# Patient Record
Sex: FEMALE | Race: WHITE | Employment: OTHER | URBAN - METROPOLITAN AREA
[De-identification: names, ages, dates, MRNs, and addresses within clinical notes are randomized per-mention and may not be internally consistent; named-entity substitution may affect disease eponyms.]

---

## 2018-04-13 PROBLEM — R29.810 FACIAL DROOP: Status: ACTIVE | Noted: 2018-04-13

## 2018-05-08 PROBLEM — Z95.0 PACEMAKER: Chronic | Status: ACTIVE | Noted: 2018-05-08

## 2018-05-08 PROBLEM — I50.42 CHRONIC COMBINED SYSTOLIC AND DIASTOLIC CHF (CONGESTIVE HEART FAILURE) (HCC): Chronic | Status: ACTIVE | Noted: 2018-05-08

## 2018-05-08 PROBLEM — R07.9 CHEST PAIN: Status: ACTIVE | Noted: 2018-05-08

## 2018-05-08 PROBLEM — D33.2 BRAIN TUMOR (BENIGN) (HCC): Status: ACTIVE | Noted: 2018-05-08

## 2018-05-08 PROBLEM — E66.9 OBESITY (BMI 30-39.9): Chronic | Status: ACTIVE | Noted: 2018-05-08

## 2018-05-08 PROBLEM — R29.810 FACIAL DROOP: Status: RESOLVED | Noted: 2018-04-13 | Resolved: 2018-05-08

## 2018-05-08 PROBLEM — D33.2 BRAIN TUMOR (BENIGN) (HCC): Chronic | Status: ACTIVE | Noted: 2018-05-08

## 2018-06-13 PROBLEM — I50.9 CHF (CONGESTIVE HEART FAILURE) (HCC): Status: ACTIVE | Noted: 2018-06-13

## 2018-06-14 PROBLEM — R07.89 ATYPICAL CHEST PAIN: Status: ACTIVE | Noted: 2018-05-08

## 2018-07-14 ENCOUNTER — APPOINTMENT (OUTPATIENT)
Dept: GENERAL RADIOLOGY | Age: 58
End: 2018-07-14
Payer: MEDICARE

## 2018-07-14 ENCOUNTER — HOSPITAL ENCOUNTER (EMERGENCY)
Age: 58
Discharge: HOME | End: 2018-07-15
Attending: EMERGENCY MEDICINE
Payer: MEDICARE

## 2018-07-14 DIAGNOSIS — R55 NEAR SYNCOPE: Primary | ICD-10-CM

## 2018-07-14 DIAGNOSIS — E86.0 DEHYDRATION: ICD-10-CM

## 2018-07-14 DIAGNOSIS — R73.9 HYPERGLYCEMIA: ICD-10-CM

## 2018-07-14 LAB
A/G RATIO: 1.4 (ref 1.1–2.2)
ALBUMIN SERPL-MCNC: 3.7 G/DL (ref 3.4–5)
ALP BLD-CCNC: 115 U/L (ref 40–129)
ALT SERPL-CCNC: 9 U/L (ref 10–40)
ANION GAP SERPL CALCULATED.3IONS-SCNC: 15 MMOL/L (ref 3–16)
AST SERPL-CCNC: 11 U/L (ref 15–37)
BASOPHILS ABSOLUTE: 0.1 K/UL (ref 0–0.2)
BASOPHILS RELATIVE PERCENT: 1.1 %
BILIRUB SERPL-MCNC: <0.2 MG/DL (ref 0–1)
BUN BLDV-MCNC: 11 MG/DL (ref 7–20)
CALCIUM SERPL-MCNC: 8.5 MG/DL (ref 8.3–10.6)
CHLORIDE BLD-SCNC: 94 MMOL/L (ref 99–110)
CO2: 19 MMOL/L (ref 21–32)
CREAT SERPL-MCNC: 0.7 MG/DL (ref 0.6–1.1)
EOSINOPHILS ABSOLUTE: 0.4 K/UL (ref 0–0.6)
EOSINOPHILS RELATIVE PERCENT: 5.6 %
GFR AFRICAN AMERICAN: >60
GFR NON-AFRICAN AMERICAN: >60
GLOBULIN: 2.7 G/DL
GLUCOSE BLD-MCNC: 598 MG/DL (ref 70–99)
HCT VFR BLD CALC: 38.8 % (ref 36–48)
HEMOGLOBIN: 13.2 G/DL (ref 12–16)
LYMPHOCYTES ABSOLUTE: 2.3 K/UL (ref 1–5.1)
LYMPHOCYTES RELATIVE PERCENT: 29 %
MCH RBC QN AUTO: 31.6 PG (ref 26–34)
MCHC RBC AUTO-ENTMCNC: 34.1 G/DL (ref 31–36)
MCV RBC AUTO: 92.6 FL (ref 80–100)
MONOCYTES ABSOLUTE: 0.6 K/UL (ref 0–1.3)
MONOCYTES RELATIVE PERCENT: 7.6 %
NEUTROPHILS ABSOLUTE: 4.4 K/UL (ref 1.7–7.7)
NEUTROPHILS RELATIVE PERCENT: 56.7 %
PDW BLD-RTO: 12.7 % (ref 12.4–15.4)
PLATELET # BLD: 243 K/UL (ref 135–450)
PMV BLD AUTO: 8.5 FL (ref 5–10.5)
POTASSIUM SERPL-SCNC: 4.2 MMOL/L (ref 3.5–5.1)
RBC # BLD: 4.19 M/UL (ref 4–5.2)
SODIUM BLD-SCNC: 128 MMOL/L (ref 136–145)
TOTAL PROTEIN: 6.4 G/DL (ref 6.4–8.2)
TROPONIN: <0.01 NG/ML
WBC # BLD: 7.8 K/UL (ref 4–11)

## 2018-07-14 PROCEDURE — 99284 EMERGENCY DEPT VISIT MOD MDM: CPT

## 2018-07-14 PROCEDURE — 85025 COMPLETE CBC W/AUTO DIFF WBC: CPT

## 2018-07-14 PROCEDURE — 93005 ELECTROCARDIOGRAM TRACING: CPT | Performed by: EMERGENCY MEDICINE

## 2018-07-14 PROCEDURE — 80053 COMPREHEN METABOLIC PANEL: CPT

## 2018-07-14 PROCEDURE — 71045 X-RAY EXAM CHEST 1 VIEW: CPT

## 2018-07-14 PROCEDURE — 84484 ASSAY OF TROPONIN QUANT: CPT

## 2018-07-14 ASSESSMENT — PAIN SCALES - GENERAL: PAINLEVEL_OUTOF10: 10

## 2018-07-14 ASSESSMENT — PAIN DESCRIPTION - LOCATION: LOCATION: CHEST

## 2018-07-15 VITALS
HEART RATE: 75 BPM | TEMPERATURE: 98.3 F | WEIGHT: 189 LBS | HEIGHT: 63 IN | RESPIRATION RATE: 18 BRPM | SYSTOLIC BLOOD PRESSURE: 122 MMHG | DIASTOLIC BLOOD PRESSURE: 87 MMHG | OXYGEN SATURATION: 95 % | BODY MASS INDEX: 33.49 KG/M2

## 2018-07-15 LAB
EKG ATRIAL RATE: 91 BPM
EKG DIAGNOSIS: NORMAL
EKG P AXIS: 23 DEGREES
EKG P-R INTERVAL: 166 MS
EKG Q-T INTERVAL: 392 MS
EKG QRS DURATION: 92 MS
EKG QTC CALCULATION (BAZETT): 482 MS
EKG R AXIS: -31 DEGREES
EKG T AXIS: 80 DEGREES
EKG VENTRICULAR RATE: 91 BPM

## 2018-07-15 PROCEDURE — 2580000003 HC RX 258: Performed by: EMERGENCY MEDICINE

## 2018-07-15 PROCEDURE — 96360 HYDRATION IV INFUSION INIT: CPT

## 2018-07-15 PROCEDURE — 93010 ELECTROCARDIOGRAM REPORT: CPT | Performed by: INTERNAL MEDICINE

## 2018-07-15 RX ORDER — 0.9 % SODIUM CHLORIDE 0.9 %
1000 INTRAVENOUS SOLUTION INTRAVENOUS ONCE
Status: COMPLETED | OUTPATIENT
Start: 2018-07-15 | End: 2018-07-15

## 2018-07-15 RX ADMIN — SODIUM CHLORIDE 1000 ML: 9 INJECTION, SOLUTION INTRAVENOUS at 01:30

## 2018-07-15 NOTE — ED NOTES
Bed: 17  Expected date:   Expected time:   Means of arrival:   Comments:     Monica Lozano RN  07/14/18 4720

## 2018-07-15 NOTE — ED PROVIDER NOTES
Emergency Department Encounter  [facility] ED    Patient: Gabriella Coats  MRN: [id number]  : 1960  Date of Evaluation: 2018  ED Provider: Da Simmons DO    Chief Complaint       Chief Complaint   Patient presents with    Loss of Consciousness     lightheadedness all day, chest pain \"for a long time\". h/o pacemaker/defibrillator. syncopal episode unknown if defibrillator fired. Johnson Elizabeth is a 65 y.o. female who presents to the emergency department For chief complaint of lightheadedness. Patient states that she's had syncopal episodes in the past multiple times, but has not had any for several months. She has a defibrillator in place because of her syncopal episodes but has been told that he knows why she is actually having syncopal episodes. She did not actually lose consciousness today but had several episodes where she felt lightheaded and denies any other significant symptoms associated with it such as fever, headache, cough, shortness of breath, nausea, vomiting, abdominal pain, changes in bowel movement or urinary symptoms. Patient has chest pain but states that she has had chest pain for years and is no different than she has had. She states she is not here for chest pain. Patient also notes that she is chronically \"high blood sugar\". ROS:     Review of Systems   All other systems reviewed and are negative.       Past History     Past Medical History:   Diagnosis Date    CAD (coronary artery disease)     Cerebral artery occlusion with cerebral infarction ([address]     Diabetes mellitus [facility]     Hyperlipidemia     Hypertension     MI (myocardial infarction)      Past Surgical History:   Procedure Laterality Date    BACK SURGERY      PACEMAKER INSERTION      TUMOR REMOVAL       Social History     Social History    Marital status:      Spouse name: N/A    Number of children: N/A    Years of education: N/A     Social History Main Topics    Smoking oriented to person, place, and time. She appears well-developed and well-nourished. No distress. Patient sit up in bed, talking normally appropriate. She doesn't appear to be in acute discomfort or distress. HENT:   Head: Normocephalic and atraumatic. Mouth/Throat: Oropharynx is clear and moist.   Eyes: EOM are normal. Pupils are equal, round, and reactive to light. Right eye exhibits no discharge. Left eye exhibits no discharge. Neck: Normal range of motion. Neck supple. No tracheal deviation present. Cardiovascular: Normal rate, regular rhythm, normal heart sounds and intact distal pulses. Exam reveals no gallop and no friction rub. No murmur heard. Pulmonary/Chest: Effort normal and breath sounds normal. No stridor. No respiratory distress. She has no wheezes. She has no rales. She exhibits no tenderness. Abdominal: Soft. She exhibits no distension. There is no tenderness. There is no rebound and no guarding. Musculoskeletal: Normal range of motion. She exhibits no edema, tenderness or deformity. Neurological: She is alert and oriented to person, place, and time. No cranial nerve deficit. Coordination normal.   Skin: Skin is warm and dry. No rash noted. She is not diaphoretic. No erythema. No pallor. Psychiatric: She has a normal mood and affect. Nursing note and vitals reviewed.       Diagnostics   Labs:  Results for orders placed or performed during the hospital encounter of 07/14/18   CBC Auto Differential   Result Value Ref Range    WBC 7.8 4.0 - 11.0 K/uL    RBC 4.19 4.00 - 5.20 M/uL    Hemoglobin 13.2 12.0 - 16.0 g/dL    Hematocrit 38.8 36.0 - 48.0 %    MCV 92.6 80.0 - 100.0 fL    MCH 31.6 26.0 - 34.0 pg    MCHC 34.1 31.0 - 36.0 g/dL    RDW 12.7 12.4 - 15.4 %    Platelets 782 861 - 893 K/uL    MPV 8.5 5.0 - 10.5 fL    Neutrophils % 56.7 %    Lymphocytes % 29.0 %    Monocytes % 7.6 %    Eosinophils % 5.6 %    Basophils % 1.1 %    Neutrophils # 4.4 1.7 - 7.7 K/uL    Lymphocytes # 2.3

## 2018-08-22 ENCOUNTER — APPOINTMENT (OUTPATIENT)
Dept: GENERAL RADIOLOGY | Age: 58
End: 2018-08-22
Payer: MEDICARE

## 2018-08-22 ENCOUNTER — HOSPITAL ENCOUNTER (EMERGENCY)
Age: 58
Discharge: PSYCHIATRIC OR SUBSTANCE USE FACILITY | End: 2018-08-23
Attending: EMERGENCY MEDICINE
Payer: MEDICARE

## 2018-08-22 VITALS
HEIGHT: 63 IN | WEIGHT: 181 LBS | SYSTOLIC BLOOD PRESSURE: 133 MMHG | BODY MASS INDEX: 32.07 KG/M2 | OXYGEN SATURATION: 96 % | RESPIRATION RATE: 21 BRPM | TEMPERATURE: 98.3 F | HEART RATE: 110 BPM | DIASTOLIC BLOOD PRESSURE: 69 MMHG

## 2018-08-22 DIAGNOSIS — Z79.4 DIABETES MELLITUS, TYPE II, INSULIN DEPENDENT (HCC): ICD-10-CM

## 2018-08-22 DIAGNOSIS — E11.65 POORLY CONTROLLED DIABETES MELLITUS (HCC): ICD-10-CM

## 2018-08-22 DIAGNOSIS — E11.9 DIABETES MELLITUS, TYPE II, INSULIN DEPENDENT (HCC): ICD-10-CM

## 2018-08-22 DIAGNOSIS — R45.851 DEPRESSION WITH SUICIDAL IDEATION: Primary | ICD-10-CM

## 2018-08-22 DIAGNOSIS — F32.A DEPRESSION WITH SUICIDAL IDEATION: Primary | ICD-10-CM

## 2018-08-22 LAB
A/G RATIO: 1.6 (ref 1.1–2.2)
ACETAMINOPHEN LEVEL: <5 UG/ML (ref 10–30)
ALBUMIN SERPL-MCNC: 4.6 G/DL (ref 3.4–5)
ALP BLD-CCNC: 111 U/L (ref 40–129)
ALT SERPL-CCNC: 8 U/L (ref 10–40)
AMPHETAMINE SCREEN, URINE: NORMAL
ANION GAP SERPL CALCULATED.3IONS-SCNC: 16 MMOL/L (ref 3–16)
AST SERPL-CCNC: 9 U/L (ref 15–37)
BARBITURATE SCREEN URINE: NORMAL
BASOPHILS ABSOLUTE: 0.2 K/UL (ref 0–0.2)
BASOPHILS RELATIVE PERCENT: 2.2 %
BENZODIAZEPINE SCREEN, URINE: NORMAL
BILIRUB SERPL-MCNC: 0.4 MG/DL (ref 0–1)
BUN BLDV-MCNC: 14 MG/DL (ref 7–20)
CALCIUM SERPL-MCNC: 9.4 MG/DL (ref 8.3–10.6)
CANNABINOID SCREEN URINE: NORMAL
CHLORIDE BLD-SCNC: 92 MMOL/L (ref 99–110)
CO2: 23 MMOL/L (ref 21–32)
COCAINE METABOLITE SCREEN URINE: NORMAL
CREAT SERPL-MCNC: <0.5 MG/DL (ref 0.6–1.1)
EOSINOPHILS ABSOLUTE: 0.1 K/UL (ref 0–0.6)
EOSINOPHILS RELATIVE PERCENT: 1.3 %
ETHANOL: NORMAL MG/DL (ref 0–0.08)
GFR AFRICAN AMERICAN: >60
GFR NON-AFRICAN AMERICAN: >60
GLOBULIN: 2.9 G/DL
GLUCOSE BLD-MCNC: 251 MG/DL (ref 70–99)
GLUCOSE BLD-MCNC: 303 MG/DL (ref 70–99)
HCT VFR BLD CALC: 46.3 % (ref 36–48)
HEMOGLOBIN: 15.7 G/DL (ref 12–16)
LYMPHOCYTES ABSOLUTE: 2.9 K/UL (ref 1–5.1)
LYMPHOCYTES RELATIVE PERCENT: 26.2 %
Lab: NORMAL
MCH RBC QN AUTO: 31 PG (ref 26–34)
MCHC RBC AUTO-ENTMCNC: 33.9 G/DL (ref 31–36)
MCV RBC AUTO: 91.4 FL (ref 80–100)
METHADONE SCREEN, URINE: NORMAL
MONOCYTES ABSOLUTE: 0.6 K/UL (ref 0–1.3)
MONOCYTES RELATIVE PERCENT: 5.1 %
NEUTROPHILS ABSOLUTE: 7.3 K/UL (ref 1.7–7.7)
NEUTROPHILS RELATIVE PERCENT: 65.2 %
OPIATE SCREEN URINE: NORMAL
OXYCODONE URINE: NORMAL
PDW BLD-RTO: 13 % (ref 12.4–15.4)
PERFORMED ON: ABNORMAL
PH UA: 5
PHENCYCLIDINE SCREEN URINE: NORMAL
PLATELET # BLD: 405 K/UL (ref 135–450)
PMV BLD AUTO: 8.7 FL (ref 5–10.5)
POTASSIUM SERPL-SCNC: 4.2 MMOL/L (ref 3.5–5.1)
PROPOXYPHENE SCREEN: NORMAL
RBC # BLD: 5.06 M/UL (ref 4–5.2)
SALICYLATE, SERUM: 10.6 MG/DL (ref 15–30)
SODIUM BLD-SCNC: 131 MMOL/L (ref 136–145)
TOTAL PROTEIN: 7.5 G/DL (ref 6.4–8.2)
WBC # BLD: 11.1 K/UL (ref 4–11)

## 2018-08-22 PROCEDURE — 2580000003 HC RX 258: Performed by: PHYSICIAN ASSISTANT

## 2018-08-22 PROCEDURE — 96360 HYDRATION IV INFUSION INIT: CPT

## 2018-08-22 PROCEDURE — G0480 DRUG TEST DEF 1-7 CLASSES: HCPCS

## 2018-08-22 PROCEDURE — 80053 COMPREHEN METABOLIC PANEL: CPT

## 2018-08-22 PROCEDURE — 96372 THER/PROPH/DIAG INJ SC/IM: CPT

## 2018-08-22 PROCEDURE — 80307 DRUG TEST PRSMV CHEM ANLYZR: CPT

## 2018-08-22 PROCEDURE — 93005 ELECTROCARDIOGRAM TRACING: CPT | Performed by: PHYSICIAN ASSISTANT

## 2018-08-22 PROCEDURE — 99285 EMERGENCY DEPT VISIT HI MDM: CPT

## 2018-08-22 PROCEDURE — 71046 X-RAY EXAM CHEST 2 VIEWS: CPT

## 2018-08-22 PROCEDURE — 85025 COMPLETE CBC W/AUTO DIFF WBC: CPT

## 2018-08-22 PROCEDURE — 6370000000 HC RX 637 (ALT 250 FOR IP): Performed by: EMERGENCY MEDICINE

## 2018-08-22 RX ORDER — 0.9 % SODIUM CHLORIDE 0.9 %
1000 INTRAVENOUS SOLUTION INTRAVENOUS ONCE
Status: COMPLETED | OUTPATIENT
Start: 2018-08-22 | End: 2018-08-22

## 2018-08-22 RX ADMIN — SODIUM CHLORIDE 1000 ML: 9 INJECTION, SOLUTION INTRAVENOUS at 15:35

## 2018-08-22 RX ADMIN — INSULIN LISPRO 16 UNITS: 100 INJECTION, SOLUTION INTRAVENOUS; SUBCUTANEOUS at 16:27

## 2018-08-22 NOTE — ED PROVIDER NOTES
name: N/A    Number of children: N/A    Years of education: N/A     Occupational History    Not on file. Social History Main Topics    Smoking status: Never Smoker    Smokeless tobacco: Never Used    Alcohol use No    Drug use: No    Sexual activity: Not on file     Other Topics Concern    Not on file     Social History Narrative    No narrative on file     No current facility-administered medications for this encounter.       Current Outpatient Prescriptions   Medication Sig Dispense Refill    ondansetron (ZOFRAN ODT) 4 MG disintegrating tablet Take 1 tablet by mouth every 8 hours as needed for Nausea 20 tablet 0    famotidine (PEPCID) 20 MG tablet Take 1 tablet by mouth 2 times daily 60 tablet 0    omeprazole (PRILOSEC) 20 MG delayed release capsule Take 1 capsule by mouth daily 30 capsule 0    topiramate (TOPAMAX) 200 MG tablet Take 1 tablet by mouth daily 60 tablet 0    DULoxetine (CYMBALTA) 20 MG extended release capsule Take 1 capsule by mouth 2 times daily 30 capsule 0    FLUoxetine (PROZAC) 10 MG capsule Take 1 capsule by mouth daily 30 capsule 3    metFORMIN (GLUCOPHAGE) 1000 MG tablet Take 1 tablet by mouth 2 times daily (with meals) 60 tablet 3    atorvastatin (LIPITOR) 40 MG tablet Take 1 tablet by mouth nightly 30 tablet 0    fenofibrate micronized (LOFIBRA) 200 MG capsule Take 1 capsule by mouth nightly 30 capsule 3    lisinopril (PRINIVIL;ZESTRIL) 2.5 MG tablet Take 1 tablet by mouth daily 30 tablet 3    carvedilol (COREG) 6.25 MG tablet Take 1 tablet by mouth 2 times daily (with meals) 60 tablet 0    clopidogrel (PLAVIX) 75 MG tablet Take 1 tablet by mouth daily 30 tablet 0    insulin glargine (LANTUS SOLOSTAR) 100 UNIT/ML injection pen Inject 35 Units into the skin 2 times daily 5 pen 3    insulin aspart (NOVOLOG FLEXPEN) 100 UNIT/ML injection pen Inject 20 Units into the skin 3 times daily (before meals) 5 pen 3    aspirin 81 MG EC tablet Take 1 tablet by mouth daily 30 tablet 3    Lancets MISC 1 month supply for TID fingersticks 100 each 3    Glucose Blood (BLOOD GLUCOSE TEST STRIPS) STRP 1 month supply for TID glucose checks 100 strip 3     No Known Allergies    REVIEW OF SYSTEMS  10 systems reviewed, pertinent positives per HPI otherwise noted to be negative. PHYSICAL EXAM  /69   Pulse 110   Temp 98.3 °F (36.8 °C)   Resp 21   Ht 5' 3\" (1.6 m)   Wt 181 lb (82.1 kg)   SpO2 96%   BMI 32.06 kg/m²  on room air  GENERAL APPEARANCE: Awake and alert. Cooperative. No acute distress. Obese  HEAD: Normocephalic. Atraumatic. EYES: PERRL. EOM's grossly intact. No scleral injection or icterus. ENT: Mucous membranes are moist.   NECK: Supple. No tracheal deviation. HEART: Tachycardic, pacemaker in place  LUNGS: Respirations unlabored. CTAB. Good air exchange. Speaking comfortably in full sentences. ABDOMEN: Soft. Non-distended. Non-tender. No guarding or rebound. Normal bowel sounds. EXTREMITIES: No peripheral edema. Moves all extremities equally. All extremities neurovascularly intact. SKIN: Warm and dry. No acute rashes. NEUROLOGICAL: Alert and oriented. +baseline facial droop. Strength 5/5, sensation intact. Normal coordination. Gait is steady. PSYCHIATRIC: Tearful on exam, appears well kept, makes appropriate eye contact. Indicates grief difficulties around loss of mother, father and sibling. Has a sister, lives with her nephew. Making plans for her future. Financial problems. Difficulty with compliance with diet for DM.+SI+SP to take pills. +remorseful +depression, denies any hallucinations. No change in appetitie or sleep patterns. LABS  I have reviewed all labs for this visit.    Results for orders placed or performed during the hospital encounter of 08/22/18   CBC auto differential   Result Value Ref Range    WBC 11.1 (H) 4.0 - 11.0 K/uL    RBC 5.06 4.00 - 5.20 M/uL    Hemoglobin 15.7 12.0 - 16.0 g/dL    Hematocrit 46.3 36.0 - 48.0 %    MCV 91.4 80.0 - Glucose   Result Value Ref Range    POC Glucose 303 (H) 70 - 99 mg/dl    Performed on ACCU-CHEK    EKG 12 Lead   Result Value Ref Range    Ventricular Rate 91 BPM    Atrial Rate 91 BPM    P-R Interval 150 ms    QRS Duration 88 ms    Q-T Interval 378 ms    QTc Calculation (Bazett) 464 ms    P Axis 47 degrees    R Axis 14 degrees    T Axis 77 degrees    Diagnosis       Normal sinus rhythmLow voltage QRSCannot rule out Anterior infarct (cited on or before 13-APR-2018)Nonspecific ST abnormalityAbnormal ECGWhen compared with ECG of 14-JUL-2018 22:48,Criteria for Inferior infarct are no longer PresentConfirmed by ALEX GASCA MD ([id number]) on 8/23/2018 8:05:01 AM     CONSULTATIONS: [facility]    ED COURSE/MDM  I have performed a medical clearance examination on this patient. It is my opinion that no medical conditions were discovered that would preclude admission to a behavioral health unit or discharge home. I feel that the patient is medically stable for disposition by the behavioral health team at this time. Afebrile, stable, no changes on EKG, Patient seen and evaluated. This patient was seen with Dr. Benita Bird. Old records reviewed. Labs and imaging reviewed and results discussed. Given IV fluids to address tachycardia, of 110 on arrival, which improved her symptoms on re-evaluation. Signed out In stable condition to attending ED provider, at [phone]. Patient was given the following medications in the ED:  Medications   0.9 % sodium chloride bolus (0 mLs Intravenous Stopped 8/22/18 1646)   insulin lispro (HUMALOG) injection vial 16 Units (16 Units Subcutaneous Given 8/22/18 1627)   insulin glargine (LANTUS) injection vial 35 Units (35 Units Subcutaneous Given 8/23/18 0242)     At this time, patient is ready for transfer in stable condition. CLINICAL IMPRESSION  1. Depression with suicidal ideation    2. Poorly controlled diabetes mellitus ([address]    3.  Diabetes mellitus, type II, insulin dependent ([address]        Blood

## 2018-08-23 LAB
EKG ATRIAL RATE: 91 BPM
EKG DIAGNOSIS: NORMAL
EKG P AXIS: 47 DEGREES
EKG P-R INTERVAL: 150 MS
EKG Q-T INTERVAL: 378 MS
EKG QRS DURATION: 88 MS
EKG QTC CALCULATION (BAZETT): 464 MS
EKG R AXIS: 14 DEGREES
EKG T AXIS: 77 DEGREES
EKG VENTRICULAR RATE: 91 BPM

## 2018-08-23 PROCEDURE — 93010 ELECTROCARDIOGRAM REPORT: CPT | Performed by: INTERNAL MEDICINE

## 2018-08-23 PROCEDURE — 96372 THER/PROPH/DIAG INJ SC/IM: CPT

## 2018-08-23 PROCEDURE — 6370000000 HC RX 637 (ALT 250 FOR IP): Performed by: EMERGENCY MEDICINE

## 2018-08-23 RX ORDER — INSULIN GLARGINE 100 [IU]/ML
35 INJECTION, SOLUTION SUBCUTANEOUS ONCE
Status: COMPLETED | OUTPATIENT
Start: 2018-08-23 | End: 2018-08-23

## 2018-08-23 RX ADMIN — INSULIN GLARGINE 35 UNITS: 100 INJECTION, SOLUTION SUBCUTANEOUS at 02:42

## 2018-08-23 NOTE — ED NOTES
Chief Complaint   Patient presents with    Suicidal     pt. states she is suicidal and \"wants to end it all\" states her plan is to take pills. pt. states she took aspirin yesterday in effort to kill herself but states \"it just made me throw up\". pt. admits to taking 13 324mg aspirin yesterday evening approx 1900. Pt. Is alert and oriented, belongings collected and pt. Placed in gown. Pt. Placed on hr/rr monitoring w/bp cycling q15 mins. Pt. Provided w/and oriented to the call light and is aware of plan of care at this time.      Rajiv Grant RN  08/22/18 8893
Dr. Coty Saucedo notified of need for note in chart prior to transfer.      [facility]  08/22/18 1415
[facility]. Verbal report provided to ambulance staff. Paperwork provided to transportation crew. Patient belongings provided to ambulance crew. Patient ambulated to [facility] and was secured by transportation crew. patient left [facility] ED with [facility] to [address] [place] [address]  08/23/18 7131
Patient is resting quietly in room.       Mp Michael RN  08/22/18 5664
Pt. Is alert and oriented, calm and cooperative w/sitter at bedside.  No needs assessed at this time      Dasia Suarez RN  08/22/18 3515
Pt. Is calm and cooperative at this time. Charge rn aware pt.  Requires 1:1.     Erickson Chahal RN  08/22/18 3887
Received call from [facility]. Patient has been accepted for inpatient admission to [facility]. She will be attended to by Dr. Traci Aburto and she will be admitted to the [facility] unit. The number [phone] was given to call report.       Landon Tian RN  08/23/18 8979
Report given to dandy Monae.       Robert Ablecody  08/22/18 0938
impairment    [x]  No outpatient services in place   []  Medication noncompliance   [x]  No collateral information to support safety      PROTECTIVE FACTORS:  [] Age >25 and <55  [x] Female gender   [] Denies depression  [] Denies suicidal ideation  [] Does not have lethal plan   [x] Does not have access to guns or weapons  [] Patient is verbally vicki for safety  [] No prior suicide attempts  [x] No active substance abuse  [x] Patient has social or family support  [x] No active psychosis or cognitive dysfunction  [] Physically healthy  [] Has outpatient services in place  [] Compliant with recommended medications  [] Collateral information from. .. supports patient safety   [] Patient is future oriented with plans to. .. Clinical Summary:    Patient presents to [facility] voluntarily after she was brought in by squad. Patient was clinically sober at the time of the evaluation. Patient was evaluated and offered supportive counseling. Pt states she became upset last evening and took 13 aspirin as a suicide attempt. Pt states, \"I just wanted to end it. \"  Pt states she struggles with the anniversary of her mother's death which will be 4 years next month. Pt also reports loss of her father and brother in the past few years. Pt states she moved back from [place] 6 months ago and is currently staying with her sister's son. Pt states she feels she is a burden to others and feels she is, \"In everyone's way. \" Pt denies current outpt tx and all hx of psychiatric admissions, Pt states she feels she has never grieved the losses from the past. Pt states she continues to feel hopeless and helpless. She denies all AVH, SA, and HI.              97 [facility]  08/22/18 1934

## 2018-09-17 ENCOUNTER — APPOINTMENT (OUTPATIENT)
Dept: CT IMAGING | Age: 58
End: 2018-09-17
Payer: MEDICARE

## 2018-09-17 ENCOUNTER — HOSPITAL ENCOUNTER (OUTPATIENT)
Age: 58
Setting detail: OBSERVATION
Discharge: HOME | End: 2018-09-18
Attending: EMERGENCY MEDICINE | Admitting: INTERNAL MEDICINE
Payer: MEDICARE

## 2018-09-17 DIAGNOSIS — R20.0 LEFT SIDED NUMBNESS: Primary | ICD-10-CM

## 2018-09-17 DIAGNOSIS — R29.810 FACIAL DROOP: ICD-10-CM

## 2018-09-17 PROBLEM — G45.9 TIA (TRANSIENT ISCHEMIC ATTACK): Status: ACTIVE | Noted: 2018-09-17

## 2018-09-17 LAB
ANION GAP SERPL CALCULATED.3IONS-SCNC: 15 MMOL/L (ref 3–16)
APTT: 26.9 SEC (ref 26–36)
BASOPHILS ABSOLUTE: 0.1 K/UL (ref 0–0.2)
BASOPHILS RELATIVE PERCENT: 1.3 %
BUN BLDV-MCNC: 15 MG/DL (ref 7–20)
CALCIUM SERPL-MCNC: 8.9 MG/DL (ref 8.3–10.6)
CHLORIDE BLD-SCNC: 101 MMOL/L (ref 99–110)
CO2: 16 MMOL/L (ref 21–32)
CREAT SERPL-MCNC: <0.5 MG/DL (ref 0.6–1.1)
EOSINOPHILS ABSOLUTE: 0.3 K/UL (ref 0–0.6)
EOSINOPHILS RELATIVE PERCENT: 3.7 %
GFR AFRICAN AMERICAN: >60
GFR NON-AFRICAN AMERICAN: >60
GLUCOSE BLD-MCNC: 189 MG/DL (ref 70–99)
GLUCOSE BLD-MCNC: 321 MG/DL (ref 70–99)
HCT VFR BLD CALC: 38.2 % (ref 36–48)
HEMOGLOBIN: 13.1 G/DL (ref 12–16)
INR BLD: 0.9 (ref 0.86–1.14)
LYMPHOCYTES ABSOLUTE: 1.9 K/UL (ref 1–5.1)
LYMPHOCYTES RELATIVE PERCENT: 26.5 %
MCH RBC QN AUTO: 30.8 PG (ref 26–34)
MCHC RBC AUTO-ENTMCNC: 34.2 G/DL (ref 31–36)
MCV RBC AUTO: 90.1 FL (ref 80–100)
MONOCYTES ABSOLUTE: 0.5 K/UL (ref 0–1.3)
MONOCYTES RELATIVE PERCENT: 6.3 %
NEUTROPHILS ABSOLUTE: 4.5 K/UL (ref 1.7–7.7)
NEUTROPHILS RELATIVE PERCENT: 62.2 %
PDW BLD-RTO: 13.2 % (ref 12.4–15.4)
PERFORMED ON: ABNORMAL
PLATELET # BLD: 265 K/UL (ref 135–450)
PMV BLD AUTO: 8 FL (ref 5–10.5)
POTASSIUM REFLEX MAGNESIUM: 4.5 MMOL/L (ref 3.5–5.1)
PROTHROMBIN TIME: 10.3 SEC (ref 9.8–13)
RBC # BLD: 4.24 M/UL (ref 4–5.2)
SODIUM BLD-SCNC: 132 MMOL/L (ref 136–145)
SPECIMEN STATUS: NORMAL
TROPONIN: <0.01 NG/ML
WBC # BLD: 7.2 K/UL (ref 4–11)

## 2018-09-17 PROCEDURE — 84484 ASSAY OF TROPONIN QUANT: CPT

## 2018-09-17 PROCEDURE — 96361 HYDRATE IV INFUSION ADD-ON: CPT

## 2018-09-17 PROCEDURE — 70450 CT HEAD/BRAIN W/O DYE: CPT

## 2018-09-17 PROCEDURE — G0378 HOSPITAL OBSERVATION PER HR: HCPCS

## 2018-09-17 PROCEDURE — 99285 EMERGENCY DEPT VISIT HI MDM: CPT

## 2018-09-17 PROCEDURE — 85025 COMPLETE CBC W/AUTO DIFF WBC: CPT

## 2018-09-17 PROCEDURE — 36415 COLL VENOUS BLD VENIPUNCTURE: CPT

## 2018-09-17 PROCEDURE — 85610 PROTHROMBIN TIME: CPT

## 2018-09-17 PROCEDURE — 85730 THROMBOPLASTIN TIME PARTIAL: CPT

## 2018-09-17 PROCEDURE — 93005 ELECTROCARDIOGRAM TRACING: CPT | Performed by: EMERGENCY MEDICINE

## 2018-09-17 PROCEDURE — 6370000000 HC RX 637 (ALT 250 FOR IP): Performed by: EMERGENCY MEDICINE

## 2018-09-17 PROCEDURE — 70496 CT ANGIOGRAPHY HEAD: CPT

## 2018-09-17 PROCEDURE — 80048 BASIC METABOLIC PNL TOTAL CA: CPT

## 2018-09-17 PROCEDURE — 6360000004 HC RX CONTRAST MEDICATION: Performed by: EMERGENCY MEDICINE

## 2018-09-17 PROCEDURE — 2580000003 HC RX 258: Performed by: EMERGENCY MEDICINE

## 2018-09-17 PROCEDURE — 6370000000 HC RX 637 (ALT 250 FOR IP): Performed by: NURSE PRACTITIONER

## 2018-09-17 PROCEDURE — 70498 CT ANGIOGRAPHY NECK: CPT

## 2018-09-17 PROCEDURE — 96374 THER/PROPH/DIAG INJ IV PUSH: CPT

## 2018-09-17 RX ORDER — FENOFIBRATE 160 MG/1
160 TABLET ORAL DAILY
Status: DISCONTINUED | OUTPATIENT
Start: 2018-09-18 | End: 2018-09-18 | Stop reason: HOSPADM

## 2018-09-17 RX ORDER — ASPIRIN 81 MG/1
324 TABLET, CHEWABLE ORAL ONCE
Status: DISCONTINUED | OUTPATIENT
Start: 2018-09-17 | End: 2018-09-17

## 2018-09-17 RX ORDER — DEXTROSE MONOHYDRATE 25 G/50ML
12.5 INJECTION, SOLUTION INTRAVENOUS PRN
Status: DISCONTINUED | OUTPATIENT
Start: 2018-09-17 | End: 2018-09-18 | Stop reason: HOSPADM

## 2018-09-17 RX ORDER — ONDANSETRON 2 MG/ML
4 INJECTION INTRAMUSCULAR; INTRAVENOUS EVERY 6 HOURS PRN
Status: DISCONTINUED | OUTPATIENT
Start: 2018-09-17 | End: 2018-09-18 | Stop reason: HOSPADM

## 2018-09-17 RX ORDER — DEXTROSE MONOHYDRATE 50 MG/ML
100 INJECTION, SOLUTION INTRAVENOUS PRN
Status: DISCONTINUED | OUTPATIENT
Start: 2018-09-17 | End: 2018-09-18 | Stop reason: HOSPADM

## 2018-09-17 RX ORDER — TOPIRAMATE 100 MG/1
200 TABLET, FILM COATED ORAL DAILY
Status: DISCONTINUED | OUTPATIENT
Start: 2018-09-18 | End: 2018-09-18 | Stop reason: HOSPADM

## 2018-09-17 RX ORDER — PANTOPRAZOLE SODIUM 40 MG/1
40 TABLET, DELAYED RELEASE ORAL
Status: DISCONTINUED | OUTPATIENT
Start: 2018-09-18 | End: 2018-09-18 | Stop reason: HOSPADM

## 2018-09-17 RX ORDER — SODIUM CHLORIDE 0.9 % (FLUSH) 0.9 %
10 SYRINGE (ML) INJECTION PRN
Status: DISCONTINUED | OUTPATIENT
Start: 2018-09-17 | End: 2018-09-18 | Stop reason: HOSPADM

## 2018-09-17 RX ORDER — ATORVASTATIN CALCIUM 40 MG/1
40 TABLET, FILM COATED ORAL NIGHTLY
Status: DISCONTINUED | OUTPATIENT
Start: 2018-09-17 | End: 2018-09-18 | Stop reason: HOSPADM

## 2018-09-17 RX ORDER — CARVEDILOL 6.25 MG/1
6.25 TABLET ORAL 2 TIMES DAILY WITH MEALS
Status: DISCONTINUED | OUTPATIENT
Start: 2018-09-18 | End: 2018-09-18 | Stop reason: HOSPADM

## 2018-09-17 RX ORDER — LABETALOL HYDROCHLORIDE 5 MG/ML
10 INJECTION, SOLUTION INTRAVENOUS EVERY 4 HOURS PRN
Status: DISCONTINUED | OUTPATIENT
Start: 2018-09-17 | End: 2018-09-18 | Stop reason: HOSPADM

## 2018-09-17 RX ORDER — FLUOXETINE 10 MG/1
10 CAPSULE ORAL DAILY
Status: DISCONTINUED | OUTPATIENT
Start: 2018-09-18 | End: 2018-09-18 | Stop reason: HOSPADM

## 2018-09-17 RX ORDER — INSULIN GLARGINE 100 [IU]/ML
35 INJECTION, SOLUTION SUBCUTANEOUS 2 TIMES DAILY
Status: DISCONTINUED | OUTPATIENT
Start: 2018-09-17 | End: 2018-09-18 | Stop reason: HOSPADM

## 2018-09-17 RX ORDER — CLOPIDOGREL BISULFATE 75 MG/1
300 TABLET ORAL ONCE
Status: COMPLETED | OUTPATIENT
Start: 2018-09-17 | End: 2018-09-17

## 2018-09-17 RX ORDER — NICOTINE POLACRILEX 4 MG
15 LOZENGE BUCCAL PRN
Status: DISCONTINUED | OUTPATIENT
Start: 2018-09-17 | End: 2018-09-18 | Stop reason: HOSPADM

## 2018-09-17 RX ORDER — ASPIRIN 81 MG/1
81 TABLET ORAL DAILY
Status: DISCONTINUED | OUTPATIENT
Start: 2018-09-18 | End: 2018-09-18 | Stop reason: HOSPADM

## 2018-09-17 RX ORDER — FAMOTIDINE 20 MG/1
20 TABLET, FILM COATED ORAL 2 TIMES DAILY
Status: DISCONTINUED | OUTPATIENT
Start: 2018-09-17 | End: 2018-09-18 | Stop reason: HOSPADM

## 2018-09-17 RX ORDER — DULOXETIN HYDROCHLORIDE 20 MG/1
20 CAPSULE, DELAYED RELEASE ORAL 2 TIMES DAILY
Status: DISCONTINUED | OUTPATIENT
Start: 2018-09-17 | End: 2018-09-18 | Stop reason: HOSPADM

## 2018-09-17 RX ORDER — CLOPIDOGREL BISULFATE 75 MG/1
75 TABLET ORAL DAILY
Status: DISCONTINUED | OUTPATIENT
Start: 2018-09-18 | End: 2018-09-18 | Stop reason: HOSPADM

## 2018-09-17 RX ORDER — SODIUM CHLORIDE 0.9 % (FLUSH) 0.9 %
10 SYRINGE (ML) INJECTION EVERY 12 HOURS SCHEDULED
Status: DISCONTINUED | OUTPATIENT
Start: 2018-09-17 | End: 2018-09-18 | Stop reason: HOSPADM

## 2018-09-17 RX ORDER — 0.9 % SODIUM CHLORIDE 0.9 %
1000 INTRAVENOUS SOLUTION INTRAVENOUS ONCE
Status: COMPLETED | OUTPATIENT
Start: 2018-09-17 | End: 2018-09-17

## 2018-09-17 RX ADMIN — DULOXETINE HYDROCHLORIDE 20 MG: 20 CAPSULE, DELAYED RELEASE ORAL at 23:59

## 2018-09-17 RX ADMIN — CLOPIDOGREL BISULFATE 300 MG: 75 TABLET ORAL at 19:44

## 2018-09-17 RX ADMIN — INSULIN HUMAN 10 UNITS: 100 INJECTION, SOLUTION PARENTERAL at 18:53

## 2018-09-17 RX ADMIN — SODIUM CHLORIDE 1000 ML: 9 INJECTION, SOLUTION INTRAVENOUS at 18:53

## 2018-09-17 RX ADMIN — ATORVASTATIN CALCIUM 40 MG: 40 TABLET, FILM COATED ORAL at 23:59

## 2018-09-17 RX ADMIN — INSULIN GLARGINE 35 UNITS: 100 INJECTION, SOLUTION SUBCUTANEOUS at 23:59

## 2018-09-17 RX ADMIN — FAMOTIDINE 20 MG: 20 TABLET, FILM COATED ORAL at 23:59

## 2018-09-17 RX ADMIN — IOPAMIDOL 75 ML: 755 INJECTION, SOLUTION INTRAVENOUS at 18:29

## 2018-09-17 NOTE — H&P
insight  Capillary Refill: Brisk,< 3 seconds   Peripheral Pulses: +2 palpable, equal bilaterally     Labs:     Recent Labs      09/17/18   1844   WBC  7.2   HGB  13.1   HCT  38.2   PLT  265     Recent Labs      09/17/18   1827   NA  132*   K  4.5   CL  101   CO2  16*   BUN  15   CREATININE  <0.5*   CALCIUM  8.9     Recent Labs      09/17/18   1827   INR  0.90     Recent Labs      09/17/18   1827   TROPONINI  <0.01     Radiology:     CXR: I have reviewed the CXR with the following interpretation: n/a  EKG:  I have reviewed the EKG with the following interpretation: Sinus rhythm with occasional Premature ventricular complexes Possible Inferior infarct , age undetermined Possible Anterior infarct (cited on or before 13-APR-2018) Abnormal ECG When compared with ECG of 22-AUG-2018 15:08,Premature ventricular complexes are now Present Nonspecific T wave abnormality, worse in Lateral leads    CTA NECK W CONTRAST   Preliminary Result   Patent head and neck arteries. Heterogeneous and enlarged thyroid      Recommend thyroid US. Reference: J Am Brando Radiol. 2015 Feb;12(2): 143-50         CTA HEAD W CONTRAST   Preliminary Result   Patent head and neck arteries. Heterogeneous and enlarged thyroid      Recommend thyroid US. Reference: J Am Brando Radiol. 2015 Feb;12(2): 143-50         CT Head WO Contrast   Final Result   No acute intracranial abnormality. Findings were discussed with Jcarlos MEYER at 6:25 pm on 9/17/2018. 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 9) [E66.9] 05/08/2018    CAD in native artery [I25.10]     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) [I63.50]     DM (diabetes mellitus) ([facility] [E11.9]     HTN

## 2018-09-17 NOTE — ED PROVIDER NOTES
3.2 oz (86.7 kg)   SpO2 96%   BMI 33.87 kg/m²   GENERAL APPEARANCE: Awake and alert. Cooperative. No acute distress. HEAD: Normocephalic. Atraumatic. EYES: PERRL. EOM's grossly intact. ENT: Mucous membranes are moist.   NECK: Supple. HEART: RRR. No murmurs. LUNGS: Respirations unlabored. CTAB. Good air exchange. Speaking comfortably in full sentences. ABDOMEN: Soft. Non-distended. Non-tender. No masses. No organomegaly. No guarding or rebound. EXTREMITIES: No peripheral edema. Moves all extremities equally  SKIN: Warm and dry. No acute rashes. NEUROLOGICAL: Alert and oriented. Please see NIH Stroke Scale below. PSYCHIATRIC: Normal mood and affect. NIH Stroke Scale     Interval: Baseline  Time: 1805  Person Administering Scale: Eva MEYER MD   Administer stroke scale items in the order listed. Record performance in each category after each subscale exam. Do not go back and change scores. Follow directions provided for each exam technique. Scores should reflect what the patient does, not what the clinician thinks the patient can do. The clinician should record answers while administering the exam and work quickly. Except where indicated, the patient should not be coached (i.e., repeated requests to patient to make a special effort). 1a  Level of consciousness: 0=alert; keenly responsive   1b. LOC questions:  0=Performs both tasks correctly   1c. LOC commands: 0=Performs both tasks correctly   2. Best Gaze: 0=normal   3. Visual: 0=No visual loss   4. Facial Palsy: 1=Minor paralysis (flattened nasolabial fold, asymmetric on smiling)   5a. Motor left arm: 0=No drift, limb holds 90 (or 45) degrees for full 10 seconds   5b. Motor right arm: 0=No drift, limb holds 90 (or 45) degrees for full 10 seconds   6a. motor left le=No drift, limb holds 90 (or 45) degrees for full 10 seconds   6b  Motor right le=No drift, limb holds 90 (or 45) degrees for full 10 seconds   7.  Limb Ataxia: 0=Absent   8. Sensory: 1=Mild to moderate sensory loss; patient feels pinprick is less sharp or is dull on the affected side; there is a loss of superficial pain with pinprick but patient is aware She is being touched   9. Best Language:  0=No aphasia, normal   10. Dysarthria: 0=Normal   11. Extinction and Inattention: 0=No abnormality   12. Distal motor function: 0=Normal    Total:  2     Modified Alexander Score - Assessing Disability From Stroke    Score: 1 - No significant disability despite symptoms; able to carry out all usual duties and activities    LABS  I have reviewed all labs for this visit.    Results for orders placed or performed during the hospital encounter of 15/90/50   Basic Metabolic Panel w/ Reflex to MG   Result Value Ref Range    Sodium 132 (L) 136 - 145 mmol/L    Potassium reflex Magnesium 4.5 3.5 - 5.1 mmol/L    Chloride 101 99 - 110 mmol/L    CO2 16 (L) 21 - 32 mmol/L    Anion Gap 15 3 - 16    Glucose 321 (H) 70 - 99 mg/dL    BUN 15 7 - 20 mg/dL    CREATININE <0.5 (L) 0.6 - 1.1 mg/dL    GFR Non-African American >60 >60    GFR African American >60 >60    Calcium 8.9 8.3 - 10.6 mg/dL   APTT   Result Value Ref Range    aPTT 26.9 26.0 - 36.0 sec   Troponin   Result Value Ref Range    Troponin <0.01 <0.01 ng/mL   Protime-INR   Result Value Ref Range    Protime 10.3 9.8 - 13.0 sec    INR 0.90 0.86 - 1.14   CBC Auto Differential   Result Value Ref Range    WBC 7.2 4.0 - 11.0 K/uL    RBC 4.24 4.00 - 5.20 M/uL    Hemoglobin 13.1 12.0 - 16.0 g/dL    Hematocrit 38.2 36.0 - 48.0 %    MCV 90.1 80.0 - 100.0 fL    MCH 30.8 26.0 - 34.0 pg    MCHC 34.2 31.0 - 36.0 g/dL    RDW 13.2 12.4 - 15.4 %    Platelets 861 129 - 226 K/uL    MPV 8.0 5.0 - 10.5 fL    Neutrophils % 62.2 %    Lymphocytes % 26.5 %    Monocytes % 6.3 %    Eosinophils % 3.7 %    Basophils % 1.3 %    Neutrophils # 4.5 1.7 - 7.7 K/uL    Lymphocytes # 1.9 1.0 - 5.1 K/uL    Monocytes # 0.5 0.0 - 1.3 K/uL    Eosinophils # 0.3 0.0 - 0.6 K/uL Basophils # 0.1 0.0 - 0.2 K/uL   Sample possible blood bank testing   Result Value Ref Range    Specimen Status DEANNE    EKG 12 lead   Result Value Ref Range    Ventricular Rate 87 BPM    Atrial Rate 87 BPM    P-R Interval 162 ms    QRS Duration 98 ms    Q-T Interval 406 ms    QTc Calculation (Bazett) 488 ms    P Axis 35 degrees    R Axis -12 degrees    T Axis 67 degrees    Diagnosis       Sinus rhythm with occasional Premature ventricular complexesPossible Inferior infarct , age undeterminedPossible Anterior infarct (cited on or before 13-APR-2018)Abnormal ECGWhen compared with ECG of 22-AUG-2018 15:08,Premature ventricular complexes are now PresentNonspecific T wave abnormality, worse in Lateral leads       EKG Interpretation    Interpreted by emergency department physician    My interpretation: Normal sinus rhythm rate 87, normal axis, normal intervals, no ST elevations or depressions      RADIOLOGY    CTA NECK W CONTRAST   Preliminary Result   Patent head and neck arteries. Heterogeneous and enlarged thyroid      Recommend thyroid US. Reference: J Am Deanne Radiol. 2015 Feb;12(2): 143-50         CTA HEAD W CONTRAST   Preliminary Result   Patent head and neck arteries. Heterogeneous and enlarged thyroid      Recommend thyroid US. Reference: J Am Deanne Radiol. 2015 Feb;12(2): 143-50         CT Head WO Contrast   Final Result   No acute intracranial abnormality. Findings were discussed with David MEYER at 6:25 pm on 9/17/2018. I discussed the CT imaging results with the radiologist, Dr. Garrett Santiago at 3070. TIMES:  Last Known Well: 1600   Arrival to ED: 4949  CT First Slice: 6481  CT Results: 1828  tPA Administration Time: NA  Door to Needle: NA  Time to ICU: NA  Stroke Team: Case discussed with  Stroke Team, Dr. Asif Franco at 0407. ED COURSE/MDM  Patient seen and evaluated. Old records reviewed. Labs and imaging reviewed and results discussed.    She presented to the emergency Department with complaints of left-sided weakness and numbness as well as facial droop. Patient was found to have numbness and facial droop on examination. Patient is not a candidate for TPA given the minor stroke symptoms that will not cause disability in the long-term. Patient had taken a 325 aspirin today. Patient will be given printed milligrams of Plavix. Dual antiplatelet therapy has been shown to be more effective than single antiplatelet therapy and a recent trial called the point trial.  Patient will need admission for continued workup including MRI/MRA. Hospitalist service was contacted and accepted the patient for admission. We thoroughly discussed the history, physical exam, laboratory and imaging studies, as well as, emergency department course. Based upon that discussion, we've decided to admit Paola Brand for further observation and evaluation of Stephanie Eisenberg's CVA-like symptoms. As I have deemed necessary from their history, physical and studies, I have considered and evaluated Paola Brand for the following diagnoses:  DIABETES, INTRACRANIAL HEMORRHAGE, MENINGITIS, SUBARACHNOID HEMORRHAGE, SUBDURAL HEMATOMA, & STROKE. t-PA NOT given due to the following EXCLUSION CRITERIA (only those checked):  [] Pregnancy  [] Symptoms > 3 hours of onset  [x] Minor or isolated neurological signs  [] Rapid improvement of stroke symptoms  [] Seizure at the same time of stroke symptoms  [] Active bleeding or acute trauma (fracture)  [] Presentation consistent with acute MI or post-MI pericarditis  [] Known intracranial neoplasm, AV malformation or aneurysm  [] CT evidence of intracranial hemorrhage  [] Any prior history of intracranial hemorrhage  [] Symptoms suggestive of subarachnoid hemorrhage (even if head CT normal)  [] Persistent hypertension (SBP>185 or DBP>110)  [] Glucose < 50 or > 400.   [] Bleeding diathesis, including but not limited to:   -Platelets < 254,572   -Heparin within 48 hours with PTT > normal range   -Current or recent use of anticoagulants (dabagtran, rivaroxaban, or warfarin with     INR > 1.7)  [] Lumbar puncture in past 7 days  [] Arterial puncture at a noncompressible site in past 7 days  [] Major surgery in past 14 days  [] Gastrointestinal or urinary tract hemorrhage in past 21 days  [] Myocardial infarction in past 3 months  [] Stroke, intracranial surgery or serious head trauma in past 3 months    t-PA given with the following INCLUSION CRITERIA verified (only those checked):  [] Age 25 years or older  [] Clinical diagnosis of ischemic stroke causing measurable neurological deficit  [] Administration of t-PA can be initiated within 3 hours of onset of symptoms  [] A patient or family members who understand the potential risks and benefits:   Of every 100 patients treated with tPA:   72 will have the same outcome   32 will have a better outcome   3 will have a worse outcome (with 1 being severely disabled or fatal) due to t-PA        CLINICAL IMPRESSION  1. Left sided numbness    2. Facial droop        Blood pressure 123/73, pulse 89, temperature 100.2 °F (37.9 °C), temperature source Oral, resp. rate 12, weight 191 lb 3.2 oz (86.7 kg), SpO2 96 %. Kaci Garrison was admitted in stable condition. Davina Oliver MD    Comment: Please note this report has been produced using speech recognition software and may contain errors related to that system including errors in grammar, punctuation, and spelling, as well as words and phrases that may be inappropriate. If there are any questions or concerns please feel free to contact the dictating provider for clarification.         Angi Richardson MD  09/17/18 1938       Angi Richardson MD  09/17/18 1971

## 2018-09-17 NOTE — ED NOTES
Bed: 02  Expected date:   Expected time:   Means of arrival:   Comments:     Dorene Fernandes RN  09/17/18 7015

## 2018-09-18 VITALS
RESPIRATION RATE: 16 BRPM | SYSTOLIC BLOOD PRESSURE: 86 MMHG | DIASTOLIC BLOOD PRESSURE: 45 MMHG | WEIGHT: 191.7 LBS | HEART RATE: 76 BPM | HEIGHT: 63 IN | TEMPERATURE: 98.4 F | BODY MASS INDEX: 33.96 KG/M2 | OXYGEN SATURATION: 97 %

## 2018-09-18 LAB
ANION GAP SERPL CALCULATED.3IONS-SCNC: 10 MMOL/L (ref 3–16)
BASOPHILS ABSOLUTE: 0.1 K/UL (ref 0–0.2)
BASOPHILS RELATIVE PERCENT: 1.2 %
BUN BLDV-MCNC: 13 MG/DL (ref 7–20)
CALCIUM SERPL-MCNC: 8.9 MG/DL (ref 8.3–10.6)
CHLORIDE BLD-SCNC: 104 MMOL/L (ref 99–110)
CHOLESTEROL, TOTAL: 199 MG/DL (ref 0–199)
CO2: 24 MMOL/L (ref 21–32)
CREAT SERPL-MCNC: <0.5 MG/DL (ref 0.6–1.1)
EOSINOPHILS ABSOLUTE: 0.2 K/UL (ref 0–0.6)
EOSINOPHILS RELATIVE PERCENT: 3.7 %
ESTIMATED AVERAGE GLUCOSE: 280.5 MG/DL
GFR AFRICAN AMERICAN: >60
GFR NON-AFRICAN AMERICAN: >60
GLUCOSE BLD-MCNC: 273 MG/DL (ref 70–99)
GLUCOSE BLD-MCNC: 296 MG/DL (ref 70–99)
HBA1C MFR BLD: 11.4 %
HCT VFR BLD CALC: 38.3 % (ref 36–48)
HDLC SERPL-MCNC: 38 MG/DL (ref 40–60)
HEMOGLOBIN: 13.2 G/DL (ref 12–16)
LDL CHOLESTEROL CALCULATED: 132 MG/DL
LYMPHOCYTES ABSOLUTE: 1.7 K/UL (ref 1–5.1)
LYMPHOCYTES RELATIVE PERCENT: 28.4 %
MCH RBC QN AUTO: 31.2 PG (ref 26–34)
MCHC RBC AUTO-ENTMCNC: 34.4 G/DL (ref 31–36)
MCV RBC AUTO: 90.8 FL (ref 80–100)
MONOCYTES ABSOLUTE: 0.4 K/UL (ref 0–1.3)
MONOCYTES RELATIVE PERCENT: 6.9 %
NEUTROPHILS ABSOLUTE: 3.6 K/UL (ref 1.7–7.7)
NEUTROPHILS RELATIVE PERCENT: 59.8 %
PDW BLD-RTO: 13.2 % (ref 12.4–15.4)
PERFORMED ON: ABNORMAL
PLATELET # BLD: 251 K/UL (ref 135–450)
PMV BLD AUTO: 8.6 FL (ref 5–10.5)
POTASSIUM REFLEX MAGNESIUM: 4.3 MMOL/L (ref 3.5–5.1)
RBC # BLD: 4.21 M/UL (ref 4–5.2)
SODIUM BLD-SCNC: 138 MMOL/L (ref 136–145)
TRIGL SERPL-MCNC: 143 MG/DL (ref 0–150)
VLDLC SERPL CALC-MCNC: 29 MG/DL
WBC # BLD: 6 K/UL (ref 4–11)

## 2018-09-18 PROCEDURE — G8987 SELF CARE CURRENT STATUS: HCPCS

## 2018-09-18 PROCEDURE — G8989 SELF CARE D/C STATUS: HCPCS

## 2018-09-18 PROCEDURE — 97161 PT EVAL LOW COMPLEX 20 MIN: CPT

## 2018-09-18 PROCEDURE — 2580000003 HC RX 258: Performed by: NURSE PRACTITIONER

## 2018-09-18 PROCEDURE — G8978 MOBILITY CURRENT STATUS: HCPCS

## 2018-09-18 PROCEDURE — 99219 PR INITIAL OBSERVATION CARE/DAY 50 MINUTES: CPT | Performed by: PSYCHIATRY & NEUROLOGY

## 2018-09-18 PROCEDURE — G0378 HOSPITAL OBSERVATION PER HR: HCPCS

## 2018-09-18 PROCEDURE — G8988 SELF CARE GOAL STATUS: HCPCS

## 2018-09-18 PROCEDURE — 93010 ELECTROCARDIOGRAM REPORT: CPT | Performed by: INTERNAL MEDICINE

## 2018-09-18 PROCEDURE — 83036 HEMOGLOBIN GLYCOSYLATED A1C: CPT

## 2018-09-18 PROCEDURE — 6370000000 HC RX 637 (ALT 250 FOR IP): Performed by: NURSE PRACTITIONER

## 2018-09-18 PROCEDURE — 85025 COMPLETE CBC W/AUTO DIFF WBC: CPT

## 2018-09-18 PROCEDURE — G8979 MOBILITY GOAL STATUS: HCPCS

## 2018-09-18 PROCEDURE — 97116 GAIT TRAINING THERAPY: CPT

## 2018-09-18 PROCEDURE — 97165 OT EVAL LOW COMPLEX 30 MIN: CPT

## 2018-09-18 PROCEDURE — 80048 BASIC METABOLIC PNL TOTAL CA: CPT

## 2018-09-18 PROCEDURE — G8980 MOBILITY D/C STATUS: HCPCS

## 2018-09-18 PROCEDURE — 97530 THERAPEUTIC ACTIVITIES: CPT

## 2018-09-18 PROCEDURE — 36415 COLL VENOUS BLD VENIPUNCTURE: CPT

## 2018-09-18 PROCEDURE — 80061 LIPID PANEL: CPT

## 2018-09-18 RX ADMIN — DULOXETINE HYDROCHLORIDE 20 MG: 20 CAPSULE, DELAYED RELEASE ORAL at 09:28

## 2018-09-18 RX ADMIN — CLOPIDOGREL BISULFATE 75 MG: 75 TABLET ORAL at 09:28

## 2018-09-18 RX ADMIN — ASPIRIN 81 MG: 81 TABLET, COATED ORAL at 09:28

## 2018-09-18 RX ADMIN — INSULIN LISPRO 2 UNITS: 100 INJECTION, SOLUTION INTRAVENOUS; SUBCUTANEOUS at 00:00

## 2018-09-18 RX ADMIN — FENOFIBRATE 160 MG: 160 TABLET ORAL at 09:54

## 2018-09-18 RX ADMIN — Medication 10 ML: at 09:39

## 2018-09-18 RX ADMIN — FAMOTIDINE 20 MG: 20 TABLET, FILM COATED ORAL at 09:28

## 2018-09-18 RX ADMIN — FLUOXETINE 10 MG: 10 CAPSULE ORAL at 09:28

## 2018-09-18 RX ADMIN — PANTOPRAZOLE SODIUM 40 MG: 40 TABLET, DELAYED RELEASE ORAL at 09:28

## 2018-09-18 RX ADMIN — TOPIRAMATE 200 MG: 100 TABLET, FILM COATED ORAL at 09:54

## 2018-09-18 RX ADMIN — Medication 10 ML: at 00:05

## 2018-09-18 RX ADMIN — INSULIN GLARGINE 35 UNITS: 100 INJECTION, SOLUTION SUBCUTANEOUS at 09:33

## 2018-09-18 RX ADMIN — INSULIN LISPRO 9 UNITS: 100 INJECTION, SOLUTION INTRAVENOUS; SUBCUTANEOUS at 09:34

## 2018-09-18 RX ADMIN — CARVEDILOL 6.25 MG: 6.25 TABLET, FILM COATED ORAL at 09:28

## 2018-09-18 NOTE — CARE COORDINATION
Patient does not have PCP. Provided a PCP list and also made referral to [facility] for post hospital follow up. Spoke with Sherron Gottron at [phone] and gave patient information of [facility].

## 2018-09-18 NOTE — DISCHARGE SUMMARY
enlarged heterogeneous thyroid on CTA neck  - will need outpatient thyroid US via PCP      Physical Exam Performed:     /76   Pulse 71   Temp 98.2 °F (36.8 °C) (Oral)   Resp 18   Ht 5' 3\" (1.6 m)   Wt 191 lb 11.2 oz (87 kg)   SpO2 98%   BMI 33.96 kg/m²       General appearance:  No apparent distress, appears stated age and cooperative. HEENT:  Normal cephalic, atraumatic without obvious deformity. Pupils equal, round, and reactive to light. Extra ocular muscles intact. Conjunctivae/corneas clear. Neck: Supple, with full range of motion. No jugular venous distention. Trachea midline. Respiratory:  Normal respiratory effort. Clear to auscultation, bilaterally without Rales/Wheezes/Rhonchi. Cardiovascular:  Regular rate and rhythm with normal S1/S2 without murmurs, rubs or gallops. Abdomen: Soft, non-tender, non-distended with normal bowel sounds. Musculoskeletal:  No clubbing, cyanosis or edema bilaterally. Full range of motion without deformity. Skin: Skin color, texture, turgor normal.  No rashes or lesions. Neurologic:  Neurovascularly intact without any focal sensory/motor deficits. Cranial nerves: II-XII intact, grossly non-focal.  Exam is inconsistent. With extra encouragement her LUE weakness resolves immediately. Psychiatric:  Alert and oriented, thought content appropriate, normal insight  Capillary Refill: Brisk,< 3 seconds   Peripheral Pulses: +2 palpable, equal bilaterally       Labs:  For convenience and continuity at follow-up the following most recent labs are provided:      CBC:    Lab Results   Component Value Date    WBC 6.0 09/18/2018    HGB 13.2 09/18/2018    HCT 38.3 09/18/2018     09/18/2018       Renal:    Lab Results   Component Value Date     09/18/2018    K 4.3 09/18/2018     09/18/2018    CO2 24 09/18/2018    BUN 13 09/18/2018    CREATININE <0.5 09/18/2018    CALCIUM 8.9 09/18/2018    PHOS 4.9 05/09/2018         Significant Diagnostic Studies    Radiology:   CTA NECK W CONTRAST   Preliminary Result   Patent head and neck arteries. Heterogeneous and enlarged thyroid      Recommend thyroid US. Reference: J Am Brando Radiol. 2015 Feb;12(2): 143-50         CTA HEAD W CONTRAST   Preliminary Result   Patent head and neck arteries. Heterogeneous and enlarged thyroid      Recommend thyroid US. Reference: J Am Brando Radiol. 2015 Feb;12(2): 143-50         CT Head WO Contrast   Final Result   No acute intracranial abnormality. Findings were discussed with Jcarlos MEYER at 6:25 pm on 9/17/2018. Consults:     IP CONSULT TO STROKE TEAM  IP CONSULT TO HOSPITALIST  IP CONSULT TO NEUROLOGY    Disposition:  home     Condition at Discharge: Stable    Discharge Instructions/Follow-up:  Follow up with PCP within 1-2 weeks.        Code Status:  Full Code     Activity: activity as tolerated    Diet: diabetic diet      Discharge Medications:     Current Discharge Medication List           Details   ondansetron (ZOFRAN ODT) 4 MG disintegrating tablet Take 1 tablet by mouth every 8 hours as needed for Nausea  Qty: 20 tablet, Refills: 0      famotidine (PEPCID) 20 MG tablet Take 1 tablet by mouth 2 times daily  Qty: 60 tablet, Refills: 0      omeprazole (PRILOSEC) 20 MG delayed release capsule Take 1 capsule by mouth daily  Qty: 30 capsule, Refills: 0      aspirin 81 MG EC tablet Take 1 tablet by mouth daily  Qty: 30 tablet, Refills: 3      topiramate (TOPAMAX) 200 MG tablet Take 1 tablet by mouth daily  Qty: 60 tablet, Refills: 0      DULoxetine (CYMBALTA) 20 MG extended release capsule Take 1 capsule by mouth 2 times daily  Qty: 30 capsule, Refills: 0      FLUoxetine (PROZAC) 10 MG capsule Take 1 capsule by mouth daily  Qty: 30 capsule, Refills: 3      metFORMIN (GLUCOPHAGE) 1000 MG tablet Take 1 tablet by mouth 2 times daily (with meals)  Qty: 60 tablet, Refills: 3      atorvastatin (LIPITOR) 40 MG tablet Take 1 tablet by

## 2018-09-18 NOTE — ED NOTES
Report given to C2 nurse, Martha Ivey CNP in room with patient.  Full NIHSS scale done on transfer with RN     Martín Partida RN  09/17/18 7280

## 2018-09-18 NOTE — PROGRESS NOTES
Consult called to Neurology at 21 [phone] on 9/17/18. RN aware.  -ES
requesting cane for home. Pt educated that cane can be bought at any local drug store and pt agreeable to have her sister stop on her way home from the hospital     Balance  Sitting - Static: Good  Sitting - Dynamic: Good  Standing - Static: Good  Standing - Dynamic: Fair;+        Assessment   Assessment: Pt is 62 yo female who presents with diagnosis of TIA. Pt indep-supervision for mobility and appears to be at baseline function. Evaluation demonstrating very inconsistent findings. Unclear if pt has any real deficits and appears to be at baseline function. Will d/c from PT due to no deficits identified. Prognosis: Good  Decision Making: Low Complexity  Patient Education: Role of PT, safety with mobility, POC, d/c recs, use of SPC; pt verbalized understanding  Barriers to Learning: none  No Skilled PT: At baseline function  REQUIRES PT FOLLOW UP: No  Activity Tolerance  Activity Tolerance: Patient Tolerated treatment well         Plan   Plan  Times per week: d/c acute PT  Safety Devices  Type of devices: All fall risk precautions in place, Bed alarm in place, Call light within reach, Gait belt, Patient at risk for falls, Nurse notified, Left in bed    G-Code  PT G-Codes  Functional Assessment Tool Used: [facility]  Score: 19  Functional Limitation: Mobility: Walking and moving around  Mobility: Walking and Moving Around Current Status (): At least 1 percent but less than 20 percent impaired, limited or restricted  Mobility: Walking and Moving Around Goal Status (): At least 1 percent but less than 20 percent impaired, limited or restricted  Mobility: Walking and Moving Around Discharge Status ():  At least 1 percent but less than 20 percent impaired, limited or restricted                     AM-PAC Score     AM-PAC Inpatient Mobility without Stair Climbing Raw Score : 19  AM-PAC Inpatient without Stair Climbing T-Scale Score : 56.04  Mobility Inpatient CMS 0-100% Score: 12.25  Mobility Inpatient without
Inpatient CMS G-Code Modifier : CI    Goals  Short term goals  Time Frame for Short term goals: 1 time visit   Short term goal 1: Pt will complete functional transfers with Mod I or better - GOAL MET   Short term goal 2: Pt will demonstrate ability to reach to face and feet with independence in order to complete ADL independently - GOAL MET  Patient Goals   Patient goals : \"to go home\"        Therapy Time   Individual Concurrent Group Co-treatment   Time In 0805 (10 minutes for eval)         Time Out 0825         Minutes 20         Timed Code Treatment Minutes: 10 Minutes       STACIA Renae/L

## 2018-09-18 NOTE — CONSULTS
FRITZ - CNP        dextrose 50 % solution 12.5 g  12.5 g Intravenous PRN FRITZ Skinner CNP        glucagon (rDNA) injection 1 mg  1 mg Intramuscular PRN FRITZ Skinner CNP        dextrose 5 % solution  100 mL/hr Intravenous PRN FRITZ Skinner CNP        labetalol (NORMODYNE;TRANDATE) injection 10 mg  10 mg Intravenous Q4H PRN FRITZ Skinner CNP           ROS : A 10-12 system review of constitutional, cardiovascular, respiratory, musculoskeletal, endocrine, skin, hematological, SHEENT, genitourinary, psychiatric and neurologic systems was obtained and updated today and is unremarkable except as mentioned in my HPI      Exam:   Constitutional:   Vitals:    09/17/18 2302 09/18/18 0338 09/18/18 0629 09/18/18 0644   BP: 106/65 105/66  118/76   Pulse: 74 75  71   Resp: 16 14  18   Temp: 98.3 °F (36.8 °C) 97.9 °F (36.6 °C)  98.2 °F (36.8 °C)   TempSrc: Oral Oral  Oral   SpO2: 97% 96%  98%   Weight:   191 lb 11.2 oz (87 kg)    Height:           General appearance: NAD  Eye: No icterus. No blurring of optic disc. Neck: supple  Cardiovascular: No carotid bruit. No lower leg edema with good pulsation. Mental Status: Oriented to person, place, problem, and time. Fluent speech. Poor fund of knowledge. Normal attention span and concentration. Cranial Nerves:   II: Visual fields: Full to confrontation  III: Pupils: equal, round, reactive to light  III,IV,VI: Extra Ocular Movements are intact. No nystagmus  V: Facial sensation is intact to pin prick and light touch  VII: Facial strength and movements: intact and symmetric  VIII: Hearing: Intact to finger rub bilaterally  IX: Palate elevation is symmetric  XI: Shoulder shrug is intact  XII: Tongue movements are normal  Musculoskeletal: 5/5 in all 4 extremities. Normal tone. Reflexes: Bilateral biceps 2/4, triceps 2/4, brachial radialis 2/4, knee 2/4 and ankle 2/4. Planters: flexor bilaterally.   Coordination: no pronator drift, no dysmetria with FNF testing. Sensation: normal to all modalities. Gait/Posture: steady    Data:  LABS:   Lab Results   Component Value Date     09/18/2018    K 4.3 09/18/2018     09/18/2018    CO2 24 09/18/2018    BUN 13 09/18/2018    CREATININE <0.5 09/18/2018    GFRAA >60 09/18/2018    LABGLOM >60 09/18/2018    GLUCOSE 296 09/18/2018    PHOS 4.9 05/09/2018    MG 1.90 05/09/2018    CALCIUM 8.9 09/18/2018     Lab Results   Component Value Date    WBC 6.0 09/18/2018    RBC 4.21 09/18/2018    HGB 13.2 09/18/2018    HCT 38.3 09/18/2018    MCV 90.8 09/18/2018    RDW 13.2 09/18/2018     09/18/2018     Lab Results   Component Value Date    INR 0.90 09/17/2018    PROTIME 10.3 09/17/2018       Neuroimaging and/or  labs reviewed by me and discussed results with the patient. Impression:  Acute left facial droop and numbness seems to be improving. Possible TIA  Diabetes, poorly controlled. A1c 11.4  Hypertension  Hyperlipidemia  CAD       Recommendation:  Can not have MRI brain  DAPT  ISS  BS monitor  PT and OT  Monitor BP and continue with the same BP medications. Hold BP medications if BP below 120/80  Statin  Stroke prevention was discussed in details with the patient  Follow troponin  Last echo was in April showed EF of 45%. Consider event monitor if symptoms recur. DC planning when medically stable           Thank you for referring such patient. If you have any questions regarding my consult note, please don't hesitate to call me. Oswald Hair MD  [phone]    This dictation was generated by voice recognition computer software.  Although all attempts are made to edit the dictation for accuracy, there may be errors in the  transcription that are not intended

## 2018-09-19 LAB
EKG ATRIAL RATE: 87 BPM
EKG DIAGNOSIS: NORMAL
EKG P AXIS: 35 DEGREES
EKG P-R INTERVAL: 162 MS
EKG Q-T INTERVAL: 406 MS
EKG QRS DURATION: 98 MS
EKG QTC CALCULATION (BAZETT): 488 MS
EKG R AXIS: -12 DEGREES
EKG T AXIS: 67 DEGREES
EKG VENTRICULAR RATE: 87 BPM

## 2018-12-09 ENCOUNTER — APPOINTMENT (OUTPATIENT)
Dept: GENERAL RADIOLOGY | Age: 58
End: 2018-12-09
Payer: MEDICARE

## 2018-12-09 ENCOUNTER — HOSPITAL ENCOUNTER (OUTPATIENT)
Age: 58
Setting detail: OBSERVATION
Discharge: HOME | End: 2018-12-12
Attending: EMERGENCY MEDICINE | Admitting: INTERNAL MEDICINE
Payer: MEDICARE

## 2018-12-09 DIAGNOSIS — R07.9 CHEST PAIN, UNSPECIFIED TYPE: Primary | ICD-10-CM

## 2018-12-09 DIAGNOSIS — R73.9 HYPERGLYCEMIA: ICD-10-CM

## 2018-12-09 LAB
A/G RATIO: 1.6 (ref 1.1–2.2)
ALBUMIN SERPL-MCNC: 4.1 G/DL (ref 3.4–5)
ALP BLD-CCNC: 83 U/L (ref 40–129)
ALT SERPL-CCNC: 10 U/L (ref 10–40)
ANION GAP SERPL CALCULATED.3IONS-SCNC: 17 MMOL/L (ref 3–16)
AST SERPL-CCNC: 7 U/L (ref 15–37)
BASOPHILS ABSOLUTE: 0.1 K/UL (ref 0–0.2)
BASOPHILS RELATIVE PERCENT: 1.1 %
BILIRUB SERPL-MCNC: <0.2 MG/DL (ref 0–1)
BUN BLDV-MCNC: 14 MG/DL (ref 7–20)
CALCIUM SERPL-MCNC: 9.1 MG/DL (ref 8.3–10.6)
CHLORIDE BLD-SCNC: 96 MMOL/L (ref 99–110)
CO2: 21 MMOL/L (ref 21–32)
CREAT SERPL-MCNC: <0.5 MG/DL (ref 0.6–1.1)
D DIMER: <200 NG/ML DDU (ref 0–229)
EOSINOPHILS ABSOLUTE: 0.1 K/UL (ref 0–0.6)
EOSINOPHILS RELATIVE PERCENT: 1.7 %
GFR AFRICAN AMERICAN: >60
GFR NON-AFRICAN AMERICAN: >60
GLOBULIN: 2.6 G/DL
GLUCOSE BLD-MCNC: 252 MG/DL (ref 70–99)
GLUCOSE BLD-MCNC: 381 MG/DL (ref 70–99)
HCT VFR BLD CALC: 41.6 % (ref 36–48)
HEMOGLOBIN: 14.3 G/DL (ref 12–16)
LYMPHOCYTES ABSOLUTE: 2.3 K/UL (ref 1–5.1)
LYMPHOCYTES RELATIVE PERCENT: 28.7 %
MCH RBC QN AUTO: 32.2 PG (ref 26–34)
MCHC RBC AUTO-ENTMCNC: 34.4 G/DL (ref 31–36)
MCV RBC AUTO: 93.6 FL (ref 80–100)
MONOCYTES ABSOLUTE: 0.6 K/UL (ref 0–1.3)
MONOCYTES RELATIVE PERCENT: 7.5 %
NEUTROPHILS ABSOLUTE: 4.8 K/UL (ref 1.7–7.7)
NEUTROPHILS RELATIVE PERCENT: 61 %
PDW BLD-RTO: 13.4 % (ref 12.4–15.4)
PERFORMED ON: ABNORMAL
PLATELET # BLD: 322 K/UL (ref 135–450)
PMV BLD AUTO: 8.5 FL (ref 5–10.5)
POTASSIUM SERPL-SCNC: 4.2 MMOL/L (ref 3.5–5.1)
RBC # BLD: 4.45 M/UL (ref 4–5.2)
SODIUM BLD-SCNC: 134 MMOL/L (ref 136–145)
TOTAL PROTEIN: 6.7 G/DL (ref 6.4–8.2)
TROPONIN: <0.01 NG/ML
WBC # BLD: 7.9 K/UL (ref 4–11)

## 2018-12-09 PROCEDURE — 6370000000 HC RX 637 (ALT 250 FOR IP): Performed by: NURSE PRACTITIONER

## 2018-12-09 PROCEDURE — 96374 THER/PROPH/DIAG INJ IV PUSH: CPT

## 2018-12-09 PROCEDURE — G0378 HOSPITAL OBSERVATION PER HR: HCPCS

## 2018-12-09 PROCEDURE — 85025 COMPLETE CBC W/AUTO DIFF WBC: CPT

## 2018-12-09 PROCEDURE — 36415 COLL VENOUS BLD VENIPUNCTURE: CPT

## 2018-12-09 PROCEDURE — S0028 INJECTION, FAMOTIDINE, 20 MG: HCPCS | Performed by: EMERGENCY MEDICINE

## 2018-12-09 PROCEDURE — 6360000002 HC RX W HCPCS: Performed by: NURSE PRACTITIONER

## 2018-12-09 PROCEDURE — 96376 TX/PRO/DX INJ SAME DRUG ADON: CPT

## 2018-12-09 PROCEDURE — 93005 ELECTROCARDIOGRAM TRACING: CPT | Performed by: NURSE PRACTITIONER

## 2018-12-09 PROCEDURE — 2580000003 HC RX 258: Performed by: NURSE PRACTITIONER

## 2018-12-09 PROCEDURE — 96375 TX/PRO/DX INJ NEW DRUG ADDON: CPT

## 2018-12-09 PROCEDURE — 99285 EMERGENCY DEPT VISIT HI MDM: CPT

## 2018-12-09 PROCEDURE — 96361 HYDRATE IV INFUSION ADD-ON: CPT

## 2018-12-09 PROCEDURE — 2500000003 HC RX 250 WO HCPCS: Performed by: EMERGENCY MEDICINE

## 2018-12-09 PROCEDURE — 80053 COMPREHEN METABOLIC PANEL: CPT

## 2018-12-09 PROCEDURE — 2580000003 HC RX 258: Performed by: EMERGENCY MEDICINE

## 2018-12-09 PROCEDURE — 85379 FIBRIN DEGRADATION QUANT: CPT

## 2018-12-09 PROCEDURE — 84484 ASSAY OF TROPONIN QUANT: CPT

## 2018-12-09 PROCEDURE — 6360000002 HC RX W HCPCS: Performed by: EMERGENCY MEDICINE

## 2018-12-09 PROCEDURE — 71046 X-RAY EXAM CHEST 2 VIEWS: CPT

## 2018-12-09 RX ORDER — DULOXETIN HYDROCHLORIDE 20 MG/1
20 CAPSULE, DELAYED RELEASE ORAL 2 TIMES DAILY
Status: DISCONTINUED | OUTPATIENT
Start: 2018-12-09 | End: 2018-12-12 | Stop reason: HOSPADM

## 2018-12-09 RX ORDER — DEXTROSE MONOHYDRATE 50 MG/ML
100 INJECTION, SOLUTION INTRAVENOUS PRN
Status: DISCONTINUED | OUTPATIENT
Start: 2018-12-09 | End: 2018-12-12 | Stop reason: HOSPADM

## 2018-12-09 RX ORDER — KETOROLAC TROMETHAMINE 30 MG/ML
30 INJECTION, SOLUTION INTRAMUSCULAR; INTRAVENOUS ONCE
Status: COMPLETED | OUTPATIENT
Start: 2018-12-09 | End: 2018-12-09

## 2018-12-09 RX ORDER — ONDANSETRON 2 MG/ML
4 INJECTION INTRAMUSCULAR; INTRAVENOUS ONCE
Status: COMPLETED | OUTPATIENT
Start: 2018-12-09 | End: 2018-12-09

## 2018-12-09 RX ORDER — MORPHINE SULFATE 2 MG/ML
2 INJECTION, SOLUTION INTRAMUSCULAR; INTRAVENOUS EVERY 4 HOURS PRN
Status: DISCONTINUED | OUTPATIENT
Start: 2018-12-09 | End: 2018-12-11

## 2018-12-09 RX ORDER — PANTOPRAZOLE SODIUM 40 MG/1
40 TABLET, DELAYED RELEASE ORAL
Status: DISCONTINUED | OUTPATIENT
Start: 2018-12-10 | End: 2018-12-12 | Stop reason: HOSPADM

## 2018-12-09 RX ORDER — FLUOXETINE 10 MG/1
10 CAPSULE ORAL DAILY
Status: DISCONTINUED | OUTPATIENT
Start: 2018-12-09 | End: 2018-12-12 | Stop reason: HOSPADM

## 2018-12-09 RX ORDER — 0.9 % SODIUM CHLORIDE 0.9 %
1000 INTRAVENOUS SOLUTION INTRAVENOUS ONCE
Status: COMPLETED | OUTPATIENT
Start: 2018-12-09 | End: 2018-12-09

## 2018-12-09 RX ORDER — ONDANSETRON 2 MG/ML
4 INJECTION INTRAMUSCULAR; INTRAVENOUS EVERY 6 HOURS PRN
Status: DISCONTINUED | OUTPATIENT
Start: 2018-12-09 | End: 2018-12-12 | Stop reason: HOSPADM

## 2018-12-09 RX ORDER — CARVEDILOL 6.25 MG/1
6.25 TABLET ORAL 2 TIMES DAILY WITH MEALS
Status: DISCONTINUED | OUTPATIENT
Start: 2018-12-09 | End: 2018-12-12 | Stop reason: HOSPADM

## 2018-12-09 RX ORDER — ATORVASTATIN CALCIUM 40 MG/1
40 TABLET, FILM COATED ORAL NIGHTLY
Status: DISCONTINUED | OUTPATIENT
Start: 2018-12-09 | End: 2018-12-12 | Stop reason: HOSPADM

## 2018-12-09 RX ORDER — LISINOPRIL 5 MG/1
2.5 TABLET ORAL DAILY
Status: DISCONTINUED | OUTPATIENT
Start: 2018-12-09 | End: 2018-12-12 | Stop reason: HOSPADM

## 2018-12-09 RX ORDER — MORPHINE SULFATE 2 MG/ML
4 INJECTION, SOLUTION INTRAMUSCULAR; INTRAVENOUS
Status: DISCONTINUED | OUTPATIENT
Start: 2018-12-09 | End: 2018-12-10

## 2018-12-09 RX ORDER — SODIUM CHLORIDE 0.9 % (FLUSH) 0.9 %
10 SYRINGE (ML) INJECTION PRN
Status: DISCONTINUED | OUTPATIENT
Start: 2018-12-09 | End: 2018-12-12 | Stop reason: HOSPADM

## 2018-12-09 RX ORDER — ASPIRIN 81 MG/1
81 TABLET ORAL DAILY
Status: DISCONTINUED | OUTPATIENT
Start: 2018-12-09 | End: 2018-12-12 | Stop reason: HOSPADM

## 2018-12-09 RX ORDER — DEXTROSE MONOHYDRATE 25 G/50ML
12.5 INJECTION, SOLUTION INTRAVENOUS PRN
Status: DISCONTINUED | OUTPATIENT
Start: 2018-12-09 | End: 2018-12-12 | Stop reason: HOSPADM

## 2018-12-09 RX ORDER — TOPIRAMATE 100 MG/1
200 TABLET, FILM COATED ORAL DAILY
Status: DISCONTINUED | OUTPATIENT
Start: 2018-12-09 | End: 2018-12-12 | Stop reason: HOSPADM

## 2018-12-09 RX ORDER — SODIUM CHLORIDE 0.9 % (FLUSH) 0.9 %
10 SYRINGE (ML) INJECTION EVERY 12 HOURS SCHEDULED
Status: DISCONTINUED | OUTPATIENT
Start: 2018-12-09 | End: 2018-12-12 | Stop reason: HOSPADM

## 2018-12-09 RX ORDER — FAMOTIDINE 20 MG/1
20 TABLET, FILM COATED ORAL 2 TIMES DAILY
Status: DISCONTINUED | OUTPATIENT
Start: 2018-12-09 | End: 2018-12-12 | Stop reason: HOSPADM

## 2018-12-09 RX ORDER — CLOPIDOGREL BISULFATE 75 MG/1
75 TABLET ORAL DAILY
Status: DISCONTINUED | OUTPATIENT
Start: 2018-12-09 | End: 2018-12-12

## 2018-12-09 RX ORDER — NITROGLYCERIN 0.4 MG/1
0.4 TABLET SUBLINGUAL EVERY 5 MIN PRN
Status: COMPLETED | OUTPATIENT
Start: 2018-12-09 | End: 2018-12-09

## 2018-12-09 RX ORDER — INSULIN GLARGINE 100 [IU]/ML
35 INJECTION, SOLUTION SUBCUTANEOUS 2 TIMES DAILY
Status: DISCONTINUED | OUTPATIENT
Start: 2018-12-09 | End: 2018-12-12 | Stop reason: HOSPADM

## 2018-12-09 RX ORDER — NICOTINE POLACRILEX 4 MG
15 LOZENGE BUCCAL PRN
Status: DISCONTINUED | OUTPATIENT
Start: 2018-12-09 | End: 2018-12-12 | Stop reason: HOSPADM

## 2018-12-09 RX ORDER — FENOFIBRATE 160 MG/1
160 TABLET ORAL DAILY
Status: DISCONTINUED | OUTPATIENT
Start: 2018-12-09 | End: 2018-12-12 | Stop reason: HOSPADM

## 2018-12-09 RX ADMIN — CLOPIDOGREL BISULFATE 75 MG: 75 TABLET ORAL at 21:14

## 2018-12-09 RX ADMIN — ONDANSETRON 4 MG: 2 INJECTION INTRAMUSCULAR; INTRAVENOUS at 19:01

## 2018-12-09 RX ADMIN — MORPHINE SULFATE 4 MG: 2 INJECTION, SOLUTION INTRAMUSCULAR; INTRAVENOUS at 19:01

## 2018-12-09 RX ADMIN — NITROGLYCERIN 0.4 MG: 0.4 TABLET, ORALLY DISINTEGRATING SUBLINGUAL at 23:08

## 2018-12-09 RX ADMIN — ATORVASTATIN CALCIUM 40 MG: 40 TABLET, FILM COATED ORAL at 21:01

## 2018-12-09 RX ADMIN — FAMOTIDINE 20 MG: 20 TABLET ORAL at 21:01

## 2018-12-09 RX ADMIN — MORPHINE SULFATE 2 MG: 2 INJECTION, SOLUTION INTRAMUSCULAR; INTRAVENOUS at 21:16

## 2018-12-09 RX ADMIN — CARVEDILOL 6.25 MG: 6.25 TABLET, FILM COATED ORAL at 21:14

## 2018-12-09 RX ADMIN — NITROGLYCERIN 0.4 MG: 0.4 TABLET, ORALLY DISINTEGRATING SUBLINGUAL at 23:15

## 2018-12-09 RX ADMIN — INSULIN LISPRO 5 UNITS: 100 INJECTION, SOLUTION INTRAVENOUS; SUBCUTANEOUS at 21:26

## 2018-12-09 RX ADMIN — INSULIN GLARGINE 35 UNITS: 100 INJECTION, SOLUTION SUBCUTANEOUS at 21:25

## 2018-12-09 RX ADMIN — LISINOPRIL 2.5 MG: 5 TABLET ORAL at 21:14

## 2018-12-09 RX ADMIN — FLUOXETINE 10 MG: 10 CAPSULE ORAL at 21:14

## 2018-12-09 RX ADMIN — SODIUM CHLORIDE, PRESERVATIVE FREE 10 ML: 5 INJECTION INTRAVENOUS at 21:15

## 2018-12-09 RX ADMIN — FENOFIBRATE 160 MG: 160 TABLET ORAL at 21:14

## 2018-12-09 RX ADMIN — TOPIRAMATE 200 MG: 100 TABLET, FILM COATED ORAL at 21:31

## 2018-12-09 RX ADMIN — KETOROLAC TROMETHAMINE 30 MG: 30 INJECTION, SOLUTION INTRAMUSCULAR at 19:22

## 2018-12-09 RX ADMIN — FAMOTIDINE 20 MG: 10 INJECTION, SOLUTION INTRAVENOUS at 19:22

## 2018-12-09 RX ADMIN — DULOXETINE HYDROCHLORIDE 20 MG: 20 CAPSULE, DELAYED RELEASE ORAL at 21:01

## 2018-12-09 RX ADMIN — SODIUM CHLORIDE 1000 ML: 9 INJECTION, SOLUTION INTRAVENOUS at 19:21

## 2018-12-09 RX ADMIN — NITROGLYCERIN 0.4 MG: 0.4 TABLET, ORALLY DISINTEGRATING SUBLINGUAL at 23:03

## 2018-12-09 RX ADMIN — ASPIRIN 81 MG: 81 TABLET, COATED ORAL at 21:14

## 2018-12-09 ASSESSMENT — PAIN SCALES - GENERAL
PAINLEVEL_OUTOF10: 10

## 2018-12-09 ASSESSMENT — PAIN DESCRIPTION - LOCATION
LOCATION: CHEST
LOCATION: CHEST

## 2018-12-09 ASSESSMENT — PAIN DESCRIPTION - ORIENTATION: ORIENTATION: RIGHT

## 2018-12-09 ASSESSMENT — PAIN DESCRIPTION - PAIN TYPE
TYPE: ACUTE PAIN
TYPE: ACUTE PAIN

## 2018-12-09 ASSESSMENT — HEART SCORE: ECG: 1

## 2018-12-09 NOTE — ED NOTES
Bed: 14  Expected date:   Expected time:   Means of arrival:   Comments:  m2     Elpidio Petty RN  12/09/18 1375

## 2018-12-10 ENCOUNTER — APPOINTMENT (OUTPATIENT)
Dept: CT IMAGING | Age: 58
End: 2018-12-10
Payer: MEDICARE

## 2018-12-10 LAB
ANION GAP SERPL CALCULATED.3IONS-SCNC: 10 MMOL/L (ref 3–16)
BUN BLDV-MCNC: 13 MG/DL (ref 7–20)
CALCIUM SERPL-MCNC: 8.4 MG/DL (ref 8.3–10.6)
CHLORIDE BLD-SCNC: 102 MMOL/L (ref 99–110)
CHOLESTEROL, TOTAL: 219 MG/DL (ref 0–199)
CO2: 25 MMOL/L (ref 21–32)
CREAT SERPL-MCNC: <0.5 MG/DL (ref 0.6–1.1)
EKG ATRIAL RATE: 109 BPM
EKG ATRIAL RATE: 70 BPM
EKG DIAGNOSIS: NORMAL
EKG DIAGNOSIS: NORMAL
EKG P AXIS: 49 DEGREES
EKG P AXIS: 8 DEGREES
EKG P-R INTERVAL: 150 MS
EKG P-R INTERVAL: 154 MS
EKG Q-T INTERVAL: 348 MS
EKG Q-T INTERVAL: 546 MS
EKG QRS DURATION: 84 MS
EKG QRS DURATION: 88 MS
EKG QTC CALCULATION (BAZETT): 468 MS
EKG QTC CALCULATION (BAZETT): 589 MS
EKG R AXIS: -18 DEGREES
EKG R AXIS: -5 DEGREES
EKG T AXIS: 59 DEGREES
EKG T AXIS: 89 DEGREES
EKG VENTRICULAR RATE: 109 BPM
EKG VENTRICULAR RATE: 70 BPM
ESTIMATED AVERAGE GLUCOSE: 303.4 MG/DL
GFR AFRICAN AMERICAN: >60
GFR NON-AFRICAN AMERICAN: >60
GLUCOSE BLD-MCNC: 159 MG/DL (ref 70–99)
GLUCOSE BLD-MCNC: 193 MG/DL (ref 70–99)
GLUCOSE BLD-MCNC: 212 MG/DL (ref 70–99)
GLUCOSE BLD-MCNC: 219 MG/DL (ref 70–99)
GLUCOSE BLD-MCNC: 294 MG/DL (ref 70–99)
HBA1C MFR BLD: 12.2 %
HCT VFR BLD CALC: 37.1 % (ref 36–48)
HDLC SERPL-MCNC: 35 MG/DL (ref 40–60)
HEMOGLOBIN: 12.7 G/DL (ref 12–16)
LDL CHOLESTEROL CALCULATED: 144 MG/DL
MCH RBC QN AUTO: 32 PG (ref 26–34)
MCHC RBC AUTO-ENTMCNC: 34.1 G/DL (ref 31–36)
MCV RBC AUTO: 93.8 FL (ref 80–100)
PDW BLD-RTO: 13.5 % (ref 12.4–15.4)
PERFORMED ON: ABNORMAL
PLATELET # BLD: 227 K/UL (ref 135–450)
PMV BLD AUTO: 8 FL (ref 5–10.5)
POTASSIUM REFLEX MAGNESIUM: 3.7 MMOL/L (ref 3.5–5.1)
RBC # BLD: 3.96 M/UL (ref 4–5.2)
SODIUM BLD-SCNC: 137 MMOL/L (ref 136–145)
TRIGL SERPL-MCNC: 201 MG/DL (ref 0–150)
VLDLC SERPL CALC-MCNC: 40 MG/DL
WBC # BLD: 4.9 K/UL (ref 4–11)

## 2018-12-10 PROCEDURE — 6360000002 HC RX W HCPCS: Performed by: INTERNAL MEDICINE

## 2018-12-10 PROCEDURE — 93010 ELECTROCARDIOGRAM REPORT: CPT | Performed by: INTERNAL MEDICINE

## 2018-12-10 PROCEDURE — 83036 HEMOGLOBIN GLYCOSYLATED A1C: CPT

## 2018-12-10 PROCEDURE — 6360000002 HC RX W HCPCS: Performed by: NURSE PRACTITIONER

## 2018-12-10 PROCEDURE — 2580000003 HC RX 258: Performed by: NURSE PRACTITIONER

## 2018-12-10 PROCEDURE — 99214 OFFICE O/P EST MOD 30 MIN: CPT | Performed by: INTERNAL MEDICINE

## 2018-12-10 PROCEDURE — 36415 COLL VENOUS BLD VENIPUNCTURE: CPT

## 2018-12-10 PROCEDURE — G0008 ADMIN INFLUENZA VIRUS VAC: HCPCS | Performed by: INTERNAL MEDICINE

## 2018-12-10 PROCEDURE — 96376 TX/PRO/DX INJ SAME DRUG ADON: CPT

## 2018-12-10 PROCEDURE — 80048 BASIC METABOLIC PNL TOTAL CA: CPT

## 2018-12-10 PROCEDURE — 85027 COMPLETE CBC AUTOMATED: CPT

## 2018-12-10 PROCEDURE — G0378 HOSPITAL OBSERVATION PER HR: HCPCS

## 2018-12-10 PROCEDURE — 80061 LIPID PANEL: CPT

## 2018-12-10 PROCEDURE — 6360000004 HC RX CONTRAST MEDICATION: Performed by: INTERNAL MEDICINE

## 2018-12-10 PROCEDURE — 93005 ELECTROCARDIOGRAM TRACING: CPT | Performed by: NURSE PRACTITIONER

## 2018-12-10 PROCEDURE — 6370000000 HC RX 637 (ALT 250 FOR IP): Performed by: INTERNAL MEDICINE

## 2018-12-10 PROCEDURE — 90686 IIV4 VACC NO PRSV 0.5 ML IM: CPT | Performed by: INTERNAL MEDICINE

## 2018-12-10 PROCEDURE — 75574 CT ANGIO HRT W/3D IMAGE: CPT

## 2018-12-10 PROCEDURE — 6370000000 HC RX 637 (ALT 250 FOR IP): Performed by: NURSE PRACTITIONER

## 2018-12-10 RX ORDER — METOPROLOL TARTRATE 50 MG/1
50 TABLET, FILM COATED ORAL
Status: COMPLETED | OUTPATIENT
Start: 2018-12-10 | End: 2018-12-10

## 2018-12-10 RX ORDER — CYCLOBENZAPRINE HCL 10 MG
10 TABLET ORAL 3 TIMES DAILY PRN
Status: DISCONTINUED | OUTPATIENT
Start: 2018-12-10 | End: 2018-12-12 | Stop reason: HOSPADM

## 2018-12-10 RX ADMIN — FAMOTIDINE 20 MG: 20 TABLET ORAL at 20:15

## 2018-12-10 RX ADMIN — FENOFIBRATE 160 MG: 160 TABLET ORAL at 09:26

## 2018-12-10 RX ADMIN — METOPROLOL TARTRATE 50 MG: 50 TABLET ORAL at 12:46

## 2018-12-10 RX ADMIN — FAMOTIDINE 20 MG: 20 TABLET ORAL at 09:26

## 2018-12-10 RX ADMIN — MORPHINE SULFATE 2 MG: 2 INJECTION, SOLUTION INTRAMUSCULAR; INTRAVENOUS at 06:55

## 2018-12-10 RX ADMIN — FLUOXETINE 10 MG: 10 CAPSULE ORAL at 09:26

## 2018-12-10 RX ADMIN — IOPAMIDOL 100 ML: 755 INJECTION, SOLUTION INTRAVENOUS at 14:28

## 2018-12-10 RX ADMIN — INSULIN GLARGINE 35 UNITS: 100 INJECTION, SOLUTION SUBCUTANEOUS at 21:56

## 2018-12-10 RX ADMIN — SODIUM CHLORIDE, PRESERVATIVE FREE 10 ML: 5 INJECTION INTRAVENOUS at 10:31

## 2018-12-10 RX ADMIN — CARVEDILOL 6.25 MG: 6.25 TABLET, FILM COATED ORAL at 18:37

## 2018-12-10 RX ADMIN — CARVEDILOL 6.25 MG: 6.25 TABLET, FILM COATED ORAL at 09:27

## 2018-12-10 RX ADMIN — ASPIRIN 81 MG: 81 TABLET, COATED ORAL at 09:26

## 2018-12-10 RX ADMIN — INSULIN LISPRO 5 UNITS: 100 INJECTION, SOLUTION INTRAVENOUS; SUBCUTANEOUS at 21:56

## 2018-12-10 RX ADMIN — ATORVASTATIN CALCIUM 40 MG: 40 TABLET, FILM COATED ORAL at 20:15

## 2018-12-10 RX ADMIN — SODIUM CHLORIDE, PRESERVATIVE FREE 10 ML: 5 INJECTION INTRAVENOUS at 20:15

## 2018-12-10 RX ADMIN — DULOXETINE HYDROCHLORIDE 20 MG: 20 CAPSULE, DELAYED RELEASE ORAL at 09:27

## 2018-12-10 RX ADMIN — SODIUM CHLORIDE, PRESERVATIVE FREE 10 ML: 5 INJECTION INTRAVENOUS at 12:13

## 2018-12-10 RX ADMIN — MORPHINE SULFATE 2 MG: 2 INJECTION, SOLUTION INTRAMUSCULAR; INTRAVENOUS at 12:12

## 2018-12-10 RX ADMIN — LISINOPRIL 2.5 MG: 5 TABLET ORAL at 09:27

## 2018-12-10 RX ADMIN — PANTOPRAZOLE SODIUM 40 MG: 40 TABLET, DELAYED RELEASE ORAL at 06:55

## 2018-12-10 RX ADMIN — CLOPIDOGREL BISULFATE 75 MG: 75 TABLET ORAL at 09:26

## 2018-12-10 RX ADMIN — INFLUENZA A VIRUS A/MICHIGAN/45/2015 X-275 (H1N1) ANTIGEN (FORMALDEHYDE INACTIVATED), INFLUENZA A VIRUS A/SINGAPORE/INFIMH-16-0019/2016 IVR-186 (H3N2) ANTIGEN (FORMALDEHYDE INACTIVATED), INFLUENZA B VIRUS B/PHUKET/3073/2013 ANTIGEN (FORMALDEHYDE INACTIVATED), AND INFLUENZA B VIRUS B/MARYLAND/15/2016 BX-69A ANTIGEN (FORMALDEHYDE INACTIVATED) 0.5 ML: 15; 15; 15; 15 INJECTION, SUSPENSION INTRAMUSCULAR at 09:28

## 2018-12-10 RX ADMIN — DULOXETINE HYDROCHLORIDE 20 MG: 20 CAPSULE, DELAYED RELEASE ORAL at 20:15

## 2018-12-10 ASSESSMENT — PAIN SCALES - GENERAL
PAINLEVEL_OUTOF10: 10
PAINLEVEL_OUTOF10: 6
PAINLEVEL_OUTOF10: 10

## 2018-12-10 ASSESSMENT — PAIN DESCRIPTION - PAIN TYPE: TYPE: ACUTE PAIN

## 2018-12-10 ASSESSMENT — PAIN DESCRIPTION - LOCATION: LOCATION: CHEST

## 2018-12-10 NOTE — PROGRESS NOTES
VSS - afebrile. Pt is alert and oriented x 4 with no history of falls. Assessment completed as charted. Bed is in lowest position with 2/4 bed rails raised. Bed alarm turned on, wheels locked, and call light within reach. Patient wearing non-skid socks and verbalizes understanding to call out for assistance. No further requests at this time. Will continue to monitor patient.     Vitals:    12/10/18 0308   BP: 113/67   Pulse: 69   Resp: 18   Temp: 97.9 °F (36.6 °C)   SpO2: 96%     Electronically signed by Geri Clark RN on 12/10/2018 at 5:04 AM

## 2018-12-10 NOTE — CONSULTS
[address], [address] [address]                                  CONSULTATION    PATIENT NAME: Eitan Magdaleno                    :        1960  MED REC NO:   [id number]                          ROOM:       2999  ACCOUNT NO:   [de-identified]                           ADMIT DATE: 2018  PROVIDER:     Carina Crawley MD    CONSULT DATE:  12/10/2018    REASON FOR CONSULTATION:  Chest pain. HISTORY OF PRESENT ILLNESS:  This 71-year-old female presented to the  hospital with complaints of chest pain. Pain came on at rest.  It feels  sharp. It is constant and going on for 24 hours. It is at times  pleuritic, at times positional, does not appear to be exertional,  radiates into her neck and right shoulder area, and waxes and wanes in  severity. PAST MEDICAL HISTORY:  1. Nonischemic cardiomyopathy. This was diagnosed in  in [place]  when an echo showed ejection fraction to be 15% to 20%. She underwent  testing, which included a cardiac catheterization, showing normal  coronary arteries; echocardiogram and stress test both of which showed  her ejection fraction to be more in the range of 30% to 35%. Most  recent cardiac evaluation was in 2018, at which time she had an  echocardiogram that showed an ejection fraction of 45% to 50% and a  nuclear stress test, which showed no ischemia and an ejection fraction  of 41%. 2.  History of nonsustained ventricular tachycardia. 3.  Status post ICD, [facility], placed in [facility]. 4.  Hypertension. 5.  Hyperlipidemia. 6.  Diabetes. 7.  History of CVA. No known residual effect. 8.  Reported history of brain tumor that was removed and believed to be  benign. 9.  Obesity. 10.  Depression. SOCIAL HISTORY:  Does not smoke. FAMILY HISTORY:  Positive for heart disease. REVIEW OF SYSTEMS:  She denies fevers, chills, cough. No focal  neurologic symptoms.   No

## 2018-12-10 NOTE — ED NOTES
Report given to Lynn Hollis. All questions answered. No concerns at this time. The patient was transported via wheelchair to room. All belongings sent with patient. Remote tele placed on patient before transfer. Patient stable during transfer.       Dayton Borrego RN  12/09/18 2007

## 2018-12-10 NOTE — H&P
21   BUN  14   CREATININE  <0.5*   CALCIUM  9.1     Recent Labs      12/09/18   1821   AST  7*   ALT  10   BILITOT  <0.2   ALKPHOS  83     Recent Labs      12/09/18   1821   TROPONINI  <0.01     Urinalysis:      Lab Results   Component Value Date    NITRU Negative 04/13/2018    WBCUA 20-50 04/13/2018    BACTERIA 2+ 04/13/2018    RBCUA 0-2 04/13/2018    BLOODU TRACE-INTACT 04/13/2018    SPECGRAV 1.010 04/13/2018    GLUCOSEU >=1000 04/13/2018     Radiology:  CXR: I have reviewed the CXR with the following interpretation: No acute cardiopulmonary findings  EKG:  I have reviewed the EKG with the following interpretation: Sinus tachycardia, rate 109. Possible inferior infarct, age undetermined. Cannot rule out anterior infarct, age undetermined. Abnormal EKG    XR CHEST STANDARD (2 VW)   Final Result   No evidence of acute process. ASSESSMENT:    Active Hospital Problems    Diagnosis Date Noted    Chest pain [R07.9] 12/09/2018    Essential hypertension [I10]     Atypical chest pain [R07.89] 05/08/2018    Chronic combined systolic (EF 16-18%) & diastolic (grade 2 LVDD) CHF [I50.42] 05/08/2018    Obesity (BMI 30-39. 9) [E66.9] 05/08/2018    CAD (coronary artery disease) [I25.10]     DM (diabetes mellitus) ([facility] [E11.9]     Dyslipidemia [E78.5]      PLAN:    Chest pain in setting of known CAD  -atypical  -serial troponin - initial negative  -EKG w/o ischemic changes  -FLP in am  -NPO after MN  -stress in am  -ECHO 4/2018 EF 45-50%  -cardiology consulted - appreciate recommendations in advance  -iv pain medication  -prn nitro  -daily asa and plavix  -tele monitoring  -heart score 4    Essential HTN,   -continue coreg and lisinopril    HLD, stable  -continue atorvastatin and fenofibrate    DM2, uncontrolled  -  -hemglobin a1c 11.4 on 9/18/2018, repeat in am  -hold home metformin  -continue lantus  -hdssi  -poct ac/hs  -hypoglycemia protocol  -carb control diet    Obesity  With Body mass index is 31.8

## 2018-12-11 LAB
GLUCOSE BLD-MCNC: 207 MG/DL (ref 70–99)
GLUCOSE BLD-MCNC: 221 MG/DL (ref 70–99)
GLUCOSE BLD-MCNC: 221 MG/DL (ref 70–99)
PERFORMED ON: ABNORMAL

## 2018-12-11 PROCEDURE — 2580000003 HC RX 258: Performed by: INTERNAL MEDICINE

## 2018-12-11 PROCEDURE — 2580000003 HC RX 258: Performed by: NURSE PRACTITIONER

## 2018-12-11 PROCEDURE — 6370000000 HC RX 637 (ALT 250 FOR IP): Performed by: NURSE PRACTITIONER

## 2018-12-11 PROCEDURE — 96372 THER/PROPH/DIAG INJ SC/IM: CPT

## 2018-12-11 PROCEDURE — 6370000000 HC RX 637 (ALT 250 FOR IP): Performed by: INTERNAL MEDICINE

## 2018-12-11 PROCEDURE — G0378 HOSPITAL OBSERVATION PER HR: HCPCS

## 2018-12-11 PROCEDURE — 6360000002 HC RX W HCPCS: Performed by: NURSE PRACTITIONER

## 2018-12-11 PROCEDURE — 99226 PR SBSQ OBSERVATION CARE/DAY 35 MINUTES: CPT | Performed by: INTERNAL MEDICINE

## 2018-12-11 RX ORDER — SODIUM CHLORIDE 9 MG/ML
INJECTION, SOLUTION INTRAVENOUS CONTINUOUS
Status: DISCONTINUED | OUTPATIENT
Start: 2018-12-11 | End: 2018-12-12 | Stop reason: HOSPADM

## 2018-12-11 RX ADMIN — INSULIN LISPRO 6 UNITS: 100 INJECTION, SOLUTION INTRAVENOUS; SUBCUTANEOUS at 11:11

## 2018-12-11 RX ADMIN — FAMOTIDINE 20 MG: 20 TABLET ORAL at 11:10

## 2018-12-11 RX ADMIN — INSULIN GLARGINE 35 UNITS: 100 INJECTION, SOLUTION SUBCUTANEOUS at 20:28

## 2018-12-11 RX ADMIN — LISINOPRIL 2.5 MG: 5 TABLET ORAL at 11:10

## 2018-12-11 RX ADMIN — SODIUM CHLORIDE, PRESERVATIVE FREE 10 ML: 5 INJECTION INTRAVENOUS at 20:27

## 2018-12-11 RX ADMIN — FENOFIBRATE 160 MG: 160 TABLET ORAL at 11:10

## 2018-12-11 RX ADMIN — CARVEDILOL 6.25 MG: 6.25 TABLET, FILM COATED ORAL at 11:09

## 2018-12-11 RX ADMIN — DULOXETINE HYDROCHLORIDE 20 MG: 20 CAPSULE, DELAYED RELEASE ORAL at 20:27

## 2018-12-11 RX ADMIN — CLOPIDOGREL BISULFATE 75 MG: 75 TABLET ORAL at 11:09

## 2018-12-11 RX ADMIN — FLUOXETINE 10 MG: 10 CAPSULE ORAL at 11:10

## 2018-12-11 RX ADMIN — INSULIN LISPRO 6 UNITS: 100 INJECTION, SOLUTION INTRAVENOUS; SUBCUTANEOUS at 16:58

## 2018-12-11 RX ADMIN — INSULIN LISPRO 3 UNITS: 100 INJECTION, SOLUTION INTRAVENOUS; SUBCUTANEOUS at 20:28

## 2018-12-11 RX ADMIN — CYCLOBENZAPRINE HYDROCHLORIDE 10 MG: 10 TABLET, FILM COATED ORAL at 11:35

## 2018-12-11 RX ADMIN — CARVEDILOL 6.25 MG: 6.25 TABLET, FILM COATED ORAL at 16:58

## 2018-12-11 RX ADMIN — ASPIRIN 81 MG: 81 TABLET, COATED ORAL at 11:09

## 2018-12-11 RX ADMIN — INSULIN GLARGINE 35 UNITS: 100 INJECTION, SOLUTION SUBCUTANEOUS at 11:15

## 2018-12-11 RX ADMIN — PANTOPRAZOLE SODIUM 40 MG: 40 TABLET, DELAYED RELEASE ORAL at 06:05

## 2018-12-11 RX ADMIN — SODIUM CHLORIDE: 9 INJECTION, SOLUTION INTRAVENOUS at 20:27

## 2018-12-11 RX ADMIN — FAMOTIDINE 20 MG: 20 TABLET ORAL at 20:27

## 2018-12-11 RX ADMIN — ATORVASTATIN CALCIUM 40 MG: 40 TABLET, FILM COATED ORAL at 20:27

## 2018-12-11 RX ADMIN — DULOXETINE HYDROCHLORIDE 20 MG: 20 CAPSULE, DELAYED RELEASE ORAL at 11:09

## 2018-12-11 RX ADMIN — TOPIRAMATE 200 MG: 100 TABLET, FILM COATED ORAL at 11:10

## 2018-12-11 RX ADMIN — SODIUM CHLORIDE, PRESERVATIVE FREE 10 ML: 5 INJECTION INTRAVENOUS at 11:10

## 2018-12-11 RX ADMIN — ENOXAPARIN SODIUM 40 MG: 40 INJECTION SUBCUTANEOUS at 11:10

## 2018-12-11 ASSESSMENT — PAIN SCALES - GENERAL: PAINLEVEL_OUTOF10: 0

## 2018-12-11 NOTE — PROGRESS NOTES
Hospitalist Progress Note      PCP: No primary care provider on file. Date of Admission: 12/9/2018    Chief Complaint:  Chest pain      Subjective:   Continues to have persistent substernal chest pain. Denies dyspnea, n/v, abd pain. Does have headache. Medications:  Reviewed    Infusion Medications    dextrose       Scheduled Medications    aspirin  81 mg Oral Daily    atorvastatin  40 mg Oral Nightly    carvedilol  6.25 mg Oral BID WC    clopidogrel  75 mg Oral Daily    DULoxetine  20 mg Oral BID    famotidine  20 mg Oral BID    fenofibrate  160 mg Oral Daily    FLUoxetine  10 mg Oral Daily    insulin glargine  35 Units Subcutaneous BID    lisinopril  2.5 mg Oral Daily    pantoprazole  40 mg Oral QAM AC    topiramate  200 mg Oral Daily    sodium chloride flush  10 mL Intravenous 2 times per day    enoxaparin  40 mg Subcutaneous Daily    insulin lispro  0-18 Units Subcutaneous TID WC    insulin lispro  0-9 Units Subcutaneous Nightly     PRN Meds: cyclobenzaprine, sodium chloride flush, magnesium hydroxide, ondansetron, morphine, glucose, dextrose, glucagon (rDNA), dextrose      Intake/Output Summary (Last 24 hours) at 12/10/18 2020  Last data filed at 12/10/18 1836   Gross per 24 hour   Intake              470 ml   Output             1900 ml   Net            -1430 ml       Exam:    BP 96/60   Pulse 65   Temp 97.9 °F (36.6 °C) (Oral)   Resp 18   Ht 5' 3\" (1.6 m)   Wt 181 lb 6.4 oz (82.3 kg)   SpO2 96%   BMI 32.13 kg/m²     Gen/overall appearance: Not in acute distress. Alert. Head: Normocephalic, atraumatic  Eyes: EOMI, no scleral icterus  CVS: regular rate and rhythm, Normal S1S2  Pulm: Clear to auscultation bilaterally. No crackles/wheezes  Gastrointestinal: Soft, nontender, obese, no guarding or rebound  Extremities: No edema.  No erythema or warmth  Neuro: No gross focal deficits noted  Skin: Warm, dry    Labs:   Recent Labs      12/09/18   1821  12/10/18   0612   WBC  7.9 4. 9   HGB  14.3  12.7   HCT  41.6  37.1   PLT  322  227     Recent Labs      12/09/18   1821  12/10/18   0612   NA  134*  137   K  4.2  3.7   CL  96*  102   CO2  21  25   BUN  14  13   CREATININE  <0.5*  <0.5*   CALCIUM  9.1  8.4     Recent Labs      12/09/18   1821   AST  7*   ALT  10   BILITOT  <0.2   ALKPHOS  83     No results for input(s): INR in the last 72 hours. Recent Labs      12/09/18   1821 12/09/18 2055 12/09/18   2320   TROPONINI  <0.01  <0.01  <0.01       Assessment/Plan:    Active Hospital Problems    Diagnosis Date Noted    Chest pain [R07.9] 12/09/2018    Essential hypertension [I10]     Atypical chest pain [R07.89] 05/08/2018    Chronic combined systolic (EF 61-35%) & diastolic (grade 2 LVDD) CHF [I50.42] 05/08/2018    Obesity (BMI 30-39. 9) [E66.9] 05/08/2018    CAD (coronary artery disease) [I25.10]     DM (diabetes mellitus) ([facility] [address] [E11.9]     Dyslipidemia [E78.5]      Atypical chest pain in setting of known CAD. -serial troponins neg  -EKG w/o ischemic changes  -CT-A per cardio  -ECHO 4/2018 EF 45-50%  -cardiology on board  -prn nitro  -daily asa and plavix  -tele monitoring     Essential HTN,   -continue coreg and lisinopril     HLD, stable  -continue atorvastatin and fenofibrate     DM2, uncontrolled  -  -hemglobin a1c 11.4 on 9/18/2018  -hold home metformin  -continue lantus  -ISS     Obesity  With Body mass index is 29.1 kg/m². Complicating assessment and treatment. Placing patient at risk for multiple co-morbidities as well as early death and contributing to the patient's presentation.  Counseled on weight loss     Depression anxiety  -continue cymbalta and prozac    DVT Prophylaxis: lovenox  Diet: DIET CARB CONTROL; No Caffeine  Code Status: Full Code    Dispo - 1 day    Dyan Cornelius MD

## 2018-12-12 ENCOUNTER — APPOINTMENT (OUTPATIENT)
Dept: CARDIAC CATH/INVASIVE PROCEDURES | Age: 58
End: 2018-12-12
Payer: MEDICARE

## 2018-12-12 VITALS
WEIGHT: 180.4 LBS | TEMPERATURE: 98.2 F | HEIGHT: 63 IN | BODY MASS INDEX: 31.96 KG/M2 | HEART RATE: 84 BPM | OXYGEN SATURATION: 95 % | DIASTOLIC BLOOD PRESSURE: 69 MMHG | RESPIRATION RATE: 16 BRPM | SYSTOLIC BLOOD PRESSURE: 105 MMHG

## 2018-12-12 LAB
EKG ATRIAL RATE: 73 BPM
EKG DIAGNOSIS: NORMAL
EKG P AXIS: 19 DEGREES
EKG P-R INTERVAL: 170 MS
EKG Q-T INTERVAL: 402 MS
EKG QRS DURATION: 90 MS
EKG QTC CALCULATION (BAZETT): 442 MS
EKG R AXIS: -14 DEGREES
EKG T AXIS: 52 DEGREES
EKG VENTRICULAR RATE: 73 BPM
GLUCOSE BLD-MCNC: 207 MG/DL (ref 70–99)
PERFORMED ON: ABNORMAL
POC ACT LR: 235 SEC

## 2018-12-12 PROCEDURE — 2580000003 HC RX 258

## 2018-12-12 PROCEDURE — 99152 MOD SED SAME PHYS/QHP 5/>YRS: CPT | Performed by: INTERNAL MEDICINE

## 2018-12-12 PROCEDURE — 93005 ELECTROCARDIOGRAM TRACING: CPT | Performed by: INTERNAL MEDICINE

## 2018-12-12 PROCEDURE — 2580000003 HC RX 258: Performed by: INTERNAL MEDICINE

## 2018-12-12 PROCEDURE — 2500000003 HC RX 250 WO HCPCS

## 2018-12-12 PROCEDURE — C1769 GUIDE WIRE: HCPCS

## 2018-12-12 PROCEDURE — C1887 CATHETER, GUIDING: HCPCS

## 2018-12-12 PROCEDURE — 93458 L HRT ARTERY/VENTRICLE ANGIO: CPT | Performed by: INTERNAL MEDICINE

## 2018-12-12 PROCEDURE — 6360000002 HC RX W HCPCS

## 2018-12-12 PROCEDURE — 99153 MOD SED SAME PHYS/QHP EA: CPT | Performed by: INTERNAL MEDICINE

## 2018-12-12 PROCEDURE — 6360000004 HC RX CONTRAST MEDICATION: Performed by: INTERNAL MEDICINE

## 2018-12-12 PROCEDURE — 6370000000 HC RX 637 (ALT 250 FOR IP): Performed by: NURSE PRACTITIONER

## 2018-12-12 PROCEDURE — 2580000003 HC RX 258: Performed by: NURSE PRACTITIONER

## 2018-12-12 PROCEDURE — 2709999900 HC NON-CHARGEABLE SUPPLY

## 2018-12-12 PROCEDURE — G0378 HOSPITAL OBSERVATION PER HR: HCPCS

## 2018-12-12 PROCEDURE — 93571 IV DOP VEL&/PRESS C FLO 1ST: CPT | Performed by: INTERNAL MEDICINE

## 2018-12-12 PROCEDURE — C1894 INTRO/SHEATH, NON-LASER: HCPCS

## 2018-12-12 PROCEDURE — 85347 COAGULATION TIME ACTIVATED: CPT

## 2018-12-12 RX ORDER — SODIUM CHLORIDE 0.9 % (FLUSH) 0.9 %
10 SYRINGE (ML) INJECTION EVERY 12 HOURS SCHEDULED
Status: CANCELLED | OUTPATIENT
Start: 2018-12-12

## 2018-12-12 RX ORDER — SODIUM CHLORIDE 0.9 % (FLUSH) 0.9 %
10 SYRINGE (ML) INJECTION PRN
Status: CANCELLED | OUTPATIENT
Start: 2018-12-12

## 2018-12-12 RX ORDER — CYCLOBENZAPRINE HCL 5 MG
5 TABLET ORAL 2 TIMES DAILY PRN
Qty: 10 TABLET | Refills: 0 | Status: SHIPPED | OUTPATIENT
Start: 2018-12-12 | End: 2018-12-17

## 2018-12-12 RX ORDER — ACETAMINOPHEN 325 MG/1
650 TABLET ORAL EVERY 4 HOURS PRN
Status: CANCELLED | OUTPATIENT
Start: 2018-12-12

## 2018-12-12 RX ORDER — SODIUM CHLORIDE 9 MG/ML
INJECTION, SOLUTION INTRAVENOUS CONTINUOUS
Status: CANCELLED | OUTPATIENT
Start: 2018-12-12 | End: 2018-12-12

## 2018-12-12 RX ORDER — NAPROXEN 500 MG/1
500 TABLET ORAL 2 TIMES DAILY PRN
Qty: 10 TABLET | Refills: 0 | Status: ON HOLD | OUTPATIENT
Start: 2018-12-12 | End: 2018-12-30

## 2018-12-12 RX ADMIN — FENOFIBRATE 160 MG: 160 TABLET ORAL at 11:01

## 2018-12-12 RX ADMIN — SODIUM CHLORIDE: 9 INJECTION, SOLUTION INTRAVENOUS at 12:25

## 2018-12-12 RX ADMIN — ASPIRIN 81 MG: 81 TABLET, COATED ORAL at 07:04

## 2018-12-12 RX ADMIN — FLUOXETINE 10 MG: 10 CAPSULE ORAL at 11:02

## 2018-12-12 RX ADMIN — IOVERSOL 110 ML: 741 INJECTION INTRA-ARTERIAL; INTRAVENOUS at 10:14

## 2018-12-12 RX ADMIN — SODIUM CHLORIDE, PRESERVATIVE FREE 10 ML: 5 INJECTION INTRAVENOUS at 11:02

## 2018-12-12 RX ADMIN — DULOXETINE HYDROCHLORIDE 20 MG: 20 CAPSULE, DELAYED RELEASE ORAL at 11:01

## 2018-12-12 RX ADMIN — TOPIRAMATE 200 MG: 100 TABLET, FILM COATED ORAL at 11:01

## 2018-12-12 RX ADMIN — CLOPIDOGREL BISULFATE 75 MG: 75 TABLET ORAL at 07:04

## 2018-12-12 RX ADMIN — FAMOTIDINE 20 MG: 20 TABLET ORAL at 11:01

## 2018-12-12 ASSESSMENT — PAIN SCALES - GENERAL
PAINLEVEL_OUTOF10: 0

## 2018-12-16 ENCOUNTER — HOSPITAL ENCOUNTER (EMERGENCY)
Age: 58
Discharge: HOME | End: 2018-12-16
Attending: EMERGENCY MEDICINE
Payer: MEDICARE

## 2018-12-16 ENCOUNTER — APPOINTMENT (OUTPATIENT)
Dept: GENERAL RADIOLOGY | Age: 58
End: 2018-12-16
Payer: MEDICARE

## 2018-12-16 VITALS
WEIGHT: 177.4 LBS | HEIGHT: 63 IN | TEMPERATURE: 98.8 F | SYSTOLIC BLOOD PRESSURE: 114 MMHG | HEART RATE: 93 BPM | DIASTOLIC BLOOD PRESSURE: 70 MMHG | RESPIRATION RATE: 18 BRPM | BODY MASS INDEX: 31.43 KG/M2 | OXYGEN SATURATION: 98 %

## 2018-12-16 DIAGNOSIS — E87.1 HYPONATREMIA: ICD-10-CM

## 2018-12-16 DIAGNOSIS — R73.9 HYPERGLYCEMIA: ICD-10-CM

## 2018-12-16 DIAGNOSIS — F41.1 ANXIETY STATE: ICD-10-CM

## 2018-12-16 DIAGNOSIS — R07.89 CHEST WALL PAIN: Primary | ICD-10-CM

## 2018-12-16 LAB
A/G RATIO: 1.6 (ref 1.1–2.2)
ALBUMIN SERPL-MCNC: 4.1 G/DL (ref 3.4–5)
ALP BLD-CCNC: 98 U/L (ref 40–129)
ALT SERPL-CCNC: 11 U/L (ref 10–40)
ANION GAP SERPL CALCULATED.3IONS-SCNC: 12 MMOL/L (ref 3–16)
AST SERPL-CCNC: 10 U/L (ref 15–37)
BASOPHILS ABSOLUTE: 0.1 K/UL (ref 0–0.2)
BASOPHILS RELATIVE PERCENT: 1 %
BILIRUB SERPL-MCNC: 0.3 MG/DL (ref 0–1)
BUN BLDV-MCNC: 16 MG/DL (ref 7–20)
CALCIUM SERPL-MCNC: 9.3 MG/DL (ref 8.3–10.6)
CHLORIDE BLD-SCNC: 94 MMOL/L (ref 99–110)
CHP ED QC CHECK: YES
CO2: 24 MMOL/L (ref 21–32)
CREAT SERPL-MCNC: <0.5 MG/DL (ref 0.6–1.1)
EOSINOPHILS ABSOLUTE: 0.2 K/UL (ref 0–0.6)
EOSINOPHILS RELATIVE PERCENT: 2.7 %
GFR AFRICAN AMERICAN: >60
GFR NON-AFRICAN AMERICAN: >60
GLOBULIN: 2.6 G/DL
GLUCOSE BLD-MCNC: 344 MG/DL
GLUCOSE BLD-MCNC: 347 MG/DL (ref 70–99)
GLUCOSE BLD-MCNC: 368 MG/DL (ref 70–99)
GLUCOSE BLD-MCNC: 434 MG/DL (ref 70–99)
HCT VFR BLD CALC: 42.1 % (ref 36–48)
HEMOGLOBIN: 14.3 G/DL (ref 12–16)
INR BLD: 0.99 (ref 0.86–1.14)
LYMPHOCYTES ABSOLUTE: 2 K/UL (ref 1–5.1)
LYMPHOCYTES RELATIVE PERCENT: 31 %
MCH RBC QN AUTO: 32 PG (ref 26–34)
MCHC RBC AUTO-ENTMCNC: 33.9 G/DL (ref 31–36)
MCV RBC AUTO: 94.5 FL (ref 80–100)
MONOCYTES ABSOLUTE: 0.4 K/UL (ref 0–1.3)
MONOCYTES RELATIVE PERCENT: 6.7 %
NEUTROPHILS ABSOLUTE: 3.8 K/UL (ref 1.7–7.7)
NEUTROPHILS RELATIVE PERCENT: 58.6 %
PDW BLD-RTO: 13.4 % (ref 12.4–15.4)
PERFORMED ON: ABNORMAL
PERFORMED ON: ABNORMAL
PLATELET # BLD: 269 K/UL (ref 135–450)
PMV BLD AUTO: 8.7 FL (ref 5–10.5)
POTASSIUM SERPL-SCNC: 4.4 MMOL/L (ref 3.5–5.1)
PROTHROMBIN TIME: 11.3 SEC (ref 9.8–13)
RBC # BLD: 4.45 M/UL (ref 4–5.2)
SODIUM BLD-SCNC: 130 MMOL/L (ref 136–145)
TOTAL PROTEIN: 6.7 G/DL (ref 6.4–8.2)
TROPONIN: <0.01 NG/ML
WBC # BLD: 6.6 K/UL (ref 4–11)

## 2018-12-16 PROCEDURE — 93005 ELECTROCARDIOGRAM TRACING: CPT | Performed by: EMERGENCY MEDICINE

## 2018-12-16 PROCEDURE — 85025 COMPLETE CBC W/AUTO DIFF WBC: CPT

## 2018-12-16 PROCEDURE — 96361 HYDRATE IV INFUSION ADD-ON: CPT

## 2018-12-16 PROCEDURE — 84484 ASSAY OF TROPONIN QUANT: CPT

## 2018-12-16 PROCEDURE — 96374 THER/PROPH/DIAG INJ IV PUSH: CPT

## 2018-12-16 PROCEDURE — 71046 X-RAY EXAM CHEST 2 VIEWS: CPT

## 2018-12-16 PROCEDURE — 96375 TX/PRO/DX INJ NEW DRUG ADDON: CPT

## 2018-12-16 PROCEDURE — 2580000003 HC RX 258: Performed by: EMERGENCY MEDICINE

## 2018-12-16 PROCEDURE — 99285 EMERGENCY DEPT VISIT HI MDM: CPT

## 2018-12-16 PROCEDURE — 85610 PROTHROMBIN TIME: CPT

## 2018-12-16 PROCEDURE — 80053 COMPREHEN METABOLIC PANEL: CPT

## 2018-12-16 PROCEDURE — 6370000000 HC RX 637 (ALT 250 FOR IP): Performed by: EMERGENCY MEDICINE

## 2018-12-16 PROCEDURE — 6360000002 HC RX W HCPCS: Performed by: EMERGENCY MEDICINE

## 2018-12-16 RX ORDER — TRAMADOL HYDROCHLORIDE 50 MG/1
50 TABLET ORAL EVERY 6 HOURS PRN
Qty: 12 TABLET | Refills: 0 | Status: SHIPPED | OUTPATIENT
Start: 2018-12-16 | End: 2018-12-19

## 2018-12-16 RX ORDER — LORAZEPAM 2 MG/ML
0.5 INJECTION INTRAMUSCULAR ONCE
Status: COMPLETED | OUTPATIENT
Start: 2018-12-16 | End: 2018-12-16

## 2018-12-16 RX ORDER — KETOROLAC TROMETHAMINE 30 MG/ML
30 INJECTION, SOLUTION INTRAMUSCULAR; INTRAVENOUS ONCE
Status: COMPLETED | OUTPATIENT
Start: 2018-12-16 | End: 2018-12-16

## 2018-12-16 RX ORDER — TRAMADOL HYDROCHLORIDE 50 MG/1
50 TABLET ORAL ONCE
Status: COMPLETED | OUTPATIENT
Start: 2018-12-16 | End: 2018-12-16

## 2018-12-16 RX ORDER — 0.9 % SODIUM CHLORIDE 0.9 %
1000 INTRAVENOUS SOLUTION INTRAVENOUS ONCE
Status: COMPLETED | OUTPATIENT
Start: 2018-12-16 | End: 2018-12-16

## 2018-12-16 RX ADMIN — KETOROLAC TROMETHAMINE 30 MG: 30 INJECTION, SOLUTION INTRAMUSCULAR at 20:12

## 2018-12-16 RX ADMIN — TRAMADOL HYDROCHLORIDE 50 MG: 50 TABLET, FILM COATED ORAL at 22:11

## 2018-12-16 RX ADMIN — LORAZEPAM 0.5 MG: 2 INJECTION, SOLUTION INTRAMUSCULAR; INTRAVENOUS at 20:14

## 2018-12-16 RX ADMIN — INSULIN HUMAN 10 UNITS: 100 INJECTION, SOLUTION PARENTERAL at 20:55

## 2018-12-16 RX ADMIN — SODIUM CHLORIDE 1000 ML: 9 INJECTION, SOLUTION INTRAVENOUS at 20:57

## 2018-12-16 ASSESSMENT — ENCOUNTER SYMPTOMS
CHEST TIGHTNESS: 0
EYE DISCHARGE: 0
EYE PAIN: 0
BLOOD IN STOOL: 0
BACK PAIN: 0
SHORTNESS OF BREATH: 0
RECTAL PAIN: 0
COLOR CHANGE: 0
CONSTIPATION: 0
PHOTOPHOBIA: 0
WHEEZING: 0
ABDOMINAL PAIN: 0
SINUS PRESSURE: 0
RHINORRHEA: 0
SORE THROAT: 0
APNEA: 0
DIARRHEA: 0
STRIDOR: 0
ABDOMINAL DISTENTION: 0
EYE REDNESS: 0
TROUBLE SWALLOWING: 0
NAUSEA: 0
VOICE CHANGE: 0
VOMITING: 0
COUGH: 0

## 2018-12-16 ASSESSMENT — PAIN SCALES - GENERAL
PAINLEVEL_OUTOF10: 10

## 2018-12-16 ASSESSMENT — PAIN DESCRIPTION - LOCATION: LOCATION: CHEST

## 2018-12-16 ASSESSMENT — PAIN DESCRIPTION - PAIN TYPE: TYPE: ACUTE PAIN

## 2018-12-16 ASSESSMENT — PAIN DESCRIPTION - ORIENTATION: ORIENTATION: RIGHT

## 2018-12-17 LAB
EKG ATRIAL RATE: 91 BPM
EKG DIAGNOSIS: NORMAL
EKG P AXIS: 19 DEGREES
EKG P-R INTERVAL: 146 MS
EKG Q-T INTERVAL: 446 MS
EKG QRS DURATION: 88 MS
EKG QTC CALCULATION (BAZETT): 548 MS
EKG R AXIS: 2 DEGREES
EKG T AXIS: 80 DEGREES
EKG VENTRICULAR RATE: 91 BPM

## 2018-12-17 PROCEDURE — 93010 ELECTROCARDIOGRAM REPORT: CPT | Performed by: INTERNAL MEDICINE

## 2018-12-17 NOTE — ED PROVIDER NOTES
Jasen Faustin is a 63year old female who has a history of CAD and MI in 1. She was recently hospitalized for chest pain, and had an angiogram which the patient reports \"showed it wasn't in my heart\". She has been in \"constant\" pain since she was released from hospital on Wednesday. The medications she was given \"isn't helping\". The pain is sharp, stabbing right sided and worse with movement and palpation, and she complains of \"tingling\" in all four of her extremities. /61   Pulse 91   Temp 98.8 °F (37.1 °C) (Oral)   Resp 16   Ht 5' 3\" (1.6 m)   Wt 177 lb 6.4 oz (80.5 kg)   SpO2 98%   BMI 31.42 kg/m²     I have reviewed the following from the nursing documentation:      Prior to Admission medications    Medication Sig Start Date End Date Taking?  Authorizing Provider   naproxen (NAPROSYN) 500 MG tablet Take 1 tablet by mouth 2 times daily as needed for Pain 12/12/18 12/17/18 Yes Apolinar Bland MD   cyclobenzaprine (FLEXERIL) 5 MG tablet Take 1 tablet by mouth 2 times daily as needed for Muscle spasms 12/12/18 12/17/18 Yes Apolinar Bland MD   ondansetron (ZOFRAN ODT) 4 MG disintegrating tablet Take 1 tablet by mouth every 8 hours as needed for Nausea 6/26/18  Yes FRITZ Stevenson CNP   famotidine (PEPCID) 20 MG tablet Take 1 tablet by mouth 2 times daily 6/26/18  Yes FRITZ Stevenson CNP   omeprazole (PRILOSEC) 20 MG delayed release capsule Take 1 capsule by mouth daily 6/26/18  Yes FRITZ Stevenson CNP   aspirin 81 MG EC tablet Take 1 tablet by mouth daily 5/10/18  Yes Sil Vu MD   topiramate (TOPAMAX) 200 MG tablet Take 1 tablet by mouth daily 4/14/18  Yes Piyush Oliveira MD   DULoxetine (CYMBALTA) 20 MG extended release capsule Take 1 capsule by mouth 2 times daily 4/14/18  Yes Piyush Oliveira MD   FLUoxetine (PROZAC) 10 MG capsule Take 1 capsule by mouth daily 4/14/18  Yes Piyush Oliveira MD   metFORMIN (GLUCOPHAGE) 1000 MG tablet Take

## 2018-12-17 NOTE — ED NOTES
Bed: 14  Expected date:   Expected time:   Means of arrival:   Comments:  [facility]  12/16/18 0160

## 2018-12-21 ENCOUNTER — APPOINTMENT (OUTPATIENT)
Dept: GENERAL RADIOLOGY | Age: 58
End: 2018-12-21
Payer: MEDICARE

## 2018-12-21 ENCOUNTER — HOSPITAL ENCOUNTER (EMERGENCY)
Age: 58
Discharge: HOME | End: 2018-12-21
Attending: EMERGENCY MEDICINE
Payer: MEDICARE

## 2018-12-21 ENCOUNTER — APPOINTMENT (OUTPATIENT)
Dept: ULTRASOUND IMAGING | Age: 58
End: 2018-12-21
Payer: MEDICARE

## 2018-12-21 VITALS
BODY MASS INDEX: 31.36 KG/M2 | OXYGEN SATURATION: 95 % | DIASTOLIC BLOOD PRESSURE: 70 MMHG | WEIGHT: 177 LBS | RESPIRATION RATE: 16 BRPM | TEMPERATURE: 97.6 F | SYSTOLIC BLOOD PRESSURE: 105 MMHG | HEIGHT: 63 IN | HEART RATE: 72 BPM

## 2018-12-21 DIAGNOSIS — R11.2 NAUSEA VOMITING AND DIARRHEA: ICD-10-CM

## 2018-12-21 DIAGNOSIS — K80.20 CALCULUS OF GALLBLADDER WITHOUT CHOLECYSTITIS WITHOUT OBSTRUCTION: Primary | ICD-10-CM

## 2018-12-21 DIAGNOSIS — R07.9 CHEST PAIN, UNSPECIFIED TYPE: ICD-10-CM

## 2018-12-21 DIAGNOSIS — R19.7 NAUSEA VOMITING AND DIARRHEA: ICD-10-CM

## 2018-12-21 LAB
A/G RATIO: 1.6 (ref 1.1–2.2)
ALBUMIN SERPL-MCNC: 4.2 G/DL (ref 3.4–5)
ALP BLD-CCNC: 71 U/L (ref 40–129)
ALT SERPL-CCNC: 9 U/L (ref 10–40)
ANION GAP SERPL CALCULATED.3IONS-SCNC: 13 MMOL/L (ref 3–16)
AST SERPL-CCNC: 8 U/L (ref 15–37)
BACTERIA: ABNORMAL /HPF
BASOPHILS ABSOLUTE: 0.1 K/UL (ref 0–0.2)
BASOPHILS RELATIVE PERCENT: 1.2 %
BILIRUB SERPL-MCNC: 0.4 MG/DL (ref 0–1)
BILIRUBIN URINE: NEGATIVE
BLOOD, URINE: NEGATIVE
BUN BLDV-MCNC: 13 MG/DL (ref 7–20)
CALCIUM SERPL-MCNC: 9.3 MG/DL (ref 8.3–10.6)
CHLORIDE BLD-SCNC: 102 MMOL/L (ref 99–110)
CLARITY: CLEAR
CO2: 21 MMOL/L (ref 21–32)
COLOR: YELLOW
CREAT SERPL-MCNC: <0.5 MG/DL (ref 0.6–1.1)
EKG ATRIAL RATE: 90 BPM
EKG DIAGNOSIS: NORMAL
EKG P AXIS: 49 DEGREES
EKG P-R INTERVAL: 162 MS
EKG Q-T INTERVAL: 408 MS
EKG QRS DURATION: 96 MS
EKG QTC CALCULATION (BAZETT): 499 MS
EKG R AXIS: -8 DEGREES
EKG T AXIS: 85 DEGREES
EKG VENTRICULAR RATE: 90 BPM
EOSINOPHILS ABSOLUTE: 0.2 K/UL (ref 0–0.6)
EOSINOPHILS RELATIVE PERCENT: 2.2 %
EPITHELIAL CELLS, UA: ABNORMAL /HPF
GFR AFRICAN AMERICAN: >60
GFR NON-AFRICAN AMERICAN: >60
GLOBULIN: 2.6 G/DL
GLUCOSE BLD-MCNC: 254 MG/DL (ref 70–99)
GLUCOSE URINE: >=1000 MG/DL
HCT VFR BLD CALC: 40.9 % (ref 36–48)
HEMOGLOBIN: 14.3 G/DL (ref 12–16)
KETONES, URINE: NEGATIVE MG/DL
LEUKOCYTE ESTERASE, URINE: ABNORMAL
LYMPHOCYTES ABSOLUTE: 2.5 K/UL (ref 1–5.1)
LYMPHOCYTES RELATIVE PERCENT: 33.2 %
MCH RBC QN AUTO: 32 PG (ref 26–34)
MCHC RBC AUTO-ENTMCNC: 34.8 G/DL (ref 31–36)
MCV RBC AUTO: 91.8 FL (ref 80–100)
MICROSCOPIC EXAMINATION: YES
MONOCYTES ABSOLUTE: 0.5 K/UL (ref 0–1.3)
MONOCYTES RELATIVE PERCENT: 6.7 %
NEUTROPHILS ABSOLUTE: 4.3 K/UL (ref 1.7–7.7)
NEUTROPHILS RELATIVE PERCENT: 56.7 %
NITRITE, URINE: NEGATIVE
PDW BLD-RTO: 13.3 % (ref 12.4–15.4)
PH UA: 6.5
PLATELET # BLD: 295 K/UL (ref 135–450)
PMV BLD AUTO: 8.7 FL (ref 5–10.5)
POTASSIUM SERPL-SCNC: 3.9 MMOL/L (ref 3.5–5.1)
PROTEIN UA: NEGATIVE MG/DL
RAPID INFLUENZA  B AGN: NEGATIVE
RAPID INFLUENZA A AGN: NEGATIVE
RBC # BLD: 4.46 M/UL (ref 4–5.2)
RBC UA: ABNORMAL /HPF (ref 0–2)
REASON FOR REJECTION: NORMAL
REJECTED TEST: NORMAL
SODIUM BLD-SCNC: 136 MMOL/L (ref 136–145)
SPECIFIC GRAVITY UA: 1.01
TOTAL PROTEIN: 6.8 G/DL (ref 6.4–8.2)
TROPONIN: <0.01 NG/ML
URINE TYPE: ABNORMAL
UROBILINOGEN, URINE: 0.2 E.U./DL
WBC # BLD: 7.5 K/UL (ref 4–11)
WBC UA: ABNORMAL /HPF (ref 0–5)
YEAST: PRESENT /HPF

## 2018-12-21 PROCEDURE — 76705 ECHO EXAM OF ABDOMEN: CPT

## 2018-12-21 PROCEDURE — 84484 ASSAY OF TROPONIN QUANT: CPT

## 2018-12-21 PROCEDURE — 85025 COMPLETE CBC W/AUTO DIFF WBC: CPT

## 2018-12-21 PROCEDURE — 87804 INFLUENZA ASSAY W/OPTIC: CPT

## 2018-12-21 PROCEDURE — 81001 URINALYSIS AUTO W/SCOPE: CPT

## 2018-12-21 PROCEDURE — 99285 EMERGENCY DEPT VISIT HI MDM: CPT

## 2018-12-21 PROCEDURE — 6360000002 HC RX W HCPCS: Performed by: EMERGENCY MEDICINE

## 2018-12-21 PROCEDURE — 96374 THER/PROPH/DIAG INJ IV PUSH: CPT

## 2018-12-21 PROCEDURE — 96372 THER/PROPH/DIAG INJ SC/IM: CPT

## 2018-12-21 PROCEDURE — 71046 X-RAY EXAM CHEST 2 VIEWS: CPT

## 2018-12-21 PROCEDURE — 80053 COMPREHEN METABOLIC PANEL: CPT

## 2018-12-21 PROCEDURE — 93005 ELECTROCARDIOGRAM TRACING: CPT | Performed by: EMERGENCY MEDICINE

## 2018-12-21 PROCEDURE — 93010 ELECTROCARDIOGRAM REPORT: CPT | Performed by: INTERNAL MEDICINE

## 2018-12-21 RX ORDER — TRAMADOL HYDROCHLORIDE 50 MG/1
50 TABLET ORAL EVERY 6 HOURS PRN
Qty: 12 TABLET | Refills: 0 | Status: SHIPPED | OUTPATIENT
Start: 2018-12-21 | End: 2018-12-24

## 2018-12-21 RX ORDER — DICYCLOMINE HYDROCHLORIDE 10 MG/ML
20 INJECTION INTRAMUSCULAR ONCE
Status: COMPLETED | OUTPATIENT
Start: 2018-12-21 | End: 2018-12-21

## 2018-12-21 RX ORDER — FENTANYL CITRATE 50 UG/ML
50 INJECTION, SOLUTION INTRAMUSCULAR; INTRAVENOUS ONCE
Status: DISCONTINUED | OUTPATIENT
Start: 2018-12-21 | End: 2018-12-21

## 2018-12-21 RX ORDER — 0.9 % SODIUM CHLORIDE 0.9 %
1000 INTRAVENOUS SOLUTION INTRAVENOUS ONCE
Status: DISCONTINUED | OUTPATIENT
Start: 2018-12-21 | End: 2018-12-21

## 2018-12-21 RX ORDER — ONDANSETRON 4 MG/1
4 TABLET, ORALLY DISINTEGRATING ORAL EVERY 8 HOURS PRN
Qty: 12 TABLET | Refills: 0 | Status: SHIPPED | OUTPATIENT
Start: 2018-12-21 | End: 2018-12-25

## 2018-12-21 RX ORDER — ONDANSETRON 2 MG/ML
4 INJECTION INTRAMUSCULAR; INTRAVENOUS ONCE
Status: COMPLETED | OUTPATIENT
Start: 2018-12-21 | End: 2018-12-21

## 2018-12-21 RX ADMIN — ONDANSETRON 4 MG: 2 INJECTION INTRAMUSCULAR; INTRAVENOUS at 16:45

## 2018-12-21 RX ADMIN — DICYCLOMINE HYDROCHLORIDE 20 MG: 20 INJECTION, SOLUTION INTRAMUSCULAR at 16:46

## 2018-12-21 ASSESSMENT — PAIN DESCRIPTION - LOCATION: LOCATION: CHEST

## 2018-12-21 ASSESSMENT — PAIN SCALES - GENERAL: PAINLEVEL_OUTOF10: 10

## 2018-12-21 ASSESSMENT — PAIN DESCRIPTION - PAIN TYPE: TYPE: ACUTE PAIN

## 2018-12-21 NOTE — ED PROVIDER NOTES
aspirin 81 MG EC tablet Take 1 tablet by mouth daily 5/10/18  Yes Rosemary Morillo MD   topiramate (TOPAMAX) 200 MG tablet Take 1 tablet by mouth daily 4/14/18  Yes Malcolm Le MD   DULoxetine (CYMBALTA) 20 MG extended release capsule Take 1 capsule by mouth 2 times daily 4/14/18  Yes Malcolm Le MD   FLUoxetine (PROZAC) 10 MG capsule Take 1 capsule by mouth daily 4/14/18  Yes Malcolm Le MD   metFORMIN (GLUCOPHAGE) 1000 MG tablet Take 1 tablet by mouth 2 times daily (with meals) 4/14/18  Yes Malcolm Le MD   atorvastatin (LIPITOR) 40 MG tablet Take 1 tablet by mouth nightly 4/14/18  Yes Malcolm Le MD   fenofibrate micronized (LOFIBRA) 200 MG capsule Take 1 capsule by mouth nightly 4/14/18  Yes Malcolm Le MD   lisinopril (PRINIVIL;ZESTRIL) 2.5 MG tablet Take 1 tablet by mouth daily 4/14/18  Yes Malcolm Le MD   carvedilol (COREG) 6.25 MG tablet Take 1 tablet by mouth 2 times daily (with meals) 4/14/18  Yes Malcolm Le MD   insulin glargine (LANTUS SOLOSTAR) 100 UNIT/ML injection pen Inject 35 Units into the skin 2 times daily 4/14/18  Yes Malcolm Le MD   insulin aspart (NOVOLOG FLEXPEN) 100 UNIT/ML injection pen Inject 20 Units into the skin 3 times daily (before meals) 4/14/18  Yes Malcolm Le MD   Lancets MISC 1 month supply for TID fingersticks 4/14/18  Yes Malcolm Le MD   Glucose Blood (BLOOD GLUCOSE TEST STRIPS) STRP 1 month supply for TID glucose checks 4/14/18  Yes Malcolm Le MD   naproxen (NAPROSYN) 500 MG tablet Take 1 tablet by mouth 2 times daily as needed for Pain 12/12/18 12/17/18  Sonny Lopez MD       Social history:  reports that she has never smoked. She has never used smokeless tobacco. She reports that she does not drink alcohol or use drugs. Family history:  History reviewed. No pertinent family history.     Exam  ED Triage Vitals [12/21/18 1453]   BP Temp Temp Source Pulse Resp SpO2 Height Weight 110/67 97.6 °F (36.4 °C) Oral 91 16 100 % 5' 3\" (1.6 m) 177 lb (80.3 kg)   Physical Exam   Constitutional: She is oriented to person, place, and time. She appears well-developed and well-nourished. No distress. HENT:   Head: Normocephalic and atraumatic. Eyes: Pupils are equal, round, and reactive to light. Conjunctivae and lids are normal. Right eye exhibits no discharge. Left eye exhibits no discharge. No scleral icterus. Neck: Neck supple. No tracheal deviation present. Cardiovascular: Normal rate, regular rhythm, normal heart sounds and intact distal pulses. No murmur heard. Pulmonary/Chest: Effort normal and breath sounds normal. No stridor. No respiratory distress. She has no wheezes. She exhibits no tenderness and no crepitus. Abdominal: Soft. Bowel sounds are normal. She exhibits no distension. There is tenderness in the right upper quadrant and epigastric area. There is no rigidity, no rebound, no guarding, no tenderness at McBurney's point and negative Lozano's sign. Musculoskeletal: She exhibits no edema or deformity. Neurological: She is alert and oriented to person, place, and time. She has normal strength. She displays no tremor. She exhibits normal muscle tone. No facial droop, no slurred speech   Skin: Skin is warm and dry. No rash noted. She is not diaphoretic. No cyanosis. No pallor. Psychiatric: She has a normal mood and affect. Her speech is normal.   Nursing note and vitals reviewed.         MDM/ED Course    ED Medication Orders     Start Ordered     Status Ordering Provider    12/21/18 1600 12/21/18 1553  dicyclomine (BENTYL) injection 20 mg  ONCE      Last MAR action:  Given - by Luis Armando KITCHEN on 12[facility]21/18 at [facility] [address], [facility]    12/21/18 1530 12/21/18 1523  ondansetron (ZOFRAN) injection 4 mg  ONCE      Last MAR action:  Given - by Luis Armando KITCHEN on 12/21/18 at [address] [facility]          EKG  The Ekg interpreted by me in the absence of a cardiologist

## 2018-12-29 ENCOUNTER — HOSPITAL ENCOUNTER (EMERGENCY)
Age: 58
Discharge: ANOTHER HOSPITAL | End: 2018-12-30
Attending: EMERGENCY MEDICINE
Payer: MEDICARE

## 2018-12-29 ENCOUNTER — APPOINTMENT (OUTPATIENT)
Dept: CT IMAGING | Age: 58
End: 2018-12-29
Payer: MEDICARE

## 2018-12-29 DIAGNOSIS — R53.1 GENERALIZED WEAKNESS: Primary | ICD-10-CM

## 2018-12-29 DIAGNOSIS — E11.65 POORLY CONTROLLED DIABETES MELLITUS (HCC): ICD-10-CM

## 2018-12-29 LAB
A/G RATIO: 1.5 (ref 1.1–2.2)
ALBUMIN SERPL-MCNC: 4.1 G/DL (ref 3.4–5)
ALP BLD-CCNC: 86 U/L (ref 40–129)
ALT SERPL-CCNC: 10 U/L (ref 10–40)
ANION GAP SERPL CALCULATED.3IONS-SCNC: 13 MMOL/L (ref 3–16)
AST SERPL-CCNC: 10 U/L (ref 15–37)
BASOPHILS ABSOLUTE: 0.1 K/UL (ref 0–0.2)
BASOPHILS RELATIVE PERCENT: 0.8 %
BILIRUB SERPL-MCNC: 0.3 MG/DL (ref 0–1)
BUN BLDV-MCNC: 9 MG/DL (ref 7–20)
CALCIUM SERPL-MCNC: 9.9 MG/DL (ref 8.3–10.6)
CHLORIDE BLD-SCNC: 93 MMOL/L (ref 99–110)
CO2: 25 MMOL/L (ref 21–32)
CREAT SERPL-MCNC: <0.5 MG/DL (ref 0.6–1.1)
EOSINOPHILS ABSOLUTE: 0.3 K/UL (ref 0–0.6)
EOSINOPHILS RELATIVE PERCENT: 4.1 %
GFR AFRICAN AMERICAN: >60
GFR NON-AFRICAN AMERICAN: >60
GLOBULIN: 2.7 G/DL
GLUCOSE BLD-MCNC: 434 MG/DL (ref 70–99)
HCT VFR BLD CALC: 41.6 % (ref 36–48)
HEMOGLOBIN: 13.9 G/DL (ref 12–16)
LYMPHOCYTES ABSOLUTE: 2.6 K/UL (ref 1–5.1)
LYMPHOCYTES RELATIVE PERCENT: 31.6 %
MCH RBC QN AUTO: 31.5 PG (ref 26–34)
MCHC RBC AUTO-ENTMCNC: 33.5 G/DL (ref 31–36)
MCV RBC AUTO: 94.1 FL (ref 80–100)
MONOCYTES ABSOLUTE: 0.6 K/UL (ref 0–1.3)
MONOCYTES RELATIVE PERCENT: 7 %
NEUTROPHILS ABSOLUTE: 4.6 K/UL (ref 1.7–7.7)
NEUTROPHILS RELATIVE PERCENT: 56.5 %
PDW BLD-RTO: 13.6 % (ref 12.4–15.4)
PLATELET # BLD: 278 K/UL (ref 135–450)
PMV BLD AUTO: 8.8 FL (ref 5–10.5)
POTASSIUM REFLEX MAGNESIUM: 4.3 MMOL/L (ref 3.5–5.1)
RBC # BLD: 4.42 M/UL (ref 4–5.2)
SODIUM BLD-SCNC: 131 MMOL/L (ref 136–145)
TOTAL CK: 27 U/L (ref 26–192)
TOTAL PROTEIN: 6.8 G/DL (ref 6.4–8.2)
TROPONIN: <0.01 NG/ML
WBC # BLD: 8.1 K/UL (ref 4–11)

## 2018-12-29 PROCEDURE — 70450 CT HEAD/BRAIN W/O DYE: CPT

## 2018-12-29 PROCEDURE — 93005 ELECTROCARDIOGRAM TRACING: CPT | Performed by: EMERGENCY MEDICINE

## 2018-12-29 PROCEDURE — 86140 C-REACTIVE PROTEIN: CPT

## 2018-12-29 PROCEDURE — 96372 THER/PROPH/DIAG INJ SC/IM: CPT

## 2018-12-29 PROCEDURE — 96375 TX/PRO/DX INJ NEW DRUG ADDON: CPT

## 2018-12-29 PROCEDURE — 96361 HYDRATE IV INFUSION ADD-ON: CPT

## 2018-12-29 PROCEDURE — 86038 ANTINUCLEAR ANTIBODIES: CPT

## 2018-12-29 PROCEDURE — 6370000000 HC RX 637 (ALT 250 FOR IP): Performed by: EMERGENCY MEDICINE

## 2018-12-29 PROCEDURE — 82550 ASSAY OF CK (CPK): CPT

## 2018-12-29 PROCEDURE — 85652 RBC SED RATE AUTOMATED: CPT

## 2018-12-29 PROCEDURE — 80053 COMPREHEN METABOLIC PANEL: CPT

## 2018-12-29 PROCEDURE — 6360000002 HC RX W HCPCS: Performed by: EMERGENCY MEDICINE

## 2018-12-29 PROCEDURE — 2580000003 HC RX 258: Performed by: EMERGENCY MEDICINE

## 2018-12-29 PROCEDURE — 85025 COMPLETE CBC W/AUTO DIFF WBC: CPT

## 2018-12-29 PROCEDURE — 84484 ASSAY OF TROPONIN QUANT: CPT

## 2018-12-29 PROCEDURE — 96374 THER/PROPH/DIAG INJ IV PUSH: CPT

## 2018-12-29 PROCEDURE — 99285 EMERGENCY DEPT VISIT HI MDM: CPT

## 2018-12-29 RX ORDER — ONDANSETRON 2 MG/ML
4 INJECTION INTRAMUSCULAR; INTRAVENOUS EVERY 30 MIN PRN
Status: DISCONTINUED | OUTPATIENT
Start: 2018-12-29 | End: 2018-12-30 | Stop reason: HOSPADM

## 2018-12-29 RX ORDER — MORPHINE SULFATE 2 MG/ML
4 INJECTION, SOLUTION INTRAMUSCULAR; INTRAVENOUS ONCE
Status: COMPLETED | OUTPATIENT
Start: 2018-12-29 | End: 2018-12-29

## 2018-12-29 RX ORDER — 0.9 % SODIUM CHLORIDE 0.9 %
1000 INTRAVENOUS SOLUTION INTRAVENOUS ONCE
Status: COMPLETED | OUTPATIENT
Start: 2018-12-29 | End: 2018-12-30

## 2018-12-29 RX ADMIN — SODIUM CHLORIDE 1000 ML: 9 INJECTION, SOLUTION INTRAVENOUS at 23:53

## 2018-12-29 RX ADMIN — MORPHINE SULFATE 4 MG: 2 INJECTION, SOLUTION INTRAMUSCULAR; INTRAVENOUS at 23:53

## 2018-12-29 RX ADMIN — ONDANSETRON 4 MG: 2 INJECTION INTRAMUSCULAR; INTRAVENOUS at 23:53

## 2018-12-29 RX ADMIN — INSULIN HUMAN 10 UNITS: 100 INJECTION, SOLUTION PARENTERAL at 23:53

## 2018-12-29 ASSESSMENT — PAIN SCALES - GENERAL
PAINLEVEL_OUTOF10: 10
PAINLEVEL_OUTOF10: 10

## 2018-12-29 ASSESSMENT — PAIN DESCRIPTION - LOCATION: LOCATION: GENERALIZED

## 2018-12-30 ENCOUNTER — HOSPITAL ENCOUNTER (INPATIENT)
Age: 58
LOS: 1 days | Discharge: HOME | DRG: 948 | End: 2018-12-31
Attending: HOSPITALIST | Admitting: HOSPITALIST
Payer: MEDICARE

## 2018-12-30 VITALS
SYSTOLIC BLOOD PRESSURE: 129 MMHG | RESPIRATION RATE: 14 BRPM | DIASTOLIC BLOOD PRESSURE: 74 MMHG | HEART RATE: 84 BPM | OXYGEN SATURATION: 99 % | WEIGHT: 169 LBS | TEMPERATURE: 98 F | BODY MASS INDEX: 29.95 KG/M2 | HEIGHT: 63 IN

## 2018-12-30 PROBLEM — R53.1 GENERALIZED WEAKNESS: Status: ACTIVE | Noted: 2018-12-30

## 2018-12-30 LAB
BILIRUBIN URINE: NEGATIVE
BLOOD, URINE: NEGATIVE
C-REACTIVE PROTEIN: 0.5 MG/L (ref 0–5.1)
CLARITY: CLEAR
COLOR: YELLOW
EKG ATRIAL RATE: 105 BPM
EKG DIAGNOSIS: NORMAL
EKG P AXIS: 44 DEGREES
EKG P-R INTERVAL: 156 MS
EKG Q-T INTERVAL: 510 MS
EKG QRS DURATION: 86 MS
EKG QTC CALCULATION (BAZETT): 674 MS
EKG R AXIS: 15 DEGREES
EKG T AXIS: 75 DEGREES
EKG VENTRICULAR RATE: 105 BPM
GLUCOSE BLD-MCNC: 225 MG/DL (ref 70–99)
GLUCOSE BLD-MCNC: 236 MG/DL (ref 70–99)
GLUCOSE BLD-MCNC: 261 MG/DL (ref 70–99)
GLUCOSE BLD-MCNC: 287 MG/DL (ref 70–99)
GLUCOSE BLD-MCNC: 318 MG/DL (ref 70–99)
GLUCOSE URINE: >=1000 MG/DL
KETONES, URINE: NEGATIVE MG/DL
LEUKOCYTE ESTERASE, URINE: NEGATIVE
MICROSCOPIC EXAMINATION: ABNORMAL
NITRITE, URINE: NEGATIVE
PERFORMED ON: ABNORMAL
PH UA: 6.5
PROTEIN UA: NEGATIVE MG/DL
SEDIMENTATION RATE, ERYTHROCYTE: 11 MM/HR (ref 0–30)
SPECIFIC GRAVITY UA: <=1.005
URINE TYPE: ABNORMAL
UROBILINOGEN, URINE: 0.2 E.U./DL

## 2018-12-30 PROCEDURE — 81003 URINALYSIS AUTO W/O SCOPE: CPT

## 2018-12-30 PROCEDURE — 2580000003 HC RX 258: Performed by: INTERNAL MEDICINE

## 2018-12-30 PROCEDURE — 1200000000 HC SEMI PRIVATE

## 2018-12-30 PROCEDURE — 6360000002 HC RX W HCPCS: Performed by: INTERNAL MEDICINE

## 2018-12-30 PROCEDURE — 93010 ELECTROCARDIOGRAM REPORT: CPT | Performed by: INTERNAL MEDICINE

## 2018-12-30 PROCEDURE — 6370000000 HC RX 637 (ALT 250 FOR IP): Performed by: INTERNAL MEDICINE

## 2018-12-30 RX ORDER — GABAPENTIN 300 MG/1
600 CAPSULE ORAL NIGHTLY
Status: DISCONTINUED | OUTPATIENT
Start: 2018-12-30 | End: 2018-12-31 | Stop reason: HOSPADM

## 2018-12-30 RX ORDER — INSULIN GLARGINE 100 [IU]/ML
35 INJECTION, SOLUTION SUBCUTANEOUS 2 TIMES DAILY
Status: DISCONTINUED | OUTPATIENT
Start: 2018-12-30 | End: 2018-12-31 | Stop reason: HOSPADM

## 2018-12-30 RX ORDER — GABAPENTIN 600 MG/1
600 TABLET ORAL NIGHTLY
Status: ON HOLD | COMMUNITY
End: 2020-07-18 | Stop reason: SDUPTHER

## 2018-12-30 RX ORDER — SODIUM CHLORIDE 0.9 % (FLUSH) 0.9 %
10 SYRINGE (ML) INJECTION PRN
Status: DISCONTINUED | OUTPATIENT
Start: 2018-12-30 | End: 2018-12-31 | Stop reason: HOSPADM

## 2018-12-30 RX ORDER — PANTOPRAZOLE SODIUM 40 MG/1
40 TABLET, DELAYED RELEASE ORAL
Status: DISCONTINUED | OUTPATIENT
Start: 2018-12-31 | End: 2018-12-31 | Stop reason: HOSPADM

## 2018-12-30 RX ORDER — ASPIRIN 81 MG/1
81 TABLET ORAL DAILY
Status: DISCONTINUED | OUTPATIENT
Start: 2018-12-30 | End: 2018-12-31 | Stop reason: HOSPADM

## 2018-12-30 RX ORDER — CLOPIDOGREL BISULFATE 75 MG/1
75 TABLET ORAL DAILY
COMMUNITY
End: 2020-01-15

## 2018-12-30 RX ORDER — CLOPIDOGREL BISULFATE 75 MG/1
75 TABLET ORAL DAILY
Status: DISCONTINUED | OUTPATIENT
Start: 2018-12-30 | End: 2018-12-31 | Stop reason: HOSPADM

## 2018-12-30 RX ORDER — SODIUM CHLORIDE 0.9 % (FLUSH) 0.9 %
10 SYRINGE (ML) INJECTION EVERY 12 HOURS SCHEDULED
Status: DISCONTINUED | OUTPATIENT
Start: 2018-12-30 | End: 2018-12-31 | Stop reason: HOSPADM

## 2018-12-30 RX ORDER — CARVEDILOL 6.25 MG/1
6.25 TABLET ORAL 2 TIMES DAILY WITH MEALS
Status: DISCONTINUED | OUTPATIENT
Start: 2018-12-30 | End: 2018-12-31 | Stop reason: HOSPADM

## 2018-12-30 RX ORDER — DEXTROSE MONOHYDRATE 50 MG/ML
100 INJECTION, SOLUTION INTRAVENOUS PRN
Status: DISCONTINUED | OUTPATIENT
Start: 2018-12-30 | End: 2018-12-31 | Stop reason: HOSPADM

## 2018-12-30 RX ORDER — DULOXETIN HYDROCHLORIDE 20 MG/1
20 CAPSULE, DELAYED RELEASE ORAL 2 TIMES DAILY
Status: DISCONTINUED | OUTPATIENT
Start: 2018-12-30 | End: 2018-12-31 | Stop reason: HOSPADM

## 2018-12-30 RX ORDER — NICOTINE POLACRILEX 4 MG
15 LOZENGE BUCCAL PRN
Status: DISCONTINUED | OUTPATIENT
Start: 2018-12-30 | End: 2018-12-31 | Stop reason: HOSPADM

## 2018-12-30 RX ORDER — DEXTROSE MONOHYDRATE 25 G/50ML
12.5 INJECTION, SOLUTION INTRAVENOUS PRN
Status: DISCONTINUED | OUTPATIENT
Start: 2018-12-30 | End: 2018-12-31 | Stop reason: HOSPADM

## 2018-12-30 RX ORDER — FLUOXETINE 10 MG/1
10 CAPSULE ORAL DAILY
Status: DISCONTINUED | OUTPATIENT
Start: 2018-12-30 | End: 2018-12-31

## 2018-12-30 RX ORDER — LISINOPRIL 2.5 MG/1
2.5 TABLET ORAL DAILY
Status: DISCONTINUED | OUTPATIENT
Start: 2018-12-30 | End: 2018-12-31 | Stop reason: HOSPADM

## 2018-12-30 RX ORDER — ONDANSETRON 2 MG/ML
4 INJECTION INTRAMUSCULAR; INTRAVENOUS EVERY 6 HOURS PRN
Status: DISCONTINUED | OUTPATIENT
Start: 2018-12-30 | End: 2018-12-31 | Stop reason: HOSPADM

## 2018-12-30 RX ORDER — ATORVASTATIN CALCIUM 40 MG/1
40 TABLET, FILM COATED ORAL NIGHTLY
Status: DISCONTINUED | OUTPATIENT
Start: 2018-12-30 | End: 2018-12-31 | Stop reason: HOSPADM

## 2018-12-30 RX ADMIN — ENOXAPARIN SODIUM 40 MG: 40 INJECTION SUBCUTANEOUS at 16:54

## 2018-12-30 RX ADMIN — CLOPIDOGREL BISULFATE 75 MG: 75 TABLET ORAL at 16:54

## 2018-12-30 RX ADMIN — LISINOPRIL 2.5 MG: 2.5 TABLET ORAL at 16:54

## 2018-12-30 RX ADMIN — Medication 10 ML: at 22:08

## 2018-12-30 RX ADMIN — CARVEDILOL 6.25 MG: 6.25 TABLET, FILM COATED ORAL at 16:54

## 2018-12-30 RX ADMIN — INSULIN LISPRO 2 UNITS: 100 INJECTION, SOLUTION INTRAVENOUS; SUBCUTANEOUS at 21:05

## 2018-12-30 RX ADMIN — GABAPENTIN 600 MG: 300 CAPSULE ORAL at 21:04

## 2018-12-30 RX ADMIN — ATORVASTATIN CALCIUM 40 MG: 40 TABLET, FILM COATED ORAL at 21:04

## 2018-12-30 RX ADMIN — FLUOXETINE 10 MG: 10 CAPSULE ORAL at 16:55

## 2018-12-30 RX ADMIN — INSULIN GLARGINE 35 UNITS: 100 INJECTION, SOLUTION SUBCUTANEOUS at 21:04

## 2018-12-30 RX ADMIN — DULOXETINE HYDROCHLORIDE 20 MG: 20 CAPSULE, DELAYED RELEASE ORAL at 21:04

## 2018-12-30 RX ADMIN — INSULIN LISPRO 2 UNITS: 100 INJECTION, SOLUTION INTRAVENOUS; SUBCUTANEOUS at 17:43

## 2018-12-30 RX ADMIN — ASPIRIN 81 MG: 81 TABLET, COATED ORAL at 16:54

## 2018-12-30 ASSESSMENT — PAIN DESCRIPTION - LOCATION: LOCATION: GENERALIZED

## 2018-12-30 ASSESSMENT — PAIN SCALES - GENERAL
PAINLEVEL_OUTOF10: 10
PAINLEVEL_OUTOF10: 10

## 2018-12-30 ASSESSMENT — PAIN DESCRIPTION - PAIN TYPE: TYPE: CHRONIC PAIN

## 2018-12-31 VITALS
SYSTOLIC BLOOD PRESSURE: 97 MMHG | TEMPERATURE: 97.9 F | RESPIRATION RATE: 16 BRPM | DIASTOLIC BLOOD PRESSURE: 64 MMHG | OXYGEN SATURATION: 97 % | HEART RATE: 92 BPM

## 2018-12-31 LAB
BASOPHILS ABSOLUTE: 0.1 K/UL (ref 0–0.2)
BASOPHILS RELATIVE PERCENT: 0.9 %
EOSINOPHILS ABSOLUTE: 0.3 K/UL (ref 0–0.6)
EOSINOPHILS RELATIVE PERCENT: 4.6 %
ESTIMATED AVERAGE GLUCOSE: 292 MG/DL
GLUCOSE BLD-MCNC: 227 MG/DL (ref 70–99)
HBA1C MFR BLD: 11.8 %
HCT VFR BLD CALC: 40.1 % (ref 36–48)
HEMOGLOBIN: 13.6 G/DL (ref 12–16)
LYMPHOCYTES ABSOLUTE: 2.5 K/UL (ref 1–5.1)
LYMPHOCYTES RELATIVE PERCENT: 40.6 %
MCH RBC QN AUTO: 32.1 PG (ref 26–34)
MCHC RBC AUTO-ENTMCNC: 34 G/DL (ref 31–36)
MCV RBC AUTO: 94.4 FL (ref 80–100)
MONOCYTES ABSOLUTE: 0.5 K/UL (ref 0–1.3)
MONOCYTES RELATIVE PERCENT: 7.6 %
NEUTROPHILS ABSOLUTE: 2.9 K/UL (ref 1.7–7.7)
NEUTROPHILS RELATIVE PERCENT: 46.3 %
PDW BLD-RTO: 13.6 % (ref 12.4–15.4)
PERFORMED ON: ABNORMAL
PLATELET # BLD: 262 K/UL (ref 135–450)
PMV BLD AUTO: 8.5 FL (ref 5–10.5)
RBC # BLD: 4.25 M/UL (ref 4–5.2)
WBC # BLD: 6.2 K/UL (ref 4–11)

## 2018-12-31 PROCEDURE — 2580000003 HC RX 258: Performed by: INTERNAL MEDICINE

## 2018-12-31 PROCEDURE — 83036 HEMOGLOBIN GLYCOSYLATED A1C: CPT

## 2018-12-31 PROCEDURE — 85025 COMPLETE CBC W/AUTO DIFF WBC: CPT

## 2018-12-31 PROCEDURE — 36415 COLL VENOUS BLD VENIPUNCTURE: CPT

## 2018-12-31 PROCEDURE — 6370000000 HC RX 637 (ALT 250 FOR IP): Performed by: INTERNAL MEDICINE

## 2018-12-31 RX ADMIN — INSULIN GLARGINE 35 UNITS: 100 INJECTION, SOLUTION SUBCUTANEOUS at 09:34

## 2018-12-31 RX ADMIN — DULOXETINE HYDROCHLORIDE 20 MG: 20 CAPSULE, DELAYED RELEASE ORAL at 09:45

## 2018-12-31 RX ADMIN — INSULIN LISPRO 2 UNITS: 100 INJECTION, SOLUTION INTRAVENOUS; SUBCUTANEOUS at 09:34

## 2018-12-31 RX ADMIN — Medication 10 ML: at 09:39

## 2018-12-31 RX ADMIN — ASPIRIN 81 MG: 81 TABLET, COATED ORAL at 09:39

## 2018-12-31 RX ADMIN — CLOPIDOGREL BISULFATE 75 MG: 75 TABLET ORAL at 09:39

## 2018-12-31 RX ADMIN — PANTOPRAZOLE SODIUM 40 MG: 40 TABLET, DELAYED RELEASE ORAL at 05:41

## 2018-12-31 ASSESSMENT — PAIN SCALES - GENERAL: PAINLEVEL_OUTOF10: 0

## 2019-01-02 LAB
ANA INTERPRETATION: NORMAL
ANTI-NUCLEAR ANTIBODY (ANA): NEGATIVE

## 2019-01-05 ENCOUNTER — HOSPITAL ENCOUNTER (EMERGENCY)
Age: 59
Discharge: HOME | End: 2019-01-06
Attending: EMERGENCY MEDICINE
Payer: MEDICARE

## 2019-01-05 ENCOUNTER — APPOINTMENT (OUTPATIENT)
Dept: GENERAL RADIOLOGY | Age: 59
End: 2019-01-05
Payer: MEDICARE

## 2019-01-05 VITALS
SYSTOLIC BLOOD PRESSURE: 126 MMHG | TEMPERATURE: 98.3 F | HEIGHT: 63 IN | WEIGHT: 168 LBS | BODY MASS INDEX: 29.77 KG/M2 | HEART RATE: 89 BPM | RESPIRATION RATE: 14 BRPM | OXYGEN SATURATION: 97 % | DIASTOLIC BLOOD PRESSURE: 61 MMHG

## 2019-01-05 DIAGNOSIS — S92.352A CLOSED FRACTURE OF FIFTH METATARSAL BONE OF LEFT FOOT, PHYSEAL INVOLVEMENT UNSPECIFIED, INITIAL ENCOUNTER: Primary | ICD-10-CM

## 2019-01-05 PROCEDURE — 4500000024 HC ED LEVEL 4 PROCEDURE

## 2019-01-05 PROCEDURE — 73630 X-RAY EXAM OF FOOT: CPT

## 2019-01-05 PROCEDURE — 99284 EMERGENCY DEPT VISIT MOD MDM: CPT

## 2019-01-05 ASSESSMENT — PAIN DESCRIPTION - ORIENTATION: ORIENTATION: LEFT

## 2019-01-05 ASSESSMENT — PAIN DESCRIPTION - LOCATION: LOCATION: FOOT

## 2019-01-05 ASSESSMENT — PAIN DESCRIPTION - PAIN TYPE: TYPE: ACUTE PAIN

## 2019-01-05 ASSESSMENT — PAIN SCALES - GENERAL: PAINLEVEL_OUTOF10: 9

## 2019-01-06 PROCEDURE — 6370000000 HC RX 637 (ALT 250 FOR IP): Performed by: EMERGENCY MEDICINE

## 2019-01-06 RX ORDER — HYDROCODONE BITARTRATE AND ACETAMINOPHEN 5; 325 MG/1; MG/1
1 TABLET ORAL EVERY 6 HOURS PRN
Qty: 8 TABLET | Refills: 0 | Status: SHIPPED | OUTPATIENT
Start: 2019-01-06 | End: 2019-01-08

## 2019-01-06 RX ORDER — IBUPROFEN 600 MG/1
600 TABLET ORAL ONCE
Status: COMPLETED | OUTPATIENT
Start: 2019-01-06 | End: 2019-01-06

## 2019-01-06 RX ADMIN — IBUPROFEN 600 MG: 600 TABLET ORAL at 00:57

## 2019-01-06 ASSESSMENT — PAIN SCALES - GENERAL: PAINLEVEL_OUTOF10: 10

## 2019-01-25 ENCOUNTER — APPOINTMENT (OUTPATIENT)
Dept: GENERAL RADIOLOGY | Age: 59
End: 2019-01-25
Payer: MEDICARE

## 2019-01-25 ENCOUNTER — HOSPITAL ENCOUNTER (OUTPATIENT)
Age: 59
Setting detail: OBSERVATION
Discharge: HOME | End: 2019-01-26
Attending: EMERGENCY MEDICINE | Admitting: INTERNAL MEDICINE
Payer: MEDICARE

## 2019-01-25 DIAGNOSIS — R07.9 CHEST PAIN, UNSPECIFIED TYPE: Primary | ICD-10-CM

## 2019-01-25 DIAGNOSIS — R11.2 NAUSEA VOMITING AND DIARRHEA: ICD-10-CM

## 2019-01-25 DIAGNOSIS — F79 MENTAL RETARDATION: ICD-10-CM

## 2019-01-25 DIAGNOSIS — R19.7 NAUSEA VOMITING AND DIARRHEA: ICD-10-CM

## 2019-01-25 LAB
A/G RATIO: 1.5 (ref 1.1–2.2)
ALBUMIN SERPL-MCNC: 3.7 G/DL (ref 3.4–5)
ALP BLD-CCNC: 72 U/L (ref 40–129)
ALT SERPL-CCNC: 8 U/L (ref 10–40)
ANION GAP SERPL CALCULATED.3IONS-SCNC: 13 MMOL/L (ref 3–16)
AST SERPL-CCNC: 7 U/L (ref 15–37)
BACTERIA: ABNORMAL /HPF
BILIRUB SERPL-MCNC: <0.2 MG/DL (ref 0–1)
BILIRUBIN URINE: NEGATIVE
BLOOD, URINE: NEGATIVE
BUN BLDV-MCNC: 15 MG/DL (ref 7–20)
CALCIUM SERPL-MCNC: 8.6 MG/DL (ref 8.3–10.6)
CHLORIDE BLD-SCNC: 100 MMOL/L (ref 99–110)
CLARITY: ABNORMAL
CO2: 21 MMOL/L (ref 21–32)
COLOR: YELLOW
CREAT SERPL-MCNC: <0.5 MG/DL (ref 0.6–1.1)
EPITHELIAL CELLS, UA: ABNORMAL /HPF
GFR AFRICAN AMERICAN: >60
GFR NON-AFRICAN AMERICAN: >60
GLOBULIN: 2.5 G/DL
GLUCOSE BLD-MCNC: 294 MG/DL (ref 70–99)
GLUCOSE URINE: >=1000 MG/DL
HCT VFR BLD CALC: 38.9 % (ref 36–48)
HEMOGLOBIN: 13.1 G/DL (ref 12–16)
INR BLD: 1.05 (ref 0.86–1.14)
KETONES, URINE: ABNORMAL MG/DL
LEUKOCYTE ESTERASE, URINE: NEGATIVE
LIPASE: 34 U/L (ref 13–60)
MCH RBC QN AUTO: 31.9 PG (ref 26–34)
MCHC RBC AUTO-ENTMCNC: 33.8 G/DL (ref 31–36)
MCV RBC AUTO: 94.4 FL (ref 80–100)
MICROSCOPIC EXAMINATION: YES
NITRITE, URINE: NEGATIVE
PDW BLD-RTO: 13.4 % (ref 12.4–15.4)
PH UA: 5.5
PLATELET # BLD: 232 K/UL (ref 135–450)
PMV BLD AUTO: 8.5 FL (ref 5–10.5)
POTASSIUM SERPL-SCNC: 4 MMOL/L (ref 3.5–5.1)
PROTEIN UA: NEGATIVE MG/DL
PROTHROMBIN TIME: 12 SEC (ref 9.8–13)
RBC # BLD: 4.12 M/UL (ref 4–5.2)
RBC UA: ABNORMAL /HPF (ref 0–2)
SODIUM BLD-SCNC: 134 MMOL/L (ref 136–145)
SPECIFIC GRAVITY UA: >=1.03
TOTAL PROTEIN: 6.2 G/DL (ref 6.4–8.2)
TROPONIN: <0.01 NG/ML
TROPONIN: <0.01 NG/ML
URINE REFLEX TO CULTURE: YES
URINE TYPE: ABNORMAL
UROBILINOGEN, URINE: 0.2 E.U./DL
WBC # BLD: 6.5 K/UL (ref 4–11)
WBC UA: ABNORMAL /HPF (ref 0–5)
YEAST: PRESENT /HPF

## 2019-01-25 PROCEDURE — 84484 ASSAY OF TROPONIN QUANT: CPT

## 2019-01-25 PROCEDURE — 83690 ASSAY OF LIPASE: CPT

## 2019-01-25 PROCEDURE — 80053 COMPREHEN METABOLIC PANEL: CPT

## 2019-01-25 PROCEDURE — 85610 PROTHROMBIN TIME: CPT

## 2019-01-25 PROCEDURE — 6370000000 HC RX 637 (ALT 250 FOR IP): Performed by: NURSE PRACTITIONER

## 2019-01-25 PROCEDURE — 6360000002 HC RX W HCPCS: Performed by: NURSE PRACTITIONER

## 2019-01-25 PROCEDURE — 93005 ELECTROCARDIOGRAM TRACING: CPT | Performed by: NURSE PRACTITIONER

## 2019-01-25 PROCEDURE — 96374 THER/PROPH/DIAG INJ IV PUSH: CPT

## 2019-01-25 PROCEDURE — 81001 URINALYSIS AUTO W/SCOPE: CPT

## 2019-01-25 PROCEDURE — 71046 X-RAY EXAM CHEST 2 VIEWS: CPT

## 2019-01-25 PROCEDURE — 99285 EMERGENCY DEPT VISIT HI MDM: CPT

## 2019-01-25 PROCEDURE — 2580000003 HC RX 258: Performed by: NURSE PRACTITIONER

## 2019-01-25 PROCEDURE — 96361 HYDRATE IV INFUSION ADD-ON: CPT

## 2019-01-25 PROCEDURE — 87086 URINE CULTURE/COLONY COUNT: CPT

## 2019-01-25 PROCEDURE — 85027 COMPLETE CBC AUTOMATED: CPT

## 2019-01-25 RX ORDER — ASPIRIN 325 MG
325 TABLET ORAL ONCE
Status: COMPLETED | OUTPATIENT
Start: 2019-01-25 | End: 2019-01-25

## 2019-01-25 RX ORDER — 0.9 % SODIUM CHLORIDE 0.9 %
500 INTRAVENOUS SOLUTION INTRAVENOUS ONCE
Status: COMPLETED | OUTPATIENT
Start: 2019-01-25 | End: 2019-01-25

## 2019-01-25 RX ORDER — ONDANSETRON 2 MG/ML
4 INJECTION INTRAMUSCULAR; INTRAVENOUS ONCE
Status: COMPLETED | OUTPATIENT
Start: 2019-01-25 | End: 2019-01-25

## 2019-01-25 RX ADMIN — ASPIRIN 325 MG: 325 TABLET, COATED ORAL at 23:35

## 2019-01-25 RX ADMIN — SODIUM CHLORIDE 500 ML: 9 INJECTION, SOLUTION INTRAVENOUS at 20:19

## 2019-01-25 RX ADMIN — ONDANSETRON 4 MG: 2 INJECTION INTRAMUSCULAR; INTRAVENOUS at 20:19

## 2019-01-25 ASSESSMENT — PAIN SCALES - GENERAL: PAINLEVEL_OUTOF10: 10

## 2019-01-25 ASSESSMENT — PAIN DESCRIPTION - PAIN TYPE: TYPE: ACUTE PAIN

## 2019-01-25 ASSESSMENT — PAIN DESCRIPTION - LOCATION: LOCATION: CHEST

## 2019-01-25 ASSESSMENT — PAIN DESCRIPTION - DESCRIPTORS: DESCRIPTORS: STABBING;PRESSURE

## 2019-01-26 VITALS
WEIGHT: 178.13 LBS | HEART RATE: 71 BPM | RESPIRATION RATE: 16 BRPM | BODY MASS INDEX: 31.56 KG/M2 | HEIGHT: 63 IN | TEMPERATURE: 98.2 F | SYSTOLIC BLOOD PRESSURE: 104 MMHG | OXYGEN SATURATION: 94 % | DIASTOLIC BLOOD PRESSURE: 66 MMHG

## 2019-01-26 LAB
ANION GAP SERPL CALCULATED.3IONS-SCNC: 10 MMOL/L (ref 3–16)
BUN BLDV-MCNC: 14 MG/DL (ref 7–20)
CALCIUM SERPL-MCNC: 8.9 MG/DL (ref 8.3–10.6)
CHLORIDE BLD-SCNC: 106 MMOL/L (ref 99–110)
CHOLESTEROL, TOTAL: 105 MG/DL (ref 0–199)
CO2: 24 MMOL/L (ref 21–32)
CREAT SERPL-MCNC: <0.5 MG/DL (ref 0.6–1.1)
EKG ATRIAL RATE: 74 BPM
EKG ATRIAL RATE: 98 BPM
EKG DIAGNOSIS: NORMAL
EKG DIAGNOSIS: NORMAL
EKG P AXIS: 32 DEGREES
EKG P AXIS: 41 DEGREES
EKG P-R INTERVAL: 152 MS
EKG P-R INTERVAL: 162 MS
EKG Q-T INTERVAL: 428 MS
EKG Q-T INTERVAL: 462 MS
EKG QRS DURATION: 100 MS
EKG QRS DURATION: 92 MS
EKG QTC CALCULATION (BAZETT): 512 MS
EKG QTC CALCULATION (BAZETT): 546 MS
EKG R AXIS: -15 DEGREES
EKG R AXIS: 0 DEGREES
EKG T AXIS: 43 DEGREES
EKG T AXIS: 71 DEGREES
EKG VENTRICULAR RATE: 74 BPM
EKG VENTRICULAR RATE: 98 BPM
GFR AFRICAN AMERICAN: >60
GFR NON-AFRICAN AMERICAN: >60
GLUCOSE BLD-MCNC: 161 MG/DL (ref 70–99)
GLUCOSE BLD-MCNC: 212 MG/DL (ref 70–99)
GLUCOSE BLD-MCNC: 219 MG/DL (ref 70–99)
HCT VFR BLD CALC: 38.7 % (ref 36–48)
HDLC SERPL-MCNC: 27 MG/DL (ref 40–60)
HEMOGLOBIN: 13 G/DL (ref 12–16)
LDL CHOLESTEROL CALCULATED: 52 MG/DL
MAGNESIUM: 1.6 MG/DL (ref 1.8–2.4)
MCH RBC QN AUTO: 31.6 PG (ref 26–34)
MCHC RBC AUTO-ENTMCNC: 33.5 G/DL (ref 31–36)
MCV RBC AUTO: 94.4 FL (ref 80–100)
PDW BLD-RTO: 13.3 % (ref 12.4–15.4)
PERFORMED ON: ABNORMAL
PERFORMED ON: ABNORMAL
PLATELET # BLD: 215 K/UL (ref 135–450)
PMV BLD AUTO: 8.5 FL (ref 5–10.5)
POTASSIUM REFLEX MAGNESIUM: 3.3 MMOL/L (ref 3.5–5.1)
RBC # BLD: 4.1 M/UL (ref 4–5.2)
SODIUM BLD-SCNC: 140 MMOL/L (ref 136–145)
TRIGL SERPL-MCNC: 129 MG/DL (ref 0–150)
TROPONIN: <0.01 NG/ML
TROPONIN: <0.01 NG/ML
VLDLC SERPL CALC-MCNC: 26 MG/DL
WBC # BLD: 4.7 K/UL (ref 4–11)

## 2019-01-26 PROCEDURE — G0378 HOSPITAL OBSERVATION PER HR: HCPCS

## 2019-01-26 PROCEDURE — 93010 ELECTROCARDIOGRAM REPORT: CPT | Performed by: INTERNAL MEDICINE

## 2019-01-26 PROCEDURE — 6370000000 HC RX 637 (ALT 250 FOR IP): Performed by: NURSE PRACTITIONER

## 2019-01-26 PROCEDURE — 83735 ASSAY OF MAGNESIUM: CPT

## 2019-01-26 PROCEDURE — 96372 THER/PROPH/DIAG INJ SC/IM: CPT

## 2019-01-26 PROCEDURE — 83036 HEMOGLOBIN GLYCOSYLATED A1C: CPT

## 2019-01-26 PROCEDURE — 6360000002 HC RX W HCPCS: Performed by: NURSE PRACTITIONER

## 2019-01-26 PROCEDURE — 36415 COLL VENOUS BLD VENIPUNCTURE: CPT

## 2019-01-26 PROCEDURE — 84484 ASSAY OF TROPONIN QUANT: CPT

## 2019-01-26 PROCEDURE — 80061 LIPID PANEL: CPT

## 2019-01-26 PROCEDURE — 85027 COMPLETE CBC AUTOMATED: CPT

## 2019-01-26 PROCEDURE — 93005 ELECTROCARDIOGRAM TRACING: CPT | Performed by: NURSE PRACTITIONER

## 2019-01-26 PROCEDURE — 80048 BASIC METABOLIC PNL TOTAL CA: CPT

## 2019-01-26 RX ORDER — ATORVASTATIN CALCIUM 40 MG/1
40 TABLET, FILM COATED ORAL NIGHTLY
Status: DISCONTINUED | OUTPATIENT
Start: 2019-01-26 | End: 2019-01-26

## 2019-01-26 RX ORDER — ASPIRIN 81 MG/1
81 TABLET, CHEWABLE ORAL DAILY
Status: DISCONTINUED | OUTPATIENT
Start: 2019-01-27 | End: 2019-01-26

## 2019-01-26 RX ORDER — FAMOTIDINE 20 MG/1
20 TABLET, FILM COATED ORAL 2 TIMES DAILY
Status: DISCONTINUED | OUTPATIENT
Start: 2019-01-26 | End: 2019-01-26 | Stop reason: HOSPADM

## 2019-01-26 RX ORDER — NITROGLYCERIN 0.4 MG/1
0.4 TABLET SUBLINGUAL EVERY 5 MIN PRN
Status: DISCONTINUED | OUTPATIENT
Start: 2019-01-26 | End: 2019-01-26 | Stop reason: HOSPADM

## 2019-01-26 RX ORDER — MAGNESIUM SULFATE 1 G/100ML
1 INJECTION INTRAVENOUS ONCE
Status: DISCONTINUED | OUTPATIENT
Start: 2019-01-26 | End: 2019-01-26 | Stop reason: HOSPADM

## 2019-01-26 RX ORDER — FLUOXETINE 10 MG/1
10 CAPSULE ORAL DAILY
Status: DISCONTINUED | OUTPATIENT
Start: 2019-01-26 | End: 2019-01-26 | Stop reason: HOSPADM

## 2019-01-26 RX ORDER — FENOFIBRATE 160 MG/1
160 TABLET ORAL DAILY
Status: DISCONTINUED | OUTPATIENT
Start: 2019-01-26 | End: 2019-01-26 | Stop reason: HOSPADM

## 2019-01-26 RX ORDER — ATORVASTATIN CALCIUM 40 MG/1
40 TABLET, FILM COATED ORAL NIGHTLY
Status: DISCONTINUED | OUTPATIENT
Start: 2019-01-26 | End: 2019-01-26 | Stop reason: HOSPADM

## 2019-01-26 RX ORDER — GABAPENTIN 300 MG/1
600 CAPSULE ORAL NIGHTLY
Status: DISCONTINUED | OUTPATIENT
Start: 2019-01-26 | End: 2019-01-26 | Stop reason: HOSPADM

## 2019-01-26 RX ORDER — NICOTINE POLACRILEX 4 MG
15 LOZENGE BUCCAL PRN
Status: DISCONTINUED | OUTPATIENT
Start: 2019-01-26 | End: 2019-01-26 | Stop reason: HOSPADM

## 2019-01-26 RX ORDER — POTASSIUM CHLORIDE 20 MEQ/1
40 TABLET, EXTENDED RELEASE ORAL ONCE
Status: DISCONTINUED | OUTPATIENT
Start: 2019-01-26 | End: 2019-01-26 | Stop reason: HOSPADM

## 2019-01-26 RX ORDER — ONDANSETRON 2 MG/ML
4 INJECTION INTRAMUSCULAR; INTRAVENOUS EVERY 6 HOURS PRN
Status: DISCONTINUED | OUTPATIENT
Start: 2019-01-26 | End: 2019-01-26

## 2019-01-26 RX ORDER — INSULIN GLARGINE 100 [IU]/ML
35 INJECTION, SOLUTION SUBCUTANEOUS NIGHTLY
Status: DISCONTINUED | OUTPATIENT
Start: 2019-01-26 | End: 2019-01-26 | Stop reason: HOSPADM

## 2019-01-26 RX ORDER — SODIUM CHLORIDE 0.9 % (FLUSH) 0.9 %
10 SYRINGE (ML) INJECTION PRN
Status: DISCONTINUED | OUTPATIENT
Start: 2019-01-26 | End: 2019-01-26 | Stop reason: HOSPADM

## 2019-01-26 RX ORDER — DULOXETIN HYDROCHLORIDE 20 MG/1
20 CAPSULE, DELAYED RELEASE ORAL 2 TIMES DAILY
Status: DISCONTINUED | OUTPATIENT
Start: 2019-01-26 | End: 2019-01-26 | Stop reason: HOSPADM

## 2019-01-26 RX ORDER — ASPIRIN 81 MG/1
81 TABLET ORAL DAILY
Status: DISCONTINUED | OUTPATIENT
Start: 2019-01-26 | End: 2019-01-26 | Stop reason: HOSPADM

## 2019-01-26 RX ORDER — CARVEDILOL 6.25 MG/1
6.25 TABLET ORAL 2 TIMES DAILY WITH MEALS
Status: DISCONTINUED | OUTPATIENT
Start: 2019-01-26 | End: 2019-01-26 | Stop reason: HOSPADM

## 2019-01-26 RX ORDER — LISINOPRIL 2.5 MG/1
2.5 TABLET ORAL DAILY
Status: DISCONTINUED | OUTPATIENT
Start: 2019-01-26 | End: 2019-01-26 | Stop reason: HOSPADM

## 2019-01-26 RX ORDER — SODIUM CHLORIDE 0.9 % (FLUSH) 0.9 %
10 SYRINGE (ML) INJECTION EVERY 12 HOURS SCHEDULED
Status: DISCONTINUED | OUTPATIENT
Start: 2019-01-26 | End: 2019-01-26 | Stop reason: HOSPADM

## 2019-01-26 RX ORDER — DEXTROSE MONOHYDRATE 25 G/50ML
12.5 INJECTION, SOLUTION INTRAVENOUS PRN
Status: DISCONTINUED | OUTPATIENT
Start: 2019-01-26 | End: 2019-01-26 | Stop reason: HOSPADM

## 2019-01-26 RX ORDER — TOPIRAMATE 25 MG/1
200 TABLET ORAL DAILY
Status: DISCONTINUED | OUTPATIENT
Start: 2019-01-26 | End: 2019-01-26 | Stop reason: HOSPADM

## 2019-01-26 RX ORDER — DEXTROSE MONOHYDRATE 50 MG/ML
100 INJECTION, SOLUTION INTRAVENOUS PRN
Status: DISCONTINUED | OUTPATIENT
Start: 2019-01-26 | End: 2019-01-26 | Stop reason: HOSPADM

## 2019-01-26 RX ADMIN — INSULIN LISPRO 10 UNITS: 100 INJECTION, SOLUTION INTRAVENOUS; SUBCUTANEOUS at 04:45

## 2019-01-26 RX ADMIN — ASPIRIN 81 MG: 81 TABLET, COATED ORAL at 08:35

## 2019-01-26 RX ADMIN — LISINOPRIL 2.5 MG: 2.5 TABLET ORAL at 08:35

## 2019-01-26 RX ADMIN — ENOXAPARIN SODIUM 40 MG: 40 INJECTION SUBCUTANEOUS at 08:34

## 2019-01-26 RX ADMIN — FAMOTIDINE 20 MG: 20 TABLET ORAL at 08:35

## 2019-01-26 RX ADMIN — TOPIRAMATE 200 MG: 25 TABLET, FILM COATED ORAL at 08:35

## 2019-01-26 RX ADMIN — DULOXETINE HYDROCHLORIDE 20 MG: 20 CAPSULE, DELAYED RELEASE ORAL at 08:35

## 2019-01-26 RX ADMIN — FENOFIBRATE 160 MG: 160 TABLET, FILM COATED ORAL at 08:35

## 2019-01-26 RX ADMIN — INSULIN LISPRO 2 UNITS: 100 INJECTION, SOLUTION INTRAVENOUS; SUBCUTANEOUS at 08:38

## 2019-01-26 RX ADMIN — FLUOXETINE 10 MG: 10 CAPSULE ORAL at 08:35

## 2019-01-26 ASSESSMENT — PAIN DESCRIPTION - ORIENTATION
ORIENTATION: MID
ORIENTATION: MID

## 2019-01-26 ASSESSMENT — PAIN SCALES - GENERAL
PAINLEVEL_OUTOF10: 10
PAINLEVEL_OUTOF10: 10

## 2019-01-26 ASSESSMENT — PAIN DESCRIPTION - LOCATION
LOCATION: CHEST
LOCATION: CHEST

## 2019-01-26 ASSESSMENT — PAIN DESCRIPTION - DESCRIPTORS: DESCRIPTORS: CONSTANT

## 2019-01-26 ASSESSMENT — PAIN DESCRIPTION - PAIN TYPE
TYPE: ACUTE PAIN
TYPE: ACUTE PAIN

## 2019-01-27 LAB
ESTIMATED AVERAGE GLUCOSE: 289.1 MG/DL
HBA1C MFR BLD: 11.7 %
URINE CULTURE, ROUTINE: NORMAL

## 2019-03-18 ENCOUNTER — HOSPITAL ENCOUNTER (EMERGENCY)
Age: 59
Discharge: HOME | End: 2019-03-18
Attending: EMERGENCY MEDICINE
Payer: MEDICARE

## 2019-03-18 ENCOUNTER — APPOINTMENT (OUTPATIENT)
Dept: GENERAL RADIOLOGY | Age: 59
End: 2019-03-18
Payer: MEDICARE

## 2019-03-18 VITALS
TEMPERATURE: 98.6 F | OXYGEN SATURATION: 98 % | DIASTOLIC BLOOD PRESSURE: 71 MMHG | WEIGHT: 161 LBS | HEIGHT: 63 IN | BODY MASS INDEX: 28.53 KG/M2 | SYSTOLIC BLOOD PRESSURE: 135 MMHG | RESPIRATION RATE: 18 BRPM | HEART RATE: 86 BPM

## 2019-03-18 DIAGNOSIS — R07.9 CHEST PAIN, UNSPECIFIED TYPE: Primary | ICD-10-CM

## 2019-03-18 LAB
A/G RATIO: 1.4 (ref 1.1–2.2)
ALBUMIN SERPL-MCNC: 4.1 G/DL (ref 3.4–5)
ALP BLD-CCNC: 119 U/L (ref 40–129)
ALT SERPL-CCNC: 18 U/L (ref 10–40)
ANION GAP SERPL CALCULATED.3IONS-SCNC: 14 MMOL/L (ref 3–16)
AST SERPL-CCNC: 10 U/L (ref 15–37)
BASOPHILS ABSOLUTE: 0.1 K/UL (ref 0–0.2)
BASOPHILS RELATIVE PERCENT: 1 %
BILIRUB SERPL-MCNC: <0.2 MG/DL (ref 0–1)
BUN BLDV-MCNC: 10 MG/DL (ref 7–20)
CALCIUM SERPL-MCNC: 9.2 MG/DL (ref 8.3–10.6)
CHLORIDE BLD-SCNC: 90 MMOL/L (ref 99–110)
CO2: 27 MMOL/L (ref 21–32)
CREAT SERPL-MCNC: <0.5 MG/DL (ref 0.6–1.1)
EKG ATRIAL RATE: 95 BPM
EKG DIAGNOSIS: NORMAL
EKG P AXIS: -9 DEGREES
EKG P-R INTERVAL: 140 MS
EKG Q-T INTERVAL: 350 MS
EKG QRS DURATION: 100 MS
EKG QTC CALCULATION (BAZETT): 439 MS
EKG R AXIS: -10 DEGREES
EKG T AXIS: 72 DEGREES
EKG VENTRICULAR RATE: 95 BPM
EOSINOPHILS ABSOLUTE: 0.2 K/UL (ref 0–0.6)
EOSINOPHILS RELATIVE PERCENT: 1.9 %
GFR AFRICAN AMERICAN: >60
GFR NON-AFRICAN AMERICAN: >60
GLOBULIN: 2.9 G/DL
GLUCOSE BLD-MCNC: 299 MG/DL (ref 70–99)
HCT VFR BLD CALC: 40.9 % (ref 36–48)
HEMOGLOBIN: 14.3 G/DL (ref 12–16)
LYMPHOCYTES ABSOLUTE: 2.9 K/UL (ref 1–5.1)
LYMPHOCYTES RELATIVE PERCENT: 30.5 %
MAGNESIUM: 1.6 MG/DL (ref 1.8–2.4)
MCH RBC QN AUTO: 31.8 PG (ref 26–34)
MCHC RBC AUTO-ENTMCNC: 35 G/DL (ref 31–36)
MCV RBC AUTO: 90.9 FL (ref 80–100)
MONOCYTES ABSOLUTE: 0.6 K/UL (ref 0–1.3)
MONOCYTES RELATIVE PERCENT: 6.1 %
NEUTROPHILS ABSOLUTE: 5.8 K/UL (ref 1.7–7.7)
NEUTROPHILS RELATIVE PERCENT: 60.5 %
PDW BLD-RTO: 13 % (ref 12.4–15.4)
PLATELET # BLD: 268 K/UL (ref 135–450)
PMV BLD AUTO: 8.3 FL (ref 5–10.5)
POTASSIUM SERPL-SCNC: 3 MMOL/L (ref 3.5–5.1)
PRO-BNP: 76 PG/ML (ref 0–124)
RBC # BLD: 4.5 M/UL (ref 4–5.2)
SODIUM BLD-SCNC: 131 MMOL/L (ref 136–145)
TOTAL PROTEIN: 7 G/DL (ref 6.4–8.2)
TROPONIN: <0.01 NG/ML
TROPONIN: <0.01 NG/ML
WBC # BLD: 9.5 K/UL (ref 4–11)

## 2019-03-18 PROCEDURE — 6360000002 HC RX W HCPCS: Performed by: NURSE PRACTITIONER

## 2019-03-18 PROCEDURE — 83735 ASSAY OF MAGNESIUM: CPT

## 2019-03-18 PROCEDURE — 85025 COMPLETE CBC W/AUTO DIFF WBC: CPT

## 2019-03-18 PROCEDURE — 80053 COMPREHEN METABOLIC PANEL: CPT

## 2019-03-18 PROCEDURE — 71046 X-RAY EXAM CHEST 2 VIEWS: CPT

## 2019-03-18 PROCEDURE — 2580000003 HC RX 258: Performed by: NURSE PRACTITIONER

## 2019-03-18 PROCEDURE — 83880 ASSAY OF NATRIURETIC PEPTIDE: CPT

## 2019-03-18 PROCEDURE — 96361 HYDRATE IV INFUSION ADD-ON: CPT

## 2019-03-18 PROCEDURE — 93005 ELECTROCARDIOGRAM TRACING: CPT | Performed by: EMERGENCY MEDICINE

## 2019-03-18 PROCEDURE — 6370000000 HC RX 637 (ALT 250 FOR IP): Performed by: NURSE PRACTITIONER

## 2019-03-18 PROCEDURE — 93010 ELECTROCARDIOGRAM REPORT: CPT | Performed by: INTERNAL MEDICINE

## 2019-03-18 PROCEDURE — 99285 EMERGENCY DEPT VISIT HI MDM: CPT

## 2019-03-18 PROCEDURE — 36415 COLL VENOUS BLD VENIPUNCTURE: CPT

## 2019-03-18 PROCEDURE — 84484 ASSAY OF TROPONIN QUANT: CPT

## 2019-03-18 PROCEDURE — 96374 THER/PROPH/DIAG INJ IV PUSH: CPT

## 2019-03-18 RX ORDER — 0.9 % SODIUM CHLORIDE 0.9 %
1000 INTRAVENOUS SOLUTION INTRAVENOUS ONCE
Status: COMPLETED | OUTPATIENT
Start: 2019-03-18 | End: 2019-03-18

## 2019-03-18 RX ORDER — POTASSIUM CHLORIDE 20 MEQ/1
40 TABLET, EXTENDED RELEASE ORAL ONCE
Status: COMPLETED | OUTPATIENT
Start: 2019-03-18 | End: 2019-03-18

## 2019-03-18 RX ORDER — KETOROLAC TROMETHAMINE 30 MG/ML
15 INJECTION, SOLUTION INTRAMUSCULAR; INTRAVENOUS ONCE
Status: COMPLETED | OUTPATIENT
Start: 2019-03-18 | End: 2019-03-18

## 2019-03-18 RX ADMIN — POTASSIUM CHLORIDE 40 MEQ: 20 TABLET, EXTENDED RELEASE ORAL at 02:39

## 2019-03-18 RX ADMIN — SODIUM CHLORIDE 1000 ML: 9 INJECTION, SOLUTION INTRAVENOUS at 02:40

## 2019-03-18 RX ADMIN — KETOROLAC TROMETHAMINE 15 MG: 30 INJECTION, SOLUTION INTRAMUSCULAR; INTRAVENOUS at 02:40

## 2019-03-18 ASSESSMENT — PAIN SCALES - GENERAL
PAINLEVEL_OUTOF10: 10

## 2019-03-18 ASSESSMENT — PAIN DESCRIPTION - ORIENTATION: ORIENTATION: RIGHT;LEFT

## 2019-03-18 ASSESSMENT — HEART SCORE: ECG: 1

## 2019-03-18 ASSESSMENT — PAIN DESCRIPTION - LOCATION: LOCATION: ARM;CHEST

## 2019-04-08 ENCOUNTER — HOSPITAL ENCOUNTER (INPATIENT)
Age: 59
LOS: 1 days | Discharge: HOME | DRG: 638 | End: 2019-04-09
Attending: EMERGENCY MEDICINE | Admitting: INTERNAL MEDICINE
Payer: MEDICARE

## 2019-04-08 ENCOUNTER — APPOINTMENT (OUTPATIENT)
Dept: GENERAL RADIOLOGY | Age: 59
DRG: 638 | End: 2019-04-08
Payer: MEDICARE

## 2019-04-08 DIAGNOSIS — R07.9 CHEST PAIN, UNSPECIFIED TYPE: ICD-10-CM

## 2019-04-08 DIAGNOSIS — R55 SYNCOPE AND COLLAPSE: Primary | ICD-10-CM

## 2019-04-08 DIAGNOSIS — R73.9 HYPERGLYCEMIA: ICD-10-CM

## 2019-04-08 PROBLEM — E11.00 TYPE 2 DIABETES MELLITUS WITH HYPEROSMOLAR NONKETOTIC HYPERGLYCEMIA (HCC): Status: ACTIVE | Noted: 2019-04-08

## 2019-04-08 PROBLEM — E11.10 DKA, TYPE 2, NOT AT GOAL (HCC): Status: ACTIVE | Noted: 2019-04-08

## 2019-04-08 LAB
A/G RATIO: 1.3 (ref 1.1–2.2)
ALBUMIN SERPL-MCNC: 4.3 G/DL (ref 3.4–5)
ALP BLD-CCNC: 173 U/L (ref 40–129)
ALT SERPL-CCNC: 17 U/L (ref 10–40)
ANION GAP SERPL CALCULATED.3IONS-SCNC: 12 MMOL/L (ref 3–16)
ANION GAP SERPL CALCULATED.3IONS-SCNC: 23 MMOL/L (ref 3–16)
APTT: 29.7 SEC (ref 26–36)
AST SERPL-CCNC: 10 U/L (ref 15–37)
BASOPHILS ABSOLUTE: 0.1 K/UL (ref 0–0.2)
BASOPHILS RELATIVE PERCENT: 1 %
BETA-HYDROXYBUTYRATE: 0.6 MMOL/L (ref 0–0.27)
BILIRUB SERPL-MCNC: <0.2 MG/DL (ref 0–1)
BUN BLDV-MCNC: 12 MG/DL (ref 7–20)
BUN BLDV-MCNC: 16 MG/DL (ref 7–20)
CALCIUM SERPL-MCNC: 8.7 MG/DL (ref 8.3–10.6)
CALCIUM SERPL-MCNC: 9.5 MG/DL (ref 8.3–10.6)
CHLORIDE BLD-SCNC: 84 MMOL/L (ref 99–110)
CHLORIDE BLD-SCNC: 97 MMOL/L (ref 99–110)
CHP ED QC CHECK: YES
CHP ED QC CHECK: YES
CO2: 17 MMOL/L (ref 21–32)
CO2: 23 MMOL/L (ref 21–32)
CREAT SERPL-MCNC: 0.8 MG/DL (ref 0.6–1.1)
CREAT SERPL-MCNC: <0.5 MG/DL (ref 0.6–1.1)
D DIMER: <200 NG/ML DDU (ref 0–229)
EKG ATRIAL RATE: 118 BPM
EKG ATRIAL RATE: 125 BPM
EKG DIAGNOSIS: NORMAL
EKG DIAGNOSIS: NORMAL
EKG P AXIS: 37 DEGREES
EKG P AXIS: 45 DEGREES
EKG P-R INTERVAL: 152 MS
EKG P-R INTERVAL: 160 MS
EKG Q-T INTERVAL: 318 MS
EKG Q-T INTERVAL: 328 MS
EKG QRS DURATION: 84 MS
EKG QRS DURATION: 88 MS
EKG QTC CALCULATION (BAZETT): 445 MS
EKG QTC CALCULATION (BAZETT): 473 MS
EKG R AXIS: -23 DEGREES
EKG R AXIS: -7 DEGREES
EKG T AXIS: 69 DEGREES
EKG T AXIS: 78 DEGREES
EKG VENTRICULAR RATE: 118 BPM
EKG VENTRICULAR RATE: 125 BPM
EOSINOPHILS ABSOLUTE: 0.1 K/UL (ref 0–0.6)
EOSINOPHILS RELATIVE PERCENT: 1 %
GFR AFRICAN AMERICAN: >60
GFR AFRICAN AMERICAN: >60
GFR NON-AFRICAN AMERICAN: >60
GFR NON-AFRICAN AMERICAN: >60
GLOBULIN: 3.2 G/DL
GLUCOSE BLD-MCNC: 1027 MG/DL (ref 70–99)
GLUCOSE BLD-MCNC: 306 MG/DL
GLUCOSE BLD-MCNC: 306 MG/DL (ref 70–99)
GLUCOSE BLD-MCNC: 307 MG/DL (ref 70–99)
GLUCOSE BLD-MCNC: 319 MG/DL (ref 70–99)
GLUCOSE BLD-MCNC: 329 MG/DL (ref 70–99)
GLUCOSE BLD-MCNC: 333 MG/DL (ref 70–99)
GLUCOSE BLD-MCNC: 365 MG/DL
GLUCOSE BLD-MCNC: 365 MG/DL (ref 70–99)
GLUCOSE BLD-MCNC: 395 MG/DL (ref 70–99)
GLUCOSE BLD-MCNC: 428 MG/DL (ref 70–99)
GLUCOSE BLD-MCNC: 540 MG/DL (ref 70–99)
GLUCOSE BLD-MCNC: >600 MG/DL (ref 70–99)
HCT VFR BLD CALC: 45.9 % (ref 36–48)
HEMOGLOBIN: 14.9 G/DL (ref 12–16)
INR BLD: 0.85 (ref 0.86–1.14)
LYMPHOCYTES ABSOLUTE: 1.7 K/UL (ref 1–5.1)
LYMPHOCYTES RELATIVE PERCENT: 21.1 %
MCH RBC QN AUTO: 31.3 PG (ref 26–34)
MCHC RBC AUTO-ENTMCNC: 32.4 G/DL (ref 31–36)
MCV RBC AUTO: 96.6 FL (ref 80–100)
MONOCYTES ABSOLUTE: 0.4 K/UL (ref 0–1.3)
MONOCYTES RELATIVE PERCENT: 5.5 %
NEUTROPHILS ABSOLUTE: 5.8 K/UL (ref 1.7–7.7)
NEUTROPHILS RELATIVE PERCENT: 71.4 %
PDW BLD-RTO: 13.6 % (ref 12.4–15.4)
PERFORMED ON: ABNORMAL
PLATELET # BLD: 288 K/UL (ref 135–450)
PMV BLD AUTO: 9.5 FL (ref 5–10.5)
POTASSIUM SERPL-SCNC: 4.2 MMOL/L (ref 3.5–5.1)
POTASSIUM SERPL-SCNC: 5 MMOL/L (ref 3.5–5.1)
PRO-BNP: 38 PG/ML (ref 0–124)
PRO-BNP: 52 PG/ML (ref 0–124)
PROTHROMBIN TIME: 9.7 SEC (ref 9.8–13)
RBC # BLD: 4.75 M/UL (ref 4–5.2)
SODIUM BLD-SCNC: 124 MMOL/L (ref 136–145)
SODIUM BLD-SCNC: 132 MMOL/L (ref 136–145)
TOTAL PROTEIN: 7.5 G/DL (ref 6.4–8.2)
TROPONIN: <0.01 NG/ML
TROPONIN: <0.01 NG/ML
WBC # BLD: 8.1 K/UL (ref 4–11)

## 2019-04-08 PROCEDURE — 6370000000 HC RX 637 (ALT 250 FOR IP)

## 2019-04-08 PROCEDURE — 2500000003 HC RX 250 WO HCPCS: Performed by: EMERGENCY MEDICINE

## 2019-04-08 PROCEDURE — 93005 ELECTROCARDIOGRAM TRACING: CPT | Performed by: EMERGENCY MEDICINE

## 2019-04-08 PROCEDURE — 93010 ELECTROCARDIOGRAM REPORT: CPT | Performed by: INTERNAL MEDICINE

## 2019-04-08 PROCEDURE — 2060000000 HC ICU INTERMEDIATE R&B

## 2019-04-08 PROCEDURE — 85379 FIBRIN DEGRADATION QUANT: CPT

## 2019-04-08 PROCEDURE — 96361 HYDRATE IV INFUSION ADD-ON: CPT

## 2019-04-08 PROCEDURE — 84484 ASSAY OF TROPONIN QUANT: CPT

## 2019-04-08 PROCEDURE — 99291 CRITICAL CARE FIRST HOUR: CPT | Performed by: INTERNAL MEDICINE

## 2019-04-08 PROCEDURE — 96365 THER/PROPH/DIAG IV INF INIT: CPT

## 2019-04-08 PROCEDURE — 80053 COMPREHEN METABOLIC PANEL: CPT

## 2019-04-08 PROCEDURE — 82010 KETONE BODYS QUAN: CPT

## 2019-04-08 PROCEDURE — 85610 PROTHROMBIN TIME: CPT

## 2019-04-08 PROCEDURE — 6370000000 HC RX 637 (ALT 250 FOR IP): Performed by: EMERGENCY MEDICINE

## 2019-04-08 PROCEDURE — 73562 X-RAY EXAM OF KNEE 3: CPT

## 2019-04-08 PROCEDURE — 2580000003 HC RX 258: Performed by: EMERGENCY MEDICINE

## 2019-04-08 PROCEDURE — 85025 COMPLETE CBC W/AUTO DIFF WBC: CPT

## 2019-04-08 PROCEDURE — 96375 TX/PRO/DX INJ NEW DRUG ADDON: CPT

## 2019-04-08 PROCEDURE — 85730 THROMBOPLASTIN TIME PARTIAL: CPT

## 2019-04-08 PROCEDURE — 83880 ASSAY OF NATRIURETIC PEPTIDE: CPT

## 2019-04-08 PROCEDURE — 6360000002 HC RX W HCPCS: Performed by: EMERGENCY MEDICINE

## 2019-04-08 PROCEDURE — 99285 EMERGENCY DEPT VISIT HI MDM: CPT

## 2019-04-08 PROCEDURE — 71045 X-RAY EXAM CHEST 1 VIEW: CPT

## 2019-04-08 RX ORDER — MAGNESIUM SULFATE 1 G/100ML
1 INJECTION INTRAVENOUS PRN
Status: DISCONTINUED | OUTPATIENT
Start: 2019-04-08 | End: 2019-04-09

## 2019-04-08 RX ORDER — ONDANSETRON 2 MG/ML
4 INJECTION INTRAMUSCULAR; INTRAVENOUS EVERY 30 MIN PRN
Status: DISCONTINUED | OUTPATIENT
Start: 2019-04-08 | End: 2019-04-09 | Stop reason: HOSPADM

## 2019-04-08 RX ORDER — POTASSIUM CHLORIDE 7.45 MG/ML
10 INJECTION INTRAVENOUS PRN
Status: DISCONTINUED | OUTPATIENT
Start: 2019-04-08 | End: 2019-04-09

## 2019-04-08 RX ORDER — ASPIRIN 81 MG/1
324 TABLET, CHEWABLE ORAL ONCE
Status: COMPLETED | OUTPATIENT
Start: 2019-04-08 | End: 2019-04-08

## 2019-04-08 RX ORDER — SODIUM CHLORIDE 9 MG/ML
INJECTION, SOLUTION INTRAVENOUS CONTINUOUS
Status: DISCONTINUED | OUTPATIENT
Start: 2019-04-08 | End: 2019-04-09

## 2019-04-08 RX ORDER — DEXTROSE MONOHYDRATE 25 G/50ML
12.5 INJECTION, SOLUTION INTRAVENOUS PRN
Status: DISCONTINUED | OUTPATIENT
Start: 2019-04-08 | End: 2019-04-09

## 2019-04-08 RX ORDER — 0.9 % SODIUM CHLORIDE 0.9 %
1000 INTRAVENOUS SOLUTION INTRAVENOUS ONCE
Status: COMPLETED | OUTPATIENT
Start: 2019-04-08 | End: 2019-04-08

## 2019-04-08 RX ORDER — DEXTROSE MONOHYDRATE 50 MG/ML
100 INJECTION, SOLUTION INTRAVENOUS PRN
Status: DISCONTINUED | OUTPATIENT
Start: 2019-04-08 | End: 2019-04-09

## 2019-04-08 RX ORDER — NITROGLYCERIN 0.4 MG/1
0.4 TABLET SUBLINGUAL EVERY 5 MIN PRN
Status: DISCONTINUED | OUTPATIENT
Start: 2019-04-08 | End: 2019-04-09

## 2019-04-08 RX ORDER — NICOTINE POLACRILEX 4 MG
15 LOZENGE BUCCAL PRN
Status: DISCONTINUED | OUTPATIENT
Start: 2019-04-08 | End: 2019-04-09

## 2019-04-08 RX ORDER — DILTIAZEM HYDROCHLORIDE 5 MG/ML
10 INJECTION INTRAVENOUS ONCE
Status: COMPLETED | OUTPATIENT
Start: 2019-04-08 | End: 2019-04-08

## 2019-04-08 RX ORDER — DEXTROSE AND SODIUM CHLORIDE 5; .45 G/100ML; G/100ML
INJECTION, SOLUTION INTRAVENOUS CONTINUOUS PRN
Status: DISCONTINUED | OUTPATIENT
Start: 2019-04-08 | End: 2019-04-09

## 2019-04-08 RX ADMIN — NITROGLYCERIN 0.4 MG: 0.4 TABLET SUBLINGUAL at 11:55

## 2019-04-08 RX ADMIN — SODIUM CHLORIDE 0.1 UNITS/KG/HR: 9 INJECTION, SOLUTION INTRAVENOUS at 13:42

## 2019-04-08 RX ADMIN — ASPIRIN 81 MG 324 MG: 81 TABLET ORAL at 11:49

## 2019-04-08 RX ADMIN — SODIUM CHLORIDE: 9 INJECTION, SOLUTION INTRAVENOUS at 13:48

## 2019-04-08 RX ADMIN — NITROGLYCERIN 0.4 MG: 0.4 TABLET SUBLINGUAL at 12:00

## 2019-04-08 RX ADMIN — DILTIAZEM HYDROCHLORIDE 10 MG: 5 INJECTION INTRAVENOUS at 11:51

## 2019-04-08 RX ADMIN — SODIUM CHLORIDE 1000 ML: 9 INJECTION, SOLUTION INTRAVENOUS at 11:49

## 2019-04-08 RX ADMIN — ONDANSETRON 4 MG: 2 INJECTION INTRAMUSCULAR; INTRAVENOUS at 11:49

## 2019-04-08 RX ADMIN — INSULIN HUMAN 10 UNITS: 100 INJECTION, SOLUTION PARENTERAL at 21:43

## 2019-04-08 ASSESSMENT — PAIN DESCRIPTION - DESCRIPTORS: DESCRIPTORS: CONSTANT;TIGHTNESS

## 2019-04-08 ASSESSMENT — PAIN DESCRIPTION - PAIN TYPE: TYPE: ACUTE PAIN

## 2019-04-08 ASSESSMENT — PAIN SCALES - GENERAL: PAINLEVEL_OUTOF10: 10

## 2019-04-08 ASSESSMENT — PAIN DESCRIPTION - LOCATION: LOCATION: CHEST

## 2019-04-08 ASSESSMENT — HEART SCORE: ECG: 1

## 2019-04-08 NOTE — ED PROVIDER NOTES
of motion   Neurological:  Alert and orientedto person and place but not time. No focal neuro deficits.              Psychiatric:  Appropriate    I have reviewed and interpreted all of the currently available lab results from this visit (if applicable):  Results for orders placed or performed during the hospital encounter of 04/08/19   D-Dimer, Quantitative   Result Value Ref Range    D-Dimer, Quant <200 0 - 229 ng/mL DDU   Comprehensive Metabolic Panel   Result Value Ref Range    Sodium 124 (L) 136 - 145 mmol/L    Potassium 4.2 3.5 - 5.1 mmol/L    Chloride 84 (L) 99 - 110 mmol/L    CO2 17 (L) 21 - 32 mmol/L    Anion Gap 23 (H) 3 - 16    Glucose 1,027 (HH) 70 - 99 mg/dL    BUN 16 7 - 20 mg/dL    CREATININE 0.8 0.6 - 1.1 mg/dL    GFR Non-African American >60 >60    GFR African American >60 >60    Calcium 9.5 8.3 - 10.6 mg/dL    Total Protein 7.5 6.4 - 8.2 g/dL    Alb 4.3 3.4 - 5.0 g/dL    Albumin/Globulin Ratio 1.3 1.1 - 2.2    Total Bilirubin <0.2 0.0 - 1.0 mg/dL    Alkaline Phosphatase 173 (H) 40 - 129 U/L    ALT 17 10 - 40 U/L    AST 10 (L) 15 - 37 U/L    Globulin 3.2 g/dL   CBC Auto Differential   Result Value Ref Range    WBC 8.1 4.0 - 11.0 K/uL    RBC 4.75 4.00 - 5.20 M/uL    Hemoglobin 14.9 12.0 - 16.0 g/dL    Hematocrit 45.9 36.0 - 48.0 %    MCV 96.6 80.0 - 100.0 fL    MCH 31.3 26.0 - 34.0 pg    MCHC 32.4 31.0 - 36.0 g/dL    RDW 13.6 12.4 - 15.4 %    Platelets 460 221 - 568 K/uL    MPV 9.5 5.0 - 10.5 fL    Neutrophils % 71.4 %    Lymphocytes % 21.1 %    Monocytes % 5.5 %    Eosinophils % 1.0 %    Basophils % 1.0 %    Neutrophils # 5.8 1.7 - 7.7 K/uL    Lymphocytes # 1.7 1.0 - 5.1 K/uL    Monocytes # 0.4 0.0 - 1.3 K/uL    Eosinophils # 0.1 0.0 - 0.6 K/uL    Basophils # 0.1 0.0 - 0.2 K/uL   APTT   Result Value Ref Range    aPTT 29.7 26.0 - 36.0 sec   Protime-INR   Result Value Ref Range    Protime 9.7 (L) 9.8 - 13.0 sec    INR 0.85 (L) 0.86 - 1.14   Troponin   Result Value Ref Range    Troponin <0.01 <0.01

## 2019-04-08 NOTE — ED NOTES
Call placed to Critical Care @ [phone]  Dr Sandeep Merida returned the call @ [address] [facility]  04/08/19 6136

## 2019-04-08 NOTE — ED NOTES
4065- Call placed to cards for inpatient consult for Dr. Rivera Castro. Spoke to [employer].       Yari Ford  04/08/19 6666

## 2019-04-09 VITALS
WEIGHT: 185.1 LBS | RESPIRATION RATE: 16 BRPM | BODY MASS INDEX: 32.8 KG/M2 | HEIGHT: 63 IN | OXYGEN SATURATION: 95 % | SYSTOLIC BLOOD PRESSURE: 125 MMHG | TEMPERATURE: 98.2 F | DIASTOLIC BLOOD PRESSURE: 72 MMHG | HEART RATE: 94 BPM

## 2019-04-09 PROBLEM — R73.9 HYPERGLYCEMIA: Status: ACTIVE | Noted: 2019-04-09

## 2019-04-09 LAB
ALBUMIN SERPL-MCNC: 3.7 G/DL (ref 3.4–5)
ANION GAP SERPL CALCULATED.3IONS-SCNC: 12 MMOL/L (ref 3–16)
ANION GAP SERPL CALCULATED.3IONS-SCNC: 12 MMOL/L (ref 3–16)
BASOPHILS ABSOLUTE: 0.2 K/UL (ref 0–0.2)
BASOPHILS RELATIVE PERCENT: 2.6 %
BETA-HYDROXYBUTYRATE: 0.1 MMOL/L (ref 0–0.27)
BUN BLDV-MCNC: 11 MG/DL (ref 7–20)
BUN BLDV-MCNC: 12 MG/DL (ref 7–20)
CALCIUM SERPL-MCNC: 8.8 MG/DL (ref 8.3–10.6)
CALCIUM SERPL-MCNC: 9 MG/DL (ref 8.3–10.6)
CHLORIDE BLD-SCNC: 100 MMOL/L (ref 99–110)
CHLORIDE BLD-SCNC: 98 MMOL/L (ref 99–110)
CO2: 25 MMOL/L (ref 21–32)
CO2: 25 MMOL/L (ref 21–32)
CREAT SERPL-MCNC: <0.5 MG/DL (ref 0.6–1.1)
CREAT SERPL-MCNC: <0.5 MG/DL (ref 0.6–1.1)
EKG ATRIAL RATE: 82 BPM
EKG DIAGNOSIS: NORMAL
EKG P AXIS: 27 DEGREES
EKG P-R INTERVAL: 162 MS
EKG Q-T INTERVAL: 410 MS
EKG QRS DURATION: 98 MS
EKG QTC CALCULATION (BAZETT): 479 MS
EKG R AXIS: -10 DEGREES
EKG T AXIS: 38 DEGREES
EKG VENTRICULAR RATE: 82 BPM
EOSINOPHILS ABSOLUTE: 0.1 K/UL (ref 0–0.6)
EOSINOPHILS RELATIVE PERCENT: 1.9 %
GFR AFRICAN AMERICAN: >60
GFR AFRICAN AMERICAN: >60
GFR NON-AFRICAN AMERICAN: >60
GFR NON-AFRICAN AMERICAN: >60
GLUCOSE BLD-MCNC: 203 MG/DL (ref 70–99)
GLUCOSE BLD-MCNC: 207 MG/DL (ref 70–99)
GLUCOSE BLD-MCNC: 207 MG/DL (ref 70–99)
GLUCOSE BLD-MCNC: 234 MG/DL (ref 70–99)
GLUCOSE BLD-MCNC: 326 MG/DL (ref 70–99)
HCT VFR BLD CALC: 38.2 % (ref 36–48)
HEMOGLOBIN: 13.2 G/DL (ref 12–16)
LYMPHOCYTES ABSOLUTE: 1.2 K/UL (ref 1–5.1)
LYMPHOCYTES RELATIVE PERCENT: 14.7 %
MCH RBC QN AUTO: 31.6 PG (ref 26–34)
MCHC RBC AUTO-ENTMCNC: 34.5 G/DL (ref 31–36)
MCV RBC AUTO: 91.4 FL (ref 80–100)
MONOCYTES ABSOLUTE: 0.4 K/UL (ref 0–1.3)
MONOCYTES RELATIVE PERCENT: 5.5 %
NEUTROPHILS ABSOLUTE: 5.9 K/UL (ref 1.7–7.7)
NEUTROPHILS RELATIVE PERCENT: 75.3 %
OSMOLALITY: 293 MOSM/KG (ref 278–305)
PDW BLD-RTO: 13.1 % (ref 12.4–15.4)
PERFORMED ON: ABNORMAL
PHOSPHORUS: 3.4 MG/DL (ref 2.5–4.9)
PLATELET # BLD: 218 K/UL (ref 135–450)
PMV BLD AUTO: 8.7 FL (ref 5–10.5)
POTASSIUM SERPL-SCNC: 3.5 MMOL/L (ref 3.5–5.1)
POTASSIUM SERPL-SCNC: 3.5 MMOL/L (ref 3.5–5.1)
RBC # BLD: 4.18 M/UL (ref 4–5.2)
SODIUM BLD-SCNC: 135 MMOL/L (ref 136–145)
SODIUM BLD-SCNC: 137 MMOL/L (ref 136–145)
TROPONIN: <0.01 NG/ML
WBC # BLD: 7.8 K/UL (ref 4–11)

## 2019-04-09 PROCEDURE — 83930 ASSAY OF BLOOD OSMOLALITY: CPT

## 2019-04-09 PROCEDURE — 99238 HOSP IP/OBS DSCHRG MGMT 30/<: CPT | Performed by: INTERNAL MEDICINE

## 2019-04-09 PROCEDURE — 80069 RENAL FUNCTION PANEL: CPT

## 2019-04-09 PROCEDURE — 36415 COLL VENOUS BLD VENIPUNCTURE: CPT

## 2019-04-09 PROCEDURE — 94761 N-INVAS EAR/PLS OXIMETRY MLT: CPT

## 2019-04-09 PROCEDURE — 99222 1ST HOSP IP/OBS MODERATE 55: CPT | Performed by: INTERNAL MEDICINE

## 2019-04-09 PROCEDURE — 84484 ASSAY OF TROPONIN QUANT: CPT

## 2019-04-09 PROCEDURE — 6370000000 HC RX 637 (ALT 250 FOR IP): Performed by: INTERNAL MEDICINE

## 2019-04-09 PROCEDURE — 99232 SBSQ HOSP IP/OBS MODERATE 35: CPT | Performed by: INTERNAL MEDICINE

## 2019-04-09 PROCEDURE — 93005 ELECTROCARDIOGRAM TRACING: CPT | Performed by: PHYSICIAN ASSISTANT

## 2019-04-09 PROCEDURE — 6370000000 HC RX 637 (ALT 250 FOR IP): Performed by: HOSPITALIST

## 2019-04-09 PROCEDURE — 82010 KETONE BODYS QUAN: CPT

## 2019-04-09 PROCEDURE — 85025 COMPLETE CBC W/AUTO DIFF WBC: CPT

## 2019-04-09 PROCEDURE — 80048 BASIC METABOLIC PNL TOTAL CA: CPT

## 2019-04-09 PROCEDURE — 93010 ELECTROCARDIOGRAM REPORT: CPT | Performed by: INTERNAL MEDICINE

## 2019-04-09 RX ORDER — CARVEDILOL 3.12 MG/1
3.12 TABLET ORAL 2 TIMES DAILY WITH MEALS
Status: DISCONTINUED | OUTPATIENT
Start: 2019-04-09 | End: 2019-04-09 | Stop reason: HOSPADM

## 2019-04-09 RX ORDER — INSULIN GLARGINE 100 [IU]/ML
35 INJECTION, SOLUTION SUBCUTANEOUS 2 TIMES DAILY
Status: DISCONTINUED | OUTPATIENT
Start: 2019-04-09 | End: 2019-04-09 | Stop reason: HOSPADM

## 2019-04-09 RX ORDER — DEXTROSE MONOHYDRATE 50 MG/ML
100 INJECTION, SOLUTION INTRAVENOUS PRN
Status: DISCONTINUED | OUTPATIENT
Start: 2019-04-09 | End: 2019-04-09 | Stop reason: SDUPTHER

## 2019-04-09 RX ORDER — NICOTINE POLACRILEX 4 MG
15 LOZENGE BUCCAL PRN
Status: DISCONTINUED | OUTPATIENT
Start: 2019-04-09 | End: 2019-04-09 | Stop reason: SDUPTHER

## 2019-04-09 RX ORDER — ASPIRIN 81 MG/1
81 TABLET ORAL DAILY
Status: DISCONTINUED | OUTPATIENT
Start: 2019-04-09 | End: 2019-04-09 | Stop reason: HOSPADM

## 2019-04-09 RX ORDER — DEXTROSE MONOHYDRATE 25 G/50ML
12.5 INJECTION, SOLUTION INTRAVENOUS PRN
Status: DISCONTINUED | OUTPATIENT
Start: 2019-04-09 | End: 2019-04-09 | Stop reason: SDUPTHER

## 2019-04-09 RX ORDER — SODIUM CHLORIDE 450 MG/100ML
INJECTION, SOLUTION INTRAVENOUS CONTINUOUS
Status: DISCONTINUED | OUTPATIENT
Start: 2019-04-09 | End: 2019-04-09

## 2019-04-09 RX ORDER — MAGNESIUM SULFATE 1 G/100ML
1 INJECTION INTRAVENOUS PRN
Status: DISCONTINUED | OUTPATIENT
Start: 2019-04-09 | End: 2019-04-09 | Stop reason: HOSPADM

## 2019-04-09 RX ORDER — ATORVASTATIN CALCIUM 40 MG/1
40 TABLET, FILM COATED ORAL NIGHTLY
Status: DISCONTINUED | OUTPATIENT
Start: 2019-04-09 | End: 2019-04-09 | Stop reason: HOSPADM

## 2019-04-09 RX ORDER — RANOLAZINE 500 MG/1
500 TABLET, EXTENDED RELEASE ORAL 2 TIMES DAILY
Qty: 60 TABLET | Refills: 3 | Status: ON HOLD | OUTPATIENT
Start: 2019-04-09 | End: 2020-07-18 | Stop reason: SDUPTHER

## 2019-04-09 RX ORDER — RANOLAZINE 500 MG/1
500 TABLET, EXTENDED RELEASE ORAL 2 TIMES DAILY
Status: DISCONTINUED | OUTPATIENT
Start: 2019-04-09 | End: 2019-04-09 | Stop reason: HOSPADM

## 2019-04-09 RX ORDER — DEXTROSE MONOHYDRATE 25 G/50ML
12.5 INJECTION, SOLUTION INTRAVENOUS PRN
Status: DISCONTINUED | OUTPATIENT
Start: 2019-04-09 | End: 2019-04-09 | Stop reason: HOSPADM

## 2019-04-09 RX ORDER — CLOPIDOGREL BISULFATE 75 MG/1
75 TABLET ORAL DAILY
Status: DISCONTINUED | OUTPATIENT
Start: 2019-04-09 | End: 2019-04-09 | Stop reason: HOSPADM

## 2019-04-09 RX ORDER — DEXTROSE AND SODIUM CHLORIDE 5; .45 G/100ML; G/100ML
INJECTION, SOLUTION INTRAVENOUS CONTINUOUS PRN
Status: DISCONTINUED | OUTPATIENT
Start: 2019-04-09 | End: 2019-04-09 | Stop reason: HOSPADM

## 2019-04-09 RX ORDER — POTASSIUM CHLORIDE 7.45 MG/ML
10 INJECTION INTRAVENOUS PRN
Status: DISCONTINUED | OUTPATIENT
Start: 2019-04-09 | End: 2019-04-09 | Stop reason: HOSPADM

## 2019-04-09 RX ADMIN — CARVEDILOL 3.12 MG: 3.12 TABLET, FILM COATED ORAL at 15:59

## 2019-04-09 RX ADMIN — INSULIN GLARGINE 35 UNITS: 100 INJECTION, SOLUTION SUBCUTANEOUS at 03:12

## 2019-04-09 RX ADMIN — INSULIN LISPRO 3 UNITS: 100 INJECTION, SOLUTION INTRAVENOUS; SUBCUTANEOUS at 03:11

## 2019-04-09 RX ADMIN — RANOLAZINE 500 MG: 500 TABLET, FILM COATED, EXTENDED RELEASE ORAL at 15:59

## 2019-04-09 RX ADMIN — CLOPIDOGREL BISULFATE 75 MG: 75 TABLET ORAL at 15:59

## 2019-04-09 RX ADMIN — ASPIRIN 81 MG: 81 TABLET, COATED ORAL at 15:59

## 2019-04-09 ASSESSMENT — PAIN SCALES - GENERAL: PAINLEVEL_OUTOF10: 0

## 2019-04-09 NOTE — ED NOTES
Call  Received from clinical requesting repeat labs per Hospitalist request. Labs drawn and sent to lab.      Alfredo Browne RN  04/08/19 7426

## 2019-04-09 NOTE — CARE COORDINATION
[facility]  Spoke with patient regarding homecare services. Demographics verified, and patient is agreeable to home care services. Will continue to follow patient for needs. DC order noted, all docs needed have been faxed to [facility] for home care services.         Vignesh Mejia  Work mobile: [phone]  [facility] office: [phone]
CM explained. States lives home alone in [address] apt. States sister provides transportation or she walks. Denies services currently. Discussed HHC and pt is agreeable and chooses [facility]. Not pt is to dc home today. Referral called to [facility] who will arrange for [place] [id number] needs. Chart reviewed and no other dc needs identified. Explained Case Management role/services.

## 2019-04-09 NOTE — PROGRESS NOTES
[facility] has been called to set up a follow up appointment for patient. See DC instructions.
100   CO2 23 25 25   PHOS  --   --  3.4   BUN 12 12 11   CREATININE <0.5* <0.5* <0.5*     LIVER PROFILE:   Recent Labs     04/08/19  1140   AST 10*   ALT 17   BILITOT <0.2   ALKPHOS 173*     PT/INR:   Recent Labs     04/08/19  1140   PROTIME 9.7*   INR 0.85*     APTT:   Recent Labs     04/08/19  1140   APTT 29.7     UA:No results for input(s): NITRITE, COLORU, PHUR, LABCAST, WBCUA, RBCUA, MUCUS, TRICHOMONAS, YEAST, BACTERIA, CLARITYU, SPECGRAV, LEUKOCYTESUR, UROBILINOGEN, BILIRUBINUR, BLOODU, GLUCOSEU, AMORPHOUS in the last 72 hours. Invalid input(s): KETONESU  No results for input(s): PHART, MHC0RWG, PO2ART in the last 72 hours. Films:  CXR: The lungs are clear.  The cardiac silhouette is stable status post dual lead  ICD.  There is no pneumothorax or pleural effusion           ASSESSMENT:  ·  Hyperosmolar hyperglycemic non-ketotic syndrome  · Pseudohyponatremia  · Hypovolemia  · Syncope- etiology is unclear  · Hx of PPM-placed in [place], no local cardiologist  · Chest discomfort- somewhat atypical by history, troponin <0.01        PLAN:  · Insulin per IM  · Cardiology consulted for syncope and pacer evaluation- appreciated  · Telemetry monitoring  · Serial cardiac markers are ok  · Please call with questions.

## 2019-04-09 NOTE — CONSULTS
[facility]  [phone]        Reason for Consultation/Chief Complaint: \"I have been having chest pain . \"    History of Present Illness:  Karen Kaye is a 63 y.o. patient who has not have a regular  cardiology follow up , but did see Dr Elie Reno in our group in  12/2018 after admission to hospital for chest pain. She states she had not had her ICD checked in long time but [employer] rep came to ED yesterday and told her device was still active. She  presented to the hospital with complaints of on going chest pain for past 3 months. She describes the pain as right upper chest tightness with pain and tightness radiating under her right arm and back. She reports her arms are numb and tingling. She reports associated SOB, nausea and diaphoresis. She feels awful. Pain is worse on touching chest or activity. Pain has been fairly constant for 3 months. Has seen PCP 3 times with no relief. She has taken SL nitro which does not help. She finally decided to come to ED for further evaluation. In the ED her EKG revealed  Sinus tachycardia. Cannot rule out Anterior infarct , Inferior myocardial infarction old, troponins neg x 3, glucose 1027  CXR nothing acute   I have been asked to provide consultation regarding further management and testing. Past Medical History: Nonischemic cardiomyopathy. This was diagnosed in 2013 in [place]  when an echo showed ejection fraction to be 15% to 20%   has a past medical history of CAD (coronary artery disease), Cerebral artery occlusion with cerebral infarction ([address] Diabetes mellitus ([address] Hyperlipidemia, Hypertension, Mental retardation, and MI (myocardial infarction) ([facility] [address] Surgical History:   has a past surgical history that includes back surgery; Pacemaker insertion; and tumor removal.     Social History:  She is disabled due to \" brain tumors\"  No transportation and that makes it difficult for her to follow up with providers. L-sided facial droop (April 2018)    Pacemaker    Atypical chest pain    Brain tumor (benign) (HCC)    Chronic combined systolic (EF 51-80%) & diastolic (grade 2 LVDD) CHF    Obesity (BMI 30-39. 9)    TIA involving right internal carotid artery    CAD in native artery    DM (diabetes mellitus), secondary, uncontrolled, w/neurologic complic ([address]    CHF (congestive heart failure) ([address]    Cardiomyopathy ([address]    Essential hypertension    TIA (transient ischemic attack)    Left sided numbness    Chest pain    Angina, class IV (HCC)    Generalized weakness    Mental retardation    DKA, type 2, not at goal [facility])    Type 2 diabetes mellitus with hyperosmolar nonketotic hyperglycemia ([address]    Syncope and collapse    Hyperglycemia         Plan:  Recommend medical therapy only, add Ranexa for coronary artery disease  Antiplatelets  Beta blockers  Statins   Consider surgical consult    Thank you for allowing to us to participate in the [facility] or [facility]. Further evaluation will be based upon the patient's clinical course and testing results.

## 2019-04-09 NOTE — ED NOTES
Per clinical hospitalist requesting repeat BMP to compare to determine if patient requires ICU or PCU.      Edvin Stallworth RN  04/08/19 5031

## 2019-04-09 NOTE — DISCHARGE SUMMARY
Name:  Otis Lee  Room:  /4503-68  MRN:    [id number]    Discharge Summary      This discharge summary is in conjunction with a complete physical exam done on the day of discharge. Discharging Physician: Dr. Patricia Islas: 4/8/2019  Discharge:   4/9/2019    HPI taken from admission H&P:    63year old white female who had DM/CAD/old CVA, pacemaker who came to the ER with syncope. She went to the pharmacy to get a script filled. She passed out. She had some chest discomfort. In the Er she was found to have DKA. She has constant chest pain. Non smoker.     She feels some better since the treatment in the Er. Diagnoses this Admission and [place] to ICU.   - DKA protocol.   - IV insulin. - Npo  - DKA resolved, transfer to PCU     DM type 2   - uncontrolled on insulin    Diffuse Chest pain with neg troponin and neg ECG  - Recent cath showed non obstructive CAD  - Cardiology consult--start Ranexa   - Prior RUQ US with cholelithiasis--will follow up OP with general surgery     Chronic combined systolic and diastolic CHF  - monitor     Syncope likely due to dehydration  - Consult cardiology. Have pacemaker check as OP. Procedures (Please Review Full Report for Details)  None    Consults    Pulmonology   Cardiology       Physical Exam at Discharge:    /63   Pulse 83   Temp 98.2 °F (36.8 °C) (Oral)   Resp 16   Ht 5' 3\" (1.6 m)   Wt 185 lb 1.6 oz (84 kg)   SpO2 93%   BMI 32.79 kg/m²     Gen: appears improved. .   Eyes: PERRL. No sclera icterus. No conjunctival injection. ENT: No discharge. Pharynx clear. Neck: Trachea midline. Normal thyroid. Resp: No accessory muscle use. No crackles. No wheezes. No rhonchi. No dullness on percussion. CV: Regular rate. Regular rhythm. No murmur or rub. No edema. GI: Non-tender. Non-distended. No masses. No organomegaly. Normal bowel sounds. No hernia. Skin: Warm and dry.  No nodule on exposed

## 2019-04-14 ENCOUNTER — HOSPITAL ENCOUNTER (OUTPATIENT)
Age: 59
Setting detail: OBSERVATION
Discharge: HOME | End: 2019-04-15
Attending: EMERGENCY MEDICINE | Admitting: INTERNAL MEDICINE
Payer: MEDICARE

## 2019-04-14 ENCOUNTER — APPOINTMENT (OUTPATIENT)
Dept: GENERAL RADIOLOGY | Age: 59
End: 2019-04-14
Payer: MEDICARE

## 2019-04-14 DIAGNOSIS — R07.9 CHEST PAIN, UNSPECIFIED TYPE: Primary | ICD-10-CM

## 2019-04-14 DIAGNOSIS — R73.9 HYPERGLYCEMIA: ICD-10-CM

## 2019-04-14 LAB
A/G RATIO: 1.3 (ref 1.1–2.2)
ALBUMIN SERPL-MCNC: 3.7 G/DL (ref 3.4–5)
ALP BLD-CCNC: 148 U/L (ref 40–129)
ALT SERPL-CCNC: 35 U/L (ref 10–40)
ANION GAP SERPL CALCULATED.3IONS-SCNC: 11 MMOL/L (ref 3–16)
ANION GAP SERPL CALCULATED.3IONS-SCNC: 19 MMOL/L (ref 3–16)
APTT: 30.1 SEC (ref 26–36)
AST SERPL-CCNC: 13 U/L (ref 15–37)
BASE EXCESS VENOUS: -4 MMOL/L (ref -3–3)
BASOPHILS ABSOLUTE: 0.1 K/UL (ref 0–0.2)
BASOPHILS RELATIVE PERCENT: 0.9 %
BETA-HYDROXYBUTYRATE: 0.2 MMOL/L (ref 0–0.27)
BILIRUB SERPL-MCNC: <0.2 MG/DL (ref 0–1)
BILIRUBIN URINE: NEGATIVE
BLOOD, URINE: NEGATIVE
BUN BLDV-MCNC: 10 MG/DL (ref 7–20)
BUN BLDV-MCNC: 11 MG/DL (ref 7–20)
CALCIUM SERPL-MCNC: 8.5 MG/DL (ref 8.3–10.6)
CALCIUM SERPL-MCNC: 9.1 MG/DL (ref 8.3–10.6)
CARBOXYHEMOGLOBIN: 1.8 % (ref 0–1.5)
CHLORIDE BLD-SCNC: 101 MMOL/L (ref 99–110)
CHLORIDE BLD-SCNC: 94 MMOL/L (ref 99–110)
CHP ED QC CHECK: YES
CLARITY: CLEAR
CO2: 19 MMOL/L (ref 21–32)
CO2: 24 MMOL/L (ref 21–32)
COLOR: ABNORMAL
CREAT SERPL-MCNC: <0.5 MG/DL (ref 0.6–1.1)
CREAT SERPL-MCNC: <0.5 MG/DL (ref 0.6–1.1)
EKG ATRIAL RATE: 102 BPM
EKG DIAGNOSIS: NORMAL
EKG P AXIS: 53 DEGREES
EKG P-R INTERVAL: 158 MS
EKG Q-T INTERVAL: 396 MS
EKG QRS DURATION: 88 MS
EKG QTC CALCULATION (BAZETT): 516 MS
EKG R AXIS: 16 DEGREES
EKG T AXIS: 63 DEGREES
EKG VENTRICULAR RATE: 102 BPM
EOSINOPHILS ABSOLUTE: 0.1 K/UL (ref 0–0.6)
EOSINOPHILS RELATIVE PERCENT: 2.4 %
GFR AFRICAN AMERICAN: >60
GFR AFRICAN AMERICAN: >60
GFR NON-AFRICAN AMERICAN: >60
GFR NON-AFRICAN AMERICAN: >60
GLOBULIN: 2.8 G/DL
GLUCOSE BLD-MCNC: 224 MG/DL (ref 70–99)
GLUCOSE BLD-MCNC: 272 MG/DL
GLUCOSE BLD-MCNC: 272 MG/DL (ref 70–99)
GLUCOSE BLD-MCNC: 337 MG/DL (ref 70–99)
GLUCOSE BLD-MCNC: 421 MG/DL
GLUCOSE BLD-MCNC: 421 MG/DL (ref 70–99)
GLUCOSE BLD-MCNC: 436 MG/DL (ref 70–99)
GLUCOSE BLD-MCNC: 536 MG/DL (ref 70–99)
GLUCOSE BLD-MCNC: 584 MG/DL
GLUCOSE BLD-MCNC: 584 MG/DL (ref 70–99)
GLUCOSE BLD-MCNC: 660 MG/DL (ref 70–99)
GLUCOSE URINE: >=1000 MG/DL
HCO3 VENOUS: 21.7 MMOL/L (ref 23–29)
HCT VFR BLD CALC: 40.5 % (ref 36–48)
HEMOGLOBIN: 13.5 G/DL (ref 12–16)
INR BLD: 0.96 (ref 0.86–1.14)
KETONES, URINE: NEGATIVE MG/DL
LEUKOCYTE ESTERASE, URINE: NEGATIVE
LYMPHOCYTES ABSOLUTE: 1.6 K/UL (ref 1–5.1)
LYMPHOCYTES RELATIVE PERCENT: 28.7 %
MAGNESIUM: 1.6 MG/DL (ref 1.8–2.4)
MCH RBC QN AUTO: 31.9 PG (ref 26–34)
MCHC RBC AUTO-ENTMCNC: 33.3 G/DL (ref 31–36)
MCV RBC AUTO: 95.7 FL (ref 80–100)
METHEMOGLOBIN VENOUS: 0.4 %
MICROSCOPIC EXAMINATION: ABNORMAL
MONOCYTES ABSOLUTE: 0.4 K/UL (ref 0–1.3)
MONOCYTES RELATIVE PERCENT: 7.7 %
NEUTROPHILS ABSOLUTE: 3.4 K/UL (ref 1.7–7.7)
NEUTROPHILS RELATIVE PERCENT: 60.3 %
NITRITE, URINE: NEGATIVE
O2 CONTENT, VEN: 18 VOL %
O2 SAT, VEN: 91 %
O2 THERAPY: ABNORMAL
PCO2, VEN: 42.1 MMHG (ref 40–50)
PDW BLD-RTO: 13.5 % (ref 12.4–15.4)
PERFORMED ON: ABNORMAL
PH UA: 5.5 (ref 5–8)
PH VENOUS: 7.33 (ref 7.35–7.45)
PHOSPHORUS: 3.4 MG/DL (ref 2.5–4.9)
PLATELET # BLD: 222 K/UL (ref 135–450)
PMV BLD AUTO: 8.9 FL (ref 5–10.5)
PO2, VEN: 64.8 MMHG (ref 25–40)
POTASSIUM REFLEX MAGNESIUM: 4.1 MMOL/L (ref 3.5–5.1)
POTASSIUM SERPL-SCNC: 4 MMOL/L (ref 3.5–5.1)
PRO-BNP: 53 PG/ML (ref 0–124)
PROTEIN UA: NEGATIVE MG/DL
PROTHROMBIN TIME: 10.9 SEC (ref 9.8–13)
RBC # BLD: 4.23 M/UL (ref 4–5.2)
SODIUM BLD-SCNC: 132 MMOL/L (ref 136–145)
SODIUM BLD-SCNC: 136 MMOL/L (ref 136–145)
SPECIFIC GRAVITY UA: <=1.005 (ref 1–1.03)
TCO2 CALC VENOUS: 23 MMOL/L
TOTAL PROTEIN: 6.5 G/DL (ref 6.4–8.2)
TROPONIN: <0.01 NG/ML
URINE REFLEX TO CULTURE: ABNORMAL
URINE TYPE: ABNORMAL
UROBILINOGEN, URINE: 0.2 E.U./DL
WBC # BLD: 5.6 K/UL (ref 4–11)

## 2019-04-14 PROCEDURE — 82803 BLOOD GASES ANY COMBINATION: CPT

## 2019-04-14 PROCEDURE — 36415 COLL VENOUS BLD VENIPUNCTURE: CPT

## 2019-04-14 PROCEDURE — 81003 URINALYSIS AUTO W/O SCOPE: CPT

## 2019-04-14 PROCEDURE — 2580000003 HC RX 258: Performed by: INTERNAL MEDICINE

## 2019-04-14 PROCEDURE — 85730 THROMBOPLASTIN TIME PARTIAL: CPT

## 2019-04-14 PROCEDURE — 71046 X-RAY EXAM CHEST 2 VIEWS: CPT

## 2019-04-14 PROCEDURE — 96365 THER/PROPH/DIAG IV INF INIT: CPT

## 2019-04-14 PROCEDURE — 2580000003 HC RX 258: Performed by: PHYSICIAN ASSISTANT

## 2019-04-14 PROCEDURE — 6360000002 HC RX W HCPCS: Performed by: HOSPITALIST

## 2019-04-14 PROCEDURE — 1200000000 HC SEMI PRIVATE

## 2019-04-14 PROCEDURE — 93005 ELECTROCARDIOGRAM TRACING: CPT | Performed by: PHYSICIAN ASSISTANT

## 2019-04-14 PROCEDURE — 96372 THER/PROPH/DIAG INJ SC/IM: CPT

## 2019-04-14 PROCEDURE — 96375 TX/PRO/DX INJ NEW DRUG ADDON: CPT

## 2019-04-14 PROCEDURE — 6370000000 HC RX 637 (ALT 250 FOR IP): Performed by: INTERNAL MEDICINE

## 2019-04-14 PROCEDURE — 83880 ASSAY OF NATRIURETIC PEPTIDE: CPT

## 2019-04-14 PROCEDURE — 83735 ASSAY OF MAGNESIUM: CPT

## 2019-04-14 PROCEDURE — 6360000002 HC RX W HCPCS: Performed by: PHYSICIAN ASSISTANT

## 2019-04-14 PROCEDURE — 84100 ASSAY OF PHOSPHORUS: CPT

## 2019-04-14 PROCEDURE — 6360000002 HC RX W HCPCS: Performed by: INTERNAL MEDICINE

## 2019-04-14 PROCEDURE — 85610 PROTHROMBIN TIME: CPT

## 2019-04-14 PROCEDURE — 83036 HEMOGLOBIN GLYCOSYLATED A1C: CPT

## 2019-04-14 PROCEDURE — 80053 COMPREHEN METABOLIC PANEL: CPT

## 2019-04-14 PROCEDURE — 82010 KETONE BODYS QUAN: CPT

## 2019-04-14 PROCEDURE — 85025 COMPLETE CBC W/AUTO DIFF WBC: CPT

## 2019-04-14 PROCEDURE — 96361 HYDRATE IV INFUSION ADD-ON: CPT

## 2019-04-14 PROCEDURE — 84484 ASSAY OF TROPONIN QUANT: CPT

## 2019-04-14 PROCEDURE — 99285 EMERGENCY DEPT VISIT HI MDM: CPT

## 2019-04-14 PROCEDURE — 96366 THER/PROPH/DIAG IV INF ADDON: CPT

## 2019-04-14 PROCEDURE — 96374 THER/PROPH/DIAG INJ IV PUSH: CPT

## 2019-04-14 PROCEDURE — 93010 ELECTROCARDIOGRAM REPORT: CPT | Performed by: INTERNAL MEDICINE

## 2019-04-14 PROCEDURE — 6370000000 HC RX 637 (ALT 250 FOR IP): Performed by: PHYSICIAN ASSISTANT

## 2019-04-14 RX ORDER — ASPIRIN 325 MG
325 TABLET ORAL ONCE
Status: COMPLETED | OUTPATIENT
Start: 2019-04-14 | End: 2019-04-14

## 2019-04-14 RX ORDER — POTASSIUM CHLORIDE 7.45 MG/ML
10 INJECTION INTRAVENOUS PRN
Status: DISCONTINUED | OUTPATIENT
Start: 2019-04-14 | End: 2019-04-15 | Stop reason: HOSPADM

## 2019-04-14 RX ORDER — POTASSIUM CHLORIDE 7.45 MG/ML
10 INJECTION INTRAVENOUS PRN
Status: DISCONTINUED | OUTPATIENT
Start: 2019-04-14 | End: 2019-04-14

## 2019-04-14 RX ORDER — FENOFIBRATE 54 MG/1
54 TABLET ORAL DAILY
Status: DISCONTINUED | OUTPATIENT
Start: 2019-04-14 | End: 2019-04-15 | Stop reason: HOSPADM

## 2019-04-14 RX ORDER — DULOXETIN HYDROCHLORIDE 20 MG/1
20 CAPSULE, DELAYED RELEASE ORAL 2 TIMES DAILY
Status: DISCONTINUED | OUTPATIENT
Start: 2019-04-14 | End: 2019-04-15 | Stop reason: HOSPADM

## 2019-04-14 RX ORDER — SODIUM CHLORIDE 0.9 % (FLUSH) 0.9 %
10 SYRINGE (ML) INJECTION PRN
Status: DISCONTINUED | OUTPATIENT
Start: 2019-04-14 | End: 2019-04-15 | Stop reason: HOSPADM

## 2019-04-14 RX ORDER — FLUOXETINE 10 MG/1
10 CAPSULE ORAL DAILY
Status: CANCELLED | OUTPATIENT
Start: 2019-04-14

## 2019-04-14 RX ORDER — DEXTROSE MONOHYDRATE 25 G/50ML
12.5 INJECTION, SOLUTION INTRAVENOUS PRN
Status: DISCONTINUED | OUTPATIENT
Start: 2019-04-14 | End: 2019-04-15 | Stop reason: HOSPADM

## 2019-04-14 RX ORDER — 0.9 % SODIUM CHLORIDE 0.9 %
2000 INTRAVENOUS SOLUTION INTRAVENOUS ONCE
Status: COMPLETED | OUTPATIENT
Start: 2019-04-14 | End: 2019-04-14

## 2019-04-14 RX ORDER — MAGNESIUM SULFATE IN WATER 40 MG/ML
4 INJECTION, SOLUTION INTRAVENOUS ONCE
Status: COMPLETED | OUTPATIENT
Start: 2019-04-14 | End: 2019-04-15

## 2019-04-14 RX ORDER — ONDANSETRON 2 MG/ML
4 INJECTION INTRAMUSCULAR; INTRAVENOUS EVERY 6 HOURS PRN
Status: DISCONTINUED | OUTPATIENT
Start: 2019-04-14 | End: 2019-04-15 | Stop reason: HOSPADM

## 2019-04-14 RX ORDER — SODIUM CHLORIDE 0.9 % (FLUSH) 0.9 %
10 SYRINGE (ML) INJECTION EVERY 12 HOURS SCHEDULED
Status: DISCONTINUED | OUTPATIENT
Start: 2019-04-14 | End: 2019-04-15 | Stop reason: HOSPADM

## 2019-04-14 RX ORDER — ASPIRIN 81 MG/1
81 TABLET ORAL DAILY
Status: DISCONTINUED | OUTPATIENT
Start: 2019-04-14 | End: 2019-04-15 | Stop reason: HOSPADM

## 2019-04-14 RX ORDER — ONDANSETRON 2 MG/ML
4 INJECTION INTRAMUSCULAR; INTRAVENOUS ONCE
Status: COMPLETED | OUTPATIENT
Start: 2019-04-14 | End: 2019-04-14

## 2019-04-14 RX ORDER — SODIUM CHLORIDE 9 MG/ML
INJECTION, SOLUTION INTRAVENOUS CONTINUOUS
Status: DISCONTINUED | OUTPATIENT
Start: 2019-04-14 | End: 2019-04-14

## 2019-04-14 RX ORDER — MAGNESIUM SULFATE 1 G/100ML
1 INJECTION INTRAVENOUS PRN
Status: DISCONTINUED | OUTPATIENT
Start: 2019-04-14 | End: 2019-04-15 | Stop reason: HOSPADM

## 2019-04-14 RX ORDER — DEXTROSE AND SODIUM CHLORIDE 5; .45 G/100ML; G/100ML
INJECTION, SOLUTION INTRAVENOUS CONTINUOUS PRN
Status: DISCONTINUED | OUTPATIENT
Start: 2019-04-14 | End: 2019-04-14

## 2019-04-14 RX ORDER — CLOPIDOGREL BISULFATE 75 MG/1
75 TABLET ORAL DAILY
Status: DISCONTINUED | OUTPATIENT
Start: 2019-04-14 | End: 2019-04-15 | Stop reason: HOSPADM

## 2019-04-14 RX ORDER — MAGNESIUM SULFATE 1 G/100ML
1 INJECTION INTRAVENOUS PRN
Status: DISCONTINUED | OUTPATIENT
Start: 2019-04-14 | End: 2019-04-14

## 2019-04-14 RX ORDER — RANOLAZINE 500 MG/1
500 TABLET, EXTENDED RELEASE ORAL 2 TIMES DAILY
Status: DISCONTINUED | OUTPATIENT
Start: 2019-04-14 | End: 2019-04-15 | Stop reason: HOSPADM

## 2019-04-14 RX ORDER — INSULIN GLARGINE 100 [IU]/ML
35 INJECTION, SOLUTION SUBCUTANEOUS 2 TIMES DAILY
Status: DISCONTINUED | OUTPATIENT
Start: 2019-04-14 | End: 2019-04-15 | Stop reason: HOSPADM

## 2019-04-14 RX ORDER — TOPIRAMATE 100 MG/1
200 TABLET, FILM COATED ORAL DAILY
Status: DISCONTINUED | OUTPATIENT
Start: 2019-04-14 | End: 2019-04-15 | Stop reason: HOSPADM

## 2019-04-14 RX ORDER — NICOTINE POLACRILEX 4 MG
15 LOZENGE BUCCAL PRN
Status: DISCONTINUED | OUTPATIENT
Start: 2019-04-14 | End: 2019-04-15 | Stop reason: HOSPADM

## 2019-04-14 RX ORDER — ACETAMINOPHEN 325 MG/1
650 TABLET ORAL EVERY 4 HOURS PRN
Status: DISCONTINUED | OUTPATIENT
Start: 2019-04-14 | End: 2019-04-15 | Stop reason: HOSPADM

## 2019-04-14 RX ORDER — FAMOTIDINE 20 MG/1
20 TABLET, FILM COATED ORAL 2 TIMES DAILY
Status: DISCONTINUED | OUTPATIENT
Start: 2019-04-14 | End: 2019-04-15 | Stop reason: HOSPADM

## 2019-04-14 RX ORDER — DEXTROSE MONOHYDRATE 50 MG/ML
100 INJECTION, SOLUTION INTRAVENOUS PRN
Status: DISCONTINUED | OUTPATIENT
Start: 2019-04-14 | End: 2019-04-15 | Stop reason: HOSPADM

## 2019-04-14 RX ORDER — CARVEDILOL 6.25 MG/1
6.25 TABLET ORAL 2 TIMES DAILY WITH MEALS
Status: DISCONTINUED | OUTPATIENT
Start: 2019-04-14 | End: 2019-04-15 | Stop reason: HOSPADM

## 2019-04-14 RX ORDER — ATORVASTATIN CALCIUM 40 MG/1
40 TABLET, FILM COATED ORAL NIGHTLY
Status: DISCONTINUED | OUTPATIENT
Start: 2019-04-14 | End: 2019-04-15 | Stop reason: HOSPADM

## 2019-04-14 RX ORDER — LISINOPRIL 5 MG/1
2.5 TABLET ORAL DAILY
Status: DISCONTINUED | OUTPATIENT
Start: 2019-04-15 | End: 2019-04-15 | Stop reason: HOSPADM

## 2019-04-14 RX ORDER — GABAPENTIN 300 MG/1
600 CAPSULE ORAL NIGHTLY
Status: DISCONTINUED | OUTPATIENT
Start: 2019-04-14 | End: 2019-04-15 | Stop reason: HOSPADM

## 2019-04-14 RX ORDER — POTASSIUM CHLORIDE 20 MEQ/1
40 TABLET, EXTENDED RELEASE ORAL PRN
Status: DISCONTINUED | OUTPATIENT
Start: 2019-04-14 | End: 2019-04-15 | Stop reason: HOSPADM

## 2019-04-14 RX ORDER — SODIUM CHLORIDE 9 MG/ML
INJECTION, SOLUTION INTRAVENOUS CONTINUOUS
Status: DISCONTINUED | OUTPATIENT
Start: 2019-04-14 | End: 2019-04-15 | Stop reason: HOSPADM

## 2019-04-14 RX ORDER — DEXTROSE MONOHYDRATE 25 G/50ML
12.5 INJECTION, SOLUTION INTRAVENOUS PRN
Status: DISCONTINUED | OUTPATIENT
Start: 2019-04-14 | End: 2019-04-14

## 2019-04-14 RX ADMIN — DULOXETINE 20 MG: 20 CAPSULE, DELAYED RELEASE ORAL at 20:37

## 2019-04-14 RX ADMIN — SODIUM CHLORIDE: 9 INJECTION, SOLUTION INTRAVENOUS at 20:36

## 2019-04-14 RX ADMIN — FAMOTIDINE 20 MG: 20 TABLET, FILM COATED ORAL at 20:37

## 2019-04-14 RX ADMIN — ENOXAPARIN SODIUM 40 MG: 40 INJECTION SUBCUTANEOUS at 20:37

## 2019-04-14 RX ADMIN — INSULIN LISPRO 1 UNITS: 100 INJECTION, SOLUTION INTRAVENOUS; SUBCUTANEOUS at 20:45

## 2019-04-14 RX ADMIN — FENOFIBRATE 54 MG: 54 TABLET ORAL at 20:37

## 2019-04-14 RX ADMIN — INSULIN HUMAN 5 UNITS: 100 INJECTION, SOLUTION PARENTERAL at 16:06

## 2019-04-14 RX ADMIN — ATORVASTATIN CALCIUM 40 MG: 40 TABLET, FILM COATED ORAL at 20:37

## 2019-04-14 RX ADMIN — INSULIN GLARGINE 35 UNITS: 100 INJECTION, SOLUTION SUBCUTANEOUS at 20:45

## 2019-04-14 RX ADMIN — ONDANSETRON 4 MG: 2 INJECTION INTRAMUSCULAR; INTRAVENOUS at 16:09

## 2019-04-14 RX ADMIN — Medication 10 ML: at 20:37

## 2019-04-14 RX ADMIN — SODIUM CHLORIDE 2000 ML: 9 INJECTION, SOLUTION INTRAVENOUS at 13:43

## 2019-04-14 RX ADMIN — ASPIRIN 81 MG: 81 TABLET, COATED ORAL at 20:38

## 2019-04-14 RX ADMIN — MAGNESIUM SULFATE HEPTAHYDRATE 4 G: 40 INJECTION, SOLUTION INTRAVENOUS at 20:45

## 2019-04-14 RX ADMIN — ASPIRIN 325 MG: 325 TABLET, COATED ORAL at 16:08

## 2019-04-14 RX ADMIN — RANOLAZINE 500 MG: 500 TABLET, FILM COATED, EXTENDED RELEASE ORAL at 20:37

## 2019-04-14 RX ADMIN — CLOPIDOGREL BISULFATE 75 MG: 75 TABLET ORAL at 20:37

## 2019-04-14 RX ADMIN — TOPIRAMATE 200 MG: 100 TABLET, FILM COATED ORAL at 20:36

## 2019-04-14 RX ADMIN — GABAPENTIN 600 MG: 300 CAPSULE ORAL at 20:37

## 2019-04-14 RX ADMIN — SODIUM CHLORIDE: 9 INJECTION, SOLUTION INTRAVENOUS at 16:11

## 2019-04-14 RX ADMIN — CARVEDILOL 6.25 MG: 6.25 TABLET, FILM COATED ORAL at 20:37

## 2019-04-14 ASSESSMENT — PAIN DESCRIPTION - ONSET: ONSET: ON-GOING

## 2019-04-14 ASSESSMENT — PAIN DESCRIPTION - DESCRIPTORS
DESCRIPTORS: BURNING;STABBING;CONSTANT
DESCRIPTORS: CONSTANT;SHARP

## 2019-04-14 ASSESSMENT — PAIN SCALES - GENERAL
PAINLEVEL_OUTOF10: 10
PAINLEVEL_OUTOF10: 10

## 2019-04-14 ASSESSMENT — PAIN DESCRIPTION - ORIENTATION: ORIENTATION: RIGHT

## 2019-04-14 ASSESSMENT — PAIN DESCRIPTION - LOCATION
LOCATION: CHEST
LOCATION: CHEST

## 2019-04-14 ASSESSMENT — PAIN - FUNCTIONAL ASSESSMENT: PAIN_FUNCTIONAL_ASSESSMENT: PREVENTS OR INTERFERES SOME ACTIVE ACTIVITIES AND ADLS

## 2019-04-14 ASSESSMENT — PAIN DESCRIPTION - PAIN TYPE
TYPE: ACUTE PAIN
TYPE: ACUTE PAIN

## 2019-04-14 ASSESSMENT — HEART SCORE: ECG: 1

## 2019-04-14 ASSESSMENT — PAIN DESCRIPTION - FREQUENCY
FREQUENCY: CONTINUOUS
FREQUENCY: CONTINUOUS

## 2019-04-14 ASSESSMENT — PAIN DESCRIPTION - PROGRESSION: CLINICAL_PROGRESSION: NOT CHANGED

## 2019-04-14 NOTE — ED PROVIDER NOTES
 Drug use: No    Sexual activity: Not on file   Lifestyle    Physical activity:     Days per week: Not on file     Minutes per session: Not on file    Stress: Not on file   Relationships    Social connections:     Talks on phone: Not on file     Gets together: Not on file     Attends Denominational service: Not on file     Active member of club or organization: Not on file     Attends meetings of clubs or organizations: Not on file     Relationship status: Not on file    Intimate partner violence:     Fear of current or ex partner: Not on file     Emotionally abused: Not on file     Physically abused: Not on file     Forced sexual activity: Not on file   Other Topics Concern    Not on file   Social History Narrative    Not on file     Current Facility-Administered Medications   Medication Dose Route Frequency Provider Last Rate Last Dose    0.9 % sodium chloride infusion   Intravenous Continuous Kaitlyn Huffman PA-C   Stopped at 04/14/19 3658     Current Outpatient Medications   Medication Sig Dispense Refill    ranolazine (RANEXA) 500 MG extended release tablet Take 1 tablet by mouth 2 times daily 60 tablet 3    gabapentin (NEURONTIN) 600 MG tablet Take 600 mg by mouth nightly. Eliza Voss clopidogrel (PLAVIX) 75 MG tablet Take 75 mg by mouth daily      famotidine (PEPCID) 20 MG tablet Take 1 tablet by mouth 2 times daily 60 tablet 0    aspirin 81 MG EC tablet Take 1 tablet by mouth daily 30 tablet 3    topiramate (TOPAMAX) 200 MG tablet Take 1 tablet by mouth daily 60 tablet 0    DULoxetine (CYMBALTA) 20 MG extended release capsule Take 1 capsule by mouth 2 times daily 30 capsule 0    FLUoxetine (PROZAC) 10 MG capsule Take 1 capsule by mouth daily 30 capsule 3    metFORMIN (GLUCOPHAGE) 1000 MG tablet Take 1 tablet by mouth 2 times daily (with meals) 60 tablet 3    atorvastatin (LIPITOR) 40 MG tablet Take 1 tablet by mouth nightly 30 tablet 0    fenofibrate micronized (LOFIBRA) 200 MG capsule Take 1 capsule by mouth nightly 30 capsule 3    lisinopril (PRINIVIL;ZESTRIL) 2.5 MG tablet Take 1 tablet by mouth daily 30 tablet 3    carvedilol (COREG) 6.25 MG tablet Take 1 tablet by mouth 2 times daily (with meals) 60 tablet 0    insulin glargine (LANTUS SOLOSTAR) 100 UNIT/ML injection pen Inject 35 Units into the skin 2 times daily (Patient taking differently: Inject 35 Units into the skin 2 times daily Has been non-compliant since July 2018) 5 pen 3    insulin aspart (NOVOLOG FLEXPEN) 100 UNIT/ML injection pen Inject 20 Units into the skin 3 times daily (before meals) 5 pen 3    Lancets MISC 1 month supply for TID fingersticks 100 each 3    Glucose Blood (BLOOD GLUCOSE TEST STRIPS) STRP 1 month supply for TID glucose checks 100 strip 3      No Known Allergies    REVIEW OF SYSTEMS  Review of Systems  10 systems reviewed, pertinent positives per HPI otherwise noted to be negative. PHYSICAL EXAM  BP (!) 108/54   Pulse 85   Temp 98.8 °F (37.1 °C) (Oral)   Resp 22   Ht 5' 3\" (1.6 m)   Wt 185 lb (83.9 kg)   SpO2 95%   BMI 32.77 kg/m²  on room air  GENERALAPPEARANCE: Awake and alert. Cooperative. No acute distress. HEAD: Normocephalic. Atraumatic. EYES: PERRL. EOM's grossly intact. No scleral injection or icterus. ENT: Mucous membranes are moist.   NECK: Supple. No tracheal deviation. HEART: Tachycardic, AICD in place to left chest wall  LUNGS: Respirations unlabored. CTAB. Good air exchange. Speaking comfortably in full sentences. ABDOMEN: Soft. Non-distended. Non-tender. No guarding or rebound. Normal bowel sounds. EXTREMITIES: No peripheral edema. Moves all extremities equally. All extremities neurovascularly intact. SKIN: Warm and dry. No acute rashes. NEUROLOGICAL: Alert and oriented. No gross facial drooping. Strength 5/5, sensation intact. Normal coordination. Gait is steady. PSYCHIATRIC: Normal mood and affect. LABS  I have reviewed all labs for this visit.    Results for orders placed or performed during the hospital encounter of 04/14/19   CBC Auto Differential   Result Value Ref Range    WBC 5.6 4.0 - 11.0 K/uL    RBC 4.23 4.00 - 5.20 M/uL    Hemoglobin 13.5 12.0 - 16.0 g/dL    Hematocrit 40.5 36.0 - 48.0 %    MCV 95.7 80.0 - 100.0 fL    MCH 31.9 26.0 - 34.0 pg    MCHC 33.3 31.0 - 36.0 g/dL    RDW 13.5 12.4 - 15.4 %    Platelets 298 799 - 911 K/uL    MPV 8.9 5.0 - 10.5 fL    Neutrophils % 60.3 %    Lymphocytes % 28.7 %    Monocytes % 7.7 %    Eosinophils % 2.4 %    Basophils % 0.9 %    Neutrophils # 3.4 1.7 - 7.7 K/uL    Lymphocytes # 1.6 1.0 - 5.1 K/uL    Monocytes # 0.4 0.0 - 1.3 K/uL    Eosinophils # 0.1 0.0 - 0.6 K/uL    Basophils # 0.1 0.0 - 0.2 K/uL   Comprehensive Metabolic Panel w/ Reflex to MG   Result Value Ref Range    Sodium 132 (L) 136 - 145 mmol/L    Potassium reflex Magnesium 4.1 3.5 - 5.1 mmol/L    Chloride 94 (L) 99 - 110 mmol/L    CO2 19 (L) 21 - 32 mmol/L    Anion Gap 19 (H) 3 - 16    Glucose 660 (HH) 70 - 99 mg/dL    BUN 11 7 - 20 mg/dL    CREATININE <0.5 (L) 0.6 - 1.1 mg/dL    GFR Non-African American >60 >60    GFR African American >60 >60    Calcium 9.1 8.3 - 10.6 mg/dL    Total Protein 6.5 6.4 - 8.2 g/dL    Alb 3.7 3.4 - 5.0 g/dL    Albumin/Globulin Ratio 1.3 1.1 - 2.2    Total Bilirubin <0.2 0.0 - 1.0 mg/dL    Alkaline Phosphatase 148 (H) 40 - 129 U/L    ALT 35 10 - 40 U/L    AST 13 (L) 15 - 37 U/L    Globulin 2.8 g/dL   Troponin   Result Value Ref Range    Troponin <0.01 <0.01 ng/mL   Brain Natriuretic Peptide   Result Value Ref Range    Pro-BNP 53 0 - 124 pg/mL   Protime-INR   Result Value Ref Range    Protime 10.9 9.8 - 13.0 sec    INR 0.96 0.86 - 1.14   APTT   Result Value Ref Range    aPTT 30.1 26.0 - 36.0 sec   Urinalysis Reflex to Culture   Result Value Ref Range    Color, UA Straw Straw/Yellow    Clarity, UA Clear Clear    Glucose, Ur >=1000 (A) Negative mg/dL    Bilirubin Urine Negative Negative    Ketones, Urine Negative Negative mg/dL    Specific Regent, UA <=1.005 1.005 - 1.030    Blood, Urine Negative Negative    pH, UA 5.5 5.0 - 8.0    Protein, UA Negative Negative mg/dL    Urobilinogen, Urine 0.2 <2.0 E.U./dL    Nitrite, Urine Negative Negative    Leukocyte Esterase, Urine Negative Negative    Microscopic Examination Not Indicated     Urine Reflex to Culture Not Indicated     Urine Type Not Specified    Blood gas, venous   Result Value Ref Range    pH, Alberto 7.331 (L) 7.350 - 7.450    pCO2, Alberto 42.1 40.0 - 50.0 mmHg    pO2, Alberto 64.8 (H) 25.0 - 40.0 mmHg    HCO3, Venous 21.7 (L) 23.0 - 29.0 mmol/L    Base Excess, Alberto -4.0 (L) -3.0 - 3.0 mmol/L    O2 Sat, Alberto 91 Not Established %    Carboxyhemoglobin 1.8 (H) 0.0 - 1.5 %    MetHgb, Alberto 0.4 <1.5 %    TC02 (Calc), Alberto 23 Not Established mmol/L    O2 Content, Alberto 18 Not Established VOL %    O2 Therapy Unknown    Beta-Hydroxybutyrate   Result Value Ref Range    Beta-Hydroxybutyrate 0.20 0.00 - 0.27 mmol/L   Basic Metabolic Panel   Result Value Ref Range    Sodium 136 136 - 145 mmol/L    Potassium 4.0 3.5 - 5.1 mmol/L    Chloride 101 99 - 110 mmol/L    CO2 24 21 - 32 mmol/L    Anion Gap 11 3 - 16    Glucose 436 (H) 70 - 99 mg/dL    BUN 10 7 - 20 mg/dL    CREATININE <0.5 (L) 0.6 - 1.1 mg/dL    GFR Non-African American >60 >60    GFR African American >60 >60    Calcium 8.5 8.3 - 10.6 mg/dL   Magnesium   Result Value Ref Range    Magnesium 1.60 (L) 1.80 - 2.40 mg/dL   Phosphorus   Result Value Ref Range    Phosphorus 3.4 2.5 - 4.9 mg/dL   POCT Glucose   Result Value Ref Range    Glucose 584 mg/dL   POCT Glucose   Result Value Ref Range    POC Glucose 584 (H) 70 - 99 mg/dl    Performed on ACCU-CHEK    POCT glucose - every hour   Result Value Ref Range    Glucose 421 mg/dL    QC OK?  Yes    POCT Glucose   Result Value Ref Range    POC Glucose 536 (H) 70 - 99 mg/dl    Performed on ACCU-CHEK    POCT Glucose   Result Value Ref Range    POC Glucose 421 (H) 70 - 99 mg/dl    Performed on ACCU-CHEK    POCT glucose - every hour   Result Value Ref Range    Glucose 272 mg/dL   POCT Glucose   Result Value Ref Range    POC Glucose 337 (H) 70 - 99 mg/dl    Performed on ACCU-CHEK    POCT Glucose   Result Value Ref Range    POC Glucose 272 (H) 70 - 99 mg/dl    Performed on ACCU-CHEK    EKG 12 Lead   Result Value Ref Range    Ventricular Rate 102 BPM    Atrial Rate 102 BPM    P-R Interval 158 ms    QRS Duration 88 ms    Q-T Interval 396 ms    QTc Calculation (Bazett) 516 ms    P Axis 53 degrees    R Axis 16 degrees    T Axis 63 degrees    Diagnosis       Sinus tachycardia with occasional Premature ventricular complexesNonspecific T wave abnormalityAbnormal ECGWhen compared with ECG of 09-APR-2019 03:42,Premature ventricular complexes are now PresentNonspecific T wave abnormality no longer evident in Inferior leadsConfirmed by SEAMUS YOUNG, [address] ([id number]) on 4/14/2019 5:39:40 PM           RADIOLOGY    Xr Chest Standard (2 Vw)    Result Date: 4/14/2019  EXAMINATION: TWO VIEWS OF THE CHEST 4/14/2019 1:16 pm COMPARISON: 04/08/2019. HISTORY: ORDERING SYSTEM PROVIDED HISTORY: other TECHNOLOGIST PROVIDED HISTORY: Reason for exam:->other Ordering Physician Provided Reason for Exam: chest pain Acuity: Unknown Type of Exam: Unknown FINDINGS: The cardiomediastinal silhouette is unremarkable. The lungs are clear. No infiltrate, pleural fluid or evidence of overt failure. Left sided AICD device is in place. No acute osseous findings. No acute cardiopulmonary disease. CONSULTATIONS  Hospitalist, dr Maximiliano Avalos, agrees to admit. ED COURSE/MDM  Afebrile stable patient presents to the ED for evaluation SPO2 on room air 95% the patient is not hypoxic. Concern for possible DKA basic labs cardiac workup is ordered in addition to beta hydroxybutyrate and venous blood gas. Patient is ordered to liters of IV fluids in addition to Aspirin. Patient seen and evaluated. Discussed H&P with supervising physician, aware of results and agrees w plan/disposition.    I have seen and evaluated this patient with supervising physician. Old records reviewed. Labs and imaging reviewed kaiden discussed. Kenisha order for a insulin-dependent however nurse indicated that she has had this patient in the past and that her glucose tends to drop very quickly. She was to hold off starting the insulin drip until this is reasonable. Advised a point-of-care glucose after patient has received her 2 L of fluids and we will order additional insulin at this time. Point of care glucose patient has received her IV fluids her glucose level has dropped into 421. I placed order for  5 units of SQ insulin. Patient was given the following medications in the ED:  At this time, patient is ready for admission . CLINICAL IMPRESSION  1. Chest pain, unspecified type    2. Hyperglycemia        Blood pressure (!) 108/54, pulse 85, temperature 98.8 °F (37.1 °C), temperature source Oral, resp. rate 22, height 5' 3\" (1.6 m), weight 185 lb (83.9 kg), SpO2 95 %. Cadence Valdes is in fair condition upon Admit to med/surg floor.           Noris Berger PA-C  04/14/19 8209

## 2019-04-14 NOTE — PROGRESS NOTES
4 Eyes Skin Assessment     The patient is being assess for   Admission    I agree that 2 RN's have performed a thorough Head to Toe Skin Assessment on the patient. ALL assessment sites listed below have been assessed. Areas assessed by both nurses:   [x]   Head, Face, and Ears   [x]   Shoulders, Back, and Chest, Abdomen  [x]   Arms, Elbows, and Hands   [x]   Coccyx, Sacrum, and Ischium  [x]   Legs, Feet, and Heels        Patient skin natural warm and dry. scaterred scabs noted to BLE, scattered bruising to BUE. Surgical scar noted to lower back, patient reports hx 3 back surgeries. Groin and lower abdominal folds excoriated, pt reports treating this at home. **SHARE this note so that the co-signing nurse is able to place an eSignature**    Co-signer eSignature: Electronically signed by Veronica Dixon RN on 4/14/19 at 6:46 PM    Does the Patient have Skin Breakdown?   No          Ignacio Prevention initiated:  No   Wound Care Orders initiated:  No      WOC nurse consulted for Pressure Injury (Stage 3,4, Unstageable, DTI, NWPT, Complex wounds)and New or Established Ostomies:  No      Primary Nurse eSignature: Electronically signed by Martine Ribeiro RN on 4/14/19 at 6:43 PM

## 2019-04-14 NOTE — ED NOTES
Call received from lab with panic glucose of 660. KAYLEN Sahni informed.      David Hawkins RN  04/14/19 0319

## 2019-04-14 NOTE — ED NOTES
Bed: 05  Expected date:   Expected time:   Means of arrival:   Comments:  khai Alvarez  04/14/19 1448

## 2019-04-14 NOTE — ED PROVIDER NOTES
I independently evaluated and obtained a history and physical on Estela Del Rio. All diagnostic, treatment, and disposition assistants were made to myself in conjunction the advanced practice provider. For further details of this patient's emergency department encounter, please see the advanced practice provider's documentation. History: Pt with chest pain for several months, difficulty controlling her blood sugars. Has had recent cardiac workups. Physician Exam: Pt no acute distress, sitting comfortably on room air    MDM: Discussed medical decision making with KAREN and agree with plan. Please see their note for further detail.          Colin Foley MD  04/15/19 7609

## 2019-04-14 NOTE — ED NOTES
Report given to Nadeen Cespedes RN  Pt went via wheelchair  A&O vitals stable      Marley Arrington RN  04/14/19 0872

## 2019-04-14 NOTE — PLAN OF CARE
Feeling ill, achy, CP, increased with arm movements, nausea/ vomiting     BG high    mild DKA     resolved with IV hydration in ED   rpt BMP , normal AG     admit to med surg  , aggressive IVF, lantus, SSI

## 2019-04-14 NOTE — ED NOTES
Perfect serve message to Dr. Betty Steward for [facility] @ [address]  04/14/19 1521    Dr Betty Steward returned the call to [facility] @ [address]  04/14/19 1536

## 2019-04-15 VITALS
BODY MASS INDEX: 37.02 KG/M2 | OXYGEN SATURATION: 96 % | HEIGHT: 63 IN | DIASTOLIC BLOOD PRESSURE: 60 MMHG | TEMPERATURE: 97.7 F | WEIGHT: 208.9 LBS | HEART RATE: 71 BPM | RESPIRATION RATE: 16 BRPM | SYSTOLIC BLOOD PRESSURE: 92 MMHG

## 2019-04-15 LAB
ANION GAP SERPL CALCULATED.3IONS-SCNC: 10 MMOL/L (ref 3–16)
BASOPHILS ABSOLUTE: 0 K/UL (ref 0–0.2)
BASOPHILS RELATIVE PERCENT: 0.7 %
BUN BLDV-MCNC: 7 MG/DL (ref 7–20)
CALCIUM SERPL-MCNC: 8.2 MG/DL (ref 8.3–10.6)
CHLORIDE BLD-SCNC: 99 MMOL/L (ref 99–110)
CO2: 25 MMOL/L (ref 21–32)
CREAT SERPL-MCNC: <0.5 MG/DL (ref 0.6–1.1)
EOSINOPHILS ABSOLUTE: 0.2 K/UL (ref 0–0.6)
EOSINOPHILS RELATIVE PERCENT: 3 %
ESTIMATED AVERAGE GLUCOSE: 317.8 MG/DL
GFR AFRICAN AMERICAN: >60
GFR NON-AFRICAN AMERICAN: >60
GLUCOSE BLD-MCNC: 113 MG/DL (ref 70–99)
GLUCOSE BLD-MCNC: 196 MG/DL (ref 70–99)
GLUCOSE BLD-MCNC: 214 MG/DL (ref 70–99)
GLUCOSE BLD-MCNC: 237 MG/DL (ref 70–99)
GLUCOSE BLD-MCNC: 255 MG/DL (ref 70–99)
GLUCOSE BLD-MCNC: 268 MG/DL (ref 70–99)
HBA1C MFR BLD: 12.7 %
HCT VFR BLD CALC: 38.2 % (ref 36–48)
HEMOGLOBIN: 13.1 G/DL (ref 12–16)
LYMPHOCYTES ABSOLUTE: 1.9 K/UL (ref 1–5.1)
LYMPHOCYTES RELATIVE PERCENT: 34.4 %
MCH RBC QN AUTO: 32.1 PG (ref 26–34)
MCHC RBC AUTO-ENTMCNC: 34.2 G/DL (ref 31–36)
MCV RBC AUTO: 93.9 FL (ref 80–100)
MONOCYTES ABSOLUTE: 0.5 K/UL (ref 0–1.3)
MONOCYTES RELATIVE PERCENT: 8.5 %
NEUTROPHILS ABSOLUTE: 2.9 K/UL (ref 1.7–7.7)
NEUTROPHILS RELATIVE PERCENT: 53.4 %
PDW BLD-RTO: 13.4 % (ref 12.4–15.4)
PERFORMED ON: ABNORMAL
PLATELET # BLD: 221 K/UL (ref 135–450)
PMV BLD AUTO: 8.5 FL (ref 5–10.5)
POTASSIUM REFLEX MAGNESIUM: 4 MMOL/L (ref 3.5–5.1)
RBC # BLD: 4.07 M/UL (ref 4–5.2)
SODIUM BLD-SCNC: 134 MMOL/L (ref 136–145)
TROPONIN: <0.01 NG/ML
WBC # BLD: 5.4 K/UL (ref 4–11)

## 2019-04-15 PROCEDURE — 6360000002 HC RX W HCPCS: Performed by: INTERNAL MEDICINE

## 2019-04-15 PROCEDURE — 80048 BASIC METABOLIC PNL TOTAL CA: CPT

## 2019-04-15 PROCEDURE — 97166 OT EVAL MOD COMPLEX 45 MIN: CPT

## 2019-04-15 PROCEDURE — 96372 THER/PROPH/DIAG INJ SC/IM: CPT

## 2019-04-15 PROCEDURE — 36415 COLL VENOUS BLD VENIPUNCTURE: CPT

## 2019-04-15 PROCEDURE — 85025 COMPLETE CBC W/AUTO DIFF WBC: CPT

## 2019-04-15 PROCEDURE — 97530 THERAPEUTIC ACTIVITIES: CPT

## 2019-04-15 PROCEDURE — 97116 GAIT TRAINING THERAPY: CPT

## 2019-04-15 PROCEDURE — 97161 PT EVAL LOW COMPLEX 20 MIN: CPT

## 2019-04-15 PROCEDURE — 6370000000 HC RX 637 (ALT 250 FOR IP): Performed by: INTERNAL MEDICINE

## 2019-04-15 PROCEDURE — G0378 HOSPITAL OBSERVATION PER HR: HCPCS

## 2019-04-15 PROCEDURE — 84484 ASSAY OF TROPONIN QUANT: CPT

## 2019-04-15 PROCEDURE — 2580000003 HC RX 258: Performed by: INTERNAL MEDICINE

## 2019-04-15 PROCEDURE — 96366 THER/PROPH/DIAG IV INF ADDON: CPT

## 2019-04-15 PROCEDURE — 99217 PR OBSERVATION CARE DISCHARGE MANAGEMENT: CPT | Performed by: INTERNAL MEDICINE

## 2019-04-15 RX ADMIN — INSULIN LISPRO 2 UNITS: 100 INJECTION, SOLUTION INTRAVENOUS; SUBCUTANEOUS at 08:42

## 2019-04-15 RX ADMIN — INSULIN LISPRO 10 UNITS: 100 INJECTION, SOLUTION INTRAVENOUS; SUBCUTANEOUS at 08:43

## 2019-04-15 RX ADMIN — DULOXETINE 20 MG: 20 CAPSULE, DELAYED RELEASE ORAL at 08:34

## 2019-04-15 RX ADMIN — FENOFIBRATE 54 MG: 54 TABLET ORAL at 08:34

## 2019-04-15 RX ADMIN — FAMOTIDINE 20 MG: 20 TABLET, FILM COATED ORAL at 08:34

## 2019-04-15 RX ADMIN — RANOLAZINE 500 MG: 500 TABLET, FILM COATED, EXTENDED RELEASE ORAL at 08:34

## 2019-04-15 RX ADMIN — INSULIN LISPRO 2 UNITS: 100 INJECTION, SOLUTION INTRAVENOUS; SUBCUTANEOUS at 12:34

## 2019-04-15 RX ADMIN — INSULIN GLARGINE 35 UNITS: 100 INJECTION, SOLUTION SUBCUTANEOUS at 08:42

## 2019-04-15 RX ADMIN — ASPIRIN 81 MG: 81 TABLET, COATED ORAL at 08:34

## 2019-04-15 RX ADMIN — SODIUM CHLORIDE: 9 INJECTION, SOLUTION INTRAVENOUS at 05:28

## 2019-04-15 RX ADMIN — ENOXAPARIN SODIUM 40 MG: 40 INJECTION SUBCUTANEOUS at 08:34

## 2019-04-15 RX ADMIN — INSULIN LISPRO 10 UNITS: 100 INJECTION, SOLUTION INTRAVENOUS; SUBCUTANEOUS at 12:34

## 2019-04-15 RX ADMIN — CLOPIDOGREL BISULFATE 75 MG: 75 TABLET ORAL at 08:30

## 2019-04-15 RX ADMIN — CARVEDILOL 6.25 MG: 6.25 TABLET, FILM COATED ORAL at 08:34

## 2019-04-15 RX ADMIN — LISINOPRIL 2.5 MG: 5 TABLET ORAL at 08:32

## 2019-04-15 RX ADMIN — TOPIRAMATE 200 MG: 100 TABLET, FILM COATED ORAL at 08:34

## 2019-04-15 NOTE — H&P
Hospital Medicine History & Physical      PCP: Crystal Mayes    Date of Admission: 4/14/2019    Date of Service: Pt seen/examined on 4/14/2019 and Admitted to Inpatient with expected LOS greater than two midnights due to medical therapy. Chief Complaint:  Chest pain      History Of Present Illness:       63 y.o. female presented to the ED with chest pain, arm pain. Work up revealed patient to be in mild DKA, readily reversed with hydration. Patient does not complain of arm or chest pain during my interview, but states she has not been able to take medication due to lack of assistance from sister. This is the patient's second similar presentation in a week. No respiratory distress on room air, no complaints of pain, alert, coherently responsive, clutching her juan pablo bear. Past Medical History:          Diagnosis Date    CAD (coronary artery disease)     [facility] [address] [facility]     Diabetes mellitus ([facility] [address] [facility]     Hyperlipidemia     Hypertension     Mental retardation     MI (myocardial infarction) ([facility] [address]        Past Surgical History:          Procedure Laterality Date    BACK SURGERY      PACEMAKER INSERTION      TUMOR REMOVAL         Medications Prior to Admission:      Prior to Admission medications    Medication Sig Start Date End Date Taking? Authorizing Provider   ranolazine (RANEXA) 500 MG extended release tablet Take 1 tablet by mouth 2 times daily 4/9/19  Yes Jasen Young MD   gabapentin (NEURONTIN) 600 MG tablet Take 600 mg by mouth nightly. .   Yes Historical Provider, MD   clopidogrel (PLAVIX) 75 MG tablet Take 75 mg by mouth daily   Yes Historical Provider, MD   famotidine (PEPCID) 20 MG tablet Take 1 tablet by mouth 2 times daily 6/26/18  Yes FRITZ Stevenson - CNP   aspirin 81 MG EC tablet Take 1 tablet by mouth daily 5/10/18  Yes Shira Stephen MD   topiramate (TOPAMAX) 200 MG tablet Take 1 tablet by mouth daily 4/14/18  Yes Mindy Crocker Bang Joseph MD   DULoxetine (CYMBALTA) 20 MG extended release capsule Take 1 capsule by mouth 2 times daily 4/14/18  Yes Clemencia Boyer MD   FLUoxetine [facility]) 10 MG capsule Take 1 capsule by mouth daily 4/14/18  Yes Clemencia Boyer MD   metFORMIN (GLUCOPHAGE) 1000 MG tablet Take 1 tablet by mouth 2 times daily (with meals) 4/14/18  Yes Clemencia Boyer MD   atorvastatin (LIPITOR) 40 MG tablet Take 1 tablet by mouth nightly 4/14/18  Yes Clemencia Boyer MD   fenofibrate micronized (LOFIBRA) 200 MG capsule Take 1 capsule by mouth nightly 4/14/18  Yes Clemencia Boyer MD   lisinopril (PRINIVIL;ZESTRIL) 2.5 MG tablet Take 1 tablet by mouth daily 4/14/18  Yes Clemencia Boyer MD   carvedilol (COREG) 6.25 MG tablet Take 1 tablet by mouth 2 times daily (with meals) 4/14/18  Yes Clemencia Boyer MD   insulin glargine (LANTUS SOLOSTAR) 100 UNIT/ML injection pen Inject 35 Units into the skin 2 times daily  Patient taking differently: Inject 35 Units into the skin 2 times daily Has been non-compliant since July 2018 4/14/18  Yes Clemencia Boyer MD   insulin aspart (NOVOLOG FLEXPEN) 100 UNIT/ML injection pen Inject 20 Units into the skin 3 times daily (before meals) 4/14/18  Yes Clemencia Boyer MD   Lancets MISC 1 month supply for TID fingersticks 4/14/18  Yes Clemencia Boyer MD   Glucose Blood (BLOOD GLUCOSE TEST STRIPS) STRP 1 month supply for TID glucose checks 4/14/18  Yes Clemencia Boyer MD       Allergies:  Patient has no known allergies. Social History:      The patient currently lives in her own apartment, alone    TOBACCO:   reports that she has never smoked. She has never used smokeless tobacco.  ETOH:   reports that she does not drink alcohol. Family History:       (+) AMI - father at age 79, brother 39        REVIEW OF SYSTEMS:   Pertinent positives as noted in the HPI. All other systems reviewed and negative.     PHYSICAL EXAM PERFORMED:    /84   Pulse 88   Temp 97.4 °F acute changes    XR CHEST STANDARD (2 VW)   Final Result   No acute cardiopulmonary disease. ASSESSMENT:    Active Hospital Problems    Diagnosis Date Noted    Hyperglycemia [R73.9] 04/09/2019         PLAN:    1) Chest pain  - h/o CAD, clinically stable, follow troponin trend    2) DKA  - resolved, follow up renal panel, continue insulin sliding scale     3) Hyperlipidemia  - normal transaminases, continue statin therapy    4) HTN   - continue home medications    5) Hypomagnesemia  - replace IV 4 g    DVT Prophylaxis: lovenox  Diet: DIET FULL LIQUID; Carb Control: 4 carb choices (60 gms)/meal  Code Status: Full Code    PT/OT Eval Status: ordered    Dispo - inpt, will likely need placement due to non compliance / unable to take medication independently       Courtney Braden MD    Thank you Porsche Bustamante for the opportunity to be involved in this patient's care. If you have any questions or concerns please feel free to contact me at [phone].

## 2019-04-15 NOTE — PROGRESS NOTES
Supine to sit:   IND  Sit to supine:   Not Tested  Scooting to head of bed:   Not Tested  Scooting in sitting:  IND  Rolling:   Not Tested  Bridging:   Not Tested    Transfers:    Sit to stand:  Modified Independent  Stand to sit:  Modified Independent  Bed to Chair:  Modified Independent  Bed to [facility]:  Not Tested      Dressing:      UE:  Not Tested  LE:   Independent- donning socks   Bathing:    UE: Not Tested  LE: Not Tested  Eating:   Independent    Positioning Needs: Remained up in chair, call light and needs in reach. Exercise / Activities Initiated:   N/A    Patient/Family Education: Role of OT ; Recommendations for DC     Assessment of Deficits: Pt seen for Occupational therapy evaluation in acute care setting. Pt demonstrated decreased Activity Tolerance, ADL's, Balance, Bathing, Dressing and Transfers    Goal(s) : To be met in 3 Visits:  1). Indep with UE ex x 10 reps    To be met in 5 Visits:  1). Supine to Sit: Independent  2). Bed to Chair/BSC: Independent  3). Upper Body Bathing:  Independent  4). Lower Body Bathing:  Supervision  5). Upper Body Dressing: Independent  6). Lower Body Dressing: Independent  7). Pt to donato UE exs [id number]    Rehabilitation Potential:  Good for goals listed above. Strengths for achieving goals include: Pt cooperative  Barriers to achieving goals include:  Complexity of condition     Plan: To be seen 3-5 x / week  while in acute care setting for therapeutic exercises, bed mobility, transfers, dressing, bathing,family/patient education with adaptive equipment, breathing technique instruction.      Juan A Rojas OTR/L [id number]          If patient discharges from this facility prior to next visit, this note will serve as the Discharge Summary

## 2019-04-15 NOTE — PROGRESS NOTES
The Utilization Review Committee members, including its physician members, have reviewed this case and agree that this patient does not meet evidenced based criteria for inpatient services, requiring a change in patient status from [address] as inpatient to place in observation, in accordance with condition code 44. Medical care will continue to be provided by Maricruz Trevino, attending provider, who concurs with this decision and has documented his/her concurrence in the patients medical record.     ALEX MONTELONGO  Utilization review committee

## 2019-04-15 NOTE — CARE COORDINATION
General acute hospital    Referral received from CM to follow for home care services. I will follow for needs, and speak with patient to verify demos.         Vignesh Mejia  Work mobile: [phone]  General acute hospital office: [phone]

## 2019-04-15 NOTE — PROGRESS NOTES
New admission with c/o weakness and tingling. FSBS 224 (glucose 660 at 1330). Pt states BS are always high at home, >500. Mag replacement ordered and to be hung by primary RN. POC reviewed at bedside with pt and primary RN to reassess after electrolyte replaced and MIVF started. Eloy Carrera Clinical       Results for Laurence Cox (MRN [id number]) as of 4/14/2019 20:17   Ref.  Range 4/14/2019 16:10   Magnesium Latest Ref Range: 1.80 - 2.40 mg/dL 1.60 (L)

## 2019-04-15 NOTE — PROGRESS NOTES
Bedside report given to Boaz Gracia RN  pt in stable condition no needs at this time.  Call light within reach

## 2019-04-15 NOTE — CARE COORDINATION
Case Management Assessment  Initial Evaluation    Date/Time of Evaluation: 4/15/2019 11:41 AM  Assessment Completed by: Tere Willsi    Patient Name: Sylvia Perry  YOB: 1960  Diagnosis: Hyperglycemia [R73.9]  Hyperglycemia [R73.9]  Date / Time: 4/14/2019 12:58 PM  Admission status/Date: Inpatient 4/14/2019  Chart Reviewed: Yes      Patient Interviewed: Yes   Family Interviewed:  No      Hospitalization in the last 30 days:  Yes    Contacts  :   Christa Campos  Relationship to Patient:  sister  Phone Number:    [phone]  Alternate Contact:     Relationship to Patient:     Phone Number:       Met with: patient    Current PCP; [facility] [address] required for SNF :  N        3 night stay required - [employer]  Support Systems:    Transportation: walks    Meal Preparation: self    Housing  Home Environment: lives alone in 1st floor apartment  Steps: NA  Plans to Return to Present Housing: Yes  Other Identified Issues: pt has little social support, estranged from sister. Home Care Information  Currently active with [address] : Yes [facility] (SN/PT/OT/MSW)     Passport/Waiver : No  :                      Phone Number:    Passport/Waiver Services: NA          Durable Medical Equipment   DME Provider: NA  Equipment: Walker_X_Cane_X_RTS__ BSC__Shower Chair__  02__ HHN__ CPAP__  BiPap__  Hospital Bed__ W/C___ Other_X Safe Link phone_________      Has Home O2 in place on admit:  No  Informed of need to bring portable home O2 tank on day of discharge for nursing to connect prior to leaving:   No  Verbalized agreement/Understanding:   No    Community Service Affiliation  Dialysis:  No    · Name:  · Location  · Dialysis Schedule:  · Phone:   · Fax:     Outpatient PT/OT: No    Cancer Center: No     CHF Clinic: No     Pulmonary Rehab: No  Pain Clinic: No  Community Mental Health: No    Wound Clinic: No     Other: NA  DISCHARGE ORDER  Date/Time

## 2019-04-15 NOTE — DISCHARGE SUMMARY
Name:  Akash Cee  Room:  0217/0217-02  MRN:    [id number]    Discharge Summary      This discharge summary is in conjunction with a complete physical exam done on the day of discharge. Discharging Physician: Dr. Marietta Abrams: 4/14/2019  Discharge:  4/15/2019    HPI taken from admission H&P:      63 y.o. female presented to the ED with chest pain, arm pain. Work up revealed patient to be in mild DKA, readily reversed with hydration. Patient does not complain of arm or chest pain during my interview, but states she has not been able to take medication due to lack of assistance from sister. This is the patient's second similar presentation in a week. No respiratory distress on room air, no complaints of pain, alert, coherently responsive, clutching her juan pablo bear. Diagnoses this Admission and Hospital Course     Chest pain  - h/o CAD  - clinically stable, followed troponin trend: <0.01 x 2  - resolved     DKA  - DKA protocol,  IVF, monitor BMP and lytes  - resolved  - follow up renal panel, continued insulin sliding scale   - resumed home regimen at d/c     Hyperlipidemia  - normal transaminases  - continued statin therapy    HTN   - continued home medications     Hypomagnesemia  - replaced IV 4 g    Procedures (Please Review Full Report for Details)  N/A    Consults    N/A    Physical Exam at Discharge:    /68   Pulse 82   Temp 97.1 °F (36.2 °C) (Oral)   Resp 16   Ht 5' 3\" (1.6 m)   Wt 208 lb 14.4 oz (94.8 kg)   SpO2 95%   BMI 37.00 kg/m²     General appearance:  No apparent distress, appears stated age and cooperative. HEENT:  Normal cephalic, atraumatic without obvious deformity. Pupils equal, round, and reactive to light. Extra ocular muscles intact. Conjunctivae/corneas clear. Neck: Supple, with full range of motion. No jugular venous distention. Trachea midline. Respiratory:  Normal respiratory effort.  Clear to auscultation, bilaterally without Rales/Wheezes/Rhonchi. Cardiovascular:  Regular rate and rhythm with normal S1/S2 without murmurs, rubs or gallops. Abdomen: Soft, non-tender, non-distended with normal bowel sounds. Musculoskeletal:  No clubbing, cyanosis or edema bilaterally. Full range of motion without deformity. Skin: Skin color, texture, turgor normal.  No rashes or lesions. Neurologic:  Neurovascularly intact without any focal sensory/motor deficits. Cranial nerves: II-XII intact, grossly non-focal.  Psychiatric:  Alert and oriented    CBC:   Recent Labs     04/14/19  1330 04/15/19  0518   WBC 5.6 5.4   HGB 13.5 13.1   HCT 40.5 38.2   MCV 95.7 93.9    221     BMP:   Recent Labs     04/14/19  1330 04/14/19  1610 04/15/19  0518   * 136 134*   K 4.1 4.0 4.0   CL 94* 101 99   CO2 19* 24 25   PHOS  --  3.4  --    BUN 11 10 7   CREATININE <0.5* <0.5* <0.5*     LIVER PROFILE:   Recent Labs     04/14/19  1330   AST 13*   ALT 35   BILITOT <0.2   ALKPHOS 148*     PT/INR:   Recent Labs     04/14/19  1330   PROTIME 10.9   INR 0.96     APTT:   Recent Labs     04/14/19  1330   APTT 30.1     UA:  Recent Labs     04/14/19  1330   COLORU Straw   PHUR 5.5   CLARITYU Clear   SPECGRAV <=1.005   LEUKOCYTESUR Negative   UROBILINOGEN 0.2   BILIRUBINUR Negative   BLOODU Negative   GLUCOSEU >=1000*     CULTURES  N/A    RADIOLOGY    XR CHEST STANDARD (2 VW) 4/14/2019   Final Result   No acute cardiopulmonary disease.            Discharge Medications     Medication List      CHANGE how you take these medications    insulin glargine 100 UNIT/ML injection pen  Commonly known as:  LANTUS SOLOSTAR  Inject 35 Units into the skin 2 times daily  What changed:  additional instructions        CONTINUE taking these medications    aspirin 81 MG EC tablet  Take 1 tablet by mouth daily     atorvastatin 40 MG tablet  Commonly known as:  LIPITOR  Take 1 tablet by mouth nightly     blood glucose test strips  1 month supply for TID glucose checks     carvedilol 6.25 MG tablet  Commonly known as:  COREG  Take 1 tablet by mouth 2 times daily (with meals)     clopidogrel 75 MG tablet  Commonly known as:  PLAVIX     DULoxetine 20 MG extended release capsule  Commonly known as:  CYMBALTA  Take 1 capsule by mouth 2 times daily     famotidine 20 MG tablet  Commonly known as:  PEPCID  Take 1 tablet by mouth 2 times daily     fenofibrate micronized 200 MG capsule  Commonly known as:  LOFIBRA  Take 1 capsule by mouth nightly     FLUoxetine 10 MG capsule  Commonly known as:  PROZAC  Take 1 capsule by mouth daily     gabapentin 600 MG tablet  Commonly known as:  NEURONTIN     insulin aspart 100 UNIT/ML injection pen  Commonly known as:  NOVOLOG FLEXPEN  Inject 20 Units into the skin 3 times daily (before meals)     Lancets Mis  1 month supply for TID fingersticks     lisinopril 2.5 MG tablet  Commonly known as:  PRINIVIL;ZESTRIL  Take 1 tablet by mouth daily     metFORMIN 1000 MG tablet  Commonly known as:  GLUCOPHAGE  Take 1 tablet by mouth 2 times daily (with meals)     ranolazine 500 MG extended release tablet  Commonly known as:  RANEXA  Take 1 tablet by mouth 2 times daily     topiramate 200 MG tablet  Commonly known as:  TOPAMAX  Take 1 tablet by mouth daily              Discharged in stable condition to home. Follow Up: Follow up with PCP in 1 week. ANNAMARIE Ritchie.

## 2019-04-15 NOTE — DISCHARGE INSTR - COC
Continuity of Care Form    Patient Name: Nya James   :  1960  MRN:  [id number]    Admit date:  2019  Discharge date:  4/15/2019    Code Status Order: Full Code   Advance Directives:   [facility] Documentation     Date/Time Healthcare Directive Type of Healthcare Directive Copy in [facility] [address] Agent's Name Healthcare Agent's Phone Number    [phone]  No, patient does not have an advance directive for healthcare treatment -- -- -- -- --          Admitting Physician:  Josh Davis MD  PCP: Porsche Bustamante    Discharging Nurse: Kamille Carrera Real Unit/Room#: 0217/0217-02  Discharging Unit Phone Number: [phone]    Emergency Contact:   Extended Emergency Contact Information  Primary Emergency Contact: Christa Campos  Address: [address] [place] [address] [address]           [address] [place] [address],[address] Phone: [phone]  Relation: Brother/Sister    Past Surgical History:  Past Surgical History:   Procedure Laterality Date    BACK SURGERY      PACEMAKER INSERTION      TUMOR REMOVAL         Immunization History:   Immunization History   Administered Date(s) Administered    Influenza A (H1N1) Vaccine IM 2009    Influenza Virus Vaccine 2012, 10/17/2014, 10/27/2017    Influenza, Quadv, 6 mo and older, IM, PF (Flulaval, Fluarix) 12/10/2018    Pneumococcal Polysaccharide (Biwmczcll23) 10/17/2014, 2017       Active Problems:  Patient Active Problem List   Diagnosis Code    DM (diabetes mellitus) ([facility] E11.9    HTN (hypertension), benign I10    Dyslipidemia E78.5    CAD (coronary artery disease) I25.10    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 9) E66.9    TIA involving right internal carotid artery G45.1    CAD in native artery I25.10    DM (diabetes mellitus), secondary, uncontrolled, w/neurologic complic ([facility]) E93.03, J14.92    CHF (congestive heart failure) ([facility]) I50.9    Cardiomyopathy ([facility] [address] [facility] I42.9    Essential hypertension I10    TIA (transient ischemic attack) G45.9    Left sided numbness R20.0    Chest pain R07.9    Angina, class IV ([facility]) I20.9    Generalized weakness R53.1    Mental retardation F79    DKA, type 2, not at goal [facility]) E11.10    Type 2 diabetes mellitus with hyperosmolar nonketotic hyperglycemia ([facility]) E11.01    Syncope and collapse R55    Hyperglycemia R73.9    Diabetic ketoacidosis without coma associated with type 2 diabetes mellitus ([facility]) E11.10       Isolation/Infection:   Isolation          No Isolation            Nurse Assessment:  Last Vital Signs: /65   Pulse 70   Temp 97.1 °F (36.2 °C) (Oral)   Resp 16   Ht 5' 3\" (1.6 m)   Wt 208 lb 14.4 oz (94.8 kg)   SpO2 100%   BMI 37.00 kg/m²     Last documented pain score (0-10 scale): Pain Level: 10  Last Weight:   Wt Readings from Last 1 Encounters:   04/15/19 208 lb 14.4 oz (94.8 kg)     Mental Status:  oriented and alert    IV Access:  - None    Nursing Mobility/ADLs:  Walking   Independent  Transfer  Independent  Bathing  Assisted  Dressing  Assisted  Toileting  Independent  Feeding  Independent  Med Admin  Assisted  Med Delivery   whole    Wound Care Documentation and Therapy:        Elimination:  Continence:   · Bowel: Yes  · Bladder: Yes   Urinary Catheter: None   Colostomy/Ileostomy/Ileal Conduit: No       Date of Last BM:     Intake/Output Summary (Last 24 hours) at 4/15/2019 1213  Last data filed at 4/15/2019 0528  Gross per 24 hour   Intake 2772.06 ml   Output --   Net 2772.06 ml     I/O last 3 completed shifts: In: 2772.1 [I.V.:772.1; IV Piggyback:2000]  Out: -     Safety Concerns:      At Risk for Falls    Impairments/Disabilities:      None    Nutrition Therapy:  Current Nutrition Therapy:   - Oral Diet:  Carb Control 4 carbs/meal (1800kcals/day)    Routes of Feeding: Oral  Liquids: Thin Liquids  Daily Fluid Restriction: no  Last Modified Barium Swallow with Video (Video Swallowing Test): not done    Treatments at the Time of Hospital Discharge:   Respiratory Treatments:   Oxygen Therapy:  is not on home oxygen therapy. Ventilator:    - No ventilator support    Rehab Therapies: Physical Therapy, Occupational Therapy and Skilled Nurse, MSW  Weight Bearing Status/Restrictions: No weight bearing restirctions  Other Medical Equipment (for information only, NOT a DME order):  walker  Other Treatments:     Patient's personal belongings (please select all that are sent with patient):  None    RN SIGNATURE:  Electronically signed by Jose A Maldonado RN on 4/15/19 at 3:17 PM    CASE MANAGEMENT/SOCIAL WORK SECTION    Inpatient Status Date: Observation    Readmission Risk Assessment Score:  Readmission Risk              Risk of Unplanned Readmission:        35           Discharging to Facility/ Agency   Name:  [facility] care    Address: [address] [place] [address],[address] [place] [address]  Phone: [phone]  Fax: [phone]      / signature: Electronically signed by Ortiz Rocha RN on 4/15/19 at 12:14 PM    PHYSICIAN SECTION    Prognosis: Good    Condition at Discharge: Stable    Rehab Potential (if transferring to Rehab):     Recommended Labs or Other Treatments After Discharge:   Physician Certification: I certify the above information and transfer of Ritika Wilkins  is necessary for the continuing treatment of the diagnosis listed and that she requires Home Care for less 30 days.      Update Admission H&P: No change in H&P    PHYSICIAN SIGNATURE:  REGINO Parker MD/ Electronically signed by Ortiz Rocha RN on 4/15/19 at 12:15 PM

## 2019-04-15 NOTE — PROGRESS NOTES
Inpatient Physical Therapy Evaluation and Treatment    Unit: 2w  Date:  4/15/2019  Patient Name:    Humble Lorenzo  Admitting diagnosis:  Hyperglycemia [R73.9]  Admit Date:  4/14/2019  Precautions/Restrictions/WB Status/ Lines/ Wounds/ Oxygen: Fall Risk, IV    Treatment Time:  0012-0048  Treatment Number:  1   Timed Code Treatment Minutes: 14 minutes  Total Treatment Minutes:  24  minutes    Patient Goals for Therapy: Not stated        Discharge Recommendations: Home with PRN assist and Home Therapy   DME needs for discharge: defer to facility    Home Health S4 Level Recommendation:  Level 1 Standard  AM-PAC Mobility Score    AM-PAC Inpatient Mobility Raw Score : 19       Preadmission Environment    Pt. Lives Alone  Home environment:  apartment   Steps to enter first floor: No steps  Bathroom: Bath Tub Shower, Grab bars and Standard height toilet  Equipment owned: [facility] and Rolling Walker    Preadmission Status:  Pt. Able to drive: No  Pt Fully independent with ADLs: Yes  Pt. Required assistance from family for: Independent PTA  Pt. Fully independent for transfers and gait and walked with [employer]  History of falls Yes - 1 fall in past month, reports lost balance and required assist to get up, stated she went to the hospital and her sugar was elevated    Pain   Yes  Rating: 10 /10  Location: Chest pain  Pain Medicine Status: RN notified and aware, not new c/o or onset, vitals stable throughout treatment    Cognition    A&O x4   Able to follow 2 step commands    Subjective  Patient lying supine in bed with no family present  Pt agreeable to this PT eval & tx. Upper Extremity ROM/Strength  Please see OT evaluation.       Lower Extremity ROM / Strength    AROM WFL: Yes    Strength Assessment:  R LE   Quad   5/5   Ant Tib  5/5   Hamstring 5/5   Iliopsoas 4  L LE  Quad   5/5   Ant Tib  3-* resistance no applied 2/2 to pain   Hamstring 5/5   Iliopsoas 4    Lower Extremity Sensation    WNL    Lower Extremity Proprioception: WNL    Coordination and Tone  WNL    Balance  Static Sitting:  Good    Tolerance: ~ 5 min  Dynamic Sitting:  Good -   Static Standing: Good -    Tolerance: ~ 2-3 min with transfer/ambulation  Dynamic Standing: Fair     Bed Mobility   Supine to Sit:   Independent, HOB minimally elevated  Sit to Supine:  Not Tested  Rolling:   Not Tested  Scooting at EOB: Independent  Scooting to [facility]:  Not Tested    Transfer Training     Sit to stand:   Supervision  Stand to sit:   Supervision  Bed to Chair:  Not Tested with use of N/A    Gait gait completed as indicated below  Distance:  40 ft  Deviations (firm surface/linoleum): decreased bonnie, Decreased step length and Decreased step height   Assistive Device Used:  No AD  Level of Assist: SBA  Comment:     Stair Training deferred, pt does not have stairs in home environment    Activity Tolerance   Pt completed therapy session with Dizziness with sitting EOB, pt reports no worsening or improvement throughout evaluation  BP: 111/68; HR: 68 bpm, SpO2: 98% on RA supine at rest  BP: 113/73; HR: 73 bpm sitting at EOB    Positioning Needs   Pt reclined in chair, call light and needs in reach and alarm set    Exercises Initiated    N/A    Other  None. Patient/Family Education   Pt educated on role of inpatient PT, POC, importance of continued activity    Assessment  Pt seen for Physical Therapy evaluation in acute care setting. Pt demonstrated decreased Balance, Safety and Strength and decreased independence with Ambulation and Transfers    Goals : To be met in 3 visits:  1). Independent with LE Ex x 10 reps    To be met in 6 visits:  1). Supine to/from sit: Independent with HOB flat  2). Sit to/from stand: Independent  3). Bed to chair: Independent  4). Gait: Ambulate 150 ft   with  Supervision  and use of LRAD  5).   Tolerate B LE exercises 3 sets of 10-15 reps    Rehabilitation Potential    Fair  Strengths for achieving goals include:   PLOF and Pt cooperative  Barriers to achieving goals include:    Pain  and Weakness    Plan    To be seen 3-5 x / week  while in acute care setting for therapeutic exercises, bed mobility, transfers, progressive gait training, balance training, and family/patient education. Lakeshia Knight, PT, DPT #[id number]    If patient discharges from this facility prior to next visit, this note will serve as the Discharge Summary.

## 2019-04-15 NOTE — PROGRESS NOTES
[employer] here to take patient home. Patient transported to cab via wheelchair in stable condition with all personal belongings.

## 2019-04-15 NOTE — CARE COORDINATION
[facility]  Spoke with patient regarding homecare services. Demographics verified, and patient is agreeable to home care services. DC order noted, all docs needed have been faxed to [facility] for home care services.         [place]  Work mobile: [phone]  [facility] office: [phone]

## 2019-04-15 NOTE — FLOWSHEET NOTE
04/15/19 0515   Vital Signs   Temp 96.9 °F (36.1 °C)   Temp Source Oral   Pulse 69   Heart Rate Source Monitor   Resp 16   /71   BP Location Right upper arm   BP Upper/Lower Upper   Patient Position Supine   Level of Consciousness 0   MEWS Score 1   Oxygen Therapy   SpO2 98 %   O2 Device None (Room air)   Pt complaining of tightness in chest and numbness and tingling everywhere. Blood sugar 255. Lab in at this time for AM labs. Clinical aware will continue to monitor.

## 2019-04-15 NOTE — PROGRESS NOTES
Shift assessment completed, see flowsheets. AM meds given per orders. Patient denies pain or further needs at this time. Patient currently awake in bed, call light and personal belongings within reach.

## 2019-04-15 NOTE — PROGRESS NOTES
Type and Reason for Visit: Consult for \"[employer] Nutrition\" Program     This RD was contacted by Constantino Reynolds (case management) this am regarding the \"[employer] Nutrition\" Program for this patient. Patient is financially insecure + she does not leave home to go to the grocery store. This RD provided a \"[employer] Nutrition\" box to this patient prior to her d/c today. Box contents:   - peanut butter  - whole wheat pasta and rotini vegetable pasta + pasta sauce  - tuna fish  - canned fruit  - canned vegetables  - canned beans  - bottles of water  - ziploc bag filled with: water flavoring packets, mayonnaise, and Mrs. Dash packets    Also, included in the box, was diabetic nutrition education for patient to take home.      Thank you,   Margo Parish RD, LD

## 2019-05-11 ENCOUNTER — HOSPITAL ENCOUNTER (INPATIENT)
Age: 59
LOS: 1 days | Discharge: HOME | DRG: 639 | End: 2019-05-12
Attending: EMERGENCY MEDICINE | Admitting: HOSPITALIST
Payer: MEDICARE

## 2019-05-11 ENCOUNTER — APPOINTMENT (OUTPATIENT)
Dept: GENERAL RADIOLOGY | Age: 59
DRG: 639 | End: 2019-05-11
Payer: MEDICARE

## 2019-05-11 ENCOUNTER — APPOINTMENT (OUTPATIENT)
Dept: CT IMAGING | Age: 59
DRG: 639 | End: 2019-05-11
Payer: MEDICARE

## 2019-05-11 DIAGNOSIS — R73.9 HYPERGLYCEMIA: Primary | ICD-10-CM

## 2019-05-11 DIAGNOSIS — R11.2 NON-INTRACTABLE VOMITING WITH NAUSEA, UNSPECIFIED VOMITING TYPE: ICD-10-CM

## 2019-05-11 DIAGNOSIS — Z78.9 SELF-CARE DEFICIT IN PATIENT LIVING ALONE: ICD-10-CM

## 2019-05-11 DIAGNOSIS — E88.81 INSULIN RESISTANCE: ICD-10-CM

## 2019-05-11 DIAGNOSIS — E86.0 DEHYDRATION: ICD-10-CM

## 2019-05-11 PROBLEM — R55 SYNCOPE AND COLLAPSE: Status: RESOLVED | Noted: 2019-04-08 | Resolved: 2019-05-11

## 2019-05-11 PROBLEM — E11.10 DKA, TYPE 2, NOT AT GOAL (HCC): Status: RESOLVED | Noted: 2019-04-08 | Resolved: 2019-05-11

## 2019-05-11 PROBLEM — R53.1 GENERALIZED WEAKNESS: Status: RESOLVED | Noted: 2018-12-30 | Resolved: 2019-05-11

## 2019-05-11 PROBLEM — R07.9 CHEST PAIN: Status: RESOLVED | Noted: 2018-12-09 | Resolved: 2019-05-11

## 2019-05-11 LAB
A/G RATIO: 1.4 (ref 1.1–2.2)
ALBUMIN SERPL-MCNC: 4.1 G/DL (ref 3.4–5)
ALP BLD-CCNC: 147 U/L (ref 40–129)
ALT SERPL-CCNC: 9 U/L (ref 10–40)
ANION GAP SERPL CALCULATED.3IONS-SCNC: 12 MMOL/L (ref 3–16)
AST SERPL-CCNC: 9 U/L (ref 15–37)
BASE EXCESS VENOUS: -1.4 MMOL/L (ref -3–3)
BASOPHILS ABSOLUTE: 0.1 K/UL (ref 0–0.2)
BASOPHILS RELATIVE PERCENT: 0.9 %
BETA-HYDROXYBUTYRATE: 0.3 MMOL/L (ref 0–0.27)
BILIRUB SERPL-MCNC: 0.3 MG/DL (ref 0–1)
BILIRUBIN URINE: NEGATIVE
BLOOD, URINE: NEGATIVE
BUN BLDV-MCNC: 18 MG/DL (ref 7–20)
CALCIUM SERPL-MCNC: 9.4 MG/DL (ref 8.3–10.6)
CARBOXYHEMOGLOBIN: 2.3 % (ref 0–1.5)
CHLORIDE BLD-SCNC: 92 MMOL/L (ref 99–110)
CHP ED QC CHECK: NORMAL
CLARITY: CLEAR
CO2: 22 MMOL/L (ref 21–32)
COLOR: YELLOW
CREAT SERPL-MCNC: <0.5 MG/DL (ref 0.6–1.1)
EKG ATRIAL RATE: 89 BPM
EKG DIAGNOSIS: NORMAL
EKG P AXIS: 30 DEGREES
EKG P-R INTERVAL: 144 MS
EKG Q-T INTERVAL: 386 MS
EKG QRS DURATION: 96 MS
EKG QTC CALCULATION (BAZETT): 469 MS
EKG R AXIS: -4 DEGREES
EKG T AXIS: 79 DEGREES
EKG VENTRICULAR RATE: 89 BPM
EOSINOPHILS ABSOLUTE: 0.2 K/UL (ref 0–0.6)
EOSINOPHILS RELATIVE PERCENT: 3.3 %
GFR AFRICAN AMERICAN: >60
GFR NON-AFRICAN AMERICAN: >60
GLOBULIN: 3 G/DL
GLUCOSE BLD-MCNC: 228 MG/DL (ref 70–99)
GLUCOSE BLD-MCNC: 243 MG/DL (ref 70–99)
GLUCOSE BLD-MCNC: 319 MG/DL (ref 70–99)
GLUCOSE BLD-MCNC: 342 MG/DL (ref 70–99)
GLUCOSE BLD-MCNC: 522 MG/DL
GLUCOSE BLD-MCNC: 522 MG/DL (ref 70–99)
GLUCOSE BLD-MCNC: 564 MG/DL (ref 70–99)
GLUCOSE BLD-MCNC: 649 MG/DL (ref 70–99)
GLUCOSE URINE: >=1000 MG/DL
HCO3 VENOUS: 24.1 MMOL/L (ref 23–29)
HCT VFR BLD CALC: 41.1 % (ref 36–48)
HEMOGLOBIN: 14.2 G/DL (ref 12–16)
KETONES, URINE: NEGATIVE MG/DL
LEUKOCYTE ESTERASE, URINE: NEGATIVE
LIPASE: 68 U/L (ref 13–60)
LYMPHOCYTES ABSOLUTE: 2.5 K/UL (ref 1–5.1)
LYMPHOCYTES RELATIVE PERCENT: 40.1 %
MCH RBC QN AUTO: 32.3 PG (ref 26–34)
MCHC RBC AUTO-ENTMCNC: 34.5 G/DL (ref 31–36)
MCV RBC AUTO: 93.7 FL (ref 80–100)
METHEMOGLOBIN VENOUS: 0.2 %
MICROSCOPIC EXAMINATION: ABNORMAL
MONOCYTES ABSOLUTE: 0.4 K/UL (ref 0–1.3)
MONOCYTES RELATIVE PERCENT: 7 %
NEUTROPHILS ABSOLUTE: 3 K/UL (ref 1.7–7.7)
NEUTROPHILS RELATIVE PERCENT: 48.7 %
NITRITE, URINE: NEGATIVE
O2 CONTENT, VEN: 19 VOL %
O2 SAT, VEN: 90 %
O2 THERAPY: ABNORMAL
PCO2, VEN: 43.4 MMHG (ref 40–50)
PDW BLD-RTO: 13.3 % (ref 12.4–15.4)
PERFORMED ON: ABNORMAL
PH UA: 6.5 (ref 5–8)
PH VENOUS: 7.36 (ref 7.35–7.45)
PLATELET # BLD: 235 K/UL (ref 135–450)
PMV BLD AUTO: 8.9 FL (ref 5–10.5)
PO2, VEN: 59.8 MMHG (ref 25–40)
POTASSIUM REFLEX MAGNESIUM: 3.7 MMOL/L (ref 3.5–5.1)
PROTEIN UA: NEGATIVE MG/DL
RBC # BLD: 4.39 M/UL (ref 4–5.2)
SODIUM BLD-SCNC: 126 MMOL/L (ref 136–145)
SPECIFIC GRAVITY UA: <=1.005 (ref 1–1.03)
TCO2 CALC VENOUS: 26 MMOL/L
TOTAL PROTEIN: 7.1 G/DL (ref 6.4–8.2)
TROPONIN: <0.01 NG/ML
URINE REFLEX TO CULTURE: ABNORMAL
URINE TYPE: ABNORMAL
UROBILINOGEN, URINE: 0.2 E.U./DL
WBC # BLD: 6.2 K/UL (ref 4–11)

## 2019-05-11 PROCEDURE — 1200000000 HC SEMI PRIVATE

## 2019-05-11 PROCEDURE — 82803 BLOOD GASES ANY COMBINATION: CPT

## 2019-05-11 PROCEDURE — 83930 ASSAY OF BLOOD OSMOLALITY: CPT

## 2019-05-11 PROCEDURE — 6360000004 HC RX CONTRAST MEDICATION: Performed by: EMERGENCY MEDICINE

## 2019-05-11 PROCEDURE — 96372 THER/PROPH/DIAG INJ SC/IM: CPT

## 2019-05-11 PROCEDURE — 84484 ASSAY OF TROPONIN QUANT: CPT

## 2019-05-11 PROCEDURE — 83690 ASSAY OF LIPASE: CPT

## 2019-05-11 PROCEDURE — 6370000000 HC RX 637 (ALT 250 FOR IP): Performed by: EMERGENCY MEDICINE

## 2019-05-11 PROCEDURE — 6360000002 HC RX W HCPCS: Performed by: HOSPITALIST

## 2019-05-11 PROCEDURE — 99223 1ST HOSP IP/OBS HIGH 75: CPT | Performed by: INTERNAL MEDICINE

## 2019-05-11 PROCEDURE — 96361 HYDRATE IV INFUSION ADD-ON: CPT

## 2019-05-11 PROCEDURE — 96360 HYDRATION IV INFUSION INIT: CPT

## 2019-05-11 PROCEDURE — 2580000003 HC RX 258: Performed by: EMERGENCY MEDICINE

## 2019-05-11 PROCEDURE — 85025 COMPLETE CBC W/AUTO DIFF WBC: CPT

## 2019-05-11 PROCEDURE — 6370000000 HC RX 637 (ALT 250 FOR IP): Performed by: HOSPITALIST

## 2019-05-11 PROCEDURE — 83036 HEMOGLOBIN GLYCOSYLATED A1C: CPT

## 2019-05-11 PROCEDURE — 81003 URINALYSIS AUTO W/O SCOPE: CPT

## 2019-05-11 PROCEDURE — 80053 COMPREHEN METABOLIC PANEL: CPT

## 2019-05-11 PROCEDURE — 99285 EMERGENCY DEPT VISIT HI MDM: CPT

## 2019-05-11 PROCEDURE — 71046 X-RAY EXAM CHEST 2 VIEWS: CPT

## 2019-05-11 PROCEDURE — 93005 ELECTROCARDIOGRAM TRACING: CPT | Performed by: EMERGENCY MEDICINE

## 2019-05-11 PROCEDURE — 93010 ELECTROCARDIOGRAM REPORT: CPT | Performed by: INTERNAL MEDICINE

## 2019-05-11 PROCEDURE — 2580000003 HC RX 258: Performed by: HOSPITALIST

## 2019-05-11 PROCEDURE — 74177 CT ABD & PELVIS W/CONTRAST: CPT

## 2019-05-11 PROCEDURE — 82010 KETONE BODYS QUAN: CPT

## 2019-05-11 PROCEDURE — 36415 COLL VENOUS BLD VENIPUNCTURE: CPT

## 2019-05-11 RX ORDER — SODIUM CHLORIDE 9 MG/ML
INJECTION, SOLUTION INTRAVENOUS CONTINUOUS
Status: DISCONTINUED | OUTPATIENT
Start: 2019-05-11 | End: 2019-05-12

## 2019-05-11 RX ORDER — DEXTROSE MONOHYDRATE 25 G/50ML
12.5 INJECTION, SOLUTION INTRAVENOUS PRN
Status: DISCONTINUED | OUTPATIENT
Start: 2019-05-11 | End: 2019-05-12 | Stop reason: HOSPADM

## 2019-05-11 RX ORDER — CLOPIDOGREL BISULFATE 75 MG/1
75 TABLET ORAL DAILY
Status: DISCONTINUED | OUTPATIENT
Start: 2019-05-11 | End: 2019-05-12 | Stop reason: HOSPADM

## 2019-05-11 RX ORDER — RANOLAZINE 500 MG/1
500 TABLET, EXTENDED RELEASE ORAL 2 TIMES DAILY
Status: DISCONTINUED | OUTPATIENT
Start: 2019-05-11 | End: 2019-05-12 | Stop reason: HOSPADM

## 2019-05-11 RX ORDER — DULOXETIN HYDROCHLORIDE 20 MG/1
20 CAPSULE, DELAYED RELEASE ORAL 2 TIMES DAILY
Status: DISCONTINUED | OUTPATIENT
Start: 2019-05-11 | End: 2019-05-12 | Stop reason: HOSPADM

## 2019-05-11 RX ORDER — 0.9 % SODIUM CHLORIDE 0.9 %
1000 INTRAVENOUS SOLUTION INTRAVENOUS ONCE
Status: COMPLETED | OUTPATIENT
Start: 2019-05-11 | End: 2019-05-11

## 2019-05-11 RX ORDER — ONDANSETRON 2 MG/ML
4 INJECTION INTRAMUSCULAR; INTRAVENOUS EVERY 6 HOURS PRN
Status: DISCONTINUED | OUTPATIENT
Start: 2019-05-11 | End: 2019-05-12 | Stop reason: HOSPADM

## 2019-05-11 RX ORDER — NICOTINE POLACRILEX 4 MG
15 LOZENGE BUCCAL PRN
Status: DISCONTINUED | OUTPATIENT
Start: 2019-05-11 | End: 2019-05-12 | Stop reason: HOSPADM

## 2019-05-11 RX ORDER — SODIUM CHLORIDE 0.9 % (FLUSH) 0.9 %
10 SYRINGE (ML) INJECTION PRN
Status: DISCONTINUED | OUTPATIENT
Start: 2019-05-11 | End: 2019-05-12 | Stop reason: HOSPADM

## 2019-05-11 RX ORDER — INSULIN GLARGINE 100 [IU]/ML
35 INJECTION, SOLUTION SUBCUTANEOUS 2 TIMES DAILY
Status: DISCONTINUED | OUTPATIENT
Start: 2019-05-11 | End: 2019-05-12 | Stop reason: HOSPADM

## 2019-05-11 RX ORDER — ASPIRIN 81 MG/1
81 TABLET ORAL DAILY
Status: DISCONTINUED | OUTPATIENT
Start: 2019-05-11 | End: 2019-05-12 | Stop reason: HOSPADM

## 2019-05-11 RX ORDER — SODIUM CHLORIDE 0.9 % (FLUSH) 0.9 %
10 SYRINGE (ML) INJECTION EVERY 12 HOURS SCHEDULED
Status: DISCONTINUED | OUTPATIENT
Start: 2019-05-11 | End: 2019-05-12 | Stop reason: HOSPADM

## 2019-05-11 RX ORDER — ONDANSETRON 2 MG/ML
4 INJECTION INTRAMUSCULAR; INTRAVENOUS EVERY 6 HOURS PRN
Status: DISCONTINUED | OUTPATIENT
Start: 2019-05-11 | End: 2019-05-11 | Stop reason: SDUPTHER

## 2019-05-11 RX ORDER — TOPIRAMATE 100 MG/1
200 TABLET, FILM COATED ORAL DAILY
Status: DISCONTINUED | OUTPATIENT
Start: 2019-05-11 | End: 2019-05-12 | Stop reason: HOSPADM

## 2019-05-11 RX ORDER — LISINOPRIL 5 MG/1
2.5 TABLET ORAL DAILY
Status: DISCONTINUED | OUTPATIENT
Start: 2019-05-11 | End: 2019-05-12 | Stop reason: HOSPADM

## 2019-05-11 RX ORDER — ACETAMINOPHEN 325 MG/1
650 TABLET ORAL EVERY 4 HOURS PRN
Status: DISCONTINUED | OUTPATIENT
Start: 2019-05-11 | End: 2019-05-12 | Stop reason: HOSPADM

## 2019-05-11 RX ORDER — DEXTROSE MONOHYDRATE 50 MG/ML
100 INJECTION, SOLUTION INTRAVENOUS PRN
Status: DISCONTINUED | OUTPATIENT
Start: 2019-05-11 | End: 2019-05-12 | Stop reason: HOSPADM

## 2019-05-11 RX ORDER — FAMOTIDINE 20 MG/1
20 TABLET, FILM COATED ORAL 2 TIMES DAILY
Status: DISCONTINUED | OUTPATIENT
Start: 2019-05-11 | End: 2019-05-12 | Stop reason: HOSPADM

## 2019-05-11 RX ORDER — FLUOXETINE 10 MG/1
10 CAPSULE ORAL DAILY
Status: DISCONTINUED | OUTPATIENT
Start: 2019-05-11 | End: 2019-05-12 | Stop reason: HOSPADM

## 2019-05-11 RX ADMIN — Medication 10 ML: at 20:53

## 2019-05-11 RX ADMIN — INSULIN LISPRO 2 UNITS: 100 INJECTION, SOLUTION INTRAVENOUS; SUBCUTANEOUS at 20:54

## 2019-05-11 RX ADMIN — IOPAMIDOL 75 ML: 755 INJECTION, SOLUTION INTRAVENOUS at 04:07

## 2019-05-11 RX ADMIN — INSULIN GLARGINE 35 UNITS: 100 INJECTION, SOLUTION SUBCUTANEOUS at 11:03

## 2019-05-11 RX ADMIN — FAMOTIDINE 20 MG: 20 TABLET, FILM COATED ORAL at 20:53

## 2019-05-11 RX ADMIN — SODIUM CHLORIDE 1000 ML: 9 INJECTION, SOLUTION INTRAVENOUS at 04:23

## 2019-05-11 RX ADMIN — INSULIN HUMAN 10 UNITS: 100 INJECTION, SOLUTION PARENTERAL at 04:19

## 2019-05-11 RX ADMIN — SODIUM CHLORIDE: 9 INJECTION, SOLUTION INTRAVENOUS at 17:08

## 2019-05-11 RX ADMIN — FAMOTIDINE 20 MG: 20 TABLET, FILM COATED ORAL at 10:37

## 2019-05-11 RX ADMIN — SODIUM CHLORIDE: 9 INJECTION, SOLUTION INTRAVENOUS at 10:37

## 2019-05-11 RX ADMIN — CLOPIDOGREL BISULFATE 75 MG: 75 TABLET ORAL at 10:37

## 2019-05-11 RX ADMIN — INSULIN LISPRO 8 UNITS: 100 INJECTION, SOLUTION INTRAVENOUS; SUBCUTANEOUS at 12:05

## 2019-05-11 RX ADMIN — ASPIRIN 81 MG: 81 TABLET, COATED ORAL at 10:34

## 2019-05-11 RX ADMIN — INSULIN GLARGINE 35 UNITS: 100 INJECTION, SOLUTION SUBCUTANEOUS at 20:53

## 2019-05-11 RX ADMIN — LISINOPRIL 2.5 MG: 5 TABLET ORAL at 10:37

## 2019-05-11 RX ADMIN — TOPIRAMATE 200 MG: 100 TABLET, FILM COATED ORAL at 10:34

## 2019-05-11 RX ADMIN — ENOXAPARIN SODIUM 40 MG: 40 INJECTION SUBCUTANEOUS at 10:34

## 2019-05-11 RX ADMIN — Medication 10 ML: at 10:34

## 2019-05-11 RX ADMIN — INSULIN LISPRO 4 UNITS: 100 INJECTION, SOLUTION INTRAVENOUS; SUBCUTANEOUS at 17:02

## 2019-05-11 RX ADMIN — DULOXETINE 20 MG: 20 CAPSULE, DELAYED RELEASE ORAL at 20:53

## 2019-05-11 RX ADMIN — RANOLAZINE 500 MG: 500 TABLET, FILM COATED, EXTENDED RELEASE ORAL at 20:52

## 2019-05-11 RX ADMIN — DULOXETINE 20 MG: 20 CAPSULE, DELAYED RELEASE ORAL at 10:37

## 2019-05-11 RX ADMIN — SODIUM CHLORIDE 1000 ML: 9 INJECTION, SOLUTION INTRAVENOUS at 03:24

## 2019-05-11 RX ADMIN — RANOLAZINE 500 MG: 500 TABLET, FILM COATED, EXTENDED RELEASE ORAL at 10:37

## 2019-05-11 ASSESSMENT — PAIN DESCRIPTION - LOCATION: LOCATION: HEAD

## 2019-05-11 ASSESSMENT — PAIN SCALES - GENERAL
PAINLEVEL_OUTOF10: 0
PAINLEVEL_OUTOF10: 10
PAINLEVEL_OUTOF10: 0
PAINLEVEL_OUTOF10: 0

## 2019-05-11 ASSESSMENT — PAIN DESCRIPTION - PAIN TYPE: TYPE: CHRONIC PAIN

## 2019-05-11 NOTE — PROGRESS NOTES
Admitted to [place] [address] at this time. Report received from [facility]. Patient is alert and denies pain. Requesting food and drink and wanting to watch TV. Smiling and pleasant. 4 Eyes Skin Assessment     The patient is being assess for   Admission    I agree that 2 RN's have performed a thorough Head to Toe Skin Assessment on the patient. ALL assessment sites listed below have been assessed. Areas assessed by both nurses:   [x]   Head, Face, and Ears   [x]   Shoulders, Back, and Chest, Abdomen  [x]   Arms, Elbows, and Hands:  Round, red bump left axilla   [x]   Coccyx, Sacrum, and Ischium  [x]   Legs, Feet, and Heels:  Small open area bottom right foot (s/p splinter removal)            **4 eyes completed by Brandyn Hernandez RN and JP Gustafson RN    Co-signer eSignature: {Esignature:[id number]}    Does the Patient have Skin Breakdown?   No          Ignacio Prevention initiated:  No   Wound Care Orders initiated:  No      [facility] nurse consulted for Pressure Injury (Stage 3,4, Unstageable, DTI, NWPT, Complex wounds)and New or Established Ostomies:  No      Primary Nurse eSignature: Electronically signed by Jorge Ji RN on 5/11/19 at 7:56 AM

## 2019-05-11 NOTE — H&P
Hospital Medicine History & Physical      PCP: Rey Castellanos    Date of Admission: 5/11/2019    Date of Service: Pt seen/examined on 5/11/2019    Chief Complaint:    Chief Complaint   Patient presents with    Nausea     patient called EMS because she had been nauseated since 10 pm, had vomitted 4 or 5 times, patient has been unable to test her glucose at home due to no testing strips but still taking her insulin and pills, report per patient. EMS stated her glucose in the squad read \" high\"         History Of Present Illness: The patient is a 63 y.o. female with CAD, prior stroke, DM2, HLD, HTN, MR who presented to [facility] ED with complaint of N/V. Patient states that over the past few days she started feeling nauseous and developed a headache. She states that she started vomiting and so she came to the ER. She reports taking her medications at home as prescribed and was able to tell me the regimen that she is on, but has not been checking her blood sugar at home. She reports being independent in taking this medication, stating she does not need her sisters help. She states that she has been more active and has been watching what she eats and states she does not understand how her blood sugars continue to run so high. She denies any active abdominal pain, nausea, CP, SOB or headache. Past Medical History:        Diagnosis Date    CAD (coronary artery disease)     [facility]     Diabetes mellitus [facility]     Hyperlipidemia     Hypertension     Mental retardation     MI (myocardial infarction) ([facility]        Past Surgical History:        Procedure Laterality Date    BACK SURGERY      PACEMAKER INSERTION      TUMOR REMOVAL         Medications Prior to Admission:    Prior to Admission medications    Medication Sig Start Date End Date Taking?  Authorizing Provider   ranolazine (RANEXA) 500 MG extended release tablet Take 1 tablet by mouth 2 times daily 4/9/19 Yes Cheryn Severance, MD   gabapentin (NEURONTIN) 600 MG tablet Take 600 mg by mouth nightly. .   Yes Historical Provider, MD   clopidogrel (PLAVIX) 75 MG tablet Take 75 mg by mouth daily   Yes Historical Provider, MD   famotidine (PEPCID) 20 MG tablet Take 1 tablet by mouth 2 times daily 6/26/18  Yes Almita Esqueda, APRN - CNP   aspirin 81 MG EC tablet Take 1 tablet by mouth daily 5/10/18  Yes Estela Lesches, MD   topiramate (TOPAMAX) 200 MG tablet Take 1 tablet by mouth daily 4/14/18  Yes Flo Ahumada, MD   DULoxetine (CYMBALTA) 20 MG extended release capsule Take 1 capsule by mouth 2 times daily 4/14/18  Yes Flo Ahumada, MD   FLUoxetine (PROZAC) 10 MG capsule Take 1 capsule by mouth daily 4/14/18  Yes Flo Ahumada, MD   metFORMIN (GLUCOPHAGE) 1000 MG tablet Take 1 tablet by mouth 2 times daily (with meals) 4/14/18  Yes Flo Ahumada, MD   atorvastatin (LIPITOR) 40 MG tablet Take 1 tablet by mouth nightly 4/14/18  Yes Flo Ahumada, MD   fenofibrate micronized (LOFIBRA) 200 MG capsule Take 1 capsule by mouth nightly 4/14/18  Yes Flo Ahumada, MD   lisinopril (PRINIVIL;ZESTRIL) 2.5 MG tablet Take 1 tablet by mouth daily 4/14/18  Yes Flo Ahumada, MD   carvedilol (COREG) 6.25 MG tablet Take 1 tablet by mouth 2 times daily (with meals) 4/14/18  Yes Flo Ahumada, MD   insulin glargine (LANTUS SOLOSTAR) 100 UNIT/ML injection pen Inject 35 Units into the skin 2 times daily  Patient taking differently: Inject 35 Units into the skin 2 times daily Has been non-compliant since July 2018 4/14/18  Yes Flo Ahumada, MD   insulin aspart (NOVOLOG FLEXPEN) 100 UNIT/ML injection pen Inject 20 Units into the skin 3 times daily (before meals) 4/14/18  Yes Flo Ahumada, MD   Lancets MISC 1 month supply for TID fingersticks 4/14/18   Flo Ahumada, MD   Glucose Blood (BLOOD GLUCOSE TEST STRIPS) STRP 1 month supply for TID glucose checks 4/14/18   Flo Ahumada, MD with the following edits. HPI:     I reviewed the patient's Past Medical History, Past Surgical History, Medications, and Allergies. 63 y.o. female with CAD, prior stroke, DM2, HLD, HTN, MR who presented to [facility] ED with complaint of N/V. Patient states that over the past few days she started feeling nauseous and developed a headache. She states that she started vomiting and so she came to the ER. Workup showed severe hyperglycemia and no DKA. admitted for treatment. Pt reports compliance to meds          General:  Middle aged female Awake, alert and oriented. Appears to be not in any distress  Mucous Membranes:  Pink , anicteric  Neck: No JVD, no carotid bruit, no thyromegaly  Chest:  Clear to auscultation bilaterally, no added sounds  Cardiovascular:  RRR S1S2 heard, no murmurs or gallops  Abdomen:  Soft, undistended, non tender, no organomegaly, BS present  Extremities: No edema or cyanosis. Distal pulses well felt  Neurological : grossly normal              CBC:   Recent Labs     05/11/19  0253   WBC 6.2   HGB 14.2   HCT 41.1   MCV 93.7        BMP:   Recent Labs     05/11/19 0253   *   K 3.7   CL 92*   CO2 22   BUN 18   CREATININE <0.5*     LIVER PROFILE:   Recent Labs     05/11/19 0253   AST 9*   ALT 9*   LIPASE 68.0*   BILITOT 0.3   ALKPHOS 147*     UA:  Recent Labs     05/11/19  0519   COLORU Yellow   PHUR 6.5   CLARITYU Clear   SPECGRAV <=1.005   LEUKOCYTESUR Negative   UROBILINOGEN 0.2   BILIRUBINUR Negative   BLOODU Negative   GLUCOSEU >=1000*          CARDIAC ENZYMES  Recent Labs     05/11/19  0253   TROPONINI <0.01     Beta-Hydroxybutyrate 0. 30High        CULTURES  None    EKG:  I have reviewed the EKG with the following interpretation:   NSR, normal axis, normal interval, Q waves in lead III, aVF, V1-V3, no other acute ST segment changes concerning for acute ischemia     EKG is unchanged from prior on 4/14/19    RADIOLOGY  CT ABDOMEN PELVIS W IV CONTRAST Additional Contrast? None Final Result   No acute abdominopelvic findings. Cholelithiasis without evidence of cholecystitis. No bile duct dilatation. Small hiatal hernia. XR CHEST STANDARD (2 VW)   Final Result   No acute findings. Pertinent previous results reviewed   None    ASSESSMENT/PLAN:  Hyperglycemia without DKA  DM2   -  on arrival to the ED   - No AG, betahydroxybutyrate mildly elevated, pH wnl   - Trending down 564-->522-->319   - Continue Lantus BID, SSI, IVF   - Hbg A1c pending   - No source of infection   - Patient has recently been admitted for mild DKA (twice in April) and reports taking her medications as prescribed. She was able to tell me her regimen and explained correctly how to take her insulin. She has been eating healthier and more active as well.     Pseudohyponatremia   - in the setting hyperglycemia   - Correct Na level 135   - Continue IVF for hypernatremia     CAD  - Continue ASA,HTN Control, Ranexa   - Follows with cardiology   - LHC in Dec 2018 showed mild diffuse CAD with no intervention needed    HTN  - Continue home medications   - BP well controlled     HLD  - Continue statin     DVT Prophylaxis: Lovenox   Diet: DIET CARB CONTROL;   Code Status: Prior      Elis Acevedo PA-C  5/11/2019 8:08 AM     Agree with above  Changes made to note    Deepa Zarate MD 5/11/2019 9:38 AM

## 2019-05-11 NOTE — ED PROVIDER NOTES
patient living alone    4. Pseudohyponatremia      This chart was generated in part by using Dragon Dictation system and may contain errors related to that system including errors in grammar, punctuation, and spelling, as well as words and phrases that may be inappropriate. If there are any questions or concerns please feel free to contact the dictating provider for clarification.            Juaquin Zavala,   05/11/19 9845

## 2019-05-11 NOTE — CARE COORDINATION
Case Management Assessment  Initial Evaluation    Date/Time of Evaluation: 5/11/2019 4:09 PM  Assessment Completed by: Sidney Hensley    Patient Name: Margoth Rojas  YOB: 1960  Diagnosis: Hyperglycemia [R73.9]  Date / Time: 5/11/2019  2:43 AM  Admission status/Date:INPT 5/11/19  Chart Reviewed: Yes      Patient Interviewed: Yes   Family Interviewed:  No      Hospitalization in the last 30 days:  No    Contacts  :     Relationship to Patient:   Phone Number:    Alternate Contact:     Relationship to Patient:     Phone Number:    Met with:    Current PCP Luis Fernando Brooks [id number]  Medicare  Precert required for SNF : Raynold Romberg        3 night stay required - Y, N    ADLS  Support Systems: Family Members  Transportation: family    Meal Preparation: self    Housing  Home Environment: home alone  Steps: n/a  Plans to Return to Present Housing: Yes  Other Identified Issues: -    [address]  Currently active with [address] : No  Type of Home Care Services: None  Passport/Waiver : No  :                      Phone Number:    Passport/Waiver Services: -          [address] [place] [address] Provider: n/a  Equipment: Walker_X_Cane_X_RTS__ BSC__Shower Chair__  02__ HHN__ CPAP__  BiPap__  Hospital Bed__ W/C___ Other__________      Has Home O2 in place on admit:  No  Informed of need to bring portable home O2 tank on day of discharge for nursing to connect prior to leaving:   Not Indicated  Verbalized agreement/Understanding:   Not Indicated    Community Service Affiliation  Dialysis:  No    · Name:  · Location  · Dialysis Schedule:  · Phone:   · Fax: Outpatient PT/OT: No    Cancer Center: No     CHF Clinic: No     Pulmonary Rehab: No  Pain Clinic: No  Community Mental Health: No    Wound Clinic: No     Other: -    DISCHARGE PLAN: reviewed chart and met with pt. Pt reports to live at home alone and is IPTA. Plans to return home at discharge. Denies needs. Will follow. Explained Case Management role/services.

## 2019-05-11 NOTE — PROGRESS NOTES
Patient has orders for both Prozac and Cymbalta. Cymbalta most recently filled and dispensed in March at [facility] in [facility], but Prozac also filled ad picked up in February (90 day supply) at [facility].  Cymbalta prescribed by nurse practitioner at [facility]. Prozac prescribed by nurse practitioner with [facility]. Clarification should be done if patient is still here during the week when offices are open.   DERIK RuffPh.5/11/201910:58 AM

## 2019-05-11 NOTE — PROGRESS NOTES
Pt sitting up in bed, denies needs. Admission assessment complete. Alert and oriented. Skin -4 eyes completed with Merlyn , noted scab to rt foot-had splinter removed recently. Pt states she was was walking [place] and fell outside of the bank, called ambulance and ED noted FSBS to be in 500s, pt states she walks , follows a low carb/sugar diet and walks frequently [place], she states she has not missed any meds recently. Oriented to room and unit. Reviewed plan of care. Call light in reach.

## 2019-05-11 NOTE — ED NOTES
Lab called with panic glucose of 649. Darrell Taveras made aware.       Bhargavi Damon RN  05/11/19 7329

## 2019-05-11 NOTE — PROGRESS NOTES
Admitted to [place] [address] at this time. Report received from Xenia Pizano, [place]. Patient is alert and denies pain. Requesting food and drink and wanting to watch TV. Smiling and pleasant.

## 2019-05-11 NOTE — ED PROVIDER NOTES
[address] ED  EMERGENCY DEPARTMENT ENCOUNTER        Pt Name: Tristan Tucker  MRN: [id number]  [place] 1960  Date of evaluation: 5/11/2019  Provider: FRITZ Glass CNP  PCP: Bernardino Wayne    This patient was seen and evaluated by the attending physician Giuliano Reyes, [address]       Chief Complaint   Patient presents with    Nausea     patient called EMS because she had been nauseated since 10 pm, had vomitted 4 or 5 times, patient has been unable to test her glucose at home due to no testing strips but still taking her insulin and pills, report per patient. EMS stated her glucose in the squad read \" high\"       HISTORY OF PRESENT ILLNESS   (Location/Symptom, Timing/Onset, Context/Setting, Quality, Duration, Modifying Factors, Severity)  Note limiting factors. Tristan Tucker is a 63 y.o. female who presents for evaluation of vomiting and increased blood glucose. Patient was brought in by EMS and EMS states the patient's blood sugar was too high for their glucometer to read. Patient states that around 2200 this evening she became nauseated and vomited approximately 4-5 times. She states that she has been taking her insulin and her pills for diabetes as prescribed but she has not been able to test her blood sugar because she has no test strips. Patient is complaining of right-sided chest pain without shortness of breath. She saw her primary medical doctor approximately 2 days ago and had a splinter removed from the bottom of her left foot. She denies redness and pain to her foot at this time patient is complaining of diffuse abdominal pain and states that she needs to have her gallbladder removed. Patient is denying urinary symptoms, fever, and chills. She states that her blood sugar is normally very well controlled. Nursing Notes were all reviewed and agreed with or any disagreements were addressed  in the HPI.     REVIEW OF SYSTEMS RANOLAZINE (RANEXA) 500 MG EXTENDED RELEASE TABLET    Take 1 tablet by mouth 2 times daily    TOPIRAMATE (TOPAMAX) 200 MG TABLET    Take 1 tablet by mouth daily         ALLERGIES     Patient has no known allergies. FAMILYHISTORY     No family history on file.        SOCIAL HISTORY       Social History     Socioeconomic History    Marital status:      Spouse name: Not on file    Number of children: Not on file    Years of education: Not on file    Highest education level: Not on file   Occupational History    Not on file   Social Needs    Financial resource strain: Not on file    Food insecurity:     Worry: Not on file     Inability: Not on file    Transportation needs:     Medical: Not on file     Non-medical: Not on file   Tobacco Use    Smoking status: Never Smoker    Smokeless tobacco: Never Used   Substance and Sexual Activity    Alcohol use: No    Drug use: No    Sexual activity: Not on file   Lifestyle    Physical activity:     Days per week: Not on file     Minutes per session: Not on file    Stress: Not on file   Relationships    Social connections:     Talks on phone: Not on file     Gets together: Not on file     Attends Protestant service: Not on file     Active member of club or organization: Not on file     Attends meetings of clubs or organizations: Not on file     Relationship status: Not on file    Intimate partner violence:     Fear of current or ex partner: Not on file     Emotionally abused: Not on file     Physically abused: Not on file     Forced sexual activity: Not on file   Other Topics Concern    Not on file   Social History Narrative    Not on file       SCREENINGS             PHYSICAL EXAM    (up to 7 for level 4, 8 or more for level 5)     ED Triage Vitals [05/11/19 0249]   BP Temp Temp Source Pulse Resp SpO2 Height Weight   131/83 98.6 °F (37 °C) Oral 96 18 95 % 5' 3\" (1.6 m) 159 lb (72.1 kg)       Physical Exam   Constitutional: She is oriented to person, place, and time. She appears well-developed and well-nourished. HENT:   Head: Normocephalic and atraumatic. Nose: Nose normal.   Eyes: Right eye exhibits no discharge. Left eye exhibits no discharge. Neck: Normal range of motion. Neck supple. Cardiovascular: Normal rate, regular rhythm and normal heart sounds. Pulmonary/Chest: Effort normal and breath sounds normal. No respiratory distress. Abdominal: Soft. Bowel sounds are normal. There is generalized tenderness and tenderness in the right upper quadrant. Complaining of generalized abdominal tenderness. States right upper quadrant \"extremely painful \" with palpation   Musculoskeletal: Normal range of motion. Neurological: She is alert and oriented to person, place, and time. Skin: Skin is warm and dry. She is not diaphoretic. Psychiatric: She has a normal mood and affect. Her behavior is normal.   Nursing note and vitals reviewed.       DIAGNOSTIC RESULTS   LABS:    Labs Reviewed   COMPREHENSIVE METABOLIC PANEL W/ REFLEX TO MG FOR LOW K - Abnormal; Notable for the following components:       Result Value    Sodium 126 (*)     Chloride 92 (*)     Glucose 649 (*)     CREATININE <0.5 (*)     Alkaline Phosphatase 147 (*)     ALT 9 (*)     AST 9 (*)     All other components within normal limits    Narrative:     Anders BARRERA tel. [phone],  Chemistry results called to and read back by Mattie Smiley, 05/11/2019  03:46, by Iban Luna  Performed at:  [facility],  [place], [facility]   Phone [phone]   LIPASE - Abnormal; Notable for the following components:    Lipase 68.0 (*)     All other components within normal limits    Narrative:     Rafael Craven tel. [phone],  Chemistry results called to and read back by Mattie Smiley, 05/11/2019  03:46, by Iban Luna  Performed at:  [facility],  [place], [facility]   Phone [phone] BLOOD GAS, VENOUS - Abnormal; Notable for the following components:    pO2, Alberto 59.8 (*)     Carboxyhemoglobin 2.3 (*)     All other components within normal limits    Narrative:     Performed at:  [facility],  [place], [facility]   Phone [phone]   BETA-HYDROXYBUTYRATE - Abnormal; Notable for the following components:    Beta-Hydroxybutyrate 0.30 (*)     All other components within normal limits    Narrative:     Performed at:  [facility],  [place], [facility]   Phone [phone]   POCT GLUCOSE - Abnormal; Notable for the following components:    POC Glucose 564 (*)     All other components within normal limits    Narrative:     Performed at:  [facility],  [place], [facility]   Phone [phone]   CBC WITH AUTO DIFFERENTIAL    Narrative:     Performed at:  [place] [address],  [place], [facility]   Phone [phone]   TROPONIN    Narrative:     Performed at:  [facility]) [facility],  [place], [facility]   Phone [phone]   URINE RT REFLEX TO CULTURE   OSMOLALITY       All other labs were within normal range or not returned as of this dictation. EKG: All EKG's are interpreted by the Emergency Department Physician who either signs orCo-signs this chart in the absence of a cardiologist.  Please see their note for interpretation of EKG. RADIOLOGY:   Non-plain film images such as CT, Ultrasound and MRI are read by the radiologist. Plain radiographic images are visualized andpreliminarily interpreted by the  ED Provider with the below findings:        Interpretation perthe Radiologist below, if available at the time of this note:    CT ABDOMEN PELVIS W IV CONTRAST Additional Contrast? None   Final Result   No acute abdominopelvic findings. Cholelithiasis without evidence of cholecystitis. No bile duct dilatation. Small hiatal hernia. XR CHEST STANDARD (2 VW)   Final Result   No acute findings. No results found. PROCEDURES   Unless otherwise noted below, none     Procedures    CRITICAL CARE TIME   N/A    CONSULTS:  IP CONSULT TO HOSPITALIST      EMERGENCY DEPARTMENT COURSE and DIFFERENTIALDIAGNOSIS/MDM:   Vitals:    Vitals:    05/11/19 0249 05/11/19 0333   BP: 131/83 116/87   Pulse: 96 83   Resp: 18 15   Temp: 98.6 °F (37 °C)    TempSrc: Oral    SpO2: 95% 96%   Weight: 159 lb (72.1 kg)    Height: 5' 3\" (1.6 m)        Patient was given thefollowing medications:  Medications   0.9 % sodium chloride bolus (1,000 mLs Intravenous New Bag 5/11/19 0324)   0.9 % sodium chloride bolus (has no administration in time range)   insulin regular (HUMULIN R;NOVOLIN R) injection 10 Units (has no administration in time range)   iopamidol (ISOVUE-370) 76 % injection 75 mL (75 mLs Intravenous Given 5/11/19 0403)     Patient is nontoxic and in no apparent distress. Lab work, EKG, and CT of abdomen and pelvis ordered. 1 L of normal saline ordered. Patient denies nausea at this time. Blood glucose is elevated at 649, lipase elevated, and troponin negative. Second liter of normal saline ordered and 10 units of regular insulin ordered for increased glucose. Per lab results patient is not in DKA. Chest x-ray unremarkable. CT of abdomen and pelvis revealed cholelithiasis. Blood glucose rechecked and glucose 522.     0515 - patient updated about lab results and possible admission. Patient told Dr. Lashawn Nolasco that she thinks she should stay. Patient ambulatory to the bathroom without difficulty. Patient admitted twice in the last month for hyperglycemia. Patient states that her sister gets her meds together for her. Patient believes that her blood sugar is well controlled.     36 - Dr. Korin Flor hospitalist informed of patient and agreed to

## 2019-05-12 VITALS
HEART RATE: 80 BPM | RESPIRATION RATE: 16 BRPM | BODY MASS INDEX: 32.99 KG/M2 | DIASTOLIC BLOOD PRESSURE: 65 MMHG | OXYGEN SATURATION: 96 % | TEMPERATURE: 96.8 F | WEIGHT: 186.2 LBS | SYSTOLIC BLOOD PRESSURE: 114 MMHG | HEIGHT: 63 IN

## 2019-05-12 PROBLEM — R07.89 ATYPICAL CHEST PAIN: Status: RESOLVED | Noted: 2018-05-08 | Resolved: 2019-05-12

## 2019-05-12 PROBLEM — E66.9 OBESITY (BMI 30-39.9): Chronic | Status: RESOLVED | Noted: 2018-05-08 | Resolved: 2019-05-12

## 2019-05-12 LAB
ALBUMIN SERPL-MCNC: 3.2 G/DL (ref 3.4–5)
ALP BLD-CCNC: 70 U/L (ref 40–129)
ALT SERPL-CCNC: 7 U/L (ref 10–40)
ANION GAP SERPL CALCULATED.3IONS-SCNC: 6 MMOL/L (ref 3–16)
AST SERPL-CCNC: 8 U/L (ref 15–37)
BASOPHILS ABSOLUTE: 0 K/UL (ref 0–0.2)
BASOPHILS RELATIVE PERCENT: 0.8 %
BILIRUB SERPL-MCNC: <0.2 MG/DL (ref 0–1)
BILIRUBIN DIRECT: <0.2 MG/DL (ref 0–0.3)
BILIRUBIN, INDIRECT: ABNORMAL MG/DL (ref 0–1)
BUN BLDV-MCNC: 11 MG/DL (ref 7–20)
CALCIUM SERPL-MCNC: 8.2 MG/DL (ref 8.3–10.6)
CHLORIDE BLD-SCNC: 111 MMOL/L (ref 99–110)
CO2: 19 MMOL/L (ref 21–32)
CREAT SERPL-MCNC: <0.5 MG/DL (ref 0.6–1.1)
EOSINOPHILS ABSOLUTE: 0.2 K/UL (ref 0–0.6)
EOSINOPHILS RELATIVE PERCENT: 3.8 %
ESTIMATED AVERAGE GLUCOSE: 303.4 MG/DL
GFR AFRICAN AMERICAN: >60
GFR NON-AFRICAN AMERICAN: >60
GLUCOSE BLD-MCNC: 157 MG/DL (ref 70–99)
GLUCOSE BLD-MCNC: 160 MG/DL (ref 70–99)
GLUCOSE BLD-MCNC: 164 MG/DL (ref 70–99)
HBA1C MFR BLD: 12.2 %
HCT VFR BLD CALC: 36.5 % (ref 36–48)
HEMOGLOBIN: 12.8 G/DL (ref 12–16)
LYMPHOCYTES ABSOLUTE: 2.2 K/UL (ref 1–5.1)
LYMPHOCYTES RELATIVE PERCENT: 41.1 %
MAGNESIUM: 1.9 MG/DL (ref 1.8–2.4)
MCH RBC QN AUTO: 32.6 PG (ref 26–34)
MCHC RBC AUTO-ENTMCNC: 35.1 G/DL (ref 31–36)
MCV RBC AUTO: 92.9 FL (ref 80–100)
MONOCYTES ABSOLUTE: 0.4 K/UL (ref 0–1.3)
MONOCYTES RELATIVE PERCENT: 7.7 %
NEUTROPHILS ABSOLUTE: 2.5 K/UL (ref 1.7–7.7)
NEUTROPHILS RELATIVE PERCENT: 46.6 %
OSMOLALITY: 311 MOSM/KG (ref 278–305)
PDW BLD-RTO: 13.4 % (ref 12.4–15.4)
PERFORMED ON: ABNORMAL
PERFORMED ON: ABNORMAL
PLATELET # BLD: 199 K/UL (ref 135–450)
PMV BLD AUTO: 8.3 FL (ref 5–10.5)
POTASSIUM REFLEX MAGNESIUM: 3.4 MMOL/L (ref 3.5–5.1)
RBC # BLD: 3.93 M/UL (ref 4–5.2)
SODIUM BLD-SCNC: 136 MMOL/L (ref 136–145)
TOTAL PROTEIN: 5.6 G/DL (ref 6.4–8.2)
WBC # BLD: 5.3 K/UL (ref 4–11)

## 2019-05-12 PROCEDURE — 6360000002 HC RX W HCPCS: Performed by: HOSPITALIST

## 2019-05-12 PROCEDURE — 80048 BASIC METABOLIC PNL TOTAL CA: CPT

## 2019-05-12 PROCEDURE — 36415 COLL VENOUS BLD VENIPUNCTURE: CPT

## 2019-05-12 PROCEDURE — 85025 COMPLETE CBC W/AUTO DIFF WBC: CPT

## 2019-05-12 PROCEDURE — 83735 ASSAY OF MAGNESIUM: CPT

## 2019-05-12 PROCEDURE — 6370000000 HC RX 637 (ALT 250 FOR IP): Performed by: HOSPITALIST

## 2019-05-12 PROCEDURE — 2580000003 HC RX 258: Performed by: HOSPITALIST

## 2019-05-12 PROCEDURE — 80076 HEPATIC FUNCTION PANEL: CPT

## 2019-05-12 PROCEDURE — 99238 HOSP IP/OBS DSCHRG MGMT 30/<: CPT | Performed by: INTERNAL MEDICINE

## 2019-05-12 RX ORDER — LANCETS
1 EACH MISCELLANEOUS DAILY
Qty: 100 EACH | Refills: 0 | Status: ON HOLD | OUTPATIENT
Start: 2019-05-12 | End: 2020-07-18 | Stop reason: SDUPTHER

## 2019-05-12 RX ORDER — GLUCOSAMINE HCL/CHONDROITIN SU 500-400 MG
CAPSULE ORAL
Qty: 100 STRIP | Refills: 2 | Status: SHIPPED | OUTPATIENT
Start: 2019-05-12

## 2019-05-12 RX ADMIN — LISINOPRIL 2.5 MG: 5 TABLET ORAL at 09:03

## 2019-05-12 RX ADMIN — TOPIRAMATE 200 MG: 100 TABLET, FILM COATED ORAL at 09:03

## 2019-05-12 RX ADMIN — ASPIRIN 81 MG: 81 TABLET, COATED ORAL at 09:03

## 2019-05-12 RX ADMIN — FLUOXETINE 10 MG: 10 CAPSULE ORAL at 09:03

## 2019-05-12 RX ADMIN — ACETAMINOPHEN 650 MG: 325 TABLET ORAL at 03:39

## 2019-05-12 RX ADMIN — FAMOTIDINE 20 MG: 20 TABLET, FILM COATED ORAL at 09:03

## 2019-05-12 RX ADMIN — RANOLAZINE 500 MG: 500 TABLET, FILM COATED, EXTENDED RELEASE ORAL at 09:03

## 2019-05-12 RX ADMIN — ENOXAPARIN SODIUM 40 MG: 40 INJECTION SUBCUTANEOUS at 09:04

## 2019-05-12 RX ADMIN — CLOPIDOGREL BISULFATE 75 MG: 75 TABLET ORAL at 09:03

## 2019-05-12 RX ADMIN — INSULIN GLARGINE 35 UNITS: 100 INJECTION, SOLUTION SUBCUTANEOUS at 09:05

## 2019-05-12 RX ADMIN — DULOXETINE 20 MG: 20 CAPSULE, DELAYED RELEASE ORAL at 09:03

## 2019-05-12 RX ADMIN — INSULIN LISPRO 2 UNITS: 100 INJECTION, SOLUTION INTRAVENOUS; SUBCUTANEOUS at 09:05

## 2019-05-12 RX ADMIN — Medication 10 ML: at 09:04

## 2019-05-12 ASSESSMENT — PAIN DESCRIPTION - PAIN TYPE
TYPE: ACUTE PAIN
TYPE: ACUTE PAIN

## 2019-05-12 ASSESSMENT — PAIN DESCRIPTION - PROGRESSION
CLINICAL_PROGRESSION: NOT CHANGED

## 2019-05-12 ASSESSMENT — PAIN DESCRIPTION - FREQUENCY
FREQUENCY: CONTINUOUS
FREQUENCY: CONTINUOUS

## 2019-05-12 ASSESSMENT — PAIN SCALES - GENERAL
PAINLEVEL_OUTOF10: 8

## 2019-05-12 ASSESSMENT — PAIN DESCRIPTION - ONSET
ONSET: ON-GOING
ONSET: ON-GOING

## 2019-05-12 ASSESSMENT — PAIN DESCRIPTION - DESCRIPTORS
DESCRIPTORS: HEADACHE
DESCRIPTORS: HEADACHE

## 2019-05-12 ASSESSMENT — PAIN DESCRIPTION - LOCATION
LOCATION: HEAD
LOCATION: HEAD

## 2019-05-12 ASSESSMENT — PAIN - FUNCTIONAL ASSESSMENT
PAIN_FUNCTIONAL_ASSESSMENT: PREVENTS OR INTERFERES WITH ALL ACTIVE AND SOME PASSIVE ACTIVITIES
PAIN_FUNCTIONAL_ASSESSMENT: PREVENTS OR INTERFERES WITH ALL ACTIVE AND SOME PASSIVE ACTIVITIES

## 2019-05-12 NOTE — CARE COORDINATION
DISCHARGE ORDER  Date/Time 2019 10:10 AM  Completed by: Catalnia Reyez, Case Management    Patient Name: Tristan Tucker    : 1960  Admitting Diagnosis: Hyperglycemia [R73.9]  Admit Date/Time: 2019  2:43 AM    Noted discharge order. Confirmed discharge plan with patient: Yes   Discharge Plan:  Received discharge order. Spoke with patient and she is agreeable to going home today.   She denies any home health needs

## 2019-05-12 NOTE — PROGRESS NOTES
Reviewed d/c instructions with pt , verbalized understanding and questions answered. Removed IV tolerated well. Reiterated need to check sugar as ordered, follow prescribed diet, and give ordered insulin. Pt verbalized that she will follow orders. Pt has no family chloe to pick her up to d/c, cab voucher given. ETA 24 37 11. Pt aware.

## 2019-05-12 NOTE — PROGRESS NOTES
Assessment complete. Doretha is alert and oriented. Vital signs are per flowsheet. Respirations are easy and even at rest.  Doretha denies pain or discomfort at this time. Doretha is lying in bed with eyes closed and lights off, 2/4 side rails up, bed in lowest position, call light in easy reach, bed alarm on, and she denies any further needs. No other complications are noted, will continue to monitor throughout shift.

## 2019-05-12 NOTE — DISCHARGE SUMMARY
Name:  Rahel Hurt  Room:  0217/0217-02  MRN:    [id number]    IM Discharge Summary    Discharging Physician:  Dalila Borden MD    Admit: 5/11/2019    Discharge:      PCP      Juliet Sears    Diagnoses and hospital course  this Admission      ASSESSMENT/PLAN:  Hyperglycemia without DKA  DM2   -  on arrival to the ED   - No AG, betahydroxybutyrate mildly elevated, pH wnl   - Trending down 564-->522-->319 >160  - Continue Lantus BID, SSI, IVF   - Hbg A1c at 12  - No source of infection - now tolerating diet, no further nausea  - Patient has recently been admitted for mild DKA (twice in April) and reports taking her medications as prescribed. She was able to tell me her regimen and explained correctly how to take her insulin. She has been eating healthier and more active as well. Recommend compliance with diet and monitoring - testing strips refilled     Pseudohyponatremia   - in the setting hyperglycemia   - Correct Na level 135   -      CAD  - Continue ASA,HTN Control, Ranexa   - Follows with cardiology   - [facility] in Dec 2018 showed mild diffuse CAD with no intervention needed     HTN  - Continue home medications   - BP well controlled      HLD  - Continue statin            HPI   63 y.o. female with CAD, prior stroke, DM2, HLD, HTN, MR who presented to [facility] ED with complaint of N/V. Patient states that over the past few days she started feeling nauseous and developed a headache. She states that she started vomiting and so she came to the ER. She reports taking her medications at home as prescribed and was able to tell me the regimen that she is on, but has not been checking her blood sugar at home. She reports being independent in taking this medication, stating she does not need her sisters help. She states that she has been more active and has been watching what she eats and states she does not understand how her blood sugars continue to run so high.  She denies any active abdominal pain, nausea, CP, SOB or headache.          Physical Exam at Discharge:      General:  Awake, alert and oriented. Appears to be not in any distress  Mucous Membranes:  Pink , anicteric  Neck: No JVD, no carotid bruit, no thyromegaly  Chest:  Clear to auscultation bilaterally, no added sounds  Cardiovascular:  RRR S1S2 heard, no murmurs or gallops  Abdomen:  Soft, undistended, non tender, no organomegaly, BS present  Extremities: No edema or cyanosis. Distal pulses well felt  Neurological : grossly normal            Radiology (Please Review Full Report for Details)       UA:      Recent Labs     05/11/19 0519   COLORU Yellow   PHUR 6.5   CLARITYU Clear   SPECGRAV <=1.005   LEUKOCYTESUR Negative   UROBILINOGEN 0.2   BILIRUBINUR Negative   BLOODU Negative   GLUCOSEU >=1000*            CARDIAC ENZYMES      Recent Labs     05/11/19 0253   TROPONINI <0.01      Beta-Hydroxybutyrate 0. 30High          CULTURES  None     EKG:  I have reviewed the EKG with the following interpretation:   NSR, normal axis, normal interval, Q waves in lead III, aVF, V1-V3, no other acute ST segment changes concerning for acute ischemia      EKG is unchanged from prior on 4/14/19     RADIOLOGY  CT ABDOMEN PELVIS W IV CONTRAST Additional Contrast? None   Final Result   No acute abdominopelvic findings.       Cholelithiasis without evidence of cholecystitis.   No bile duct dilatation.       Small hiatal hernia.           XR CHEST STANDARD (2 VW)   Final Result   No acute findings.                      Recent Labs     05/11/19 0253 05/12/19  0545   WBC 6.2 5.3   HGB 14.2 12.8   HCT 41.1 36.5   MCV 93.7 92.9    199       Recent Labs     05/11/19 0253 05/12/19  0545   * 136   K 3.7 3.4*   CL 92* 111*   CO2 22 19*   BUN 18 11   CREATININE <0.5* <0.5*       CBC:  Lab Results   Component Value Date    WBC 5.3 05/12/2019    HGB 12.8 05/12/2019    HCT 36.5 05/12/2019    MCV 92.9 05/12/2019     05/12/2019    NEUTOPHILPCT 46.6 05/12/2019    LYMPHOPCT 41.1 fenofibrate micronized 200 MG capsule  Commonly known as:  LOFIBRA  Take 1 capsule by mouth nightly     FLUoxetine 10 MG capsule  Commonly known as:  PROZAC  Take 1 capsule by mouth daily     gabapentin 600 MG tablet  Commonly known as:  NEURONTIN     insulin aspart 100 UNIT/ML injection pen  Commonly known as:  NOVOLOG FLEXPEN  Inject 20 Units into the skin 3 times daily (before meals)     lisinopril 2.5 MG tablet  Commonly known as:  PRINIVIL;ZESTRIL  Take 1 tablet by mouth daily     metFORMIN 1000 MG tablet  Commonly known as:  GLUCOPHAGE  Take 1 tablet by mouth 2 times daily (with meals)     ranolazine 500 MG extended release tablet  Commonly known as:  RANEXA  Take 1 tablet by mouth 2 times daily     topiramate 200 MG tablet  Commonly known as:  TOPAMAX  Take 1 tablet by mouth daily           Where to Get Your Medications      You can get these medications from any pharmacy    Bring a paper prescription for each of these medications  · blood glucose test strips  · [facility] Lancets Ultra Thin Misc           Discharge Condition/Location: stable/home     Follow Up:    PCP in 1 weeks      Time Spent on discharge is more than 28 minutes in the examination, evaluation, counseling and review of medications and discharge plan.       Ashley Marley MD 5/12/2019 9:55 AM

## 2019-05-20 ENCOUNTER — HOSPITAL ENCOUNTER (EMERGENCY)
Age: 59
Discharge: HOME | End: 2019-05-20
Attending: EMERGENCY MEDICINE
Payer: MEDICARE

## 2019-05-20 VITALS
DIASTOLIC BLOOD PRESSURE: 75 MMHG | BODY MASS INDEX: 33.49 KG/M2 | RESPIRATION RATE: 18 BRPM | HEART RATE: 85 BPM | WEIGHT: 189 LBS | HEIGHT: 63 IN | TEMPERATURE: 97.4 F | SYSTOLIC BLOOD PRESSURE: 159 MMHG | OXYGEN SATURATION: 98 %

## 2019-05-20 DIAGNOSIS — R11.2 NON-INTRACTABLE VOMITING WITH NAUSEA, UNSPECIFIED VOMITING TYPE: Primary | ICD-10-CM

## 2019-05-20 LAB
A/G RATIO: 1.5 (ref 1.1–2.2)
ALBUMIN SERPL-MCNC: 4.3 G/DL (ref 3.4–5)
ALP BLD-CCNC: 99 U/L (ref 40–129)
ALT SERPL-CCNC: 17 U/L (ref 10–40)
ANION GAP SERPL CALCULATED.3IONS-SCNC: 14 MMOL/L (ref 3–16)
AST SERPL-CCNC: 13 U/L (ref 15–37)
BASOPHILS ABSOLUTE: 0 K/UL (ref 0–0.2)
BASOPHILS RELATIVE PERCENT: 0.7 %
BILIRUB SERPL-MCNC: <0.2 MG/DL (ref 0–1)
BILIRUBIN URINE: NEGATIVE
BLOOD, URINE: NEGATIVE
BUN BLDV-MCNC: 24 MG/DL (ref 7–20)
CALCIUM SERPL-MCNC: 9.7 MG/DL (ref 8.3–10.6)
CHLORIDE BLD-SCNC: 104 MMOL/L (ref 99–110)
CHP ED QC CHECK: YES
CLARITY: CLEAR
CO2: 23 MMOL/L (ref 21–32)
COLOR: YELLOW
CREAT SERPL-MCNC: <0.5 MG/DL (ref 0.6–1.1)
EOSINOPHILS ABSOLUTE: 0.2 K/UL (ref 0–0.6)
EOSINOPHILS RELATIVE PERCENT: 2.3 %
EPITHELIAL CELLS, UA: ABNORMAL /HPF
GFR AFRICAN AMERICAN: >60
GFR NON-AFRICAN AMERICAN: >60
GLOBULIN: 2.8 G/DL
GLUCOSE BLD-MCNC: 215 MG/DL
GLUCOSE BLD-MCNC: 215 MG/DL (ref 70–99)
GLUCOSE BLD-MCNC: 36 MG/DL (ref 70–99)
GLUCOSE BLD-MCNC: 71 MG/DL (ref 70–99)
GLUCOSE BLD-MCNC: 76 MG/DL (ref 70–99)
GLUCOSE URINE: >=1000 MG/DL
HCT VFR BLD CALC: 40.1 % (ref 36–48)
HEMOGLOBIN: 13.9 G/DL (ref 12–16)
KETONES, URINE: NEGATIVE MG/DL
LEUKOCYTE ESTERASE, URINE: ABNORMAL
LYMPHOCYTES ABSOLUTE: 2.5 K/UL (ref 1–5.1)
LYMPHOCYTES RELATIVE PERCENT: 34.4 %
MCH RBC QN AUTO: 32.1 PG (ref 26–34)
MCHC RBC AUTO-ENTMCNC: 34.7 G/DL (ref 31–36)
MCV RBC AUTO: 92.7 FL (ref 80–100)
MICROSCOPIC EXAMINATION: YES
MONOCYTES ABSOLUTE: 0.4 K/UL (ref 0–1.3)
MONOCYTES RELATIVE PERCENT: 6.1 %
NEUTROPHILS ABSOLUTE: 4 K/UL (ref 1.7–7.7)
NEUTROPHILS RELATIVE PERCENT: 56.5 %
NITRITE, URINE: NEGATIVE
PDW BLD-RTO: 13.1 % (ref 12.4–15.4)
PERFORMED ON: ABNORMAL
PERFORMED ON: ABNORMAL
PERFORMED ON: NORMAL
PH UA: 6.5 (ref 5–8)
PLATELET # BLD: 269 K/UL (ref 135–450)
PMV BLD AUTO: 7.8 FL (ref 5–10.5)
POTASSIUM SERPL-SCNC: 3.1 MMOL/L (ref 3.5–5.1)
PROTEIN UA: NEGATIVE MG/DL
RBC # BLD: 4.33 M/UL (ref 4–5.2)
RBC UA: ABNORMAL /HPF (ref 0–2)
SODIUM BLD-SCNC: 141 MMOL/L (ref 136–145)
SPECIFIC GRAVITY UA: <=1.005 (ref 1–1.03)
TOTAL PROTEIN: 7.1 G/DL (ref 6.4–8.2)
URINE REFLEX TO CULTURE: YES
URINE TYPE: ABNORMAL
UROBILINOGEN, URINE: 0.2 E.U./DL
WBC # BLD: 7.2 K/UL (ref 4–11)
WBC UA: ABNORMAL /HPF (ref 0–5)

## 2019-05-20 PROCEDURE — 85025 COMPLETE CBC W/AUTO DIFF WBC: CPT

## 2019-05-20 PROCEDURE — 80053 COMPREHEN METABOLIC PANEL: CPT

## 2019-05-20 PROCEDURE — 87086 URINE CULTURE/COLONY COUNT: CPT

## 2019-05-20 PROCEDURE — 81001 URINALYSIS AUTO W/SCOPE: CPT

## 2019-05-20 PROCEDURE — 99284 EMERGENCY DEPT VISIT MOD MDM: CPT

## 2019-05-20 NOTE — ED PROVIDER NOTES
Triage Chief Complaint:   Emesis (pt brought to ED via EMS from home. Per EMS they were called out for hyperglycemia. Per EMS pt. glucose 316. patient states that she has high blood sugar and has been throwing up that started \"a couple hours ago\". )      [place]:  Dia Pino is a 63 y.o. female that presents with hyperglycemia. Blood sugar on arrival was in the 200 range and she was not symptomatic. Patient states that she didn't feel well and had some nausea and vomiting. The fever did not have abdominal pain has not had diarrhea and has not been on antibiotics recently. She denies urinary symptoms. Patient has some issues with MR was able to provide a history. ROS:  Review of systems was reviewed for 10 systems and is otherwise negative except as in the [address]    Past Medical History:   Diagnosis Date    CAD (coronary artery disease)     Cerebral artery occlusion with cerebral infarction ([address]     Diabetes mellitus ([address]     Hyperlipidemia     Hypertension     Mental retardation     [facility] [facility]      Past Surgical History:   Procedure Laterality Date    BACK SURGERY      PACEMAKER INSERTION      TUMOR REMOVAL       History reviewed. No pertinent family history.   Social History     Socioeconomic History    Marital status:      Spouse name: Not on file    Number of children: Not on file    Years of education: Not on file    Highest education level: Not on file   Occupational History    Not on file   Social Needs    Financial resource strain: Not on file    Food insecurity:     Worry: Not on file     Inability: Not on file    Transportation needs:     Medical: Not on file     Non-medical: Not on file   Tobacco Use    Smoking status: Never Smoker    Smokeless tobacco: Never Used   Substance and Sexual Activity    Alcohol use: No    Drug use: No    Sexual activity: Not on file   Lifestyle    Physical activity:     Days per week: Not on file     Minutes per session: Not on file    Stress: Not on file   Relationships    Social connections:     Talks on phone: Not on file     Gets together: Not on file     Attends Rastafarian service: Not on file     Active member of club or organization: Not on file     Attends meetings of clubs or organizations: Not on file     Relationship status: Not on file    Intimate partner violence:     Fear of current or ex partner: Not on file     Emotionally abused: Not on file     Physically abused: Not on file     Forced sexual activity: Not on file   Other Topics Concern    Not on file   Social History Narrative    Not on file     No current facility-administered medications for this encounter. Current Outpatient Medications   Medication Sig Dispense Refill    ranolazine (RANEXA) 500 MG extended release tablet Take 1 tablet by mouth 2 times daily 60 tablet 3    gabapentin (NEURONTIN) 600 MG tablet Take 600 mg by mouth nightly. [facility] clopidogrel (PLAVIX) 75 MG tablet Take 75 mg by mouth daily      famotidine (PEPCID) 20 MG tablet Take 1 tablet by mouth 2 times daily 60 tablet 0    aspirin 81 MG EC tablet Take 1 tablet by mouth daily 30 tablet 3    topiramate (TOPAMAX) 200 MG tablet Take 1 tablet by mouth daily 60 tablet 0    DULoxetine (CYMBALTA) 20 MG extended release capsule Take 1 capsule by mouth 2 times daily 30 capsule 0    FLUoxetine (PROZAC) 10 MG capsule Take 1 capsule by mouth daily 30 capsule 3    metFORMIN (GLUCOPHAGE) 1000 MG tablet Take 1 tablet by mouth 2 times daily (with meals) 60 tablet 3    atorvastatin (LIPITOR) 40 MG tablet Take 1 tablet by mouth nightly 30 tablet 0    fenofibrate micronized (LOFIBRA) 200 MG capsule Take 1 capsule by mouth nightly 30 capsule 3    lisinopril (PRINIVIL;ZESTRIL) 2.5 MG tablet Take 1 tablet by mouth daily 30 tablet 3    carvedilol (COREG) 6.25 MG tablet Take 1 tablet by mouth 2 times daily (with meals) 60 tablet 0    insulin glargine (LANTUS SOLOSTAR) 100 UNIT/ML 4. 33 4.00 - 5.20 M/uL    Hemoglobin 13.9 12.0 - 16.0 g/dL    Hematocrit 40.1 36.0 - 48.0 %    MCV 92.7 80.0 - 100.0 fL    MCH 32.1 26.0 - 34.0 pg    MCHC 34.7 31.0 - 36.0 g/dL    RDW 13.1 12.4 - 15.4 %    Platelets 372 550 - 635 K/uL    MPV 7.8 5.0 - 10.5 fL    Neutrophils % 56.5 %    Lymphocytes % 34.4 %    Monocytes % 6.1 %    Eosinophils % 2.3 %    Basophils % 0.7 %    Neutrophils # 4.0 1.7 - 7.7 K/uL    Lymphocytes # 2.5 1.0 - 5.1 K/uL    Monocytes # 0.4 0.0 - 1.3 K/uL    Eosinophils # 0.2 0.0 - 0.6 K/uL    Basophils # 0.0 0.0 - 0.2 K/uL   Comprehensive metabolic panel   Result Value Ref Range    Sodium 141 136 - 145 mmol/L    Potassium 3.1 (L) 3.5 - 5.1 mmol/L    Chloride 104 99 - 110 mmol/L    CO2 23 21 - 32 mmol/L    Anion Gap 14 3 - 16    Glucose 76 70 - 99 mg/dL    BUN 24 (H) 7 - 20 mg/dL    CREATININE <0.5 (L) 0.6 - 1.1 mg/dL    GFR Non-African American >60 >60    GFR African American >60 >60    Calcium 9.7 8.3 - 10.6 mg/dL    Total Protein 7.1 6.4 - 8.2 g/dL    Alb 4.3 3.4 - 5.0 g/dL    Albumin/Globulin Ratio 1.5 1.1 - 2.2    Total Bilirubin <0.2 0.0 - 1.0 mg/dL    Alkaline Phosphatase 99 40 - 129 U/L    ALT 17 10 - 40 U/L    AST 13 (L) 15 - 37 U/L    Globulin 2.8 g/dL   Urine, reflex to culture   Result Value Ref Range    Color, UA Yellow Straw/Yellow    Clarity, UA Clear Clear    Glucose, Ur >=1000 (A) Negative mg/dL    Bilirubin Urine Negative Negative    Ketones, Urine Negative Negative mg/dL    Specific Gravity, UA <=1.005 1.005 - 1.030    Blood, Urine Negative Negative    pH, UA 6.5 5.0 - 8.0    Protein, UA Negative Negative mg/dL    Urobilinogen, Urine 0.2 <2.0 E.U./dL    Nitrite, Urine Negative Negative    Leukocyte Esterase, Urine SMALL (A) Negative    Microscopic Examination YES     Urine Reflex to Culture Yes     Urine Type Not Specified    Microscopic Urinalysis   Result Value Ref Range    WBC, UA 6-10 (A) 0 - 5 /HPF    RBC, UA None seen 0 - 2 /HPF    Epi Cells 3-5 /HPF   POCT glucose   Result Value Ref Range    Glucose 215 mg/dL    QC OK? yes    POCT Glucose   Result Value Ref Range    POC Glucose 215 (H) 70 - 99 mg/dl    Performed on ACCU-CHEK         Radiographs (if obtained):  ? Radiologist's Report Reviewed:  No orders to display       ? Discussed with Radiologist:     ? The following radiograph was interpreted by myself in the absence of a radiologist:     EKG (if obtained): (All EKG's are interpreted by myself in the absence of a cardiologist)      MDM:   Patient complained of nausea and vomiting and hyperglycemia. Her labs weren't diagnostically abnormal her blood sugar is now normal and she'll be treated as an outpatient followed up with her family physician but she was encouraged to return to the emergency department if problems develop. Final Impression:  1. Non-intractable vomiting with nausea, unspecified vomiting type        Critical Care:       Disposition referral (if applicable):  Jessica Hilliard  [address] [facility] [address]    Call in 1 day        Disposition medications (if applicable):  New Prescriptions    No medications on file       Comment: Please note this report has been produced using speech recognition software and may contain errors related to that system including errors in grammar, punctuation, and spelling, as well as words and phrases that may be inappropriate. If there are any questions or concerns please feel free to contact the dictating provider for clarification.       (Please note that portions of this note may have been completed with a voice recognition program. Efforts were made to edit the dictations but occasionally words are mis-transcribed.)    MD Chinmay Aleman., MD  05/20/19 1467

## 2019-05-20 NOTE — ED NOTES
Upon entering patient room to discharge patient, patient appears very diaphoretic and pale. Patient states \"I just feel crummy\". POCT 36 at this time. MD Janak Nuno notified. Pt. Drinking orange juice and eating peanut butter crackers. Pt. States that she took the same amount of insulin that she does every morning. Pt. States that she ate pancakes after taking her insulin. Writer asked patient if she had thrown up after eating and patient verbalized that she had thrown up all of her breakfast. Pt. Remains alert and oriented at this time. Will continue to monitor.       Argenis Ashton RN  05/20/19 1518

## 2019-05-20 NOTE — ED NOTES
After eating, pt blood glucose up to 71. Per Dr Nan John OK to discharge patient if eating food. Pt given orange juice and box lunch to take home with her and instructed to check her blood glucose again upon returning home and keep checking it frequently for today. Pt voices understanding and denies any questions. Pt ambulated to cab without assistance. Is being taken home by cab.      Chance Man RN  05/20/19 3499

## 2019-05-21 LAB — URINE CULTURE, ROUTINE: NORMAL

## 2019-05-22 ENCOUNTER — HOSPITAL ENCOUNTER (EMERGENCY)
Age: 59
Discharge: HOME | End: 2019-05-22
Attending: EMERGENCY MEDICINE
Payer: MEDICARE

## 2019-05-22 VITALS
DIASTOLIC BLOOD PRESSURE: 66 MMHG | OXYGEN SATURATION: 97 % | SYSTOLIC BLOOD PRESSURE: 134 MMHG | RESPIRATION RATE: 16 BRPM | WEIGHT: 189 LBS | TEMPERATURE: 99.1 F | HEIGHT: 63 IN | BODY MASS INDEX: 33.49 KG/M2 | HEART RATE: 85 BPM

## 2019-05-22 DIAGNOSIS — R73.9 HYPERGLYCEMIA WITHOUT KETOSIS: ICD-10-CM

## 2019-05-22 DIAGNOSIS — R11.2 NAUSEA AND VOMITING, INTRACTABILITY OF VOMITING NOT SPECIFIED, UNSPECIFIED VOMITING TYPE: Primary | ICD-10-CM

## 2019-05-22 DIAGNOSIS — S90.852S: ICD-10-CM

## 2019-05-22 LAB
A/G RATIO: 1.4 (ref 1.1–2.2)
ALBUMIN SERPL-MCNC: 4.2 G/DL (ref 3.4–5)
ALP BLD-CCNC: 128 U/L (ref 40–129)
ALT SERPL-CCNC: 16 U/L (ref 10–40)
ANION GAP SERPL CALCULATED.3IONS-SCNC: 10 MMOL/L (ref 3–16)
AST SERPL-CCNC: 13 U/L (ref 15–37)
BASE EXCESS VENOUS: -1.4 MMOL/L (ref -3–3)
BASOPHILS ABSOLUTE: 0.1 K/UL (ref 0–0.2)
BASOPHILS RELATIVE PERCENT: 1 %
BILIRUB SERPL-MCNC: <0.2 MG/DL (ref 0–1)
BUN BLDV-MCNC: 18 MG/DL (ref 7–20)
CALCIUM SERPL-MCNC: 9.8 MG/DL (ref 8.3–10.6)
CARBOXYHEMOGLOBIN: 2 % (ref 0–1.5)
CHLORIDE BLD-SCNC: 96 MMOL/L (ref 99–110)
CO2: 23 MMOL/L (ref 21–32)
CREAT SERPL-MCNC: <0.5 MG/DL (ref 0.6–1.1)
EOSINOPHILS ABSOLUTE: 0.2 K/UL (ref 0–0.6)
EOSINOPHILS RELATIVE PERCENT: 3 %
GFR AFRICAN AMERICAN: >60
GFR NON-AFRICAN AMERICAN: >60
GLOBULIN: 2.9 G/DL
GLUCOSE BLD-MCNC: 235 MG/DL
GLUCOSE BLD-MCNC: 235 MG/DL (ref 70–99)
GLUCOSE BLD-MCNC: 282 MG/DL
GLUCOSE BLD-MCNC: 282 MG/DL (ref 70–99)
GLUCOSE BLD-MCNC: 328 MG/DL
GLUCOSE BLD-MCNC: 328 MG/DL (ref 70–99)
GLUCOSE BLD-MCNC: 383 MG/DL (ref 70–99)
HCO3 VENOUS: 23.4 MMOL/L (ref 23–29)
HCT VFR BLD CALC: 39.8 % (ref 36–48)
HEMOGLOBIN: 13.7 G/DL (ref 12–16)
LYMPHOCYTES ABSOLUTE: 3 K/UL (ref 1–5.1)
LYMPHOCYTES RELATIVE PERCENT: 41.8 %
MCH RBC QN AUTO: 32.1 PG (ref 26–34)
MCHC RBC AUTO-ENTMCNC: 34.5 G/DL (ref 31–36)
MCV RBC AUTO: 93 FL (ref 80–100)
METHEMOGLOBIN VENOUS: 0.2 %
MONOCYTES ABSOLUTE: 0.5 K/UL (ref 0–1.3)
MONOCYTES RELATIVE PERCENT: 7.1 %
NEUTROPHILS ABSOLUTE: 3.4 K/UL (ref 1.7–7.7)
NEUTROPHILS RELATIVE PERCENT: 47.1 %
O2 CONTENT, VEN: 18 VOL %
O2 SAT, VEN: 90 %
O2 THERAPY: ABNORMAL
PCO2, VEN: 39.9 MMHG (ref 40–50)
PDW BLD-RTO: 13.1 % (ref 12.4–15.4)
PERFORMED ON: ABNORMAL
PH VENOUS: 7.39 (ref 7.35–7.45)
PLATELET # BLD: 263 K/UL (ref 135–450)
PMV BLD AUTO: 8.1 FL (ref 5–10.5)
PO2, VEN: 58 MMHG (ref 25–40)
POTASSIUM REFLEX MAGNESIUM: 4.2 MMOL/L (ref 3.5–5.1)
RBC # BLD: 4.28 M/UL (ref 4–5.2)
SODIUM BLD-SCNC: 129 MMOL/L (ref 136–145)
TCO2 CALC VENOUS: 25 MMOL/L
TOTAL PROTEIN: 7.1 G/DL (ref 6.4–8.2)
WBC # BLD: 7.2 K/UL (ref 4–11)

## 2019-05-22 PROCEDURE — 2580000003 HC RX 258: Performed by: EMERGENCY MEDICINE

## 2019-05-22 PROCEDURE — 96374 THER/PROPH/DIAG INJ IV PUSH: CPT

## 2019-05-22 PROCEDURE — 99285 EMERGENCY DEPT VISIT HI MDM: CPT

## 2019-05-22 PROCEDURE — 96361 HYDRATE IV INFUSION ADD-ON: CPT

## 2019-05-22 PROCEDURE — 85025 COMPLETE CBC W/AUTO DIFF WBC: CPT

## 2019-05-22 PROCEDURE — 80053 COMPREHEN METABOLIC PANEL: CPT

## 2019-05-22 PROCEDURE — 82803 BLOOD GASES ANY COMBINATION: CPT

## 2019-05-22 PROCEDURE — 6360000002 HC RX W HCPCS: Performed by: EMERGENCY MEDICINE

## 2019-05-22 RX ORDER — ONDANSETRON 4 MG/1
4 TABLET, ORALLY DISINTEGRATING ORAL EVERY 8 HOURS PRN
Qty: 10 TABLET | Refills: 0 | Status: ON HOLD | OUTPATIENT
Start: 2019-05-22 | End: 2020-04-20 | Stop reason: HOSPADM

## 2019-05-22 RX ORDER — ONDANSETRON 2 MG/ML
4 INJECTION INTRAMUSCULAR; INTRAVENOUS ONCE
Status: COMPLETED | OUTPATIENT
Start: 2019-05-22 | End: 2019-05-22

## 2019-05-22 RX ORDER — 0.9 % SODIUM CHLORIDE 0.9 %
1000 INTRAVENOUS SOLUTION INTRAVENOUS ONCE
Status: COMPLETED | OUTPATIENT
Start: 2019-05-22 | End: 2019-05-22

## 2019-05-22 RX ADMIN — ONDANSETRON 4 MG: 2 INJECTION INTRAMUSCULAR; INTRAVENOUS at 03:45

## 2019-05-22 RX ADMIN — SODIUM CHLORIDE 1000 ML: 9 INJECTION, SOLUTION INTRAVENOUS at 03:45

## 2019-05-22 ASSESSMENT — PAIN DESCRIPTION - PAIN TYPE: TYPE: ACUTE PAIN

## 2019-05-22 ASSESSMENT — PAIN DESCRIPTION - FREQUENCY: FREQUENCY: CONTINUOUS

## 2019-05-22 ASSESSMENT — PAIN DESCRIPTION - LOCATION: LOCATION: FOOT

## 2019-05-22 ASSESSMENT — PAIN DESCRIPTION - DESCRIPTORS: DESCRIPTORS: THROBBING

## 2019-05-22 ASSESSMENT — PAIN SCALES - GENERAL: PAINLEVEL_OUTOF10: 9

## 2019-05-22 ASSESSMENT — PAIN DESCRIPTION - ORIENTATION: ORIENTATION: RIGHT

## 2019-05-22 NOTE — ED NOTES
Patient to be discharged home, insisting that she does not have a ride and wants to walk home. This RN placed a call to her emergency contact, Eunice Rivera stated she had no transportation and could not  the patient, she did not know anybody that could  the patient and then she hung up on this RN.        Victoriano Carr RN  05/22/19 9694

## 2019-05-22 NOTE — ED PROVIDER NOTES
Emergency Physician Note    Chief Complaint  Hyperglycemia (patient reports high glucose x 1.5 weeks. N/V. patient reports home glucose 400+. )       History of Present Illness  Aleks العراقي is a 63 y.o. female who presents to the ED for elevated blood glucose and nausea and vomiting. Patient states her blood glucose has been increasing over the past 10 days. Been as high as 500. She also reports the past 3 days she's had multiple episodes of nonbloody vomiting associated with nausea. Is not having any shortness of breath or abdominal pain associated with these symptoms. A week and a half ago she had a splinter taken out of her foot by her primary care physician, she states that the wound continues to be sore especially when she is walking on it. Denies any hematuria, dysuria, cough, headache, fever. Denies fever, chills, malaise, chest pain, shortness of breath, cough, abdominal pain, nausea, vomiting, diarrhea, headache, sore throat, dysuria, back pain, rash. No palliative/provocative factors. Positives and pertinent negatives as per HPI. All other of the 10 systems were reviewed and are negative. I have reviewed the following from the nursing documentation:      Prior to Admission medications    Medication Sig Start Date End Date Taking? Authorizing Provider   ondansetron (ZOFRAN ODT) 4 MG disintegrating tablet Take 1 tablet by mouth every 8 hours as needed for Nausea or Vomiting 5/22/19  Yes Kristin Amador MD   [employer] Lancets Ultra Thin MISC 1 each by Does not apply route daily 5/12/19  Yes Jevon Timmons MD   blood glucose monitor strips Test three times a day & as needed for symptoms of irregular blood glucose. 5/12/19  Yes Jevon Timmons MD   ranolazine (RANEXA) 500 MG extended release tablet Take 1 tablet by mouth 2 times daily 4/9/19  Yes Brigette Petty MD   gabapentin (NEURONTIN) 600 MG tablet Take 600 mg by mouth nightly. Rupal Dhillon Historical Provider, MD clopidogrel (PLAVIX) 75 MG tablet Take 75 mg by mouth daily   Yes Alexandria Munoz MD   famotidine (PEPCID) 20 MG tablet Take 1 tablet by mouth 2 times daily 6/26/18  Yes FRITZ Stevenson CNP   aspirin 81 MG EC tablet Take 1 tablet by mouth daily 5/10/18  Yes Juan Nuno MD   topiramate (TOPAMAX) 200 MG tablet Take 1 tablet by mouth daily 4/14/18  Yes Guerda Pollard MD   DULoxetine (CYMBALTA) 20 MG extended release capsule Take 1 capsule by mouth 2 times daily 4/14/18  Yes Guerda Pollard MD   FLUoxetine (PROZAC) 10 MG capsule Take 1 capsule by mouth daily 4/14/18  Yes Guerda Pollard MD   metFORMIN (GLUCOPHAGE) 1000 MG tablet Take 1 tablet by mouth 2 times daily (with meals) 4/14/18  Yes Guerda Pollard MD   atorvastatin (LIPITOR) 40 MG tablet Take 1 tablet by mouth nightly 4/14/18  Yes Guerda Pollard MD   fenofibrate micronized (LOFIBRA) 200 MG capsule Take 1 capsule by mouth nightly 4/14/18  Yes Guerda Pollard MD   lisinopril (PRINIVIL;ZESTRIL) 2.5 MG tablet Take 1 tablet by mouth daily 4/14/18  Yes Guerda Pollard MD   carvedilol (COREG) 6.25 MG tablet Take 1 tablet by mouth 2 times daily (with meals) 4/14/18  Yes Guerda Pollard MD   insulin glargine (LANTUS SOLOSTAR) 100 UNIT/ML injection pen Inject 35 Units into the skin 2 times daily 4/14/18  Yes Guerda Pollard MD   insulin aspart (NOVOLOG FLEXPEN) 100 UNIT/ML injection pen Inject 20 Units into the skin 3 times daily (before meals) 4/14/18  Yes Guerda Pollard MD   Lancets MISC 1 month supply for TID fingersticks 4/14/18  Yes Guerda Pollard MD       Allergies as of 05/22/2019    (No Known Allergies)       Past Medical History:   Diagnosis Date    CAD (coronary artery disease)     [facility]     Diabetes mellitus [facility]     Hyperlipidemia     Hypertension     Mental retardation     [facility]my[facility] infarction) ([facility]         Surgical History:   Past Surgical History:   Procedure Laterality Date    BACK SURGERY      PACEMAKER INSERTION      TUMOR REMOVAL          Family History:  History reviewed. No pertinent family history. Social History     Socioeconomic History    Marital status:      Spouse name: Not on file    Number of children: Not on file    Years of education: Not on file    Highest education level: Not on file   Occupational History    Not on file   Social Needs    Financial resource strain: Not on file    Food insecurity:     Worry: Not on file     Inability: Not on file    Transportation needs:     Medical: Not on file     Non-medical: Not on file   Tobacco Use    Smoking status: Never Smoker    Smokeless tobacco: Never Used   Substance and Sexual Activity    Alcohol use: No    Drug use: No    Sexual activity: Not Currently   Lifestyle    Physical activity:     Days per week: Not on file     Minutes per session: Not on file    Stress: Not on file   Relationships    Social connections:     Talks on phone: Not on file     Gets together: Not on file     Attends Confucianist service: Not on file     Active member of club or organization: Not on file     Attends meetings of clubs or organizations: Not on file     Relationship status: Not on file    Intimate partner violence:     Fear of current or ex partner: Not on file     Emotionally abused: Not on file     Physically abused: Not on file     Forced sexual activity: Not on file   Other Topics Concern    Not on file   Social History Narrative    Not on file       Nursing notes reviewed. ED Triage Vitals [05/22/19 0325]   Enc Vitals Group      /66      Pulse 85      Resp 16      Temp 99.1 °F (37.3 °C)      Temp Source Oral      SpO2 97 %      Weight 189 lb (85.7 kg)      Height 5' 3\" (1.6 m)      Head Circumference       Peak Flow       Pain Score       Pain Loc       Pain Edu? Excl. in [address]? GENERAL:  Awake, alert.  Well developed, well nourished with no 47.1 %    Lymphocytes % 41.8 %    Monocytes % 7.1 %    Eosinophils % 3.0 %    Basophils % 1.0 %    Neutrophils # 3.4 1.7 - 7.7 K/uL    Lymphocytes # 3.0 1.0 - 5.1 K/uL    Monocytes # 0.5 0.0 - 1.3 K/uL    Eosinophils # 0.2 0.0 - 0.6 K/uL    Basophils # 0.1 0.0 - 0.2 K/uL   Comprehensive Metabolic Panel w/ Reflex to MG   Result Value Ref Range    Sodium 129 (L) 136 - 145 mmol/L    Potassium reflex Magnesium 4.2 3.5 - 5.1 mmol/L    Chloride 96 (L) 99 - 110 mmol/L    CO2 23 21 - 32 mmol/L    Anion Gap 10 3 - 16    Glucose 383 (H) 70 - 99 mg/dL    BUN 18 7 - 20 mg/dL    CREATININE <0.5 (L) 0.6 - 1.1 mg/dL    GFR Non-African American >60 >60    GFR African American >60 >60    Calcium 9.8 8.3 - 10.6 mg/dL    Total Protein 7.1 6.4 - 8.2 g/dL    Alb 4.2 3.4 - 5.0 g/dL    Albumin/Globulin Ratio 1.4 1.1 - 2.2    Total Bilirubin <0.2 0.0 - 1.0 mg/dL    Alkaline Phosphatase 128 40 - 129 U/L    ALT 16 10 - 40 U/L    AST 13 (L) 15 - 37 U/L    Globulin 2.9 g/dL   Blood gas, venous   Result Value Ref Range    pH, Alberto 7.386 7.350 - 7.450    pCO2, Alberto 39.9 (L) 40.0 - 50.0 mmHg    pO2, Alberto 58.0 (H) 25.0 - 40.0 mmHg    HCO3, Venous 23.4 23.0 - 29.0 mmol/L    Base Excess, Alberto -1.4 -3.0 - 3.0 mmol/L    O2 Sat, Alberto 90 Not Established %    Carboxyhemoglobin 2.0 (H) 0.0 - 1.5 %    MetHgb, Alberto 0.2 <1.5 %    TC02 (Calc), Alberto 25 Not Established mmol/L    O2 Content, Alberto 18 Not Established VOL %    O2 Therapy Unknown    POCT glucose   Result Value Ref Range    Glucose 328 mg/dL   POCT Glucose   Result Value Ref Range    POC Glucose 328 (H) 70 - 99 mg/dl    Performed on ACCU-CHEK        PROCEDURES      MEDICAL DECISION MAKING    Procedures/interventions/images ordered for this visit  Orders Placed This Encounter   Procedures    CBC Auto Differential    Comprehensive Metabolic Panel w/ Reflex to MG    Blood gas, venous    POCT glucose    POCT Glucose    POCT glucose       Medications ordered for this visit  Orders Placed This Encounter Medications    0.9 % sodium chloride bolus    ondansetron (ZOFRAN) injection 4 mg       Pt presents with vomiting and elevated blood glucose. Abdominal exam is benign. After anti-emetic, patient was reassessed and has been tolerating PO challenge without difficulty. Patient has a benign abdominal exam. There is no significant evidence of severe dehydration, surgical abdominal process, severe electrolyte abnormality, toxicity, shock, sepsis, hemodynamic or cardiopulmonary instability, increased intracranial pressure, or any disease process requiring other immediate surgical or further ER medical intervention at this time. Pt presents to the emergency room for a wound check. Vital signs reviewed and are stable. Patient is afebrile, nontoxic appearing and in no acute distress. Neurovascular status is intact. They have no major deficits on exam and the wound appears to be healing well and as expected. At this time, there is no sign of worsening of wound infection, cellulitis, or dehiscence. They were counseled on continued wound care. Final Impression    1. Nausea and vomiting, intractability of vomiting not specified, unspecified vomiting type    2. Hyperglycemia without ketosis    3. Splinter of foot without major open wound or infection, left, sequela        Blood pressure 134/66, pulse 85, temperature 99.1 °F (37.3 °C), temperature source Oral, resp. rate 16, height 5' 3\" (1.6 m), weight 189 lb (85.7 kg), SpO2 97 %. Patient and/or companions verbalized understanding of the ED workup, any relevant findings as well as any incidental findings, and the disposition and plan. Questions sought and answered with the patient and/or family members. They voice understanding and agree with plan. If the patient was discharged from the ED, they were instructed to return for any worsening or worrisome concerns. Patient was given scripts for the following medications.  I counseled patient how to take these medications. New Prescriptions    ONDANSETRON (ZOFRAN ODT) 4 MG DISINTEGRATING TABLET    Take 1 tablet by mouth every 8 hours as needed for Nausea or Vomiting       Disposition  Pt is in stable condition upon Discharge to home. The note was completed using Dragon voice recognition transcription. Every effort was made to ensure accuracy; however, inadvertent transcription errors may be present despite my best efforts to edit errors.     Puja Abbott MD  [address] [facility], [place]  05/22/19 4839

## 2019-06-03 ENCOUNTER — APPOINTMENT (OUTPATIENT)
Dept: GENERAL RADIOLOGY | Age: 59
End: 2019-06-03
Payer: MEDICARE

## 2019-06-03 ENCOUNTER — HOSPITAL ENCOUNTER (EMERGENCY)
Age: 59
Discharge: HOME | End: 2019-06-03
Attending: EMERGENCY MEDICINE
Payer: MEDICARE

## 2019-06-03 VITALS
TEMPERATURE: 98.8 F | WEIGHT: 180 LBS | BODY MASS INDEX: 31.89 KG/M2 | SYSTOLIC BLOOD PRESSURE: 150 MMHG | HEIGHT: 63 IN | OXYGEN SATURATION: 100 % | RESPIRATION RATE: 16 BRPM | DIASTOLIC BLOOD PRESSURE: 87 MMHG | HEART RATE: 92 BPM

## 2019-06-03 DIAGNOSIS — S39.012A STRAIN OF LUMBAR REGION, INITIAL ENCOUNTER: Primary | ICD-10-CM

## 2019-06-03 DIAGNOSIS — R73.9 HYPERGLYCEMIA: ICD-10-CM

## 2019-06-03 LAB
A/G RATIO: 1.4 (ref 1.1–2.2)
ALBUMIN SERPL-MCNC: 4.2 G/DL (ref 3.4–5)
ALP BLD-CCNC: 128 U/L (ref 40–129)
ALT SERPL-CCNC: 26 U/L (ref 10–40)
ANION GAP SERPL CALCULATED.3IONS-SCNC: 13 MMOL/L (ref 3–16)
AST SERPL-CCNC: 12 U/L (ref 15–37)
BASOPHILS ABSOLUTE: 0.1 K/UL (ref 0–0.2)
BASOPHILS RELATIVE PERCENT: 1.1 %
BILIRUB SERPL-MCNC: 0.4 MG/DL (ref 0–1)
BUN BLDV-MCNC: 17 MG/DL (ref 7–20)
CALCIUM SERPL-MCNC: 9.9 MG/DL (ref 8.3–10.6)
CHLORIDE BLD-SCNC: 94 MMOL/L (ref 99–110)
CHP ED QC CHECK: YES
CO2: 23 MMOL/L (ref 21–32)
CREAT SERPL-MCNC: <0.5 MG/DL (ref 0.6–1.1)
EOSINOPHILS ABSOLUTE: 0.1 K/UL (ref 0–0.6)
EOSINOPHILS RELATIVE PERCENT: 1.4 %
GFR AFRICAN AMERICAN: >60
GFR NON-AFRICAN AMERICAN: >60
GLOBULIN: 3 G/DL
GLUCOSE BLD-MCNC: 302 MG/DL
GLUCOSE BLD-MCNC: 302 MG/DL (ref 70–99)
GLUCOSE BLD-MCNC: 416 MG/DL (ref 70–99)
GLUCOSE BLD-MCNC: 445 MG/DL (ref 70–99)
HCT VFR BLD CALC: 38.3 % (ref 36–48)
HEMOGLOBIN: 13.6 G/DL (ref 12–16)
LYMPHOCYTES ABSOLUTE: 2.1 K/UL (ref 1–5.1)
LYMPHOCYTES RELATIVE PERCENT: 25.5 %
MCH RBC QN AUTO: 32.4 PG (ref 26–34)
MCHC RBC AUTO-ENTMCNC: 35.5 G/DL (ref 31–36)
MCV RBC AUTO: 91.4 FL (ref 80–100)
MONOCYTES ABSOLUTE: 0.6 K/UL (ref 0–1.3)
MONOCYTES RELATIVE PERCENT: 7.5 %
NEUTROPHILS ABSOLUTE: 5.3 K/UL (ref 1.7–7.7)
NEUTROPHILS RELATIVE PERCENT: 64.5 %
PDW BLD-RTO: 13.3 % (ref 12.4–15.4)
PERFORMED ON: ABNORMAL
PERFORMED ON: ABNORMAL
PLATELET # BLD: 220 K/UL (ref 135–450)
PMV BLD AUTO: 8.5 FL (ref 5–10.5)
POTASSIUM SERPL-SCNC: 3.7 MMOL/L (ref 3.5–5.1)
RBC # BLD: 4.19 M/UL (ref 4–5.2)
SODIUM BLD-SCNC: 130 MMOL/L (ref 136–145)
TOTAL PROTEIN: 7.2 G/DL (ref 6.4–8.2)
WBC # BLD: 8.2 K/UL (ref 4–11)

## 2019-06-03 PROCEDURE — 72100 X-RAY EXAM L-S SPINE 2/3 VWS: CPT

## 2019-06-03 PROCEDURE — 99283 EMERGENCY DEPT VISIT LOW MDM: CPT

## 2019-06-03 PROCEDURE — 6370000000 HC RX 637 (ALT 250 FOR IP): Performed by: EMERGENCY MEDICINE

## 2019-06-03 PROCEDURE — 96374 THER/PROPH/DIAG INJ IV PUSH: CPT

## 2019-06-03 PROCEDURE — 85025 COMPLETE CBC W/AUTO DIFF WBC: CPT

## 2019-06-03 PROCEDURE — 6370000000 HC RX 637 (ALT 250 FOR IP)

## 2019-06-03 PROCEDURE — 80053 COMPREHEN METABOLIC PANEL: CPT

## 2019-06-03 RX ORDER — OXYCODONE HYDROCHLORIDE AND ACETAMINOPHEN 5; 325 MG/1; MG/1
1 TABLET ORAL ONCE
Status: COMPLETED | OUTPATIENT
Start: 2019-06-03 | End: 2019-06-03

## 2019-06-03 RX ORDER — OXYCODONE HYDROCHLORIDE AND ACETAMINOPHEN 5; 325 MG/1; MG/1
1-2 TABLET ORAL EVERY 6 HOURS PRN
Qty: 7 TABLET | Refills: 0 | Status: SHIPPED | OUTPATIENT
Start: 2019-06-03 | End: 2019-06-10

## 2019-06-03 RX ORDER — LIDOCAINE 50 MG/G
1 PATCH TOPICAL DAILY
Qty: 10 PATCH | Refills: 0 | Status: SHIPPED | OUTPATIENT
Start: 2019-06-03 | End: 2020-01-15

## 2019-06-03 RX ADMIN — OXYCODONE HYDROCHLORIDE AND ACETAMINOPHEN 1 TABLET: 5; 325 TABLET ORAL at 02:14

## 2019-06-03 RX ADMIN — INSULIN HUMAN 4 UNITS: 100 INJECTION, SOLUTION PARENTERAL at 02:12

## 2019-06-03 ASSESSMENT — PAIN DESCRIPTION - FREQUENCY: FREQUENCY: CONTINUOUS

## 2019-06-03 ASSESSMENT — PAIN DESCRIPTION - ONSET: ONSET: GRADUAL

## 2019-06-03 ASSESSMENT — PAIN DESCRIPTION - PAIN TYPE: TYPE: ACUTE PAIN

## 2019-06-03 ASSESSMENT — PAIN DESCRIPTION - PROGRESSION: CLINICAL_PROGRESSION: GRADUALLY WORSENING

## 2019-06-03 ASSESSMENT — PAIN DESCRIPTION - LOCATION: LOCATION: BACK

## 2019-06-03 ASSESSMENT — PAIN SCALES - GENERAL
PAINLEVEL_OUTOF10: 10
PAINLEVEL_OUTOF10: 10

## 2019-06-03 NOTE — ED PROVIDER NOTES
insecurity:     Worry: None     Inability: None    Transportation needs:     Medical: None     Non-medical: None   Tobacco Use    Smoking status: Never Smoker    Smokeless tobacco: Never Used   Substance and Sexual Activity    Alcohol use: No    Drug use: No    Sexual activity: Not Currently   Lifestyle    Physical activity:     Days per week: None     Minutes per session: None    Stress: None   Relationships    Social connections:     Talks on phone: None     Gets together: None     Attends Mandaeism service: None     Active member of club or organization: None     Attends meetings of clubs or organizations: None     Relationship status: None    Intimate partner violence:     Fear of current or ex partner: None     Emotionally abused: None     Physically abused: None     Forced sexual activity: None   Other Topics Concern    None   Social History Narrative    None       SCREENINGS      @FLOW([id number])@      PHYSICAL EXAM    (up to 7 for level 4, 8 or more for level 5)     ED Triage Vitals [06/03/19 0134]   BP Temp Temp Source Pulse Resp SpO2 Height Weight   (!) 150/87 98.8 °F (37.1 °C) Oral 92 16 96 % 5' 3\" (1.6 m) 180 lb (81.6 kg)       Physical Exam      General Appearance:  Alert, cooperative, no distress, appears stated age. Head:  Normocephalic, without obviousabnormality, atraumatic. Eyes:  conjunctiva/corneas clear, EOM's intact. Sclera anicteric. ENT: Mucous membranes moist.   Neck: Supple, symmetrical, trachea midline, no adenopathy. No jugular venous distention. Lungs:   Clear to auscultation bilaterally, respirationsunlabored. No rales, rhonchi or wheezes. Chest Wall:  No tenderness. Heart:  Regular rate and rhythm, S1 and S2 normal, no murmur, rub or gallop. Abdomen:   Soft, non-tender, bowel sounds active,   no masses, no organomegaly. Extremities: No edema, cords or calf tenderness. Full range of motion.    Pulses: 2+ and symmetric   Skin: Turgor is normal, no rashes or lesions. Neurologic: Alert and oriented X 3. No focal findings. Motor grossly normal.  Speech clear, no drift, CN III-XII grossly intact,        DIAGNOSTIC RESULTS   LABS:    Labs Reviewed   POCT GLUCOSE - Abnormal; Notable for the following components:       Result Value    POC Glucose 416 (*)     All other components within normal limits    Narrative:     Performed at:  [facility],  [place], [facility]   Phone [phone]   CBC WITH AUTO DIFFERENTIAL    Narrative:     Performed at:  [facility],  [place], [facility]   Phone [phone]   COMPREHENSIVE METABOLIC PANEL   URINE RT REFLEX TO CULTURE       All other labs were within normal range or not returned as of this dictation. EKG: All EKG's are interpreted by the Emergency Department Physician who eithersigns or Co-signs this chart in the absence of a cardiologist.        RADIOLOGY:   Non-plain film images such as CT, Ultrasound and MRI are read by the radiologist. Plain radiographic images are visualized by myself. *    Interpretation per the Radiologist below, if available at the time of this note:    XR LUMBAR SPINE (2-3 VIEWS)    (Results Pending)         PROCEDURES   Unless otherwise noted below, none     Procedures    *    CRITICAL CARE TIME   N/A      EMERGENCY DEPARTMENT COURSE and DIFFERENTIALDIAGNOSIS/MDM:   Vitals:    Vitals:    06/03/19 0134   BP: (!) 150/87   Pulse: 92   Resp: 16   Temp: 98.8 °F (37.1 °C)   TempSrc: Oral   SpO2: 96%   Weight: 180 lb (81.6 kg)   Height: 5' 3\" (1.6 m)       Patient was given thefollowing medications:  Medications   insulin regular (HUMULIN R;NOVOLIN R) injection 10 Units (has no administration in time range)   oxyCODONE-acetaminophen (PERCOCET) 5-325 MG per tablet 1 tablet (has no administration in time range)           The patient tolerated their visit well.    The patient and / or the familywere informed of the results of any tests, a time was given to answer questions. FINAL IMPRESSION      1. Strain of lumbar region, initial encounter    2. Hyperglycemia          DISPOSITION/PLAN   DISPOSITION Decision To Discharge 06/03/2019 02:01:48 AM      PATIENT REFERRED TO:  Rey Castellanos  [address]  [phone]    In 2 days        DISCHARGE MEDICATIONS:  New Prescriptions    LIDOCAINE (LIDODERM) 5 %    Place 1 patch onto the skin daily 12 hours on, 12 hours off. OXYCODONE-ACETAMINOPHEN (PERCOCET) 5-325 MG PER TABLET    Take 1-2 tablets by mouth every 6 hours as needed for Pain for up to 7 days.        DISCONTINUED MEDICATIONS:  Discontinued Medications    No medications on file              (Please note that portions of this note were completed with a voice recognition program.  Efforts were made to edit the dictations but occasionally words are mis-transcribed.)    Kaitlyn Mann MD (electronically signed)      Kaitlyn Mann MD  06/03/19 7899

## 2019-06-03 NOTE — ED NOTES
Attempted to contact pt sister Williams Palomo at this time for ride home, no answer, unable to leave message due to mailbox being full.      Francie Mike RN  06/03/19 0539

## 2019-08-06 ENCOUNTER — HOSPITAL ENCOUNTER (EMERGENCY)
Age: 59
Discharge: HOME | End: 2019-08-06
Attending: EMERGENCY MEDICINE
Payer: MEDICARE

## 2019-08-06 VITALS
TEMPERATURE: 98.3 F | HEIGHT: 63 IN | BODY MASS INDEX: 32.78 KG/M2 | HEART RATE: 87 BPM | WEIGHT: 185 LBS | RESPIRATION RATE: 14 BRPM | DIASTOLIC BLOOD PRESSURE: 66 MMHG | SYSTOLIC BLOOD PRESSURE: 126 MMHG | OXYGEN SATURATION: 95 %

## 2019-08-06 DIAGNOSIS — R73.9 HYPERGLYCEMIA: Primary | ICD-10-CM

## 2019-08-06 DIAGNOSIS — E86.0 DEHYDRATION: ICD-10-CM

## 2019-08-06 LAB
A/G RATIO: 1.2 (ref 1.1–2.2)
ALBUMIN SERPL-MCNC: 4.2 G/DL (ref 3.4–5)
ALP BLD-CCNC: 138 U/L (ref 40–129)
ALT SERPL-CCNC: 10 U/L (ref 10–40)
ANION GAP SERPL CALCULATED.3IONS-SCNC: 11 MMOL/L (ref 3–16)
ANION GAP SERPL CALCULATED.3IONS-SCNC: 18 MMOL/L (ref 3–16)
AST SERPL-CCNC: 10 U/L (ref 15–37)
BASE EXCESS VENOUS: -0.5 MMOL/L (ref -3–3)
BASOPHILS ABSOLUTE: 0.1 K/UL (ref 0–0.2)
BASOPHILS RELATIVE PERCENT: 1 %
BETA-HYDROXYBUTYRATE: 1.7 MMOL/L (ref 0–0.27)
BILIRUB SERPL-MCNC: <0.2 MG/DL (ref 0–1)
BILIRUBIN URINE: NEGATIVE
BLOOD, URINE: NEGATIVE
BUN BLDV-MCNC: 21 MG/DL (ref 7–20)
BUN BLDV-MCNC: 29 MG/DL (ref 7–20)
CALCIUM SERPL-MCNC: 8.6 MG/DL (ref 8.3–10.6)
CALCIUM SERPL-MCNC: 9.9 MG/DL (ref 8.3–10.6)
CARBOXYHEMOGLOBIN: 1.9 % (ref 0–1.5)
CHLORIDE BLD-SCNC: 90 MMOL/L (ref 99–110)
CHLORIDE BLD-SCNC: 96 MMOL/L (ref 99–110)
CHP ED QC CHECK: YES
CLARITY: CLEAR
CO2: 22 MMOL/L (ref 21–32)
CO2: 26 MMOL/L (ref 21–32)
COLOR: ABNORMAL
CREAT SERPL-MCNC: 0.7 MG/DL (ref 0.6–1.1)
CREAT SERPL-MCNC: <0.5 MG/DL (ref 0.6–1.1)
EOSINOPHILS ABSOLUTE: 0.1 K/UL (ref 0–0.6)
EOSINOPHILS RELATIVE PERCENT: 1.8 %
GFR AFRICAN AMERICAN: >60
GFR AFRICAN AMERICAN: >60
GFR NON-AFRICAN AMERICAN: >60
GFR NON-AFRICAN AMERICAN: >60
GLOBULIN: 3.5 G/DL
GLUCOSE BLD-MCNC: 250 MG/DL (ref 70–99)
GLUCOSE BLD-MCNC: 259 MG/DL (ref 70–99)
GLUCOSE BLD-MCNC: 268 MG/DL
GLUCOSE BLD-MCNC: 268 MG/DL (ref 70–99)
GLUCOSE BLD-MCNC: 558 MG/DL (ref 70–99)
GLUCOSE BLD-MCNC: 609 MG/DL (ref 70–99)
GLUCOSE URINE: >=1000 MG/DL
HCO3 VENOUS: 24.3 MMOL/L (ref 23–29)
HCT VFR BLD CALC: 39.7 % (ref 36–48)
HEMOGLOBIN: 13.7 G/DL (ref 12–16)
KETONES, URINE: 15 MG/DL
LACTIC ACID, SEPSIS: 0.9 MMOL/L (ref 0.4–1.9)
LEUKOCYTE ESTERASE, URINE: NEGATIVE
LYMPHOCYTES ABSOLUTE: 1.5 K/UL (ref 1–5.1)
LYMPHOCYTES RELATIVE PERCENT: 22.2 %
MCH RBC QN AUTO: 31.7 PG (ref 26–34)
MCHC RBC AUTO-ENTMCNC: 34.5 G/DL (ref 31–36)
MCV RBC AUTO: 91.7 FL (ref 80–100)
METHEMOGLOBIN VENOUS: 0.3 %
MICROSCOPIC EXAMINATION: ABNORMAL
MONOCYTES ABSOLUTE: 0.5 K/UL (ref 0–1.3)
MONOCYTES RELATIVE PERCENT: 8.2 %
NEUTROPHILS ABSOLUTE: 4.4 K/UL (ref 1.7–7.7)
NEUTROPHILS RELATIVE PERCENT: 66.8 %
NITRITE, URINE: NEGATIVE
O2 CONTENT, VEN: 19 VOL %
O2 SAT, VEN: 97 %
O2 THERAPY: ABNORMAL
PCO2, VEN: 40.5 MMHG (ref 40–50)
PDW BLD-RTO: 12.8 % (ref 12.4–15.4)
PERFORMED ON: ABNORMAL
PH UA: 5 (ref 5–8)
PH VENOUS: 7.4 (ref 7.35–7.45)
PLATELET # BLD: 227 K/UL (ref 135–450)
PMV BLD AUTO: 8.4 FL (ref 5–10.5)
PO2, VEN: 85.9 MMHG (ref 25–40)
POTASSIUM REFLEX MAGNESIUM: 4.5 MMOL/L (ref 3.5–5.1)
POTASSIUM SERPL-SCNC: 4 MMOL/L (ref 3.5–5.1)
PROTEIN UA: NEGATIVE MG/DL
RBC # BLD: 4.33 M/UL (ref 4–5.2)
SODIUM BLD-SCNC: 130 MMOL/L (ref 136–145)
SODIUM BLD-SCNC: 133 MMOL/L (ref 136–145)
SPECIFIC GRAVITY UA: 1.01 (ref 1–1.03)
TCO2 CALC VENOUS: 26 MMOL/L
TOTAL PROTEIN: 7.7 G/DL (ref 6.4–8.2)
TROPONIN: <0.01 NG/ML
URINE REFLEX TO CULTURE: ABNORMAL
URINE TYPE: ABNORMAL
UROBILINOGEN, URINE: 0.2 E.U./DL
WBC # BLD: 6.6 K/UL (ref 4–11)

## 2019-08-06 PROCEDURE — 96374 THER/PROPH/DIAG INJ IV PUSH: CPT

## 2019-08-06 PROCEDURE — 81003 URINALYSIS AUTO W/O SCOPE: CPT

## 2019-08-06 PROCEDURE — 2580000003 HC RX 258: Performed by: PHYSICIAN ASSISTANT

## 2019-08-06 PROCEDURE — 6370000000 HC RX 637 (ALT 250 FOR IP): Performed by: PHYSICIAN ASSISTANT

## 2019-08-06 PROCEDURE — 85025 COMPLETE CBC W/AUTO DIFF WBC: CPT

## 2019-08-06 PROCEDURE — 84484 ASSAY OF TROPONIN QUANT: CPT

## 2019-08-06 PROCEDURE — 80053 COMPREHEN METABOLIC PANEL: CPT

## 2019-08-06 PROCEDURE — 96361 HYDRATE IV INFUSION ADD-ON: CPT

## 2019-08-06 PROCEDURE — 99284 EMERGENCY DEPT VISIT MOD MDM: CPT

## 2019-08-06 PROCEDURE — 87801 DETECT AGNT MULT DNA AMPLI: CPT

## 2019-08-06 PROCEDURE — 82010 KETONE BODYS QUAN: CPT

## 2019-08-06 PROCEDURE — 83605 ASSAY OF LACTIC ACID: CPT

## 2019-08-06 PROCEDURE — 2580000003 HC RX 258: Performed by: EMERGENCY MEDICINE

## 2019-08-06 PROCEDURE — 87040 BLOOD CULTURE FOR BACTERIA: CPT

## 2019-08-06 PROCEDURE — 82803 BLOOD GASES ANY COMBINATION: CPT

## 2019-08-06 RX ORDER — 0.9 % SODIUM CHLORIDE 0.9 %
1000 INTRAVENOUS SOLUTION INTRAVENOUS ONCE
Status: COMPLETED | OUTPATIENT
Start: 2019-08-06 | End: 2019-08-06

## 2019-08-06 RX ORDER — SODIUM CHLORIDE 0.9 % (FLUSH) 0.9 %
10 SYRINGE (ML) INJECTION EVERY 12 HOURS SCHEDULED
Status: DISCONTINUED | OUTPATIENT
Start: 2019-08-06 | End: 2019-08-06 | Stop reason: HOSPADM

## 2019-08-06 RX ORDER — FLUCONAZOLE 150 MG/1
150 TABLET ORAL WEEKLY
Qty: 2 TABLET | Refills: 0 | Status: SHIPPED | OUTPATIENT
Start: 2019-08-06 | End: 2019-08-14

## 2019-08-06 RX ORDER — SODIUM CHLORIDE 0.9 % (FLUSH) 0.9 %
10 SYRINGE (ML) INJECTION PRN
Status: DISCONTINUED | OUTPATIENT
Start: 2019-08-06 | End: 2019-08-06 | Stop reason: HOSPADM

## 2019-08-06 RX ADMIN — SODIUM CHLORIDE 1000 ML: 9 INJECTION, SOLUTION INTRAVENOUS at 15:34

## 2019-08-06 RX ADMIN — SODIUM CHLORIDE 1000 ML: 9 INJECTION, SOLUTION INTRAVENOUS at 16:58

## 2019-08-06 RX ADMIN — INSULIN HUMAN 10 UNITS: 100 INJECTION, SOLUTION PARENTERAL at 15:32

## 2019-08-06 ASSESSMENT — PAIN DESCRIPTION - LOCATION: LOCATION: VAGINA

## 2019-08-06 NOTE — ED NOTES
Bed: 11  Expected date: 8/6/19  Expected time: 1:45 PM  Means of arrival:   Comments:  Medic 2    Hyperglycemia     Maggy Parson  08/06/19 [id number]

## 2019-08-10 LAB — REPORT: NORMAL

## 2019-08-11 LAB — CULTURE, BLOOD 2: NORMAL

## 2019-08-12 LAB
BLOOD CULTURE, ROUTINE: ABNORMAL
BLOOD CULTURE, ROUTINE: ABNORMAL
ORGANISM: ABNORMAL

## 2019-10-07 ENCOUNTER — APPOINTMENT (OUTPATIENT)
Dept: GENERAL RADIOLOGY | Age: 59
End: 2019-10-07
Payer: MEDICARE

## 2019-10-07 ENCOUNTER — HOSPITAL ENCOUNTER (OUTPATIENT)
Age: 59
Setting detail: OBSERVATION
Discharge: HOME | End: 2019-10-09
Attending: EMERGENCY MEDICINE | Admitting: INTERNAL MEDICINE
Payer: MEDICARE

## 2019-10-07 DIAGNOSIS — R73.9 HYPERGLYCEMIA: ICD-10-CM

## 2019-10-07 DIAGNOSIS — R07.9 CHEST PAIN, UNSPECIFIED TYPE: Primary | ICD-10-CM

## 2019-10-07 DIAGNOSIS — N76.0 ACUTE VAGINITIS: ICD-10-CM

## 2019-10-07 LAB
A/G RATIO: 1.3 (ref 1.1–2.2)
ALBUMIN SERPL-MCNC: 3.9 G/DL (ref 3.4–5)
ALP BLD-CCNC: 229 U/L (ref 40–129)
ALT SERPL-CCNC: 12 U/L (ref 10–40)
ANION GAP SERPL CALCULATED.3IONS-SCNC: 15 MMOL/L (ref 3–16)
AST SERPL-CCNC: 13 U/L (ref 15–37)
BASE EXCESS VENOUS: -2.4 MMOL/L (ref -3–3)
BASOPHILS ABSOLUTE: 0.1 K/UL (ref 0–0.2)
BASOPHILS RELATIVE PERCENT: 1.2 %
BILIRUB SERPL-MCNC: <0.2 MG/DL (ref 0–1)
BILIRUBIN URINE: NEGATIVE
BLOOD, URINE: NEGATIVE
BUN BLDV-MCNC: 24 MG/DL (ref 7–20)
CALCIUM SERPL-MCNC: 9.6 MG/DL (ref 8.3–10.6)
CARBOXYHEMOGLOBIN: 1.5 % (ref 0–1.5)
CHLORIDE BLD-SCNC: 91 MMOL/L (ref 99–110)
CLARITY: CLEAR
CO2: 21 MMOL/L (ref 21–32)
COLOR: YELLOW
CREAT SERPL-MCNC: <0.5 MG/DL (ref 0.6–1.1)
D DIMER: <200 NG/ML DDU (ref 0–229)
EOSINOPHILS ABSOLUTE: 0.1 K/UL (ref 0–0.6)
EOSINOPHILS RELATIVE PERCENT: 1.8 %
GFR AFRICAN AMERICAN: >60
GFR NON-AFRICAN AMERICAN: >60
GLOBULIN: 2.9 G/DL
GLUCOSE BLD-MCNC: 265 MG/DL (ref 70–99)
GLUCOSE BLD-MCNC: 315 MG/DL (ref 70–99)
GLUCOSE BLD-MCNC: 362 MG/DL (ref 70–99)
GLUCOSE BLD-MCNC: 420 MG/DL (ref 70–99)
GLUCOSE BLD-MCNC: 545 MG/DL (ref 70–99)
GLUCOSE BLD-MCNC: 825 MG/DL (ref 70–99)
GLUCOSE URINE: >=1000 MG/DL
HCO3 VENOUS: 22.8 MMOL/L (ref 23–29)
HCT VFR BLD CALC: 42.2 % (ref 36–48)
HEMOGLOBIN: 14.7 G/DL (ref 12–16)
INR BLD: 0.84 (ref 0.86–1.14)
KETONES, URINE: ABNORMAL MG/DL
LEUKOCYTE ESTERASE, URINE: NEGATIVE
LYMPHOCYTES ABSOLUTE: 1.3 K/UL (ref 1–5.1)
LYMPHOCYTES RELATIVE PERCENT: 22.1 %
MCH RBC QN AUTO: 33 PG (ref 26–34)
MCHC RBC AUTO-ENTMCNC: 34.9 G/DL (ref 31–36)
MCV RBC AUTO: 94.7 FL (ref 80–100)
METHEMOGLOBIN VENOUS: 0.6 %
MICROSCOPIC EXAMINATION: ABNORMAL
MONOCYTES ABSOLUTE: 0.5 K/UL (ref 0–1.3)
MONOCYTES RELATIVE PERCENT: 8.6 %
NEUTROPHILS ABSOLUTE: 3.8 K/UL (ref 1.7–7.7)
NEUTROPHILS RELATIVE PERCENT: 66.3 %
NITRITE, URINE: NEGATIVE
O2 CONTENT, VEN: 19 VOL %
O2 SAT, VEN: 94 %
O2 THERAPY: ABNORMAL
OSMOLALITY: 323 MOSM/KG (ref 275–295)
PCO2, VEN: 41 MMHG (ref 40–50)
PDW BLD-RTO: 13.4 % (ref 12.4–15.4)
PERFORMED ON: ABNORMAL
PH UA: 5.5 (ref 5–8)
PH VENOUS: 7.36 (ref 7.35–7.45)
PLATELET # BLD: 227 K/UL (ref 135–450)
PMV BLD AUTO: 8.5 FL (ref 5–10.5)
PO2, VEN: 75.6 MMHG (ref 25–40)
POTASSIUM SERPL-SCNC: 4.7 MMOL/L (ref 3.5–5.1)
PRO-BNP: 132 PG/ML (ref 0–124)
PROTEIN UA: NEGATIVE MG/DL
PROTHROMBIN TIME: 9.6 SEC (ref 9.8–13)
RBC # BLD: 4.46 M/UL (ref 4–5.2)
REASON FOR REJECTION: NORMAL
REJECTED TEST: NORMAL
SODIUM BLD-SCNC: 127 MMOL/L (ref 136–145)
SPECIFIC GRAVITY UA: <=1.005 (ref 1–1.03)
TCO2 CALC VENOUS: 24 MMOL/L
TOTAL PROTEIN: 6.8 G/DL (ref 6.4–8.2)
TROPONIN: <0.01 NG/ML
URINE TYPE: ABNORMAL
UROBILINOGEN, URINE: 0.2 E.U./DL
WBC # BLD: 5.7 K/UL (ref 4–11)

## 2019-10-07 PROCEDURE — 6370000000 HC RX 637 (ALT 250 FOR IP): Performed by: NURSE PRACTITIONER

## 2019-10-07 PROCEDURE — 80053 COMPREHEN METABOLIC PANEL: CPT

## 2019-10-07 PROCEDURE — 2580000003 HC RX 258: Performed by: NURSE PRACTITIONER

## 2019-10-07 PROCEDURE — 99285 EMERGENCY DEPT VISIT HI MDM: CPT

## 2019-10-07 PROCEDURE — 96376 TX/PRO/DX INJ SAME DRUG ADON: CPT

## 2019-10-07 PROCEDURE — 96374 THER/PROPH/DIAG INJ IV PUSH: CPT

## 2019-10-07 PROCEDURE — 85379 FIBRIN DEGRADATION QUANT: CPT

## 2019-10-07 PROCEDURE — 84484 ASSAY OF TROPONIN QUANT: CPT

## 2019-10-07 PROCEDURE — 85025 COMPLETE CBC W/AUTO DIFF WBC: CPT

## 2019-10-07 PROCEDURE — 93005 ELECTROCARDIOGRAM TRACING: CPT | Performed by: EMERGENCY MEDICINE

## 2019-10-07 PROCEDURE — 81003 URINALYSIS AUTO W/O SCOPE: CPT

## 2019-10-07 PROCEDURE — 82803 BLOOD GASES ANY COMBINATION: CPT

## 2019-10-07 PROCEDURE — 71046 X-RAY EXAM CHEST 2 VIEWS: CPT

## 2019-10-07 PROCEDURE — G0378 HOSPITAL OBSERVATION PER HR: HCPCS

## 2019-10-07 PROCEDURE — 85610 PROTHROMBIN TIME: CPT

## 2019-10-07 PROCEDURE — 6360000002 HC RX W HCPCS: Performed by: NURSE PRACTITIONER

## 2019-10-07 PROCEDURE — 36415 COLL VENOUS BLD VENIPUNCTURE: CPT

## 2019-10-07 PROCEDURE — 83880 ASSAY OF NATRIURETIC PEPTIDE: CPT

## 2019-10-07 PROCEDURE — 83930 ASSAY OF BLOOD OSMOLALITY: CPT

## 2019-10-07 PROCEDURE — 96375 TX/PRO/DX INJ NEW DRUG ADDON: CPT

## 2019-10-07 RX ORDER — FENOFIBRATE 160 MG/1
160 TABLET ORAL DAILY
Status: DISCONTINUED | OUTPATIENT
Start: 2019-10-07 | End: 2019-10-09 | Stop reason: HOSPADM

## 2019-10-07 RX ORDER — ASPIRIN 81 MG/1
324 TABLET, CHEWABLE ORAL ONCE
Status: COMPLETED | OUTPATIENT
Start: 2019-10-07 | End: 2019-10-07

## 2019-10-07 RX ORDER — FLUCONAZOLE 100 MG/1
150 TABLET ORAL
Status: DISCONTINUED | OUTPATIENT
Start: 2019-10-10 | End: 2019-10-09 | Stop reason: HOSPADM

## 2019-10-07 RX ORDER — FAMOTIDINE 20 MG/1
20 TABLET, FILM COATED ORAL 2 TIMES DAILY
Status: DISCONTINUED | OUTPATIENT
Start: 2019-10-07 | End: 2019-10-09 | Stop reason: HOSPADM

## 2019-10-07 RX ORDER — ASPIRIN 81 MG/1
81 TABLET ORAL DAILY
Status: DISCONTINUED | OUTPATIENT
Start: 2019-10-07 | End: 2019-10-07

## 2019-10-07 RX ORDER — CLOPIDOGREL BISULFATE 75 MG/1
75 TABLET ORAL DAILY
Status: DISCONTINUED | OUTPATIENT
Start: 2019-10-07 | End: 2019-10-09 | Stop reason: HOSPADM

## 2019-10-07 RX ORDER — ASPIRIN 81 MG/1
81 TABLET ORAL DAILY
Status: DISCONTINUED | OUTPATIENT
Start: 2019-10-08 | End: 2019-10-09 | Stop reason: HOSPADM

## 2019-10-07 RX ORDER — SODIUM CHLORIDE 0.9 % (FLUSH) 0.9 %
10 SYRINGE (ML) INJECTION PRN
Status: DISCONTINUED | OUTPATIENT
Start: 2019-10-07 | End: 2019-10-09 | Stop reason: HOSPADM

## 2019-10-07 RX ORDER — FLUOXETINE 10 MG/1
10 CAPSULE ORAL DAILY
Status: DISCONTINUED | OUTPATIENT
Start: 2019-10-07 | End: 2019-10-09 | Stop reason: HOSPADM

## 2019-10-07 RX ORDER — ONDANSETRON 2 MG/ML
4 INJECTION INTRAMUSCULAR; INTRAVENOUS EVERY 6 HOURS PRN
Status: DISCONTINUED | OUTPATIENT
Start: 2019-10-07 | End: 2019-10-09 | Stop reason: HOSPADM

## 2019-10-07 RX ORDER — DEXTROSE MONOHYDRATE 50 MG/ML
100 INJECTION, SOLUTION INTRAVENOUS PRN
Status: DISCONTINUED | OUTPATIENT
Start: 2019-10-07 | End: 2019-10-09 | Stop reason: HOSPADM

## 2019-10-07 RX ORDER — CARVEDILOL 6.25 MG/1
6.25 TABLET ORAL 2 TIMES DAILY WITH MEALS
Status: DISCONTINUED | OUTPATIENT
Start: 2019-10-07 | End: 2019-10-09 | Stop reason: HOSPADM

## 2019-10-07 RX ORDER — ACETAMINOPHEN 325 MG/1
650 TABLET ORAL EVERY 4 HOURS PRN
Status: DISCONTINUED | OUTPATIENT
Start: 2019-10-07 | End: 2019-10-09 | Stop reason: HOSPADM

## 2019-10-07 RX ORDER — DEXTROSE MONOHYDRATE 25 G/50ML
12.5 INJECTION, SOLUTION INTRAVENOUS PRN
Status: DISCONTINUED | OUTPATIENT
Start: 2019-10-07 | End: 2019-10-09 | Stop reason: HOSPADM

## 2019-10-07 RX ORDER — GABAPENTIN 300 MG/1
600 CAPSULE ORAL NIGHTLY
Status: DISCONTINUED | OUTPATIENT
Start: 2019-10-07 | End: 2019-10-09 | Stop reason: HOSPADM

## 2019-10-07 RX ORDER — LISINOPRIL 5 MG/1
2.5 TABLET ORAL DAILY
Status: DISCONTINUED | OUTPATIENT
Start: 2019-10-07 | End: 2019-10-09 | Stop reason: HOSPADM

## 2019-10-07 RX ORDER — FLUCONAZOLE 100 MG/1
200 TABLET ORAL ONCE
Status: COMPLETED | OUTPATIENT
Start: 2019-10-07 | End: 2019-10-07

## 2019-10-07 RX ORDER — INSULIN GLARGINE 100 [IU]/ML
35 INJECTION, SOLUTION SUBCUTANEOUS 2 TIMES DAILY
Status: DISCONTINUED | OUTPATIENT
Start: 2019-10-07 | End: 2019-10-09 | Stop reason: HOSPADM

## 2019-10-07 RX ORDER — DULOXETIN HYDROCHLORIDE 20 MG/1
20 CAPSULE, DELAYED RELEASE ORAL 2 TIMES DAILY
Status: DISCONTINUED | OUTPATIENT
Start: 2019-10-07 | End: 2019-10-09 | Stop reason: HOSPADM

## 2019-10-07 RX ORDER — 0.9 % SODIUM CHLORIDE 0.9 %
1000 INTRAVENOUS SOLUTION INTRAVENOUS ONCE
Status: COMPLETED | OUTPATIENT
Start: 2019-10-07 | End: 2019-10-07

## 2019-10-07 RX ORDER — TOPIRAMATE 100 MG/1
200 TABLET, FILM COATED ORAL DAILY
Status: DISCONTINUED | OUTPATIENT
Start: 2019-10-07 | End: 2019-10-09 | Stop reason: HOSPADM

## 2019-10-07 RX ORDER — SODIUM CHLORIDE 0.9 % (FLUSH) 0.9 %
10 SYRINGE (ML) INJECTION EVERY 12 HOURS SCHEDULED
Status: DISCONTINUED | OUTPATIENT
Start: 2019-10-07 | End: 2019-10-09 | Stop reason: HOSPADM

## 2019-10-07 RX ORDER — MORPHINE SULFATE 2 MG/ML
2 INJECTION, SOLUTION INTRAMUSCULAR; INTRAVENOUS EVERY 4 HOURS PRN
Status: DISCONTINUED | OUTPATIENT
Start: 2019-10-07 | End: 2019-10-09 | Stop reason: HOSPADM

## 2019-10-07 RX ORDER — RANOLAZINE 500 MG/1
500 TABLET, EXTENDED RELEASE ORAL 2 TIMES DAILY
Status: DISCONTINUED | OUTPATIENT
Start: 2019-10-07 | End: 2019-10-09 | Stop reason: HOSPADM

## 2019-10-07 RX ORDER — NICOTINE POLACRILEX 4 MG
15 LOZENGE BUCCAL PRN
Status: DISCONTINUED | OUTPATIENT
Start: 2019-10-07 | End: 2019-10-09 | Stop reason: HOSPADM

## 2019-10-07 RX ORDER — ATORVASTATIN CALCIUM 40 MG/1
40 TABLET, FILM COATED ORAL NIGHTLY
Status: DISCONTINUED | OUTPATIENT
Start: 2019-10-07 | End: 2019-10-09 | Stop reason: HOSPADM

## 2019-10-07 RX ADMIN — INSULIN LISPRO 20 UNITS: 100 INJECTION, SOLUTION INTRAVENOUS; SUBCUTANEOUS at 19:12

## 2019-10-07 RX ADMIN — MORPHINE SULFATE 2 MG: 2 INJECTION, SOLUTION INTRAMUSCULAR; INTRAVENOUS at 21:37

## 2019-10-07 RX ADMIN — INSULIN LISPRO 15 UNITS: 100 INJECTION, SOLUTION INTRAVENOUS; SUBCUTANEOUS at 18:52

## 2019-10-07 RX ADMIN — FLUCONAZOLE 200 MG: 100 TABLET ORAL at 15:04

## 2019-10-07 RX ADMIN — INSULIN LISPRO 6 UNITS: 100 INJECTION, SOLUTION INTRAVENOUS; SUBCUTANEOUS at 15:05

## 2019-10-07 RX ADMIN — INSULIN GLARGINE 35 UNITS: 100 INJECTION, SOLUTION SUBCUTANEOUS at 21:36

## 2019-10-07 RX ADMIN — Medication 10 ML: at 15:10

## 2019-10-07 RX ADMIN — CARVEDILOL 6.25 MG: 6.25 TABLET, FILM COATED ORAL at 15:04

## 2019-10-07 RX ADMIN — MORPHINE SULFATE 2 MG: 2 INJECTION, SOLUTION INTRAMUSCULAR; INTRAVENOUS at 15:10

## 2019-10-07 RX ADMIN — DULOXETINE HYDROCHLORIDE 20 MG: 20 CAPSULE, DELAYED RELEASE ORAL at 21:36

## 2019-10-07 RX ADMIN — Medication 10 ML: at 21:37

## 2019-10-07 RX ADMIN — ASPIRIN 81 MG 324 MG: 81 TABLET ORAL at 11:38

## 2019-10-07 RX ADMIN — SODIUM CHLORIDE 1000 ML: 9 INJECTION, SOLUTION INTRAVENOUS at 11:31

## 2019-10-07 RX ADMIN — INSULIN HUMAN 10 UNITS: 100 INJECTION, SOLUTION PARENTERAL at 11:45

## 2019-10-07 RX ADMIN — FENOFIBRATE 160 MG: 160 TABLET ORAL at 21:36

## 2019-10-07 RX ADMIN — GABAPENTIN 600 MG: 300 CAPSULE ORAL at 21:36

## 2019-10-07 RX ADMIN — NITROGLYCERIN 0.5 INCH: 20 OINTMENT TOPICAL at 11:39

## 2019-10-07 RX ADMIN — INSULIN LISPRO 5 UNITS: 100 INJECTION, SOLUTION INTRAVENOUS; SUBCUTANEOUS at 21:36

## 2019-10-07 RX ADMIN — FAMOTIDINE 20 MG: 20 TABLET ORAL at 21:36

## 2019-10-07 RX ADMIN — ATORVASTATIN CALCIUM 40 MG: 40 TABLET, FILM COATED ORAL at 21:36

## 2019-10-07 ASSESSMENT — PAIN SCALES - GENERAL
PAINLEVEL_OUTOF10: 10

## 2019-10-07 ASSESSMENT — PAIN DESCRIPTION - LOCATION: LOCATION: CHEST

## 2019-10-07 ASSESSMENT — PAIN DESCRIPTION - DESCRIPTORS: DESCRIPTORS: ACHING

## 2019-10-08 LAB
ANION GAP SERPL CALCULATED.3IONS-SCNC: 10 MMOL/L (ref 3–16)
BUN BLDV-MCNC: 15 MG/DL (ref 7–20)
CALCIUM SERPL-MCNC: 9 MG/DL (ref 8.3–10.6)
CHLORIDE BLD-SCNC: 101 MMOL/L (ref 99–110)
CHOLESTEROL, TOTAL: 201 MG/DL (ref 0–199)
CO2: 26 MMOL/L (ref 21–32)
CREAT SERPL-MCNC: <0.5 MG/DL (ref 0.6–1.1)
ESTIMATED AVERAGE GLUCOSE: 349.4 MG/DL
GFR AFRICAN AMERICAN: >60
GFR NON-AFRICAN AMERICAN: >60
GLUCOSE BLD-MCNC: 199 MG/DL (ref 70–99)
GLUCOSE BLD-MCNC: 206 MG/DL (ref 70–99)
GLUCOSE BLD-MCNC: 234 MG/DL (ref 70–99)
GLUCOSE BLD-MCNC: 249 MG/DL (ref 70–99)
GLUCOSE BLD-MCNC: 262 MG/DL (ref 70–99)
HBA1C MFR BLD: 13.8 %
HCT VFR BLD CALC: 40.3 % (ref 36–48)
HDLC SERPL-MCNC: 43 MG/DL (ref 40–60)
HEMOGLOBIN: 14.2 G/DL (ref 12–16)
LDL CHOLESTEROL CALCULATED: 108 MG/DL
LV EF: 38 %
LVEF MODALITY: NORMAL
MCH RBC QN AUTO: 32.1 PG (ref 26–34)
MCHC RBC AUTO-ENTMCNC: 35.1 G/DL (ref 31–36)
MCV RBC AUTO: 91.4 FL (ref 80–100)
PDW BLD-RTO: 13.4 % (ref 12.4–15.4)
PERFORMED ON: ABNORMAL
PLATELET # BLD: 223 K/UL (ref 135–450)
PMV BLD AUTO: 8.2 FL (ref 5–10.5)
POTASSIUM REFLEX MAGNESIUM: 3.8 MMOL/L (ref 3.5–5.1)
RBC # BLD: 4.41 M/UL (ref 4–5.2)
SODIUM BLD-SCNC: 137 MMOL/L (ref 136–145)
TRIGL SERPL-MCNC: 251 MG/DL (ref 0–150)
VLDLC SERPL CALC-MCNC: 50 MG/DL
WBC # BLD: 6 K/UL (ref 4–11)

## 2019-10-08 PROCEDURE — 85027 COMPLETE CBC AUTOMATED: CPT

## 2019-10-08 PROCEDURE — 83036 HEMOGLOBIN GLYCOSYLATED A1C: CPT

## 2019-10-08 PROCEDURE — 80048 BASIC METABOLIC PNL TOTAL CA: CPT

## 2019-10-08 PROCEDURE — 96372 THER/PROPH/DIAG INJ SC/IM: CPT

## 2019-10-08 PROCEDURE — G0378 HOSPITAL OBSERVATION PER HR: HCPCS

## 2019-10-08 PROCEDURE — 80061 LIPID PANEL: CPT

## 2019-10-08 PROCEDURE — 6370000000 HC RX 637 (ALT 250 FOR IP): Performed by: INTERNAL MEDICINE

## 2019-10-08 PROCEDURE — 2580000003 HC RX 258: Performed by: NURSE PRACTITIONER

## 2019-10-08 PROCEDURE — 93306 TTE W/DOPPLER COMPLETE: CPT

## 2019-10-08 PROCEDURE — 6370000000 HC RX 637 (ALT 250 FOR IP): Performed by: NURSE PRACTITIONER

## 2019-10-08 PROCEDURE — 6360000002 HC RX W HCPCS: Performed by: NURSE PRACTITIONER

## 2019-10-08 PROCEDURE — 36415 COLL VENOUS BLD VENIPUNCTURE: CPT

## 2019-10-08 RX ORDER — LANCETS 30 GAUGE
EACH MISCELLANEOUS
Qty: 100 EACH | Refills: 3 | Status: ON HOLD | OUTPATIENT
Start: 2019-10-08 | End: 2020-07-18 | Stop reason: HOSPADM

## 2019-10-08 RX ORDER — FLUCONAZOLE 150 MG/1
150 TABLET ORAL
Qty: 2 TABLET | Refills: 0 | Status: ON HOLD | OUTPATIENT
Start: 2019-10-10 | End: 2019-10-16 | Stop reason: HOSPADM

## 2019-10-08 RX ADMIN — INSULIN GLARGINE 35 UNITS: 100 INJECTION, SOLUTION SUBCUTANEOUS at 09:44

## 2019-10-08 RX ADMIN — Medication 10 ML: at 23:54

## 2019-10-08 RX ADMIN — ATORVASTATIN CALCIUM 40 MG: 40 TABLET, FILM COATED ORAL at 23:51

## 2019-10-08 RX ADMIN — INSULIN LISPRO 2 UNITS: 100 INJECTION, SOLUTION INTRAVENOUS; SUBCUTANEOUS at 23:51

## 2019-10-08 RX ADMIN — FAMOTIDINE 20 MG: 20 TABLET ORAL at 09:44

## 2019-10-08 RX ADMIN — CARVEDILOL 6.25 MG: 6.25 TABLET, FILM COATED ORAL at 18:17

## 2019-10-08 RX ADMIN — FENOFIBRATE 160 MG: 160 TABLET ORAL at 18:17

## 2019-10-08 RX ADMIN — DULOXETINE HYDROCHLORIDE 20 MG: 20 CAPSULE, DELAYED RELEASE ORAL at 23:51

## 2019-10-08 RX ADMIN — DULOXETINE HYDROCHLORIDE 20 MG: 20 CAPSULE, DELAYED RELEASE ORAL at 09:44

## 2019-10-08 RX ADMIN — TOPIRAMATE 200 MG: 100 TABLET, FILM COATED ORAL at 09:44

## 2019-10-08 RX ADMIN — LISINOPRIL 2.5 MG: 5 TABLET ORAL at 09:44

## 2019-10-08 RX ADMIN — INSULIN GLARGINE 35 UNITS: 100 INJECTION, SOLUTION SUBCUTANEOUS at 23:50

## 2019-10-08 RX ADMIN — FAMOTIDINE 20 MG: 20 TABLET ORAL at 23:51

## 2019-10-08 RX ADMIN — ASPIRIN 81 MG: 81 TABLET ORAL at 09:44

## 2019-10-08 RX ADMIN — CARVEDILOL 6.25 MG: 6.25 TABLET, FILM COATED ORAL at 09:44

## 2019-10-08 RX ADMIN — Medication 10 ML: at 09:45

## 2019-10-08 RX ADMIN — ENOXAPARIN SODIUM 40 MG: 40 INJECTION SUBCUTANEOUS at 18:19

## 2019-10-08 RX ADMIN — FLUOXETINE 10 MG: 10 CAPSULE ORAL at 09:44

## 2019-10-08 RX ADMIN — INSULIN LISPRO 9 UNITS: 100 INJECTION, SOLUTION INTRAVENOUS; SUBCUTANEOUS at 09:46

## 2019-10-08 RX ADMIN — CLOPIDOGREL BISULFATE 75 MG: 75 TABLET ORAL at 09:44

## 2019-10-08 RX ADMIN — GABAPENTIN 600 MG: 300 CAPSULE ORAL at 23:51

## 2019-10-08 ASSESSMENT — PAIN DESCRIPTION - PROGRESSION
CLINICAL_PROGRESSION: NOT CHANGED

## 2019-10-08 ASSESSMENT — PAIN DESCRIPTION - LOCATION
LOCATION: CHEST
LOCATION: CHEST

## 2019-10-08 ASSESSMENT — PAIN DESCRIPTION - PAIN TYPE
TYPE: ACUTE PAIN
TYPE: ACUTE PAIN

## 2019-10-08 ASSESSMENT — PAIN SCALES - GENERAL
PAINLEVEL_OUTOF10: 10
PAINLEVEL_OUTOF10: 0
PAINLEVEL_OUTOF10: 10

## 2019-10-09 VITALS
WEIGHT: 193.78 LBS | HEIGHT: 63 IN | RESPIRATION RATE: 18 BRPM | SYSTOLIC BLOOD PRESSURE: 96 MMHG | TEMPERATURE: 98.3 F | OXYGEN SATURATION: 96 % | HEART RATE: 68 BPM | DIASTOLIC BLOOD PRESSURE: 60 MMHG | BODY MASS INDEX: 34.34 KG/M2

## 2019-10-09 LAB
EKG ATRIAL RATE: 105 BPM
EKG DIAGNOSIS: NORMAL
EKG P AXIS: 56 DEGREES
EKG P-R INTERVAL: 162 MS
EKG Q-T INTERVAL: 362 MS
EKG QRS DURATION: 100 MS
EKG QTC CALCULATION (BAZETT): 478 MS
EKG R AXIS: 4 DEGREES
EKG T AXIS: 60 DEGREES
EKG VENTRICULAR RATE: 105 BPM
GLUCOSE BLD-MCNC: 221 MG/DL (ref 70–99)
PERFORMED ON: ABNORMAL

## 2019-10-09 PROCEDURE — 99214 OFFICE O/P EST MOD 30 MIN: CPT | Performed by: INTERNAL MEDICINE

## 2019-10-09 PROCEDURE — G0378 HOSPITAL OBSERVATION PER HR: HCPCS

## 2019-10-09 PROCEDURE — 6370000000 HC RX 637 (ALT 250 FOR IP): Performed by: NURSE PRACTITIONER

## 2019-10-09 PROCEDURE — 93010 ELECTROCARDIOGRAM REPORT: CPT | Performed by: INTERNAL MEDICINE

## 2019-10-09 PROCEDURE — 6370000000 HC RX 637 (ALT 250 FOR IP): Performed by: INTERNAL MEDICINE

## 2019-10-09 RX ADMIN — INSULIN GLARGINE 35 UNITS: 100 INJECTION, SOLUTION SUBCUTANEOUS at 08:38

## 2019-10-09 RX ADMIN — FAMOTIDINE 20 MG: 20 TABLET ORAL at 08:38

## 2019-10-09 RX ADMIN — CLOPIDOGREL BISULFATE 75 MG: 75 TABLET ORAL at 08:38

## 2019-10-09 RX ADMIN — ASPIRIN 81 MG: 81 TABLET ORAL at 08:38

## 2019-10-09 ASSESSMENT — PAIN DESCRIPTION - PROGRESSION
CLINICAL_PROGRESSION: NOT CHANGED

## 2019-10-09 ASSESSMENT — PAIN SCALES - GENERAL
PAINLEVEL_OUTOF10: 0
PAINLEVEL_OUTOF10: 0
PAINLEVEL_OUTOF10: 10

## 2019-10-09 ASSESSMENT — PAIN DESCRIPTION - LOCATION: LOCATION: CHEST

## 2019-10-09 ASSESSMENT — PAIN DESCRIPTION - PAIN TYPE: TYPE: ACUTE PAIN

## 2019-10-11 ENCOUNTER — HOSPITAL ENCOUNTER (INPATIENT)
Age: 59
LOS: 3 days | Discharge: HOME | DRG: 065 | End: 2019-10-16
Attending: EMERGENCY MEDICINE | Admitting: INTERNAL MEDICINE
Payer: MEDICARE

## 2019-10-11 ENCOUNTER — APPOINTMENT (OUTPATIENT)
Dept: GENERAL RADIOLOGY | Age: 59
DRG: 065 | End: 2019-10-11
Payer: MEDICARE

## 2019-10-11 ENCOUNTER — APPOINTMENT (OUTPATIENT)
Dept: CT IMAGING | Age: 59
DRG: 065 | End: 2019-10-11
Payer: MEDICARE

## 2019-10-11 DIAGNOSIS — E04.2 MULTINODULAR GOITER: ICD-10-CM

## 2019-10-11 DIAGNOSIS — R73.9 HYPERGLYCEMIA: ICD-10-CM

## 2019-10-11 DIAGNOSIS — R07.9 CHEST PAIN, UNSPECIFIED TYPE: Primary | ICD-10-CM

## 2019-10-11 LAB
A/G RATIO: 1.5 (ref 1.1–2.2)
ALBUMIN SERPL-MCNC: 4 G/DL (ref 3.4–5)
ALP BLD-CCNC: 123 U/L (ref 40–129)
ALT SERPL-CCNC: 16 U/L (ref 10–40)
ANION GAP SERPL CALCULATED.3IONS-SCNC: 14 MMOL/L (ref 3–16)
AST SERPL-CCNC: 12 U/L (ref 15–37)
BASE EXCESS VENOUS: -0.9 MMOL/L (ref -3–3)
BASOPHILS ABSOLUTE: 0.1 K/UL (ref 0–0.2)
BASOPHILS RELATIVE PERCENT: 0.8 %
BILIRUB SERPL-MCNC: 0.3 MG/DL (ref 0–1)
BUN BLDV-MCNC: 22 MG/DL (ref 7–20)
CALCIUM SERPL-MCNC: 9.1 MG/DL (ref 8.3–10.6)
CARBOXYHEMOGLOBIN: 1.3 % (ref 0–1.5)
CHLORIDE BLD-SCNC: 93 MMOL/L (ref 99–110)
CO2: 24 MMOL/L (ref 21–32)
CREAT SERPL-MCNC: <0.5 MG/DL (ref 0.6–1.1)
EKG ATRIAL RATE: 94 BPM
EKG DIAGNOSIS: NORMAL
EKG P AXIS: 55 DEGREES
EKG P-R INTERVAL: 152 MS
EKG Q-T INTERVAL: 412 MS
EKG QRS DURATION: 100 MS
EKG QTC CALCULATION (BAZETT): 515 MS
EKG R AXIS: 12 DEGREES
EKG T AXIS: 66 DEGREES
EKG VENTRICULAR RATE: 94 BPM
EOSINOPHILS ABSOLUTE: 0.1 K/UL (ref 0–0.6)
EOSINOPHILS RELATIVE PERCENT: 1.4 %
GFR AFRICAN AMERICAN: >60
GFR NON-AFRICAN AMERICAN: >60
GLOBULIN: 2.7 G/DL
GLUCOSE BLD-MCNC: 362 MG/DL (ref 70–99)
GLUCOSE BLD-MCNC: 540 MG/DL (ref 70–99)
HCO3 VENOUS: 24.5 MMOL/L (ref 23–29)
HCT VFR BLD CALC: 40.8 % (ref 36–48)
HEMOGLOBIN: 14.1 G/DL (ref 12–16)
INR BLD: 0.99 (ref 0.86–1.14)
LYMPHOCYTES ABSOLUTE: 2.1 K/UL (ref 1–5.1)
LYMPHOCYTES RELATIVE PERCENT: 23.6 %
MCH RBC QN AUTO: 32.5 PG (ref 26–34)
MCHC RBC AUTO-ENTMCNC: 34.7 G/DL (ref 31–36)
MCV RBC AUTO: 93.8 FL (ref 80–100)
METHEMOGLOBIN VENOUS: 0.7 %
MONOCYTES ABSOLUTE: 0.5 K/UL (ref 0–1.3)
MONOCYTES RELATIVE PERCENT: 5.2 %
NEUTROPHILS ABSOLUTE: 6.1 K/UL (ref 1.7–7.7)
NEUTROPHILS RELATIVE PERCENT: 69 %
O2 CONTENT, VEN: 14 VOL %
O2 SAT, VEN: 72 %
O2 THERAPY: NORMAL
PCO2, VEN: 43 MMHG (ref 40–50)
PDW BLD-RTO: 13.3 % (ref 12.4–15.4)
PERFORMED ON: ABNORMAL
PH VENOUS: 7.37 (ref 7.35–7.45)
PLATELET # BLD: 222 K/UL (ref 135–450)
PMV BLD AUTO: 8.8 FL (ref 5–10.5)
PO2, VEN: 38.3 MMHG (ref 25–40)
POTASSIUM SERPL-SCNC: 3.6 MMOL/L (ref 3.5–5.1)
PRO-BNP: 101 PG/ML (ref 0–124)
PROTHROMBIN TIME: 11.3 SEC (ref 9.8–13)
RBC # BLD: 4.35 M/UL (ref 4–5.2)
SODIUM BLD-SCNC: 131 MMOL/L (ref 136–145)
TCO2 CALC VENOUS: 26 MMOL/L
TOTAL PROTEIN: 6.7 G/DL (ref 6.4–8.2)
TROPONIN: <0.01 NG/ML
TROPONIN: <0.01 NG/ML
WBC # BLD: 8.9 K/UL (ref 4–11)

## 2019-10-11 PROCEDURE — 83036 HEMOGLOBIN GLYCOSYLATED A1C: CPT

## 2019-10-11 PROCEDURE — 71046 X-RAY EXAM CHEST 2 VIEWS: CPT

## 2019-10-11 PROCEDURE — 84484 ASSAY OF TROPONIN QUANT: CPT

## 2019-10-11 PROCEDURE — G0378 HOSPITAL OBSERVATION PER HR: HCPCS

## 2019-10-11 PROCEDURE — 6360000004 HC RX CONTRAST MEDICATION: Performed by: EMERGENCY MEDICINE

## 2019-10-11 PROCEDURE — 2580000003 HC RX 258: Performed by: EMERGENCY MEDICINE

## 2019-10-11 PROCEDURE — 93005 ELECTROCARDIOGRAM TRACING: CPT | Performed by: EMERGENCY MEDICINE

## 2019-10-11 PROCEDURE — 71260 CT THORAX DX C+: CPT

## 2019-10-11 PROCEDURE — 36415 COLL VENOUS BLD VENIPUNCTURE: CPT

## 2019-10-11 PROCEDURE — 99285 EMERGENCY DEPT VISIT HI MDM: CPT

## 2019-10-11 PROCEDURE — 80053 COMPREHEN METABOLIC PANEL: CPT

## 2019-10-11 PROCEDURE — 85610 PROTHROMBIN TIME: CPT

## 2019-10-11 PROCEDURE — 6370000000 HC RX 637 (ALT 250 FOR IP): Performed by: INTERNAL MEDICINE

## 2019-10-11 PROCEDURE — 93010 ELECTROCARDIOGRAM REPORT: CPT | Performed by: INTERNAL MEDICINE

## 2019-10-11 PROCEDURE — 96360 HYDRATION IV INFUSION INIT: CPT

## 2019-10-11 PROCEDURE — 6370000000 HC RX 637 (ALT 250 FOR IP): Performed by: EMERGENCY MEDICINE

## 2019-10-11 PROCEDURE — 83880 ASSAY OF NATRIURETIC PEPTIDE: CPT

## 2019-10-11 PROCEDURE — 85025 COMPLETE CBC W/AUTO DIFF WBC: CPT

## 2019-10-11 PROCEDURE — 2580000003 HC RX 258: Performed by: INTERNAL MEDICINE

## 2019-10-11 PROCEDURE — 82803 BLOOD GASES ANY COMBINATION: CPT

## 2019-10-11 RX ORDER — ASPIRIN 81 MG/1
81 TABLET, CHEWABLE ORAL DAILY
Status: DISCONTINUED | OUTPATIENT
Start: 2019-10-12 | End: 2019-10-16 | Stop reason: HOSPADM

## 2019-10-11 RX ORDER — FENOFIBRATE 160 MG/1
160 TABLET ORAL DAILY
Status: DISCONTINUED | OUTPATIENT
Start: 2019-10-12 | End: 2019-10-16 | Stop reason: HOSPADM

## 2019-10-11 RX ORDER — LISINOPRIL 2.5 MG/1
2.5 TABLET ORAL DAILY
Status: DISCONTINUED | OUTPATIENT
Start: 2019-10-12 | End: 2019-10-16 | Stop reason: HOSPADM

## 2019-10-11 RX ORDER — INSULIN GLARGINE 100 [IU]/ML
35 INJECTION, SOLUTION SUBCUTANEOUS 2 TIMES DAILY
Status: DISCONTINUED | OUTPATIENT
Start: 2019-10-11 | End: 2019-10-16 | Stop reason: HOSPADM

## 2019-10-11 RX ORDER — FAMOTIDINE 20 MG/1
20 TABLET, FILM COATED ORAL 2 TIMES DAILY
Status: DISCONTINUED | OUTPATIENT
Start: 2019-10-11 | End: 2019-10-11 | Stop reason: SDUPTHER

## 2019-10-11 RX ORDER — RANOLAZINE 500 MG/1
500 TABLET, EXTENDED RELEASE ORAL 2 TIMES DAILY
Status: DISCONTINUED | OUTPATIENT
Start: 2019-10-11 | End: 2019-10-16 | Stop reason: HOSPADM

## 2019-10-11 RX ORDER — SODIUM CHLORIDE 0.9 % (FLUSH) 0.9 %
10 SYRINGE (ML) INJECTION PRN
Status: DISCONTINUED | OUTPATIENT
Start: 2019-10-11 | End: 2019-10-16 | Stop reason: HOSPADM

## 2019-10-11 RX ORDER — 0.9 % SODIUM CHLORIDE 0.9 %
500 INTRAVENOUS SOLUTION INTRAVENOUS ONCE
Status: COMPLETED | OUTPATIENT
Start: 2019-10-11 | End: 2019-10-11

## 2019-10-11 RX ORDER — FAMOTIDINE 20 MG/1
20 TABLET, FILM COATED ORAL 2 TIMES DAILY
Status: DISCONTINUED | OUTPATIENT
Start: 2019-10-11 | End: 2019-10-16 | Stop reason: HOSPADM

## 2019-10-11 RX ORDER — TOPIRAMATE 100 MG/1
200 TABLET, FILM COATED ORAL DAILY
Status: DISCONTINUED | OUTPATIENT
Start: 2019-10-12 | End: 2019-10-16 | Stop reason: HOSPADM

## 2019-10-11 RX ORDER — CARVEDILOL 6.25 MG/1
6.25 TABLET ORAL 2 TIMES DAILY WITH MEALS
Status: DISCONTINUED | OUTPATIENT
Start: 2019-10-12 | End: 2019-10-16 | Stop reason: HOSPADM

## 2019-10-11 RX ORDER — SODIUM CHLORIDE 0.9 % (FLUSH) 0.9 %
10 SYRINGE (ML) INJECTION EVERY 12 HOURS SCHEDULED
Status: DISCONTINUED | OUTPATIENT
Start: 2019-10-11 | End: 2019-10-16 | Stop reason: HOSPADM

## 2019-10-11 RX ORDER — DEXTROSE MONOHYDRATE 25 G/50ML
12.5 INJECTION, SOLUTION INTRAVENOUS PRN
Status: DISCONTINUED | OUTPATIENT
Start: 2019-10-11 | End: 2019-10-16 | Stop reason: HOSPADM

## 2019-10-11 RX ORDER — GABAPENTIN 300 MG/1
600 CAPSULE ORAL NIGHTLY
Status: DISCONTINUED | OUTPATIENT
Start: 2019-10-11 | End: 2019-10-16 | Stop reason: HOSPADM

## 2019-10-11 RX ORDER — CLOPIDOGREL BISULFATE 75 MG/1
75 TABLET ORAL DAILY
Status: DISCONTINUED | OUTPATIENT
Start: 2019-10-12 | End: 2019-10-16 | Stop reason: HOSPADM

## 2019-10-11 RX ORDER — DULOXETIN HYDROCHLORIDE 20 MG/1
20 CAPSULE, DELAYED RELEASE ORAL 2 TIMES DAILY
Status: DISCONTINUED | OUTPATIENT
Start: 2019-10-11 | End: 2019-10-16

## 2019-10-11 RX ORDER — ASPIRIN 81 MG/1
81 TABLET ORAL DAILY
Status: DISCONTINUED | OUTPATIENT
Start: 2019-10-12 | End: 2019-10-11 | Stop reason: SDUPTHER

## 2019-10-11 RX ORDER — ATORVASTATIN CALCIUM 40 MG/1
40 TABLET, FILM COATED ORAL NIGHTLY
Status: DISCONTINUED | OUTPATIENT
Start: 2019-10-11 | End: 2019-10-16 | Stop reason: HOSPADM

## 2019-10-11 RX ORDER — DEXTROSE MONOHYDRATE 50 MG/ML
100 INJECTION, SOLUTION INTRAVENOUS PRN
Status: DISCONTINUED | OUTPATIENT
Start: 2019-10-11 | End: 2019-10-16 | Stop reason: HOSPADM

## 2019-10-11 RX ORDER — NICOTINE POLACRILEX 4 MG
15 LOZENGE BUCCAL PRN
Status: DISCONTINUED | OUTPATIENT
Start: 2019-10-11 | End: 2019-10-16 | Stop reason: HOSPADM

## 2019-10-11 RX ORDER — ATORVASTATIN CALCIUM 40 MG/1
40 TABLET, FILM COATED ORAL NIGHTLY
Status: DISCONTINUED | OUTPATIENT
Start: 2019-10-11 | End: 2019-10-11 | Stop reason: SDUPTHER

## 2019-10-11 RX ORDER — ONDANSETRON 2 MG/ML
4 INJECTION INTRAMUSCULAR; INTRAVENOUS EVERY 6 HOURS PRN
Status: DISCONTINUED | OUTPATIENT
Start: 2019-10-11 | End: 2019-10-16 | Stop reason: HOSPADM

## 2019-10-11 RX ADMIN — INSULIN HUMAN 6 UNITS: 100 INJECTION, SOLUTION PARENTERAL at 18:49

## 2019-10-11 RX ADMIN — INSULIN LISPRO 3 UNITS: 100 INJECTION, SOLUTION INTRAVENOUS; SUBCUTANEOUS at 21:35

## 2019-10-11 RX ADMIN — ATORVASTATIN CALCIUM 40 MG: 40 TABLET, FILM COATED ORAL at 21:36

## 2019-10-11 RX ADMIN — RANOLAZINE 500 MG: 500 TABLET, FILM COATED, EXTENDED RELEASE ORAL at 21:36

## 2019-10-11 RX ADMIN — FAMOTIDINE 20 MG: 20 TABLET ORAL at 21:36

## 2019-10-11 RX ADMIN — SODIUM CHLORIDE 500 ML: 9 INJECTION, SOLUTION INTRAVENOUS at 18:55

## 2019-10-11 RX ADMIN — IOPAMIDOL 75 ML: 755 INJECTION, SOLUTION INTRAVENOUS at 17:10

## 2019-10-11 RX ADMIN — DULOXETINE HYDROCHLORIDE 20 MG: 20 CAPSULE, DELAYED RELEASE ORAL at 21:36

## 2019-10-11 RX ADMIN — Medication 10 ML: at 21:36

## 2019-10-11 RX ADMIN — GABAPENTIN 600 MG: 300 CAPSULE ORAL at 21:36

## 2019-10-11 RX ADMIN — INSULIN GLARGINE 35 UNITS: 100 INJECTION, SOLUTION SUBCUTANEOUS at 21:33

## 2019-10-11 ASSESSMENT — PAIN SCALES - GENERAL
PAINLEVEL_OUTOF10: 10
PAINLEVEL_OUTOF10: 0
PAINLEVEL_OUTOF10: 10
PAINLEVEL_OUTOF10: 10

## 2019-10-11 ASSESSMENT — PAIN DESCRIPTION - LOCATION
LOCATION: CHEST

## 2019-10-11 ASSESSMENT — PAIN DESCRIPTION - ORIENTATION: ORIENTATION: MID

## 2019-10-11 ASSESSMENT — HEART SCORE: ECG: 1

## 2019-10-11 ASSESSMENT — PAIN DESCRIPTION - PAIN TYPE: TYPE: ACUTE PAIN

## 2019-10-12 LAB
ANION GAP SERPL CALCULATED.3IONS-SCNC: 10 MMOL/L (ref 3–16)
BUN BLDV-MCNC: 15 MG/DL (ref 7–20)
CALCIUM SERPL-MCNC: 8.9 MG/DL (ref 8.3–10.6)
CHLORIDE BLD-SCNC: 103 MMOL/L (ref 99–110)
CHOLESTEROL, TOTAL: 166 MG/DL (ref 0–199)
CO2: 26 MMOL/L (ref 21–32)
CREAT SERPL-MCNC: <0.5 MG/DL (ref 0.6–1.1)
ESTIMATED AVERAGE GLUCOSE: 363.7 MG/DL
GFR AFRICAN AMERICAN: >60
GFR NON-AFRICAN AMERICAN: >60
GLUCOSE BLD-MCNC: 167 MG/DL (ref 70–99)
GLUCOSE BLD-MCNC: 217 MG/DL (ref 70–99)
GLUCOSE BLD-MCNC: 222 MG/DL (ref 70–99)
GLUCOSE BLD-MCNC: 243 MG/DL (ref 70–99)
GLUCOSE BLD-MCNC: 99 MG/DL (ref 70–99)
HBA1C MFR BLD: 14.3 %
HCT VFR BLD CALC: 39.6 % (ref 36–48)
HDLC SERPL-MCNC: 51 MG/DL (ref 40–60)
HEMOGLOBIN: 13.9 G/DL (ref 12–16)
LDL CHOLESTEROL CALCULATED: 87 MG/DL
MCH RBC QN AUTO: 32 PG (ref 26–34)
MCHC RBC AUTO-ENTMCNC: 35.1 G/DL (ref 31–36)
MCV RBC AUTO: 91.2 FL (ref 80–100)
PDW BLD-RTO: 13.3 % (ref 12.4–15.4)
PERFORMED ON: ABNORMAL
PERFORMED ON: NORMAL
PLATELET # BLD: 209 K/UL (ref 135–450)
PMV BLD AUTO: 8.3 FL (ref 5–10.5)
POTASSIUM REFLEX MAGNESIUM: 3.8 MMOL/L (ref 3.5–5.1)
RBC # BLD: 4.34 M/UL (ref 4–5.2)
SODIUM BLD-SCNC: 139 MMOL/L (ref 136–145)
TRIGL SERPL-MCNC: 142 MG/DL (ref 0–150)
TROPONIN: <0.01 NG/ML
VLDLC SERPL CALC-MCNC: 28 MG/DL
WBC # BLD: 5.3 K/UL (ref 4–11)

## 2019-10-12 PROCEDURE — 6360000002 HC RX W HCPCS

## 2019-10-12 PROCEDURE — 96372 THER/PROPH/DIAG INJ SC/IM: CPT

## 2019-10-12 PROCEDURE — G0378 HOSPITAL OBSERVATION PER HR: HCPCS

## 2019-10-12 PROCEDURE — 2580000003 HC RX 258: Performed by: INTERNAL MEDICINE

## 2019-10-12 PROCEDURE — 6360000002 HC RX W HCPCS: Performed by: INTERNAL MEDICINE

## 2019-10-12 PROCEDURE — 85027 COMPLETE CBC AUTOMATED: CPT

## 2019-10-12 PROCEDURE — 6370000000 HC RX 637 (ALT 250 FOR IP): Performed by: INTERNAL MEDICINE

## 2019-10-12 PROCEDURE — G0008 ADMIN INFLUENZA VIRUS VAC: HCPCS

## 2019-10-12 PROCEDURE — 80048 BASIC METABOLIC PNL TOTAL CA: CPT

## 2019-10-12 PROCEDURE — 90686 IIV4 VACC NO PRSV 0.5 ML IM: CPT

## 2019-10-12 PROCEDURE — 80061 LIPID PANEL: CPT

## 2019-10-12 PROCEDURE — 99222 1ST HOSP IP/OBS MODERATE 55: CPT | Performed by: INTERNAL MEDICINE

## 2019-10-12 PROCEDURE — 84484 ASSAY OF TROPONIN QUANT: CPT

## 2019-10-12 RX ADMIN — TOPIRAMATE 200 MG: 100 TABLET, FILM COATED ORAL at 13:14

## 2019-10-12 RX ADMIN — ENOXAPARIN SODIUM 40 MG: 40 INJECTION SUBCUTANEOUS at 13:15

## 2019-10-12 RX ADMIN — RANOLAZINE 500 MG: 500 TABLET, FILM COATED, EXTENDED RELEASE ORAL at 13:15

## 2019-10-12 RX ADMIN — FENOFIBRATE 160 MG: 160 TABLET ORAL at 13:15

## 2019-10-12 RX ADMIN — CLOPIDOGREL BISULFATE 75 MG: 75 TABLET ORAL at 13:17

## 2019-10-12 RX ADMIN — ASPIRIN 81 MG 81 MG: 81 TABLET ORAL at 13:17

## 2019-10-12 RX ADMIN — METFORMIN HYDROCHLORIDE 1000 MG: 500 TABLET ORAL at 13:14

## 2019-10-12 RX ADMIN — LISINOPRIL 2.5 MG: 2.5 TABLET ORAL at 13:14

## 2019-10-12 RX ADMIN — INSULIN LISPRO 1 UNITS: 100 INJECTION, SOLUTION INTRAVENOUS; SUBCUTANEOUS at 17:07

## 2019-10-12 RX ADMIN — INSULIN GLARGINE 35 UNITS: 100 INJECTION, SOLUTION SUBCUTANEOUS at 09:27

## 2019-10-12 RX ADMIN — RANOLAZINE 500 MG: 500 TABLET, FILM COATED, EXTENDED RELEASE ORAL at 21:19

## 2019-10-12 RX ADMIN — Medication 10 ML: at 21:20

## 2019-10-12 RX ADMIN — INSULIN LISPRO 20 UNITS: 100 INJECTION, SOLUTION INTRAVENOUS; SUBCUTANEOUS at 17:07

## 2019-10-12 RX ADMIN — DULOXETINE HYDROCHLORIDE 20 MG: 20 CAPSULE, DELAYED RELEASE ORAL at 13:15

## 2019-10-12 RX ADMIN — CARVEDILOL 6.25 MG: 6.25 TABLET, FILM COATED ORAL at 13:15

## 2019-10-12 RX ADMIN — FAMOTIDINE 20 MG: 20 TABLET ORAL at 21:18

## 2019-10-12 RX ADMIN — METFORMIN HYDROCHLORIDE 1000 MG: 500 TABLET ORAL at 17:07

## 2019-10-12 RX ADMIN — GABAPENTIN 600 MG: 300 CAPSULE ORAL at 21:19

## 2019-10-12 RX ADMIN — INSULIN GLARGINE 35 UNITS: 100 INJECTION, SOLUTION SUBCUTANEOUS at 21:19

## 2019-10-12 RX ADMIN — INFLUENZA A VIRUS A/BRISBANE/02/2018 IVR-190 (H1N1) ANTIGEN (PROPIOLACTONE INACTIVATED), INFLUENZA A VIRUS A/KANSAS/14/2017 X-327 (H3N2) ANTIGEN (PROPIOLACTONE INACTIVATED), INFLUENZA B VIRUS B/MARYLAND/15/2016 ANTIGEN (PROPIOLACTONE INACTIVATED), INFLUENZA B VIRUS B/PHUKET/3073/2013 BVR-1B ANTIGEN (PROPIOLACTONE INACTIVATED) 0.5 ML: 15; 15; 15; 15 INJECTION, SUSPENSION INTRAMUSCULAR at 13:17

## 2019-10-12 RX ADMIN — ATORVASTATIN CALCIUM 40 MG: 40 TABLET, FILM COATED ORAL at 21:19

## 2019-10-12 RX ADMIN — FAMOTIDINE 20 MG: 20 TABLET ORAL at 13:15

## 2019-10-12 RX ADMIN — INSULIN LISPRO 20 UNITS: 100 INJECTION, SOLUTION INTRAVENOUS; SUBCUTANEOUS at 13:52

## 2019-10-12 RX ADMIN — INSULIN LISPRO 2 UNITS: 100 INJECTION, SOLUTION INTRAVENOUS; SUBCUTANEOUS at 13:26

## 2019-10-12 RX ADMIN — DULOXETINE HYDROCHLORIDE 20 MG: 20 CAPSULE, DELAYED RELEASE ORAL at 21:19

## 2019-10-13 ENCOUNTER — APPOINTMENT (OUTPATIENT)
Dept: CT IMAGING | Age: 59
DRG: 065 | End: 2019-10-13
Payer: MEDICARE

## 2019-10-13 LAB
ANION GAP SERPL CALCULATED.3IONS-SCNC: 10 MMOL/L (ref 3–16)
BUN BLDV-MCNC: 13 MG/DL (ref 7–20)
CALCIUM SERPL-MCNC: 9.7 MG/DL (ref 8.3–10.6)
CHLORIDE BLD-SCNC: 102 MMOL/L (ref 99–110)
CO2: 24 MMOL/L (ref 21–32)
CREAT SERPL-MCNC: <0.5 MG/DL (ref 0.6–1.1)
EKG ATRIAL RATE: 74 BPM
EKG DIAGNOSIS: NORMAL
EKG P AXIS: -14 DEGREES
EKG P-R INTERVAL: 162 MS
EKG Q-T INTERVAL: 414 MS
EKG QRS DURATION: 100 MS
EKG QTC CALCULATION (BAZETT): 459 MS
EKG R AXIS: 9 DEGREES
EKG T AXIS: 23 DEGREES
EKG VENTRICULAR RATE: 74 BPM
GFR AFRICAN AMERICAN: >60
GFR NON-AFRICAN AMERICAN: >60
GLUCOSE BLD-MCNC: 145 MG/DL (ref 70–99)
GLUCOSE BLD-MCNC: 146 MG/DL (ref 70–99)
GLUCOSE BLD-MCNC: 167 MG/DL (ref 70–99)
GLUCOSE BLD-MCNC: 243 MG/DL (ref 70–99)
GLUCOSE BLD-MCNC: 395 MG/DL (ref 70–99)
HCT VFR BLD CALC: 43.6 % (ref 36–48)
HEMOGLOBIN: 15.1 G/DL (ref 12–16)
MAGNESIUM: 2.1 MG/DL (ref 1.8–2.4)
MCH RBC QN AUTO: 32.1 PG (ref 26–34)
MCHC RBC AUTO-ENTMCNC: 34.5 G/DL (ref 31–36)
MCV RBC AUTO: 93 FL (ref 80–100)
PDW BLD-RTO: 13.4 % (ref 12.4–15.4)
PERFORMED ON: ABNORMAL
PLATELET # BLD: 229 K/UL (ref 135–450)
PLATELET SLIDE REVIEW: ADEQUATE
PMV BLD AUTO: 8.6 FL (ref 5–10.5)
POTASSIUM SERPL-SCNC: 4.6 MMOL/L (ref 3.5–5.1)
RBC # BLD: 4.69 M/UL (ref 4–5.2)
SLIDE REVIEW: NORMAL
SODIUM BLD-SCNC: 136 MMOL/L (ref 136–145)
WBC # BLD: 8.7 K/UL (ref 4–11)

## 2019-10-13 PROCEDURE — 6360000002 HC RX W HCPCS: Performed by: INTERNAL MEDICINE

## 2019-10-13 PROCEDURE — 97530 THERAPEUTIC ACTIVITIES: CPT

## 2019-10-13 PROCEDURE — 70496 CT ANGIOGRAPHY HEAD: CPT

## 2019-10-13 PROCEDURE — 85027 COMPLETE CBC AUTOMATED: CPT

## 2019-10-13 PROCEDURE — 6370000000 HC RX 637 (ALT 250 FOR IP): Performed by: INTERNAL MEDICINE

## 2019-10-13 PROCEDURE — 83735 ASSAY OF MAGNESIUM: CPT

## 2019-10-13 PROCEDURE — 2580000003 HC RX 258: Performed by: INTERNAL MEDICINE

## 2019-10-13 PROCEDURE — 36415 COLL VENOUS BLD VENIPUNCTURE: CPT

## 2019-10-13 PROCEDURE — 80048 BASIC METABOLIC PNL TOTAL CA: CPT

## 2019-10-13 PROCEDURE — 97162 PT EVAL MOD COMPLEX 30 MIN: CPT

## 2019-10-13 PROCEDURE — 93010 ELECTROCARDIOGRAM REPORT: CPT | Performed by: INTERNAL MEDICINE

## 2019-10-13 PROCEDURE — 97110 THERAPEUTIC EXERCISES: CPT

## 2019-10-13 PROCEDURE — G0378 HOSPITAL OBSERVATION PER HR: HCPCS

## 2019-10-13 PROCEDURE — 93005 ELECTROCARDIOGRAM TRACING: CPT | Performed by: INTERNAL MEDICINE

## 2019-10-13 PROCEDURE — 70450 CT HEAD/BRAIN W/O DYE: CPT

## 2019-10-13 PROCEDURE — 96372 THER/PROPH/DIAG INJ SC/IM: CPT

## 2019-10-13 PROCEDURE — 6360000004 HC RX CONTRAST MEDICATION: Performed by: INTERNAL MEDICINE

## 2019-10-13 PROCEDURE — 1200000000 HC SEMI PRIVATE

## 2019-10-13 RX ADMIN — CARVEDILOL 6.25 MG: 6.25 TABLET, FILM COATED ORAL at 17:12

## 2019-10-13 RX ADMIN — ATORVASTATIN CALCIUM 40 MG: 40 TABLET, FILM COATED ORAL at 20:53

## 2019-10-13 RX ADMIN — Medication 10 ML: at 09:15

## 2019-10-13 RX ADMIN — INSULIN LISPRO 1 UNITS: 100 INJECTION, SOLUTION INTRAVENOUS; SUBCUTANEOUS at 18:38

## 2019-10-13 RX ADMIN — FENOFIBRATE 160 MG: 160 TABLET ORAL at 09:07

## 2019-10-13 RX ADMIN — TOPIRAMATE 200 MG: 100 TABLET, FILM COATED ORAL at 09:07

## 2019-10-13 RX ADMIN — METFORMIN HYDROCHLORIDE 1000 MG: 500 TABLET ORAL at 17:12

## 2019-10-13 RX ADMIN — Medication 10 ML: at 20:59

## 2019-10-13 RX ADMIN — GABAPENTIN 600 MG: 300 CAPSULE ORAL at 20:53

## 2019-10-13 RX ADMIN — CLOPIDOGREL BISULFATE 75 MG: 75 TABLET ORAL at 09:07

## 2019-10-13 RX ADMIN — DULOXETINE HYDROCHLORIDE 20 MG: 20 CAPSULE, DELAYED RELEASE ORAL at 20:53

## 2019-10-13 RX ADMIN — INSULIN LISPRO 1 UNITS: 100 INJECTION, SOLUTION INTRAVENOUS; SUBCUTANEOUS at 20:54

## 2019-10-13 RX ADMIN — INSULIN LISPRO 5 UNITS: 100 INJECTION, SOLUTION INTRAVENOUS; SUBCUTANEOUS at 12:06

## 2019-10-13 RX ADMIN — ASPIRIN 81 MG 81 MG: 81 TABLET ORAL at 09:07

## 2019-10-13 RX ADMIN — RANOLAZINE 500 MG: 500 TABLET, FILM COATED, EXTENDED RELEASE ORAL at 09:06

## 2019-10-13 RX ADMIN — INSULIN GLARGINE 35 UNITS: 100 INJECTION, SOLUTION SUBCUTANEOUS at 20:54

## 2019-10-13 RX ADMIN — RANOLAZINE 500 MG: 500 TABLET, FILM COATED, EXTENDED RELEASE ORAL at 20:53

## 2019-10-13 RX ADMIN — INSULIN LISPRO 20 UNITS: 100 INJECTION, SOLUTION INTRAVENOUS; SUBCUTANEOUS at 18:38

## 2019-10-13 RX ADMIN — IOPAMIDOL 75 ML: 755 INJECTION, SOLUTION INTRAVENOUS at 08:11

## 2019-10-13 RX ADMIN — FAMOTIDINE 20 MG: 20 TABLET ORAL at 20:53

## 2019-10-13 RX ADMIN — INSULIN LISPRO 20 UNITS: 100 INJECTION, SOLUTION INTRAVENOUS; SUBCUTANEOUS at 12:06

## 2019-10-13 RX ADMIN — ENOXAPARIN SODIUM 40 MG: 40 INJECTION SUBCUTANEOUS at 09:07

## 2019-10-13 RX ADMIN — DULOXETINE HYDROCHLORIDE 20 MG: 20 CAPSULE, DELAYED RELEASE ORAL at 09:07

## 2019-10-13 RX ADMIN — FAMOTIDINE 20 MG: 20 TABLET ORAL at 09:07

## 2019-10-13 RX ADMIN — INSULIN GLARGINE 35 UNITS: 100 INJECTION, SOLUTION SUBCUTANEOUS at 09:18

## 2019-10-13 ASSESSMENT — PAIN DESCRIPTION - LOCATION: LOCATION: GENERALIZED

## 2019-10-13 ASSESSMENT — PAIN DESCRIPTION - PAIN TYPE: TYPE: CHRONIC PAIN

## 2019-10-13 ASSESSMENT — PAIN SCALES - GENERAL
PAINLEVEL_OUTOF10: 0
PAINLEVEL_OUTOF10: 10

## 2019-10-14 LAB
GLUCOSE BLD-MCNC: 106 MG/DL (ref 70–99)
GLUCOSE BLD-MCNC: 129 MG/DL (ref 70–99)
GLUCOSE BLD-MCNC: 148 MG/DL (ref 70–99)
GLUCOSE BLD-MCNC: 268 MG/DL (ref 70–99)
PERFORMED ON: ABNORMAL

## 2019-10-14 PROCEDURE — 97535 SELF CARE MNGMENT TRAINING: CPT

## 2019-10-14 PROCEDURE — 2580000003 HC RX 258: Performed by: INTERNAL MEDICINE

## 2019-10-14 PROCEDURE — 99223 1ST HOSP IP/OBS HIGH 75: CPT | Performed by: PSYCHIATRY & NEUROLOGY

## 2019-10-14 PROCEDURE — 97165 OT EVAL LOW COMPLEX 30 MIN: CPT

## 2019-10-14 PROCEDURE — 92610 EVALUATE SWALLOWING FUNCTION: CPT

## 2019-10-14 PROCEDURE — 1200000000 HC SEMI PRIVATE

## 2019-10-14 PROCEDURE — 6370000000 HC RX 637 (ALT 250 FOR IP): Performed by: INTERNAL MEDICINE

## 2019-10-14 PROCEDURE — 6370000000 HC RX 637 (ALT 250 FOR IP): Performed by: NURSE PRACTITIONER

## 2019-10-14 PROCEDURE — 6360000002 HC RX W HCPCS: Performed by: INTERNAL MEDICINE

## 2019-10-14 PROCEDURE — 2500000003 HC RX 250 WO HCPCS: Performed by: NURSE PRACTITIONER

## 2019-10-14 RX ORDER — FLUCONAZOLE 100 MG/1
200 TABLET ORAL ONCE
Status: COMPLETED | OUTPATIENT
Start: 2019-10-14 | End: 2019-10-14

## 2019-10-14 RX ADMIN — DULOXETINE HYDROCHLORIDE 20 MG: 20 CAPSULE, DELAYED RELEASE ORAL at 20:58

## 2019-10-14 RX ADMIN — INSULIN LISPRO 2 UNITS: 100 INJECTION, SOLUTION INTRAVENOUS; SUBCUTANEOUS at 20:59

## 2019-10-14 RX ADMIN — INSULIN GLARGINE 35 UNITS: 100 INJECTION, SOLUTION SUBCUTANEOUS at 20:58

## 2019-10-14 RX ADMIN — FAMOTIDINE 20 MG: 20 TABLET ORAL at 20:58

## 2019-10-14 RX ADMIN — GABAPENTIN 600 MG: 300 CAPSULE ORAL at 20:58

## 2019-10-14 RX ADMIN — LISINOPRIL 2.5 MG: 2.5 TABLET ORAL at 10:25

## 2019-10-14 RX ADMIN — TOPIRAMATE 200 MG: 100 TABLET, FILM COATED ORAL at 10:25

## 2019-10-14 RX ADMIN — INSULIN GLARGINE 35 UNITS: 100 INJECTION, SOLUTION SUBCUTANEOUS at 10:26

## 2019-10-14 RX ADMIN — DULOXETINE HYDROCHLORIDE 20 MG: 20 CAPSULE, DELAYED RELEASE ORAL at 10:25

## 2019-10-14 RX ADMIN — FAMOTIDINE 20 MG: 20 TABLET ORAL at 10:25

## 2019-10-14 RX ADMIN — FENOFIBRATE 160 MG: 160 TABLET ORAL at 10:25

## 2019-10-14 RX ADMIN — RANOLAZINE 500 MG: 500 TABLET, FILM COATED, EXTENDED RELEASE ORAL at 10:25

## 2019-10-14 RX ADMIN — ATORVASTATIN CALCIUM 40 MG: 40 TABLET, FILM COATED ORAL at 20:58

## 2019-10-14 RX ADMIN — ASPIRIN 81 MG 81 MG: 81 TABLET ORAL at 10:25

## 2019-10-14 RX ADMIN — Medication 10 ML: at 20:58

## 2019-10-14 RX ADMIN — CARVEDILOL 6.25 MG: 6.25 TABLET, FILM COATED ORAL at 10:26

## 2019-10-14 RX ADMIN — FLUCONAZOLE 200 MG: 100 TABLET ORAL at 18:52

## 2019-10-14 RX ADMIN — MICONAZOLE NITRATE: 20 POWDER TOPICAL at 18:52

## 2019-10-14 RX ADMIN — MICONAZOLE NITRATE: 20 POWDER TOPICAL at 20:58

## 2019-10-14 RX ADMIN — CLOPIDOGREL BISULFATE 75 MG: 75 TABLET ORAL at 10:25

## 2019-10-14 RX ADMIN — ENOXAPARIN SODIUM 40 MG: 40 INJECTION SUBCUTANEOUS at 10:25

## 2019-10-14 RX ADMIN — RANOLAZINE 500 MG: 500 TABLET, FILM COATED, EXTENDED RELEASE ORAL at 20:58

## 2019-10-14 RX ADMIN — METFORMIN HYDROCHLORIDE 1000 MG: 500 TABLET ORAL at 10:25

## 2019-10-14 RX ADMIN — CARVEDILOL 6.25 MG: 6.25 TABLET, FILM COATED ORAL at 18:52

## 2019-10-14 ASSESSMENT — PAIN DESCRIPTION - PAIN TYPE: TYPE: CHRONIC PAIN

## 2019-10-14 ASSESSMENT — PAIN SCALES - GENERAL
PAINLEVEL_OUTOF10: 0
PAINLEVEL_OUTOF10: 10

## 2019-10-14 ASSESSMENT — PAIN DESCRIPTION - LOCATION: LOCATION: GENERALIZED

## 2019-10-15 LAB
GLUCOSE BLD-MCNC: 163 MG/DL (ref 70–99)
GLUCOSE BLD-MCNC: 228 MG/DL (ref 70–99)
GLUCOSE BLD-MCNC: 90 MG/DL (ref 70–99)
GLUCOSE BLD-MCNC: 94 MG/DL (ref 70–99)
PERFORMED ON: ABNORMAL
PERFORMED ON: ABNORMAL
PERFORMED ON: NORMAL
PERFORMED ON: NORMAL

## 2019-10-15 PROCEDURE — 6370000000 HC RX 637 (ALT 250 FOR IP): Performed by: NURSE PRACTITIONER

## 2019-10-15 PROCEDURE — 1200000000 HC SEMI PRIVATE

## 2019-10-15 PROCEDURE — 6370000000 HC RX 637 (ALT 250 FOR IP): Performed by: INTERNAL MEDICINE

## 2019-10-15 PROCEDURE — 97110 THERAPEUTIC EXERCISES: CPT

## 2019-10-15 PROCEDURE — 97116 GAIT TRAINING THERAPY: CPT

## 2019-10-15 PROCEDURE — 99233 SBSQ HOSP IP/OBS HIGH 50: CPT | Performed by: PSYCHIATRY & NEUROLOGY

## 2019-10-15 PROCEDURE — 97530 THERAPEUTIC ACTIVITIES: CPT

## 2019-10-15 PROCEDURE — 6360000002 HC RX W HCPCS: Performed by: INTERNAL MEDICINE

## 2019-10-15 PROCEDURE — 2580000003 HC RX 258: Performed by: INTERNAL MEDICINE

## 2019-10-15 RX ADMIN — FENOFIBRATE 160 MG: 160 TABLET ORAL at 10:07

## 2019-10-15 RX ADMIN — MICONAZOLE NITRATE: 20 POWDER TOPICAL at 10:09

## 2019-10-15 RX ADMIN — INSULIN LISPRO 1 UNITS: 100 INJECTION, SOLUTION INTRAVENOUS; SUBCUTANEOUS at 21:35

## 2019-10-15 RX ADMIN — ENOXAPARIN SODIUM 40 MG: 40 INJECTION SUBCUTANEOUS at 10:08

## 2019-10-15 RX ADMIN — TOPIRAMATE 200 MG: 100 TABLET, FILM COATED ORAL at 10:08

## 2019-10-15 RX ADMIN — INSULIN GLARGINE 35 UNITS: 100 INJECTION, SOLUTION SUBCUTANEOUS at 10:08

## 2019-10-15 RX ADMIN — RANOLAZINE 500 MG: 500 TABLET, FILM COATED, EXTENDED RELEASE ORAL at 10:07

## 2019-10-15 RX ADMIN — CARVEDILOL 6.25 MG: 6.25 TABLET, FILM COATED ORAL at 17:59

## 2019-10-15 RX ADMIN — GABAPENTIN 600 MG: 300 CAPSULE ORAL at 21:07

## 2019-10-15 RX ADMIN — Medication 10 ML: at 21:08

## 2019-10-15 RX ADMIN — DULOXETINE HYDROCHLORIDE 20 MG: 20 CAPSULE, DELAYED RELEASE ORAL at 10:07

## 2019-10-15 RX ADMIN — INSULIN LISPRO 2 UNITS: 100 INJECTION, SOLUTION INTRAVENOUS; SUBCUTANEOUS at 12:09

## 2019-10-15 RX ADMIN — CLOPIDOGREL BISULFATE 75 MG: 75 TABLET ORAL at 10:07

## 2019-10-15 RX ADMIN — MICONAZOLE NITRATE: 20 POWDER TOPICAL at 21:08

## 2019-10-15 RX ADMIN — CARVEDILOL 6.25 MG: 6.25 TABLET, FILM COATED ORAL at 10:07

## 2019-10-15 RX ADMIN — INSULIN LISPRO 5 UNITS: 100 INJECTION, SOLUTION INTRAVENOUS; SUBCUTANEOUS at 12:09

## 2019-10-15 RX ADMIN — ATORVASTATIN CALCIUM 40 MG: 40 TABLET, FILM COATED ORAL at 21:07

## 2019-10-15 RX ADMIN — DULOXETINE HYDROCHLORIDE 20 MG: 20 CAPSULE, DELAYED RELEASE ORAL at 21:07

## 2019-10-15 RX ADMIN — ASPIRIN 81 MG 81 MG: 81 TABLET ORAL at 10:08

## 2019-10-15 RX ADMIN — FAMOTIDINE 20 MG: 20 TABLET ORAL at 10:07

## 2019-10-15 RX ADMIN — RANOLAZINE 500 MG: 500 TABLET, FILM COATED, EXTENDED RELEASE ORAL at 21:07

## 2019-10-15 RX ADMIN — FAMOTIDINE 20 MG: 20 TABLET ORAL at 21:07

## 2019-10-15 RX ADMIN — INSULIN GLARGINE 35 UNITS: 100 INJECTION, SOLUTION SUBCUTANEOUS at 21:34

## 2019-10-15 ASSESSMENT — 9 HOLE PEG TEST
TESTTIME_SECONDS: 29.84
TESTTIME_SECONDS: 29.94
TEST_RESULT: IMPAIRED
TEST_RESULT: IMPAIRED

## 2019-10-15 ASSESSMENT — PAIN SCALES - GENERAL: PAINLEVEL_OUTOF10: 0

## 2019-10-16 VITALS
HEART RATE: 84 BPM | SYSTOLIC BLOOD PRESSURE: 119 MMHG | BODY MASS INDEX: 34.2 KG/M2 | WEIGHT: 193 LBS | RESPIRATION RATE: 16 BRPM | HEIGHT: 63 IN | DIASTOLIC BLOOD PRESSURE: 81 MMHG | TEMPERATURE: 98.1 F | OXYGEN SATURATION: 97 %

## 2019-10-16 LAB
GLUCOSE BLD-MCNC: 128 MG/DL (ref 70–99)
GLUCOSE BLD-MCNC: 138 MG/DL (ref 70–99)
GLUCOSE BLD-MCNC: 87 MG/DL (ref 70–99)
PERFORMED ON: ABNORMAL
PERFORMED ON: ABNORMAL
PERFORMED ON: NORMAL

## 2019-10-16 PROCEDURE — 6370000000 HC RX 637 (ALT 250 FOR IP): Performed by: INTERNAL MEDICINE

## 2019-10-16 PROCEDURE — 6360000002 HC RX W HCPCS: Performed by: INTERNAL MEDICINE

## 2019-10-16 PROCEDURE — 2580000003 HC RX 258: Performed by: INTERNAL MEDICINE

## 2019-10-16 PROCEDURE — 99223 1ST HOSP IP/OBS HIGH 75: CPT | Performed by: PSYCHIATRY & NEUROLOGY

## 2019-10-16 RX ORDER — DULOXETIN HYDROCHLORIDE 60 MG/1
60 CAPSULE, DELAYED RELEASE ORAL DAILY
Status: DISCONTINUED | OUTPATIENT
Start: 2019-10-17 | End: 2019-10-16 | Stop reason: HOSPADM

## 2019-10-16 RX ORDER — DULOXETIN HYDROCHLORIDE 60 MG/1
60 CAPSULE, DELAYED RELEASE ORAL DAILY
Qty: 30 CAPSULE | Refills: 3 | Status: ON HOLD | OUTPATIENT
Start: 2019-10-17 | End: 2020-07-18 | Stop reason: SDUPTHER

## 2019-10-16 RX ADMIN — CLOPIDOGREL BISULFATE 75 MG: 75 TABLET ORAL at 09:45

## 2019-10-16 RX ADMIN — RANOLAZINE 500 MG: 500 TABLET, FILM COATED, EXTENDED RELEASE ORAL at 09:45

## 2019-10-16 RX ADMIN — MICONAZOLE NITRATE: 20 POWDER TOPICAL at 09:46

## 2019-10-16 RX ADMIN — ENOXAPARIN SODIUM 40 MG: 40 INJECTION SUBCUTANEOUS at 09:46

## 2019-10-16 RX ADMIN — CARVEDILOL 6.25 MG: 6.25 TABLET, FILM COATED ORAL at 09:46

## 2019-10-16 RX ADMIN — DULOXETINE HYDROCHLORIDE 20 MG: 20 CAPSULE, DELAYED RELEASE ORAL at 09:46

## 2019-10-16 RX ADMIN — FAMOTIDINE 20 MG: 20 TABLET ORAL at 09:45

## 2019-10-16 RX ADMIN — Medication 10 ML: at 09:46

## 2019-10-16 RX ADMIN — ASPIRIN 81 MG 81 MG: 81 TABLET ORAL at 09:45

## 2019-10-16 RX ADMIN — TOPIRAMATE 200 MG: 100 TABLET, FILM COATED ORAL at 09:45

## 2019-10-16 RX ADMIN — LISINOPRIL 2.5 MG: 2.5 TABLET ORAL at 09:45

## 2019-10-16 RX ADMIN — FENOFIBRATE 160 MG: 160 TABLET ORAL at 09:46

## 2019-11-20 ENCOUNTER — HOSPITAL ENCOUNTER (EMERGENCY)
Age: 59
Discharge: HOME | End: 2019-11-20
Attending: EMERGENCY MEDICINE
Payer: MEDICARE

## 2019-11-20 ENCOUNTER — APPOINTMENT (OUTPATIENT)
Dept: CT IMAGING | Age: 59
End: 2019-11-20
Payer: MEDICARE

## 2019-11-20 ENCOUNTER — APPOINTMENT (OUTPATIENT)
Dept: GENERAL RADIOLOGY | Age: 59
End: 2019-11-20
Payer: MEDICARE

## 2019-11-20 VITALS
OXYGEN SATURATION: 98 % | BODY MASS INDEX: 33.66 KG/M2 | TEMPERATURE: 98.3 F | WEIGHT: 190 LBS | HEART RATE: 90 BPM | SYSTOLIC BLOOD PRESSURE: 123 MMHG | RESPIRATION RATE: 15 BRPM | DIASTOLIC BLOOD PRESSURE: 56 MMHG

## 2019-11-20 DIAGNOSIS — R20.0 NUMBNESS: Primary | ICD-10-CM

## 2019-11-20 LAB
A/G RATIO: 1.4 (ref 1.1–2.2)
ALBUMIN SERPL-MCNC: 3.9 G/DL (ref 3.4–5)
ALP BLD-CCNC: 72 U/L (ref 40–129)
ALT SERPL-CCNC: 9 U/L (ref 10–40)
ANION GAP SERPL CALCULATED.3IONS-SCNC: 11 MMOL/L (ref 3–16)
AST SERPL-CCNC: 12 U/L (ref 15–37)
BASOPHILS ABSOLUTE: 0 K/UL (ref 0–0.2)
BASOPHILS RELATIVE PERCENT: 0.7 %
BILIRUB SERPL-MCNC: <0.2 MG/DL (ref 0–1)
BUN BLDV-MCNC: 11 MG/DL (ref 7–20)
CALCIUM SERPL-MCNC: 9.7 MG/DL (ref 8.3–10.6)
CHLORIDE BLD-SCNC: 101 MMOL/L (ref 99–110)
CO2: 23 MMOL/L (ref 21–32)
CREAT SERPL-MCNC: 0.6 MG/DL (ref 0.6–1.1)
EOSINOPHILS ABSOLUTE: 0.2 K/UL (ref 0–0.6)
EOSINOPHILS RELATIVE PERCENT: 2.9 %
GFR AFRICAN AMERICAN: >60
GFR NON-AFRICAN AMERICAN: >60
GLOBULIN: 2.8 G/DL
GLUCOSE BLD-MCNC: 187 MG/DL (ref 70–99)
HCT VFR BLD CALC: 39.6 % (ref 36–48)
HEMOGLOBIN: 13.5 G/DL (ref 12–16)
INR BLD: 0.98 (ref 0.86–1.14)
LYMPHOCYTES ABSOLUTE: 2.1 K/UL (ref 1–5.1)
LYMPHOCYTES RELATIVE PERCENT: 35.1 %
MCH RBC QN AUTO: 31.6 PG (ref 26–34)
MCHC RBC AUTO-ENTMCNC: 34.2 G/DL (ref 31–36)
MCV RBC AUTO: 92.6 FL (ref 80–100)
MONOCYTES ABSOLUTE: 0.4 K/UL (ref 0–1.3)
MONOCYTES RELATIVE PERCENT: 7 %
NEUTROPHILS ABSOLUTE: 3.2 K/UL (ref 1.7–7.7)
NEUTROPHILS RELATIVE PERCENT: 54.3 %
PDW BLD-RTO: 13.3 % (ref 12.4–15.4)
PLATELET # BLD: 231 K/UL (ref 135–450)
PMV BLD AUTO: 8.8 FL (ref 5–10.5)
POTASSIUM REFLEX MAGNESIUM: 3.7 MMOL/L (ref 3.5–5.1)
PROTHROMBIN TIME: 11.4 SEC (ref 10–13.2)
RBC # BLD: 4.27 M/UL (ref 4–5.2)
SODIUM BLD-SCNC: 135 MMOL/L (ref 136–145)
TOTAL PROTEIN: 6.7 G/DL (ref 6.4–8.2)
TROPONIN: <0.01 NG/ML
TSH REFLEX: 0.37 UIU/ML (ref 0.27–4.2)
WBC # BLD: 6 K/UL (ref 4–11)

## 2019-11-20 PROCEDURE — 85025 COMPLETE CBC W/AUTO DIFF WBC: CPT

## 2019-11-20 PROCEDURE — 85610 PROTHROMBIN TIME: CPT

## 2019-11-20 PROCEDURE — 70498 CT ANGIOGRAPHY NECK: CPT

## 2019-11-20 PROCEDURE — 6360000004 HC RX CONTRAST MEDICATION: Performed by: EMERGENCY MEDICINE

## 2019-11-20 PROCEDURE — 70450 CT HEAD/BRAIN W/O DYE: CPT

## 2019-11-20 PROCEDURE — 80053 COMPREHEN METABOLIC PANEL: CPT

## 2019-11-20 PROCEDURE — 99284 EMERGENCY DEPT VISIT MOD MDM: CPT

## 2019-11-20 PROCEDURE — 84484 ASSAY OF TROPONIN QUANT: CPT

## 2019-11-20 PROCEDURE — 84443 ASSAY THYROID STIM HORMONE: CPT

## 2019-11-20 PROCEDURE — 93005 ELECTROCARDIOGRAM TRACING: CPT | Performed by: EMERGENCY MEDICINE

## 2019-11-20 PROCEDURE — 71045 X-RAY EXAM CHEST 1 VIEW: CPT

## 2019-11-20 RX ADMIN — IOPAMIDOL 85 ML: 755 INJECTION, SOLUTION INTRAVENOUS at 19:04

## 2019-11-21 LAB
EKG ATRIAL RATE: 85 BPM
EKG DIAGNOSIS: NORMAL
EKG P AXIS: 24 DEGREES
EKG P-R INTERVAL: 154 MS
EKG Q-T INTERVAL: 412 MS
EKG QRS DURATION: 92 MS
EKG QTC CALCULATION (BAZETT): 490 MS
EKG R AXIS: -14 DEGREES
EKG T AXIS: 59 DEGREES
EKG VENTRICULAR RATE: 85 BPM

## 2019-11-21 PROCEDURE — 93010 ELECTROCARDIOGRAM REPORT: CPT | Performed by: INTERNAL MEDICINE

## 2019-12-20 ENCOUNTER — APPOINTMENT (OUTPATIENT)
Dept: GENERAL RADIOLOGY | Age: 59
DRG: 638 | End: 2019-12-20
Payer: MEDICARE

## 2019-12-20 ENCOUNTER — HOSPITAL ENCOUNTER (INPATIENT)
Age: 59
LOS: 2 days | Discharge: HOME | DRG: 638 | End: 2019-12-22
Attending: EMERGENCY MEDICINE | Admitting: INTERNAL MEDICINE
Payer: MEDICARE

## 2019-12-20 ENCOUNTER — APPOINTMENT (OUTPATIENT)
Dept: CT IMAGING | Age: 59
DRG: 638 | End: 2019-12-20
Payer: MEDICARE

## 2019-12-20 DIAGNOSIS — E11.00 UNCONTROLLED TYPE 2 DIABETES MELLITUS WITH HYPEROSMOLAR NONKETOTIC HYPERGLYCEMIA (HCC): Primary | ICD-10-CM

## 2019-12-20 DIAGNOSIS — N17.9 AKI (ACUTE KIDNEY INJURY) (HCC): ICD-10-CM

## 2019-12-20 LAB
A/G RATIO: 1.2 (ref 1.1–2.2)
ALBUMIN SERPL-MCNC: 5.1 G/DL (ref 3.4–5)
ALP BLD-CCNC: 103 U/L (ref 40–129)
ALT SERPL-CCNC: 23 U/L (ref 10–40)
ANION GAP SERPL CALCULATED.3IONS-SCNC: 17 MMOL/L (ref 3–16)
ANION GAP SERPL CALCULATED.3IONS-SCNC: 25 MMOL/L (ref 3–16)
AST SERPL-CCNC: 14 U/L (ref 15–37)
BASE EXCESS VENOUS: -0.9 MMOL/L (ref -3–3)
BASOPHILS ABSOLUTE: 0 K/UL (ref 0–0.2)
BASOPHILS RELATIVE PERCENT: 0.3 %
BETA-HYDROXYBUTYRATE: 4.2 MMOL/L (ref 0–0.27)
BILIRUB SERPL-MCNC: 0.5 MG/DL (ref 0–1)
BILIRUBIN URINE: NEGATIVE
BLOOD, URINE: NEGATIVE
BUN BLDV-MCNC: 34 MG/DL (ref 7–20)
BUN BLDV-MCNC: 35 MG/DL (ref 7–20)
CALCIUM SERPL-MCNC: 11.7 MG/DL (ref 8.3–10.6)
CALCIUM SERPL-MCNC: 9.8 MG/DL (ref 8.3–10.6)
CARBOXYHEMOGLOBIN: 1.5 % (ref 0–1.5)
CHLORIDE BLD-SCNC: 118 MMOL/L (ref 99–110)
CHLORIDE BLD-SCNC: 99 MMOL/L (ref 99–110)
CLARITY: CLEAR
CO2: 21 MMOL/L (ref 21–32)
CO2: 25 MMOL/L (ref 21–32)
COLOR: ABNORMAL
CREAT SERPL-MCNC: 1.1 MG/DL (ref 0.6–1.1)
CREAT SERPL-MCNC: 1.3 MG/DL (ref 0.6–1.1)
EKG ATRIAL RATE: 119 BPM
EKG DIAGNOSIS: NORMAL
EKG P AXIS: 96 DEGREES
EKG P-R INTERVAL: 80 MS
EKG Q-T INTERVAL: 442 MS
EKG QRS DURATION: 102 MS
EKG QTC CALCULATION (BAZETT): 621 MS
EKG R AXIS: -41 DEGREES
EKG T AXIS: 100 DEGREES
EKG VENTRICULAR RATE: 119 BPM
EOSINOPHILS ABSOLUTE: 0 K/UL (ref 0–0.6)
EOSINOPHILS RELATIVE PERCENT: 0 %
GFR AFRICAN AMERICAN: 51
GFR AFRICAN AMERICAN: >60
GFR NON-AFRICAN AMERICAN: 42
GFR NON-AFRICAN AMERICAN: 51
GLOBULIN: 4.1 G/DL
GLUCOSE BLD-MCNC: 1026 MG/DL (ref 70–99)
GLUCOSE BLD-MCNC: 406 MG/DL (ref 70–99)
GLUCOSE BLD-MCNC: 428 MG/DL (ref 70–99)
GLUCOSE BLD-MCNC: 502 MG/DL (ref 70–99)
GLUCOSE BLD-MCNC: 524 MG/DL (ref 70–99)
GLUCOSE BLD-MCNC: 565 MG/DL (ref 70–99)
GLUCOSE BLD-MCNC: 622 MG/DL (ref 70–99)
GLUCOSE BLD-MCNC: 903 MG/DL (ref 70–99)
GLUCOSE BLD-MCNC: >600 MG/DL (ref 70–99)
GLUCOSE BLD-MCNC: >600 MG/DL (ref 70–99)
GLUCOSE URINE: >=1000 MG/DL
HCO3 VENOUS: 25.3 MMOL/L (ref 23–29)
HCT VFR BLD CALC: 50.7 % (ref 36–48)
HEMOGLOBIN: 16.6 G/DL (ref 12–16)
KETONES, URINE: ABNORMAL MG/DL
LACTIC ACID, SEPSIS: 1.7 MMOL/L (ref 0.4–1.9)
LACTIC ACID, SEPSIS: 3 MMOL/L (ref 0.4–1.9)
LEUKOCYTE ESTERASE, URINE: NEGATIVE
LIPASE: 60 U/L (ref 13–60)
LYMPHOCYTES ABSOLUTE: 1.1 K/UL (ref 1–5.1)
LYMPHOCYTES RELATIVE PERCENT: 6.9 %
MAGNESIUM: 2.2 MG/DL (ref 1.8–2.4)
MCH RBC QN AUTO: 30.7 PG (ref 26–34)
MCHC RBC AUTO-ENTMCNC: 32.7 G/DL (ref 31–36)
MCV RBC AUTO: 93.9 FL (ref 80–100)
METHEMOGLOBIN VENOUS: 0.2 %
MICROSCOPIC EXAMINATION: ABNORMAL
MONOCYTES ABSOLUTE: 1.2 K/UL (ref 0–1.3)
MONOCYTES RELATIVE PERCENT: 7.9 %
NEUTROPHILS ABSOLUTE: 13.2 K/UL (ref 1.7–7.7)
NEUTROPHILS RELATIVE PERCENT: 84.9 %
NITRITE, URINE: NEGATIVE
O2 CONTENT, VEN: 23 VOL %
O2 SAT, VEN: 95 %
O2 THERAPY: ABNORMAL
OSMOLALITY: 391 MOSM/KG (ref 275–295)
PCO2, VEN: 46.9 MMHG (ref 40–50)
PDW BLD-RTO: 13.5 % (ref 12.4–15.4)
PERFORMED ON: ABNORMAL
PH UA: 5.5 (ref 5–8)
PH VENOUS: 7.35 (ref 7.35–7.45)
PHOSPHORUS: 2.3 MG/DL (ref 2.5–4.9)
PLATELET # BLD: 473 K/UL (ref 135–450)
PMV BLD AUTO: 8.8 FL (ref 5–10.5)
PO2, VEN: 79.3 MMHG (ref 25–40)
POTASSIUM REFLEX MAGNESIUM: 3.8 MMOL/L (ref 3.5–5.1)
POTASSIUM SERPL-SCNC: 3 MMOL/L (ref 3.5–5.1)
PRO-BNP: 679 PG/ML (ref 0–124)
PROCALCITONIN: 1.16 NG/ML (ref 0–0.15)
PROTEIN UA: NEGATIVE MG/DL
RAPID INFLUENZA  B AGN: NEGATIVE
RAPID INFLUENZA A AGN: NEGATIVE
RBC # BLD: 5.4 M/UL (ref 4–5.2)
SODIUM BLD-SCNC: 149 MMOL/L (ref 136–145)
SODIUM BLD-SCNC: 156 MMOL/L (ref 136–145)
SPECIFIC GRAVITY UA: <=1.005 (ref 1–1.03)
TCO2 CALC VENOUS: 27 MMOL/L
TOTAL CK: 16 U/L (ref 26–192)
TOTAL PROTEIN: 9.2 G/DL (ref 6.4–8.2)
TROPONIN: <0.01 NG/ML
TROPONIN: <0.01 NG/ML
URINE REFLEX TO CULTURE: ABNORMAL
URINE TYPE: ABNORMAL
UROBILINOGEN, URINE: 0.2 E.U./DL
WBC # BLD: 15.5 K/UL (ref 4–11)

## 2019-12-20 PROCEDURE — 83880 ASSAY OF NATRIURETIC PEPTIDE: CPT

## 2019-12-20 PROCEDURE — 2580000003 HC RX 258: Performed by: INTERNAL MEDICINE

## 2019-12-20 PROCEDURE — 87804 INFLUENZA ASSAY W/OPTIC: CPT

## 2019-12-20 PROCEDURE — 96368 THER/DIAG CONCURRENT INF: CPT

## 2019-12-20 PROCEDURE — 83605 ASSAY OF LACTIC ACID: CPT

## 2019-12-20 PROCEDURE — 93010 ELECTROCARDIOGRAM REPORT: CPT | Performed by: INTERNAL MEDICINE

## 2019-12-20 PROCEDURE — 82947 ASSAY GLUCOSE BLOOD QUANT: CPT

## 2019-12-20 PROCEDURE — 6370000000 HC RX 637 (ALT 250 FOR IP): Performed by: INTERNAL MEDICINE

## 2019-12-20 PROCEDURE — 83735 ASSAY OF MAGNESIUM: CPT

## 2019-12-20 PROCEDURE — 82550 ASSAY OF CK (CPK): CPT

## 2019-12-20 PROCEDURE — 2700000000 HC OXYGEN THERAPY PER DAY

## 2019-12-20 PROCEDURE — 2000000000 HC ICU R&B

## 2019-12-20 PROCEDURE — 82803 BLOOD GASES ANY COMBINATION: CPT

## 2019-12-20 PROCEDURE — 70450 CT HEAD/BRAIN W/O DYE: CPT

## 2019-12-20 PROCEDURE — 84484 ASSAY OF TROPONIN QUANT: CPT

## 2019-12-20 PROCEDURE — 84145 PROCALCITONIN (PCT): CPT

## 2019-12-20 PROCEDURE — 96367 TX/PROPH/DG ADDL SEQ IV INF: CPT

## 2019-12-20 PROCEDURE — 82010 KETONE BODYS QUAN: CPT

## 2019-12-20 PROCEDURE — 71045 X-RAY EXAM CHEST 1 VIEW: CPT

## 2019-12-20 PROCEDURE — 87040 BLOOD CULTURE FOR BACTERIA: CPT

## 2019-12-20 PROCEDURE — 36415 COLL VENOUS BLD VENIPUNCTURE: CPT

## 2019-12-20 PROCEDURE — 93005 ELECTROCARDIOGRAM TRACING: CPT | Performed by: EMERGENCY MEDICINE

## 2019-12-20 PROCEDURE — 84100 ASSAY OF PHOSPHORUS: CPT

## 2019-12-20 PROCEDURE — 2500000003 HC RX 250 WO HCPCS: Performed by: INTERNAL MEDICINE

## 2019-12-20 PROCEDURE — 99291 CRITICAL CARE FIRST HOUR: CPT

## 2019-12-20 PROCEDURE — 6360000002 HC RX W HCPCS: Performed by: INTERNAL MEDICINE

## 2019-12-20 PROCEDURE — 96365 THER/PROPH/DIAG IV INF INIT: CPT

## 2019-12-20 PROCEDURE — 2580000003 HC RX 258: Performed by: EMERGENCY MEDICINE

## 2019-12-20 PROCEDURE — 6370000000 HC RX 637 (ALT 250 FOR IP): Performed by: EMERGENCY MEDICINE

## 2019-12-20 PROCEDURE — 80053 COMPREHEN METABOLIC PANEL: CPT

## 2019-12-20 PROCEDURE — 94761 N-INVAS EAR/PLS OXIMETRY MLT: CPT

## 2019-12-20 PROCEDURE — 81003 URINALYSIS AUTO W/O SCOPE: CPT

## 2019-12-20 PROCEDURE — 85025 COMPLETE CBC W/AUTO DIFF WBC: CPT

## 2019-12-20 PROCEDURE — 83930 ASSAY OF BLOOD OSMOLALITY: CPT

## 2019-12-20 PROCEDURE — 6360000002 HC RX W HCPCS: Performed by: EMERGENCY MEDICINE

## 2019-12-20 PROCEDURE — 83690 ASSAY OF LIPASE: CPT

## 2019-12-20 RX ORDER — POTASSIUM CHLORIDE 7.45 MG/ML
10 INJECTION INTRAVENOUS PRN
Status: DISCONTINUED | OUTPATIENT
Start: 2019-12-20 | End: 2019-12-22 | Stop reason: HOSPADM

## 2019-12-20 RX ORDER — MAGNESIUM SULFATE 1 G/100ML
1 INJECTION INTRAVENOUS PRN
Status: DISCONTINUED | OUTPATIENT
Start: 2019-12-20 | End: 2019-12-22 | Stop reason: HOSPADM

## 2019-12-20 RX ORDER — NICOTINE POLACRILEX 4 MG
15 LOZENGE BUCCAL PRN
Status: DISCONTINUED | OUTPATIENT
Start: 2019-12-20 | End: 2019-12-22 | Stop reason: HOSPADM

## 2019-12-20 RX ORDER — LISINOPRIL 5 MG/1
2.5 TABLET ORAL DAILY
Status: DISCONTINUED | OUTPATIENT
Start: 2019-12-20 | End: 2019-12-22 | Stop reason: HOSPADM

## 2019-12-20 RX ORDER — LIDOCAINE 4 G/G
1 PATCH TOPICAL DAILY
Status: DISCONTINUED | OUTPATIENT
Start: 2019-12-21 | End: 2019-12-22 | Stop reason: HOSPADM

## 2019-12-20 RX ORDER — CARVEDILOL 6.25 MG/1
6.25 TABLET ORAL 2 TIMES DAILY WITH MEALS
Status: DISCONTINUED | OUTPATIENT
Start: 2019-12-20 | End: 2019-12-22 | Stop reason: HOSPADM

## 2019-12-20 RX ORDER — TOPIRAMATE 100 MG/1
200 TABLET, FILM COATED ORAL DAILY
Status: DISCONTINUED | OUTPATIENT
Start: 2019-12-21 | End: 2019-12-22 | Stop reason: HOSPADM

## 2019-12-20 RX ORDER — FENOFIBRATE 160 MG/1
160 TABLET ORAL DAILY
Status: DISCONTINUED | OUTPATIENT
Start: 2019-12-21 | End: 2019-12-22 | Stop reason: HOSPADM

## 2019-12-20 RX ORDER — DEXTROSE MONOHYDRATE 25 G/50ML
12.5 INJECTION, SOLUTION INTRAVENOUS PRN
Status: DISCONTINUED | OUTPATIENT
Start: 2019-12-20 | End: 2019-12-21

## 2019-12-20 RX ORDER — SODIUM CHLORIDE 9 MG/ML
INJECTION, SOLUTION INTRAVENOUS CONTINUOUS
Status: DISCONTINUED | OUTPATIENT
Start: 2019-12-20 | End: 2019-12-21

## 2019-12-20 RX ORDER — FAMOTIDINE 20 MG/1
20 TABLET, FILM COATED ORAL 2 TIMES DAILY
Status: DISCONTINUED | OUTPATIENT
Start: 2019-12-20 | End: 2019-12-22 | Stop reason: HOSPADM

## 2019-12-20 RX ORDER — ACETAMINOPHEN 325 MG/1
650 TABLET ORAL ONCE
Status: COMPLETED | OUTPATIENT
Start: 2019-12-20 | End: 2019-12-20

## 2019-12-20 RX ORDER — CLOPIDOGREL BISULFATE 75 MG/1
75 TABLET ORAL DAILY
Status: DISCONTINUED | OUTPATIENT
Start: 2019-12-20 | End: 2019-12-22 | Stop reason: HOSPADM

## 2019-12-20 RX ORDER — 0.9 % SODIUM CHLORIDE 0.9 %
1586 INTRAVENOUS SOLUTION INTRAVENOUS ONCE
Status: DISCONTINUED | OUTPATIENT
Start: 2019-12-20 | End: 2019-12-20 | Stop reason: DRUGHIGH

## 2019-12-20 RX ORDER — DEXTROSE MONOHYDRATE 50 MG/ML
100 INJECTION, SOLUTION INTRAVENOUS PRN
Status: DISCONTINUED | OUTPATIENT
Start: 2019-12-20 | End: 2019-12-21

## 2019-12-20 RX ORDER — RANOLAZINE 500 MG/1
500 TABLET, EXTENDED RELEASE ORAL 2 TIMES DAILY
Status: DISCONTINUED | OUTPATIENT
Start: 2019-12-20 | End: 2019-12-22 | Stop reason: HOSPADM

## 2019-12-20 RX ORDER — ATORVASTATIN CALCIUM 40 MG/1
40 TABLET, FILM COATED ORAL NIGHTLY
Status: DISCONTINUED | OUTPATIENT
Start: 2019-12-20 | End: 2019-12-22 | Stop reason: HOSPADM

## 2019-12-20 RX ORDER — DULOXETIN HYDROCHLORIDE 60 MG/1
60 CAPSULE, DELAYED RELEASE ORAL DAILY
Status: DISCONTINUED | OUTPATIENT
Start: 2019-12-20 | End: 2019-12-22 | Stop reason: HOSPADM

## 2019-12-20 RX ORDER — ONDANSETRON 4 MG/1
4 TABLET, ORALLY DISINTEGRATING ORAL EVERY 8 HOURS PRN
Status: DISCONTINUED | OUTPATIENT
Start: 2019-12-20 | End: 2019-12-22 | Stop reason: HOSPADM

## 2019-12-20 RX ORDER — FLUOXETINE 10 MG/1
10 CAPSULE ORAL DAILY
Status: DISCONTINUED | OUTPATIENT
Start: 2019-12-21 | End: 2019-12-22 | Stop reason: HOSPADM

## 2019-12-20 RX ORDER — ASPIRIN 81 MG/1
81 TABLET ORAL DAILY
Status: DISCONTINUED | OUTPATIENT
Start: 2019-12-20 | End: 2019-12-22 | Stop reason: HOSPADM

## 2019-12-20 RX ORDER — GABAPENTIN 300 MG/1
600 CAPSULE ORAL NIGHTLY
Status: DISCONTINUED | OUTPATIENT
Start: 2019-12-20 | End: 2019-12-22 | Stop reason: HOSPADM

## 2019-12-20 RX ORDER — 0.9 % SODIUM CHLORIDE 0.9 %
1000 INTRAVENOUS SOLUTION INTRAVENOUS ONCE
Status: COMPLETED | OUTPATIENT
Start: 2019-12-20 | End: 2019-12-20

## 2019-12-20 RX ORDER — DEXTROSE AND SODIUM CHLORIDE 5; .45 G/100ML; G/100ML
INJECTION, SOLUTION INTRAVENOUS CONTINUOUS PRN
Status: DISCONTINUED | OUTPATIENT
Start: 2019-12-20 | End: 2019-12-21 | Stop reason: ALTCHOICE

## 2019-12-20 RX ADMIN — POTASSIUM CHLORIDE 10 MEQ: 7.46 INJECTION, SOLUTION INTRAVENOUS at 23:07

## 2019-12-20 RX ADMIN — ACETAMINOPHEN 650 MG: 325 TABLET ORAL at 16:40

## 2019-12-20 RX ADMIN — POTASSIUM CHLORIDE 10 MEQ: 7.46 INJECTION, SOLUTION INTRAVENOUS at 22:00

## 2019-12-20 RX ADMIN — SODIUM CHLORIDE 1000 ML: 9 INJECTION, SOLUTION INTRAVENOUS at 15:16

## 2019-12-20 RX ADMIN — CEFEPIME 2 G: 2 INJECTION, POWDER, FOR SOLUTION INTRAVENOUS at 15:46

## 2019-12-20 RX ADMIN — ONDANSETRON 4 MG: 4 TABLET, ORALLY DISINTEGRATING ORAL at 18:12

## 2019-12-20 RX ADMIN — POTASSIUM CHLORIDE: 2 INJECTION, SOLUTION, CONCENTRATE INTRAVENOUS at 17:04

## 2019-12-20 RX ADMIN — SODIUM CHLORIDE: 9 INJECTION, SOLUTION INTRAVENOUS at 19:02

## 2019-12-20 RX ADMIN — SODIUM CHLORIDE 0.1 UNITS/KG/HR: 9 INJECTION, SOLUTION INTRAVENOUS at 17:17

## 2019-12-20 RX ADMIN — SODIUM PHOSPHATE, MONOBASIC, MONOHYDRATE 10 MMOL: 276; 142 INJECTION, SOLUTION INTRAVENOUS at 21:28

## 2019-12-20 RX ADMIN — VANCOMYCIN HYDROCHLORIDE 1250 MG: 1 INJECTION, POWDER, LYOPHILIZED, FOR SOLUTION INTRAVENOUS at 16:21

## 2019-12-20 RX ADMIN — POTASSIUM CHLORIDE 10 MEQ: 7.46 INJECTION, SOLUTION INTRAVENOUS at 21:00

## 2019-12-20 RX ADMIN — SODIUM CHLORIDE 4.3 UNITS/HR: 9 INJECTION, SOLUTION INTRAVENOUS at 18:38

## 2019-12-20 RX ADMIN — SODIUM CHLORIDE 1586 ML: 9 INJECTION, SOLUTION INTRAVENOUS at 15:47

## 2019-12-20 RX ADMIN — MICONAZOLE NITRATE: 20 POWDER TOPICAL at 20:47

## 2019-12-20 RX ADMIN — POTASSIUM CHLORIDE 10 MEQ: 7.46 INJECTION, SOLUTION INTRAVENOUS at 20:39

## 2019-12-21 ENCOUNTER — APPOINTMENT (OUTPATIENT)
Dept: GENERAL RADIOLOGY | Age: 59
DRG: 638 | End: 2019-12-21
Payer: MEDICARE

## 2019-12-21 PROBLEM — E11.11 DIABETIC KETOACIDOSIS WITH COMA ASSOCIATED WITH TYPE 2 DIABETES MELLITUS (HCC): Status: ACTIVE | Noted: 2019-12-21

## 2019-12-21 LAB
ANION GAP SERPL CALCULATED.3IONS-SCNC: 10 MMOL/L (ref 3–16)
ANION GAP SERPL CALCULATED.3IONS-SCNC: 10 MMOL/L (ref 3–16)
ANION GAP SERPL CALCULATED.3IONS-SCNC: 11 MMOL/L (ref 3–16)
ANION GAP SERPL CALCULATED.3IONS-SCNC: 16 MMOL/L (ref 3–16)
ANION GAP SERPL CALCULATED.3IONS-SCNC: 7 MMOL/L (ref 3–16)
ANION GAP SERPL CALCULATED.3IONS-SCNC: 9 MMOL/L (ref 3–16)
BASOPHILS ABSOLUTE: 0 K/UL (ref 0–0.2)
BASOPHILS RELATIVE PERCENT: 0.1 %
BUN BLDV-MCNC: 34 MG/DL (ref 7–20)
BUN BLDV-MCNC: 35 MG/DL (ref 7–20)
BUN BLDV-MCNC: 36 MG/DL (ref 7–20)
BUN BLDV-MCNC: 36 MG/DL (ref 7–20)
CALCIUM SERPL-MCNC: 8 MG/DL (ref 8.3–10.6)
CALCIUM SERPL-MCNC: 8.4 MG/DL (ref 8.3–10.6)
CALCIUM SERPL-MCNC: 8.5 MG/DL (ref 8.3–10.6)
CALCIUM SERPL-MCNC: 9.1 MG/DL (ref 8.3–10.6)
CALCIUM SERPL-MCNC: 9.5 MG/DL (ref 8.3–10.6)
CALCIUM SERPL-MCNC: 9.7 MG/DL (ref 8.3–10.6)
CHLORIDE BLD-SCNC: 113 MMOL/L (ref 99–110)
CHLORIDE BLD-SCNC: 119 MMOL/L (ref 99–110)
CHLORIDE BLD-SCNC: 124 MMOL/L (ref 99–110)
CHLORIDE BLD-SCNC: 124 MMOL/L (ref 99–110)
CHLORIDE BLD-SCNC: 125 MMOL/L (ref 99–110)
CHLORIDE BLD-SCNC: 127 MMOL/L (ref 99–110)
CO2: 23 MMOL/L (ref 21–32)
CO2: 24 MMOL/L (ref 21–32)
CO2: 25 MMOL/L (ref 21–32)
CO2: 25 MMOL/L (ref 21–32)
CO2: 26 MMOL/L (ref 21–32)
CO2: 27 MMOL/L (ref 21–32)
CREAT SERPL-MCNC: 0.8 MG/DL (ref 0.6–1.1)
CREAT SERPL-MCNC: 0.9 MG/DL (ref 0.6–1.1)
CREAT SERPL-MCNC: 0.9 MG/DL (ref 0.6–1.1)
CREAT SERPL-MCNC: 1 MG/DL (ref 0.6–1.1)
CREAT SERPL-MCNC: 1.1 MG/DL (ref 0.6–1.1)
CREAT SERPL-MCNC: 1.1 MG/DL (ref 0.6–1.1)
EOSINOPHILS ABSOLUTE: 0.1 K/UL (ref 0–0.6)
EOSINOPHILS RELATIVE PERCENT: 1.1 %
GFR AFRICAN AMERICAN: >60
GFR NON-AFRICAN AMERICAN: 51
GFR NON-AFRICAN AMERICAN: 51
GFR NON-AFRICAN AMERICAN: 57
GFR NON-AFRICAN AMERICAN: >60
GLUCOSE BLD-MCNC: 104 MG/DL (ref 70–99)
GLUCOSE BLD-MCNC: 112 MG/DL (ref 70–99)
GLUCOSE BLD-MCNC: 128 MG/DL (ref 70–99)
GLUCOSE BLD-MCNC: 175 MG/DL (ref 70–99)
GLUCOSE BLD-MCNC: 181 MG/DL (ref 70–99)
GLUCOSE BLD-MCNC: 187 MG/DL (ref 70–99)
GLUCOSE BLD-MCNC: 188 MG/DL (ref 70–99)
GLUCOSE BLD-MCNC: 194 MG/DL (ref 70–99)
GLUCOSE BLD-MCNC: 202 MG/DL (ref 70–99)
GLUCOSE BLD-MCNC: 206 MG/DL (ref 70–99)
GLUCOSE BLD-MCNC: 207 MG/DL (ref 70–99)
GLUCOSE BLD-MCNC: 223 MG/DL (ref 70–99)
GLUCOSE BLD-MCNC: 224 MG/DL (ref 70–99)
GLUCOSE BLD-MCNC: 241 MG/DL (ref 70–99)
GLUCOSE BLD-MCNC: 272 MG/DL (ref 70–99)
GLUCOSE BLD-MCNC: 281 MG/DL (ref 70–99)
GLUCOSE BLD-MCNC: 283 MG/DL (ref 70–99)
GLUCOSE BLD-MCNC: 285 MG/DL (ref 70–99)
GLUCOSE BLD-MCNC: 302 MG/DL (ref 70–99)
GLUCOSE BLD-MCNC: 383 MG/DL (ref 70–99)
GLUCOSE BLD-MCNC: 443 MG/DL (ref 70–99)
HCT VFR BLD CALC: 43.2 % (ref 36–48)
HEMOGLOBIN: 14.5 G/DL (ref 12–16)
LYMPHOCYTES ABSOLUTE: 0.8 K/UL (ref 1–5.1)
LYMPHOCYTES RELATIVE PERCENT: 8.6 %
MAGNESIUM: 1.7 MG/DL (ref 1.8–2.4)
MAGNESIUM: 2 MG/DL (ref 1.8–2.4)
MAGNESIUM: 2.1 MG/DL (ref 1.8–2.4)
MAGNESIUM: 2.2 MG/DL (ref 1.8–2.4)
MAGNESIUM: 2.3 MG/DL (ref 1.8–2.4)
MAGNESIUM: 2.3 MG/DL (ref 1.8–2.4)
MCH RBC QN AUTO: 31.2 PG (ref 26–34)
MCHC RBC AUTO-ENTMCNC: 33.7 G/DL (ref 31–36)
MCV RBC AUTO: 92.7 FL (ref 80–100)
MONOCYTES ABSOLUTE: 0.9 K/UL (ref 0–1.3)
MONOCYTES RELATIVE PERCENT: 9.6 %
NEUTROPHILS ABSOLUTE: 7.6 K/UL (ref 1.7–7.7)
NEUTROPHILS RELATIVE PERCENT: 80.6 %
PDW BLD-RTO: 13.3 % (ref 12.4–15.4)
PERFORMED ON: ABNORMAL
PHOSPHORUS: 2 MG/DL (ref 2.5–4.9)
PHOSPHORUS: 2 MG/DL (ref 2.5–4.9)
PHOSPHORUS: 2.2 MG/DL (ref 2.5–4.9)
PHOSPHORUS: 2.4 MG/DL (ref 2.5–4.9)
PHOSPHORUS: 2.4 MG/DL (ref 2.5–4.9)
PHOSPHORUS: 2.5 MG/DL (ref 2.5–4.9)
PLATELET # BLD: 354 K/UL (ref 135–450)
PMV BLD AUTO: 8.2 FL (ref 5–10.5)
POTASSIUM SERPL-SCNC: 3.5 MMOL/L (ref 3.5–5.1)
POTASSIUM SERPL-SCNC: 3.7 MMOL/L (ref 3.5–5.1)
POTASSIUM SERPL-SCNC: 3.7 MMOL/L (ref 3.5–5.1)
POTASSIUM SERPL-SCNC: 4.1 MMOL/L (ref 3.5–5.1)
POTASSIUM SERPL-SCNC: 4.2 MMOL/L (ref 3.5–5.1)
POTASSIUM SERPL-SCNC: 4.4 MMOL/L (ref 3.5–5.1)
RBC # BLD: 4.66 M/UL (ref 4–5.2)
SODIUM BLD-SCNC: 145 MMOL/L (ref 136–145)
SODIUM BLD-SCNC: 151 MMOL/L (ref 136–145)
SODIUM BLD-SCNC: 158 MMOL/L (ref 136–145)
SODIUM BLD-SCNC: 163 MMOL/L (ref 136–145)
SODIUM BLD-SCNC: 163 MMOL/L (ref 136–145)
SODIUM BLD-SCNC: 165 MMOL/L (ref 136–145)
WBC # BLD: 9.4 K/UL (ref 4–11)

## 2019-12-21 PROCEDURE — 80048 BASIC METABOLIC PNL TOTAL CA: CPT

## 2019-12-21 PROCEDURE — 71045 X-RAY EXAM CHEST 1 VIEW: CPT

## 2019-12-21 PROCEDURE — 2580000003 HC RX 258: Performed by: INTERNAL MEDICINE

## 2019-12-21 PROCEDURE — 6370000000 HC RX 637 (ALT 250 FOR IP): Performed by: INTERNAL MEDICINE

## 2019-12-21 PROCEDURE — 2000000000 HC ICU R&B

## 2019-12-21 PROCEDURE — 6360000002 HC RX W HCPCS: Performed by: INTERNAL MEDICINE

## 2019-12-21 PROCEDURE — 85025 COMPLETE CBC W/AUTO DIFF WBC: CPT

## 2019-12-21 PROCEDURE — 84100 ASSAY OF PHOSPHORUS: CPT

## 2019-12-21 PROCEDURE — 94761 N-INVAS EAR/PLS OXIMETRY MLT: CPT

## 2019-12-21 PROCEDURE — 2500000003 HC RX 250 WO HCPCS: Performed by: INTERNAL MEDICINE

## 2019-12-21 PROCEDURE — 99223 1ST HOSP IP/OBS HIGH 75: CPT | Performed by: INTERNAL MEDICINE

## 2019-12-21 PROCEDURE — 83735 ASSAY OF MAGNESIUM: CPT

## 2019-12-21 PROCEDURE — 6360000002 HC RX W HCPCS: Performed by: HOSPITALIST

## 2019-12-21 PROCEDURE — 99233 SBSQ HOSP IP/OBS HIGH 50: CPT | Performed by: INTERNAL MEDICINE

## 2019-12-21 PROCEDURE — 36415 COLL VENOUS BLD VENIPUNCTURE: CPT

## 2019-12-21 RX ORDER — LEVOFLOXACIN 5 MG/ML
500 INJECTION, SOLUTION INTRAVENOUS EVERY 24 HOURS
Status: DISCONTINUED | OUTPATIENT
Start: 2019-12-21 | End: 2019-12-22 | Stop reason: HOSPADM

## 2019-12-21 RX ORDER — POTASSIUM CHLORIDE AND SODIUM CHLORIDE 450; 150 MG/100ML; MG/100ML
INJECTION, SOLUTION INTRAVENOUS CONTINUOUS
Status: DISCONTINUED | OUTPATIENT
Start: 2019-12-21 | End: 2019-12-21

## 2019-12-21 RX ORDER — INSULIN GLARGINE 100 [IU]/ML
35 INJECTION, SOLUTION SUBCUTANEOUS NIGHTLY
Status: DISCONTINUED | OUTPATIENT
Start: 2019-12-21 | End: 2019-12-22 | Stop reason: HOSPADM

## 2019-12-21 RX ORDER — INSULIN GLARGINE 100 [IU]/ML
35 INJECTION, SOLUTION SUBCUTANEOUS NIGHTLY
Status: DISCONTINUED | OUTPATIENT
Start: 2019-12-21 | End: 2019-12-21

## 2019-12-21 RX ORDER — DEXTROSE MONOHYDRATE 50 MG/ML
INJECTION, SOLUTION INTRAVENOUS CONTINUOUS
Status: DISCONTINUED | OUTPATIENT
Start: 2019-12-21 | End: 2019-12-22 | Stop reason: HOSPADM

## 2019-12-21 RX ADMIN — POTASSIUM CHLORIDE 10 MEQ: 7.46 INJECTION, SOLUTION INTRAVENOUS at 05:40

## 2019-12-21 RX ADMIN — POTASSIUM CHLORIDE 10 MEQ: 7.46 INJECTION, SOLUTION INTRAVENOUS at 06:01

## 2019-12-21 RX ADMIN — MICONAZOLE NITRATE: 20 POWDER TOPICAL at 20:46

## 2019-12-21 RX ADMIN — ATORVASTATIN CALCIUM 40 MG: 40 TABLET, FILM COATED ORAL at 20:38

## 2019-12-21 RX ADMIN — SODIUM PHOSPHATE, MONOBASIC, MONOHYDRATE 15 MMOL: 276; 142 INJECTION, SOLUTION INTRAVENOUS at 06:55

## 2019-12-21 RX ADMIN — FAMOTIDINE 20 MG: 20 TABLET ORAL at 20:38

## 2019-12-21 RX ADMIN — POTASSIUM CHLORIDE 10 MEQ: 7.46 INJECTION, SOLUTION INTRAVENOUS at 02:49

## 2019-12-21 RX ADMIN — POTASSIUM CHLORIDE 10 MEQ: 7.46 INJECTION, SOLUTION INTRAVENOUS at 01:38

## 2019-12-21 RX ADMIN — FLUOXETINE 10 MG: 10 CAPSULE ORAL at 10:47

## 2019-12-21 RX ADMIN — POTASSIUM CHLORIDE 10 MEQ: 7.46 INJECTION, SOLUTION INTRAVENOUS at 16:42

## 2019-12-21 RX ADMIN — SODIUM CHLORIDE 8.15 UNITS/HR: 9 INJECTION, SOLUTION INTRAVENOUS at 07:01

## 2019-12-21 RX ADMIN — GABAPENTIN 600 MG: 300 CAPSULE ORAL at 20:38

## 2019-12-21 RX ADMIN — POTASSIUM PHOSPHATE, MONOBASIC AND POTASSIUM PHOSPHATE, DIBASIC 10 MMOL: 224; 236 INJECTION, SOLUTION INTRAVENOUS at 15:30

## 2019-12-21 RX ADMIN — ASPIRIN 81 MG: 81 TABLET, COATED ORAL at 10:47

## 2019-12-21 RX ADMIN — FENOFIBRATE 160 MG: 160 TABLET ORAL at 10:48

## 2019-12-21 RX ADMIN — INSULIN LISPRO 20 UNITS: 100 INJECTION, SOLUTION INTRAVENOUS; SUBCUTANEOUS at 17:50

## 2019-12-21 RX ADMIN — DEXTROSE MONOHYDRATE: 50 INJECTION, SOLUTION INTRAVENOUS at 19:47

## 2019-12-21 RX ADMIN — SODIUM PHOSPHATE, MONOBASIC, MONOHYDRATE 15 MMOL: 276; 142 INJECTION, SOLUTION INTRAVENOUS at 01:51

## 2019-12-21 RX ADMIN — ENOXAPARIN SODIUM 40 MG: 40 INJECTION SUBCUTANEOUS at 11:28

## 2019-12-21 RX ADMIN — CARVEDILOL 6.25 MG: 6.25 TABLET, FILM COATED ORAL at 10:48

## 2019-12-21 RX ADMIN — INSULIN GLARGINE 35 UNITS: 100 INJECTION, SOLUTION SUBCUTANEOUS at 17:39

## 2019-12-21 RX ADMIN — POTASSIUM CHLORIDE 10 MEQ: 7.46 INJECTION, SOLUTION INTRAVENOUS at 17:53

## 2019-12-21 RX ADMIN — FAMOTIDINE 20 MG: 20 TABLET ORAL at 10:47

## 2019-12-21 RX ADMIN — POTASSIUM PHOSPHATE, MONOBASIC AND POTASSIUM PHOSPHATE, DIBASIC 15 MMOL: 224; 236 INJECTION, SOLUTION INTRAVENOUS at 10:48

## 2019-12-21 RX ADMIN — POTASSIUM CHLORIDE 10 MEQ: 7.46 INJECTION, SOLUTION INTRAVENOUS at 06:55

## 2019-12-21 RX ADMIN — LEVOFLOXACIN 500 MG: 5 INJECTION, SOLUTION INTRAVENOUS at 11:29

## 2019-12-21 RX ADMIN — POTASSIUM CHLORIDE 10 MEQ: 7.46 INJECTION, SOLUTION INTRAVENOUS at 02:43

## 2019-12-21 RX ADMIN — DEXTROSE MONOHYDRATE: 50 INJECTION, SOLUTION INTRAVENOUS at 10:48

## 2019-12-21 RX ADMIN — INSULIN LISPRO 3 UNITS: 100 INJECTION, SOLUTION INTRAVENOUS; SUBCUTANEOUS at 20:41

## 2019-12-21 RX ADMIN — RANOLAZINE 500 MG: 500 TABLET, FILM COATED, EXTENDED RELEASE ORAL at 10:47

## 2019-12-21 RX ADMIN — CLOPIDOGREL BISULFATE 75 MG: 75 TABLET ORAL at 10:48

## 2019-12-21 RX ADMIN — TOPIRAMATE 200 MG: 100 TABLET, FILM COATED ORAL at 10:47

## 2019-12-21 RX ADMIN — MICONAZOLE NITRATE: 20 POWDER TOPICAL at 08:33

## 2019-12-21 RX ADMIN — RANOLAZINE 500 MG: 500 TABLET, FILM COATED, EXTENDED RELEASE ORAL at 20:38

## 2019-12-21 RX ADMIN — POTASSIUM CHLORIDE 10 MEQ: 7.46 INJECTION, SOLUTION INTRAVENOUS at 16:07

## 2019-12-21 RX ADMIN — POTASSIUM CHLORIDE 10 MEQ: 7.46 INJECTION, SOLUTION INTRAVENOUS at 07:50

## 2019-12-21 RX ADMIN — LISINOPRIL 2.5 MG: 5 TABLET ORAL at 10:47

## 2019-12-21 RX ADMIN — DULOXETINE HYDROCHLORIDE 60 MG: 60 CAPSULE, DELAYED RELEASE ORAL at 10:47

## 2019-12-21 RX ADMIN — POTASSIUM CHLORIDE AND SODIUM CHLORIDE: 450; 150 INJECTION, SOLUTION INTRAVENOUS at 01:54

## 2019-12-22 VITALS
DIASTOLIC BLOOD PRESSURE: 78 MMHG | OXYGEN SATURATION: 99 % | HEIGHT: 63 IN | BODY MASS INDEX: 34.45 KG/M2 | RESPIRATION RATE: 17 BRPM | HEART RATE: 97 BPM | SYSTOLIC BLOOD PRESSURE: 110 MMHG | WEIGHT: 194.45 LBS | TEMPERATURE: 98.8 F

## 2019-12-22 LAB
ANION GAP SERPL CALCULATED.3IONS-SCNC: 9 MMOL/L (ref 3–16)
BUN BLDV-MCNC: 29 MG/DL (ref 7–20)
CALCIUM SERPL-MCNC: 7.7 MG/DL (ref 8.3–10.6)
CHLORIDE BLD-SCNC: 112 MMOL/L (ref 99–110)
CO2: 18 MMOL/L (ref 21–32)
CREAT SERPL-MCNC: 0.8 MG/DL (ref 0.6–1.1)
GFR AFRICAN AMERICAN: >60
GFR NON-AFRICAN AMERICAN: >60
GLUCOSE BLD-MCNC: 134 MG/DL (ref 70–99)
GLUCOSE BLD-MCNC: 210 MG/DL (ref 70–99)
GLUCOSE BLD-MCNC: 226 MG/DL (ref 70–99)
GLUCOSE BLD-MCNC: 231 MG/DL (ref 70–99)
MAGNESIUM: 1.9 MG/DL (ref 1.8–2.4)
PERFORMED ON: ABNORMAL
PHOSPHORUS: 1.8 MG/DL (ref 2.5–4.9)
POTASSIUM SERPL-SCNC: 3.7 MMOL/L (ref 3.5–5.1)
SODIUM BLD-SCNC: 139 MMOL/L (ref 136–145)

## 2019-12-22 PROCEDURE — 6360000002 HC RX W HCPCS: Performed by: INTERNAL MEDICINE

## 2019-12-22 PROCEDURE — 6370000000 HC RX 637 (ALT 250 FOR IP): Performed by: INTERNAL MEDICINE

## 2019-12-22 PROCEDURE — 83735 ASSAY OF MAGNESIUM: CPT

## 2019-12-22 PROCEDURE — 80048 BASIC METABOLIC PNL TOTAL CA: CPT

## 2019-12-22 PROCEDURE — 99239 HOSP IP/OBS DSCHRG MGMT >30: CPT | Performed by: INTERNAL MEDICINE

## 2019-12-22 PROCEDURE — 36415 COLL VENOUS BLD VENIPUNCTURE: CPT

## 2019-12-22 PROCEDURE — 84100 ASSAY OF PHOSPHORUS: CPT

## 2019-12-22 PROCEDURE — 99233 SBSQ HOSP IP/OBS HIGH 50: CPT | Performed by: INTERNAL MEDICINE

## 2019-12-22 RX ADMIN — MICONAZOLE NITRATE: 20 POWDER TOPICAL at 08:31

## 2019-12-22 RX ADMIN — RANOLAZINE 500 MG: 500 TABLET, FILM COATED, EXTENDED RELEASE ORAL at 08:27

## 2019-12-22 RX ADMIN — FLUOXETINE 10 MG: 10 CAPSULE ORAL at 08:27

## 2019-12-22 RX ADMIN — INSULIN LISPRO 4 UNITS: 100 INJECTION, SOLUTION INTRAVENOUS; SUBCUTANEOUS at 07:49

## 2019-12-22 RX ADMIN — TOPIRAMATE 200 MG: 100 TABLET, FILM COATED ORAL at 08:26

## 2019-12-22 RX ADMIN — INSULIN LISPRO 4 UNITS: 100 INJECTION, SOLUTION INTRAVENOUS; SUBCUTANEOUS at 16:44

## 2019-12-22 RX ADMIN — ENOXAPARIN SODIUM 40 MG: 40 INJECTION SUBCUTANEOUS at 08:31

## 2019-12-22 RX ADMIN — CARVEDILOL 6.25 MG: 6.25 TABLET, FILM COATED ORAL at 08:27

## 2019-12-22 RX ADMIN — DULOXETINE HYDROCHLORIDE 60 MG: 60 CAPSULE, DELAYED RELEASE ORAL at 08:27

## 2019-12-22 RX ADMIN — FAMOTIDINE 20 MG: 20 TABLET ORAL at 08:27

## 2019-12-22 RX ADMIN — LISINOPRIL 2.5 MG: 5 TABLET ORAL at 08:27

## 2019-12-22 RX ADMIN — INSULIN LISPRO 20 UNITS: 100 INJECTION, SOLUTION INTRAVENOUS; SUBCUTANEOUS at 07:49

## 2019-12-22 RX ADMIN — CLOPIDOGREL BISULFATE 75 MG: 75 TABLET ORAL at 08:27

## 2019-12-22 RX ADMIN — INSULIN LISPRO 20 UNITS: 100 INJECTION, SOLUTION INTRAVENOUS; SUBCUTANEOUS at 16:43

## 2019-12-22 RX ADMIN — ASPIRIN 81 MG: 81 TABLET, COATED ORAL at 08:26

## 2019-12-22 RX ADMIN — CARVEDILOL 6.25 MG: 6.25 TABLET, FILM COATED ORAL at 16:46

## 2019-12-22 RX ADMIN — FENOFIBRATE 160 MG: 160 TABLET ORAL at 08:27

## 2019-12-22 RX ADMIN — INSULIN LISPRO 20 UNITS: 100 INJECTION, SOLUTION INTRAVENOUS; SUBCUTANEOUS at 11:43

## 2019-12-22 RX ADMIN — LEVOFLOXACIN 500 MG: 5 INJECTION, SOLUTION INTRAVENOUS at 10:48

## 2019-12-24 LAB
BLOOD CULTURE, ROUTINE: NORMAL
CULTURE, BLOOD 2: NORMAL
GLUCOSE BLD-MCNC: >600 MG/DL (ref 70–99)
PERFORMED ON: ABNORMAL

## 2020-01-15 ENCOUNTER — APPOINTMENT (OUTPATIENT)
Dept: GENERAL RADIOLOGY | Age: 60
End: 2020-01-15
Payer: MEDICARE

## 2020-01-15 ENCOUNTER — HOSPITAL ENCOUNTER (EMERGENCY)
Age: 60
Discharge: HOME | End: 2020-01-15
Attending: EMERGENCY MEDICINE
Payer: MEDICARE

## 2020-01-15 ENCOUNTER — TELEPHONE (OUTPATIENT)
Dept: CARDIOLOGY | Age: 60
End: 2020-01-15

## 2020-01-15 VITALS
HEIGHT: 63 IN | HEART RATE: 86 BPM | TEMPERATURE: 98.7 F | RESPIRATION RATE: 16 BRPM | DIASTOLIC BLOOD PRESSURE: 61 MMHG | WEIGHT: 200 LBS | SYSTOLIC BLOOD PRESSURE: 103 MMHG | BODY MASS INDEX: 35.44 KG/M2 | OXYGEN SATURATION: 98 %

## 2020-01-15 LAB
A/G RATIO: 1.5 (ref 1.1–2.2)
ALBUMIN SERPL-MCNC: 4.1 G/DL (ref 3.4–5)
ALP BLD-CCNC: 63 U/L (ref 40–129)
ALT SERPL-CCNC: 17 U/L (ref 10–40)
ANION GAP SERPL CALCULATED.3IONS-SCNC: 11 MMOL/L (ref 3–16)
AST SERPL-CCNC: 20 U/L (ref 15–37)
BASOPHILS ABSOLUTE: 0.1 K/UL (ref 0–0.2)
BASOPHILS RELATIVE PERCENT: 0.9 %
BILIRUB SERPL-MCNC: 0.3 MG/DL (ref 0–1)
BUN BLDV-MCNC: 16 MG/DL (ref 7–20)
CALCIUM SERPL-MCNC: 9.1 MG/DL (ref 8.3–10.6)
CHLORIDE BLD-SCNC: 101 MMOL/L (ref 99–110)
CO2: 22 MMOL/L (ref 21–32)
CREAT SERPL-MCNC: 0.6 MG/DL (ref 0.6–1.1)
EOSINOPHILS ABSOLUTE: 0.2 K/UL (ref 0–0.6)
EOSINOPHILS RELATIVE PERCENT: 3.1 %
GFR AFRICAN AMERICAN: >60
GFR NON-AFRICAN AMERICAN: >60
GLOBULIN: 2.7 G/DL
GLUCOSE BLD-MCNC: 243 MG/DL (ref 70–99)
HCT VFR BLD CALC: 37 % (ref 36–48)
HEMOGLOBIN: 12.7 G/DL (ref 12–16)
LYMPHOCYTES ABSOLUTE: 1.7 K/UL (ref 1–5.1)
LYMPHOCYTES RELATIVE PERCENT: 21.2 %
MCH RBC QN AUTO: 32.2 PG (ref 26–34)
MCHC RBC AUTO-ENTMCNC: 34.4 G/DL (ref 31–36)
MCV RBC AUTO: 93.7 FL (ref 80–100)
MONOCYTES ABSOLUTE: 0.6 K/UL (ref 0–1.3)
MONOCYTES RELATIVE PERCENT: 7.5 %
NEUTROPHILS ABSOLUTE: 5.4 K/UL (ref 1.7–7.7)
NEUTROPHILS RELATIVE PERCENT: 67.3 %
PDW BLD-RTO: 14.9 % (ref 12.4–15.4)
PLATELET # BLD: 260 K/UL (ref 135–450)
PMV BLD AUTO: 8.2 FL (ref 5–10.5)
POTASSIUM SERPL-SCNC: 4.8 MMOL/L (ref 3.5–5.1)
RBC # BLD: 3.95 M/UL (ref 4–5.2)
SODIUM BLD-SCNC: 134 MMOL/L (ref 136–145)
TOTAL PROTEIN: 6.8 G/DL (ref 6.4–8.2)
TROPONIN: <0.01 NG/ML
WBC # BLD: 8 K/UL (ref 4–11)

## 2020-01-15 PROCEDURE — 85025 COMPLETE CBC W/AUTO DIFF WBC: CPT

## 2020-01-15 PROCEDURE — 93005 ELECTROCARDIOGRAM TRACING: CPT | Performed by: EMERGENCY MEDICINE

## 2020-01-15 PROCEDURE — 71046 X-RAY EXAM CHEST 2 VIEWS: CPT

## 2020-01-15 PROCEDURE — 80053 COMPREHEN METABOLIC PANEL: CPT

## 2020-01-15 PROCEDURE — 84484 ASSAY OF TROPONIN QUANT: CPT

## 2020-01-15 PROCEDURE — 99285 EMERGENCY DEPT VISIT HI MDM: CPT

## 2020-01-15 ASSESSMENT — PAIN DESCRIPTION - DESCRIPTORS: DESCRIPTORS: CONSTANT

## 2020-01-15 ASSESSMENT — PAIN SCALES - GENERAL: PAINLEVEL_OUTOF10: 9

## 2020-01-15 ASSESSMENT — PAIN DESCRIPTION - LOCATION: LOCATION: CHEST

## 2020-01-15 ASSESSMENT — PAIN DESCRIPTION - PAIN TYPE: TYPE: ACUTE PAIN

## 2020-01-15 NOTE — ED NOTES
Bed: 12  Expected date: 1/15/20  Expected time: 1:37 PM  Means of arrival: SQUAD  Comments:  64yo female chest pain x 3 days.  Defibrillator going off since Saturday       [facility]  01/15/20 [id number]

## 2020-01-15 NOTE — ED NOTES
[address] called back card     [facility] - [facility]ANI  01/15/20 [id number]

## 2020-01-15 NOTE — TELEPHONE ENCOUNTER
Patient is new to the area and would like to follow-up in our office for history of ICD and was in the emergency room with a defibrillator firing and was discharged thereafter once adjustments to the device were made. Can we get her hooked up into our EP office as a new patient in the near future and also in device clinic. We will need to reach out to her and call her to get more information on the  of the device. Thank you.

## 2020-01-15 NOTE — ED NOTES
Pacemaker tech finished interrogation. Spoke with ALINE Delgadillo regarding results.       Allyson Florian RN  01/15/20 [id number]

## 2020-01-16 ENCOUNTER — APPOINTMENT (OUTPATIENT)
Dept: GENERAL RADIOLOGY | Age: 60
End: 2020-01-16
Payer: MEDICARE

## 2020-01-16 ENCOUNTER — HOSPITAL ENCOUNTER (EMERGENCY)
Age: 60
Discharge: HOME | End: 2020-01-16
Attending: EMERGENCY MEDICINE
Payer: MEDICARE

## 2020-01-16 VITALS
BODY MASS INDEX: 31.89 KG/M2 | TEMPERATURE: 97.6 F | RESPIRATION RATE: 17 BRPM | HEIGHT: 63 IN | OXYGEN SATURATION: 99 % | DIASTOLIC BLOOD PRESSURE: 57 MMHG | HEART RATE: 81 BPM | SYSTOLIC BLOOD PRESSURE: 102 MMHG | WEIGHT: 180 LBS

## 2020-01-16 LAB
A/G RATIO: 1.8 (ref 1.1–2.2)
ALBUMIN SERPL-MCNC: 4.2 G/DL (ref 3.4–5)
ALP BLD-CCNC: 56 U/L (ref 40–129)
ALT SERPL-CCNC: 13 U/L (ref 10–40)
ANION GAP SERPL CALCULATED.3IONS-SCNC: 11 MMOL/L (ref 3–16)
AST SERPL-CCNC: 11 U/L (ref 15–37)
BASOPHILS ABSOLUTE: 0.1 K/UL (ref 0–0.2)
BASOPHILS RELATIVE PERCENT: 0.7 %
BILIRUB SERPL-MCNC: 0.3 MG/DL (ref 0–1)
BUN BLDV-MCNC: 20 MG/DL (ref 7–20)
CALCIUM SERPL-MCNC: 9.9 MG/DL (ref 8.3–10.6)
CHLORIDE BLD-SCNC: 98 MMOL/L (ref 99–110)
CO2: 24 MMOL/L (ref 21–32)
CREAT SERPL-MCNC: 0.7 MG/DL (ref 0.6–1.1)
EKG ATRIAL RATE: 92 BPM
EKG DIAGNOSIS: NORMAL
EKG P AXIS: 4 DEGREES
EKG P-R INTERVAL: 148 MS
EKG Q-T INTERVAL: 396 MS
EKG QRS DURATION: 100 MS
EKG QTC CALCULATION (BAZETT): 489 MS
EKG R AXIS: -11 DEGREES
EKG T AXIS: 77 DEGREES
EKG VENTRICULAR RATE: 92 BPM
EOSINOPHILS ABSOLUTE: 0.2 K/UL (ref 0–0.6)
EOSINOPHILS RELATIVE PERCENT: 2.3 %
GFR AFRICAN AMERICAN: >60
GFR NON-AFRICAN AMERICAN: >60
GLOBULIN: 2.4 G/DL
GLUCOSE BLD-MCNC: 160 MG/DL (ref 70–99)
GLUCOSE BLD-MCNC: 41 MG/DL (ref 70–99)
GLUCOSE BLD-MCNC: 73 MG/DL
GLUCOSE BLD-MCNC: 73 MG/DL (ref 70–99)
GLUCOSE BLD-MCNC: 74 MG/DL (ref 70–99)
HCT VFR BLD CALC: 36.9 % (ref 36–48)
HEMOGLOBIN: 12.4 G/DL (ref 12–16)
LYMPHOCYTES ABSOLUTE: 1.2 K/UL (ref 1–5.1)
LYMPHOCYTES RELATIVE PERCENT: 16.6 %
MCH RBC QN AUTO: 32 PG (ref 26–34)
MCHC RBC AUTO-ENTMCNC: 33.7 G/DL (ref 31–36)
MCV RBC AUTO: 95 FL (ref 80–100)
MONOCYTES ABSOLUTE: 0.5 K/UL (ref 0–1.3)
MONOCYTES RELATIVE PERCENT: 7.2 %
NEUTROPHILS ABSOLUTE: 5.3 K/UL (ref 1.7–7.7)
NEUTROPHILS RELATIVE PERCENT: 73.2 %
PDW BLD-RTO: 14.7 % (ref 12.4–15.4)
PERFORMED ON: ABNORMAL
PERFORMED ON: NORMAL
PERFORMED ON: NORMAL
PLATELET # BLD: 228 K/UL (ref 135–450)
PMV BLD AUTO: 8.3 FL (ref 5–10.5)
POTASSIUM REFLEX MAGNESIUM: 3.9 MMOL/L (ref 3.5–5.1)
RBC # BLD: 3.89 M/UL (ref 4–5.2)
SODIUM BLD-SCNC: 133 MMOL/L (ref 136–145)
TOTAL PROTEIN: 6.6 G/DL (ref 6.4–8.2)
TROPONIN: <0.01 NG/ML
WBC # BLD: 7.2 K/UL (ref 4–11)

## 2020-01-16 PROCEDURE — 80053 COMPREHEN METABOLIC PANEL: CPT

## 2020-01-16 PROCEDURE — 93010 ELECTROCARDIOGRAM REPORT: CPT | Performed by: INTERNAL MEDICINE

## 2020-01-16 PROCEDURE — 2580000003 HC RX 258

## 2020-01-16 PROCEDURE — 93005 ELECTROCARDIOGRAM TRACING: CPT | Performed by: EMERGENCY MEDICINE

## 2020-01-16 PROCEDURE — 84484 ASSAY OF TROPONIN QUANT: CPT

## 2020-01-16 PROCEDURE — 6370000000 HC RX 637 (ALT 250 FOR IP): Performed by: EMERGENCY MEDICINE

## 2020-01-16 PROCEDURE — 99285 EMERGENCY DEPT VISIT HI MDM: CPT

## 2020-01-16 PROCEDURE — 71045 X-RAY EXAM CHEST 1 VIEW: CPT

## 2020-01-16 PROCEDURE — 85025 COMPLETE CBC W/AUTO DIFF WBC: CPT

## 2020-01-16 RX ORDER — DEXTROSE MONOHYDRATE 25 G/50ML
INJECTION, SOLUTION INTRAVENOUS
Status: COMPLETED
Start: 2020-01-16 | End: 2020-01-16

## 2020-01-16 RX ORDER — ASPIRIN 81 MG/1
324 TABLET, CHEWABLE ORAL ONCE
Status: COMPLETED | OUTPATIENT
Start: 2020-01-16 | End: 2020-01-16

## 2020-01-16 RX ADMIN — ASPIRIN 81 MG 324 MG: 81 TABLET ORAL at 18:09

## 2020-01-16 RX ADMIN — DEXTROSE MONOHYDRATE: 25 INJECTION, SOLUTION INTRAVENOUS at 17:47

## 2020-01-16 ASSESSMENT — PAIN DESCRIPTION - LOCATION: LOCATION: CHEST

## 2020-01-16 ASSESSMENT — PAIN SCALES - GENERAL: PAINLEVEL_OUTOF10: 10

## 2020-01-16 ASSESSMENT — PAIN DESCRIPTION - PAIN TYPE: TYPE: ACUTE PAIN

## 2020-01-16 NOTE — ED PROVIDER NOTES
Inability: Not on file    Transportation needs:     Medical: Not on file     Non-medical: Not on file   Tobacco Use    Smoking status: Never Smoker    Smokeless tobacco: Never Used   Substance and Sexual Activity    Alcohol use: No    Drug use: No    Sexual activity: Not Currently   Lifestyle    Physical activity:     Days per week: Not on file     Minutes per session: Not on file    Stress: Not on file   Relationships    Social connections:     Talks on phone: Not on file     Gets together: Not on file     Attends Catholic service: Not on file     Active member of club or organization: Not on file     Attends meetings of clubs or organizations: Not on file     Relationship status: Not on file    Intimate partner violence:     Fear of current or ex partner: Not on file     Emotionally abused: Not on file     Physically abused: Not on file     Forced sexual activity: Not on file   Other Topics Concern    Not on file   Social History Narrative    Not on file     No current facility-administered medications for this encounter. Current Outpatient Medications   Medication Sig Dispense Refill    DULoxetine (CYMBALTA) 60 MG extended release capsule Take 1 capsule by mouth daily 30 capsule 3    miconazole (MICOTIN) 2 % powder Apply topically 2 times daily. 45 g 1    Lancets MISC 1 month supply for TID fingersticks 100 each 3    ondansetron (ZOFRAN ODT) 4 MG disintegrating tablet Take 1 tablet by mouth every 8 hours as needed for Nausea or Vomiting 10 tablet 0    [employer] Lancets Ultra Thin MISC 1 each by Does not apply route daily 100 each 0    blood glucose monitor strips Test three times a day & as needed for symptoms of irregular blood glucose. 100 strip 2    ranolazine (RANEXA) 500 MG extended release tablet Take 1 tablet by mouth 2 times daily 60 tablet 3    gabapentin (NEURONTIN) 600 MG tablet Take 600 mg by mouth nightly. Adelaida Lopez aspirin 81 MG EC tablet Take 1 tablet by mouth daily 30 tablet 3  topiramate (TOPAMAX) 200 MG tablet Take 1 tablet by mouth daily 60 tablet 0    FLUoxetine (PROZAC) 10 MG capsule Take 1 capsule by mouth daily 30 capsule 3    metFORMIN (GLUCOPHAGE) 1000 MG tablet Take 1 tablet by mouth 2 times daily (with meals) 60 tablet 3    atorvastatin (LIPITOR) 40 MG tablet Take 1 tablet by mouth nightly 30 tablet 0    fenofibrate micronized (LOFIBRA) 200 MG capsule Take 1 capsule by mouth nightly 30 capsule 3    lisinopril (PRINIVIL;ZESTRIL) 2.5 MG tablet Take 1 tablet by mouth daily 30 tablet 3    carvedilol (COREG) 6.25 MG tablet Take 1 tablet by mouth 2 times daily (with meals) 60 tablet 0    insulin glargine (LANTUS SOLOSTAR) 100 UNIT/ML injection pen Inject 35 Units into the skin 2 times daily 5 pen 3    insulin aspart (NOVOLOG FLEXPEN) 100 UNIT/ML injection pen Inject 20 Units into the skin 3 times daily (before meals) 5 pen 3     No Known Allergies    REVIEW OF SYSTEMS  10 systems reviewed, pertinent positives and negatives per HPI, otherwise noted to be negative. PHYSICAL EXAM  ED Triage Vitals   Enc Vitals Group      BP 01/16/20 1742 (!) 95/47      Pulse 01/16/20 1740 80      Resp 01/16/20 1740 20      Temp 01/16/20 1740 97.6 °F (36.4 °C)      Temp Source 01/16/20 1740 Oral      SpO2 01/16/20 1740 97 %      Weight 01/16/20 1740 180 lb (81.6 kg)      Height 01/16/20 1740 5' 3\" (1.6 m)      Head Circumference --       Peak Flow --       Pain Score --       Pain Loc --       Pain Edu? --       Excl. in [address]? --        General appearance: Awake and alert. Cooperative. No acute distress. HENT: Normocephalic. Atraumatic. Mucous membranes are moist.  Neck: Supple. Eyes: PERRL. EOMI. Heart/Chest: RRR. No murmurs. Lungs: Respirations unlabored. CTAB. Good air exchange. Speaking comfortably in full sentences. Abdomen: No tenderness. Soft. Non-distended. No masses. No organomegaly. No guarding or rebound. Musculoskeletal: No extremity edema. No deformity.   No tenderness

## 2020-01-16 NOTE — ED PROVIDER NOTES
glucose monitor strips Test three times a day & as needed for symptoms of irregular blood glucose., Disp-100 strip, R-2, Print       !! - Potential duplicate medications found. Please discuss with provider. ALLERGIES:    Patient has no known allergies. FAMILY HISTORY:     History reviewed. No pertinent family history.        SOCIAL HISTORY:       Social History     Socioeconomic History    Marital status:      Spouse name: None    Number of children: None    Years of education: None    Highest education level: None   Occupational History    None   Social Needs    Financial resource strain: None    Food insecurity:     Worry: None     Inability: None    Transportation needs:     Medical: None     Non-medical: None   Tobacco Use    Smoking status: Never Smoker    Smokeless tobacco: Never Used   Substance and Sexual Activity    Alcohol use: No    Drug use: No    Sexual activity: Not Currently   Lifestyle    Physical activity:     Days per week: None     Minutes per session: None    Stress: None   Relationships    Social connections:     Talks on phone: None     Gets together: None     Attends [employer] service: None     Active member of club or organization: None     Attends meetings of clubs or organizations: None     Relationship status: None    Intimate partner violence:     Fear of current or ex partner: None     Emotionally abused: None     Physically abused: None     Forced sexual activity: None   Other Topics Concern    None   Social History Narrative    None       SCREENINGS:   [place] Coma Scale  Eye Opening: Spontaneous  Best Verbal Response: Oriented  Best Motor Response: Obeys commands  [place] Coma Scale Score: 15        PHYSICAL EXAM:       ED Triage Vitals   BP Temp Temp Source Pulse Resp SpO2 Height Weight   01/15/20 1357 01/15/20 1357 01/15/20 1357 01/15/20 1357 01/15/20 1357 01/15/20 1357 01/15/20 1352 01/15/20 1352   (!) 100/46 98.7 °F (37.1 °C) Oral 92 20 99 % MCHC 34.4 31.0 - 36.0 g/dL    RDW 14.9 12.4 - 15.4 %    Platelets 943 432 - 439 K/uL    MPV 8.2 5.0 - 10.5 fL    Neutrophils % 67.3 %    Lymphocytes % 21.2 %    Monocytes % 7.5 %    Eosinophils % 3.1 %    Basophils % 0.9 %    Neutrophils Absolute 5.4 1.7 - 7.7 K/uL    Lymphocytes Absolute 1.7 1.0 - 5.1 K/uL    Monocytes Absolute 0.6 0.0 - 1.3 K/uL    Eosinophils Absolute 0.2 0.0 - 0.6 K/uL    Basophils Absolute 0.1 0.0 - 0.2 K/uL   Comprehensive metabolic panel   Result Value Ref Range    Sodium 134 (L) 136 - 145 mmol/L    Potassium 4.8 3.5 - 5.1 mmol/L    Chloride 101 99 - 110 mmol/L    CO2 22 21 - 32 mmol/L    Anion Gap 11 3 - 16    Glucose 243 (H) 70 - 99 mg/dL    BUN 16 7 - 20 mg/dL    CREATININE 0.6 0.6 - 1.1 mg/dL    GFR Non-African American >60 >60    GFR African American >60 >60    Calcium 9.1 8.3 - 10.6 mg/dL    Total Protein 6.8 6.4 - 8.2 g/dL    Alb 4.1 3.4 - 5.0 g/dL    Albumin/Globulin Ratio 1.5 1.1 - 2.2    Total Bilirubin 0.3 0.0 - 1.0 mg/dL    Alkaline Phosphatase 63 40 - 129 U/L    ALT 17 10 - 40 U/L    AST 20 15 - 37 U/L    Globulin 2.7 g/dL   Troponin   Result Value Ref Range    Troponin <0.01 <0.01 ng/mL   EKG 12 Lead   Result Value Ref Range    Ventricular Rate 92 BPM    Atrial Rate 92 BPM    P-R Interval 148 ms    QRS Duration 100 ms    Q-T Interval 396 ms    QTc Calculation (Bazett) 489 ms    P Axis 4 degrees    R Axis -11 degrees    T Axis 77 degrees    Diagnosis       Normal sinus rhythmPossible Inferior infarct (cited on or before 20-DEC-2019)Anterior infarct (cited on or before 20-NOV-2019)Abnormal ECGWhen compared with ECG of 20-DEC-2019 15:10,Premature ventricular complexes are no longer PresentConfirmed by Toyin Felix MDAamir ([id number]) on 1/16/2020 9:14:29 AM         RADIOLOGY:  All x-ray studies are viewed/reviewedby me. Formal interpretations per the radiologist are as follows:      XR CHEST STANDARD (2 VW)   Final Result   No radiographic evidence of acute pulmonary disease. Cardiomegaly. EKG:  See EKG interpretation by an attending phsyician      PROCEDURES:   N/A    CRITICAL CARE TIME:   N/A    CONSULTS:  IP CONSULT TO CARDIOLOGY      EMERGENCYDEPARTMENT COURSE and DIFFERENTIAL DIAGNOSIS/MDM:   Vitals:    Vitals:    01/15/20 1352 01/15/20 1357 01/15/20 1557 01/15/20 1709   BP:  (!) 100/46 104/60 103/61   Pulse:  92 88 86   Resp:  20 17 16   Temp:  98.7 °F (37.1 °C)     TempSrc:  Oral     SpO2:  99% 97% 98%   Weight: 200 lb (90.7 kg)      Height: 5' 3\" (1.6 m)          Patient was given the following medications:  Medications - No data to display      Patient was evaluated by both myself and Dr. Sanjay Sanchez. Patient presented to the emergency department today with complaints of her pacemaker defibrillator firing. We did have the defibrillator interrogated, I spoke with the rep in person, he states that patient had episodes of SVT and that she was only shocked one time and that was 11 days ago, no shocks initiated over the last 4 days. He states that everything was working and functioning correctly, he changed some of the settings so that this would not happen. I did consult cardiology and spoke with cardiologist who recommended that the patient up her Coreg and they will see her as an outpatient. Urgent cardiology consult was placed in the patient's chart. Patient laboratory studies today showed a negative troponin, otherwise unremarkable labs. She was discharged home with instruction to follow-up with cardiology. Patient verbalized understanding of the plan and agreed with it. Discharged home in good condition. Patient laboratory studies, radiographic imaging, andassessment were all discussed with the patient and/or patient family. There was shared decision-making between myself, the attending physician, as well as the patient and/or their surrogate and we are all in agreement with discharge home.   There was an opportunity for questions and all questions were answered to the best of my ability and to the satisfaction of the patient and/or patient family. I estimate there is LOW risk for PULMONARY EMBOLISM, ACUTE CORONARY SYNDROME, OR THORACIC AORTIC DISSECTION, thus I consider the discharge disposition reasonable. Sidney Johnson and I have discussed the diagnosis and risks, and we agree with discharging home to follow-up with their primary doctor. We also discussed returning to the Emergency Department immediately if new or worsening symptoms occur. We have discussed the symptoms which are most concerning (e.g., bloody sputum, fever, worsening pain or shortness of breath, vomiting) that necessitate immediate return. FINAL IMPRESSION:      1.  Chest pain, unspecified type          DISPOSITION/PLAN:   DISPOSITIONDecision To Discharge      PATIENT REFERRED TO:  [facility]  [address] [facility]  Go to   If symptoms worsen      DISCHARGE MEDICATIONS:  Discharge Medication List as of 1/15/2020  5:01 PM                     (Please note that portions of this note were completed with a voice recognition program.  Efforts were made to edit the dictations, but occasionally words are mis-transcribed.)    Kerin Sacks, APRN - CNP-C (electronically signed)        Kerin Sacks, APRN - CNP  01/16/20 6515

## 2020-01-17 LAB
EKG ATRIAL RATE: 80 BPM
EKG DIAGNOSIS: NORMAL
EKG P AXIS: 6 DEGREES
EKG P-R INTERVAL: 174 MS
EKG Q-T INTERVAL: 416 MS
EKG QRS DURATION: 102 MS
EKG QTC CALCULATION (BAZETT): 479 MS
EKG R AXIS: -8 DEGREES
EKG T AXIS: 72 DEGREES
EKG VENTRICULAR RATE: 80 BPM

## 2020-01-17 PROCEDURE — 93010 ELECTROCARDIOGRAM REPORT: CPT | Performed by: INTERNAL MEDICINE

## 2020-01-17 NOTE — ED NOTES
Saline lock removed intact. IV site looked unremarkable. Pressure dressing applied. Discharge instructions reviewed with Ms. Deborah Oliveira. She verbalized understanding. Copy of discharge instructions and prescriptions given. Ms. Deborah Oliveira was discharged to home in good condition per personal vehicle, friend/family driving. She exited the ED without difficulty.         Urmila Pagan RN  01/16/20 3824

## 2020-01-24 NOTE — PROGRESS NOTES
[address]   Electrophysiology Consult Note              Date:  January 28, 2020  Patient name: Hayden Woodruff  YOB: 1960    Reason for Consult:   New Patient; Chest Pain; Shortness of Breath; Palpitations; Edema; and Fatigue    Requesting Physician:No primary care provider on file. HISTORY OF PRESENT ILLNESS: Hayden Woodruff is a 63 y.o. female who presents for evaluation for s/p defibrillator x5 years. She has a PMH of CAD, cerebral artery occlusion with cerebral infarct, DM, Hyperlipidemia, Hypertension, Mental retardation, NSVT, and MI. She underwent LHC in 12/2018. This showed an EF of 45%. She presented to ED on 1/15/2020 with chest pain that was worse with palpitations from her defibrillator firing. Today she states she is here for her pacemaker firing. She states that she can't feel her legs and arms. She uses a cane to walk with. This has been going on for 3 months. She has had 3 strokes since April. She states that she has not followed up with neurologist.  She states that her sugars have been high. She does not want to see an endocrinologist.  She states that she had 7 heart attacks and she thinks the last one was a year ago. She states that she passes out a lot and the last time was a couple of days ago and she hit her head. Her device check today shows she has had atrial tachycardia. She states that she has chest tightness constantly. She states that it makes her feel like she can't breath. Past Medical History:   has a past medical history of CAD (coronary artery disease), Cerebral artery occlusion with cerebral infarction ([address] Diabetes mellitus ([address] Hyperlipidemia, Hypertension, Mental retardation, and MI (myocardial infarction) ([address] Past Surgical History:   has a past surgical history that includes back surgery; Pacemaker insertion; and tumor removal.     Allergies:  Patient has no known allergies.     Social History:   reports that she need to see if she has a POA prior to ablation. ~on device check today    4. Chest tightness   ~ Plan as per above. 5.  Extremity numbness H/O 3 strokes   ~referral to neuro given      RECOMMENDATIONS:  1. For the atrial tachycardia, we can use medication to control vs ablation vs medications to suppress abnormal rhythm vs removing ICD. 2.  Get stress test for chest tightness [phone]   3. See daniela for extremity numbness and history of strokes  4. Follow up in 1 month    This note was scribed in the presence of Arlet Lopez MD by Ag Malik RN. QUALITY MEASURES  1. Tobacco Cessation Counseling: NA  2. Retake of BP if >140/90:   NA  3. Documentation to PCP/referring for new patient:  Sent to PCP at close of office visit  4. CAD patient on anti-platelet: NA  5. CAD patient on STATIN therapy:  NA  6. Patient with CHF and aFib on anticoagulation:  NA     All questions and concerns were addressed to the patient/family. Alternatives to my treatment were discussed. Dr. Arlet Lopez MD  Electrophysiology  [facility]. [address] [place] [address]. [address]. [place] [address]  Phone: [phone]  Fax: [phone]     NOTE: This report was transcribed using voice recognition software. Every effort was made to ensure accuracy, however, inadvertent computerized transcription errors may be present. The above documentation has been prepared under my direction and personally reviewed by me in its entirety. I confirm the note above accurately reflects the work, physical examination, discussion of treatments and procedures, and medical decision making performed by the nurse and myself.      Electronically signed by Radha Barnhart MD on 1/29/2020 at 8:52 AM

## 2020-01-28 ENCOUNTER — OFFICE VISIT (OUTPATIENT)
Dept: CARDIOLOGY CLINIC | Age: 60
End: 2020-01-28
Payer: MEDICARE

## 2020-01-28 ENCOUNTER — NURSE ONLY (OUTPATIENT)
Dept: CARDIOLOGY CLINIC | Age: 60
End: 2020-01-28
Payer: MEDICARE

## 2020-01-28 VITALS
OXYGEN SATURATION: 95 % | DIASTOLIC BLOOD PRESSURE: 62 MMHG | HEIGHT: 63 IN | HEART RATE: 96 BPM | WEIGHT: 211 LBS | BODY MASS INDEX: 37.39 KG/M2 | SYSTOLIC BLOOD PRESSURE: 118 MMHG

## 2020-01-28 PROBLEM — I47.19 ATRIAL TACHYCARDIA: Status: ACTIVE | Noted: 2020-01-28

## 2020-01-28 PROBLEM — Z95.810 DUAL ICD (IMPLANTABLE CARDIOVERTER-DEFIBRILLATOR) IN PLACE: Status: ACTIVE | Noted: 2018-05-08

## 2020-01-28 PROBLEM — I47.1 ATRIAL TACHYCARDIA (HCC): Status: ACTIVE | Noted: 2020-01-28

## 2020-01-28 PROCEDURE — 93283 PRGRMG EVAL IMPLANTABLE DFB: CPT | Performed by: INTERNAL MEDICINE

## 2020-01-28 PROCEDURE — G8427 DOCREV CUR MEDS BY ELIG CLIN: HCPCS | Performed by: INTERNAL MEDICINE

## 2020-01-28 PROCEDURE — 99215 OFFICE O/P EST HI 40 MIN: CPT | Performed by: INTERNAL MEDICINE

## 2020-01-28 PROCEDURE — 93000 ELECTROCARDIOGRAM COMPLETE: CPT | Performed by: INTERNAL MEDICINE

## 2020-01-28 PROCEDURE — G8417 CALC BMI ABV UP PARAM F/U: HCPCS | Performed by: INTERNAL MEDICINE

## 2020-01-28 PROCEDURE — G8482 FLU IMMUNIZE ORDER/ADMIN: HCPCS | Performed by: INTERNAL MEDICINE

## 2020-01-28 PROCEDURE — 93290 INTERROG DEV EVAL ICPMS IP: CPT | Performed by: INTERNAL MEDICINE

## 2020-01-28 RX ORDER — GLIPIZIDE 5 MG/1
5 TABLET ORAL
Status: ON HOLD | COMMUNITY
End: 2020-04-20 | Stop reason: HOSPADM

## 2020-01-31 ENCOUNTER — HOSPITAL ENCOUNTER (OUTPATIENT)
Age: 60
Setting detail: OBSERVATION
Discharge: HOME | End: 2020-02-01
Attending: EMERGENCY MEDICINE | Admitting: INTERNAL MEDICINE
Payer: MEDICARE

## 2020-01-31 ENCOUNTER — APPOINTMENT (OUTPATIENT)
Dept: NUCLEAR MEDICINE | Age: 60
End: 2020-01-31
Payer: MEDICARE

## 2020-01-31 ENCOUNTER — APPOINTMENT (OUTPATIENT)
Dept: GENERAL RADIOLOGY | Age: 60
End: 2020-01-31
Payer: MEDICARE

## 2020-01-31 LAB
A/G RATIO: 1.4 (ref 1.1–2.2)
ALBUMIN SERPL-MCNC: 4.2 G/DL (ref 3.4–5)
ALP BLD-CCNC: 45 U/L (ref 40–129)
ALT SERPL-CCNC: 9 U/L (ref 10–40)
ANION GAP SERPL CALCULATED.3IONS-SCNC: 14 MMOL/L (ref 3–16)
AST SERPL-CCNC: 8 U/L (ref 15–37)
BASE EXCESS VENOUS: -2.7 MMOL/L (ref -3–3)
BASOPHILS ABSOLUTE: 0.1 K/UL (ref 0–0.2)
BASOPHILS RELATIVE PERCENT: 1.1 %
BILIRUB SERPL-MCNC: 0.4 MG/DL (ref 0–1)
BUN BLDV-MCNC: 20 MG/DL (ref 7–20)
CALCIUM SERPL-MCNC: 9.6 MG/DL (ref 8.3–10.6)
CARBOXYHEMOGLOBIN: 1.5 % (ref 0–1.5)
CHLORIDE BLD-SCNC: 100 MMOL/L (ref 99–110)
CO2: 22 MMOL/L (ref 21–32)
CREAT SERPL-MCNC: 0.8 MG/DL (ref 0.6–1.1)
EOSINOPHILS ABSOLUTE: 0.1 K/UL (ref 0–0.6)
EOSINOPHILS RELATIVE PERCENT: 1.9 %
GFR AFRICAN AMERICAN: >60
GFR NON-AFRICAN AMERICAN: >60
GLOBULIN: 3 G/DL
GLUCOSE BLD-MCNC: 242 MG/DL (ref 70–99)
GLUCOSE BLD-MCNC: 277 MG/DL (ref 70–99)
GLUCOSE BLD-MCNC: 421 MG/DL (ref 70–99)
GLUCOSE BLD-MCNC: 538 MG/DL (ref 70–99)
GLUCOSE BLD-MCNC: 558 MG/DL (ref 70–99)
GLUCOSE BLD-MCNC: 562 MG/DL (ref 70–99)
HCO3 VENOUS: 21.4 MMOL/L (ref 23–29)
HCT VFR BLD CALC: 37.5 % (ref 36–48)
HEMOGLOBIN: 12.7 G/DL (ref 12–16)
LV EF: 45 %
LVEF MODALITY: NORMAL
LYMPHOCYTES ABSOLUTE: 1.8 K/UL (ref 1–5.1)
LYMPHOCYTES RELATIVE PERCENT: 25.3 %
MCH RBC QN AUTO: 32.3 PG (ref 26–34)
MCHC RBC AUTO-ENTMCNC: 33.8 G/DL (ref 31–36)
MCV RBC AUTO: 95.5 FL (ref 80–100)
METHEMOGLOBIN VENOUS: 0.3 %
MONOCYTES ABSOLUTE: 0.6 K/UL (ref 0–1.3)
MONOCYTES RELATIVE PERCENT: 8.4 %
NEUTROPHILS ABSOLUTE: 4.4 K/UL (ref 1.7–7.7)
NEUTROPHILS RELATIVE PERCENT: 63.3 %
O2 CONTENT, VEN: 18 VOL %
O2 SAT, VEN: 96 %
O2 THERAPY: ABNORMAL
PCO2, VEN: 35.1 MMHG (ref 40–50)
PDW BLD-RTO: 14.2 % (ref 12.4–15.4)
PERFORMED ON: ABNORMAL
PH VENOUS: 7.4 (ref 7.35–7.45)
PLATELET # BLD: 351 K/UL (ref 135–450)
PMV BLD AUTO: 7.9 FL (ref 5–10.5)
PO2, VEN: 80.9 MMHG (ref 25–40)
POTASSIUM REFLEX MAGNESIUM: 4.1 MMOL/L (ref 3.5–5.1)
RBC # BLD: 3.92 M/UL (ref 4–5.2)
SODIUM BLD-SCNC: 136 MMOL/L (ref 136–145)
TCO2 CALC VENOUS: 22 MMOL/L
TOTAL PROTEIN: 7.2 G/DL (ref 6.4–8.2)
TROPONIN: <0.01 NG/ML
TROPONIN: <0.01 NG/ML
WBC # BLD: 6.9 K/UL (ref 4–11)

## 2020-01-31 PROCEDURE — G0378 HOSPITAL OBSERVATION PER HR: HCPCS

## 2020-01-31 PROCEDURE — 3430000000 HC RX DIAGNOSTIC RADIOPHARMACEUTICAL: Performed by: INTERNAL MEDICINE

## 2020-01-31 PROCEDURE — 99285 EMERGENCY DEPT VISIT HI MDM: CPT

## 2020-01-31 PROCEDURE — 71046 X-RAY EXAM CHEST 2 VIEWS: CPT

## 2020-01-31 PROCEDURE — 93005 ELECTROCARDIOGRAM TRACING: CPT | Performed by: EMERGENCY MEDICINE

## 2020-01-31 PROCEDURE — 6370000000 HC RX 637 (ALT 250 FOR IP): Performed by: INTERNAL MEDICINE

## 2020-01-31 PROCEDURE — A9502 TC99M TETROFOSMIN: HCPCS | Performed by: INTERNAL MEDICINE

## 2020-01-31 PROCEDURE — 2500000003 HC RX 250 WO HCPCS: Performed by: INTERNAL MEDICINE

## 2020-01-31 PROCEDURE — 82803 BLOOD GASES ANY COMBINATION: CPT

## 2020-01-31 PROCEDURE — 6360000002 HC RX W HCPCS: Performed by: EMERGENCY MEDICINE

## 2020-01-31 PROCEDURE — 96372 THER/PROPH/DIAG INJ SC/IM: CPT

## 2020-01-31 PROCEDURE — 85025 COMPLETE CBC W/AUTO DIFF WBC: CPT

## 2020-01-31 PROCEDURE — 84484 ASSAY OF TROPONIN QUANT: CPT

## 2020-01-31 PROCEDURE — 2580000003 HC RX 258: Performed by: INTERNAL MEDICINE

## 2020-01-31 PROCEDURE — 6370000000 HC RX 637 (ALT 250 FOR IP): Performed by: EMERGENCY MEDICINE

## 2020-01-31 PROCEDURE — 6360000002 HC RX W HCPCS: Performed by: INTERNAL MEDICINE

## 2020-01-31 PROCEDURE — 80053 COMPREHEN METABOLIC PANEL: CPT

## 2020-01-31 PROCEDURE — 78452 HT MUSCLE IMAGE SPECT MULT: CPT

## 2020-01-31 PROCEDURE — 2580000003 HC RX 258: Performed by: EMERGENCY MEDICINE

## 2020-01-31 PROCEDURE — 93017 CV STRESS TEST TRACING ONLY: CPT

## 2020-01-31 PROCEDURE — 99214 OFFICE O/P EST MOD 30 MIN: CPT | Performed by: INTERNAL MEDICINE

## 2020-01-31 PROCEDURE — 96374 THER/PROPH/DIAG INJ IV PUSH: CPT

## 2020-01-31 RX ORDER — TOPIRAMATE 100 MG/1
200 TABLET, FILM COATED ORAL DAILY
Status: DISCONTINUED | OUTPATIENT
Start: 2020-01-31 | End: 2020-02-01 | Stop reason: HOSPADM

## 2020-01-31 RX ORDER — ATORVASTATIN CALCIUM 40 MG/1
40 TABLET, FILM COATED ORAL NIGHTLY
Status: DISCONTINUED | OUTPATIENT
Start: 2020-01-31 | End: 2020-02-01 | Stop reason: HOSPADM

## 2020-01-31 RX ORDER — LISINOPRIL 5 MG/1
2.5 TABLET ORAL DAILY
Status: DISCONTINUED | OUTPATIENT
Start: 2020-01-31 | End: 2020-02-01 | Stop reason: HOSPADM

## 2020-01-31 RX ORDER — GABAPENTIN 300 MG/1
600 CAPSULE ORAL NIGHTLY
Status: DISCONTINUED | OUTPATIENT
Start: 2020-01-31 | End: 2020-02-01 | Stop reason: HOSPADM

## 2020-01-31 RX ORDER — DULOXETIN HYDROCHLORIDE 60 MG/1
60 CAPSULE, DELAYED RELEASE ORAL DAILY
Status: DISCONTINUED | OUTPATIENT
Start: 2020-01-31 | End: 2020-02-01 | Stop reason: HOSPADM

## 2020-01-31 RX ORDER — GLIPIZIDE 5 MG/1
5 TABLET ORAL
Status: DISCONTINUED | OUTPATIENT
Start: 2020-01-31 | End: 2020-02-01 | Stop reason: HOSPADM

## 2020-01-31 RX ORDER — ASPIRIN 81 MG/1
81 TABLET ORAL DAILY
Status: DISCONTINUED | OUTPATIENT
Start: 2020-01-31 | End: 2020-02-01 | Stop reason: HOSPADM

## 2020-01-31 RX ORDER — SODIUM CHLORIDE 0.9 % (FLUSH) 0.9 %
10 SYRINGE (ML) INJECTION PRN
Status: DISCONTINUED | OUTPATIENT
Start: 2020-01-31 | End: 2020-02-01 | Stop reason: HOSPADM

## 2020-01-31 RX ORDER — CARVEDILOL 6.25 MG/1
6.25 TABLET ORAL 2 TIMES DAILY WITH MEALS
Status: DISCONTINUED | OUTPATIENT
Start: 2020-01-31 | End: 2020-02-01 | Stop reason: HOSPADM

## 2020-01-31 RX ORDER — SODIUM CHLORIDE 0.9 % (FLUSH) 0.9 %
10 SYRINGE (ML) INJECTION EVERY 12 HOURS SCHEDULED
Status: DISCONTINUED | OUTPATIENT
Start: 2020-01-31 | End: 2020-02-01 | Stop reason: HOSPADM

## 2020-01-31 RX ORDER — INSULIN GLARGINE 100 [IU]/ML
35 INJECTION, SOLUTION SUBCUTANEOUS 2 TIMES DAILY
Status: DISCONTINUED | OUTPATIENT
Start: 2020-01-31 | End: 2020-02-01 | Stop reason: HOSPADM

## 2020-01-31 RX ORDER — ACETAMINOPHEN 325 MG/1
650 TABLET ORAL EVERY 4 HOURS PRN
Status: DISCONTINUED | OUTPATIENT
Start: 2020-01-31 | End: 2020-02-01 | Stop reason: HOSPADM

## 2020-01-31 RX ORDER — FENOFIBRATE 160 MG/1
160 TABLET ORAL DAILY
Status: DISCONTINUED | OUTPATIENT
Start: 2020-01-31 | End: 2020-02-01 | Stop reason: HOSPADM

## 2020-01-31 RX ORDER — RANOLAZINE 500 MG/1
500 TABLET, EXTENDED RELEASE ORAL 2 TIMES DAILY
Status: DISCONTINUED | OUTPATIENT
Start: 2020-01-31 | End: 2020-02-01 | Stop reason: HOSPADM

## 2020-01-31 RX ORDER — DEXTROSE MONOHYDRATE 25 G/50ML
12.5 INJECTION, SOLUTION INTRAVENOUS PRN
Status: DISCONTINUED | OUTPATIENT
Start: 2020-01-31 | End: 2020-02-01 | Stop reason: HOSPADM

## 2020-01-31 RX ORDER — INSULIN GLARGINE 100 [IU]/ML
15 INJECTION, SOLUTION SUBCUTANEOUS ONCE
Status: COMPLETED | OUTPATIENT
Start: 2020-01-31 | End: 2020-01-31

## 2020-01-31 RX ORDER — CARVEDILOL 6.25 MG/1
6.25 TABLET ORAL 2 TIMES DAILY WITH MEALS
Status: DISCONTINUED | OUTPATIENT
Start: 2020-01-31 | End: 2020-01-31

## 2020-01-31 RX ORDER — FLUOXETINE 10 MG/1
10 CAPSULE ORAL DAILY
Status: DISCONTINUED | OUTPATIENT
Start: 2020-01-31 | End: 2020-02-01 | Stop reason: HOSPADM

## 2020-01-31 RX ORDER — ONDANSETRON 2 MG/ML
4 INJECTION INTRAMUSCULAR; INTRAVENOUS EVERY 6 HOURS PRN
Status: DISCONTINUED | OUTPATIENT
Start: 2020-01-31 | End: 2020-02-01 | Stop reason: HOSPADM

## 2020-01-31 RX ORDER — INSULIN GLARGINE 100 [IU]/ML
20 INJECTION, SOLUTION SUBCUTANEOUS ONCE
Status: COMPLETED | OUTPATIENT
Start: 2020-01-31 | End: 2020-01-31

## 2020-01-31 RX ORDER — 0.9 % SODIUM CHLORIDE 0.9 %
1000 INTRAVENOUS SOLUTION INTRAVENOUS ONCE
Status: COMPLETED | OUTPATIENT
Start: 2020-01-31 | End: 2020-01-31

## 2020-01-31 RX ORDER — DEXTROSE MONOHYDRATE 50 MG/ML
100 INJECTION, SOLUTION INTRAVENOUS PRN
Status: DISCONTINUED | OUTPATIENT
Start: 2020-01-31 | End: 2020-02-01 | Stop reason: HOSPADM

## 2020-01-31 RX ORDER — MORPHINE SULFATE 4 MG/ML
4 INJECTION, SOLUTION INTRAMUSCULAR; INTRAVENOUS EVERY 30 MIN PRN
Status: DISCONTINUED | OUTPATIENT
Start: 2020-01-31 | End: 2020-01-31 | Stop reason: HOSPADM

## 2020-01-31 RX ORDER — NICOTINE POLACRILEX 4 MG
15 LOZENGE BUCCAL PRN
Status: DISCONTINUED | OUTPATIENT
Start: 2020-01-31 | End: 2020-02-01 | Stop reason: HOSPADM

## 2020-01-31 RX ORDER — ASPIRIN 81 MG/1
324 TABLET, CHEWABLE ORAL ONCE
Status: COMPLETED | OUTPATIENT
Start: 2020-01-31 | End: 2020-01-31

## 2020-01-31 RX ADMIN — INSULIN GLARGINE 15 UNITS: 100 INJECTION, SOLUTION SUBCUTANEOUS at 15:24

## 2020-01-31 RX ADMIN — INSULIN LISPRO 2 UNITS: 100 INJECTION, SOLUTION INTRAVENOUS; SUBCUTANEOUS at 21:03

## 2020-01-31 RX ADMIN — GABAPENTIN 600 MG: 300 CAPSULE ORAL at 21:03

## 2020-01-31 RX ADMIN — FENOFIBRATE 160 MG: 160 TABLET ORAL at 10:12

## 2020-01-31 RX ADMIN — INSULIN HUMAN 8 UNITS: 100 INJECTION, SOLUTION PARENTERAL at 04:05

## 2020-01-31 RX ADMIN — ATORVASTATIN CALCIUM 40 MG: 40 TABLET, FILM COATED ORAL at 21:03

## 2020-01-31 RX ADMIN — TETROFOSMIN 11 MILLICURIE: 1.38 INJECTION, POWDER, LYOPHILIZED, FOR SOLUTION INTRAVENOUS at 11:10

## 2020-01-31 RX ADMIN — RANOLAZINE 500 MG: 500 TABLET, FILM COATED, EXTENDED RELEASE ORAL at 10:12

## 2020-01-31 RX ADMIN — DULOXETINE HYDROCHLORIDE 60 MG: 60 CAPSULE, DELAYED RELEASE ORAL at 10:12

## 2020-01-31 RX ADMIN — CARVEDILOL 6.25 MG: 6.25 TABLET, FILM COATED ORAL at 21:03

## 2020-01-31 RX ADMIN — NITROGLYCERIN 0.5 INCH: 20 OINTMENT TOPICAL at 04:14

## 2020-01-31 RX ADMIN — MICONAZOLE NITRATE: 20 POWDER TOPICAL at 21:03

## 2020-01-31 RX ADMIN — ASPIRIN 81 MG 324 MG: 81 TABLET ORAL at 02:47

## 2020-01-31 RX ADMIN — INSULIN LISPRO 6 UNITS: 100 INJECTION, SOLUTION INTRAVENOUS; SUBCUTANEOUS at 14:33

## 2020-01-31 RX ADMIN — FLUOXETINE 10 MG: 10 CAPSULE ORAL at 14:30

## 2020-01-31 RX ADMIN — RANOLAZINE 500 MG: 500 TABLET, FILM COATED, EXTENDED RELEASE ORAL at 21:03

## 2020-01-31 RX ADMIN — Medication 10 ML: at 21:03

## 2020-01-31 RX ADMIN — TETROFOSMIN 30.6 MILLICURIE: 1.38 INJECTION, POWDER, LYOPHILIZED, FOR SOLUTION INTRAVENOUS at 12:29

## 2020-01-31 RX ADMIN — ASPIRIN 81 MG: 81 TABLET, COATED ORAL at 10:12

## 2020-01-31 RX ADMIN — SODIUM CHLORIDE 1000 ML: 9 INJECTION, SOLUTION INTRAVENOUS at 04:06

## 2020-01-31 RX ADMIN — REGADENOSON 0.4 MG: 0.08 INJECTION, SOLUTION INTRAVENOUS at 12:27

## 2020-01-31 RX ADMIN — TOPIRAMATE 200 MG: 100 TABLET, FILM COATED ORAL at 10:12

## 2020-01-31 RX ADMIN — INSULIN LISPRO 6 UNITS: 100 INJECTION, SOLUTION INTRAVENOUS; SUBCUTANEOUS at 17:50

## 2020-01-31 RX ADMIN — MICONAZOLE NITRATE: 20 POWDER TOPICAL at 10:13

## 2020-01-31 RX ADMIN — INSULIN GLARGINE 35 UNITS: 100 INJECTION, SOLUTION SUBCUTANEOUS at 21:04

## 2020-01-31 RX ADMIN — MORPHINE SULFATE 4 MG: 4 INJECTION, SOLUTION INTRAMUSCULAR; INTRAVENOUS at 04:46

## 2020-01-31 RX ADMIN — INSULIN GLARGINE 20 UNITS: 100 INJECTION, SOLUTION SUBCUTANEOUS at 14:30

## 2020-01-31 RX ADMIN — Medication 10 ML: at 10:12

## 2020-01-31 ASSESSMENT — PAIN DESCRIPTION - LOCATION
LOCATION: CHEST

## 2020-01-31 ASSESSMENT — PAIN SCALES - GENERAL
PAINLEVEL_OUTOF10: 10
PAINLEVEL_OUTOF10: 10
PAINLEVEL_OUTOF10: 0
PAINLEVEL_OUTOF10: 10

## 2020-01-31 ASSESSMENT — HEART SCORE: ECG: 0

## 2020-01-31 ASSESSMENT — PAIN DESCRIPTION - PAIN TYPE
TYPE: ACUTE PAIN

## 2020-01-31 NOTE — ED NOTES
Bedside report to DILLAN Woo. Patient transported via wheelchair in stable condition.      Mary Kate Hobbs RN  01/31/20 028

## 2020-01-31 NOTE — ED PROVIDER NOTES
Emergency Physician Note    Chief Complaint  Chest Pain (CP for a couple days, worse tonight and noticed sugar was high tonight ) and Hyperglycemia       History of Present Illness  Anant Rdz is a 63 y.o. female who presents to the ED for pain. Patient states she does have chronic chest pain that is described as pressure-like and in the midsternal region. However tonight she states the pain significantly worsened at 1 AM, does not radiate, and patient is very concerned about this change in her chronic pain. She is also reports that her blood sugars have been high recently and she is not sure why. Denies fever, chills, malaise,   shortness of breath, cough, abdominal pain, nausea, vomiting, diarrhea, headache, sore throat, dysuria, back pain, rash. No palliative/provocative factors. Unless otherwise stated in this report or unable to obtain because of the patient's clinical or mental status as evidenced by the medical record, this patient's positive and negative responses for review of systems, constitutional, psych, eyes, ENT, cardiovascular, respiratory, gastrointestinal, neurological, genitourinary, musculoskeletal, integument systems and systems related to the presenting problem are either stated in the preceding paragraph or were not pertinent or were negative for the symptoms and/or complaints related to the medical problem. I have reviewed the following from the nursing documentation:      Prior to Admission medications    Medication Sig Start Date End Date Taking? Authorizing Provider   glipiZIDE (GLUCOTROL) 5 MG tablet Take 5 mg by mouth 2 times daily (before meals)    Historical Provider, MD   DULoxetine (CYMBALTA) 60 MG extended release capsule Take 1 capsule by mouth daily 10/17/19   Elena Memory, APRN - CNP   miconazole (MICOTIN) 2 % powder Apply topically 2 times daily.  10/16/19   Elena Memory, APRN - CNP   Lancets MISC 1 month supply for TID fingersticks 10/8/19 sinus   Rate: normal  Ectopy: none  ST Segments: normal    RADIOLOGY  X-RAYS:  I have reviewed radiologic plain film image(s). ALL OTHER NON-PLAIN FILM IMAGES SUCH AS CT, ULTRASOUND AND MRI HAVE BEEN READ BY THE RADIOLOGIST.   XR CHEST STANDARD (2 VW)   Preliminary Result   No acute process              Chest X-Ray    Interpreted by: Emergency Department Physician    View: PA/Lateral chest xray    Findings: Normal heart size, Normal lungs, Normal mediastinum, No infiltrates, No hemothorax, No pneumothorax, No congestive heart failure, No effusion    LABS  Results for orders placed or performed during the hospital encounter of 01/31/20   CBC Auto Differential   Result Value Ref Range    WBC 6.9 4.0 - 11.0 K/uL    RBC 3.92 (L) 4.00 - 5.20 M/uL    Hemoglobin 12.7 12.0 - 16.0 g/dL    Hematocrit 37.5 36.0 - 48.0 %    MCV 95.5 80.0 - 100.0 fL    MCH 32.3 26.0 - 34.0 pg    MCHC 33.8 31.0 - 36.0 g/dL    RDW 14.2 12.4 - 15.4 %    Platelets 113 175 - 672 K/uL    MPV 7.9 5.0 - 10.5 fL    Neutrophils % 63.3 %    Lymphocytes % 25.3 %    Monocytes % 8.4 %    Eosinophils % 1.9 %    Basophils % 1.1 %    Neutrophils Absolute 4.4 1.7 - 7.7 K/uL    Lymphocytes Absolute 1.8 1.0 - 5.1 K/uL    Monocytes Absolute 0.6 0.0 - 1.3 K/uL    Eosinophils Absolute 0.1 0.0 - 0.6 K/uL    Basophils Absolute 0.1 0.0 - 0.2 K/uL   Blood gas, venous   Result Value Ref Range    pH, Alberto 7.402 7.350 - 7.450    pCO2, Alberto 35.1 (L) 40.0 - 50.0 mmHg    pO2, Alberto 80.9 (H) 25.0 - 40.0 mmHg    HCO3, Venous 21.4 (L) 23.0 - 29.0 mmol/L    Base Excess, Alberto -2.7 -3.0 - 3.0 mmol/L    O2 Sat, Alberto 96 Not Established %    Carboxyhemoglobin 1.5 0.0 - 1.5 %    MetHgb, Alberto 0.3 <1.5 %    TC02 (Calc), Alberto 22 Not Established mmol/L    O2 Content, Alberto 18 Not Established VOL %    O2 Therapy Unknown    POCT Glucose   Result Value Ref Range    POC Glucose 562 (H) 70 - 99 mg/dl    Performed on ACCU-CHEK        PROCEDURES      MEDICAL DECISION MAKING    Procedures/interventions/images ordered for this visit  Orders Placed This Encounter   Procedures    XR CHEST STANDARD (2 VW)    CBC Auto Differential    Comprehensive Metabolic Panel w/ Reflex to MG    Troponin    Blood gas, venous    Initiate Oxygen Therapy Protocol    POCT Glucose    EKG 12 Lead    Saline lock IV       Medications ordered for this visit  Orders Placed This Encounter   Medications    aspirin chewable tablet 324 mg       ED course notes for this visit       REVIEW OF PREVIOUS RECORDS  I have reviewed the old records of Magali Stone which reveal the following pertinent information:      From office visit on 2020  Robert Adams MD   Physician   Cardiology   Progress Notes   Signed   Encounter Date:  2020          EC  Normal sinus rhythmLow voltage QRSCannot rule out Inferior infarct   EC20  SR 90     ECHO: 10/7/2019  Summary   The left ventricular systolic function is mildly reduced with an ejection   fraction of 35 - 40 %.   There is hypokinesis of the apex, anteroseptum, anterior, apical lateral and   inferior walls.   Left ventricular cavity size is moderately dilated.   Grade I diastolic dysfunction with normal filling pressure.   Changes noted from previous echo on 2018 in left ventricular function.   Mild mitral regurgitation.   Right ventricular systolic function is mildly reduced .   Systolic pulmonic artery pressure (SPAP) is normal estimated at 21 mmHg   (Right atrial pressure of 3 mmHg).    IMPRESSION:    1. Cardiomyopathy  Mrs. Miguelito Hope is a pleasant 63year old female with a medical history significant for mental retardation, ?ischemic cardiomypathy?, cerebral vascular disease, diabetes mellitus, hypertension, hyperlipidemia, NSVT, and SVT who presents from home for evaluation of device discharge (inappropriate). Etiology of cardiomyopathy. She reports having chronic chest pressure.   Nuclear stress test from 2018 showed depressed ejection fraction without signs of ischemic pathology. I will ask that she get a stress test.  If negative will ask that cardiology evaluate her for continued chest pain.    - Nuclear stress test.              ~[facility] [facility] defibrillator placed in [address] [place] [address] St              ~Echo 10/7/2009 EF 35-40%    RECOMMENDATIONS:  1. For the atrial tachycardia, we can use medication to control vs ablation vs medications to suppress abnormal rhythm vs removing ICD. 2.  Get stress test for chest tightness [phone]   3. See daniela for extremity numbness and history of strokes  4.   Follow up in 1 month        - Patient seen and evaluated in room 3    Results for orders placed or performed during the hospital encounter of 01/31/20   CBC Auto Differential   Result Value Ref Range    WBC 6.9 4.0 - 11.0 K/uL    RBC 3.92 (L) 4.00 - 5.20 M/uL    Hemoglobin 12.7 12.0 - 16.0 g/dL    Hematocrit 37.5 36.0 - 48.0 %    MCV 95.5 80.0 - 100.0 fL    MCH 32.3 26.0 - 34.0 pg    MCHC 33.8 31.0 - 36.0 g/dL    RDW 14.2 12.4 - 15.4 %    Platelets 070 376 - 816 K/uL    MPV 7.9 5.0 - 10.5 fL    Neutrophils % 63.3 %    Lymphocytes % 25.3 %    Monocytes % 8.4 %    Eosinophils % 1.9 %    Basophils % 1.1 %    Neutrophils Absolute 4.4 1.7 - 7.7 K/uL    Lymphocytes Absolute 1.8 1.0 - 5.1 K/uL    Monocytes Absolute 0.6 0.0 - 1.3 K/uL    Eosinophils Absolute 0.1 0.0 - 0.6 K/uL    Basophils Absolute 0.1 0.0 - 0.2 K/uL   Comprehensive Metabolic Panel w/ Reflex to MG   Result Value Ref Range    Sodium 136 136 - 145 mmol/L    Potassium reflex Magnesium 4.1 3.5 - 5.1 mmol/L    Chloride 100 99 - 110 mmol/L    CO2 22 21 - 32 mmol/L    Anion Gap 14 3 - 16    Glucose 558 (H) 70 - 99 mg/dL    BUN 20 7 - 20 mg/dL    CREATININE 0.8 0.6 - 1.1 mg/dL    GFR Non-African American >60 >60    GFR African American >60 >60    Calcium 9.6 8.3 - 10.6 mg/dL    Total Protein 7.2 6.4 - 8.2 g/dL    Alb 4.2 3.4 - 5.0 g/dL    Albumin/Globulin Ratio 1.4 1.1 - 2.2    Total Bilirubin 0.4 0.0 - 1.0 mg/dL    Alkaline

## 2020-01-31 NOTE — ED NOTES
[facility]  RE:cHest pain, recent visit to the cardiology recommended stress test, hyperglycemia in the setting of diabetes per Dr. Cathi Bueno spoke with Torrey Luna at [address]  01/31/20 0665

## 2020-01-31 NOTE — PROGRESS NOTES
A lexiscan stress test was completed on this patient as ordered. The patient tolerated the procedure well. Awaiting stress imaging at this time.

## 2020-01-31 NOTE — PROGRESS NOTES
Paged Dr. Jamee Anderson \"blood sugar 538 after stress test. Will give sliding scale and 20 units of lantus now. Do you want anything else? Will look for orders. Thanks! \"

## 2020-01-31 NOTE — PLAN OF CARE
Problem: OXYGENATION/RESPIRATORY FUNCTION  Goal: Patient will maintain patent airway  Outcome: Ongoing  Note:     Patient's EF (Ejection Fraction) is less than 40%    Heart Failure Medications:  Diuretics[de-identified] None  ACE[de-identified] Lisinopril  ARB[de-identified] None  ARNI[de-identified] None  Evidenced Based Beta Blocker[de-identified] Carvedilol- Coreg    Patient's weights and intake/output reviewed: Yes    Patient's Last Weight: 202 lbs obtained by standing scale. Difference of 9 lbs less than last documented weight. Intake/Output Summary (Last 24 hours) at 1/31/2020 1811  Last data filed at 1/31/2020 1633  Gross per 24 hour   Intake 1960 ml   Output 600 ml   Net 1360 ml       Comorbidities Reviewed Yes    Patient has a past medical history of CAD (coronary artery disease), Cerebral artery occlusion with cerebral infarction ([facility] Diabetes mellitus ([facility] Hyperlipidemia, Hypertension, Mental retardation, and MI (myocardial infarction) ([facility] >>For CHF and Comorbidity documentation on Education Time and Topics, please see Education Tab    Patient stated Daily Functional Goal: (Tolerate stress test, walk to bathroom with minimal assistance    Pt resting in bed at this time on room air. Pt denies shortness of breath. Pt without lower extremity edema.      Patient and/or Family's stated Goal of Care this Admission: increase activity tolerance, better understand heart failure and disease management, and be more comfortable prior to discharge

## 2020-01-31 NOTE — H&P
mouth every 8 hours as needed for Nausea or Vomiting 5/22/19   Jason Hyde MD   [facility] Lancets Ultra Thin MISC 1 each by Does not apply route daily 5/12/19   Melba Mensah MD   blood glucose monitor strips Test three times a day & as needed for symptoms of irregular blood glucose. 5/12/19   Melba Mensah MD   ranolazine (RANEXA) 500 MG extended release tablet Take 1 tablet by mouth 2 times daily 4/9/19   Fabio Alfred MD   gabapentin (NEURONTIN) 600 MG tablet Take 600 mg by mouth nightly. Padmini Munoz MD   aspirin 81 MG EC tablet Take 1 tablet by mouth daily 5/10/18   Patrick Newby MD   topiramate (TOPAMAX) 200 MG tablet Take 1 tablet by mouth daily 4/14/18   Fady Layton MD   [facility]) 10 MG capsule Take 1 capsule by mouth daily 4/14/18   Fady Layton MD   metFORMIN (GLUCOPHAGE) 1000 MG tablet Take 1 tablet by mouth 2 times daily (with meals) 4/14/18   Fady Layton MD   atorvastatin (LIPITOR) 40 MG tablet Take 1 tablet by mouth nightly 4/14/18   Fady Layton MD   fenofibrate micronized (LOFIBRA) 200 MG capsule Take 1 capsule by mouth nightly 4/14/18   Fady Layton MD   lisinopril (PRINIVIL;ZESTRIL) 2.5 MG tablet Take 1 tablet by mouth daily 4/14/18   Fady Layton MD   carvedilol (COREG) 6.25 MG tablet Take 1 tablet by mouth 2 times daily (with meals) 4/14/18   Fady Layton MD   insulin glargine (LANTUS SOLOSTAR) 100 UNIT/ML injection pen Inject 35 Units into the skin 2 times daily 4/14/18   Fady Layton MD   insulin aspart (NOVOLOG FLEXPEN) 100 UNIT/ML injection pen Inject 20 Units into the skin 3 times daily (before meals) 4/14/18   Fady Layton MD       Allergies:  Patient has no known allergies. Social History:      The patient currently lives at home by self    TOBACCO:   reports that she has never smoked. She has never used smokeless tobacco.  ETOH:   reports no history of alcohol use.   E-Cigarettes TROPONINI <0.01 <0.01       Urinalysis:      Lab Results   Component Value Date    NITRU Negative 12/20/2019    WBCUA 6-10 05/20/2019    BACTERIA 2+ 01/25/2019    RBCUA None seen 05/20/2019    BLOODU Negative 12/20/2019    SPECGRAV <=1.005 12/20/2019    GLUCOSEU >=1000 12/20/2019       Radiology:     CXR: I have reviewed the CXR with the following interpretation: No acute process  EKG:  I have reviewed the EKG with the following interpretation: NSR, rate 91, no acute changes compared to prior EKG    XR CHEST STANDARD (2 VW)   Preliminary Result   No acute process             ASSESSMENT:PLAN:    Active Hospital Problems    Diagnosis Date Noted    Atrial tachycardia ([facility] [I47.1] 01/28/2020    Chest pain [R07.9] 10/07/2019    Dual ICD (implantable cardioverter-defibrillator) in place [Z95.810] 05/08/2018    CAD in native artery [I25.10]     DM (diabetes mellitus), secondary, uncontrolled, w/neurologic complic ([facility] [O80.81, Y31.98]     HTN (hypertension), benign [I10]     Dyslipidemia [E78.5]      Chest pain-atypical, likely musculoskeletal pain  Initial EKG and troponin negative  Follow serial troponins  IV morphine and NTG patch applied in ED without relief of pain, suggesting likely noncardiac pain. May benefit from NSAID  We will schedule stress test as determined necessary by Karlos Cardenas MD    Cardiomyopathy ? Ischemic  Chronic systolic heart failure EF 35 to 40% on echo 10/2019  -Appears compensated  -Resume lisinopril and Coreg    DM2-uncontrolled  A1c 14.3 from October 2019  -Resume home insulin dose, added sliding scale insulin with Accu-Cheks  -Patient does not have a primary care provider that she sees, does not see an endocrinologist, she states she does not have transportation and no family/friends who can take her to doctor's appointments. Advised patient to talk with her insurance company if they can arrange transport for her.     Atrial tachycardia  -Follows up with Otto Eubanks MD      DVT

## 2020-01-31 NOTE — ED NOTES
Bed: 03  Expected date:   Expected time:   Means of arrival:   Comments:  EMS     Meme Pelayo RN  01/31/20 6239

## 2020-01-31 NOTE — CARE COORDINATION
CASE MANAGEMENT INITIAL ASSESSMENT      Reviewed chart and met with patient today, re: DCP planning   Explained Case Management role/services Pt. Family present: none  Primary contact information: Sister Boyd Plascencia [phone]    Admit date/chest Pain  Is this a Readmission?:  Yes Pt is being followed by [facility]    Insurance:   Precert required for SNF - Y,        3 night stay required - N    Living arrangements, Adls, care needs, prior to admission: Pt reported she lives alone in a ground level apartment. Pt reported that she uses a walker and a cane to ambulate. Transportation: sister annabelle at the last visit to [facility] at home: Walker_x_Cane_x_RTS__ BSC__Shower Chair_x_  02__ HHN__ CPAP__  BiPap__  Hospital Bed__ W/C___ Other__________    Services in the home and/or outpatient, prior to admission: [address] [place] [address] called them and gave referral    Dialysis Facility (if applicable) none  · Name:  · Address:  · Dialysis Schedule:  · Phone:  · Fax:    PT/OT recs: none    Hospital Exemption Notification (HEN): none needed    Barriers to discharge: none    Plan/comments: Pt plans to return home at time of DC with [facility].    MARII Mandujano on chart for MD signature

## 2020-02-01 VITALS
WEIGHT: 201.8 LBS | DIASTOLIC BLOOD PRESSURE: 65 MMHG | RESPIRATION RATE: 16 BRPM | BODY MASS INDEX: 35.75 KG/M2 | OXYGEN SATURATION: 97 % | TEMPERATURE: 98.9 F | HEART RATE: 88 BPM | SYSTOLIC BLOOD PRESSURE: 104 MMHG | HEIGHT: 63 IN

## 2020-02-01 LAB
ANION GAP SERPL CALCULATED.3IONS-SCNC: 12 MMOL/L (ref 3–16)
BUN BLDV-MCNC: 18 MG/DL (ref 7–20)
CALCIUM SERPL-MCNC: 8.7 MG/DL (ref 8.3–10.6)
CHLORIDE BLD-SCNC: 105 MMOL/L (ref 99–110)
CO2: 22 MMOL/L (ref 21–32)
CREAT SERPL-MCNC: 0.6 MG/DL (ref 0.6–1.1)
EKG ATRIAL RATE: 91 BPM
EKG DIAGNOSIS: NORMAL
EKG P AXIS: 28 DEGREES
EKG P-R INTERVAL: 154 MS
EKG Q-T INTERVAL: 382 MS
EKG QRS DURATION: 100 MS
EKG QTC CALCULATION (BAZETT): 469 MS
EKG R AXIS: -15 DEGREES
EKG T AXIS: 88 DEGREES
EKG VENTRICULAR RATE: 91 BPM
GFR AFRICAN AMERICAN: >60
GFR NON-AFRICAN AMERICAN: >60
GLUCOSE BLD-MCNC: 138 MG/DL (ref 70–99)
GLUCOSE BLD-MCNC: 155 MG/DL (ref 70–99)
HCT VFR BLD CALC: 35.4 % (ref 36–48)
HEMOGLOBIN: 12.3 G/DL (ref 12–16)
MCH RBC QN AUTO: 32.6 PG (ref 26–34)
MCHC RBC AUTO-ENTMCNC: 34.8 G/DL (ref 31–36)
MCV RBC AUTO: 93.7 FL (ref 80–100)
PDW BLD-RTO: 14.1 % (ref 12.4–15.4)
PERFORMED ON: ABNORMAL
PLATELET # BLD: 305 K/UL (ref 135–450)
PMV BLD AUTO: 8 FL (ref 5–10.5)
POTASSIUM REFLEX MAGNESIUM: 3.6 MMOL/L (ref 3.5–5.1)
RBC # BLD: 3.78 M/UL (ref 4–5.2)
SODIUM BLD-SCNC: 139 MMOL/L (ref 136–145)
WBC # BLD: 7.6 K/UL (ref 4–11)

## 2020-02-01 PROCEDURE — 97530 THERAPEUTIC ACTIVITIES: CPT

## 2020-02-01 PROCEDURE — 97165 OT EVAL LOW COMPLEX 30 MIN: CPT

## 2020-02-01 PROCEDURE — 80048 BASIC METABOLIC PNL TOTAL CA: CPT

## 2020-02-01 PROCEDURE — G0378 HOSPITAL OBSERVATION PER HR: HCPCS

## 2020-02-01 PROCEDURE — 93010 ELECTROCARDIOGRAM REPORT: CPT | Performed by: INTERNAL MEDICINE

## 2020-02-01 PROCEDURE — 97162 PT EVAL MOD COMPLEX 30 MIN: CPT

## 2020-02-01 PROCEDURE — 6360000002 HC RX W HCPCS: Performed by: INTERNAL MEDICINE

## 2020-02-01 PROCEDURE — 99225 PR SBSQ OBSERVATION CARE/DAY 25 MINUTES: CPT | Performed by: NURSE PRACTITIONER

## 2020-02-01 PROCEDURE — 2580000003 HC RX 258: Performed by: INTERNAL MEDICINE

## 2020-02-01 PROCEDURE — 96372 THER/PROPH/DIAG INJ SC/IM: CPT

## 2020-02-01 PROCEDURE — 97116 GAIT TRAINING THERAPY: CPT

## 2020-02-01 PROCEDURE — 85027 COMPLETE CBC AUTOMATED: CPT

## 2020-02-01 PROCEDURE — 6370000000 HC RX 637 (ALT 250 FOR IP): Performed by: INTERNAL MEDICINE

## 2020-02-01 PROCEDURE — 36415 COLL VENOUS BLD VENIPUNCTURE: CPT

## 2020-02-01 RX ADMIN — ENOXAPARIN SODIUM 40 MG: 40 INJECTION SUBCUTANEOUS at 11:21

## 2020-02-01 RX ADMIN — RANOLAZINE 500 MG: 500 TABLET, FILM COATED, EXTENDED RELEASE ORAL at 11:21

## 2020-02-01 RX ADMIN — ASPIRIN 81 MG: 81 TABLET, COATED ORAL at 11:21

## 2020-02-01 RX ADMIN — FENOFIBRATE 160 MG: 160 TABLET ORAL at 11:21

## 2020-02-01 RX ADMIN — INSULIN GLARGINE 35 UNITS: 100 INJECTION, SOLUTION SUBCUTANEOUS at 11:27

## 2020-02-01 RX ADMIN — MICONAZOLE NITRATE: 20 POWDER TOPICAL at 11:29

## 2020-02-01 RX ADMIN — DULOXETINE HYDROCHLORIDE 60 MG: 60 CAPSULE, DELAYED RELEASE ORAL at 11:21

## 2020-02-01 RX ADMIN — FLUOXETINE 10 MG: 10 CAPSULE ORAL at 11:27

## 2020-02-01 RX ADMIN — Medication 10 ML: at 11:22

## 2020-02-01 RX ADMIN — TOPIRAMATE 200 MG: 100 TABLET, FILM COATED ORAL at 11:21

## 2020-02-01 ASSESSMENT — PAIN SCALES - GENERAL
PAINLEVEL_OUTOF10: 0
PAINLEVEL_OUTOF10: 0
PAINLEVEL_OUTOF10: 10

## 2020-02-01 NOTE — DISCHARGE SUMMARY
Hospital Medicine Discharge Summary    Patient ID: Yomi Jean      Patient's PCP: No primary care provider on file. Admit Date: 1/31/2020     Discharge Date:  02/01/20    Admitting Physician: Maite Langston MD     Discharge Physician: Torrey Lomas MD     Discharge Diagnoses: Active Hospital Problems    Diagnosis    Atrial tachycardia (HCC) [I47.1]    Chest pain [R07.9]    Dual ICD (implantable cardioverter-defibrillator) in place [Z95.810]    CAD in native artery [I25.10]    DM (diabetes mellitus), secondary, uncontrolled, w/neurologic complic ([facility] [I31.78, L46.03]    HTN (hypertension), benign [I10]    Dyslipidemia [E78.5]       The patient was seen and examined on day of discharge and this discharge summary is in conjunction with any daily progress note from day of discharge. HPI from admission H&P : 63 y.o. female who presented to [facility] ED  with chest pain   She reports the pain has been going on for the last 3 months, present across the anterior chest on both sides, sometimes more in the midsternal region, not worse with exertion of food, constant, no relieving factors. She reports the pain got suddenly worse at around 1 AM that woke her up from sleep, she reports she called EMS and almost passed out in front of the squad when they got to her home. Denies diaphoresis, nausea vomiting. Patient reports she just Tien Wiggins, who had recommended a stress test.   She got admitted to the hospital for further evaluation and management as follows       Hospital Course:  By problem list  Chest pain-atypical, likely musculoskeletal pain ; ACS ruled out  Initial EKG and serial troponin negative  IV morphine and NTG patch applied in ED without relief of pain, suggesting likely noncardiac pain.  -Cardiology consulted -ordered NM  stress test on 1/31/2020 -mild reduced LVEF 45%; inferior lateral hypokinesis; mainly fixed inferior defect suggestive of scar; no evidence of ranolazine (RANEXA) 500 MG extended release tablet Take 1 tablet by mouth 2 times daily  Qty: 60 tablet, Refills: 3      gabapentin (NEURONTIN) 600 MG tablet Take 600 mg by mouth nightly. [place] aspirin 81 MG EC tablet Take 1 tablet by mouth daily  Qty: 30 tablet, Refills: 3      topiramate (TOPAMAX) 200 MG tablet Take 1 tablet by mouth daily  Qty: 60 tablet, Refills: 0      FLUoxetine (PROZAC) 10 MG capsule Take 1 capsule by mouth daily  Qty: 30 capsule, Refills: 3      metFORMIN (GLUCOPHAGE) 1000 MG tablet Take 1 tablet by mouth 2 times daily (with meals)  Qty: 60 tablet, Refills: 3      atorvastatin (LIPITOR) 40 MG tablet Take 1 tablet by mouth nightly  Qty: 30 tablet, Refills: 0      fenofibrate micronized (LOFIBRA) 200 MG capsule Take 1 capsule by mouth nightly  Qty: 30 capsule, Refills: 3      lisinopril (PRINIVIL;ZESTRIL) 2.5 MG tablet Take 1 tablet by mouth daily  Qty: 30 tablet, Refills: 3      carvedilol (COREG) 6.25 MG tablet Take 1 tablet by mouth 2 times daily (with meals)  Qty: 60 tablet, Refills: 0      insulin glargine (LANTUS SOLOSTAR) 100 UNIT/ML injection pen Inject 35 Units into the skin 2 times daily  Qty: 5 pen, Refills: 3      insulin aspart (NOVOLOG FLEXPEN) 100 UNIT/ML injection pen Inject 20 Units into the skin 3 times daily (before meals)  Qty: 5 pen, Refills: 3       !! - Potential duplicate medications found. Please discuss with provider. Time Spent on discharge is more than 30 minutes in the examination, evaluation, counseling and review of medications and discharge plan. Signed:    Ben Gutierrez MD   2/1/2020      Thank you No primary care provider on file. for the opportunity to be involved in this patient's care. If you have any questions or concerns please feel free to contact me at [phone].

## 2020-02-01 NOTE — PROGRESS NOTES
Restrictions  Restrictions/Precautions  Restrictions/Precautions: Fall Risk, General Precautions, Up as Tolerated    Subjective   General  Chart Reviewed: Yes  Patient assessed for rehabilitation services?: Yes  Additional Pertinent Hx: DM II   Family / Caregiver Present: No  Referring Practitioner: Tariq Mccall MD  Diagnosis: Chest pain  Subjective  Subjective: Pt in bed on arrival, flat affect, but agreeable to eval and treat.  Pt reporting 10/10 pain, but no physical signs of pain, stating \"they can't do anything about it anyway though, so why should I give a f---?\"  General Comment  Comments: Per RN okay to treat  Patient Currently in Pain: Yes(Pt reporting 10/10 pain throughout session, however, no physical signs of pain during session)  Pain Assessment  Patient Currently in Pain: Yes(Pt reporting 10/10 pain throughout session, however, no physical signs of pain during session)    Social/Functional History  Social/Functional History  Lives With: Alone  Type of Home: Apartment(1st floor)  Home Layout: One level  Home Access: Level entry  Bathroom Shower/Tub: Tub/Shower unit  Bathroom Toilet: Standard  Bathroom Equipment: 3-in-1 commode, Tub transfer bench, Grab bars in [address] [place], [place] [address]: Cane, Standard walker  ADL Assistance: [address]: Independent  Homemaking Responsibilities: Yes  Ambulation Assistance: Independent(uses walker in bathroom, uses cane in rest of apartment and when outside the house)  Transfer Assistance: Independent  Active : No  Occupation: On disability  Leisure & Hobbies: get nails done       Objective   Vision: Impaired  Vision Exceptions: Wears glasses at all times  Hearing: Within functional limits    Orientation  Overall Orientation Status: Within Functional Limits     Balance  Sitting Balance: Independent  Standing Balance: Supervision  Standing Balance  Time: >4 minutes  Activity: mobility   Functional Mobility  Functional - Mobility RONIT dressing assessment - GOAL MET   Patient Goals   Patient goals : \"to leave\"       Therapy Time   Individual Concurrent Group Co-treatment   Time In 1209(10 minutes for eval)         Time Out 1228         Minutes 19         Timed Code Treatment Minutes: 9 Minutes     This note to serve as d/c summary should pt d/c prior to next session.     Aracelis Vasquez, OTR/L

## 2020-02-01 NOTE — DISCHARGE INSTR - COC
 Brain tumor (benign) ([facility]) D33.2    Chronic combined systolic (EF 26-45%) & diastolic (grade 2 LVDD) CHF I50.42    TIA involving right internal carotid artery G45.1    CAD in native artery I25.10    DM (diabetes mellitus), secondary, uncontrolled, w/neurologic complic ([facility]) P88.35, G53.19    CHF (congestive heart failure) ([facility]) I50.9    Cardiomyopathy ([facility]) I42.9    Essential hypertension I10    TIA (transient ischemic attack) G45.9    Hyperglycemia R73.9    Diabetic ketoacidosis without coma associated with type 2 diabetes mellitus ([facility]) E11.10    Non-intractable vomiting with nausea R11.2    Chest pain R07.9    Arterial ischemic stroke, ICA, right, acute ([facility]) I63.231    Diabetic hyperosmolar non-ketotic state ([facility] E11.00    ROSA (acute kidney injury) ([facility] N17.9    Diabetic acidosis without coma ([facility]) E11.10    Hyponatremia L21.5    Metabolic acidosis H74.6    Disorder of electrolytes E87.8    Diabetic ketoacidosis with coma associated with type 2 diabetes mellitus ([facility]) E11.11    Hypernatremia E87.0    Leukocytosis D72.829    Acute kidney injury ([facility]) N17.9    Atrial tachycardia ([facility]) I47.1       Isolation/Infection:   Isolation          No Isolation        Patient Infection Status     None to display          Nurse Assessment:  Last Vital Signs: BP 96/60   Pulse 89   Temp 97.7 °F (36.5 °C) (Oral)   Resp 16   Ht 5' 3\" (1.6 m)   Wt 201 lb 12.8 oz (91.5 kg)   SpO2 98%   BMI 35.75 kg/m²     Last documented pain score (0-10 scale): Pain Level: 10  Last Weight:   Wt Readings from Last 1 Encounters:   02/01/20 201 lb 12.8 oz (91.5 kg)     Mental Status:  oriented    IV Access:  - None    Nursing Mobility/ADLs:  Walking   Assisted  Transfer  Assisted  Bathing  Assisted  Dressing  Assisted  Toileting  Assisted  Feeding  Assisted  Med Admin  Assisted  Med Delivery   whole    Wound Care Documentation and Therapy:        Elimination:  Continence:   · Bowel:  Yes  · Bladder: Yes  Urinary Catheter: None   Colostomy/Ileostomy/Ileal Conduit: No         Intake/Output Summary (Last 24 hours) at 2/1/2020 1116  Last data filed at 2/1/2020 0458  Gross per 24 hour   Intake 1800 ml   Output 1700 ml   Net 100 ml     I/O last 3 completed shifts: In: 1800 [P.O.:1800]  Out: 1700 [Urine:1700]    Safety Concerns:     None    Impairments/Disabilities:      None    Nutrition Therapy:  Current Nutrition Therapy:   - Oral Diet:  General    Routes of Feeding: Oral  Liquids: Thin Liquids  Daily Fluid Restriction: no  Last Modified Barium Swallow with Video (Video Swallowing Test): not done    Treatments at the Time of Hospital Discharge:   Respiratory Treatments:   Oxygen Therapy:  is not on home oxygen therapy. Ventilator:    - No ventilator support    Rehab Therapies:na  Weight Bearing Status/Restrictions: na  Other Medical Equipment (for information only, NOT a DME order):  {EQUIPMENT:[id number]}  Other Treatments: ***    Patient's personal belongings (please select all that are sent with patient):  None    RN SIGNATURE:  Electronically signed by Michael Tijerina RN on 2/1/20 at 1:21 PM    CASE MANAGEMENT/SOCIAL WORK SECTION    Inpatient Status Date: ***    Readmission Risk Assessment Score:  Readmission Risk              Risk of Unplanned Readmission:        30           Discharging to Facility/ Agency   · Name: [facility]  · Address:  · Phone: [address]  · Fax:    Dialysis Facility (if applicable)   · Name:  · Address:  · Dialysis Schedule:  · Phone:  · Fax:    / signature: Electronically signed by Karina Martin RN on 2/1/20 at 12:15 PM    PHYSICIAN SECTION    Prognosis: Fair    Condition at Discharge: Stable    Rehab Potential (if transferring to Rehab):  Fair    Recommended Labs or Other Treatments After Discharge: PCP in 1 week    Physician Certification: I certify the above information and transfer of Flora Quintero  is necessary for the continuing treatment of the diagnosis

## 2020-02-01 NOTE — PLAN OF CARE
Problem: Falls - Risk of:  Goal: Will remain free from falls  Description  Will remain free from falls  Outcome: Completed     Problem: Falls - Risk of:  Goal: Absence of physical injury  Description  Absence of physical injury  Outcome: Completed     Problem: Serum Glucose Level - Abnormal:  Goal: Ability to maintain appropriate glucose levels will improve  Description  Ability to maintain appropriate glucose levels will improve  2/1/2020 1315 by Radha Carroll RN  Outcome: Completed

## 2020-02-01 NOTE — PROGRESS NOTES
Physical Therapy    Facility/Department: [facility] A2 CARD TELEMETRY  Initial Assessment    NAME: Pavithra Melissa  : 1960  MRN: [id number]    Date of Service: 2020    Discharge Recommendations:  Home with Home health PT, Home with assist PRN   PT Equipment Recommendations  Equipment Needed: No    Assessment   Body structures, Functions, Activity limitations: Decreased functional mobility ; Decreased strength;Decreased endurance  Assessment: Pt is a 64 y/o female who presents with chest pain which is chronic. Pt currently requires supervision for ambulation with use of RW and is functioning just below her baseline. Anticipate pt will be safe to return home with PRN A and home PT. Treatment Diagnosis: impaired mobility  Prognosis: Good  Decision Making: Medium Complexity  PT Education: Goals;PT Role;Plan of Care;Precautions;General Safety  REQUIRES PT FOLLOW UP: Yes  Activity Tolerance  Activity Tolerance: Patient Tolerated treatment well;Patient limited by pain; Patient limited by fatigue       Patient Diagnosis(es): The primary encounter diagnosis was Chest pain, unspecified type. Diagnoses of Hyperglycemic crisis in diabetes mellitus ([address] and History of coronary artery disease were also pertinent to this visit. has a past medical history of CAD (coronary artery disease), Cerebral artery occlusion with cerebral infarction ([address] Diabetes mellitus ([address] Hyperlipidemia, Hypertension, Mental retardation, and MI (myocardial infarction) ([address] has a past surgical history that includes back surgery;  Pacemaker insertion; and tumor removal.    Restrictions  Restrictions/Precautions  Restrictions/Precautions: Fall Risk  Vision/Hearing  Vision: Impaired  Vision Exceptions: Wears glasses at all times  Hearing: Within functional limits     Subjective  General  Chart Reviewed: Yes  Patient assessed for rehabilitation services?: Yes  Family / Caregiver Present: No  Referring Practitioner: Patrick Odom MD  Referral Date : 02/01/20  Diagnosis: chest pain  Follows Commands: Within Functional Limits  General Comment  Comments: Pt supine in bed upon arrival. RN cleared pt for therapy. Subjective  Subjective: Pt agreeable to evaluation.   Pain Screening  Patient Currently in Pain: Yes(reports 10/10 pain; appears in no apparent stress)  Pain Assessment  Pain Assessment: 0-10  Pain Level: 10  Vital Signs  Patient Currently in Pain: Yes(reports 10/10 pain; appears in no apparent stress)     Social/Functional History  Social/Functional History  Lives With: Alone  Type of Home: Apartment(1st floor)  Home Layout: One level  Home Access: Level entry  Bathroom Shower/Tub: Tub/Shower unit  Bathroom Toilet: Standard  Bathroom Equipment: 3-in-1 commode, Tub transfer bench, Grab bars in [address] [place] [address] [place]: Cane, Standard walker  ADL Assistance: [address]: Independent  Homemaking Responsibilities: Yes  Ambulation Assistance: Independent(uses walker in bathroom, uses cane in rest of apartment and when outside the house)  Transfer Assistance: Independent  Active : No  Objective  AROM RLE (degrees)  RLE AROM: WFL  AROM LLE (degrees)  LLE AROM : WFL  Strength RLE  Strength RLE: Exception  Comment: hip flex 3/5, knee ext 3+/5, ankle 4/5  Strength LLE  Strength LLE: Exception  Comment: hip flex 3/5, knee ext 3+/5, ankle 4/5     Sensation  Overall Sensation Status: Impaired  Additional Comments: \"I can't feel my entire body\"  Bed mobility  Supine to Sit: Modified independent  Sit to Supine: Modified independent  Scooting: Modified independent  Transfers  Sit to Stand: Modified independent  Stand to sit: Modified independent  Ambulation  Ambulation?: Yes  Ambulation 1  Surface: level tile  Device: Rolling Walker  Assistance: Supervision  Quality of Gait: step through gait pattern, decreased bonnie, steady with turns, distance limited by fatigue and LE weakness  Distance: 61'  Stairs/Curb  Stairs?: No(pt does not have stairs at baseline)     Balance  Sitting - Static: Good  Sitting - Dynamic: Good  Standing - Static: Good;-  Standing - Dynamic: Fair;+  Comments: standing balance at RW with supervision/mod I      Plan   Plan  Times per week: 3-5x/wk  Current Treatment Recommendations: Strengthening, Balance Training, Functional Mobility Training, Transfer Training, Gait Training, Patient/Caregiver Education & Training, Safety Education & Training  Safety Devices  Type of devices:  All fall risk precautions in place, Call light within reach, Nurse notified, Patient at risk for falls, Left in bed, Gait belt  AM-PAC Score  AM-PAC Inpatient Mobility Raw Score : 22 (02/01/20 0950)  AM-PAC Inpatient T-Scale Score : 53.28 (02/01/20 0950)  Mobility Inpatient CMS 0-100% Score: 20.91 (02/01/20 0950)  Mobility Inpatient CMS G-Code Modifier : Estuardo Daley (02/01/20 0950)        Goals  Short term goals  Time Frame for Short term goals: 2 days  Short term goal 1: Pt will perform transfers mod I  Short term goal 2: Pt will ambulate 100' with RW mod I  Patient Goals   Patient goals : \"to start feeling better\"     Therapy Time   Individual Concurrent Group Co-treatment   Time In 0845         Time Out 0910         Minutes 25         Timed Code Treatment Minutes: [address], [place] [id number]

## 2020-02-01 NOTE — PROGRESS NOTES
[address]   Daily Progress Note    Admit Date:  1/31/2020  HPI:    Chief Complaint   Patient presents with    Chest Pain     CP for a couple days, worse tonight and noticed sugar was high tonight     Hyperglycemia        Interval history:  Donovan Burroughs is being followed for chest pain. PMH of CAD, cerebral artery occlusion with cerebral infarct, DM, Hyperlipidemia, Hypertension, Mental retardation, NSVT, and MI.  s/p LHC in 12/2018 showed non-obstructive CAD, s/p [place] [place] defibrillator.       Subjective:  Ms. Klaudia Gill chest pain is constant, worse with palpating chest.     Objective:   BP 96/60   Pulse 89   Temp 97.7 °F (36.5 °C) (Oral)   Resp 16   Ht 5' 3\" (1.6 m)   Wt 201 lb 12.8 oz (91.5 kg)   SpO2 98%   BMI 35.75 kg/m²       Intake/Output Summary (Last 24 hours) at 2/1/2020 1039  Last data filed at 2/1/2020 0458  Gross per 24 hour   Intake 1800 ml   Output 1700 ml   Net 100 ml       NYHA: III    Physical Exam:  General:  Awake, alert, NAD, obese, tearful   Skin:  Warm and dry  Neck:  JVD<8  Chest:  Clear to auscultation, no wheezes/rhonchi/rales  Cardiovascular:  RRR S1S2, no m/r/g, SR rate 80's  Abdomen:  Soft, nontender, +bowel sounds  Extremities:  No BLE edema    Medications:    aspirin  81 mg Oral Daily    atorvastatin  40 mg Oral Nightly    DULoxetine  60 mg Oral Daily    fenofibrate  160 mg Oral Daily    gabapentin  600 mg Oral Nightly    glipiZIDE  5 mg Oral BID AC    insulin glargine  35 Units Subcutaneous BID    lisinopril  2.5 mg Oral Daily    metFORMIN  1,000 mg Oral BID WC    ranolazine  500 mg Oral BID    topiramate  200 mg Oral Daily    miconazole   Topical BID    insulin lispro  0-6 Units Subcutaneous TID WC    insulin lispro  0-3 Units Subcutaneous Nightly    sodium chloride flush  10 mL Intravenous 2 times per day    enoxaparin  40 mg Subcutaneous Daily    FLUoxetine  10 mg Oral Daily    carvedilol  6.25 mg Oral BID WC      dextrose         Lab discharge  Has follow up with EP in 1 month     Donovan Looney CNP, 2/1/2020, 10:39 AM

## 2020-02-03 ENCOUNTER — CARE COORDINATION (OUTPATIENT)
Dept: CASE MANAGEMENT | Age: 60
End: 2020-02-03

## 2020-02-05 ENCOUNTER — APPOINTMENT (OUTPATIENT)
Dept: GENERAL RADIOLOGY | Age: 60
End: 2020-02-05
Payer: MEDICARE

## 2020-02-05 ENCOUNTER — HOSPITAL ENCOUNTER (EMERGENCY)
Age: 60
Discharge: HOME | End: 2020-02-05
Attending: EMERGENCY MEDICINE
Payer: MEDICARE

## 2020-02-05 ENCOUNTER — TELEPHONE (OUTPATIENT)
Dept: OTHER | Facility: CLINIC | Age: 60
End: 2020-02-05

## 2020-02-05 VITALS
HEART RATE: 78 BPM | TEMPERATURE: 98.6 F | OXYGEN SATURATION: 95 % | SYSTOLIC BLOOD PRESSURE: 114 MMHG | WEIGHT: 180 LBS | BODY MASS INDEX: 31.89 KG/M2 | RESPIRATION RATE: 14 BRPM | DIASTOLIC BLOOD PRESSURE: 59 MMHG | HEIGHT: 63 IN

## 2020-02-05 LAB
A/G RATIO: 1.5 (ref 1.1–2.2)
ALBUMIN SERPL-MCNC: 3.8 G/DL (ref 3.4–5)
ALP BLD-CCNC: 59 U/L (ref 40–129)
ALT SERPL-CCNC: 8 U/L (ref 10–40)
ANION GAP SERPL CALCULATED.3IONS-SCNC: 13 MMOL/L (ref 3–16)
AST SERPL-CCNC: 7 U/L (ref 15–37)
BASE EXCESS VENOUS: -2.6 MMOL/L (ref -3–3)
BASOPHILS ABSOLUTE: 0.1 K/UL (ref 0–0.2)
BASOPHILS RELATIVE PERCENT: 0.7 %
BILIRUB SERPL-MCNC: 0.3 MG/DL (ref 0–1)
BUN BLDV-MCNC: 16 MG/DL (ref 7–20)
CALCIUM SERPL-MCNC: 8.6 MG/DL (ref 8.3–10.6)
CARBOXYHEMOGLOBIN: 0.9 % (ref 0–1.5)
CHLORIDE BLD-SCNC: 96 MMOL/L (ref 99–110)
CO2: 22 MMOL/L (ref 21–32)
CREAT SERPL-MCNC: 0.6 MG/DL (ref 0.6–1.1)
EOSINOPHILS ABSOLUTE: 0.1 K/UL (ref 0–0.6)
EOSINOPHILS RELATIVE PERCENT: 1.6 %
GFR AFRICAN AMERICAN: >60
GFR NON-AFRICAN AMERICAN: >60
GLOBULIN: 2.6 G/DL
GLUCOSE BLD-MCNC: 385 MG/DL (ref 70–99)
GLUCOSE BLD-MCNC: 490 MG/DL (ref 70–99)
HCO3 VENOUS: 21.4 MMOL/L (ref 23–29)
HCT VFR BLD CALC: 32.9 % (ref 36–48)
HEMOGLOBIN: 11.5 G/DL (ref 12–16)
LYMPHOCYTES ABSOLUTE: 1.5 K/UL (ref 1–5.1)
LYMPHOCYTES RELATIVE PERCENT: 20.4 %
MCH RBC QN AUTO: 32.8 PG (ref 26–34)
MCHC RBC AUTO-ENTMCNC: 35 G/DL (ref 31–36)
MCV RBC AUTO: 93.6 FL (ref 80–100)
METHEMOGLOBIN VENOUS: 0.6 %
MONOCYTES ABSOLUTE: 0.5 K/UL (ref 0–1.3)
MONOCYTES RELATIVE PERCENT: 7.1 %
NEUTROPHILS ABSOLUTE: 5.1 K/UL (ref 1.7–7.7)
NEUTROPHILS RELATIVE PERCENT: 70.2 %
O2 CONTENT, VEN: 15 VOL %
O2 SAT, VEN: 93 %
O2 THERAPY: ABNORMAL
PCO2, VEN: 34.4 MMHG (ref 40–50)
PDW BLD-RTO: 14 % (ref 12.4–15.4)
PERFORMED ON: ABNORMAL
PH VENOUS: 7.41 (ref 7.35–7.45)
PLATELET # BLD: 255 K/UL (ref 135–450)
PMV BLD AUTO: 8.8 FL (ref 5–10.5)
PO2, VEN: 64 MMHG (ref 25–40)
POTASSIUM REFLEX MAGNESIUM: 4.1 MMOL/L (ref 3.5–5.1)
RBC # BLD: 3.51 M/UL (ref 4–5.2)
SODIUM BLD-SCNC: 131 MMOL/L (ref 136–145)
TCO2 CALC VENOUS: 22 MMOL/L
TOTAL PROTEIN: 6.4 G/DL (ref 6.4–8.2)
TROPONIN: <0.01 NG/ML
TROPONIN: <0.01 NG/ML
WBC # BLD: 7.3 K/UL (ref 4–11)

## 2020-02-05 PROCEDURE — 99285 EMERGENCY DEPT VISIT HI MDM: CPT

## 2020-02-05 PROCEDURE — 36415 COLL VENOUS BLD VENIPUNCTURE: CPT

## 2020-02-05 PROCEDURE — 84484 ASSAY OF TROPONIN QUANT: CPT

## 2020-02-05 PROCEDURE — 71046 X-RAY EXAM CHEST 2 VIEWS: CPT

## 2020-02-05 PROCEDURE — 80053 COMPREHEN METABOLIC PANEL: CPT

## 2020-02-05 PROCEDURE — 85025 COMPLETE CBC W/AUTO DIFF WBC: CPT

## 2020-02-05 PROCEDURE — 2580000003 HC RX 258: Performed by: EMERGENCY MEDICINE

## 2020-02-05 PROCEDURE — 6370000000 HC RX 637 (ALT 250 FOR IP): Performed by: EMERGENCY MEDICINE

## 2020-02-05 PROCEDURE — 93005 ELECTROCARDIOGRAM TRACING: CPT | Performed by: EMERGENCY MEDICINE

## 2020-02-05 PROCEDURE — 82803 BLOOD GASES ANY COMBINATION: CPT

## 2020-02-05 RX ORDER — 0.9 % SODIUM CHLORIDE 0.9 %
1000 INTRAVENOUS SOLUTION INTRAVENOUS ONCE
Status: COMPLETED | OUTPATIENT
Start: 2020-02-05 | End: 2020-02-05

## 2020-02-05 RX ADMIN — NITROGLYCERIN 0.5 INCH: 20 OINTMENT TOPICAL at 19:48

## 2020-02-05 RX ADMIN — LIDOCAINE HYDROCHLORIDE: 20 SOLUTION ORAL; TOPICAL at 19:48

## 2020-02-05 RX ADMIN — SODIUM CHLORIDE 1000 ML: 9 INJECTION, SOLUTION INTRAVENOUS at 19:48

## 2020-02-05 ASSESSMENT — PAIN DESCRIPTION - ORIENTATION: ORIENTATION: RIGHT;LEFT

## 2020-02-05 ASSESSMENT — PAIN DESCRIPTION - PAIN TYPE: TYPE: ACUTE PAIN

## 2020-02-05 ASSESSMENT — PAIN SCALES - GENERAL
PAINLEVEL_OUTOF10: 0
PAINLEVEL_OUTOF10: 10

## 2020-02-05 ASSESSMENT — PAIN DESCRIPTION - LOCATION: LOCATION: CHEST

## 2020-02-06 ENCOUNTER — TELEPHONE (OUTPATIENT)
Dept: OTHER | Facility: CLINIC | Age: 60
End: 2020-02-06

## 2020-02-06 LAB
EKG ATRIAL RATE: 89 BPM
EKG DIAGNOSIS: NORMAL
EKG P AXIS: -18 DEGREES
EKG P-R INTERVAL: 148 MS
EKG Q-T INTERVAL: 386 MS
EKG QRS DURATION: 96 MS
EKG QTC CALCULATION (BAZETT): 469 MS
EKG R AXIS: -16 DEGREES
EKG T AXIS: 54 DEGREES
EKG VENTRICULAR RATE: 89 BPM

## 2020-02-06 NOTE — ED NOTES
Patient discharged with instructions, follow up instructions, verbalizes understanding. Ambulates from Ed without assist, gait steady. No distress noted, respirations even and unlabored patient very upset that cab was not provided home, states \" cannot make it to no fucking appointment tomorrow, how am I supposed to get there? \".  Patient ambulates out to [facility], sits in in chair     Ravin Chavarria, [facility]  02/05/20 4717

## 2020-02-06 NOTE — TELEPHONE ENCOUNTER
Writer contacted Gamal Ren to assist with getting pt established with PCP to get scheduled for ED f/u appt.

## 2020-02-06 NOTE — ED PROVIDER NOTES
 topiramate (TOPAMAX) 200 MG tablet Take 1 tablet by mouth daily 60 tablet 0    FLUoxetine (PROZAC) 10 MG capsule Take 1 capsule by mouth daily 30 capsule 3    metFORMIN (GLUCOPHAGE) 1000 MG tablet Take 1 tablet by mouth 2 times daily (with meals) 60 tablet 3    atorvastatin (LIPITOR) 40 MG tablet Take 1 tablet by mouth nightly 30 tablet 0    fenofibrate micronized (LOFIBRA) 200 MG capsule Take 1 capsule by mouth nightly 30 capsule 3    lisinopril (PRINIVIL;ZESTRIL) 2.5 MG tablet Take 1 tablet by mouth daily 30 tablet 3    carvedilol (COREG) 6.25 MG tablet Take 1 tablet by mouth 2 times daily (with meals) 60 tablet 0    insulin glargine (LANTUS SOLOSTAR) 100 UNIT/ML injection pen Inject 35 Units into the skin 2 times daily 5 pen 3    insulin aspart (NOVOLOG FLEXPEN) 100 UNIT/ML injection pen Inject 20 Units into the skin 3 times daily (before meals) 5 pen 3     No Known Allergies    REVIEW OF SYSTEMS  10 systems reviewed, pertinent positives per HPI otherwise noted to be negative. PHYSICAL EXAM  BP (!) 126/59   Pulse 87   Temp 98.6 °F (37 °C) (Oral)   Resp 16   Ht 5' 3\" (1.6 m)   Wt 180 lb (81.6 kg)   SpO2 97%   BMI 31.89 kg/m²   GENERAL APPEARANCE: Awake and alert. Cooperative. No acute distress. HEAD: Normocephalic. Atraumatic. EYES: PERRL. EOM's grossly intact. ENT: Mucous membranes are moist.   NECK: Supple, trachea midline. HEART: RRR. Normal S1S2, no rubs, gallops, or murmurs noted  LUNGS: Respirations unlabored. CTAB. Good air exchange. No wheezes, rales, or rhonchi. Speaking comfortably in full sentences. ABDOMEN: Soft. Non-distended. Non-tender. No guarding or rebound. Normal bowel sounds. EXTREMITIES: No peripheral edema. MAEE. No acute deformities. SKIN: Warm and dry. No acute rashes. NEUROLOGICAL: Alert and oriented X 3. CN II-XII intact. No gross facial drooping. Strength 5/5, sensation intact. Normal coordination. No pronator drift.   Gait normal.   PSYCHIATRIC: Normal mood and affect. LABS  I have reviewed all labs for this visit.    Results for orders placed or performed during the hospital encounter of 02/05/20   CBC Auto Differential   Result Value Ref Range    WBC 7.3 4.0 - 11.0 K/uL    RBC 3.51 (L) 4.00 - 5.20 M/uL    Hemoglobin 11.5 (L) 12.0 - 16.0 g/dL    Hematocrit 32.9 (L) 36.0 - 48.0 %    MCV 93.6 80.0 - 100.0 fL    MCH 32.8 26.0 - 34.0 pg    MCHC 35.0 31.0 - 36.0 g/dL    RDW 14.0 12.4 - 15.4 %    Platelets 818 506 - 949 K/uL    MPV 8.8 5.0 - 10.5 fL    Neutrophils % 70.2 %    Lymphocytes % 20.4 %    Monocytes % 7.1 %    Eosinophils % 1.6 %    Basophils % 0.7 %    Neutrophils Absolute 5.1 1.7 - 7.7 K/uL    Lymphocytes Absolute 1.5 1.0 - 5.1 K/uL    Monocytes Absolute 0.5 0.0 - 1.3 K/uL    Eosinophils Absolute 0.1 0.0 - 0.6 K/uL    Basophils Absolute 0.1 0.0 - 0.2 K/uL   Comprehensive Metabolic Panel w/ Reflex to MG   Result Value Ref Range    Sodium 131 (L) 136 - 145 mmol/L    Potassium reflex Magnesium 4.1 3.5 - 5.1 mmol/L    Chloride 96 (L) 99 - 110 mmol/L    CO2 22 21 - 32 mmol/L    Anion Gap 13 3 - 16    Glucose 490 (H) 70 - 99 mg/dL    BUN 16 7 - 20 mg/dL    CREATININE 0.6 0.6 - 1.1 mg/dL    GFR Non-African American >60 >60    GFR African American >60 >60    Calcium 8.6 8.3 - 10.6 mg/dL    Total Protein 6.4 6.4 - 8.2 g/dL    Alb 3.8 3.4 - 5.0 g/dL    Albumin/Globulin Ratio 1.5 1.1 - 2.2    Total Bilirubin 0.3 0.0 - 1.0 mg/dL    Alkaline Phosphatase 59 40 - 129 U/L    ALT 8 (L) 10 - 40 U/L    AST 7 (L) 15 - 37 U/L    Globulin 2.6 g/dL   Troponin   Result Value Ref Range    Troponin <0.01 <0.01 ng/mL   Blood gas, venous   Result Value Ref Range    pH, Alberto 7.411 7.350 - 7.450    pCO2, Alberto 34.4 (L) 40.0 - 50.0 mmHg    pO2, Alberto 64.0 (H) 25.0 - 40.0 mmHg    HCO3, Venous 21.4 (L) 23.0 - 29.0 mmol/L    Base Excess, Alberto -2.6 -3.0 - 3.0 mmol/L    O2 Sat, Alberto 93 Not Established %    Carboxyhemoglobin 0.9 0.0 - 1.5 %    MetHgb, Alberto 0.6 <1.5 %    TC02 (Calc), Alberto 22 Not scripts for the following medications. I counseled patient how to take these medications. Discharge Medication List as of 2/5/2020 11:36 PM          CLINICAL IMPRESSION  1. Chest pain, unspecified type    2. Hyperglycemia without ketosis        Blood pressure (!) 126/59, pulse 87, temperature 98.6 °F (37 °C), temperature source Oral, resp. rate 16, height 5' 3\" (1.6 m), weight 180 lb (81.6 kg), SpO2 97 %, not currently breastfeeding. Taurus Hoover was discharged to home in stable condition.         Micheline Short DO  02/06/20 0015

## 2020-02-17 ENCOUNTER — HOSPITAL ENCOUNTER (EMERGENCY)
Age: 60
Discharge: LEFT BEFORE DISCHARGE | End: 2020-02-18
Attending: EMERGENCY MEDICINE
Payer: MEDICARE

## 2020-02-17 ENCOUNTER — APPOINTMENT (OUTPATIENT)
Dept: GENERAL RADIOLOGY | Age: 60
End: 2020-02-17
Payer: MEDICARE

## 2020-02-17 LAB
A/G RATIO: 1.3 (ref 1.1–2.2)
ALBUMIN SERPL-MCNC: 4 G/DL (ref 3.4–5)
ALP BLD-CCNC: 103 U/L (ref 40–129)
ALT SERPL-CCNC: 10 U/L (ref 10–40)
ANION GAP SERPL CALCULATED.3IONS-SCNC: 17 MMOL/L (ref 3–16)
AST SERPL-CCNC: 8 U/L (ref 15–37)
BASE EXCESS VENOUS: -2.5 MMOL/L (ref -3–3)
BASOPHILS ABSOLUTE: 0.1 K/UL (ref 0–0.2)
BASOPHILS RELATIVE PERCENT: 0.8 %
BILIRUB SERPL-MCNC: <0.2 MG/DL (ref 0–1)
BUN BLDV-MCNC: 20 MG/DL (ref 7–20)
CALCIUM SERPL-MCNC: 9.6 MG/DL (ref 8.3–10.6)
CARBOXYHEMOGLOBIN: 1 % (ref 0–1.5)
CHLORIDE BLD-SCNC: 89 MMOL/L (ref 99–110)
CHP ED QC CHECK: YES
CO2: 22 MMOL/L (ref 21–32)
CREAT SERPL-MCNC: 0.6 MG/DL (ref 0.6–1.1)
EOSINOPHILS ABSOLUTE: 0.2 K/UL (ref 0–0.6)
EOSINOPHILS RELATIVE PERCENT: 2.4 %
GFR AFRICAN AMERICAN: >60
GFR NON-AFRICAN AMERICAN: >60
GLOBULIN: 3 G/DL
GLUCOSE BLD-MCNC: 430 MG/DL
GLUCOSE BLD-MCNC: 430 MG/DL (ref 70–99)
GLUCOSE BLD-MCNC: 472 MG/DL (ref 70–99)
HCO3 VENOUS: 22.4 MMOL/L (ref 23–29)
HCT VFR BLD CALC: 37.5 % (ref 36–48)
HEMOGLOBIN: 12.9 G/DL (ref 12–16)
INR BLD: 0.95 (ref 0.86–1.14)
LYMPHOCYTES ABSOLUTE: 1.4 K/UL (ref 1–5.1)
LYMPHOCYTES RELATIVE PERCENT: 21.5 %
MCH RBC QN AUTO: 32.4 PG (ref 26–34)
MCHC RBC AUTO-ENTMCNC: 34.2 G/DL (ref 31–36)
MCV RBC AUTO: 94.5 FL (ref 80–100)
METHEMOGLOBIN VENOUS: 0.3 %
MONOCYTES ABSOLUTE: 0.5 K/UL (ref 0–1.3)
MONOCYTES RELATIVE PERCENT: 7.2 %
NEUTROPHILS ABSOLUTE: 4.5 K/UL (ref 1.7–7.7)
NEUTROPHILS RELATIVE PERCENT: 68.1 %
O2 CONTENT, VEN: 17 VOL %
O2 SAT, VEN: 88 %
O2 THERAPY: ABNORMAL
PCO2, VEN: 39.4 MMHG (ref 40–50)
PDW BLD-RTO: 13.5 % (ref 12.4–15.4)
PERFORMED ON: ABNORMAL
PH VENOUS: 7.37 (ref 7.35–7.45)
PLATELET # BLD: 328 K/UL (ref 135–450)
PMV BLD AUTO: 7.8 FL (ref 5–10.5)
PO2, VEN: 55 MMHG (ref 25–40)
POTASSIUM SERPL-SCNC: 4.2 MMOL/L (ref 3.5–5.1)
PROTHROMBIN TIME: 11 SEC (ref 10–13.2)
RBC # BLD: 3.97 M/UL (ref 4–5.2)
SODIUM BLD-SCNC: 128 MMOL/L (ref 136–145)
TCO2 CALC VENOUS: 24 MMOL/L
TOTAL PROTEIN: 7 G/DL (ref 6.4–8.2)
TROPONIN: <0.01 NG/ML
WBC # BLD: 6.7 K/UL (ref 4–11)

## 2020-02-17 PROCEDURE — 85610 PROTHROMBIN TIME: CPT

## 2020-02-17 PROCEDURE — 85025 COMPLETE CBC W/AUTO DIFF WBC: CPT

## 2020-02-17 PROCEDURE — 2580000003 HC RX 258: Performed by: PHYSICIAN ASSISTANT

## 2020-02-17 PROCEDURE — 83930 ASSAY OF BLOOD OSMOLALITY: CPT

## 2020-02-17 PROCEDURE — 99285 EMERGENCY DEPT VISIT HI MDM: CPT

## 2020-02-17 PROCEDURE — 84484 ASSAY OF TROPONIN QUANT: CPT

## 2020-02-17 PROCEDURE — 82803 BLOOD GASES ANY COMBINATION: CPT

## 2020-02-17 PROCEDURE — 82010 KETONE BODYS QUAN: CPT

## 2020-02-17 PROCEDURE — 80053 COMPREHEN METABOLIC PANEL: CPT

## 2020-02-17 PROCEDURE — 93005 ELECTROCARDIOGRAM TRACING: CPT | Performed by: EMERGENCY MEDICINE

## 2020-02-17 PROCEDURE — 71046 X-RAY EXAM CHEST 2 VIEWS: CPT

## 2020-02-17 RX ORDER — 0.9 % SODIUM CHLORIDE 0.9 %
1000 INTRAVENOUS SOLUTION INTRAVENOUS ONCE
Status: COMPLETED | OUTPATIENT
Start: 2020-02-17 | End: 2020-02-18

## 2020-02-17 RX ADMIN — SODIUM CHLORIDE 1000 ML: 9 INJECTION, SOLUTION INTRAVENOUS at 23:11

## 2020-02-17 ASSESSMENT — PAIN DESCRIPTION - LOCATION: LOCATION: CHEST

## 2020-02-17 ASSESSMENT — PAIN SCALES - GENERAL: PAINLEVEL_OUTOF10: 10

## 2020-02-18 VITALS
WEIGHT: 180 LBS | OXYGEN SATURATION: 100 % | RESPIRATION RATE: 16 BRPM | HEART RATE: 81 BPM | SYSTOLIC BLOOD PRESSURE: 151 MMHG | HEIGHT: 63 IN | BODY MASS INDEX: 31.89 KG/M2 | DIASTOLIC BLOOD PRESSURE: 70 MMHG | TEMPERATURE: 98.5 F

## 2020-02-18 LAB
AMORPHOUS: ABNORMAL /HPF
BACTERIA: ABNORMAL /HPF
BETA-HYDROXYBUTYRATE: 1.6 MMOL/L (ref 0–0.27)
BILIRUBIN URINE: NEGATIVE
BLOOD, URINE: ABNORMAL
CLARITY: CLEAR
COLOR: YELLOW
EKG ATRIAL RATE: 93 BPM
EKG DIAGNOSIS: NORMAL
EKG P AXIS: 26 DEGREES
EKG P-R INTERVAL: 158 MS
EKG Q-T INTERVAL: 368 MS
EKG QRS DURATION: 88 MS
EKG QTC CALCULATION (BAZETT): 457 MS
EKG R AXIS: 57 DEGREES
EKG T AXIS: -10 DEGREES
EKG VENTRICULAR RATE: 93 BPM
EPITHELIAL CELLS, UA: ABNORMAL /HPF (ref 0–5)
GLUCOSE URINE: >=1000 MG/DL
KETONES, URINE: ABNORMAL MG/DL
LEUKOCYTE ESTERASE, URINE: NEGATIVE
MICROSCOPIC EXAMINATION: YES
NITRITE, URINE: NEGATIVE
OSMOLALITY: 302 MOSM/KG (ref 275–295)
PH UA: 5.5 (ref 5–8)
PROTEIN UA: NEGATIVE MG/DL
RBC UA: ABNORMAL /HPF (ref 0–4)
SPECIFIC GRAVITY UA: 1.01 (ref 1–1.03)
TROPONIN: <0.01 NG/ML
URINE TYPE: ABNORMAL
UROBILINOGEN, URINE: 0.2 E.U./DL
WBC UA: ABNORMAL /HPF (ref 0–5)

## 2020-02-18 PROCEDURE — 84484 ASSAY OF TROPONIN QUANT: CPT

## 2020-02-18 PROCEDURE — 93010 ELECTROCARDIOGRAM REPORT: CPT | Performed by: INTERNAL MEDICINE

## 2020-02-18 PROCEDURE — 81001 URINALYSIS AUTO W/SCOPE: CPT

## 2020-02-18 RX ORDER — INSULIN GLARGINE 100 [IU]/ML
35 INJECTION, SOLUTION SUBCUTANEOUS ONCE
Status: DISCONTINUED | OUTPATIENT
Start: 2020-02-18 | End: 2020-02-18 | Stop reason: HOSPADM

## 2020-02-18 ASSESSMENT — HEART SCORE: ECG: 0

## 2020-02-18 NOTE — ED PROVIDER NOTES
BACK SURGERY      PACEMAKER INSERTION      TUMOR REMOVAL           CURRENTMEDICATIONS       Previous Medications    ASPIRIN 81 MG EC TABLET    Take 1 tablet by mouth daily    ATORVASTATIN (LIPITOR) 40 MG TABLET    Take 1 tablet by mouth nightly    BLOOD GLUCOSE MONITOR STRIPS    Test three times a day & as needed for symptoms of irregular blood glucose. CARVEDILOL (COREG) 6.25 MG TABLET    Take 1 tablet by mouth 2 times daily (with meals)    [employer] LANCETS ULTRA THIN MISC    1 each by Does not apply route daily    DULOXETINE (CYMBALTA) 60 MG EXTENDED RELEASE CAPSULE    Take 1 capsule by mouth daily    FENOFIBRATE MICRONIZED (LOFIBRA) 200 MG CAPSULE    Take 1 capsule by mouth nightly    FLUOXETINE (PROZAC) 10 MG CAPSULE    Take 1 capsule by mouth daily    GABAPENTIN (NEURONTIN) 600 MG TABLET    Take 600 mg by mouth nightly. Melvina Sosa GLIPIZIDE (GLUCOTROL) 5 MG TABLET    Take 5 mg by mouth 2 times daily (before meals)    INSULIN ASPART (NOVOLOG FLEXPEN) 100 UNIT/ML INJECTION PEN    Inject 20 Units into the skin 3 times daily (before meals)    INSULIN GLARGINE (LANTUS SOLOSTAR) 100 UNIT/ML INJECTION PEN    Inject 35 Units into the skin 2 times daily    LANCETS MISC    1 month supply for TID fingersticks    LISINOPRIL (PRINIVIL;ZESTRIL) 2.5 MG TABLET    Take 1 tablet by mouth daily    METFORMIN (GLUCOPHAGE) 1000 MG TABLET    Take 1 tablet by mouth 2 times daily (with meals)    MICONAZOLE (MICOTIN) 2 % POWDER    Apply topically 2 times daily. ONDANSETRON (ZOFRAN ODT) 4 MG DISINTEGRATING TABLET    Take 1 tablet by mouth every 8 hours as needed for Nausea or Vomiting    RANOLAZINE (RANEXA) 500 MG EXTENDED RELEASE TABLET    Take 1 tablet by mouth 2 times daily    TOPIRAMATE (TOPAMAX) 200 MG TABLET    Take 1 tablet by mouth daily         ALLERGIES     Patient has no known allergies. FAMILYHISTORY     History reviewed. No pertinent family history.        SOCIAL HISTORY       Social History     Socioeconomic History    Marital status:      Spouse name: None    Number of children: None    Years of education: None    Highest education level: None   Occupational History    None   Social Needs    Financial resource strain: None    Food insecurity:     Worry: None     Inability: None    Transportation needs:     Medical: None     Non-medical: None   Tobacco Use    Smoking status: Never Smoker    Smokeless tobacco: Never Used   Substance and Sexual Activity    Alcohol use: No    Drug use: No    Sexual activity: Not Currently   Lifestyle    Physical activity:     Days per week: None     Minutes per session: None    Stress: None   Relationships    Social connections:     Talks on phone: None     Gets together: None     Attends Hoahaoism service: None     Active member of club or organization: None     Attends meetings of clubs or organizations: None     Relationship status: None    Intimate partner violence:     Fear of current or ex partner: None     Emotionally abused: None     Physically abused: None     Forced sexual activity: None   Other Topics Concern    None   Social History Narrative    None       SCREENINGS      Heart Score for chest pain patients  History: Slightly Suspicious  ECG: Normal  Patient Age: > 39 and < 65 years  *Risk factors for Atherosclerotic disease: Diabetes Mellitus, Hypercholesterolemia, Hypertension, Obesity  Risk Factors: > 3 Risk factors or history of atherosclerotic disease*  Troponin: < 1X normal limit  Heart Score Total: 3      PHYSICAL EXAM    (up to 7 for level 4, 8 or more for level 5)     ED Triage Vitals [02/17/20 2119]   BP Temp Temp Source Pulse Resp SpO2 Height Weight   (!) 131/58 98.5 °F (36.9 °C) Oral 96 16 95 % 5' 3\" (1.6 m) 180 lb (81.6 kg)       Physical Exam  Vitals signs and nursing note reviewed. Constitutional:       General: She is awake. She is not in acute distress. Appearance: She is well-developed. She is obese.  She is not ill-appearing, toxic-appearing or diaphoretic. HENT:      Head: Normocephalic and atraumatic. Right Ear: External ear normal.      Left Ear: External ear normal.      Nose: Nose normal.      Mouth/Throat:      Mouth: Mucous membranes are moist.   Eyes:      General:         Right eye: No discharge. Left eye: No discharge. Extraocular Movements: Extraocular movements intact. Conjunctiva/sclera: Conjunctivae normal.      Pupils: Pupils are equal, round, and reactive to light. Neck:      Musculoskeletal: Normal range of motion and neck supple. Cardiovascular:      Rate and Rhythm: Normal rate and regular rhythm. Pulses: Normal pulses. Heart sounds: Normal heart sounds. No murmur. No friction rub. No gallop. Pulmonary:      Effort: Pulmonary effort is normal. No respiratory distress. Breath sounds: Normal breath sounds. No wheezing or rales. Abdominal:      General: There is no distension. Palpations: Abdomen is soft. Tenderness: There is no abdominal tenderness. Musculoskeletal:      Right lower leg: No edema. Left lower leg: No edema. Comments:  Strength 5/5, sensation intact- Motor strength 5/5 and symmetric to UE Deltoids/trapezius, biceps and wrist extensors. Sensation to light touch intact and symmetric to bilateral UE dermatomes. Strength 5/5 symmetric to LE hip flexors, knees and ankles. Sensation to light touch intact in bilateral LE dermatomes. 2+ DTR to  patella. Gait normal.      Lymphadenopathy:      Cervical: No cervical adenopathy. Skin:     General: Skin is warm and dry. Neurological:      General: No focal deficit present. Mental Status: She is alert, oriented to person, place, and time and easily aroused. GCS: GCS eye subscore is 4. GCS verbal subscore is 5. GCS motor subscore is 6. Cranial Nerves: Facial asymmetry (left sided facial droop, no changes) present. Motor: Motor function is intact.       Coordination: Coordination is intact. Gait: Gait is intact. Deep Tendon Reflexes:      Reflex Scores:       Patellar reflexes are 2+ on the right side and 2+ on the left side. Comments: Notes numbness from below the knee bilaterally but jumps at pinprick to bilateral plantar surfaces. Psychiatric:         Attention and Perception: Attention and perception normal.         Mood and Affect: Mood is depressed. Affect is flat and tearful. Speech: Speech normal.         Behavior: Behavior normal. Behavior is not slowed, aggressive, hyperactive or combative. Behavior is cooperative. Thought Content: Thought content does not include homicidal or suicidal ideation. Thought content does not include homicidal or suicidal plan.          Cognition and Memory: Cognition and memory normal.         Judgment: Judgment normal.         DIAGNOSTIC RESULTS   LABS:    Labs Reviewed   COMPREHENSIVE METABOLIC PANEL - Abnormal; Notable for the following components:       Result Value    Sodium 128 (*)     Chloride 89 (*)     Anion Gap 17 (*)     Glucose 472 (*)     AST 8 (*)     All other components within normal limits    Narrative:     Performed at:  [facility] [address],  [place], [facility]   Phone [phone]   CBC WITH AUTO DIFFERENTIAL - Abnormal; Notable for the following components:    RBC 3.97 (*)     All other components within normal limits    Narrative:     Performed at:  [facility] [address],  [place], [facility]   Phone [phone]   BLOOD GAS, VENOUS - Abnormal; Notable for the following components:    pCO2, Alberto 39.4 (*)     pO2, Alberto 55.0 (*)     HCO3, Venous 22.4 (*)     All other components within normal limits    Narrative:     Performed at:  [facility]) [facility] [address],  [place], [place] [employer]   Phone [phone]   URINALYSIS - Abnormal; Notable for the following components: Glucose, Ur >=1000 (*)     Ketones, Urine TRACE (*)     Blood, Urine TRACE-INTACT (*)     All other components within normal limits    Narrative:     Performed at:  [facility] [address],  [employer]   Phone [phone]   BETA-HYDROXYBUTYRATE - Abnormal; Notable for the following components:    Beta-Hydroxybutyrate 1.60 (*)     All other components within normal limits    Narrative:     Performed at:  [place] [address],  [employer]   Phone [phone]   OSMOLALITY - Abnormal; Notable for the following components:    Osmolality 302 (*)     All other components within normal limits    Narrative:     Performed at:  [place] [address],  [employer]   Phone [phone]   MICROSCOPIC URINALYSIS - Abnormal; Notable for the following components:    WBC, UA 6-10 (*)     Bacteria, UA 2+ (*)     All other components within normal limits    Narrative:     Performed at:  [facility]) - [facility],  [employer]   Phone [phone]   POCT GLUCOSE - Abnormal; Notable for the following components:    POC Glucose 430 (*)     All other components within normal limits    Narrative:     Performed at:  [facility] [address],  [employer]   Phone [phone]   POCT GLUCOSE - Normal   PROTIME-INR    Narrative:     Performed at:  [place] [address],  [employer]   Phone [phone]   TROPONIN    Narrative:     Performed at:  [facility]) [facility],  [facility]   Phone [phone]   TROPONIN       All other labs were within normal range or not returned as of this dictation. EKG:  All EKG's are interpreted by the Emergency Department Physician who either signs orCo-signs this 78-year-old female who presents for evaluation of hyperglycemia. Physical exam remarkable for obese appearing female, she is sitting comfortably, in no acute distress. She has stable appearing left-sided facial deficits secondary to remote CVA. No focal neuro deficits. Her abdomen soft nontender, no peritoneal signs. Labs reviewed, she does have elevated serum glucose and slight anion gap. Bicarb is within normal limits however, and although she does have serum and urine ketones she is not currently HHS or DKA. Additionally, patient complaining of vague generalized chest pain that is been going on for quite some time. Initial troponin is negative, twelve-lead remain remarkable for normal sinus rhythm with occasional PVCs, nonspecific ST changes. Her chest x-ray was negative, no sign of acute cardiopulmonary disease. Patient's heart score is a 3. However patient left AGAINST MEDICAL ADVICE prior to completion of delta troponin repeat twelve-lead in the emergency department. Although I do have low risk that patient is currently suffering from acute coronary syndrome given that this is been chronic for her I did advise patient that given her risk and the fact that she presents again for the chest pain I recommended complete and thorough evaluation while she was in the emergency department. By the time I was able to reevaluate the patient she had left and signed [employer] paperwork. While she was in the emergency department she was given her home dose of Lantus and IV fluids. I did refer patient to PCP, to psychiatry and I also placed an inpatient consult to case management for review in the morning given her transportation issues. FINAL IMPRESSION      1. Hyperglycemia    2. Atypical chest pain    3.  Depression, unspecified depression type          DISPOSITION/PLAN   DISPOSITION Discharge - Pending Orders Complete 02/18/2020 12:45:30 AM      PATIENT REFERREDTO:  Eulalio Cabello

## 2020-03-02 DIAGNOSIS — Z95.810 DUAL ICD (IMPLANTABLE CARDIOVERTER-DEFIBRILLATOR) IN PLACE: Primary | ICD-10-CM

## 2020-03-10 ENCOUNTER — HOSPITAL ENCOUNTER (EMERGENCY)
Age: 60
Discharge: HOME | End: 2020-03-10
Attending: EMERGENCY MEDICINE
Payer: MEDICARE

## 2020-03-10 VITALS
HEART RATE: 89 BPM | BODY MASS INDEX: 31.01 KG/M2 | TEMPERATURE: 98.1 F | WEIGHT: 175 LBS | HEIGHT: 63 IN | RESPIRATION RATE: 18 BRPM | OXYGEN SATURATION: 100 % | SYSTOLIC BLOOD PRESSURE: 132 MMHG | DIASTOLIC BLOOD PRESSURE: 67 MMHG

## 2020-03-10 LAB
A/G RATIO: 1.2 (ref 1.1–2.2)
ALBUMIN SERPL-MCNC: 3.9 G/DL (ref 3.4–5)
ALP BLD-CCNC: 119 U/L (ref 40–129)
ALT SERPL-CCNC: 11 U/L (ref 10–40)
ANION GAP SERPL CALCULATED.3IONS-SCNC: 17 MMOL/L (ref 3–16)
AST SERPL-CCNC: 9 U/L (ref 15–37)
BACTERIA: ABNORMAL /HPF
BASOPHILS ABSOLUTE: 0.1 K/UL (ref 0–0.2)
BASOPHILS RELATIVE PERCENT: 1.3 %
BILIRUB SERPL-MCNC: <0.2 MG/DL (ref 0–1)
BILIRUBIN URINE: NEGATIVE
BLOOD, URINE: ABNORMAL
BUN BLDV-MCNC: 20 MG/DL (ref 7–20)
CALCIUM SERPL-MCNC: 9.2 MG/DL (ref 8.3–10.6)
CHLORIDE BLD-SCNC: 92 MMOL/L (ref 99–110)
CLARITY: CLEAR
CO2: 23 MMOL/L (ref 21–32)
COLOR: ABNORMAL
CREAT SERPL-MCNC: <0.5 MG/DL (ref 0.6–1.1)
EOSINOPHILS ABSOLUTE: 0.1 K/UL (ref 0–0.6)
EOSINOPHILS RELATIVE PERCENT: 1.6 %
GFR AFRICAN AMERICAN: >60
GFR NON-AFRICAN AMERICAN: >60
GLOBULIN: 3.2 G/DL
GLUCOSE BLD-MCNC: 354 MG/DL (ref 70–99)
GLUCOSE BLD-MCNC: 378 MG/DL (ref 70–99)
GLUCOSE URINE: >=1000 MG/DL
HCT VFR BLD CALC: 39.2 % (ref 36–48)
HEMOGLOBIN: 13.6 G/DL (ref 12–16)
KETONES, URINE: 15 MG/DL
LEUKOCYTE ESTERASE, URINE: NEGATIVE
LIPASE: 53 U/L (ref 13–60)
LYMPHOCYTES ABSOLUTE: 1.6 K/UL (ref 1–5.1)
LYMPHOCYTES RELATIVE PERCENT: 26.5 %
MCH RBC QN AUTO: 32.3 PG (ref 26–34)
MCHC RBC AUTO-ENTMCNC: 34.6 G/DL (ref 31–36)
MCV RBC AUTO: 93.2 FL (ref 80–100)
MICROSCOPIC EXAMINATION: YES
MONOCYTES ABSOLUTE: 0.5 K/UL (ref 0–1.3)
MONOCYTES RELATIVE PERCENT: 8 %
NEUTROPHILS ABSOLUTE: 3.8 K/UL (ref 1.7–7.7)
NEUTROPHILS RELATIVE PERCENT: 62.6 %
NITRITE, URINE: NEGATIVE
PDW BLD-RTO: 13.1 % (ref 12.4–15.4)
PERFORMED ON: ABNORMAL
PH UA: 5.5 (ref 5–8)
PLATELET # BLD: 232 K/UL (ref 135–450)
PMV BLD AUTO: 8.5 FL (ref 5–10.5)
POTASSIUM REFLEX MAGNESIUM: 4 MMOL/L (ref 3.5–5.1)
PROTEIN UA: NEGATIVE MG/DL
RAPID INFLUENZA  B AGN: NEGATIVE
RAPID INFLUENZA A AGN: NEGATIVE
RBC # BLD: 4.2 M/UL (ref 4–5.2)
RBC UA: ABNORMAL /HPF (ref 0–4)
SODIUM BLD-SCNC: 132 MMOL/L (ref 136–145)
SPECIFIC GRAVITY UA: 1.01 (ref 1–1.03)
TOTAL PROTEIN: 7.1 G/DL (ref 6.4–8.2)
URINE REFLEX TO CULTURE: YES
URINE TYPE: ABNORMAL
UROBILINOGEN, URINE: 0.2 E.U./DL
WBC # BLD: 6.1 K/UL (ref 4–11)
WBC UA: ABNORMAL /HPF (ref 0–5)
YEAST: PRESENT /HPF

## 2020-03-10 PROCEDURE — 6360000002 HC RX W HCPCS: Performed by: EMERGENCY MEDICINE

## 2020-03-10 PROCEDURE — 2580000003 HC RX 258: Performed by: EMERGENCY MEDICINE

## 2020-03-10 PROCEDURE — 6370000000 HC RX 637 (ALT 250 FOR IP): Performed by: EMERGENCY MEDICINE

## 2020-03-10 PROCEDURE — 96361 HYDRATE IV INFUSION ADD-ON: CPT

## 2020-03-10 PROCEDURE — 80053 COMPREHEN METABOLIC PANEL: CPT

## 2020-03-10 PROCEDURE — 96375 TX/PRO/DX INJ NEW DRUG ADDON: CPT

## 2020-03-10 PROCEDURE — 96372 THER/PROPH/DIAG INJ SC/IM: CPT

## 2020-03-10 PROCEDURE — 87086 URINE CULTURE/COLONY COUNT: CPT

## 2020-03-10 PROCEDURE — 99284 EMERGENCY DEPT VISIT MOD MDM: CPT

## 2020-03-10 PROCEDURE — 83690 ASSAY OF LIPASE: CPT

## 2020-03-10 PROCEDURE — 96374 THER/PROPH/DIAG INJ IV PUSH: CPT

## 2020-03-10 PROCEDURE — 81001 URINALYSIS AUTO W/SCOPE: CPT

## 2020-03-10 PROCEDURE — 87804 INFLUENZA ASSAY W/OPTIC: CPT

## 2020-03-10 PROCEDURE — 85025 COMPLETE CBC W/AUTO DIFF WBC: CPT

## 2020-03-10 RX ORDER — ONDANSETRON 4 MG/1
4 TABLET, FILM COATED ORAL 3 TIMES DAILY PRN
Qty: 15 TABLET | Refills: 0 | Status: ON HOLD | OUTPATIENT
Start: 2020-03-10 | End: 2020-07-18 | Stop reason: HOSPADM

## 2020-03-10 RX ORDER — ONDANSETRON 2 MG/ML
4 INJECTION INTRAMUSCULAR; INTRAVENOUS ONCE
Status: COMPLETED | OUTPATIENT
Start: 2020-03-10 | End: 2020-03-10

## 2020-03-10 RX ORDER — NYSTATIN 100000 [USP'U]/G
POWDER TOPICAL
Qty: 1 BOTTLE | Refills: 0 | Status: ON HOLD | OUTPATIENT
Start: 2020-03-10 | End: 2020-07-18 | Stop reason: HOSPADM

## 2020-03-10 RX ORDER — FLUCONAZOLE 100 MG/1
150 TABLET ORAL ONCE
Status: COMPLETED | OUTPATIENT
Start: 2020-03-10 | End: 2020-03-10

## 2020-03-10 RX ORDER — KETOROLAC TROMETHAMINE 30 MG/ML
15 INJECTION, SOLUTION INTRAMUSCULAR; INTRAVENOUS ONCE
Status: COMPLETED | OUTPATIENT
Start: 2020-03-10 | End: 2020-03-10

## 2020-03-10 RX ORDER — 0.9 % SODIUM CHLORIDE 0.9 %
1000 INTRAVENOUS SOLUTION INTRAVENOUS ONCE
Status: COMPLETED | OUTPATIENT
Start: 2020-03-10 | End: 2020-03-10

## 2020-03-10 RX ADMIN — ONDANSETRON HYDROCHLORIDE 4 MG: 2 INJECTION, SOLUTION INTRAMUSCULAR; INTRAVENOUS at 07:29

## 2020-03-10 RX ADMIN — FLUCONAZOLE 150 MG: 100 TABLET ORAL at 09:06

## 2020-03-10 RX ADMIN — SODIUM CHLORIDE 1000 ML: 9 INJECTION, SOLUTION INTRAVENOUS at 07:28

## 2020-03-10 RX ADMIN — INSULIN LISPRO 8 UNITS: 100 INJECTION, SOLUTION INTRAVENOUS; SUBCUTANEOUS at 07:57

## 2020-03-10 RX ADMIN — KETOROLAC TROMETHAMINE 15 MG: 30 INJECTION, SOLUTION INTRAMUSCULAR at 07:28

## 2020-03-10 ASSESSMENT — PAIN SCALES - GENERAL
PAINLEVEL_OUTOF10: 10
PAINLEVEL_OUTOF10: 3

## 2020-03-10 NOTE — ED TRIAGE NOTES
Pt states started 3 days ago with nausea/vomiting as well as fevers, cold sweats/hot flashes, and cough. States her glucometer broke 2 days ago and was unsure of blood sugar, squad reported at 441, FSBS taken here is 354.

## 2020-03-10 NOTE — ED PROVIDER NOTES
[address] ED  EMERGENCY DEPARTMENT ENCOUNTER      Pt Name: Swathi Coyne  MRN: [id number]  [place] 1960  Date of evaluation: 3/10/2020  Provider: Narda Cisneros MD    CHIEF COMPLAINT       Chief Complaint   Patient presents with    Nausea     Pt states has been having nausea/vomiting along with cold sweats and fever for last 3 days. Glucometer broke 2 days ago and was unable to check sugar, squad reports it as 441    Emesis    Chills         HISTORY OF PRESENT ILLNESS   (Location/Symptom, Timing/Onset, Context/Setting, Quality, Duration, Modifying Factors, Severity)  Note limiting factors. Swathi Coyne is a 63 y.o. female with past medical history of hypertension, hyperlipidemia, diabetes, coronary artery disease, stroke and CHF here today with vomiting and diarrhea. The patient states for last 3 days she has had nausea, vomiting, loose watery nonbloody stools. Notes occasional cramping abdominal pain. Denies any dysuria or hematuria. States she has no chest pain, cough or shortness of breath. Nursing notes state that the patient was having a fever, but to me she denies this. She does note that despite taking her insulin her blood sugar has been running high. Denies polyuria or polydipsia. No obvious alleviating factors. No known sick contacts    HPI    Nursing Notes were reviewed. REVIEW OF SYSTEMS    (2-9 systems for level 4, 10 or more for level 5)     Review of Systems    Please see HPI for pertinent positive and negative review of system findings. A full 10 system ROS was performed and otherwise negative.         PAST MEDICAL HISTORY     Past Medical History:   Diagnosis Date    CAD (coronary artery disease)     [facility]erebral artery occlusion with [facility] infarction ([facility]     Diabetes mellitus ([facility]     Hyperlipidemia     Hypertension     Mental retardation     MI (myocardial infarction) ([facility]          SURGICAL HISTORY       Past Surgical History:   Procedure spontaneously  Musculoskeletal:   Normal ROM, no deformities          Psychiatric:  Normal mood      DIAGNOSTIC RESULTS       Labs Reviewed   COMPREHENSIVE METABOLIC PANEL W/ REFLEX TO MG FOR LOW K - Abnormal; Notable for the following components:       Result Value    Sodium 132 (*)     Chloride 92 (*)     Anion Gap 17 (*)     Glucose 378 (*)     CREATININE <0.5 (*)     AST 9 (*)     All other components within normal limits    Narrative:     Performed at:  [place] [address],  [employer]   Phone [phone]   URINE RT REFLEX TO CULTURE - Abnormal; Notable for the following components:    Glucose, Ur >=1000 (*)     Ketones, Urine 15 (*)     Blood, Urine TRACE-LYSED (*)     All other components within normal limits    Narrative:     Performed at:  [place] [address],  [employer]   Phone [phone]   MICROSCOPIC URINALYSIS - Abnormal; Notable for the following components:    WBC, UA 11-20 (*)     Bacteria, UA 2+ (*)     Yeast, UA Present (*)     All other components within normal limits    Narrative:     Performed at:  [facility] [place] [address],  [employer]   Phone [phone]   POCT GLUCOSE - Abnormal; Notable for the following components:    POC Glucose 354 (*)     All other components within normal limits    Narrative:     Performed at:  [place] [address],  [employer]   Phone [phone]   RAPID INFLUENZA A/B ANTIGENS    Narrative:     Performed at:  [facility]) - [facility],  [employer]   Phone [phone]   CULTURE, URINE   CBC WITH AUTO DIFFERENTIAL    Narrative:     Performed at:  [place] [address],  [employer]   Phone [phone]   LIPASE    Narrative:     Performed at:  [facility]) - [facility]  [address],  [place], [facility]   Phone [phone]       Interpretation per the Radiologist below, if obtained/available at the time of this note:    No orders to display       All other labs/imaging were within normal range or not returned as of this dictation. EMERGENCY DEPARTMENT COURSE and DIFFERENTIAL DIAGNOSIS/MDM:   Vitals:    Vitals:    03/10/20 0625 03/10/20 0633   BP: (!) 153/85 (!) 129/90   Pulse: 95 91   Resp: 18 16   Temp: 98.1 °F (36.7 °C)    SpO2: 95% 96%   Weight: 175 lb (79.4 kg)    Height: 5' 3\" (1.6 m)        Patient presents emergency department today with some cramping abdominal pain, vomiting and diarrhea. Overall abdomen soft. Found to be hyperglycemic here but I am not concerned for DKA. Does have evidence of dehydration was given IV fluids nausea medication has had no further emesis. Symptoms have improved. Urinalysis concerning for yeast infection and on reexamination of the patient she does have intertrigo in the folds of her groin. Will be given nystatin powder and oral Diflucan but otherwise will be discharged home    MDM    CONSULTS     None    Critical Care:   None    REASSESSMENT          PROCEDURE     Unless otherwise noted below, none     Procedures      FINAL IMPRESSION      1. Nausea and vomiting, intractability of vomiting not specified, unspecified vomiting type    2. Dehydration    3. Diarrhea, unspecified type    4. Abdominal pain, unspecified abdominal location    5. Intertrigo    6. Hyperglycemia            DISPOSITION/PLAN   DISPOSITION Decision To Discharge 03/10/2020 08:44:27 AM        PATIENT REFERRED TO:  [facility]  [phone]    Schedule an appointment as soon as possible for a visit         DISCHARGE MEDICATIONS:  New Prescriptions    NYSTATIN (MYCOSTATIN) 876270 UNIT/GM POWDER    Apply topically 4 times daily.     ONDANSETRON (ZOFRAN) 4 MG TABLET    Take 1 tablet by mouth 3 times daily as needed for Nausea

## 2020-03-11 LAB — URINE CULTURE, ROUTINE: NORMAL

## 2020-04-15 ENCOUNTER — APPOINTMENT (OUTPATIENT)
Dept: CT IMAGING | Age: 60
DRG: 871 | End: 2020-04-15
Payer: MEDICARE

## 2020-04-15 ENCOUNTER — HOSPITAL ENCOUNTER (INPATIENT)
Age: 60
LOS: 4 days | Discharge: HOME | DRG: 871 | End: 2020-04-20
Attending: EMERGENCY MEDICINE | Admitting: INTERNAL MEDICINE
Payer: MEDICARE

## 2020-04-15 ENCOUNTER — APPOINTMENT (OUTPATIENT)
Dept: GENERAL RADIOLOGY | Age: 60
DRG: 871 | End: 2020-04-15
Payer: MEDICARE

## 2020-04-15 ENCOUNTER — NURSE ONLY (OUTPATIENT)
Dept: CARDIOLOGY CLINIC | Age: 60
End: 2020-04-15
Payer: MEDICARE

## 2020-04-15 LAB
A/G RATIO: 0.7 (ref 1.1–2.2)
ALBUMIN SERPL-MCNC: 3.1 G/DL (ref 3.4–5)
ALP BLD-CCNC: 104 U/L (ref 40–129)
ALT SERPL-CCNC: 8 U/L (ref 10–40)
ANION GAP SERPL CALCULATED.3IONS-SCNC: 24 MMOL/L (ref 3–16)
AST SERPL-CCNC: 8 U/L (ref 15–37)
BACTERIA: ABNORMAL /HPF
BASOPHILS ABSOLUTE: 0.1 K/UL (ref 0–0.2)
BASOPHILS RELATIVE PERCENT: 0.7 %
BETA-HYDROXYBUTYRATE: 6.9 MMOL/L (ref 0–0.27)
BILIRUB SERPL-MCNC: <0.2 MG/DL (ref 0–1)
BILIRUBIN URINE: ABNORMAL
BLOOD, URINE: ABNORMAL
BUN BLDV-MCNC: 14 MG/DL (ref 7–20)
CALCIUM SERPL-MCNC: 8.7 MG/DL (ref 8.3–10.6)
CHLORIDE BLD-SCNC: 97 MMOL/L (ref 99–110)
CLARITY: CLEAR
CO2: 10 MMOL/L (ref 21–32)
COLOR: YELLOW
CREAT SERPL-MCNC: 0.7 MG/DL (ref 0.6–1.1)
D DIMER: 888 NG/ML DDU (ref 0–229)
EKG ATRIAL RATE: 87 BPM
EKG DIAGNOSIS: NORMAL
EKG P AXIS: 13 DEGREES
EKG P-R INTERVAL: 148 MS
EKG Q-T INTERVAL: 408 MS
EKG QRS DURATION: 102 MS
EKG QTC CALCULATION (BAZETT): 490 MS
EKG R AXIS: 13 DEGREES
EKG T AXIS: 64 DEGREES
EKG VENTRICULAR RATE: 87 BPM
EOSINOPHILS ABSOLUTE: 0 K/UL (ref 0–0.6)
EOSINOPHILS RELATIVE PERCENT: 0.4 %
EPITHELIAL CELLS, UA: ABNORMAL /HPF (ref 0–5)
GFR AFRICAN AMERICAN: >60
GFR NON-AFRICAN AMERICAN: >60
GLOBULIN: 4.6 G/DL
GLUCOSE BLD-MCNC: 217 MG/DL (ref 70–99)
GLUCOSE BLD-MCNC: 242 MG/DL (ref 70–99)
GLUCOSE URINE: 500 MG/DL
HCT VFR BLD CALC: 38.8 % (ref 36–48)
HEMOGLOBIN: 12.9 G/DL (ref 12–16)
KETONES, URINE: >=80 MG/DL
LACTIC ACID: 1.2 MMOL/L (ref 0.4–2)
LEUKOCYTE ESTERASE, URINE: ABNORMAL
LYMPHOCYTES ABSOLUTE: 0.9 K/UL (ref 1–5.1)
LYMPHOCYTES RELATIVE PERCENT: 9.3 %
MAGNESIUM: 2 MG/DL (ref 1.8–2.4)
MCH RBC QN AUTO: 31.2 PG (ref 26–34)
MCHC RBC AUTO-ENTMCNC: 33.3 G/DL (ref 31–36)
MCV RBC AUTO: 93.8 FL (ref 80–100)
MICROSCOPIC EXAMINATION: YES
MONOCYTES ABSOLUTE: 0.9 K/UL (ref 0–1.3)
MONOCYTES RELATIVE PERCENT: 8.9 %
NEUTROPHILS ABSOLUTE: 7.8 K/UL (ref 1.7–7.7)
NEUTROPHILS RELATIVE PERCENT: 80.7 %
NITRITE, URINE: NEGATIVE
PDW BLD-RTO: 13.1 % (ref 12.4–15.4)
PERFORMED ON: ABNORMAL
PH UA: 5.5 (ref 5–8)
PLATELET # BLD: 304 K/UL (ref 135–450)
PMV BLD AUTO: 8 FL (ref 5–10.5)
POTASSIUM REFLEX MAGNESIUM: 3.1 MMOL/L (ref 3.5–5.1)
PRO-BNP: 2128 PG/ML (ref 0–124)
PROTEIN UA: 30 MG/DL
RBC # BLD: 4.13 M/UL (ref 4–5.2)
RBC UA: ABNORMAL /HPF (ref 0–4)
SODIUM BLD-SCNC: 131 MMOL/L (ref 136–145)
SPECIFIC GRAVITY UA: >=1.03 (ref 1–1.03)
TOTAL PROTEIN: 7.7 G/DL (ref 6.4–8.2)
TROPONIN: <0.01 NG/ML
URINE REFLEX TO CULTURE: YES
URINE TYPE: ABNORMAL
UROBILINOGEN, URINE: 0.2 E.U./DL
WBC # BLD: 9.7 K/UL (ref 4–11)
WBC UA: ABNORMAL /HPF (ref 0–5)

## 2020-04-15 PROCEDURE — 6370000000 HC RX 637 (ALT 250 FOR IP): Performed by: NURSE PRACTITIONER

## 2020-04-15 PROCEDURE — 71045 X-RAY EXAM CHEST 1 VIEW: CPT

## 2020-04-15 PROCEDURE — 85025 COMPLETE CBC W/AUTO DIFF WBC: CPT

## 2020-04-15 PROCEDURE — 83605 ASSAY OF LACTIC ACID: CPT

## 2020-04-15 PROCEDURE — 80307 DRUG TEST PRSMV CHEM ANLYZR: CPT

## 2020-04-15 PROCEDURE — 96360 HYDRATION IV INFUSION INIT: CPT

## 2020-04-15 PROCEDURE — 6360000004 HC RX CONTRAST MEDICATION: Performed by: EMERGENCY MEDICINE

## 2020-04-15 PROCEDURE — 83880 ASSAY OF NATRIURETIC PEPTIDE: CPT

## 2020-04-15 PROCEDURE — 84484 ASSAY OF TROPONIN QUANT: CPT

## 2020-04-15 PROCEDURE — 93010 ELECTROCARDIOGRAM REPORT: CPT | Performed by: INTERNAL MEDICINE

## 2020-04-15 PROCEDURE — 83735 ASSAY OF MAGNESIUM: CPT

## 2020-04-15 PROCEDURE — 82010 KETONE BODYS QUAN: CPT

## 2020-04-15 PROCEDURE — 93005 ELECTROCARDIOGRAM TRACING: CPT | Performed by: EMERGENCY MEDICINE

## 2020-04-15 PROCEDURE — 93289 INTERROG DEVICE EVAL HEART: CPT | Performed by: INTERNAL MEDICINE

## 2020-04-15 PROCEDURE — 81001 URINALYSIS AUTO W/SCOPE: CPT

## 2020-04-15 PROCEDURE — 99285 EMERGENCY DEPT VISIT HI MDM: CPT

## 2020-04-15 PROCEDURE — 2580000003 HC RX 258: Performed by: NURSE PRACTITIONER

## 2020-04-15 PROCEDURE — 87086 URINE CULTURE/COLONY COUNT: CPT

## 2020-04-15 PROCEDURE — 36415 COLL VENOUS BLD VENIPUNCTURE: CPT

## 2020-04-15 PROCEDURE — 85379 FIBRIN DEGRADATION QUANT: CPT

## 2020-04-15 PROCEDURE — 80053 COMPREHEN METABOLIC PANEL: CPT

## 2020-04-15 PROCEDURE — 71260 CT THORAX DX C+: CPT

## 2020-04-15 RX ORDER — CEFDINIR 300 MG/1
300 CAPSULE ORAL 2 TIMES DAILY
Qty: 20 CAPSULE | Refills: 0 | Status: SHIPPED | OUTPATIENT
Start: 2020-04-15 | End: 2020-04-20 | Stop reason: HOSPADM

## 2020-04-15 RX ORDER — 0.9 % SODIUM CHLORIDE 0.9 %
1000 INTRAVENOUS SOLUTION INTRAVENOUS ONCE
Status: COMPLETED | OUTPATIENT
Start: 2020-04-15 | End: 2020-04-15

## 2020-04-15 RX ORDER — ONDANSETRON 4 MG/1
4 TABLET, FILM COATED ORAL EVERY 8 HOURS PRN
Qty: 20 TABLET | Refills: 0 | Status: ON HOLD | OUTPATIENT
Start: 2020-04-15 | End: 2020-07-18 | Stop reason: HOSPADM

## 2020-04-15 RX ORDER — POTASSIUM CHLORIDE 20 MEQ/1
40 TABLET, EXTENDED RELEASE ORAL ONCE
Status: COMPLETED | OUTPATIENT
Start: 2020-04-15 | End: 2020-04-15

## 2020-04-15 RX ORDER — CEFDINIR 300 MG/1
300 CAPSULE ORAL ONCE
Status: COMPLETED | OUTPATIENT
Start: 2020-04-15 | End: 2020-04-15

## 2020-04-15 RX ADMIN — SODIUM CHLORIDE 1000 ML: 9 INJECTION, SOLUTION INTRAVENOUS at 22:14

## 2020-04-15 RX ADMIN — POTASSIUM CHLORIDE 40 MEQ: 1500 TABLET, EXTENDED RELEASE ORAL at 19:39

## 2020-04-15 RX ADMIN — CEFDINIR 300 MG: 300 CAPSULE ORAL at 21:44

## 2020-04-15 RX ADMIN — IOPAMIDOL 85 ML: 755 INJECTION, SOLUTION INTRAVENOUS at 20:53

## 2020-04-15 ASSESSMENT — ENCOUNTER SYMPTOMS
ABDOMINAL PAIN: 0
RHINORRHEA: 0
SHORTNESS OF BREATH: 0
VOMITING: 1
SORE THROAT: 0
NAUSEA: 1
COLOR CHANGE: 0

## 2020-04-15 NOTE — ED NOTES
[facility] interpretation line called.  RN to receive call back     Brielle Noonan RN  04/15/20 8742

## 2020-04-15 NOTE — ED PROVIDER NOTES
r          [facility] [address]        Pt Name: Ashlee Anderson  MRN: [id number]  [place] 1960  Dateof evaluation: 4/15/2020  Provider: FRITZ Cabral - CELESTINO  PCP: Angel Lira  ED Attending: No att. providers found    [address] [place] [address]       Chief Complaint   Patient presents with    Fatigue     Patient reports n/v and general weakness x 2 days    Emesis       HISTORY OF PRESENTILLNESS   (Location/Symptom, Timing/Onset, Context/Setting, Quality, Duration, Modifying Factors, Severity)  Note limiting factors. Ashlee Anderson is a 63 y.o. female for nausea and vomiting. Onset was 3 days ago. Duration has been since the onset. Context includes patient reports that she has had nausea and vomiting for the last 3 days. She also reports chest discomfort for 3 days. Patient does have a history of multiple heart attacks. Patient reports that she does have exertional chest pain and chest pain that is worse with a deep breath. Alleviating factors include nothing. Aggravating factors include nothing. Pain is 10/10. Nothing has been used for pain today. Nursing Notes were all reviewed and agreed with or any disagreements were addressed  in the HPI. REVIEW OF SYSTEMS    (2-9 systems for level 4, 10 or more for level 5)     Review of Systems   Constitutional: Positive for fatigue. Negative for fever. HENT: Negative for congestion, rhinorrhea and sore throat. Respiratory: Negative for shortness of breath. Cardiovascular: Positive for chest pain. Gastrointestinal: Positive for nausea and vomiting. Negative for abdominal pain. Genitourinary: Negative for decreased urine volume and difficulty urinating. Musculoskeletal: Negative for arthralgias and myalgias. Skin: Negative for color change and rash. Neurological: Negative for dizziness and light-headedness. Psychiatric/Behavioral: Negative for agitation.    All other systems reviewed and Attends meetings of clubs or organizations: None     Relationship status: None    Intimate partner violence     Fear of current or ex partner: None     Emotionally abused: None     Physically abused: None     Forced sexual activity: None   Other Topics Concern    None   Social History Narrative    None       SCREENINGS    Bc Coma Scale  Eye Opening: Spontaneous  Best Verbal Response: Oriented  Best Motor Response: Obeys commands  [place] Coma Scale Score: 15        PHYSICAL EXAM  (up to 7 for level 4, 8 or more for level 5)     ED Triage Vitals   BP Temp Temp Source Pulse Resp SpO2 Height Weight   04/15/20 1642 04/15/20 1639 04/15/20 1639 04/15/20 1639 04/15/20 1639 04/15/20 1639 04/15/20 1639 04/15/20 1639   (!) 140/59 97.6 °F (36.4 °C) Oral 94 16 98 % 5' 3\" (1.6 m) 180 lb (81.6 kg)       Physical Exam  Constitutional:       Appearance: She is well-developed. HENT:      Head: Normocephalic and atraumatic. Neck:      Musculoskeletal: Normal range of motion. Cardiovascular:      Rate and Rhythm: Normal rate. Pulmonary:      Effort: Pulmonary effort is normal. No respiratory distress. Breath sounds: Examination of the right-lower field reveals decreased breath sounds. Examination of the left-lower field reveals decreased breath sounds. Decreased breath sounds present. Abdominal:      General: There is no distension. Palpations: Abdomen is soft. Tenderness: There is generalized abdominal tenderness. Musculoskeletal: Normal range of motion. Skin:     General: Skin is warm and dry. Neurological:      Mental Status: She is alert and oriented to person, place, and time. Psychiatric:         Mood and Affect: Affect is tearful. Thought Content: Thought content does not include suicidal ideation. Thought content does not include suicidal plan.       Comments: Patient became very tearful on exam talking about the loss of her parents         DIAGNOSTIC RESULTS   LABS:    Labs normal limits    Narrative:     Patricia BARRERA tel. [phone],  Chemistry results called to and read back by Arvind Almendarez, 04/16/2020  01:49, by Stephanie Benavides  Performed at:  [facility] 75,  [employer]   Phone [phone]   BLOOD GAS, VENOUS - Abnormal; Notable for the following components:    pH, Alberto 7.239 (*)     pCO2, Alberto 25.2 (*)     pO2, Alberto 110.9 (*)     HCO3, Venous 10.5 (*)     Base Excess, Alberto -15.1 (*)     All other components within normal limits    Narrative:     Performed at:  [address] [facility] 75,  [employer]   Phone [phone]   ACETAMINOPHEN LEVEL - Abnormal; Notable for the following components:    Acetaminophen Level <5 (*)     All other components within normal limits    Narrative:     Performed at:  [facility] 75,  [employer]   Phone [phone]   SALICYLATE LEVEL - Abnormal; Notable for the following components:    Salicylate, Serum <1.7 (*)     All other components within normal limits    Narrative:     Performed at:  [facility] 75,  [employer]   Phone [phone]   CBC WITH AUTO DIFFERENTIAL - Abnormal; Notable for the following components:    RBC 3.84 (*)     Hemoglobin 11.9 (*)     Hematocrit 35.6 (*)     Lymphocytes Absolute 0.8 (*)     All other components within normal limits    Narrative:     Performed at:  [facility] 75,  [employer]   Phone [phone]   BASIC METABOLIC PANEL - Abnormal; Notable for the following components:    Sodium 134 (*)     Potassium 3.3 (*)     CO2 9 (*)     Anion Gap 23 (*)     Glucose 199 (*)     All other components within normal limits    Narrative:     [address] tel. [phone],  Previous panic on this admission - call not needed per SOP, 04/16/2020 [address]   Phone [phone]   PHOSPHORUS - Abnormal; Notable for the following components:    Phosphorus 0.6 (*)     All other components within normal limits    Narrative:     Bereket Solis tel. [phone],  Chemistry results called to and read back by Wyatt Choi RN, 04/16/2020  09:18, by Neda Wong  Previous panic on this admission - call not needed per SOP, 04/16/2020 08:51,  by ALY  Performed at:  [place] [address],  [place], [employer]   Phone [phone]   PHOSPHORUS - Abnormal; Notable for the following components:    Phosphorus 0.9 (*)     All other components within normal limits    Narrative:     Bereket Solis tel. [phone],  Chemistry results called to and read back by Shayna Block RN, 04/16/2020  12:17, by Neda Wong  Performed at:  [facility] 75,  [place]   Phone [phone]   PHOSPHORUS - Abnormal; Notable for the following components:    Phosphorus 1.3 (*)     All other components within normal limits    Narrative:     Collection has been rescheduled by Femi Mccord at 04/16/2020 15:52 Reason:   Patient has port or line  Performed at:  [facility] 75,  [place], [place]   Phone [phone]   MAGNESIUM - Abnormal; Notable for the following components:    Magnesium 1.60 (*)     All other components within normal limits    Narrative:     Collection has been rescheduled by Femi Mccord at 04/16/2020 15:52 Reason:   Patient has port or line  Performed at:  [facility] 75,  [place], [place]   Phone [phone]   BASIC METABOLIC PANEL - Abnormal; Notable for the following components:    Sodium 134 (*)     Potassium 3.1 (*)     CO2 18 (*)     Glucose 216 (*)     BUN 4 (*)     CREATININE <0.5 (*)     Calcium 7.8 (*)     All other components within normal limits    Narrative:     Performed at:  [facility] 75,  [place], [facility]   Phone [phone]   PHOSPHORUS - Abnormal; Notable for the following components:    Phosphorus 1.5 (*)     All other components within normal limits    Narrative:     Performed at:  [place] [address],  [place], [place] [facility]   Phone [phone]   MAGNESIUM - Abnormal; Notable for the following components:    Magnesium 2.50 (*)     All other components within normal limits    Narrative:     Performed at:  [place] [address],  [place], [facility]   Phone [phone] - Abnormal; Notable for the following components:    Protime 13.5 (*)     INR 1.16 (*)     All other components within normal limits    Narrative:     Performed at:  [place] [address],  [place], [facility]   Phone [phone]   BASIC METABOLIC PANEL - Abnormal; Notable for the following components:    Sodium 131 (*)     Potassium 3.0 (*)     Glucose 175 (*)     BUN 3 (*)     CREATININE <0.5 (*)     Calcium 7.7 (*)     All other components within normal limits    Narrative:     Performed at:  [place] [address],  [place] [employer]   Phone [phone]   PHOSPHORUS - Abnormal; Notable for the following components:    Phosphorus 2.1 (*)     All other components within normal limits    Narrative:     Performed at:  [place] [address],  [place], [place] [employer]   Phone [phone]   BASIC METABOLIC PANEL - Abnormal; Notable for the following components:    Sodium 127 (*)     Potassium 3.4 (*)     CO2 20 (*)     Glucose 174 (*)     BUN <2 (*)     CREATININE <0.5 (*)     Calcium 7.5 (*)     All other components within normal limits    Narrative:     Performed at:  [place] [address],  [employer], [place], [employer]   Phone [phone]   POCT GLUCOSE - Abnormal; Notable for the following components:    POC Glucose 157 (*)     All other components within normal limits    Narrative:     Performed at:  [facility] 75,  [place], [employer]   Phone [phone]   POCT GLUCOSE - Abnormal; Notable for the following components:    POC Glucose 246 (*)     All other components within normal limits    Narrative:     Performed at:  [facility] 75,  [place], [employer]   Phone [phone]   POCT GLUCOSE - Abnormal; Notable for the following components:    POC Glucose 121 (*)     All other components within normal limits    Narrative:     Performed at:  [facility] 75,  [place], [employer]   Phone [phone]   POCT GLUCOSE - Abnormal; Notable for the following components:    POC Glucose 105 (*)     All other components within normal limits    Narrative:     Performed at:  [facility] 75,  [place], [employer]   Phone [phone]   POCT GLUCOSE - Abnormal; Notable for the following components:    POC Glucose 221 (*)     All other components within normal limits    Narrative:     Performed at:  [facility] 75,  [place], [employer]   Phone [phone]   POCT GLUCOSE - Abnormal; Notable for the following components:    POC Glucose 231 (*)     All other components within normal limits    Narrative:     Performed at:  [facility] 75,  [place], [employer]   Phone [phone]   POCT GLUCOSE - Abnormal; Notable for the following components:    POC Glucose 225 (*)     All other components within normal limits    Narrative:     Performed at:  [facility] [employer],  [employer] Ravi Manzano [facility] [address]   Phone [phone]   TROPONIN    Narrative:     Magdi De Dios  ACUITY [facility] tel. [phone],  Previous panic on this admission - call not needed per SOP, 04/16/2020 05:05,  by SONJA  Performed at:  [facility] 75,  [place], [facility]   Phone [phone]   TROPONIN    Narrative:     Performed at:  [place] [address],  [place], [facility]   Phone [phone]   MAGNESIUM    Narrative:     Prince[facility] Monarch tel. [phone],  Previous panic on this admission - call not needed per SOP, 04/16/2020 05:05,  by SONJA  Performed at:  [place] [address],  [place], [facility]   Phone [phone]   MAGNESIUM    Narrative:     [employer] tel. [phone],  Previous panic on this admission - call not needed per SOP, 04/16/2020 08:51,  by ALY  Performed at:  [place] [address],  [place], [facility]   Phone [phone]   MAGNESIUM    Narrative:     [employer] tel. [phone],  Chemistry results called to and read back by Khushboo Romero RN, 04/16/2020  12:17, by Faviola Hinojosa  Performed at:  [place] [address],  [place], [facility]   Phone [phone]   MAGNESIUM    Narrative:     Performed at:  [place] [address],  [place], [facility]   Phone [phone]   MAGNESIUM    Narrative:     Performed at:  [place] [address],  [place], [facility]   Phone [phone]   POTASSIUM    Narrative:     Performed at:  [place] [address],  [place], [facility]   Phone [phone]   MAGNESIUM    Narrative:     Performed at:  [place] [address],  [place], [facility]   Phone [phone]   PHOSPHORUS    Narrative: Performed at:  [facility] 75,  [place], [facility]   Phone [phone]   MAGNESIUM    Narrative:     Performed at:  [facility] 75,  [place], [facility]   Phone [phone]   MAGNESIUM    Narrative:     Performed at:  [facility] 75,  [place], [facility]   Phone [phone]   POCT GLUCOSE    Narrative:     Performed at:  [facility] 75,  [place], [facility]   Phone [phone]   POCT GLUCOSE    Narrative:     Performed at:  [facility] 75,  [place], [facility]   Phone [phone]   POCT GLUCOSE   POCT GLUCOSE   POCT GLUCOSE   POCT GLUCOSE   POCT GLUCOSE   POCT GLUCOSE   POCT GLUCOSE   POCT GLUCOSE   POCT GLUCOSE   POCT GLUCOSE   POCT GLUCOSE   POCT GLUCOSE   POCT GLUCOSE       All other labs werewithin normal range or not returned as of this dictation. EKG: All EKG's are interpreted by the Emergency Department Physician who either signs or Co-signs this chart in the absence of acardiologist.  Please see their note for interpretation of EKG. RADIOLOGY:     Chest x-ray interpreted by radiologist for pacemaker. No acute cardiopulmonary process. CT chest pulmonary with IV contrast interpreted by radiologist for  Impression:    No evidence of pulmonary embolism or acute pulmonary abnormality. Evidence of remote granulomatous disease. Cholelithiasis. Grossly unchanged multinodular goiter. Small hiatal hernia.  Concentric thickening of the wall of the distal  esophagus may be related to reflux disease.  Clinical correlation is  recommended. Interpretation per the Radiologist below, if available at the time of this note:    IR PICC WO SQ PORT/PUMP > 5 YEARS   Final Result   Successful placement of PICC line.          CT CHEST PULMONARY chloride bolus (0 mLs Intravenous Stopped 4/15/20 4884)   sodium chloride 0.9 % infusion (250 mLs  New Bag 4/16/20 0559)   sodium chloride 0.9 % infusion (500 mLs  New Bag 4/16/20 0826)   sodium chloride 0.9 % infusion (250 mLs  New Bag 4/16/20 0825)   sodium phosphate 15 mmol in dextrose 5 % 250 mL IVPB (0 mmol Intravenous Stopped 4/17/20 1310)   sodium chloride 0.9 % infusion (250 mLs  New Bag 4/18/20 6655)       Patient was seen and evaluated by myself and Dr. Li Yu. Patient here for complaints of nausea and vomiting that she is had for the last 3 days. Upon further questioning the patient does report that she has had chest tightness and. Patient reports that she has had multiple MIs in the past.  Patient currently has a pacemaker. During my evaluation the patient was making comments concerning the fact that she wants her pacemaker removed because she does not want it anymore however she was informed that if her pacemaker was removed she may die. Patient does deny any suicidal or homicidal ideations however I suspect that she may be depressed. During our conversation she became very tearful and was talking about her parents who have passed away and appeared very sad. Lab values have all been reviewed and interpreted. Patient was found to be orthostatic. She also had a UTI. She had an anion gap of 24 with an elevated BNP of 2128. Patient was provided with ZIEGLER JOSSY for her UTI. She did have replacement her potassium and she is given IV fluids. Consult was placed to the hospitalist for admission for orthostatics and a UTI. Pacemaker was interrogated in the ED. It did show there were episodes of SVT. Consult was placed to the hospitalist for admission. The patient tolerated their visit well. I have evaluated thispatient. My supervising physician was available for consultation.  The patient and / or the family were informed of the results of any tests, a time was given to answer questions, a plan

## 2020-04-16 ENCOUNTER — APPOINTMENT (OUTPATIENT)
Dept: GENERAL RADIOLOGY | Age: 60
DRG: 871 | End: 2020-04-16
Payer: MEDICARE

## 2020-04-16 ENCOUNTER — APPOINTMENT (OUTPATIENT)
Dept: INTERVENTIONAL RADIOLOGY/VASCULAR | Age: 60
DRG: 871 | End: 2020-04-16
Payer: MEDICARE

## 2020-04-16 PROBLEM — E11.10 DKA, TYPE 2, NOT AT GOAL (HCC): Status: ACTIVE | Noted: 2020-04-16

## 2020-04-16 PROBLEM — F81.9 COGNITIVE DEVELOPMENTAL DELAY: Chronic | Status: ACTIVE | Noted: 2020-04-16

## 2020-04-16 PROBLEM — F39 MOOD DISORDER (HCC): Status: ACTIVE | Noted: 2020-04-16

## 2020-04-16 LAB
A/G RATIO: 0.7 (ref 1.1–2.2)
ACETAMINOPHEN LEVEL: <5 UG/ML (ref 10–30)
ALBUMIN SERPL-MCNC: 2.8 G/DL (ref 3.4–5)
ALP BLD-CCNC: 94 U/L (ref 40–129)
ALT SERPL-CCNC: 7 U/L (ref 10–40)
AMPHETAMINE SCREEN, URINE: NORMAL
ANION GAP SERPL CALCULATED.3IONS-SCNC: 14 MMOL/L (ref 3–16)
ANION GAP SERPL CALCULATED.3IONS-SCNC: 16 MMOL/L (ref 3–16)
ANION GAP SERPL CALCULATED.3IONS-SCNC: 16 MMOL/L (ref 3–16)
ANION GAP SERPL CALCULATED.3IONS-SCNC: 20 MMOL/L (ref 3–16)
ANION GAP SERPL CALCULATED.3IONS-SCNC: 21 MMOL/L (ref 3–16)
ANION GAP SERPL CALCULATED.3IONS-SCNC: 23 MMOL/L (ref 3–16)
AST SERPL-CCNC: 6 U/L (ref 15–37)
BARBITURATE SCREEN URINE: NORMAL
BASE EXCESS VENOUS: -15.1 MMOL/L (ref -3–3)
BASOPHILS ABSOLUTE: 0 K/UL (ref 0–0.2)
BASOPHILS RELATIVE PERCENT: 0.5 %
BENZODIAZEPINE SCREEN, URINE: NORMAL
BILIRUB SERPL-MCNC: <0.2 MG/DL (ref 0–1)
BUN BLDV-MCNC: 10 MG/DL (ref 7–20)
BUN BLDV-MCNC: 11 MG/DL (ref 7–20)
BUN BLDV-MCNC: 4 MG/DL (ref 7–20)
BUN BLDV-MCNC: 5 MG/DL (ref 7–20)
BUN BLDV-MCNC: 7 MG/DL (ref 7–20)
BUN BLDV-MCNC: 9 MG/DL (ref 7–20)
CALCIUM SERPL-MCNC: 7.8 MG/DL (ref 8.3–10.6)
CALCIUM SERPL-MCNC: 8 MG/DL (ref 8.3–10.6)
CALCIUM SERPL-MCNC: 8.1 MG/DL (ref 8.3–10.6)
CALCIUM SERPL-MCNC: 8.3 MG/DL (ref 8.3–10.6)
CALCIUM SERPL-MCNC: 8.3 MG/DL (ref 8.3–10.6)
CALCIUM SERPL-MCNC: 8.5 MG/DL (ref 8.3–10.6)
CANNABINOID SCREEN URINE: NORMAL
CARBOXYHEMOGLOBIN: 1 % (ref 0–1.5)
CHLORIDE BLD-SCNC: 100 MMOL/L (ref 99–110)
CHLORIDE BLD-SCNC: 102 MMOL/L (ref 99–110)
CHLORIDE BLD-SCNC: 106 MMOL/L (ref 99–110)
CO2: 12 MMOL/L (ref 21–32)
CO2: 13 MMOL/L (ref 21–32)
CO2: 14 MMOL/L (ref 21–32)
CO2: 17 MMOL/L (ref 21–32)
CO2: 18 MMOL/L (ref 21–32)
CO2: 9 MMOL/L (ref 21–32)
COCAINE METABOLITE SCREEN URINE: NORMAL
CREAT SERPL-MCNC: 0.6 MG/DL (ref 0.6–1.1)
CREAT SERPL-MCNC: 0.6 MG/DL (ref 0.6–1.1)
CREAT SERPL-MCNC: 0.7 MG/DL (ref 0.6–1.1)
CREAT SERPL-MCNC: <0.5 MG/DL (ref 0.6–1.1)
EOSINOPHILS ABSOLUTE: 0 K/UL (ref 0–0.6)
EOSINOPHILS RELATIVE PERCENT: 0.5 %
GFR AFRICAN AMERICAN: >60
GFR NON-AFRICAN AMERICAN: >60
GLOBULIN: 4.1 G/DL
GLUCOSE BLD-MCNC: 130 MG/DL (ref 70–99)
GLUCOSE BLD-MCNC: 136 MG/DL (ref 70–99)
GLUCOSE BLD-MCNC: 140 MG/DL (ref 70–99)
GLUCOSE BLD-MCNC: 153 MG/DL (ref 70–99)
GLUCOSE BLD-MCNC: 178 MG/DL (ref 70–99)
GLUCOSE BLD-MCNC: 182 MG/DL (ref 70–99)
GLUCOSE BLD-MCNC: 185 MG/DL (ref 70–99)
GLUCOSE BLD-MCNC: 185 MG/DL (ref 70–99)
GLUCOSE BLD-MCNC: 187 MG/DL (ref 70–99)
GLUCOSE BLD-MCNC: 188 MG/DL (ref 70–99)
GLUCOSE BLD-MCNC: 191 MG/DL (ref 70–99)
GLUCOSE BLD-MCNC: 194 MG/DL (ref 70–99)
GLUCOSE BLD-MCNC: 194 MG/DL (ref 70–99)
GLUCOSE BLD-MCNC: 195 MG/DL (ref 70–99)
GLUCOSE BLD-MCNC: 195 MG/DL (ref 70–99)
GLUCOSE BLD-MCNC: 199 MG/DL (ref 70–99)
GLUCOSE BLD-MCNC: 202 MG/DL (ref 70–99)
GLUCOSE BLD-MCNC: 203 MG/DL (ref 70–99)
GLUCOSE BLD-MCNC: 205 MG/DL (ref 70–99)
GLUCOSE BLD-MCNC: 216 MG/DL (ref 70–99)
GLUCOSE BLD-MCNC: 223 MG/DL (ref 70–99)
GLUCOSE BLD-MCNC: 241 MG/DL (ref 70–99)
HCO3 VENOUS: 10.5 MMOL/L (ref 23–29)
HCT VFR BLD CALC: 35.6 % (ref 36–48)
HEMOGLOBIN: 11.9 G/DL (ref 12–16)
INR BLD: 1.16 (ref 0.86–1.14)
LYMPHOCYTES ABSOLUTE: 0.8 K/UL (ref 1–5.1)
LYMPHOCYTES RELATIVE PERCENT: 10.3 %
Lab: NORMAL
MAGNESIUM: 1.6 MG/DL (ref 1.8–2.4)
MAGNESIUM: 1.8 MG/DL (ref 1.8–2.4)
MAGNESIUM: 1.9 MG/DL (ref 1.8–2.4)
MAGNESIUM: 2 MG/DL (ref 1.8–2.4)
MAGNESIUM: 2.5 MG/DL (ref 1.8–2.4)
MCH RBC QN AUTO: 31 PG (ref 26–34)
MCHC RBC AUTO-ENTMCNC: 33.4 G/DL (ref 31–36)
MCV RBC AUTO: 92.8 FL (ref 80–100)
METHADONE SCREEN, URINE: NORMAL
METHEMOGLOBIN VENOUS: 0.3 %
MONOCYTES ABSOLUTE: 0.7 K/UL (ref 0–1.3)
MONOCYTES RELATIVE PERCENT: 9.3 %
NEUTROPHILS ABSOLUTE: 6.2 K/UL (ref 1.7–7.7)
NEUTROPHILS RELATIVE PERCENT: 79.4 %
O2 CONTENT, VEN: 18 VOL %
O2 SAT, VEN: 97 %
O2 THERAPY: ABNORMAL
OPIATE SCREEN URINE: NORMAL
OXYCODONE URINE: NORMAL
PCO2, VEN: 25.2 MMHG (ref 40–50)
PDW BLD-RTO: 13 % (ref 12.4–15.4)
PERFORMED ON: ABNORMAL
PH UA: 5.5
PH VENOUS: 7.24 (ref 7.35–7.45)
PHENCYCLIDINE SCREEN URINE: NORMAL
PHOSPHORUS: 0.6 MG/DL (ref 2.5–4.9)
PHOSPHORUS: 0.9 MG/DL (ref 2.5–4.9)
PHOSPHORUS: 1 MG/DL (ref 2.5–4.9)
PHOSPHORUS: 1.3 MG/DL (ref 2.5–4.9)
PHOSPHORUS: 1.5 MG/DL (ref 2.5–4.9)
PLATELET # BLD: 264 K/UL (ref 135–450)
PMV BLD AUTO: 7.2 FL (ref 5–10.5)
PO2, VEN: 110.9 MMHG (ref 25–40)
POTASSIUM SERPL-SCNC: 3 MMOL/L (ref 3.5–5.1)
POTASSIUM SERPL-SCNC: 3.1 MMOL/L (ref 3.5–5.1)
POTASSIUM SERPL-SCNC: 3.2 MMOL/L (ref 3.5–5.1)
POTASSIUM SERPL-SCNC: 3.3 MMOL/L (ref 3.5–5.1)
PROPOXYPHENE SCREEN: NORMAL
PROTHROMBIN TIME: 13.5 SEC (ref 10–13.2)
RBC # BLD: 3.84 M/UL (ref 4–5.2)
SALICYLATE, SERUM: <0.3 MG/DL (ref 15–30)
SARS-COV-2, PCR: NOT DETECTED
SODIUM BLD-SCNC: 132 MMOL/L (ref 136–145)
SODIUM BLD-SCNC: 134 MMOL/L (ref 136–145)
SODIUM BLD-SCNC: 134 MMOL/L (ref 136–145)
SODIUM BLD-SCNC: 135 MMOL/L (ref 136–145)
SODIUM BLD-SCNC: 136 MMOL/L (ref 136–145)
SODIUM BLD-SCNC: 136 MMOL/L (ref 136–145)
TCO2 CALC VENOUS: 11 MMOL/L
TOTAL PROTEIN: 6.9 G/DL (ref 6.4–8.2)
TROPONIN: <0.01 NG/ML
TROPONIN: <0.01 NG/ML
URINE CULTURE, ROUTINE: NORMAL
WBC # BLD: 7.8 K/UL (ref 4–11)

## 2020-04-16 PROCEDURE — 84484 ASSAY OF TROPONIN QUANT: CPT

## 2020-04-16 PROCEDURE — 6370000000 HC RX 637 (ALT 250 FOR IP): Performed by: INTERNAL MEDICINE

## 2020-04-16 PROCEDURE — G0480 DRUG TEST DEF 1-7 CLASSES: HCPCS

## 2020-04-16 PROCEDURE — 36573 INSJ PICC RS&I 5 YR+: CPT

## 2020-04-16 PROCEDURE — 99223 1ST HOSP IP/OBS HIGH 75: CPT | Performed by: PSYCHIATRY & NEUROLOGY

## 2020-04-16 PROCEDURE — 2580000003 HC RX 258: Performed by: INTERNAL MEDICINE

## 2020-04-16 PROCEDURE — 83735 ASSAY OF MAGNESIUM: CPT

## 2020-04-16 PROCEDURE — 87040 BLOOD CULTURE FOR BACTERIA: CPT

## 2020-04-16 PROCEDURE — 99291 CRITICAL CARE FIRST HOUR: CPT | Performed by: INTERNAL MEDICINE

## 2020-04-16 PROCEDURE — 6360000002 HC RX W HCPCS: Performed by: INTERNAL MEDICINE

## 2020-04-16 PROCEDURE — 87186 SC STD MICRODIL/AGAR DIL: CPT

## 2020-04-16 PROCEDURE — 82803 BLOOD GASES ANY COMBINATION: CPT

## 2020-04-16 PROCEDURE — 2000000000 HC ICU R&B

## 2020-04-16 PROCEDURE — 2500000003 HC RX 250 WO HCPCS: Performed by: INTERNAL MEDICINE

## 2020-04-16 PROCEDURE — C1751 CATH, INF, PER/CENT/MIDLINE: HCPCS

## 2020-04-16 PROCEDURE — 87150 DNA/RNA AMPLIFIED PROBE: CPT

## 2020-04-16 PROCEDURE — 84100 ASSAY OF PHOSPHORUS: CPT

## 2020-04-16 PROCEDURE — 85025 COMPLETE CBC W/AUTO DIFF WBC: CPT

## 2020-04-16 PROCEDURE — 80053 COMPREHEN METABOLIC PANEL: CPT

## 2020-04-16 PROCEDURE — 51798 US URINE CAPACITY MEASURE: CPT

## 2020-04-16 PROCEDURE — 36592 COLLECT BLOOD FROM PICC: CPT

## 2020-04-16 PROCEDURE — 2580000003 HC RX 258

## 2020-04-16 PROCEDURE — U0002 COVID-19 LAB TEST NON-CDC: HCPCS

## 2020-04-16 PROCEDURE — 02HV33Z INSERTION OF INFUSION DEVICE INTO SUPERIOR VENA CAVA, PERCUTANEOUS APPROACH: ICD-10-PCS | Performed by: RADIOLOGY

## 2020-04-16 PROCEDURE — 85610 PROTHROMBIN TIME: CPT

## 2020-04-16 PROCEDURE — 94761 N-INVAS EAR/PLS OXIMETRY MLT: CPT

## 2020-04-16 PROCEDURE — 36415 COLL VENOUS BLD VENIPUNCTURE: CPT

## 2020-04-16 RX ORDER — POTASSIUM CHLORIDE 7.45 MG/ML
INJECTION INTRAVENOUS
Status: DISCONTINUED
Start: 2020-04-16 | End: 2020-04-17

## 2020-04-16 RX ORDER — MAGNESIUM SULFATE 1 G/100ML
1 INJECTION INTRAVENOUS PRN
Status: DISCONTINUED | OUTPATIENT
Start: 2020-04-16 | End: 2020-04-17

## 2020-04-16 RX ORDER — ALBUTEROL SULFATE 90 UG/1
2 AEROSOL, METERED RESPIRATORY (INHALATION) EVERY 6 HOURS PRN
Status: DISCONTINUED | OUTPATIENT
Start: 2020-04-16 | End: 2020-04-20 | Stop reason: HOSPADM

## 2020-04-16 RX ORDER — RANOLAZINE 500 MG/1
500 TABLET, EXTENDED RELEASE ORAL 2 TIMES DAILY
Status: DISCONTINUED | OUTPATIENT
Start: 2020-04-16 | End: 2020-04-20 | Stop reason: HOSPADM

## 2020-04-16 RX ORDER — GABAPENTIN 300 MG/1
600 CAPSULE ORAL NIGHTLY
Status: DISCONTINUED | OUTPATIENT
Start: 2020-04-16 | End: 2020-04-20 | Stop reason: HOSPADM

## 2020-04-16 RX ORDER — SODIUM CHLORIDE 9 MG/ML
INJECTION, SOLUTION INTRAVENOUS
Status: COMPLETED
Start: 2020-04-16 | End: 2020-04-16

## 2020-04-16 RX ORDER — FLUOXETINE 10 MG/1
10 CAPSULE ORAL DAILY
Status: DISCONTINUED | OUTPATIENT
Start: 2020-04-16 | End: 2020-04-20 | Stop reason: HOSPADM

## 2020-04-16 RX ORDER — LISINOPRIL 5 MG/1
2.5 TABLET ORAL DAILY
Status: DISCONTINUED | OUTPATIENT
Start: 2020-04-16 | End: 2020-04-20 | Stop reason: HOSPADM

## 2020-04-16 RX ORDER — CARVEDILOL 6.25 MG/1
6.25 TABLET ORAL 2 TIMES DAILY WITH MEALS
Status: DISCONTINUED | OUTPATIENT
Start: 2020-04-16 | End: 2020-04-20 | Stop reason: HOSPADM

## 2020-04-16 RX ORDER — LIDOCAINE HYDROCHLORIDE 10 MG/ML
5 INJECTION, SOLUTION EPIDURAL; INFILTRATION; INTRACAUDAL; PERINEURAL ONCE
Status: DISCONTINUED | OUTPATIENT
Start: 2020-04-16 | End: 2020-04-17

## 2020-04-16 RX ORDER — ASPIRIN 81 MG/1
81 TABLET, CHEWABLE ORAL DAILY
Status: DISCONTINUED | OUTPATIENT
Start: 2020-04-16 | End: 2020-04-20 | Stop reason: HOSPADM

## 2020-04-16 RX ORDER — DEXTROSE MONOHYDRATE 25 G/50ML
12.5 INJECTION, SOLUTION INTRAVENOUS PRN
Status: DISCONTINUED | OUTPATIENT
Start: 2020-04-16 | End: 2020-04-17 | Stop reason: SDUPTHER

## 2020-04-16 RX ORDER — TOPIRAMATE 100 MG/1
200 TABLET, FILM COATED ORAL DAILY
Status: DISCONTINUED | OUTPATIENT
Start: 2020-04-16 | End: 2020-04-20 | Stop reason: HOSPADM

## 2020-04-16 RX ORDER — SODIUM CHLORIDE 0.9 % (FLUSH) 0.9 %
10 SYRINGE (ML) INJECTION PRN
Status: DISCONTINUED | OUTPATIENT
Start: 2020-04-16 | End: 2020-04-20 | Stop reason: HOSPADM

## 2020-04-16 RX ORDER — SODIUM CHLORIDE 0.9 % (FLUSH) 0.9 %
10 SYRINGE (ML) INJECTION EVERY 12 HOURS SCHEDULED
Status: DISCONTINUED | OUTPATIENT
Start: 2020-04-16 | End: 2020-04-20 | Stop reason: HOSPADM

## 2020-04-16 RX ORDER — DULOXETIN HYDROCHLORIDE 60 MG/1
60 CAPSULE, DELAYED RELEASE ORAL DAILY
Status: DISCONTINUED | OUTPATIENT
Start: 2020-04-16 | End: 2020-04-20 | Stop reason: HOSPADM

## 2020-04-16 RX ORDER — PROCHLORPERAZINE EDISYLATE 5 MG/ML
10 INJECTION INTRAMUSCULAR; INTRAVENOUS EVERY 6 HOURS PRN
Status: DISCONTINUED | OUTPATIENT
Start: 2020-04-16 | End: 2020-04-20 | Stop reason: HOSPADM

## 2020-04-16 RX ORDER — ATORVASTATIN CALCIUM 40 MG/1
40 TABLET, FILM COATED ORAL NIGHTLY
Status: DISCONTINUED | OUTPATIENT
Start: 2020-04-16 | End: 2020-04-20 | Stop reason: HOSPADM

## 2020-04-16 RX ORDER — ACETAMINOPHEN 325 MG/1
650 TABLET ORAL EVERY 4 HOURS PRN
Status: DISCONTINUED | OUTPATIENT
Start: 2020-04-16 | End: 2020-04-20 | Stop reason: HOSPADM

## 2020-04-16 RX ORDER — MAGNESIUM SULFATE 1 G/100ML
INJECTION INTRAVENOUS
Status: DISCONTINUED
Start: 2020-04-16 | End: 2020-04-17

## 2020-04-16 RX ORDER — GUAIFENESIN/DEXTROMETHORPHAN 100-10MG/5
5 SYRUP ORAL EVERY 4 HOURS PRN
Status: DISCONTINUED | OUTPATIENT
Start: 2020-04-16 | End: 2020-04-20 | Stop reason: HOSPADM

## 2020-04-16 RX ORDER — SODIUM CHLORIDE 9 MG/ML
INJECTION, SOLUTION INTRAVENOUS CONTINUOUS
Status: DISCONTINUED | OUTPATIENT
Start: 2020-04-16 | End: 2020-04-17

## 2020-04-16 RX ORDER — LABETALOL HYDROCHLORIDE 5 MG/ML
10 INJECTION, SOLUTION INTRAVENOUS EVERY 6 HOURS PRN
Status: DISCONTINUED | OUTPATIENT
Start: 2020-04-16 | End: 2020-04-20 | Stop reason: HOSPADM

## 2020-04-16 RX ORDER — POTASSIUM CHLORIDE 7.45 MG/ML
10 INJECTION INTRAVENOUS PRN
Status: DISCONTINUED | OUTPATIENT
Start: 2020-04-16 | End: 2020-04-17

## 2020-04-16 RX ORDER — FLUOXETINE HYDROCHLORIDE 20 MG/1
CAPSULE ORAL
Status: DISCONTINUED
Start: 2020-04-16 | End: 2020-04-17

## 2020-04-16 RX ORDER — DEXTROSE, SODIUM CHLORIDE, AND POTASSIUM CHLORIDE 5; .45; .15 G/100ML; G/100ML; G/100ML
INJECTION INTRAVENOUS CONTINUOUS PRN
Status: DISCONTINUED | OUTPATIENT
Start: 2020-04-16 | End: 2020-04-17

## 2020-04-16 RX ADMIN — POTASSIUM CHLORIDE 10 MEQ: 7.46 INJECTION, SOLUTION INTRAVENOUS at 22:26

## 2020-04-16 RX ADMIN — POTASSIUM CHLORIDE 10 MEQ: 7.46 INJECTION, SOLUTION INTRAVENOUS at 06:52

## 2020-04-16 RX ADMIN — POTASSIUM CHLORIDE, DEXTROSE MONOHYDRATE AND SODIUM CHLORIDE: 150; 5; 450 INJECTION, SOLUTION INTRAVENOUS at 06:09

## 2020-04-16 RX ADMIN — SODIUM CHLORIDE 4.29 UNITS/HR: 9 INJECTION, SOLUTION INTRAVENOUS at 06:02

## 2020-04-16 RX ADMIN — POTASSIUM CHLORIDE 10 MEQ: 7.46 INJECTION, SOLUTION INTRAVENOUS at 09:59

## 2020-04-16 RX ADMIN — POTASSIUM CHLORIDE, DEXTROSE MONOHYDRATE AND SODIUM CHLORIDE: 150; 5; 450 INJECTION, SOLUTION INTRAVENOUS at 21:16

## 2020-04-16 RX ADMIN — POTASSIUM CHLORIDE 10 MEQ: 7.46 INJECTION, SOLUTION INTRAVENOUS at 22:29

## 2020-04-16 RX ADMIN — SODIUM PHOSPHATE, MONOBASIC, MONOHYDRATE 20 MMOL: 276; 142 INJECTION, SOLUTION INTRAVENOUS at 11:09

## 2020-04-16 RX ADMIN — MICONAZOLE NITRATE: 20 POWDER TOPICAL at 20:26

## 2020-04-16 RX ADMIN — MAGNESIUM SULFATE 1 G: 1 INJECTION INTRAVENOUS at 18:48

## 2020-04-16 RX ADMIN — FLUOXETINE 10 MG: 10 CAPSULE ORAL at 16:29

## 2020-04-16 RX ADMIN — MAGNESIUM SULFATE 1 G: 1 INJECTION INTRAVENOUS at 23:44

## 2020-04-16 RX ADMIN — POTASSIUM CHLORIDE, DEXTROSE MONOHYDRATE AND SODIUM CHLORIDE: 150; 5; 450 INJECTION, SOLUTION INTRAVENOUS at 12:56

## 2020-04-16 RX ADMIN — TOPIRAMATE 200 MG: 100 TABLET, FILM COATED ORAL at 08:24

## 2020-04-16 RX ADMIN — POTASSIUM CHLORIDE 10 MEQ: 7.46 INJECTION, SOLUTION INTRAVENOUS at 08:20

## 2020-04-16 RX ADMIN — ASPIRIN 81 MG 81 MG: 81 TABLET ORAL at 15:29

## 2020-04-16 RX ADMIN — SODIUM CHLORIDE 250 ML: 9 INJECTION, SOLUTION INTRAVENOUS at 08:25

## 2020-04-16 RX ADMIN — POTASSIUM CHLORIDE 10 MEQ: 7.46 INJECTION, SOLUTION INTRAVENOUS at 23:42

## 2020-04-16 RX ADMIN — CARVEDILOL 6.25 MG: 6.25 TABLET, FILM COATED ORAL at 16:28

## 2020-04-16 RX ADMIN — LISINOPRIL 2.5 MG: 5 TABLET ORAL at 15:29

## 2020-04-16 RX ADMIN — MAGNESIUM SULFATE 1 G: 1 INJECTION INTRAVENOUS at 22:30

## 2020-04-16 RX ADMIN — POTASSIUM CHLORIDE 10 MEQ: 7.46 INJECTION, SOLUTION INTRAVENOUS at 18:51

## 2020-04-16 RX ADMIN — POTASSIUM CHLORIDE 10 MEQ: 7.46 INJECTION, SOLUTION INTRAVENOUS at 16:26

## 2020-04-16 RX ADMIN — DULOXETINE HYDROCHLORIDE 60 MG: 60 CAPSULE, DELAYED RELEASE ORAL at 15:29

## 2020-04-16 RX ADMIN — ENOXAPARIN SODIUM 40 MG: 40 INJECTION SUBCUTANEOUS at 08:25

## 2020-04-16 RX ADMIN — RANOLAZINE 500 MG: 500 TABLET, FILM COATED, EXTENDED RELEASE ORAL at 08:24

## 2020-04-16 RX ADMIN — SODIUM PHOSPHATE, MONOBASIC, MONOHYDRATE 20 MMOL: 276; 142 INJECTION, SOLUTION INTRAVENOUS at 18:51

## 2020-04-16 RX ADMIN — SODIUM CHLORIDE 500 ML: 9 INJECTION, SOLUTION INTRAVENOUS at 08:26

## 2020-04-16 RX ADMIN — ACETAMINOPHEN 650 MG: 325 TABLET ORAL at 17:43

## 2020-04-16 RX ADMIN — POTASSIUM CHLORIDE 10 MEQ: 7.46 INJECTION, SOLUTION INTRAVENOUS at 15:13

## 2020-04-16 RX ADMIN — CEFTRIAXONE SODIUM 1 G: 1 INJECTION, POWDER, FOR SOLUTION INTRAMUSCULAR; INTRAVENOUS at 06:10

## 2020-04-16 RX ADMIN — MAGNESIUM SULFATE 1 G: 1 INJECTION INTRAVENOUS at 17:47

## 2020-04-16 RX ADMIN — Medication 10 ML: at 20:26

## 2020-04-16 RX ADMIN — GUAIFENESIN AND DEXTROMETHORPHAN 5 ML: 100; 10 SYRUP ORAL at 15:29

## 2020-04-16 RX ADMIN — SODIUM CHLORIDE 250 ML: 9 INJECTION, SOLUTION INTRAVENOUS at 05:59

## 2020-04-16 RX ADMIN — MICONAZOLE NITRATE: 20 POWDER TOPICAL at 08:25

## 2020-04-16 RX ADMIN — POTASSIUM CHLORIDE 10 MEQ: 7.46 INJECTION, SOLUTION INTRAVENOUS at 17:47

## 2020-04-16 ASSESSMENT — PAIN SCALES - GENERAL
PAINLEVEL_OUTOF10: 0
PAINLEVEL_OUTOF10: 0

## 2020-04-16 NOTE — PROGRESS NOTES
Brief note . chart reviewed. Plan of care discussed with ICU RN    Patient seen by Dr. Essence Mascorro early this morning. Please see his H&P. Patient on droplet plus precautions. Rule out COVID. Admitted with DKA, sepsis. On insulin drip, getting IV fluids, sats stable. High functioning MRDD, lives at home. Dooley catheter placed for urinary retention.   Hypophosphatemia being repleted

## 2020-04-16 NOTE — PROGRESS NOTES
Vitals:    04/16/20 0800   BP: 126/60   Pulse: 85   Resp: 19   Temp:    SpO2: 99%       Pt in bed. Pt alert and oriented X4. Pt states slight discomfort to abdomen, denies nausea. Per pt states she is hungry. Shift assessment complete. Respirations easy at rest. Pt is tearful states she does not want to be here in the hospital. Lungs diminished bilateral. Present bowel sounds. HR NSR on monitor. No edema noted. IV fluid infusing see MAR. Insulin gtt per order, 1.5cc/hr see insulin dosing. Call light in reach. Will continue to monitor.

## 2020-04-16 NOTE — PROGRESS NOTES
Notified Jessica Huerta by telephone regarding pt assessment dry cough, fine crackles to RLL. O2 sat 99% on RA. Pt has low grade temp 100.3. Per  PRN tylenol 650mg q4hrs for fever and continue to monitor pt. Will monitor.

## 2020-04-16 NOTE — H&P
VILLA Lamas MD   glipiZIDE (GLUCOTROL) 5 MG tablet Take 5 mg by mouth 2 times daily (before meals)    Historical Provider, MD   DULoxetine (CYMBALTA) 60 MG extended release capsule Take 1 capsule by mouth daily 10/17/19   FRITZ Banks CNP   miconazole (MICOTIN) 2 % powder Apply topically 2 times daily. 10/16/19   FRITZ Banks CNP   Lancets MISC 1 month supply for TID fingersticks 10/8/19   FRITZ Banks CNP   ondansetron (ZOFRAN ODT) 4 MG disintegrating tablet Take 1 tablet by mouth every 8 hours as needed for Nausea or Vomiting 5/22/19   Perfecto Burt MD   [employer] Lancets Ultra Thin MISC 1 each by Does not apply route daily 5/12/19   Purnima Pichardo MD   blood glucose monitor strips Test three times a day & as needed for symptoms of irregular blood glucose. 5/12/19   Purnima Pichardo MD   ranolazine (RANEXA) 500 MG extended release tablet Take 1 tablet by mouth 2 times daily 4/9/19   Chanell Bonilla MD   gabapentin (NEURONTIN) 600 MG tablet Take 600 mg by mouth nightly. Natan Samayoa     Historical Provider, MD   aspirin 81 MG EC tablet Take 1 tablet by mouth daily 5/10/18   Tyesha Salguero MD   topiramate (TOPAMAX) 200 MG tablet Take 1 tablet by mouth daily 4/14/18   Champ Dominguez MD   FLUoxetine [facility]) 10 MG capsule Take 1 capsule by mouth daily 4/14/18   Champ Dominguez MD   metFORMIN (GLUCOPHAGE) 1000 MG tablet Take 1 tablet by mouth 2 times daily (with meals) 4/14/18   Champ Dominguez MD   atorvastatin (LIPITOR) 40 MG tablet Take 1 tablet by mouth nightly 4/14/18   Champ Dominguez MD   fenofibrate micronized (LOFIBRA) 200 MG capsule Take 1 capsule by mouth nightly 4/14/18   Champ Dominguez MD   lisinopril (PRINIVIL;ZESTRIL) 2.5 MG tablet Take 1 tablet by mouth daily 4/14/18   Champ Dominguez MD   carvedilol (COREG) 6.25 MG tablet Take 1 tablet by mouth 2 times daily (with meals) 4/14/18   Champ Dominguez MD   insulin glargine (LANTUS SOLOSTAR) 100 SPECT REST EXERCISE OR    RX        Study location: [id number] [place] [address] - Nuclear Medicine        Indications: Coronary Artery Disease (CAD) and Chest pain.                           Hospital Status: Outpatient.       Height: 63 inches Weight: 211 pounds        Risk Factors        The patient risk factors include:obesity, cerebrovascular disease, physical    activity, hypertension, diabetes mellitus and dyslipidemia.        Conclusions        Summary    Mildly reduced LVEF 45%    Inferolateral hypokinesis    Mainly fixed inferior defect suggestive of scar    No evidence of myocardium at risk for significant reversible ischemia. CBC:  Recent Labs     04/15/20  1647   WBC 9.7   HGB 12.9   HCT 38.8         RENAL  Recent Labs     04/15/20  1647   *   K 3.1*   CL 97*   CO2 10*   BUN 14   CREATININE 0.7   GLUCOSE 242*     Hemoglobin a1c:  Lab Results   Component Value Date    LABA1C 14.3 10/11/2019    LABA1C 13.8 10/08/2019    LABA1C 12.2 05/11/2019     LFT'S:  Recent Labs     04/15/20  1647   AST 8*   ALT 8*   BILITOT <0.2   ALKPHOS 104     CARDIAC ENZYMES:   Recent Labs     04/15/20  1647   TROPONINI <0.01     Lab Results   Component Value Date    PROBNP 2,128 (H) 04/15/2020    PROBNP 679 (H) 12/20/2019    PROBNP 101 10/11/2019     LACTIC ACID:  Recent Labs     04/15/20  1647   LACTA 1.2     U/A:  Recent Labs     04/15/20  2020   LEUKOCYTESUR TRACE*   BACTERIA 1+*   WBCUA 21-50*   COLORU Yellow   RBCUA 11-20*   CLARITYU Clear   SPECGRAV >=1.030   BLOODU SMALL*   GLUCOSEU 500*     VBG:  Recent Labs     04/16/20  0035   PHVEN 7.239*   ORW3FRZ 25.2*   QKR9BIH 10.5*   PO2VEN 110.9*   H0LYBNGT 97      PHYSICIAN CERTIFICATION  I certify that Ced Mcfadden is expected to be hospitalized for 2 midnights based on the following assessment and plan:    ASSESSMENT/PLAN:  1. DKA vs other causes of metabolic acidosis. pH of 7.239 on VBG 7.239, AG 24, , ?-hydroxybutyrate .   DKA protocol for insulin gtt, electrolyte replacement, hypoglycemia orders and IVF. q4 BMP, K, Mg and Phos to monitor electrolytes and AG. Chk ASA, acetaminophen levels. PRN IV compazine for N/V. Intensivist c/s. Admit to ICU. Hold PO regimen for now. 2. Possible sepsis, likely related to UTI. IV ceftriaxone. CTA chest did not demonstrate acute process. 3. UTI, ABX as above, f/u cx.    4. Febrile illness w/ frequent coughing fits and N/V. Low grade fever, 99.3. CT chest clear. Possible COVID, f/u labs, precautions. PRN Robitussin, resp cx, IBD. 5. Chest pain, hx CAD, tele and serial trops. Possibly related to GI/esophagus pain related to emesis. 6. Hypokalemia, 3.1, replacement per DKA protocol. 7. Lightheadedness, + orthostatics, possibly related to vol depletion. IVF, fall precautions. Cardiogenic causes also possible consideration. Pt has had several episodes of 1:1 SVT over the last several days per pacer interrogation. Rehydrate and correct electrolytes/acidosis. Defer to DD for cards c/s if needed. 8. CAD, hold ASA pending levels. 9. HLD, hold statin/fibrate for now. 10. PSY, hold home PO regimen for now. 11. HTN, hold PO regimen for now. PRN IV labetalol. 12. Prolonged QTc, 490 ms, avoid QT prolonging agents as able. 13. MRDD, supportive measures. 14. Hx sCHF, monitor for vol OL. DVT Prophylaxis: Lx  Diet: NPO  Code Status: Full Code  PT/OT Eval Status: Will order if needed and as patient condition allows  Dispo - Admit to inpatient ICU    Total critical care time caring for this patient with life threatening, unstable organ failure, including direct patient contact, management of life support systems, review of data including imaging and labs, discussions with other team members and physicians is 35 minutes so far today, excluding procedures. Leopold Russel, MD    Thank you [facility] for the opportunity to be involved in this patient's care.  If you have any questions or

## 2020-04-16 NOTE — PROGRESS NOTES
Verbal consent from patient for PICC line placement per covid precautions unable to bring paperwork in for patient to sign. MEDS on hold until PICC placed. Will monitor.

## 2020-04-16 NOTE — CONSULTS
patient could benefit from an increase in in-home services. If  nursing is coming once a month, it is most likely not an appropriate  amount of time with patient's cognitive delays and needs. Will benefit  from more frequent nursing visits. 6.  Discharge home once medically stabilized. I spent approximately 110 minutes on this evaluation, with more than 50%  of the time discussing the patient's care and treatment options.       Ana Maria Yost MD    D: 04/16/2020 13:16:09       T: 04/16/2020 15:17:10     [email or web address]  Job#: [id number]     Doc#: [id number]    CC:

## 2020-04-16 NOTE — PROGRESS NOTES
4 Eyes Skin Assessment     The patient is being assess for   Admission    I agree that 2 RN's have performed a thorough Head to Toe Skin Assessment on the patient. ALL assessment sites listed below have been assessed. Areas assessed by both nurses:   [x]   Head, Face, and Ears   [x]   Shoulders, Back, and Chest, Abdomen  [x]   Arms, Elbows, and Hands   [x]   Coccyx, Sacrum, and Ischium  [x]   Legs, Feet, and Heels        Scattered bruises and abrasions    **SHARE this note so that the co-signing nurse is able to place an eSignature**    Co-signer eSignature: Electronically signed by Rekha Doyle RN on 4/16/20 at 6:56 AM EDT    Does the Patient have Skin Breakdown?   No          Ignacio Prevention initiated:  Yes   Wound Care Orders initiated:  NA      [facility] nurse consulted for Pressure Injury (Stage 3,4, Unstageable, DTI, NWPT, Complex wounds)and New or Established Ostomies:  NA      Primary Nurse eSignature: Electronically signed by Celeste Ponce RN on 4/16/20 at 4:36 AM EDT

## 2020-04-16 NOTE — CONSULTS
Patient is being seen at the request of Madison Yost MD  for a consultation for c/o N/V fatigue.  marked metabolic acidosis.  Meets DKA criteria but BGT's only in 200's. HISTORY OF PRESENT ILLNESS:   63years old with history of coronary artery disease and diabetes presented with nausea and vomiting over the past several days. Associated with generalized weakness and lightheadedness. Left-sided chest pain worse with taking deep breath and exertion. Pacer inquiry performed in ED showed several episodes of SVTs. Hyperglycemia with metabolic acidosis. On insulin drip 0.7 unit/hr. Poor historian not able to obtain further HPI. PAST MEDICAL HISTORY:  Past Medical History:   Diagnosis Date    CAD (coronary artery disease)     [facility]     Diabetes mellitus [facility] [facility]     Hyperlipidemia     Hypertension     Mental retardation     MI (myocardial infarction) ([facility] [address]      PAST SURGICAL HISTORY:  Past Surgical History:   Procedure Laterality Date    BACK SURGERY      PACEMAKER INSERTION      TUMOR REMOVAL         FAMILY HISTORY:  Poor historian unable to obtain    SOCIAL HISTORY:   reports that she has never smoked.  She has never used smokeless tobacco.    Scheduled Meds:   miconazole   Topical BID    ranolazine  500 mg Oral BID    topiramate  200 mg Oral Daily    cefTRIAXone (ROCEPHIN) IV  1 g Intravenous Q24H    enoxaparin  40 mg Subcutaneous Daily     Continuous Infusions:   sodium chloride Stopped (04/16/20 0610)    dextrose 5% and 0.45% NaCl with KCl 20 mEq 150 mL/hr at 04/16/20 0609    insulin 3.54 Units/hr (04/16/20 0700)     PRN Meds:  guaiFENesin-dextromethorphan, labetalol, prochlorperazine, dextrose, potassium chloride, magnesium sulfate, sodium phosphate IVPB **OR** sodium phosphate IVPB **OR** sodium phosphate IVPB, albuterol sulfate HFA, dextrose 5% and 0.45% NaCl with KCl 20 mEq    ALLERGIES:  Patient has No Known Allergies. REVIEW OF SYSTEMS: Poor historian limited to obtain  Constitutional: Negative for fever  HENT: Negative for sore throat  Eyes: Negative for redness   Respiratory: + cough  Cardiovascular: Negative for chest pain  Gastrointestinal: + vomiting  Genitourinary: Negative for hematuria   Musculoskeletal: + arthralgias   Skin: Negative for rash  Neurological: Negative for syncope  Hematological: Negative for adenopathy  Psychiatric/Behavorial: Negative for anxiety    PHYSICAL EXAM:  Blood pressure 133/67, pulse 82, temperature 98.7 °F (37.1 °C), temperature source Oral, resp. rate 20, height 5' 3\" (1.6 m), weight 187 lb 13.3 oz (85.2 kg), SpO2 100 %, not currently breastfeeding.' on RA  Gen: + distress. Eyes: PERRL. No sclera icterus. No conjunctival injection. ENT: No discharge. Pharynx clear. Neck: Trachea midline. No obvious mass. Resp: + accessory muscle use. No crackles. No wheezes. Few rhonchi. No dullness on percussion. CV: Regular rate. Regular rhythm. No murmur or rub. No edema. Peripheral pulses are 2+. Capillary refill is less than 3 seconds. GI: Non-tender. Non-distended. No hernia. Skin: Warm and dry. No nodule on exposed extremities. Lymph: No cervical LAD. No supraclavicular LAD. M/S: No cyanosis. No joint deformity. No clubbing. Neuro: Awake. Alert. Moves all four extremities. Psych: Oriented x 1. No anxiety. LABS:  CBC:   Recent Labs     04/15/20  1647   WBC 9.7   HGB 12.9   HCT 38.8   MCV 93.8        BMP:   Recent Labs     04/15/20  1647 04/16/20  0035 04/16/20  0418   * 132* 134*   K 3.1* 3.3* 3.3*   CL 97* 100 102   CO2 10* 12* 9*   PHOS  --   --  1.0*   BUN 14 11 10   CREATININE 0.7 0.7 0.6     LIVER PROFILE:   Recent Labs     04/15/20  1647 04/16/20  0035   AST 8* 6*   ALT 8* 7*   BILITOT <0.2 <0.2   ALKPHOS 104 94     PT/INR: No results for input(s): PROTIME, INR in the last 72 hours. APTT: No results for input(s):  APTT in the last 72 hours.  UA:  Recent Labs     04/15/20  2020 04/15/20  2025   COLORU Yellow  --    PHUR 5.5 5.5   WBCUA 21-50*  --    RBCUA 11-20*  --    BACTERIA 1+*  --    CLARITYU Clear  --    SPECGRAV >=1.030  --    LEUKOCYTESUR TRACE*  --    UROBILINOGEN 0.2  --    BILIRUBINUR MODERATE*  --    BLOODU SMALL*  --    GLUCOSEU 500*  --      No results for input(s): PHART, BFQ5WET, PO2ART in the last 72 hours. CT chest 4/15 imaging was reviewed by me and showed   No evidence of pulmonary embolism or acute pulmonary abnormality. Evidence of remote granulomatous disease. Cholelithiasis. Grossly unchanged multinodular goiter. Small hiatal hernia.  Concentric thickening of the wall of the distal  esophagus may be related to reflux disease.  Clinical correlation is  Recommended.       ASSESSMENT:  · Metabolic acidosis-probable DKA  · Possible UTI  · Nausea and vomiting  · Hyperglycemia with possible DKA   · Chest pain-SVT on pacer interrogation  · Electrolytes disorder  · CAD, cardiomyopathy-EF 35 to 40%, NSVT, SVT, atrial tachycardia followed by cardiology  · MRDD        PLAN:  Supplemental oxygen to maintain SaO2 >92%; wean as tolerated  Closely monitory airways, clinical status, cardiac rhythm, vital signs, and urine output   IVF resuscitation, electrolytes repletion, electrolytes and glucose monitoring and insulin drip are per DKA protocol   Cardiac monitor and pulse oximtery  No indication for Bicarbonate  Ceftriaxone  Resume ASA Lipitor Cymbalta Prozac Neurontin Lisinopril   Droplet plus isolation (surgical mask, eye protection, gown, glove)  Respiratory panel, molecular  Emergent Covid 19 testing  Moved to negative pressure room in case needs aerosolized procedure  Avoid NEBs as able-albuterol MDI strongly preferred   Avoid steroids if possible  DVT prophylaxis: Lovenox  MRSA prophylaxis: Bactroban              Total critical care time caring for this patient with life threatening, unstable organ failure, including direct patient contact, management of life support systems, review of data including imaging and labs, discussions with other team members and physicians is 33 minutes, excluding procedures.

## 2020-04-17 LAB
ANION GAP SERPL CALCULATED.3IONS-SCNC: 8 MMOL/L (ref 3–16)
ANION GAP SERPL CALCULATED.3IONS-SCNC: 8 MMOL/L (ref 3–16)
BUN BLDV-MCNC: 3 MG/DL (ref 7–20)
BUN BLDV-MCNC: <2 MG/DL (ref 7–20)
CALCIUM SERPL-MCNC: 7.5 MG/DL (ref 8.3–10.6)
CALCIUM SERPL-MCNC: 7.7 MG/DL (ref 8.3–10.6)
CHLORIDE BLD-SCNC: 102 MMOL/L (ref 99–110)
CHLORIDE BLD-SCNC: 99 MMOL/L (ref 99–110)
CO2: 20 MMOL/L (ref 21–32)
CO2: 21 MMOL/L (ref 21–32)
CREAT SERPL-MCNC: <0.5 MG/DL (ref 0.6–1.1)
CREAT SERPL-MCNC: <0.5 MG/DL (ref 0.6–1.1)
GFR AFRICAN AMERICAN: >60
GFR AFRICAN AMERICAN: >60
GFR NON-AFRICAN AMERICAN: >60
GFR NON-AFRICAN AMERICAN: >60
GLUCOSE BLD-MCNC: 149 MG/DL (ref 70–99)
GLUCOSE BLD-MCNC: 150 MG/DL (ref 70–99)
GLUCOSE BLD-MCNC: 157 MG/DL (ref 70–99)
GLUCOSE BLD-MCNC: 157 MG/DL (ref 70–99)
GLUCOSE BLD-MCNC: 159 MG/DL (ref 70–99)
GLUCOSE BLD-MCNC: 161 MG/DL (ref 70–99)
GLUCOSE BLD-MCNC: 161 MG/DL (ref 70–99)
GLUCOSE BLD-MCNC: 163 MG/DL (ref 70–99)
GLUCOSE BLD-MCNC: 166 MG/DL (ref 70–99)
GLUCOSE BLD-MCNC: 174 MG/DL (ref 70–99)
GLUCOSE BLD-MCNC: 175 MG/DL (ref 70–99)
GLUCOSE BLD-MCNC: 188 MG/DL (ref 70–99)
GLUCOSE BLD-MCNC: 305 MG/DL (ref 70–99)
MAGNESIUM: 2.1 MG/DL (ref 1.8–2.4)
MAGNESIUM: 2.4 MG/DL (ref 1.8–2.4)
PERFORMED ON: ABNORMAL
PHOSPHORUS: 1.5 MG/DL (ref 2.5–4.9)
PHOSPHORUS: 2.1 MG/DL (ref 2.5–4.9)
POTASSIUM SERPL-SCNC: 3 MMOL/L (ref 3.5–5.1)
POTASSIUM SERPL-SCNC: 3.4 MMOL/L (ref 3.5–5.1)
POTASSIUM SERPL-SCNC: 4.1 MMOL/L (ref 3.5–5.1)
REPORT: NORMAL
SODIUM BLD-SCNC: 127 MMOL/L (ref 136–145)
SODIUM BLD-SCNC: 131 MMOL/L (ref 136–145)

## 2020-04-17 PROCEDURE — 2580000003 HC RX 258: Performed by: INTERNAL MEDICINE

## 2020-04-17 PROCEDURE — 6370000000 HC RX 637 (ALT 250 FOR IP): Performed by: INTERNAL MEDICINE

## 2020-04-17 PROCEDURE — 99233 SBSQ HOSP IP/OBS HIGH 50: CPT | Performed by: INTERNAL MEDICINE

## 2020-04-17 PROCEDURE — 83735 ASSAY OF MAGNESIUM: CPT

## 2020-04-17 PROCEDURE — 84100 ASSAY OF PHOSPHORUS: CPT

## 2020-04-17 PROCEDURE — 6360000002 HC RX W HCPCS: Performed by: INTERNAL MEDICINE

## 2020-04-17 PROCEDURE — 84132 ASSAY OF SERUM POTASSIUM: CPT

## 2020-04-17 PROCEDURE — 1200000000 HC SEMI PRIVATE

## 2020-04-17 PROCEDURE — 2500000003 HC RX 250 WO HCPCS: Performed by: INTERNAL MEDICINE

## 2020-04-17 PROCEDURE — 80048 BASIC METABOLIC PNL TOTAL CA: CPT

## 2020-04-17 RX ORDER — DEXTROSE MONOHYDRATE 25 G/50ML
12.5 INJECTION, SOLUTION INTRAVENOUS PRN
Status: DISCONTINUED | OUTPATIENT
Start: 2020-04-17 | End: 2020-04-20 | Stop reason: HOSPADM

## 2020-04-17 RX ORDER — INSULIN GLARGINE 100 [IU]/ML
35 INJECTION, SOLUTION SUBCUTANEOUS 2 TIMES DAILY
Status: DISCONTINUED | OUTPATIENT
Start: 2020-04-17 | End: 2020-04-20 | Stop reason: HOSPADM

## 2020-04-17 RX ORDER — NICOTINE POLACRILEX 4 MG
15 LOZENGE BUCCAL PRN
Status: DISCONTINUED | OUTPATIENT
Start: 2020-04-17 | End: 2020-04-20 | Stop reason: HOSPADM

## 2020-04-17 RX ORDER — DEXTROSE MONOHYDRATE 50 MG/ML
100 INJECTION, SOLUTION INTRAVENOUS PRN
Status: DISCONTINUED | OUTPATIENT
Start: 2020-04-17 | End: 2020-04-20 | Stop reason: HOSPADM

## 2020-04-17 RX ORDER — POTASSIUM CHLORIDE 7.45 MG/ML
INJECTION INTRAVENOUS
Status: DISCONTINUED
Start: 2020-04-17 | End: 2020-04-17

## 2020-04-17 RX ADMIN — GABAPENTIN 600 MG: 300 CAPSULE ORAL at 21:24

## 2020-04-17 RX ADMIN — DULOXETINE HYDROCHLORIDE 60 MG: 60 CAPSULE, DELAYED RELEASE ORAL at 09:21

## 2020-04-17 RX ADMIN — ATORVASTATIN CALCIUM 40 MG: 40 TABLET, FILM COATED ORAL at 21:24

## 2020-04-17 RX ADMIN — POTASSIUM CHLORIDE, DEXTROSE MONOHYDRATE AND SODIUM CHLORIDE: 150; 5; 450 INJECTION, SOLUTION INTRAVENOUS at 04:14

## 2020-04-17 RX ADMIN — TOPIRAMATE 200 MG: 100 TABLET, FILM COATED ORAL at 09:21

## 2020-04-17 RX ADMIN — FLUOXETINE 10 MG: 10 CAPSULE ORAL at 09:40

## 2020-04-17 RX ADMIN — INSULIN LISPRO 1 UNITS: 100 INJECTION, SOLUTION INTRAVENOUS; SUBCUTANEOUS at 21:30

## 2020-04-17 RX ADMIN — INSULIN LISPRO 8 UNITS: 100 INJECTION, SOLUTION INTRAVENOUS; SUBCUTANEOUS at 11:44

## 2020-04-17 RX ADMIN — ASPIRIN 81 MG 81 MG: 81 TABLET ORAL at 09:21

## 2020-04-17 RX ADMIN — INSULIN GLARGINE 35 UNITS: 100 INJECTION, SOLUTION SUBCUTANEOUS at 21:30

## 2020-04-17 RX ADMIN — CARVEDILOL 6.25 MG: 6.25 TABLET, FILM COATED ORAL at 17:37

## 2020-04-17 RX ADMIN — CEFTRIAXONE SODIUM 1 G: 1 INJECTION, POWDER, FOR SOLUTION INTRAMUSCULAR; INTRAVENOUS at 05:23

## 2020-04-17 RX ADMIN — LISINOPRIL 2.5 MG: 5 TABLET ORAL at 09:21

## 2020-04-17 RX ADMIN — Medication 10 ML: at 21:24

## 2020-04-17 RX ADMIN — ACETAMINOPHEN 650 MG: 325 TABLET ORAL at 17:37

## 2020-04-17 RX ADMIN — SODIUM PHOSPHATE, MONOBASIC, MONOHYDRATE 20 MMOL: 276; 142 INJECTION, SOLUTION INTRAVENOUS at 00:56

## 2020-04-17 RX ADMIN — POTASSIUM CHLORIDE, DEXTROSE MONOHYDRATE AND SODIUM CHLORIDE: 150; 5; 450 INJECTION, SOLUTION INTRAVENOUS at 10:43

## 2020-04-17 RX ADMIN — POTASSIUM CHLORIDE 10 MEQ: 7.46 INJECTION, SOLUTION INTRAVENOUS at 09:32

## 2020-04-17 RX ADMIN — RANOLAZINE 500 MG: 500 TABLET, FILM COATED, EXTENDED RELEASE ORAL at 21:24

## 2020-04-17 RX ADMIN — Medication 10 ML: at 08:02

## 2020-04-17 RX ADMIN — MICONAZOLE NITRATE: 20 POWDER TOPICAL at 09:23

## 2020-04-17 RX ADMIN — RANOLAZINE 500 MG: 500 TABLET, FILM COATED, EXTENDED RELEASE ORAL at 09:21

## 2020-04-17 RX ADMIN — POTASSIUM CHLORIDE 10 MEQ: 7.46 INJECTION, SOLUTION INTRAVENOUS at 08:32

## 2020-04-17 RX ADMIN — INSULIN LISPRO 10 UNITS: 100 INJECTION, SOLUTION INTRAVENOUS; SUBCUTANEOUS at 17:19

## 2020-04-17 RX ADMIN — POTASSIUM CHLORIDE 10 MEQ: 7.46 INJECTION, SOLUTION INTRAVENOUS at 06:27

## 2020-04-17 RX ADMIN — INSULIN LISPRO 2 UNITS: 100 INJECTION, SOLUTION INTRAVENOUS; SUBCUTANEOUS at 17:18

## 2020-04-17 RX ADMIN — SODIUM PHOSPHATE, MONOBASIC, MONOHYDRATE 15 MMOL: 276; 142 INJECTION, SOLUTION INTRAVENOUS at 09:10

## 2020-04-17 RX ADMIN — POTASSIUM CHLORIDE 10 MEQ: 7.46 INJECTION, SOLUTION INTRAVENOUS at 06:29

## 2020-04-17 RX ADMIN — POTASSIUM CHLORIDE 10 MEQ: 7.46 INJECTION, SOLUTION INTRAVENOUS at 00:44

## 2020-04-17 RX ADMIN — MUPIROCIN: 20 OINTMENT TOPICAL at 09:22

## 2020-04-17 RX ADMIN — INSULIN GLARGINE 35 UNITS: 100 INJECTION, SOLUTION SUBCUTANEOUS at 09:23

## 2020-04-17 RX ADMIN — CARVEDILOL 6.25 MG: 6.25 TABLET, FILM COATED ORAL at 08:02

## 2020-04-17 RX ADMIN — ENOXAPARIN SODIUM 40 MG: 40 INJECTION SUBCUTANEOUS at 09:21

## 2020-04-17 RX ADMIN — INSULIN LISPRO 10 UNITS: 100 INJECTION, SOLUTION INTRAVENOUS; SUBCUTANEOUS at 11:44

## 2020-04-17 ASSESSMENT — PAIN SCALES - GENERAL: PAINLEVEL_OUTOF10: 0

## 2020-04-17 NOTE — PROGRESS NOTES
12 ft  Deviations (firm surface/linoleum): decreased bonnie, Decreased step length on R, Decreased step length on L, Decreased step height on R, Decreased step height on L, Decreased head and trunk rotation, lack heel strike on L  and lack heel strike on R   Assistive Device Used:  rolling walker (RW) and gait belt  Level of Assist: SBA  Comment: therapist managed lines    Stair Training deferred, pt unsafe/not appropriate to complete stairs at this time    Activity Tolerance   Pt completed therapy session with SOB noted with ambulation  SpO2: >95% on RA with activity  HR: 100 bpm with activity  BP:     Positioning Needs   Pt instructed and educated on use of call light to call for assist if desiring to get up or change position, call light handed to pt and needs within reach, Pt sitting up in chair, alarm set, call light provided and all needs within reach and pillows placed for support/comfort    Exercises Initiated    N/A    Other  SBA for washing up at sink and washing hair with shower cap. Patient/Family Education   Pt educated on role of inpatient PT, POC, importance of continued activity, DC recommendations, safety awareness, transfer techniques, pursed lip breathing, pacing activity and calling for assist with mobility. Assessment  Pt seen for Physical Therapy evaluation in acute care setting. Pt demonstrated decreased Activity tolerance, Balance, ROM, Safety and Strength and decreased independence with Ambulation, Bed Mobility  and Transfers. At this time pt appears to be functioning below baseline and would benefit from additional skilled physical therapy. Pt does not have 24/7 assistance available from family or friends and would benefit from increased HHA services temporarily upon return home. Recommending home with PRN assist and home PT to continue address the above deficits. Pt verbalized understanding of therapist's recommendation that she use her walker at home. Goals :    To be met in 3 visits:  1). Independent with LE Ex x 10 reps    To be met in 6 visits:  1). Supine to/from sit: Independent  2). Sit to/from stand: Modified Independent  3). Bed to chair: Modified Independent and with use of RW  4). Gait: Ambulate 50 ft.  with  Modified Independent  and use of rolling walker (RW)  5). Tolerate B LE exercises 3 sets of 10-15 reps    Rehabilitation Potential: Good  Strengths for achieving goals include:   Pt motivated, PLOF and Pt cooperative  Barriers to achieving goals include:    Weakness    Plan    To be seen 3-5 x / week  while in acute care setting for therapeutic exercises, bed mobility, transfers, progressive gait training, balance training, and family/patient education. Signature: Ricarda Whitfield PT, DPT #[id number]    If patient discharges from this facility prior to next visit, this note will serve as the Discharge Summary.

## 2020-04-17 NOTE — PROGRESS NOTES
Pulmonary & Critical Care Medicine ICU Progress Note    CC: DKA    Events of Last 24 hours: On RA  Off insulin drip this am       Invasive Lines: IV: PICC  /   No results for input(s): PHART, XQY9IMU, PO2ART in the last 72 hours. IV:   sodium chloride Stopped (20 0610)    dextrose 5% and 0.45% NaCl with KCl 20 mEq 150 mL/hr at 20 0414    insulin 3.88 Units/hr (20 0626)       Vitals:  Blood pressure 116/61, pulse 94, temperature 98.6 °F (37 °C), temperature source Axillary, resp. rate 24, height 5' 3\" (1.6 m), weight 187 lb 13.3 oz (85.2 kg), SpO2 99 %, not currently breastfeeding. on RA  Temp  Av.6 °F (37 °C)  Min: 97.8 °F (36.6 °C)  Max: 100.2 °F (37.9 °C)    Intake/Output Summary (Last 24 hours) at 2020 0742  Last data filed at 2020 0205  Gross per 24 hour   Intake 5645 ml   Output 3250 ml   Net 2395 ml     EXAM:  Gen: + distress. Eyes: PERRL. No sclera icterus. No conjunctival injection. ENT: No discharge. Pharynx clear. Neck: Trachea midline. No obvious mass. Resp: No accessory muscle use. No crackles. No wheezes. Few rhonchi. No dullness on percussion. CV: Regular rate. Regular rhythm. No murmur or rub. No edema. GI: Non-tender. Non-distended. No hernia. Skin: Warm and dry. No nodule on exposed extremities. Lymph: No cervical LAD. No supraclavicular LAD. M/S: No cyanosis. No joint deformity. No clubbing. Neuro: Awake. Alert. Moves all four extremities. Psych: Oriented x 3. No anxiety.      Scheduled Meds:   potassium chloride        potassium chloride        magnesium sulfate        miconazole   Topical BID    ranolazine  500 mg Oral BID    topiramate  200 mg Oral Daily    cefTRIAXone (ROCEPHIN) IV  1 g Intravenous Q24H    enoxaparin  40 mg Subcutaneous Daily    lidocaine 1 % injection  5 mL Intradermal Once    sodium chloride flush  10 mL Intravenous 2 times per day    aspirin  81 mg Oral Daily    atorvastatin  40 mg

## 2020-04-17 NOTE — PROGRESS NOTES
Called placed to Dr Sravan Segundo regarding COVID-19 negative results. Okayed to remove from isolation.  Ariana Brine

## 2020-04-17 NOTE — PROGRESS NOTES
4 Eyes Skin Assessment     The patient is being assess for   Transfer to New Unit    I agree that 2 RN's have performed a thorough Head to Toe Skin Assessment on the patient. ALL assessment sites listed below have been assessed. Areas assessed by both nurses:   [x]   Head, Face, and Ears   [x]   Shoulders, Back, and Chest, Abdomen  [x]   Arms, Elbows, and Hands   [x]   Coccyx, Sacrum, and Ischium  [x]   Legs, Feet, and Heels                              Scattered bruises and abrasions    **SHARE this note so that the co-signing nurse is able to place an eSignature**    Co-signer eSignature: {Esignature:[id number]}    Does the Patient have Skin Breakdown?   No          Ignacio Prevention initiated:  Yes   Wound Care Orders initiated:  No      [facility] nurse consulted for Pressure Injury (Stage 3,4, Unstageable, DTI, NWPT, Complex wounds)and New or Established Ostomies:  NA      Primary Nurse eSignature: Electronically signed by Rubio Chaidez RN on 4/17/20 at 4:09 PM EDT

## 2020-04-17 NOTE — PROGRESS NOTES
Assessment complete. Vital signs stable on room air. Call light within reach. ID band and monitor intact. Medications given per MAR. Insulin drip stopped per MD orders. SQ insulin administered. Carb control diet ordered per MD. Patient tolerated. Will continue to monitor.

## 2020-04-17 NOTE — CARE COORDINATION
Care is related to the following treatment goals: return home with current services. DISCHARGE PLAN: reviewed chart. Pt in ICU and unable to reach at this time. ALEXYS spoke with pt sister, Shona Valentin, via telephone ([phone] or [phone]) Shona Valentin states that pt lives at home alone and is IPTA. States that pt is active with [facility] [place] and will return home at discharge with current services. Shona Valentin states that pt has not been compliant with her medications for diabetes and doesn't seem to check her sugars. Alexys spoke with pt nurse for increased bedside teaching re DM management. May need to consider increased hhc services at discharge. Pt sister states that she is looking into getting guardianship and assisted living for pt due to poor self care. Will follow. Explained Case Management role/services.

## 2020-04-17 NOTE — PROGRESS NOTES
Physical Therapy/Occupational Therapy Attempt  PT/OT orders received and chart reviewed. Pt cleared for therapy by RN. Upon therapist arrival to room pt declining therapy. Educated pt on importance of continued activity. Pt continued to decline stating she wanted to rest.   Will follow up as pt status and schedule allow. No charge.    Aracely Nettles, PT, DPT #[id number]  Iván Marx, OTR/L [id number]

## 2020-04-18 LAB
ANION GAP SERPL CALCULATED.3IONS-SCNC: 9 MMOL/L (ref 3–16)
BUN BLDV-MCNC: 8 MG/DL (ref 7–20)
CALCIUM SERPL-MCNC: 8.2 MG/DL (ref 8.3–10.6)
CHLORIDE BLD-SCNC: 107 MMOL/L (ref 99–110)
CO2: 23 MMOL/L (ref 21–32)
CREAT SERPL-MCNC: <0.5 MG/DL (ref 0.6–1.1)
GFR AFRICAN AMERICAN: >60
GFR NON-AFRICAN AMERICAN: >60
GLUCOSE BLD-MCNC: 157 MG/DL (ref 70–99)
GLUCOSE BLD-MCNC: 204 MG/DL (ref 70–99)
GLUCOSE BLD-MCNC: 246 MG/DL (ref 70–99)
GLUCOSE BLD-MCNC: 281 MG/DL (ref 70–99)
GLUCOSE BLD-MCNC: 326 MG/DL (ref 70–99)
GLUCOSE BLD-MCNC: 342 MG/DL (ref 70–99)
MAGNESIUM: 2.2 MG/DL (ref 1.8–2.4)
PERFORMED ON: ABNORMAL
PHOSPHORUS: 3 MG/DL (ref 2.5–4.9)
POTASSIUM SERPL-SCNC: 3.5 MMOL/L (ref 3.5–5.1)
SODIUM BLD-SCNC: 139 MMOL/L (ref 136–145)

## 2020-04-18 PROCEDURE — 99233 SBSQ HOSP IP/OBS HIGH 50: CPT | Performed by: INTERNAL MEDICINE

## 2020-04-18 PROCEDURE — 36415 COLL VENOUS BLD VENIPUNCTURE: CPT

## 2020-04-18 PROCEDURE — 6360000002 HC RX W HCPCS: Performed by: INTERNAL MEDICINE

## 2020-04-18 PROCEDURE — 6370000000 HC RX 637 (ALT 250 FOR IP): Performed by: INTERNAL MEDICINE

## 2020-04-18 PROCEDURE — 83735 ASSAY OF MAGNESIUM: CPT

## 2020-04-18 PROCEDURE — 84100 ASSAY OF PHOSPHORUS: CPT

## 2020-04-18 PROCEDURE — 97162 PT EVAL MOD COMPLEX 30 MIN: CPT

## 2020-04-18 PROCEDURE — 2580000003 HC RX 258: Performed by: INTERNAL MEDICINE

## 2020-04-18 PROCEDURE — 97166 OT EVAL MOD COMPLEX 45 MIN: CPT

## 2020-04-18 PROCEDURE — 97116 GAIT TRAINING THERAPY: CPT

## 2020-04-18 PROCEDURE — 97530 THERAPEUTIC ACTIVITIES: CPT

## 2020-04-18 PROCEDURE — 1200000000 HC SEMI PRIVATE

## 2020-04-18 PROCEDURE — 97535 SELF CARE MNGMENT TRAINING: CPT

## 2020-04-18 PROCEDURE — 99232 SBSQ HOSP IP/OBS MODERATE 35: CPT | Performed by: INTERNAL MEDICINE

## 2020-04-18 PROCEDURE — 2580000003 HC RX 258

## 2020-04-18 PROCEDURE — 87040 BLOOD CULTURE FOR BACTERIA: CPT

## 2020-04-18 PROCEDURE — 80048 BASIC METABOLIC PNL TOTAL CA: CPT

## 2020-04-18 RX ORDER — SODIUM CHLORIDE 9 MG/ML
INJECTION, SOLUTION INTRAVENOUS
Status: COMPLETED
Start: 2020-04-18 | End: 2020-04-18

## 2020-04-18 RX ADMIN — INSULIN LISPRO 10 UNITS: 100 INJECTION, SOLUTION INTRAVENOUS; SUBCUTANEOUS at 09:27

## 2020-04-18 RX ADMIN — ATORVASTATIN CALCIUM 40 MG: 40 TABLET, FILM COATED ORAL at 20:56

## 2020-04-18 RX ADMIN — CEFTRIAXONE SODIUM 1 G: 1 INJECTION, POWDER, FOR SOLUTION INTRAMUSCULAR; INTRAVENOUS at 04:55

## 2020-04-18 RX ADMIN — INSULIN LISPRO 2 UNITS: 100 INJECTION, SOLUTION INTRAVENOUS; SUBCUTANEOUS at 20:59

## 2020-04-18 RX ADMIN — INSULIN LISPRO 10 UNITS: 100 INJECTION, SOLUTION INTRAVENOUS; SUBCUTANEOUS at 17:04

## 2020-04-18 RX ADMIN — SODIUM CHLORIDE 250 ML: 9 INJECTION, SOLUTION INTRAVENOUS at 04:55

## 2020-04-18 RX ADMIN — DULOXETINE HYDROCHLORIDE 60 MG: 60 CAPSULE, DELAYED RELEASE ORAL at 09:18

## 2020-04-18 RX ADMIN — INSULIN GLARGINE 35 UNITS: 100 INJECTION, SOLUTION SUBCUTANEOUS at 20:59

## 2020-04-18 RX ADMIN — MICONAZOLE NITRATE: 20 POWDER TOPICAL at 09:08

## 2020-04-18 RX ADMIN — INSULIN LISPRO 10 UNITS: 100 INJECTION, SOLUTION INTRAVENOUS; SUBCUTANEOUS at 12:19

## 2020-04-18 RX ADMIN — TOPIRAMATE 200 MG: 100 TABLET, FILM COATED ORAL at 09:18

## 2020-04-18 RX ADMIN — INSULIN GLARGINE 35 UNITS: 100 INJECTION, SOLUTION SUBCUTANEOUS at 09:22

## 2020-04-18 RX ADMIN — INSULIN LISPRO 2 UNITS: 100 INJECTION, SOLUTION INTRAVENOUS; SUBCUTANEOUS at 17:04

## 2020-04-18 RX ADMIN — RANOLAZINE 500 MG: 500 TABLET, FILM COATED, EXTENDED RELEASE ORAL at 20:56

## 2020-04-18 RX ADMIN — MEROPENEM 1 G: 1 INJECTION, POWDER, FOR SOLUTION INTRAVENOUS at 23:40

## 2020-04-18 RX ADMIN — FLUOXETINE 10 MG: 10 CAPSULE ORAL at 09:18

## 2020-04-18 RX ADMIN — ASPIRIN 81 MG 81 MG: 81 TABLET ORAL at 09:18

## 2020-04-18 RX ADMIN — MICONAZOLE NITRATE: 20 POWDER TOPICAL at 20:57

## 2020-04-18 RX ADMIN — MEROPENEM 1 G: 1 INJECTION, POWDER, FOR SOLUTION INTRAVENOUS at 17:01

## 2020-04-18 RX ADMIN — INSULIN LISPRO 4 UNITS: 100 INJECTION, SOLUTION INTRAVENOUS; SUBCUTANEOUS at 09:21

## 2020-04-18 RX ADMIN — Medication 10 ML: at 20:56

## 2020-04-18 RX ADMIN — RANOLAZINE 500 MG: 500 TABLET, FILM COATED, EXTENDED RELEASE ORAL at 09:18

## 2020-04-18 RX ADMIN — INSULIN LISPRO 8 UNITS: 100 INJECTION, SOLUTION INTRAVENOUS; SUBCUTANEOUS at 12:18

## 2020-04-18 RX ADMIN — CARVEDILOL 6.25 MG: 6.25 TABLET, FILM COATED ORAL at 09:18

## 2020-04-18 RX ADMIN — ENOXAPARIN SODIUM 40 MG: 40 INJECTION SUBCUTANEOUS at 09:16

## 2020-04-18 RX ADMIN — MEROPENEM 1 G: 1 INJECTION, POWDER, FOR SOLUTION INTRAVENOUS at 09:11

## 2020-04-18 RX ADMIN — CARVEDILOL 6.25 MG: 6.25 TABLET, FILM COATED ORAL at 17:03

## 2020-04-18 RX ADMIN — GABAPENTIN 600 MG: 300 CAPSULE ORAL at 20:56

## 2020-04-18 RX ADMIN — Medication 10 ML: at 09:35

## 2020-04-18 NOTE — PLAN OF CARE
Problem: Falls - Risk of:  Goal: Will remain free from falls  Description: Will remain free from falls  Outcome: Ongoing     Problem: Safety:  Goal: Free from accidental physical injury  Description: Free from accidental physical injury  Outcome: Ongoing     Problem: Daily Care:  Goal: Daily care needs are met  Description: Daily care needs are met  Outcome: Ongoing     Problem: Pain:  Goal: Patient's pain/discomfort is manageable  Description: Patient's pain/discomfort is manageable  Outcome: Ongoing     Problem: Skin Integrity:  Goal: Skin integrity will stabilize  Description: Skin integrity will stabilize  Outcome: Ongoing     Problem: Discharge Planning:  Goal: Patients continuum of care needs are met  Description: Patients continuum of care needs are met  Outcome: Ongoing

## 2020-04-18 NOTE — PROGRESS NOTES
Final Result   No evidence of pulmonary embolism or acute pulmonary abnormality. Evidence of remote granulomatous disease. Cholelithiasis. Grossly unchanged multinodular goiter. Small hiatal hernia. Concentric thickening of the wall of the distal   esophagus may be related to reflux disease. Clinical correlation is   recommended. XR CHEST PORTABLE   Final Result   Pacemaker. No acute cardiopulmonary process. Echo 10/8/2019   Summary   The left ventricular systolic function is mildly reduced with an ejection   fraction of 35 - 40 %. There is hypokinesis of the apex, anteroseptum, anterior, apical lateral and   inferior walls. Left ventricular cavity size is moderately dilated. Grade I diastolic dysfunction with normal filling pressure. Changes noted from previous echo on 4- in left ventricular function. Mild mitral regurgitation. Right ventricular systolic function is mildly reduced . Systolic pulmonic artery pressure (SPAP) is normal estimated at 21 mmHg   (Right atrial pressure of 3 mmHg). Assessment/Plan:  # DKA   - DKA protocol, insulin drip, electrolyte replacement  - NPO, IVFs  - intensivist consult  - DKA resolved. Insulin drip discontinued. # DM , type 2, insulin requiring  - resumed lantus, novolog , SSI     # Sepsis, related to UTI  - IVFs, antibiotics as below    # UTI  # E. Coli bacteremia  - urine cx mixed skin/urogenital hipolito  - Blood cx with E. Coli. -  on Rocephin D#3. Patient with persistent fevers, antibiotics changed to [address] [place] [address] today    # Febrile illness  - COVID negative  -Treat UTI    # Pseudohyponatremia  - due to hyperglycemia     # Chest pain  # CAD  - continue asa, statin, coreg.   - monitor on tele  - serial troponins  - GI related due to emesis? # Hypokalemia, hypophosphatemia  - replacement protocol.      # Light-headedness  - + orthostatic hypotension  - volume depletion  - IVFs, fall precautions  - several episodes of SVT over last several days per pacer interrogation  - rehydrate and correct electrolytes/acidosis    # Chronic systolic CHF  - monitor closely with administration of IVFs. - EF 35-40%  - held Lisinopril. Resumed now. IV fluids have been discontinued    # Hyperlipidemia  - on Atorvastatin. # Hypertension  - BP stable. - held  Lisinopril, Coreg. Resumed now    # Depression, mood disorder   - resumed Cymbalta. - seen by  psychiatrist    # Prolonged QTc  - avoid QT prolonging agents.      # MRDD  - supportive measures    #General weakness  - PT, OT ordered      DVT Prophylaxis: Lovenox  Diet: DIET CARB CONTROL;  Code Status: Full Code      Zulema Man MD  4/18/2020

## 2020-04-18 NOTE — PROGRESS NOTES
Up in chair, call light and needs in reach. Exercise / Activities Initiated:   N/A    Patient/Family Education:   Role of OT  Recommendations for DC    Assessment of Deficits: Pt seen for Occupational therapy evaluation in acute care setting. Pt demonstrated decreased Activity Tolerance, ADL's, Bed Mobility and Transfers. Pt functioning below baseline and will likely benefit from skilled occupational therapy services to maximize safety and independence. Goal(s) : To be met in 3 Visits:  1). Bed to toilet: Independent and with use of RW    To be met in 5 Visits:  1). Supine to Sit: Independent, HOB flat, to hand rails   2). Upper Body Bathing:  Supervision  3). Lower Body Bathing:  Supervision  4). Upper Body Dressing: Supervision  5). Lower Body Dressing: Supervision  6). Pt to donato UE exs x 15 reps    Rehabilitation Potential:  Good for goals listed above. Strengths for achieving goals include: Pt motivated, PLOF and Pt cooperative  Barriers to achieving goals include:  None      Plan: To be seen 3-5 x/wk while in acute care setting for therapeutic exercises, bed mobility, transfers, dressing, bathing, family/patient education with adaptive equipment, breathing technique instruction.      Ayde Devlin MOT, OTR/L   [id number]           If patient discharges from this facility prior to next visit, this note will serve as the Discharge Summary

## 2020-04-18 NOTE — PROGRESS NOTES
Pt confused on time believing it is noon and she missed breakfast and lunch. Reoriented pt at this time. Also provided with a box lunch.

## 2020-04-18 NOTE — PROGRESS NOTES
Pulmonary & Critical Care Medicine ICU Progress Note    CC: Sepsis    Events of Last 24 hours:   Appears comfortable  Room air  Fever overnight      Invasive Lines: IV: PICC  /   No results for input(s): PHART, PBM6APW, PO2ART in the last 72 hours. IV:   dextrose         Vitals:  Blood pressure 103/66, pulse 84, temperature 97 °F (36.1 °C), temperature source Oral, resp. rate 20, height 5' 3\" (1.6 m), weight 196 lb 3.4 oz (89 kg), SpO2 96 %, not currently breastfeeding. on RA  Temp  Av.1 °F (37.3 °C)  Min: 97 °F (36.1 °C)  Max: 101.3 °F (38.5 °C)    Intake/Output Summary (Last 24 hours) at 2020 0708  Last data filed at 2020 0458  Gross per 24 hour   Intake 1595 ml   Output 4000 ml   Net -2405 ml     EXAM:  Gen:  No distress. Eyes: PERRL. No sclera icterus. No conjunctival injection. ENT: No discharge. Pharynx clear. Neck: Trachea midline. No obvious mass. Resp: No accessory muscle use. No crackles. No wheezes. Few rhonchi. No dullness on percussion. CV: Regular rate. Regular rhythm. No murmur or rub. No edema. GI: Non-tender. Non-distended. No hernia. Skin: Warm and dry. No nodule on exposed extremities. Lymph: No cervical LAD. No supraclavicular LAD. M/S: No cyanosis. No joint deformity. No clubbing. Neuro: More alert and awake today. Followed commands. Persistent fever moves all four extremities. Psych: Oriented x 3. No anxiety.      Scheduled Meds:   mupirocin   Nasal BID    insulin glargine  35 Units Subcutaneous BID    insulin lispro  0-12 Units Subcutaneous TID WC    insulin lispro  0-6 Units Subcutaneous Nightly    insulin lispro  10 Units Subcutaneous TID WC    miconazole   Topical BID    ranolazine  500 mg Oral BID    topiramate  200 mg Oral Daily    cefTRIAXone (ROCEPHIN) IV  1 g Intravenous Q24H    enoxaparin  40 mg Subcutaneous Daily    sodium chloride flush  10 mL Intravenous 2 times per day    aspirin  81 mg Oral Daily    atorvastatin  40 mg Oral Nightly    carvedilol  6.25 mg Oral BID WC    DULoxetine  60 mg Oral Daily    FLUoxetine  10 mg Oral Daily    gabapentin  600 mg Oral Nightly    lisinopril  2.5 mg Oral Daily     PRN Meds:  glucose, dextrose, glucagon (rDNA), dextrose, guaiFENesin-dextromethorphan, labetalol, prochlorperazine, albuterol sulfate HFA, sodium chloride flush, acetaminophen    Results:  CBC:   Recent Labs     04/15/20  1647 04/16/20  0815   WBC 9.7 7.8   HGB 12.9 11.9*   HCT 38.8 35.6*   MCV 93.8 92.8    264     BMP:   Recent Labs     04/17/20  0411 04/17/20  0805 04/17/20  1210 04/18/20  0418   * 127*  --  139   K 3.0* 3.4* 4.1 3.5    99  --  107   CO2 21 20*  --  23   PHOS 2.1* 1.5*  --  3.0   BUN 3* <2*  --  8   CREATININE <0.5* <0.5*  --  <0.5*     LIVER PROFILE:   Recent Labs     04/15/20  1647 04/16/20  0035   AST 8* 6*   ALT 8* 7*   BILITOT <0.2 <0.2   ALKPHOS 104 94       Cultures:  4/16 BC Escherichia coli DNA DetectedAbnormal     Films:  CT chest 4/15 imaging was reviewed by me and showed   No evidence of pulmonary embolism or acute pulmonary abnormality. Evidence of remote granulomatous disease. Cholelithiasis. Grossly unchanged multinodular goiter.   Small hiatal hernia.  Concentric thickening of the wall of the distal  esophagus may be related to reflux disease.  Clinical correlation is  Recommended.        ASSESSMENT:  · Persistent fever   · E. coli bacteremia  · Possible UTI  · Hyperglycemia with possible DKA   · Chest pain-SVT on pacer interrogation  · CAD, cardiomyopathy-EF 35 to 40%, NSVT, SVT, atrial tachycardia followed by cardiology  · MRDD           PLAN:  · Supplemental oxygen to maintain SaO2 >92%; wean as tolerated  · Ceftriaxone D#2-change to meropenem given persistent fever  · Repeat blood culture  · Narrow down antibiotics based on final sensitivity  · Covid negative and off isolation   · Evaluated by psych   · DVT prophylaxis: Lovenox  · MRSA prophylaxis:

## 2020-04-19 LAB
ANION GAP SERPL CALCULATED.3IONS-SCNC: 13 MMOL/L (ref 3–16)
BLOOD CULTURE, ROUTINE: ABNORMAL
BLOOD CULTURE, ROUTINE: ABNORMAL
BUN BLDV-MCNC: 9 MG/DL (ref 7–20)
CALCIUM SERPL-MCNC: 8.5 MG/DL (ref 8.3–10.6)
CHLORIDE BLD-SCNC: 106 MMOL/L (ref 99–110)
CO2: 22 MMOL/L (ref 21–32)
CREAT SERPL-MCNC: <0.5 MG/DL (ref 0.6–1.1)
GFR AFRICAN AMERICAN: >60
GFR NON-AFRICAN AMERICAN: >60
GLUCOSE BLD-MCNC: 105 MG/DL (ref 70–99)
GLUCOSE BLD-MCNC: 120 MG/DL (ref 70–99)
GLUCOSE BLD-MCNC: 121 MG/DL (ref 70–99)
GLUCOSE BLD-MCNC: 221 MG/DL (ref 70–99)
GLUCOSE BLD-MCNC: 225 MG/DL (ref 70–99)
GLUCOSE BLD-MCNC: 231 MG/DL (ref 70–99)
MAGNESIUM: 2 MG/DL (ref 1.8–2.4)
ORGANISM: ABNORMAL
ORGANISM: ABNORMAL
PERFORMED ON: ABNORMAL
POTASSIUM SERPL-SCNC: 3.6 MMOL/L (ref 3.5–5.1)
SODIUM BLD-SCNC: 141 MMOL/L (ref 136–145)

## 2020-04-19 PROCEDURE — 99232 SBSQ HOSP IP/OBS MODERATE 35: CPT | Performed by: INTERNAL MEDICINE

## 2020-04-19 PROCEDURE — 6370000000 HC RX 637 (ALT 250 FOR IP): Performed by: INTERNAL MEDICINE

## 2020-04-19 PROCEDURE — 1200000000 HC SEMI PRIVATE

## 2020-04-19 PROCEDURE — 83735 ASSAY OF MAGNESIUM: CPT

## 2020-04-19 PROCEDURE — 97110 THERAPEUTIC EXERCISES: CPT

## 2020-04-19 PROCEDURE — 99233 SBSQ HOSP IP/OBS HIGH 50: CPT | Performed by: INTERNAL MEDICINE

## 2020-04-19 PROCEDURE — 97116 GAIT TRAINING THERAPY: CPT

## 2020-04-19 PROCEDURE — 6360000002 HC RX W HCPCS: Performed by: INTERNAL MEDICINE

## 2020-04-19 PROCEDURE — 2580000003 HC RX 258: Performed by: INTERNAL MEDICINE

## 2020-04-19 PROCEDURE — 80048 BASIC METABOLIC PNL TOTAL CA: CPT

## 2020-04-19 RX ADMIN — INSULIN LISPRO 4 UNITS: 100 INJECTION, SOLUTION INTRAVENOUS; SUBCUTANEOUS at 16:53

## 2020-04-19 RX ADMIN — MICONAZOLE NITRATE: 20 POWDER TOPICAL at 08:46

## 2020-04-19 RX ADMIN — MUPIROCIN: 20 OINTMENT TOPICAL at 20:45

## 2020-04-19 RX ADMIN — ATORVASTATIN CALCIUM 40 MG: 40 TABLET, FILM COATED ORAL at 20:45

## 2020-04-19 RX ADMIN — INSULIN LISPRO 10 UNITS: 100 INJECTION, SOLUTION INTRAVENOUS; SUBCUTANEOUS at 08:59

## 2020-04-19 RX ADMIN — INSULIN GLARGINE 35 UNITS: 100 INJECTION, SOLUTION SUBCUTANEOUS at 21:11

## 2020-04-19 RX ADMIN — CARVEDILOL 6.25 MG: 6.25 TABLET, FILM COATED ORAL at 16:56

## 2020-04-19 RX ADMIN — INSULIN LISPRO 2 UNITS: 100 INJECTION, SOLUTION INTRAVENOUS; SUBCUTANEOUS at 21:11

## 2020-04-19 RX ADMIN — MICONAZOLE NITRATE: 20 POWDER TOPICAL at 20:45

## 2020-04-19 RX ADMIN — FLUOXETINE 10 MG: 10 CAPSULE ORAL at 08:45

## 2020-04-19 RX ADMIN — INSULIN LISPRO 4 UNITS: 100 INJECTION, SOLUTION INTRAVENOUS; SUBCUTANEOUS at 12:10

## 2020-04-19 RX ADMIN — CARVEDILOL 6.25 MG: 6.25 TABLET, FILM COATED ORAL at 08:46

## 2020-04-19 RX ADMIN — RANOLAZINE 500 MG: 500 TABLET, FILM COATED, EXTENDED RELEASE ORAL at 08:45

## 2020-04-19 RX ADMIN — ASPIRIN 81 MG 81 MG: 81 TABLET ORAL at 08:45

## 2020-04-19 RX ADMIN — INSULIN LISPRO 10 UNITS: 100 INJECTION, SOLUTION INTRAVENOUS; SUBCUTANEOUS at 12:10

## 2020-04-19 RX ADMIN — TOPIRAMATE 200 MG: 100 TABLET, FILM COATED ORAL at 08:46

## 2020-04-19 RX ADMIN — INSULIN LISPRO 10 UNITS: 100 INJECTION, SOLUTION INTRAVENOUS; SUBCUTANEOUS at 16:53

## 2020-04-19 RX ADMIN — Medication 10 ML: at 08:50

## 2020-04-19 RX ADMIN — ENOXAPARIN SODIUM 40 MG: 40 INJECTION SUBCUTANEOUS at 08:46

## 2020-04-19 RX ADMIN — CEFTRIAXONE SODIUM 1 G: 1 INJECTION, POWDER, FOR SOLUTION INTRAMUSCULAR; INTRAVENOUS at 08:47

## 2020-04-19 RX ADMIN — Medication 10 ML: at 20:45

## 2020-04-19 RX ADMIN — INSULIN GLARGINE 35 UNITS: 100 INJECTION, SOLUTION SUBCUTANEOUS at 08:59

## 2020-04-19 RX ADMIN — RANOLAZINE 500 MG: 500 TABLET, FILM COATED, EXTENDED RELEASE ORAL at 20:45

## 2020-04-19 RX ADMIN — GABAPENTIN 600 MG: 300 CAPSULE ORAL at 20:45

## 2020-04-19 RX ADMIN — DULOXETINE HYDROCHLORIDE 60 MG: 60 CAPSULE, DELAYED RELEASE ORAL at 08:45

## 2020-04-19 NOTE — PLAN OF CARE
Problem: Falls - Risk of:  Goal: Will remain free from falls  Description: Will remain free from falls  Outcome: Ongoing  Goal: Absence of physical injury  Description: Absence of physical injury  Outcome: Ongoing     Problem: Infection - Central Venous Catheter-Associated Bloodstream Infection:  Goal: Will show no infection signs and symptoms  Description: Will show no infection signs and symptoms  Outcome: Ongoing     Problem: Infection:  Goal: Will remain free from infection  Description: Will remain free from infection  Outcome: Ongoing     Problem: Safety:  Goal: Free from accidental physical injury  Description: Free from accidental physical injury  Outcome: Ongoing  Goal: Free from intentional harm  Description: Free from intentional harm  Outcome: Ongoing     Problem: Daily Care:  Goal: Daily care needs are met  Description: Daily care needs are met  Outcome: Ongoing     Problem: Pain:  Goal: Patient's pain/discomfort is manageable  Description: Patient's pain/discomfort is manageable  Outcome: Ongoing     Problem: Skin Integrity:  Goal: Skin integrity will stabilize  Description: Skin integrity will stabilize  Outcome: Ongoing     Problem: Discharge Planning:  Goal: Patients continuum of care needs are met  Description: Patients continuum of care needs are met  Outcome: Ongoing

## 2020-04-19 NOTE — PROGRESS NOTES
Pt awake at this time. Attempted to draw AM labs from PICC line with no success. PICC is hard to flush with little blood return will call lab for AM labs.

## 2020-04-19 NOTE — PROGRESS NOTES
Pulmonary & Critical Care Medicine ICU Progress Note    CC: Sepsis    Events of Last 24 hours:   No fever  Room air  No sputum production      Invasive Lines: IV: PICC  /   No results for input(s): PHART, OQY7NPN, PO2ART in the last 72 hours. IV:   dextrose         Vitals:  Blood pressure 113/66, pulse 88, temperature 97.7 °F (36.5 °C), temperature source Axillary, resp. rate 18, height 5' 3\" (1.6 m), weight 196 lb 3.4 oz (89 kg), SpO2 98 %, not currently breastfeeding. on RA  Temp  Av.8 °F (36.6 °C)  Min: 96.9 °F (36.1 °C)  Max: 98.4 °F (36.9 °C)    Intake/Output Summary (Last 24 hours) at 2020 0716  Last data filed at 2020 1735  Gross per 24 hour   Intake 760 ml   Output 1150 ml   Net -390 ml     EXAM:  Gen:  No distress. Ill-appearing  Eyes: PERRL. No sclera icterus. No conjunctival injection. ENT: No discharge. Pharynx clear. Neck: Trachea midline. No obvious mass. Resp: No accessory muscle use. No crackles. No wheezes. Few rhonchi. No dullness on percussion. CV: Regular rate. Regular rhythm. No murmur or rub. No edema. GI: Non-tender. Non-distended. No hernia. Skin: Warm and dry. No nodule on exposed extremities. Lymph: No cervical LAD. No supraclavicular LAD. M/S: No cyanosis. No joint deformity. No clubbing. Neuro:  Alert and awake. Followed commands. Follows commands  Psych: Oriented x 3. No anxiety.      Scheduled Meds:   meropenem  1 g Intravenous Q8H    mupirocin   Nasal BID    insulin glargine  35 Units Subcutaneous BID    insulin lispro  0-12 Units Subcutaneous TID WC    insulin lispro  0-6 Units Subcutaneous Nightly    insulin lispro  10 Units Subcutaneous TID WC    miconazole   Topical BID    ranolazine  500 mg Oral BID    topiramate  200 mg Oral Daily    enoxaparin  40 mg Subcutaneous Daily    sodium chloride flush  10 mL Intravenous 2 times per day    aspirin  81 mg Oral Daily    atorvastatin  40 mg Oral Nightly    carvedilol 6.25 mg Oral BID WC    DULoxetine  60 mg Oral Daily    FLUoxetine  10 mg Oral Daily    gabapentin  600 mg Oral Nightly    lisinopril  2.5 mg Oral Daily     PRN Meds:  glucose, dextrose, glucagon (rDNA), dextrose, guaiFENesin-dextromethorphan, labetalol, prochlorperazine, albuterol sulfate HFA, sodium chloride flush, acetaminophen    Results:  CBC:   Recent Labs     04/16/20  0815   WBC 7.8   HGB 11.9*   HCT 35.6*   MCV 92.8        BMP:   Recent Labs     04/17/20  0411 04/17/20  0805 04/17/20  1210 04/18/20  0418 04/19/20  0546   * 127*  --  139 141   K 3.0* 3.4* 4.1 3.5 3.6    99  --  107 106   CO2 21 20*  --  23 22   PHOS 2.1* 1.5*  --  3.0  --    BUN 3* <2*  --  8 9   CREATININE <0.5* <0.5*  --  <0.5* <0.5*     LIVER PROFILE:   No results for input(s): AST, ALT, LIPASE, BILIDIR, BILITOT, ALKPHOS in the last 72 hours. Invalid input(s): AMYLASE,  ALB    Cultures:  4/16 BC Escherichia coli DNA     Films:  CT chest 4/15 imaging was reviewed by me and showed   No evidence of pulmonary embolism or acute pulmonary abnormality. Evidence of remote granulomatous disease. Cholelithiasis. Grossly unchanged multinodular goiter.   Small hiatal hernia.          ASSESSMENT:  · Persistent fever   · E. coli bacteremia  · Possible UTI  · Hyperglycemia with possible DKA   · Chest pain-SVT on pacer interrogation  · CAD, cardiomyopathy-EF 35 to 40%, NSVT, SVT, atrial tachycardia followed by cardiology  · MRDD           PLAN:  · Supplemental oxygen to maintain SaO2 >92%; wean as tolerated  · Ceftriaxone D#4  · Follow-up repeat cultures  · Covid negative and off isolation   · Evaluated by psych   · Discussed with internal medicine  · We will follow on the periphery and call with questions

## 2020-04-19 NOTE — PROGRESS NOTES
Final Result   No evidence of pulmonary embolism or acute pulmonary abnormality. Evidence of remote granulomatous disease. Cholelithiasis. Grossly unchanged multinodular goiter. Small hiatal hernia. Concentric thickening of the wall of the distal   esophagus may be related to reflux disease. Clinical correlation is   recommended. XR CHEST PORTABLE   Final Result   Pacemaker. No acute cardiopulmonary process. Echo 10/8/2019   Summary   The left ventricular systolic function is mildly reduced with an ejection   fraction of 35 - 40 %. There is hypokinesis of the apex, anteroseptum, anterior, apical lateral and   inferior walls. Left ventricular cavity size is moderately dilated. Grade I diastolic dysfunction with normal filling pressure. Changes noted from previous echo on 4- in left ventricular function. Mild mitral regurgitation. Right ventricular systolic function is mildly reduced . Systolic pulmonic artery pressure (SPAP) is normal estimated at 21 mmHg   (Right atrial pressure of 3 mmHg). Assessment/Plan:  # DKA   - DKA protocol, insulin drip, electrolyte replacement, NPO, IVFs  - intensivist consult  - DKA resolved. Insulin drip discontinued. # DM , type 2, insulin requiring  - resumed lantus, novolog , SSI     # Sepsis, related to UTI  - IVFs, antibiotics as below  - sepsis resolved     # UTI  # E. Coli bacteremia  - urine cx mixed skin/urogenital hipolito  - Blood cx with E. Coli. - on Rocephin D#4    # Febrile illness  - COVID negative  -Treat UTI    # Pseudohyponatremia  - due to hyperglycemia   - na l;evels stable now     # Chest pain  # CAD  - continue asa, statin, coreg.   - monitor on tele  - serial troponins  - GI related due to emesis? # Hypokalemia, hypophosphatemia  - replacement protocol.      # Light-headedness  - + orthostatic hypotension  - volume depletion  - IVFs, fall precautions  - several episodes of SVT over last

## 2020-04-19 NOTE — PLAN OF CARE
Problem: Falls - Risk of:  Goal: Will remain free from falls  Description: Will remain free from falls  Outcome: Ongoing     Problem: Infection - Central Venous Catheter-Associated Bloodstream Infection:  Goal: Will show no infection signs and symptoms  Description: Will show no infection signs and symptoms  Outcome: Ongoing     Problem: Daily Care:  Goal: Daily care needs are met  Description: Daily care needs are met  Outcome: Ongoing     Problem: Skin Integrity:  Goal: Skin integrity will stabilize  Description: Skin integrity will stabilize  Outcome: Ongoing     Problem: Discharge Planning:  Goal: Patients continuum of care needs are met  Description: Patients continuum of care needs are met  Outcome: Ongoing

## 2020-04-19 NOTE — PROGRESS NOTES
completed as indicated below  Distance:  40 ft x 2 bouts  Deviations (firm surface/linoleum): decreased bonnie, Decreased step length on R and Decreased step length on L   Assistive Device Used:  rolling walker (RW)  Level of Assist: SBA  Comment:     Stair Training deferred, pt does not have stairs in the home environment    Therapeutic Exercise all completed bilaterally unless indicated  Ankle pumps: x 10  LAQ: x 10    Max cues for proper execution and full ROM     Balance  Static Sitting:  Normal   Tolerance:   Dynamic Sitting:  Good   Static Standing: Good -    Tolerance:   Dynamic Standing: Good -     Patient Education      Role of PT, POC, Discharge recommendations, DC recommendations, safety awareness, transfer techniques, HEP and calling for assist with mobility. Positioning Needs       Pt instructed and educated on use of call light to call for assist if desiring to get up or change position, call light handed to pt and needs within reach, Pt sitting up in chair and alarm set. Activity Tolerance   Pt completed therapy session with Dizziness noted with initial sitting up, improved with sitting < 30 sec, Vitals stable    Other  None. Assessment :  Patient demonstrates improved functional activity tolerance with increased distance of ambulation. Min VC for safe sequencing with transfers with hand placement. Recommend home PRN assist and home PT at discharge. Discussed with pt use of RW over cane at this time. Goals (all goals ongoing unless otherwise indicated)  To be met in 3 visits:  1). Independent with LE Ex x 10 reps     To be met in 6 visits:  1). Supine to/from sit: Independent  2). Sit to/from stand: Modified Independent  3). Bed to chair: Modified Independent and with use of RW  4). Gait: Ambulate 50 ft.  with  Modified Independent  and use of rolling walker (RW)  5). Tolerate B LE exercises 3 sets of 10-15 reps    Plan   Continue with plan of care.     Babatunde Parsons, PT, DPT #[id number]    If patient discharges from this facility prior to next visit, this note will serve as the Discharge Summary.

## 2020-04-20 VITALS
TEMPERATURE: 97.8 F | OXYGEN SATURATION: 97 % | DIASTOLIC BLOOD PRESSURE: 63 MMHG | HEIGHT: 63 IN | WEIGHT: 196.21 LBS | BODY MASS INDEX: 34.77 KG/M2 | SYSTOLIC BLOOD PRESSURE: 100 MMHG | HEART RATE: 85 BPM | RESPIRATION RATE: 15 BRPM

## 2020-04-20 LAB
ANION GAP SERPL CALCULATED.3IONS-SCNC: 11 MMOL/L (ref 3–16)
BUN BLDV-MCNC: 9 MG/DL (ref 7–20)
CALCIUM SERPL-MCNC: 8.6 MG/DL (ref 8.3–10.6)
CHLORIDE BLD-SCNC: 102 MMOL/L (ref 99–110)
CO2: 24 MMOL/L (ref 21–32)
CREAT SERPL-MCNC: <0.5 MG/DL (ref 0.6–1.1)
CULTURE, BLOOD 2: NORMAL
GFR AFRICAN AMERICAN: >60
GFR NON-AFRICAN AMERICAN: >60
GLUCOSE BLD-MCNC: 103 MG/DL (ref 70–99)
GLUCOSE BLD-MCNC: 116 MG/DL (ref 70–99)
GLUCOSE BLD-MCNC: 147 MG/DL (ref 70–99)
GLUCOSE BLD-MCNC: 64 MG/DL (ref 70–99)
GLUCOSE BLD-MCNC: 73 MG/DL (ref 70–99)
GLUCOSE BLD-MCNC: 77 MG/DL (ref 70–99)
MAGNESIUM: 1.9 MG/DL (ref 1.8–2.4)
PERFORMED ON: ABNORMAL
PERFORMED ON: NORMAL
PERFORMED ON: NORMAL
POTASSIUM SERPL-SCNC: 3.5 MMOL/L (ref 3.5–5.1)
SODIUM BLD-SCNC: 137 MMOL/L (ref 136–145)

## 2020-04-20 PROCEDURE — 80048 BASIC METABOLIC PNL TOTAL CA: CPT

## 2020-04-20 PROCEDURE — 2580000003 HC RX 258: Performed by: INTERNAL MEDICINE

## 2020-04-20 PROCEDURE — 6370000000 HC RX 637 (ALT 250 FOR IP): Performed by: INTERNAL MEDICINE

## 2020-04-20 PROCEDURE — 6360000002 HC RX W HCPCS: Performed by: INTERNAL MEDICINE

## 2020-04-20 PROCEDURE — 99238 HOSP IP/OBS DSCHRG MGMT 30/<: CPT | Performed by: INTERNAL MEDICINE

## 2020-04-20 PROCEDURE — 83735 ASSAY OF MAGNESIUM: CPT

## 2020-04-20 RX ORDER — CIPROFLOXACIN 500 MG/1
500 TABLET, FILM COATED ORAL 2 TIMES DAILY
Qty: 20 TABLET | Refills: 0 | Status: SHIPPED | OUTPATIENT
Start: 2020-04-20 | End: 2020-04-30

## 2020-04-20 RX ADMIN — INSULIN LISPRO 10 UNITS: 100 INJECTION, SOLUTION INTRAVENOUS; SUBCUTANEOUS at 11:41

## 2020-04-20 RX ADMIN — MICONAZOLE NITRATE: 20 POWDER TOPICAL at 08:33

## 2020-04-20 RX ADMIN — ENOXAPARIN SODIUM 40 MG: 40 INJECTION SUBCUTANEOUS at 08:32

## 2020-04-20 RX ADMIN — INSULIN GLARGINE 35 UNITS: 100 INJECTION, SOLUTION SUBCUTANEOUS at 08:38

## 2020-04-20 RX ADMIN — INSULIN LISPRO 2 UNITS: 100 INJECTION, SOLUTION INTRAVENOUS; SUBCUTANEOUS at 11:41

## 2020-04-20 RX ADMIN — RANOLAZINE 500 MG: 500 TABLET, FILM COATED, EXTENDED RELEASE ORAL at 08:32

## 2020-04-20 RX ADMIN — DULOXETINE HYDROCHLORIDE 60 MG: 60 CAPSULE, DELAYED RELEASE ORAL at 08:32

## 2020-04-20 RX ADMIN — FLUOXETINE 10 MG: 10 CAPSULE ORAL at 08:32

## 2020-04-20 RX ADMIN — Medication 10 ML: at 08:32

## 2020-04-20 RX ADMIN — CEFTRIAXONE SODIUM 1 G: 1 INJECTION, POWDER, FOR SOLUTION INTRAMUSCULAR; INTRAVENOUS at 08:32

## 2020-04-20 RX ADMIN — CARVEDILOL 6.25 MG: 6.25 TABLET, FILM COATED ORAL at 08:32

## 2020-04-20 RX ADMIN — TOPIRAMATE 200 MG: 100 TABLET, FILM COATED ORAL at 08:32

## 2020-04-20 RX ADMIN — LISINOPRIL 2.5 MG: 5 TABLET ORAL at 08:32

## 2020-04-20 RX ADMIN — MUPIROCIN: 20 OINTMENT TOPICAL at 08:38

## 2020-04-20 RX ADMIN — ASPIRIN 81 MG 81 MG: 81 TABLET ORAL at 08:32

## 2020-04-20 ASSESSMENT — PAIN SCALES - GENERAL: PAINLEVEL_OUTOF10: 0

## 2020-04-20 NOTE — DISCHARGE SUMMARY
Result   Successful placement of PICC line. CT CHEST PULMONARY EMBOLISM W CONTRAST   Final Result   No evidence of pulmonary embolism or acute pulmonary abnormality. Evidence of remote granulomatous disease. Cholelithiasis. Grossly unchanged multinodular goiter. Small hiatal hernia. Concentric thickening of the wall of the distal   esophagus may be related to reflux disease. Clinical correlation is   recommended. XR CHEST PORTABLE   Final Result   Pacemaker. No acute cardiopulmonary process. Discharge Medications     Medication List      START taking these medications    cefdinir 300 MG capsule  Commonly known as:  OMNICEF  Take 1 capsule by mouth 2 times daily for 10 days        CHANGE how you take these medications    * ondansetron 4 MG tablet  Commonly known as:  ZOFRAN  Take 1 tablet by mouth 3 times daily as needed for Nausea or Vomiting  What changed:  Another medication with the same name was added. Make sure you understand how and when to take each. * ondansetron 4 MG tablet  Commonly known as:  Zofran  Take 1 tablet by mouth every 8 hours as needed for Nausea  What changed: You were already taking a medication with the same name, and this prescription was added. Make sure you understand how and when to take each. * This list has 2 medication(s) that are the same as other medications prescribed for you. Read the directions carefully, and ask your doctor or other care provider to review them with you. ASK your doctor about these medications    aspirin 81 MG EC tablet  Take 1 tablet by mouth daily     atorvastatin 40 MG tablet  Commonly known as:  LIPITOR  Take 1 tablet by mouth nightly     blood glucose test strips  Test three times a day & as needed for symptoms of irregular blood glucose.      carvedilol 6.25 MG tablet  Commonly known as:  COREG  Take 1 tablet by mouth 2 times daily (with meals)     * [employer] Lancets Ultra Thin Misc  1 each by Does not apply route daily     * Lancets [facility]  1 month supply for TID fingersticks     DULoxetine 60 MG extended release capsule  Commonly known as:  CYMBALTA  Take 1 capsule by mouth daily     fenofibrate micronized 200 MG capsule  Commonly known as:  LOFIBRA  Take 1 capsule by mouth nightly     FLUoxetine 10 MG capsule  Commonly known as:  PROZAC  Take 1 capsule by mouth daily     gabapentin 600 MG tablet  Commonly known as:  NEURONTIN     glipiZIDE 5 MG tablet  Commonly known as:  GLUCOTROL     insulin aspart 100 UNIT/ML injection pen  Commonly known as:  NovoLOG FlexPen  Inject 20 Units into the skin 3 times daily (before meals)     insulin glargine 100 UNIT/ML injection pen  Commonly known as:  Lantus SoloStar  Inject 35 Units into the skin 2 times daily     lisinopril 2.5 MG tablet  Commonly known as:  PRINIVIL;ZESTRIL  Take 1 tablet by mouth daily     metFORMIN 1000 MG tablet  Commonly known as:  GLUCOPHAGE  Take 1 tablet by mouth 2 times daily (with meals)     miconazole 2 % powder  Commonly known as:  MICOTIN  Apply topically 2 times daily. nystatin 313814 UNIT/GM powder  Commonly known as:  MYCOSTATIN  Apply topically 4 times daily. ondansetron 4 MG disintegrating tablet  Commonly known as:  Zofran ODT  Take 1 tablet by mouth every 8 hours as needed for Nausea or Vomiting     ranolazine 500 MG extended release tablet  Commonly known as:  RANEXA  Take 1 tablet by mouth 2 times daily     topiramate 200 MG tablet  Commonly known as:  TOPAMAX  Take 1 tablet by mouth daily         * This list has 2 medication(s) that are the same as other medications prescribed for you. Read the directions carefully, and ask your doctor or other care provider to review them with you.                Where to Get Your Medications      You can get these medications from any pharmacy    Bring a paper prescription for each of these medications  · cefdinir 300 MG capsule  · ondansetron 4 MG tablet           Discharged

## 2020-04-20 NOTE — DISCHARGE INSTR - COC
XGKR:[id number]}  Toileting  {P DME EDEB:207899500}  Feeding  {[facility] DME HEMW:298384460}  Med Admin  {[facility] DME BRQ}  Med Delivery   { [facility] MED Delivery:047686521}    Wound Care Documentation and Therapy:        Elimination:  Continence:   · Bowel: {YES / XK:74790}  · Bladder: {YES / DO:40488}  Urinary Catheter: {Urinary Catheter:988480260}   Colostomy/Ileostomy/Ileal Conduit: {YES / }       Date of Last BM: ***    Intake/Output Summary (Last 24 hours) at 2020 1127  Last data filed at 2020 0902  Gross per 24 hour   Intake 545 ml   Output --   Net 545 ml     I/O last 3 completed shifts:   In: 5 [P.O.:720]  Out: -     Safety Concerns:     [address] Concerns:329692655}    Impairments/Disabilities:      [address] Impairments/Disabilities:691516113}    Nutrition Therapy:  Current Nutrition Therapy:   [address] [facility] Diet List:765189946}    Routes of Feeding: {[facility] DME Other Feedings:019421382}  Liquids: {Slp liquid thickness:16226}  Daily Fluid Restriction: {[facility] DME Yes amt example:518736254}  Last Modified Barium Swallow with Video (Video Swallowing Test): {Done Not Done YBGR:410687419}    Treatments at the Time of Hospital Discharge:   Respiratory Treatments: ***  Oxygen Therapy:  {Therapy; copd oxygen:77294}  Ventilator:    { CC Vent UVOR:633895821}    Rehab Therapies: {THERAPEUTIC INTERVENTION:4666396760}  Weight Bearing Status/Restrictions: [address]  Weight Bearin}  Other Medical Equipment (for information only, NOT a DME order):  {EQUIPMENT:601257805}  Other Treatments: ***    Patient's personal belongings (please select all that are sent with patient):  {[facility] DME Belongings:698196906}    RN SIGNATURE:  {Esignature:353286443}    CASE MANAGEMENT/SOCIAL WORK SECTION    Inpatient Status Date: 20    Readmission Risk Assessment Score:  Readmission Risk              Risk of Unplanned Readmission:        41           Discharging to Facility/ Agency   · Name: [facility]  · Phone: [phone]        / signature: Electronically signed by Manas Motta RN on 4/20/20 at 1:48 PM EDT    PHYSICIAN SECTION    Prognosis: Good    Condition at Discharge: Stable    Rehab Potential (if transferring to Rehab): {Prognosis:6312190440}    Recommended Labs or Other Treatments After Discharge: ***    Physician Certification: I certify the above information and transfer of Sameer Dougherty  is necessary for the continuing treatment of the diagnosis listed and that she requires Home Care for less 30 days.      Update Admission H&P: No change in H&P    PHYSICIAN SIGNATURE:  Electronically signed by REGINO Comer, RN on 4/20/20 at 1:49 PM EDT

## 2020-04-21 ENCOUNTER — CARE COORDINATION (OUTPATIENT)
Dept: CASE MANAGEMENT | Age: 60
End: 2020-04-21

## 2020-04-22 LAB
BLOOD CULTURE, ROUTINE: NORMAL
CULTURE, BLOOD 2: NORMAL

## 2020-04-23 ENCOUNTER — TELEPHONE (OUTPATIENT)
Dept: PULMONOLOGY | Age: 60
End: 2020-04-23

## 2020-04-23 NOTE — TELEPHONE ENCOUNTER
The radiologist report suggests the findings in lungs are benign granulomas. We can schedule her for f/u when office opens. Please tell patient.

## 2020-04-23 NOTE — TELEPHONE ENCOUNTER
Received a referral from Morenita Chavez CNP for Granulomatous disease. Patient saw [facility] in 2019 and was recently IP at [facility] and was consulted by . Please advise when to Schedule          Narrative   EXAMINATION:   CTA OF THE CHEST 4/15/2020 8:53 pm       TECHNIQUE:   CTA of the chest was performed after the administration of intravenous   contrast.  Multiplanar reformatted images are provided for review.  MIP   images are provided for review. Dose modulation, iterative reconstruction,   and/or weight based adjustment of the mA/kV was utilized to reduce the   radiation dose to as low as reasonably achievable.       COMPARISON:   Chest x-ray 04/15/2020 and CTA chest 10/11/2019       HISTORY:   ORDERING SYSTEM PROVIDED HISTORY: fatigue and elevated dimer   TECHNOLOGIST PROVIDED HISTORY:   Reason for exam:->fatigue and elevated dimer       FINDINGS:   Pulmonary Arteries: Pulmonary arteries are adequately opacified for   evaluation.  No evidence of intraluminal filling defect to suggest pulmonary   embolism.  Main pulmonary artery is normal in caliber.       Mediastinum: Multinodular goiter as noted on the prior study.  There is an   unchanged left subclavian AICD.  No evidence of mediastinal lymphadenopathy. The heart is borderline enlarged.  The heart and pericardium demonstrate no   acute abnormality.  There is no acute abnormality of the thoracic aorta.       Lungs/pleura: The lungs are without acute process.  No focal consolidation or   pulmonary edema.  No evidence of pleural effusion or pneumothorax.  There are   right lung and right hilar calcifications from remote granulomatous disease.       Upper Abdomen: There is a small hiatal hernia. [place] An is concentric   thickening of the wall of the distal esophagus.  This may be related to   reflux disease and clinical correlation is recommended.  There are spleen and   to lesser extent liver calcifications from remote granulomatous disease.

## 2020-04-27 ENCOUNTER — CARE COORDINATION (OUTPATIENT)
Dept: CASE MANAGEMENT | Age: 60
End: 2020-04-27

## 2020-04-27 NOTE — TELEPHONE ENCOUNTER
Pt was informed with verbal understanding. Will call pt when able, to schedule IN OFFICE appt.       Pt placed on wait list.

## 2020-04-27 NOTE — CARE COORDINATION
Patient contacted regarding COVID-19 risk and screening. Care Transition Nurse/ Ambulatory Care Manager contacted the patient by telephone to perform follow-up assessment. Verified name and  with patient as identifiers. Patient has following risk factors of: heart failure, sepsis and DM. Symptoms reviewed with patient who verbalized the following symptoms: nausea, vomiting and elevated BS. Due to Patient reports already contacting her PCP and is awaiting a return call. encounter was not routed to provider for escalation. Education provided regarding infection prevention, and signs and symptoms of COVID-19 and when to seek medical attention with patient who verbalized understanding. Discussed exposure protocols and quarantine from [address] you at higher risk for severe illness  and given an opportunity for questions and concerns. The patient agrees to contact the PCP office for questions related to their healthcare. CTN/ACM provided contact information for future reference. From CDC: Are you at higher risk for severe illness?  Wash your hands often.  Avoid close contact (6 feet, which is about two arm lengths) with people who are sick.  Put distance between yourself and other people if COVID-19 is spreading in your community.  Clean and disinfect frequently touched surfaces.  Avoid all cruise travel and non-essential air travel.  Call your healthcare professional if you have concerns about COVID-19 and your underlying condition or if you are sick. For more information on steps you can take to protect yourself, see CDC's How to [address] [place] [address] for follow-up call in 1-2 days based on severity of symptoms and risk factors. Sindhu Nguyen reports she has a glucometer and has been checking her BS. She c/o n/v despite ondansetron. Her BS are elevated to >500 today.  States she already called her PCP - [address] - and is awaiting a return call from them for instruction. Denies needs from writer at this time.      Keisha Sandy, RN  Care Transition Nurse  [phone] mobile    Future Appointments   Date Time Provider Jeremy Lomas   7/15/2020  7:50 AM SCHEDULE, Neda Thomas REMOTE TRANSMISSION [facility], [place], [facility]

## 2020-04-28 ENCOUNTER — CARE COORDINATION (OUTPATIENT)
Dept: CASE MANAGEMENT | Age: 60
End: 2020-04-28

## 2020-05-01 ENCOUNTER — CARE COORDINATION (OUTPATIENT)
Dept: CASE MANAGEMENT | Age: 60
End: 2020-05-01

## 2020-05-28 ENCOUNTER — APPOINTMENT (OUTPATIENT)
Dept: GENERAL RADIOLOGY | Age: 60
End: 2020-05-28
Payer: MEDICARE

## 2020-05-28 ENCOUNTER — HOSPITAL ENCOUNTER (EMERGENCY)
Age: 60
Discharge: HOME | End: 2020-05-28
Payer: MEDICARE

## 2020-05-28 VITALS
BODY MASS INDEX: 31.89 KG/M2 | HEART RATE: 97 BPM | HEIGHT: 63 IN | WEIGHT: 180 LBS | SYSTOLIC BLOOD PRESSURE: 135 MMHG | OXYGEN SATURATION: 93 % | TEMPERATURE: 97.5 F | DIASTOLIC BLOOD PRESSURE: 76 MMHG | RESPIRATION RATE: 20 BRPM

## 2020-05-28 LAB
A/G RATIO: 1.4 (ref 1.1–2.2)
A/G RATIO: 1.4 (ref 1.1–2.2)
ALBUMIN SERPL-MCNC: 3.7 G/DL (ref 3.4–5)
ALBUMIN SERPL-MCNC: 4.1 G/DL (ref 3.4–5)
ALP BLD-CCNC: 121 U/L (ref 40–129)
ALP BLD-CCNC: 96 U/L (ref 40–129)
ALT SERPL-CCNC: 11 U/L (ref 10–40)
ALT SERPL-CCNC: 18 U/L (ref 10–40)
ANION GAP SERPL CALCULATED.3IONS-SCNC: 12 MMOL/L (ref 3–16)
ANION GAP SERPL CALCULATED.3IONS-SCNC: 17 MMOL/L (ref 3–16)
AST SERPL-CCNC: 14 U/L (ref 15–37)
AST SERPL-CCNC: 14 U/L (ref 15–37)
BACTERIA: ABNORMAL /HPF
BASE EXCESS VENOUS: -3 MMOL/L (ref -3–3)
BASOPHILS ABSOLUTE: 0.1 K/UL (ref 0–0.2)
BASOPHILS RELATIVE PERCENT: 1.3 %
BETA-HYDROXYBUTYRATE: 1.5 MMOL/L (ref 0–0.27)
BILIRUB SERPL-MCNC: <0.2 MG/DL (ref 0–1)
BILIRUB SERPL-MCNC: <0.2 MG/DL (ref 0–1)
BILIRUBIN URINE: NEGATIVE
BLOOD, URINE: ABNORMAL
BUN BLDV-MCNC: 18 MG/DL (ref 7–20)
BUN BLDV-MCNC: 23 MG/DL (ref 7–20)
CALCIUM SERPL-MCNC: 8.1 MG/DL (ref 8.3–10.6)
CALCIUM SERPL-MCNC: 8.8 MG/DL (ref 8.3–10.6)
CARBOXYHEMOGLOBIN: 0.8 % (ref 0–1.5)
CHLORIDE BLD-SCNC: 100 MMOL/L (ref 99–110)
CHLORIDE BLD-SCNC: 96 MMOL/L (ref 99–110)
CLARITY: ABNORMAL
CO2: 20 MMOL/L (ref 21–32)
CO2: 20 MMOL/L (ref 21–32)
COLOR: YELLOW
CREAT SERPL-MCNC: <0.5 MG/DL (ref 0.6–1.1)
CREAT SERPL-MCNC: <0.5 MG/DL (ref 0.6–1.1)
D DIMER: <200 NG/ML DDU (ref 0–229)
EOSINOPHILS ABSOLUTE: 0.1 K/UL (ref 0–0.6)
EOSINOPHILS RELATIVE PERCENT: 1 %
GFR AFRICAN AMERICAN: >60
GFR AFRICAN AMERICAN: >60
GFR NON-AFRICAN AMERICAN: >60
GFR NON-AFRICAN AMERICAN: >60
GLOBULIN: 2.7 G/DL
GLOBULIN: 3 G/DL
GLUCOSE BLD-MCNC: 271 MG/DL (ref 70–99)
GLUCOSE BLD-MCNC: 572 MG/DL (ref 70–99)
GLUCOSE BLD-MCNC: >600 MG/DL (ref 70–99)
GLUCOSE URINE: >=1000 MG/DL
HCO3 VENOUS: 21.1 MMOL/L (ref 23–29)
HCT VFR BLD CALC: 40.1 % (ref 36–48)
HEMOGLOBIN: 13.2 G/DL (ref 12–16)
KETONES, URINE: 15 MG/DL
LEUKOCYTE ESTERASE, URINE: ABNORMAL
LIPASE: 42 U/L (ref 13–60)
LYMPHOCYTES ABSOLUTE: 1.7 K/UL (ref 1–5.1)
LYMPHOCYTES RELATIVE PERCENT: 21.9 %
MCH RBC QN AUTO: 30.1 PG (ref 26–34)
MCHC RBC AUTO-ENTMCNC: 32.8 G/DL (ref 31–36)
MCV RBC AUTO: 91.7 FL (ref 80–100)
METHEMOGLOBIN VENOUS: 0.3 %
MICROSCOPIC EXAMINATION: YES
MONOCYTES ABSOLUTE: 0.5 K/UL (ref 0–1.3)
MONOCYTES RELATIVE PERCENT: 6.5 %
NEUTROPHILS ABSOLUTE: 5.4 K/UL (ref 1.7–7.7)
NEUTROPHILS RELATIVE PERCENT: 69.3 %
NITRITE, URINE: NEGATIVE
O2 CONTENT, VEN: 19 VOL %
O2 SAT, VEN: 98 %
O2 THERAPY: ABNORMAL
PCO2, VEN: 35.2 MMHG (ref 40–50)
PDW BLD-RTO: 14.5 % (ref 12.4–15.4)
PERFORMED ON: ABNORMAL
PH UA: 5.5 (ref 5–8)
PH VENOUS: 7.4 (ref 7.35–7.45)
PLATELET # BLD: 275 K/UL (ref 135–450)
PMV BLD AUTO: 8.4 FL (ref 5–10.5)
PO2, VEN: 102.6 MMHG (ref 25–40)
POTASSIUM REFLEX MAGNESIUM: 4.3 MMOL/L (ref 3.5–5.1)
POTASSIUM REFLEX MAGNESIUM: 4.5 MMOL/L (ref 3.5–5.1)
PROTEIN UA: NEGATIVE MG/DL
RBC # BLD: 4.38 M/UL (ref 4–5.2)
RBC UA: ABNORMAL /HPF (ref 0–4)
SODIUM BLD-SCNC: 132 MMOL/L (ref 136–145)
SODIUM BLD-SCNC: 133 MMOL/L (ref 136–145)
SPECIFIC GRAVITY UA: 1.01 (ref 1–1.03)
TCO2 CALC VENOUS: 22 MMOL/L
TOTAL PROTEIN: 6.4 G/DL (ref 6.4–8.2)
TOTAL PROTEIN: 7.1 G/DL (ref 6.4–8.2)
TROPONIN: <0.01 NG/ML
URINE REFLEX TO CULTURE: YES
URINE TYPE: ABNORMAL
UROBILINOGEN, URINE: 0.2 E.U./DL
WBC # BLD: 7.8 K/UL (ref 4–11)
WBC UA: >100 /HPF (ref 0–5)

## 2020-05-28 PROCEDURE — 84484 ASSAY OF TROPONIN QUANT: CPT

## 2020-05-28 PROCEDURE — 87086 URINE CULTURE/COLONY COUNT: CPT

## 2020-05-28 PROCEDURE — 82803 BLOOD GASES ANY COMBINATION: CPT

## 2020-05-28 PROCEDURE — 96365 THER/PROPH/DIAG IV INF INIT: CPT

## 2020-05-28 PROCEDURE — 6360000002 HC RX W HCPCS: Performed by: PHYSICIAN ASSISTANT

## 2020-05-28 PROCEDURE — 82010 KETONE BODYS QUAN: CPT

## 2020-05-28 PROCEDURE — 96375 TX/PRO/DX INJ NEW DRUG ADDON: CPT

## 2020-05-28 PROCEDURE — 99285 EMERGENCY DEPT VISIT HI MDM: CPT

## 2020-05-28 PROCEDURE — 93005 ELECTROCARDIOGRAM TRACING: CPT | Performed by: EMERGENCY MEDICINE

## 2020-05-28 PROCEDURE — 83690 ASSAY OF LIPASE: CPT

## 2020-05-28 PROCEDURE — 85379 FIBRIN DEGRADATION QUANT: CPT

## 2020-05-28 PROCEDURE — 81001 URINALYSIS AUTO W/SCOPE: CPT

## 2020-05-28 PROCEDURE — 71046 X-RAY EXAM CHEST 2 VIEWS: CPT

## 2020-05-28 PROCEDURE — 80053 COMPREHEN METABOLIC PANEL: CPT

## 2020-05-28 PROCEDURE — 85025 COMPLETE CBC W/AUTO DIFF WBC: CPT

## 2020-05-28 PROCEDURE — 6370000000 HC RX 637 (ALT 250 FOR IP): Performed by: PHYSICIAN ASSISTANT

## 2020-05-28 PROCEDURE — 87077 CULTURE AEROBIC IDENTIFY: CPT

## 2020-05-28 PROCEDURE — 2580000003 HC RX 258: Performed by: PHYSICIAN ASSISTANT

## 2020-05-28 RX ORDER — 0.9 % SODIUM CHLORIDE 0.9 %
1000 INTRAVENOUS SOLUTION INTRAVENOUS ONCE
Status: COMPLETED | OUTPATIENT
Start: 2020-05-28 | End: 2020-05-28

## 2020-05-28 RX ORDER — CEPHALEXIN 500 MG/1
500 CAPSULE ORAL 2 TIMES DAILY
Qty: 28 CAPSULE | Refills: 0 | Status: SHIPPED | OUTPATIENT
Start: 2020-05-28 | End: 2020-06-04

## 2020-05-28 RX ORDER — ONDANSETRON 2 MG/ML
4 INJECTION INTRAMUSCULAR; INTRAVENOUS EVERY 6 HOURS PRN
Status: DISCONTINUED | OUTPATIENT
Start: 2020-05-28 | End: 2020-05-29 | Stop reason: HOSPADM

## 2020-05-28 RX ADMIN — INSULIN HUMAN 10 UNITS: 100 INJECTION, SOLUTION PARENTERAL at 20:41

## 2020-05-28 RX ADMIN — SODIUM CHLORIDE 1000 ML: 9 INJECTION, SOLUTION INTRAVENOUS at 19:56

## 2020-05-28 RX ADMIN — ONDANSETRON HYDROCHLORIDE 4 MG: 2 INJECTION, SOLUTION INTRAMUSCULAR; INTRAVENOUS at 19:54

## 2020-05-28 RX ADMIN — CEFTRIAXONE SODIUM 1 G: 1 INJECTION, POWDER, FOR SOLUTION INTRAMUSCULAR; INTRAVENOUS at 20:41

## 2020-05-28 RX ADMIN — SODIUM CHLORIDE 1000 ML: 9 INJECTION, SOLUTION INTRAVENOUS at 19:53

## 2020-05-28 ASSESSMENT — ENCOUNTER SYMPTOMS
VOMITING: 1
SHORTNESS OF BREATH: 1
NAUSEA: 1

## 2020-05-28 ASSESSMENT — PAIN SCALES - GENERAL: PAINLEVEL_OUTOF10: 10

## 2020-05-28 ASSESSMENT — PAIN DESCRIPTION - LOCATION: LOCATION: CHEST

## 2020-05-28 NOTE — ED PROVIDER NOTES
[address] ED  EMERGENCY DEPARTMENT ENCOUNTER        Pt Name: Sarah Villa  MRN: [id number]  [place] 1960  Date of evaluation: 5/28/2020  Provider: Darrell Jimenez PA-C  PCP: [facility]    Evaluation by KAREN. My supervising physician was available for consultation. CHIEF COMPLAINT       Chief Complaint   Patient presents with    Hyperglycemia     EMS states they checked her FSBS reading 539    Shortness of Breath     EMS states patient c/o not feeling well. She was out for a walk and felt short of breath. HISTORY OF PRESENT ILLNESS   (Location, Timing/Onset, Context/Setting, Quality, Duration, Modifying Factors, Severity, Associated Signs and Symptoms)  Note limiting factors. Sarah Villa is a 63 y.o. female with a past medical history of diabetes, hyperlipidemia, hypertension, CAD, history of previous CVA, CHF, cognitive development delay, TIA, type 2 diabetes on insulin as well as metformin brought in today by private vehicle for complaints of hyperglycemia. Patient states she checked her blood sugar earlier today and noticed that it was running \"high\". Onset occurred earlier today. Duration of symptoms have been persistent since onset. No context. She states she has felt nauseous but denies any vomiting. She also admits to chest pain and shortness of breath but denies any cough or congestion. Denies any numbness or tingling to her lower extremities no aggravating or alleviating complaints. Patient took about 40 units of insulin at about noon today. She has not taken anything else. She otherwise denies any other complaints. Nursing Notes were all reviewed and agreed with or any disagreements were addressed in the HPI. REVIEW OF SYSTEMS    (2-9 systems for level 4, 10 or more for level 5)     Review of Systems   Constitutional: Negative. HENT: Negative. Respiratory: Positive for shortness of breath.     Cardiovascular: Positive for chest Anion Gap 17 (*)     Glucose 572 (*)     BUN 23 (*)     CREATININE <0.5 (*)     AST 14 (*)     All other components within normal limits    Narrative:     Performed at:  [facility] [place],  [employer]   Phone [phone]   BLOOD GAS, VENOUS - Abnormal; Notable for the following components:    pCO2, Alberto 35.2 (*)     pO2, Alberto 102.6 (*)     HCO3, Venous 21.1 (*)     All other components within normal limits    Narrative:     Performed at:  [place] [address],  [employer]   Phone [phone]   URINE RT REFLEX TO CULTURE - Abnormal; Notable for the following components:    Clarity, UA SL CLOUDY (*)     Glucose, Ur >=1000 (*)     Ketones, Urine 15 (*)     Blood, Urine TRACE-INTACT (*)     Leukocyte Esterase, Urine SMALL (*)     All other components within normal limits    Narrative:     Performed at:  [place] [address],  [employer]   Phone [phone]   BETA-HYDROXYBUTYRATE - Abnormal; Notable for the following components:    Beta-Hydroxybutyrate 1.50 (*)     All other components within normal limits    Narrative:     Performed at:  [facility] [place],  [employer] [place] [employer]   Phone [phone]   MICROSCOPIC URINALYSIS - Abnormal; Notable for the following components:    WBC, UA >100 (*)     Bacteria, UA Rare (*)     All other components within normal limits    Narrative:     Performed at:  [facility] [place],  [employer]   Phone [phone]   COMPREHENSIVE METABOLIC PANEL W/ REFLEX TO MG FOR LOW K - Abnormal; Notable for the following components:    Sodium 132 (*)     CO2 20 (*)     Glucose 271 (*)     CREATININE <0.5 (*)     Calcium 8.1 (*)     AST 14 (*)     All other components within normal limits    Narrative:     Performed at:  [facility]) - breath Reason for Exam: Shortness of Breath (EMS states patient c/o not feeling well. She was out for a walk and felt short of breath. ) FINDINGS: Left-sided subclavian ICD again noted. No evidence of pneumonia, edema or other acute pulmonary process. No evidence of acute process of the cardiac or mediastinal structures. No evidence of pneumothorax or pleural effusion. No evidence of acute cardiopulmonary disease. PROCEDURES   Unless otherwise noted below, none     Procedures    CRITICAL CARE TIME   N/A    CONSULTS:  None      EMERGENCY DEPARTMENT COURSE and DIFFERENTIAL DIAGNOSIS/MDM:   Vitals:    Vitals:    05/28/20 2032 05/28/20 2133 05/28/20 2215 05/28/20 2302   BP: (!) 144/89 (!) 140/75 (!) 140/77 135/76   Pulse: 107 110 101 97   Resp: 22 20 17 20   Temp:       TempSrc:       SpO2: (!) 82% 93% 96% 93%   Weight:       Height:           Patient was given the following medications:  Medications   ondansetron (ZOFRAN) injection 4 mg (4 mg Intravenous Given 5/28/20 1954)   0.9 % sodium chloride bolus (0 mLs Intravenous Stopped 5/28/20 2214)   0.9 % sodium chloride bolus (0 mLs Intravenous Stopped 5/28/20 2153)   cefTRIAXone (ROCEPHIN) 1 g IVPB in 50 mL D5W minibag (0 g Intravenous Stopped 5/28/20 2120)   insulin regular (HUMULIN R;NOVOLIN R) injection 10 Units (10 Units Intravenous Given 5/28/20 2041)       Patient brought in today by private vehicle for complaints of hyperglycemia. Patient is a type 2 DM. Patient initially tachycardic here afebrile breathing on room air satting at 96%. Nontoxic no acute respiratory distress. Old labs records reviewed. Patient initially given 2 L of fluids and Zofran. CBC reveals no acute leukocytosis. Hemoglobin of 13.2. Potassium of 4.5. BUN of 23 creatinine of 0.5 blood glucose of 572 anion gap of 17. Hydroxybutyrate elevated at 1.5. Lipase of 42. Troponin is less than 2.01. EKG was reviewed by my attending see note for dictation.     Urine shows

## 2020-05-29 ENCOUNTER — CARE COORDINATION (OUTPATIENT)
Dept: CARE COORDINATION | Age: 60
End: 2020-05-29

## 2020-05-29 LAB
EKG ATRIAL RATE: 121 BPM
EKG DIAGNOSIS: NORMAL
EKG P AXIS: 58 DEGREES
EKG P-R INTERVAL: 142 MS
EKG Q-T INTERVAL: 328 MS
EKG QRS DURATION: 90 MS
EKG QTC CALCULATION (BAZETT): 465 MS
EKG R AXIS: -10 DEGREES
EKG T AXIS: 69 DEGREES
EKG VENTRICULAR RATE: 121 BPM
ORGANISM: ABNORMAL
URINE CULTURE, ROUTINE: ABNORMAL

## 2020-05-29 PROCEDURE — 93010 ELECTROCARDIOGRAM REPORT: CPT | Performed by: INTERNAL MEDICINE

## 2020-05-29 NOTE — ED NOTES
Pt's oxygen level dropped to 82% with a good pleth, pt was on oxygen 2L per NC at the time. Pt was sitting in bed alert and oriented, asked pt to breath deep and cough, I increased oxygen to 4L,. Pt  States that she is not on oxygen at home. On 4L pt's oxygen saturation is between 90 and 92.      Jose Manuel Huff RN  05/28/20 0415

## 2020-06-01 ENCOUNTER — CARE COORDINATION (OUTPATIENT)
Dept: CARE COORDINATION | Age: 60
End: 2020-06-01

## 2020-06-01 NOTE — CARE COORDINATION
First phone attempt to reach patient for 3-5 day ED FU. LM with my contact information    Plan  Make second phone call    Nataly Antunez.  Jean-Pierre Stroud, RN, BSN, [address] [place] [address]  [phone]

## 2020-06-02 ENCOUNTER — CARE COORDINATION (OUTPATIENT)
Dept: CARE COORDINATION | Age: 60
End: 2020-06-02

## 2020-06-05 ENCOUNTER — APPOINTMENT (OUTPATIENT)
Dept: GENERAL RADIOLOGY | Age: 60
End: 2020-06-05
Payer: MEDICARE

## 2020-06-05 ENCOUNTER — HOSPITAL ENCOUNTER (EMERGENCY)
Age: 60
Discharge: HOME | End: 2020-06-06
Attending: EMERGENCY MEDICINE
Payer: MEDICARE

## 2020-06-05 VITALS
DIASTOLIC BLOOD PRESSURE: 71 MMHG | HEIGHT: 63 IN | TEMPERATURE: 97 F | RESPIRATION RATE: 20 BRPM | SYSTOLIC BLOOD PRESSURE: 148 MMHG | HEART RATE: 90 BPM | BODY MASS INDEX: 31.89 KG/M2 | WEIGHT: 180 LBS | OXYGEN SATURATION: 98 %

## 2020-06-05 LAB
A/G RATIO: 1.3 (ref 1.1–2.2)
A/G RATIO: 1.4 (ref 1.1–2.2)
ACETAMINOPHEN LEVEL: <5 UG/ML (ref 10–30)
ALBUMIN SERPL-MCNC: 3.9 G/DL (ref 3.4–5)
ALBUMIN SERPL-MCNC: 4.1 G/DL (ref 3.4–5)
ALP BLD-CCNC: 101 U/L (ref 40–129)
ALP BLD-CCNC: 121 U/L (ref 40–129)
ALT SERPL-CCNC: 10 U/L (ref 10–40)
ALT SERPL-CCNC: 9 U/L (ref 10–40)
ANION GAP SERPL CALCULATED.3IONS-SCNC: 11 MMOL/L (ref 3–16)
ANION GAP SERPL CALCULATED.3IONS-SCNC: 15 MMOL/L (ref 3–16)
AST SERPL-CCNC: 10 U/L (ref 15–37)
AST SERPL-CCNC: 11 U/L (ref 15–37)
BASE EXCESS VENOUS: 0.8 MMOL/L (ref -3–3)
BASOPHILS ABSOLUTE: 0.1 K/UL (ref 0–0.2)
BASOPHILS RELATIVE PERCENT: 1.2 %
BETA-HYDROXYBUTYRATE: 1.4 MMOL/L (ref 0–0.27)
BILIRUB SERPL-MCNC: <0.2 MG/DL (ref 0–1)
BILIRUB SERPL-MCNC: <0.2 MG/DL (ref 0–1)
BILIRUBIN URINE: NEGATIVE
BLOOD, URINE: NEGATIVE
BUN BLDV-MCNC: 15 MG/DL (ref 7–20)
BUN BLDV-MCNC: 20 MG/DL (ref 7–20)
CALCIUM SERPL-MCNC: 8.4 MG/DL (ref 8.3–10.6)
CALCIUM SERPL-MCNC: 9.6 MG/DL (ref 8.3–10.6)
CARBOXYHEMOGLOBIN: 2.3 % (ref 0–1.5)
CHLORIDE BLD-SCNC: 87 MMOL/L (ref 99–110)
CHLORIDE BLD-SCNC: 98 MMOL/L (ref 99–110)
CHP ED QC CHECK: YES
CLARITY: CLEAR
CO2: 24 MMOL/L (ref 21–32)
CO2: 24 MMOL/L (ref 21–32)
COLOR: ABNORMAL
CREAT SERPL-MCNC: <0.5 MG/DL (ref 0.6–1.1)
CREAT SERPL-MCNC: <0.5 MG/DL (ref 0.6–1.1)
EKG ATRIAL RATE: 110 BPM
EKG DIAGNOSIS: NORMAL
EKG P AXIS: 36 DEGREES
EKG P-R INTERVAL: 148 MS
EKG Q-T INTERVAL: 354 MS
EKG QRS DURATION: 94 MS
EKG QTC CALCULATION (BAZETT): 479 MS
EKG R AXIS: -4 DEGREES
EKG T AXIS: 81 DEGREES
EKG VENTRICULAR RATE: 110 BPM
EOSINOPHILS ABSOLUTE: 0.1 K/UL (ref 0–0.6)
EOSINOPHILS RELATIVE PERCENT: 2 %
ETHANOL: NORMAL MG/DL (ref 0–0.08)
GFR AFRICAN AMERICAN: >60
GFR AFRICAN AMERICAN: >60
GFR NON-AFRICAN AMERICAN: >60
GFR NON-AFRICAN AMERICAN: >60
GLOBULIN: 2.9 G/DL
GLOBULIN: 3 G/DL
GLUCOSE BLD-MCNC: 322 MG/DL (ref 70–99)
GLUCOSE BLD-MCNC: 368 MG/DL
GLUCOSE BLD-MCNC: 368 MG/DL (ref 70–99)
GLUCOSE BLD-MCNC: 475 MG/DL (ref 70–99)
GLUCOSE BLD-MCNC: 590 MG/DL
GLUCOSE BLD-MCNC: 590 MG/DL (ref 70–99)
GLUCOSE BLD-MCNC: 710 MG/DL (ref 70–99)
GLUCOSE URINE: >=1000 MG/DL
HCO3 VENOUS: 25.9 MMOL/L (ref 23–29)
HCT VFR BLD CALC: 41.8 % (ref 36–48)
HEMOGLOBIN: 14 G/DL (ref 12–16)
KETONES, URINE: 15 MG/DL
LEUKOCYTE ESTERASE, URINE: NEGATIVE
LYMPHOCYTES ABSOLUTE: 1.1 K/UL (ref 1–5.1)
LYMPHOCYTES RELATIVE PERCENT: 15.8 %
MCH RBC QN AUTO: 31.3 PG (ref 26–34)
MCHC RBC AUTO-ENTMCNC: 33.5 G/DL (ref 31–36)
MCV RBC AUTO: 93.3 FL (ref 80–100)
METHEMOGLOBIN VENOUS: 0.3 %
MICROSCOPIC EXAMINATION: ABNORMAL
MONOCYTES ABSOLUTE: 0.5 K/UL (ref 0–1.3)
MONOCYTES RELATIVE PERCENT: 6.4 %
NEUTROPHILS ABSOLUTE: 5.3 K/UL (ref 1.7–7.7)
NEUTROPHILS RELATIVE PERCENT: 74.6 %
NITRITE, URINE: NEGATIVE
O2 CONTENT, VEN: 18 VOL %
O2 SAT, VEN: 85 %
O2 THERAPY: ABNORMAL
PCO2, VEN: 42.9 MMHG (ref 40–50)
PDW BLD-RTO: 14.5 % (ref 12.4–15.4)
PERFORMED ON: ABNORMAL
PH UA: 6 (ref 5–8)
PH VENOUS: 7.4 (ref 7.35–7.45)
PLATELET # BLD: 280 K/UL (ref 135–450)
PMV BLD AUTO: 8.3 FL (ref 5–10.5)
PO2, VEN: 49.7 MMHG (ref 25–40)
POTASSIUM REFLEX MAGNESIUM: 4.4 MMOL/L (ref 3.5–5.1)
POTASSIUM REFLEX MAGNESIUM: 4.9 MMOL/L (ref 3.5–5.1)
PROTEIN UA: NEGATIVE MG/DL
RBC # BLD: 4.49 M/UL (ref 4–5.2)
SALICYLATE, SERUM: <0.3 MG/DL (ref 15–30)
SODIUM BLD-SCNC: 126 MMOL/L (ref 136–145)
SODIUM BLD-SCNC: 133 MMOL/L (ref 136–145)
SPECIFIC GRAVITY UA: 1.01 (ref 1–1.03)
TCO2 CALC VENOUS: 27 MMOL/L
TOTAL PROTEIN: 6.8 G/DL (ref 6.4–8.2)
TOTAL PROTEIN: 7.1 G/DL (ref 6.4–8.2)
TROPONIN: <0.01 NG/ML
URINE REFLEX TO CULTURE: ABNORMAL
URINE TYPE: ABNORMAL
UROBILINOGEN, URINE: 0.2 E.U./DL
WBC # BLD: 7.1 K/UL (ref 4–11)

## 2020-06-05 PROCEDURE — 84484 ASSAY OF TROPONIN QUANT: CPT

## 2020-06-05 PROCEDURE — G0480 DRUG TEST DEF 1-7 CLASSES: HCPCS

## 2020-06-05 PROCEDURE — 93010 ELECTROCARDIOGRAM REPORT: CPT | Performed by: INTERNAL MEDICINE

## 2020-06-05 PROCEDURE — 99284 EMERGENCY DEPT VISIT MOD MDM: CPT

## 2020-06-05 PROCEDURE — 71045 X-RAY EXAM CHEST 1 VIEW: CPT

## 2020-06-05 PROCEDURE — 81003 URINALYSIS AUTO W/O SCOPE: CPT

## 2020-06-05 PROCEDURE — 2580000003 HC RX 258: Performed by: EMERGENCY MEDICINE

## 2020-06-05 PROCEDURE — 82010 KETONE BODYS QUAN: CPT

## 2020-06-05 PROCEDURE — 2580000003 HC RX 258: Performed by: PHYSICIAN ASSISTANT

## 2020-06-05 PROCEDURE — 85025 COMPLETE CBC W/AUTO DIFF WBC: CPT

## 2020-06-05 PROCEDURE — 93005 ELECTROCARDIOGRAM TRACING: CPT | Performed by: EMERGENCY MEDICINE

## 2020-06-05 PROCEDURE — 6370000000 HC RX 637 (ALT 250 FOR IP): Performed by: PHYSICIAN ASSISTANT

## 2020-06-05 PROCEDURE — 80053 COMPREHEN METABOLIC PANEL: CPT

## 2020-06-05 PROCEDURE — 96374 THER/PROPH/DIAG INJ IV PUSH: CPT

## 2020-06-05 PROCEDURE — 82803 BLOOD GASES ANY COMBINATION: CPT

## 2020-06-05 RX ORDER — 0.9 % SODIUM CHLORIDE 0.9 %
1000 INTRAVENOUS SOLUTION INTRAVENOUS ONCE
Status: COMPLETED | OUTPATIENT
Start: 2020-06-05 | End: 2020-06-05

## 2020-06-05 RX ADMIN — SODIUM CHLORIDE 1000 ML: 9 INJECTION, SOLUTION INTRAVENOUS at 15:27

## 2020-06-05 RX ADMIN — INSULIN HUMAN 10 UNITS: 100 INJECTION, SOLUTION PARENTERAL at 15:24

## 2020-06-05 RX ADMIN — SODIUM CHLORIDE 1000 ML: 9 INJECTION, SOLUTION INTRAVENOUS at 14:37

## 2020-06-05 RX ADMIN — SODIUM CHLORIDE 1000 ML: 9 INJECTION, SOLUTION INTRAVENOUS at 16:22

## 2020-06-05 ASSESSMENT — PAIN DESCRIPTION - PAIN TYPE: TYPE: ACUTE PAIN

## 2020-06-05 ASSESSMENT — PAIN SCALES - GENERAL: PAINLEVEL_OUTOF10: 10

## 2020-06-05 NOTE — ED PROVIDER NOTES
EKG: Sinus tachycardia rate of 110 bpm with inferior and anterior Q waves. No ST elevation.   Compared to prior from 5/28/2020 no change      Tawny Rivera MD  06/05/20 0176

## 2020-06-06 NOTE — ED NOTES
Pt states this is the anniversary of dad death. And mom dead too. Nothing means anything to her anymore. [employer] NP made aware of pt stating she is depressed and nothing is helping. Denies that she is harmful to herself, however states she has a bad life with no meaning.       Jorge Ramirez  06/05/20 9313
impairment    [x]  No outpatient services in place   []  Medication noncompliance   []  No collateral information to support safety      PROTECTIVE FACTORS:  [] Age >25 and <55  [x] Female gender   [] Denies depression  [x] Denies suicidal ideation  [x] Does not have lethal plan   [x] Does not have access to guns or weapons  [x] Patient is verbally vicki for safety  [x] No prior suicide attempts  [x] No active substance abuse  [] Patient has social or family support  [x] No active psychosis or cognitive dysfunction  [] Physically healthy  [] Has outpatient services in place  [x] Compliant with recommended medications  [] Collateral information from  supports patient safety   [] Patient is future oriented with plans to          Clinical Summary:    Patient presents to [facility] voluntarily after stating she felt depressed to MD.  Patient was clinically sober at the time of the evaluation. Patient was evaluated and offered supportive counseling.                  Woodrow Howard RN  06/05/20 6009

## 2020-06-07 ASSESSMENT — ENCOUNTER SYMPTOMS
GASTROINTESTINAL NEGATIVE: 1
RESPIRATORY NEGATIVE: 1

## 2020-06-07 NOTE — ED PROVIDER NOTES
Disp-60 tablet, R-3Print      gabapentin (NEURONTIN) 600 MG tablet Take 600 mg by mouth nightly. [facility] [place] Historical Med      aspirin 81 MG EC tablet Take 1 tablet by mouth daily, Disp-30 tablet, R-3Normal      topiramate (TOPAMAX) 200 MG tablet Take 1 tablet by mouth daily, Disp-60 tablet, R-0Print      FLUoxetine (PROZAC) 10 MG capsule Take 1 capsule by mouth daily, Disp-30 capsule, R-3Print      metFORMIN (GLUCOPHAGE) 1000 MG tablet Take 1 tablet by mouth 2 times daily (with meals), Disp-60 tablet, R-3Print      atorvastatin (LIPITOR) 40 MG tablet Take 1 tablet by mouth nightly, Disp-30 tablet, R-0Print      fenofibrate micronized (LOFIBRA) 200 MG capsule Take 1 capsule by mouth nightly, Disp-30 capsule, R-3Print      lisinopril (PRINIVIL;ZESTRIL) 2.5 MG tablet Take 1 tablet by mouth daily, Disp-30 tablet, R-3Print      carvedilol (COREG) 6.25 MG tablet Take 1 tablet by mouth 2 times daily (with meals), Disp-60 tablet, R-0Print      insulin glargine (LANTUS SOLOSTAR) 100 UNIT/ML injection pen Inject 35 Units into the skin 2 times daily, Disp-5 pen, R-3Print      insulin aspart (NOVOLOG FLEXPEN) 100 UNIT/ML injection pen Inject 20 Units into the skin 3 times daily (before meals), Disp-5 pen, R-3Print       !! - Potential duplicate medications found. Please discuss with provider. ALLERGIES     Patient has no known allergies. FAMILYHISTORY     History reviewed. No pertinent family history.        SOCIAL HISTORY       Social History     Tobacco Use    Smoking status: Never Smoker    Smokeless tobacco: Never Used   Substance Use Topics    Alcohol use: No    Drug use: No       SCREENINGS             PHYSICAL EXAM    (up to 7 for level 4, 8 or more for level 5)     ED Triage Vitals   BP Temp Temp src Pulse Resp SpO2 Height Weight   06/05/20 1415 06/05/20 1417 -- 06/05/20 1415 06/05/20 1415 06/05/20 1415 06/05/20 1415 06/05/20 1415   127/62 98.6 °F (37 °C)  110 19 94 % 5' 3\" (1.6 m) 180 lb (81.6 kg) Physical Exam  Vitals signs and nursing note reviewed. Constitutional:       General: She is awake. She is not in acute distress. Appearance: Normal appearance. She is well-developed. She is obese. She is not ill-appearing, toxic-appearing or diaphoretic. HENT:      Head: Normocephalic and atraumatic. Nose: Nose normal.   Eyes:      General:         Right eye: No discharge. Left eye: No discharge. Neck:      Musculoskeletal: Normal range of motion and neck supple. Cardiovascular:      Rate and Rhythm: Normal rate and regular rhythm. Pulses:           Radial pulses are 2+ on the right side and 2+ on the left side. Heart sounds: Normal heart sounds. No murmur. No gallop. Pulmonary:      Effort: Pulmonary effort is normal. No respiratory distress. Breath sounds: Normal breath sounds. No decreased breath sounds, wheezing, rhonchi or rales. Chest:      Chest wall: No tenderness. Abdominal:      General: Abdomen is flat. Bowel sounds are normal.      Palpations: Abdomen is soft. Tenderness: There is no abdominal tenderness. Musculoskeletal: Normal range of motion. General: No deformity. Right lower leg: No edema. Left lower leg: No edema. Skin:     General: Skin is warm and dry. Neurological:      Mental Status: She is alert and oriented to person, place, and time. GCS: GCS eye subscore is 4. GCS verbal subscore is 5. GCS motor subscore is 6. Psychiatric:         Attention and Perception: Attention normal.         Mood and Affect: Mood is depressed. Affect is flat. Speech: Speech normal.         Behavior: Behavior normal. Behavior is cooperative. Thought Content: Thought content is not paranoid or delusional. Thought content does not include homicidal or suicidal ideation. Thought content does not include homicidal or suicidal plan.          Cognition and Memory: Cognition normal.         Judgment: Judgment normal. Intravenous Stopped 6/5/20 1729)   0.9 % sodium chloride bolus (0 mLs Intravenous Stopped 6/5/20 1729)   0.9 % sodium chloride bolus (0 mLs Intravenous Stopped 6/5/20 1810)   insulin regular (HUMULIN R;NOVOLIN R) injection 10 Units (10 Units Intravenous Given 6/5/20 1524)       Patient presented to the ER by squad from home for evaluation of generalized weakness and fatigue and hyperglycemia. She is a type II diabetic and has had \"problems with her blood sugar\". On exam patient is alert and oriented she is afebrile today breathing on room air satting at 98%. Nontoxic in appearance in no acute respiratory distress. Old labs and records reviewed at this time. Initial blood sugar of 590. Urine is negative for infection. CMP initially shows a blood glucose of 710. No anion gap. Bicarb of 24. No acute electrolyte abnormalities. CBC reveals no acute leukocytosis. Hemoglobin of 14. Troponin is less than 0.01. EKG was reviewed by my attending please see note for dictation. Beta hydroxybutyrate was elevated slightly at 1.4. VBG is unremarkable. pH of 7.3. Ethanol is negative. Salicylate level is negative. Acetaminophen level is negative. While patient was in the ER upon further evaluation and further questioning patient states she is feeling down and depressed. She denied suicidal or homicidal ideation. Denies hallucinations or delusions. At this time I will also have our behavioral health team evaluate the patient once patient is medically cleared. Patient was initially given 2 L of fluids and 10 units of insulin. She then received 1/3 L of fluids. Blood glucose had improved. Patient was turned over to my attending Dr. Jhony Franco at 8304 awaiting on all disposition and also evaluation from the psychiatric team here in the ER. Please review the psychiatric team's note for further evaluation as well as my attendings for final disposition. Patient was stable at this time.       FINAL

## 2020-06-08 ENCOUNTER — CARE COORDINATION (OUTPATIENT)
Dept: CARE COORDINATION | Age: 60
End: 2020-06-08

## 2020-06-12 ENCOUNTER — HOSPITAL ENCOUNTER (OUTPATIENT)
Age: 60
Setting detail: OBSERVATION
Discharge: HOME | End: 2020-06-14
Attending: EMERGENCY MEDICINE | Admitting: INTERNAL MEDICINE
Payer: MEDICARE

## 2020-06-12 LAB
A/G RATIO: 1.4 (ref 1.1–2.2)
ALBUMIN SERPL-MCNC: 4.6 G/DL (ref 3.4–5)
ALP BLD-CCNC: 111 U/L (ref 40–129)
ALT SERPL-CCNC: 11 U/L (ref 10–40)
ANION GAP SERPL CALCULATED.3IONS-SCNC: 18 MMOL/L (ref 3–16)
AST SERPL-CCNC: 12 U/L (ref 15–37)
BASOPHILS ABSOLUTE: 0.1 K/UL (ref 0–0.2)
BASOPHILS RELATIVE PERCENT: 1.2 %
BILIRUB SERPL-MCNC: 0.3 MG/DL (ref 0–1)
BILIRUBIN URINE: NEGATIVE
BLOOD, URINE: ABNORMAL
BUN BLDV-MCNC: 18 MG/DL (ref 7–20)
CALCIUM SERPL-MCNC: 9.6 MG/DL (ref 8.3–10.6)
CHLORIDE BLD-SCNC: 89 MMOL/L (ref 99–110)
CHP ED QC CHECK: YES
CLARITY: CLEAR
CO2: 19 MMOL/L (ref 21–32)
COLOR: YELLOW
CREAT SERPL-MCNC: <0.5 MG/DL (ref 0.6–1.1)
EKG ATRIAL RATE: 101 BPM
EKG DIAGNOSIS: NORMAL
EKG P AXIS: 31 DEGREES
EKG P-R INTERVAL: 154 MS
EKG Q-T INTERVAL: 310 MS
EKG QRS DURATION: 90 MS
EKG QTC CALCULATION (BAZETT): 401 MS
EKG R AXIS: 0 DEGREES
EKG T AXIS: 68 DEGREES
EKG VENTRICULAR RATE: 101 BPM
EOSINOPHILS ABSOLUTE: 0.2 K/UL (ref 0–0.6)
EOSINOPHILS RELATIVE PERCENT: 2.1 %
EPITHELIAL CELLS, UA: ABNORMAL /HPF (ref 0–5)
GFR AFRICAN AMERICAN: >60
GFR NON-AFRICAN AMERICAN: >60
GLOBULIN: 3.4 G/DL
GLUCOSE BLD-MCNC: 287 MG/DL (ref 70–99)
GLUCOSE BLD-MCNC: 352 MG/DL
GLUCOSE BLD-MCNC: 352 MG/DL (ref 70–99)
GLUCOSE URINE: >=1000 MG/DL
HCT VFR BLD CALC: 44.2 % (ref 36–48)
HEMOGLOBIN: 14.8 G/DL (ref 12–16)
KETONES, URINE: 15 MG/DL
LEUKOCYTE ESTERASE, URINE: ABNORMAL
LYMPHOCYTES ABSOLUTE: 2.3 K/UL (ref 1–5.1)
LYMPHOCYTES RELATIVE PERCENT: 28.2 %
MCH RBC QN AUTO: 30.8 PG (ref 26–34)
MCHC RBC AUTO-ENTMCNC: 33.5 G/DL (ref 31–36)
MCV RBC AUTO: 91.8 FL (ref 80–100)
MICROSCOPIC EXAMINATION: YES
MONOCYTES ABSOLUTE: 0.5 K/UL (ref 0–1.3)
MONOCYTES RELATIVE PERCENT: 6.1 %
NEUTROPHILS ABSOLUTE: 5.1 K/UL (ref 1.7–7.7)
NEUTROPHILS RELATIVE PERCENT: 62.4 %
NITRITE, URINE: NEGATIVE
PDW BLD-RTO: 15 % (ref 12.4–15.4)
PERFORMED ON: ABNORMAL
PH UA: 5 (ref 5–8)
PLATELET # BLD: 294 K/UL (ref 135–450)
PMV BLD AUTO: 7.8 FL (ref 5–10.5)
POTASSIUM REFLEX MAGNESIUM: 4.1 MMOL/L (ref 3.5–5.1)
PROTEIN UA: NEGATIVE MG/DL
RBC # BLD: 4.82 M/UL (ref 4–5.2)
RBC UA: ABNORMAL /HPF (ref 0–4)
SODIUM BLD-SCNC: 126 MMOL/L (ref 136–145)
SPECIFIC GRAVITY UA: <=1.005 (ref 1–1.03)
TOTAL CK: 54 U/L (ref 26–192)
TOTAL PROTEIN: 8 G/DL (ref 6.4–8.2)
TROPONIN: <0.01 NG/ML
URINE REFLEX TO CULTURE: YES
URINE TYPE: ABNORMAL
UROBILINOGEN, URINE: 0.2 E.U./DL
WBC # BLD: 8.1 K/UL (ref 4–11)
WBC UA: ABNORMAL /HPF (ref 0–5)

## 2020-06-12 PROCEDURE — 85025 COMPLETE CBC W/AUTO DIFF WBC: CPT

## 2020-06-12 PROCEDURE — 99285 EMERGENCY DEPT VISIT HI MDM: CPT

## 2020-06-12 PROCEDURE — 93005 ELECTROCARDIOGRAM TRACING: CPT | Performed by: PHYSICIAN ASSISTANT

## 2020-06-12 PROCEDURE — 81001 URINALYSIS AUTO W/SCOPE: CPT

## 2020-06-12 PROCEDURE — 6360000002 HC RX W HCPCS: Performed by: EMERGENCY MEDICINE

## 2020-06-12 PROCEDURE — 2580000003 HC RX 258: Performed by: EMERGENCY MEDICINE

## 2020-06-12 PROCEDURE — 36415 COLL VENOUS BLD VENIPUNCTURE: CPT

## 2020-06-12 PROCEDURE — 84484 ASSAY OF TROPONIN QUANT: CPT

## 2020-06-12 PROCEDURE — 82550 ASSAY OF CK (CPK): CPT

## 2020-06-12 PROCEDURE — 80053 COMPREHEN METABOLIC PANEL: CPT

## 2020-06-12 PROCEDURE — 2580000003 HC RX 258: Performed by: PHYSICIAN ASSISTANT

## 2020-06-12 PROCEDURE — 83036 HEMOGLOBIN GLYCOSYLATED A1C: CPT

## 2020-06-12 PROCEDURE — 87086 URINE CULTURE/COLONY COUNT: CPT

## 2020-06-12 PROCEDURE — 93010 ELECTROCARDIOGRAM REPORT: CPT | Performed by: INTERNAL MEDICINE

## 2020-06-12 PROCEDURE — 87077 CULTURE AEROBIC IDENTIFY: CPT

## 2020-06-12 PROCEDURE — 96374 THER/PROPH/DIAG INJ IV PUSH: CPT

## 2020-06-12 PROCEDURE — 96361 HYDRATE IV INFUSION ADD-ON: CPT

## 2020-06-12 RX ORDER — 0.9 % SODIUM CHLORIDE 0.9 %
1000 INTRAVENOUS SOLUTION INTRAVENOUS ONCE
Status: COMPLETED | OUTPATIENT
Start: 2020-06-12 | End: 2020-06-13

## 2020-06-12 RX ORDER — KETOROLAC TROMETHAMINE 30 MG/ML
15 INJECTION, SOLUTION INTRAMUSCULAR; INTRAVENOUS ONCE
Status: COMPLETED | OUTPATIENT
Start: 2020-06-12 | End: 2020-06-12

## 2020-06-12 RX ORDER — 0.9 % SODIUM CHLORIDE 0.9 %
1000 INTRAVENOUS SOLUTION INTRAVENOUS ONCE
Status: COMPLETED | OUTPATIENT
Start: 2020-06-12 | End: 2020-06-12

## 2020-06-12 RX ADMIN — SODIUM CHLORIDE 1000 ML: 9 INJECTION, SOLUTION INTRAVENOUS at 21:44

## 2020-06-12 RX ADMIN — SODIUM CHLORIDE 1000 ML: 9 INJECTION, SOLUTION INTRAVENOUS at 23:11

## 2020-06-12 RX ADMIN — KETOROLAC TROMETHAMINE 15 MG: 30 INJECTION, SOLUTION INTRAMUSCULAR at 23:11

## 2020-06-12 ASSESSMENT — PAIN DESCRIPTION - ORIENTATION: ORIENTATION: RIGHT;LEFT

## 2020-06-12 ASSESSMENT — PAIN SCALES - GENERAL
PAINLEVEL_OUTOF10: 5
PAINLEVEL_OUTOF10: 10
PAINLEVEL_OUTOF10: 10

## 2020-06-12 ASSESSMENT — PAIN DESCRIPTION - PAIN TYPE: TYPE: ACUTE PAIN

## 2020-06-12 ASSESSMENT — PAIN DESCRIPTION - LOCATION: LOCATION: LEG;ARM

## 2020-06-12 ASSESSMENT — ENCOUNTER SYMPTOMS
RESPIRATORY NEGATIVE: 1
GASTROINTESTINAL NEGATIVE: 1

## 2020-06-13 ENCOUNTER — APPOINTMENT (OUTPATIENT)
Dept: CT IMAGING | Age: 60
End: 2020-06-13
Payer: MEDICARE

## 2020-06-13 PROBLEM — R55 SYNCOPE AND COLLAPSE: Status: ACTIVE | Noted: 2020-06-13

## 2020-06-13 LAB
ANION GAP SERPL CALCULATED.3IONS-SCNC: 15 MMOL/L (ref 3–16)
BUN BLDV-MCNC: 16 MG/DL (ref 7–20)
CALCIUM SERPL-MCNC: 8.6 MG/DL (ref 8.3–10.6)
CHLORIDE BLD-SCNC: 98 MMOL/L (ref 99–110)
CO2: 20 MMOL/L (ref 21–32)
CREAT SERPL-MCNC: <0.5 MG/DL (ref 0.6–1.1)
GFR AFRICAN AMERICAN: >60
GFR NON-AFRICAN AMERICAN: >60
GLUCOSE BLD-MCNC: 235 MG/DL (ref 70–99)
GLUCOSE BLD-MCNC: 248 MG/DL (ref 70–99)
GLUCOSE BLD-MCNC: 250 MG/DL (ref 70–99)
GLUCOSE BLD-MCNC: 258 MG/DL (ref 70–99)
GLUCOSE BLD-MCNC: 288 MG/DL (ref 70–99)
LV EF: 35 %
LVEF MODALITY: NORMAL
PERFORMED ON: ABNORMAL
POTASSIUM SERPL-SCNC: 4.1 MMOL/L (ref 3.5–5.1)
SODIUM BLD-SCNC: 133 MMOL/L (ref 136–145)
TROPONIN: <0.01 NG/ML
TROPONIN: <0.01 NG/ML

## 2020-06-13 PROCEDURE — 84484 ASSAY OF TROPONIN QUANT: CPT

## 2020-06-13 PROCEDURE — 96375 TX/PRO/DX INJ NEW DRUG ADDON: CPT

## 2020-06-13 PROCEDURE — G0378 HOSPITAL OBSERVATION PER HR: HCPCS

## 2020-06-13 PROCEDURE — 99214 OFFICE O/P EST MOD 30 MIN: CPT | Performed by: INTERNAL MEDICINE

## 2020-06-13 PROCEDURE — 6360000002 HC RX W HCPCS: Performed by: INTERNAL MEDICINE

## 2020-06-13 PROCEDURE — 80048 BASIC METABOLIC PNL TOTAL CA: CPT

## 2020-06-13 PROCEDURE — 36415 COLL VENOUS BLD VENIPUNCTURE: CPT

## 2020-06-13 PROCEDURE — 93290 INTERROG DEV EVAL ICPMS IP: CPT | Performed by: INTERNAL MEDICINE

## 2020-06-13 PROCEDURE — 70450 CT HEAD/BRAIN W/O DYE: CPT

## 2020-06-13 PROCEDURE — 93306 TTE W/DOPPLER COMPLETE: CPT

## 2020-06-13 PROCEDURE — 6370000000 HC RX 637 (ALT 250 FOR IP): Performed by: HOSPITALIST

## 2020-06-13 PROCEDURE — 6370000000 HC RX 637 (ALT 250 FOR IP): Performed by: INTERNAL MEDICINE

## 2020-06-13 PROCEDURE — 96361 HYDRATE IV INFUSION ADD-ON: CPT

## 2020-06-13 PROCEDURE — 2580000003 HC RX 258: Performed by: INTERNAL MEDICINE

## 2020-06-13 PROCEDURE — 93283 PRGRMG EVAL IMPLANTABLE DFB: CPT | Performed by: INTERNAL MEDICINE

## 2020-06-13 RX ORDER — MAGNESIUM SULFATE 1 G/100ML
1 INJECTION INTRAVENOUS PRN
Status: DISCONTINUED | OUTPATIENT
Start: 2020-06-13 | End: 2020-06-14 | Stop reason: HOSPADM

## 2020-06-13 RX ORDER — PROMETHAZINE HYDROCHLORIDE 25 MG/1
12.5 TABLET ORAL EVERY 6 HOURS PRN
Status: DISCONTINUED | OUTPATIENT
Start: 2020-06-13 | End: 2020-06-14 | Stop reason: HOSPADM

## 2020-06-13 RX ORDER — POTASSIUM CHLORIDE 20 MEQ/1
40 TABLET, EXTENDED RELEASE ORAL PRN
Status: DISCONTINUED | OUTPATIENT
Start: 2020-06-13 | End: 2020-06-14 | Stop reason: HOSPADM

## 2020-06-13 RX ORDER — GABAPENTIN 300 MG/1
600 CAPSULE ORAL NIGHTLY
Status: DISCONTINUED | OUTPATIENT
Start: 2020-06-13 | End: 2020-06-14 | Stop reason: HOSPADM

## 2020-06-13 RX ORDER — RANOLAZINE 500 MG/1
500 TABLET, EXTENDED RELEASE ORAL 2 TIMES DAILY
Status: DISCONTINUED | OUTPATIENT
Start: 2020-06-13 | End: 2020-06-14 | Stop reason: HOSPADM

## 2020-06-13 RX ORDER — LISINOPRIL 5 MG/1
2.5 TABLET ORAL DAILY
Status: DISCONTINUED | OUTPATIENT
Start: 2020-06-13 | End: 2020-06-14 | Stop reason: HOSPADM

## 2020-06-13 RX ORDER — SODIUM CHLORIDE 9 MG/ML
INJECTION, SOLUTION INTRAVENOUS CONTINUOUS
Status: DISCONTINUED | OUTPATIENT
Start: 2020-06-13 | End: 2020-06-14 | Stop reason: HOSPADM

## 2020-06-13 RX ORDER — CARVEDILOL 6.25 MG/1
6.25 TABLET ORAL 2 TIMES DAILY WITH MEALS
Status: DISCONTINUED | OUTPATIENT
Start: 2020-06-13 | End: 2020-06-14 | Stop reason: HOSPADM

## 2020-06-13 RX ORDER — SODIUM CHLORIDE 0.9 % (FLUSH) 0.9 %
10 SYRINGE (ML) INJECTION EVERY 12 HOURS SCHEDULED
Status: DISCONTINUED | OUTPATIENT
Start: 2020-06-13 | End: 2020-06-14 | Stop reason: HOSPADM

## 2020-06-13 RX ORDER — INSULIN GLARGINE 100 [IU]/ML
25 INJECTION, SOLUTION SUBCUTANEOUS 2 TIMES DAILY
Status: DISCONTINUED | OUTPATIENT
Start: 2020-06-13 | End: 2020-06-14 | Stop reason: HOSPADM

## 2020-06-13 RX ORDER — ACETAMINOPHEN 325 MG/1
650 TABLET ORAL EVERY 6 HOURS PRN
Status: DISCONTINUED | OUTPATIENT
Start: 2020-06-13 | End: 2020-06-14 | Stop reason: HOSPADM

## 2020-06-13 RX ORDER — NICOTINE POLACRILEX 4 MG
15 LOZENGE BUCCAL PRN
Status: DISCONTINUED | OUTPATIENT
Start: 2020-06-13 | End: 2020-06-14 | Stop reason: HOSPADM

## 2020-06-13 RX ORDER — POLYETHYLENE GLYCOL 3350 17 G/17G
17 POWDER, FOR SOLUTION ORAL DAILY PRN
Status: DISCONTINUED | OUTPATIENT
Start: 2020-06-13 | End: 2020-06-14 | Stop reason: HOSPADM

## 2020-06-13 RX ORDER — POTASSIUM CHLORIDE 7.45 MG/ML
10 INJECTION INTRAVENOUS PRN
Status: DISCONTINUED | OUTPATIENT
Start: 2020-06-13 | End: 2020-06-14 | Stop reason: HOSPADM

## 2020-06-13 RX ORDER — FLUCONAZOLE 2 MG/ML
200 INJECTION, SOLUTION INTRAVENOUS EVERY 24 HOURS
Status: DISCONTINUED | OUTPATIENT
Start: 2020-06-13 | End: 2020-06-14 | Stop reason: HOSPADM

## 2020-06-13 RX ORDER — DULOXETIN HYDROCHLORIDE 30 MG/1
30 CAPSULE, DELAYED RELEASE ORAL DAILY
Status: DISCONTINUED | OUTPATIENT
Start: 2020-06-13 | End: 2020-06-14 | Stop reason: HOSPADM

## 2020-06-13 RX ORDER — GABAPENTIN 100 MG/1
100 CAPSULE ORAL 3 TIMES DAILY
Status: DISCONTINUED | OUTPATIENT
Start: 2020-06-13 | End: 2020-06-13 | Stop reason: DRUGHIGH

## 2020-06-13 RX ORDER — INSULIN GLARGINE 100 [IU]/ML
15 INJECTION, SOLUTION SUBCUTANEOUS 2 TIMES DAILY
Status: DISCONTINUED | OUTPATIENT
Start: 2020-06-13 | End: 2020-06-13

## 2020-06-13 RX ORDER — ASPIRIN 81 MG/1
81 TABLET ORAL DAILY
Status: DISCONTINUED | OUTPATIENT
Start: 2020-06-13 | End: 2020-06-14 | Stop reason: HOSPADM

## 2020-06-13 RX ORDER — ATORVASTATIN CALCIUM 40 MG/1
40 TABLET, FILM COATED ORAL NIGHTLY
Status: DISCONTINUED | OUTPATIENT
Start: 2020-06-13 | End: 2020-06-14 | Stop reason: HOSPADM

## 2020-06-13 RX ORDER — ONDANSETRON 2 MG/ML
4 INJECTION INTRAMUSCULAR; INTRAVENOUS EVERY 6 HOURS PRN
Status: DISCONTINUED | OUTPATIENT
Start: 2020-06-13 | End: 2020-06-14 | Stop reason: HOSPADM

## 2020-06-13 RX ORDER — SODIUM CHLORIDE 0.9 % (FLUSH) 0.9 %
10 SYRINGE (ML) INJECTION PRN
Status: DISCONTINUED | OUTPATIENT
Start: 2020-06-13 | End: 2020-06-14 | Stop reason: HOSPADM

## 2020-06-13 RX ORDER — ACETAMINOPHEN 650 MG/1
650 SUPPOSITORY RECTAL EVERY 6 HOURS PRN
Status: DISCONTINUED | OUTPATIENT
Start: 2020-06-13 | End: 2020-06-14 | Stop reason: HOSPADM

## 2020-06-13 RX ORDER — FENOFIBRATE 160 MG/1
160 TABLET ORAL DAILY
Status: DISCONTINUED | OUTPATIENT
Start: 2020-06-13 | End: 2020-06-14 | Stop reason: HOSPADM

## 2020-06-13 RX ORDER — GABAPENTIN 100 MG/1
100 CAPSULE ORAL
Status: DISCONTINUED | OUTPATIENT
Start: 2020-06-14 | End: 2020-06-14 | Stop reason: HOSPADM

## 2020-06-13 RX ORDER — DEXTROSE MONOHYDRATE 50 MG/ML
100 INJECTION, SOLUTION INTRAVENOUS PRN
Status: DISCONTINUED | OUTPATIENT
Start: 2020-06-13 | End: 2020-06-14 | Stop reason: HOSPADM

## 2020-06-13 RX ORDER — DEXTROSE MONOHYDRATE 25 G/50ML
12.5 INJECTION, SOLUTION INTRAVENOUS PRN
Status: DISCONTINUED | OUTPATIENT
Start: 2020-06-13 | End: 2020-06-14 | Stop reason: HOSPADM

## 2020-06-13 RX ORDER — FLUOXETINE 10 MG/1
10 CAPSULE ORAL DAILY
Status: DISCONTINUED | OUTPATIENT
Start: 2020-06-13 | End: 2020-06-14 | Stop reason: HOSPADM

## 2020-06-13 RX ADMIN — Medication 10 ML: at 20:28

## 2020-06-13 RX ADMIN — GABAPENTIN 100 MG: 100 CAPSULE ORAL at 12:15

## 2020-06-13 RX ADMIN — CARVEDILOL 6.25 MG: 6.25 TABLET, FILM COATED ORAL at 16:53

## 2020-06-13 RX ADMIN — Medication 10 ML: at 09:09

## 2020-06-13 RX ADMIN — INSULIN LISPRO 6 UNITS: 100 INJECTION, SOLUTION INTRAVENOUS; SUBCUTANEOUS at 16:52

## 2020-06-13 RX ADMIN — SODIUM CHLORIDE: 9 INJECTION, SOLUTION INTRAVENOUS at 22:10

## 2020-06-13 RX ADMIN — FLUCONAZOLE 200 MG: 2 INJECTION, SOLUTION INTRAVENOUS at 09:02

## 2020-06-13 RX ADMIN — GABAPENTIN 600 MG: 300 CAPSULE ORAL at 20:28

## 2020-06-13 RX ADMIN — INSULIN LISPRO 3 UNITS: 100 INJECTION, SOLUTION INTRAVENOUS; SUBCUTANEOUS at 20:39

## 2020-06-13 RX ADMIN — INSULIN GLARGINE 25 UNITS: 100 INJECTION, SOLUTION SUBCUTANEOUS at 20:38

## 2020-06-13 RX ADMIN — ACETAMINOPHEN 650 MG: 325 TABLET ORAL at 16:52

## 2020-06-13 RX ADMIN — RANOLAZINE 500 MG: 500 TABLET, FILM COATED, EXTENDED RELEASE ORAL at 20:28

## 2020-06-13 RX ADMIN — ATORVASTATIN CALCIUM 40 MG: 40 TABLET, FILM COATED ORAL at 20:28

## 2020-06-13 RX ADMIN — METFORMIN HYDROCHLORIDE 1000 MG: 500 TABLET ORAL at 16:53

## 2020-06-13 RX ADMIN — SODIUM CHLORIDE: 9 INJECTION, SOLUTION INTRAVENOUS at 09:02

## 2020-06-13 ASSESSMENT — PAIN SCALES - GENERAL: PAINLEVEL_OUTOF10: 10

## 2020-06-13 NOTE — ED PROVIDER NOTES
related to that system including errors in grammar, punctuation, and spelling, as well as words and phrases that may be inappropriate. If there are any questions or concerns please feel free to contact the dictating provider for clarification.           [place],   06/20/20 6499

## 2020-06-13 NOTE — ED PROVIDER NOTES
[address] ED  EMERGENCY DEPARTMENT ENCOUNTER        Pt Name: Amaryllis Blizzard  MRN: [id number]  [place] 1960  Date of evaluation: 6/12/2020  Provider: Fuad Clemons PA-C  PCP: [facility]     I have seen and evaluated this patient with my supervising physician Fern Hughes, [facility]       Chief Complaint   Patient presents with    Arm Pain     patient states she has shooting pains going up her arms and down her legs that started 4 days ago.  Leg Pain       HISTORY OF PRESENT ILLNESS   (Location, Timing/Onset, Context/Setting, Quality, Duration, Modifying Factors, Severity, Associated Signs and Symptoms)  Note limiting factors. Amaryllis Blizzard is a 63 y.o. female with a past medical history of diabetes, hypertension, hyperlipidemia, CAD, history of CVA, CHF, cognitive developmental delay, ICD in place, history of TIA brought in today by squad from home for evaluation of bilateral arm pain as well as bilateral leg pain. Denies injury or trauma. Denies skin changes or redness. Denies fevers ors chills. Onset occurred over the past several days. Duration of symptoms have been persistent since onset. No context. No aggravating or alleviating complaints. Rates her pain 10 out of 10 with no radiation of pain. She has not tried anything at home for pain relief. Nothing seems to make the pain better or worse. Denies any numbness or tingling. She otherwise denies any other complaints at this time. Nursing Notes were all reviewed and agreed with or any disagreements were addressed in the HPI. REVIEW OF SYSTEMS    (2-9 systems for level 4, 10 or more for level 5)     Review of Systems   Constitutional: Negative. HENT: Negative. Respiratory: Negative. Cardiovascular: Negative. Gastrointestinal: Negative. Genitourinary: Negative. Musculoskeletal: Positive for arthralgias. Skin: Negative. Neurological: Negative.         Positives diaphoretic. HENT:      Head: Normocephalic and atraumatic. Nose: Nose normal.   Eyes:      General:         Right eye: No discharge. Left eye: No discharge. Neck:      Musculoskeletal: Normal range of motion and neck supple. Cardiovascular:      Rate and Rhythm: Normal rate and regular rhythm. Pulses:           Radial pulses are 2+ on the right side and 2+ on the left side. Heart sounds: Normal heart sounds. No murmur. No gallop. Pulmonary:      Effort: Pulmonary effort is normal. No respiratory distress. Breath sounds: Normal breath sounds. No decreased breath sounds, wheezing, rhonchi or rales. Chest:      Chest wall: No tenderness. Musculoskeletal: Normal range of motion. General: No deformity. Skin:     General: Skin is warm and dry. Neurological:      General: No focal deficit present. Mental Status: She is alert and oriented to person, place, and time. GCS: GCS eye subscore is 4. GCS verbal subscore is 5. GCS motor subscore is 6. Cranial Nerves: Cranial nerves are intact. Sensory: Sensation is intact. Motor: Motor function is intact. Coordination: Coordination is intact. Gait: Gait is intact. Psychiatric:         Behavior: Behavior normal. Behavior is cooperative.          DIAGNOSTIC RESULTS   LABS:    Labs Reviewed   COMPREHENSIVE METABOLIC PANEL W/ REFLEX TO MG FOR LOW K - Abnormal; Notable for the following components:       Result Value    Sodium 126 (*)     Chloride 89 (*)     CO2 19 (*)     Anion Gap 18 (*)     Glucose 287 (*)     CREATININE <0.5 (*)     AST 12 (*)     All other components within normal limits    Narrative:     Performed at:  [place] [address],  [place], [facility]   Phone [phone]   URINE RT REFLEX TO CULTURE - Abnormal; Notable for the following components:    Glucose, Ur >=1000 (*)     Ketones, Urine 15 (*)     Blood, Urine TRACE-LYSED (*)

## 2020-06-13 NOTE — ED NOTES
Report given to [facility]. All questions answered at this time.       Bry Liu RN  06/13/20 3918

## 2020-06-13 NOTE — H&P
Questions Responses    Vaping Use Never User    Start Date     Does device contain nicotine? Quit Date     Vaping Type             Family History:       Reviewed in detail and negative for DM, CAD, Cancer, CVA. Positive as follows:        Problem Relation Age of Onset    Heart Disease Father        REVIEW OF SYSTEMS:   Pertinent positives as noted in the HPI. All other systems reviewed and negative. PHYSICAL EXAM PERFORMED:    /80   Pulse 80   Temp 97.3 °F (36.3 °C) (Oral)   Resp 16   Ht 5' 3\" (1.6 m)   Wt 204 lb (92.5 kg)   SpO2 97%   BMI 36.14 kg/m²     General appearance:  No apparent distress, appears stated age and cooperative. HEENT:  Normal cephalic, atraumatic without obvious deformity. Pupils equal, round, and reactive to light. Extra ocular muscles intact. Conjunctivae/corneas clear. Neck: Supple, with full range of motion. No jugular venous distention. Trachea midline. Respiratory:  Normal respiratory effort. Clear to auscultation, bilaterally without Rales/Wheezes/Rhonchi. Cardiovascular:  Regular rate and rhythm with normal S1/S2 without murmurs, rubs or gallops. Abdomen: Soft, non-tender, non-distended with normal bowel sounds. Musculoskeletal:  No clubbing, cyanosis or edema bilaterally. Full range of motion without deformity. trace edema   Bit sensitive to touch   Skin: Skin color, texture, turgor normal.  No rashes or lesions. Neurologic:  Neurovascularly intact without any focal sensory/motor deficits.  Cranial nerves: II-XII intact, grossly non-focal.  Psychiatric:  Alert and oriented, thought content appropriate, normal insight  Capillary Refill: Brisk,< 3 seconds   Peripheral Pulses: +2 palpable, equal bilaterally       Labs:     Recent Labs     06/12/20 2055   WBC 8.1   HGB 14.8   HCT 44.2        Recent Labs     06/12/20 2055 06/13/20  0702   * 133*   K 4.1 4.1   CL 89* 98*   CO2 19* 20*   BUN 18 16   CREATININE <0.5* <0.5*   CALCIUM 9.6 8.6     Recent Labs     06/12/20 2055   AST 12*   ALT 11   BILITOT 0.3   ALKPHOS 111     No results for input(s): INR in the last 72 hours. Recent Labs     06/12/20 2055 06/13/20  0702 06/13/20  1242   CKTOTAL 54  --   --    TROPONINI <0.01 <0.01 <0.01       Urinalysis:      Lab Results   Component Value Date    NITRU Negative 06/12/2020    WBCUA 10-20 06/12/2020    BACTERIA Rare 05/28/2020    RBCUA 3-4 06/12/2020    BLOODU TRACE-LYSED 06/12/2020    SPECGRAV <=1.005 06/12/2020    GLUCOSEU >=1000 06/12/2020       Radiology:   EKG:  I have reviewed the EKG with the following interpretation:NSR  pVC s    CT Head WO Contrast   Preliminary Result   No acute intracranial abnormality. ASSESSMENT:    Active Hospital Problems    Diagnosis Date Noted    Syncope and collapse [R55] 06/13/2020    S/P ICD (internal cardiac defibrillator) procedure [Z95.810]     Nonischemic cardiomyopathy ([address] [I42.8]      Uncontrolled dm  previous a1c 14    hyponatremia due t obs    uti  atb     Pain leg/arm could be neuropathy\    Start neurontoin     Syncope/hx  stJude pacer  Will getinterrogate  Dr. Ino Jimenez is considering atrial tachycardia ablation if necessary. Pt has non  ischemic CMP [facility] 2018     MRDD     PLAN:  Will need supervison /assited living   Social service consult       DVT Prophylaxis: lovonex  Diet: DIET CARB CONTROL; Carb Control: 4 carb choices (60 gms)/meal; Low Sodium (2 GM)  Code Status: Full Code      Dispo - pcu       Xiomara Sheth MD    Thank you [facility] for the opportunity to be involved in this patient's care. If you have any questions or concerns please feel free to contact me at [phone].

## 2020-06-13 NOTE — CARE COORDINATION
Case Management Assessment  Initial Evaluation    Date/Time of Evaluation: 6/13/2020 5:49 PM  Assessment Completed by: Scout Amezquita    Patient Name: Compa Lubin  YOB: 1960  Diagnosis: Syncope and collapse [R55]  Date / Time: 6/12/2020  6:05 PM  Admission status/Date:6/12/2020 1805 OBS  Chart Reviewed: Yes      Patient Interviewed: Yes   Family Interviewed:  No      Hospitalization in the last 30 days:  No    Contacts  :     Relationship to Patient:   Phone Number:    Alternate Contact:     Relationship to Patient:     Phone Number:    Met with:    Current PCP Vahid Ta, [address] [phone])--[id number] MD [address] required for SNF : Y, N        3 night stay required - Y, N    ADLS  Support Systems: None  Transportation: walks    Meal Preparation: self    Housing  Home Environment: 1 st floor apt alone  Steps: 0  Plans to Return to Present Housing: Yes  Other Identified Issues:     Home Care Information  Currently active with [address] : No  Type of Home Care Services: Nursing Services  Passport/Waiver : No  :                      Phone Number:    Passport/Waiver Services:           Durable Medical Equipment   DME Provider:   Equipment: yes Walker__X_Cane_X__RTS___ BSC___Shower Chair__X with grab bars_  02__ at ____Liter(s)---Uses________HHN___ CPAP___ BiPap___ Hospital Bed___W/C____Other________      Has Home O2 in place on admit:  No    If above answer is No ---is pt presently on O2 during hospitalization:  No  if yes CM to follow for potential DC O2 need  Informed of need to bring portable home O2 tank on day of discharge for nursing to connect prior to leaving:   Not Indicated  Verbalized agreement/Understanding:   Not Indicated    Community Service Affiliation  Dialysis:  No    · Name:  · Location  · Dialysis Schedule:  · Phone:   · Fax:     Outpatient PT/OT: No    Cancer Center: No     CHF Clinic: No Pulmonary Rehab: No  Pain Clinic: No  Community Mental Health: No    Wound Clinic: No     COVID SCREENING: No       Other: The Plan for Transition of Care is related to the following treatment goals: home    The Patient and/or patient representative pt was provided with a choice of provider and agrees   with the discharge plan. [x] Yes [] No    Freedom of choice list was provided with basic dialogue that supports the patient's individualized plan of care/goals, treatment preferences and shares the quality data associated with the providers. [x] Yes [] No    DISCHARGE PLAN: Reviewed chart. Role of discharge planner explained and patient verbalized understanding. Pt does have a cognitive developmental delay, but is able to understand and tell CM everything and repeat everything CM is saying. Pt is from a 1st floor apt alone and plans to return. CM received a consult for assisted living options. CM placed these options in pt's d/c instructions. Pt was active with [facility]. Cm called John Lemus with [facility] ([phone]) and she states that pt's [facility] ended on 5/20/2020 d/t insurance only approving 4 visits. John Lemus states that she will accept pt back upon discharge. Per John Lemus, the  with [facility] also was involved and asked pt to put in an application into [facility], however, it was noted that for applicants under age 58, there is a waiting list of 1 1-1/2 yrs. Pt states that she has walked to this location, but everytime she does and she also calls, they are closed, they are not open yet. Pt states that upon d/c, she would like [facility] to resume with SN/SW/PT. CM discussed the Assisted Living information that was placed in patients d/c instructions and pt is aware that she needs to call them to set up an appt to get an Assisted Living facility. Pt agreed and states that she will do this.      Pt did not have an emergency  her list, but Nate Persons [facility]. stated that she had her sister. Christa Sheets name and number. Pt states that she did not want the sister involved. CM did not call her sister. She states that her sister has not talked to her in 3 years. Pt states that she walks everywhere. Pt will probably need a taxi voucher home, as she has no family or friends to help. Pt also asked for CM to ask for transportation services. CM placed CTC info for transportation in d/c instructions,as well. Explained Case Management role/services.

## 2020-06-13 NOTE — PROGRESS NOTES
Cardiology consult has been called to Dr. Raeann Tobin on 6/13/20 through answering service @ 1841.  Joaquim Shoemaker [facility]

## 2020-06-13 NOTE — CONSULTS
Skin color, texture, turgor normal, no rashes or lesions   Pysch: Normal mood and affect   Neurologic: Normal gross motor and sensory exam.         Labs  CBC:   Lab Results   Component Value Date    WBC 8.1 06/12/2020    RBC 4.82 06/12/2020    HGB 14.8 06/12/2020    HCT 44.2 06/12/2020    MCV 91.8 06/12/2020    RDW 15.0 06/12/2020     06/12/2020     CMP:    Lab Results   Component Value Date     06/12/2020    K 4.1 06/12/2020    CL 89 06/12/2020    CO2 19 06/12/2020    BUN 18 06/12/2020    CREATININE <0.5 06/12/2020    GFRAA >60 06/12/2020    AGRATIO 1.4 06/12/2020    LABGLOM >60 06/12/2020    GLUCOSE 287 06/12/2020    PROT 8.0 06/12/2020    CALCIUM 9.6 06/12/2020    BILITOT 0.3 06/12/2020    ALKPHOS 111 06/12/2020    AST 12 06/12/2020    ALT 11 06/12/2020     PT/INR:  No results found for: PTINR  Lab Results   Component Value Date    CKTOTAL 54 06/12/2020    TROPONINI <0.01 06/12/2020       EKG:  I have reviewed EKG with the following interpretation:  Impression:  See HPI    Assessment:  Karissa Coker is a 63 y.o. patient who presented to [facility] 6/12/20 with multiple c/o pain in legs/arms/chest, passing out, and N/V. She has PMH of non-ischemic CM, non-obstructive CAD by cath 12/18, hx NSVT, hx atach and inappropriate ICD shock secondarily, hx CVA, s/p ICD, HTN, DM, and syncope. Most recent [address] 1/31/20 shows EF=45%; inferolateral HK; fixed defect c/w scar inferior wall; no ischemia. Most recent ECHO 10/8/19 showed EF=35-40%; moderately dilated LV; mild MR; RV systolic dysfunction. Now presents with multiple complaints. See above. She c/o diffuse, constant, sharp CP, nothing makes better or worse, and no associated symptoms. Also c/o leg/arm pain along with 2-3 day hx N/V with decreased oral intake. She \"passed out\" 2 days ago but cannot give me history of any symptoms prior to event. She reports awakening on floor. This has happened in past. She has baseline CARRION with exertion.  Admit EKG shows sinus tach 101bpm; low voltage; nonspecific ST change (no change from 6/5//20 and 5//28/20 EKG's). Note 2 negative Britney enzymes; K+ 4.1; Na+=126; BUN/Cr=16/<0..5. Diagnosis of unspecified chest pain and reported syncope in middle-aged female with hx non-ischemic CM, atach, NSVT, NOCAD, and s/p ICD. Recs:  1. Ruled out for MI and EKG unremarkable. .  2. ? True syncope but she has hx CM and NSVT with ICD. She denies recent ICD shock. 3. I recommend device interrogation by [facility]. If no concerning arrhythmia cardiology will sign off and she could be d/c'd later today if OK with IM doc. Will make outpatient OV in office. Note Dr. Nataly Saucedo is considering atrial tachycardia ablation if necessary. 4. Would cancel ECHO study if not already done.        Patient Active Problem List   Diagnosis    DM (diabetes mellitus) ([address]    HTN (hypertension), benign    Dyslipidemia    CAD (coronary artery disease)    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)    Dual ICD (implantable cardioverter-defibrillator) in place    Brain tumor (benign) ([address]    Chronic combined systolic (EF 12-96%) & diastolic (grade 2 LVDD) CHF    TIA involving right internal carotid artery    CAD in native artery    DM (diabetes mellitus), secondary, uncontrolled, w/neurologic complic ([address]    CHF (congestive heart failure) ([address]    Cardiomyopathy ([address]    Essential hypertension    TIA (transient ischemic attack)    Hyperglycemia    Diabetic ketoacidosis without coma associated with type 2 diabetes mellitus (HCC)    Non-intractable vomiting with nausea    Chest pain    Arterial ischemic stroke, ICA, right, acute ([address]    Diabetic hyperosmolar non-ketotic state ([address]    ROSA (acute kidney injury) ([address]    Diabetic acidosis without coma ([address]    Hyponatremia    Metabolic acidosis    Disorder of electrolytes    Diabetic ketoacidosis with coma associated with type 2 diabetes mellitus ([address]    Hypernatremia  Leukocytosis    Acute kidney injury [facility]    Atrial tachycardia ([facility]    DKA, type 2, not at goal [facility])    Cognitive developmental delay    Mood disorder ([facility]    Urinary tract infection with hematuria    Nausea and vomiting    Hypokalemia    Persistent fever    Bacteremia    Syncope and collapse       Thank you for allowing to us to participate in the [facility]. Further evaluation will be based upon the patient's clinical course and testing results.

## 2020-06-14 VITALS
HEIGHT: 63 IN | HEART RATE: 89 BPM | OXYGEN SATURATION: 97 % | DIASTOLIC BLOOD PRESSURE: 82 MMHG | WEIGHT: 200.2 LBS | BODY MASS INDEX: 35.47 KG/M2 | RESPIRATION RATE: 18 BRPM | TEMPERATURE: 97.7 F | SYSTOLIC BLOOD PRESSURE: 131 MMHG

## 2020-06-14 LAB
A/G RATIO: 1.4 (ref 1.1–2.2)
ALBUMIN SERPL-MCNC: 3.6 G/DL (ref 3.4–5)
ALP BLD-CCNC: 68 U/L (ref 40–129)
ALT SERPL-CCNC: 8 U/L (ref 10–40)
ANION GAP SERPL CALCULATED.3IONS-SCNC: 12 MMOL/L (ref 3–16)
AST SERPL-CCNC: 11 U/L (ref 15–37)
BASOPHILS ABSOLUTE: 0.1 K/UL (ref 0–0.2)
BASOPHILS RELATIVE PERCENT: 1 %
BILIRUB SERPL-MCNC: <0.2 MG/DL (ref 0–1)
BUN BLDV-MCNC: 10 MG/DL (ref 7–20)
CALCIUM SERPL-MCNC: 8.8 MG/DL (ref 8.3–10.6)
CHLORIDE BLD-SCNC: 103 MMOL/L (ref 99–110)
CO2: 21 MMOL/L (ref 21–32)
CREAT SERPL-MCNC: <0.5 MG/DL (ref 0.6–1.1)
EOSINOPHILS ABSOLUTE: 0.2 K/UL (ref 0–0.6)
EOSINOPHILS RELATIVE PERCENT: 4.2 %
ESTIMATED AVERAGE GLUCOSE: 317.8 MG/DL
GFR AFRICAN AMERICAN: >60
GFR NON-AFRICAN AMERICAN: >60
GLOBULIN: 2.6 G/DL
GLUCOSE BLD-MCNC: 197 MG/DL (ref 70–99)
GLUCOSE BLD-MCNC: 209 MG/DL (ref 70–99)
GLUCOSE BLD-MCNC: 215 MG/DL (ref 70–99)
GLUCOSE BLD-MCNC: 250 MG/DL (ref 70–99)
HBA1C MFR BLD: 12.7 %
HCT VFR BLD CALC: 41.6 % (ref 36–48)
HEMOGLOBIN: 13.9 G/DL (ref 12–16)
LYMPHOCYTES ABSOLUTE: 2.3 K/UL (ref 1–5.1)
LYMPHOCYTES RELATIVE PERCENT: 39.5 %
MCH RBC QN AUTO: 31.3 PG (ref 26–34)
MCHC RBC AUTO-ENTMCNC: 33.4 G/DL (ref 31–36)
MCV RBC AUTO: 93.8 FL (ref 80–100)
MONOCYTES ABSOLUTE: 0.5 K/UL (ref 0–1.3)
MONOCYTES RELATIVE PERCENT: 8.2 %
NEUTROPHILS ABSOLUTE: 2.8 K/UL (ref 1.7–7.7)
NEUTROPHILS RELATIVE PERCENT: 47.1 %
ORGANISM: ABNORMAL
PDW BLD-RTO: 15.1 % (ref 12.4–15.4)
PERFORMED ON: ABNORMAL
PLATELET # BLD: 261 K/UL (ref 135–450)
PMV BLD AUTO: 7.8 FL (ref 5–10.5)
POTASSIUM REFLEX MAGNESIUM: 4 MMOL/L (ref 3.5–5.1)
RBC # BLD: 4.44 M/UL (ref 4–5.2)
SODIUM BLD-SCNC: 136 MMOL/L (ref 136–145)
TOTAL PROTEIN: 6.2 G/DL (ref 6.4–8.2)
URINE CULTURE, ROUTINE: ABNORMAL
URINE CULTURE, ROUTINE: ABNORMAL
WBC # BLD: 5.8 K/UL (ref 4–11)

## 2020-06-14 PROCEDURE — G0378 HOSPITAL OBSERVATION PER HR: HCPCS

## 2020-06-14 PROCEDURE — 85025 COMPLETE CBC W/AUTO DIFF WBC: CPT

## 2020-06-14 PROCEDURE — 80053 COMPREHEN METABOLIC PANEL: CPT

## 2020-06-14 PROCEDURE — 36415 COLL VENOUS BLD VENIPUNCTURE: CPT

## 2020-06-14 PROCEDURE — 2580000003 HC RX 258: Performed by: INTERNAL MEDICINE

## 2020-06-14 PROCEDURE — 6360000002 HC RX W HCPCS: Performed by: INTERNAL MEDICINE

## 2020-06-14 PROCEDURE — 96376 TX/PRO/DX INJ SAME DRUG ADON: CPT

## 2020-06-14 PROCEDURE — 6370000000 HC RX 637 (ALT 250 FOR IP): Performed by: HOSPITALIST

## 2020-06-14 PROCEDURE — 96372 THER/PROPH/DIAG INJ SC/IM: CPT

## 2020-06-14 PROCEDURE — 6370000000 HC RX 637 (ALT 250 FOR IP): Performed by: INTERNAL MEDICINE

## 2020-06-14 RX ORDER — FLUCONAZOLE 100 MG/1
200 TABLET ORAL DAILY
Qty: 5 TABLET | Refills: 0 | Status: SHIPPED | OUTPATIENT
Start: 2020-06-14 | End: 2020-06-21

## 2020-06-14 RX ADMIN — ASPIRIN 81 MG: 81 TABLET, COATED ORAL at 11:47

## 2020-06-14 RX ADMIN — CARVEDILOL 6.25 MG: 6.25 TABLET, FILM COATED ORAL at 11:47

## 2020-06-14 RX ADMIN — DULOXETINE HYDROCHLORIDE 30 MG: 30 CAPSULE, DELAYED RELEASE ORAL at 11:46

## 2020-06-14 RX ADMIN — FENOFIBRATE 160 MG: 160 TABLET ORAL at 11:45

## 2020-06-14 RX ADMIN — METFORMIN HYDROCHLORIDE 1000 MG: 500 TABLET ORAL at 11:47

## 2020-06-14 RX ADMIN — ACETAMINOPHEN 650 MG: 325 TABLET ORAL at 06:11

## 2020-06-14 RX ADMIN — LISINOPRIL 2.5 MG: 5 TABLET ORAL at 11:46

## 2020-06-14 RX ADMIN — INSULIN GLARGINE 15 UNITS: 100 INJECTION, SOLUTION SUBCUTANEOUS at 08:58

## 2020-06-14 RX ADMIN — FLUCONAZOLE 200 MG: 2 INJECTION, SOLUTION INTRAVENOUS at 11:54

## 2020-06-14 RX ADMIN — RANOLAZINE 500 MG: 500 TABLET, FILM COATED, EXTENDED RELEASE ORAL at 11:46

## 2020-06-14 RX ADMIN — ENOXAPARIN SODIUM 40 MG: 40 INJECTION SUBCUTANEOUS at 11:47

## 2020-06-14 RX ADMIN — GABAPENTIN 100 MG: 100 CAPSULE ORAL at 11:47

## 2020-06-14 RX ADMIN — FLUOXETINE 10 MG: 10 CAPSULE ORAL at 11:46

## 2020-06-14 RX ADMIN — SODIUM CHLORIDE: 9 INJECTION, SOLUTION INTRAVENOUS at 11:47

## 2020-06-14 ASSESSMENT — PAIN DESCRIPTION - PROGRESSION: CLINICAL_PROGRESSION: NOT CHANGED

## 2020-06-14 ASSESSMENT — PAIN - FUNCTIONAL ASSESSMENT: PAIN_FUNCTIONAL_ASSESSMENT: ACTIVITIES ARE NOT PREVENTED

## 2020-06-14 ASSESSMENT — PAIN DESCRIPTION - DESCRIPTORS: DESCRIPTORS: HEADACHE

## 2020-06-14 ASSESSMENT — PAIN SCALES - GENERAL: PAINLEVEL_OUTOF10: 3

## 2020-06-14 ASSESSMENT — PAIN DESCRIPTION - PAIN TYPE: TYPE: ACUTE PAIN

## 2020-06-14 ASSESSMENT — PAIN DESCRIPTION - LOCATION: LOCATION: HEAD

## 2020-06-14 ASSESSMENT — PAIN DESCRIPTION - ONSET: ONSET: ON-GOING

## 2020-06-14 ASSESSMENT — PAIN DESCRIPTION - FREQUENCY: FREQUENCY: CONTINUOUS

## 2020-06-14 NOTE — DISCHARGE SUMMARY
Nausea or Vomiting  Qty: 15 tablet, Refills: 0      nystatin (MYCOSTATIN) 192528 UNIT/GM powder Apply topically 4 times daily. Qty: 1 Bottle, Refills: 0      DULoxetine (CYMBALTA) 60 MG extended release capsule Take 1 capsule by mouth daily  Qty: 30 capsule, Refills: 3      miconazole (MICOTIN) 2 % powder Apply topically 2 times daily. Qty: 45 g, Refills: 1      !! Lancets MISC 1 month supply for TID fingersticks  Qty: 100 each, Refills: 3      !! CVS Lancets Ultra Thin MISC 1 each by Does not apply route daily  Qty: 100 each, Refills: 0      blood glucose monitor strips Test three times a day & as needed for symptoms of irregular blood glucose. Qty: 100 strip, Refills: 2    Comments: Brand per patient preference. May round up to next available package size. ranolazine (RANEXA) 500 MG extended release tablet Take 1 tablet by mouth 2 times daily  Qty: 60 tablet, Refills: 3      gabapentin (NEURONTIN) 600 MG tablet Take 600 mg by mouth nightly. Andres Aguilar       aspirin 81 MG EC tablet Take 1 tablet by mouth daily  Qty: 30 tablet, Refills: 3      topiramate (TOPAMAX) 200 MG tablet Take 1 tablet by mouth daily  Qty: 60 tablet, Refills: 0      FLUoxetine (PROZAC) 10 MG capsule Take 1 capsule by mouth daily  Qty: 30 capsule, Refills: 3      metFORMIN (GLUCOPHAGE) 1000 MG tablet Take 1 tablet by mouth 2 times daily (with meals)  Qty: 60 tablet, Refills: 3      atorvastatin (LIPITOR) 40 MG tablet Take 1 tablet by mouth nightly  Qty: 30 tablet, Refills: 0      fenofibrate micronized (LOFIBRA) 200 MG capsule Take 1 capsule by mouth nightly  Qty: 30 capsule, Refills: 3      lisinopril (PRINIVIL;ZESTRIL) 2.5 MG tablet Take 1 tablet by mouth daily  Qty: 30 tablet, Refills: 3      carvedilol (COREG) 6.25 MG tablet Take 1 tablet by mouth 2 times daily (with meals)  Qty: 60 tablet, Refills: 0      insulin glargine (LANTUS SOLOSTAR) 100 UNIT/ML injection pen Inject 35 Units into the skin 2 times daily  Qty: 5 pen, Refills: 3 insulin aspart (NOVOLOG FLEXPEN) 100 UNIT/ML injection pen Inject 20 Units into the skin 3 times daily (before meals)  Qty: 5 pen, Refills: 3       !! - Potential duplicate medications found. Please discuss with provider. Time Spent on discharge is more than 45 minutes in the examination, evaluation, counseling and review of medications and discharge plan. Signed:    Gilbert Tsai MD   6/14/2020      Thank you [facility] for the opportunity to be involved in this patient's care. If you have any questions or concerns please feel free to contact me at [phone].

## 2020-06-14 NOTE — CARE COORDINATION
DISCHARGE ORDER  Date/Time 2020 12:57 PM  Completed by: Mak Junior, Case Management    Patient Name: Liya Lorenzo    : 1960  Admitting Diagnosis: Syncope and collapse [R55]      Admit order Date and Status:2020 1805 OBS   (verify MD's last order for status of admission)      Noted discharge order. Confirmed discharge plan  (pt): Yes  with whom_______________  If pt confirmed DC plan does family need to be contacted by CM No if yes who______  Discharge Plan: Reviewed chart. Role of discharge planner explained and patient verbalized understanding. Discharge order is noted. Pt is being d/c'd to home today. Pt's O2 sats are 97% on RA. Pt states that she is agreeable to [facility] again for SN/PT/SSW. CM spoke rubén Feliz with [facility] ([phone]) and she is able to accept pt back. CM faxed facesheet/HHC orders/AVS/ADEN to [phone]. Pt is going to call [facility] for looking into Assisted Living, as she knows it is on the d/c instructions, Karlee with [facility] is,as well. CM also informed pt about CTC being on her d/c instructions and to call them regarding transportation. She states that she is familiar with this and will call to set up. CM spoke with Ellen Grande is aware that pt will need taxi transport home, d/t not having any family or friends to transport her home. .  No further discharge needs needed or noted. Reviewed chart. Role of discharge planner explained and patient verbalized understanding. Discharge order is noted. Has Home O2 in place on admit:  No  Informed of need to bring portable home O2 tank on day of discharge for nursing to connect prior to leaving:   No  Verbalized agreement/Understanding:   No    Discharge timeout done with Feng Rankin RN.  All discharge needs and concerns addressed.]

## 2020-06-15 ENCOUNTER — NURSE ONLY (OUTPATIENT)
Dept: CARDIOLOGY CLINIC | Age: 60
End: 2020-06-15
Payer: MEDICARE

## 2020-06-16 ENCOUNTER — CARE COORDINATION (OUTPATIENT)
Dept: CARE COORDINATION | Age: 60
End: 2020-06-16

## 2020-06-22 ENCOUNTER — CARE COORDINATION (OUTPATIENT)
Dept: CARE COORDINATION | Age: 60
End: 2020-06-22

## 2020-06-25 ENCOUNTER — TELEPHONE (OUTPATIENT)
Dept: OTHER | Facility: CLINIC | Age: 60
End: 2020-06-25

## 2020-06-25 ENCOUNTER — APPOINTMENT (OUTPATIENT)
Dept: CT IMAGING | Age: 60
End: 2020-06-25
Payer: MEDICARE

## 2020-06-25 ENCOUNTER — HOSPITAL ENCOUNTER (EMERGENCY)
Age: 60
Discharge: HOME | End: 2020-06-25
Attending: EMERGENCY MEDICINE
Payer: MEDICARE

## 2020-06-25 VITALS
SYSTOLIC BLOOD PRESSURE: 113 MMHG | DIASTOLIC BLOOD PRESSURE: 70 MMHG | HEART RATE: 93 BPM | RESPIRATION RATE: 16 BRPM | OXYGEN SATURATION: 96 % | TEMPERATURE: 98.5 F | BODY MASS INDEX: 31.89 KG/M2 | HEIGHT: 63 IN | WEIGHT: 180 LBS

## 2020-06-25 PROCEDURE — 6370000000 HC RX 637 (ALT 250 FOR IP): Performed by: EMERGENCY MEDICINE

## 2020-06-25 PROCEDURE — 6360000002 HC RX W HCPCS: Performed by: EMERGENCY MEDICINE

## 2020-06-25 PROCEDURE — 96372 THER/PROPH/DIAG INJ SC/IM: CPT

## 2020-06-25 PROCEDURE — 72131 CT LUMBAR SPINE W/O DYE: CPT

## 2020-06-25 PROCEDURE — 72128 CT CHEST SPINE W/O DYE: CPT

## 2020-06-25 PROCEDURE — 99283 EMERGENCY DEPT VISIT LOW MDM: CPT

## 2020-06-25 RX ORDER — ONDANSETRON 4 MG/1
4 TABLET, ORALLY DISINTEGRATING ORAL ONCE
Status: COMPLETED | OUTPATIENT
Start: 2020-06-25 | End: 2020-06-25

## 2020-06-25 RX ORDER — ORPHENADRINE CITRATE 30 MG/ML
60 INJECTION INTRAMUSCULAR; INTRAVENOUS ONCE
Status: COMPLETED | OUTPATIENT
Start: 2020-06-25 | End: 2020-06-25

## 2020-06-25 RX ADMIN — ONDANSETRON 4 MG: 4 TABLET, ORALLY DISINTEGRATING ORAL at 01:27

## 2020-06-25 RX ADMIN — HYDROMORPHONE HYDROCHLORIDE 1 MG: 1 INJECTION, SOLUTION INTRAMUSCULAR; INTRAVENOUS; SUBCUTANEOUS at 01:31

## 2020-06-25 RX ADMIN — ORPHENADRINE CITRATE 60 MG: 30 INJECTION INTRAMUSCULAR; INTRAVENOUS at 01:27

## 2020-06-25 ASSESSMENT — PAIN DESCRIPTION - DESCRIPTORS: DESCRIPTORS: SHARP;SHOOTING

## 2020-06-25 ASSESSMENT — PAIN DESCRIPTION - LOCATION: LOCATION: BACK

## 2020-06-25 ASSESSMENT — PAIN SCALES - GENERAL
PAINLEVEL_OUTOF10: 10
PAINLEVEL_OUTOF10: 10

## 2020-06-25 ASSESSMENT — PAIN DESCRIPTION - PAIN TYPE: TYPE: ACUTE PAIN

## 2020-06-25 ASSESSMENT — PAIN DESCRIPTION - ORIENTATION: ORIENTATION: LEFT;LOWER

## 2020-06-25 NOTE — TELEPHONE ENCOUNTER
RN Access contacted [facility] to schedule ED f/u appt. Spoke with Dash Garduno, she stated it was hospice and they do not see this pt.

## 2020-06-25 NOTE — ED NOTES
Bed: 09  Expected date:   Expected time:   Means of arrival:   Comments:  [facility]  06/25/20 9615

## 2020-06-25 NOTE — ED PROVIDER NOTES
CHIEF COMPLAINT  Back Pain (c/o lower back pain for three weeks. EMS states on arrival patient was lying on the floor on her stomach. patient able to get up and walk. Pt has hx of back surgeries)      HISTORY OF PRESENT ILLNESS  Rico Pinto is a 63 y.o. female presents to the ED with back pain, history of back problems, surgery in the past, had a disc repaired in lumbar spine and flared up pain again tonight, no fall or injury, just bent over and felt like her back went out, EMS reported she had called 911 and was lying on her abdomen when they arrived, they explained they couldn't put her face down on the cot so it took some time to get her here but she was able to stand/walk at the scene, no numbness/weakness, no bowel/bladder accidents,no urinary retention, no saddle anesthesia, had not taken anything PTA, pain does radiate down L leg. No dysuria/hematuria, no bowel changes, no melena/hematochezia, no abd pain/N/V/D, no fevers, no IVDA, No other complaints, modifying factors or associated symptoms. I have reviewed the following from the nursing documentation.     Past Medical History:   Diagnosis Date    CAD (coronary artery disease)     Cerebral artery occlusion with cerebral infarction ([address]     Diabetes mellitus ([address]     Hyperlipidemia     Hypertension     Mental retardation     MI (myocardial infarction) ([address]      Past Surgical History:   Procedure Laterality Date    BACK SURGERY      PACEMAKER INSERTION      TUMOR REMOVAL       Family History   Problem Relation Age of Onset    Heart Disease Father      Social History     Socioeconomic History    Marital status:      Spouse name: Not on file    Number of children: Not on file    Years of education: Not on file    Highest education level: Not on file   Occupational History    Not on file   Social Needs    Financial resource strain: Not on file    Food insecurity     Worry: Not on file     Inability: Not on file   [address] Transportation needs     Medical: Not on file     Non-medical: Not on file   Tobacco Use    Smoking status: Never Smoker    Smokeless tobacco: Never Used   Substance and Sexual Activity    Alcohol use: No    Drug use: No    Sexual activity: Not Currently   Lifestyle    Physical activity     Days per week: Not on file     Minutes per session: Not on file    Stress: Not on file   Relationships    Social connections     Talks on phone: Not on file     Gets together: Not on file     Attends Mosque service: Not on file     Active member of club or organization: Not on file     Attends meetings of clubs or organizations: Not on file     Relationship status: Not on file    Intimate partner violence     Fear of current or ex partner: Not on file     Emotionally abused: Not on file     Physically abused: Not on file     Forced sexual activity: Not on file   Other Topics Concern    Not on file   Social History Narrative    Not on file     No current facility-administered medications for this encounter. Current Outpatient Medications   Medication Sig Dispense Refill    aspirin 81 MG EC tablet Take 1 tablet by mouth daily 30 tablet 2    ranolazine (RANEXA) 500 MG extended release tablet Take 1 tablet by mouth 2 times daily 60 tablet 2    gabapentin (NEURONTIN) 600 MG tablet Take 1 tablet by mouth nightly for 30 days.  30 tablet 0    DULoxetine (CYMBALTA) 60 MG extended release capsule Take 1 capsule by mouth daily 30 capsule 0    FLUoxetine (PROZAC) 10 MG capsule Take 1 capsule by mouth daily 30 capsule 0    insulin aspart (NOVOLOG FLEXPEN) 100 UNIT/ML injection pen Inject 30 Units into the skin 3 times daily (before meals) 5 pen 3    insulin glargine (LANTUS SOLOSTAR) 100 UNIT/ML injection pen Inject 40 Units into the skin 2 times daily 5 pen 3    metFORMIN (GLUCOPHAGE) 500 MG tablet Take 2 tablets by mouth 2 times daily (with meals) 120 tablet 2    atorvastatin (LIPITOR) 40 MG tablet Take 1 tablet by mouth nightly 30 tablet 2    fenofibrate micronized (LOFIBRA) 200 MG capsule Take 1 capsule by mouth nightly 30 capsule 3    lisinopril (PRINIVIL;ZESTRIL) 2.5 MG tablet Take 1 tablet by mouth daily 30 tablet 3    carvedilol (COREG) 6.25 MG tablet Take 1 tablet by mouth 2 times daily (with meals) 60 tablet 2    miconazole (MICOTIN) 2 % powder Apply topically 2 times daily. 45 g 1    CVS Lancets Ultra Thin MISC 1 each by Does not apply route 4 times daily 200 each 0    blood glucose monitor strips Test three times a day & as needed for symptoms of irregular blood glucose. 100 strip 2    topiramate (TOPAMAX) 200 MG tablet Take 1 tablet by mouth daily 60 tablet 0     No Known Allergies    REVIEW OF SYSTEMS  10 systems reviewed, pertinent positives per HPI otherwise noted to be negative. PHYSICAL EXAM  /70   Pulse 93   Temp 98.5 °F (36.9 °C)   Resp 16   Ht 5' 3\" (1.6 m)   Wt 180 lb (81.6 kg)   SpO2 96%   BMI 31.89 kg/m²   GENERAL APPEARANCE: Awake and alert. Cooperative. No acute distress  HEAD: Normocephalic. Atraumatic. EYES: PERRL. EOM's grossly intact. ENT: Mucous membranes are moist.   NECK: Supple. No rigidity, normal range of motion  Back: Midline lower lumbar spine well-healed surgical incision, slight midline tenderness of lower lumbar region and SI joint/lumbar paraspinal musculature region, no step-offs or deformities, lying on her side in room, was brought in on squad laying on her abdomen, slight tenderness to palpation extends all the way to mid/lower thoracic region, not upper thoracic or cervical spine, patient reporting pain with minimal range of motion and refusing to lie down for straight leg raise exam  HEART: RRR. No murmurs  LUNGS: Respirations unlabored. Lungs are clear to auscultation bilaterally. ABDOMEN: Soft. Non-distended. Non-tender. No guarding or rebound. Normal bowel sounds. EXTREMITIES: No peripheral edema. Moves all extremities equally.  All extremities Initial; ORDERING SYSTEM PROVIDED HISTORY: severe back pain,   herniated disc   TECHNOLOGIST PROVIDED HISTORY:   Reason for exam:->severe back pain, herniated disc   Reason for Exam: severe pain herniated disks   Acuity: Acute   Type of Exam: Initial     FINDINGS:   BONES/ALIGNMENT: There is no acute fracture or traumatic malalignment. L4-L5 posterior lumbar interbody fusion is seen with bilateral cannulated   pedicle screws attached to posterior fixation rods without evidence of   hardware malalignment, loosening or other complication identified. DEGENERATIVE CHANGES: No significant degenerative changes of the thoracic or   lumbar spine. SOFT TISSUES: No paraspinal mass is seen.                       CT THORACIC SPINE WO CONTRAST (Final result)   Result time 06/25/20 02:15:42   Final result by Erick Bartholomew MD (06/25/20 02:15:42)                 Impression:     L4-L5 posterior lumbar interbody fusion (PLIF) without evidence of hardware   complication. No significant thoracic or lumbar spine spondylosis. Narrative:     EXAMINATION:   CT OF THE THORACIC SPINE WITHOUT CONTRAST; CT OF THE LUMBAR SPINE WITHOUT   CONTRAST 6/25/2020     TECHNIQUE:   CT of the thoracic spine was performed without the administration of   intravenous contrast. Multiplanar reformatted images are provided for review. Dose modulation, iterative reconstruction, and/or weight based adjustment of   the mA/kV was utilized to reduce the radiation dose to as low as reasonably   achievable.; CT of the lumbar spine was performed without the administration   of intravenous contrast. Multiplanar reformatted images are provided for   review. Dose modulation, iterative reconstruction, and/or weight based   adjustment of the mA/kV was utilized to reduce the radiation dose to as low   as reasonably achievable. COMPARISON:   Lumbar spine 2-3 views Nasreen 3, 2019.      HISTORY:   ORDERING SYSTEM PROVIDED HISTORY: severe back

## 2020-06-26 ENCOUNTER — CARE COORDINATION (OUTPATIENT)
Dept: CARE COORDINATION | Age: 60
End: 2020-06-26

## 2020-07-17 ENCOUNTER — HOSPITAL ENCOUNTER (OUTPATIENT)
Age: 60
Setting detail: OBSERVATION
Discharge: HOME | End: 2020-07-18
Attending: EMERGENCY MEDICINE | Admitting: INTERNAL MEDICINE
Payer: MEDICARE

## 2020-07-17 ENCOUNTER — APPOINTMENT (OUTPATIENT)
Dept: CT IMAGING | Age: 60
End: 2020-07-17
Payer: MEDICARE

## 2020-07-17 LAB
A/G RATIO: 1.3 (ref 1.1–2.2)
ALBUMIN SERPL-MCNC: 3.9 G/DL (ref 3.4–5)
ALP BLD-CCNC: 119 U/L (ref 40–129)
ALT SERPL-CCNC: 9 U/L (ref 10–40)
ANION GAP SERPL CALCULATED.3IONS-SCNC: 14 MMOL/L (ref 3–16)
ANION GAP SERPL CALCULATED.3IONS-SCNC: 17 MMOL/L (ref 3–16)
AST SERPL-CCNC: 9 U/L (ref 15–37)
BASE EXCESS VENOUS: -3.6 MMOL/L (ref -3–3)
BASOPHILS ABSOLUTE: 0 K/UL (ref 0–0.2)
BASOPHILS RELATIVE PERCENT: 1 %
BETA-HYDROXYBUTYRATE: 2.4 MMOL/L (ref 0–0.27)
BILIRUB SERPL-MCNC: <0.2 MG/DL (ref 0–1)
BILIRUBIN URINE: NEGATIVE
BLOOD, URINE: NEGATIVE
BUN BLDV-MCNC: 15 MG/DL (ref 7–20)
BUN BLDV-MCNC: 18 MG/DL (ref 7–20)
CALCIUM SERPL-MCNC: 8.7 MG/DL (ref 8.3–10.6)
CALCIUM SERPL-MCNC: 8.9 MG/DL (ref 8.3–10.6)
CARBOXYHEMOGLOBIN: 1 % (ref 0–1.5)
CHLORIDE BLD-SCNC: 102 MMOL/L (ref 99–110)
CHLORIDE BLD-SCNC: 95 MMOL/L (ref 99–110)
CLARITY: CLEAR
CO2: 21 MMOL/L (ref 21–32)
CO2: 23 MMOL/L (ref 21–32)
COLOR: ABNORMAL
CREAT SERPL-MCNC: 0.6 MG/DL (ref 0.6–1.1)
CREAT SERPL-MCNC: <0.5 MG/DL (ref 0.6–1.1)
EOSINOPHILS ABSOLUTE: 0.1 K/UL (ref 0–0.6)
EOSINOPHILS RELATIVE PERCENT: 1.8 %
GFR AFRICAN AMERICAN: >60
GFR AFRICAN AMERICAN: >60
GFR NON-AFRICAN AMERICAN: >60
GFR NON-AFRICAN AMERICAN: >60
GLOBULIN: 3 G/DL
GLUCOSE BLD-MCNC: 268 MG/DL (ref 70–99)
GLUCOSE BLD-MCNC: 283 MG/DL (ref 70–99)
GLUCOSE BLD-MCNC: 346 MG/DL (ref 70–99)
GLUCOSE BLD-MCNC: 674 MG/DL (ref 70–99)
GLUCOSE URINE: >=1000 MG/DL
HCO3 VENOUS: 20.6 MMOL/L (ref 23–29)
HCT VFR BLD CALC: 42.4 % (ref 36–48)
HEMOGLOBIN: 14.2 G/DL (ref 12–16)
KETONES, URINE: 15 MG/DL
LEUKOCYTE ESTERASE, URINE: NEGATIVE
LYMPHOCYTES ABSOLUTE: 1.2 K/UL (ref 1–5.1)
LYMPHOCYTES RELATIVE PERCENT: 26.6 %
MAGNESIUM: 1.8 MG/DL (ref 1.8–2.4)
MCH RBC QN AUTO: 31.7 PG (ref 26–34)
MCHC RBC AUTO-ENTMCNC: 33.6 G/DL (ref 31–36)
MCV RBC AUTO: 94.5 FL (ref 80–100)
METHEMOGLOBIN VENOUS: 0.1 %
MICROSCOPIC EXAMINATION: ABNORMAL
MONOCYTES ABSOLUTE: 0.3 K/UL (ref 0–1.3)
MONOCYTES RELATIVE PERCENT: 6.1 %
NEUTROPHILS ABSOLUTE: 2.9 K/UL (ref 1.7–7.7)
NEUTROPHILS RELATIVE PERCENT: 64.5 %
NITRITE, URINE: NEGATIVE
O2 CONTENT, VEN: 20 VOL %
O2 SAT, VEN: 98 %
O2 THERAPY: ABNORMAL
PCO2, VEN: 35 MMHG (ref 40–50)
PDW BLD-RTO: 13.9 % (ref 12.4–15.4)
PERFORMED ON: ABNORMAL
PERFORMED ON: ABNORMAL
PH UA: 6 (ref 5–8)
PH VENOUS: 7.39 (ref 7.35–7.45)
PLATELET # BLD: 232 K/UL (ref 135–450)
PMV BLD AUTO: 9.1 FL (ref 5–10.5)
PO2, VEN: 99.6 MMHG (ref 25–40)
POTASSIUM REFLEX MAGNESIUM: 3.3 MMOL/L (ref 3.5–5.1)
POTASSIUM REFLEX MAGNESIUM: 4 MMOL/L (ref 3.5–5.1)
PROTEIN UA: NEGATIVE MG/DL
RBC # BLD: 4.48 M/UL (ref 4–5.2)
SODIUM BLD-SCNC: 135 MMOL/L (ref 136–145)
SODIUM BLD-SCNC: 137 MMOL/L (ref 136–145)
SPECIFIC GRAVITY UA: <=1.005 (ref 1–1.03)
TCO2 CALC VENOUS: 22 MMOL/L
TOTAL PROTEIN: 6.9 G/DL (ref 6.4–8.2)
URINE REFLEX TO CULTURE: ABNORMAL
URINE TYPE: ABNORMAL
UROBILINOGEN, URINE: 0.2 E.U./DL
WBC # BLD: 4.5 K/UL (ref 4–11)

## 2020-07-17 PROCEDURE — 96374 THER/PROPH/DIAG INJ IV PUSH: CPT

## 2020-07-17 PROCEDURE — 6370000000 HC RX 637 (ALT 250 FOR IP): Performed by: EMERGENCY MEDICINE

## 2020-07-17 PROCEDURE — 82962 GLUCOSE BLOOD TEST: CPT

## 2020-07-17 PROCEDURE — 96361 HYDRATE IV INFUSION ADD-ON: CPT

## 2020-07-17 PROCEDURE — 6360000002 HC RX W HCPCS: Performed by: PHYSICIAN ASSISTANT

## 2020-07-17 PROCEDURE — 85025 COMPLETE CBC W/AUTO DIFF WBC: CPT

## 2020-07-17 PROCEDURE — 6370000000 HC RX 637 (ALT 250 FOR IP): Performed by: PHYSICIAN ASSISTANT

## 2020-07-17 PROCEDURE — 82010 KETONE BODYS QUAN: CPT

## 2020-07-17 PROCEDURE — 81003 URINALYSIS AUTO W/O SCOPE: CPT

## 2020-07-17 PROCEDURE — 36415 COLL VENOUS BLD VENIPUNCTURE: CPT

## 2020-07-17 PROCEDURE — 82803 BLOOD GASES ANY COMBINATION: CPT

## 2020-07-17 PROCEDURE — G0378 HOSPITAL OBSERVATION PER HR: HCPCS

## 2020-07-17 PROCEDURE — 96375 TX/PRO/DX INJ NEW DRUG ADDON: CPT

## 2020-07-17 PROCEDURE — 83735 ASSAY OF MAGNESIUM: CPT

## 2020-07-17 PROCEDURE — 2580000003 HC RX 258: Performed by: PHYSICIAN ASSISTANT

## 2020-07-17 PROCEDURE — 2580000003 HC RX 258: Performed by: EMERGENCY MEDICINE

## 2020-07-17 PROCEDURE — 80053 COMPREHEN METABOLIC PANEL: CPT

## 2020-07-17 PROCEDURE — 99285 EMERGENCY DEPT VISIT HI MDM: CPT

## 2020-07-17 PROCEDURE — 70450 CT HEAD/BRAIN W/O DYE: CPT

## 2020-07-17 PROCEDURE — 99219 PR INITIAL OBSERVATION CARE/DAY 50 MINUTES: CPT | Performed by: PHYSICIAN ASSISTANT

## 2020-07-17 PROCEDURE — 83036 HEMOGLOBIN GLYCOSYLATED A1C: CPT

## 2020-07-17 RX ORDER — 0.9 % SODIUM CHLORIDE 0.9 %
1000 INTRAVENOUS SOLUTION INTRAVENOUS ONCE
Status: COMPLETED | OUTPATIENT
Start: 2020-07-17 | End: 2020-07-17

## 2020-07-17 RX ORDER — POLYETHYLENE GLYCOL 3350 17 G/17G
17 POWDER, FOR SOLUTION ORAL DAILY PRN
Status: DISCONTINUED | OUTPATIENT
Start: 2020-07-17 | End: 2020-07-18 | Stop reason: HOSPADM

## 2020-07-17 RX ORDER — INSULIN GLARGINE 100 [IU]/ML
35 INJECTION, SOLUTION SUBCUTANEOUS 2 TIMES DAILY
Status: DISCONTINUED | OUTPATIENT
Start: 2020-07-17 | End: 2020-07-18 | Stop reason: HOSPADM

## 2020-07-17 RX ORDER — SODIUM CHLORIDE 0.9 % (FLUSH) 0.9 %
10 SYRINGE (ML) INJECTION EVERY 12 HOURS SCHEDULED
Status: DISCONTINUED | OUTPATIENT
Start: 2020-07-17 | End: 2020-07-18 | Stop reason: HOSPADM

## 2020-07-17 RX ORDER — SODIUM CHLORIDE 9 MG/ML
INJECTION, SOLUTION INTRAVENOUS CONTINUOUS
Status: DISPENSED | OUTPATIENT
Start: 2020-07-17 | End: 2020-07-18

## 2020-07-17 RX ORDER — FENOFIBRATE 160 MG/1
160 TABLET ORAL DAILY
Status: DISCONTINUED | OUTPATIENT
Start: 2020-07-17 | End: 2020-07-18 | Stop reason: HOSPADM

## 2020-07-17 RX ORDER — TOPIRAMATE 100 MG/1
200 TABLET, FILM COATED ORAL DAILY
Status: DISCONTINUED | OUTPATIENT
Start: 2020-07-17 | End: 2020-07-18 | Stop reason: HOSPADM

## 2020-07-17 RX ORDER — DEXTROSE MONOHYDRATE 25 G/50ML
12.5 INJECTION, SOLUTION INTRAVENOUS PRN
Status: DISCONTINUED | OUTPATIENT
Start: 2020-07-17 | End: 2020-07-18 | Stop reason: HOSPADM

## 2020-07-17 RX ORDER — DULOXETIN HYDROCHLORIDE 60 MG/1
60 CAPSULE, DELAYED RELEASE ORAL DAILY
Status: DISCONTINUED | OUTPATIENT
Start: 2020-07-17 | End: 2020-07-18 | Stop reason: HOSPADM

## 2020-07-17 RX ORDER — LISINOPRIL 5 MG/1
2.5 TABLET ORAL DAILY
Status: DISCONTINUED | OUTPATIENT
Start: 2020-07-17 | End: 2020-07-18 | Stop reason: HOSPADM

## 2020-07-17 RX ORDER — ONDANSETRON 2 MG/ML
4 INJECTION INTRAMUSCULAR; INTRAVENOUS EVERY 6 HOURS PRN
Status: DISCONTINUED | OUTPATIENT
Start: 2020-07-17 | End: 2020-07-18 | Stop reason: HOSPADM

## 2020-07-17 RX ORDER — DEXTROSE MONOHYDRATE 50 MG/ML
100 INJECTION, SOLUTION INTRAVENOUS PRN
Status: DISCONTINUED | OUTPATIENT
Start: 2020-07-17 | End: 2020-07-18 | Stop reason: HOSPADM

## 2020-07-17 RX ORDER — CARVEDILOL 6.25 MG/1
6.25 TABLET ORAL 2 TIMES DAILY WITH MEALS
Status: DISCONTINUED | OUTPATIENT
Start: 2020-07-17 | End: 2020-07-18 | Stop reason: HOSPADM

## 2020-07-17 RX ORDER — RANOLAZINE 500 MG/1
500 TABLET, EXTENDED RELEASE ORAL 2 TIMES DAILY
Status: DISCONTINUED | OUTPATIENT
Start: 2020-07-17 | End: 2020-07-18 | Stop reason: HOSPADM

## 2020-07-17 RX ORDER — ASPIRIN 81 MG/1
81 TABLET ORAL DAILY
Status: DISCONTINUED | OUTPATIENT
Start: 2020-07-17 | End: 2020-07-18 | Stop reason: HOSPADM

## 2020-07-17 RX ORDER — MAGNESIUM SULFATE IN WATER 40 MG/ML
2 INJECTION, SOLUTION INTRAVENOUS PRN
Status: DISCONTINUED | OUTPATIENT
Start: 2020-07-17 | End: 2020-07-18 | Stop reason: HOSPADM

## 2020-07-17 RX ORDER — NICOTINE POLACRILEX 4 MG
15 LOZENGE BUCCAL PRN
Status: DISCONTINUED | OUTPATIENT
Start: 2020-07-17 | End: 2020-07-18 | Stop reason: HOSPADM

## 2020-07-17 RX ORDER — POTASSIUM CHLORIDE 7.45 MG/ML
10 INJECTION INTRAVENOUS PRN
Status: DISCONTINUED | OUTPATIENT
Start: 2020-07-17 | End: 2020-07-18 | Stop reason: HOSPADM

## 2020-07-17 RX ORDER — ATORVASTATIN CALCIUM 40 MG/1
40 TABLET, FILM COATED ORAL NIGHTLY
Status: DISCONTINUED | OUTPATIENT
Start: 2020-07-17 | End: 2020-07-18 | Stop reason: HOSPADM

## 2020-07-17 RX ORDER — PROMETHAZINE HYDROCHLORIDE 25 MG/1
12.5 TABLET ORAL EVERY 6 HOURS PRN
Status: DISCONTINUED | OUTPATIENT
Start: 2020-07-17 | End: 2020-07-18 | Stop reason: HOSPADM

## 2020-07-17 RX ORDER — POTASSIUM CHLORIDE 20 MEQ/1
40 TABLET, EXTENDED RELEASE ORAL PRN
Status: DISCONTINUED | OUTPATIENT
Start: 2020-07-17 | End: 2020-07-18 | Stop reason: HOSPADM

## 2020-07-17 RX ORDER — ACETAMINOPHEN 325 MG/1
650 TABLET ORAL EVERY 6 HOURS PRN
Status: DISCONTINUED | OUTPATIENT
Start: 2020-07-17 | End: 2020-07-18 | Stop reason: HOSPADM

## 2020-07-17 RX ORDER — SODIUM CHLORIDE 0.9 % (FLUSH) 0.9 %
10 SYRINGE (ML) INJECTION PRN
Status: DISCONTINUED | OUTPATIENT
Start: 2020-07-17 | End: 2020-07-18 | Stop reason: HOSPADM

## 2020-07-17 RX ORDER — ACETAMINOPHEN 650 MG/1
650 SUPPOSITORY RECTAL EVERY 6 HOURS PRN
Status: DISCONTINUED | OUTPATIENT
Start: 2020-07-17 | End: 2020-07-18 | Stop reason: HOSPADM

## 2020-07-17 RX ADMIN — LISINOPRIL 2.5 MG: 5 TABLET ORAL at 18:19

## 2020-07-17 RX ADMIN — CARVEDILOL 6.25 MG: 6.25 TABLET, FILM COATED ORAL at 18:19

## 2020-07-17 RX ADMIN — SODIUM CHLORIDE: 9 INJECTION, SOLUTION INTRAVENOUS at 18:18

## 2020-07-17 RX ADMIN — ASPIRIN 81 MG: 81 TABLET, COATED ORAL at 18:18

## 2020-07-17 RX ADMIN — ACETAMINOPHEN 650 MG: 325 TABLET ORAL at 21:08

## 2020-07-17 RX ADMIN — METFORMIN HYDROCHLORIDE 1000 MG: 500 TABLET ORAL at 18:18

## 2020-07-17 RX ADMIN — INSULIN HUMAN 10 UNITS: 100 INJECTION, SOLUTION PARENTERAL at 16:14

## 2020-07-17 RX ADMIN — ATORVASTATIN CALCIUM 40 MG: 40 TABLET, FILM COATED ORAL at 21:06

## 2020-07-17 RX ADMIN — INSULIN GLARGINE 35 UNITS: 100 INJECTION, SOLUTION SUBCUTANEOUS at 21:07

## 2020-07-17 RX ADMIN — RANOLAZINE 500 MG: 500 TABLET, FILM COATED, EXTENDED RELEASE ORAL at 21:06

## 2020-07-17 RX ADMIN — POTASSIUM BICARBONATE 40 MEQ: 782 TABLET, EFFERVESCENT ORAL at 22:33

## 2020-07-17 RX ADMIN — FENOFIBRATE 160 MG: 160 TABLET ORAL at 18:18

## 2020-07-17 RX ADMIN — SODIUM CHLORIDE 1000 ML: 9 INJECTION, SOLUTION INTRAVENOUS at 14:13

## 2020-07-17 RX ADMIN — ONDANSETRON HYDROCHLORIDE 4 MG: 2 INJECTION, SOLUTION INTRAMUSCULAR; INTRAVENOUS at 22:38

## 2020-07-17 RX ADMIN — TOPIRAMATE 200 MG: 100 TABLET, FILM COATED ORAL at 18:18

## 2020-07-17 RX ADMIN — Medication 10 ML: at 21:06

## 2020-07-17 RX ADMIN — DULOXETINE HYDROCHLORIDE 60 MG: 60 CAPSULE, DELAYED RELEASE ORAL at 18:18

## 2020-07-17 RX ADMIN — SODIUM CHLORIDE: 9 INJECTION, SOLUTION INTRAVENOUS at 21:05

## 2020-07-17 RX ADMIN — INSULIN LISPRO 20 UNITS: 100 INJECTION, SOLUTION INTRAVENOUS; SUBCUTANEOUS at 18:24

## 2020-07-17 ASSESSMENT — PAIN DESCRIPTION - PROGRESSION: CLINICAL_PROGRESSION: NOT CHANGED

## 2020-07-17 ASSESSMENT — PAIN DESCRIPTION - ORIENTATION: ORIENTATION: RIGHT;LEFT

## 2020-07-17 ASSESSMENT — PAIN - FUNCTIONAL ASSESSMENT: PAIN_FUNCTIONAL_ASSESSMENT: ACTIVITIES ARE NOT PREVENTED

## 2020-07-17 ASSESSMENT — PAIN DESCRIPTION - PAIN TYPE: TYPE: ACUTE PAIN

## 2020-07-17 ASSESSMENT — PAIN DESCRIPTION - DESCRIPTORS
DESCRIPTORS: SHOOTING
DESCRIPTORS: ACHING;DISCOMFORT

## 2020-07-17 ASSESSMENT — ENCOUNTER SYMPTOMS
NAUSEA: 1
SORE THROAT: 0
COUGH: 0
EYE DISCHARGE: 0
VOMITING: 0
ABDOMINAL PAIN: 0
SHORTNESS OF BREATH: 0
DIARRHEA: 0
BACK PAIN: 0

## 2020-07-17 ASSESSMENT — PAIN SCALES - GENERAL
PAINLEVEL_OUTOF10: 10
PAINLEVEL_OUTOF10: 0
PAINLEVEL_OUTOF10: 3

## 2020-07-17 ASSESSMENT — PAIN DESCRIPTION - LOCATION
LOCATION: ARM;HEAD;LEG
LOCATION: GENERALIZED

## 2020-07-17 ASSESSMENT — PAIN DESCRIPTION - FREQUENCY
FREQUENCY: CONTINUOUS
FREQUENCY: INTERMITTENT

## 2020-07-17 NOTE — H&P
10/16/19   [place] Cons, APRN - CNP   Lancets MISC 1 month supply for TID fingersticks 10/8/19   [place] Cons, APRN - CNP   [facility] Lancets Ultra Thin MISC 1 each by Does not apply route daily 5/12/19   Radha Licea MD   blood glucose monitor strips Test three times a day & as needed for symptoms of irregular blood glucose. 5/12/19   Radha Licea MD   ranolazine (RANEXA) 500 MG extended release tablet Take 1 tablet by mouth 2 times daily 4/9/19   Shelly Zamora MD   gabapentin (NEURONTIN) 600 MG tablet Take 600 mg by mouth nightly. Dianna Munoz MD   aspirin 81 MG EC tablet Take 1 tablet by mouth daily 5/10/18   Butch Rivera MD   topiramate (TOPAMAX) 200 MG tablet Take 1 tablet by mouth daily 4/14/18   Darrian Shaffer MD   [facility]) 10 MG capsule Take 1 capsule by mouth daily 4/14/18   Darrian Shaffer MD   metFORMIN (GLUCOPHAGE) 1000 MG tablet Take 1 tablet by mouth 2 times daily (with meals) 4/14/18   Darrian Shaffer MD   atorvastatin (LIPITOR) 40 MG tablet Take 1 tablet by mouth nightly 4/14/18   Darrian Shaffer MD   fenofibrate micronized (LOFIBRA) 200 MG capsule Take 1 capsule by mouth nightly 4/14/18   Darrian Shaffer MD   lisinopril (PRINIVIL;ZESTRIL) 2.5 MG tablet Take 1 tablet by mouth daily 4/14/18   Darrian Shaffer MD   carvedilol (COREG) 6.25 MG tablet Take 1 tablet by mouth 2 times daily (with meals) 4/14/18   Darrian Shaffer MD   insulin glargine (LANTUS SOLOSTAR) 100 UNIT/ML injection pen Inject 35 Units into the skin 2 times daily 4/14/18   Darrian Shaffer MD   insulin aspart (NOVOLOG FLEXPEN) 100 UNIT/ML injection pen Inject 20 Units into the skin 3 times daily (before meals) 4/14/18   Darrian Shaffer MD       Allergies:  Patient has no known allergies. Social History:  The patient currently lives at home. TOBACCO:   reports that she has never smoked.  She has never used smokeless tobacco.  ETOH:   reports no history of alcohol use. Family History:   Positive as follows:        Problem Relation Age of Onset    Heart Disease Father        REVIEW OF SYSTEMS:     Constitutional: Negative for fever   HENT: Negative for sore throat   Eyes: Negative for redness   Respiratory: Negative  for dyspnea, cough   Cardiovascular: + chronic chest pain   Gastrointestinal: Negative for vomiting, diarrhea   Genitourinary: Negative for hematuria   Musculoskeletal: Negative for arthralgias   Skin: Negative for rash   Neurological: Negative for syncope, + neuropathy   Hematological: Negative for adenopathy   Psychiatric/Behavorial: Negative for anxiety    PHYSICAL EXAM:    BP (!) 148/82   Pulse 96   Temp 98.8 °F (37.1 °C)   Resp 19   SpO2 94%   Gen: No distress. Alert. Middle aged  female, mild MR, obese  Eyes: No sclera icterus. No conjunctival injection. Glasses   Neck:  Trachea midline. Resp: No accessory muscle use. No crackles. No wheezes. No rhonchi. On RA  CV: Regular rate. Regular rhythm. No murmur. No edema. GI: Soft, Non-tender. Non-distended. Skin: Warm and dry. No rash on exposed extremities. Neuro: Awake. Grossly nonfocal    Psych: Oriented x 3. No anxiety or agitation.  No SI.    CBC:   Recent Labs     07/17/20  1201   WBC 4.5   HGB 14.2   HCT 42.4   MCV 94.5        BMP:   Recent Labs     07/17/20  1201   *   K 4.0   CL 95*   CO2 23   BUN 18   CREATININE 0.6     LIVER PROFILE:   Recent Labs     07/17/20  1201   AST 9*   ALT 9*   BILITOT <0.2   ALKPHOS 119     UA:  Recent Labs     07/17/20  1204   COLORU Straw   PHUR 6.0   CLARITYU Clear   SPECGRAV <=1.005   LEUKOCYTESUR Negative   UROBILINOGEN 0.2   BILIRUBINUR Negative   BLOODU Negative   GLUCOSEU >=1000*      U/A:    Lab Results   Component Value Date    COLORU Straw 07/17/2020    WBCUA 10-20 06/12/2020    RBCUA 3-4 06/12/2020    BACTERIA Rare 05/28/2020    CLARITYU Clear 07/17/2020    SPECGRAV <=1.005 07/17/2020    LEUKOCYTESUR Negative 07/17/2020    BLOODU Negative 07/17/2020    GLUCOSEU >=1000 07/17/2020    AMORPHOUS 1+ 02/18/2020     CULTURES  None    RADIOLOGY    CT HEAD WO CONTRAST 7/17/2020   Final Result   No acute intracranial abnormality. Mild cerebral atrophy appropriate for age. Mild chronic ischemic change also   age-appropriate. No significant change from the prior study. Prior high   right parietal craniotomy. Pertinent previous results reviewed     6/13/2020 TTE   Conclusions      Summary   LV systolic function is moderately reduced with an estimated EF of 35%. Moderate global hypokinesis. There is mild concentric left ventricular hypertrophy. Left ventricular cavity size is mildly dilated. Estimated LV diastolic filling pressure is normal.   Mild mitral and tricuspid regurgitation. Systolic pulmonary artery pressure (SPAP) is estimated at 35mmHg (Right   atrial pressure of 8 mmHg). No significant change from exam done 10/18/2019    ASSESSMENT/PLAN:    Hyperglycemia  DM Type II  - 2/2 medication non-compliance, stopped taking her medications; denies any SI;  on admission  - Admit to Med Surg, observation  - resume home meds - Metformin, Lantus 35 units BID, humalog 20 units TID before meals  - IVF, POC Glucose, SSI  - monitor - repeat 283    Medication Non-Compliance  - reported she threw away her medications a couple weeks ago because she felt like they were not working; denied any self harm or SI  - when asked further, pt reported \"I just stopped taking them because they don't help, so what's the point? I just don't care anymore.  I'm not going to hurt myself but I just don't care\"  -may benefit from psych eval    CAD  - chronic, unchanged chest pain  - cont ASA, Lipitor, Coreg, Ranexa    Chronic Combined Systolic and Diastolic CHF  - appears compensated  - last echo 6/13/2020: EF 35%, mild LVH  - low Na diet, I&Os, daily weights  - cont Coreg, Lisinopril    S/p ICD    HTN  - controlled  - cont

## 2020-07-17 NOTE — ED PROVIDER NOTES
[address] ED  EMERGENCY DEPARTMENT ENCOUNTER        Pt Name: Romeo Macias  MRN: [id number]  [place] 1960  Date of evaluation: 7/17/2020  Provider: Zara Henry MD  PCP: FRITZ Casillas - NP  ED Attending: Zara Henry MD    CHIEF COMPLAINT       Chief Complaint   Patient presents with    Hyperglycemia     541 glucose per squad; shooting pain bilateral extremities       HISTORY OF PRESENT ILLNESS   (Location/Symptom, Timing/Onset, Context/Setting, Quality, Duration, Modifying Factors, Severity)  Note limiting factors. Romeo Macias is a 63 y.o. female who presents complaining of whole body shooting pains. It's been present for several days. Patient states movement hurts. Nothing seems to make things better. Patient stopped taking her medications and threw them out about two weeks ago. She denies suicidal thoughts; she just didn't want to keep taking her medications. EMS discovered that the patient's glucose was elevated. Patient has been fatigued, nauseated, polyuric and polydipsic since stopping her meds. She denies fever. History is obtained from the patient. REVIEW OF SYSTEMS    (2-9 systems for level 4, 10 or more for level 5)     Review of Systems   Constitutional: Positive for fatigue. Negative for chills and fever. HENT: Negative for congestion and sore throat. Eyes: Negative for discharge. Respiratory: Negative for cough and shortness of breath. Cardiovascular: Negative for chest pain. Gastrointestinal: Positive for nausea. Negative for abdominal pain, diarrhea and vomiting. Endocrine: Positive for polydipsia and polyuria. Genitourinary: Negative for dysuria and urgency. Musculoskeletal: Positive for arthralgias and myalgias. Negative for back pain. Skin: Negative for rash. Neurological: Negative for weakness and headaches. Hematological: Does not bruise/bleed easily. Psychiatric/Behavioral: Negative for confusion. Positives and Pertinent negatives as per HPI. Except as noted above in the ROS, all other systems were reviewed and negative. PAST MEDICAL HISTORY     Past Medical History:   Diagnosis Date    CAD (coronary artery disease)     [facility] occlusion with [facility]     Diabetes mellitus ([facility]     Hyperlipidemia     Hypertension     Mental retardation     MI (myocardial infarction) ([facility]          SURGICAL HISTORY     Past Surgical History:   Procedure Laterality Date    BACK SURGERY      PACEMAKER INSERTION      TUMOR REMOVAL           CURRENTMEDICATIONS       Previous Medications    ASPIRIN 81 MG EC TABLET    Take 1 tablet by mouth daily    ATORVASTATIN (LIPITOR) 40 MG TABLET    Take 1 tablet by mouth nightly    BLOOD GLUCOSE MONITOR STRIPS    Test three times a day & as needed for symptoms of irregular blood glucose. CARVEDILOL (COREG) 6.25 MG TABLET    Take 1 tablet by mouth 2 times daily (with meals)    [facility] LANCETS ULTRA THIN MISC    1 each by Does not apply route daily    DULOXETINE (CYMBALTA) 60 MG EXTENDED RELEASE CAPSULE    Take 1 capsule by mouth daily    FENOFIBRATE MICRONIZED (LOFIBRA) 200 MG CAPSULE    Take 1 capsule by mouth nightly    FLUOXETINE (PROZAC) 10 MG CAPSULE    Take 1 capsule by mouth daily    GABAPENTIN (NEURONTIN) 600 MG TABLET    Take 600 mg by mouth nightly. .    INSULIN ASPART (NOVOLOG FLEXPEN) 100 UNIT/ML INJECTION PEN    Inject 20 Units into the skin 3 times daily (before meals)    INSULIN GLARGINE (LANTUS SOLOSTAR) 100 UNIT/ML INJECTION PEN    Inject 35 Units into the skin 2 times daily    LANCETS MISC    1 month supply for TID fingersticks    LISINOPRIL (PRINIVIL;ZESTRIL) 2.5 MG TABLET    Take 1 tablet by mouth daily    METFORMIN (GLUCOPHAGE) 1000 MG TABLET    Take 1 tablet by mouth 2 times daily (with meals)    MICONAZOLE (MICOTIN) 2 % POWDER    Apply topically 2 times daily.     NYSTATIN (MYCOSTATIN) 328734 UNIT/GM POWDER    Apply topically (up to 7 for level 4, 8 or more for level 5)     ED Triage Vitals   BP Temp Temp src Pulse Resp SpO2 Height Weight   07/17/20 1159 07/17/20 1159 -- 07/17/20 1159 07/17/20 1203 07/17/20 1159 -- --   129/71 98.8 °F (37.1 °C)  102 16 96 %         Physical Exam  Constitutional:       General: She is not in acute distress. Appearance: She is well-developed. She is obese. HENT:      Head: Normocephalic and atraumatic. Right Ear: External ear normal.      Left Ear: External ear normal.      Nose: Nose normal.      Mouth/Throat:      Pharynx: No oropharyngeal exudate. Eyes:      Conjunctiva/sclera: Conjunctivae normal.      Pupils: Pupils are equal, round, and reactive to light. Neck:      Musculoskeletal: Normal range of motion and neck supple. Cardiovascular:      Rate and Rhythm: Normal rate and regular rhythm. Heart sounds: Normal heart sounds. No murmur. No friction rub. No gallop. Pulmonary:      Effort: Pulmonary effort is normal. No respiratory distress. Breath sounds: Normal breath sounds. No wheezing. Abdominal:      General: Bowel sounds are normal. There is no distension. Palpations: Abdomen is soft. Tenderness: There is no abdominal tenderness. There is no guarding or rebound. Musculoskeletal: Normal range of motion. General: No tenderness. Lymphadenopathy:      Cervical: No cervical adenopathy. Skin:     General: Skin is warm and dry. Capillary Refill: Capillary refill takes less than 2 seconds. Findings: No rash. Neurological:      General: No focal deficit present. Mental Status: She is alert and oriented to person, place, and time. Cranial Nerves: No cranial nerve deficit. Motor: No weakness. Psychiatric:         Attention and Perception: Attention and perception normal.         Mood and Affect: Mood is anxious. Speech: Speech normal.         Behavior: Behavior is cooperative. Thought Content:  Thought content is not paranoid or delusional. Thought content does not include homicidal or suicidal ideation.          DIAGNOSTIC RESULTS   LABS:    Results for orders placed or performed during the hospital encounter of 07/17/20   CBC Auto Differential   Result Value Ref Range    WBC 4.5 4.0 - 11.0 K/uL    RBC 4.48 4.00 - 5.20 M/uL    Hemoglobin 14.2 12.0 - 16.0 g/dL    Hematocrit 42.4 36.0 - 48.0 %    MCV 94.5 80.0 - 100.0 fL    MCH 31.7 26.0 - 34.0 pg    MCHC 33.6 31.0 - 36.0 g/dL    RDW 13.9 12.4 - 15.4 %    Platelets 086 437 - 965 K/uL    MPV 9.1 5.0 - 10.5 fL    Neutrophils % 64.5 %    Lymphocytes % 26.6 %    Monocytes % 6.1 %    Eosinophils % 1.8 %    Basophils % 1.0 %    Neutrophils Absolute 2.9 1.7 - 7.7 K/uL    Lymphocytes Absolute 1.2 1.0 - 5.1 K/uL    Monocytes Absolute 0.3 0.0 - 1.3 K/uL    Eosinophils Absolute 0.1 0.0 - 0.6 K/uL    Basophils Absolute 0.0 0.0 - 0.2 K/uL   Comprehensive Metabolic Panel w/ Reflex to MG   Result Value Ref Range    Sodium 135 (L) 136 - 145 mmol/L    Potassium reflex Magnesium 4.0 3.5 - 5.1 mmol/L    Chloride 95 (L) 99 - 110 mmol/L    CO2 23 21 - 32 mmol/L    Anion Gap 17 (H) 3 - 16    Glucose 674 (HH) 70 - 99 mg/dL    BUN 18 7 - 20 mg/dL    CREATININE 0.6 0.6 - 1.1 mg/dL    GFR Non-African American >60 >60    GFR African American >60 >60    Calcium 8.9 8.3 - 10.6 mg/dL    Total Protein 6.9 6.4 - 8.2 g/dL    Alb 3.9 3.4 - 5.0 g/dL    Albumin/Globulin Ratio 1.3 1.1 - 2.2    Total Bilirubin <0.2 0.0 - 1.0 mg/dL    Alkaline Phosphatase 119 40 - 129 U/L    ALT 9 (L) 10 - 40 U/L    AST 9 (L) 15 - 37 U/L    Globulin 3.0 g/dL   Urinalysis Reflex to Culture    Specimen: Urine, clean catch   Result Value Ref Range    Color, UA Straw Straw/Yellow    Clarity, UA Clear Clear    Glucose, Ur >=1000 (A) Negative mg/dL    Bilirubin Urine Negative Negative    Ketones, Urine 15 (A) Negative mg/dL    Specific Gravity, UA <=1.005 1.005 - 1.030    Blood, Urine Negative Negative    pH, UA 6.0 5.0 - 8.0 1617   BP:  132/73 (!) 151/79 (!) 148/82   Pulse: 107 105 102 96   Resp: 12 21 14 19   Temp:       SpO2:  95% 96% 94%       Patient was given the following medications:  Medications   0.9 % sodium chloride bolus (1,000 mLs Intravenous New Bag 7/17/20 1413)   insulin regular (HUMULIN R;NOVOLIN R) injection 10 Units (10 Units Intravenous Given 7/17/20 1614)     Patient is significantly hyperglycemic. She has ketones with an elevated anion gap. Her venous pH is normal, so this is not DKA. Patient has developmental delay and I am concerned about her throwing her medications away. She is not overtly suicidal, but is tearful intermittently through exam.  I started the patient on IVF and provided her a bolus of insulin IV. I do not believe she needs a drip at this point. I believe she needs admission given her situation and medical findings. Case initially discussed with Hiral Fofana PA-C and Dr. Jimbo Chester. They admitted the patient. Patient agreeable with the plan for admission. The patient understands the importance of follow up and reasons to return. FINAL IMPRESSION      1. Hyperglycemia    2. Ketosis ([address]    3.  Myalgia          DISPOSITION/PLAN   DISPOSITION Admitted 07/17/2020 04:14:16 PM      (Please note that portions of this note were completed with a voice recognition program.  Efforts were made to edit the dictations but occasionally words are mis-transcribed.)    Beverly Ramirez MD(electronically signed)              Beverly Ramirez MD  07/17/20 [id number]

## 2020-07-17 NOTE — ED NOTES
1525- Perfect serve sent to Dr. Choi Stands for admission     [phone]- Call was returned by Radha Smith and spoke to Dr. Sanjay Begum  07/17/20 [address] [place] [address]  07/17/20 [id number]

## 2020-07-17 NOTE — ED NOTES
Dr Brooklyn Mccullough and Argelia Logan RN notified of panic glucose.      Kena Rushing RN  07/17/20 9736

## 2020-07-18 VITALS
HEART RATE: 95 BPM | HEIGHT: 63 IN | RESPIRATION RATE: 22 BRPM | WEIGHT: 204 LBS | BODY MASS INDEX: 36.14 KG/M2 | SYSTOLIC BLOOD PRESSURE: 113 MMHG | OXYGEN SATURATION: 98 % | TEMPERATURE: 97.4 F | DIASTOLIC BLOOD PRESSURE: 75 MMHG

## 2020-07-18 LAB
ANION GAP SERPL CALCULATED.3IONS-SCNC: 12 MMOL/L (ref 3–16)
BASOPHILS ABSOLUTE: 0 K/UL (ref 0–0.2)
BASOPHILS RELATIVE PERCENT: 0.7 %
BUN BLDV-MCNC: 13 MG/DL (ref 7–20)
CALCIUM SERPL-MCNC: 8.6 MG/DL (ref 8.3–10.6)
CHLORIDE BLD-SCNC: 104 MMOL/L (ref 99–110)
CO2: 22 MMOL/L (ref 21–32)
CREAT SERPL-MCNC: <0.5 MG/DL (ref 0.6–1.1)
EOSINOPHILS ABSOLUTE: 0.2 K/UL (ref 0–0.6)
EOSINOPHILS RELATIVE PERCENT: 3.2 %
ESTIMATED AVERAGE GLUCOSE: 340.8 MG/DL
GFR AFRICAN AMERICAN: >60
GFR NON-AFRICAN AMERICAN: >60
GLUCOSE BLD-MCNC: 138 MG/DL (ref 70–99)
GLUCOSE BLD-MCNC: 217 MG/DL (ref 70–99)
GLUCOSE BLD-MCNC: 233 MG/DL (ref 70–99)
GLUCOSE BLD-MCNC: 245 MG/DL (ref 70–99)
GLUCOSE BLD-MCNC: 336 MG/DL (ref 70–99)
HBA1C MFR BLD: 13.5 %
HCT VFR BLD CALC: 41.3 % (ref 36–48)
HEMOGLOBIN: 13.8 G/DL (ref 12–16)
LYMPHOCYTES ABSOLUTE: 2 K/UL (ref 1–5.1)
LYMPHOCYTES RELATIVE PERCENT: 33.9 %
MCH RBC QN AUTO: 31.3 PG (ref 26–34)
MCHC RBC AUTO-ENTMCNC: 33.5 G/DL (ref 31–36)
MCV RBC AUTO: 93.6 FL (ref 80–100)
MONOCYTES ABSOLUTE: 0.5 K/UL (ref 0–1.3)
MONOCYTES RELATIVE PERCENT: 8.6 %
NEUTROPHILS ABSOLUTE: 3.2 K/UL (ref 1.7–7.7)
NEUTROPHILS RELATIVE PERCENT: 53.6 %
PDW BLD-RTO: 13.8 % (ref 12.4–15.4)
PERFORMED ON: ABNORMAL
PLATELET # BLD: 243 K/UL (ref 135–450)
PMV BLD AUTO: 8.3 FL (ref 5–10.5)
POTASSIUM REFLEX MAGNESIUM: 3.8 MMOL/L (ref 3.5–5.1)
RBC # BLD: 4.41 M/UL (ref 4–5.2)
SODIUM BLD-SCNC: 138 MMOL/L (ref 136–145)
WBC # BLD: 5.9 K/UL (ref 4–11)

## 2020-07-18 PROCEDURE — G0378 HOSPITAL OBSERVATION PER HR: HCPCS

## 2020-07-18 PROCEDURE — 6370000000 HC RX 637 (ALT 250 FOR IP): Performed by: PHYSICIAN ASSISTANT

## 2020-07-18 PROCEDURE — 99217 PR OBSERVATION CARE DISCHARGE MANAGEMENT: CPT | Performed by: INTERNAL MEDICINE

## 2020-07-18 PROCEDURE — 2580000003 HC RX 258: Performed by: PHYSICIAN ASSISTANT

## 2020-07-18 PROCEDURE — 80048 BASIC METABOLIC PNL TOTAL CA: CPT

## 2020-07-18 PROCEDURE — 82962 GLUCOSE BLOOD TEST: CPT

## 2020-07-18 PROCEDURE — 96361 HYDRATE IV INFUSION ADD-ON: CPT

## 2020-07-18 PROCEDURE — 36415 COLL VENOUS BLD VENIPUNCTURE: CPT

## 2020-07-18 PROCEDURE — 96372 THER/PROPH/DIAG INJ SC/IM: CPT

## 2020-07-18 PROCEDURE — 85025 COMPLETE CBC W/AUTO DIFF WBC: CPT

## 2020-07-18 RX ORDER — ATORVASTATIN CALCIUM 40 MG/1
40 TABLET, FILM COATED ORAL NIGHTLY
Qty: 30 TABLET | Refills: 2 | Status: SHIPPED | OUTPATIENT
Start: 2020-07-18

## 2020-07-18 RX ORDER — LISINOPRIL 2.5 MG/1
2.5 TABLET ORAL DAILY
Qty: 30 TABLET | Refills: 3 | Status: SHIPPED | OUTPATIENT
Start: 2020-07-18 | End: 2020-09-25

## 2020-07-18 RX ORDER — INSULIN ASPART 100 [IU]/ML
30 INJECTION, SOLUTION INTRAVENOUS; SUBCUTANEOUS
Qty: 5 PEN | Refills: 3 | Status: SHIPPED | OUTPATIENT
Start: 2020-07-18 | End: 2020-08-12 | Stop reason: SDUPTHER

## 2020-07-18 RX ORDER — LANCETS
1 EACH MISCELLANEOUS 4 TIMES DAILY
Qty: 200 EACH | Refills: 0 | Status: SHIPPED | OUTPATIENT
Start: 2020-07-18

## 2020-07-18 RX ORDER — DULOXETIN HYDROCHLORIDE 60 MG/1
60 CAPSULE, DELAYED RELEASE ORAL DAILY
Qty: 30 CAPSULE | Refills: 0 | Status: ON HOLD | OUTPATIENT
Start: 2020-07-18 | End: 2021-02-05 | Stop reason: SDUPTHER

## 2020-07-18 RX ORDER — RANOLAZINE 500 MG/1
500 TABLET, EXTENDED RELEASE ORAL 2 TIMES DAILY
Qty: 60 TABLET | Refills: 2 | Status: SHIPPED | OUTPATIENT
Start: 2020-07-18 | End: 2021-03-05 | Stop reason: SDUPTHER

## 2020-07-18 RX ORDER — INSULIN GLARGINE 100 [IU]/ML
40 INJECTION, SOLUTION SUBCUTANEOUS 2 TIMES DAILY
Qty: 5 PEN | Refills: 3 | Status: ON HOLD | OUTPATIENT
Start: 2020-07-18 | End: 2020-09-22 | Stop reason: SDUPTHER

## 2020-07-18 RX ORDER — ASPIRIN 81 MG/1
81 TABLET ORAL DAILY
Qty: 30 TABLET | Refills: 2 | Status: SHIPPED | OUTPATIENT
Start: 2020-07-18

## 2020-07-18 RX ORDER — CARVEDILOL 6.25 MG/1
6.25 TABLET ORAL 2 TIMES DAILY WITH MEALS
Qty: 60 TABLET | Refills: 2 | Status: ON HOLD
Start: 2020-07-18 | End: 2020-09-22 | Stop reason: HOSPADM

## 2020-07-18 RX ORDER — GABAPENTIN 600 MG/1
600 TABLET ORAL NIGHTLY
Qty: 30 TABLET | Refills: 0 | Status: ON HOLD | OUTPATIENT
Start: 2020-07-18 | End: 2020-08-24 | Stop reason: HOSPADM

## 2020-07-18 RX ORDER — FLUOXETINE 10 MG/1
10 CAPSULE ORAL DAILY
Qty: 30 CAPSULE | Refills: 0 | Status: ON HOLD | OUTPATIENT
Start: 2020-07-18 | End: 2020-10-26

## 2020-07-18 RX ORDER — FENOFIBRATE 200 MG/1
200 CAPSULE ORAL NIGHTLY
Qty: 30 CAPSULE | Refills: 3 | Status: SHIPPED
Start: 2020-07-18 | End: 2021-03-23 | Stop reason: CLARIF

## 2020-07-18 RX ADMIN — FENOFIBRATE 160 MG: 160 TABLET ORAL at 09:55

## 2020-07-18 RX ADMIN — INSULIN GLARGINE 35 UNITS: 100 INJECTION, SOLUTION SUBCUTANEOUS at 09:56

## 2020-07-18 RX ADMIN — TOPIRAMATE 200 MG: 100 TABLET, FILM COATED ORAL at 09:56

## 2020-07-18 RX ADMIN — LISINOPRIL 2.5 MG: 5 TABLET ORAL at 09:55

## 2020-07-18 RX ADMIN — ACETAMINOPHEN 650 MG: 325 TABLET ORAL at 09:54

## 2020-07-18 RX ADMIN — ASPIRIN 81 MG: 81 TABLET, COATED ORAL at 09:55

## 2020-07-18 RX ADMIN — Medication 10 ML: at 10:05

## 2020-07-18 RX ADMIN — INSULIN LISPRO 20 UNITS: 100 INJECTION, SOLUTION INTRAVENOUS; SUBCUTANEOUS at 12:41

## 2020-07-18 RX ADMIN — METFORMIN HYDROCHLORIDE 1000 MG: 500 TABLET ORAL at 09:55

## 2020-07-18 RX ADMIN — INSULIN LISPRO 20 UNITS: 100 INJECTION, SOLUTION INTRAVENOUS; SUBCUTANEOUS at 09:56

## 2020-07-18 RX ADMIN — CARVEDILOL 6.25 MG: 6.25 TABLET, FILM COATED ORAL at 09:55

## 2020-07-18 RX ADMIN — DULOXETINE HYDROCHLORIDE 60 MG: 60 CAPSULE, DELAYED RELEASE ORAL at 09:55

## 2020-07-18 RX ADMIN — RANOLAZINE 500 MG: 500 TABLET, FILM COATED, EXTENDED RELEASE ORAL at 09:56

## 2020-07-18 ASSESSMENT — PAIN SCALES - GENERAL
PAINLEVEL_OUTOF10: 10
PAINLEVEL_OUTOF10: 10

## 2020-07-18 ASSESSMENT — PAIN DESCRIPTION - LOCATION: LOCATION: HEAD

## 2020-07-18 ASSESSMENT — PAIN DESCRIPTION - PAIN TYPE: TYPE: ACUTE PAIN

## 2020-07-18 ASSESSMENT — PAIN - FUNCTIONAL ASSESSMENT: PAIN_FUNCTIONAL_ASSESSMENT: ACTIVITIES ARE NOT PREVENTED

## 2020-07-18 ASSESSMENT — PAIN DESCRIPTION - DESCRIPTORS: DESCRIPTORS: HEADACHE

## 2020-07-18 NOTE — PROGRESS NOTES
Pt has been educated to hospital falls prevention policy. They are aware they will be assessed every shift, and with any condition changes, by the nursing staff on their ability to perform their ADL's without need for assistance. Pt understands that based on the number of their score they are given a base score that will assign a level of low, medium, or high risk for falls. This patient has rated a high which requires a bed / chair alarm for their safety to prevent a fall. The patient is alert and oriented, and acknowledges and understands the need for intervention but refuses the application and use of the bed / chair alarm that is required per policy. Pt agrees to use call light and wait for help to arrive to assist them to get up when they need to. Call light is within reach and all other safety measures in place. Will continue to monitor.   Liya Jackson RN

## 2020-07-18 NOTE — DISCHARGE INSTR - DIET

## 2020-07-18 NOTE — PROGRESS NOTES
Pt d/c'd to home. Removed IV and stopped bleeding. Catheter intact. Pt tolerated well. No redness noted at site. Reviewed d/c instructions, home meds, and  f/u information utilizing teach-back method. Scripts for pt electronically send to [facility]. Eda Holland. Patient verbalized understanding. Per pt, did not have a ride home and no family or friends available. Spoke w/ Clinical Abhishek schroeder RN. OK to leave w/ [employer]. Pt left by [employer].

## 2020-07-18 NOTE — PROGRESS NOTES
40 mEq K replaced at this time, see eMAR; K of 3.3. Pt kidney function WNL so CrCL adequate to follow PRN replacement protocol- see orders. Call light within reach. Bed alarm refused. Will continue to monitor.   Tyler Bravo RN

## 2020-07-18 NOTE — PROGRESS NOTES
Clinical RN notified of labs from ED earlier today; 1x BMP with reflex ordered to reassess anion gap. Will continue to monitor.   Germania Solis RN

## 2020-07-18 NOTE — DISCHARGE SUMMARY
Name:  Taya Roche  Room:  5326/4090-85  MRN:    [id number]    Discharge Summary      This discharge summary is in conjunction with a complete physical exam done on the day of discharge. Discharging Physician: Dr. Christal Linton: 7/17/2020  Discharge: 7/18/2020      HPI taken from admission H&P:    The patient is a 63 y.o. female with a PMH of CAD, Hx of CVA, Chronic combined CHF, HLD, HTN, Type II DM, high functioning MRDD who presented to [facility] ED with complaint of hyperglycemia. Pt reports that she stopped taking her medication about 2 weeks ago because she got tired of taking her medications. She felt like they were not helping her so she stopped. Denies any SI. Has chronic chest pain and SOB. She reports this is unchanged. No N/V/D. BG in ED was  674. Pt admitted to Med Surg, observation. Diagnoses this Admission and Hospital Course   Hyperglycemia  DM Type II  - 2/2 medication non-compliance, stopped taking her medications; denies any SI;  on admission  - Admit to Med Surg, observation  - resume home meds - Metformin, Lantus 35 units BID, humalog 20 units TID before meals  - IVF, POC Glucose, SSI  - monitor - repeat 283     Medication Non-Compliance  - reported she threw away her medications a couple weeks ago because she felt like they were not working; denied any self harm or SI  - when asked further, pt reported \"I just stopped taking them because they don't help, so what's the point? I just don't care anymore. I'm not going to hurt myself but I just don't care\"  -I talked to her about a psychiatric evaluation. She says she does not want to see a psychiatrist.  She will talk to her primary care provider.   She is already on antidepressants and she will resume them.     CAD  - chronic, unchanged chest pain  - cont ASA, Lipitor, Coreg, Ranexa     Chronic Combined Systolic and Diastolic CHF  - appears compensated  - last echo 6/13/2020: EF 35%, mild LVH  - low Na diet, I&Os, daily weights  - cont Coreg, Lisinopril     S/p ICD     HTN  - controlled  - cont Lisinopril, Coreg  - monitor     HLD  - cont Lipitor, Fenofibrate     Hx of CVA  - cont ASA, Lipitor     Cognitive Developmental Delay  - supportive measures     Hx of Brain Tumor  - s/p sx - right parietal craniotomy noted on CT head     Depression  Mood DO  - cont Topamax, Cymbalta    Procedures (Please Review Full Report for Details)  none    Consults    none    Physical Exam at Discharge:    /75   Pulse 95   Temp 97.4 °F (36.3 °C) (Oral)   Resp 22   Ht 5' 3\" (1.6 m)   Wt 204 lb (92.5 kg)   SpO2 98%   BMI 36.14 kg/m²     Gen: No distress. Alert. Middle aged  female, mild MR, obese  Eyes: No sclera icterus. No conjunctival injection. Glasses   Neck:  Trachea midline. Resp: No accessory muscle use. No crackles. No wheezes. No rhonchi. On RA  CV: Regular rate. Regular rhythm. No murmur. No edema. GI: Soft, Non-tender. Non-distended. Skin: Warm and dry. No rash on exposed extremities. Neuro: Awake. Grossly nonfocal    Psych: Oriented x 3. No anxiety or agitation. No SI.    CBC:   Recent Labs     07/17/20  1201 07/18/20  0537   WBC 4.5 5.9   HGB 14.2 13.8   HCT 42.4 41.3   MCV 94.5 93.6    243     BMP:   Recent Labs     07/17/20  1201 07/17/20  2038 07/18/20  0537   * 137 138   K 4.0 3.3* 3.8   CL 95* 102 104   CO2 23 21 22   BUN 18 15 13   CREATININE 0.6 <0.5* <0.5*     LIVER PROFILE:   Recent Labs     07/17/20  1201   AST 9*   ALT 9*   BILITOT <0.2   ALKPHOS 119     UA:  Recent Labs     07/17/20  1204   COLORU Straw   PHUR 6.0   CLARITYU Clear   SPECGRAV <=1.005   LEUKOCYTESUR Negative   UROBILINOGEN 0.2   BILIRUBINUR Negative   BLOODU Negative   GLUCOSEU >=1000*     CULTURES  none    RADIOLOGY  CT HEAD WO CONTRAST   Final Result   No acute intracranial abnormality. Mild cerebral atrophy appropriate for age. Mild chronic ischemic change also   age-appropriate.   No significant change from the prior study. Prior high   right parietal craniotomy. Discharge Medications     Medication List      CHANGE how you take these medications    [employer] Lancets Ultra Thin Misc  1 each by Does not apply route 4 times daily  What changed:    · when to take this  · Another medication with the same name was removed. Continue taking this medication, and follow the directions you see here. Lantus SoloStar 100 UNIT/ML injection pen  Generic drug:  insulin glargine  Inject 40 Units into the skin 2 times daily  What changed:  how much to take     metFORMIN 500 MG tablet  Commonly known as:  GLUCOPHAGE  Take 2 tablets by mouth 2 times daily (with meals)  What changed:  medication strength     NovoLOG FlexPen 100 UNIT/ML injection pen  Generic drug:  insulin aspart  Inject 30 Units into the skin 3 times daily (before meals)  What changed:  how much to take        CONTINUE taking these medications    aspirin 81 MG EC tablet  Take 1 tablet by mouth daily     atorvastatin 40 MG tablet  Commonly known as:  LIPITOR  Take 1 tablet by mouth nightly     blood glucose test strips  Test three times a day & as needed for symptoms of irregular blood glucose. carvedilol 6.25 MG tablet  Commonly known as:  COREG  Take 1 tablet by mouth 2 times daily (with meals)     DULoxetine 60 MG extended release capsule  Commonly known as:  CYMBALTA  Take 1 capsule by mouth daily     fenofibrate micronized 200 MG capsule  Commonly known as:  LOFIBRA  Take 1 capsule by mouth nightly     FLUoxetine 10 MG capsule  Commonly known as:  PROZAC  Take 1 capsule by mouth daily     gabapentin 600 MG tablet  Commonly known as:  NEURONTIN  Take 1 tablet by mouth nightly for 30 days. lisinopril 2.5 MG tablet  Commonly known as:  PRINIVIL;ZESTRIL  Take 1 tablet by mouth daily     miconazole 2 % powder  Commonly known as:  MICOTIN  Apply topically 2 times daily.      ranolazine 500 MG extended release tablet  Commonly known as:  RANEXA  Take 1 tablet by mouth 2 times daily     topiramate 200 MG tablet  Commonly known as:  TOPAMAX  Take 1 tablet by mouth daily        STOP taking these medications    nystatin [id number] UNIT/GM powder  Commonly known as:  MYCOSTATIN     ondansetron 4 MG tablet  Commonly known as:  Zofran           Where to Get Your Medications      These medications were sent to [facility] - [place], [place] - [address] - P [phone] - F [phone]  [address] [facility] [address]    Phone:  [phone]   · aspirin 81 MG EC tablet  · atorvastatin 40 MG tablet  · carvedilol 6.25 MG tablet  · [employer]S Lancets Ultra Thin Misc  · DULoxetine 60 MG extended release capsule  · fenofibrate micronized 200 MG capsule  · FLUoxetine 10 MG capsule  · gabapentin 600 MG tablet  · Lantus SoloStar 100 UNIT/ML injection pen  · lisinopril 2.5 MG tablet  · metFORMIN 500 MG tablet  · miconazole 2 % powder  · NovoLOG FlexPen 100 UNIT/ML injection pen  · ranolazine 500 MG extended release tablet       Discharged in stable condition to home    Follow Up:   Follow up with PCP       Loki Tidwell 7/18/2020 2:03 PM

## 2020-08-12 ENCOUNTER — APPOINTMENT (OUTPATIENT)
Dept: GENERAL RADIOLOGY | Age: 60
End: 2020-08-12
Payer: MEDICARE

## 2020-08-12 ENCOUNTER — HOSPITAL ENCOUNTER (EMERGENCY)
Age: 60
Discharge: HOME | End: 2020-08-12
Attending: EMERGENCY MEDICINE
Payer: MEDICARE

## 2020-08-12 VITALS
WEIGHT: 180 LBS | SYSTOLIC BLOOD PRESSURE: 121 MMHG | OXYGEN SATURATION: 93 % | HEIGHT: 63 IN | TEMPERATURE: 97.5 F | BODY MASS INDEX: 31.89 KG/M2 | DIASTOLIC BLOOD PRESSURE: 76 MMHG | HEART RATE: 99 BPM | RESPIRATION RATE: 15 BRPM

## 2020-08-12 LAB
A/G RATIO: 1.5 (ref 1.1–2.2)
ALBUMIN SERPL-MCNC: 4 G/DL (ref 3.4–5)
ALP BLD-CCNC: 78 U/L (ref 40–129)
ALT SERPL-CCNC: 12 U/L (ref 10–40)
ANION GAP SERPL CALCULATED.3IONS-SCNC: 13 MMOL/L (ref 3–16)
AST SERPL-CCNC: 11 U/L (ref 15–37)
BASOPHILS ABSOLUTE: 0.1 K/UL (ref 0–0.2)
BASOPHILS RELATIVE PERCENT: 0.9 %
BILIRUB SERPL-MCNC: 0.4 MG/DL (ref 0–1)
BUN BLDV-MCNC: 15 MG/DL (ref 7–20)
CALCIUM SERPL-MCNC: 9.7 MG/DL (ref 8.3–10.6)
CHLORIDE BLD-SCNC: 91 MMOL/L (ref 99–110)
CO2: 26 MMOL/L (ref 21–32)
CREAT SERPL-MCNC: <0.5 MG/DL (ref 0.6–1.1)
D DIMER: <200 NG/ML DDU (ref 0–229)
EOSINOPHILS ABSOLUTE: 0.1 K/UL (ref 0–0.6)
EOSINOPHILS RELATIVE PERCENT: 2.2 %
GFR AFRICAN AMERICAN: >60
GFR NON-AFRICAN AMERICAN: >60
GLOBULIN: 2.6 G/DL
GLUCOSE BLD-MCNC: 329 MG/DL (ref 70–99)
GLUCOSE BLD-MCNC: 372 MG/DL (ref 70–99)
GLUCOSE BLD-MCNC: 402 MG/DL (ref 70–99)
HCT VFR BLD CALC: 44.4 % (ref 36–48)
HEMOGLOBIN: 15 G/DL (ref 12–16)
INR BLD: 1.01 (ref 0.86–1.14)
LYMPHOCYTES ABSOLUTE: 1.5 K/UL (ref 1–5.1)
LYMPHOCYTES RELATIVE PERCENT: 22.4 %
MCH RBC QN AUTO: 31.5 PG (ref 26–34)
MCHC RBC AUTO-ENTMCNC: 33.8 G/DL (ref 31–36)
MCV RBC AUTO: 93.2 FL (ref 80–100)
MONOCYTES ABSOLUTE: 0.6 K/UL (ref 0–1.3)
MONOCYTES RELATIVE PERCENT: 9.6 %
NEUTROPHILS ABSOLUTE: 4.2 K/UL (ref 1.7–7.7)
NEUTROPHILS RELATIVE PERCENT: 64.9 %
PDW BLD-RTO: 13.1 % (ref 12.4–15.4)
PERFORMED ON: ABNORMAL
PERFORMED ON: ABNORMAL
PLATELET # BLD: 280 K/UL (ref 135–450)
PMV BLD AUTO: 8.7 FL (ref 5–10.5)
POTASSIUM SERPL-SCNC: 4.6 MMOL/L (ref 3.5–5.1)
PROTHROMBIN TIME: 11.7 SEC (ref 10–13.2)
RBC # BLD: 4.76 M/UL (ref 4–5.2)
SODIUM BLD-SCNC: 130 MMOL/L (ref 136–145)
TOTAL PROTEIN: 6.6 G/DL (ref 6.4–8.2)
TROPONIN: <0.01 NG/ML
WBC # BLD: 6.5 K/UL (ref 4–11)

## 2020-08-12 PROCEDURE — 99285 EMERGENCY DEPT VISIT HI MDM: CPT

## 2020-08-12 PROCEDURE — 80053 COMPREHEN METABOLIC PANEL: CPT

## 2020-08-12 PROCEDURE — 85379 FIBRIN DEGRADATION QUANT: CPT

## 2020-08-12 PROCEDURE — 84484 ASSAY OF TROPONIN QUANT: CPT

## 2020-08-12 PROCEDURE — 85025 COMPLETE CBC W/AUTO DIFF WBC: CPT

## 2020-08-12 PROCEDURE — 96375 TX/PRO/DX INJ NEW DRUG ADDON: CPT

## 2020-08-12 PROCEDURE — 96374 THER/PROPH/DIAG INJ IV PUSH: CPT

## 2020-08-12 PROCEDURE — 6370000000 HC RX 637 (ALT 250 FOR IP): Performed by: EMERGENCY MEDICINE

## 2020-08-12 PROCEDURE — 85610 PROTHROMBIN TIME: CPT

## 2020-08-12 PROCEDURE — 2580000003 HC RX 258: Performed by: EMERGENCY MEDICINE

## 2020-08-12 PROCEDURE — 71045 X-RAY EXAM CHEST 1 VIEW: CPT

## 2020-08-12 PROCEDURE — 6360000002 HC RX W HCPCS: Performed by: EMERGENCY MEDICINE

## 2020-08-12 PROCEDURE — 36415 COLL VENOUS BLD VENIPUNCTURE: CPT

## 2020-08-12 PROCEDURE — 93005 ELECTROCARDIOGRAM TRACING: CPT | Performed by: EMERGENCY MEDICINE

## 2020-08-12 RX ORDER — INSULIN ASPART 100 [IU]/ML
30 INJECTION, SOLUTION INTRAVENOUS; SUBCUTANEOUS
Qty: 5 PEN | Refills: 3 | Status: ON HOLD | OUTPATIENT
Start: 2020-08-12 | End: 2020-09-22 | Stop reason: SDUPTHER

## 2020-08-12 RX ORDER — ONDANSETRON 4 MG/1
4 TABLET, FILM COATED ORAL EVERY 8 HOURS PRN
Qty: 10 TABLET | Refills: 0 | Status: ON HOLD | OUTPATIENT
Start: 2020-08-12 | End: 2020-11-06 | Stop reason: HOSPADM

## 2020-08-12 RX ORDER — ONDANSETRON 2 MG/ML
4 INJECTION INTRAMUSCULAR; INTRAVENOUS ONCE
Status: COMPLETED | OUTPATIENT
Start: 2020-08-12 | End: 2020-08-12

## 2020-08-12 RX ORDER — 0.9 % SODIUM CHLORIDE 0.9 %
1000 INTRAVENOUS SOLUTION INTRAVENOUS ONCE
Status: COMPLETED | OUTPATIENT
Start: 2020-08-12 | End: 2020-08-12

## 2020-08-12 RX ADMIN — ONDANSETRON 4 MG: 2 INJECTION INTRAMUSCULAR; INTRAVENOUS at 18:29

## 2020-08-12 RX ADMIN — INSULIN HUMAN 10 UNITS: 100 INJECTION, SOLUTION PARENTERAL at 18:39

## 2020-08-12 RX ADMIN — SODIUM CHLORIDE 1000 ML: 9 INJECTION, SOLUTION INTRAVENOUS at 18:29

## 2020-08-12 ASSESSMENT — PAIN DESCRIPTION - PAIN TYPE: TYPE: ACUTE PAIN

## 2020-08-12 ASSESSMENT — PAIN DESCRIPTION - ORIENTATION: ORIENTATION: MID

## 2020-08-12 ASSESSMENT — PAIN SCALES - GENERAL: PAINLEVEL_OUTOF10: 10

## 2020-08-12 ASSESSMENT — PAIN DESCRIPTION - LOCATION: LOCATION: CHEST

## 2020-08-12 NOTE — ED NOTES

## 2020-08-12 NOTE — ED PROVIDER NOTES
MG EC TABLET    Take 1 tablet by mouth daily    ATORVASTATIN (LIPITOR) 40 MG TABLET    Take 1 tablet by mouth nightly    BLOOD GLUCOSE MONITOR STRIPS    Test three times a day & as needed for symptoms of irregular blood glucose. CARVEDILOL (COREG) 6.25 MG TABLET    Take 1 tablet by mouth 2 times daily (with meals)    [employer]S LANCETS ULTRA THIN MISC    1 each by Does not apply route 4 times daily    DULOXETINE (CYMBALTA) 60 MG EXTENDED RELEASE CAPSULE    Take 1 capsule by mouth daily    FENOFIBRATE MICRONIZED (LOFIBRA) 200 MG CAPSULE    Take 1 capsule by mouth nightly    FLUOXETINE (PROZAC) 10 MG CAPSULE    Take 1 capsule by mouth daily    GABAPENTIN (NEURONTIN) 600 MG TABLET    Take 1 tablet by mouth nightly for 30 days. INSULIN GLARGINE (LANTUS SOLOSTAR) 100 UNIT/ML INJECTION PEN    Inject 40 Units into the skin 2 times daily    LISINOPRIL (PRINIVIL;ZESTRIL) 2.5 MG TABLET    Take 1 tablet by mouth daily    METFORMIN (GLUCOPHAGE) 500 MG TABLET    Take 2 tablets by mouth 2 times daily (with meals)    MICONAZOLE (MICOTIN) 2 % POWDER    Apply topically 2 times daily. RANOLAZINE (RANEXA) 500 MG EXTENDED RELEASE TABLET    Take 1 tablet by mouth 2 times daily    TOPIRAMATE (TOPAMAX) 200 MG TABLET    Take 1 tablet by mouth daily       ALLERGIES     Patient has no known allergies. FAMILY HISTORY       Family History   Problem Relation Age of Onset    Heart Disease Father           SOCIAL HISTORY       Social History     Socioeconomic History    Marital status:      Spouse name: None    Number of children: None    Years of education: None    Highest education level: None   Occupational History    None   Social Needs    Financial resource strain: None    Food insecurity     Worry: None     Inability: None    Transportation needs     Medical: None     Non-medical: None   Tobacco Use    Smoking status: Never Smoker    Smokeless tobacco: Never Used   Substance and Sexual Activity    Alcohol use:  No  Drug use: No    Sexual activity: Not Currently   Lifestyle    Physical activity     Days per week: None     Minutes per session: None    Stress: None   Relationships    Social connections     Talks on phone: None     Gets together: None     Attends Baptism service: None     Active member of club or organization: None     Attends meetings of clubs or organizations: None     Relationship status: None    Intimate partner violence     Fear of current or ex partner: None     Emotionally abused: None     Physically abused: None     Forced sexual activity: None   Other Topics Concern    None   Social History Narrative    None       SCREENINGS    Bc Coma Scale  Eye Opening: Spontaneous  Best Verbal Response: Oriented  Best Motor Response: Obeys commands  Bc Coma Scale Score: 15      PHYSICAL EXAM    (5+ for level 4, 8+ for level 5)     ED Triage Vitals [08/12/20 1725]   BP Temp Temp Source Pulse Resp SpO2 Height Weight   108/64 97.5 °F (36.4 °C) Oral 99 18 97 % 5' 3\" (1.6 m) 180 lb (81.6 kg)       Physical Exam  Vitals signs and nursing note reviewed. Constitutional:       General: She is not in acute distress. Appearance: Normal appearance. She is not toxic-appearing or diaphoretic. HENT:      Head: Normocephalic and atraumatic. Right Ear: External ear normal.      Left Ear: External ear normal.      Nose: Nose normal.      Mouth/Throat:      Mouth: Mucous membranes are moist.      Pharynx: Oropharynx is clear. Eyes:      General: No scleral icterus. Right eye: No discharge. Left eye: No discharge. Extraocular Movements: Extraocular movements intact. Conjunctiva/sclera: Conjunctivae normal.      Pupils: Pupils are equal, round, and reactive to light. Neck:      Musculoskeletal: Normal range of motion and neck supple. Cardiovascular:      Pulses: Normal pulses. Pulmonary:      Effort: Pulmonary effort is normal. No respiratory distress.       Breath sounds: Normal breath sounds. No stridor. Chest:      Chest wall: Tenderness (Left chest without crepitus) present. Abdominal:      General: Abdomen is flat. There is no distension. Palpations: There is no mass. Tenderness: There is no abdominal tenderness. There is no guarding or rebound. Hernia: No hernia is present. Musculoskeletal: Normal range of motion. General: No swelling, tenderness, deformity or signs of injury. Right lower leg: No edema. Left lower leg: No edema. Skin:     General: Skin is warm and dry. Capillary Refill: Capillary refill takes less than 2 seconds. Coloration: Skin is not jaundiced or pale. Findings: No bruising, erythema, lesion or rash. Neurological:      General: No focal deficit present. Mental Status: She is alert and oriented to person, place, and time. Cranial Nerves: No cranial nerve deficit. Sensory: No sensory deficit. Motor: No weakness. Psychiatric:         Mood and Affect: Mood normal.         Behavior: Behavior normal.         Thought Content: Thought content normal.         Judgment: Judgment normal.         DIAGNOSTIC RESULTS     EKG (Per Emergency Physician):   EKG interpretation by ED physician: Normal axis, sinus rhythm at 98 bpm with T wave flattening laterally. QTc is prolonged at 503 ms. Similar to previous EKG from 6/12/2020. RADIOLOGY (Per Emergency Physician): Interpretation per the Radiologist below, if available at the time of this note:  Xr Chest Portable    Result Date: 8/12/2020  EXAMINATION: ONE XRAY VIEW OF THE CHEST 8/12/2020 5:53 pm COMPARISON: 06/05/2020 HISTORY: ORDERING SYSTEM PROVIDED HISTORY: CP TECHNOLOGIST PROVIDED HISTORY: Reason for exam:->CP Reason for Exam: Chest Pain (constant midsternal chest pain \"going all over chest.\" for the last few days with n/v. last episode of emesis was yesterday) FINDINGS: Left subclavian cardiac device is unchanged.   The heart size is stable. The lungs are clear. No acute bony abnormality. Stable portable study. LABS:  Labs Reviewed   COMPREHENSIVE METABOLIC PANEL - Abnormal; Notable for the following components:       Result Value    Sodium 130 (*)     Chloride 91 (*)     Glucose 402 (*)     CREATININE <0.5 (*)     AST 11 (*)     All other components within normal limits    Narrative:     Performed at:  [address], [facility] [place]   Phone [phone]   POCT GLUCOSE - Abnormal; Notable for the following components:    POC Glucose 372 (*)     All other components within normal limits    Narrative:     Performed at:  [address], [facility] [place]   Phone [phone]   POCT GLUCOSE - Abnormal; Notable for the following components:    POC Glucose 329 (*)     All other components within normal limits    Narrative:     Performed at:  [address], [facility] [place]   Phone [phone]   CBC WITH AUTO DIFFERENTIAL    Narrative:     Performed at:  [address], [address] [place]   Phone [phone]   TROPONIN    Narrative:     Performed at:  [address], [address] [place]   Phone [phone]   PROTIME-INR    Narrative:     Performed at:  [address], [address] [place]   Phone [phone]   D-DIMER, QUANTITATIVE    Narrative:     Performed at:  [address], [address] [place]   Phone [phone]   POCT GLUCOSE   POCT GLUCOSE       All other labs were within normal range or not returned as of this dictation.     EMERGENCY DEPARTMENT COURSE and DIFFERENTIAL DIAGNOSIS/MDM:   Vitals:    Vitals:    08/12/20 1855 08/12/20 1954 08/12/20 2025 08/12/20 2055   BP: (!) 100/54 118/76 117/72 110/70   Pulse: 95 98 98 99   Resp: 20 20 20 18   Temp:       TempSrc:       SpO2: 91% 94% 94% 94%   Weight:       Height:           Medications   ondansetron (ZOFRAN) injection 4 mg (4 mg Intravenous Given 8/12/20 1829)   0.9 % sodium chloride bolus (0 mLs Intravenous Stopped 8/12/20 2019)   insulin regular (HUMULIN R;NOVOLIN R) injection 10 Units (10 Units Intravenous Given 8/12/20 1839)       MDM. Basic lab work including cardiac work-up initiated. Given pleuritic chest pain and age, d-dimer was ordered. IV fluids and insulin ordered given her elevated blood glucose level. No anion gap to suggest DKA. Note that she is been out of her NovoLog recently. She recently had a negative stress test in the last 7 months and chest pain is atypical therefore admission not currently indicated. Dimer neg. Pt improved on re-eval - will refill short acting insulin. I did confirm the dose with her - will rx zofran - given strict return precautions. Patient instructed to follow up with their primary care doctor in one-two days and return to the emergency department if worsening of the condition or any other concerns. Please see discharge instructions for further delineation regarding the specific discharge instructions explained and given to the patient. CONSULTS:  None    PROCEDURES:  Unless otherwise noted below, none     Procedures    FINAL IMPRESSION      1. Non-intractable vomiting with nausea, unspecified vomiting type    2. Uncontrolled type 2 diabetes mellitus with hyperglycemia ([facility] [address]    3. Encounter for medication refill    4.  Chest pain, unspecified type          DISPOSITION/PLAN   DISPOSITION        PATIENT REFERRED TO:  [facility]) Pre-Services  [phone]  Schedule an appointment as soon as possible for a visit       [facility]  ED  [address] [facility] [address]  [phone]    If symptoms worsen      DISCHARGE MEDICATIONS:  New Prescriptions ONDANSETRON (ZOFRAN) 4 MG TABLET    Take 1 tablet by mouth every 8 hours as needed for Nausea          (Please note:  Portions of this note were completed with a voice recognition program. Efforts were made to edit the dictations but occasionally words and phrases are mis-transcribed.)    Form v2016. J.[id number]    Negro Almonte DO (electronically signed)  Emergency Medicine Provider              Chaitanya Wilks DO  08/12/20 [id number]

## 2020-08-12 NOTE — ED NOTES
Fall risk screening completed. Fall risk bracelet applied to patient. Non-skid socks provided and placed on patient. The fall risk is indicated using  dome light . Based on score, a bed alarm was indicated and applied. The call light is within the patient's reach, and instructions for use were provided. The bed is in the lowest position with wheels locked. The patient has been advised to notify staff, using the call light, if there is a need to get up or use restroom. The patient verbalized understanding of safety precautions and how to contact staff for assistance.        Yasemin Wheatley RN  08/12/20 7979

## 2020-08-13 ENCOUNTER — CARE COORDINATION (OUTPATIENT)
Dept: CARE COORDINATION | Age: 60
End: 2020-08-13

## 2020-08-13 LAB
EKG ATRIAL RATE: 98 BPM
EKG DIAGNOSIS: NORMAL
EKG P AXIS: 19 DEGREES
EKG P-R INTERVAL: 154 MS
EKG Q-T INTERVAL: 394 MS
EKG QRS DURATION: 98 MS
EKG QTC CALCULATION (BAZETT): 503 MS
EKG R AXIS: -8 DEGREES
EKG T AXIS: 101 DEGREES
EKG VENTRICULAR RATE: 98 BPM

## 2020-08-13 PROCEDURE — 93010 ELECTROCARDIOGRAM REPORT: CPT | Performed by: INTERNAL MEDICINE

## 2020-08-13 NOTE — CARE COORDINATION
Patient contacted regarding recent discharge and COVID-19 risk. Care Transition Nurse/ Ambulatory Care Manager contacted the patient by telephone to perform post discharge assessment. Verified name and  with patient as identifiers. Patient has following risk factors of: heart failure, diabetes and HTN, HLD, CAD, CVA, brain tumor, cardiac defibrillator, TIA. CTN/ACM reviewed discharge instructions, medical action plan and red flags related to discharge diagnosis. Reviewed and educated them on any new and changed medications related to discharge diagnosis. Advised obtaining a 90-day supply of all daily and as-needed medications. Education provided regarding infection prevention, and signs and symptoms of COVID-19 and when to seek medical attention with patient who verbalized understanding. Discussed exposure protocols and quarantine from [address] you at higher risk for severe illness  and given an opportunity for questions and concerns. The patient agrees to contact the COVID-19 hotline [phone] or PCP office for questions related to their healthcare. CTN/ACM provided contact information for future reference. From CDC: Are you at higher risk for severe illness?  Wash your hands often.  Avoid close contact (6 feet, which is about two arm lengths) with people who are sick.  Put distance between yourself and other people if COVID-19 is spreading in your community.  Clean and disinfect frequently touched surfaces.  Avoid all cruise travel and non-essential air travel.  Call your healthcare professional if you have concerns about COVID-19 and your underlying condition or if you are sick. For more information on steps you can take to protect yourself, see CDC's How to Natalya for follow-up call in 5-7 days     Patient was frustrated over the phone. She said she was tired of being sick. Complaining of nausea and taking zofran with some relief.   Says her legs and arms were tired and achey. Allowed her to vent and offered support. Patient said she was done talking on phone and ended call. Plan  ED FU 5-7 days    Nancy Quiñones.  Milton Ruby RN, BSN, [address] [place] [address] Primary Care  [phone]

## 2020-08-17 ENCOUNTER — CARE COORDINATION (OUTPATIENT)
Dept: CARE COORDINATION | Age: 60
End: 2020-08-17

## 2020-08-17 NOTE — CARE COORDINATION
5-7 day CM/ED FU for patient's depression. She was in much better spirits today. Says that her \"NP that does home visits fired her\". Offered bringing [facility] Physician referral on the line. Brought them on 3 way call and they provided physician referral information to patient. She stated that she would like a PCP that was able to do home visits. MPR line encouraged her to contact her insurance for referral for PCP home visit. She said that she would contact them. Also, I spoke to her about her secondary insurance Medicaid. Encouraged her to contact them for an assigned CM that assists with getting her transportation to medical appointments. Patient was satisfied and said she would end call and make these phone calls. She did not have any questions or concerns. Plan  ED FU in one week    Emerson Almanzar.  Charmayne Helm, RN, BSN, [address] [place] [address] Primary Care  [phone]

## 2020-08-22 ENCOUNTER — APPOINTMENT (OUTPATIENT)
Dept: GENERAL RADIOLOGY | Age: 60
DRG: 293 | End: 2020-08-22
Payer: MEDICARE

## 2020-08-22 ENCOUNTER — HOSPITAL ENCOUNTER (INPATIENT)
Age: 60
LOS: 2 days | Discharge: HOME | DRG: 293 | End: 2020-08-24
Attending: EMERGENCY MEDICINE | Admitting: INTERNAL MEDICINE
Payer: MEDICARE

## 2020-08-22 ENCOUNTER — APPOINTMENT (OUTPATIENT)
Dept: CT IMAGING | Age: 60
DRG: 293 | End: 2020-08-22
Payer: MEDICARE

## 2020-08-22 PROBLEM — I50.23 ACUTE ON CHRONIC SYSTOLIC HEART FAILURE (HCC): Status: ACTIVE | Noted: 2020-08-22

## 2020-08-22 LAB
A/G RATIO: 1.5 (ref 1.1–2.2)
ALBUMIN SERPL-MCNC: 4 G/DL (ref 3.4–5)
ALP BLD-CCNC: 172 U/L (ref 40–129)
ALT SERPL-CCNC: 160 U/L (ref 10–40)
ANION GAP SERPL CALCULATED.3IONS-SCNC: 14 MMOL/L (ref 3–16)
AST SERPL-CCNC: 170 U/L (ref 15–37)
BASOPHILS ABSOLUTE: 0.1 K/UL (ref 0–0.2)
BASOPHILS RELATIVE PERCENT: 1.2 %
BILIRUB SERPL-MCNC: <0.2 MG/DL (ref 0–1)
BUN BLDV-MCNC: 19 MG/DL (ref 7–20)
CALCIUM SERPL-MCNC: 9.5 MG/DL (ref 8.3–10.6)
CHLORIDE BLD-SCNC: 98 MMOL/L (ref 99–110)
CO2: 21 MMOL/L (ref 21–32)
CREAT SERPL-MCNC: 0.7 MG/DL (ref 0.6–1.1)
D DIMER: 259 NG/ML DDU (ref 0–229)
EKG ATRIAL RATE: 110 BPM
EKG DIAGNOSIS: NORMAL
EKG P AXIS: 57 DEGREES
EKG P-R INTERVAL: 144 MS
EKG Q-T INTERVAL: 486 MS
EKG QRS DURATION: 104 MS
EKG QTC CALCULATION (BAZETT): 657 MS
EKG R AXIS: 23 DEGREES
EKG T AXIS: 45 DEGREES
EKG VENTRICULAR RATE: 110 BPM
EOSINOPHILS ABSOLUTE: 0.1 K/UL (ref 0–0.6)
EOSINOPHILS RELATIVE PERCENT: 1.3 %
GFR AFRICAN AMERICAN: >60
GFR NON-AFRICAN AMERICAN: >60
GLOBULIN: 2.6 G/DL
GLUCOSE BLD-MCNC: 226 MG/DL (ref 70–99)
GLUCOSE BLD-MCNC: 266 MG/DL (ref 70–99)
GLUCOSE BLD-MCNC: 391 MG/DL (ref 70–99)
HCT VFR BLD CALC: 43.1 % (ref 36–48)
HEMOGLOBIN: 14.5 G/DL (ref 12–16)
LYMPHOCYTES ABSOLUTE: 1.6 K/UL (ref 1–5.1)
LYMPHOCYTES RELATIVE PERCENT: 21.8 %
MCH RBC QN AUTO: 31.9 PG (ref 26–34)
MCHC RBC AUTO-ENTMCNC: 33.6 G/DL (ref 31–36)
MCV RBC AUTO: 94.8 FL (ref 80–100)
MONOCYTES ABSOLUTE: 0.6 K/UL (ref 0–1.3)
MONOCYTES RELATIVE PERCENT: 7.4 %
NEUTROPHILS ABSOLUTE: 5.2 K/UL (ref 1.7–7.7)
NEUTROPHILS RELATIVE PERCENT: 68.3 %
PDW BLD-RTO: 13.5 % (ref 12.4–15.4)
PERFORMED ON: ABNORMAL
PERFORMED ON: ABNORMAL
PLATELET # BLD: 291 K/UL (ref 135–450)
PMV BLD AUTO: 8.5 FL (ref 5–10.5)
POTASSIUM SERPL-SCNC: 4.3 MMOL/L (ref 3.5–5.1)
PRO-BNP: 3113 PG/ML (ref 0–124)
RBC # BLD: 4.54 M/UL (ref 4–5.2)
SODIUM BLD-SCNC: 133 MMOL/L (ref 136–145)
TOTAL PROTEIN: 6.6 G/DL (ref 6.4–8.2)
TROPONIN: <0.01 NG/ML
WBC # BLD: 7.5 K/UL (ref 4–11)

## 2020-08-22 PROCEDURE — 6360000002 HC RX W HCPCS: Performed by: INTERNAL MEDICINE

## 2020-08-22 PROCEDURE — 99222 1ST HOSP IP/OBS MODERATE 55: CPT | Performed by: INTERNAL MEDICINE

## 2020-08-22 PROCEDURE — 2580000003 HC RX 258: Performed by: INTERNAL MEDICINE

## 2020-08-22 PROCEDURE — 1200000000 HC SEMI PRIVATE

## 2020-08-22 PROCEDURE — 93005 ELECTROCARDIOGRAM TRACING: CPT | Performed by: EMERGENCY MEDICINE

## 2020-08-22 PROCEDURE — 84484 ASSAY OF TROPONIN QUANT: CPT

## 2020-08-22 PROCEDURE — 96374 THER/PROPH/DIAG INJ IV PUSH: CPT

## 2020-08-22 PROCEDURE — 96372 THER/PROPH/DIAG INJ SC/IM: CPT

## 2020-08-22 PROCEDURE — 99285 EMERGENCY DEPT VISIT HI MDM: CPT

## 2020-08-22 PROCEDURE — 93010 ELECTROCARDIOGRAM REPORT: CPT | Performed by: INTERNAL MEDICINE

## 2020-08-22 PROCEDURE — 6360000002 HC RX W HCPCS: Performed by: STUDENT IN AN ORGANIZED HEALTH CARE EDUCATION/TRAINING PROGRAM

## 2020-08-22 PROCEDURE — 71045 X-RAY EXAM CHEST 1 VIEW: CPT

## 2020-08-22 PROCEDURE — 2500000003 HC RX 250 WO HCPCS: Performed by: INTERNAL MEDICINE

## 2020-08-22 PROCEDURE — 96375 TX/PRO/DX INJ NEW DRUG ADDON: CPT

## 2020-08-22 PROCEDURE — 36415 COLL VENOUS BLD VENIPUNCTURE: CPT

## 2020-08-22 PROCEDURE — 6370000000 HC RX 637 (ALT 250 FOR IP): Performed by: INTERNAL MEDICINE

## 2020-08-22 PROCEDURE — 85025 COMPLETE CBC W/AUTO DIFF WBC: CPT

## 2020-08-22 PROCEDURE — 85379 FIBRIN DEGRADATION QUANT: CPT

## 2020-08-22 PROCEDURE — 83880 ASSAY OF NATRIURETIC PEPTIDE: CPT

## 2020-08-22 PROCEDURE — 6360000004 HC RX CONTRAST MEDICATION: Performed by: EMERGENCY MEDICINE

## 2020-08-22 PROCEDURE — 96376 TX/PRO/DX INJ SAME DRUG ADON: CPT

## 2020-08-22 PROCEDURE — 80053 COMPREHEN METABOLIC PANEL: CPT

## 2020-08-22 PROCEDURE — 71260 CT THORAX DX C+: CPT

## 2020-08-22 PROCEDURE — 83036 HEMOGLOBIN GLYCOSYLATED A1C: CPT

## 2020-08-22 RX ORDER — CARVEDILOL 6.25 MG/1
6.25 TABLET ORAL 2 TIMES DAILY WITH MEALS
Status: DISCONTINUED | OUTPATIENT
Start: 2020-08-22 | End: 2020-08-24 | Stop reason: HOSPADM

## 2020-08-22 RX ORDER — RANOLAZINE 500 MG/1
500 TABLET, EXTENDED RELEASE ORAL 2 TIMES DAILY
Status: DISCONTINUED | OUTPATIENT
Start: 2020-08-22 | End: 2020-08-24 | Stop reason: HOSPADM

## 2020-08-22 RX ORDER — FUROSEMIDE 10 MG/ML
40 INJECTION INTRAMUSCULAR; INTRAVENOUS ONCE
Status: COMPLETED | OUTPATIENT
Start: 2020-08-22 | End: 2020-08-22

## 2020-08-22 RX ORDER — TOPIRAMATE 100 MG/1
200 TABLET, FILM COATED ORAL DAILY
Status: DISCONTINUED | OUTPATIENT
Start: 2020-08-23 | End: 2020-08-24 | Stop reason: HOSPADM

## 2020-08-22 RX ORDER — FUROSEMIDE 10 MG/ML
40 INJECTION INTRAMUSCULAR; INTRAVENOUS 2 TIMES DAILY
Status: DISCONTINUED | OUTPATIENT
Start: 2020-08-22 | End: 2020-08-22

## 2020-08-22 RX ORDER — FUROSEMIDE 40 MG/1
40 TABLET ORAL DAILY
Status: DISCONTINUED | OUTPATIENT
Start: 2020-08-24 | End: 2020-08-24 | Stop reason: HOSPADM

## 2020-08-22 RX ORDER — SODIUM CHLORIDE 0.9 % (FLUSH) 0.9 %
10 SYRINGE (ML) INJECTION PRN
Status: DISCONTINUED | OUTPATIENT
Start: 2020-08-22 | End: 2020-08-24 | Stop reason: HOSPADM

## 2020-08-22 RX ORDER — SODIUM CHLORIDE 0.9 % (FLUSH) 0.9 %
10 SYRINGE (ML) INJECTION EVERY 12 HOURS SCHEDULED
Status: DISCONTINUED | OUTPATIENT
Start: 2020-08-22 | End: 2020-08-24 | Stop reason: HOSPADM

## 2020-08-22 RX ORDER — ATORVASTATIN CALCIUM 40 MG/1
40 TABLET, FILM COATED ORAL NIGHTLY
Status: DISCONTINUED | OUTPATIENT
Start: 2020-08-22 | End: 2020-08-24 | Stop reason: HOSPADM

## 2020-08-22 RX ORDER — KETOROLAC TROMETHAMINE 30 MG/ML
15 INJECTION, SOLUTION INTRAMUSCULAR; INTRAVENOUS EVERY 6 HOURS PRN
Status: DISCONTINUED | OUTPATIENT
Start: 2020-08-22 | End: 2020-08-24 | Stop reason: HOSPADM

## 2020-08-22 RX ORDER — DEXTROSE MONOHYDRATE 50 MG/ML
100 INJECTION, SOLUTION INTRAVENOUS PRN
Status: DISCONTINUED | OUTPATIENT
Start: 2020-08-22 | End: 2020-08-24 | Stop reason: HOSPADM

## 2020-08-22 RX ORDER — ONDANSETRON 2 MG/ML
4 INJECTION INTRAMUSCULAR; INTRAVENOUS EVERY 6 HOURS PRN
Status: DISCONTINUED | OUTPATIENT
Start: 2020-08-22 | End: 2020-08-24 | Stop reason: HOSPADM

## 2020-08-22 RX ORDER — DEXTROSE MONOHYDRATE 25 G/50ML
12.5 INJECTION, SOLUTION INTRAVENOUS PRN
Status: DISCONTINUED | OUTPATIENT
Start: 2020-08-22 | End: 2020-08-24 | Stop reason: HOSPADM

## 2020-08-22 RX ORDER — POLYETHYLENE GLYCOL 3350 17 G/17G
17 POWDER, FOR SOLUTION ORAL DAILY PRN
Status: DISCONTINUED | OUTPATIENT
Start: 2020-08-22 | End: 2020-08-24 | Stop reason: HOSPADM

## 2020-08-22 RX ORDER — FUROSEMIDE 10 MG/ML
40 INJECTION INTRAMUSCULAR; INTRAVENOUS 2 TIMES DAILY
Status: COMPLETED | OUTPATIENT
Start: 2020-08-22 | End: 2020-08-23

## 2020-08-22 RX ORDER — DULOXETIN HYDROCHLORIDE 60 MG/1
60 CAPSULE, DELAYED RELEASE ORAL DAILY
Status: DISCONTINUED | OUTPATIENT
Start: 2020-08-22 | End: 2020-08-24 | Stop reason: HOSPADM

## 2020-08-22 RX ORDER — LISINOPRIL 5 MG/1
2.5 TABLET ORAL DAILY
Status: DISCONTINUED | OUTPATIENT
Start: 2020-08-22 | End: 2020-08-24 | Stop reason: HOSPADM

## 2020-08-22 RX ORDER — PROMETHAZINE HYDROCHLORIDE 25 MG/1
12.5 TABLET ORAL EVERY 6 HOURS PRN
Status: DISCONTINUED | OUTPATIENT
Start: 2020-08-22 | End: 2020-08-24 | Stop reason: HOSPADM

## 2020-08-22 RX ORDER — ASPIRIN 81 MG/1
81 TABLET, CHEWABLE ORAL DAILY
Status: DISCONTINUED | OUTPATIENT
Start: 2020-08-23 | End: 2020-08-24 | Stop reason: HOSPADM

## 2020-08-22 RX ORDER — NITROGLYCERIN 0.4 MG/1
0.4 TABLET SUBLINGUAL EVERY 5 MIN PRN
Status: DISCONTINUED | OUTPATIENT
Start: 2020-08-22 | End: 2020-08-24 | Stop reason: HOSPADM

## 2020-08-22 RX ORDER — INSULIN GLARGINE 100 [IU]/ML
40 INJECTION, SOLUTION SUBCUTANEOUS 2 TIMES DAILY
Status: DISCONTINUED | OUTPATIENT
Start: 2020-08-22 | End: 2020-08-24 | Stop reason: HOSPADM

## 2020-08-22 RX ORDER — NICOTINE POLACRILEX 4 MG
15 LOZENGE BUCCAL PRN
Status: DISCONTINUED | OUTPATIENT
Start: 2020-08-22 | End: 2020-08-24 | Stop reason: HOSPADM

## 2020-08-22 RX ORDER — FENOFIBRATE 160 MG/1
160 TABLET ORAL DAILY
Status: DISCONTINUED | OUTPATIENT
Start: 2020-08-22 | End: 2020-08-24 | Stop reason: HOSPADM

## 2020-08-22 RX ORDER — FLUOXETINE 10 MG/1
10 CAPSULE ORAL DAILY
Status: DISCONTINUED | OUTPATIENT
Start: 2020-08-22 | End: 2020-08-24 | Stop reason: HOSPADM

## 2020-08-22 RX ORDER — ACETAMINOPHEN 325 MG/1
650 TABLET ORAL EVERY 6 HOURS PRN
Status: DISCONTINUED | OUTPATIENT
Start: 2020-08-22 | End: 2020-08-24 | Stop reason: HOSPADM

## 2020-08-22 RX ORDER — ACETAMINOPHEN 650 MG/1
650 SUPPOSITORY RECTAL EVERY 6 HOURS PRN
Status: DISCONTINUED | OUTPATIENT
Start: 2020-08-22 | End: 2020-08-24 | Stop reason: HOSPADM

## 2020-08-22 RX ADMIN — FUROSEMIDE 40 MG: 10 INJECTION, SOLUTION INTRAMUSCULAR; INTRAVENOUS at 17:19

## 2020-08-22 RX ADMIN — RANOLAZINE 500 MG: 500 TABLET, FILM COATED, EXTENDED RELEASE ORAL at 21:36

## 2020-08-22 RX ADMIN — INSULIN LISPRO 6 UNITS: 100 INJECTION, SOLUTION INTRAVENOUS; SUBCUTANEOUS at 17:19

## 2020-08-22 RX ADMIN — ENOXAPARIN SODIUM 40 MG: 40 INJECTION SUBCUTANEOUS at 14:27

## 2020-08-22 RX ADMIN — MICONAZOLE NITRATE: 20 POWDER TOPICAL at 16:34

## 2020-08-22 RX ADMIN — INSULIN GLARGINE 40 UNITS: 100 INJECTION, SOLUTION SUBCUTANEOUS at 21:36

## 2020-08-22 RX ADMIN — INSULIN LISPRO 20 UNITS: 100 INJECTION, SOLUTION INTRAVENOUS; SUBCUTANEOUS at 17:19

## 2020-08-22 RX ADMIN — Medication 10 ML: at 21:37

## 2020-08-22 RX ADMIN — IOPAMIDOL 75 ML: 755 INJECTION, SOLUTION INTRAVENOUS at 09:51

## 2020-08-22 RX ADMIN — CARVEDILOL 6.25 MG: 6.25 TABLET, FILM COATED ORAL at 16:41

## 2020-08-22 RX ADMIN — ACETAMINOPHEN 650 MG: 325 TABLET ORAL at 20:00

## 2020-08-22 RX ADMIN — INSULIN LISPRO 5 UNITS: 100 INJECTION, SOLUTION INTRAVENOUS; SUBCUTANEOUS at 21:37

## 2020-08-22 RX ADMIN — ATORVASTATIN CALCIUM 40 MG: 40 TABLET, FILM COATED ORAL at 21:36

## 2020-08-22 RX ADMIN — FUROSEMIDE 40 MG: 10 INJECTION, SOLUTION INTRAMUSCULAR; INTRAVENOUS at 10:03

## 2020-08-22 RX ADMIN — MICONAZOLE NITRATE: 20 POWDER TOPICAL at 21:46

## 2020-08-22 ASSESSMENT — PAIN DESCRIPTION - FREQUENCY
FREQUENCY: CONTINUOUS

## 2020-08-22 ASSESSMENT — PAIN DESCRIPTION - ORIENTATION
ORIENTATION: MID
ORIENTATION: MID

## 2020-08-22 ASSESSMENT — PAIN SCALES - GENERAL
PAINLEVEL_OUTOF10: 10

## 2020-08-22 ASSESSMENT — PAIN DESCRIPTION - PAIN TYPE
TYPE: ACUTE PAIN

## 2020-08-22 ASSESSMENT — ENCOUNTER SYMPTOMS
NAUSEA: 0
CONSTIPATION: 1
SHORTNESS OF BREATH: 1
DIARRHEA: 1
VOMITING: 0

## 2020-08-22 ASSESSMENT — PAIN DESCRIPTION - LOCATION
LOCATION: CHEST

## 2020-08-22 ASSESSMENT — PAIN DESCRIPTION - ONSET: ONSET: ON-GOING

## 2020-08-22 ASSESSMENT — PAIN DESCRIPTION - DESCRIPTORS: DESCRIPTORS: SHARP

## 2020-08-22 NOTE — ED NOTES
Pt assisted to bedside commode. 500mL of yellow urine voided.       Isabelle Workman RN  08/22/20 1016

## 2020-08-22 NOTE — ED TRIAGE NOTES
Chief Complaint   Patient presents with    Chest Pain     Pt brought in by EMS for chest pain. Pt was given asa and nitro via EMS with no change to pain. Pt reports SOB with the chest pain.

## 2020-08-22 NOTE — ED PROVIDER NOTES
EMERGENCY DEPARTMENT ENCOUNTER      Pt Name: Blake Guy  MRN: [id number]  [place] 1960  Date of evaluation: 8/22/2020  Provider:Jeremiah Cohen December, DO  ED Attending: Neville Aguilar MD    CHIEF COMPLAINT       Chief Complaint   Patient presents with    Chest Pain     Pt brought in by EMS for chest pain. Pt was given asa and nitro via EMS with no change to pain. Pt reports SOB with the chest pain. HISTORY OF PRESENT ILLNESS   (Location/Symptom, Timing/Onset,Context/Setting, Quality, Duration, Modifying Factors, Severity)  Note limiting factors. Blake Guy is a 63 y.o. female with a past medical history of nonischemic cardiomyopathy, CAD, hypertension, hyperlipidemia, diabetes, cognitive developmental delay who presents to the emergency department for evaluation of chest pain and shortness of breath. Patient reports that her symptoms began a couple of days ago while standing in the kitchen. Describes her chest pain is a sharp pain localized to the precordial region that is nonradiating. Pain is constant and currently rates her pain at 10/10 on pain scale. Patient received an aspirin and nitro during transport which patient reports no improvement of her pain. Patient does have a history of cardiomyopathy with CAD and cath in 2018. Patient reports that the chest pain is different from previous episodes. Patient also complaining of fevers, chills, abdominal pain, diarrhea, constipation, dizziness, and lightheadedness. She has a history of depression and feels like she has been feeling depressed. Feels like her family does not care about her. Additionally she is upset about the upcoming anniversary of her mother's death which occurred approximately 6 years ago. Denies any recent travel. Denies any sick contacts. Has been having some decreased appetite. Was able to tolerate a hot dog yesterday. She said reports that she has been compliant with all her medication.     Nursing Notes were reviewed. REVIEW OF SYSTEMS    (2-9 systems for level 4, 10 or more for level 5)     Review of Systems   Constitutional: Positive for chills and fever. Respiratory: Positive for shortness of breath. Cardiovascular: Positive for chest pain. Gastrointestinal: Positive for constipation and diarrhea. Negative for nausea and vomiting. Neurological: Positive for dizziness and light-headedness. Psychiatric/Behavioral: Negative for suicidal ideas. Depression       Except as noted above the remainder of the review of systems was reviewed and negative.        PAST MEDICAL HISTORY     Past Medical History:   Diagnosis Date    CAD (coronary artery disease)     Cerebral artery occlusion with [facility] ([facility]     Diabetes mellitus ([facility]     Hyperlipidemia     Hypertension     Mental retardation     [facility]) [facility]          SURGICAL HISTORY       Past Surgical History:   Procedure Laterality Date    BACK SURGERY      PACEMAKER INSERTION      TUBAL LIGATION      TUMOR REMOVAL           CURRENT MEDICATIONS       Current Discharge Medication List      CONTINUE these medications which have NOT CHANGED    Details   aspirin 81 MG EC tablet Take 1 tablet by mouth daily  Qty: 30 tablet, Refills: 2      ranolazine (RANEXA) 500 MG extended release tablet Take 1 tablet by mouth 2 times daily  Qty: 60 tablet, Refills: 2      DULoxetine (CYMBALTA) 60 MG extended release capsule Take 1 capsule by mouth daily  Qty: 30 capsule, Refills: 0      FLUoxetine (PROZAC) 10 MG capsule Take 1 capsule by mouth daily  Qty: 30 capsule, Refills: 0      lisinopril (PRINIVIL;ZESTRIL) 2.5 MG tablet Take 1 tablet by mouth daily  Qty: 30 tablet, Refills: 3      carvedilol (COREG) 6.25 MG tablet Take 1 tablet by mouth 2 times daily (with meals)  Qty: 60 tablet, Refills: 2      topiramate (TOPAMAX) 200 MG tablet Take 1 tablet by mouth daily  Qty: 60 tablet, Refills: 0      ondansetron (ZOFRAN) 4 MG tablet Take 1 tablet by mouth every 8 hours as needed for Nausea  Qty: 10 tablet, Refills: 0      insulin aspart (NOVOLOG FLEXPEN) 100 UNIT/ML injection pen Inject 30 Units into the skin 3 times daily (before meals)  Qty: 5 pen, Refills: 3      gabapentin (NEURONTIN) 600 MG tablet Take 1 tablet by mouth nightly for 30 days. Qty: 30 tablet, Refills: 0      insulin glargine (LANTUS SOLOSTAR) 100 UNIT/ML injection pen Inject 40 Units into the skin 2 times daily  Qty: 5 pen, Refills: 3      metFORMIN (GLUCOPHAGE) 500 MG tablet Take 2 tablets by mouth 2 times daily (with meals)  Qty: 120 tablet, Refills: 2      atorvastatin (LIPITOR) 40 MG tablet Take 1 tablet by mouth nightly  Qty: 30 tablet, Refills: 2      fenofibrate micronized (LOFIBRA) 200 MG capsule Take 1 capsule by mouth nightly  Qty: 30 capsule, Refills: 3      miconazole (MICOTIN) 2 % powder Apply topically 2 times daily. Qty: 45 g, Refills: 1      [employer] Lancets Ultra Thin MISC 1 each by Does not apply route 4 times daily  Qty: 200 each, Refills: 0      blood glucose monitor strips Test three times a day & as needed for symptoms of irregular blood glucose. Qty: 100 strip, Refills: 2    Comments: Brand per patient preference. May round up to next available package size. ALLERGIES     Patient has no known allergies.     FAMILY HISTORY       Family History   Problem Relation Age of Onset    Heart Disease Father           SOCIAL HISTORY       Social History     Socioeconomic History    Marital status:      Spouse name: None    Number of children: None    Years of education: None    Highest education level: None   Occupational History    None   Social Needs    Financial resource strain: None    Food insecurity     Worry: None     Inability: None    Transportation needs     Medical: None     Non-medical: None   Tobacco Use    Smoking status: Never Smoker    Smokeless tobacco: Never Used   Substance and Sexual Activity    Alcohol use: No    Drug use: No    Sexual activity: Not Currently   Lifestyle    Physical activity     Days per week: None     Minutes per session: None    Stress: None   Relationships    Social connections     Talks on phone: None     Gets together: None     Attends Latter day service: None     Active member of club or organization: None     Attends meetings of clubs or organizations: None     Relationship status: None    Intimate partner violence     Fear of current or ex partner: None     Emotionally abused: None     Physically abused: None     Forced sexual activity: None   Other Topics Concern    None   Social History Narrative    None       SCREENINGS    [place] Coma Scale  Eye Opening: Spontaneous  Best Verbal Response: Oriented  Best Motor Response: Obeys commands  Bc Coma Scale Score: 15        PHYSICAL EXAM    (up to 7 for level 4, 8 ormore for level 5)     ED Triage Vitals [08/22/20 0753]   BP Temp Temp Source Pulse Resp SpO2 Height Weight   123/84 99 °F (37.2 °C) Oral 103 18 94 % 5' 3\" (1.6 m) 180 lb (81.6 kg)       General appearance:  Alert and oriented, no apparent distress, cooperative   HEENT:  Normal cephalic, atraumatic without obvious deformity. Neck: Supple, with full range of motion. Respiratory:  Normal respiratory effort. Clear to auscultation, bilaterally without Rales/Wheezes/Rhonchi. Cardiovascular:  Regular rate and rhythm with normal S1/S2 without murmurs, rubs or gallops. Chest wall: Reproducible tenderness to palpation of sternum  Abdomen: Soft, non-tender, non-distended with normal bowel sounds. Musculoskeletal:  No clubbing, cyanosis or edema bilaterally. Full range of motion without deformity. Skin: Skin color, texture, turgor normal.  No rashes or lesions. Neurologic:  Neurovascularly intact without any focal sensory/motor deficits. No focal deficits. Psychiatric:  Alert and oriented, thought content appropriate, normal insight.   Patient tearful when discussing family issues. Extremities: Scant bilateral ankle edema. Physical Exam    DIAGNOSTIC RESULTS     EKG: All EKG's are interpreted by the Emergency Department Physicianwho either signs or Co-signs this chart in the absence of a cardiologist.      RADIOLOGY:   Non-plain film images such as CT, Ultrasound and MRI are read by the radiologist. Plain radiographic images are visualized and preliminarily interpreted by the emergency physician with the below findings:      Interpretation per the Radiologist below, if available at the time of this note:    CT CHEST PULMONARY EMBOLISM W CONTRAST   Final Result   1. No evidence of pulmonary embolism   2. Cardiomegaly with interstitial edema and bilateral pleural effusions.    Infra diaphragmatic reflux of contrast suggests elevated right heart pressure         XR CHEST PORTABLE   Final Result   Cardiomegaly with pulmonary vascular congestion and mild perihilar   interstitial edema             ED BEDSIDE ULTRASOUND:   Performed by ED Physician - none    LABS:  Labs Reviewed   COMPREHENSIVE METABOLIC PANEL - Abnormal; Notable for the following components:       Result Value    Sodium 133 (*)     Chloride 98 (*)     Glucose 391 (*)     Alkaline Phosphatase 172 (*)      (*)      (*)     All other components within normal limits    Narrative:     Performed at:  [address] [facility],  [address] [place] [address], [email or web address]   Phone [phone]   D-DIMER, QUANTITATIVE - Abnormal; Notable for the following components:    D-Dimer, Quant 259 (*)     All other components within normal limits    Narrative:     Performed at:  [address],  [address] [place] [address] Drive, [address] [place]   Phone [phone] - Abnormal; Notable for the following components:    Pro-BNP 3,113 (*)     All other components within normal limits    Narrative:     Performed at:  [address] [place] [address]  with likely CHF exacerbation. Due to no improvement of patient's chest pain or shortness of breath, we discussed admission to the hospital for further work-up with patient who is agreeable. Cardiology was consulted and spoke with Dr. Katherine Davidson who agreed that this is unlikely ACS but likely a CHF exacerbation and agreed with admission for further work-up. The patient was discussed with hospitalist Dr. Edin Marina who accepted patient for admission.   - Plan for admission for further workup and treatment discussed with patient. Patient and family in agreement with plan and have no further questions/concerns      REASSESSMENT     ED Course as of Aug 22 1446   Sat Aug 22, 2020   0902 D-Dimer, Quant(!): 259 [XN]      ED Course User Index  [XN] Hal Tidwell DO         CRITICAL CARE TIME   Total Critical Care time was 0 minutes, excluding separately reportableprocedures. There was a high probability of clinically significant/life threatening deterioration in the patient's condition which required my urgent intervention. CONSULTS:  IP CONSULT TO CARDIOLOGY  IP CONSULT TO HOSPITALIST  IP CONSULT TO HEART FAILURE NURSE/COORDINATOR  IP CONSULT TO DIETITIAN    PROCEDURES:  Unless otherwise noted below, none     Procedures    FINAL IMPRESSION      1. Acute on chronic systolic congestive heart failure ([facility]    2. Dyspnea, unspecified type    3. Chest pain, unspecified type          DISPOSITION/PLAN   DISPOSITION Admitted 08/22/2020 11:37:00 AM      PATIENT REFERREDTO:  No follow-up provider specified. DISCHARGE MEDICATIONS:  Current Discharge Medication List             The patient was discussed and seen with attending provider. Maria Victoria Elias D.O.   [facility] - Family Medicine Resident  PGY-3           Hal Tidwell DO  Resident  08/22/20 4439

## 2020-08-22 NOTE — PROGRESS NOTES
4 Eyes Skin Assessment     The patient is being assess for  Admission    I agree that 2 RN's have performed a thorough Head to Toe Skin Assessment on the patient. ALL assessment sites listed below have been assessed. Areas assessed by both nurses: 2  [x]   Head, Face, and Ears   [x]   Shoulders, Back, and Chest  [x]   Arms, Elbows, and Hands   [x]   Coccyx, Sacrum, and Ischum  [x]   Legs, Feet, and Heels        Does the Patient have Skin Breakdown?   No         Ignacio Prevention initiated:  No   Wound Care Orders initiated:  No      [facility] nurse consulted for Pressure Injury (Stage 3,4, Unstageable, DTI, NWPT, and Complex wounds):  No      Nurse 1 eSignature: Electronically signed by Anatoliy Moreno RN on 8/22/20 at 12:58 PM EDT    **SHARE this note so that the co-signing nurse is able to place an eSignature**    Nurse 2 eSignature: Electronically signed by Israel Miller RN on 8/22/20 at 4:34 PM EDT

## 2020-08-22 NOTE — CONSULTS
[facility]  [phone]      Attending Physician: Harriett Nath MD  Reason for Consultation/Chief Complaint: Chest pain and shortness of breath    Subjective   History of Present Illness:  Shira Lott is a 63 y.o. patient who presented to the hospital with complaints of chest pain and shortness of breath over the last few days. Patient describes his pain as a sharp pain in the middle of her chest and she states that when she presses on her chest that seems to worsen it. She notes coughing as well. She presented emergency room today and work-up raise concern for heart failure, proBNP level was elevated 5046 however troponin was negative. She has been admitted to the hospital and IV diuretics have been started. Patient has a history of a cardiomyopathy, reportedly it is an idiopathic cardiomyopathy, patient had previous care through [facility] and ultimately transitioned to our practice this year and was seen as a new patient in the EP clinic due to her history of cardiomyopathy status post ICD implantation. In our clinic, she establish for ICD checks and it was noted she has a [employer] device. Patient does not recall all of her history, however it appears that she also has had care through [facility] in [facility]. She was last seen there in 2016, it was noted that she chronically does have diabetes hypertension and hypercholesterolemia and has had a nonischemic cardiomyopathy with dual-chamber ICD. Chronically, there does appear to be a history of mental health disorders and possibly mental retardation. Patient states she is compliant with her medications. She has noted that she has had heart attacks and strokes in the past however she does provide a tangential history, does not appear to be a reliable historian.     Patient has had several visits to the emergency room in hospital for chest pain last year and this year she has had several encounters for abnormal glucose, nausea and hyponatremia. Past Medical History:   has a past medical history of CAD (coronary artery disease), Cerebral artery occlusion with cerebral infarction ([facility] Diabetes mellitus ([facility] Hyperlipidemia, Hypertension, Mental retardation, and MI (myocardial infarction) ([facility] Surgical History:   has a past surgical history that includes back surgery; Pacemaker insertion; tumor removal; and Tubal ligation. Social History:   reports that she has never smoked. She has never used smokeless tobacco. She reports that she does not drink alcohol or use drugs. Family History:  family history includes Heart Disease in her father. Home Medications:  Were reviewed and are listed in nursing record and/or below  Prior to Admission medications    Medication Sig Start Date End Date Taking?  Authorizing Provider   aspirin 81 MG EC tablet Take 1 tablet by mouth daily 7/18/20  Yes Mile Bennett MD   ranolazine (RANEXA) 500 MG extended release tablet Take 1 tablet by mouth 2 times daily 7/18/20  Yes Mile Bennett MD   DULoxetine (CYMBALTA) 60 MG extended release capsule Take 1 capsule by mouth daily 7/18/20  Yes Mile Bennett MD   FLUoxetine (PROZAC) 10 MG capsule Take 1 capsule by mouth daily 7/18/20  Yes Mile Bennett MD   lisinopril (PRINIVIL;ZESTRIL) 2.5 MG tablet Take 1 tablet by mouth daily 7/18/20  Yes Mile Bennett MD   carvedilol (COREG) 6.25 MG tablet Take 1 tablet by mouth 2 times daily (with meals) 7/18/20  Yes Mile Bennett MD   topiramate (TOPAMAX) 200 MG tablet Take 1 tablet by mouth daily 4/14/18  Yes Ana M Johnson MD   ondansetron [facility]) 4 MG tablet Take 1 tablet by mouth every 8 hours as needed for Nausea 8/12/20   Tc Murillo, DO   insulin aspart (NOVOLOG FLEXPEN) 100 UNIT/ML injection pen Inject 30 Units into the skin 3 times daily (before meals) 8/12/20   Tc Murillo, DO contrast suggests elevated right heart pressure                 I have reviewed labs and imaging/xray/diagnostic testing in this note. 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)    Dual ICD (implantable cardioverter-defibrillator) in place    Brain tumor (benign) ([address]    Chronic combined systolic (EF 26-56%) & diastolic (grade 2 LVDD) CHF    TIA involving right internal carotid artery    CAD in native artery    DM (diabetes mellitus), secondary, uncontrolled, w/neurologic complic ([address]    CHF (congestive heart failure) ([address]    Nonischemic cardiomyopathy ([address]    Essential hypertension    TIA (transient ischemic attack)    Hyperglycemia    Diabetic ketoacidosis without coma associated with type 2 diabetes mellitus (HCC)    Non-intractable vomiting with nausea    Chest pain    Arterial ischemic stroke, ICA, right, acute ([address]    Diabetic hyperosmolar non-ketotic state ([address]    ROSA (acute kidney injury) ([address]    Diabetic acidosis without coma ([address]    Hyponatremia    Metabolic acidosis    Disorder of electrolytes    Diabetic ketoacidosis with coma associated with type 2 diabetes mellitus (HCC)    Hypernatremia    Leukocytosis    Acute kidney injury ([address]    Atrial tachycardia ([address]    DKA, type 2, not at goal [facility])    Cognitive developmental delay    Mood disorder (HCC)    Urinary tract infection with hematuria    Nausea and vomiting    Hypokalemia    Persistent fever    Bacteremia    Syncope and collapse    S/P ICD (internal cardiac defibrillator) procedure    History of CVA (cerebrovascular accident)    Noncompliance with medications    Acute on chronic systolic heart failure (HCC)       Chest pain, atypical, I do not think ischemic evaluation is needed for this.     Acute on

## 2020-08-22 NOTE — PROGRESS NOTES
Patient admitted to room 209 from ER. Patient oriented to room, call light, bed rails, phone, lights and bathroom. Patient instructed about the schedule of the day including: vital sign frequency, lab draws, possible tests, frequency of MD and staff rounds, including RN/MD rounding together at bedside, daily weights, and I &O's. Patient instructed about prescribed diet, how to use 8MENU, and television. Telemetry box  in place, patient aware of placement and reason. Bed locked, in lowest position, side rails up 2/4, call light within reach. Will continue to monitor.

## 2020-08-22 NOTE — ED NOTES
Bed: 17  Expected date:   Expected time:   Means of arrival:   Comments:  Medic Tomas Jung RN  08/22/20 2565

## 2020-08-22 NOTE — PLAN OF CARE
Problem: OXYGENATION/RESPIRATORY FUNCTION  Goal: Patient will maintain patent airway  Outcome: Ongoing  Goal: Patient will achieve/maintain normal respiratory rate/effort  Description: Respiratory rate and effort will be within normal limits for the patient  Outcome: Ongoing     Problem: HEMODYNAMIC STATUS  Goal: Patient has stable vital signs and fluid balance  Outcome: Ongoing     Problem: FLUID AND ELECTROLYTE IMBALANCE  Goal: Fluid and electrolyte balance are achieved/maintained  Outcome: Ongoing     Problem: ACTIVITY INTOLERANCE/IMPAIRED MOBILITY  Goal: Mobility/activity is maintained at optimum level for patient  Outcome: Ongoing     Patient's EF (Ejection Fraction) is less than 40%    Heart Failure Medications:  Diuretics[de-identified] Furosemide    (One of the following REQUIRED for EF <40%/SYSTOLIC FAILURE but MAY be used in EF% >40%/DIASTOLIC FAILURE)        ACE[de-identified] Lisinopril        ARB[de-identified] None         ARNI[de-identified] None    (Beta Blockers)  NON- Evidenced Based Beta Blocker (for EF% >40%/DIASTOLIC FAILURE): None    Evidenced Based Beta Blocker::(REQUIRED for EF% <40%/SYSTOLIC FAILURE) Carvedilol- Coreg  . .................................................................................................................................................. Patient's weights and intake/output reviewed: Yes    Patient's Last Weight: 210.3 lbs obtained by standing scale. Intake/Output Summary (Last 24 hours) at 8/22/2020 1859  Last data filed at 8/22/2020 1817  Gross per 24 hour   Intake 1419 ml   Output 500 ml   Net 919 ml       Comorbidities Reviewed Yes    Patient has a past medical history of CAD (coronary artery disease), Cerebral artery occlusion with cerebral infarction ([facility] Diabetes mellitus ([facility] Hyperlipidemia, Hypertension, Mental retardation, and MI (myocardial infarction) ([address]      >>For CHF and Comorbidity documentation on Education Time and Topics, please see Education Tab    Progressive Mobility Assessment:

## 2020-08-22 NOTE — H&P
Charito Del Castillo MD   FLUoxetine (PROZAC) 10 MG capsule Take 1 capsule by mouth daily 7/18/20  Yes Joe Sparks MD   lisinopril (PRINIVIL;ZESTRIL) 2.5 MG tablet Take 1 tablet by mouth daily 7/18/20  Yes Joe Sparks MD   carvedilol (COREG) 6.25 MG tablet Take 1 tablet by mouth 2 times daily (with meals) 7/18/20  Yes Joe Sparks MD   topiramate (TOPAMAX) 200 MG tablet Take 1 tablet by mouth daily 4/14/18  Yes Valentina Alejandra MD   ondansetron [facility]) 4 MG tablet Take 1 tablet by mouth every 8 hours as needed for Nausea 8/12/20   Lauren Antis, DO   insulin aspart (NOVOLOG FLEXPEN) 100 UNIT/ML injection pen Inject 30 Units into the skin 3 times daily (before meals) 8/12/20   Lauren Antis, DO   gabapentin (NEURONTIN) 600 MG tablet Take 1 tablet by mouth nightly for 30 days. 7/18/20 8/17/20  Joe Sparks MD   insulin glargine (LANTUS SOLOSTAR) 100 UNIT/ML injection pen Inject 40 Units into the skin 2 times daily 7/18/20   Joe Sparks MD   metFORMIN (GLUCOPHAGE) 500 MG tablet Take 2 tablets by mouth 2 times daily (with meals) 7/18/20 10/16/20  Joe Sparks MD   atorvastatin (LIPITOR) 40 MG tablet Take 1 tablet by mouth nightly 7/18/20   Joe Sparks MD   fenofibrate micronized (LOFIBRA) 200 MG capsule Take 1 capsule by mouth nightly 7/18/20   Joe Sparks MD   miconazole (MICOTIN) 2 % powder Apply topically 2 times daily. 7/18/20   Joe Sparks MD   [employer] Lancets Ultra Thin MISC 1 each by Does not apply route 4 times daily 7/18/20   Joe Sparks MD   blood glucose monitor strips Test three times a day & as needed for symptoms of irregular blood glucose. 5/12/19   Noemí Graham MD       Allergies:  Patient has no known allergies. Social History:      The patient currently lives at home    TOBACCO:   reports that she has never smoked.  She has never used smokeless tobacco.  ETOH:   reports no history *   *   BILITOT <0.2   ALKPHOS 172*     No results for input(s): INR in the last 72 hours. Recent Labs     08/22/20  0809   TROPONINI <0.01       Urinalysis:      Lab Results   Component Value Date    NITRU Negative 07/17/2020    WBCUA 10-20 06/12/2020    BACTERIA Rare 05/28/2020    RBCUA 3-4 06/12/2020    BLOODU Negative 07/17/2020    SPECGRAV <=1.005 07/17/2020    GLUCOSEU >=1000 07/17/2020       Radiology:     CXR: I have reviewed the CXR with the following interpretation: cardiomegaly, vascular congestion  EKG:  I have reviewed the EKG with the following interpretation: sinus tachycardia    CT CHEST PULMONARY EMBOLISM W CONTRAST   Final Result   1. No evidence of pulmonary embolism   2. Cardiomegaly with interstitial edema and bilateral pleural effusions.    Infra diaphragmatic reflux of contrast suggests elevated right heart pressure         XR CHEST PORTABLE   Final Result   Cardiomegaly with pulmonary vascular congestion and mild perihilar   interstitial edema             ASSESSMENT:    Active Hospital Problems    Diagnosis Date Noted    Acute on chronic systolic heart failure (HCC) [I50.23] 08/22/2020         PLAN:  Acute on chronic systolic heart failure  - elevated BNP, LE edema, pulmonary congestion on admission  - last echo 6/20 with EF 35%, global hypokinesis  - started on IV lasix  - continue home coreg, lisinopril  - cardiology consulted  - monitor BMP, daily weights    Chest pain, known CAD  - EKG, trop negative  - cardiology consulted  - continue home ASA, statin, BB, ranexa  - CTPA negative for PE  - defer further testing to cardiology    HTN  - well controlled  - continue home coreg, lisinopril    HLD  - continue home statin     DMII  - poorly controlled  - holding home oral meds  - continue basal/bolus insulin    DVT Prophylaxis: lovenox  Diet: DIET CARDIAC; No Caffeine  Code Status: Full Code    PT/OT Eval Status: not ordered    Dispo - at least two days       Fidelina Resendez MD    Thank you No primary care provider on file. for the opportunity to be involved in this patient's care. If you have any questions or concerns please feel free to contact me at [phone].

## 2020-08-22 NOTE — ED NOTES
Patient states that she is unaware of her home meds and has no one to call for them.       Beverley Stewart RN  08/22/20 3072

## 2020-08-22 NOTE — ED NOTES
Garry [email or web address] Re: admit.  Chest pain, CHF exacerbation per [email or web address] [id number]  08/22/20 1131

## 2020-08-23 LAB
ALBUMIN SERPL-MCNC: 3.7 G/DL (ref 3.4–5)
ALP BLD-CCNC: 129 U/L (ref 40–129)
ALT SERPL-CCNC: 110 U/L (ref 10–40)
ANION GAP SERPL CALCULATED.3IONS-SCNC: 15 MMOL/L (ref 3–16)
AST SERPL-CCNC: 50 U/L (ref 15–37)
BILIRUB SERPL-MCNC: 0.4 MG/DL (ref 0–1)
BILIRUBIN DIRECT: <0.2 MG/DL (ref 0–0.3)
BILIRUBIN, INDIRECT: ABNORMAL MG/DL (ref 0–1)
BUN BLDV-MCNC: 22 MG/DL (ref 7–20)
CALCIUM SERPL-MCNC: 8.8 MG/DL (ref 8.3–10.6)
CHLORIDE BLD-SCNC: 98 MMOL/L (ref 99–110)
CO2: 24 MMOL/L (ref 21–32)
CREAT SERPL-MCNC: 0.6 MG/DL (ref 0.6–1.1)
EKG ATRIAL RATE: 99 BPM
EKG DIAGNOSIS: NORMAL
EKG P AXIS: -8 DEGREES
EKG P-R INTERVAL: 160 MS
EKG Q-T INTERVAL: 462 MS
EKG QRS DURATION: 92 MS
EKG QTC CALCULATION (BAZETT): 592 MS
EKG R AXIS: 47 DEGREES
EKG T AXIS: 53 DEGREES
EKG VENTRICULAR RATE: 99 BPM
ESTIMATED AVERAGE GLUCOSE: 340.8 MG/DL
GFR AFRICAN AMERICAN: >60
GFR NON-AFRICAN AMERICAN: >60
GLUCOSE BLD-MCNC: 101 MG/DL (ref 70–99)
GLUCOSE BLD-MCNC: 146 MG/DL (ref 70–99)
GLUCOSE BLD-MCNC: 147 MG/DL (ref 70–99)
GLUCOSE BLD-MCNC: 204 MG/DL (ref 70–99)
GLUCOSE BLD-MCNC: 249 MG/DL (ref 70–99)
HBA1C MFR BLD: 13.5 %
HCT VFR BLD CALC: 41.7 % (ref 36–48)
HEMOGLOBIN: 14.1 G/DL (ref 12–16)
MAGNESIUM: 1.8 MG/DL (ref 1.8–2.4)
MCH RBC QN AUTO: 31.4 PG (ref 26–34)
MCHC RBC AUTO-ENTMCNC: 33.9 G/DL (ref 31–36)
MCV RBC AUTO: 92.8 FL (ref 80–100)
PDW BLD-RTO: 13.1 % (ref 12.4–15.4)
PERFORMED ON: ABNORMAL
PLATELET # BLD: 277 K/UL (ref 135–450)
PMV BLD AUTO: 8.4 FL (ref 5–10.5)
POTASSIUM SERPL-SCNC: 3.4 MMOL/L (ref 3.5–5.1)
RBC # BLD: 4.49 M/UL (ref 4–5.2)
SODIUM BLD-SCNC: 137 MMOL/L (ref 136–145)
TOTAL PROTEIN: 6.3 G/DL (ref 6.4–8.2)
WBC # BLD: 7.9 K/UL (ref 4–11)

## 2020-08-23 PROCEDURE — 80076 HEPATIC FUNCTION PANEL: CPT

## 2020-08-23 PROCEDURE — 36415 COLL VENOUS BLD VENIPUNCTURE: CPT

## 2020-08-23 PROCEDURE — 6360000002 HC RX W HCPCS: Performed by: INTERNAL MEDICINE

## 2020-08-23 PROCEDURE — 6360000002 HC RX W HCPCS: Performed by: NURSE PRACTITIONER

## 2020-08-23 PROCEDURE — 96375 TX/PRO/DX INJ NEW DRUG ADDON: CPT

## 2020-08-23 PROCEDURE — 2580000003 HC RX 258: Performed by: INTERNAL MEDICINE

## 2020-08-23 PROCEDURE — 83735 ASSAY OF MAGNESIUM: CPT

## 2020-08-23 PROCEDURE — 96376 TX/PRO/DX INJ SAME DRUG ADON: CPT

## 2020-08-23 PROCEDURE — 93005 ELECTROCARDIOGRAM TRACING: CPT | Performed by: INTERNAL MEDICINE

## 2020-08-23 PROCEDURE — 6370000000 HC RX 637 (ALT 250 FOR IP): Performed by: INTERNAL MEDICINE

## 2020-08-23 PROCEDURE — 93010 ELECTROCARDIOGRAM REPORT: CPT | Performed by: INTERNAL MEDICINE

## 2020-08-23 PROCEDURE — 80048 BASIC METABOLIC PNL TOTAL CA: CPT

## 2020-08-23 PROCEDURE — 1200000000 HC SEMI PRIVATE

## 2020-08-23 PROCEDURE — 96372 THER/PROPH/DIAG INJ SC/IM: CPT

## 2020-08-23 PROCEDURE — 85027 COMPLETE CBC AUTOMATED: CPT

## 2020-08-23 RX ORDER — POTASSIUM CHLORIDE 20 MEQ/1
40 TABLET, EXTENDED RELEASE ORAL ONCE
Status: COMPLETED | OUTPATIENT
Start: 2020-08-23 | End: 2020-08-23

## 2020-08-23 RX ADMIN — ENOXAPARIN SODIUM 40 MG: 40 INJECTION SUBCUTANEOUS at 09:17

## 2020-08-23 RX ADMIN — INSULIN LISPRO 3 UNITS: 100 INJECTION, SOLUTION INTRAVENOUS; SUBCUTANEOUS at 21:07

## 2020-08-23 RX ADMIN — ASPIRIN 81 MG: 81 TABLET, CHEWABLE ORAL at 09:16

## 2020-08-23 RX ADMIN — INSULIN LISPRO 6 UNITS: 100 INJECTION, SOLUTION INTRAVENOUS; SUBCUTANEOUS at 12:24

## 2020-08-23 RX ADMIN — ACETAMINOPHEN 650 MG: 325 TABLET ORAL at 09:40

## 2020-08-23 RX ADMIN — MICONAZOLE NITRATE: 20 POWDER TOPICAL at 09:18

## 2020-08-23 RX ADMIN — ATORVASTATIN CALCIUM 40 MG: 40 TABLET, FILM COATED ORAL at 21:07

## 2020-08-23 RX ADMIN — Medication 10 ML: at 09:16

## 2020-08-23 RX ADMIN — KETOROLAC TROMETHAMINE 15 MG: 30 INJECTION, SOLUTION INTRAMUSCULAR at 14:45

## 2020-08-23 RX ADMIN — POTASSIUM CHLORIDE 40 MEQ: 20 TABLET, EXTENDED RELEASE ORAL at 12:23

## 2020-08-23 RX ADMIN — TOPIRAMATE 200 MG: 100 TABLET, FILM COATED ORAL at 09:16

## 2020-08-23 RX ADMIN — KETOROLAC TROMETHAMINE 15 MG: 30 INJECTION, SOLUTION INTRAMUSCULAR at 00:43

## 2020-08-23 RX ADMIN — FUROSEMIDE 40 MG: 10 INJECTION, SOLUTION INTRAMUSCULAR; INTRAVENOUS at 18:20

## 2020-08-23 RX ADMIN — FLUOXETINE 10 MG: 10 CAPSULE ORAL at 09:16

## 2020-08-23 RX ADMIN — FENOFIBRATE 160 MG: 160 TABLET ORAL at 09:16

## 2020-08-23 RX ADMIN — FUROSEMIDE 40 MG: 10 INJECTION, SOLUTION INTRAMUSCULAR; INTRAVENOUS at 09:16

## 2020-08-23 RX ADMIN — INSULIN LISPRO 20 UNITS: 100 INJECTION, SOLUTION INTRAVENOUS; SUBCUTANEOUS at 12:24

## 2020-08-23 RX ADMIN — INSULIN GLARGINE 40 UNITS: 100 INJECTION, SOLUTION SUBCUTANEOUS at 09:14

## 2020-08-23 RX ADMIN — RANOLAZINE 500 MG: 500 TABLET, FILM COATED, EXTENDED RELEASE ORAL at 21:07

## 2020-08-23 RX ADMIN — INSULIN LISPRO 20 UNITS: 100 INJECTION, SOLUTION INTRAVENOUS; SUBCUTANEOUS at 17:07

## 2020-08-23 RX ADMIN — LISINOPRIL 2.5 MG: 5 TABLET ORAL at 09:16

## 2020-08-23 RX ADMIN — CARVEDILOL 6.25 MG: 6.25 TABLET, FILM COATED ORAL at 17:06

## 2020-08-23 RX ADMIN — MICONAZOLE NITRATE: 20 POWDER TOPICAL at 21:07

## 2020-08-23 RX ADMIN — DULOXETINE HYDROCHLORIDE 60 MG: 60 CAPSULE, DELAYED RELEASE ORAL at 09:16

## 2020-08-23 RX ADMIN — INSULIN LISPRO 20 UNITS: 100 INJECTION, SOLUTION INTRAVENOUS; SUBCUTANEOUS at 09:13

## 2020-08-23 RX ADMIN — NITROGLYCERIN 0.4 MG: 0.4 TABLET SUBLINGUAL at 02:06

## 2020-08-23 RX ADMIN — INSULIN GLARGINE 40 UNITS: 100 INJECTION, SOLUTION SUBCUTANEOUS at 21:07

## 2020-08-23 RX ADMIN — RANOLAZINE 500 MG: 500 TABLET, FILM COATED, EXTENDED RELEASE ORAL at 09:16

## 2020-08-23 RX ADMIN — CARVEDILOL 6.25 MG: 6.25 TABLET, FILM COATED ORAL at 09:15

## 2020-08-23 RX ADMIN — Medication 10 ML: at 21:07

## 2020-08-23 RX ADMIN — INSULIN LISPRO 3 UNITS: 100 INJECTION, SOLUTION INTRAVENOUS; SUBCUTANEOUS at 09:12

## 2020-08-23 ASSESSMENT — PAIN SCALES - GENERAL
PAINLEVEL_OUTOF10: 10
PAINLEVEL_OUTOF10: 9
PAINLEVEL_OUTOF10: 10

## 2020-08-23 NOTE — PLAN OF CARE
Problem: Pain:  Goal: Pain level will decrease  Description: Pain level will decrease  Outcome: Ongoing  Pt complaining of CP 10/10. BP soft gave Tylenol no relief. BP improved gave nitro and toradol with no change on re-assessment. Problem: ACTIVITY INTOLERANCE/IMPAIRED MOBILITY  Goal: Mobility/activity is maintained at optimum level for patient  8/23/2020 0038 by Toshia Cervantes RN  Outcome: Ongoing     Problem: FLUID AND ELECTROLYTE IMBALANCE  Goal: Fluid and electrolyte balance are achieved/maintained  8/23/2020 0038 by Toshia Cervantes RN  Outcome: Ongoing     Problem: OXYGENATION/RESPIRATORY FUNCTION  Goal: Patient will achieve/maintain normal respiratory rate/effort  Description: Respiratory rate and effort will be within normal limits for the patient  8/23/2020 0038 by Toshia Cervantes RN  Outcome: Met This Shift  Pt has maintained a normal RR on RA. Problem: OXYGENATION/RESPIRATORY FUNCTION  Goal: Patient will maintain patent airway  8/23/2020 0038 by Toshia Cervantes RN  Outcome: Met This Shift     Problem: Falls - Risk of:  Goal: Will remain free from falls  Description: Will remain free from falls  Outcome: Met This Shift     Patient's EF (Ejection Fraction) is less than 40%    Heart Failure Medications:   Diuretics[de-identified] Torsemide     (One of the following REQUIRED for EF <40%/SYSTOLIC FAILURE but MAY be used in EF% >40%/DIASTOLIC FAILURE)        ACE[de-identified] Lisinopril        ARB[de-identified] None         ARNI[de-identified] None    (Beta Blockers)   NON- Evidenced Based Beta Blocker (for EF% >40%/DIASTOLIC FAILURE): None     Evidenced Based Beta Blocker::(REQUIRED for EF% <40%/SYSTOLIC FAILURE) Carvedilol- Coreg  . .................................................................................................................................................. Patient's weights and intake/output reviewed: Yes    Patient's Last Weight: 210 lbs obtained by standing scale.  Difference of 0 lbs than last

## 2020-08-23 NOTE — PROGRESS NOTES
Adan Goss NP:\"Pt with CP 10/10. BP 92/66 Tylenol given instead of Nitro SL. Re-assessment pain in still 10/10. Is there something else she could have ordered? \"  Awaiting orders. Toradol ordered and given. Pt still complaining CP 10/10 after being given toradol and nitro. Ordered stat EKG. Ready for you to review. Do you want anything extra for pain? Awaiting orders.

## 2020-08-23 NOTE — PLAN OF CARE
Problem: OXYGENATION/RESPIRATORY FUNCTION  Goal: Patient will maintain patent airway  Outcome: Ongoing     Patient's EF (Ejection Fraction) is less than 40%    Heart Failure Medications:  Diuretics[de-identified] Furosemide    (One of the following REQUIRED for EF <40%/SYSTOLIC FAILURE but MAY be used in EF% >40%/DIASTOLIC FAILURE)        ACE[de-identified] Lisinopril        ARB[de-identified] None         ARNI[de-identified] None    (Beta Blockers)  NON- Evidenced Based Beta Blocker (for EF% >40%/DIASTOLIC FAILURE): None    Evidenced Based Beta Blocker::(REQUIRED for EF% <40%/SYSTOLIC FAILURE) Carvedilol- Coreg  . .................................................................................................................................................. Patient's weights and intake/output reviewed: Yes    Patient's Last Weight: 210 lbs obtained by standing scale. Difference of 0 lbs more/less than last documented weight. Intake/Output Summary (Last 24 hours) at 8/23/2020 1456  Last data filed at 8/23/2020 1441  Gross per 24 hour   Intake 2769 ml   Output 3720 ml   Net -951 ml       Comorbidities Reviewed Yes    Patient has a past medical history of CAD (coronary artery disease), Cerebral artery occlusion with cerebral infarction ([address] Diabetes mellitus ([address] Hyperlipidemia, Hypertension, Mental retardation, and MI (myocardial infarction) ([address] >>For CHF and Comorbidity documentation on Education Time and Topics, please see Education Tab    Progressive Mobility Assessment:  What is this patient's Current Level of Mobility?: Ambulatory-Up Ad Lois  How was this patient Mobilized today?: Edge of Bed, Up to Chair,  Up to Toilet/Shower, and Up in Room                 With Whom? Self                 Level of Difficulty/Assistance: Independent     Pt resting in bed at this time on room air. Pt denies shortness of breath. Pt without lower extremity edema.      Patient and/or Family's stated Goal of Care this Admission: increase activity tolerance, better understand heart failure and disease management, and be more comfortable prior to discharge        :

## 2020-08-23 NOTE — PROGRESS NOTES
Hospitalist Progress Note      PCP: No primary care provider on file. Date of Admission: 8/22/2020    Chief Complaint: chest pain, SOB    Hospital Course:   63 y.o. female who presented to [facility] with chest pain and SOB. Symptoms started a couple days ago and have been progressive sternal chest pain. This is associated with worsening exertional SOB and orthopnea. She does not check her weights but she has noticed increased LE swelling. She has a history of non-obstructive CAD but states this chest pain is more severe than prior episodes. She has known heart failure and takes lasix at home. She has also had increasing blood sugars at home and has not had tight control. She states she is between her PCPs and is having trouble filling her insulin scripts though she denies being out currently. She denies fevers, chills, cough, nausea, diarrhea. Subjective: Still having significant chest pain which is unlikely to be cardiac. Continuing to diurese well.        Medications:  Reviewed    Infusion Medications    dextrose       Scheduled Medications    atorvastatin  40 mg Oral Nightly    carvedilol  6.25 mg Oral BID WC    fenofibrate  160 mg Oral Daily    FLUoxetine  10 mg Oral Daily    DULoxetine  60 mg Oral Daily    insulin glargine  40 Units Subcutaneous BID    lisinopril  2.5 mg Oral Daily    ranolazine  500 mg Oral BID    topiramate  200 mg Oral Daily    sodium chloride flush  10 mL Intravenous 2 times per day    aspirin  81 mg Oral Daily    insulin lispro  0-18 Units Subcutaneous TID WC    insulin lispro  0-9 Units Subcutaneous Nightly    insulin lispro  20 Units Subcutaneous TID WC    enoxaparin  40 mg Subcutaneous Daily    miconazole   Topical BID    furosemide  40 mg Intravenous BID    [START ON 8/24/2020] furosemide  40 mg Oral Daily     PRN Meds: sodium chloride flush, acetaminophen **OR** acetaminophen, polyethylene glycol, promethazine **OR** ondansetron, glucose, dextrose, glucagon (rDNA), dextrose, nitroGLYCERIN, ketorolac      Intake/Output Summary (Last 24 hours) at 8/23/2020 0907  Last data filed at 8/23/2020 0746  Gross per 24 hour   Intake 1899 ml   Output 2520 ml   Net -621 ml       Physical Exam Performed:    /77   Pulse 87   Temp 97.7 °F (36.5 °C) (Oral)   Resp 20   Ht 5' 3\" (1.6 m)   Wt 210 lb (95.3 kg)   SpO2 96%   BMI 37.20 kg/m²     General appearance:  No apparent distress, appears stated age and cooperative. HEENT:  Normal cephalic, atraumatic without obvious deformity. Pupils equal, round, and reactive to light. Extra ocular muscles intact. Conjunctivae/corneas clear. Neck: Supple, with full range of motion. No jugular venous distention. Trachea midline. Respiratory:  Normal respiratory effort. Clear to auscultation, bilaterally without Rales/Wheezes/Rhonchi. Cardiovascular:  Regular rate and rhythm with normal S1/S2 without murmurs, rubs or gallops. Abdomen: Soft, non-tender, non-distended with normal bowel sounds. Musculoskeletal:  No clubbing, cyanosis. Mild LE edema bilaterally. Full range of motion without deformity. Skin: Skin color, texture, turgor normal.  No rashes or lesions. Neurologic:  Neurovascularly intact without any focal sensory/motor deficits. Cranial nerves: II-XII intact, grossly non-focal.  Psychiatric:  Alert and oriented, thought content appropriate, normal insight  Capillary Refill: Brisk,< 3 seconds   Peripheral Pulses: +2 palpable, equal bilaterally     Labs:   Recent Labs     08/22/20  0809 08/23/20  0716   WBC 7.5 7.9   HGB 14.5 14.1   HCT 43.1 41.7    277     Recent Labs     08/22/20  0809 08/23/20  0716   * 137   K 4.3 3.4*   CL 98* 98*   CO2 21 24   BUN 19 22*   CREATININE 0.7 0.6   CALCIUM 9.5 8.8     Recent Labs     08/22/20  0809 08/23/20  0716   * 50*   * 110*   BILIDIR  --  <0.2   BILITOT <0.2 0.4   ALKPHOS 172* 129     No results for input(s): INR in the last 72 hours.   Recent Labs 08/22/20  0809   TROPONINI <0.01       Urinalysis:      Lab Results   Component Value Date    NITRU Negative 07/17/2020    WBCUA 10-20 06/12/2020    BACTERIA Rare 05/28/2020    RBCUA 3-4 06/12/2020    BLOODU Negative 07/17/2020    SPECGRAV <=1.005 07/17/2020    GLUCOSEU >=1000 07/17/2020       Radiology:  CT CHEST PULMONARY EMBOLISM W CONTRAST   Final Result   1. No evidence of pulmonary embolism   2. Cardiomegaly with interstitial edema and bilateral pleural effusions. Infra diaphragmatic reflux of contrast suggests elevated right heart pressure         XR CHEST PORTABLE   Final Result   Cardiomegaly with pulmonary vascular congestion and mild perihilar   interstitial edema                 Assessment/Plan:    Active Hospital Problems    Diagnosis    Acute on chronic systolic heart failure (HCC) [I50.23]     Acute on chronic systolic heart failure  - elevated BNP, LE edema, pulmonary congestion on admission  - last echo 6/20 with EF 35%, global hypokinesis  - continue IV lasix.  Plan to resume PO tomorrow  - continue home coreg, lisinopril  - cardiology consulted  - monitor BMP, daily weights     Chest pain, known CAD  - likely chronic angina and mood driven  - EKG, trop negative  - cardiology consulted  - continue home ASA, statin, BB, ranexa  - CTPA negative for PE  - Low likelihood of ACS     HTN  - well controlled  - continue home coreg, lisinopril     HLD  - continue home statin      DMII  - poorly controlled  - holding home oral meds  - continue basal/bolus insulin    Mood disorder  - likely severe depression and anxiety but could have alternative diagnosis  - strongly advised to have have outpatient psych follow up  - continue cymbalta, topamax, prozac    DVT Prophylaxis: lovenox  Diet: DIET CARDIAC; No Caffeine  Code Status: Full Code    PT/OT Eval Status: not ordered    Dispo - likely home tomorrow    Christine Marte MD

## 2020-08-24 VITALS
WEIGHT: 212.4 LBS | TEMPERATURE: 97.5 F | RESPIRATION RATE: 18 BRPM | HEART RATE: 81 BPM | HEIGHT: 63 IN | SYSTOLIC BLOOD PRESSURE: 94 MMHG | OXYGEN SATURATION: 99 % | BODY MASS INDEX: 37.63 KG/M2 | DIASTOLIC BLOOD PRESSURE: 62 MMHG

## 2020-08-24 PROBLEM — E66.9 OBESITY: Status: ACTIVE | Noted: 2020-08-24

## 2020-08-24 LAB
ANION GAP SERPL CALCULATED.3IONS-SCNC: 12 MMOL/L (ref 3–16)
BUN BLDV-MCNC: 33 MG/DL (ref 7–20)
CALCIUM SERPL-MCNC: 9 MG/DL (ref 8.3–10.6)
CHLORIDE BLD-SCNC: 103 MMOL/L (ref 99–110)
CO2: 25 MMOL/L (ref 21–32)
CREAT SERPL-MCNC: 0.7 MG/DL (ref 0.6–1.1)
GFR AFRICAN AMERICAN: >60
GFR NON-AFRICAN AMERICAN: >60
GLUCOSE BLD-MCNC: 100 MG/DL (ref 70–99)
GLUCOSE BLD-MCNC: 104 MG/DL (ref 70–99)
GLUCOSE BLD-MCNC: 109 MG/DL (ref 70–99)
MAGNESIUM: 2.1 MG/DL (ref 1.8–2.4)
PERFORMED ON: ABNORMAL
PERFORMED ON: ABNORMAL
POTASSIUM SERPL-SCNC: 4.3 MMOL/L (ref 3.5–5.1)
SODIUM BLD-SCNC: 140 MMOL/L (ref 136–145)

## 2020-08-24 PROCEDURE — 6360000002 HC RX W HCPCS: Performed by: INTERNAL MEDICINE

## 2020-08-24 PROCEDURE — 96372 THER/PROPH/DIAG INJ SC/IM: CPT

## 2020-08-24 PROCEDURE — 6370000000 HC RX 637 (ALT 250 FOR IP): Performed by: INTERNAL MEDICINE

## 2020-08-24 PROCEDURE — 36415 COLL VENOUS BLD VENIPUNCTURE: CPT

## 2020-08-24 PROCEDURE — 80048 BASIC METABOLIC PNL TOTAL CA: CPT

## 2020-08-24 PROCEDURE — 83735 ASSAY OF MAGNESIUM: CPT

## 2020-08-24 RX ORDER — NITROGLYCERIN 0.4 MG/1
TABLET SUBLINGUAL
Qty: 25 TABLET | Refills: 3 | Status: SHIPPED | OUTPATIENT
Start: 2020-08-24

## 2020-08-24 RX ORDER — FUROSEMIDE 40 MG/1
40 TABLET ORAL DAILY
Qty: 30 TABLET | Refills: 0 | Status: SHIPPED | OUTPATIENT
Start: 2020-08-25 | End: 2020-09-08 | Stop reason: SDUPTHER

## 2020-08-24 RX ADMIN — TOPIRAMATE 200 MG: 100 TABLET, FILM COATED ORAL at 08:26

## 2020-08-24 RX ADMIN — INSULIN LISPRO 20 UNITS: 100 INJECTION, SOLUTION INTRAVENOUS; SUBCUTANEOUS at 08:25

## 2020-08-24 RX ADMIN — RANOLAZINE 500 MG: 500 TABLET, FILM COATED, EXTENDED RELEASE ORAL at 08:26

## 2020-08-24 RX ADMIN — ASPIRIN 81 MG: 81 TABLET, CHEWABLE ORAL at 08:26

## 2020-08-24 RX ADMIN — FUROSEMIDE 40 MG: 40 TABLET ORAL at 08:26

## 2020-08-24 RX ADMIN — LISINOPRIL 2.5 MG: 5 TABLET ORAL at 08:26

## 2020-08-24 RX ADMIN — ENOXAPARIN SODIUM 40 MG: 40 INJECTION SUBCUTANEOUS at 08:26

## 2020-08-24 RX ADMIN — MICONAZOLE NITRATE: 20 POWDER TOPICAL at 08:26

## 2020-08-24 RX ADMIN — INSULIN GLARGINE 40 UNITS: 100 INJECTION, SOLUTION SUBCUTANEOUS at 08:24

## 2020-08-24 RX ADMIN — FLUOXETINE 10 MG: 10 CAPSULE ORAL at 08:26

## 2020-08-24 RX ADMIN — FENOFIBRATE 160 MG: 160 TABLET ORAL at 08:26

## 2020-08-24 RX ADMIN — CARVEDILOL 6.25 MG: 6.25 TABLET, FILM COATED ORAL at 08:26

## 2020-08-24 RX ADMIN — DULOXETINE HYDROCHLORIDE 60 MG: 60 CAPSULE, DELAYED RELEASE ORAL at 08:26

## 2020-08-24 NOTE — PROGRESS NOTES
Pt wheeled via wheelchair to private car with all belongings. Pt discharged home in stable condition.

## 2020-08-24 NOTE — CONSULTS
[address] HE[place] 1960    History:  Past Medical History:   Diagnosis Date    CAD (coronary artery disease)     [facility]     Diabetes mellitus ([facility]     Hyperlipidemia     Hypertension     Mental retardation     MI (myocardial infarction) ([facility]        ECHO: 35% + coreg + liso    Discharge plans: home with self. Requesting to restart services with [facility]    Family Present: none    Advanced Directives: patient has advance directives scanned in the chart    Patient's stated goal of care: re-establish with PCP      Patient's current functional capacity:  Slight limitation of physical activity. Comfortable at rest. Ordinary physical activity results in fatigue, palpitation, dyspnea. Pt resting in bed at this time on room air. Pt with complaints of shortness of breath. Pt without lower extremity edema. Patient's weights and intake/output reviewed:    Patient has poor insight. She goes on tangents about her family relationships and how she is upset with her sister. She does not drive, she does not have a scale. She is concerned on how she is suppose to see her cardiologist. She needs meds to beds. Last three weights hospital weights reviewed:    Patient Vitals for the past 96 hrs (Last 3 readings):   Weight   08/24/20 0445 212 lb 6.4 oz (96.3 kg)   08/23/20 0423 210 lb (95.3 kg)   08/22/20 1240 210 lb 3 oz (95.3 kg)       Patient provided with both written and verbal education on CHF signs/symptoms, causes, discharge medications, daily weights, low sodium diet, activity, and follow-up. HF zone self management written instructions provided/reviewed and advised to call MD when in \"yellow\" zone.      Instructed them to call the doctor post discharge if they experiences increasing worsening shortness of breath, worsening chest pains, increased swelling from their baseline, worsening cough, or weight gain of >2- 3 lbs in a day/ 5 lb gain in a week. Also advised to call the doctor if they feels dizzy, increased fatigue, decreased or difficulty urinating. Instructed on and emphasized importance of scheduled hospital follow-up appointment with Juliana Goyal on 08/28/2020 at 9am.      Estela's education needs reinforcement. No additional questions at this time. Stated she will alert nurse with any questions. PATIENT/CAREGIVER TEACHING:    Level of patient/caregiver understanding able to:   [ X ] Verbalize understanding [ ] Demonstrate understanding [ ] Teach back   [ X ] Needs reinforcement [ ] Other:     Education Time: 20 minutes    Recommendations:   1. Encourage follow-up appointment compliance. Next appointment: 08/28  2. Educate further on fluid restriction of <64oz during inpatient stay so they can understand how to measure intake at home. 3. Review sodium restrictions. Encouraged to not add table salt to their foods and avoid foods that are high in sodium. 4. Continue to educate on S/S. Stress the importance of calling the MD with the earliest signs of an acute exacerbation. 5. Emphasize daily weights - instructed to call the MD if the patient gains 3 lb in a day or 5 lb in a week. 6. Provided patient with CHF Resource Line for questions and concerns.      Leann ALANIS BSN-RN  Heart Failure Navigator  [address] [place] [address]  [phone]

## 2020-08-24 NOTE — CONSULTS
Nutrition Education    Consulted for CHF nutrition education. Discussed low sodium, fluid restriction and weight monitoring. Pt reports no additional salt added to foods at home. Pt reports meal preparation at home, encouraged herbs and spices w/o salt for flavoring. Encouraged fluid restriction. Pt does not have scale at home, reports able to get one and willing to take daily weights. Will continue to monitor. · Verbally reviewed information with Patient  · Educated on CHF  · Written educational materials provided. · Contact name and number provided. · Refer to Patient Education activity for more details.     Electronically signed by Marylin Elliott MS, RD, LD on 8/24/20 at 3:02 PM EDT    Contact: Office: [phone]

## 2020-08-24 NOTE — PLAN OF CARE
Patient's EF (Ejection Fraction) is less than 40%    Heart Failure Medications:   Diuretics[de-identified] Furosemide     (One of the following REQUIRED for EF <40%/SYSTOLIC FAILURE but MAY be used in EF% >40%/DIASTOLIC FAILURE)        ACE[de-identified] Lisinopril        ARB[de-identified] None         ARNI[de-identified] None    (Beta Blockers)   NON- Evidenced Based Beta Blocker (for EF% >40%/DIASTOLIC FAILURE): None     Evidenced Based Beta Blocker::(REQUIRED for EF% <40%/SYSTOLIC FAILURE) Carvedilol- Coreg  . .................................................................................................................................................. Patient's weights and intake/output reviewed: Yes    Patient's Last Weight: 210 lbs obtained by standing scale. Difference of 0 lbs less than last documented weight. Intake/Output Summary (Last 24 hours) at 8/23/2020 2349  Last data filed at 8/23/2020 2113  Gross per 24 hour   Intake 1830 ml   Output 2200 ml   Net -370 ml       Comorbidities Reviewed Yes    Patient has a past medical history of CAD (coronary artery disease), Cerebral artery occlusion with cerebral infarction ([address] Diabetes mellitus ([address] Hyperlipidemia, Hypertension, Mental retardation, and MI (myocardial infarction) ([facility] >>For CHF and Comorbidity documentation on Education Time and Topics, please see Education Tab    Progressive Mobility Assessment:  What is this patient's Current Level of Mobility?: Ambulatory-Up Ad Lois  How was this patient Mobilized today?: Edge of Bed,  Up to Toilet/Shower and Up in Room                 With Whom? Self                 Level of Difficulty/Assistance: Independent     Pt resting in bed at this time on room air. Pt denies shortness of breath. Pt with nonpitting lower extremity edema.      Patient and/or Family's stated Goal of Care this Admission: reduce shortness of breath, increase activity tolerance, better understand heart failure and disease management, be more comfortable and reduce lower extremity edema prior to discharge        :

## 2020-08-24 NOTE — PROGRESS NOTES
PIV removed. Tip intact. Dressing in place. Tele box removed. [facility] notified of pt discharge. Discharge paperwork went over with and given to pt. Verbalizes understanding. Pt lock box emptied. Pt awaiting ride home. Scripts given to pt.

## 2020-08-24 NOTE — PROGRESS NOTES
End of shift report given to [facility]. Call light within reach, bed in lowest position, no needs at this time.

## 2020-08-24 NOTE — PROGRESS NOTES
and mood driven. Initial enzymes/EKG negative. Follow serial enzymes, reviewed and documented as above and monitor on tele, w/out evidence of ischemia/arrythmia. CTPA negative for PE. Cardiology consulted and appreciated. Acute on chronic systolic heart failure  - elevated BNP, LE edema, pulmonary congestion on admission  - last echo 6/20 with EF 35%, global hypokinesis  - continued IV lasix. Plan to resume PO  - continue home coreg, lisinopril  - cardiology consulted      HTN/CAD - w/ known CAD but no evidence of active signs/sxs of ischemia/failure. Currently controlled on home meds w/ vitals reviewed and documented as above. HyperLipidemia - controlled on home Statin. Continue, w/ f/u and med adjustment w/ PCP    DM2 - uncontrolled on home oral antiGlycemics/Insulin - held/continued respectively. Follow FSBS/SSI high regimen. Last HbA1c 13.5% dated this admission. Anticipate resuming/continuing home regimen at discharge.      Mood disorder  - likely severe depression and anxiety but could have alternative diagnosis  - strongly advised to have have outpatient psych follow up  - continue cymbalta, topamax, prozac     Obesity -  With Body mass index is 37.62 kg/m². Complicating assessment and treatment. Placing patient at risk for multiple co-morbidities as well as early death and contributing to the patient's presentation. Counseled on weight loss. DVT Prophylaxis: LMWH  Diet: DIET CARB CONTROL; Low Sodium (2 GM); No Caffeine  Code Status: Full Code      PT/OT Eval Status: not yet ordered. Dispo - likely to home Monday 24 August pending clinical course and Cardiology recs.      Jay Milan MD

## 2020-08-24 NOTE — PLAN OF CARE
activity tolerance, better understand heart failure and disease management, and be more comfortable prior to discharge        :

## 2020-08-25 NOTE — DISCHARGE SUMMARY
Hospital Medicine Discharge Summary    Patient ID: Jacobo Wilkins      Patient's PCP: No primary care provider on file. Admit Date: 8/22/2020     Discharge Date: 8/24/2020      Admitting Physician: Bob Joseph MD     Discharge Physician: Rohit Bailey MD     Discharge Diagnoses: Active Hospital Problems    Diagnosis    Obesity [E66.9]    S/P ICD (internal cardiac defibrillator) procedure [Z95.810]    Chest pain [R07.9]    Essential hypertension [I10]    CHF (congestive heart failure) ([facility]) [I50.9]    CAD (coronary artery disease) [I25.10]    DM (diabetes mellitus) ([facility] [address] [E11.9]    Dyslipidemia [E78.5]       The patient was seen and examined on day of discharge and this discharge summary is in conjunction with any daily progress note from day of discharge. Hospital Course:     Chest pain - Concerning to ED for ACS, etiology clinically unable to determine but likely chronic angina and mood driven. Initial enzymes/EKG negative. Followed serial enzymes and monitorws on tele, w/out evidence of ischemia/arrythmia. CTPA negative for PE. Cardiology consulted and appreciated.     Acute on chronic systolic heart failure  - elevated BNP, LE edema, pulmonary congestion on admission  - last echo 6/20 with EF 35%, global hypokinesis  - continued IV Lasix. Plan to resume PO at discharge  - continue home Coreg, Lisinopril  - cardiology consulted      HTN/CAD - w/ known CAD but no evidence of active signs/sxs of ischemia/failure. Currently controlled on home meds      HyperLipidemia - controlled on home Statin. Continue, w/ f/u and med adjustment w/ PCP     DM2 - uncontrolled on home oral antiGlycemics/Insulin - held/continued respectively. Followed FSBS/SSI high regimen. Last HbA1c 13.5% dated this admission.  Resumed home regimen at discharge.      Mood disorder  - likely severe depression and anxiety but could have alternative diagnosis  - strongly advised to have have outpatient psych follow up  - continue cymbalta, topamax, prozac     Obesity -  With Body mass index is 37.62 kg/m². Complicating assessment and treatment. Placing patient at risk for multiple co-morbidities as well as early death and contributing to the patient's presentation. Counseled on weight loss.         Labs: For convenience and continuity at follow-up the following most recent labs are provided:      CBC:    Lab Results   Component Value Date    WBC 7.9 08/23/2020    HGB 14.1 08/23/2020    HCT 41.7 08/23/2020     08/23/2020       Renal:    Lab Results   Component Value Date     08/24/2020    K 4.3 08/24/2020    K 3.8 07/18/2020     08/24/2020    CO2 25 08/24/2020    BUN 33 08/24/2020    CREATININE 0.7 08/24/2020    CALCIUM 9.0 08/24/2020    PHOS 3.0 04/18/2020         Significant Diagnostic Studies    Radiology:   CT CHEST PULMONARY EMBOLISM W CONTRAST   Final Result   1. No evidence of pulmonary embolism   2. Cardiomegaly with interstitial edema and bilateral pleural effusions. Infra diaphragmatic reflux of contrast suggests elevated right heart pressure         XR CHEST PORTABLE   Final Result   Cardiomegaly with pulmonary vascular congestion and mild perihilar   interstitial edema                Consults:     IP CONSULT TO CARDIOLOGY  IP CONSULT TO HOSPITALIST  IP CONSULT TO HEART FAILURE NURSE/COORDINATOR  IP CONSULT TO DIETITIAN    Disposition: home     Condition at Discharge: Stable    Discharge Instructions/Follow-up:  w/ PCP 1-2 weeks and subspecialists as arranged. Code Status:  Full Code    Activity: activity as tolerated    Diet: regular diet      Discharge Medications:     Discharge Medication List as of 8/24/2020  3:44 PM           Details   nitroGLYCERIN (NITROSTAT) 0.4 MG SL tablet up to max of 3 total doses.  If no relief after 1 dose, call 911., Disp-25 tablet,R-3Print      furosemide (LASIX) 40 MG tablet Take 1 tablet by mouth daily, Disp-30 tablet,R-0Print              Details ondansetron (ZOFRAN) 4 MG tablet Take 1 tablet by mouth every 8 hours as needed for Nausea, Disp-10 tablet,R-0Print      insulin aspart (NOVOLOG FLEXPEN) 100 UNIT/ML injection pen Inject 30 Units into the skin 3 times daily (before meals), Disp-5 pen,R-3Print      aspirin 81 MG EC tablet Take 1 tablet by mouth daily, Disp-30 tablet,R-2Normal      ranolazine (RANEXA) 500 MG extended release tablet Take 1 tablet by mouth 2 times daily, Disp-60 tablet,R-2Normal      DULoxetine (CYMBALTA) 60 MG extended release capsule Take 1 capsule by mouth daily, Disp-30 capsule,R-0Normal      FLUoxetine (PROZAC) 10 MG capsule Take 1 capsule by mouth daily, Disp-30 capsule,R-0Normal      insulin glargine (LANTUS SOLOSTAR) 100 UNIT/ML injection pen Inject 40 Units into the skin 2 times daily, Disp-5 pen,R-3Normal      metFORMIN (GLUCOPHAGE) 500 MG tablet Take 2 tablets by mouth 2 times daily (with meals), Disp-120 tablet,R-2Normal      atorvastatin (LIPITOR) 40 MG tablet Take 1 tablet by mouth nightly, Disp-30 tablet,R-2Normal      fenofibrate micronized (LOFIBRA) 200 MG capsule Take 1 capsule by mouth nightly, Disp-30 capsule,R-3Normal      lisinopril (PRINIVIL;ZESTRIL) 2.5 MG tablet Take 1 tablet by mouth daily, Disp-30 tablet,R-3Normal      carvedilol (COREG) 6.25 MG tablet Take 1 tablet by mouth 2 times daily (with meals), Disp-60 tablet,R-2Normal      miconazole (MICOTIN) 2 % powder Apply topically 2 times daily. , Disp-45 g,R-1, Normal      [employer] Lancets Ultra Thin MISC 4 TIMES DAILY Starting Sat 7/18/2020, Disp-200 each,R-0, Normal      blood glucose monitor strips Test three times a day & as needed for symptoms of irregular blood glucose., Disp-100 strip, R-2, Print      topiramate (TOPAMAX) 200 MG tablet Take 1 tablet by mouth daily, Disp-60 tablet, R-0Print             Time Spent on discharge is more than 30 minutes in the examination, evaluation, counseling and review of medications and discharge plan.       Signed:    Edgar Stern Irish Cesar MD   8/25/2020

## 2020-08-26 ENCOUNTER — FOLLOWUP TELEPHONE ENCOUNTER (OUTPATIENT)
Dept: TELEMETRY | Age: 60
End: 2020-08-26

## 2020-08-26 ENCOUNTER — APPOINTMENT (OUTPATIENT)
Dept: GENERAL RADIOLOGY | Age: 60
End: 2020-08-26
Payer: MEDICARE

## 2020-08-26 LAB
ANION GAP SERPL CALCULATED.3IONS-SCNC: 9 MMOL/L (ref 3–16)
BASOPHILS ABSOLUTE: 0.1 K/UL (ref 0–0.2)
BASOPHILS RELATIVE PERCENT: 1.3 %
BUN BLDV-MCNC: 19 MG/DL (ref 7–20)
CALCIUM SERPL-MCNC: 8.9 MG/DL (ref 8.3–10.6)
CHLORIDE BLD-SCNC: 104 MMOL/L (ref 99–110)
CO2: 20 MMOL/L (ref 21–32)
CREAT SERPL-MCNC: 0.7 MG/DL (ref 0.6–1.1)
EOSINOPHILS ABSOLUTE: 0.1 K/UL (ref 0–0.6)
EOSINOPHILS RELATIVE PERCENT: 2 %
GFR AFRICAN AMERICAN: >60
GFR NON-AFRICAN AMERICAN: >60
GLUCOSE BLD-MCNC: 353 MG/DL (ref 70–99)
HCT VFR BLD CALC: 41.9 % (ref 36–48)
HEMOGLOBIN: 13.8 G/DL (ref 12–16)
LYMPHOCYTES ABSOLUTE: 2.5 K/UL (ref 1–5.1)
LYMPHOCYTES RELATIVE PERCENT: 37.4 %
MCH RBC QN AUTO: 31.2 PG (ref 26–34)
MCHC RBC AUTO-ENTMCNC: 32.9 G/DL (ref 31–36)
MCV RBC AUTO: 94.9 FL (ref 80–100)
MONOCYTES ABSOLUTE: 0.5 K/UL (ref 0–1.3)
MONOCYTES RELATIVE PERCENT: 7.7 %
NEUTROPHILS ABSOLUTE: 3.4 K/UL (ref 1.7–7.7)
NEUTROPHILS RELATIVE PERCENT: 51.6 %
PDW BLD-RTO: 13.6 % (ref 12.4–15.4)
PLATELET # BLD: 252 K/UL (ref 135–450)
PMV BLD AUTO: 8.7 FL (ref 5–10.5)
POTASSIUM REFLEX MAGNESIUM: 4.3 MMOL/L (ref 3.5–5.1)
RBC # BLD: 4.42 M/UL (ref 4–5.2)
SODIUM BLD-SCNC: 133 MMOL/L (ref 136–145)
TROPONIN: <0.01 NG/ML
WBC # BLD: 6.6 K/UL (ref 4–11)

## 2020-08-26 PROCEDURE — 71046 X-RAY EXAM CHEST 2 VIEWS: CPT

## 2020-08-26 PROCEDURE — 83880 ASSAY OF NATRIURETIC PEPTIDE: CPT

## 2020-08-26 PROCEDURE — 80048 BASIC METABOLIC PNL TOTAL CA: CPT

## 2020-08-26 PROCEDURE — 84484 ASSAY OF TROPONIN QUANT: CPT

## 2020-08-26 PROCEDURE — 85025 COMPLETE CBC W/AUTO DIFF WBC: CPT

## 2020-08-26 NOTE — ADT AUTH CERT
continue home coreg, lisinopril    - cardiology consulted    - monitor BMP, daily weights         Chest pain, known CAD    - likely chronic angina and mood driven    - EKG, trop negative    - cardiology consulted    - continue home ASA, statin, BB, ranexa    - CTPA negative for PE    - Low likelihood of ACS

## 2020-08-26 NOTE — TELEPHONE ENCOUNTER
[email or web address] Care transition included reason for hospitalization, procedures performed during hospitalization, services provided during hospitalization, discharge medications, and follow-up treatment and services needed.

## 2020-08-26 NOTE — TELEPHONE ENCOUNTER
1st Attempt; No Answer- Left HIPAA compliant voicemail with Non-Urgent Heart Failure Resource Line number for call back.     Ale Palma HF BSN-RN  Heart Failure Navigator  [address] [facility]  [phone]

## 2020-08-27 ENCOUNTER — HOSPITAL ENCOUNTER (EMERGENCY)
Age: 60
Discharge: HOME | End: 2020-08-27
Attending: EMERGENCY MEDICINE
Payer: MEDICARE

## 2020-08-27 VITALS
HEIGHT: 63 IN | DIASTOLIC BLOOD PRESSURE: 83 MMHG | BODY MASS INDEX: 37.21 KG/M2 | SYSTOLIC BLOOD PRESSURE: 125 MMHG | OXYGEN SATURATION: 97 % | RESPIRATION RATE: 16 BRPM | WEIGHT: 210 LBS | HEART RATE: 91 BPM | TEMPERATURE: 97.3 F

## 2020-08-27 LAB — PRO-BNP: 1358 PG/ML (ref 0–124)

## 2020-08-27 PROCEDURE — 99284 EMERGENCY DEPT VISIT MOD MDM: CPT

## 2020-08-27 PROCEDURE — 96374 THER/PROPH/DIAG INJ IV PUSH: CPT

## 2020-08-27 PROCEDURE — 6360000002 HC RX W HCPCS: Performed by: EMERGENCY MEDICINE

## 2020-08-27 RX ORDER — FUROSEMIDE 10 MG/ML
20 INJECTION INTRAMUSCULAR; INTRAVENOUS ONCE
Status: COMPLETED | OUTPATIENT
Start: 2020-08-27 | End: 2020-08-27

## 2020-08-27 RX ADMIN — FUROSEMIDE 20 MG: 10 INJECTION, SOLUTION INTRAVENOUS at 01:33

## 2020-08-27 NOTE — ED PROVIDER NOTES
Emergency Physician Note  [address] [facility] ED  [address] [facility] [place]. [address]  Dept: [phone]  Loc: [phone]    Chief Complaint  Shortness of Breath       History of Present Illness  Bao Garcia is a 63 y.o. female  has a past medical history of CAD (coronary artery disease), Cerebral artery occlusion with cerebral infarction ([facility] [address] Diabetes mellitus ([facility] [address] Hyperlipidemia, Hypertension, Mental retardation, and MI (myocardial infarction) ([facility] [address] who presents to the ED for shortness of breath. Patient was just discharged from [facility] for shortness of breath. She is found to have congestive heart failure. Patient was started on Lasix and given a prescription for the Lasix however she has not had the medication filled and has not been taking it while at home. She states shortness of breath is still at the same baseline shortness of breath she had when she left [facility]. She came in today because [facility] is just tired of all of the shortness of breath\". Patient minimally ambulatory at home, she states this is baseline for her. She is also had some chest pain, she says she has had the chest pain for over a week. She does not know if she has gained or lost weight because she does not have a scale at home. Denies fever, chills, malaise,  cough, abdominal pain, nausea, vomiting, diarrhea, headache, sore throat, dysuria, back pain, rash. No palliative/provocative factors.        Unless otherwise stated in this report or unable to obtain because of the patient's clinical or mental status as evidenced by the medical record, this patient's positive and negative responses for review of systems, constitutional, psych, eyes, ENT, cardiovascular, respiratory, gastrointestinal, neurological, genitourinary, musculoskeletal, integument systems and systems related to the presenting problem are either stated in the preceding paragraph or were not pertinent or were negative for the symptoms and/or complaints related to the medical problem. I have reviewed the following from the nursing documentation:      Prior to Admission medications    Medication Sig Start Date End Date Taking? Authorizing Provider   nitroGLYCERIN (NITROSTAT) 0.4 MG SL tablet up to max of 3 total doses.  If no relief after 1 dose, call 911. 8/24/20   Queenie Guerrero MD   furosemide (LASIX) 40 MG tablet Take 1 tablet by mouth daily 8/25/20 9/24/20  Queenie Guerrero MD   ondansetron [facility]) 4 MG tablet Take 1 tablet by mouth every 8 hours as needed for Nausea 8/12/20   Corrine Machado DO   insulin aspart (NOVOLOG FLEXPEN) 100 UNIT/ML injection pen Inject 30 Units into the skin 3 times daily (before meals) 8/12/20   Corrine Machado DO   aspirin 81 MG EC tablet Take 1 tablet by mouth daily 7/18/20   Aby Abrams MD   ranolazine (RANEXA) 500 MG extended release tablet Take 1 tablet by mouth 2 times daily 7/18/20   Aby Abrams MD   DULoxetine (CYMBALTA) 60 MG extended release capsule Take 1 capsule by mouth daily 7/18/20   Aby Abrams MD   FLUoxetine (PROZAC) 10 MG capsule Take 1 capsule by mouth daily 7/18/20   Aby Abrams MD   insulin glargine (LANTUS SOLOSTAR) 100 UNIT/ML injection pen Inject 40 Units into the skin 2 times daily 7/18/20   Aby Abrams MD   metFORMIN (GLUCOPHAGE) 500 MG tablet Take 2 tablets by mouth 2 times daily (with meals) 7/18/20 10/16/20  Aby Abrams MD   atorvastatin (LIPITOR) 40 MG tablet Take 1 tablet by mouth nightly 7/18/20   Aby Abrams MD   fenofibrate micronized (LOFIBRA) 200 MG capsule Take 1 capsule by mouth nightly 7/18/20   Aby Abrams MD   lisinopril (PRINIVIL;ZESTRIL) 2.5 MG tablet Take 1 tablet by mouth daily 7/18/20   Ayb Abrams MD   carvedilol (COREG) 6.25 MG tablet Take 1 tablet by mouth 2 times daily (with meals) 7/18/20 Felisa Manrique MD   miconazole (MICOTIN) 2 % powder Apply topically 2 times daily. 7/18/20   Felisa Manrique MD   [employer] Lancets Ultra Thin MISC 1 each by Does not apply route 4 times daily 7/18/20   Felisa Manrique MD   blood glucose monitor strips Test three times a day & as needed for symptoms of irregular blood glucose.  5/12/19   Travis Soliz MD   topiramate (TOPAMAX) 200 MG tablet Take 1 tablet by mouth daily 4/14/18   Violet Mojica MD       Allergies as of 08/26/2020    (No Known Allergies)       Past Medical History:   Diagnosis Date    CAD (coronary artery disease)     [facility] [address]     Diabetes mellitus ([facility] [address]     Hyperlipidemia     Hypertension     Mental retardation     MI (myocardial infarction) ([facility] [address]         Surgical History:   Past Surgical History:   Procedure Laterality Date    BACK SURGERY      PACEMAKER INSERTION      TUBAL LIGATION      TUMOR REMOVAL          Family History:    Family History   Problem Relation Age of Onset    Heart Disease Father        Social History     Socioeconomic History    Marital status:      Spouse name: Not on file    Number of children: Not on file    Years of education: Not on file    Highest education level: Not on file   Occupational History    Not on file   Social Needs    Financial resource strain: Not on file    Food insecurity     Worry: Not on file     Inability: Not on file    Transportation needs     Medical: Not on file     Non-medical: Not on file   Tobacco Use    Smoking status: Never Smoker    Smokeless tobacco: Never Used   Substance and Sexual Activity    Alcohol use: No    Drug use: No    Sexual activity: Not Currently   Lifestyle    Physical activity     Days per week: Not on file     Minutes per session: Not on file    Stress: Not on file   Relationships    Social connections     Talks on phone: Not on file     Gets together: Not on file Attends Spiritism service: Not on file     Active member of club or organization: Not on file     Attends meetings of clubs or organizations: Not on file     Relationship status: Not on file    Intimate partner violence     Fear of current or ex partner: Not on file     Emotionally abused: Not on file     Physically abused: Not on file     Forced sexual activity: Not on file   Other Topics Concern    Not on file   Social History Narrative    Not on file       Nursing notes reviewed. ED Triage Vitals [08/26/20 2142]   Enc Vitals Group      /82      Pulse 88      Resp 16      Temp 97.3 °F (36.3 °C)      Temp Source Oral      SpO2 100 %      Weight 210 lb (95.3 kg)      Height 5' 3\" (1.6 m)      Head Circumference       Peak Flow       Pain Score       Pain Loc       Pain Edu? Excl. in [address]? GENERAL:    Awake, alert. Well developed, well nourished with no apparent distress. Nontoxic-appearing, non-ill-appearing. HENT:    Normocephalic, Atraumatic, no lacerations. No ENT exam due to PPE. EYES:    Conjunctiva normal,   Pupils equal round and reactive to light,   Extraocular movements normal.  NECK:    Trachea is midline. No stridor. CHEST:    Regular rate and regular rhythm, no murmurs/rubs/gallops,   normal S1/S2, chest wall non-tender. LUNGS:    Shallow breathing, no respiratory distress. No abdominal retractions, no sternal retractions. Clear to auscultation bilaterally, no wheezing, no rhochi, no rales  Speaking comfortably in full sentences  ABDOMEN:    Soft, non-tender, non-distended. No guarding. No rebound. EXTREMITIES:   no edema,   no tenderness, no deformity,   distal pulses present and equal bilaterally. SKIN:    Warm, dry and intact. NEUROLOGIC:  Normal mental status. Moving all extremities to command. Alert and oriented x4   without focal motor deficit or gross sensory deficit. Normal speech.     Left-sided facial droop (at baseline for the MAKING    Procedures/interventions/images ordered for this visit  Orders Placed This Encounter   Procedures    XR CHEST (2 VW)    Basic Metabolic Panel w/ Reflex to MG    CBC Auto Differential    Troponin       Medications ordered for this visit  No orders of the defined types were placed in this encounter. ED course notes for this visit     REVIEW OF PREVIOUS RECORDS  I have reviewed the old records of Blake Guy which reveal the following pertinent information:    June 2020:      Summary   LV systolic function is moderately reduced with an estimated EF of 35%.   Moderate global hypokinesis.   There is mild concentric left ventricular hypertrophy.   Left ventricular cavity size is mildly dilated.   Estimated LV diastolic filling pressure is normal.   Mild mitral and tricuspid regurgitation.   Systolic pulmonary artery pressure (SPAP) is estimated at 35mmHg (Right   atrial pressure of 8 mmHg).   No significant change from exam done 10/18/2019.     Stress Test:     January 2020:     Summary     Mildly reduced LVEF 45%     Inferolateral hypokinesis     Mainly fixed inferior defect suggestive of scar     No evidence of myocardium at risk for significant reversible ischemia.             Cath:     Studies:   Chest x-ray:         Impression    Cardiomegaly with pulmonary vascular congestion and mild perihilar    interstitial edema                 I wore goggles, surgical mask, N95 mask and gloves when I evaluated the patient. I evaluated the patient in room 11/11     In regards to the chest pain, patient had chest pain when she was recently admitted over this past weekend. She had a cardiology work-up and they do not feel that it was ischemic related. Patient also had a CTA to rule out PE at that time and is no PE was noted on the CTA.   Given that she is also had a stress test in January 2020 I do not feel the need to do a further work-up in regards to the chest pain, I believe this is of chronic nature for the patient. I gave the patient a dose of Lasix while here, her friend who is with her states that she will do what she can to make sure the patient gets her medications tomorrow and start to Lasix at home. This is a pleasant patient who complains of dyspnea. Based on the history and evaluation at this time, there is no significant evidence of a mechanical airflow obstruction, acute coronary syndrome, pulmonary embolism, pulmonary HTN, CHF, pneumothorax, pneumonia, asthma or emphysema, toxicity or other obvious infectious etiology, sepsis, unstable arrhythmia, anemia, hemoglobin disorder, neuromuscular disorder, or any disease process requiring other immediate medical or surgical intervention at this time. There is no obvious sign of hemodynamic or cardiopulmonary instability. Etiology of their symptom is likely secondary to medication noncompliance and CHF at this time and this was explained to the patient. Based on their well exam here, stable v/s and no sign of significant respiratory distress while observed in the department, it is felt that they are stable for d/c home with close f/u by their physician. It is understood that if the patient is not improving as expected or if other new symptoms or signs of concern develop, other etiologies or diagnoses may need to be considered requiring other tests, treatments, consultations, and/or admission. The diagnosis, plan, expected course, follow-up, and return precautions were discussed and all questions were answered. Final Impression    1. Chronic diastolic congestive heart failure ([address]    2. Shortness of breath    3. Chronic chest pain        Blood pressure 125/83, pulse 91, temperature 97.3 °F (36.3 °C), temperature source Oral, resp. rate 16, height 5' 3\" (1.6 m), weight 210 lb (95.3 kg), SpO2 97 %, not currently breastfeeding.       Disposition  At this point I do not feel the patient requires further work up and it is reasonable to discharge the patient. I had a discussion with the patient and/or their surrogate regarding diagnosis, diagnostic testing results, treatment/ plan of care, and follow up. Patient and/or companions verbalized understanding of the ED workup, any relevant findings as well as any incidental findings, and the disposition and plan. There was shared decision-making between myself as well as the patient and/or their surrogate and we are all in agreement with discharge home. There was an opportunity for questions and all questions were answered to the best of my ability and to the satisfaction of the patient and/or patient family. Patient agreed to follow upas recommend for further evaluation/treatment. The patient was given strict return precautions as we discussed symptoms that would necessitate return to the ED. Patient will return to ED for new/worsening symptoms. The patient verbalized their understanding and agreement with the above plan. Please refer to AVS for further details regarding discharge instructions. Patient was given scripts for the following medications. I counseled patient how to take these medications. New Prescriptions    No medications on file       Pt is in stable condition upon Discharge to home. The note was completed using Dragon voice recognition transcription. Every effort was made to ensure accuracy; however, inadvertent transcription errors may be present despite my best efforts to edit errors.     Luma Dow MD  [address] MD [place]  08/27/20 1094

## 2020-08-27 NOTE — TELEPHONE ENCOUNTER
Patient was evaluated overnight at [facility] ED. This satisfies the 72 hour post hospital discharge evaluation.       Angelina Harris HF BSN-RN  Heart Failure Navigator  [address] [place] [address]  [phone]

## 2020-08-27 NOTE — ED TRIAGE NOTES
Pt states she was just DC from hospital and feels like her heart is beating too fast, pt with normal VS in triage

## 2020-08-28 ENCOUNTER — CARE COORDINATION (OUTPATIENT)
Dept: CARE COORDINATION | Age: 60
End: 2020-08-28

## 2020-08-28 NOTE — CARE COORDINATION
Patient contacted regarding Evangelina Gillespie. Care Transition Nurse/ Ambulatory Care Manager contacted the patient by telephone to perform post discharge assessment. Verified name and  with patient as identifiers. Provided introduction to self, and explanation of the CTN/ACM role, and reason for call due to risk factors for infection and/or exposure to COVID-19. Symptoms reviewed with patient who verbalized the following symptoms: shortness of breath. Due to no new or worsening symptoms encounter was not routed to provider for escalation. Discussed follow-up appointments. If no appointment was previously scheduled, appointment scheduling offered: Yes  [facility] follow up appointment(s):   Future Appointments   Date Time Provider Jeremy Lomas   2020  9:30 AM FRITZ Love - CELESTINO UNGER   10/7/2020  7:35 AM SCHEDULE, Rip Burkett REMOTE TRANSMISSION Johny UNGER     Non-[facility] follow up appointment(s): none     Advance Care Planning:   Does patient have an Advance Directive:  reviewed and current. Patient has following risk factors of: heart failure, CAD, CVA, DM, HLD, HTN, mental retardation, MI.   CTN/ACM reviewed discharge instructions, medical action plan and red flags such as increased shortness of breath, increasing fever and signs of decompensation with patient who verbalized understanding. Discussed exposure protocols and quarantine with CDC Guidelines What to do if you are sick with coronavirus disease .  Patient was given an opportunity for questions and concerns. The patient agrees to contact the [facility] exposure line [phone], local health department [place] [facility]: ([phone]) and PCP office for questions related to their healthcare. CTN/ACM provided contact information for future needs.     Reviewed and educated patient on any new and changed medications related to discharge diagnosis     Patient/family/caregiver given information for [employer] and agrees to enroll no  Patient's preferred e-mail:    Patient's preferred phone number:  Based on Loop alert triggers, patient will be contacted by nurse care manager for worsening symptoms. 2 weeks based on severity of symptoms and risk factors. Patient states she is feeling better. Denies SOB and able to speak on phone in full sentences. Patient was in good spirits. She said that she has [facility] and nurse was coming this afternoon. Then she was pleased that her NP No Keith would be managing her medications again. Patient says she needs her pacemaker removed because \"it is not working\". Patient spoke about her life and loss of family members. Allowed her to vent and offered support. She is dreading  because her mother  on that day 6 years ago. She lost her father 5 years ago and her brother 20 years ago. She spoke about her sister and how estranged they have been but she said her sister's car is working now so she is visiting her more often. Patient spoke about her distrust of men and that she would never [place] another one! \". Offered her R [address] and she said she does not have email. She went into discussion about how much \"she hates computers\". Patient thanked me for calling her. Plan  ED FU in 2 weeks    [place] Lay.  Edda Carmichael RN, BSN, [address] [place] [address] Primary Care  [phone]

## 2020-08-31 ENCOUNTER — HOSPITAL ENCOUNTER (EMERGENCY)
Age: 60
Discharge: HOME | End: 2020-08-31
Attending: EMERGENCY MEDICINE
Payer: MEDICARE

## 2020-08-31 ENCOUNTER — APPOINTMENT (OUTPATIENT)
Dept: GENERAL RADIOLOGY | Age: 60
End: 2020-08-31
Payer: MEDICARE

## 2020-08-31 VITALS
HEART RATE: 92 BPM | RESPIRATION RATE: 17 BRPM | TEMPERATURE: 98 F | BODY MASS INDEX: 37.2 KG/M2 | OXYGEN SATURATION: 96 % | DIASTOLIC BLOOD PRESSURE: 79 MMHG | SYSTOLIC BLOOD PRESSURE: 119 MMHG | WEIGHT: 210 LBS

## 2020-08-31 LAB
A/G RATIO: 1.4 (ref 1.1–2.2)
ALBUMIN SERPL-MCNC: 4 G/DL (ref 3.4–5)
ALP BLD-CCNC: 245 U/L (ref 40–129)
ALT SERPL-CCNC: 78 U/L (ref 10–40)
ANION GAP SERPL CALCULATED.3IONS-SCNC: 16 MMOL/L (ref 3–16)
AST SERPL-CCNC: 56 U/L (ref 15–37)
BASOPHILS ABSOLUTE: 0.1 K/UL (ref 0–0.2)
BASOPHILS RELATIVE PERCENT: 0.7 %
BILIRUB SERPL-MCNC: 0.3 MG/DL (ref 0–1)
BUN BLDV-MCNC: 24 MG/DL (ref 7–20)
CALCIUM SERPL-MCNC: 9.3 MG/DL (ref 8.3–10.6)
CHLORIDE BLD-SCNC: 91 MMOL/L (ref 99–110)
CO2: 19 MMOL/L (ref 21–32)
CREAT SERPL-MCNC: 0.7 MG/DL (ref 0.6–1.1)
EKG ATRIAL RATE: 108 BPM
EKG DIAGNOSIS: NORMAL
EKG P AXIS: 48 DEGREES
EKG P-R INTERVAL: 164 MS
EKG Q-T INTERVAL: 364 MS
EKG QRS DURATION: 100 MS
EKG QTC CALCULATION (BAZETT): 487 MS
EKG R AXIS: -87 DEGREES
EKG T AXIS: 68 DEGREES
EKG VENTRICULAR RATE: 108 BPM
EOSINOPHILS ABSOLUTE: 0.1 K/UL (ref 0–0.6)
EOSINOPHILS RELATIVE PERCENT: 1.2 %
GFR AFRICAN AMERICAN: >60
GFR NON-AFRICAN AMERICAN: >60
GLOBULIN: 2.9 G/DL
GLUCOSE BLD-MCNC: 397 MG/DL (ref 70–99)
GLUCOSE BLD-MCNC: 527 MG/DL
GLUCOSE BLD-MCNC: 527 MG/DL (ref 70–99)
GLUCOSE BLD-MCNC: 565 MG/DL (ref 70–99)
HCT VFR BLD CALC: 43.1 % (ref 36–48)
HEMOGLOBIN: 14.1 G/DL (ref 12–16)
INR BLD: 1.02 (ref 0.86–1.14)
LYMPHOCYTES ABSOLUTE: 2.6 K/UL (ref 1–5.1)
LYMPHOCYTES RELATIVE PERCENT: 30.5 %
MCH RBC QN AUTO: 31 PG (ref 26–34)
MCHC RBC AUTO-ENTMCNC: 32.8 G/DL (ref 31–36)
MCV RBC AUTO: 94.6 FL (ref 80–100)
MONOCYTES ABSOLUTE: 0.6 K/UL (ref 0–1.3)
MONOCYTES RELATIVE PERCENT: 7.4 %
NEUTROPHILS ABSOLUTE: 5.1 K/UL (ref 1.7–7.7)
NEUTROPHILS RELATIVE PERCENT: 60.2 %
PDW BLD-RTO: 13.2 % (ref 12.4–15.4)
PERFORMED ON: ABNORMAL
PERFORMED ON: ABNORMAL
PLATELET # BLD: 228 K/UL (ref 135–450)
PMV BLD AUTO: 9 FL (ref 5–10.5)
POTASSIUM REFLEX MAGNESIUM: 4.4 MMOL/L (ref 3.5–5.1)
PRO-BNP: 2869 PG/ML (ref 0–124)
PROTHROMBIN TIME: 11.8 SEC (ref 10–13.2)
RBC # BLD: 4.56 M/UL (ref 4–5.2)
SODIUM BLD-SCNC: 126 MMOL/L (ref 136–145)
TOTAL PROTEIN: 6.9 G/DL (ref 6.4–8.2)
TROPONIN: <0.01 NG/ML
TROPONIN: <0.01 NG/ML
WBC # BLD: 8.4 K/UL (ref 4–11)

## 2020-08-31 PROCEDURE — 71046 X-RAY EXAM CHEST 2 VIEWS: CPT

## 2020-08-31 PROCEDURE — 6370000000 HC RX 637 (ALT 250 FOR IP): Performed by: NURSE PRACTITIONER

## 2020-08-31 PROCEDURE — 93005 ELECTROCARDIOGRAM TRACING: CPT | Performed by: EMERGENCY MEDICINE

## 2020-08-31 PROCEDURE — 84484 ASSAY OF TROPONIN QUANT: CPT

## 2020-08-31 PROCEDURE — 85610 PROTHROMBIN TIME: CPT

## 2020-08-31 PROCEDURE — 2580000003 HC RX 258: Performed by: NURSE PRACTITIONER

## 2020-08-31 PROCEDURE — 83880 ASSAY OF NATRIURETIC PEPTIDE: CPT

## 2020-08-31 PROCEDURE — 93010 ELECTROCARDIOGRAM REPORT: CPT | Performed by: INTERNAL MEDICINE

## 2020-08-31 PROCEDURE — 85025 COMPLETE CBC W/AUTO DIFF WBC: CPT

## 2020-08-31 PROCEDURE — 99285 EMERGENCY DEPT VISIT HI MDM: CPT

## 2020-08-31 PROCEDURE — 6360000002 HC RX W HCPCS: Performed by: NURSE PRACTITIONER

## 2020-08-31 PROCEDURE — 96374 THER/PROPH/DIAG INJ IV PUSH: CPT

## 2020-08-31 PROCEDURE — 80053 COMPREHEN METABOLIC PANEL: CPT

## 2020-08-31 PROCEDURE — 96375 TX/PRO/DX INJ NEW DRUG ADDON: CPT

## 2020-08-31 RX ORDER — ASPIRIN 81 MG/1
244 TABLET, CHEWABLE ORAL ONCE
Status: COMPLETED | OUTPATIENT
Start: 2020-08-31 | End: 2020-08-31

## 2020-08-31 RX ORDER — MORPHINE SULFATE 4 MG/ML
4 INJECTION, SOLUTION INTRAMUSCULAR; INTRAVENOUS ONCE
Status: COMPLETED | OUTPATIENT
Start: 2020-08-31 | End: 2020-08-31

## 2020-08-31 RX ORDER — 0.9 % SODIUM CHLORIDE 0.9 %
1000 INTRAVENOUS SOLUTION INTRAVENOUS ONCE
Status: COMPLETED | OUTPATIENT
Start: 2020-08-31 | End: 2020-08-31

## 2020-08-31 RX ORDER — ONDANSETRON 2 MG/ML
4 INJECTION INTRAMUSCULAR; INTRAVENOUS ONCE
Status: COMPLETED | OUTPATIENT
Start: 2020-08-31 | End: 2020-08-31

## 2020-08-31 RX ORDER — HYDROCODONE BITARTRATE AND ACETAMINOPHEN 5; 325 MG/1; MG/1
1 TABLET ORAL ONCE
Status: COMPLETED | OUTPATIENT
Start: 2020-08-31 | End: 2020-08-31

## 2020-08-31 RX ORDER — FUROSEMIDE 10 MG/ML
20 INJECTION INTRAMUSCULAR; INTRAVENOUS ONCE
Status: COMPLETED | OUTPATIENT
Start: 2020-08-31 | End: 2020-08-31

## 2020-08-31 RX ADMIN — MORPHINE SULFATE 4 MG: 4 INJECTION INTRAVENOUS at 12:20

## 2020-08-31 RX ADMIN — ONDANSETRON HYDROCHLORIDE 4 MG: 2 INJECTION, SOLUTION INTRAMUSCULAR; INTRAVENOUS at 12:20

## 2020-08-31 RX ADMIN — HYDROCODONE BITARTRATE AND ACETAMINOPHEN 1 TABLET: 5; 325 TABLET ORAL at 15:21

## 2020-08-31 RX ADMIN — INSULIN HUMAN 5 UNITS: 100 INJECTION, SOLUTION PARENTERAL at 17:12

## 2020-08-31 RX ADMIN — FUROSEMIDE 20 MG: 10 INJECTION, SOLUTION INTRAMUSCULAR; INTRAVENOUS at 15:21

## 2020-08-31 RX ADMIN — SODIUM CHLORIDE 1000 ML: 9 INJECTION, SOLUTION INTRAVENOUS at 12:43

## 2020-08-31 RX ADMIN — ASPIRIN 81 MG 243 MG: 81 TABLET ORAL at 12:20

## 2020-08-31 ASSESSMENT — PAIN DESCRIPTION - LOCATION
LOCATION: CHEST

## 2020-08-31 ASSESSMENT — PAIN DESCRIPTION - FREQUENCY: FREQUENCY: CONTINUOUS

## 2020-08-31 ASSESSMENT — PAIN SCALES - GENERAL
PAINLEVEL_OUTOF10: 10

## 2020-08-31 ASSESSMENT — PAIN DESCRIPTION - PAIN TYPE
TYPE: ACUTE PAIN

## 2020-08-31 ASSESSMENT — PAIN DESCRIPTION - DESCRIPTORS: DESCRIPTORS: CONSTANT;SHARP

## 2020-08-31 NOTE — ED PROVIDER NOTES
CHIEF COMPLAINT  Chest Pain (states cp  for a week. hx of afib and pacemeaker.)      HISTORY OF PRESENT ILLNESS  Birdie Menchaca is a 63 y.o. female who presents to the ED complaining of chest pain and shortness of breath. Patient is nontoxic in appearance and in no acute distress. Patient presents to the emergency department via EMS. Patient reports that she has been experiencing chest pain and shortness of breath for the past week. States 10 out of 10 chest discomfort, which she describes as constant and sharp at times. States pain is worsened with any exertion. Denies relieving factors. Denies radiation of pain. She denies headache or visual disturbances. No lightheadedness or dizziness. Denies difficulty breathing. Denies abdominal pain. No nausea, vomiting, diarrhea constipation. Denies any urinary complaints. No reports of cough or congestion. Denies fever or chills. Patient with history of coronary artery disease and CHF and states that she is taking her medications as prescribed. Reports that she does have a upcoming appointment next week with her cardiologist although she cannot recall the name. No other complaints, modifying factors or associated symptoms. Nursing notes reviewed.    Past Medical History:   Diagnosis Date    CAD (coronary artery disease)     Cerebral artery occlusion with cerebral infarction ([address]     Diabetes mellitus ([address]     Hyperlipidemia     Hypertension     Mental retardation     MI (myocardial infarction) ([address]      Past Surgical History:   Procedure Laterality Date    BACK SURGERY      PACEMAKER INSERTION      TUBAL LIGATION      TUMOR REMOVAL       Family History   Problem Relation Age of Onset    Heart Disease Father      Social History     Socioeconomic History    Marital status:      Spouse name: Not on file    Number of children: Not on file    Years of education: Not on file    Highest education level: Not on file Occupational History    Not on file   Social Needs    Financial resource strain: Not on file    Food insecurity     Worry: Not on file     Inability: Not on file    Transportation needs     Medical: Not on file     Non-medical: Not on file   Tobacco Use    Smoking status: Never Smoker    Smokeless tobacco: Never Used   Substance and Sexual Activity    Alcohol use: No    Drug use: No    Sexual activity: Not Currently   Lifestyle    Physical activity     Days per week: Not on file     Minutes per session: Not on file    Stress: Not on file   Relationships    Social connections     Talks on phone: Not on file     Gets together: Not on file     Attends Zoroastrianism service: Not on file     Active member of club or organization: Not on file     Attends meetings of clubs or organizations: Not on file     Relationship status: Not on file    Intimate partner violence     Fear of current or ex partner: Not on file     Emotionally abused: Not on file     Physically abused: Not on file     Forced sexual activity: Not on file   Other Topics Concern    Not on file   Social History Narrative    Not on file     No current facility-administered medications for this encounter. Current Outpatient Medications   Medication Sig Dispense Refill    nitroGLYCERIN (NITROSTAT) 0.4 MG SL tablet up to max of 3 total doses.  If no relief after 1 dose, call 911. 25 tablet 3    furosemide (LASIX) 40 MG tablet Take 1 tablet by mouth daily 30 tablet 0    ondansetron (ZOFRAN) 4 MG tablet Take 1 tablet by mouth every 8 hours as needed for Nausea 10 tablet 0    insulin aspart (NOVOLOG FLEXPEN) 100 UNIT/ML injection pen Inject 30 Units into the skin 3 times daily (before meals) 5 pen 3    aspirin 81 MG EC tablet Take 1 tablet by mouth daily 30 tablet 2    ranolazine (RANEXA) 500 MG extended release tablet Take 1 tablet by mouth 2 times daily 60 tablet 2    DULoxetine (CYMBALTA) 60 MG extended release capsule Take 1 capsule by mouth daily 30 capsule 0    FLUoxetine (PROZAC) 10 MG capsule Take 1 capsule by mouth daily 30 capsule 0    insulin glargine (LANTUS SOLOSTAR) 100 UNIT/ML injection pen Inject 40 Units into the skin 2 times daily 5 pen 3    metFORMIN (GLUCOPHAGE) 500 MG tablet Take 2 tablets by mouth 2 times daily (with meals) 120 tablet 2    atorvastatin (LIPITOR) 40 MG tablet Take 1 tablet by mouth nightly 30 tablet 2    fenofibrate micronized (LOFIBRA) 200 MG capsule Take 1 capsule by mouth nightly 30 capsule 3    lisinopril (PRINIVIL;ZESTRIL) 2.5 MG tablet Take 1 tablet by mouth daily 30 tablet 3    carvedilol (COREG) 6.25 MG tablet Take 1 tablet by mouth 2 times daily (with meals) 60 tablet 2    miconazole (MICOTIN) 2 % powder Apply topically 2 times daily. 45 g 1    [employer] Lancets Ultra Thin MISC 1 each by Does not apply route 4 times daily 200 each 0    blood glucose monitor strips Test three times a day & as needed for symptoms of irregular blood glucose. 100 strip 2    topiramate (TOPAMAX) 200 MG tablet Take 1 tablet by mouth daily 60 tablet 0     No Known Allergies    REVIEW OF SYSTEMS  10 systems reviewed, pertinent positives per HPI otherwise noted to be negative    PHYSICAL EXAM  /79   Pulse 92   Temp 98 °F (36.7 °C) (Oral)   Resp 17   Wt 210 lb (95.3 kg)   SpO2 96%   BMI 37.20 kg/m²   GENERAL APPEARANCE: Awake and alert. Cooperative. No acute distress. HEAD: Normocephalic. Atraumatic. EYES: EOM's grossly intact. Bilateral conjunctiva clear and without exudate. No conjunctival injection bilaterally. No scleral icterus. ENT: Mucous membranes are moist.   NECK: Supple. Normal ROM. Trachea is midline. CHEST: Equal and symmetric chest rise and fall. No reproducible tenderness with palpation to the chest wall. HEART: Regular rate and rhythm without murmurs. LUNGS: Respirations unlabored. Speaking comfortably in full sentences.   Bilateral lung sounds clear to auscultation without wheezing, rhonchi or rales. ABDOMEN: Soft, non-distended and non-tender. No hernias or masses appreciated. No organomegaly. No rebound or guarding. Bowel sounds audible and active in all four quadrants. EXTREMITIES: Moves all extremities equally and neurovascularly intact. No edema. SKIN: Pink ,warm and dry. No acute rashes. NEUROLOGICAL: Alert and oriented x 4. Speech clear. No gross facial drooping. Strength is 5/5 in all extremities and sensation intact. PSYCHIATRIC: Normal mood and affect. RADIOLOGY  Xr Chest (2 Vw)    Result Date: 8/31/2020  EXAMINATION: TWO XRAY VIEWS OF THE CHEST 8/31/2020 11:25 am COMPARISON: 08/26/2020 HISTORY: ORDERING SYSTEM PROVIDED HISTORY: cp TECHNOLOGIST PROVIDED HISTORY: Reason for exam:->cp Reason for Exam: chest pain Acuity: Acute Type of Exam: Initial FINDINGS: Slight worsening mild to moderate pulmonary vascular congestion. No change in small bilateral pleural effusions with atelectasis. Cardiomegaly stable status post 2 lead ICD. There is no pneumothorax. 1. Slight worsening congestive heart failure. ED COURSE  I have evaluated this patient in collaboration with Dr. Jasmin Chung. Patient seen and evaluated. Old records reviewed. Patient presenting for evaluation of chest pain and shortness of breath, ongoing for the past 1 week. Afebrile. Patient noted initially to be tachycardic with a heart rate of 110 bpm upon arrival, tachypneic with a respiratory rate of 22 and hypertensive with a blood pressure of 142/98. Nontoxic and in no acute distress. CBC without leukocytosis or anemia. CMP notable for hyponatremia, sodium 126, elevated BUN of 24, hyperglycemia with a glucose of 565. Additionally noted with elevated liver enzymes, alk phos 245, ALT 78 and AST 56, she does appear to have had this in the past as well. Troponin negative. PT and INR unremarkable. BNP elevated at 2869. Chest x-ray with reports of slight worsening congestive heart failure. EKG interpretation per attending, please see her documentation for additional details. Patient received IV fluid hydration, analgesic and antiemetic as well as Lasix and insulin while in the ED. Repeat blood glucose 397. Repeat troponin negative. Initial plan was for possible admission to the hospital; however, considering recent hospitalization and work-up for chest pain and similar symptoms, plan for diuresis in the emergency department as well as insulin for hyperglycemia pseudohyponatremia related to elevated blood glucose. Following reevaluation, patient with improving blood sugar, no increased work of breathing or need for supplemental oxygen. She does have follow-up appointment already scheduled with her PCP in 2 days. Patient prefers to be discharged home. A discussion was had with the patient regarding lab and imaging results as well as plan of care. Patient instructed to follow up with her primary care physician as scheduled in 2 days for further evaluation/treatment. Patient will be discharged home with a prescription for the following medications below. Patient verbalizes understanding, all questions were answered and she is agreeable with the plan of care. Patient will return to ED for any new/worsening symptoms. Patient was given scripts for the following medications. I counseled patient how to take these medications.    Discharge Medication List as of 8/31/2020  5:58 PM        [facility]  Results for orders placed or performed during the hospital encounter of 08/31/20   CBC auto differential   Result Value Ref Range    WBC 8.4 4.0 - 11.0 K/uL    RBC 4.56 4.00 - 5.20 M/uL    Hemoglobin 14.1 12.0 - 16.0 g/dL    Hematocrit 43.1 36.0 - 48.0 %    MCV 94.6 80.0 - 100.0 fL    MCH 31.0 26.0 - 34.0 pg    MCHC 32.8 31.0 - 36.0 g/dL    RDW 13.2 12.4 - 15.4 %    Platelets 718 891 - 850 K/uL    MPV 9.0 5.0 - 10.5 fL    Neutrophils % 60.2 %    Lymphocytes % 30.5 %    Monocytes % 7.4 %    Eosinophils % 1.2 % Basophils % 0.7 %    Neutrophils Absolute 5.1 1.7 - 7.7 K/uL    Lymphocytes Absolute 2.6 1.0 - 5.1 K/uL    Monocytes Absolute 0.6 0.0 - 1.3 K/uL    Eosinophils Absolute 0.1 0.0 - 0.6 K/uL    Basophils Absolute 0.1 0.0 - 0.2 K/uL   Comprehensive Metabolic Panel w/ Reflex to MG   Result Value Ref Range    Sodium 126 (L) 136 - 145 mmol/L    Potassium reflex Magnesium 4.4 3.5 - 5.1 mmol/L    Chloride 91 (L) 99 - 110 mmol/L    CO2 19 (L) 21 - 32 mmol/L    Anion Gap 16 3 - 16    Glucose 565 (H) 70 - 99 mg/dL    BUN 24 (H) 7 - 20 mg/dL    CREATININE 0.7 0.6 - 1.1 mg/dL    GFR Non-African American >60 >60    GFR African American >60 >60    Calcium 9.3 8.3 - 10.6 mg/dL    Total Protein 6.9 6.4 - 8.2 g/dL    Alb 4.0 3.4 - 5.0 g/dL    Albumin/Globulin Ratio 1.4 1.1 - 2.2    Total Bilirubin 0.3 0.0 - 1.0 mg/dL    Alkaline Phosphatase 245 (H) 40 - 129 U/L    ALT 78 (H) 10 - 40 U/L    AST 56 (H) 15 - 37 U/L    Globulin 2.9 g/dL   Troponin   Result Value Ref Range    Troponin <0.01 <0.01 ng/mL   Protime-INR   Result Value Ref Range    Protime 11.8 10.0 - 13.2 sec    INR 1.02 0.86 - 1.14   Brain Natriuretic Peptide   Result Value Ref Range    Pro-BNP 2,869 (H) 0 - 124 pg/mL   Troponin   Result Value Ref Range    Troponin <0.01 <0.01 ng/mL   POCT glucose   Result Value Ref Range    Glucose 527 mg/dL   POCT Glucose   Result Value Ref Range    POC Glucose 527 (H) 70 - 99 mg/dl    Performed on [facility]    POCT Glucose   Result Value Ref Range    POC Glucose 397 (H) 70 - 99 mg/dl    Performed on [facility]    EKG 12 Lead   Result Value Ref Range    Ventricular Rate 108 BPM    Atrial Rate 108 BPM    P-R Interval 164 ms    QRS Duration 100 ms    Q-T Interval 364 ms    QTc Calculation (Bazett) 487 ms    P Axis 48 degrees    R Axis -87 degrees    T Axis 68 degrees    Diagnosis       Sinus tachycardia with frequent Premature ventricular complexesLeft anterior fascicular blockAnterolateral infarct , age undetermined Low voltage QRSAbnormal ECGsignificant changes since 8.23.20Confirmed by Tami Christian MD, [address] ([id number]) on 8/31/2020 4:33:08 PM       I estimate there is LOW risk for PULMONARY EMBOLISM, ACUTE CORONARY SYNDROME, OR THORACIC AORTIC DISSECTION, thus I consider the discharge disposition reasonable. Landon Wheeler and I have discussed the diagnosis and risks, and we agree with discharging home to follow-up with their primary doctor. We also discussed returning to the Emergency Department immediately if new or worsening symptoms occur. We have discussed the symptoms which are most concerning (e.g., bloody sputum, fever, worsening pain or shortness of breath, vomiting) that necessitate immediate return. FINAL Impression    1. Acute on chronic congestive heart failure, unspecified heart failure type ([address]    2. Hyperglycemia        Blood pressure 119/79, pulse 92, temperature 98 °F (36.7 °C), temperature source Oral, resp. rate 17, weight 210 lb (95.3 kg), SpO2 96 %, not currently breastfeeding. DISPOSITION  Patient was discharged to home in stable condition. DISCLAIMER:  Please note this report has been produced using speech recognition Dragon software and may contain errors related to that system including errors in grammar, punctuation, and spelling, as well as words and phrases that may be inappropriate. If there are any questions or concerns please feel free to contact the dictating provider for clarification.                 FRITZ Mueller - [place]  09/02/20 [id number]

## 2020-08-31 NOTE — ED PROVIDER NOTES
ED Attending Note    Anjel Marshall was initially seen and evaluated by the advanced practice provider. Briefly, this is a 63 y.o. female who presented with shortness of breath and chest tightness. Symptoms x several weeks. Getting worse. Seen in this ED 8/26/2020 with similar complaint, favored secondary to medication noncompliance and CHF. Denies fever. Complains of cough, nonproductive. Emesis x 1 earlier today. June 2020:      Summary   LV systolic function is moderately reduced with an estimated EF of 35%.   Moderate global hypokinesis.   There is mild concentric left ventricular hypertrophy.   Left ventricular cavity size is mildly dilated.   Estimated LV diastolic filling pressure is normal.   Mild mitral and tricuspid regurgitation.   Systolic pulmonary artery pressure (SPAP) is estimated at 35mmHg (Right   atrial pressure of 8 mmHg).   No significant change from exam done 10/18/2019.     Stress Test:     January 2020:     Summary     Mildly reduced LVEF 45%     Inferolateral hypokinesis     Mainly fixed inferior defect suggestive of scar     No evidence of myocardium at risk for significant reversible ischemia.           Focused exam:   General: appears stated age, nontoxic, NAD  HEENT: NCAT. PERRLA. CV: RRR, no M/R/G  Pulm: scant cracklesat bilateral bases. No increased WOB. Abd: Soft, nontender, nondistended. No rebound or guarding. Extremities: 1+ symmetric edema    EKG interpreted by myself. Rate: 108  Rhythm: sinus tachy with PVCs  Axis: -87  Intervals:   QTc 487  ST Segments: no acute abnormality  T waves: no acute abnormality  Comparison: Compared to 8/23/2020, the rate is increased by 9 bpm, QTc has narrowed  Impression: Sinus tachycardia with PVCs, left anterior fascicular block    This is a 56-year-old female presents with chest tightness and shortness of breath favoring COPD exacerbation. Previous presentation several days ago with similar complaint.   Troponin

## 2020-08-31 NOTE — ED NOTES
Pt given a cab valucher to to go home. Dc instructions to pt. Follow up instructions.      Hortencia Holland  08/31/20 4804

## 2020-09-08 ENCOUNTER — NURSE ONLY (OUTPATIENT)
Dept: CARDIOLOGY CLINIC | Age: 60
End: 2020-09-08
Payer: MEDICARE

## 2020-09-08 ENCOUNTER — OFFICE VISIT (OUTPATIENT)
Dept: CARDIOLOGY CLINIC | Age: 60
End: 2020-09-08
Payer: MEDICARE

## 2020-09-08 ENCOUNTER — TELEPHONE (OUTPATIENT)
Dept: CARDIOLOGY CLINIC | Age: 60
End: 2020-09-08

## 2020-09-08 VITALS
HEART RATE: 136 BPM | WEIGHT: 209 LBS | OXYGEN SATURATION: 95 % | SYSTOLIC BLOOD PRESSURE: 120 MMHG | BODY MASS INDEX: 37.03 KG/M2 | DIASTOLIC BLOOD PRESSURE: 80 MMHG | HEIGHT: 63 IN

## 2020-09-08 PROCEDURE — 99214 OFFICE O/P EST MOD 30 MIN: CPT | Performed by: NURSE PRACTITIONER

## 2020-09-08 PROCEDURE — 93290 INTERROG DEV EVAL ICPMS IP: CPT | Performed by: NURSE PRACTITIONER

## 2020-09-08 PROCEDURE — 93283 PRGRMG EVAL IMPLANTABLE DFB: CPT | Performed by: INTERNAL MEDICINE

## 2020-09-08 RX ORDER — ESCITALOPRAM OXALATE 10 MG/1
TABLET ORAL
Status: ON HOLD | COMMUNITY
Start: 2020-06-15 | End: 2020-10-26

## 2020-09-08 RX ORDER — CLOPIDOGREL BISULFATE 75 MG/1
TABLET ORAL
Status: ON HOLD | COMMUNITY
Start: 2020-06-10 | End: 2020-10-13 | Stop reason: HOSPADM

## 2020-09-08 RX ORDER — FUROSEMIDE 40 MG/1
40 TABLET ORAL 2 TIMES DAILY
Qty: 60 TABLET | Refills: 0 | Status: ON HOLD | OUTPATIENT
Start: 2020-09-08 | End: 2020-10-13 | Stop reason: HOSPADM

## 2020-09-08 NOTE — TELEPHONE ENCOUNTER
Called [employer] and ordered the patient a new bedside monitor per patient request. Will arrive at the patient's home within 7-10 business days. Ephraim Hagen.

## 2020-09-08 NOTE — PROGRESS NOTES
[address]   Cardiac Evaluation    Primary Care Doctor:  No primary care provider on file. Chief Complaint   Patient presents with    Follow-Up from Hospital     Increased HR    Shortness of Breath     Pt states all the time    Chest Pain     Mid sternum, does not move across chest area, feels pressure in chest as well        History of Present Illness:   I had the pleasure of seeing Chema Smiley in follow up for recent hospitalization. Chema Smiley presented with chest pain and shortness of breath. The chest pain was sharp, worse with palpation. ECG and troponin's negative for ischemia. She was treated for CHF with IV Lasix with improvement in breathing. She has hx of NICM, sCHF, s/p dual ICD as well as HTN, HLD and uncontrolled DM. She has been seen twice in ED over past 3 weeks with similar symptoms. She was treated with IV Lasix. Noted she did not get prescriptions filled from prior hospitalization. Glucose was > 500 associated with hyponatremia. She still has shortness of breath with any activity and with lying down. She complains of chest pain that hurts all the time. She has been out of Lasix for about 2 weeks. She gets medicines through [employer] with pill packets. However she does not think they included the Lasix with last fill. She is accompanied by sister in office today. She has home care house calls as PCP Say Caraballo CNP). Blood sugars continue to run high (>400) despite insulin. Appetite is fair but has frequent emesis after eating. Unable to weigh herself as does not have a scale at home. Chema Smiley describes symptoms including chest pain, dyspnea, palpitations, orthopnea, fatigue, edema but denies PND, early saiety, syncope.      Past Medical History:   has a past medical history of CAD (coronary artery disease), Cerebral artery occlusion with cerebral infarction ([address] Diabetes mellitus ([address] Hyperlipidemia, Hypertension, Mental retardation, and MI (myocardial infarction) ([facility] [address] Surgical History:   has a past surgical history that includes back surgery; Pacemaker insertion; tumor removal; and Tubal ligation. Social History:   reports that she has never smoked. She has never used smokeless tobacco. She reports that she does not drink alcohol or use drugs. Family History:   Family History   Problem Relation Age of Onset    Heart Disease Father        Home Medications:  Prior to Admission medications    Medication Sig Start Date End Date Taking? Authorizing Provider   nitroGLYCERIN (NITROSTAT) 0.4 MG SL tablet up to max of 3 total doses.  If no relief after 1 dose, call 911. 8/24/20  Yes Edmond Hastings MD   furosemide (LASIX) 40 MG tablet Take 1 tablet by mouth daily 8/25/20 9/24/20 Yes Edmond Hastings MD   ondansetron [facility]) 4 MG tablet Take 1 tablet by mouth every 8 hours as needed for Nausea 8/12/20  Yes Dmitri Sher, DO   insulin aspart (NOVOLOG FLEXPEN) 100 UNIT/ML injection pen Inject 30 Units into the skin 3 times daily (before meals) 8/12/20  Yes Dmitri Sher, DO   aspirin 81 MG EC tablet Take 1 tablet by mouth daily 7/18/20  Yes Delvin Sandifer, MD   ranolazine (RANEXA) 500 MG extended release tablet Take 1 tablet by mouth 2 times daily 7/18/20  Yes Delvin Sandifer, MD   DULoxetine (CYMBALTA) 60 MG extended release capsule Take 1 capsule by mouth daily 7/18/20  Yes Delvin Sandifer, MD   FLUoxetine (PROZAC) 10 MG capsule Take 1 capsule by mouth daily 7/18/20  Yes Delvin Sandifer, MD   insulin glargine (LANTUS SOLOSTAR) 100 UNIT/ML injection pen Inject 40 Units into the skin 2 times daily 7/18/20  Yes Delvin Sandifer, MD   metFORMIN (GLUCOPHAGE) 500 MG tablet Take 2 tablets by mouth 2 times daily (with meals) 7/18/20 10/16/20 Yes Delvin Sandifer, MD   atorvastatin (LIPITOR) 40 MG tablet Take 1 tablet by mouth nightly 7/18/20  Yes Delvin Sandifer, MD   fenofibrate micronized (LOFIBRA) 200 MG capsule Take 1 capsule by mouth nightly 7/18/20  Yes Jaki Larson MD   lisinopril (PRINIVIL;ZESTRIL) 2.5 MG tablet Take 1 tablet by mouth daily 7/18/20  Yes Jaki Larson MD   carvedilol (COREG) 6.25 MG tablet Take 1 tablet by mouth 2 times daily (with meals) 7/18/20  Yes Jaki Larson MD   miconazole (MICOTIN) 2 % powder Apply topically 2 times daily. 7/18/20  Yes Jaki Larson MD   [employer] Lancets Ultra Thin MISC 1 each by Does not apply route 4 times daily 7/18/20  Yes Jaki Larson MD   blood glucose monitor strips Test three times a day & as needed for symptoms of irregular blood glucose. 5/12/19  Yes Jhonny Stafford MD   topiramate (TOPAMAX) 200 MG tablet Take 1 tablet by mouth daily 4/14/18  Yes Michelle Copeland MD        Allergies:  Patient has no known allergies. Review of Systems:   · Constitutional: there has been no unanticipated weight loss. · Eyes: No vision changes, wears corrective lens  · ENT: No Headaches, no nasal congestion. No mouth sores or sore throat. · Cardiovascular: Reviewed in HPI  · Respiratory: No cough or wheezing, no sputum production. + SOB  · Gastrointestinal: No abdominal pain, no constipation or diarrhea, + nausea and vomiting  · Genitourinary: No dysuria, trouble voiding, or hematuria. · Musculoskeletal:  No weakness or joint complaints. · Integumentary: No rash or pruritis. · Neurological: No numbness or tingling. No weakness. No tremor. · Psychiatric: No anxiety, no depression. · Endocrine:  No excessive thirst or urination. · Hematologic/Lymphatic: No abnormal bruising or bleeding, blood clots or swollen lymph nodes.       Physical Examination:    Vitals:    09/08/20 0939   BP: 120/80   Pulse: 136   SpO2: 95%   Weight: 209 lb (94.8 kg)   Height: 5' 3\" (1.6 m)        Constitutional and General Appearance: Warm and dry, no apparent distress, normal coloration  HEENT:  Normocephalic, atraumatic  Respiratory:  · Normal excursion and expansion without use of accessory muscles  · Resp Auscultation: Normal breath sounds without dullness  Cardiovascular:  · The apical impulses not displaced  · Heart tones are crisp and normal  · JVP difficult to assess  · Regular rate and rhythm, tachy, no appreciable m/g/r  · Peripheral pulses are symmetrical and full  · There is no clubbing, cyanosis of the extremities.   · 1+ BLE pretibial edema  · Pedal Pulses: 2+ and equal     Abdomen:  · No masses or tenderness  · Liver/Spleen: No Abnormalities Noted  Neurological/Psychiatric:  · Alert and oriented in all spheres  · Moves all extremities well  · Exhibits normal gait balance and coordination  · No abnormalities of mood, affect, memory, mentation, or behavior are noted    Lab Data:  CBC:   Lab Results   Component Value Date    WBC 8.4 08/31/2020    WBC 6.6 08/26/2020    WBC 7.9 08/23/2020    RBC 4.56 08/31/2020    RBC 4.42 08/26/2020    RBC 4.49 08/23/2020    HGB 14.1 08/31/2020    HGB 13.8 08/26/2020    HGB 14.1 08/23/2020    HCT 43.1 08/31/2020    HCT 41.9 08/26/2020    HCT 41.7 08/23/2020    MCV 94.6 08/31/2020    MCV 94.9 08/26/2020    MCV 92.8 08/23/2020    RDW 13.2 08/31/2020    RDW 13.6 08/26/2020    RDW 13.1 08/23/2020     08/31/2020     08/26/2020     08/23/2020     BMP:  Lab Results   Component Value Date     08/31/2020     08/26/2020     08/24/2020    K 4.4 08/31/2020    K 4.3 08/26/2020    K 4.3 08/24/2020    K 3.4 08/23/2020    K 4.3 08/22/2020    K 3.8 07/18/2020    CL 91 08/31/2020     08/26/2020     08/24/2020    CO2 19 08/31/2020    CO2 20 08/26/2020    CO2 25 08/24/2020    PHOS 3.0 04/18/2020    PHOS 1.5 04/17/2020    PHOS 2.1 04/17/2020    BUN 24 08/31/2020    BUN 19 08/26/2020    BUN 33 08/24/2020    CREATININE 0.7 08/31/2020    CREATININE 0.7 08/26/2020    CREATININE 0.7 08/24/2020     BNP:   Lab Results   Component Value Date    PROBNP 2,869 08/31/2020    PROBNP 1,358 08/26/2020    PROBNP 3,113 08/22/2020     Troponin: No components found for: TROPONIN  Lipids:   Lab Results   Component Value Date    TRIG 142 10/12/2019    TRIG 251 10/08/2019    HDL 51 10/12/2019    HDL 43 10/08/2019    LDLCALC 87 10/12/2019    LDLCALC 108 10/08/2019     Cardiac Imaging:  Echo June 2020:    Summary   LV systolic function is moderately reduced with an estimated EF of 35%. Moderate global hypokinesis. There is mild concentric left ventricular hypertrophy. Left ventricular cavity size is mildly dilated. Estimated LV diastolic filling pressure is normal.   Mild mitral and tricuspid regurgitation. Systolic pulmonary artery pressure (SPAP) is estimated at 35mmHg (Right atrial pressure of 8 mmHg). No significant change from exam done 10/18/2019. Stress Test January 2020:   Summary     Mildly reduced LVEF 45%     Inferolateral hypokinesis     Mainly fixed inferior defect suggestive of scar     No evidence of myocardium at risk for significant reversible ischemia. STRESS MPI 4/16/18   LV function is mild-moderately reduced with global hypokinesis and ejection  fraction of 41 %. There is normal isotope uptake at stress and rest. There is no evidence of  myocardial ischemia or scar. Echo:  4/14/18:  Summary   The left ventricular systolic function is mildly reduced with an ejection fraction of 45-50 %. The left ventricle is mildly dilated. There is hypokinesis of the basal inferoseptal, basal inferior and inferolateral wall. Grade II diastolic dysfunction with elevated filing pressure. Pacer / ICD wire is visualized in the right ventricle. A bubble study was performed and fails to show evidence of shunting. Assessment:    1. Nonischemic cardiomyopathy ([address]    2. Chronic combined systolic and diastolic CHF (congestive heart failure) ([address]    3. Dual ICD (implantable cardioverter-defibrillator) in place    4.  Coronary artery disease involving native coronary artery of native heart without angina pectoris    5. DM (diabetes mellitus), secondary, uncontrolled, w/neurologic complic ([facility] [address]    6. Dyslipidemia    7. Essential hypertension    8. History of CVA (cerebrovascular accident)    9. Hyponatremia    10. Noncompliance with medications      Device check in office today shows sinus tachycardia with rates of 110-130's. There have been episodes of NSVT as well as SVT. Currently tachycardia likely related to decompensated CHF. Will diurese and continue to assess. Plan:   1. Restart the furosemide (Lasix) 40 mg at twice daily   2. Continue same doses of carvedilol (Coreg) 6.25 mg twice daily and lisinopril 2.5 mg once daily  3. Keep your fluid intake no more than 64 oz per day (2 liters or 8-8 oz cups)  4. Follow up with me in 2 weeks  5. Will arrange for follow up with EP in 1-2 months       I appreciate the opportunity of cooperating in the care of this individual.    Ani Lyons CNP, 9/8/2020, 9:49 AM    QUALITY MEASURES  1. Tobacco Cessation Counseling: NA  2. Retake of BP if >140/90:   NA  3. Documentation to PCP/referring for new patient:  Sent to PCP at close of office visit  4. CAD patient on anti-platelet: NA  5. CAD patient on STATIN therapy:  NA  6.  Patient with CHF and aFib on anticoagulation:  NA

## 2020-09-08 NOTE — PROGRESS NOTES
Patient presents to the device clinic today for a programming evaluation for her defibrillator. Patient has a history of NICM, syncope, and AT. Takes Plavix. Last device interrogation was on 6/14. Since then, 5 SVT events recorded appear to be 1:1 conduction with V rates ranging 169-181 bpm. SVT event on 7/10 appears to show dual tachycardia at the beginning of the arrhythmia x 2 beats (SVT & NSVT) with the arrhythmias switching back to SVT. 16 NSVT events recorded with longest event recorded  X 12 seconds on 9/6 - limited EGMs available for review. 475 AMS events recorded with overall burden of 3.1%. V rates range from  bpm. AMS events appear to be 1:1 AT based on available EGMs. CorVue indicates previous fluid accumulation. AP <1%  <1%  All sensing and pacing parameters are within normal range. No changes need to be made at this time. Patient education was provided about device functionality, in home monitoring, and any other patient questions and/or concerns were addressed. Patient voices understanding. Called [employer] and ordered the patient a new bedside monitor, per patient request.    Please see interrogation for more detail. Patient will see ALINE Echols today in office. Patient will follow up in 3 months in office or remotely. See [employer] report under the Cardiology tab.

## 2020-09-10 ENCOUNTER — CARE COORDINATION (OUTPATIENT)
Dept: CARE COORDINATION | Age: 60
End: 2020-09-10

## 2020-09-10 ENCOUNTER — HOSPITAL ENCOUNTER (OUTPATIENT)
Age: 60
Setting detail: OBSERVATION
Discharge: HOME | End: 2020-09-11
Attending: STUDENT IN AN ORGANIZED HEALTH CARE EDUCATION/TRAINING PROGRAM | Admitting: INTERNAL MEDICINE
Payer: MEDICARE

## 2020-09-10 ENCOUNTER — APPOINTMENT (OUTPATIENT)
Dept: GENERAL RADIOLOGY | Age: 60
End: 2020-09-10
Payer: MEDICARE

## 2020-09-10 LAB
A/G RATIO: 1.5 (ref 1.1–2.2)
ALBUMIN SERPL-MCNC: 3.8 G/DL (ref 3.4–5)
ALP BLD-CCNC: 103 U/L (ref 40–129)
ALT SERPL-CCNC: 41 U/L (ref 10–40)
ANION GAP SERPL CALCULATED.3IONS-SCNC: 10 MMOL/L (ref 3–16)
AST SERPL-CCNC: 38 U/L (ref 15–37)
BASOPHILS ABSOLUTE: 0.1 K/UL (ref 0–0.2)
BASOPHILS RELATIVE PERCENT: 1.4 %
BILIRUB SERPL-MCNC: 0.4 MG/DL (ref 0–1)
BUN BLDV-MCNC: 18 MG/DL (ref 7–20)
CALCIUM SERPL-MCNC: 8.9 MG/DL (ref 8.3–10.6)
CHLORIDE BLD-SCNC: 96 MMOL/L (ref 99–110)
CO2: 28 MMOL/L (ref 21–32)
CREAT SERPL-MCNC: 0.7 MG/DL (ref 0.6–1.1)
D DIMER: 215 NG/ML DDU (ref 0–229)
EOSINOPHILS ABSOLUTE: 0.2 K/UL (ref 0–0.6)
EOSINOPHILS RELATIVE PERCENT: 2.6 %
GFR AFRICAN AMERICAN: >60
GFR NON-AFRICAN AMERICAN: >60
GLOBULIN: 2.5 G/DL
GLUCOSE BLD-MCNC: 151 MG/DL (ref 70–99)
GLUCOSE BLD-MCNC: 179 MG/DL (ref 70–99)
HCT VFR BLD CALC: 43.3 % (ref 36–48)
HEMOGLOBIN: 14.8 G/DL (ref 12–16)
LYMPHOCYTES ABSOLUTE: 2 K/UL (ref 1–5.1)
LYMPHOCYTES RELATIVE PERCENT: 28.1 %
MCH RBC QN AUTO: 31.3 PG (ref 26–34)
MCHC RBC AUTO-ENTMCNC: 34.1 G/DL (ref 31–36)
MCV RBC AUTO: 91.8 FL (ref 80–100)
MONOCYTES ABSOLUTE: 0.7 K/UL (ref 0–1.3)
MONOCYTES RELATIVE PERCENT: 9 %
NEUTROPHILS ABSOLUTE: 4.3 K/UL (ref 1.7–7.7)
NEUTROPHILS RELATIVE PERCENT: 58.9 %
PDW BLD-RTO: 13.2 % (ref 12.4–15.4)
PERFORMED ON: ABNORMAL
PLATELET # BLD: 309 K/UL (ref 135–450)
PMV BLD AUTO: 8.2 FL (ref 5–10.5)
POTASSIUM REFLEX MAGNESIUM: 3.7 MMOL/L (ref 3.5–5.1)
PRO-BNP: 1578 PG/ML (ref 0–124)
RBC # BLD: 4.72 M/UL (ref 4–5.2)
SODIUM BLD-SCNC: 134 MMOL/L (ref 136–145)
TOTAL PROTEIN: 6.3 G/DL (ref 6.4–8.2)
TROPONIN: <0.01 NG/ML
WBC # BLD: 7.2 K/UL (ref 4–11)

## 2020-09-10 PROCEDURE — 93005 ELECTROCARDIOGRAM TRACING: CPT | Performed by: PHYSICIAN ASSISTANT

## 2020-09-10 PROCEDURE — 84484 ASSAY OF TROPONIN QUANT: CPT

## 2020-09-10 PROCEDURE — 85025 COMPLETE CBC W/AUTO DIFF WBC: CPT

## 2020-09-10 PROCEDURE — 80053 COMPREHEN METABOLIC PANEL: CPT

## 2020-09-10 PROCEDURE — 85379 FIBRIN DEGRADATION QUANT: CPT

## 2020-09-10 PROCEDURE — G0378 HOSPITAL OBSERVATION PER HR: HCPCS

## 2020-09-10 PROCEDURE — 96360 HYDRATION IV INFUSION INIT: CPT

## 2020-09-10 PROCEDURE — 83880 ASSAY OF NATRIURETIC PEPTIDE: CPT

## 2020-09-10 PROCEDURE — 99285 EMERGENCY DEPT VISIT HI MDM: CPT

## 2020-09-10 PROCEDURE — 71045 X-RAY EXAM CHEST 1 VIEW: CPT

## 2020-09-10 PROCEDURE — 2580000003 HC RX 258: Performed by: PHYSICIAN ASSISTANT

## 2020-09-10 RX ORDER — POTASSIUM CHLORIDE 20 MEQ/1
40 TABLET, EXTENDED RELEASE ORAL PRN
Status: DISCONTINUED | OUTPATIENT
Start: 2020-09-10 | End: 2020-09-11 | Stop reason: HOSPADM

## 2020-09-10 RX ORDER — DEXTROSE MONOHYDRATE 50 MG/ML
100 INJECTION, SOLUTION INTRAVENOUS PRN
Status: DISCONTINUED | OUTPATIENT
Start: 2020-09-10 | End: 2020-09-11 | Stop reason: HOSPADM

## 2020-09-10 RX ORDER — ACETAMINOPHEN 325 MG/1
650 TABLET ORAL EVERY 6 HOURS PRN
Status: DISCONTINUED | OUTPATIENT
Start: 2020-09-10 | End: 2020-09-11 | Stop reason: HOSPADM

## 2020-09-10 RX ORDER — CARVEDILOL 6.25 MG/1
6.25 TABLET ORAL 2 TIMES DAILY WITH MEALS
Status: DISCONTINUED | OUTPATIENT
Start: 2020-09-11 | End: 2020-09-11 | Stop reason: HOSPADM

## 2020-09-10 RX ORDER — ESCITALOPRAM OXALATE 10 MG/1
10 TABLET ORAL DAILY
Status: DISCONTINUED | OUTPATIENT
Start: 2020-09-11 | End: 2020-09-11 | Stop reason: HOSPADM

## 2020-09-10 RX ORDER — ASPIRIN 81 MG/1
81 TABLET ORAL DAILY
Status: DISCONTINUED | OUTPATIENT
Start: 2020-09-11 | End: 2020-09-11 | Stop reason: HOSPADM

## 2020-09-10 RX ORDER — FLUOXETINE 10 MG/1
10 CAPSULE ORAL DAILY
Status: DISCONTINUED | OUTPATIENT
Start: 2020-09-11 | End: 2020-09-11 | Stop reason: HOSPADM

## 2020-09-10 RX ORDER — NITROGLYCERIN 0.4 MG/1
0.4 TABLET SUBLINGUAL EVERY 5 MIN PRN
Status: DISCONTINUED | OUTPATIENT
Start: 2020-09-10 | End: 2020-09-11 | Stop reason: HOSPADM

## 2020-09-10 RX ORDER — SODIUM CHLORIDE 0.9 % (FLUSH) 0.9 %
10 SYRINGE (ML) INJECTION EVERY 12 HOURS SCHEDULED
Status: DISCONTINUED | OUTPATIENT
Start: 2020-09-11 | End: 2020-09-11 | Stop reason: HOSPADM

## 2020-09-10 RX ORDER — ACETAMINOPHEN 650 MG/1
650 SUPPOSITORY RECTAL EVERY 6 HOURS PRN
Status: DISCONTINUED | OUTPATIENT
Start: 2020-09-10 | End: 2020-09-11 | Stop reason: HOSPADM

## 2020-09-10 RX ORDER — POLYETHYLENE GLYCOL 3350 17 G/17G
17 POWDER, FOR SOLUTION ORAL DAILY PRN
Status: DISCONTINUED | OUTPATIENT
Start: 2020-09-10 | End: 2020-09-11 | Stop reason: HOSPADM

## 2020-09-10 RX ORDER — PROMETHAZINE HYDROCHLORIDE 25 MG/1
12.5 TABLET ORAL EVERY 6 HOURS PRN
Status: DISCONTINUED | OUTPATIENT
Start: 2020-09-10 | End: 2020-09-11

## 2020-09-10 RX ORDER — ONDANSETRON 2 MG/ML
4 INJECTION INTRAMUSCULAR; INTRAVENOUS EVERY 6 HOURS PRN
Status: DISCONTINUED | OUTPATIENT
Start: 2020-09-10 | End: 2020-09-11

## 2020-09-10 RX ORDER — ATORVASTATIN CALCIUM 40 MG/1
40 TABLET, FILM COATED ORAL NIGHTLY
Status: DISCONTINUED | OUTPATIENT
Start: 2020-09-11 | End: 2020-09-11 | Stop reason: HOSPADM

## 2020-09-10 RX ORDER — NICOTINE POLACRILEX 4 MG
15 LOZENGE BUCCAL PRN
Status: DISCONTINUED | OUTPATIENT
Start: 2020-09-10 | End: 2020-09-11 | Stop reason: HOSPADM

## 2020-09-10 RX ORDER — CLOPIDOGREL BISULFATE 75 MG/1
75 TABLET ORAL DAILY
Status: DISCONTINUED | OUTPATIENT
Start: 2020-09-11 | End: 2020-09-11 | Stop reason: HOSPADM

## 2020-09-10 RX ORDER — FUROSEMIDE 40 MG/1
40 TABLET ORAL 2 TIMES DAILY
Status: DISCONTINUED | OUTPATIENT
Start: 2020-09-11 | End: 2020-09-11 | Stop reason: HOSPADM

## 2020-09-10 RX ORDER — DULOXETIN HYDROCHLORIDE 60 MG/1
60 CAPSULE, DELAYED RELEASE ORAL DAILY
Status: DISCONTINUED | OUTPATIENT
Start: 2020-09-11 | End: 2020-09-11 | Stop reason: HOSPADM

## 2020-09-10 RX ORDER — TOPIRAMATE 100 MG/1
200 TABLET, FILM COATED ORAL DAILY
Status: DISCONTINUED | OUTPATIENT
Start: 2020-09-11 | End: 2020-09-11 | Stop reason: HOSPADM

## 2020-09-10 RX ORDER — SODIUM CHLORIDE 0.9 % (FLUSH) 0.9 %
10 SYRINGE (ML) INJECTION PRN
Status: DISCONTINUED | OUTPATIENT
Start: 2020-09-10 | End: 2020-09-11 | Stop reason: HOSPADM

## 2020-09-10 RX ORDER — LISINOPRIL 5 MG/1
2.5 TABLET ORAL DAILY
Status: DISCONTINUED | OUTPATIENT
Start: 2020-09-11 | End: 2020-09-11 | Stop reason: HOSPADM

## 2020-09-10 RX ORDER — POTASSIUM CHLORIDE 7.45 MG/ML
10 INJECTION INTRAVENOUS PRN
Status: DISCONTINUED | OUTPATIENT
Start: 2020-09-10 | End: 2020-09-11 | Stop reason: HOSPADM

## 2020-09-10 RX ORDER — DEXTROSE MONOHYDRATE 25 G/50ML
12.5 INJECTION, SOLUTION INTRAVENOUS PRN
Status: DISCONTINUED | OUTPATIENT
Start: 2020-09-10 | End: 2020-09-11 | Stop reason: HOSPADM

## 2020-09-10 RX ORDER — RANOLAZINE 500 MG/1
500 TABLET, EXTENDED RELEASE ORAL 2 TIMES DAILY
Status: DISCONTINUED | OUTPATIENT
Start: 2020-09-11 | End: 2020-09-11 | Stop reason: HOSPADM

## 2020-09-10 RX ORDER — INSULIN GLARGINE 100 [IU]/ML
40 INJECTION, SOLUTION SUBCUTANEOUS 2 TIMES DAILY
Status: DISCONTINUED | OUTPATIENT
Start: 2020-09-11 | End: 2020-09-11 | Stop reason: HOSPADM

## 2020-09-10 RX ORDER — 0.9 % SODIUM CHLORIDE 0.9 %
500 INTRAVENOUS SOLUTION INTRAVENOUS ONCE
Status: COMPLETED | OUTPATIENT
Start: 2020-09-10 | End: 2020-09-10

## 2020-09-10 RX ADMIN — SODIUM CHLORIDE 500 ML: 9 INJECTION, SOLUTION INTRAVENOUS at 19:52

## 2020-09-10 ASSESSMENT — PAIN SCALES - GENERAL: PAINLEVEL_OUTOF10: 10

## 2020-09-10 ASSESSMENT — ENCOUNTER SYMPTOMS
RHINORRHEA: 0
CHEST TIGHTNESS: 0
SORE THROAT: 0
CONSTIPATION: 0
ABDOMINAL PAIN: 0
SINUS PRESSURE: 0
SHORTNESS OF BREATH: 1
COUGH: 1
NAUSEA: 0
EYE REDNESS: 0
SINUS PAIN: 0
EYE DISCHARGE: 0
VOMITING: 0
DIARRHEA: 0

## 2020-09-10 NOTE — CARE COORDINATION
Your Patient resolved from the Care Transitions episode on 8/28/2020  Patient/family has been provided the following resources and education related to COVID-19:                         Signs, symptoms and red flags related to COVID-19            CDC exposure and quarantine guidelines            [employer]it exposure contact - [phone]            Contact for their local Department of Health                 Patient currently reports that the following symptoms have improved:  shortness of breath and no new/worsening symptoms  Patient states that she is just \"tired of having CHF and peeing all the time\". Patient able to speak in sentences over the phone. States she is at her baseline SOB. Patient was happy that her NP Amrit Raya is able to visit her at home. Patient just had her 62 th birthday 2 days ago and she said that she did not feel well on her birthday. Lengthy conversation and allowed patient to vent about her illness-CHF. Offered emotional support. No further outreach scheduled with this CTN/ACM. Episode of Care resolved. Patient has this CTN/ACM contact information if future needs arise. Plan  Resolve 14 day ED FU    Randolph Davila.  Trenton Stroud, RN, BSN, [address] [place] [address] Primary Care  [phone]

## 2020-09-10 NOTE — ED PROVIDER NOTES
[address] [facility]        Pt Name: Anjel Marshall  MRN: [id number]  [place] 1960  Date of evaluation: 9/10/2020  Provider: Benoit Zavala PA-C  PCP: FRITZ Hope NP  ED Attending: No att. providers found      This patient was seen and evaluated by the attending physician No att. providers found. I have not independently evaluated this patient. CHIEF COMPLAINT       Chief Complaint   Patient presents with    Chest Pain     patient c/o chest pain for about a month. Patient recently admited for \"fluid in lungs\" and had a 3 day stay. HISTORY OF PRESENT ILLNESS   (Location/Symptom, Timing/Onset, Context/Setting, Quality, Duration, Modifying Factors, Severity)  Note limiting factors. Anjel Marshall is a 62 y.o. female with a history of CAD, CVA, DM, HLD, HTN, HLD, CHF, MR, ICD placement, cardiomyopathy, MI, for evaluation of substernal chest pain, which has been constant for one month, sharp pain, feels like an elephant sitting on my chest. Tried nitro with no improvement in her symptoms. Saw cardiology on tues who was no help to her pain. Chest pain accompanied by SOB. Associated  With one episode of vomiting. Denies fevers. Takes baby aspirin daily, took this am.     Nursing Notes were all reviewed and agreed with or any disagreements were addressed  in the HPI. REVIEW OF SYSTEMS  (2-9 systems for level 4, 10 or more for level 5)     Review of Systems   Constitutional: Negative for chills and fever. HENT: Negative. Negative for congestion, rhinorrhea, sinus pressure, sinus pain and sore throat. Eyes: Negative for discharge, redness and visual disturbance. Respiratory: Positive for cough and shortness of breath. Negative for chest tightness. Cardiovascular: Positive for chest pain. Negative for palpitations. Gastrointestinal: Negative for abdominal pain, constipation, diarrhea, nausea and vomiting.    Genitourinary: Negative for difficulty urinating, dysuria and frequency. Musculoskeletal: Negative. Skin: Negative. Neurological: Negative. Negative for dizziness, weakness, numbness and headaches. Psychiatric/Behavioral: Negative. All other systems reviewed and are negative. Positivesand Pertinent negatives as per HPI. Except as noted above in the ROS, all other systems were reviewed and negative. PAST MEDICAL HISTORY     Past Medical History:   Diagnosis Date    Arthritis     CAD (coronary artery disease)     [facility]erebral artery occlusion with [facility] infarction ([facility]     [facility]     Diabetes mellitus [facility]     Hyperlipidemia     Hypertension     Mental retardation     MI [facility]myocardial infarction) [facility]          SURGICAL HISTORY       Past Surgical History:   Procedure Laterality Date    BACK SURGERY      PACEMAKER INSERTION      TUBAL LIGATION      TUMOR REMOVAL           CURRENT MEDICATIONS       Current Discharge Medication List      CONTINUE these medications which have NOT CHANGED    Details   clopidogrel (PLAVIX) 75 MG tablet       escitalopram (LEXAPRO) 10 MG tablet       furosemide (LASIX) 40 MG tablet Take 1 tablet by mouth 2 times daily  Qty: 60 tablet, Refills: 0      nitroGLYCERIN (NITROSTAT) 0.4 MG SL tablet up to max of 3 total doses. If no relief after 1 dose, call 911.   Qty: 25 tablet, Refills: 3      ondansetron (ZOFRAN) 4 MG tablet Take 1 tablet by mouth every 8 hours as needed for Nausea  Qty: 10 tablet, Refills: 0      insulin aspart (NOVOLOG FLEXPEN) 100 UNIT/ML injection pen Inject 30 Units into the skin 3 times daily (before meals)  Qty: 5 pen, Refills: 3      aspirin 81 MG EC tablet Take 1 tablet by mouth daily  Qty: 30 tablet, Refills: 2      ranolazine (RANEXA) 500 MG extended release tablet Take 1 tablet by mouth 2 times daily  Qty: 60 tablet, Refills: 2      DULoxetine (CYMBALTA) 60 MG extended release capsule Take 1 capsule by mouth daily  Qty: 30 capsule, Refills: 0      FLUoxetine (PROZAC) 10 MG capsule Take 1 capsule by mouth daily  Qty: 30 capsule, Refills: 0      insulin glargine (LANTUS SOLOSTAR) 100 UNIT/ML injection pen Inject 40 Units into the skin 2 times daily  Qty: 5 pen, Refills: 3      metFORMIN (GLUCOPHAGE) 500 MG tablet Take 2 tablets by mouth 2 times daily (with meals)  Qty: 120 tablet, Refills: 2      atorvastatin (LIPITOR) 40 MG tablet Take 1 tablet by mouth nightly  Qty: 30 tablet, Refills: 2      fenofibrate micronized (LOFIBRA) 200 MG capsule Take 1 capsule by mouth nightly  Qty: 30 capsule, Refills: 3      lisinopril (PRINIVIL;ZESTRIL) 2.5 MG tablet Take 1 tablet by mouth daily  Qty: 30 tablet, Refills: 3      carvedilol (COREG) 6.25 MG tablet Take 1 tablet by mouth 2 times daily (with meals)  Qty: 60 tablet, Refills: 2      miconazole (MICOTIN) 2 % powder Apply topically 2 times daily. Qty: 45 g, Refills: 1      [employer] Lancets Ultra Thin MISC 1 each by Does not apply route 4 times daily  Qty: 200 each, Refills: 0      blood glucose monitor strips Test three times a day & as needed for symptoms of irregular blood glucose. Qty: 100 strip, Refills: 2    Comments: Brand per patient preference. May round up to next available package size. topiramate (TOPAMAX) 200 MG tablet Take 1 tablet by mouth daily  Qty: 60 tablet, Refills: 0               ALLERGIES     Patient has no known allergies.     FAMILY HISTORY       Family History   Problem Relation Age of Onset    Heart Disease Father     Cancer Mother     Other Mother          SOCIAL HISTORY       Social History     Socioeconomic History    Marital status:      Spouse name: None    Number of children: None    Years of education: None    Highest education level: None   Occupational History    None   Social Needs    Financial resource strain: None    Food insecurity     Worry: None     Inability: None    Transportation needs     Medical: None     Non-medical: None Tobacco Use    Smoking status: Never Smoker    Smokeless tobacco: Never Used   Substance and Sexual Activity    Alcohol use: No    Drug use: No    Sexual activity: Not Currently   Lifestyle    Physical activity     Days per week: None     Minutes per session: None    Stress: None   Relationships    Social connections     Talks on phone: None     Gets together: None     Attends Gnosticism service: None     Active member of club or organization: None     Attends meetings of clubs or organizations: None     Relationship status: None    Intimate partner violence     Fear of current or ex partner: None     Emotionally abused: None     Physically abused: None     Forced sexual activity: None   Other Topics Concern    None   Social History Narrative    None       SCREENINGS     NIH Score       [facility]     Heart Score         PHYSICAL EXAM    (up to 7 for level 4, 8 ormore for level 5)     ED Triage Vitals   BP Temp Temp Source Pulse Resp SpO2 Height Weight   09/10/20 1855 09/10/20 1852 09/10/20 1852 09/10/20 1852 09/10/20 1852 09/10/20 1852 09/10/20 1852 09/10/20 1852   93/66 98.9 °F (37.2 °C) Oral 118 16 96 % 5' 3\" (1.6 m) 210 lb (95.3 kg)       Physical Exam  Vitals signs and nursing note reviewed. Constitutional:       Appearance: She is well-developed. She is not diaphoretic. HENT:      Head: Normocephalic. Nose: Nose normal.      Mouth/Throat:      Pharynx: No oropharyngeal exudate. Eyes:      General:         Right eye: No discharge. Left eye: No discharge. Conjunctiva/sclera: Conjunctivae normal.      Pupils: Pupils are equal, round, and reactive to light. Neck:      Musculoskeletal: Normal range of motion. Cardiovascular:      Rate and Rhythm: Normal rate and regular rhythm. Heart sounds: Normal heart sounds. No murmur. No friction rub. No gallop. Pulmonary:      Effort: Pulmonary effort is normal. No respiratory distress.       Breath sounds: Normal breath sounds. No wheezing. Abdominal:      General: Bowel sounds are normal. There is no distension. Palpations: Abdomen is soft. Tenderness: There is no abdominal tenderness. Musculoskeletal: Normal range of motion. Skin:     General: Skin is warm and dry. Neurological:      Mental Status: She is alert.    Psychiatric:         Behavior: Behavior normal.         DIAGNOSTIC RESULTS   LABS:    Labs Reviewed   COMPREHENSIVE METABOLIC PANEL W/ REFLEX TO MG FOR LOW K - Abnormal; Notable for the following components:       Result Value    Sodium 134 (*)     Chloride 96 (*)     Glucose 179 (*)     Total Protein 6.3 (*)     ALT 41 (*)     AST 38 (*)     All other components within normal limits    Narrative:     Performed at:  [place] [address],  [employer]   Phone [phone]   BRAIN NATRIURETIC PEPTIDE - Abnormal; Notable for the following components:    Pro-BNP 1,578 (*)     All other components within normal limits    Narrative:     Performed at:  [place] [address],  [employer]   Phone [phone]   POCT GLUCOSE - Abnormal; Notable for the following components:    POC Glucose 151 (*)     All other components within normal limits    Narrative:     Performed at:  [place] [address],  [employer]   Phone [phone]   C DIFF TOXIN/ANTIGEN   CBC WITH AUTO DIFFERENTIAL    Narrative:     Performed at:  [facility],  [employer]   Phone [phone]   TROPONIN    Narrative:     Performed at:  [place] [address],  [employer]   Phone [phone]   D-DIMER, QUANTITATIVE    Narrative:     Performed at:  [place] [address],  [employer]   Phone [phone]   TROPONIN   TROPONIN CBC   POCT GLUCOSE   POCT GLUCOSE       All other labs were within normal range or notreturned as of this dictation. EKG: All EKG's are interpreted by the Emergency Department Physician who either signs or Co-signs this chart in the absence of a cardiologist.  Please see their note for interpretation of EKG. RADIOLOGY:         Interpretation per the Radiologist below, if available at the time of this note:    XR CHEST PORTABLE   Final Result   Pulmonary vascular congestion. No results found.       PROCEDURES   Unless otherwise noted below, none     Procedures    CRITICAL CARE TIME   N/A    CONSULTS:  IP CONSULT TO HOSPITALIST  IP CONSULT TO CARDIOLOGY      EMERGENCY DEPARTMENT COURSE and DIFFERENTIAL DIAGNOSIS/MDM:   Vitals:    Vitals:    09/10/20 1948 09/10/20 2052 09/10/20 2156 09/10/20 2315   BP: 93/67 95/69 104/72 108/79   Pulse: 97 98 102 98   Resp: 17 11 18 14   Temp:    97.8 °F (36.6 °C)   TempSrc:    Oral   SpO2: 94% 97% 97% 97%   Weight:       Height:           Patient was given the following medications:  Medications   aspirin EC tablet 81 mg (has no administration in time range)   atorvastatin (LIPITOR) tablet 40 mg (has no administration in time range)   carvedilol (COREG) tablet 6.25 mg (has no administration in time range)   clopidogrel (PLAVIX) tablet 75 mg (has no administration in time range)   DULoxetine (CYMBALTA) extended release capsule 60 mg (has no administration in time range)   escitalopram (LEXAPRO) tablet 10 mg (has no administration in time range)   FLUoxetine (PROZAC) capsule 10 mg (has no administration in time range)   furosemide (LASIX) tablet 40 mg (has no administration in time range)   insulin glargine (LANTUS) injection vial 40 Units (has no administration in time range)   lisinopril (PRINIVIL;ZESTRIL) tablet 2.5 mg (has no administration in time range)   nitroGLYCERIN (NITROSTAT) SL tablet 0.4 mg (has no administration in time range)   ranolazine (RANEXA) extended release tablet 500 mg (has no administration in time range)   topiramate (TOPAMAX) tablet 200 mg (has no administration in time range)   sodium chloride flush 0.9 % injection 10 mL (has no administration in time range)   sodium chloride flush 0.9 % injection 10 mL (has no administration in time range)   acetaminophen (TYLENOL) tablet 650 mg (has no administration in time range)     Or   acetaminophen (TYLENOL) suppository 650 mg (has no administration in time range)   polyethylene glycol (GLYCOLAX) packet 17 g (has no administration in time range)   promethazine (PHENERGAN) tablet 12.5 mg (has no administration in time range)     Or   ondansetron (ZOFRAN) injection 4 mg (has no administration in time range)   glucose (GLUTOSE) 40 % oral gel 15 g (has no administration in time range)   dextrose 50 % IV solution (has no administration in time range)   glucagon (rDNA) injection 1 mg (has no administration in time range)   dextrose 5 % solution (has no administration in time range)   insulin lispro (HUMALOG) injection vial 0-18 Units (has no administration in time range)   insulin lispro (HUMALOG) injection vial 0-9 Units (has no administration in time range)   enoxaparin (LOVENOX) injection 40 mg (has no administration in time range)   potassium chloride (KLOR-CON M) extended release tablet 40 mEq (has no administration in time range)     Or   potassium bicarb-citric acid (EFFER-K) effervescent tablet 40 mEq (has no administration in time range)     Or   potassium chloride 10 mEq/100 mL IVPB (Peripheral Line) (has no administration in time range)   0.9 % sodium chloride bolus (0 mLs Intravenous Stopped 9/10/20 2123)         Afebrile, stable, patient presents to the ED for evaluation. Seen in conjunction with attending ED provider who agrees with assessment and plan. Patient's SPO2 on room air is 97% she is not hypoxic she is mildly hypertensive on initial presentation.   We do provide her with 500 cc of fluids however we do not want to overwhelm her with fluids due to a history of CHF. Patient's pressure is too low to provide her with nitro. No elevation in initial troponin. BNP is elevated chest x-ray shows pulmonary vascular congestion. All questions are answered. Patient has had stress testing and echo within the past year. However has had past legitimate coronary artery disease. Do not feel comfortable discharging her home at this time she will be admitted for serial troponins further evaluation and treatment. The patient tolerated their visit well. They were seen and evaluated by the attendingphysician, No att. providers found who agreed with the assessment and plan. The patient and / or the family were informed of the results of any tests, a time was given to answer questions, a plan was proposed and they agreed Carlos Raymundo. FINAL IMPRESSION      1. Chest pain, unspecified type    2. Hypotension, unspecified hypotension type    3.  Unstable angina [facility])          DISPOSITION/PLAN   DISPOSITION Admitted 09/10/2020 10:12:00 PM      PATIENT REFERRED TO:  Hamilton Hammans, APRN - NP  [place] [address] [place] [id number]  [phone]            DISCHARGE MEDICATIONS:  Current Discharge Medication List          DISCONTINUED MEDICATIONS:  Current Discharge Medication List                 (Please note that portions of this note were completed with a voice recognition program.  Efforts were made to edit the dictations but occasionally words are mis-transcribed.)    Jorge Fountain PA-C (electronically signed)      Jorge Fountain PA-C  09/11/20 0015

## 2020-09-11 VITALS
OXYGEN SATURATION: 94 % | SYSTOLIC BLOOD PRESSURE: 103 MMHG | RESPIRATION RATE: 16 BRPM | WEIGHT: 203.1 LBS | TEMPERATURE: 97 F | DIASTOLIC BLOOD PRESSURE: 58 MMHG | HEIGHT: 63 IN | HEART RATE: 89 BPM | BODY MASS INDEX: 35.98 KG/M2

## 2020-09-11 PROBLEM — I50.23 ACUTE ON CHRONIC SYSTOLIC HEART FAILURE (HCC): Status: RESOLVED | Noted: 2020-08-22 | Resolved: 2020-09-11

## 2020-09-11 LAB
EKG ATRIAL RATE: 105 BPM
EKG ATRIAL RATE: 94 BPM
EKG DIAGNOSIS: NORMAL
EKG DIAGNOSIS: NORMAL
EKG P AXIS: 45 DEGREES
EKG P-R INTERVAL: 178 MS
EKG P-R INTERVAL: 184 MS
EKG Q-T INTERVAL: 376 MS
EKG Q-T INTERVAL: 506 MS
EKG QRS DURATION: 102 MS
EKG QRS DURATION: 96 MS
EKG QTC CALCULATION (BAZETT): 470 MS
EKG QTC CALCULATION (BAZETT): 668 MS
EKG R AXIS: -50 DEGREES
EKG R AXIS: 116 DEGREES
EKG T AXIS: 32 DEGREES
EKG T AXIS: 45 DEGREES
EKG VENTRICULAR RATE: 105 BPM
EKG VENTRICULAR RATE: 94 BPM
GLUCOSE BLD-MCNC: 106 MG/DL (ref 70–99)
GLUCOSE BLD-MCNC: 140 MG/DL (ref 70–99)
HCT VFR BLD CALC: 42.5 % (ref 36–48)
HEMOGLOBIN: 14.4 G/DL (ref 12–16)
MCH RBC QN AUTO: 31.4 PG (ref 26–34)
MCHC RBC AUTO-ENTMCNC: 34 G/DL (ref 31–36)
MCV RBC AUTO: 92.3 FL (ref 80–100)
PDW BLD-RTO: 13.4 % (ref 12.4–15.4)
PERFORMED ON: ABNORMAL
PERFORMED ON: ABNORMAL
PLATELET # BLD: 270 K/UL (ref 135–450)
PMV BLD AUTO: 8.2 FL (ref 5–10.5)
RBC # BLD: 4.6 M/UL (ref 4–5.2)
TROPONIN: <0.01 NG/ML
TROPONIN: <0.01 NG/ML
WBC # BLD: 7 K/UL (ref 4–11)

## 2020-09-11 PROCEDURE — 93005 ELECTROCARDIOGRAM TRACING: CPT | Performed by: INTERNAL MEDICINE

## 2020-09-11 PROCEDURE — G0378 HOSPITAL OBSERVATION PER HR: HCPCS

## 2020-09-11 PROCEDURE — 84484 ASSAY OF TROPONIN QUANT: CPT

## 2020-09-11 PROCEDURE — 99214 OFFICE O/P EST MOD 30 MIN: CPT | Performed by: INTERNAL MEDICINE

## 2020-09-11 PROCEDURE — 99234 HOSP IP/OBS SM DT SF/LOW 45: CPT | Performed by: INTERNAL MEDICINE

## 2020-09-11 PROCEDURE — 93010 ELECTROCARDIOGRAM REPORT: CPT | Performed by: INTERNAL MEDICINE

## 2020-09-11 PROCEDURE — 6370000000 HC RX 637 (ALT 250 FOR IP): Performed by: INTERNAL MEDICINE

## 2020-09-11 PROCEDURE — 2500000003 HC RX 250 WO HCPCS: Performed by: INTERNAL MEDICINE

## 2020-09-11 PROCEDURE — 36415 COLL VENOUS BLD VENIPUNCTURE: CPT

## 2020-09-11 PROCEDURE — 85027 COMPLETE CBC AUTOMATED: CPT

## 2020-09-11 PROCEDURE — 2580000003 HC RX 258: Performed by: INTERNAL MEDICINE

## 2020-09-11 RX ORDER — PROCHLORPERAZINE EDISYLATE 5 MG/ML
10 INJECTION INTRAMUSCULAR; INTRAVENOUS EVERY 6 HOURS PRN
Status: DISCONTINUED | OUTPATIENT
Start: 2020-09-11 | End: 2020-09-11 | Stop reason: HOSPADM

## 2020-09-11 RX ADMIN — FUROSEMIDE 40 MG: 40 TABLET ORAL at 01:41

## 2020-09-11 RX ADMIN — RANOLAZINE 500 MG: 500 TABLET, FILM COATED, EXTENDED RELEASE ORAL at 01:42

## 2020-09-11 RX ADMIN — INSULIN LISPRO 2 UNITS: 100 INJECTION, SOLUTION INTRAVENOUS; SUBCUTANEOUS at 01:42

## 2020-09-11 RX ADMIN — MICONAZOLE NITRATE: 20 POWDER TOPICAL at 12:11

## 2020-09-11 RX ADMIN — Medication 10 ML: at 12:13

## 2020-09-11 RX ADMIN — ATORVASTATIN CALCIUM 40 MG: 40 TABLET, FILM COATED ORAL at 01:42

## 2020-09-11 NOTE — PROGRESS NOTES
AM assessment completed, see flowsheet. Patient resting in bed at this time. All needs met. Respirations easy and even at rest. O2 stable on room air. C/o chest pain 10/10, cardio aware. Bed in lowest position and locked, SR up x2. Call light and bedside table within reach.

## 2020-09-11 NOTE — PROGRESS NOTES
Handoff report given to DILLAN Panda.     Electronically signed by Amaury Best RN on 9/11/2020 at 7:52 AM

## 2020-09-11 NOTE — H&P
Jeffrey Wyatt MD   ondansetron [facility]) 4 MG tablet Take 1 tablet by mouth every 8 hours as needed for Nausea 8/12/20   Leonila Molina DO   insulin aspart (NOVOLOG FLEXPEN) 100 UNIT/ML injection pen Inject 30 Units into the skin 3 times daily (before meals) 8/12/20   eLonila Molina DO   aspirin 81 MG EC tablet Take 1 tablet by mouth daily 7/18/20   Dash Leary MD   ranolazine (RANEXA) 500 MG extended release tablet Take 1 tablet by mouth 2 times daily 7/18/20   Dash Leary MD   DULoxetine (CYMBALTA) 60 MG extended release capsule Take 1 capsule by mouth daily 7/18/20   Dash Leary MD   FLUoxetine (PROZAC) 10 MG capsule Take 1 capsule by mouth daily 7/18/20   Dash Leary MD   insulin glargine (LANTUS SOLOSTAR) 100 UNIT/ML injection pen Inject 40 Units into the skin 2 times daily 7/18/20   Dash Leary MD   metFORMIN (GLUCOPHAGE) 500 MG tablet Take 2 tablets by mouth 2 times daily (with meals) 7/18/20 10/16/20  Dash Leary MD   atorvastatin (LIPITOR) 40 MG tablet Take 1 tablet by mouth nightly 7/18/20   Dash Leary MD   fenofibrate micronized (LOFIBRA) 200 MG capsule Take 1 capsule by mouth nightly 7/18/20   Dash Leary MD   lisinopril (PRINIVIL;ZESTRIL) 2.5 MG tablet Take 1 tablet by mouth daily 7/18/20   Dash Leary MD   carvedilol (COREG) 6.25 MG tablet Take 1 tablet by mouth 2 times daily (with meals) 7/18/20   Dash Leary MD   miconazole (MICOTIN) 2 % powder Apply topically 2 times daily. 7/18/20   Dash Leary MD   [employer] Lancets Ultra Thin MISC 1 each by Does not apply route 4 times daily 7/18/20   Dash Leary MD   blood glucose monitor strips Test three times a day & as needed for symptoms of irregular blood glucose.  5/12/19   Carleen Alejandro MD   topiramate (TOPAMAX) 200 MG tablet Take 1 tablet by mouth daily 4/14/18   Raz De La Cruz MD       Allergies: Patient has no known allergies. Social History:    TOBACCO:   reports that she has never smoked. She has never used smokeless tobacco.  ETOH:   reports no history of alcohol use. Family History:        Problem Relation Age of Onset    Heart Disease Father        REVIEW OF SYSTEMS:   Constitutional:  Negative for fever,chills or night sweats  ENT:  Negative for rhinorrhea, epistaxis, hoarseness, sore throat. Respiratory: Negative for SOB or wheezing   Cardiovascular:   Positive for chest pain  Gastrointestinal:  Negative for nausea, vomiting, diarrhea  Genitourinary:  Negative for polyuria, dysuria   Hematologic/Lymphatic:  Negative for  bleeding tendency, easy bruising  Musculoskeletal:  Negative for myalgias and arthralgias  Neurologic:  Negative for  confusion,dysarthria. Skin:  Negative for itching,rash  Psychiatric:  Negative for depression,anxiety, agitation. Endocrine:  Negative for polydipsia,polyuria,heat /cold intolerance. PHYSICAL EXAM:  /72   Pulse 102   Temp 98.9 °F (37.2 °C) (Oral)   Resp 18   Ht 5' 3\" (1.6 m)   Wt 210 lb (95.3 kg)   SpO2 97%   BMI 37.20 kg/m²   General appearance:  No apparent distress, appears stated age and cooperative. HEENT: Positive left-sided facial droop that is chronic  Neck: Supple, with full range of motion. No jugular venous distention. Trachea midline. Respiratory:  Normal respiratory effort. Clear to auscultation, bilaterally without Rales/Wheezes/Rhonchi. Cardiovascular:  Regular rate and rhythm with normal S1/S2 without murmurs, rubs or gallops. Abdomen: Soft, non-tender, non-distended with normal bowel sounds. Musculoskeletal:  No clubbing, cyanosis or edema bilaterally. Full range of motion without deformity. Skin: Skin color, texture, turgor normal.  No rashes or lesions. Neurologic:  Neurovascularly intact without any focal sensory/motor deficits.  Cranial nerves: II-XII intact, grossly non-focal.  Psychiatric:  Alert and oriented, thought content appropriate, normal insight  Capillary Refill: Brisk,< 3 seconds   Peripheral Pulses: +2 palpable, equal bilaterally       Labs:   Recent Labs     09/10/20  1925   WBC 7.2   HGB 14.8   HCT 43.3        Recent Labs     09/10/20  1925   *   K 3.7   CL 96*   CO2 28   BUN 18   CREATININE 0.7   CALCIUM 8.9     Recent Labs     09/10/20  1925   AST 38*   ALT 41*   BILITOT 0.4   ALKPHOS 103     No results for input(s): INR in the last 72 hours. Recent Labs     09/10/20  1925   TROPONINI <0.01       Urinalysis:   Lab Results   Component Value Date    NITRU Negative 07/17/2020    WBCUA 10-20 06/12/2020    BACTERIA Rare 05/28/2020    RBCUA 3-4 06/12/2020    BLOODU Negative 07/17/2020    SPECGRAV <=1.005 07/17/2020    GLUCOSEU >=1000 07/17/2020       Radiology:   EKG, sinus tachycardia with PVCs    XR CHEST PORTABLE   Final Result   Pulmonary vascular congestion. ASSESSMENT:  Chest pain, rule out ACS  Diabetes type 2  Ischemic cardiomyopathy  Chronic systolic heart failure  History of CVA    PLAN:  Serial troponins, consult cardiology. Patient states that his chest pain has lingered for weeks. She has been seen by cardiology at [place] and will follow-up. She may just need a left heart catheterization to rule out any obstruction. We will continue her cardiac medications including p.o. Lasix.  -Admit to telemetry, resume rest of home medications including dual antiplatelet therapy and insulin.  -Reevaluate in a.m. nitroglycerin for pain if needed. DVT Prophylaxis: Lovenox  Diet: No diet orders on file  Code Status: Prior    Dispo -admit to telemetry      Thank you for the opportunity to be involved in this patient's care.       (Please note that portions of this note were completed with a voice recognition program. Efforts were made to edit the dictations but occasionally words are mis-transcribed.)

## 2020-09-11 NOTE — PROGRESS NOTES
Patient complaining of chest pain 10/10, refusing Nitrostat states nothing helps with pain. Updated MD no new order.

## 2020-09-11 NOTE — DISCHARGE SUMMARY
Name:  Jaleesa Vanegas  Room:  /0225-23  MRN:    [id number]    Discharge Summary      This discharge summary is in conjunction with a complete physical exam done on the day of discharge. Attending Physician: Dr. Usman Sosa  Discharging Physician: Dr. Evelyn Wilks: 9/10/2020  Discharge:   9/11/2020    HPI:  62 y.o. female who presented to the emergency department with a chief complaint of chest pain. Patient has a pretty extensive cardiac history including MI and is status post ICD. She was recently admitted to [facility] and was seen by cardiology for chest discomfort. She was ruled out for ACS and was told to follow-up with cardiology. She went for her follow-up appointment as scheduled and her diuresis was increased. She states however that she continues to have chest discomfort and describes it as something sitting on her chest.  She says this is chronic and has been ongoing. She is frustrated that nobody believes that her symptoms are real.  In emergency department today initial work-up was unremarkable. She will be admitted for an ACS rule out. Diagnoses this Admission and Hospital Course     Chest pain, ruled out ACS-    Recurrent admissions for the same noted. Was ruled out ACS multiple times at [facility] and here.  Stress test in 1/20 neg exept for scar  Seen and evaluated by cardiology multiple times  Per cardiology eval today  -she nees EP eval for recurrent SVT noted on AICD eval   Will refer back to EP   Dc home today in stable condition       Diabetes type 2  Ischemic cardiomyopathy  Chronic systolic heart failure  History of CVA     Resumed all home meds  Dc home     Procedures (Please Review Full Report for Details)  N/A    Consults    Cardiology       Physical Exam at Discharge:    BP (!) 103/58   Pulse 89   Temp 97 °F (36.1 °C) (Oral)   Resp 16   Ht 5' 3\" (1.6 m)   Wt 203 lb 1.6 oz (92.1 kg)   SpO2 94%   BMI 35.98 kg/m²     See today's progress note    CBC: UNIT/ML injection pen  Generic drug:  insulin glargine  Inject 40 Units into the skin 2 times daily     lisinopril 2.5 MG tablet  Commonly known as:  PRINIVIL;ZESTRIL  Take 1 tablet by mouth daily     metFORMIN 500 MG tablet  Commonly known as:  GLUCOPHAGE  Take 2 tablets by mouth 2 times daily (with meals)     miconazole 2 % powder  Commonly known as:  MICOTIN  Apply topically 2 times daily. nitroGLYCERIN 0.4 MG SL tablet  Commonly known as:  NITROSTAT  up to max of 3 total doses. If no relief after 1 dose, call 911. NovoLOG FlexPen 100 UNIT/ML injection pen  Generic drug:  insulin aspart  Inject 30 Units into the skin 3 times daily (before meals)     ondansetron 4 MG tablet  Commonly known as:  Zofran  Take 1 tablet by mouth every 8 hours as needed for Nausea     ranolazine 500 MG extended release tablet  Commonly known as:  RANEXA  Take 1 tablet by mouth 2 times daily     topiramate 200 MG tablet  Commonly known as:  TOPAMAX  Take 1 tablet by mouth daily              Discharged in stable condition to home. Follow Up: Follow up with PCP.     Laurence Flores MD 10/4/2020 8:33 PM

## 2020-09-11 NOTE — ED NOTES
2147- page to Dr. Beltran Schmidt consult per [facility]. Mayra Beard  09/10/20 2148      2212- Dr. Beltran Schmidt put in orders on pt, consult completed.      Mayra Beard  09/10/20 2218

## 2020-09-11 NOTE — PROGRESS NOTES
IM Progress Note    Admit Date:  9/10/2020  0    Interval history:  Admitted for chest pain   Recent workup at [facility] neg      Subjective:  Ms. Celeste Jimenez seen up in bed, feels ok today . Discussed with cardiology who recommends EP eval for recurrent SVT     Objective:   /79   Pulse 95   Temp 97.7 °F (36.5 °C) (Oral)   Resp 16   Ht 5' 3\" (1.6 m)   Wt 203 lb 1.6 oz (92.1 kg)   SpO2 95%   BMI 35.98 kg/m²       Intake/Output Summary (Last 24 hours) at 9/11/2020 0730  Last data filed at 9/11/2020 0640  Gross per 24 hour   Intake 0 ml   Output 900 ml   Net -900 ml       Physical Exam:        General:  elderly appearing male, Awake, alert and oriented. Appears to be not in any distress  Mucous Membranes:  Pink , anicteric  Neck: No JVD, no carotid bruit, no thyromegaly  Chest:  Clear to auscultation bilaterally, no added sounds + AICD  Cardiovascular: RRR S1S2 heard, no murmurs or gallops  Abdomen:  Soft, undistended, non tender, no organomegaly, BS present  Extremities: No edema or cyanosis.  Distal pulses well felt  Neurological : grossly normal        Medications:   Scheduled Medications:    miconazole   Topical BID    aspirin  81 mg Oral Daily    atorvastatin  40 mg Oral Nightly    carvedilol  6.25 mg Oral BID WC    clopidogrel  75 mg Oral Daily    DULoxetine  60 mg Oral Daily    escitalopram  10 mg Oral Daily    FLUoxetine  10 mg Oral Daily    furosemide  40 mg Oral BID    insulin glargine  40 Units Subcutaneous BID    lisinopril  2.5 mg Oral Daily    ranolazine  500 mg Oral BID    topiramate  200 mg Oral Daily    sodium chloride flush  10 mL Intravenous 2 times per day    insulin lispro  0-18 Units Subcutaneous TID WC    insulin lispro  0-9 Units Subcutaneous Nightly    enoxaparin  40 mg Subcutaneous Daily     I   dextrose       prochlorperazine, nitroGLYCERIN, sodium chloride flush, acetaminophen **OR** acetaminophen, polyethylene glycol, glucose, dextrose, glucagon (rDNA), dextrose, potassium chloride **OR** potassium alternative oral replacement **OR** potassium chloride    Lab Data:  Recent Labs     09/10/20  1925 09/11/20  0242   WBC 7.2 7.0   HGB 14.8 14.4   HCT 43.3 42.5   MCV 91.8 92.3    270     Recent Labs     09/10/20  1925   *   K 3.7   CL 96*   CO2 28   BUN 18   CREATININE 0.7     Recent Labs     09/11/20  0019 09/11/20  0242   TROPONINI <0.01 <0.01       Coagulation:   Lab Results   Component Value Date    INR 1.02 08/31/2020    APTT 30.1 04/14/2019     Cardiac markers:   Lab Results   Component Value Date    CKTOTAL 54 06/12/2020    TROPONINI <0.01 09/11/2020         Lab Results   Component Value Date    ALT 41 (H) 09/10/2020    AST 38 (H) 09/10/2020    ALKPHOS 103 09/10/2020    BILITOT 0.4 09/10/2020       Lab Results   Component Value Date    INR 1.02 08/31/2020    INR 1.01 08/12/2020    INR 1.16 (H) 04/16/2020    PROTIME 11.8 08/31/2020    PROTIME 11.7 08/12/2020    PROTIME 13.5 (H) 04/16/2020     Radiology:   EKG, sinus tachycardia with PVCs     XR CHEST PORTABLE   Final Result   Pulmonary vascular congestion.                ASSESSMENT and PLAN    Chest pain, ruled out ACS-    Recurrent admissions for the same noted. Was ruled out ACS multiple times at [facility] and here .  Stress test in 1/20 neg exept for scar  Seen and evaluated by cardiology multiple times  Per cardiology eval today  -she nees EP eval for recurrent SVT noted on AICD eval   Will refer back to EP   Dc home today in stable condition      Diabetes type 2  Ischemic cardiomyopathy  Chronic systolic heart failure  History of CVA     Resume all home meds  Dc home       Chaitanya Harmna MD 9/11/2020 7:30 AM

## 2020-09-11 NOTE — DISCHARGE INSTR - COC
Continuity of Care Form    Patient Name: Lashanda Nickerson   :  1960  MRN:  [id number]    Admit date:  9/10/2020  Discharge date:  2020    Code Status Order: Full Code   Advance Directives:   Advance Care Flowsheet Documentation     Date/Time Healthcare Directive Type of Healthcare Directive Copy in [facility] [address] Agent's Name Healthcare Agent's Phone Number    09/10/20 2328  No, patient does not have an advance directive for healthcare treatment -- -- -- -- --          Admitting Physician:  Emiliana York MD  PCP: Evelyn Jay APRN - NP    [facility]  Discharging Unit Phone Number: [phone]    Emergency Contact:   Extended Emergency Contact Information  Primary Emergency Contact: Christa Campos  Address:  [address]           [place], [address] [place] [address],[address] Phone: [phone]  Mobile Phone: [phone]  Relation: Brother/Sister    Past Surgical History:  Past Surgical History:   Procedure Laterality Date    BACK SURGERY      PACEMAKER INSERTION      TUBAL LIGATION      TUMOR REMOVAL         Immunization History:   Immunization History   Administered Date(s) Administered    Influenza 2012    Influenza A (A0V8-21) Vaccine IM 2009    Influenza Virus Vaccine 10/17/2014, 10/27/2017    Influenza, Quadv, 6 mo and older, IM, PF (Flulaval, Fluarix) 12/10/2018    Influenza, Quadv, IM, PF (6 mo and older Fluzone, Flulaval, Fluarix, and 3 yrs and older Afluria) 10/12/2019    Pneumococcal Polysaccharide ([id number]) 10/17/2014, 2017       Active Problems:  Patient Active Problem List   Diagnosis Code    DM (diabetes mellitus) ([facility] [address] E11.9    HTN (hypertension), benign I10    Dyslipidemia E78.5    CAD (coronary artery disease) I25.10    Hx CVA with residual L-sided facial droop (2018) I63.50    Dual ICD (implantable cardioverter-defibrillator) in place Z95.810    Brain tumor (benign) ([facility] [address] D33.2    Chronic combined systolic (EF 67-91%) & diastolic (grade 2 LVDD) CHF I50.42    TIA involving right internal carotid artery G45.1    CAD in native artery I25.10    DM (diabetes mellitus), secondary, uncontrolled, w/neurologic complic ([facility]) G43.14, Q04.83    CHF (congestive heart failure) ([facility]) I50.9    Nonischemic cardiomyopathy ([facility]) I42.8    Essential hypertension I10    TIA (transient ischemic attack) G45.9    Hyperglycemia R73.9    Diabetic ketoacidosis without coma associated with type 2 diabetes mellitus ([facility] [address] [facility] E11.10    Non-intractable vomiting with nausea R11.2    Chest pain R07.9    Arterial ischemic stroke, ICA, right, acute ([facility]) I63.231    Diabetic hyperosmolar non-ketotic state ([facility] [address] E11.00    Diabetic acidosis without coma ([facility] [address] E11.10    Hyponatremia L87.7    Metabolic acidosis V30.5    Disorder of electrolytes E87.8    Diabetic ketoacidosis with coma associated with type 2 diabetes mellitus ([facility]) E11.11    Hypernatremia E87.0    Leukocytosis D72.829    Atrial tachycardia ([facility]) I47.1    DKA, type 2, not at goal [facility]) E11.10    Cognitive developmental delay F81.9    Mood disorder ([facility]) F39    Urinary tract infection with hematuria N39.0, R31.9    Nausea and vomiting R11.2    Hypokalemia E87.6    Persistent fever R50.9    Syncope and collapse R55    S/P ICD (internal cardiac defibrillator) procedure Z95.810    History of CVA (cerebrovascular accident) Z80.78    Noncompliance with medications Z91.14    Obesity E66.9       Isolation/Infection:   Isolation          C Diff Contact        Unreconciled External Infections     Infection Onset Last Indicated Last Received Source    No mapped external infections found    .     Unmapped Infections (1)      MRSA 10/31/14 09/09/16 09/08/20             Patient Infection Status     Infection Onset Added Last Indicated Last Indicated By Review Planned Expiration Resolved Resolved By C-diff Rule Out 09/10/20 09/10/20 09/10/20 Clostridium difficile toxin/antigen (Ordered)        Resolved    COVID-19 Rule Out 04/16/20 04/16/20 04/16/20 COVID-19 (Ordered)   04/16/20 Rule-Out Test Resulted          Nurse Assessment:  Last Vital Signs: BP (!) 103/58   Pulse 89   Temp 97 °F (36.1 °C) (Oral)   Resp 16   Ht 5' 3\" (1.6 m)   Wt 203 lb 1.6 oz (92.1 kg)   SpO2 94%   BMI 35.98 kg/m²     Last documented pain score (0-10 scale): Pain Level: 10  Last Weight:   Wt Readings from Last 1 Encounters:   09/11/20 203 lb 1.6 oz (92.1 kg)     Mental Status:  oriented and alert    IV Access:  - None    Nursing Mobility/ADLs:  Walking   Independent  Transfer  Independent  Bathing  Independent  Dressing  Independent  Toileting  Independent  Feeding  Independent  Med Admin  Assisted  Med Delivery   whole    Wound Care Documentation and Therapy:        Elimination:  Continence:   · Bowel: Yes  · Bladder: Yes  Urinary Catheter: None   Colostomy/Ileostomy/Ileal Conduit: No       Date of Last BM:     Intake/Output Summary (Last 24 hours) at 9/11/2020 1312  Last data filed at 9/11/2020 0640  Gross per 24 hour   Intake 0 ml   Output 900 ml   Net -900 ml     I/O last 3 completed shifts: In: 0   Out: 900 [Urine:900]    Safety Concerns:            Impairments/Disabilities:      None    Nutrition Therapy:  Current Nutrition Therapy:   - Oral Diet:  Carb Control 5 carbs/meal (2000kcals/day)    Routes of Feeding: Oral  Liquids: Thin Liquids  Daily Fluid Restriction: no  Last Modified Barium Swallow with Video (Video Swallowing Test): not done    Treatments at the Time of Hospital Discharge:   Respiratory Treatments:   Oxygen Therapy:  is not on home oxygen therapy.   Ventilator:    - No ventilator support    Rehab Therapies:   Weight Bearing Status/Restrictions: No weight bearing restirctions  Other Medical Equipment (for information only, NOT a DME order):  cane  Other Treatments:     Patient's personal belongings (please select all that are sent with patient):  all personal belongings    RN SIGNATURE:  {Esignature:[id number]}    CASE MANAGEMENT/SOCIAL WORK SECTION    Inpatient Status Date: 9/10/2020    Readmission Risk Assessment Score:  Readmission Risk              Risk of Unplanned Readmission:        0           [facility]   [facility]  · Address:  · Phone:[phone]  · Fax:[phone]    Dialysis Facility (if applicable)   · Name:  · Address:  · Dialysis Schedule:  · Phone:  · Fax:    / signature: Electronically signed by Pieter Pearce RN on 9/11/20 at 1:13 PM EDT    PHYSICIAN SECTION    Prognosis: Good    Condition at Discharge: Stable    Rehab Potential (if transferring to Rehab):     Recommended Labs or Other Treatments After Discharge:     Physician Certification: I certify the above information and transfer of Waldo Ham  is necessary for the continuing treatment of the diagnosis listed and that she requires Home Care for less 30 days.      Update Admission H&P: No change in H&P    PHYSICIAN SIGNATURE:  CAITLIN Davis MD/Electronically signed by Pieter Pearce RN on 9/11/20 at 1:14 PM EDT

## 2020-09-11 NOTE — CONSULTS
[address] [facility]  [phone]        Reason for Consultation/Chief Complaint: \"I have been having chest pain. \"     History of Present Illness:  Charity Abdul is a 62 y.o. patient who presented to [facility] 9/10/20 with complaints of chest pain. Patient seen 9/8/2020 in office by ALINE Anderson for another recent hospitalization (8/22/20)  regarding the same symptoms. She has PMH: NICM, sCHF, s/p dual ICD as well as HTN, HLD and uncontrolled DM. She reports the chest pain is sharp and always there, left sided without radiation, and associated with SOB. She rates the pain a 10 but appears comfortable and states she has not taken any medications offered stating, \"nothing helps\". She also c/o palpitations randomly. Of note: she is in contact precautions due to complaints of diarrhea and C-Diff is being ruled out. Most recent EKG 9/11/20 demonstrates SR, occasional PVC's, lateral infarct, nonspecific ST abnormality. Most recent device check 9/8/20 demonstrated  <1%, AP < 1%, AT/AF burden 3.1%, SVT 5 episodes since 6/13/20, non sustained episodes 16 since 6/13/20. No VT and no VF. Most recent Echo 6/13/20 demonstrated EF 35%, mild MR and TR, no significant change from 10/18/19. Most recent stress test 1/31/2020 demonstrated mildly reduced LVEF 45%  Inferolateral hypokinesis  Mainly fixed inferior defect suggestive of scar  No evidence of myocardium at risk for significant reversible ischemia. Note 3 negative Britney; NBU=4790; K+ 3.7; BUN/Cr=18/0.7. Patient with no complaints of dizziness, edema, or orthopnea/PND. I have been asked to provide consultation regarding further management and testing.       Past Medical History:   has a past medical history of Arthritis, CAD (coronary artery disease), Cerebral artery occlusion with cerebral infarction ([facility] [address] CHF (congestive heart failure) ([facility] [address] Diabetes mellitus ([facility] [address] Hyperlipidemia, Hypertension, Mental retardation, and MI (myocardial infarction) [facility]).    Surgical History:   has a past surgical history that includes back surgery; Pacemaker insertion; tumor removal; and Tubal ligation. Social History:   reports that she has never smoked. She has never used smokeless tobacco. She reports that she does not drink alcohol or use drugs. Family History:  family history includes Cancer in her mother; Heart Disease in her father; Other in her mother. Home Medications:  Were reviewed and are listed in nursing record. and/or listed below  Prior to Admission medications    Medication Sig Start Date End Date Taking? Authorizing Provider   clopidogrel (PLAVIX) 75 MG tablet  6/10/20   Historical Provider, MD   escitalopram (LEXAPRO) 10 MG tablet  6/15/20   Historical Provider, MD   furosemide (LASIX) 40 MG tablet Take 1 tablet by mouth 2 times daily 9/8/20 10/8/20  FRITZ Herrera CNP   nitroGLYCERIN (NITROSTAT) 0.4 MG SL tablet up to max of 3 total doses.  If no relief after 1 dose, call 911. 8/24/20   Jennifer Cote MD   ondansetron [facility]) 4 MG tablet Take 1 tablet by mouth every 8 hours as needed for Nausea 8/12/20   Luis Fine, DO   insulin aspart (NOVOLOG FLEXPEN) 100 UNIT/ML injection pen Inject 30 Units into the skin 3 times daily (before meals) 8/12/20   Luis Fine, DO   aspirin 81 MG EC tablet Take 1 tablet by mouth daily 7/18/20   Bob Milan MD   ranolazine (RANEXA) 500 MG extended release tablet Take 1 tablet by mouth 2 times daily 7/18/20   Bob Milan MD   DULoxetine (CYMBALTA) 60 MG extended release capsule Take 1 capsule by mouth daily 7/18/20   Bob Milan MD   FLUoxetine (PROZAC) 10 MG capsule Take 1 capsule by mouth daily 7/18/20   Bob Milan MD   insulin glargine (LANTUS SOLOSTAR) 100 UNIT/ML injection pen Inject 40 Units into the skin 2 times daily 7/18/20   Bob Milan MD   metFORMIN (GLUCOPHAGE) 500 MG tablet Take 2 tablets by mouth 2 times daily (with deformity   Heart:  Regular rate and rhythm, S1, S2 normal, no murmur, rub or gallop   Abdomen:   Soft, non-tender, bowel sounds active all four quadrants,  no masses, no organomegaly           Extremities: Extremities normal, atraumatic, no cyanosis or edema   Pulses: 2+ and symmetric   Skin: Skin color, texture, turgor normal, no rashes or lesions   Pysch: Normal mood and affect   Neurologic: Normal gross motor and sensory exam.         Labs  CBC:   Lab Results   Component Value Date    WBC 7.0 2020    RBC 4.60 2020    HGB 14.4 2020    HCT 42.5 2020    MCV 92.3 2020    RDW 13.4 2020     2020     CMP:    Lab Results   Component Value Date     09/10/2020    K 3.7 09/10/2020    CL 96 09/10/2020    CO2 28 09/10/2020    BUN 18 09/10/2020    CREATININE 0.7 09/10/2020    GFRAA >60 09/10/2020    AGRATIO 1.5 09/10/2020    LABGLOM >60 09/10/2020    GLUCOSE 179 09/10/2020    PROT 6.3 09/10/2020    CALCIUM 8.9 09/10/2020    BILITOT 0.4 09/10/2020    ALKPHOS 103 09/10/2020    AST 38 09/10/2020    ALT 41 09/10/2020     PT/INR:  No results found for: PTINR  Lab Results   Component Value Date    CKTOTAL 54 2020    TROPONINI <0.01 2020       EK2020  Sinus rhythm with occasional Premature ventricular complexesLateral infarct (cited on or before 2019)Nonspecific ST abnormalityAbnormal ECGWhen compared with ECG of 10-SEP-2020 19:05, (unconfirmed)lateral infarct newConfirmed by ALEX GASCA MD ([id number]) on 2020 8:33:34 AM     ECHO 2020 Summary   LV systolic function is moderately reduced with an estimated EF of 35%. Moderate global hypokinesis. There is mild concentric left ventricular hypertrophy. Left ventricular cavity size is mildly dilated. Estimated LV diastolic filling pressure is normal.   Mild mitral and tricuspid regurgitation.    Systolic pulmonary artery pressure (SPAP) is estimated at 35mmHg (Right   atrial pressure of 8 mmHg). No significant change from exam done 10/18/2019. Stress Test 1/31/2020  Summary  Mildly reduced LVEF 45%  Inferolateral hypokinesis  Mainly fixed inferior defect suggestive of scar  No evidence of myocardium at risk for significant reversible ischemia. I have reviewed EKG with the following interpretation:  Impression:  See HPI    Assessment:  Jacobo Wilkins is a 62 y.o. patient who presented to [facility] 9/10/20 with complaints of chest pain. Patient seen 9/8/2020 in office by NP Vallie Dakin for another recent hospitalization (8/22/20)  regarding the same symptoms. She has PMH: NICM, sCHF, s/p dual ICD as well as HTN, HLD and uncontrolled DM. She reports the chest pain is sharp and always there, left sided without radiation, and associated with SOB. She rates the pain a 10 but appears comfortable and states she has not taken any medications offered stating, \"nothing helps\". She also c/o palpitations randomly. Of note: she is in contact precautions due to complaints of diarrhea and C-Diff is being ruled out. Most recent EKG 9/11/20 demonstrates SR, occasional PVC's, lateral infarct, nonspecific ST abnormality. Most recent device check 9/8/20 demonstrated  <1%, AP < 1%, AT/AF burden 3.1%, SVT 5 episodes since 6/13/20, non sustained episodes 16 since 6/13/20. No VT and no VF. Most recent Echo 6/13/20 demonstrated EF 35%, mild MR and TR, no significant change from 10/18/19. Most recent stress test 1/31/2020 demonstrated mildly reduced LVEF 45%  Inferolateral hypokinesis  Mainly fixed inferior defect suggestive of scar  No evidence of myocardium at risk for significant reversible ischemia. Note 3 negative Britney; LEZ=6235; K+ 3.7; BUN/Cr=18/0.7. Diagnosis of unspecified chest pain in middle-aged female with recurrent chronic CP, NICM, chronic systolic CHF, hx SVT, and s/p dual chamber ICD. Recs:  1. She is ruled out for MI and has no ischemic ST changes on EKG.  Her CP is likely related to cardiomyopathy with depressed EF and also potentially related to tachyarrhythmias. 2. Device check in June showed 30 episodes of high HR likely SVT. Recent check with SVT also. 3. Needs EP evaluation sooner rather than later. Dr. Iram Redding mentioned possible ablation for atrial tachycardia and need to get appt with him soon. 4. OK for d/c home today from cardiac standpoint. I do not believe she needs cardiac w/u given recent testing and no ischemia on labs or EKG. Need to get EP appt asap. Signing off thanks.     Patient Active Problem List   Diagnosis    DM (diabetes mellitus) ([address]    HTN (hypertension), benign    Dyslipidemia    CAD (coronary artery disease)    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)    Dual ICD (implantable cardioverter-defibrillator) in place    Brain tumor (benign) ([address]    Chronic combined systolic (EF 65-32%) & diastolic (grade 2 LVDD) CHF    TIA involving right internal carotid artery    CAD in native artery    DM (diabetes mellitus), secondary, uncontrolled, w/neurologic complic ([address]    CHF (congestive heart failure) ([address]    Nonischemic cardiomyopathy ([address]    Essential hypertension    TIA (transient ischemic attack)    Hyperglycemia    Diabetic ketoacidosis without coma associated with type 2 diabetes mellitus (HCC)    Non-intractable vomiting with nausea    Chest pain    Arterial ischemic stroke, ICA, right, acute ([address]    Diabetic hyperosmolar non-ketotic state ([address]    ROSA (acute kidney injury) ([address]    Diabetic acidosis without coma ([address]    Hyponatremia    Metabolic acidosis    Disorder of electrolytes    Diabetic ketoacidosis with coma associated with type 2 diabetes mellitus (HCC)    Hypernatremia    Leukocytosis    Acute kidney injury ([address]    Atrial tachycardia ([address]    DKA, type 2, not at goal [facility])    Cognitive developmental delay    Mood disorder ([address]    Urinary tract infection with hematuria    Nausea and vomiting   

## 2020-09-11 NOTE — PROGRESS NOTES
Patient admitted to room 305 from ER. Patient oriented to room, call light, bed rails, phone, lights and bathroom. Patient instructed about the schedule of the day including: vital sign frequency, lab draws, possible tests, frequency of MD and staff rounds, daily weights, I &O's and prescribed diet. bed alarm in place, patient aware of placement and reason. Telemetry box in place, patient aware of placement and reason. Bed locked, in lowest position, side rails up 2/4, call light within reach. Recliner Assessment  Patient is able to demonstrated the ability to move from a reclining position to an upright position within the recliner. 4 Eyes Skin Assessment     The patient is being assess for   Admission    I agree that 2 RN's have performed a thorough Head to Toe Skin Assessment on the patient. ALL assessment sites listed below have been assessed. Areas assessed by both nurses:   [x]   Head, Face, and Ears   [x]   Shoulders, Back, and Chest, Abdomen  [x]   Arms, Elbows, and Hands   [x]   Coccyx, Sacrum, and Ischium  [x]   Legs, Feet, and Heels        Red excoriation zachary area     **SHARE this note so that the co-signing nurse is able to place an eSignature**    Co-signer eSignature: Electronically signed by Star Vilchis RN on 9/11/20 at 1:40 AM EDT    Does the Patient have Skin Breakdown?   No          Ignacio Prevention initiated:  No   Wound Care Orders initiated:  No      [facility] nurse consulted for Pressure Injury (Stage 3,4, Unstageable, DTI, NWPT, Complex wounds)and New or Established Ostomies:  No      Primary Nurse eSignature: Electronically signed by Yesi Orr RN on 9/11/20 at 12:24 AM EDT

## 2020-09-11 NOTE — ED PROVIDER NOTES
I independently examined and evaluated [facility]. In brief, 81-year-old female with history of diabetes, hypertension, dyslipidemia, coronary artery disease, CVA, ICD, CHF with ejection fraction 35% who presents for chest pain. 1 month of chest pain, unchanged over past month. Severe pressure in the middle of your chest. Malaise. Difficulty breathing.    + cough, leg swelling    Denies fever    This is the patient's fifth presentation to the emergency department for chest pain in the past month. Focused exam revealed cardiac regular rate and rhythm. Lungs clear to auscultation bilaterally. No chest wall tenderness to palpation. Abdomen soft and nontender. No bilateral lower extremity swelling. Alert and oriented. Strength and sensation intact. ED course:     Differential diagnosis includes but is not limited to: unstable angina, Pneumonia, pneumothorax, pleural effusion, ACS, CHF exacerbation, COPD exacerbation, asthma, pulmonary embolism, arrhythmia, anemia    Patient was hypotensive on arrival, given IV fluids. Will obtain EKG, chest x-ray, and labs. The Ekg interpreted by me shows  sinus tachycardia, hppm=885 with supraventricular complexes and occasional PVCs and fusion complexes  Axis is   Left axis deviation  QTc is  Significantly prolonged at 668  Intervals and Durations are unremarkable. ST Segments: no acute change  No significant change from prior EKG dated 8/31/20    EKG showed no evidence of ischemia, does show sinus tachycardia and prolonged QTC. Troponin was in normal limits. Patient's pain has been present for 1 month, low suspicion for acute MI at this time. Heart score is 4. Given patient's continued pain and frequent return to the emergency department, do feel that she requires admission for further work-up possibly including catheterization procedure. She has had recent stress test and echocardiograms, but has not had recent catheterization.     BNP was mildly elevated, evidence of fluid overload on exam.  Chest x-ray did show evidence of pulmonary congestion. Patient did receive fluids on arrival for mild hypotension. Patient's maps are greater than 70. Will not give further fluid resuscitation, however in the setting of hypotension we will not give Lasix at this time. Overall low suspicion for pulmonary embolism given lack of risk factors. Patient is low risk per Wells score. Patient was tachycardic so d-dimer obtained and was only minimally elevated. Age-adjusted would be within normal limits. Do not feel that patient requires further evaluation for pulmonary embolism at this time. No leukocytosis, anemia, or thrombocytopenia. Mild hyponatremia and hypochloremia. No other significant electrolyte abnormalities. Liver function testing shows improving transaminitis. Chest x-ray showed no evidence of pneumonia, pleural effusion, or pneumothorax. XR CHEST PORTABLE   Final Result   Pulmonary vascular congestion. Low suspicion for aortic pathology. Patient is not hypertensive. Patient has strong pulses in the bilateral radial and femoral arteries. Pain was not maximal at onset. There is no evidence of mediastinal widening on chest x-ray. Patient does not have any neurologic deficits. The evidence indicates that the patient is very low risk for Aortic Dissection and this is consistent with my clinical intuition. The risk of further workup or hospitalization for aortic dissection is likely higher than the risk of the patient having an aortic dissection. Based on results of work-up, I am concerned for cardiac related chest pain. At this time, do feel the patient requires admission for further work-up and management. Patient has had extensive chest pain work-up but I do not see a recent catheterization procedure and patient denies recent catheterization procedure. This may be indicated in the setting of continued chest pain. Discussed the patient with hospital team.  Patient admitted in stable condition. All diagnostic, treatment, and disposition decisions were made by myself in conjunction with the advanced practice provider. For all further details of the patient's emergency department visit, please see the advanced practice provider's documentation. Comment: Please note this report has been produced using speech recognition software and may contain errors related to that system including errors in grammar, punctuation, and spelling, as well as words and phrases that may be inappropriate. If there are any questions or concerns please feel free to contact the dictating provider for clarification.       Emperatriz Borrego MD  09/11/20 5513

## 2020-09-11 NOTE — CARE COORDINATION
Case Management Assessment  Initial Evaluation      Patient Name: Anjel Marshall  YOB: 1960  Diagnosis: Chest pain [R07.9]  Date / Time: 9/10/2020  6:44 PM    Admission status/Date:9/10/2020 obs  Chart Reviewed: Yes      Patient Interviewed: Yes  Family Interviewed:  No      Hospitalization in the last 30 days: Yes    Tonio Denney Maker:Yes    Met with: pt  Interview conducted  (bedside/phone):bedside    Current PCP:   Vladimir Luna -home visits    [facility]. required for SNF : Y          3 night stay required - N    ADLS  Support Systems/Care Needs: None  Transportation: sister    Meal Preparation: self    Housing  Living Arrangements: lives in 1st floor apt alone  Steps: 0  Intent for return to present living arrangements: Yes  Identified Issues:     Home Care Information  Active with Home Health Care : No Agency:(Services)  Type of Home Care Services: None  Passport/Waiver : No  :                      Phone Number:    Passport/Waiver Services:           Durable Medical Equiptment   DME Provider: none  Equipment:   Walker_X__Cane_X__RTS___ BSC___Shower Chair_X__Hospital Bed___W/C____Other________  02 at ____Liter(s)---wears(frequency)_______ HHN ___ CPAP___ BiPap___   N/A____      Home O2 Use :  No    If No for home O2---if presently on O2 during hospitalization:  No      Community Service Affiliation  Dialysis:  No    · Agency:  · Location:  · Dialysis Schedule:  · Phone:   · Fax: Other Community Services:none (ex:PT/OT,Mental Health,Wound Clinic, Cardio/Pul [address])    DISCHARGE PLAN: Explained Case Management role/services. Chart reviewed, met with pt at bedside. Pt is from apt alone and plans to return. Pt states her sister now has a car and is visiting more often and drives her places otherwise pt walks were she needs to go. Pt ambulates with cane.      Pt would like HC and would like to resume care with

## 2020-09-18 ENCOUNTER — TELEPHONE (OUTPATIENT)
Dept: OTHER | Facility: CLINIC | Age: 60
End: 2020-09-18

## 2020-09-18 ENCOUNTER — HOSPITAL ENCOUNTER (EMERGENCY)
Age: 60
Discharge: ANOTHER HOSPITAL | End: 2020-09-19
Attending: STUDENT IN AN ORGANIZED HEALTH CARE EDUCATION/TRAINING PROGRAM
Payer: MEDICARE

## 2020-09-18 ENCOUNTER — APPOINTMENT (OUTPATIENT)
Dept: GENERAL RADIOLOGY | Age: 60
End: 2020-09-18
Payer: MEDICARE

## 2020-09-18 LAB
A/G RATIO: 1.5 (ref 1.1–2.2)
ALBUMIN SERPL-MCNC: 4.3 G/DL (ref 3.4–5)
ALP BLD-CCNC: 149 U/L (ref 40–129)
ALT SERPL-CCNC: 97 U/L (ref 10–40)
ANION GAP SERPL CALCULATED.3IONS-SCNC: 14 MMOL/L (ref 3–16)
AST SERPL-CCNC: 154 U/L (ref 15–37)
BASOPHILS ABSOLUTE: 0.1 K/UL (ref 0–0.2)
BASOPHILS RELATIVE PERCENT: 1.1 %
BILIRUB SERPL-MCNC: 0.8 MG/DL (ref 0–1)
BUN BLDV-MCNC: 24 MG/DL (ref 7–20)
CALCIUM SERPL-MCNC: 9.4 MG/DL (ref 8.3–10.6)
CHLORIDE BLD-SCNC: 95 MMOL/L (ref 99–110)
CO2: 23 MMOL/L (ref 21–32)
CREAT SERPL-MCNC: 0.9 MG/DL (ref 0.6–1.1)
EKG ATRIAL RATE: 105 BPM
EKG DIAGNOSIS: NORMAL
EKG P AXIS: 44 DEGREES
EKG P-R INTERVAL: 160 MS
EKG Q-T INTERVAL: 468 MS
EKG QRS DURATION: 98 MS
EKG QTC CALCULATION (BAZETT): 618 MS
EKG R AXIS: -6 DEGREES
EKG T AXIS: 64 DEGREES
EKG VENTRICULAR RATE: 105 BPM
EOSINOPHILS ABSOLUTE: 0.1 K/UL (ref 0–0.6)
EOSINOPHILS RELATIVE PERCENT: 1.1 %
GFR AFRICAN AMERICAN: >60
GFR NON-AFRICAN AMERICAN: >60
GLOBULIN: 2.9 G/DL
GLUCOSE BLD-MCNC: 427 MG/DL (ref 70–99)
HCT VFR BLD CALC: 44.8 % (ref 36–48)
HEMOGLOBIN: 15.1 G/DL (ref 12–16)
LYMPHOCYTES ABSOLUTE: 1.7 K/UL (ref 1–5.1)
LYMPHOCYTES RELATIVE PERCENT: 21.2 %
MAGNESIUM: 1.7 MG/DL (ref 1.8–2.4)
MCH RBC QN AUTO: 31.2 PG (ref 26–34)
MCHC RBC AUTO-ENTMCNC: 33.8 G/DL (ref 31–36)
MCV RBC AUTO: 92.4 FL (ref 80–100)
MONOCYTES ABSOLUTE: 0.6 K/UL (ref 0–1.3)
MONOCYTES RELATIVE PERCENT: 7.5 %
NEUTROPHILS ABSOLUTE: 5.5 K/UL (ref 1.7–7.7)
NEUTROPHILS RELATIVE PERCENT: 69.1 %
PDW BLD-RTO: 13.3 % (ref 12.4–15.4)
PLATELET # BLD: 292 K/UL (ref 135–450)
PMV BLD AUTO: 9.2 FL (ref 5–10.5)
POTASSIUM REFLEX MAGNESIUM: 5 MMOL/L (ref 3.5–5.1)
PRO-BNP: 2632 PG/ML (ref 0–124)
RBC # BLD: 4.85 M/UL (ref 4–5.2)
SODIUM BLD-SCNC: 132 MMOL/L (ref 136–145)
TOTAL PROTEIN: 7.2 G/DL (ref 6.4–8.2)
TROPONIN: <0.01 NG/ML
TROPONIN: <0.01 NG/ML
WBC # BLD: 8 K/UL (ref 4–11)

## 2020-09-18 PROCEDURE — 6360000002 HC RX W HCPCS: Performed by: PHYSICIAN ASSISTANT

## 2020-09-18 PROCEDURE — 96374 THER/PROPH/DIAG INJ IV PUSH: CPT

## 2020-09-18 PROCEDURE — 84484 ASSAY OF TROPONIN QUANT: CPT

## 2020-09-18 PROCEDURE — 85025 COMPLETE CBC W/AUTO DIFF WBC: CPT

## 2020-09-18 PROCEDURE — 93010 ELECTROCARDIOGRAM REPORT: CPT | Performed by: INTERNAL MEDICINE

## 2020-09-18 PROCEDURE — C9113 INJ PANTOPRAZOLE SODIUM, VIA: HCPCS | Performed by: PHYSICIAN ASSISTANT

## 2020-09-18 PROCEDURE — 80053 COMPREHEN METABOLIC PANEL: CPT

## 2020-09-18 PROCEDURE — 83880 ASSAY OF NATRIURETIC PEPTIDE: CPT

## 2020-09-18 PROCEDURE — 96375 TX/PRO/DX INJ NEW DRUG ADDON: CPT

## 2020-09-18 PROCEDURE — 83735 ASSAY OF MAGNESIUM: CPT

## 2020-09-18 PROCEDURE — 93005 ELECTROCARDIOGRAM TRACING: CPT | Performed by: PHYSICIAN ASSISTANT

## 2020-09-18 PROCEDURE — 71045 X-RAY EXAM CHEST 1 VIEW: CPT

## 2020-09-18 PROCEDURE — 99285 EMERGENCY DEPT VISIT HI MDM: CPT

## 2020-09-18 RX ORDER — ONDANSETRON 2 MG/ML
4 INJECTION INTRAMUSCULAR; INTRAVENOUS ONCE
Status: COMPLETED | OUTPATIENT
Start: 2020-09-18 | End: 2020-09-18

## 2020-09-18 RX ORDER — PANTOPRAZOLE SODIUM 40 MG/10ML
40 INJECTION, POWDER, LYOPHILIZED, FOR SOLUTION INTRAVENOUS ONCE
Status: COMPLETED | OUTPATIENT
Start: 2020-09-18 | End: 2020-09-18

## 2020-09-18 RX ORDER — FUROSEMIDE 10 MG/ML
40 INJECTION INTRAMUSCULAR; INTRAVENOUS ONCE
Status: COMPLETED | OUTPATIENT
Start: 2020-09-18 | End: 2020-09-18

## 2020-09-18 RX ADMIN — PANTOPRAZOLE SODIUM 40 MG: 40 INJECTION, POWDER, FOR SOLUTION INTRAVENOUS at 17:06

## 2020-09-18 RX ADMIN — ONDANSETRON HYDROCHLORIDE 4 MG: 2 INJECTION, SOLUTION INTRAMUSCULAR; INTRAVENOUS at 17:06

## 2020-09-18 RX ADMIN — FUROSEMIDE 40 MG: 10 INJECTION, SOLUTION INTRAMUSCULAR; INTRAVENOUS at 20:19

## 2020-09-18 ASSESSMENT — ENCOUNTER SYMPTOMS
NAUSEA: 1
ABDOMINAL PAIN: 0
VOMITING: 1
SHORTNESS OF BREATH: 1
COUGH: 1

## 2020-09-18 ASSESSMENT — PAIN DESCRIPTION - PAIN TYPE: TYPE: ACUTE PAIN;CHRONIC PAIN

## 2020-09-18 ASSESSMENT — PAIN DESCRIPTION - LOCATION: LOCATION: CHEST

## 2020-09-18 ASSESSMENT — PAIN DESCRIPTION - DIRECTION: RADIATING_TOWARDS: NO

## 2020-09-18 ASSESSMENT — PAIN DESCRIPTION - DESCRIPTORS: DESCRIPTORS: CONSTANT

## 2020-09-18 ASSESSMENT — PAIN SCALES - GENERAL: PAINLEVEL_OUTOF10: 10

## 2020-09-18 NOTE — ED PROVIDER NOTES
I independently examined and evaluated Muna Ramos. In brief, 59-year-old female with a history of diabetes, hypertension, dyslipidemia, coronary artery disease, CVA, brain tumor, and heart failure with ICD in place who presents for continued chest pain. Patient reports continued chest pain and palpitations over the past month. Patient was admitted on 8/10 and troponins were trended at that time and remained within normal limits. Patient reports that her symptoms have continued since discharge, worse with exertion. She reports severe chest pressure and difficulty breathing that has been worsening despite taking Lasix and other prescribed medications. Denies feeling like she has been shocked by her ICD. She also reports new vomiting today, 3 episodes without hematemesis. Has appointment with EP on 9/25. Focused exam revealed anxious patient. Tachycardia, regular rhythm. Lungs clear to auscultation bilaterally. Abdomen soft and nontender. No appreciable bilateral lower extremity edema. Patient is alert and oriented. She is moving all extremities without difficulty, strength and sensation intact. Normal affect. ED course: While patient has had previous evaluations for similar complaints, she reports that symptoms continue to worsen. Furthermore, patient does have significant cardiac disease at baseline. Will repeat labs, chest x-ray, and EKG. The Ekg interpreted by me shows  sinus tachycardia, wwtq=403 with occasional PVCs  Axis is   Normal  QTc is  prolonged  Intervals and Durations are unremarkable. ST Segments: nonspecific changes  No significant change from prior EKG dated 9/10/20    ED Course as of Sep 18 2102   Fri Sep 18, 2020   1753 Troponin within normal limits. [ER]   1754 BNP up trended from 8 days ago but within recent baseline.    [ER]   1754 No leukocytosis, anemia, thrombocytopenia. [ER]   1754 Hyponatremia and hypochloremia.   No other electrolyte There is no evidence of mediastinal widening on chest x-ray. Patient does not have any neurologic deficits. The evidence indicates that the patient is very low risk for Aortic Dissection and this is consistent with my clinical intuition. The risk of further workup or hospitalization for aortic dissection is likely higher than the risk of the patient having an aortic dissection. Low suspicion for esophageal perforation. History not consistent with this etiology. There is no crepitus on exam.  No evidence of pneumomediastinum on chest x-ray. EKG showed no evidence of ischemia. .  Troponin x2 was within normal limits. Patient has known cardiac disease and has been recently evaluated by cardiology. Patient is supposed to be seen by electrophysiology and has an outpatient appointment scheduled for 9/25. Chest x-ray showed no evidence of pneumonia, pleural effusion, or pneumothorax. Patient to be admitted for further evaluation via echocardiogram.  Cardiology also can provide further recommendations. Patient would benefit from electrophysiology consult while inpatient if this was possible. She receiving IV Lasix. Patient admitted in stable condition. Hospitalist felt the patient would be best served by being transferred to [facility] to be seen by electrophysiology. He spoke to a cardiologist who agreed with this plan. Patient to be transferred to [facility] for further cardiac evaluation including echocardiogram and evaluation by electrophysiology. 11:13 PM   I have signed out [facility] Emergency Department care to Dr. Olga Cevallos. We discussed the pertinent history, physical exam, completed/pending test results (if applicable) and current treatment plan. Please refer to his/her chart for the patients remaining Emergency Department course and final disposition.       All diagnostic, treatment, and disposition decisions were made by myself in conjunction with the advanced

## 2020-09-18 NOTE — ED PROVIDER NOTES
[address] ED  EMERGENCY DEPARTMENT ENCOUNTER        Pt Name: Heber Rocha  MRN: [id number]  [place] 1960  Date of evaluation: 9/18/2020  Provider: Kristal Tello PA-C  PCP: FRITZ Nascimento NP    Shared Visit or Autonomous Visit:  I have seen and evaluated this patient with my supervising physician Rosy Harding MD.    [address] [facility]       Chief Complaint   Patient presents with    Palpitations     started 1 month       HISTORY OF PRESENT ILLNESS   (Location/Symptom, Timing/Onset, Context/Setting, Quality, Duration, Modifying Factors, Severity)  Note limiting factors. Heber Rocha is a 62 y.o. female presenting to the emergency department with complaint of rapid heartbeat and chest pain constant for the past 1 month. States she has congestive heart failure. She has a pacemaker she saw her cardiologist 9/8/2020 states they think it may be her pacemaker malfunctioning a new monitor was ordered. She has ischemic cardiomyopathy. History of SVT. Recent admit 9/10 for chest pain. States the chest pain is constant pressure and sharp for the past 1 month it gets worse with anything that she does states if she breathes or moves it makes it worse states she always feels short of breath. Has a little bit of a cough. No fever. No known ill exposures. No COVID-19 exposures. She has had previous heart attack and 3 prior strokes. She has hypertension, hyperlipidemia and she is diabetic. Non-smoker. Nausea and vomiting since yesterday. States she vomited twice today and some blood in it. CTPA negative for PE 8/22/2020. The history is provided by the patient. Palpitations   Palpitations quality:  Fast  Onset quality:  Gradual  Duration: 1 month.   Timing:  Constant  Progression:  Unchanged  Chronicity:  New  Associated symptoms: chest pain, chest pressure, cough, lower extremity edema, nausea, shortness of breath and vomiting    Risk factors: heart disease    Risk factors: no hx of atrial fibrillation          Nursing Notes were reviewed    REVIEW OF SYSTEMS    (2-9 systems for level 4, 10 or more for level 5)     Review of Systems   Constitutional: Negative for fever. Respiratory: Positive for cough and shortness of breath. Cardiovascular: Positive for chest pain and palpitations. Gastrointestinal: Positive for nausea and vomiting. Negative for abdominal pain. All other systems reviewed and are negative. Positives and Pertinent negatives as per HPI. PAST MEDICAL HISTORY     Past Medical History:   Diagnosis Date    Arthritis     CAD (coronary artery disease)     Cerebral artery occlusion with cerebral infarction ([facility]     CHF (congestive heart failure) (HCC)     Diabetes mellitus ([facility]     Hyperlipidemia     Hypertension     Mental retardation     MI (myocardial infarction) ([facility] [address] [facility]          SURGICAL HISTORY     Past Surgical History:   Procedure Laterality Date    BACK SURGERY      PACEMAKER INSERTION      TUBAL LIGATION      TUMOR REMOVAL           CURRENTMEDICATIONS       Previous Medications    ASPIRIN 81 MG EC TABLET    Take 1 tablet by mouth daily    ATORVASTATIN (LIPITOR) 40 MG TABLET    Take 1 tablet by mouth nightly    BLOOD GLUCOSE MONITOR STRIPS    Test three times a day & as needed for symptoms of irregular blood glucose.     CARVEDILOL (COREG) 6.25 MG TABLET    Take 1 tablet by mouth 2 times daily (with meals)    CLOPIDOGREL (PLAVIX) 75 MG TABLET        [employer] LANCETS ULTRA THIN MISC    1 each by Does not apply route 4 times daily    DULOXETINE (CYMBALTA) 60 MG EXTENDED RELEASE CAPSULE    Take 1 capsule by mouth daily    ESCITALOPRAM (LEXAPRO) 10 MG TABLET        FENOFIBRATE MICRONIZED (LOFIBRA) 200 MG CAPSULE    Take 1 capsule by mouth nightly    FLUOXETINE (PROZAC) 10 MG CAPSULE    Take 1 capsule by mouth daily    FUROSEMIDE (LASIX) 40 MG TABLET    Take 1 tablet by mouth 2 times daily    INSULIN ASPART (NOVOLOG FLEXPEN) 100 UNIT/ML None    Intimate partner violence     Fear of current or ex partner: None     Emotionally abused: None     Physically abused: None     Forced sexual activity: None   Other Topics Concern    None   Social History Narrative    None       SCREENINGS    [place] Coma Scale  Eye Opening: Spontaneous  Best Verbal Response: Oriented  Best Motor Response: Obeys commands  [place] Coma Scale Score: 15        PHYSICAL EXAM    (up to 7 for level 4, 8 or more for level 5)     ED Triage Vitals [09/18/20 1528]   BP Temp Temp Source Pulse Resp SpO2 Height Weight   (!) 147/47 98 °F (36.7 °C) Temporal 108 18 99 % 5' 3\" (1.6 m) 199 lb (90.3 kg)       Physical Exam  Vitals signs and nursing note reviewed. Constitutional:       Appearance: She is well-developed. HENT:      Head: Normocephalic and atraumatic. Mouth/Throat:      Mouth: Mucous membranes are moist.      Pharynx: Oropharynx is clear. No pharyngeal swelling, oropharyngeal exudate or posterior oropharyngeal erythema. Eyes:      Extraocular Movements: Extraocular movements intact. Conjunctiva/sclera: Conjunctivae normal.      Pupils: Pupils are equal, round, and reactive to light. Neck:      Musculoskeletal: Normal range of motion and neck supple. Vascular: No JVD. Cardiovascular:      Rate and Rhythm: Regular rhythm. Tachycardia present. Pulses: Normal pulses. Pulmonary:      Effort: Pulmonary effort is normal. No respiratory distress. Breath sounds: Normal breath sounds. No stridor. No wheezing, rhonchi or rales. Abdominal:      General: Bowel sounds are normal. There is no distension. Palpations: Abdomen is soft. Abdomen is not rigid. There is no mass. Tenderness: There is no abdominal tenderness. There is no guarding or rebound. Musculoskeletal: Normal range of motion. Right lower leg: No edema. Left lower leg: No edema. Skin:     General: Skin is warm and dry. Findings: No rash.    Neurological: Mental Status: She is alert and oriented to person, place, and time. GCS: GCS eye subscore is 4. GCS verbal subscore is 5. GCS motor subscore is 6. Sensory: Sensation is intact. No sensory deficit. Motor: Motor function is intact. No abnormal muscle tone.       Coordination: Coordination normal.   Psychiatric:         Behavior: Behavior normal.         DIAGNOSTIC RESULTS   LABS:    Labs Reviewed   COMPREHENSIVE METABOLIC PANEL W/ REFLEX TO MG FOR LOW K - Abnormal; Notable for the following components:       Result Value    Sodium 132 (*)     Chloride 95 (*)     Glucose 427 (*)     BUN 24 (*)     Alkaline Phosphatase 149 (*)     ALT 97 (*)      (*)     All other components within normal limits    Narrative:     Performed at:  [facility],  [employer]   Phone [phone]   BRAIN NATRIURETIC PEPTIDE - Abnormal; Notable for the following components:    Pro-BNP 2,632 (*)     All other components within normal limits    Narrative:     Performed at:  [facility],  [place]   Phone [phone]   MAGNESIUM - Abnormal; Notable for the following components:    Magnesium 1.70 (*)     All other components within normal limits    Narrative:     Performed at:  [facility]) [facility],  [place], [employer]   Phone [phone]   CBC WITH AUTO DIFFERENTIAL    Narrative:     Performed at:  [facility],  [place], [employer]   Phone [phone]   TROPONIN    Narrative:     Performed at:  [facility]) - [facility],  [place], [employer]   Phone [phone]   URINE RT REFLEX TO CULTURE   TROPONIN     Results for orders placed or performed during the hospital encounter of 09/18/20   CBC Auto Differential   Result Value Ref Range    WBC 8.0 4.0 - 11.0 K/uL RBC 4.85 4.00 - 5.20 M/uL    Hemoglobin 15.1 12.0 - 16.0 g/dL    Hematocrit 44.8 36.0 - 48.0 %    MCV 92.4 80.0 - 100.0 fL    MCH 31.2 26.0 - 34.0 pg    MCHC 33.8 31.0 - 36.0 g/dL    RDW 13.3 12.4 - 15.4 %    Platelets 608 114 - 690 K/uL    MPV 9.2 5.0 - 10.5 fL    Neutrophils % 69.1 %    Lymphocytes % 21.2 %    Monocytes % 7.5 %    Eosinophils % 1.1 %    Basophils % 1.1 %    Neutrophils Absolute 5.5 1.7 - 7.7 K/uL    Lymphocytes Absolute 1.7 1.0 - 5.1 K/uL    Monocytes Absolute 0.6 0.0 - 1.3 K/uL    Eosinophils Absolute 0.1 0.0 - 0.6 K/uL    Basophils Absolute 0.1 0.0 - 0.2 K/uL   Comprehensive Metabolic Panel w/ Reflex to MG   Result Value Ref Range    Sodium 132 (L) 136 - 145 mmol/L    Potassium reflex Magnesium 5.0 3.5 - 5.1 mmol/L    Chloride 95 (L) 99 - 110 mmol/L    CO2 23 21 - 32 mmol/L    Anion Gap 14 3 - 16    Glucose 427 (H) 70 - 99 mg/dL    BUN 24 (H) 7 - 20 mg/dL    CREATININE 0.9 0.6 - 1.1 mg/dL    GFR Non-African American >60 >60    GFR African American >60 >60    Calcium 9.4 8.3 - 10.6 mg/dL    Total Protein 7.2 6.4 - 8.2 g/dL    Alb 4.3 3.4 - 5.0 g/dL    Albumin/Globulin Ratio 1.5 1.1 - 2.2    Total Bilirubin 0.8 0.0 - 1.0 mg/dL    Alkaline Phosphatase 149 (H) 40 - 129 U/L    ALT 97 (H) 10 - 40 U/L     (H) 15 - 37 U/L    Globulin 2.9 g/dL   Troponin   Result Value Ref Range    Troponin <0.01 <0.01 ng/mL   Brain Natriuretic Peptide   Result Value Ref Range    Pro-BNP 2,632 (H) 0 - 124 pg/mL   Magnesium   Result Value Ref Range    Magnesium 1.70 (L) 1.80 - 2.40 mg/dL   Troponin   Result Value Ref Range    Troponin <0.01 <0.01 ng/mL   EKG 12 Lead   Result Value Ref Range    Ventricular Rate 105 BPM    Atrial Rate 105 BPM    P-R Interval 160 ms    QRS Duration 98 ms    Q-T Interval 468 ms    QTc Calculation (Bazett) 618 ms    P Axis 44 degrees    R Axis -6 degrees    T Axis 64 degrees    Diagnosis       Sinus tachycardia with occasional Premature ventricular complexesLow voltage QRSAnterolateral infarct prior cannot be ruled outNonspecific ST abnormalityBaseline artifactAbnormal ECGWhen compared with ECG of 11-SEP-2020 06:12,axis shifted leftConfirmed by ALEX Newman MD ([id number]) on 9/18/2020 5:46:17 PM       All other labs were within normal range or not returned as of this dictation. EKG: All EKG's are interpreted by the Emergency Department Physician in the absence of a cardiologist.  Please see their note for interpretation of EKG. RADIOLOGY:   Non-plain film images such as CT, Ultrasound and MRI are read by the radiologist. Plain radiographic images are visualized andpreliminarily interpreted by the  ED Provider with the below findings:        Interpretation perthe Radiologist below, if available at the time of this note:    XR CHEST PORTABLE   Final Result   No acute process. Stable cardiomegaly           Xr Chest Portable    Result Date: 9/18/2020  EXAMINATION: ONE XRAY VIEW OF THE CHEST 9/18/2020 4:33 pm COMPARISON: 09/10/2020 HISTORY: ORDERING SYSTEM PROVIDED HISTORY: chest pain TECHNOLOGIST PROVIDED HISTORY: Reason for exam:->chest pain Reason for Exam: Palpitations (started 1 month) FINDINGS: Transvenous pacer remains in place. The lungs are without acute focal process. There is no effusion or pneumothorax. The cardiomediastinal silhouette is stable. The osseous structures are stable. No acute process.  Stable cardiomegaly           PROCEDURES   Unless otherwise noted below, none     Procedures    CRITICAL CARE TIME   N/A    CONSULTS:  IP CONSULT TO HOSPITALIST      EMERGENCY DEPARTMENT COURSE and DIFFERENTIAL DIAGNOSIS/MDM:   Vitals:    Vitals:    09/18/20 1904 09/18/20 1916 09/18/20 2004 09/18/20 2104   BP: 100/74 100/74 (!) 118/91 107/75   Pulse: 103  106 102   Resp: 19 22 18   Temp:       TempSrc:       SpO2: 97%  96% 98%   Weight:       Height:           Patient was given thefollowing medications:  Medications   ondansetron (ZOFRAN) injection 4 mg (4 mg Intravenous Given 9/18/20 1706)   pantoprazole (PROTONIX) injection 40 mg (40 mg Intravenous Given 9/18/20 1706)   furosemide (LASIX) injection 40 mg (40 mg Intravenous Given 9/18/20 2019)       8:07 PM EDT  [facility] cardiology Dr Arnulfo Voss recommends 40mg IV lasix, admit and obtain echocardiogram and they will see her in hospital tomorrow  9:11 PM EDT  Consulted Dr Jj Brian who then consulted Dr Braxton Walton EP recommends admit to [facility] [place] and to be seen by EP Monday in hospital.  9:50 PM EDT  Dr Kassidy Salinas consulted at [facility], accepts patient for transfer. She is stable. FINAL IMPRESSION      1. Chest pain, unspecified type    2. Palpitations    3. Dyspnea on exertion    4. History of ischemic cardiomyopathy    5. Elevated brain natriuretic peptide (BNP) level    6. Pacemaker    7. Diabetes mellitus without complication ([facility]          DISPOSITION/PLAN   DISPOSITION     PATIENT REFERREDTO:  No follow-up provider specified.     DISCHARGE MEDICATIONS:  New Prescriptions    No medications on file       DISCONTINUED MEDICATIONS:  Discontinued Medications    No medications on file              (Please note that portions ofthis note were completed with a voice recognition program.  Efforts were made to edit the dictations but occasionally words are mis-transcribed.)    Carlos Douglas PA-C (electronically signed)            Tarsha Rivera PA-C  09/18/20 0701

## 2020-09-18 NOTE — TELEPHONE ENCOUNTER
Writer contacted  ED provider to inform of 30 day readmission risk. No Decision on disposition at this time.

## 2020-09-19 ENCOUNTER — HOSPITAL ENCOUNTER (INPATIENT)
Age: 60
LOS: 3 days | Discharge: HOME HEALTH | DRG: 308 | End: 2020-09-22
Attending: INTERNAL MEDICINE | Admitting: INTERNAL MEDICINE
Payer: MEDICARE

## 2020-09-19 VITALS
DIASTOLIC BLOOD PRESSURE: 74 MMHG | SYSTOLIC BLOOD PRESSURE: 104 MMHG | WEIGHT: 199 LBS | BODY MASS INDEX: 35.26 KG/M2 | OXYGEN SATURATION: 98 % | TEMPERATURE: 98 F | RESPIRATION RATE: 20 BRPM | HEIGHT: 63 IN | HEART RATE: 90 BPM

## 2020-09-19 LAB
GLUCOSE BLD-MCNC: 312 MG/DL (ref 70–99)
GLUCOSE BLD-MCNC: 452 MG/DL (ref 70–99)
GLUCOSE BLD-MCNC: >600 MG/DL (ref 70–99)
MAGNESIUM: 1.8 MG/DL (ref 1.8–2.4)
PERFORMED ON: ABNORMAL
TROPONIN: <0.01 NG/ML
TROPONIN: <0.01 NG/ML

## 2020-09-19 PROCEDURE — 84443 ASSAY THYROID STIM HORMONE: CPT

## 2020-09-19 PROCEDURE — 84484 ASSAY OF TROPONIN QUANT: CPT

## 2020-09-19 PROCEDURE — 84439 ASSAY OF FREE THYROXINE: CPT

## 2020-09-19 PROCEDURE — 83735 ASSAY OF MAGNESIUM: CPT

## 2020-09-19 PROCEDURE — 6370000000 HC RX 637 (ALT 250 FOR IP): Performed by: STUDENT IN AN ORGANIZED HEALTH CARE EDUCATION/TRAINING PROGRAM

## 2020-09-19 PROCEDURE — 2580000003 HC RX 258: Performed by: INTERNAL MEDICINE

## 2020-09-19 PROCEDURE — 6370000000 HC RX 637 (ALT 250 FOR IP): Performed by: INTERNAL MEDICINE

## 2020-09-19 PROCEDURE — 93005 ELECTROCARDIOGRAM TRACING: CPT | Performed by: INTERNAL MEDICINE

## 2020-09-19 PROCEDURE — 83036 HEMOGLOBIN GLYCOSYLATED A1C: CPT

## 2020-09-19 PROCEDURE — 1200000000 HC SEMI PRIVATE

## 2020-09-19 PROCEDURE — 36415 COLL VENOUS BLD VENIPUNCTURE: CPT

## 2020-09-19 PROCEDURE — 96372 THER/PROPH/DIAG INJ SC/IM: CPT

## 2020-09-19 RX ORDER — NICOTINE POLACRILEX 4 MG
15 LOZENGE BUCCAL PRN
Status: DISCONTINUED | OUTPATIENT
Start: 2020-09-19 | End: 2020-09-22 | Stop reason: HOSPADM

## 2020-09-19 RX ORDER — INSULIN GLARGINE 100 [IU]/ML
40 INJECTION, SOLUTION SUBCUTANEOUS 2 TIMES DAILY
Status: DISCONTINUED | OUTPATIENT
Start: 2020-09-19 | End: 2020-09-21

## 2020-09-19 RX ORDER — NITROGLYCERIN 0.4 MG/1
0.4 TABLET SUBLINGUAL EVERY 5 MIN PRN
Status: DISCONTINUED | OUTPATIENT
Start: 2020-09-19 | End: 2020-09-22 | Stop reason: HOSPADM

## 2020-09-19 RX ORDER — LISINOPRIL 5 MG/1
2.5 TABLET ORAL DAILY
Status: DISCONTINUED | OUTPATIENT
Start: 2020-09-19 | End: 2020-09-22 | Stop reason: HOSPADM

## 2020-09-19 RX ORDER — ACETAMINOPHEN 325 MG/1
650 TABLET ORAL EVERY 6 HOURS PRN
Status: DISCONTINUED | OUTPATIENT
Start: 2020-09-19 | End: 2020-09-22 | Stop reason: HOSPADM

## 2020-09-19 RX ORDER — SODIUM CHLORIDE 0.9 % (FLUSH) 0.9 %
10 SYRINGE (ML) INJECTION PRN
Status: DISCONTINUED | OUTPATIENT
Start: 2020-09-19 | End: 2020-09-22 | Stop reason: HOSPADM

## 2020-09-19 RX ORDER — FLUOXETINE 10 MG/1
10 CAPSULE ORAL DAILY
Status: DISCONTINUED | OUTPATIENT
Start: 2020-09-19 | End: 2020-09-19

## 2020-09-19 RX ORDER — ACETAMINOPHEN 650 MG/1
650 SUPPOSITORY RECTAL EVERY 6 HOURS PRN
Status: DISCONTINUED | OUTPATIENT
Start: 2020-09-19 | End: 2020-09-22 | Stop reason: HOSPADM

## 2020-09-19 RX ORDER — ONDANSETRON 2 MG/ML
4 INJECTION INTRAMUSCULAR; INTRAVENOUS EVERY 6 HOURS PRN
Status: DISCONTINUED | OUTPATIENT
Start: 2020-09-19 | End: 2020-09-22 | Stop reason: HOSPADM

## 2020-09-19 RX ORDER — MAGNESIUM SULFATE 1 G/100ML
1 INJECTION INTRAVENOUS PRN
Status: DISCONTINUED | OUTPATIENT
Start: 2020-09-19 | End: 2020-09-22 | Stop reason: HOSPADM

## 2020-09-19 RX ORDER — CLOPIDOGREL BISULFATE 75 MG/1
75 TABLET ORAL DAILY
Status: DISCONTINUED | OUTPATIENT
Start: 2020-09-19 | End: 2020-09-22 | Stop reason: HOSPADM

## 2020-09-19 RX ORDER — CARVEDILOL 6.25 MG/1
6.25 TABLET ORAL ONCE
Status: COMPLETED | OUTPATIENT
Start: 2020-09-19 | End: 2020-09-19

## 2020-09-19 RX ORDER — INSULIN GLARGINE 100 [IU]/ML
40 INJECTION, SOLUTION SUBCUTANEOUS ONCE
Status: COMPLETED | OUTPATIENT
Start: 2020-09-19 | End: 2020-09-19

## 2020-09-19 RX ORDER — FUROSEMIDE 10 MG/ML
40 INJECTION INTRAMUSCULAR; INTRAVENOUS 2 TIMES DAILY
Status: DISCONTINUED | OUTPATIENT
Start: 2020-09-19 | End: 2020-09-20

## 2020-09-19 RX ORDER — POLYETHYLENE GLYCOL 3350 17 G/17G
17 POWDER, FOR SOLUTION ORAL DAILY PRN
Status: DISCONTINUED | OUTPATIENT
Start: 2020-09-19 | End: 2020-09-22 | Stop reason: HOSPADM

## 2020-09-19 RX ORDER — CLOPIDOGREL BISULFATE 75 MG/1
75 TABLET ORAL ONCE
Status: COMPLETED | OUTPATIENT
Start: 2020-09-19 | End: 2020-09-19

## 2020-09-19 RX ORDER — DEXTROSE MONOHYDRATE 50 MG/ML
100 INJECTION, SOLUTION INTRAVENOUS PRN
Status: DISCONTINUED | OUTPATIENT
Start: 2020-09-19 | End: 2020-09-22 | Stop reason: HOSPADM

## 2020-09-19 RX ORDER — PROMETHAZINE HYDROCHLORIDE 25 MG/1
12.5 TABLET ORAL EVERY 6 HOURS PRN
Status: DISCONTINUED | OUTPATIENT
Start: 2020-09-19 | End: 2020-09-22 | Stop reason: HOSPADM

## 2020-09-19 RX ORDER — ASPIRIN 81 MG/1
81 TABLET ORAL DAILY
Status: DISCONTINUED | OUTPATIENT
Start: 2020-09-19 | End: 2020-09-22 | Stop reason: HOSPADM

## 2020-09-19 RX ORDER — DEXTROSE MONOHYDRATE 25 G/50ML
12.5 INJECTION, SOLUTION INTRAVENOUS PRN
Status: DISCONTINUED | OUTPATIENT
Start: 2020-09-19 | End: 2020-09-22 | Stop reason: HOSPADM

## 2020-09-19 RX ORDER — DULOXETIN HYDROCHLORIDE 60 MG/1
60 CAPSULE, DELAYED RELEASE ORAL DAILY
Status: DISCONTINUED | OUTPATIENT
Start: 2020-09-19 | End: 2020-09-19

## 2020-09-19 RX ORDER — RANOLAZINE 500 MG/1
500 TABLET, EXTENDED RELEASE ORAL 2 TIMES DAILY
Status: DISCONTINUED | OUTPATIENT
Start: 2020-09-19 | End: 2020-09-22 | Stop reason: HOSPADM

## 2020-09-19 RX ORDER — FUROSEMIDE 40 MG/1
40 TABLET ORAL 2 TIMES DAILY
Status: DISCONTINUED | OUTPATIENT
Start: 2020-09-19 | End: 2020-09-19 | Stop reason: HOSPADM

## 2020-09-19 RX ORDER — SODIUM CHLORIDE 0.9 % (FLUSH) 0.9 %
10 SYRINGE (ML) INJECTION EVERY 12 HOURS SCHEDULED
Status: DISCONTINUED | OUTPATIENT
Start: 2020-09-19 | End: 2020-09-22 | Stop reason: HOSPADM

## 2020-09-19 RX ORDER — CARVEDILOL 6.25 MG/1
6.25 TABLET ORAL 2 TIMES DAILY WITH MEALS
Status: DISCONTINUED | OUTPATIENT
Start: 2020-09-19 | End: 2020-09-21

## 2020-09-19 RX ORDER — TOPIRAMATE 100 MG/1
200 TABLET, FILM COATED ORAL DAILY
Status: DISCONTINUED | OUTPATIENT
Start: 2020-09-19 | End: 2020-09-22 | Stop reason: HOSPADM

## 2020-09-19 RX ORDER — LISINOPRIL 5 MG/1
2.5 TABLET ORAL ONCE
Status: COMPLETED | OUTPATIENT
Start: 2020-09-19 | End: 2020-09-19

## 2020-09-19 RX ADMIN — Medication 10 ML: at 21:48

## 2020-09-19 RX ADMIN — INSULIN GLARGINE 40 UNITS: 100 INJECTION, SOLUTION SUBCUTANEOUS at 21:55

## 2020-09-19 RX ADMIN — LISINOPRIL 2.5 MG: 5 TABLET ORAL at 11:24

## 2020-09-19 RX ADMIN — INSULIN LISPRO 4 UNITS: 100 INJECTION, SOLUTION INTRAVENOUS; SUBCUTANEOUS at 21:54

## 2020-09-19 RX ADMIN — RANOLAZINE 500 MG: 500 TABLET, FILM COATED, EXTENDED RELEASE ORAL at 21:48

## 2020-09-19 RX ADMIN — CARVEDILOL 6.25 MG: 6.25 TABLET, FILM COATED ORAL at 11:24

## 2020-09-19 RX ADMIN — CLOPIDOGREL BISULFATE 75 MG: 75 TABLET ORAL at 11:24

## 2020-09-19 RX ADMIN — METFORMIN HYDROCHLORIDE 500 MG: 500 TABLET ORAL at 11:24

## 2020-09-19 RX ADMIN — FUROSEMIDE 40 MG: 40 TABLET ORAL at 11:23

## 2020-09-19 RX ADMIN — INSULIN GLARGINE 40 UNITS: 100 INJECTION, SOLUTION SUBCUTANEOUS at 11:37

## 2020-09-19 RX ADMIN — INSULIN LISPRO 20 UNITS: 100 INJECTION, SOLUTION INTRAVENOUS; SUBCUTANEOUS at 11:38

## 2020-09-19 ASSESSMENT — PAIN SCALES - GENERAL
PAINLEVEL_OUTOF10: 10
PAINLEVEL_OUTOF10: 10

## 2020-09-19 ASSESSMENT — PAIN DESCRIPTION - PAIN TYPE
TYPE: ACUTE PAIN
TYPE: ACUTE PAIN

## 2020-09-19 ASSESSMENT — PAIN DESCRIPTION - LOCATION
LOCATION: CHEST
LOCATION: CHEST

## 2020-09-19 NOTE — H&P
Hospital Medicine History & Physical      PCP: FRITZ Alcazar - NP    Date of Admission: 9/19/2020    Date of Service: Pt seen/examined on 9/19 and Admitted to Inpatient with expected LOS greater than two midnights due to medical therapy. Chief Complaint:  Palpitations      History Of Present Illness:     62 y.o. female with PMH of systolic CHF, ICM, CAD, pacer, CVA, DM, htn, hld who presents with palpitations and dyspnea. Was transferred from [facility] ED for EP eval.  Has been having intermittent palpitations for the past month. Episodes associated with chest pain and dyspnea albeit does state this is chronic. Also complains of n/v, LE edema and cough at times. Denies fever, chills, calf pain or erythema, COVID contacts. Past Medical History:          Diagnosis Date    Arthritis     CAD (coronary artery disease)     Cerebral artery occlusion with cerebral infarction ([facility] [address]     CHF (congestive heart failure) (HCC)     Diabetes mellitus ([facility] [address] [facility]     Hyperlipidemia     Hypertension     Mental retardation     MI (myocardial infarction) ([facility] [address] [facility]        Past Surgical History:          Procedure Laterality Date    BACK SURGERY      PACEMAKER INSERTION      TUBAL LIGATION      TUMOR REMOVAL         Medications Prior to Admission:      Prior to Admission medications    Medication Sig Start Date End Date Taking? Authorizing Provider   clopidogrel (PLAVIX) 75 MG tablet  6/10/20   Historical Provider, MD   escitalopram (LEXAPRO) 10 MG tablet  6/15/20   Historical Provider, MD   furosemide (LASIX) 40 MG tablet Take 1 tablet by mouth 2 times daily 9/8/20 10/8/20  FRITZ Harp - CNP   nitroGLYCERIN (NITROSTAT) 0.4 MG SL tablet up to max of 3 total doses.  If no relief after 1 dose, call 911. 8/24/20   Rah Quinones MD   ondansetron [facility]) 4 MG tablet Take 1 tablet by mouth every 8 hours as needed for Nausea 8/12/20   Danielle Vivar DO   insulin aspart (Harris Rice) 100 UNIT/ML injection pen Inject 30 Units into the skin 3 times daily (before meals) 8/12/20   Chaitanya Wilks, DO   aspirin 81 MG EC tablet Take 1 tablet by mouth daily 7/18/20   [facility], MD   ranolazine (RANEXA) 500 MG extended release tablet Take 1 tablet by mouth 2 times daily 7/18/20   [facility], MD   DULoxetine (CYMBALTA) 60 MG extended release capsule Take 1 capsule by mouth daily 7/18/20   [facility], MD   FLUoxetine (PROZAC) 10 MG capsule Take 1 capsule by mouth daily 7/18/20   [facility], MD   insulin glargine (LANTUS SOLOSTAR) 100 UNIT/ML injection pen Inject 40 Units into the skin 2 times daily 7/18/20   [facility], MD   metFORMIN (GLUCOPHAGE) 500 MG tablet Take 2 tablets by mouth 2 times daily (with meals) 7/18/20 10/16/20  [facility], MD   atorvastatin (LIPITOR) 40 MG tablet Take 1 tablet by mouth nightly 7/18/20   [facility], MD   fenofibrate micronized (LOFIBRA) 200 MG capsule Take 1 capsule by mouth nightly 7/18/20   [facility], MD   lisinopril (PRINIVIL;ZESTRIL) 2.5 MG tablet Take 1 tablet by mouth daily 7/18/20   [facility], MD   carvedilol (COREG) 6.25 MG tablet Take 1 tablet by mouth 2 times daily (with meals) 7/18/20   [facility], MD   miconazole (MICOTIN) 2 % powder Apply topically 2 times daily. 7/18/20   [facility], MD   CVS Lancets Ultra Thin MISC 1 each by Does not apply route 4 times daily 7/18/20   [facility], MD   blood glucose monitor strips Test three times a day & as needed for symptoms of irregular blood glucose. 5/12/19   Grant Baez MD   topiramate (TOPAMAX) 200 MG tablet Take 1 tablet by mouth daily 4/14/18   Harley Baez MD       Allergies:  Patient has no known allergies. Social History:      TOBACCO:   reports that she has never smoked.  She has never used smokeless tobacco.  ETOH:   reports no history of alcohol use.      Family History:          Problem Relation Age of Onset    Heart Disease Father     Cancer Mother     Other Mother        REVIEW OF SYSTEMS:   Pertinent positives as noted in the HPI. All other systems reviewed and negative. PHYSICAL EXAM:    There were no vitals taken for this visit. Gen/overall appearance: Not in acute distress. Alert. Head: Normocephalic, atraumatic  Eyes: EOMI, no scleral icterus  CVS: Regular rhythm, tachy, Normal S1S2  Pulm: Diminished, Clear to auscultation bilaterally. No crackles/wheezes  Gastrointestinal: Soft, nontender, nondistended, no guarding or rebound  Extremities: No edema. No erythema or warmth  Neuro: No gross focal deficits noted  Skin: Warm, dry    Labs:     Recent Labs     09/18/20  1647   WBC 8.0   HGB 15.1   HCT 44.8        Recent Labs     09/18/20  1647   *   K 5.0   CL 95*   CO2 23   BUN 24*   CREATININE 0.9   CALCIUM 9.4     Recent Labs     09/18/20  1647   *   ALT 97*   BILITOT 0.8   ALKPHOS 149*     No results for input(s): INR in the last 72 hours. Recent Labs     09/18/20  1647 09/18/20  2116   Carloyn Allyson <0.01 <0.01       Urinalysis:      Lab Results   Component Value Date    NITRU Negative 07/17/2020    WBCUA 10-20 06/12/2020    BACTERIA Rare 05/28/2020    RBCUA 3-4 06/12/2020    BLOODU Negative 07/17/2020    SPECGRAV <=1.005 07/17/2020    GLUCOSEU >=1000 07/17/2020         ASSESSMENT:    Active Hospital Problems    Diagnosis Date Noted    SVT (supraventricular tachycardia) (HCC) [I47.1]        Questionable SVT. Review of EKG found remarkable for sinus tachycardia with intermittent PVCs. - repeat EKG  - monitor tele  - pacer interrogation  - monitor and replace lytes PRN  - c/w home BB for now  - EP eval    Chronic systolic CHF exacerbation. Appears compensated at this time. Last ECHO EF 35% with moderate global hypokinesis.   Ischemic cardiomyopathy  CAD  - s/p IV lasix in ED; hold for now given relative hypotension  - strict ins/outs  - monitor lytes and Cr  - c/w ACE, BB, asa, statin  - CHF protocol    Uncontrolled hyperglycemia. Hx of DM  - restart home regimen  - moderate ISS  - Hgb a1c  - accuchecks     PMH of CVA, htn, hld, obesity    Diet: No diet orders on file  Code Status: Prior    Dispo - Court Stuart MD    Thank you FRITZ Aponte - ALINE for the opportunity to be involved in this patient's care.

## 2020-09-19 NOTE — ED NOTES
2290 ~ per RN Andrea Bosworth call placed to [facility] for a call to be placed to [facility] for orders, as we are holding this pt indefinitely. 9008 ~ call returned by [facility]. Dr. Albina Abdullahi, states they will not place orders until pt is there, orders now depending on our ED doc.      Roselyn Noriega  09/19/20 1028

## 2020-09-19 NOTE — ED NOTES
Pt report given to [facility]/pt transported to [facility] via squad on [employer], RN  09/19/20 5904

## 2020-09-19 NOTE — PROGRESS NOTES
Perfect served Trev YOUNG: \"Patient states she does not take duloxetine or fluoxetine, can these be d/c'd from orders? Also, her sugar is now down to 128. She has medium dose sliding scale and then 30 unites of Humalog ordered as well. Do you still want to keep the high doses for normal sugars? Thanks! \"

## 2020-09-19 NOTE — PROGRESS NOTES
Transmission from [facility] capability  The following is a report from a pacemaker/ICD that was interrogated on 09- 05:11 PM  EDT at [facility]. The patient is currently enrolled in [employer]. [employer]. Below is the relevant contact information. Site with [employer] capability: [facility]  [place], [address] [place] [address],Third Floor  FFJRW:[phone]  XSZ:[phone]    AT/AF burden 3.4%. AMS/nsvt recordings show AT/SVT-RVR. Corvue is within normal limits. Pt currently admitted. See PACEART report under Cardiology tab.

## 2020-09-20 ENCOUNTER — APPOINTMENT (OUTPATIENT)
Dept: GENERAL RADIOLOGY | Age: 60
DRG: 308 | End: 2020-09-20
Attending: INTERNAL MEDICINE
Payer: MEDICARE

## 2020-09-20 LAB
ALBUMIN SERPL-MCNC: 3.9 G/DL (ref 3.4–5)
ALP BLD-CCNC: 121 U/L (ref 40–129)
ALT SERPL-CCNC: 81 U/L (ref 10–40)
ANION GAP SERPL CALCULATED.3IONS-SCNC: 12 MMOL/L (ref 3–16)
AST SERPL-CCNC: 71 U/L (ref 15–37)
BILIRUB SERPL-MCNC: 0.5 MG/DL (ref 0–1)
BILIRUBIN DIRECT: <0.2 MG/DL (ref 0–0.3)
BILIRUBIN, INDIRECT: ABNORMAL MG/DL (ref 0–1)
BUN BLDV-MCNC: 28 MG/DL (ref 7–20)
CALCIUM SERPL-MCNC: 8.9 MG/DL (ref 8.3–10.6)
CHLORIDE BLD-SCNC: 97 MMOL/L (ref 99–110)
CO2: 22 MMOL/L (ref 21–32)
CREAT SERPL-MCNC: 0.6 MG/DL (ref 0.6–1.1)
EKG ATRIAL RATE: 92 BPM
EKG DIAGNOSIS: NORMAL
EKG P AXIS: 58 DEGREES
EKG P-R INTERVAL: 168 MS
EKG Q-T INTERVAL: 384 MS
EKG QRS DURATION: 98 MS
EKG QTC CALCULATION (BAZETT): 474 MS
EKG R AXIS: 56 DEGREES
EKG T AXIS: -11 DEGREES
EKG VENTRICULAR RATE: 92 BPM
ESTIMATED AVERAGE GLUCOSE: 335 MG/DL
GFR AFRICAN AMERICAN: >60
GFR NON-AFRICAN AMERICAN: >60
GLUCOSE BLD-MCNC: 128 MG/DL (ref 70–99)
GLUCOSE BLD-MCNC: 143 MG/DL (ref 70–99)
GLUCOSE BLD-MCNC: 153 MG/DL (ref 70–99)
GLUCOSE BLD-MCNC: 154 MG/DL (ref 70–99)
GLUCOSE BLD-MCNC: 193 MG/DL (ref 70–99)
GLUCOSE BLD-MCNC: 228 MG/DL (ref 70–99)
GLUCOSE BLD-MCNC: 242 MG/DL (ref 70–99)
HBA1C MFR BLD: 13.3 %
HCT VFR BLD CALC: 43.3 % (ref 36–48)
HEMOGLOBIN: 14.6 G/DL (ref 12–16)
MAGNESIUM: 2 MG/DL (ref 1.8–2.4)
MCH RBC QN AUTO: 30.7 PG (ref 26–34)
MCHC RBC AUTO-ENTMCNC: 33.8 G/DL (ref 31–36)
MCV RBC AUTO: 90.9 FL (ref 80–100)
PDW BLD-RTO: 13.3 % (ref 12.4–15.4)
PERFORMED ON: ABNORMAL
PLATELET # BLD: 262 K/UL (ref 135–450)
PMV BLD AUTO: 10.2 FL (ref 5–10.5)
POTASSIUM SERPL-SCNC: 4.2 MMOL/L (ref 3.5–5.1)
PRO-BNP: 1494 PG/ML (ref 0–124)
RBC # BLD: 4.77 M/UL (ref 4–5.2)
SODIUM BLD-SCNC: 131 MMOL/L (ref 136–145)
T4 FREE: 1.5 NG/DL (ref 0.9–1.8)
TOTAL PROTEIN: 6.6 G/DL (ref 6.4–8.2)
TSH SERPL DL<=0.05 MIU/L-ACNC: 0.28 UIU/ML (ref 0.27–4.2)
WBC # BLD: 8.7 K/UL (ref 4–11)

## 2020-09-20 PROCEDURE — 83880 ASSAY OF NATRIURETIC PEPTIDE: CPT

## 2020-09-20 PROCEDURE — 99222 1ST HOSP IP/OBS MODERATE 55: CPT | Performed by: INTERNAL MEDICINE

## 2020-09-20 PROCEDURE — 6360000002 HC RX W HCPCS: Performed by: INTERNAL MEDICINE

## 2020-09-20 PROCEDURE — 80048 BASIC METABOLIC PNL TOTAL CA: CPT

## 2020-09-20 PROCEDURE — 6370000000 HC RX 637 (ALT 250 FOR IP): Performed by: INTERNAL MEDICINE

## 2020-09-20 PROCEDURE — 36415 COLL VENOUS BLD VENIPUNCTURE: CPT

## 2020-09-20 PROCEDURE — 71046 X-RAY EXAM CHEST 2 VIEWS: CPT

## 2020-09-20 PROCEDURE — 85027 COMPLETE CBC AUTOMATED: CPT

## 2020-09-20 PROCEDURE — 2580000003 HC RX 258: Performed by: INTERNAL MEDICINE

## 2020-09-20 PROCEDURE — 80076 HEPATIC FUNCTION PANEL: CPT

## 2020-09-20 PROCEDURE — 93010 ELECTROCARDIOGRAM REPORT: CPT | Performed by: INTERNAL MEDICINE

## 2020-09-20 PROCEDURE — 1200000000 HC SEMI PRIVATE

## 2020-09-20 PROCEDURE — 83735 ASSAY OF MAGNESIUM: CPT

## 2020-09-20 RX ORDER — FUROSEMIDE 10 MG/ML
40 INJECTION INTRAMUSCULAR; INTRAVENOUS 2 TIMES DAILY
Status: DISCONTINUED | OUTPATIENT
Start: 2020-09-20 | End: 2020-09-22

## 2020-09-20 RX ADMIN — ASPIRIN 81 MG: 81 TABLET, COATED ORAL at 10:14

## 2020-09-20 RX ADMIN — FUROSEMIDE 40 MG: 10 INJECTION, SOLUTION INTRAMUSCULAR; INTRAVENOUS at 18:16

## 2020-09-20 RX ADMIN — LISINOPRIL 2.5 MG: 5 TABLET ORAL at 10:09

## 2020-09-20 RX ADMIN — INSULIN LISPRO 2 UNITS: 100 INJECTION, SOLUTION INTRAVENOUS; SUBCUTANEOUS at 22:27

## 2020-09-20 RX ADMIN — ENOXAPARIN SODIUM 40 MG: 40 INJECTION SUBCUTANEOUS at 10:09

## 2020-09-20 RX ADMIN — RANOLAZINE 500 MG: 500 TABLET, FILM COATED, EXTENDED RELEASE ORAL at 10:14

## 2020-09-20 RX ADMIN — INSULIN GLARGINE 40 UNITS: 100 INJECTION, SOLUTION SUBCUTANEOUS at 11:56

## 2020-09-20 RX ADMIN — INSULIN LISPRO 4 UNITS: 100 INJECTION, SOLUTION INTRAVENOUS; SUBCUTANEOUS at 18:17

## 2020-09-20 RX ADMIN — Medication 10 ML: at 22:28

## 2020-09-20 RX ADMIN — INSULIN LISPRO 2 UNITS: 100 INJECTION, SOLUTION INTRAVENOUS; SUBCUTANEOUS at 11:57

## 2020-09-20 RX ADMIN — INSULIN LISPRO 30 UNITS: 100 INJECTION, SOLUTION INTRAVENOUS; SUBCUTANEOUS at 18:20

## 2020-09-20 RX ADMIN — INSULIN GLARGINE 40 UNITS: 100 INJECTION, SOLUTION SUBCUTANEOUS at 22:27

## 2020-09-20 RX ADMIN — TOPIRAMATE 200 MG: 100 TABLET, FILM COATED ORAL at 10:13

## 2020-09-20 RX ADMIN — CLOPIDOGREL BISULFATE 75 MG: 75 TABLET ORAL at 10:09

## 2020-09-20 RX ADMIN — CARVEDILOL 6.25 MG: 6.25 TABLET, FILM COATED ORAL at 10:08

## 2020-09-20 RX ADMIN — CARVEDILOL 6.25 MG: 6.25 TABLET, FILM COATED ORAL at 18:16

## 2020-09-20 RX ADMIN — RANOLAZINE 500 MG: 500 TABLET, FILM COATED, EXTENDED RELEASE ORAL at 22:27

## 2020-09-20 RX ADMIN — INSULIN LISPRO 15 UNITS: 100 INJECTION, SOLUTION INTRAVENOUS; SUBCUTANEOUS at 10:14

## 2020-09-20 ASSESSMENT — PAIN SCALES - GENERAL
PAINLEVEL_OUTOF10: 0
PAINLEVEL_OUTOF10: 0

## 2020-09-20 NOTE — DISCHARGE INSTR - COC
Continuity of Care Form    Patient Name: Jacobo Wilkins   :  1960  MRN:  [id number]    Admit date:  2020  Discharge date:  2020    Code Status Order: Full Code   Advance Directives:   Advance Care Flowsheet Documentation       Date/Time Healthcare Directive Type of Healthcare Directive Copy in [address] Agent's Name Healthcare Agent's Phone Number    20 1802  No, patient does not have an advance directive for healthcare treatment -- -- -- -- --            Admitting Physician:  Sha Cantor MD  PCP: FRITZ Manzo NP    Discharging Nurse: [facility] Unit/Room#: 0218/0218-02  Discharging Unit Phone Number: [phone]    Emergency Contact:   Extended Emergency Contact Information  Primary Emergency Contact: Christa Campos  Address: [address] [place] [address] [address]           FANN[place], [address] [place] [address],[address] Phone: [phone]  Mobile Phone: [phone]  Relation: Brother/Sister    Past Surgical History:  Past Surgical History:   Procedure Laterality Date    BACK SURGERY      PACEMAKER INSERTION      TUBAL LIGATION      TUMOR REMOVAL         Immunization History:   Immunization History   Administered Date(s) Administered    Influenza 2012    Influenza A ([id number]) Vaccine IM 2009    Influenza Virus Vaccine 10/17/2014, 10/27/2017    Influenza, Quadv, 6 mo and older, IM, PF (Flulaval, Fluarix) 12/10/2018    Influenza, Quadv, IM, PF (6 mo and older Fluzone, Flulaval, Fluarix, and 3 yrs and older Afluria) 10/12/2019    Pneumococcal Polysaccharide ([id number]) 10/17/2014, 2017       Active Problems:  Patient Active Problem List   Diagnosis Code    DM (diabetes mellitus) ([facility] E11.9    HTN (hypertension), benign I10    Dyslipidemia E78.5    CAD (coronary artery disease) I25.10    Hx CVA with residual L-sided facial droop (2018) I63.50    Dual ICD (implantable cardioverter-defibrillator) in place Z95.810    Brain tumor (benign) ([facility] D33.2    Chronic combined systolic (EF 88-76%) & diastolic (grade 2 LVDD) CHF I50.42    TIA involving right internal carotid artery G45.1    CAD in native artery I25.10    DM (diabetes mellitus), secondary, uncontrolled, w/neurologic complic ([facility]) U32.01, Q97.42    CHF (congestive heart failure) ([facility]) I50.9    Nonischemic cardiomyopathy ([facility]) I42.8    Essential hypertension I10    TIA (transient ischemic attack) G45.9    Hyperglycemia R73.9    Diabetic ketoacidosis without coma associated with type 2 diabetes mellitus ([facility] E11.10    Non-intractable vomiting with nausea R11.2    Chest pain R07.9    Arterial ischemic stroke, ICA, right, acute ([facility]) I63.231    Diabetic hyperosmolar non-ketotic state ([facility] [facility] E11.00    Diabetic acidosis without coma ([facility]) E11.10    Hyponatremia E44.8    Metabolic acidosis N54.5    Disorder of electrolytes E87.8    Diabetic ketoacidosis with coma associated with type 2 diabetes mellitus ([facility]) E11.11    Hypernatremia E87.0    Leukocytosis D72.829    Atrial tachycardia ([facility]) I47.1    DKA, type 2, not at goal ([facility] [facility] E11.10    Cognitive developmental delay F81.9    Mood disorder ([facility]) F39    Urinary tract infection with hematuria N39.0, R31.9    Nausea and vomiting R11.2    Hypokalemia E87.6    Persistent fever R50.9    Syncope and collapse R55    S/P ICD (internal cardiac defibrillator) procedure Z95.810    History of CVA (cerebrovascular accident) Z80.78    Noncompliance with medications Z91.14    Obesity E66.9    SVT (supraventricular tachycardia) ([facility]) I47.1    Cardiomyopathy, ischemic I25.5       Isolation/Infection:   Isolation            No Isolation          Unreconciled External Infections       Infection Onset Last Indicated Last Received Source    No mapped external infections found      .     Unmapped Infections (1)        MRSA 10/31/14 09/09/16 09/08/20                   Patient Infection Status Infection Onset Added Last Indicated Last Indicated By Review Planned Expiration Resolved Resolved By    C-diff Rule Out 09/10/20 09/10/20 09/10/20 Clostridium difficile toxin/antigen (Ordered)        Resolved    COVID-19 Rule Out 04/16/20 04/16/20 04/16/20 COVID-19 (Ordered)   04/16/20 Rule-Out Test Resulted            Nurse Assessment:  Last Vital Signs: /74   Pulse 91   Temp 97.8 °F (36.6 °C) (Oral)   Resp 16   Ht 5' 1\" (1.549 m)   Wt 204 lb 1 oz (92.6 kg)   SpO2 97%   BMI 38.56 kg/m²     Last documented pain score (0-10 scale): Pain Level: 0  Last Weight:   Wt Readings from Last 1 Encounters:   09/20/20 204 lb 1 oz (92.6 kg)     Mental Status:  oriented and alert    IV Access:  - None    Nursing Mobility/ADLs:  Walking   Independent  Transfer  Independent  Bathing  Assisted  Dressing  Assisted  Toileting  Independent  Feeding  [address]  Independent  Med Delivery   whole    Wound Care Documentation and Therapy:        Elimination:  Continence:   · Bowel: Yes  · Bladder: Yes  Urinary Catheter: None   Colostomy/Ileostomy/Ileal Conduit: No       Date of Last BM:     Intake/Output Summary (Last 24 hours) at 9/20/2020 0937  Last data filed at 9/20/2020 0417  Gross per 24 hour   Intake 480 ml   Output 100 ml   Net 380 ml     I/O last 3 completed shifts: In: 480 [P.O.:480]  Out: 100 [Urine:100]    Safety Concerns:     None    Impairments/Disabilities:      None    Nutrition Therapy:  Current Nutrition Therapy:   - Oral Diet:  Cardiac and Low Sodium (2gm)    Routes of Feeding: Oral  Liquids: No Restrictions  Daily Fluid Restriction: yes - amount 2L  Last Modified Barium Swallow with Video (Video Swallowing Test): not done    Treatments at the Time of Hospital Discharge:   Respiratory Treatments:   Oxygen Therapy:  is not on home oxygen therapy.   Ventilator:    - No ventilator support    Rehab Therapies:   Weight Bearing Status/Restrictions: No weight bearing restirctions  Other Medical Equipment (for information only, NOT a DME order): Other Treatments: ***    Patient's personal belongings (please select all that are sent with patient):  Glasses    RN SIGNATURE:  Electronically signed by Kailee Dumont RN on 9/22/20 at 1:29 PM EDT    CASE MANAGEMENT/SOCIAL WORK SECTION    Inpatient Status Date: 9/19/2020    Readmission Risk Assessment Score:  Readmission Risk              Risk of Unplanned Readmission:        44           Discharging to Facility/ Agency   · Name: [facility]  · Address:  · Phone: [phone]    Dialysis Facility (if applicable)   · Name:  · Address:  · Dialysis Schedule:  · Phone:  · Fax:    / signature: Electronically signed by Brianne Wells RN on 9/22/20 at 1:25 PM EDT    PHYSICIAN SECTION    Prognosis: Good    Condition at Discharge: Stable    Rehab Potential (if transferring to Rehab): Good    Recommended Labs or Other Treatments After Discharge: Follow-up with cardiology per discharge instructions    Physician Certification: I certify the above information and transfer of Brandyn Anand  is necessary for the continuing treatment of the diagnosis listed and that she requires Home Care for less 30 days.      Update Admission H&P: No change in H&P    PHYSICIAN SIGNATURE:  Electronically signed by Sean Adkins MD on 9/22/20 at 12:28 PM EDT

## 2020-09-20 NOTE — CARE COORDINATION
CASE MANAGEMENT INITIAL ASSESSMENT      Reviewed chart and completed assessment via telephone with: patient   Explained Case Management role/services. yes    Primary contact information: [address] [place] [address] Street:   Who do you trust or have selected to make healthcare decisions for you? Name:   Leda Fothergill, sister  Phone  Number: [phone]  Can this person be reached and be able to respond quickly, such as within a few minutes or hours? yes  Who would be your back-up decision maker? Name na  Phone Number na    Admit date/status: 9/19/2020  Diagnosis: SVT    Insurance: BCBS medicare/medicaid   Precert required for SNF - Y        3 night stay required -  N    Living arrangements, Adls, care needs, prior to admission: lives alone in an apartment with no steps. Uses cane/walker for ambulation. Has never driven. Walks where she needs to go or has her sister drive her. Transportation: will need cab ride home    [address] [place] at home: Walker_x_Cane_x_RTS__ BSC__Shower Chair__  02__ HHN__ CPAP__  BiPap__  Hospital Bed__ W/C___ Other__________    Services in the home and/or outpatient, prior to admission: active with light for SN only      PT/OT recs: not ordered     Hospital Exemption Notification (HEN): needed for snf    Barriers to discharge: none    Plan/comments: patient plans to return to home and continue with her [facility]. Patient would benefit from a  through [place] to help her with more resources for assistance. CM confirmed with light that patient is active. Will need to send new orders and add SW at discharge.      ECOC on chart for MD signature yes

## 2020-09-20 NOTE — PROGRESS NOTES
Elgin Torre MD give 15 units of humolog instead of 30 units, and see how she responds after morning labs. Elgin YOUNG also hold sliding scale for now.

## 2020-09-20 NOTE — CONSULTS
CARDIOLOGY CONSULTATION        Patient Name: Deisy Tabares  Date of admission: 9/19/2020  3:27 PM  Admission Dx: SVT (supraventricular tachycardia) ([facility] [address] [I47.1]  Requesting Physician: Francie Roger MD  Primary Care physician: FRITZ Watt NP    Reason for Consultation/Chief Complaint: Palpitations, dyspnea    History of Present Illness:     Deisy Tabares is a 62 y.o. patient with a prior history significant for congestive heart failure secondary to non-ischemic cardiomyopathy, moderate LV dysfunction, coronary artery disease, status post dual-chamber ICD, cerebrovascular accident, hypertension, hyperlipidemia and diabetes, presenting with intermittent palpitations for the past 1 month. The patient was recently seen during hospitalization at [facility] by Dr. Quick Earing for chest discomfort and palpitations. At that time she was ruled out for MI. It was thought that her chest discomfort was chronic, potentially related to tachyarrhythmia however. On device interrogation in June it was noted that that she had 30 episodes of high heart rate, suspected to be SVT. EP evaluation was requested on outpatient basis. She was recommended for discharge without further cardiac testing. The patient returned to the ED overnight with the above complaints. Subsequently transferred to this facility for EP evaluation. Cardiac enzymes negative x 2. She reports last night that she returned to the hospital due to inability to breathe in the setting of a rapid heart beat. No irregularity. She feels like this has been ongoing but acutely worsened Friday. Associated symptoms include fatigue, dizziness and near-syncope as well as chest discomfort. The latter has been persistent since her last hospitalization at [place] and appears to be a chronic complaint by OP notes. No syncope. She endorses +PND/orthopnea and worsening LE edema. She reports lasix fails to make her urinate robustly.  She remains on low dose coreg and ranexa as started previously. No ICD fires. Past Medical History:   has a past medical history of Arthritis, CAD (coronary artery disease), Cerebral artery occlusion with cerebral infarction ([facility] [address] CHF (congestive heart failure) ([facility] [address] Diabetes mellitus ([facility] [address] Hyperlipidemia, Hypertension, Mental retardation, and MI (myocardial infarction) ([facility] [address] Surgical History:   has a past surgical history that includes back surgery; Pacemaker insertion; tumor removal; and Tubal ligation. Social History:   reports that she has never smoked. She has never used smokeless tobacco. She reports that she does not drink alcohol or use drugs. Family History:  family history includes Cancer in her mother; Heart Disease in her father; Other in her mother. Home Medications:  Were reviewed and are listed in nursing record and/or below  Prior to Admission medications    Medication Sig Start Date End Date Taking? Authorizing Provider   clopidogrel (PLAVIX) 75 MG tablet  6/10/20   Historical Provider, MD   escitalopram (LEXAPRO) 10 MG tablet  6/15/20   Historical Provider, MD   furosemide (LASIX) 40 MG tablet Take 1 tablet by mouth 2 times daily 9/8/20 10/8/20  FRITZ Chavez - CNP   nitroGLYCERIN (NITROSTAT) 0.4 MG SL tablet up to max of 3 total doses.  If no relief after 1 dose, call 911. 8/24/20   Que Feliz MD   ondansetron [facility]) 4 MG tablet Take 1 tablet by mouth every 8 hours as needed for Nausea 8/12/20   Jami Yoo, DO   insulin aspart (NOVOLOG FLEXPEN) 100 UNIT/ML injection pen Inject 30 Units into the skin 3 times daily (before meals) 8/12/20   Jami Yoo, DO   aspirin 81 MG EC tablet Take 1 tablet by mouth daily 7/18/20   Rip Yates MD   ranolazine (RANEXA) 500 MG extended release tablet Take 1 tablet by mouth 2 times daily 7/18/20   Rip Yates MD   DULoxetine (CYMBALTA) 60 MG extended release capsule Take 1 capsule by mouth daily 7/18/20   Desean Vu MD   FLUoxetine (PROZAC) 10 MG capsule Take 1 capsule by mouth daily 7/18/20   Desean Vu MD   insulin glargine (LANTUS SOLOSTAR) 100 UNIT/ML injection pen Inject 40 Units into the skin 2 times daily 7/18/20   Desean Vu MD   metFORMIN (GLUCOPHAGE) 500 MG tablet Take 2 tablets by mouth 2 times daily (with meals) 7/18/20 10/16/20  Desean Vu MD   atorvastatin (LIPITOR) 40 MG tablet Take 1 tablet by mouth nightly 7/18/20   Desean Vu MD   fenofibrate micronized (LOFIBRA) 200 MG capsule Take 1 capsule by mouth nightly 7/18/20   Desean Vu MD   lisinopril (PRINIVIL;ZESTRIL) 2.5 MG tablet Take 1 tablet by mouth daily 7/18/20   Desean Vu MD   carvedilol (COREG) 6.25 MG tablet Take 1 tablet by mouth 2 times daily (with meals) 7/18/20   Desean Vu MD   miconazole (MICOTIN) 2 % powder Apply topically 2 times daily. 7/18/20   Desean Vu MD   [employer] Lancets Ultra Thin MISC 1 each by Does not apply route 4 times daily 7/18/20   Desean Vu MD   blood glucose monitor strips Test three times a day & as needed for symptoms of irregular blood glucose.  5/12/19   Clare Feldman MD   topiramate (TOPAMAX) 200 MG tablet Take 1 tablet by mouth daily 4/14/18   Anuradha Mcguire MD        CURRENT Medications:  topiramate (TOPAMAX) tablet 200 mg, Daily  aspirin EC tablet 81 mg, Daily  ranolazine (RANEXA) extended release tablet 500 mg, BID  insulin glargine (LANTUS) injection vial 40 Units, BID  lisinopril (PRINIVIL;ZESTRIL) tablet 2.5 mg, Daily  carvedilol (COREG) tablet 6.25 mg, BID WC  nitroGLYCERIN (NITROSTAT) SL tablet 0.4 mg, Q5 Min PRN  clopidogrel (PLAVIX) tablet 75 mg, Daily  insulin lispro (HUMALOG) injection vial 30 Units, TID WC  sodium chloride flush 0.9 % injection 10 mL, 2 times per day  sodium chloride flush 0.9 % injection 10 mL, PRN  acetaminophen (TYLENOL) tablet 650 mg, Q6H PRN    Or  acetaminophen (TYLENOL) suppository 650 mg, Q6H PRN  polyethylene glycol (GLYCOLAX) packet 17 g, Daily PRN  promethazine (PHENERGAN) tablet 12.5 mg, Q6H PRN    Or  ondansetron (ZOFRAN) injection 4 mg, Q6H PRN  enoxaparin (LOVENOX) injection 40 mg, Daily  perflutren lipid microspheres (DEFINITY) injection 1.65 mg, ONCE PRN  [Held by provider] furosemide (LASIX) injection 40 mg, BID  glucose (GLUTOSE) 40 % oral gel 15 g, PRN  dextrose 50 % IV solution, PRN  glucagon (rDNA) injection 1 mg, PRN  dextrose 5 % solution, PRN  insulin lispro (HUMALOG) injection vial 0-12 Units, TID WC  insulin lispro (HUMALOG) injection vial 0-6 Units, Nightly  magnesium replacement protocol, RX Placeholder  magnesium sulfate 1 g in dextrose 5% 100 mL IVPB, PRN        Allergies:  Patient has no known allergies. Review of Systems:   A 14 point review of symptoms completed. Pertinent positives identified in the HPI, all other review of symptoms negative as below. Objective:     Vitals:    09/19/20 2143 09/20/20 0040 09/20/20 0415 09/20/20 0747   BP: (!) 98/49 103/63 95/80 105/74   Pulse:  86 70 91   Resp:  16 16 16   Temp:  98 °F (36.7 °C) 98 °F (36.7 °C) 97.8 °F (36.6 °C)   TempSrc:  Oral Oral Oral   SpO2:  97% 95% 97%   Weight:   204 lb 1 oz (92.6 kg)    Height:          Weight: 204 lb 1 oz (92.6 kg)       PHYSICAL EXAM:    General:  Alert, cooperative, no distress, appears stated age   Head:  Normocephalic, atraumatic   Eyes:  Conjunctiva/corneas clear, anicteric sclerae    Nose: Nares normal, no drainage or sinus tenderness   Throat: No abnormalities of the lips, oral mucosa or tongue. Neck: Trachea midline.  Neck supple with no lymphadenopathy, thyroid not enlarged, symmetric, no tenderness/mass/nodules, elevated JVP to ear lobe at 45 degrees   Lungs:   Diminished breath sounds bases, no wheezes, no rales    Chest Wall:  No deformity or tenderness to palpation   Heart:  Regular rate and rhythm, normal S1, normal S2, no murmur, no rub, no S3/S4, PMI non-palpable   Abdomen:   Soft, non-tender, with normoactive bowel sounds. No masses, no hepatosplenomegaly   Extremities: No cyanosis, clubbing or pitting edema. Vascular: 2+ radial, brachial, femoral, dorsalis pedis and posterior tibial pulses bilaterally. Brisk carotid upstrokes without carotid bruit. Skin: Skin color, texture, turgor are normal with no rashes or ulceration. Pysch: Euthymic mood, appropriate affect   Neurologic: Oriented to person, place and time. No slurred speech or facial asymmetry. No motor or sensory deficits on gross examination. Labs:   CBC:   Lab Results   Component Value Date    WBC 8.0 09/18/2020    RBC 4.85 09/18/2020    HGB 15.1 09/18/2020    HCT 44.8 09/18/2020    MCV 92.4 09/18/2020    RDW 13.3 09/18/2020     09/18/2020     CMP:  Lab Results   Component Value Date     09/18/2020    K 5.0 09/18/2020    CL 95 09/18/2020    CO2 23 09/18/2020    BUN 24 09/18/2020    CREATININE 0.9 09/18/2020    GFRAA >60 09/18/2020    AGRATIO 1.5 09/18/2020    LABGLOM >60 09/18/2020    GLUCOSE 427 09/18/2020    PROT 7.2 09/18/2020    CALCIUM 9.4 09/18/2020    BILITOT 0.8 09/18/2020    ALKPHOS 149 09/18/2020     09/18/2020    ALT 97 09/18/2020     PT/INR:  No results found for: PTINR  HgBA1c:  Lab Results   Component Value Date    LABA1C 13.5 08/22/2020     Lab Results   Component Value Date    CKTOTAL 54 06/12/2020    TROPONINI <0.01 09/19/2020         Cardiac Data:     EKG: Sinus rhythm with PVCs, poor R wave progression cannot rule out prior anterior infarct    Echo: 6/13/20  Summary   LV systolic function is moderately reduced with an estimated EF of 35%. Moderate global hypokinesis. There is mild concentric left ventricular hypertrophy. Left ventricular cavity size is mildly dilated. Estimated LV diastolic filling pressure is normal.   Mild mitral and tricuspid regurgitation.    Systolic pulmonary artery pressure (SPAP) is estimated at 35mmHg (Right   atrial pressure of 8 mmHg). No significant change from exam done 10/18/2019. Stress Test:  1/31/2020 demonstrated mildly reduced LVEF 45%  Inferolateral hypokinesis  Mainly fixed inferior defect suggestive of scar  No evidence of myocardium at risk for significant reversible ischemia    Cardiac catheterization:  12/2018  Non-obstructive CAD with FFR PDA 0.91    Additional studies:     9/18/20 interrogation [place]  AT/AF burden 3.4%. AMS/nsvt recordings show AT/SVT-RVR. Corvue is within normal limits. Pt currently admitted    Most recent device check 9/8/20 demonstrated  <1%, AP < 1%, AT/AF burden 3.1%, SVT 5 episodes since 6/13/20, non sustained episodes 16 since 6/13/20. No VT and no VF. Impression and Plan:   Ms. Luciano Gottron is a pleasant 55-year-old female with the above outlined history presenting as a transfer from [facility] emergency department for evaluation of palpitations. Her interrogation in the emergency department shows AT/SVT with rapid ventricular response. This is a recurrent complaint requiring admission earlier this month in addition to now. We will seek electrophysiology consultation. She does appear volume overloaded by JVP. No CXR/BNP to date. Further plans as below. 1.  Palpitations  2. SVT by device interrogation  3. NSVT, frequent by telemetry  4. Acute on chronic systolic ingestive heart failure due to Nonischemic cardiomyopathy  3.  chest pain, chronic, unchanged. Ruled out for MI.  4.  Nonobstructive coronary artery disease  5. Status post ICD  6. Hypertension  1. Hyperlipidemia    Plan:  1. Give lasix 40mg IV BID today as she appears significantly volume up by exam/JVP assessment  2. I have ordered CXR, proBNP and BMP to complete workup; will review upon return  3. EP consultation for SVT and NSVT management; she continues on Ranexa as well as low-dose beta-blocker  4.  Continue current medications otherwise with consideration to switching carvedilol to metoprolol XL once diuresed due to low-normal BP's. 5. Continue telemetry monitoring  6. Agressively repeat lytes- K>4, Mg>2 goals. Will monitor daily. She will need continued diuresis IV. Further evaluation and plan for arrhythmia management from  tomorrow.         Patient Active Problem List   Diagnosis    DM (diabetes mellitus) ([address]    HTN (hypertension), benign    Dyslipidemia    CAD (coronary artery disease)    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)    Dual ICD (implantable cardioverter-defibrillator) in place    Brain tumor (benign) ([address]    Chronic combined systolic (EF 90-66%) & diastolic (grade 2 LVDD) CHF    TIA involving right internal carotid artery    CAD in native artery    DM (diabetes mellitus), secondary, uncontrolled, w/neurologic complic ([address]    CHF (congestive heart failure) ([address]    Nonischemic cardiomyopathy ([address]    Essential hypertension    TIA (transient ischemic attack)    Hyperglycemia    Diabetic ketoacidosis without coma associated with type 2 diabetes mellitus (HCC)    Non-intractable vomiting with nausea    Chest pain    Arterial ischemic stroke, ICA, right, acute ([address]    Diabetic hyperosmolar non-ketotic state ([address]    Diabetic acidosis without coma ([address]    Hyponatremia    Metabolic acidosis    Disorder of electrolytes    Diabetic ketoacidosis with coma associated with type 2 diabetes mellitus (HCC)    Hypernatremia    Leukocytosis    Atrial tachycardia (HCC)    DKA, type 2, not at goal [facility])    Cognitive developmental delay    Mood disorder (HCC)    Urinary tract infection with hematuria    Nausea and vomiting    Hypokalemia    Persistent fever    Syncope and collapse    S/P ICD (internal cardiac defibrillator) procedure    History of CVA (cerebrovascular accident)    Noncompliance with medications    Obesity    SVT (supraventricular tachycardia) ([address]    Cardiomyopathy, ischemic         I will address the patient's cardiac risk factors and adjusted pharmacologic treatment as needed. In addition, I have reinforced the need for patient directed risk factor modification. All questions and concerns were addressed to the patient/family. Alternatives to my treatment were discussed. Thank you for allowing us to participate in the care of Pato Gates. Please call me with any questions [phone].     Maldonado Weathers MD   Cardiovascular Disease  [facility]  [phone] [facility]  [phone] [address] [place] [address]  9/20/2020 7:58 AM

## 2020-09-20 NOTE — PLAN OF CARE
Problem: OXYGENATION/RESPIRATORY FUNCTION  Goal: Patient will achieve/maintain normal respiratory rate/effort  Description: Respiratory rate and effort will be within normal limits for the patient  Outcome: Ongoing     Problem: Serum Glucose Level - Abnormal:  Goal: Ability to maintain appropriate glucose levels will improve  Description: Ability to maintain appropriate glucose levels will improve  Outcome: Ongoing     Patient's EF (Ejection Fraction) is less than 40%    Heart Failure Medications:  Diuretics[de-identified] None    (One of the following REQUIRED for EF <40%/SYSTOLIC FAILURE but MAY be used in EF% >40%/DIASTOLIC FAILURE)        ACE[de-identified] Lisinopril        ARB[de-identified] None         ARNI[de-identified] None    (Beta Blockers)  NON- Evidenced Based Beta Blocker (for EF% >40%/DIASTOLIC FAILURE): None    Evidenced Based Beta Blocker::(REQUIRED for EF% <40%/SYSTOLIC FAILURE) Carvedilol- Coreg  . .................................................................................................................................................. Patient's weights and intake/output reviewed: Yes    Patient's Last Weight: 204 lbs obtained by standing scale. Difference of 1 lbs more than last documented weight. Intake/Output Summary (Last 24 hours) at 9/20/2020 0690  Last data filed at 9/20/2020 0417  Gross per 24 hour   Intake 480 ml   Output 100 ml   Net 380 ml       Comorbidities Reviewed Yes    Patient has a past medical history of Arthritis, CAD (coronary artery disease), Cerebral artery occlusion with cerebral infarction ([facility] CHF (congestive heart failure) ([facility] [address] Diabetes mellitus ([facility] [address] Hyperlipidemia, Hypertension, Mental retardation, and MI (myocardial infarction) ([facility] [address]      >>For CHF and Comorbidity documentation on Education Time and Topics, please see Education Tab    Progressive Mobility Assessment:  What is this patient's Current Level of Mobility?: Ambulatory-Up Ad Lois  How was this patient Mobilized today?: Edge of Bed, Up to Chair,  Up to Toilet/Shower, and Up in Room                 With Whom? Self                 Level of Difficulty/Assistance: Independent     Pt sitting in bed at this time on room air. Pt denies shortness of breath. Pt without lower extremity edema.      Patient and/or Family's stated Goal of Care this Admission: better understand heart failure and disease management and be more comfortable prior to discharge        :

## 2020-09-20 NOTE — PROGRESS NOTES
murmurs, rubs or gallops. Abdomen: Soft, non-tender, non-distended with normal bowel sounds. Musculoskeletal: No clubbing, cyanosis or edema bilaterally. Skin: warm and dry   Neurologic:  Alert, speech clear with no overt facial droop  Psychiatric: Alert and oriented, thought content appropriate, normal insight        Labs:   Recent Labs     09/18/20  1647   WBC 8.0   HGB 15.1   HCT 44.8        Recent Labs     09/18/20  1647   *   K 5.0   CL 95*   CO2 23   BUN 24*   CREATININE 0.9   CALCIUM 9.4     Recent Labs     09/18/20  1647   *   ALT 97*   BILITOT 0.8   ALKPHOS 149*     No results for input(s): INR in the last 72 hours. Recent Labs     09/18/20  2116 09/19/20  1550 09/19/20 2030   TROPONINI <0.01 <0.01 <0.01       Urinalysis:      Lab Results   Component Value Date    NITRU Negative 07/17/2020    WBCUA 10-20 06/12/2020    BACTERIA Rare 05/28/2020    RBCUA 3-4 06/12/2020    BLOODU Negative 07/17/2020    SPECGRAV <=1.005 07/17/2020    GLUCOSEU >=1000 07/17/2020       Radiology:  XR CHEST (2 VW)    (Results Pending)           Assessment/Plan:    Active Hospital Problems    Diagnosis    SVT (supraventricular tachycardia) (HCC) [I47.1]     SVT:    Has ICD , device interrogation in ER showed AT/SVT. Cardio recs appreciated. EP consultation pending. Continue tele, BB           Acute on chronic CHF/ ischemic cardiomyopathy: Last ECHO EF 35% with moderate global hypokinesis. Ischemic cardiomyopathy. Started on IV lasix, monitor strict I's and O's, daily weights. CAD: Continue ACE, BB, asa, statin       Uncontrolled hyperglycemia. Hx of poorly controlled DM, HBA1c 13.3   - BG borderline 143 this am, Continue lantus at 40units BID, SSI  and decrease am scheduled humalog to 15 units. On  30units humalog with meals at home which have been continued.  Monitor accuchecks and adjust insulin doses as needed.         PMH of CVA, htn, hld, obesity    Transaminitis: noted on admission labs of

## 2020-09-21 ENCOUNTER — NURSE ONLY (OUTPATIENT)
Dept: CARDIOLOGY CLINIC | Age: 60
End: 2020-09-21

## 2020-09-21 LAB
ANION GAP SERPL CALCULATED.3IONS-SCNC: 11 MMOL/L (ref 3–16)
BUN BLDV-MCNC: 25 MG/DL (ref 7–20)
CALCIUM SERPL-MCNC: 9 MG/DL (ref 8.3–10.6)
CHLORIDE BLD-SCNC: 103 MMOL/L (ref 99–110)
CO2: 22 MMOL/L (ref 21–32)
CREAT SERPL-MCNC: 0.6 MG/DL (ref 0.6–1.1)
GFR AFRICAN AMERICAN: >60
GFR NON-AFRICAN AMERICAN: >60
GLUCOSE BLD-MCNC: 232 MG/DL (ref 70–99)
GLUCOSE BLD-MCNC: 251 MG/DL (ref 70–99)
GLUCOSE BLD-MCNC: 75 MG/DL (ref 70–99)
GLUCOSE BLD-MCNC: 83 MG/DL (ref 70–99)
GLUCOSE BLD-MCNC: 90 MG/DL (ref 70–99)
HCT VFR BLD CALC: 44.4 % (ref 36–48)
HEMOGLOBIN: 14.9 G/DL (ref 12–16)
MAGNESIUM: 2.2 MG/DL (ref 1.8–2.4)
MCH RBC QN AUTO: 30.6 PG (ref 26–34)
MCHC RBC AUTO-ENTMCNC: 33.6 G/DL (ref 31–36)
MCV RBC AUTO: 91.1 FL (ref 80–100)
PDW BLD-RTO: 13.2 % (ref 12.4–15.4)
PERFORMED ON: ABNORMAL
PERFORMED ON: ABNORMAL
PERFORMED ON: NORMAL
PERFORMED ON: NORMAL
PLATELET # BLD: 252 K/UL (ref 135–450)
PMV BLD AUTO: 9.3 FL (ref 5–10.5)
POTASSIUM SERPL-SCNC: 3.7 MMOL/L (ref 3.5–5.1)
RBC # BLD: 4.87 M/UL (ref 4–5.2)
SODIUM BLD-SCNC: 136 MMOL/L (ref 136–145)
WBC # BLD: 8.6 K/UL (ref 4–11)

## 2020-09-21 PROCEDURE — 6360000002 HC RX W HCPCS: Performed by: INTERNAL MEDICINE

## 2020-09-21 PROCEDURE — 36415 COLL VENOUS BLD VENIPUNCTURE: CPT

## 2020-09-21 PROCEDURE — 6370000000 HC RX 637 (ALT 250 FOR IP): Performed by: INTERNAL MEDICINE

## 2020-09-21 PROCEDURE — 1200000000 HC SEMI PRIVATE

## 2020-09-21 PROCEDURE — 99223 1ST HOSP IP/OBS HIGH 75: CPT | Performed by: INTERNAL MEDICINE

## 2020-09-21 PROCEDURE — 85027 COMPLETE CBC AUTOMATED: CPT

## 2020-09-21 PROCEDURE — 80048 BASIC METABOLIC PNL TOTAL CA: CPT

## 2020-09-21 PROCEDURE — 2580000003 HC RX 258: Performed by: INTERNAL MEDICINE

## 2020-09-21 PROCEDURE — 83735 ASSAY OF MAGNESIUM: CPT

## 2020-09-21 RX ORDER — METOPROLOL SUCCINATE 25 MG/1
25 TABLET, EXTENDED RELEASE ORAL DAILY
Status: DISCONTINUED | OUTPATIENT
Start: 2020-09-22 | End: 2020-09-22 | Stop reason: HOSPADM

## 2020-09-21 RX ORDER — INSULIN GLARGINE 100 [IU]/ML
30 INJECTION, SOLUTION SUBCUTANEOUS 2 TIMES DAILY
Status: DISCONTINUED | OUTPATIENT
Start: 2020-09-21 | End: 2020-09-22 | Stop reason: HOSPADM

## 2020-09-21 RX ORDER — 0.9 % SODIUM CHLORIDE 0.9 %
500 INTRAVENOUS SOLUTION INTRAVENOUS ONCE
Status: COMPLETED | OUTPATIENT
Start: 2020-09-21 | End: 2020-09-21

## 2020-09-21 RX ADMIN — Medication 10 ML: at 11:56

## 2020-09-21 RX ADMIN — RANOLAZINE 500 MG: 500 TABLET, FILM COATED, EXTENDED RELEASE ORAL at 11:55

## 2020-09-21 RX ADMIN — TOPIRAMATE 200 MG: 100 TABLET, FILM COATED ORAL at 11:55

## 2020-09-21 RX ADMIN — PROMETHAZINE HYDROCHLORIDE 12.5 MG: 25 TABLET ORAL at 02:49

## 2020-09-21 RX ADMIN — FUROSEMIDE 40 MG: 10 INJECTION, SOLUTION INTRAMUSCULAR; INTRAVENOUS at 11:54

## 2020-09-21 RX ADMIN — Medication 10 ML: at 22:33

## 2020-09-21 RX ADMIN — RANOLAZINE 500 MG: 500 TABLET, FILM COATED, EXTENDED RELEASE ORAL at 22:28

## 2020-09-21 RX ADMIN — LISINOPRIL 2.5 MG: 5 TABLET ORAL at 11:55

## 2020-09-21 RX ADMIN — INSULIN GLARGINE 30 UNITS: 100 INJECTION, SOLUTION SUBCUTANEOUS at 22:30

## 2020-09-21 RX ADMIN — CLOPIDOGREL BISULFATE 75 MG: 75 TABLET ORAL at 11:55

## 2020-09-21 RX ADMIN — ASPIRIN 81 MG: 81 TABLET, COATED ORAL at 11:55

## 2020-09-21 RX ADMIN — INSULIN LISPRO 2 UNITS: 100 INJECTION, SOLUTION INTRAVENOUS; SUBCUTANEOUS at 22:29

## 2020-09-21 RX ADMIN — CARVEDILOL 6.25 MG: 6.25 TABLET, FILM COATED ORAL at 11:55

## 2020-09-21 RX ADMIN — SODIUM CHLORIDE 500 ML: 9 INJECTION, SOLUTION INTRAVENOUS at 18:10

## 2020-09-21 ASSESSMENT — PAIN SCALES - GENERAL
PAINLEVEL_OUTOF10: 0

## 2020-09-21 NOTE — PROGRESS NOTES
Hospitalist Progress Note      PCP: FRITZ Fletcher NP    Date of Admission: 9/19/2020    Chief Complaint: palpitations           Subjective:     Pt feels weak with frequent palpitations and chest pain which spontaneously resolve. Medications:  Reviewed    Infusion Medications    dextrose       Scheduled Medications    furosemide  40 mg Intravenous BID    topiramate  200 mg Oral Daily    aspirin  81 mg Oral Daily    ranolazine  500 mg Oral BID    insulin glargine  40 Units Subcutaneous BID    lisinopril  2.5 mg Oral Daily    carvedilol  6.25 mg Oral BID WC    clopidogrel  75 mg Oral Daily    insulin lispro  30 Units Subcutaneous TID     sodium chloride flush  10 mL Intravenous 2 times per day    enoxaparin  40 mg Subcutaneous Daily    insulin lispro  0-12 Units Subcutaneous TID WC    insulin lispro  0-6 Units Subcutaneous Nightly    magnesium replacement protocol   Other RX Placeholder     PRN Meds: nitroGLYCERIN, sodium chloride flush, acetaminophen **OR** acetaminophen, polyethylene glycol, promethazine **OR** ondansetron, perflutren lipid microspheres, glucose, dextrose, glucagon (rDNA), dextrose, magnesium sulfate      Intake/Output Summary (Last 24 hours) at 9/21/2020 1347  Last data filed at 9/21/2020 1200  Gross per 24 hour   Intake 1040 ml   Output 1300 ml   Net -260 ml       Physical Exam Performed:    BP 91/64   Pulse 89   Temp 98 °F (36.7 °C) (Oral)   Resp 18   Ht 5' 1\" (1.549 m)   Wt 204 lb 11.2 oz (92.9 kg)   SpO2 98%   BMI 38.68 kg/m²     General appearance: No apparent distress, appears stated age and cooperative. Obese   HEENT: Pupils equal, round. Conjunctivae/corneas clear. Neck: Supple, with full range of motion. No jugular venous distention. Trachea midline. Respiratory:  Normal respiratory effort. Clear to auscultation, bilaterally without Rales/Wheezes/Rhonchi.   Cardiovascular: irregular  rate and rhythm with normal S1/S2 without murmurs, rubs or scheduled humalog, decrease SSI to low dose and decrease lantus to 30 units BID. Monitor and adjust insulin doses as needed         PMH of CVA, htn, hld, obesity    Transaminitis: noted on admission labs of unclear etiology. Statin held. LFTs improved on repeat lab.        DVT Prophylaxis: lovenox    Diet: DIET CARB CONTROL; Low Sodium (2 GM)  Code Status: Full Code    PT/OT Eval Status: not indicated at this time     Dispo - 1-3 days pending clinical course     Jigar Nathan MD

## 2020-09-21 NOTE — PROGRESS NOTES
Pt refusing her am medications and vitals. Will attempt again at a later time.  Electronically signed by Corinne Pearson RN on 9/21/2020 at 10:14 AM

## 2020-09-21 NOTE — PLAN OF CARE
Patient states she is feeling dizzy and nausea. VSS phenergan given. Will continue to monitor. Problem: OXYGENATION/RESPIRATORY FUNCTION  Goal: Patient will maintain patent airway  Outcome: Met This Shift  Goal: Patient will achieve/maintain normal respiratory rate/effort  Description: Respiratory rate and effort will be within normal limits for the patient  Outcome: Met This Shift     Problem: HEMODYNAMIC STATUS  Goal: Patient has stable vital signs and fluid balance  Outcome: Met This Shift     Problem: ACTIVITY INTOLERANCE/IMPAIRED MOBILITY  Goal: Mobility/activity is maintained at optimum level for patient  Outcome: Ongoing     Problem: Discharge Planning:  Goal: Discharged to appropriate level of care  Description: Discharged to appropriate level of care  Outcome: Ongoing     Problem: Serum Glucose Level - Abnormal:  Goal: Ability to maintain appropriate glucose levels will improve  Description: Ability to maintain appropriate glucose levels will improve  Outcome: Ongoing     Problem: Sensory Perception - Impaired:  Goal: Ability to maintain a stable neurologic state will improve  Description: Ability to maintain a stable neurologic state will improve  Outcome: Ongoing     Patient's EF (Ejection Fraction) is less than 40%    Heart Failure Medications:   Diuretics[de-identified] None     (One of the following REQUIRED for EF <40%/SYSTOLIC FAILURE but MAY be used in EF% >40%/DIASTOLIC FAILURE)        ACE[de-identified] Lisinopril        ARB[de-identified] None         ARNI[de-identified] None    (Beta Blockers)   NON- Evidenced Based Beta Blocker (for EF% >40%/DIASTOLIC FAILURE): None     Evidenced Based Beta Blocker::(REQUIRED for EF% <40%/SYSTOLIC FAILURE) Carvedilol- Coreg  . .................................................................................................................................................. Patient's weights and intake/output reviewed: Yes    Patient's Last Weight: 204 lbs obtained by standing scale.  Difference of 1 lbs more than last documented weight. Intake/Output Summary (Last 24 hours) at 9/21/2020 0219  Last data filed at 9/20/2020 2355  Gross per 24 hour   Intake 720 ml   Output 1100 ml   Net -380 ml       Comorbidities Reviewed Yes    Patient has a past medical history of Arthritis, CAD (coronary artery disease), Cerebral artery occlusion with cerebral infarction ([facility] [address] CHF (congestive heart failure) ([facility] [address] Diabetes mellitus ([facility] [address] Hyperlipidemia, Hypertension, Mental retardation, and MI (myocardial infarction) ([facility] [address] >>For CHF and Comorbidity documentation on Education Time and Topics, please see Education Tab    Progressive Mobility Assessment:  What is this patient's Current Level of Mobility?: Ambulatory-Up Ad Lois  How was this patient Mobilized today?: Edge of Bed, Up to Chair,  Up to Toilet/Shower, Up in Room, and Up in Hallway                 With Whom? Self                 Level of Difficulty/Assistance: Independent     Pt resting in bed at this time on room air. Pt denies shortness of breath. Pt without lower extremity edema.      Patient and/or Family's stated Goal of Care this Admission: increase activity tolerance, better understand heart failure and disease management, and be more comfortable prior to discharge        :

## 2020-09-22 VITALS
BODY MASS INDEX: 38.87 KG/M2 | WEIGHT: 205.9 LBS | OXYGEN SATURATION: 100 % | DIASTOLIC BLOOD PRESSURE: 56 MMHG | HEART RATE: 87 BPM | RESPIRATION RATE: 18 BRPM | HEIGHT: 61 IN | TEMPERATURE: 97.6 F | SYSTOLIC BLOOD PRESSURE: 91 MMHG

## 2020-09-22 PROBLEM — I50.43 ACUTE ON CHRONIC COMBINED SYSTOLIC AND DIASTOLIC CHF (CONGESTIVE HEART FAILURE) (HCC): Status: ACTIVE | Noted: 2018-05-08

## 2020-09-22 LAB
ANION GAP SERPL CALCULATED.3IONS-SCNC: 12 MMOL/L (ref 3–16)
BUN BLDV-MCNC: 28 MG/DL (ref 7–20)
CALCIUM SERPL-MCNC: 8.8 MG/DL (ref 8.3–10.6)
CHLORIDE BLD-SCNC: 101 MMOL/L (ref 99–110)
CO2: 25 MMOL/L (ref 21–32)
CREAT SERPL-MCNC: 0.7 MG/DL (ref 0.6–1.1)
GFR AFRICAN AMERICAN: >60
GFR NON-AFRICAN AMERICAN: >60
GLUCOSE BLD-MCNC: 117 MG/DL (ref 70–99)
GLUCOSE BLD-MCNC: 175 MG/DL (ref 70–99)
GLUCOSE BLD-MCNC: 191 MG/DL (ref 70–99)
PERFORMED ON: ABNORMAL
PERFORMED ON: ABNORMAL
POTASSIUM SERPL-SCNC: 4.8 MMOL/L (ref 3.5–5.1)
SODIUM BLD-SCNC: 138 MMOL/L (ref 136–145)

## 2020-09-22 PROCEDURE — 97530 THERAPEUTIC ACTIVITIES: CPT

## 2020-09-22 PROCEDURE — 2580000003 HC RX 258: Performed by: INTERNAL MEDICINE

## 2020-09-22 PROCEDURE — 97165 OT EVAL LOW COMPLEX 30 MIN: CPT

## 2020-09-22 PROCEDURE — 97116 GAIT TRAINING THERAPY: CPT

## 2020-09-22 PROCEDURE — 80048 BASIC METABOLIC PNL TOTAL CA: CPT

## 2020-09-22 PROCEDURE — 97161 PT EVAL LOW COMPLEX 20 MIN: CPT

## 2020-09-22 PROCEDURE — 6360000002 HC RX W HCPCS: Performed by: INTERNAL MEDICINE

## 2020-09-22 PROCEDURE — 36415 COLL VENOUS BLD VENIPUNCTURE: CPT

## 2020-09-22 PROCEDURE — 6370000000 HC RX 637 (ALT 250 FOR IP): Performed by: INTERNAL MEDICINE

## 2020-09-22 RX ORDER — INSULIN GLARGINE 100 [IU]/ML
30 INJECTION, SOLUTION SUBCUTANEOUS 2 TIMES DAILY
Qty: 5 PEN | Refills: 3 | Status: ON HOLD | OUTPATIENT
Start: 2020-09-22 | End: 2020-10-26

## 2020-09-22 RX ORDER — INSULIN ASPART 100 [IU]/ML
5 INJECTION, SOLUTION INTRAVENOUS; SUBCUTANEOUS
Qty: 5 PEN | Refills: 3 | Status: ON HOLD | OUTPATIENT
Start: 2020-09-22 | End: 2020-10-26

## 2020-09-22 RX ORDER — METOPROLOL SUCCINATE 25 MG/1
25 TABLET, EXTENDED RELEASE ORAL DAILY
Qty: 30 TABLET | Refills: 3 | Status: SHIPPED | OUTPATIENT
Start: 2020-09-23 | End: 2020-09-25

## 2020-09-22 RX ORDER — FUROSEMIDE 40 MG/1
40 TABLET ORAL 2 TIMES DAILY
Status: DISCONTINUED | OUTPATIENT
Start: 2020-09-22 | End: 2020-09-22 | Stop reason: HOSPADM

## 2020-09-22 RX ADMIN — ENOXAPARIN SODIUM 40 MG: 40 INJECTION SUBCUTANEOUS at 09:35

## 2020-09-22 RX ADMIN — CLOPIDOGREL BISULFATE 75 MG: 75 TABLET ORAL at 09:35

## 2020-09-22 RX ADMIN — Medication 10 ML: at 09:37

## 2020-09-22 RX ADMIN — RANOLAZINE 500 MG: 500 TABLET, FILM COATED, EXTENDED RELEASE ORAL at 09:38

## 2020-09-22 RX ADMIN — ASPIRIN 81 MG: 81 TABLET, COATED ORAL at 09:35

## 2020-09-22 RX ADMIN — INSULIN GLARGINE 30 UNITS: 100 INJECTION, SOLUTION SUBCUTANEOUS at 09:36

## 2020-09-22 RX ADMIN — TOPIRAMATE 200 MG: 100 TABLET, FILM COATED ORAL at 09:35

## 2020-09-22 RX ADMIN — INSULIN LISPRO 1 UNITS: 100 INJECTION, SOLUTION INTRAVENOUS; SUBCUTANEOUS at 12:13

## 2020-09-22 ASSESSMENT — PAIN SCALES - GENERAL
PAINLEVEL_OUTOF10: 10
PAINLEVEL_OUTOF10: 10

## 2020-09-22 ASSESSMENT — PAIN DESCRIPTION - LOCATION: LOCATION: CHEST

## 2020-09-22 ASSESSMENT — PAIN DESCRIPTION - PAIN TYPE: TYPE: ACUTE PAIN

## 2020-09-22 NOTE — PLAN OF CARE
Problem: OXYGENATION/RESPIRATORY FUNCTION  Goal: Patient will maintain patent airway  Outcome: Met This Shift  SaO2 has remained in the 90's on room air. Goal: Patient will achieve/maintain normal respiratory rate/effort  Description: Respiratory rate and effort will be within normal limits for the patient  Outcome: Met This Shift     Problem: HEMODYNAMIC STATUS  Goal: Patient has stable vital signs and fluid balance  Outcome: Ongoing     Problem: FLUID AND ELECTROLYTE IMBALANCE  Goal: Fluid and electrolyte balance are achieved/maintained  Outcome: Ongoing     Problem: ACTIVITY INTOLERANCE/IMPAIRED MOBILITY  Goal: Mobility/activity is maintained at optimum level for patient  Outcome: Ongoing     Problem: Discharge Planning:  Goal: Discharged to appropriate level of care  Description: Discharged to appropriate level of care  Outcome: Ongoing     Problem: Serum Glucose Level - Abnormal:  Goal: Ability to maintain appropriate glucose levels will improve  Description: Ability to maintain appropriate glucose levels will improve  Outcome: Ongoing     Patient's EF (Ejection Fraction) is less than 40%    Heart Failure Medications:   Diuretics[de-identified] Furosemide     (One of the following REQUIRED for EF <40%/SYSTOLIC FAILURE but MAY be used in EF% >40%/DIASTOLIC FAILURE)        ACE[de-identified] Lisinopril        ARB[de-identified] None         ARNI[de-identified] None    (Beta Blockers)   NON- Evidenced Based Beta Blocker (for EF% >40%/DIASTOLIC FAILURE): None     Evidenced Based Beta Blocker::(REQUIRED for EF% <40%/SYSTOLIC FAILURE) Carvedilol- Coreg  . .................................................................................................................................................. Patient's weights and intake/output reviewed: Yes    Patient's Last Weight: 204 lbs obtained by standing scale. Difference of 0 lbs less than last documented weight.       Intake/Output Summary (Last 24 hours) at 9/22/2020 0355  Last data filed at 9/21/2020 2239  Gross per 24 hour   Intake 1200 ml   Output 850 ml   Net 350 ml       Comorbidities Reviewed Yes    Patient has a past medical history of Arthritis, CAD (coronary artery disease), Cerebral artery occlusion with cerebral infarction ([address] CHF (congestive heart failure) ([facility] [address] Diabetes mellitus ([facility] [address] Hyperlipidemia, Hypertension, Mental retardation, and MI (myocardial infarction) ([facility] [address] >>For CHF and Comorbidity documentation on Education Time and Topics, please see Education Tab    Progressive Mobility Assessment:  What is this patient's Current Level of Mobility?: Ambulatory-Up Ad Lois  How was this patient Mobilized today?: Edge of Bed, Up to Chair,  Up to Toilet/Shower and Up in Room                 With Whom? Nurse and Self                 Level of Difficulty/Assistance: Independent     Pt resting in bed at this time on room air. Pt denies shortness of breath. Pt with pitting lower extremity edema.      Patient and/or Family's stated Goal of Care this Admission: increase activity tolerance, better understand heart failure and disease management, be more comfortable and reduce lower extremity edema prior to discharge        :

## 2020-09-22 NOTE — CONSULTS
Electrophysiology Consultation   Date: 9/21/2020  Admit Date:  9/19/2020  Reason for Consultation: Palpitations, chest pain, and shortness of breath  Consult Requesting Physician: Douglas Bernal MD     No chief complaint on file. HPI:   Mrs. Fe Smith is a 62year old female with a medical history significant for supraventricular tachycardia, non-sustained ventricular tachycardia, chronic atypical chest pain, diabetes mellitus type II, cerebral vascular disease, cognitive delay, non-ischemic cardiomyopathy status post DC ICD ([employer]), hypertension, and hyperlipidemia who presents from home with chest pain and shortness of breath. According to patient she presented to the ER with chest pain, shortness of breath, and palpitations. According to patient she began to suffer from palpitations. Her history and timing is unclear as initially she said she hasn't had more palpitations however during subsequent portion of interview she states that she is currently having palpitations. She doesn't correlate timing of chest pain with palpitations. She is unable to describe palpitations beyond \"palpitations. \"     Patient denies fevers, orthopnea, PND, lower extremity edema, abdominal swelling, chills, visual changes, headaches, sore throat, cough, abdominal pain, nausea, vomiting, bleeding, bruising, dysuria, muscle/joint pain, confusion, depression, anxiety, skin lesions, etc.    Emergency Room/Hospital Course:  Patient was evaluated in the ER. She was admitted from [facility] for SVT evaluation by electrophysiology. Her CMP was reassuring. Her CBC was reassuring. Her BNP is likely around baseline. Her chest radiograph was chest radiograph was reassuring. Cardiology/Electrophysiology was consulted for supraventricular tachycardia. Of note, patient had SVT on her device. Last episode was day prior to admission. SVT appears to be AT vs AVNRT (terminate with PVC which suggests AVNRT).     Past Medical History:   Diagnosis Date    Arthritis     CAD (coronary artery disease)     Cerebral artery occlusion with cerebral infarction ([facility] [address]     CHF (congestive heart failure) (HCC)     [facility]     Hyperlipidemia     Hypertension     Mental retardation     MI (myocardial infarction) ([facility]         Past Surgical History:   Procedure Laterality Date    BACK SURGERY      PACEMAKER INSERTION      TUBAL LIGATION      TUMOR REMOVAL         No Known Allergies    Social History:  Reviewed. reports that she has never smoked. She has never used smokeless tobacco. She reports that she does not drink alcohol or use drugs. Family History:  Reviewed. family history includes Cancer in her mother; Heart Disease in her father; Other in her mother. No premature CAD. Review of System:  All other systems reviewed except for that noted above. Pertinent negatives and positives are:     · General: negative for fever, chills   · Ophthalmic ROS: negative for - eye pain or loss of vision  · ENT ROS: negative for - headaches, sore throat   · Respiratory: negative for - cough, sputum  · Cardiovascular: Reviewed in HPI  · Gastrointestinal: negative for - abdominal pain, diarrhea, N/V  · Hematology: negative for - bleeding, blood clots, bruising or jaundice  · Genito-Urinary:  negative for - Dysuria or incontinence  · Musculoskeletal: negative for - Joint swelling, muscle pain  · Neurological: negative for - confusion, dizziness, headaches   · Psychiatric: No anxiety, no depression.   · Dermatological: negative for - rash    Physical Examination:  Vitals:    09/21/20 2030   BP: 98/64   Pulse: 89   Resp: 18   Temp: 97.7 °F (36.5 °C)   SpO2: 98%        Intake/Output Summary (Last 24 hours) at 9/21/2020 2052  Last data filed at 9/21/2020 1630  Gross per 24 hour   Intake 1200 ml   Output 1700 ml   Net -500 ml     In: 1200 [P.O.:1200]  Out: 1700    Wt Readings from Last 3 Encounters:   09/21/20 204 lb 11.2 oz (92.9 kg) 20 199 lb (90.3 kg)   20 203 lb 1.6 oz (92.1 kg)     Temp  Av.7 °F (36.5 °C)  Min: 97 °F (36.1 °C)  Max: 98.1 °F (36.7 °C)  Pulse  Av.7  Min: 70  Max: 90  BP  Min: 70/50  Max: 101/68  SpO2  Av.8 %  Min: 97 %  Max: 98 %    · Telemetry: Sinus rhythm   · Constitutional: Alert. Oriented to person, place, and time. No distress. · Neck: Neck supple. No lymphadenopathy. No rigidity. No JVD present. · Cardiovas   · Pulmonary/Chest: Bilateral respiratory sounds present. No respiratory accessory muscle use. · Abdominal: Soft. Normal bowel sounds present. No distension, No tenderness. No splenomegaly. No hernia. · Musculoskeletal: No tenderness. No edema    · Neurological: Alert and oriented. Cranial nerve II-XII grossly. · Skin: Skin is warm and dry. No rash, lesions, ulcerations noted. · Psychiatric: No anxiety nor agitation. Labs:  Reviewed. Recent Labs     20  0940 20  1018   * 136   K 4.2 3.7   CL 97* 103   CO2 22 22   BUN 28* 25*   CREATININE 0.6 0.6     Recent Labs     20  0940 20  1018   WBC 8.7 8.6   HGB 14.6 14.9   HCT 43.3 44.4   MCV 90.9 91.1    252     Lab Results   Component Value Date    CKTOTAL 54 2020    TROPONINI <0.01 2020     No results found for: BNP  Lab Results   Component Value Date    PROTIME 11.8 2020    PROTIME 11.7 2020    PROTIME 13.5 2020    INR 1.02 2020    INR 1.01 2020    INR 1.16 2020     Lab Results   Component Value Date    CHOL 166 10/12/2019    HDL 51 10/12/2019    TRIG 142 10/12/2019       Diagnostic and imaging results reviewed. EC2020 - Normal sinus rhythm with PVCs. Poor R wave progression. Echo: 2020   Summary   LV systolic function is moderately reduced with an estimated EF of 35%. Moderate global hypokinesis. There is mild concentric left ventricular hypertrophy. Left ventricular cavity size is mildly dilated.    Estimated LV infection with hematuria     Nausea and vomiting     Atrial tachycardia ([address] 01/28/2020    Leukocytosis     Diabetic ketoacidosis with coma associated with type 2 diabetes mellitus ([address] 12/21/2019    Diabetic acidosis without coma ([address]     Hyponatremia     Metabolic acidosis     Disorder of electrolytes     Hypernatremia     Diabetic hyperosmolar non-ketotic state ([address] 12/20/2019    Arterial ischemic stroke, ICA, right, acute ([address]     Chest pain 10/07/2019    Non-intractable vomiting with nausea     Diabetic ketoacidosis without coma associated with type 2 diabetes mellitus ([address]     Hyperglycemia 04/09/2019    TIA (transient ischemic attack) 09/17/2018    Nonischemic cardiomyopathy ([address]     Essential hypertension     CHF (congestive heart failure) ([address] 06/13/2018    Dual ICD (implantable cardioverter-defibrillator) in place 05/08/2018    Brain tumor (benign) ([facility] [address] [facility] 05/08/2018    Chronic combined systolic (EF 72-86%) & diastolic (grade 2 LVDD) CHF 05/08/2018    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)     TIA involving right internal carotid artery     CAD in native artery     DM (diabetes mellitus), secondary, uncontrolled, w/neurologic complic ([address]     DM (diabetes mellitus) ([address]     HTN (hypertension), benign     Dyslipidemia     CAD (coronary artery disease)       Active Hospital Problems    Diagnosis Date Noted    SVT (supraventricular tachycardia) ([facility] [address] [I47.1]        Mrs. Alexandro Nolasco is a 62year old female with a medical history significant for supraventricular tachycardia, non-sustained ventricular tachycardia, chronic atypical chest pain, diabetes mellitus type II, cerebral vascular disease, cognitive delay, non-ischemic cardiomyopathy status post DC ICD ([employer]), hypertension, and hyperlipidemia who presents from home with chest pain and shortness of breath. Problem List:  1. Supraventricular tachycardia (new onset).   2. Non-ischemic cardiomyopathy status post DC ICD. 3. Chronic atypical chest pain and shortness of breath. Assessment and Plan:  1. Supraventricular tachycardia (new onset). Patient is a 62year old female with a medical history significant for supraventricular tachycardia, non-ischemic cardiomyopathy, diabetes mellitus type II, cognitive delay, and cerebral vascular disease who presents from home with chest pain and palpitations. Association between symptoms and tachycardia unclear as no SVT before admission (or that would lead to admission for palpitations). SVT appears to be AVNRT > AT. Atrial fibrillation seen on device likely AT based on CL and regularity (burden ~3.3%). High risk for stroke but no clear atrial fibrillation. I would like to find out if symptoms related to SVT. I will arrange for heart monitor and follow up with me in clinic to discuss results. - Agree with Dr. Minesh López, I will switch from coreg to metoprolol given hypotension.  - We will arrange for for 2 week monitor.  - We will arrange for follow up with me in clinic at next available appointment to review results (monitor to link symptoms to SVT episodes). - We will sign off case. 2. Non-ischemic cardiomyopathy status post DC ICD. - Per cardiology. 3. Chronic atypical chest pain and shortness of breath. - Per cardiology. 4. Non-sustained ventricular tachycardia. Stable. Post DC ICD with [employer]. - Continue to monitor clinically. Thank you for allowing me to participate in the care of Chava Linares . If you have any questions/comments, please do not hesitate to contact us.     Rigoberto Jean MD  Cardiac Electrophysiology  [address] [facility]  [phone] [facility]

## 2020-09-22 NOTE — PROGRESS NOTES
Occupational Therapy   Occupational Therapy Initial Assessment and Treatment Note  Date: 2020   Patient Name: Rosa Yañez  MRN: [id number]     : 1960    Date of Service: 2020    Discharge Recommendations:  Home with Home health OT, Home with assist PRN  OT Equipment Recommendations  Equipment Needed: No    Assessment   Performance deficits / Impairments: Decreased functional mobility ; Decreased cognition;Decreased ADL status; Decreased high-level IADLs;Decreased balance  Assessment: Pt reports typically IND with BADL and IADL, functional mobility/transfers, presenting grossly at SBA in these areas without device at time of eval. Anticipate pt will be safe to return home with supervision/assistance as needed in these areas, but could benefit from further therapy and a home safety assessment. Continue OT tx. Prognosis: Good  Decision Making: Low Complexity  OT Education: OT Role;Plan of Care;Orientation;Precautions;Transfer Training;Energy Conservation  Patient Education: importance of mobility  Barriers to Learning: cognition  REQUIRES OT FOLLOW UP: Yes  Activity Tolerance  Activity Tolerance: Patient Tolerated treatment well  Safety Devices  Safety Devices in place: Yes  Type of devices: Gait belt;Call light within reach; Chair alarm in place; Left in chair;Nurse notified           Patient Diagnosis(es): The encounter diagnosis was SVT (supraventricular tachycardia) ([address] has a past medical history of Arthritis, CAD (coronary artery disease), Cerebral artery occlusion with cerebral infarction ([address] CHF (congestive heart failure) ([address] Diabetes mellitus ([address] Hyperlipidemia, Hypertension, Mental retardation, and MI (myocardial infarction) ([address] has a past surgical history that includes back surgery; Pacemaker insertion; tumor removal; and Tubal ligation.            Restrictions  Restrictions/Precautions  Restrictions/Precautions: General Precautions  Position Activity Restriction  Other position/activity restrictions: up with assist; low fall risk per nursing assessment, pacemaker    Subjective   General  Chart Reviewed: Yes  Patient assessed for rehabilitation services?: Yes  Referring Practitioner: Shayla Masters MD  Diagnosis: SVT  Subjective  Subjective: Pt in bed on arrival, agreeable to eval and treat  General Comment  Comments: Per RN okay to treat  Patient Currently in Pain: Yes(Pt reported 10/10 generalized pain, but no signs of distress or pain throughout session)  Pain Assessment  Pain Assessment: 0-10  Pain Level: 10  Pain Type: Acute pain  Pain Location: Chest  Non-Pharmaceutical Pain Intervention(s): Ambulation/Increased Activity;Repositioned  Response to Pain Intervention: Patient Satisfied  Vital Signs  Patient Currently in Pain: Yes(Pt reported 10/10 generalized pain, but no signs of distress or pain throughout session)  Social/Functional History  Social/Functional History  Lives With: Alone  Type of Home: Apartment  Home Layout: One level  Home Access: Level entry  Bathroom Shower/Tub: Walk-in shower  Bathroom Toilet: Standard  Bathroom Equipment: Shower chair  Home Equipment: Cane, Rolling walker  ADL Assistance: Independent  Homemaking Responsibilities: Yes  Ambulation Assistance: Independent(uses cane or RW)  Transfer Assistance: Independent  Active : No  Patient's  Info: walks to grocery  Additional Comments: States her neighbors have been making fun of her for being admitted to hospital frequently and wants to move; Sister has car and sometimes able to help take pt places       Objective        Orientation  Overall Orientation Status: Within Functional Limits     Balance  Sitting Balance: Modified independent   Standing Balance: Stand by assistance  Standing Balance  Time: 1-2 minutes, <1 minute, >2 minutes  Activity: mobility, transfers, standing ADL  Functional Mobility  Functional - Mobility Device: No device  Activity: Other  Assist Level: Stand by assistance  ADL  LE Dressing: Stand by assistance;Setup(to don pants)        Bed mobility  Supine to Sit: Modified independent  Sit to Supine: Unable to assess(pt left up in chair)  Transfers  Sit to stand: Stand by assistance  Stand to sit: Stand by assistance     Cognition  Overall Cognitive Status: Exceptions  Following Commands: Follows one step commands with repetition  Attention Span: Difficulty dividing attention; Attends with cues to redirect  Memory: Decreased short term memory  Problem Solving: Assistance required to generate solutions  Initiation: Requires cues for all  Sequencing: Requires cues for some  Cognition Comment: Pt with some difficulty following conversation, changing topics often     LUE AROM (degrees)  LUE AROM : WFL  Left Hand AROM (degrees)  Left Hand AROM: WFL  RUE AROM (degrees)  RUE AROM : WFL  Right Hand AROM (degrees)  Right Hand AROM: WFL  LUE Strength  Gross LUE Strength: WFL  LUE Strength Comment: Functional via observaton of functional activity, formal MMT varied with grasp and at shoulders/elbows, with pt reporting \"I'm really weak\"  RUE Strength  Gross RUE Strength: WFL  RUE Strength Comment: Functional via observaton of functional activity, formal MMT varied with grasp and at shoulders/elbows, with pt reporting \"I'm really weak\"         Plan   Plan  Times per week: 3-5x/week  Current Treatment Recommendations: Strengthening, Balance Training, Functional Mobility Training, Endurance Training, Patient/Caregiver Education & Training, Equipment Evaluation, Education, & procurement, Safety Education & Training, Self-Care / ADL           AM-PAC Score        AM-[facility] Inpatient Daily Activity Raw Score: 19 (09/22/20 1503)  AM-PAC Inpatient ADL T-Scale Score : 40.22 (09/22/20 1503)  ADL Inpatient CMS 0-100% Score: 42.8 (09/22/20 1503)  ADL Inpatient CMS G-Code Modifier : CK (09/22/20 1503)    Goals  Short term goals  Time Frame for Short term goals: 1 week (by 9/29/20)  Short term goal 1:

## 2020-09-22 NOTE — PLAN OF CARE
Problem: Serum Glucose Level - Abnormal:  Goal: Ability to maintain appropriate glucose levels will improve  Description: Ability to maintain appropriate glucose levels will improve  9/22/2020 1337 by Modesto Lindsay RN  Outcome: Ongoing   Pt educated on the importance of a carb control diet. Monitoring pt's blood glucose AC/HS. Sliding scale insulin given as ordered according to pt's blood glucose. Will continue to monitor.

## 2020-09-22 NOTE — PLAN OF CARE
Problem: OXYGENATION/RESPIRATORY FUNCTION  Goal: Patient will maintain patent airway  9/22/2020 1335 by Bo Sanchez RN  Outcome: Ongoing     Patient's EF (Ejection Fraction) is less than 40%    Heart Failure Medications:  Diuretics[de-identified] Furosemide    (One of the following REQUIRED for EF <40%/SYSTOLIC FAILURE but MAY be used in EF% >40%/DIASTOLIC FAILURE)        ACE[de-identified] Lisinopril        ARB[de-identified] None         ARNI[de-identified] None    (Beta Blockers)  NON- Evidenced Based Beta Blocker (for EF% >40%/DIASTOLIC FAILURE): None    Evidenced Based Beta Blocker::(REQUIRED for EF% <40%/SYSTOLIC FAILURE) Metoprolol SUCCinate- Toprol XL  . .................................................................................................................................................. Patient's weights and intake/output reviewed: Yes    Patient's Last Weight: 205 lbs obtained by standing scale. Difference of 1 lbs more than last documented weight. Intake/Output Summary (Last 24 hours) at 9/22/2020 1336  Last data filed at 9/22/2020 1028  Gross per 24 hour   Intake 880 ml   Output 1850 ml   Net -970 ml       Comorbidities Reviewed Yes    Patient has a past medical history of Arthritis, CAD (coronary artery disease), Cerebral artery occlusion with cerebral infarction ([address] CHF (congestive heart failure) ([address] Diabetes mellitus ([address] Hyperlipidemia, Hypertension, Mental retardation, and MI (myocardial infarction) ([address] >>For CHF and Comorbidity documentation on Education Time and Topics, please see Education Tab    Progressive Mobility Assessment:  What is this patient's Current Level of Mobility?: Ambulatory-Up Ad Lois  How was this patient Mobilized today?: Edge of Bed, Up to Chair,  Up to Toilet/Shower, and Up in Room                 With Whom? Self                 Level of Difficulty/Assistance: Independent     Pt resting in bed at this time on room air. Pt denies shortness of breath. Pt without lower extremity edema.      Patient and/or Family's stated Goal of Care this Admission: increase activity tolerance, better understand heart failure and disease management, and be more comfortable prior to discharge        :

## 2020-09-22 NOTE — PROGRESS NOTES
Pt d/c'd home. Removed peripheral IV and stopped bleeding. Catheter intact. Pt tolerated well. Notified [facility] and removed tele box. Reviewed d/c instructions, home meds, and  f/u information utilizing teach-back method. Patient verbalized understanding. Pt escorted off of unit with PCA and all medications and belongings in hand. Medications picked up from outpatient pharmacy.

## 2020-09-22 NOTE — FLOWSHEET NOTE
09/22/20 1035   Encounter Summary   Services provided to: Patient   Referral/Consult From: [address] [facility] Family members   Place of [employer] non-den   Continue Visiting   (9/22 Tel-pt. feeling low/scared;assured her of prayers)   Complexity of Encounter Moderate   Length of Encounter 15 minutes   Routine   Type Initial   Assessment Anxious   Intervention Explored feelings, thoughts, concerns;Nurtured hope   Outcome Expressed gratitude;Comfort

## 2020-09-22 NOTE — PROGRESS NOTES
Pt given event monitor at this time. Event monitor placed on pt, turned on and activated. Explained and demonstrated to pt how to use event monitor. Pt verbalizes understanding.

## 2020-09-22 NOTE — PROGRESS NOTES
Physical Therapy    Facility/Department: [facility] A2 CARD TELEMETRY  Initial Assessment and Treatment    NAME: Dany Guevara  : 1960  MRN: [id number]    Date of Service: 2020    Discharge Recommendations:  Home with Home health PT, Home with assist PRN   PT Equipment Recommendations  Equipment Needed: No    Assessment   Body structures, Functions, Activity limitations: Decreased functional mobility ; Decreased balance;Decreased strength;Decreased endurance  Assessment: Pt is 63 yo female who presents with diagnosis of SVT. Pt lives alone and mod I with mobility at baseline with RW. Pt demonstrates 3/5 strength with MMT, however SBA for transfers and gait training without device this date. Limited with gait distance and mobility d/t pt stating she is not feeling well. Pt would benefit from continued skilled therapy to address deficits. Recommend home with assist prn and home PT at d/c. Treatment Diagnosis: impaired functional mobility  Specific instructions for Next Treatment: progress mobility as tolerated  Prognosis: Good  Decision Making: Low Complexity  PT Education: Goals; General Safety;Gait Training;PT Role;Disease Specific Education;Plan of Care; Functional Mobility Training;Transfer Training  Patient Education: Pt educated on importance of OOB mobility -- pt  verbalized understanding  Barriers to Learning: none  REQUIRES PT FOLLOW UP: Yes  Activity Tolerance  Activity Tolerance: Patient Tolerated treatment well;Patient limited by fatigue;Patient limited by endurance  Activity Tolerance: Post gait training SpO2 99% on room air, HR 92       Patient Diagnosis(es): The encounter diagnosis was SVT (supraventricular tachycardia) ([facility]      has a past medical history of Arthritis, CAD (coronary artery disease), Cerebral artery occlusion with cerebral infarction ([address] CHF (congestive heart failure) ([address] Diabetes mellitus ([address] Hyperlipidemia, Hypertension, Mental retardation, and MI (myocardial wants to move; Sister has car and sometimes able to help take pt places    Objective     AROM RLE (degrees)  RLE AROM: WFL  AROM LLE (degrees)  LLE AROM : WFL  Strength RLE  Comment: Demonstrates 3/5 strength, ankles, knee and hip flexion with MMT  Strength LLE  Comment: Demonstrates 3/5 strength, ankles, knee and hip flexion with MMT        Bed mobility  Supine to Sit: Modified independent  Sit to Supine: Unable to assess(pt up in chair at end of session)     Transfers  Sit to Stand: Stand by assistance  Stand to sit: Stand by assistance     Ambulation  Ambulation?: Yes  Ambulation 1  Surface: level tile  Device: No Device  Assistance: Stand by assistance  Quality of Gait: Increased lateral sway but no overt LOB. Pt states she normally ambulates with RW at baseline  Gait Deviations: Slow Ana;Decreased step length;Shuffles  Distance: 15 ft  Comments: pt declined further gait training d/t not feeling well     Balance  Sitting - Static: Good  Sitting - Dynamic: Good  Standing - Static: Fair;+  Standing - Dynamic: [facility]  Times per week: 3-5x/wk  Times per day: Daily  Specific instructions for Next Treatment: progress mobility as tolerated  Current Treatment Recommendations: Strengthening, Neuromuscular Re-education, Home Exercise Program, Safety Education & Training, Balance Training, Endurance Training, Patient/Caregiver Education & Training, Functional Mobility Training, Transfer Training, Gait Training, Equipment Evaluation, Education, & procurement  Safety Devices  Type of devices:  All fall risk precautions in place, Call light within reach, Chair alarm in place, Gait belt, Patient at risk for falls, Nurse notified, Left in chair                                   AM-PAC Score     AM-PAC Inpatient Mobility without Stair Climbing Raw Score : 18 (09/22/20 1323)  AM-PAC Inpatient without Stair Climbing T-Scale Score : 51.97 (09/22/20 1323)  Mobility Inpatient CMS 0-100% Score: 23.26 (09/22/20 1323)  Mobility Inpatient without Stair CMS G-Code Modifier : Chioma Noel (09/22/20 1323)       Goals  Short term goals  Time Frame for Short term goals: 1 week (9/29) unless otherwise specified  Short term goal 1: Pt will be mod I with transfers with RW. Short term goal 2: Pt will ambulate 100 ft with RW and mod I. Short term goal 3: 9/25: Pt will participate in 12-15 reps of BLE exercises to promote strength and activity tolerance. Patient Goals   Patient goals : \"to feel better\"       Therapy Time   Individual Concurrent Group Co-treatment   Time In 1105         Time Out 1125         Minutes 20         Timed Code Treatment Minutes: [facility], PT, DPT  If pt is unable to be seen after this session, please let this note serve as discharge summary. Please see case management note for discharge disposition. Thank you.

## 2020-09-22 NOTE — DISCHARGE SUMMARY
Hospital Medicine Discharge Summary    Patient ID: Aretta       Patient's PCP: FRITZ Pimentel NP    Admit Date: 9/19/2020     Discharge Date:   9/22/2020    Admitting Physician: Lieutenant Thaddeus MD     Discharge Physician: Lieutenant Thaddeus MD     Discharge Diagnoses: Active Hospital Problems    Diagnosis    SVT (supraventricular tachycardia) ([facility]) [I47.1]     Priority: High    Acute on chronic combined systolic and diastolic CHF (congestive heart failure) ([facility]) [I50.43]     Priority: Medium    Dual ICD (implantable cardioverter-defibrillator) in place [Z95.810]    DM (diabetes mellitus) ([address] [E11.9]    HTN (hypertension), benign [I10]    CAD (coronary artery disease) [I25.10]       The patient was seen and examined on day of discharge and this discharge summary is in conjunction with any daily progress note from day of discharge. HPI :   62 y.o. female with PMH of systolic CHF, ICM, CAD, pacer, CVA, DM, htn, hld who presents with palpitations and dyspnea. Was transferred from [facility] ED for EP eval.  Has been having intermittent palpitations for the past month. Episodes associated with chest pain and dyspnea albeit does state this is chronic. Also complains of n/v, LE edema and cough at times. Denies fever, chills, calf pain or erythema, COVID contacts.         Hospital Course:         SVT:    Has ICD , device interrogation in ER showed AT/SVT. Cardia assisted. Coreg was switched to metoprolol. Pt d/nikhil with 2 week monitor and is to follow up with cardiology outpatient.             Acute on chronic CHF/ ischemic cardiomyopathy: recent ECHO EF 35% with moderate global hypokinesis in 6/2020. Improved with IV diuresis. Now euvolemic. Transitioned to home dose of PO lasix 40mg BID.  Continue BB, ACEi         CAD: Continue ACE, BB, asa, statin        Uncontrolled hyperglycemia.  Hx of poorly controlled DM, HBA1c 13.3   - her blood glucose measurements were borderline low on her home dose of insulin while inpatient which improved with inslin dose adjustments. Continue lantus at 30 units BID, 5 units scheduled novolog with meals with SSI. Metformin resumed. Monitor BG's at home and f/u with PCP          PMH of CVA, htn, hld, obesity     Transaminitis: noted on admission labs of unclear etiology. Statin held. LFTs improved on repeat lab. Statin resumed at d/c. Physical Exam Performed:     BP (!) 91/56   Pulse 87   Temp 97.6 °F (36.4 °C) (Oral)   Resp 18   Ht 5' 1\" (1.549 m)   Wt 205 lb 14.4 oz (93.4 kg)   SpO2 100%   BMI 38.90 kg/m²     General appearance: No apparent distress, appears stated age and cooperative. Obese   HEENT: Pupils equal, round. Conjunctivae/corneas clear. Neck: Supple, with full range of motion. No jugular venous distention. Trachea midline. Respiratory:  Normal respiratory effort. Clear to auscultation, bilaterally without Rales/Wheezes/Rhonchi. Cardiovascular: regular  rate and rhythm with normal S1/S2 without murmurs, rubs or gallops. Abdomen: Soft, non-tender, non-distended with normal bowel sounds. Musculoskeletal: No clubbing, cyanosis or edema bilaterally. Skin: warm and dry   Neurologic:  Alert, speech clear with no overt facial droop  Psychiatric: Alert and oriented, thought content appropriate, normal insight      Labs:  For convenience and continuity at follow-up the following most recent labs are provided:      CBC:    Lab Results   Component Value Date    WBC 8.6 09/21/2020    HGB 14.9 09/21/2020    HCT 44.4 09/21/2020     09/21/2020       Renal:    Lab Results   Component Value Date     09/22/2020    K 4.8 09/22/2020    K 5.0 09/18/2020     09/22/2020    CO2 25 09/22/2020    BUN 28 09/22/2020    CREATININE 0.7 09/22/2020    CALCIUM 8.8 09/22/2020    PHOS 3.0 04/18/2020         Significant Diagnostic Studies    Radiology:   XR CHEST (2 VW)   Final Result   Stable cardiomegaly with no evidence of acute congestive heart failure or   effusions. Consults:     IP CONSULT TO HEART FAILURE NURSE/COORDINATOR  IP CONSULT TO DIETITIAN  IP CONSULT TO CARDIOLOGY  IP CONSULT TO SOCIAL WORK  IP CONSULT TO CASE MANAGEMENT  IP CONSULT TO HOME CARE NEEDS    Disposition:  Home with Odell Brown     Condition at Discharge: Stable    Discharge Instructions/Follow-up:      - F/u with cardiology   - Follow up with PCP in one week     Code Status:  Full Code   Activity: activity as tolerated    Diet: cardiac diet      Discharge Medications:     Current Discharge Medication List           Details   insulin lispro (HUMALOG) 100 UNIT/ML injection vial Inject 0-6 Units into the skin 3 times daily (with meals) **Corrective Low Dose Algorithm**  Glucose: Dose:               No Insulin  140-199 1 Unit  200-249 2 Units  250-299 3 Units  300-349 4 Units  350-399 5 Units  Over 399 6 Units  Qty: 1 vial, Refills: 3      metoprolol succinate (TOPROL XL) 25 MG extended release tablet Take 1 tablet by mouth daily  Qty: 30 tablet, Refills: 3              Details   insulin aspart (NOVOLOG FLEXPEN) 100 UNIT/ML injection pen Inject 5 Units into the skin 3 times daily (before meals)  Qty: 5 pen, Refills: 3      insulin glargine (LANTUS SOLOSTAR) 100 UNIT/ML injection pen Inject 30 Units into the skin 2 times daily  Qty: 5 pen, Refills: 3              Details   clopidogrel (PLAVIX) 75 MG tablet       escitalopram (LEXAPRO) 10 MG tablet       furosemide (LASIX) 40 MG tablet Take 1 tablet by mouth 2 times daily  Qty: 60 tablet, Refills: 0      nitroGLYCERIN (NITROSTAT) 0.4 MG SL tablet up to max of 3 total doses. If no relief after 1 dose, call 911.   Qty: 25 tablet, Refills: 3      ondansetron (ZOFRAN) 4 MG tablet Take 1 tablet by mouth every 8 hours as needed for Nausea  Qty: 10 tablet, Refills: 0      aspirin 81 MG EC tablet Take 1 tablet by mouth daily  Qty: 30 tablet, Refills: 2      ranolazine (RANEXA) 500 MG extended release tablet Take 1 tablet by mouth 2 times daily  Qty: 60 tablet, Refills: 2      DULoxetine (CYMBALTA) 60 MG extended release capsule Take 1 capsule by mouth daily  Qty: 30 capsule, Refills: 0      FLUoxetine (PROZAC) 10 MG capsule Take 1 capsule by mouth daily  Qty: 30 capsule, Refills: 0      metFORMIN (GLUCOPHAGE) 500 MG tablet Take 2 tablets by mouth 2 times daily (with meals)  Qty: 120 tablet, Refills: 2      atorvastatin (LIPITOR) 40 MG tablet Take 1 tablet by mouth nightly  Qty: 30 tablet, Refills: 2      fenofibrate micronized (LOFIBRA) 200 MG capsule Take 1 capsule by mouth nightly  Qty: 30 capsule, Refills: 3      lisinopril (PRINIVIL;ZESTRIL) 2.5 MG tablet Take 1 tablet by mouth daily  Qty: 30 tablet, Refills: 3      miconazole (MICOTIN) 2 % powder Apply topically 2 times daily. Qty: 45 g, Refills: 1      [employer] Lancets Ultra Thin MISC 1 each by Does not apply route 4 times daily  Qty: 200 each, Refills: 0      blood glucose monitor strips Test three times a day & as needed for symptoms of irregular blood glucose. Qty: 100 strip, Refills: 2    Comments: Brand per patient preference. May round up to next available package size. topiramate (TOPAMAX) 200 MG tablet Take 1 tablet by mouth daily  Qty: 60 tablet, Refills: 0             Time Spent on discharge is more than 30 minutes in the examination, evaluation, counseling and review of medications and discharge plan. Signed:    Mariano Cat MD   9/22/2020      Thank you FRITZ Fletcher NP for the opportunity to be involved in this patient's care. If you have any questions or concerns please feel free to contact me at [phone].

## 2020-09-23 ENCOUNTER — FOLLOWUP TELEPHONE ENCOUNTER (OUTPATIENT)
Dept: TELEMETRY | Age: 60
End: 2020-09-23

## 2020-09-23 NOTE — TELEPHONE ENCOUNTER
1st Attempt; No Answer- Left HIPAA compliant voicemail with Non-Urgent Heart Failure Resource Line number for call back.     Josr Patel HF BSN-RN  Heart Failure Navigator  [address] [place] [address]  [phone]

## 2020-09-23 NOTE — ED NOTES
Normal saline infusion stopped per verbal order from KAYLEN Sahni due to patient POC glucose of 272.       Reginaldo Ren RN  04/14/19 8203 English

## 2020-09-25 ENCOUNTER — TELEPHONE (OUTPATIENT)
Dept: CARDIOLOGY CLINIC | Age: 60
End: 2020-09-25

## 2020-09-25 ENCOUNTER — OFFICE VISIT (OUTPATIENT)
Dept: CARDIOLOGY CLINIC | Age: 60
End: 2020-09-25
Payer: MEDICARE

## 2020-09-25 VITALS
HEART RATE: 88 BPM | WEIGHT: 206.5 LBS | OXYGEN SATURATION: 100 % | DIASTOLIC BLOOD PRESSURE: 68 MMHG | BODY MASS INDEX: 38.99 KG/M2 | SYSTOLIC BLOOD PRESSURE: 110 MMHG | HEIGHT: 61 IN

## 2020-09-25 PROCEDURE — 99214 OFFICE O/P EST MOD 30 MIN: CPT | Performed by: NURSE PRACTITIONER

## 2020-09-25 RX ORDER — METOPROLOL SUCCINATE 50 MG/1
50 TABLET, EXTENDED RELEASE ORAL DAILY
Qty: 30 TABLET | Refills: 3 | Status: ON HOLD | OUTPATIENT
Start: 2020-09-25 | End: 2020-10-13 | Stop reason: HOSPADM

## 2020-09-25 RX ORDER — DIGOXIN 125 MCG
125 TABLET ORAL EVERY OTHER DAY
Qty: 30 TABLET | Refills: 3 | Status: SHIPPED | OUTPATIENT
Start: 2020-09-25

## 2020-09-25 NOTE — PATIENT INSTRUCTIONS
1. Stop the lisinopril  2. Increase the metoprolol (Toprol XL) to 50 mg daily  3. Add digoxin 125 mcg every other day  4. Stay on same dose of furosemide (Lasix) 40 mg twice daily  5.  Follow up with me at [facility] 10/19/20 at 12 noon

## 2020-09-25 NOTE — PROGRESS NOTES
Sneha Sellers   Cardiac Evaluation    Primary Care Doctor:  FRITZ Matthews NP    Chief Complaint   Patient presents with   Radha Johnson     Started wearing monitor 09/18, will wear for 2 weeks     Palpitations        History of Present Illness:   I had the pleasure of seeing Brandyn Anand in follow up for recent hospitalization. Brandyn Anand has been hospitalized twice and seen in ED several times in past 2 weeks. She was referred to EP for tachycardia (SVT, NSVT, AT). The Coreg was changed to Toprol XL. She was also diuresed for CHF. She is followed here for NICM, sCHF, s/p dual ICD as well as HTN, HLD. Other medical conditions include uncontrolled DM, hyponatremia and cognitive delay. She continues to feel poorly. Still having fast heart beats and chest pain. Her breathing is terrible. Her weight is down 3 lbs since last OV. She still complains of uncomfortable feeling, unable to describe further.  + lightheadedness. Brandyn Anand describes symptoms including chest pain, dyspnea, palpitations, fatigue, edema but denies orthopnea, PND, early saiety, syncope. Past Medical History:   has a past medical history of Arthritis, CAD (coronary artery disease), Cerebral artery occlusion with cerebral infarction ([address] CHF (congestive heart failure) ([address] Diabetes mellitus ([address] Hyperlipidemia, Hypertension, Mental retardation, and MI (myocardial infarction) ([address] Surgical History:   has a past surgical history that includes back surgery; Pacemaker insertion; tumor removal; and Tubal ligation. Social History:   reports that she has never smoked. She has never used smokeless tobacco. She reports that she does not drink alcohol or use drugs. Family History:   Family History   Problem Relation Age of Onset    Heart Disease Father     Cancer Mother     Other Mother        Home Medications:  Prior to Admission medications    Medication Sig Start Date End Date Taking? Authorizing Provider   insulin aspart (NOVOLOG FLEXPEN) 100 UNIT/ML injection pen Inject 5 Units into the skin 3 times daily (before meals) 9/22/20  Yes Florin Torre MD   insulin glargine (LANTUS SOLOSTAR) 100 UNIT/ML injection pen Inject 30 Units into the skin 2 times daily 9/22/20  Yes Ede Torre MD   insulin lispro (HUMALOG) 100 UNIT/ML injection vial Inject 0-6 Units into the skin 3 times daily (with meals) **Corrective Low Dose Algorithm**  Glucose: Dose:               No Insulin  140-199 1 Unit  200-249 2 Units  250-299 3 Units  300-349 4 Units  350-399 5 Units  Over 399 6 Units 9/22/20  Yes Ede Torre MD   metoprolol succinate (TOPROL XL) 25 MG extended release tablet Take 1 tablet by mouth daily 9/23/20  Yes Ede Torre MD   clopidogrel (PLAVIX) 75 MG tablet  6/10/20  Yes Historical Provider, MD   escitalopram (LEXAPRO) 10 MG tablet  6/15/20  Yes Historical Provider, MD   furosemide (LASIX) 40 MG tablet Take 1 tablet by mouth 2 times daily 9/8/20 10/8/20 Yes FRITZ Fowler - CNP   nitroGLYCERIN (NITROSTAT) 0.4 MG SL tablet up to max of 3 total doses.  If no relief after 1 dose, call 911. 8/24/20  Yes Carlos Manuel Davis MD   ondansetron [facility]) 4 MG tablet Take 1 tablet by mouth every 8 hours as needed for Nausea 8/12/20  Yes Mattie Vanegas DO   aspirin 81 MG EC tablet Take 1 tablet by mouth daily 7/18/20  Yes Ronny Rosario MD   ranolazine (RANEXA) 500 MG extended release tablet Take 1 tablet by mouth 2 times daily 7/18/20  Yes Ronny Rosario MD   DULoxetine (CYMBALTA) 60 MG extended release capsule Take 1 capsule by mouth daily 7/18/20  Yes Ronny Rosario MD   FLUoxetine (PROZAC) 10 MG capsule Take 1 capsule by mouth daily 7/18/20  Yes Ronny Rosario MD   metFORMIN (GLUCOPHAGE) 500 MG tablet Take 2 tablets by mouth 2 times daily (with meals) 7/18/20 10/16/20 Yes Ronny Rosario MD   atorvastatin (LIPITOR) 40 MG tablet Take 1 tablet by mouth nightly 7/18/20  Yes Bob Milan MD   fenofibrate micronized (LOFIBRA) 200 MG capsule Take 1 capsule by mouth nightly 7/18/20  Yes Bob Milan MD   lisinopril (PRINIVIL;ZESTRIL) 2.5 MG tablet Take 1 tablet by mouth daily 7/18/20  Yes Bob Milan MD   miconazole (MICOTIN) 2 % powder Apply topically 2 times daily. 7/18/20  Yes Bob Milan MD   [employer] Lancets Ultra Thin MISC 1 each by Does not apply route 4 times daily 7/18/20  Yes Bob Milan MD   blood glucose monitor strips Test three times a day & as needed for symptoms of irregular blood glucose. 5/12/19  Yes Chaitanya Harman MD   topiramate (TOPAMAX) 200 MG tablet Take 1 tablet by mouth daily 4/14/18  Yes Lynne Muhammad MD        Allergies:  Patient has no known allergies. Review of Systems:   · Constitutional: there has been no unanticipated weight loss. · Eyes: No vision changes, wears corrective lens  · ENT: No Headaches, no nasal congestion. No mouth sores or sore throat. · Cardiovascular: Reviewed in HPI  · Respiratory: +cough, no wheezing, no sputum production. · Gastrointestinal: No abdominal pain, no constipation or diarrhea  · Genitourinary: No dysuria, trouble voiding, or hematuria. · Musculoskeletal:  No joint complaints. + weakness  · Integumentary: No rash or pruritis. · Neurological: No numbness or tingling. No tremor. + cognitive delay  · Psychiatric: + anxiety, + depression, + mood disorder  · Endocrine:  No excessive thirst or urination. · Hematologic/Lymphatic: No abnormal bruising or bleeding, blood clots or swollen lymph nodes.       Physical Examination:    Vitals:    09/25/20 1346   BP: 110/68   Pulse: 88   SpO2: 100%   Weight: 206 lb 8 oz (93.7 kg)   Height: 5' 1\" (1.549 m)        Constitutional and General Appearance: Warm and dry, no apparent distress, normal coloration  HEENT:  Normocephalic, atraumatic  Respiratory:  · Normal excursion and expansion without use of accessory muscles  · Resp Auscultation: Normal breath sounds without dullness  Cardiovascular:  · The apical impulses not displaced  · Heart tones are crisp and normal  · JVP 10 cm H2O  · Regular rate and rhythm, normal S1S2, no m/g/r  · Peripheral pulses are symmetrical and full  · There is no clubbing, cyanosis of the extremities.   · 1+ BLE edema  · Pedal Pulses: 2+ and equal     Abdomen:  · No masses or tenderness, soft, no HJR  · Liver/Spleen: No Abnormalities Noted  Neurological/Psychiatric:  · Alert and oriented in all spheres  · Moves all extremities well  · Exhibits normal gait balance and coordination  · No abnormalities of mood, affect, memory, mentation, or behavior are noted    Lab Data:  CBC:   Lab Results   Component Value Date    WBC 8.6 09/21/2020    WBC 8.7 09/20/2020    WBC 8.0 09/18/2020    RBC 4.87 09/21/2020    RBC 4.77 09/20/2020    RBC 4.85 09/18/2020    HGB 14.9 09/21/2020    HGB 14.6 09/20/2020    HGB 15.1 09/18/2020    HCT 44.4 09/21/2020    HCT 43.3 09/20/2020    HCT 44.8 09/18/2020    MCV 91.1 09/21/2020    MCV 90.9 09/20/2020    MCV 92.4 09/18/2020    RDW 13.2 09/21/2020    RDW 13.3 09/20/2020    RDW 13.3 09/18/2020     09/21/2020     09/20/2020     09/18/2020     BMP:  Lab Results   Component Value Date     09/22/2020     09/21/2020     09/20/2020    K 4.8 09/22/2020    K 3.7 09/21/2020    K 4.2 09/20/2020    K 5.0 09/18/2020    K 3.7 09/10/2020    K 4.4 08/31/2020     09/22/2020     09/21/2020    CL 97 09/20/2020    CO2 25 09/22/2020    CO2 22 09/21/2020    CO2 22 09/20/2020    PHOS 3.0 04/18/2020    PHOS 1.5 04/17/2020    PHOS 2.1 04/17/2020    BUN 28 09/22/2020    BUN 25 09/21/2020    BUN 28 09/20/2020    CREATININE 0.7 09/22/2020    CREATININE 0.6 09/21/2020    CREATININE 0.6 09/20/2020     BNP:   Lab Results   Component Value Date    PROBNP 1,494 09/20/2020    PROBNP 2,632 09/18/2020    PROBNP 1,578 09/10/2020     Troponin: No components found for: TROPONIN  Lipids:   Lab Results   Component Value Date    TRIG 142 10/12/2019    TRIG 251 10/08/2019    HDL 51 10/12/2019    HDL 43 10/08/2019    LDLCALC 87 10/12/2019    LDLCALC 108 10/08/2019     Cardiac Imaging:  Echo June 2020:    Summary   LV systolic function is moderately reduced with an estimated EF of 35%. Moderate global hypokinesis. There is mild concentric left ventricular hypertrophy. Left ventricular cavity size is mildly dilated. Estimated LV diastolic filling pressure is normal.   Mild mitral and tricuspid regurgitation. Systolic pulmonary artery pressure (SPAP) is estimated at 35mmHg (Right atrial pressure of 8 mmHg). No significant change from exam done 10/18/2019. Stress Test January 2020:   Summary     Mildly reduced LVEF 45%     Inferolateral hypokinesis     Mainly fixed inferior defect suggestive of scar     No evidence of myocardium at risk for significant reversible ischemia. STRESS MPI 4/16/18   LV function is mild-moderately reduced with global hypokinesis and ejection  fraction of 41 %. There is normal isotope uptake at stress and rest. There is no evidence of  myocardial ischemia or scar. Echo:  4/14/18:  Summary   The left ventricular systolic function is mildly reduced with an ejection fraction of 45-50 %. The left ventricle is mildly dilated. There is hypokinesis of the basal inferoseptal, basal inferior and inferolateral wall. Grade II diastolic dysfunction with elevated filing pressure. Pacer / ICD wire is visualized in the right ventricle. A bubble study was performed and fails to show evidence of shunting. Assessment:    1. Nonischemic cardiomyopathy ([address]    2. Acute on chronic systolic heart failure ([address]    3. Dual ICD (implantable cardioverter-defibrillator) in place    4. Coronary artery disease involving native coronary artery of native heart without angina pectoris    5.  DM (diabetes mellitus), secondary, uncontrolled, w/neurologic complic ([address]    6. Dyslipidemia    7. Essential hypertension    8. Hyponatremia    9. Atrial tachycardia (HCC)      HR has improved here today. Weight is down but still with +JVD and edema. Plan:   1. Stop the lisinopril (relative hypotension, allow room for BB and diuretic)  2. Increase the metoprolol (Toprol XL) to 50 mg daily  3. Add digoxin 125 mcg every other day  4. Stay on same dose of furosemide (Lasix) 40 mg twice daily  5. Follow up with me at [facility] 10/19/20 at 12 noon      I appreciate the opportunity of cooperating in the care of this individual.    Maciej Beckham, CNP, 9/25/2020, 2:20 PM    QUALITY MEASURES  1. Tobacco Cessation Counseling: NA  2. Retake of BP if >140/90:   NA  3. Documentation to PCP/referring for new patient:  Sent to PCP at close of office visit  4. CAD patient on anti-platelet: Yes  5. CAD patient on STATIN therapy:  Yes  6.  Patient with CHF and aFib on anticoagulation:  NA

## 2020-09-27 ENCOUNTER — TELEPHONE (OUTPATIENT)
Dept: OTHER | Facility: CLINIC | Age: 60
End: 2020-09-27

## 2020-09-27 ENCOUNTER — APPOINTMENT (OUTPATIENT)
Dept: GENERAL RADIOLOGY | Age: 60
End: 2020-09-27
Payer: MEDICARE

## 2020-09-27 ENCOUNTER — HOSPITAL ENCOUNTER (EMERGENCY)
Age: 60
Discharge: HOME | End: 2020-09-27
Attending: EMERGENCY MEDICINE
Payer: MEDICARE

## 2020-09-27 ENCOUNTER — PROCEDURE VISIT (OUTPATIENT)
Dept: CARDIOLOGY CLINIC | Age: 60
End: 2020-09-27

## 2020-09-27 VITALS
BODY MASS INDEX: 40.62 KG/M2 | WEIGHT: 215 LBS | SYSTOLIC BLOOD PRESSURE: 124 MMHG | HEART RATE: 91 BPM | RESPIRATION RATE: 22 BRPM | OXYGEN SATURATION: 99 % | TEMPERATURE: 99 F | DIASTOLIC BLOOD PRESSURE: 72 MMHG

## 2020-09-27 LAB
A/G RATIO: 1.3 (ref 1.1–2.2)
ALBUMIN SERPL-MCNC: 3.5 G/DL (ref 3.4–5)
ALP BLD-CCNC: 171 U/L (ref 40–129)
ALT SERPL-CCNC: 131 U/L (ref 10–40)
ANION GAP SERPL CALCULATED.3IONS-SCNC: 14 MMOL/L (ref 3–16)
AST SERPL-CCNC: 72 U/L (ref 15–37)
BASE EXCESS VENOUS: -0.5 MMOL/L (ref -3–3)
BASOPHILS ABSOLUTE: 0.1 K/UL (ref 0–0.2)
BASOPHILS RELATIVE PERCENT: 0.7 %
BETA-HYDROXYBUTYRATE: 1.8 MMOL/L (ref 0–0.27)
BILIRUB SERPL-MCNC: 0.5 MG/DL (ref 0–1)
BUN BLDV-MCNC: 14 MG/DL (ref 7–20)
CALCIUM SERPL-MCNC: 8.8 MG/DL (ref 8.3–10.6)
CARBOXYHEMOGLOBIN: 2.3 % (ref 0–1.5)
CHLORIDE BLD-SCNC: 103 MMOL/L (ref 99–110)
CO2: 16 MMOL/L (ref 21–32)
CREAT SERPL-MCNC: 0.7 MG/DL (ref 0.6–1.1)
EOSINOPHILS ABSOLUTE: 0.1 K/UL (ref 0–0.6)
EOSINOPHILS RELATIVE PERCENT: 1.4 %
GFR AFRICAN AMERICAN: >60
GFR NON-AFRICAN AMERICAN: >60
GLOBULIN: 2.6 G/DL
GLUCOSE BLD-MCNC: 311 MG/DL (ref 70–99)
HCO3 VENOUS: 21.3 MMOL/L (ref 23–29)
HCT VFR BLD CALC: 43.6 % (ref 36–48)
HEMOGLOBIN: 14.3 G/DL (ref 12–16)
LYMPHOCYTES ABSOLUTE: 1.5 K/UL (ref 1–5.1)
LYMPHOCYTES RELATIVE PERCENT: 18.7 %
MCH RBC QN AUTO: 30.8 PG (ref 26–34)
MCHC RBC AUTO-ENTMCNC: 32.8 G/DL (ref 31–36)
MCV RBC AUTO: 93.9 FL (ref 80–100)
METHEMOGLOBIN VENOUS: 0.3 %
MONOCYTES ABSOLUTE: 0.6 K/UL (ref 0–1.3)
MONOCYTES RELATIVE PERCENT: 7.6 %
NEUTROPHILS ABSOLUTE: 5.6 K/UL (ref 1.7–7.7)
NEUTROPHILS RELATIVE PERCENT: 71.6 %
O2 CONTENT, VEN: 20 VOL %
O2 SAT, VEN: 100 %
O2 THERAPY: ABNORMAL
PCO2, VEN: 27.7 MMHG (ref 40–50)
PDW BLD-RTO: 13.8 % (ref 12.4–15.4)
PH VENOUS: 7.5 (ref 7.35–7.45)
PLATELET # BLD: 191 K/UL (ref 135–450)
PMV BLD AUTO: 9.8 FL (ref 5–10.5)
PO2, VEN: 208.5 MMHG (ref 25–40)
POTASSIUM REFLEX MAGNESIUM: 4.5 MMOL/L (ref 3.5–5.1)
PRO-BNP: 1614 PG/ML (ref 0–124)
RBC # BLD: 4.64 M/UL (ref 4–5.2)
SODIUM BLD-SCNC: 133 MMOL/L (ref 136–145)
TCO2 CALC VENOUS: 22 MMOL/L
TOTAL PROTEIN: 6.1 G/DL (ref 6.4–8.2)
TROPONIN: <0.01 NG/ML
TROPONIN: <0.01 NG/ML
WBC # BLD: 7.8 K/UL (ref 4–11)

## 2020-09-27 PROCEDURE — 71046 X-RAY EXAM CHEST 2 VIEWS: CPT

## 2020-09-27 PROCEDURE — 6360000002 HC RX W HCPCS: Performed by: EMERGENCY MEDICINE

## 2020-09-27 PROCEDURE — 6370000000 HC RX 637 (ALT 250 FOR IP): Performed by: EMERGENCY MEDICINE

## 2020-09-27 PROCEDURE — 82803 BLOOD GASES ANY COMBINATION: CPT

## 2020-09-27 PROCEDURE — 84484 ASSAY OF TROPONIN QUANT: CPT

## 2020-09-27 PROCEDURE — 96374 THER/PROPH/DIAG INJ IV PUSH: CPT

## 2020-09-27 PROCEDURE — 82010 KETONE BODYS QUAN: CPT

## 2020-09-27 PROCEDURE — 85025 COMPLETE CBC W/AUTO DIFF WBC: CPT

## 2020-09-27 PROCEDURE — 83880 ASSAY OF NATRIURETIC PEPTIDE: CPT

## 2020-09-27 PROCEDURE — 80053 COMPREHEN METABOLIC PANEL: CPT

## 2020-09-27 PROCEDURE — 99285 EMERGENCY DEPT VISIT HI MDM: CPT

## 2020-09-27 RX ORDER — FUROSEMIDE 10 MG/ML
40 INJECTION INTRAMUSCULAR; INTRAVENOUS ONCE
Status: COMPLETED | OUTPATIENT
Start: 2020-09-27 | End: 2020-09-27

## 2020-09-27 RX ADMIN — INSULIN LISPRO 5 UNITS: 100 INJECTION, SOLUTION INTRAVENOUS; SUBCUTANEOUS at 21:50

## 2020-09-27 RX ADMIN — FUROSEMIDE 40 MG: 10 INJECTION, SOLUTION INTRAMUSCULAR; INTRAVENOUS at 18:51

## 2020-09-27 NOTE — ED NOTES
Pacemaker interrogated using the [place]. Awaiting report.      Alexsander Dos Santos RN  09/27/20 0699

## 2020-09-27 NOTE — ED NOTES
Patient ambulated to restroom without difficulty. O2 Sats remained at 98% on room air while ambulating, pulse increased from 90 to 99.       Niki Schultz RN  09/27/20 [id number]

## 2020-09-27 NOTE — TELEPHONE ENCOUNTER
Writer contacted ED provider to inform of 30 day readmission risk. Writer's attempt to contact ED provider was unsuccessful.

## 2020-09-27 NOTE — ED NOTES
This Rn at pt bedside introducing self as primary RN pt denies needs or concerns call button within reach      Brielle Ruby RN  09/27/20 1918

## 2020-09-27 NOTE — PROGRESS NOTES
The following is a report from a pacemaker/ICD that was interrogated on 09- 06:51 PM  EDT at [facility]. The patient is currently enrolled in [employer]. Last interrogation 9/18. Hx AT/AF/SVT. (asa, plavix, toprol xl, digoxin). AT/AF burden 3.1%. AT recorded, all < 10 sec. . no VT/VF recorded. Corvue is within normal limits. See PACEART report under Cardiology tab.

## 2020-09-27 NOTE — ED NOTES
Pt report given to [facility] AND [facility].      Ari May RN  09/27/20 0045

## 2020-09-28 ENCOUNTER — TELEPHONE (OUTPATIENT)
Dept: OTHER | Facility: CLINIC | Age: 60
End: 2020-09-28

## 2020-09-28 NOTE — TELEPHONE ENCOUNTER
RN Access contacted Dr. Becky Farris (Cardiology) to schedule ED f/u appt. Spoke with office, pt has appt scheduled prior to this writer contacting office.

## 2020-09-28 NOTE — ED NOTES
Pt ambulatory to restroom with a steady gate.  Pt labs drawn per orders and labeled at pt bedside      Ed DILLAN Wise  09/27/20 2009

## 2020-09-28 NOTE — ED PROVIDER NOTES
[address] ED      CHIEF COMPLAINT  Shortness of Breath (pt has been sob. has been seen several times for this. pt states nothing has changed. last seen at [facility] [place] a week ago. pt states she is :\"tired of this shit\")       HISTORY OF PRESENT ILLNESS  Waldo Ham is a 62 y.o. female with past medical history of hypertension, hyperlipidemia, diabetes, cardiomyopathy with systolic CHF with EF 48%, coronary artery disease, and dual-chamber AICD who presents to the ED complaining of chest pain and shortness of breath. The patient states that the symptoms have been going on for quite some time, she describes them over the past several months. She states they have not really changed today, she just became frustrated by the fact that she has dyspnea on exertion. She denies recent palpitations. She does have chest pain, describes as substernal, denies radiation to her arms. She states this chest pain has been present for several months as well. She denies cough, fever, chills, or respiratory symptoms. She denies recent worsening swelling. She had a recent hospitalization at [facility] and was seen by electrophysiology, it was suggested that she wear a Holter monitor to link SVT episodes to symptoms of palpitations. Patient states she has been wearing the monitor. No other complaints, modifying factors or associated symptoms. I have reviewed the following from the nursing documentation.     Past Medical History:   Diagnosis Date    Arthritis     CAD (coronary artery disease)     Cerebral artery occlusion with cerebral infarction ([address]     CHF (congestive heart failure) (HCC)     Diabetes mellitus ([address]     Hyperlipidemia     Hypertension     Mental retardation     MI (myocardial infarction) ([address]      Past Surgical History:   Procedure Laterality Date    BACK SURGERY      PACEMAKER INSERTION      TUBAL LIGATION      TUMOR REMOVAL       Family History   Problem Relation Age of Onset    Heart Disease Father     Cancer Mother     Other Mother      Social History     Socioeconomic History    Marital status:      Spouse name: Not on file    Number of children: Not on file    Years of education: Not on file    Highest education level: Not on file   Occupational History    Not on file   Social Needs    Financial resource strain: Not on file    Food insecurity     Worry: Not on file     Inability: Not on file   [employer] needs     Medical: Not on file     Non-medical: Not on file   Tobacco Use    Smoking status: Never Smoker    Smokeless tobacco: Never Used   Substance and Sexual Activity    Alcohol use: No    Drug use: No    Sexual activity: Not Currently   Lifestyle    Physical activity     Days per week: Not on file     Minutes per session: Not on file    Stress: Not on file   Relationships    Social connections     Talks on phone: Not on file     Gets together: Not on file     Attends Zoroastrianism service: Not on file     Active member of club or organization: Not on file     Attends meetings of clubs or organizations: Not on file     Relationship status: Not on file    Intimate partner violence     Fear of current or ex partner: Not on file     Emotionally abused: Not on file     Physically abused: Not on file     Forced sexual activity: Not on file   Other Topics Concern    Not on file   Social History Narrative    Not on file     No current facility-administered medications for this encounter.       Current Outpatient Medications   Medication Sig Dispense Refill    metoprolol succinate (TOPROL XL) 50 MG extended release tablet Take 1 tablet by mouth daily 30 tablet 3    digoxin (LANOXIN) 125 MCG tablet Take 1 tablet by mouth every other day 30 tablet 3    insulin aspart (NOVOLOG FLEXPEN) 100 UNIT/ML injection pen Inject 5 Units into the skin 3 times daily (before meals) 5 pen 3    insulin glargine (LANTUS SOLOSTAR) 100 UNIT/ML injection pen Inject 30 Units into the skin 2 times daily 5 pen 3    insulin lispro (HUMALOG) 100 UNIT/ML injection vial Inject 0-6 Units into the skin 3 times daily (with meals) **Corrective Low Dose Algorithm**  Glucose: Dose:               No Insulin  140-199 1 Unit  200-249 2 Units  250-299 3 Units  300-349 4 Units  350-399 5 Units  Over 399 6 Units 1 vial 3    clopidogrel (PLAVIX) 75 MG tablet       escitalopram (LEXAPRO) 10 MG tablet       furosemide (LASIX) 40 MG tablet Take 1 tablet by mouth 2 times daily 60 tablet 0    nitroGLYCERIN (NITROSTAT) 0.4 MG SL tablet up to max of 3 total doses. If no relief after 1 dose, call 911. 25 tablet 3    ondansetron (ZOFRAN) 4 MG tablet Take 1 tablet by mouth every 8 hours as needed for Nausea 10 tablet 0    aspirin 81 MG EC tablet Take 1 tablet by mouth daily 30 tablet 2    ranolazine (RANEXA) 500 MG extended release tablet Take 1 tablet by mouth 2 times daily 60 tablet 2    DULoxetine (CYMBALTA) 60 MG extended release capsule Take 1 capsule by mouth daily 30 capsule 0    FLUoxetine (PROZAC) 10 MG capsule Take 1 capsule by mouth daily 30 capsule 0    metFORMIN (GLUCOPHAGE) 500 MG tablet Take 2 tablets by mouth 2 times daily (with meals) 120 tablet 2    atorvastatin (LIPITOR) 40 MG tablet Take 1 tablet by mouth nightly 30 tablet 2    fenofibrate micronized (LOFIBRA) 200 MG capsule Take 1 capsule by mouth nightly 30 capsule 3    miconazole (MICOTIN) 2 % powder Apply topically 2 times daily. 45 g 1    [employer] Lancets Ultra Thin MISC 1 each by Does not apply route 4 times daily 200 each 0    blood glucose monitor strips Test three times a day & as needed for symptoms of irregular blood glucose. 100 strip 2    topiramate (TOPAMAX) 200 MG tablet Take 1 tablet by mouth daily 60 tablet 0     No Known Allergies    REVIEW OF SYSTEMS  10 systems reviewed, pertinent positives per HPI otherwise noted to be negative.     PHYSICAL EXAM  /72   Pulse 91   Temp 99 °F (37.2 °C) (Oral) Resp 22   Wt 215 lb (97.5 kg)   SpO2 99%   BMI 40.62 kg/m²    Physical exam:  General appearance: awake and cooperative. No distress. Non toxic appearing. Skin: Warm and dry. No rashes or lesions. HENT: Normocephalic. Atraumatic. Mucus membranes are moist.   Neck: supple  Eyes: LISA. EOM intact. Heart: RRR. No murmurs. Lungs: Respirations unlabored. CTAB. No wheezes, rales, or rhonchi. Good air exchange. No conversational dyspnea or accessory muscle use. Abdomen: No tenderness. Soft. Non distended. No peritoneal signs. Musculoskeletal: No extremity edema. Compartments soft. No deformity. No tenderness in the extremities. All extremities neurovascularly intact. Radial, Dp, and PT pulses +2/4 bilaterally  Neurological: Alert and oriented. No focal deficits. No aphasia or dysarthria. No gait ataxia. Psychiatric: depressed mood and affect. LABS  I have reviewed all labs for this visit.    Results for orders placed or performed during the hospital encounter of 09/27/20   CBC auto differential   Result Value Ref Range    WBC 7.8 4.0 - 11.0 K/uL    RBC 4.64 4.00 - 5.20 M/uL    Hemoglobin 14.3 12.0 - 16.0 g/dL    Hematocrit 43.6 36.0 - 48.0 %    MCV 93.9 80.0 - 100.0 fL    MCH 30.8 26.0 - 34.0 pg    MCHC 32.8 31.0 - 36.0 g/dL    RDW 13.8 12.4 - 15.4 %    Platelets 597 472 - 279 K/uL    MPV 9.8 5.0 - 10.5 fL    Neutrophils % 71.6 %    Lymphocytes % 18.7 %    Monocytes % 7.6 %    Eosinophils % 1.4 %    Basophils % 0.7 %    Neutrophils Absolute 5.6 1.7 - 7.7 K/uL    Lymphocytes Absolute 1.5 1.0 - 5.1 K/uL    Monocytes Absolute 0.6 0.0 - 1.3 K/uL    Eosinophils Absolute 0.1 0.0 - 0.6 K/uL    Basophils Absolute 0.1 0.0 - 0.2 K/uL   Comprehensive Metabolic Panel w/ Reflex to MG   Result Value Ref Range    Sodium 133 (L) 136 - 145 mmol/L    Potassium reflex Magnesium 4.5 3.5 - 5.1 mmol/L    Chloride 103 99 - 110 mmol/L    CO2 16 (L) 21 - 32 mmol/L    Anion Gap 14 3 - 16    Glucose 311 (H) 70 - 99 mg/dL    BUN 14 7 - 20 mg/dL    CREATININE 0.7 0.6 - 1.1 mg/dL    GFR Non-African American >60 >60    GFR African American >60 >60    Calcium 8.8 8.3 - 10.6 mg/dL    Total Protein 6.1 (L) 6.4 - 8.2 g/dL    Alb 3.5 3.4 - 5.0 g/dL    Albumin/Globulin Ratio 1.3 1.1 - 2.2    Total Bilirubin 0.5 0.0 - 1.0 mg/dL    Alkaline Phosphatase 171 (H) 40 - 129 U/L     (H) 10 - 40 U/L    AST 72 (H) 15 - 37 U/L    Globulin 2.6 g/dL   Troponin   Result Value Ref Range    Troponin <0.01 <0.01 ng/mL   Brain Natriuretic Peptide   Result Value Ref Range    Pro-BNP 1,614 (H) 0 - 124 pg/mL   Troponin   Result Value Ref Range    Troponin <0.01 <0.01 ng/mL   Blood gas, venous   Result Value Ref Range    pH, Alberto 7.503 (H) 7.350 - 7.450    pCO2, Alberto 27.7 (L) 40.0 - 50.0 mmHg    pO2, Alberto 208.5 (H) 25.0 - 40.0 mmHg    HCO3, Venous 21.3 (L) 23.0 - 29.0 mmol/L    Base Excess, Alberto -0.5 -3.0 - 3.0 mmol/L    O2 Sat, Alberto 100 Not Established %    Carboxyhemoglobin 2.3 (H) 0.0 - 1.5 %    MetHgb, Alberto 0.3 <1.5 %    TC02 (Calc), Alberto 22 Not Established mmol/L    O2 Content, Alberto 20 Not Established VOL %    O2 Therapy Unknown    Beta-Hydroxybutyrate   Result Value Ref Range    Beta-Hydroxybutyrate 1.80 (H) 0.00 - 0.27 mmol/L     RADIOLOGY  Xr Chest (2 Vw)    Result Date: 9/27/2020  EXAMINATION: TWO XRAY VIEWS OF THE CHEST 9/27/2020 4:43 pm COMPARISON: Chest radiograph performed September 20, 2020. HISTORY: ORDERING SYSTEM PROVIDED HISTORY: shortness of breath TECHNOLOGIST PROVIDED HISTORY: Reason for exam:-> shortness of breath Reason for Exam: trouble breathing Acuity: Acute Type of Exam: Initial FINDINGS: Left chest AICD in stable positioning. Stable enlargement of the cardiac silhouette. Low lung volumes. Mild prominence of the pulmonary vasculature. No focal consolidation, pleural effusion, or pneumothorax. No acute osseous abnormality. Stable enlargement of the cardiac silhouette.   Mild prominence of the pulmonary vasculature which could be related to vascular crowding versus pulmonary vascular congestion. No pleural effusion. Xr Chest (2 Vw)    Result Date: 9/20/2020  EXAMINATION: TWO XRAY VIEWS OF THE CHEST 9/20/2020 9:55 am COMPARISON: Chest radiograph 09/18/2020, 09/10/2020. HISTORY: ORDERING SYSTEM PROVIDED HISTORY: CHF TECHNOLOGIST PROVIDED HISTORY: Reason for exam:->CHF Reason for Exam: CHF Acuity: Acute Type of Exam: Initial FINDINGS: Single view provided. Stable enlarged cardiac silhouette. Stable left-sided AICD. Normal lung volumes with no interstitial edema. No acute consolidation or effusion. No pneumothorax or free subdiaphragmatic air. The bowel gas pattern is normal.     Stable cardiomegaly with no evidence of acute congestive heart failure or effusions. Xr Chest (2 Vw)    Result Date: 8/31/2020  EXAMINATION: TWO XRAY VIEWS OF THE CHEST 8/31/2020 11:25 am COMPARISON: 08/26/2020 HISTORY: ORDERING SYSTEM PROVIDED HISTORY: cp TECHNOLOGIST PROVIDED HISTORY: Reason for exam:->cp Reason for Exam: chest pain Acuity: Acute Type of Exam: Initial FINDINGS: Slight worsening mild to moderate pulmonary vascular congestion. No change in small bilateral pleural effusions with atelectasis. Cardiomegaly stable status post 2 lead ICD. There is no pneumothorax. 1. Slight worsening congestive heart failure. Xr Chest Portable    Result Date: 9/18/2020  EXAMINATION: ONE XRAY VIEW OF THE CHEST 9/18/2020 4:33 pm COMPARISON: 09/10/2020 HISTORY: ORDERING SYSTEM PROVIDED HISTORY: chest pain TECHNOLOGIST PROVIDED HISTORY: Reason for exam:->chest pain Reason for Exam: Palpitations (started 1 month) FINDINGS: Transvenous pacer remains in place. The lungs are without acute focal process. There is no effusion or pneumothorax. The cardiomediastinal silhouette is stable. The osseous structures are stable. No acute process.  Stable cardiomegaly     Xr Chest Portable    Result Date: 9/10/2020  EXAMINATION: ONE XRAY VIEW OF THE CHEST 9/10/2020 4:48 pm 24 hours. The patient ambulates without difficulty, maintaining oxygenation of 99% on room air. She does not appear to be in distress, I did witness her ambulating. I do feel that the symptoms are more chronic in nature, she does not show signs of CHF exacerbation at this time, and I do feel she is appropriate for discharge home outpatient follow-up. I did call cardiology and spoke with Cecelia Caraballo's with patient to have close outpatient follow-up. He states that he was sent a message to the patient's electrophysiologist tomorrow and she is told to follow-up very closely. The patient does have a hyperglycemia of 311, she does have a bicarb of 16 of unclear etiology but I do not suspect DKA. She has a normal anion gap. She is not acidotic on venous pH. Patient is agreeable to follow-up with cardiology and primary care. She is given strict return precautions and voices understanding. During the patient's ED course, the patient was given:  Medications   furosemide (LASIX) injection 40 mg (40 mg Intravenous Given 9/27/20 1851)   insulin lispro (HUMALOG) injection vial 5 Units (5 Units Subcutaneous Given 9/27/20 2150)        CLINICAL IMPRESSION  1. Dyspnea on exertion    2. Chest pain, unspecified type    3. Paroxysmal supraventricular tachycardia (HCC)        Blood pressure 124/72, pulse 91, temperature 99 °F (37.2 °C), temperature source Oral, resp. rate 22, weight 215 lb (97.5 kg), SpO2 99 %, not currently breastfeeding. Patient was given scripts for the following medications. I counseled patient how to take these medications. Discharge Medication List as of 9/27/2020 10:14 PM          Follow-up with:  Armani Starkey MD  [address] [place] [address]  [phone]    Call in 1 day  for follow up appointment      DISCLAIMER: This chart was created using Dragon dictation software.   Efforts were made by me to ensure accuracy, however some errors may be present due to limitations of this technology and occasionally words are not transcribed correctly.        [facility] PHYSICAL [facility], [place]  09/28/20 6414

## 2020-09-28 NOTE — ED NOTES
2038 Call to access center to page cardiologist on call     Dominik Lanier  09/27/20 2038 2055 Osmel Bonilla called back and spoke to Dr Loki Camp  09/27/20 2055

## 2020-09-28 NOTE — ED NOTES
D/c education provided pt verbalized understanding and ambulatory with a steady gate to anitra Ferro RN  09/27/20 4721

## 2020-09-28 NOTE — ED NOTES
2001 Call placed to [address] [place] [address] ([phone]) Spoke to [facility], brayan Bartholomew is representative for area, and he is going to call back    2002 Dr Mccall Began called back and spoke to Dr Dianne Botello  09/27/20 2003

## 2020-10-02 ENCOUNTER — HOSPITAL ENCOUNTER (INPATIENT)
Age: 60
LOS: 8 days | Discharge: HOME HEALTH | DRG: 286 | End: 2020-10-14
Attending: EMERGENCY MEDICINE | Admitting: INTERNAL MEDICINE
Payer: MEDICARE

## 2020-10-02 ENCOUNTER — APPOINTMENT (OUTPATIENT)
Dept: GENERAL RADIOLOGY | Age: 60
DRG: 286 | End: 2020-10-02
Payer: MEDICARE

## 2020-10-02 LAB
A/G RATIO: 1.5 (ref 1.1–2.2)
ALBUMIN SERPL-MCNC: 3.8 G/DL (ref 3.4–5)
ALP BLD-CCNC: 225 U/L (ref 40–129)
ALT SERPL-CCNC: 69 U/L (ref 10–40)
ANION GAP SERPL CALCULATED.3IONS-SCNC: 15 MMOL/L (ref 3–16)
AST SERPL-CCNC: 57 U/L (ref 15–37)
BASE EXCESS VENOUS: -5.3 MMOL/L (ref -3–3)
BASOPHILS ABSOLUTE: 0.1 K/UL (ref 0–0.2)
BASOPHILS RELATIVE PERCENT: 1.1 %
BILIRUB SERPL-MCNC: 0.4 MG/DL (ref 0–1)
BILIRUBIN URINE: NEGATIVE
BLOOD, URINE: NEGATIVE
BUN BLDV-MCNC: 26 MG/DL (ref 7–20)
CALCIUM SERPL-MCNC: 9 MG/DL (ref 8.3–10.6)
CARBOXYHEMOGLOBIN: 1.5 % (ref 0–1.5)
CHLORIDE BLD-SCNC: 97 MMOL/L (ref 99–110)
CLARITY: CLEAR
CO2: 18 MMOL/L (ref 21–32)
COLOR: YELLOW
CREAT SERPL-MCNC: 0.7 MG/DL (ref 0.6–1.1)
EOSINOPHILS ABSOLUTE: 0.1 K/UL (ref 0–0.6)
EOSINOPHILS RELATIVE PERCENT: 0.6 %
GFR AFRICAN AMERICAN: >60
GFR NON-AFRICAN AMERICAN: >60
GLOBULIN: 2.6 G/DL
GLUCOSE BLD-MCNC: 528 MG/DL (ref 70–99)
GLUCOSE BLD-MCNC: 641 MG/DL (ref 70–99)
GLUCOSE BLD-MCNC: >600 MG/DL (ref 70–99)
GLUCOSE URINE: >=1000 MG/DL
HCO3 VENOUS: 18.7 MMOL/L (ref 23–29)
HCT VFR BLD CALC: 42.6 % (ref 36–48)
HEMOGLOBIN: 13.8 G/DL (ref 12–16)
KETONES, URINE: NEGATIVE MG/DL
LACTIC ACID: 1.6 MMOL/L (ref 0.4–2)
LEUKOCYTE ESTERASE, URINE: NEGATIVE
LIPASE: 74 U/L (ref 13–60)
LYMPHOCYTES ABSOLUTE: 2.3 K/UL (ref 1–5.1)
LYMPHOCYTES RELATIVE PERCENT: 25 %
MAGNESIUM: 1.9 MG/DL (ref 1.8–2.4)
MCH RBC QN AUTO: 30 PG (ref 26–34)
MCHC RBC AUTO-ENTMCNC: 32.3 G/DL (ref 31–36)
MCV RBC AUTO: 92.8 FL (ref 80–100)
METHEMOGLOBIN VENOUS: 0.5 %
MICROSCOPIC EXAMINATION: ABNORMAL
MONOCYTES ABSOLUTE: 0.5 K/UL (ref 0–1.3)
MONOCYTES RELATIVE PERCENT: 6 %
NEUTROPHILS ABSOLUTE: 6.2 K/UL (ref 1.7–7.7)
NEUTROPHILS RELATIVE PERCENT: 67.3 %
NITRITE, URINE: NEGATIVE
O2 CONTENT, VEN: 20 VOL %
O2 SAT, VEN: 96 %
O2 THERAPY: ABNORMAL
PCO2, VEN: 32.6 MMHG (ref 40–50)
PDW BLD-RTO: 13.8 % (ref 12.4–15.4)
PERFORMED ON: ABNORMAL
PERFORMED ON: ABNORMAL
PH UA: 5 (ref 5–8)
PH VENOUS: 7.38 (ref 7.35–7.45)
PLATELET # BLD: 234 K/UL (ref 135–450)
PMV BLD AUTO: 9.6 FL (ref 5–10.5)
PO2, VEN: 85.7 MMHG (ref 25–40)
POTASSIUM SERPL-SCNC: 5 MMOL/L (ref 3.5–5.1)
PROTEIN UA: NEGATIVE MG/DL
RBC # BLD: 4.6 M/UL (ref 4–5.2)
SODIUM BLD-SCNC: 130 MMOL/L (ref 136–145)
SPECIFIC GRAVITY UA: 1.01 (ref 1–1.03)
TCO2 CALC VENOUS: 20 MMOL/L
TOTAL PROTEIN: 6.4 G/DL (ref 6.4–8.2)
TROPONIN: 0.02 NG/ML
URINE REFLEX TO CULTURE: ABNORMAL
URINE TYPE: ABNORMAL
UROBILINOGEN, URINE: 0.2 E.U./DL
WBC # BLD: 9.2 K/UL (ref 4–11)

## 2020-10-02 PROCEDURE — 82803 BLOOD GASES ANY COMBINATION: CPT

## 2020-10-02 PROCEDURE — 2580000003 HC RX 258: Performed by: EMERGENCY MEDICINE

## 2020-10-02 PROCEDURE — 81003 URINALYSIS AUTO W/O SCOPE: CPT

## 2020-10-02 PROCEDURE — 6360000002 HC RX W HCPCS: Performed by: EMERGENCY MEDICINE

## 2020-10-02 PROCEDURE — 83735 ASSAY OF MAGNESIUM: CPT

## 2020-10-02 PROCEDURE — 85025 COMPLETE CBC W/AUTO DIFF WBC: CPT

## 2020-10-02 PROCEDURE — 36415 COLL VENOUS BLD VENIPUNCTURE: CPT

## 2020-10-02 PROCEDURE — 80053 COMPREHEN METABOLIC PANEL: CPT

## 2020-10-02 PROCEDURE — 93005 ELECTROCARDIOGRAM TRACING: CPT | Performed by: EMERGENCY MEDICINE

## 2020-10-02 PROCEDURE — 87040 BLOOD CULTURE FOR BACTERIA: CPT

## 2020-10-02 PROCEDURE — 99285 EMERGENCY DEPT VISIT HI MDM: CPT

## 2020-10-02 PROCEDURE — 71045 X-RAY EXAM CHEST 1 VIEW: CPT

## 2020-10-02 PROCEDURE — 84484 ASSAY OF TROPONIN QUANT: CPT

## 2020-10-02 PROCEDURE — 83690 ASSAY OF LIPASE: CPT

## 2020-10-02 PROCEDURE — 83605 ASSAY OF LACTIC ACID: CPT

## 2020-10-02 PROCEDURE — 96372 THER/PROPH/DIAG INJ SC/IM: CPT

## 2020-10-02 PROCEDURE — 96374 THER/PROPH/DIAG INJ IV PUSH: CPT

## 2020-10-02 PROCEDURE — 6370000000 HC RX 637 (ALT 250 FOR IP): Performed by: EMERGENCY MEDICINE

## 2020-10-02 RX ORDER — ONDANSETRON 2 MG/ML
4 INJECTION INTRAMUSCULAR; INTRAVENOUS ONCE
Status: COMPLETED | OUTPATIENT
Start: 2020-10-02 | End: 2020-10-02

## 2020-10-02 RX ORDER — PROMETHAZINE HYDROCHLORIDE 25 MG/ML
12.5 INJECTION, SOLUTION INTRAMUSCULAR; INTRAVENOUS ONCE
Status: DISCONTINUED | OUTPATIENT
Start: 2020-10-02 | End: 2020-10-03

## 2020-10-02 RX ORDER — 0.9 % SODIUM CHLORIDE 0.9 %
30 INTRAVENOUS SOLUTION INTRAVENOUS ONCE
Status: COMPLETED | OUTPATIENT
Start: 2020-10-02 | End: 2020-10-03

## 2020-10-02 RX ORDER — ASPIRIN 81 MG/1
324 TABLET, CHEWABLE ORAL ONCE
Status: COMPLETED | OUTPATIENT
Start: 2020-10-02 | End: 2020-10-02

## 2020-10-02 RX ORDER — 0.9 % SODIUM CHLORIDE 0.9 %
30 INTRAVENOUS SOLUTION INTRAVENOUS ONCE
Status: DISCONTINUED | OUTPATIENT
Start: 2020-10-02 | End: 2020-10-02

## 2020-10-02 RX ADMIN — INSULIN LISPRO 15 UNITS: 100 INJECTION, SOLUTION INTRAVENOUS; SUBCUTANEOUS at 23:24

## 2020-10-02 RX ADMIN — ONDANSETRON 4 MG: 2 INJECTION INTRAMUSCULAR; INTRAVENOUS at 21:59

## 2020-10-02 RX ADMIN — ASPIRIN 324 MG: 81 TABLET, CHEWABLE ORAL at 22:00

## 2020-10-02 RX ADMIN — SODIUM CHLORIDE 1434 ML: 9 INJECTION, SOLUTION INTRAVENOUS at 21:59

## 2020-10-02 ASSESSMENT — PAIN DESCRIPTION - LOCATION
LOCATION: CHEST
LOCATION: CHEST

## 2020-10-02 ASSESSMENT — PAIN SCALES - GENERAL
PAINLEVEL_OUTOF10: 10
PAINLEVEL_OUTOF10: 10

## 2020-10-02 ASSESSMENT — PAIN DESCRIPTION - PROGRESSION: CLINICAL_PROGRESSION: NOT CHANGED

## 2020-10-02 ASSESSMENT — PAIN DESCRIPTION - PAIN TYPE
TYPE: ACUTE PAIN
TYPE: ACUTE PAIN

## 2020-10-03 ENCOUNTER — TELEPHONE (OUTPATIENT)
Dept: OTHER | Facility: CLINIC | Age: 60
End: 2020-10-03

## 2020-10-03 ENCOUNTER — APPOINTMENT (OUTPATIENT)
Dept: CT IMAGING | Age: 60
DRG: 286 | End: 2020-10-03
Payer: MEDICARE

## 2020-10-03 PROBLEM — E11.65 TYPE 2 DIABETES MELLITUS WITH HYPERGLYCEMIA, WITH LONG-TERM CURRENT USE OF INSULIN (HCC): Status: ACTIVE | Noted: 2020-10-03

## 2020-10-03 PROBLEM — Z79.4 TYPE 2 DIABETES MELLITUS WITH HYPERGLYCEMIA, WITH LONG-TERM CURRENT USE OF INSULIN (HCC): Status: ACTIVE | Noted: 2020-10-03

## 2020-10-03 LAB
ANION GAP SERPL CALCULATED.3IONS-SCNC: 16 MMOL/L (ref 3–16)
BUN BLDV-MCNC: 23 MG/DL (ref 7–20)
CALCIUM SERPL-MCNC: 9.2 MG/DL (ref 8.3–10.6)
CHLORIDE BLD-SCNC: 101 MMOL/L (ref 99–110)
CO2: 18 MMOL/L (ref 21–32)
CREAT SERPL-MCNC: 0.6 MG/DL (ref 0.6–1.1)
D DIMER: 319 NG/ML DDU (ref 0–229)
DIGOXIN LEVEL: <0.3 NG/ML (ref 0.8–2)
EKG ATRIAL RATE: 113 BPM
EKG DIAGNOSIS: NORMAL
EKG P AXIS: 30 DEGREES
EKG P-R INTERVAL: 170 MS
EKG Q-T INTERVAL: 342 MS
EKG QRS DURATION: 90 MS
EKG QTC CALCULATION (BAZETT): 469 MS
EKG R AXIS: 62 DEGREES
EKG T AXIS: 52 DEGREES
EKG VENTRICULAR RATE: 113 BPM
GFR AFRICAN AMERICAN: >60
GFR NON-AFRICAN AMERICAN: >60
GLUCOSE BLD-MCNC: 206 MG/DL (ref 70–99)
GLUCOSE BLD-MCNC: 272 MG/DL (ref 70–99)
GLUCOSE BLD-MCNC: 296 MG/DL (ref 70–99)
GLUCOSE BLD-MCNC: 337 MG/DL (ref 70–99)
GLUCOSE BLD-MCNC: 345 MG/DL (ref 70–99)
GLUCOSE BLD-MCNC: 377 MG/DL (ref 70–99)
GLUCOSE BLD-MCNC: 471 MG/DL (ref 70–99)
PERFORMED ON: ABNORMAL
POTASSIUM REFLEX MAGNESIUM: 4.2 MMOL/L (ref 3.5–5.1)
PRO-BNP: 3271 PG/ML (ref 0–124)
SODIUM BLD-SCNC: 135 MMOL/L (ref 136–145)
TROPONIN: 0.02 NG/ML
TROPONIN: 0.03 NG/ML

## 2020-10-03 PROCEDURE — 83880 ASSAY OF NATRIURETIC PEPTIDE: CPT

## 2020-10-03 PROCEDURE — 93010 ELECTROCARDIOGRAM REPORT: CPT | Performed by: INTERNAL MEDICINE

## 2020-10-03 PROCEDURE — 85379 FIBRIN DEGRADATION QUANT: CPT

## 2020-10-03 PROCEDURE — 96375 TX/PRO/DX INJ NEW DRUG ADDON: CPT

## 2020-10-03 PROCEDURE — 2580000003 HC RX 258: Performed by: EMERGENCY MEDICINE

## 2020-10-03 PROCEDURE — 36415 COLL VENOUS BLD VENIPUNCTURE: CPT

## 2020-10-03 PROCEDURE — 80048 BASIC METABOLIC PNL TOTAL CA: CPT

## 2020-10-03 PROCEDURE — 80162 ASSAY OF DIGOXIN TOTAL: CPT

## 2020-10-03 PROCEDURE — 99214 OFFICE O/P EST MOD 30 MIN: CPT | Performed by: INTERNAL MEDICINE

## 2020-10-03 PROCEDURE — G0378 HOSPITAL OBSERVATION PER HR: HCPCS

## 2020-10-03 PROCEDURE — 6370000000 HC RX 637 (ALT 250 FOR IP): Performed by: INTERNAL MEDICINE

## 2020-10-03 PROCEDURE — 6360000002 HC RX W HCPCS: Performed by: EMERGENCY MEDICINE

## 2020-10-03 PROCEDURE — 96372 THER/PROPH/DIAG INJ SC/IM: CPT

## 2020-10-03 PROCEDURE — 84484 ASSAY OF TROPONIN QUANT: CPT

## 2020-10-03 PROCEDURE — 87040 BLOOD CULTURE FOR BACTERIA: CPT

## 2020-10-03 PROCEDURE — 6370000000 HC RX 637 (ALT 250 FOR IP): Performed by: EMERGENCY MEDICINE

## 2020-10-03 RX ORDER — MAGNESIUM SULFATE 1 G/100ML
1 INJECTION INTRAVENOUS PRN
Status: DISCONTINUED | OUTPATIENT
Start: 2020-10-03 | End: 2020-10-14 | Stop reason: HOSPADM

## 2020-10-03 RX ORDER — PROMETHAZINE HYDROCHLORIDE 25 MG/1
12.5 TABLET ORAL EVERY 6 HOURS PRN
Status: DISCONTINUED | OUTPATIENT
Start: 2020-10-03 | End: 2020-10-14 | Stop reason: HOSPADM

## 2020-10-03 RX ORDER — DEXTROSE MONOHYDRATE 50 MG/ML
100 INJECTION, SOLUTION INTRAVENOUS PRN
Status: DISCONTINUED | OUTPATIENT
Start: 2020-10-03 | End: 2020-10-14 | Stop reason: HOSPADM

## 2020-10-03 RX ORDER — INSULIN GLARGINE 100 [IU]/ML
30 INJECTION, SOLUTION SUBCUTANEOUS ONCE
Status: COMPLETED | OUTPATIENT
Start: 2020-10-03 | End: 2020-10-03

## 2020-10-03 RX ORDER — ONDANSETRON 2 MG/ML
4 INJECTION INTRAMUSCULAR; INTRAVENOUS EVERY 6 HOURS PRN
Status: DISCONTINUED | OUTPATIENT
Start: 2020-10-03 | End: 2020-10-14 | Stop reason: HOSPADM

## 2020-10-03 RX ORDER — SODIUM CHLORIDE 0.9 % (FLUSH) 0.9 %
10 SYRINGE (ML) INJECTION PRN
Status: DISCONTINUED | OUTPATIENT
Start: 2020-10-03 | End: 2020-10-14 | Stop reason: HOSPADM

## 2020-10-03 RX ORDER — FENOFIBRATE 160 MG/1
160 TABLET ORAL DAILY
Status: DISCONTINUED | OUTPATIENT
Start: 2020-10-03 | End: 2020-10-14 | Stop reason: HOSPADM

## 2020-10-03 RX ORDER — CLOPIDOGREL BISULFATE 75 MG/1
75 TABLET ORAL DAILY
Status: DISCONTINUED | OUTPATIENT
Start: 2020-10-03 | End: 2020-10-12

## 2020-10-03 RX ORDER — LIDOCAINE HYDROCHLORIDE 10 MG/ML
5 INJECTION, SOLUTION INFILTRATION; PERINEURAL ONCE
Status: COMPLETED | OUTPATIENT
Start: 2020-10-03 | End: 2020-10-05

## 2020-10-03 RX ORDER — RANOLAZINE 500 MG/1
500 TABLET, EXTENDED RELEASE ORAL 2 TIMES DAILY
Status: DISCONTINUED | OUTPATIENT
Start: 2020-10-03 | End: 2020-10-14 | Stop reason: HOSPADM

## 2020-10-03 RX ORDER — TOPIRAMATE 100 MG/1
200 TABLET, FILM COATED ORAL DAILY
Status: DISCONTINUED | OUTPATIENT
Start: 2020-10-03 | End: 2020-10-14 | Stop reason: HOSPADM

## 2020-10-03 RX ORDER — ASPIRIN 81 MG/1
81 TABLET ORAL DAILY
Status: DISCONTINUED | OUTPATIENT
Start: 2020-10-03 | End: 2020-10-14 | Stop reason: HOSPADM

## 2020-10-03 RX ORDER — FUROSEMIDE 10 MG/ML
60 INJECTION INTRAMUSCULAR; INTRAVENOUS ONCE
Status: COMPLETED | OUTPATIENT
Start: 2020-10-03 | End: 2020-10-03

## 2020-10-03 RX ORDER — METOPROLOL SUCCINATE 50 MG/1
50 TABLET, EXTENDED RELEASE ORAL ONCE
Status: COMPLETED | OUTPATIENT
Start: 2020-10-03 | End: 2020-10-03

## 2020-10-03 RX ORDER — ACETAMINOPHEN 325 MG/1
650 TABLET ORAL EVERY 6 HOURS PRN
Status: DISCONTINUED | OUTPATIENT
Start: 2020-10-03 | End: 2020-10-14 | Stop reason: HOSPADM

## 2020-10-03 RX ORDER — DEXTROSE MONOHYDRATE 25 G/50ML
12.5 INJECTION, SOLUTION INTRAVENOUS PRN
Status: DISCONTINUED | OUTPATIENT
Start: 2020-10-03 | End: 2020-10-14 | Stop reason: HOSPADM

## 2020-10-03 RX ORDER — SODIUM CHLORIDE 0.9 % (FLUSH) 0.9 %
10 SYRINGE (ML) INJECTION EVERY 12 HOURS SCHEDULED
Status: DISCONTINUED | OUTPATIENT
Start: 2020-10-03 | End: 2020-10-14 | Stop reason: HOSPADM

## 2020-10-03 RX ORDER — METOPROLOL SUCCINATE 50 MG/1
50 TABLET, EXTENDED RELEASE ORAL DAILY
Status: DISCONTINUED | OUTPATIENT
Start: 2020-10-03 | End: 2020-10-07

## 2020-10-03 RX ORDER — POTASSIUM CHLORIDE 7.45 MG/ML
10 INJECTION INTRAVENOUS PRN
Status: DISCONTINUED | OUTPATIENT
Start: 2020-10-03 | End: 2020-10-14 | Stop reason: HOSPADM

## 2020-10-03 RX ORDER — POTASSIUM CHLORIDE 20 MEQ/1
40 TABLET, EXTENDED RELEASE ORAL PRN
Status: DISCONTINUED | OUTPATIENT
Start: 2020-10-03 | End: 2020-10-14 | Stop reason: HOSPADM

## 2020-10-03 RX ORDER — NICOTINE POLACRILEX 4 MG
15 LOZENGE BUCCAL PRN
Status: DISCONTINUED | OUTPATIENT
Start: 2020-10-03 | End: 2020-10-14 | Stop reason: HOSPADM

## 2020-10-03 RX ORDER — ACETAMINOPHEN 650 MG/1
650 SUPPOSITORY RECTAL EVERY 6 HOURS PRN
Status: DISCONTINUED | OUTPATIENT
Start: 2020-10-03 | End: 2020-10-14 | Stop reason: HOSPADM

## 2020-10-03 RX ORDER — SODIUM CHLORIDE 0.9 % (FLUSH) 0.9 %
10 SYRINGE (ML) INJECTION PRN
Status: DISCONTINUED | OUTPATIENT
Start: 2020-10-03 | End: 2020-10-14

## 2020-10-03 RX ORDER — INSULIN GLARGINE 100 [IU]/ML
30 INJECTION, SOLUTION SUBCUTANEOUS 2 TIMES DAILY
Status: DISCONTINUED | OUTPATIENT
Start: 2020-10-03 | End: 2020-10-08

## 2020-10-03 RX ORDER — ATORVASTATIN CALCIUM 40 MG/1
40 TABLET, FILM COATED ORAL NIGHTLY
Status: DISCONTINUED | OUTPATIENT
Start: 2020-10-03 | End: 2020-10-14 | Stop reason: HOSPADM

## 2020-10-03 RX ADMIN — METOPROLOL SUCCINATE 50 MG: 50 TABLET, EXTENDED RELEASE ORAL at 05:49

## 2020-10-03 RX ADMIN — RANOLAZINE 500 MG: 500 TABLET, FILM COATED, EXTENDED RELEASE ORAL at 20:05

## 2020-10-03 RX ADMIN — ASPIRIN 81 MG: 81 TABLET ORAL at 11:37

## 2020-10-03 RX ADMIN — CLOPIDOGREL BISULFATE 75 MG: 75 TABLET ORAL at 11:37

## 2020-10-03 RX ADMIN — ATORVASTATIN CALCIUM 40 MG: 40 TABLET, FILM COATED ORAL at 20:05

## 2020-10-03 RX ADMIN — INSULIN GLARGINE 30 UNITS: 100 INJECTION, SOLUTION SUBCUTANEOUS at 20:06

## 2020-10-03 RX ADMIN — RANOLAZINE 500 MG: 500 TABLET, FILM COATED, EXTENDED RELEASE ORAL at 11:37

## 2020-10-03 RX ADMIN — INSULIN GLARGINE 30 UNITS: 100 INJECTION, SOLUTION SUBCUTANEOUS at 13:24

## 2020-10-03 RX ADMIN — TOPIRAMATE 200 MG: 100 TABLET, FILM COATED ORAL at 11:37

## 2020-10-03 RX ADMIN — INSULIN LISPRO 6 UNITS: 100 INJECTION, SOLUTION INTRAVENOUS; SUBCUTANEOUS at 11:21

## 2020-10-03 RX ADMIN — INSULIN LISPRO 4 UNITS: 100 INJECTION, SOLUTION INTRAVENOUS; SUBCUTANEOUS at 20:05

## 2020-10-03 RX ADMIN — ENOXAPARIN SODIUM 100 MG: 100 INJECTION SUBCUTANEOUS at 05:49

## 2020-10-03 RX ADMIN — PROMETHAZINE HYDROCHLORIDE 12.5 MG: 25 TABLET ORAL at 18:50

## 2020-10-03 RX ADMIN — METOPROLOL SUCCINATE 50 MG: 50 TABLET, EXTENDED RELEASE ORAL at 11:37

## 2020-10-03 RX ADMIN — INSULIN GLARGINE 30 UNITS: 100 INJECTION, SOLUTION SUBCUTANEOUS at 04:27

## 2020-10-03 RX ADMIN — INSULIN LISPRO 10 UNITS: 100 INJECTION, SOLUTION INTRAVENOUS; SUBCUTANEOUS at 18:18

## 2020-10-03 RX ADMIN — FUROSEMIDE 60 MG: 10 INJECTION, SOLUTION INTRAMUSCULAR; INTRAVENOUS at 07:25

## 2020-10-03 RX ADMIN — FENOFIBRATE 160 MG: 160 TABLET ORAL at 11:37

## 2020-10-03 RX ADMIN — INSULIN LISPRO 8 UNITS: 100 INJECTION, SOLUTION INTRAVENOUS; SUBCUTANEOUS at 18:18

## 2020-10-03 ASSESSMENT — PAIN DESCRIPTION - PAIN TYPE
TYPE: ACUTE PAIN

## 2020-10-03 ASSESSMENT — PAIN SCALES - GENERAL
PAINLEVEL_OUTOF10: 0
PAINLEVEL_OUTOF10: 10

## 2020-10-03 ASSESSMENT — PAIN DESCRIPTION - LOCATION
LOCATION: CHEST

## 2020-10-03 NOTE — ED NOTES

## 2020-10-03 NOTE — ED NOTES
Lab called to draw trop as pt is awaiting PICC line and multiple ED IV lines have failed.  Informed by Drew Davila,  she would call stat phlebotomist.     Rose Melgoza RN  10/03/20 1702

## 2020-10-03 NOTE — TELEPHONE ENCOUNTER
Writer contacted  Dr. Gurdeep Mckinley ,ED provider to inform of 30 day readmission risk. ED provider informed writer of readmission. Kashmir Herrmann

## 2020-10-03 NOTE — CONSULTS
[address]  Cardiac Consult     Referring Provider:  FRITZ Evangelista NP         History of Present Illness:  63 y/o female with multiple medical issues including non-ischemic cardiomyopathy admitted with chest pain and dyspnea. She was recently admitted with episodes of SVT. She was discharged with a 2 week monitor to f/u with EP. She has had multiple recent admissions with the same thing. \"Heart racing\" associated with chest tightness. No real exacerbating or relieving factors. Associated dyspnea. Present for \"3 years\". Pacer interrogation at [place] showed episodes of tachycardia, possible SVT. Past Medical History:   has a past medical history of Arthritis, CAD (coronary artery disease), Cerebral artery occlusion with cerebral infarction ([address] CHF (congestive heart failure) ([facility] Diabetes mellitus ([facility] Hyperlipidemia, Hypertension, Mental retardation, and MI (myocardial infarction) ([facility] Surgical History:   has a past surgical history that includes back surgery; Pacemaker insertion; tumor removal; and Tubal ligation. Social History:   reports that she has never smoked. She has never used smokeless tobacco. She reports that she does not drink alcohol or use drugs. Family History:  family history includes Cancer in her mother; Heart Disease in her father; Other in her mother.      Medications:   lidocaine 1 % injection  5 mL Intradermal Once    sodium chloride flush  10 mL Intravenous 2 times per day    aspirin  81 mg Oral Daily    atorvastatin  40 mg Oral Nightly    clopidogrel  75 mg Oral Daily    fenofibrate  160 mg Oral Daily    metoprolol succinate  50 mg Oral Daily    ranolazine  500 mg Oral BID    topiramate  200 mg Oral Daily    [START ON 10/4/2020] enoxaparin  40 mg Subcutaneous Daily    insulin glargine  30 Units Subcutaneous BID    insulin lispro  0.08 Units/kg Subcutaneous TID WC    insulin lispro  0-12 Units Subcutaneous TID WC    insulin lispro 0-6 Units Subcutaneous Nightly         Allergies:  Patient has no known allergies. ? Medications and dosages reviewed. ROS:  ?Full ROS obtained and negative except as mentioned in HPI      Physical Examination:    Vitals:    10/03/20 1107   BP: 120/83   Pulse: 88   Resp: 17   Temp: 98 °F (36.7 °C)   SpO2: 95%        · GENERAL: Well developed, well nourished, No acute distress  · NEUROLOGICAL: Alert and oriented  · PSYCH: Calm affect  · SKIN: Warm and dry, No visible rash,   · EYES: Pupils equal and round, Sclera non-icteric,   · HENT:  External ears and nose unremarkable, mucus membranes moist  · MUSCULOSKELETAL: Normal cephalic, neck supple  · CAROTID: Normal upstroke, no bruits  · CARDIAC: JVP normal, Normal PMI, regular rate and rhythm, normal S1S2, no murmur, rub, or gallop  · RESPIRATORY: Normal respiratory effort, clear to auscultation bilaterally  · EXTREMITIES: No edema  · GASTROINTESTINAL: normal bowel sounds, soft, non-tender, No hepatomegaly     All testing and labs listed below were personally reviewed. CXR  FINDINGS:    Cardiac AICD is stable in positioning. There is cardiomegaly with increasing    interstitial edema. No definite pleural effusion or pneumothorax. No new    focal consolidation. No definite pleural effusion. The osseous structures    are stable. Impression    Cardiomegaly with increasing interstitial edema. LABS  WBC 9.2 K/uL RBC 4.60 M/uL Hemoglobin 13.8 g/dL Hematocrit 42.6 % MCV 92.8 fL MCH 30.0 pg MCHC 32.3 g/dL RDW 13.8 % Platelets 157 K/uL   pH, Alberto 7.377 pCO2, Alberto 32.6Low mmHg pO2, Alberto 85. 7High mmHg HCO3, Venous 18.7Low mmol/L Base Excess, Alberto -5.3Low mmol/L O2 Sat, Alberto 96 % Carboxyhemoglobin 1.5 %   Calcium 9.0 mg/dL Total Protein 6.4 g/dL Alb 3.8 g/dL Albumin/Globulin Ratio 1.5 Total Bilirubin 0.4 mg/dL Alkaline Phosphatase 225High U/L ALT 69High U/L AST 57High U/L Globulin 2.6 g/dL   Sodium 130Low mmol/L Potassium 5.0 mmol/L Chloride 97Low mmol/L CO2 18Low mmol/L Anion Gap 15 Glucose 641High Panic mg/dL BUN 26High mg/dL CREATININE 0.7 mg/dL   Pro-BNP 3,271High pg/mL   D-Dimer, Quant 319High ng/mL DDU   Digoxin Lvl <0.3Low ng/mL   Sodium 135Low mmol/L Potassium reflex Magnesium 4.2 mmol/L Chloride 101 mmol/L CO2 18Low mmol/L Anion Gap 16 Glucose 272High mg/dL BUN 23High mg/dL CREATININE 0.6 mg/dL     EKG: Sinus tach, possible prior anterior lateral infarct, no change        Echo: 6/13/20  Summary   LV systolic function is moderately reduced with an estimated EF of 35%. Moderate global hypokinesis. There is mild concentric left ventricular hypertrophy. Left ventricular cavity size is mildly dilated. Estimated LV diastolic filling pressure is normal.   Mild mitral and tricuspid regurgitation. Systolic pulmonary artery pressure (SPAP) is estimated at 35mmHg (Right   atrial pressure of 8 mmHg). No significant change from exam done 10/18/2019. CARDIAC CATH 12/2018     LM <20%  LAD 20-30%  Cx 20-20%  RCA 20-30%              RPDA 50%. FFR 0.89  LVEF 45%      Assessment:     Chest pain/Heart racing:    Recurrent admissions with the same thing. Etiology unclear. Recent holter ordered. Unclear to me if she had it done. At this point I am unsure what to do. Her cath was 2 years ago so it would be worth repeating stress. Have EP see on Monday. Plan:  Stress myoview  EP to see Monday.      Thank you for allowing me to participate in the care of this individual.      Wang Gaming M.D., [facility] [place]

## 2020-10-03 NOTE — ED NOTES
Fall precautions in place. Bed alarm, fall wristband in place. Pt declining fall socks.      Ivy Shahid RN  10/02/20 1958

## 2020-10-03 NOTE — ED NOTES
Informed by Mitali Gutiérrez RN that pt IV access lost in CT. CT unable to flush without pain. Multiple IVs blown/loss of access over ED stay. IV removed. No complications noted. Denies any further needs, call light within reach, will continue to monitor.         Cj Awan RN  10/03/20 7589

## 2020-10-03 NOTE — ED NOTES
Ivorian  [place] ([facility]) sandwich, pudding and water to pt; mahnaz per provider      Ronni Warner, DILLAN  10/03/20 4540

## 2020-10-03 NOTE — ED PROVIDER NOTES
CHIEF COMPLAINT  Hyperglycemia (blood sugars have been over 600 for \"the last couple of days\" per patient; reading high at time of triage); Nausea (nausea and vomiting for two days ); and Chest Pain (for 3 months )      HISTORY OF PRESENT ILLNESS  Kennedy Saldana is a 62 y.o. female presents to the ED with high blood sugars, for the last few days, over 600 at times, brought in by EMS, states she does not know why, reporting nausea and vomiting for the last 2 days, states she cannot keep anything down, has had a little diarrhea as well, she also has chest pain, states it is been going on for about 3 months, but a little worse than normal today, also having issues with shortness of breath for 3 months as well, she states she has been taking all of her medications, including her insulin, last took the Lantus this morning, on abdominal discomfort, but not significant pain, no melena or hematochezia. States she has been urinating normally, urinates a lot, no dysuria, no other new symptoms, no headache or neck stiffness, no fevers, no new cough nor coronavirus exposure that she is aware of, was just admitted a week or 2 ago, reports she has to go to the ER a lot, no other complaints, modifying factors or associated symptoms. I have reviewed the following from the nursing documentation.     Past Medical History:   Diagnosis Date    Arthritis     CAD (coronary artery disease)     Cerebral artery occlusion with cerebral infarction ([address]     CHF (congestive heart failure) (HCC)     Diabetes mellitus ([address]     Hyperlipidemia     Hypertension     Mental retardation     MI (myocardial infarction) ([address]      Past Surgical History:   Procedure Laterality Date    BACK SURGERY      PACEMAKER INSERTION      TUBAL LIGATION      TUMOR REMOVAL       Family History   Problem Relation Age of Onset    Heart Disease Father     Cancer Mother     Other Mother      Social History     Socioeconomic History    Marital status:      Spouse name: Not on file    Number of children: Not on file    Years of education: Not on file    Highest education level: Not on file   Occupational History    Not on file   Social Needs    Financial resource strain: Not on file    Food insecurity     Worry: Not on file     Inability: Not on file    Transportation needs     Medical: Not on file     Non-medical: Not on file   Tobacco Use    Smoking status: Never Smoker    Smokeless tobacco: Never Used   Substance and Sexual Activity    Alcohol use: No    Drug use: No    Sexual activity: Not Currently   Lifestyle    Physical activity     Days per week: Not on file     Minutes per session: Not on file    Stress: Not on file   Relationships    Social connections     Talks on phone: Not on file     Gets together: Not on file     Attends Che[employer] service: Not on file     Active member of club or organization: Not on file     Attends meetings of clubs or organizations: Not on file     Relationship status: Not on file    Intimate partner violence     Fear of current or ex partner: Not on file     Emotionally abused: Not on file     Physically abused: Not on file     Forced sexual activity: Not on file   Other Topics Concern    Not on file   Social History Narrative    Not on file     Current Facility-Administered Medications   Medication Dose Route Frequency Provider Last Rate Last Dose    iopamidol (ISOVUE-370) 76 % injection 75 mL  75 mL Intravenous ONCE PRN Cindy Rising, DO        promethazine (PHENERGAN) injection 12.5 mg  12.5 mg Intramuscular Once Cindy Rising, DO         Current Outpatient Medications   Medication Sig Dispense Refill    metoprolol succinate (TOPROL XL) 50 MG extended release tablet Take 1 tablet by mouth daily 30 tablet 3    digoxin (LANOXIN) 125 MCG tablet Take 1 tablet by mouth every other day 30 tablet 3    insulin aspart (NOVOLOG FLEXPEN) 100 UNIT/ML injection pen Inject 5 Units into the skin 3 times daily (before meals) 5 pen 3    insulin glargine (LANTUS SOLOSTAR) 100 UNIT/ML injection pen Inject 30 Units into the skin 2 times daily 5 pen 3    insulin lispro (HUMALOG) 100 UNIT/ML injection vial Inject 0-6 Units into the skin 3 times daily (with meals) **Corrective Low Dose Algorithm**  Glucose: Dose:               No Insulin  140-199 1 Unit  200-249 2 Units  250-299 3 Units  300-349 4 Units  350-399 5 Units  Over 399 6 Units 1 vial 3    clopidogrel (PLAVIX) 75 MG tablet       escitalopram (LEXAPRO) 10 MG tablet       furosemide (LASIX) 40 MG tablet Take 1 tablet by mouth 2 times daily 60 tablet 0    nitroGLYCERIN (NITROSTAT) 0.4 MG SL tablet up to max of 3 total doses. If no relief after 1 dose, call 911. 25 tablet 3    ondansetron (ZOFRAN) 4 MG tablet Take 1 tablet by mouth every 8 hours as needed for Nausea 10 tablet 0    aspirin 81 MG EC tablet Take 1 tablet by mouth daily 30 tablet 2    ranolazine (RANEXA) 500 MG extended release tablet Take 1 tablet by mouth 2 times daily 60 tablet 2    DULoxetine (CYMBALTA) 60 MG extended release capsule Take 1 capsule by mouth daily 30 capsule 0    FLUoxetine (PROZAC) 10 MG capsule Take 1 capsule by mouth daily 30 capsule 0    metFORMIN (GLUCOPHAGE) 500 MG tablet Take 2 tablets by mouth 2 times daily (with meals) 120 tablet 2    atorvastatin (LIPITOR) 40 MG tablet Take 1 tablet by mouth nightly 30 tablet 2    fenofibrate micronized (LOFIBRA) 200 MG capsule Take 1 capsule by mouth nightly 30 capsule 3    miconazole (MICOTIN) 2 % powder Apply topically 2 times daily. 45 g 1    [employer] Lancets Ultra Thin MISC 1 each by Does not apply route 4 times daily 200 each 0    blood glucose monitor strips Test three times a day & as needed for symptoms of irregular blood glucose.  100 strip 2    topiramate (TOPAMAX) 200 MG tablet Take 1 tablet by mouth daily 60 tablet 0     No Known Allergies    REVIEW OF SYSTEMS  10 systems reviewed, pertinent positives per HPI otherwise noted to be negative. PHYSICAL EXAM  /73   Pulse 109   Temp 98.5 °F (36.9 °C)   Resp 26   Ht 5' 1\" (1.549 m)   Wt 219 lb (99.3 kg)   SpO2 96%   BMI 41.38 kg/m²   GENERAL APPEARANCE: Awake and alert. Cooperative. No acute distress, obese  HEAD: Normocephalic. Atraumatic. EYES: PERRL. EOM's grossly intact. No conjunctival injection  ENT: Mucous membranes are tacky  NECK: Supple. No rigidity  HEART: RRR. No murmurs  LUNGS: Respirations nonlabored. Lungs are with coarse breath sounds, but no wheezes or rhonchi noted. Trace crackles in the bases  ABDOMEN: Soft. Non-distended. Mild diffuse tenderness, rotund,. No guarding or rebound. Normal bowel sounds. EXTREMITIES: Trace peripheral edema. No calf tenderness, moves all extremities equally. All extremities neurovascularly intact. SKIN: Warm and dry. No acute rashes. NEUROLOGICAL: Alert and oriented. No gross facial drooping, but the left side of her mouth is chronically lower than the right. Strength 5/5, sensation intact. No truncal ataxia. Normal speech  PSYCHIATRIC: Irritable, frequently cursing/agitated at times, but sits calmly in the bed, easily upset    LABS  I have reviewed all labs for this visit.    Results for orders placed or performed during the hospital encounter of 10/02/20   CBC Auto Differential   Result Value Ref Range    WBC 9.2 4.0 - 11.0 K/uL    RBC 4.60 4.00 - 5.20 M/uL    Hemoglobin 13.8 12.0 - 16.0 g/dL    Hematocrit 42.6 36.0 - 48.0 %    MCV 92.8 80.0 - 100.0 fL    MCH 30.0 26.0 - 34.0 pg    MCHC 32.3 31.0 - 36.0 g/dL    RDW 13.8 12.4 - 15.4 %    Platelets 780 927 - 975 K/uL    MPV 9.6 5.0 - 10.5 fL    Neutrophils % 67.3 %    Lymphocytes % 25.0 %    Monocytes % 6.0 %    Eosinophils % 0.6 %    Basophils % 1.1 %    Neutrophils Absolute 6.2 1.7 - 7.7 K/uL    Lymphocytes Absolute 2.3 1.0 - 5.1 K/uL    Monocytes Absolute 0.5 0.0 - 1.3 K/uL    Eosinophils Absolute 0.1 0.0 - 0.6 K/uL    Basophils Absolute 0.1 0.0 - 0.2 K/uL   Urinalysis Reflex to Culture    Specimen: Urine, clean catch   Result Value Ref Range    Color, UA Yellow Straw/Yellow    Clarity, UA Clear Clear    Glucose, Ur >=1000 (A) Negative mg/dL    Bilirubin Urine Negative Negative    Ketones, Urine Negative Negative mg/dL    Specific Gravity, UA 1.010 1.005 - 1.030    Blood, Urine Negative Negative    pH, UA 5.0 5.0 - 8.0    Protein, UA Negative Negative mg/dL    Urobilinogen, Urine 0.2 <2.0 E.U./dL    Nitrite, Urine Negative Negative    Leukocyte Esterase, Urine Negative Negative    Microscopic Examination Not Indicated     Urine Type NotGiven     Urine Reflex to Culture Not Indicated    Lactic Acid, Plasma   Result Value Ref Range    Lactic Acid 1.6 0.4 - 2.0 mmol/L   Blood Gas, Venous   Result Value Ref Range    pH, Alberto 7.377 7.350 - 7.450    pCO2, Alberto 32.6 (L) 40.0 - 50.0 mmHg    pO2, Alberto 85.7 (H) 25.0 - 40.0 mmHg    HCO3, Venous 18.7 (L) 23.0 - 29.0 mmol/L    Base Excess, Alberto -5.3 (L) -3.0 - 3.0 mmol/L    O2 Sat, Alberto 96 Not Established %    Carboxyhemoglobin 1.5 0.0 - 1.5 %    MetHgb, Alberto 0.5 <1.5 %    TC02 (Calc), Alberto 20 Not Established mmol/L    O2 Content, Alberto 20 Not Established VOL %    O2 Therapy Unknown    Magnesium   Result Value Ref Range    Magnesium 1.90 1.80 - 2.40 mg/dL   Lipase   Result Value Ref Range    Lipase 74.0 (H) 13.0 - 60.0 U/L   Comprehensive Metabolic Panel   Result Value Ref Range    Sodium 130 (L) 136 - 145 mmol/L    Potassium 5.0 3.5 - 5.1 mmol/L    Chloride 97 (L) 99 - 110 mmol/L    CO2 18 (L) 21 - 32 mmol/L    Anion Gap 15 3 - 16    Glucose 641 (HH) 70 - 99 mg/dL    BUN 26 (H) 7 - 20 mg/dL    CREATININE 0.7 0.6 - 1.1 mg/dL    GFR Non-African American >60 >60    GFR African American >60 >60    Calcium 9.0 8.3 - 10.6 mg/dL    Total Protein 6.4 6.4 - 8.2 g/dL    Alb 3.8 3.4 - 5.0 g/dL    Albumin/Globulin Ratio 1.5 1.1 - 2.2    Total Bilirubin 0.4 0.0 - 1.0 mg/dL    Alkaline Phosphatase 225 (H) 40 - 129 U/L    ALT 69 (H) 10 - 40 U/L    AST 57 (H) 15 - 37 U/L    Globulin 2.6 g/dL   Troponin   Result Value Ref Range    Troponin 0.02 (H) <0.01 ng/mL   Troponin   Result Value Ref Range    Troponin 0.02 (H) <0.01 ng/mL   Brain Natriuretic Peptide   Result Value Ref Range    Pro-BNP 3,271 (H) 0 - 124 pg/mL   D-Dimer, Quantitative   Result Value Ref Range    D-Dimer, Quant 319 (H) 0 - 229 ng/mL DDU   Troponin   Result Value Ref Range    Troponin 0.03 (H) <0.01 ng/mL   Digoxin Level   Result Value Ref Range    Digoxin Lvl <0.3 (L) 0.8 - 2.0 ng/mL   POCT Glucose   Result Value Ref Range    POC Glucose >600 (AA) 70 - 99 mg/dl    Performed on ACCU-CHEK    POCT Glucose   Result Value Ref Range    POC Glucose 528 (H) 70 - 99 mg/dl    Performed on ACCU-CHEK    POCT Glucose   Result Value Ref Range    POC Glucose 471 (H) 70 - 99 mg/dl    Performed on ACCU-CHEK    POCT Glucose   Result Value Ref Range    POC Glucose 206 (H) 70 - 99 mg/dl    Performed on ACCU-CHEK        RADIOLOGY  Xr Chest (2 Vw)    Result Date: 9/27/2020  EXAMINATION: TWO XRAY VIEWS OF THE CHEST 9/27/2020 4:43 pm COMPARISON: Chest radiograph performed September 20, 2020. HISTORY: ORDERING SYSTEM PROVIDED HISTORY: shortness of breath TECHNOLOGIST PROVIDED HISTORY: Reason for exam:-> shortness of breath Reason for Exam: trouble breathing Acuity: Acute Type of Exam: Initial FINDINGS: Left chest AICD in stable positioning. Stable enlargement of the cardiac silhouette. Low lung volumes. Mild prominence of the pulmonary vasculature. No focal consolidation, pleural effusion, or pneumothorax. No acute osseous abnormality. Stable enlargement of the cardiac silhouette. Mild prominence of the pulmonary vasculature which could be related to vascular crowding versus pulmonary vascular congestion. No pleural effusion. Xr Chest (2 Vw)    Result Date: 9/20/2020  EXAMINATION: TWO XRAY VIEWS OF THE CHEST 9/20/2020 9:55 am COMPARISON: Chest radiograph 09/18/2020, 09/10/2020. Cardial-pericardial silhouette is enlarged but stable. Bipolar pacemaker/ICD again noted. Pulmonary vasculature is congested. A focal infiltrate is not identified. No pneumothorax. No free air. No acute bony abnormality. Pulmonary vascular congestion. PROCEDURE:   Peripheral venous access using ultrasound guidance    I personally placed an 18-gauge long angiocatheter under ultrasound guidance into the left brachial vein on first attempt with good return of blood and it flushed without difficulty. Patient tolerated the procedure well. This was obtained for IV access due to poor IV access elsewhere when nurses tried. HEART SCORE:    History: +1 for moderate suspicion  EKG: +1 for nonspecific changes   Age: +1 for age 44-72 years  Risk factors (includes HLD, HTN, DM, tobacco use, obesity, and +FHx): +2 for known CAD or 3+ risk factors  Initial troponin: +1 for troponin 1-3x normal limit    Heart score: 6. This falls under the following category: Score of 4-6, which indicates low/moderate risk for major adverse cardiac event and supports observation with repeated troponins and/or non-invasive testing    WELLS CRITERIA FOR PE RISK:   1. Clinical S/S of DVT: +0 No   2. PE #1 Dx: +0 No   3. HR>100: +1.5 Yes   4. Immobilization w/in past 3 days OR Surgery w/in past 3 weeks: +1.5 Yes   5. Previous PE/DVT, not on blood thinners: +0 No   6. Hemoptysis: +0 No   7. Malignancy, Tx w/in 6 months: +0 No    Score: 3 2-6: Moderate Risk-16.2%-Obtain D-Dimer   D-Dimer: Positive-CTA      ED COURSE/MDM  Patient seen and evaluated. Old records reviewed. Labs and imaging reviewed and results discussed with patient.   18-year-old female with multiple issues today, she was primarily complaining of her blood sugar being elevated, vomiting/GI symptoms, however she began complaining of her typical chest pain and shortness of breath while here, has been worked up for this multiple times, she has an AICD, I considered ACS, but it did not seem that the chest pain had changed much over the past 3 months, and she has been evaluated multiple times and admitted recently, she was given aspirin here, had already been on aspirin/Plavix at home, however her initial troponin was 0.02, no acute ischemic changes on EKG, however the repeat was 0.02 as well, I have concern for the possibility of PE as well, she had some signs of right heart strain on EKG, BNP was elevated, and d-dimer was elevated, given the recent hospitalizations, I felt this was a distinct possibility, we had issues with IV access with the patient throughout her ED stay, had initially given her fluids and insulin, which improved her hyperglycemia, she did not receive the full bolus of fluids because her IV infiltrated, only about 500 cc, however the patient states she was more short of breath, and then it was found she had some interstitial edema increasing on chest x-ray, ultimately given IV Lasix which she is supposed to be on at home, low suspicion for infectious process or sepsis, ordered for the AICD to be interrogated, she was due for her metoprolol, and had been tachycardic much of the ED stay, I did not feel the patient required central line placement, I opted to treat with Lovenox 1 mg/kg at the time when patient's IV was pulled out in CT (that I placed under ultrasound guidance after 4 different nurses tried obtaining an IV), a.m. physician was able to calm and place another, she was not requiring any supplemental oxygen, no increased work of breathing here, patient verbalized understanding of plan to stay in the hospital, Dr. Cindy Najera agreed to admit for hospitalist service.     Orders Placed This Encounter   Procedures    Culture, Blood 1    Culture, Blood 2    XR CHEST PORTABLE    CT CHEST PULMONARY EMBOLISM W CONTRAST    CBC Auto Differential    Urinalysis Reflex to Culture    Lactic Acid, Plasma    Blood Gas, Venous    Magnesium    Lipase    Comprehensive Metabolic Panel    Troponin    Troponin    Brain Natriuretic Peptide    D-Dimer, Quantitative    Digoxin level    Telemetry monitoring    Weigh patient    Nursing communication    Nursing communication    Notify physician (specify)    Tip Confirmation System (TCS) and/ or Chest Xray for tip confirmation    Catheter Care    Inpatient consult to Hospitalist    POCT Glucose    POCT Glucose    POCT Glucose    POCT Glucose    POCT Glucose    POCT Glucose    EKG 12 Lead    Saline lock IV    Insert PICC line    PATIENT STATUS (FROM ED OR OR/PROCEDURAL) Observation     Orders Placed This Encounter   Medications    ondansetron (ZOFRAN) injection 4 mg    DISCONTD: 0.9 % sodium chloride bolus    aspirin chewable tablet 324 mg    0.9 % sodium chloride bolus    insulin lispro (HUMALOG) injection vial 15 Units    promethazine (PHENERGAN) injection 12.5 mg    insulin glargine (LANTUS) injection vial 30 Units    iopamidol (ISOVUE-370) 76 % injection 75 mL    enoxaparin (LOVENOX) injection 100 mg    metoprolol succinate (TOPROL XL) extended release tablet 50 mg    furosemide (LASIX) injection 60 mg    lidocaine 1 % injection 5 mL    sodium chloride flush 0.9 % injection 10 mL    sodium chloride flush 0.9 % injection 10 mL     ED Course as of Oct 03 0832   Fri Oct 02, 2020   2149 EKG interpretation by me: Sinus tachycardia, likely old anterolateral infarct, rate 113, QTc 469, PVC versus fusion complex, and similar to September 19, 2020 EKG except increased rate    [SY]   Sat Oct 03, 2020   0831 I spoke to the [facility] representative for the AICD for interrogation, he reports recently she has had 4 NSVT episodes over the past week, the last one was October 1, rates getting into the 180s, lasting around 5 to 8 seconds, she is also having intermittent episodes of atrial tachycardia's, into the 130s at times, even this morning.     [SY]      ED Course User Index  [SY] Jose A Dejesus Iram Cordoba DO       The total critical care time spent while evaluating and treating this patient was at least 31 minutes. This excludes time spent doing separately billable procedures. This includes time at the bedside, data interpretation, medication management, obtaining critical history from collateral sources if the patient is unable to provide it directly, and physician consultation. Specifics of interventions taken and potentially life-threatening diagnostic considerations are listed above in the medical decision making. CLINICAL IMPRESSION  1. Type 2 diabetes mellitus with hyperglycemia, with long-term current use of insulin ([facility]    2. Non-intractable vomiting with nausea, unspecified vomiting type    3. Chest pain, unspecified type    4. Acute on chronic combined systolic and diastolic CHF (congestive heart failure) (HCC)    5. Elevated troponin    6. Acute pulmonary edema (HCC)    7. Dual ICD (implantable cardioverter-defibrillator) in place        Blood pressure 117/73, pulse 109, temperature 98.5 °F (36.9 °C), resp. rate 26, height 5' 1\" (1.549 m), weight 219 lb (99.3 kg), SpO2 96 %, not currently breastfeeding. Mary Velasquez was admitted to the hospital in stable condition.                    Alysha Howell DO  10/03/20 [id number]

## 2020-10-03 NOTE — ED PROVIDER NOTES
PROCEDURE NOTE - Ultrasound guided peripheral IV placement  Indication: unable to obtain adequate PIV access without ultrasound for medication administration, fluid resuscitation and/or lab draw    Views:  Long access view of target vein: Adequate    Images obtained with findings:   Vein compressible: yes  Needle tip within vein: yes    Impression:   Successful cannulation of vein: yes    The left antecubital fossa and forearm were prepped with chloraprep and tourniquet applied. Using a 18g angiocath, under direct ultrasound visualization and guidance, IV access was established and secured on second attempt. The PIV withdrew blood easily and flushed easily. Tourniquet removed. Complications: none    Images obtained by Alexandro Jackson, interpreted by Alexandro Jackson.        Alexandro Jackson MD  10/03/20 [id number]

## 2020-10-03 NOTE — ED NOTES
Pt identified as fall risk. Precautions in place including yellow arm band, yellow socks, bed alarm, and be safe sign outside of room. Pt instructed to not get out of bed alone and to use call light for assistance. Pt voiced understanding.        Rupal Alfaro RN  10/03/20 0510

## 2020-10-03 NOTE — ED NOTES
Report given to [facility] at bedside. Pt stable, IV WNL, VSS. All paperwork and belongings with pt. Transported with RN via wheelchair.      Rose Melgoza RN  10/03/20 0281

## 2020-10-03 NOTE — H&P
Hospital Medicine History & Physical      PCP: Griffith Cogan, APRN - NP    Date of Admission: 10/2/2020    Date of Service: Pt seen/examined on 10/3/2020 and Admitted to Inpatient with expected LOS greater than two midnights due to medical therapy. Chief Complaint: Chest pain, shortness of breath      History Of Present Illness:      62 y.o. female who presented to [facility] with chest pain and shortness of breath ongoing for last several days gradually getting worse. Patient denies any fever, denies any hemoptysis, denies any abdominal pain, denies any nausea or vomiting. Patient is admitted for further evaluation management. Past Medical History:          Diagnosis Date    Arthritis     CAD (coronary artery disease)     Cerebral artery occlusion with cerebral infarction [facility]     CH[facility]congestive heart failure) [facility]     Diabetes mellitus ([facility]     Hyperlipidemia     Hypertension     Mental retardation     MI (myocardial infarction) ([facility] [address] [facility]        Past Surgical History:          Procedure Laterality Date    BACK SURGERY      PACEMAKER INSERTION      TUBAL LIGATION      TUMOR REMOVAL         Medications Prior to Admission:      Prior to Admission medications    Medication Sig Start Date End Date Taking?  Authorizing Provider   metoprolol succinate (TOPROL XL) 50 MG extended release tablet Take 1 tablet by mouth daily 9/25/20   FRITZ Mariscal CNP   digoxin (LANOXIN) 125 MCG tablet Take 1 tablet by mouth every other day 9/25/20   FRITZ Mariscal CNP   insulin aspart (NOVOLOG FLEXPEN) 100 UNIT/ML injection pen Inject 5 Units into the skin 3 times daily (before meals) 9/22/20   Florin Torre MD   insulin glargine (LANTUS SOLOSTAR) 100 UNIT/ML injection pen Inject 30 Units into the skin 2 times daily 9/22/20   Florin Torre MD   insulin lispro (HUMALOG) 100 UNIT/ML injection vial Inject 0-6 Units into the skin 3 times daily (with meals) **Corrective Low Dose Algorithm**  Glucose: Dose:               No Insulin  140-199 1 Unit  200-249 2 Units  250-299 3 Units  300-349 4 Units  350-399 5 Units  Over 399 6 Units 9/22/20   Jaymie Torre MD   clopidogrel (PLAVIX) 75 MG tablet  6/10/20   Historical Provider, MD   escitalopram (LEXAPRO) 10 MG tablet  6/15/20   Historical Provider, MD   furosemide (LASIX) 40 MG tablet Take 1 tablet by mouth 2 times daily 9/8/20 10/8/20  Emperatriz Smith APRN - CNP   nitroGLYCERIN (NITROSTAT) 0.4 MG SL tablet up to max of 3 total doses. If no relief after 1 dose, call 911. 8/24/20   Ashlie Palomino MD   ondansetron [facility]) 4 MG tablet Take 1 tablet by mouth every 8 hours as needed for Nausea 8/12/20   Meredith Pond DO   aspirin 81 MG EC tablet Take 1 tablet by mouth daily 7/18/20   Rolf Forrest MD   ranolazine (RANEXA) 500 MG extended release tablet Take 1 tablet by mouth 2 times daily 7/18/20   Rolf Forrest MD   DULoxetine (CYMBALTA) 60 MG extended release capsule Take 1 capsule by mouth daily 7/18/20   Rolf Forrest MD   FLUoxetine (PROZAC) 10 MG capsule Take 1 capsule by mouth daily 7/18/20   Rolf Forrest MD   metFORMIN (GLUCOPHAGE) 500 MG tablet Take 2 tablets by mouth 2 times daily (with meals) 7/18/20 10/16/20  Rolf Forrest MD   atorvastatin (LIPITOR) 40 MG tablet Take 1 tablet by mouth nightly 7/18/20   Rolf Forrest MD   fenofibrate micronized (LOFIBRA) 200 MG capsule Take 1 capsule by mouth nightly 7/18/20   Rolf Forrest MD   miconazole (MICOTIN) 2 % powder Apply topically 2 times daily. 7/18/20   Rolf Forrest MD   [employer] Lancets Ultra Thin MISC 1 each by Does not apply route 4 times daily 7/18/20   Rolf Forrest MD   blood glucose monitor strips Test three times a day & as needed for symptoms of irregular blood glucose.  5/12/19   Chelsea Head MD   topiramate (TOPAMAX) 200 MG tablet Take 1 tablet by mouth daily 4/14/18 Peripheral Pulses: +2 palpable, equal bilaterally       Labs:     Recent Labs     10/02/20  2132   WBC 9.2   HGB 13.8   HCT 42.6        Recent Labs     10/02/20  2132   *   K 5.0   CL 97*   CO2 18*   BUN 26*   CREATININE 0.7   CALCIUM 9.0     Recent Labs     10/02/20  2132   AST 57*   ALT 69*   BILITOT 0.4   ALKPHOS 225*     No results for input(s): INR in the last 72 hours. Recent Labs     10/02/20  2132 10/03/20  0142 10/03/20  0640   TROPONINI 0.02* 0.02* 0.03*       Urinalysis:      Lab Results   Component Value Date    NITRU Negative 10/02/2020    WBCUA 10-20 06/12/2020    BACTERIA Rare 05/28/2020    RBCUA 3-4 06/12/2020    BLOODU Negative 10/02/2020    SPECGRAV 1.010 10/02/2020    GLUCOSEU >=1000 10/02/2020       Radiology:     CXR: I have reviewed the CXR with the following interpretation:   Mild CHF  EKG:  I have reviewed the EKG with the following interpretation:   Sinus tachycardia, no ischemic changes noted. XR CHEST PORTABLE   Final Result   Cardiomegaly with increasing interstitial edema. CT CHEST PULMONARY EMBOLISM W CONTRAST    (Results Pending)       ASSESSMENT:    Active Hospital Problems    Diagnosis Date Noted    Chest pain [R07.9] 10/07/2019     Priority: High    Type 2 diabetes mellitus with hyperglycemia, with long-term current use of insulin ([facility]) [E11.65, Z79.4] 10/03/2020    Nonischemic cardiomyopathy ([facility] [address] [I42.8]     Dual ICD (implantable cardioverter-defibrillator) in place [Z95.810] 05/08/2018         PLAN:    Chest pain, atypical, [facility], trend troponin, continue aspirin and statin, cardiology consult. Acute on chronic systolic heart failure, recent echo on 6/13/2020 showing LVEF of 35%, continue home regimen, continue IV Lasix, cardiology consult. History of ICD in situ, monitor on telemetry. Diabetes mellitus type 2, uncontrolled, continue insulin sliding scale, monitor.     DVT Prophylaxis: Lovenox  Diet: No diet orders on file  Code Status: Prior    PT/OT Eval Status: Not ordered    Dispo -2 to 4 days       Edin Ferreira MD    Thank you FRITZ Lizama NP for the opportunity to be involved in this patient's care. If you have any questions or concerns please feel free to contact me at [phone].

## 2020-10-04 LAB
ANION GAP SERPL CALCULATED.3IONS-SCNC: 15 MMOL/L (ref 3–16)
BUN BLDV-MCNC: 29 MG/DL (ref 7–20)
CALCIUM SERPL-MCNC: 8.9 MG/DL (ref 8.3–10.6)
CHLORIDE BLD-SCNC: 101 MMOL/L (ref 99–110)
CO2: 19 MMOL/L (ref 21–32)
CREAT SERPL-MCNC: 0.7 MG/DL (ref 0.6–1.1)
GFR AFRICAN AMERICAN: >60
GFR NON-AFRICAN AMERICAN: >60
GLUCOSE BLD-MCNC: 123 MG/DL (ref 70–99)
GLUCOSE BLD-MCNC: 125 MG/DL (ref 70–99)
GLUCOSE BLD-MCNC: 128 MG/DL (ref 70–99)
GLUCOSE BLD-MCNC: 93 MG/DL (ref 70–99)
PERFORMED ON: ABNORMAL
PERFORMED ON: ABNORMAL
PERFORMED ON: NORMAL
POTASSIUM REFLEX MAGNESIUM: 3.8 MMOL/L (ref 3.5–5.1)
SODIUM BLD-SCNC: 135 MMOL/L (ref 136–145)

## 2020-10-04 PROCEDURE — 2580000003 HC RX 258: Performed by: EMERGENCY MEDICINE

## 2020-10-04 PROCEDURE — G0378 HOSPITAL OBSERVATION PER HR: HCPCS

## 2020-10-04 PROCEDURE — 99215 OFFICE O/P EST HI 40 MIN: CPT | Performed by: NURSE PRACTITIONER

## 2020-10-04 PROCEDURE — 96372 THER/PROPH/DIAG INJ SC/IM: CPT

## 2020-10-04 PROCEDURE — 6360000002 HC RX W HCPCS: Performed by: INTERNAL MEDICINE

## 2020-10-04 PROCEDURE — 36415 COLL VENOUS BLD VENIPUNCTURE: CPT

## 2020-10-04 PROCEDURE — 6370000000 HC RX 637 (ALT 250 FOR IP): Performed by: INTERNAL MEDICINE

## 2020-10-04 PROCEDURE — 6370000000 HC RX 637 (ALT 250 FOR IP): Performed by: NURSE PRACTITIONER

## 2020-10-04 PROCEDURE — 80048 BASIC METABOLIC PNL TOTAL CA: CPT

## 2020-10-04 RX ORDER — FUROSEMIDE 10 MG/ML
40 INJECTION INTRAMUSCULAR; INTRAVENOUS DAILY
Status: DISCONTINUED | OUTPATIENT
Start: 2020-10-04 | End: 2020-10-04

## 2020-10-04 RX ORDER — ESCITALOPRAM OXALATE 10 MG/1
10 TABLET ORAL DAILY
Status: DISCONTINUED | OUTPATIENT
Start: 2020-10-04 | End: 2020-10-14 | Stop reason: HOSPADM

## 2020-10-04 RX ORDER — DULOXETIN HYDROCHLORIDE 60 MG/1
60 CAPSULE, DELAYED RELEASE ORAL DAILY
Status: DISCONTINUED | OUTPATIENT
Start: 2020-10-04 | End: 2020-10-14 | Stop reason: HOSPADM

## 2020-10-04 RX ORDER — FUROSEMIDE 40 MG/1
40 TABLET ORAL DAILY
Status: DISCONTINUED | OUTPATIENT
Start: 2020-10-05 | End: 2020-10-04

## 2020-10-04 RX ORDER — FUROSEMIDE 40 MG/1
40 TABLET ORAL 2 TIMES DAILY
Status: DISCONTINUED | OUTPATIENT
Start: 2020-10-04 | End: 2020-10-05

## 2020-10-04 RX ORDER — FLUOXETINE 10 MG/1
10 CAPSULE ORAL DAILY
Status: DISCONTINUED | OUTPATIENT
Start: 2020-10-04 | End: 2020-10-14 | Stop reason: HOSPADM

## 2020-10-04 RX ADMIN — ASPIRIN 81 MG: 81 TABLET ORAL at 09:08

## 2020-10-04 RX ADMIN — RANOLAZINE 500 MG: 500 TABLET, FILM COATED, EXTENDED RELEASE ORAL at 09:08

## 2020-10-04 RX ADMIN — FENOFIBRATE 160 MG: 160 TABLET ORAL at 09:08

## 2020-10-04 RX ADMIN — TOPIRAMATE 200 MG: 100 TABLET, FILM COATED ORAL at 09:08

## 2020-10-04 RX ADMIN — INSULIN GLARGINE 30 UNITS: 100 INJECTION, SOLUTION SUBCUTANEOUS at 09:09

## 2020-10-04 RX ADMIN — ESCITALOPRAM OXALATE 10 MG: 10 TABLET ORAL at 09:08

## 2020-10-04 RX ADMIN — FLUOXETINE 10 MG: 10 CAPSULE ORAL at 09:08

## 2020-10-04 RX ADMIN — INSULIN LISPRO 8 UNITS: 100 INJECTION, SOLUTION INTRAVENOUS; SUBCUTANEOUS at 09:09

## 2020-10-04 RX ADMIN — ENOXAPARIN SODIUM 40 MG: 40 INJECTION SUBCUTANEOUS at 09:07

## 2020-10-04 RX ADMIN — DULOXETINE HYDROCHLORIDE 60 MG: 60 CAPSULE, DELAYED RELEASE ORAL at 09:08

## 2020-10-04 RX ADMIN — INSULIN GLARGINE 30 UNITS: 100 INJECTION, SOLUTION SUBCUTANEOUS at 21:00

## 2020-10-04 RX ADMIN — PROMETHAZINE HYDROCHLORIDE 12.5 MG: 25 TABLET ORAL at 16:46

## 2020-10-04 RX ADMIN — Medication 10 ML: at 21:05

## 2020-10-04 RX ADMIN — METOPROLOL SUCCINATE 50 MG: 50 TABLET, EXTENDED RELEASE ORAL at 09:08

## 2020-10-04 RX ADMIN — CLOPIDOGREL BISULFATE 75 MG: 75 TABLET ORAL at 09:08

## 2020-10-04 ASSESSMENT — PAIN SCALES - GENERAL
PAINLEVEL_OUTOF10: 0
PAINLEVEL_OUTOF10: 0
PAINLEVEL_OUTOF10: 10
PAINLEVEL_OUTOF10: 0
PAINLEVEL_OUTOF10: 0

## 2020-10-04 NOTE — PLAN OF CARE
Problem: Falls - Risk of:  Goal: Will remain free from falls  Description: Will remain free from falls  Outcome: Ongoing   Pt A&Ox4 calls out appropriately. On RA. Bed in lowest position and locked. Bedside table in reach. Call light in reach. Will continue to monitor.

## 2020-10-04 NOTE — PROGRESS NOTES
[address]   Daily Progress Note    Admit Date:  10/2/2020  HPI:    Chief Complaint   Patient presents with    Hyperglycemia     blood sugars have been over 600 for \"the last couple of days\" per patient; reading high at time of triage    Nausea     nausea and vomiting for two days     Chest Pain     for 3 months         Interval history: Estrella Padilla is being followed for chest pain.    PMH of CAD, cerebral artery occlusion with cerebral infarct, DM, Hyperlipidemia, Hypertension, Mental retardation, NSVT, and MI.  s/p LHC in 12/2018 showed non-obstructive CAD, s/p [employer]as defibrillator.      Subjective:  Ms. Hafsa Rosa continues with constant chest pain - has had chest pain for 3 months. Pain is \"everywhere\". Nothing improves the pain\" \"nitro doesn't help\" has constant heart racing.    Tel: NSR rate 80-90's    Objective:   /66   Pulse 84   Temp 97.5 °F (36.4 °C) (Oral)   Resp 16   Ht 5' 1\" (1.549 m)   Wt 213 lb 1.6 oz (96.7 kg)   SpO2 99%   BMI 40.26 kg/m²       Intake/Output Summary (Last 24 hours) at 10/4/2020 1116  Last data filed at 10/4/2020 0926  Gross per 24 hour   Intake 805 ml   Output 0 ml   Net 805 ml       NYHA: III    Physical Exam:  General:  Awake, alert, NAD, no eye contact  Skin:  Warm and dry  Neck:  JVD not able to assess  Chest:  Clear to auscultation, no wheezes/rhonchi/rales  Cardiovascular:  RRR S1S2, no m/r/g   Abdomen:  Soft, nontender, +bowel sounds  Extremities:  Trace BLE edema    Medications:    FLUoxetine  10 mg Oral Daily    escitalopram  10 mg Oral Daily    DULoxetine  60 mg Oral Daily    [START ON 10/5/2020] furosemide  40 mg Oral Daily    lidocaine 1 % injection  5 mL Intradermal Once    sodium chloride flush  10 mL Intravenous 2 times per day    aspirin  81 mg Oral Daily    atorvastatin  40 mg Oral Nightly    clopidogrel  75 mg Oral Daily    fenofibrate  160 mg Oral Daily    metoprolol succinate  50 mg Oral Daily    ranolazine  500 mg Oral BID    topiramate  200 mg Oral Daily    enoxaparin  40 mg Subcutaneous Daily    insulin glargine  30 Units Subcutaneous BID    insulin lispro  0.08 Units/kg Subcutaneous TID WC    insulin lispro  0-12 Units Subcutaneous TID WC    insulin lispro  0-6 Units Subcutaneous Nightly      dextrose         Lab Data:  CBC:   Recent Labs     10/02/20  2132   WBC 9.2   HGB 13.8        BMP:    Recent Labs     10/02/20  2132 10/03/20  0640   * 135*   K 5.0 4.2   CO2 18* 18*   BUN 26* 23*   CREATININE 0.7 0.6     INR:  No results for input(s): INR in the last 72 hours. BNP:    Recent Labs     10/03/20  0142   PROBNP 3,271*     Lab Results   Component Value Date    LVEF 35 06/13/2020       ECHO: 10/7/2019  Summary   The left ventricular systolic function is mildly reduced with an ejection   fraction of 35 - 40 %.   There is hypokinesis of the apex, anteroseptum, anterior, apical lateral and   inferior walls.   Left ventricular cavity size is moderately dilated.   Grade I diastolic dysfunction with normal filling pressure.   Changes noted from previous echo on 4- in left ventricular function.   Mild mitral regurgitation.   Right ventricular systolic function is mildly reduced .   Systolic pulmonic artery pressure (SPAP) is normal estimated at 21 mmHg   (Right atrial pressure of 3 mmHg).      Stress 1/31/20   Stress Interpretation   Mildly reduced LVEF 45%   Inferolateral hypokinesis   Mainly fixed inferior defect suggestive of scar   No significant areas of reversibility to suggest ischemia    Echo: 6/13/20  Summary   LV systolic function is moderately reduced with an estimated EF of 35%.   Moderate global hypokinesis.   There is mild concentric left ventricular hypertrophy.   Left ventricular cavity size is mildly dilated.   Estimated LV diastolic filling pressure is normal.   Mild mitral and tricuspid regurgitation.   Systolic pulmonary artery pressure (SPAP) is estimated at 35mmHg (Right  Jorge pressure of 8 mmHg).   No significant change from exam done 10/18/2019.     CARDIAC CATH 12/2018     LM <20%  LAD 20-30%  Cx 20-20%  RCA 20-30%              RPDA 50%. FFR 0.89  LVEF 45%     Principal Problem:    Chest pain  Active Problems:    Dual ICD (implantable cardioverter-defibrillator) in place    Nonischemic cardiomyopathy (HCC)    Type 2 diabetes mellitus with hyperglycemia, with long-term current use of insulin (HCC)  Resolved Problems:    * No resolved hospital problems.  *      Assessment/Plan:  ~chest pain- constant and atypical  ~ palpitations  ~non ischemic cardimoyopathy  s/p St. Esdras ICD   ~Acute on chronic systolic heart failure- Pro-bnp elevated on admission and interstitial edema noted on CXR  ~hypotension  ~Hyponatremia  Dm    Plan:  Continue toprol 50mg daily  Contiue lasix - 40mg adjust to BID for now (she lost IV access)  Holding ACEi due to hypotension and need room for some diuresis   Stress test Monday  EP to see MOnday for SVT and palpitations    Letty Caraballo CNP, 10/4/2020, 1:11 PM

## 2020-10-04 NOTE — PROGRESS NOTES
Pt A&Ox4 calls out appropriately. On RA. C/o 10/10 chest pain but does not want any pain medications. Appears very anxious. Denies any other needs at this time. Bed in lowest position and locked. Bedside table in reach. Call light in reach. Will continue to monitor.

## 2020-10-04 NOTE — PROGRESS NOTES
venous distention. Trachea midline. Respiratory:  Normal respiratory effort. Clear to auscultation, bilaterally without Rales/Wheezes/Rhonchi. Cardiovascular: Regular rate and rhythm with normal S1/S2 without murmurs, rubs or gallops. Abdomen: Soft, non-tender, non-distended with normal bowel sounds. Musculoskeletal: No clubbing, cyanosis or edema bilaterally. Full range of motion without deformity. Skin: Skin color, texture, turgor normal.  No rashes or lesions. Neurologic:  Neurovascularly intact without any focal sensory/motor deficits. Cranial nerves: II-XII intact, grossly non-focal.  Psychiatric: Alert and oriented, thought content appropriate, normal insight  Capillary Refill: Brisk,< 3 seconds   Peripheral Pulses: +2 palpable, equal bilaterally       Labs:   Recent Labs     10/02/20  2132   WBC 9.2   HGB 13.8   HCT 42.6        Recent Labs     10/02/20  2132 10/03/20  0640   * 135*   K 5.0 4.2   CL 97* 101   CO2 18* 18*   BUN 26* 23*   CREATININE 0.7 0.6   CALCIUM 9.0 9.2     Recent Labs     10/02/20  2132   AST 57*   ALT 69*   BILITOT 0.4   ALKPHOS 225*     No results for input(s): INR in the last 72 hours. Recent Labs     10/02/20  2132 10/03/20  0142 10/03/20  0640   TROPONINI 0.02* 0.02* 0.03*       Urinalysis:      Lab Results   Component Value Date    NITRU Negative 10/02/2020    WBCUA 10-20 06/12/2020    BACTERIA Rare 05/28/2020    RBCUA 3-4 06/12/2020    BLOODU Negative 10/02/2020    SPECGRAV 1.010 10/02/2020    GLUCOSEU >=1000 10/02/2020       Radiology:  XR CHEST PORTABLE   Final Result   Cardiomegaly with increasing interstitial edema.          NM Cardiac Stress Test Nuclear Imaging    (Results Pending)           Assessment/Plan:    Active Hospital Problems    Diagnosis    Chest pain [R07.9]     Priority: High    Type 2 diabetes mellitus with hyperglycemia, with long-term current use of insulin (HCC) [E11.65, Z79.4]    Nonischemic cardiomyopathy ([facility] [address] [I42.8]    Dual ICD (implantable cardioverter-defibrillator) in place [Z95.810]     Chest pain, atypical, admit to [facility] telemetry, trend troponin, continue aspirin and statin, cardiology consult. Plan for stress test.     Acute on chronic systolic heart failure, recent echo on 6/13/2020 showing LVEF of 35%, continue home regimen, continue IV Lasix, cardiology consult.     History of ICD in situ, monitor on telemetry.     Diabetes mellitus type 2, uncontrolled, continue insulin sliding scale, monitor. DVT Prophylaxis: Lovenox  Diet: DIET CARB CONTROL;   Diet NPO, After Midnight  Code Status: Full Code    PT/OT Eval Status: Ambulatory    Dispo -in 2 to 3 days    Zev Del Valle MD

## 2020-10-05 ENCOUNTER — APPOINTMENT (OUTPATIENT)
Dept: INTERVENTIONAL RADIOLOGY/VASCULAR | Age: 60
DRG: 286 | End: 2020-10-05
Payer: MEDICARE

## 2020-10-05 ENCOUNTER — APPOINTMENT (OUTPATIENT)
Dept: NUCLEAR MEDICINE | Age: 60
DRG: 286 | End: 2020-10-05
Payer: MEDICARE

## 2020-10-05 LAB
GLUCOSE BLD-MCNC: 147 MG/DL (ref 70–99)
GLUCOSE BLD-MCNC: 176 MG/DL (ref 70–99)
GLUCOSE BLD-MCNC: 99 MG/DL (ref 70–99)
GLUCOSE BLD-MCNC: 99 MG/DL (ref 70–99)
LV EF: 19 %
LVEF MODALITY: NORMAL
PERFORMED ON: ABNORMAL
PERFORMED ON: ABNORMAL
PERFORMED ON: NORMAL
PERFORMED ON: NORMAL

## 2020-10-05 PROCEDURE — A9502 TC99M TETROFOSMIN: HCPCS | Performed by: INTERNAL MEDICINE

## 2020-10-05 PROCEDURE — 6360000002 HC RX W HCPCS: Performed by: INTERNAL MEDICINE

## 2020-10-05 PROCEDURE — 4A023N8 MEASUREMENT OF CARDIAC SAMPLING AND PRESSURE, BILATERAL, PERCUTANEOUS APPROACH: ICD-10-PCS | Performed by: INTERNAL MEDICINE

## 2020-10-05 PROCEDURE — 36573 INSJ PICC RS&I 5 YR+: CPT

## 2020-10-05 PROCEDURE — C1751 CATH, INF, PER/CENT/MIDLINE: HCPCS

## 2020-10-05 PROCEDURE — 2500000003 HC RX 250 WO HCPCS: Performed by: EMERGENCY MEDICINE

## 2020-10-05 PROCEDURE — B2111ZZ FLUOROSCOPY OF MULTIPLE CORONARY ARTERIES USING LOW OSMOLAR CONTRAST: ICD-10-PCS | Performed by: INTERNAL MEDICINE

## 2020-10-05 PROCEDURE — 6370000000 HC RX 637 (ALT 250 FOR IP): Performed by: NURSE PRACTITIONER

## 2020-10-05 PROCEDURE — 05HY33Z INSERTION OF INFUSION DEVICE INTO UPPER VEIN, PERCUTANEOUS APPROACH: ICD-10-PCS | Performed by: RADIOLOGY

## 2020-10-05 PROCEDURE — G0378 HOSPITAL OBSERVATION PER HR: HCPCS

## 2020-10-05 PROCEDURE — 6370000000 HC RX 637 (ALT 250 FOR IP): Performed by: INTERNAL MEDICINE

## 2020-10-05 PROCEDURE — 3430000000 HC RX DIAGNOSTIC RADIOPHARMACEUTICAL: Performed by: INTERNAL MEDICINE

## 2020-10-05 PROCEDURE — 2580000003 HC RX 258: Performed by: EMERGENCY MEDICINE

## 2020-10-05 PROCEDURE — 93017 CV STRESS TEST TRACING ONLY: CPT

## 2020-10-05 PROCEDURE — 99215 OFFICE O/P EST HI 40 MIN: CPT | Performed by: INTERNAL MEDICINE

## 2020-10-05 PROCEDURE — 78452 HT MUSCLE IMAGE SPECT MULT: CPT

## 2020-10-05 RX ORDER — AMINOPHYLLINE DIHYDRATE 25 MG/ML
75 INJECTION, SOLUTION INTRAVENOUS ONCE
Status: COMPLETED | OUTPATIENT
Start: 2020-10-05 | End: 2020-10-05

## 2020-10-05 RX ORDER — FUROSEMIDE 10 MG/ML
40 INJECTION INTRAMUSCULAR; INTRAVENOUS 2 TIMES DAILY
Status: DISCONTINUED | OUTPATIENT
Start: 2020-10-06 | End: 2020-10-07

## 2020-10-05 RX ADMIN — AMINOPHYLLINE 75 MG: 25 INJECTION, SOLUTION INTRAVENOUS at 12:32

## 2020-10-05 RX ADMIN — Medication 10 ML: at 21:00

## 2020-10-05 RX ADMIN — TOPIRAMATE 200 MG: 100 TABLET, FILM COATED ORAL at 10:27

## 2020-10-05 RX ADMIN — FLUOXETINE 10 MG: 10 CAPSULE ORAL at 10:28

## 2020-10-05 RX ADMIN — Medication 10 ML: at 10:27

## 2020-10-05 RX ADMIN — TETROFOSMIN 29.8 MILLICURIE: 1.38 INJECTION, POWDER, LYOPHILIZED, FOR SOLUTION INTRAVENOUS at 12:28

## 2020-10-05 RX ADMIN — DULOXETINE HYDROCHLORIDE 60 MG: 60 CAPSULE, DELAYED RELEASE ORAL at 10:28

## 2020-10-05 RX ADMIN — METOPROLOL SUCCINATE 50 MG: 50 TABLET, EXTENDED RELEASE ORAL at 10:28

## 2020-10-05 RX ADMIN — ESCITALOPRAM OXALATE 10 MG: 10 TABLET ORAL at 10:27

## 2020-10-05 RX ADMIN — REGADENOSON 0.4 MG: 0.08 INJECTION, SOLUTION INTRAVENOUS at 12:28

## 2020-10-05 RX ADMIN — ATORVASTATIN CALCIUM 40 MG: 40 TABLET, FILM COATED ORAL at 20:59

## 2020-10-05 RX ADMIN — LIDOCAINE HYDROCHLORIDE 5 ML: 10 INJECTION, SOLUTION EPIDURAL; INFILTRATION; INTRACAUDAL; PERINEURAL at 09:59

## 2020-10-05 RX ADMIN — RANOLAZINE 500 MG: 500 TABLET, FILM COATED, EXTENDED RELEASE ORAL at 10:27

## 2020-10-05 RX ADMIN — TETROFOSMIN 9 MILLICURIE: 1.38 INJECTION, POWDER, LYOPHILIZED, FOR SOLUTION INTRAVENOUS at 11:11

## 2020-10-05 RX ADMIN — RANOLAZINE 500 MG: 500 TABLET, FILM COATED, EXTENDED RELEASE ORAL at 20:59

## 2020-10-05 ASSESSMENT — PAIN SCALES - GENERAL
PAINLEVEL_OUTOF10: 0

## 2020-10-05 NOTE — PLAN OF CARE
Care this Admission: better understand heart failure and disease management prior to discharge        The patient will demonstrate an understanding of s/s & treatment of hyperglycemia, by verbalization,  with the goal of completion at discharge.

## 2020-10-05 NOTE — PLAN OF CARE
Problem: Falls - Risk of:  Goal: Will remain free from falls  Description: Will remain free from falls  Outcome: Ongoing   Pt A&Ox4 calls out appropriately. On RA. Bed in lowest position and locked. Bedside table in reach. Call light in reach.  Will continue to monitor

## 2020-10-05 NOTE — DISCHARGE INSTR - COC
Continuity of Care Form    Patient Name: Urbano Timmons   :  1960  MRN:  [id number]    Admit date:  10/2/2020  Discharge date:  10/14/20      Code Status Order: Full Code   Advance Directives:   Advance Care Flowsheet Documentation       Date/Time Healthcare Directive Type of Healthcare Directive Copy in [address] Agent's Name Healthcare Agent's Phone Number    10/03/20 1512  No, patient does not have an advance directive for healthcare treatment -- -- -- -- --            Admitting Physician:  Ana Emerson MD  PCP: FRITZ Nash - NP    Discharging Nurse: [address] Unit/Room#: 2138/6407-57  Discharging Unit Phone Number: [phone]    Emergency Contact:   Extended Emergency Contact Information  Primary Emergency Contact: Christa Campos  Address: [address] [place] [address] [address]           [place], [address] [facility] Phone: [phone]  Mobile Phone: [phone]  Relation: Brother/Sister    Past Surgical History:  Past Surgical History:   Procedure Laterality Date    BACK SURGERY      PACEMAKER INSERTION      TUBAL LIGATION      TUMOR REMOVAL         Immunization History:   Immunization History   Administered Date(s) Administered    Influenza 2012    Influenza A ([id number]) Vaccine IM 2009    Influenza Virus Vaccine 10/17/2014, 10/27/2017    Influenza, Quadv, 6 mo and older, IM, PF (Flulaval, Fluarix) 12/10/2018    Influenza, Quadv, IM, PF (6 mo and older Fluzone, Flulaval, Fluarix, and 3 yrs and older Afluria) 10/12/2019    Pneumococcal Polysaccharide ([id number]) 10/17/2014, 2017       Active Problems:  Patient Active Problem List   Diagnosis Code    DM (diabetes mellitus) ([facility] [address] E11.9    HTN (hypertension), benign I10    Dyslipidemia E78.5    CAD (coronary artery disease) I25.10    Hx CVA with residual L-sided facial droop (2018) I63.50    Dual ICD (implantable cardioverter-defibrillator) in place Z95.810    Brain tumor (benign) ([facility] [address] D33.2    Acute on chronic combined systolic and diastolic CHF (congestive heart failure) ([facility]) I50.43    TIA involving right internal carotid artery G45.1    CAD in native artery I25.10    DM (diabetes mellitus), secondary, uncontrolled, w/neurologic complic ([facility]) A28.21, P49.14    CHF (congestive heart failure) ([facility]) I50.9    Nonischemic cardiomyopathy ([facility]) I42.8    Essential hypertension I10    TIA (transient ischemic attack) G45.9    Hyperglycemia R73.9    Diabetic ketoacidosis without coma associated with type 2 diabetes mellitus ([facility] [facility] E11.10    Non-intractable vomiting with nausea R11.2    Chest pain R07.9    Arterial ischemic stroke, ICA, right, acute ([facility]) I63.231    Diabetic hyperosmolar non-ketotic state ([facility] [address] E11.00    Diabetic acidosis without coma ([facility]) E11.10    Hyponatremia K81.4    Metabolic acidosis H85.5    Disorder of electrolytes E87.8    Diabetic ketoacidosis with coma associated with type 2 diabetes mellitus ([facility]) E11.11    Hypernatremia E87.0    Leukocytosis D72.829    Atrial tachycardia ([facility]) I47.1    DKA, type 2, not at goal [facility]) E11.10    Cognitive developmental delay F81.9    Mood disorder ([facility]) F39    Urinary tract infection with hematuria N39.0, R31.9    Nausea and vomiting R11.2    Hypokalemia E87.6    Persistent fever R50.9    Syncope and collapse R55    S/P ICD (internal cardiac defibrillator) procedure Z95.810    History of CVA (cerebrovascular accident) Z80.78    Noncompliance with medications Z91.14    Obesity E66.9    SVT (supraventricular tachycardia) ([facility]) I47.1    Cardiomyopathy, ischemic I25.5    Type 2 diabetes mellitus with hyperglycemia, with long-term current use of insulin ([facility]) E11.65, Z79.4       Isolation/Infection:   Isolation            No Isolation          Patient Infection Status       Infection Onset Added Last Indicated Last Indicated By Review Planned Expiration Resolved Resolved By    None active    Resolved    C-diff Rule Out 09/10/20 09/10/20 09/10/20 Clostridium difficile toxin/antigen (Ordered)   09/22/20 Anuel Tran RN    COVID-19 Rule Out 04/16/20 04/16/20 04/16/20 COVID-19 (Ordered)   04/16/20 Rule-Out Test Resulted            Nurse Assessment:  Last Vital Signs: /79   Pulse 77   Temp 97.5 °F (36.4 °C) (Oral)   Resp 16   Ht 5' 1\" (1.549 m)   Wt 211 lb 6.4 oz (95.9 kg)   SpO2 93%   BMI 39.94 kg/m²     Last documented pain score (0-10 scale): Pain Level: 0  Last Weight:   Wt Readings from Last 1 Encounters:   10/05/20 211 lb 6.4 oz (95.9 kg)     Mental Status:  oriented and alert    IV Access:  - None    Nursing Mobility/ADLs:  Walking   Independent  Transfer  Independent  Bathing  Assisted  Dressing  Assisted  Toileting  Independent  Feeding  Independent  Med Admin  Assisted  Med Delivery   whole    Wound Care Documentation and Therapy:        Elimination:  Continence:   · Bowel: Yes  · Bladder: Yes  Urinary Catheter: None   Colostomy/Ileostomy/Ileal Conduit: No       Date of Last BM: 10/14/20      Intake/Output Summary (Last 24 hours) at 10/5/2020 1005  Last data filed at 10/5/2020 0400  Gross per 24 hour   Intake 0 ml   Output 100 ml   Net -100 ml     I/O last 3 completed shifts: In: 240 [P.O.:240]  Out: 100 [Urine:100]    Safety Concerns:     None    Impairments/Disabilities:      None    Nutrition Therapy:  Current Nutrition Therapy:   - Oral Diet:  Carb Control 5 carbs/meal (2000kcals/day)    Routes of Feeding: Oral  Liquids: No Restrictions  Daily Fluid Restriction: yes - amount 2000ml  Last Modified Barium Swallow with Video (Video Swallowing Test): not done    Treatments at the Time of Hospital Discharge:   Respiratory Treatments: n/a  Oxygen Therapy:  is not on home oxygen therapy.   Ventilator:    - No ventilator support    Rehab Therapies: n/a  Weight Bearing Status/Restrictions: No weight bearing restirctions  Other Medical

## 2020-10-05 NOTE — CARE COORDINATION
CASE MANAGEMENT INITIAL ASSESSMENT      Reviewed chart and completed assessment via telephone with:Pt  Explained Case Management role/services. Primary contact information: Pt's sister Darrel Ego: Pt's sister Henrique Adan   Who do you trust or have selected to make healthcare decisions for you? Name:   Buddy Zimmerman   Phone  Number: [phone]    Admit date/status: 10/2/20   Diagnosis: Chest Pain   Is this a Readmission?: N    Insurance:BCBS   Precert required for SNF: Y    3 night stay required: N    Living arrangements, Adls, care needs, prior to admission:Pt lives at home alone independent of ADL'S     Transportation:Might need Taxi voucher home     Durable Medical Equipment at home:  Walker_X_Cane_X_RTS__ BSC__Shower Chair__  02__ HHN__ CPAP__  BiPap__  Hospital Bed__ W/C__X_ Other__________    Services in the home and/or outpatient, prior to admission: Active with [facility]     Dialysis Facility (if applicable) n/a   · Name:  · Address:  · Dialysis Schedule:  · Phone:  · Fax:    PT/OT recs: Not ordered     Hospital Exemption Notification (HEN):n/a     Barriers to discharge: None     Plan/comments: 10/5/20 Pt was active with [facility] skilled PTA and plans to return home and resume services. Pt here for chest pain W/U, went for stress test, waiting for results, might need taxi voucher home. Watch for any need home o2 needs related to CHF.      ECOC on chart for MD signature

## 2020-10-05 NOTE — PROGRESS NOTES
Pt A&Ox4 calls out appropriately. VSS On RA. Appears very anxious. Denies any needs at this time. Bed in lowest position and locked. Bedside table in reach. Call light in reach. Will continue to monitor. shift assessment complete.  Electronically signed by Lee Parker RN on 10/5/2020 at 12:35 AM

## 2020-10-05 NOTE — PROGRESS NOTES
A Nessa Myoview stress test was completed on this patient as ordered. The patient developed shortness of breath and chest pain. Patient was given 75 mg of aminophylline per protocol to reverse Nessa. Patient feels better after aminophylline. Awaiting stress imaging at this time.

## 2020-10-05 NOTE — PROGRESS NOTES
respiratory effort. Clear to auscultation, bilaterally without Rales/Wheezes/Rhonchi. Cardiovascular: Regular rate and rhythm with normal S1/S2 without murmurs, rubs or gallops. Abdomen: Soft, non-tender, non-distended with normal bowel sounds. Musculoskeletal: No clubbing, cyanosis or edema bilaterally. Full range of motion without deformity. Skin: Skin color, texture, turgor normal.  No rashes or lesions. Neurologic:  Neurovascularly intact without any focal sensory/motor deficits. Cranial nerves: II-XII intact, grossly non-focal.  Psychiatric: Alert and oriented, thought content appropriate, normal insight  Capillary Refill: Brisk,< 3 seconds   Peripheral Pulses: +2 palpable, equal bilaterally       Labs:   Recent Labs     10/02/20  2132   WBC 9.2   HGB 13.8   HCT 42.6        Recent Labs     10/02/20  2132 10/03/20  0640 10/04/20  1152   * 135* 135*   K 5.0 4.2 3.8   CL 97* 101 101   CO2 18* 18* 19*   BUN 26* 23* 29*   CREATININE 0.7 0.6 0.7   CALCIUM 9.0 9.2 8.9     Recent Labs     10/02/20  2132   AST 57*   ALT 69*   BILITOT 0.4   ALKPHOS 225*     No results for input(s): INR in the last 72 hours. Recent Labs     10/02/20  2132 10/03/20  0142 10/03/20  0640   TROPONINI 0.02* 0.02* 0.03*       Urinalysis:      Lab Results   Component Value Date    NITRU Negative 10/02/2020    WBCUA 10-20 06/12/2020    BACTERIA Rare 05/28/2020    RBCUA 3-4 06/12/2020    BLOODU Negative 10/02/2020    SPECGRAV 1.010 10/02/2020    GLUCOSEU >=1000 10/02/2020       Radiology:  IR PICC WO SQ PORT/PUMP > 5 YEARS   Final Result   Successful placement of PICC line. XR CHEST PORTABLE   Final Result   Cardiomegaly with increasing interstitial edema.          NM Cardiac Stress Test Nuclear Imaging    (Results Pending)           Assessment/Plan:    Active Hospital Problems    Diagnosis    Chest pain [R07.9]     Priority: High    Type 2 diabetes mellitus with hyperglycemia, with long-term current use of insulin

## 2020-10-06 ENCOUNTER — APPOINTMENT (OUTPATIENT)
Dept: CARDIAC CATH/INVASIVE PROCEDURES | Age: 60
DRG: 286 | End: 2020-10-06
Payer: MEDICARE

## 2020-10-06 LAB
BLOOD CULTURE, ROUTINE: NORMAL
GLUCOSE BLD-MCNC: 139 MG/DL (ref 70–99)
GLUCOSE BLD-MCNC: 143 MG/DL (ref 70–99)
GLUCOSE BLD-MCNC: 182 MG/DL (ref 70–99)
GLUCOSE BLD-MCNC: 188 MG/DL (ref 70–99)
GLUCOSE BLD-MCNC: 205 MG/DL (ref 70–99)
PERFORMED ON: ABNORMAL

## 2020-10-06 PROCEDURE — C8923 2D TTE W OR W/O FOL W/CON,CO: HCPCS

## 2020-10-06 PROCEDURE — 6360000002 HC RX W HCPCS

## 2020-10-06 PROCEDURE — 99233 SBSQ HOSP IP/OBS HIGH 50: CPT | Performed by: NURSE PRACTITIONER

## 2020-10-06 PROCEDURE — 99223 1ST HOSP IP/OBS HIGH 75: CPT | Performed by: INTERNAL MEDICINE

## 2020-10-06 PROCEDURE — 6360000002 HC RX W HCPCS: Performed by: INTERNAL MEDICINE

## 2020-10-06 PROCEDURE — 93460 R&L HRT ART/VENTRICLE ANGIO: CPT | Performed by: INTERNAL MEDICINE

## 2020-10-06 PROCEDURE — C1769 GUIDE WIRE: HCPCS

## 2020-10-06 PROCEDURE — 6370000000 HC RX 637 (ALT 250 FOR IP): Performed by: INTERNAL MEDICINE

## 2020-10-06 PROCEDURE — 6360000004 HC RX CONTRAST MEDICATION

## 2020-10-06 PROCEDURE — C1894 INTRO/SHEATH, NON-LASER: HCPCS

## 2020-10-06 PROCEDURE — 99153 MOD SED SAME PHYS/QHP EA: CPT

## 2020-10-06 PROCEDURE — 2500000003 HC RX 250 WO HCPCS

## 2020-10-06 PROCEDURE — 93456 R HRT CORONARY ARTERY ANGIO: CPT

## 2020-10-06 PROCEDURE — 99152 MOD SED SAME PHYS/QHP 5/>YRS: CPT

## 2020-10-06 PROCEDURE — 2709999900 HC NON-CHARGEABLE SUPPLY

## 2020-10-06 PROCEDURE — 2580000003 HC RX 258: Performed by: INTERNAL MEDICINE

## 2020-10-06 PROCEDURE — 1200000000 HC SEMI PRIVATE

## 2020-10-06 PROCEDURE — C1887 CATHETER, GUIDING: HCPCS

## 2020-10-06 PROCEDURE — 2580000003 HC RX 258: Performed by: EMERGENCY MEDICINE

## 2020-10-06 RX ORDER — MIDAZOLAM HYDROCHLORIDE 5 MG/ML
INJECTION INTRAMUSCULAR; INTRAVENOUS
Status: COMPLETED | OUTPATIENT
Start: 2020-10-06 | End: 2020-10-06

## 2020-10-06 RX ORDER — SODIUM CHLORIDE 0.9 % (FLUSH) 0.9 %
10 SYRINGE (ML) INJECTION PRN
Status: DISCONTINUED | OUTPATIENT
Start: 2020-10-06 | End: 2020-10-06 | Stop reason: SDUPTHER

## 2020-10-06 RX ORDER — SODIUM CHLORIDE 9 MG/ML
INJECTION, SOLUTION INTRAVENOUS CONTINUOUS
Status: DISCONTINUED | OUTPATIENT
Start: 2020-10-06 | End: 2020-10-07

## 2020-10-06 RX ORDER — FENTANYL CITRATE 50 UG/ML
INJECTION, SOLUTION INTRAMUSCULAR; INTRAVENOUS
Status: COMPLETED | OUTPATIENT
Start: 2020-10-06 | End: 2020-10-06

## 2020-10-06 RX ORDER — SODIUM CHLORIDE 0.9 % (FLUSH) 0.9 %
10 SYRINGE (ML) INJECTION EVERY 12 HOURS SCHEDULED
Status: DISCONTINUED | OUTPATIENT
Start: 2020-10-06 | End: 2020-10-06 | Stop reason: SDUPTHER

## 2020-10-06 RX ADMIN — FENTANYL CITRATE 25 MCG: 50 INJECTION, SOLUTION INTRAMUSCULAR; INTRAVENOUS at 14:09

## 2020-10-06 RX ADMIN — RANOLAZINE 500 MG: 500 TABLET, FILM COATED, EXTENDED RELEASE ORAL at 22:14

## 2020-10-06 RX ADMIN — SODIUM CHLORIDE: 9 INJECTION, SOLUTION INTRAVENOUS at 11:10

## 2020-10-06 RX ADMIN — MIDAZOLAM HYDROCHLORIDE 1 MG: 5 INJECTION INTRAMUSCULAR; INTRAVENOUS at 14:03

## 2020-10-06 RX ADMIN — FUROSEMIDE 40 MG: 10 INJECTION, SOLUTION INTRAMUSCULAR; INTRAVENOUS at 17:46

## 2020-10-06 RX ADMIN — FENTANYL CITRATE 25 MCG: 50 INJECTION, SOLUTION INTRAMUSCULAR; INTRAVENOUS at 14:38

## 2020-10-06 RX ADMIN — Medication 10 ML: at 22:17

## 2020-10-06 RX ADMIN — INSULIN GLARGINE 30 UNITS: 100 INJECTION, SOLUTION SUBCUTANEOUS at 22:14

## 2020-10-06 RX ADMIN — ATORVASTATIN CALCIUM 40 MG: 40 TABLET, FILM COATED ORAL at 22:14

## 2020-10-06 RX ADMIN — MIDAZOLAM HYDROCHLORIDE 1 MG: 5 INJECTION INTRAMUSCULAR; INTRAVENOUS at 14:10

## 2020-10-06 ASSESSMENT — PAIN SCALES - GENERAL
PAINLEVEL_OUTOF10: 0
PAINLEVEL_OUTOF10: 0
PAINLEVEL_OUTOF10: 10
PAINLEVEL_OUTOF10: 0

## 2020-10-06 NOTE — PROGRESS NOTES
[address]     Electrophysiology                                     Progress Note    Admission date:  10/2/2020    Reason for follow up visit: SVT    HPI/CC: Yasemin Cervantes was admitted on 10/2/2020 with acute on chronic chest pain and SOB. Stress test 10/5 showed fixed defects. Echo is pending. EP following for SVT. Rhythm has been sinus with PVCs and intermittent pacing. Subjective: She nods her head yes to every complaint (chest pain, palpitations, SOB, headache, dizziness, abdominal pain, headache) but does not expand on symptoms. Vitals:  Blood pressure 117/80, pulse 73, temperature 98.9 °F (37.2 °C), temperature source Oral, resp. rate 16, height 5' 1\" (1.549 m), weight 210 lb 1.6 oz (95.3 kg), SpO2 97 %, not currently breastfeeding.   Temp  Av °F (36.7 °C)  Min: 97.5 °F (36.4 °C)  Max: 98.9 °F (37.2 °C)  Pulse  Av  Min: 69  Max: 76  BP  Min: 100/68  Max: 117/80  SpO2  Av.2 %  Min: 95 %  Max: 97 %    24 hour I/O    Intake/Output Summary (Last 24 hours) at 10/6/2020 1130  Last data filed at 10/6/2020 1056  Gross per 24 hour   Intake 220 ml   Output 375 ml   Net -155 ml     Current Facility-Administered Medications   Medication Dose Route Frequency Provider Last Rate Last Dose    0.9 % sodium chloride infusion   Intravenous Continuous Felix Choi MD 75 mL/hr at 10/06/20 1110      perflutren lipid microspheres (DEFINITY) injection 1.65 mg  1.5 mL Intravenous ONCE PRN YO Bolden MD        furosemide (LASIX) injection 40 mg  40 mg Intravenous BID Sigrid Steen MD        FLUoxetine (PROZAC) capsule 10 mg  10 mg Oral Daily Albert Naranjo, APRN - NP   10 mg at 10/05/20 1028    escitalopram (LEXAPRO) tablet 10 mg  10 mg Oral Daily Albert Naranjo, APRN - NP   10 mg at 10/05/20 1027    DULoxetine (CYMBALTA) extended release capsule 60 mg  60 mg Oral Daily Albert Naranjo APRN - NP   60 mg at 10/05/20 1028    sodium chloride flush 0.9 % injection 10 mL  10 mL Intravenous 2 times per day Scott Henry DO   10 mL at 10/05/20 2100    sodium chloride flush 0.9 % injection 10 mL  10 mL Intravenous PRN Scott Henry DO        aspirin EC tablet 81 mg  81 mg Oral Daily Dionicio Zavala MD   81 mg at 10/04/20 0908    atorvastatin (LIPITOR) tablet 40 mg  40 mg Oral Nightly Dionicio Zavala MD   40 mg at 10/05/20 2059    clopidogrel (PLAVIX) tablet 75 mg  75 mg Oral Daily Dionicio Zavala MD   75 mg at 10/04/20 0908    fenofibrate (TRIGLIDE) tablet 160 mg  160 mg Oral Daily Dionicio Zavala MD   160 mg at 10/04/20 0908    metoprolol succinate (TOPROL XL) extended release tablet 50 mg  50 mg Oral Daily Dionicio Zavala MD   50 mg at 10/05/20 1028    ranolazine (RANEXA) extended release tablet 500 mg  500 mg Oral BID Dionicio Zavala MD   500 mg at 10/05/20 2059    topiramate (TOPAMAX) tablet 200 mg  200 mg Oral Daily Dionicio Zavala MD   200 mg at 10/05/20 1027    sodium chloride flush 0.9 % injection 10 mL  10 mL Intravenous PRN Dionicio Zavala MD        potassium chloride (KLOR-CON M) extended release tablet 40 mEq  40 mEq Oral PRN Dionicio Zavala MD        Or    potassium bicarb-citric acid (EFFER-K) effervescent tablet 40 mEq  40 mEq Oral PRN Dionicio Zavala MD        Or    potassium chloride 10 mEq/100 mL IVPB (Peripheral Line)  10 mEq Intravenous PRN Dionicio Zavala MD        magnesium sulfate 1 g in dextrose 5% 100 mL IVPB  1 g Intravenous PRN Dionicio Zavala MD        acetaminophen (TYLENOL) tablet 650 mg  650 mg Oral Q6H PRN Dionicio Zavala MD        Or    acetaminophen (TYLENOL) suppository 650 mg  650 mg Rectal Q6H PRN Dionicio Zavala MD        promethazine (PHENERGAN) tablet 12.5 mg  12.5 mg Oral Q6H PRN Dionicio Zavala MD   12.5 mg at 10/04/20 1646    Or    ondansetron (ZOFRAN) injection 4 mg  4 mg Intravenous Q6H PRN Dionicio Zavala MD        glucose (GLUTOSE) 40 % oral gel 15 g  15 g Oral PRN Dionicio Zavala MD moderately reduced with an estimated EF of 35%. Moderate global hypokinesis. There is mild concentric left ventricular hypertrophy. Left ventricular cavity size is mildly dilated. Estimated LV diastolic filling pressure is normal.  Mild mitral and tricuspid regurgitation. Systolic pulmonary artery pressure (SPAP) is estimated at 35mmHg (Right atrial pressure of 8 mmHg). No significant change from exam done 10/18/2019. Stress test 10/5/2020:  Dilated LV with severe global hypokinesis. Large, severe, fixed perfusion     defect of the inferior/inferolateral wall consistent with scar. Diminished     uptake of the anterior wall consistent with breast artifact. Post stress     LVEF is abnormal at 19%. Abnormal study. Overall findings represent a high     risk scan. [facility] 12/12/2018 ([place]):  LM <20%  LAD 20-30%  Cx 20-20%  RCA 20-30%              RPDA 50%. FFR 0.89  LVEF 45%     ASA, statin, BBlk, ACE  Stop plavix  OK discharge    All labs and testing reviewed.   Lab Review     Renal Profile:   Lab Results   Component Value Date    CREATININE 0.7 10/04/2020    BUN 29 10/04/2020     10/04/2020    K 3.8 10/04/2020     10/04/2020    CO2 19 10/04/2020     CBC:    Lab Results   Component Value Date    WBC 9.2 10/02/2020    RBC 4.60 10/02/2020    HGB 13.8 10/02/2020    HCT 42.6 10/02/2020    MCV 92.8 10/02/2020    RDW 13.8 10/02/2020     10/02/2020     BNP:  No results found for: BNP  Fasting Lipid Panel:    Lab Results   Component Value Date    CHOL 166 10/12/2019    HDL 51 10/12/2019    TRIG 142 10/12/2019     Cardiac Enzymes:  CK/MbTroponin  Lab Results   Component Value Date    CKTOTAL 54 06/12/2020    TROPONINI 0.03 10/03/2020     PT/ INR   Lab Results   Component Value Date    INR 1.02 08/31/2020    INR 1.01 08/12/2020    INR 1.16 04/16/2020    PROTIME 11.8 08/31/2020    PROTIME 11.7 08/12/2020    PROTIME 13.5 04/16/2020     PTT No results found for: PTT   Lab Results   Component Value Date    MG 1.90 10/02/2020      Lab Results   Component Value Date    TSH 0.28 09/19/2020       Assessment:  SVT: currently stable    -noted on device check  NSVT: stable  Ischemic cardiomyopathy   -EF 35% 6/2020   -s/p dual chamber ICD 2015 ([employer])  Acute on chronic systolic CHF   -cardiology following  Chronic chest pain  CAD   -nonobstructive on [facility] 12/2018  DM  History of CVA  Cognitive impairment     Plan:   1. Continue Toprol  2. DAPT and Lasix per cardiology   3. Echo is pending   4. RHC and LHC per Dr. Danielle Newell  5. Will consider antiarrhythmic options after cath  6.  Office continues to seek out event monitor tracings    FRITZ King-CNP  [address]  [phone]

## 2020-10-06 NOTE — PROGRESS NOTES
Post-cath recovery begun. Site assessed, no bleeding or hematoma. Vital signs WNL.  Patient informed of need to lie flat until 9 PM.

## 2020-10-06 NOTE — PROGRESS NOTES
Hospitalist Progress Note      PCP: FRITZ Purcell - NP    Date of Admission: 10/2/2020    Chief Complaint: Chest pain    Hospital Course: See H&P    Subjective:   Patient is up in bed, comfortable, not in distress. No new event overnight noted. Medications:  Reviewed    Infusion Medications    sodium chloride      dextrose       Scheduled Medications    sodium chloride flush  10 mL Intravenous 2 times per day    furosemide  40 mg Intravenous BID    FLUoxetine  10 mg Oral Daily    escitalopram  10 mg Oral Daily    DULoxetine  60 mg Oral Daily    sodium chloride flush  10 mL Intravenous 2 times per day    aspirin  81 mg Oral Daily    atorvastatin  40 mg Oral Nightly    clopidogrel  75 mg Oral Daily    fenofibrate  160 mg Oral Daily    metoprolol succinate  50 mg Oral Daily    ranolazine  500 mg Oral BID    topiramate  200 mg Oral Daily    enoxaparin  40 mg Subcutaneous Daily    insulin glargine  30 Units Subcutaneous BID    insulin lispro  0.08 Units/kg Subcutaneous TID WC    insulin lispro  0-12 Units Subcutaneous TID WC    insulin lispro  0-6 Units Subcutaneous Nightly     PRN Meds: sodium chloride flush, perflutren lipid microspheres, sodium chloride flush, sodium chloride flush, potassium chloride **OR** potassium alternative oral replacement **OR** potassium chloride, magnesium sulfate, acetaminophen **OR** acetaminophen, promethazine **OR** ondansetron, glucose, dextrose, glucagon (rDNA), dextrose      Intake/Output Summary (Last 24 hours) at 10/6/2020 1017  Last data filed at 10/6/2020 0608  Gross per 24 hour   Intake 240 ml   Output 375 ml   Net -135 ml       Physical Exam Performed:    /76   Pulse 75   Temp 97.5 °F (36.4 °C) (Oral)   Resp 16   Ht 5' 1\" (1.549 m)   Wt 210 lb 1.6 oz (95.3 kg)   SpO2 96%   BMI 39.70 kg/m²     General appearance: No apparent distress, appears stated age and cooperative. HEENT: Pupils equal, round, and reactive to light. Conjunctivae/corneas clear. Neck: Supple, with full range of motion. No jugular venous distention. Trachea midline. Respiratory:  Normal respiratory effort. Clear to auscultation, bilaterally without Rales/Wheezes/Rhonchi. Cardiovascular: Regular rate and rhythm with normal S1/S2 without murmurs, rubs or gallops. Abdomen: Soft, non-tender, non-distended with normal bowel sounds. Musculoskeletal: No clubbing, cyanosis or edema bilaterally. Full range of motion without deformity. Skin: Skin color, texture, turgor normal.  No rashes or lesions. Neurologic:  Neurovascularly intact without any focal sensory/motor deficits. Cranial nerves: II-XII intact, grossly non-focal.  Psychiatric: Alert and oriented, thought content appropriate, normal insight  Capillary Refill: Brisk,< 3 seconds   Peripheral Pulses: +2 palpable, equal bilaterally       Labs:   No results for input(s): WBC, HGB, HCT, PLT in the last 72 hours. Recent Labs     10/04/20  1152   *   K 3.8      CO2 19*   BUN 29*   CREATININE 0.7   CALCIUM 8.9     No results for input(s): AST, ALT, BILIDIR, BILITOT, ALKPHOS in the last 72 hours. No results for input(s): INR in the last 72 hours. No results for input(s): Gardner Lager in the last 72 hours. Urinalysis:      Lab Results   Component Value Date    NITRU Negative 10/02/2020    WBCUA 10-20 06/12/2020    BACTERIA Rare 05/28/2020    RBCUA 3-4 06/12/2020    BLOODU Negative 10/02/2020    SPECGRAV 1.010 10/02/2020    GLUCOSEU >=1000 10/02/2020       Radiology:  NM Cardiac Stress Test Nuclear Imaging   Final Result      IR PICC WO SQ PORT/PUMP > 5 YEARS   Final Result   Successful placement of PICC line. XR CHEST PORTABLE   Final Result   Cardiomegaly with increasing interstitial edema.                  Assessment/Plan:    Active Hospital Problems    Diagnosis    Chest pain [R07.9]     Priority: High    Type 2 diabetes mellitus with hyperglycemia, with long-term current use of insulin (HCC) [E11.65, Z79.4]    Nonischemic cardiomyopathy ([facility] [address] [I42.8]    Dual ICD (implantable cardioverter-defibrillator) in place [Z95.810]     Chest pain, atypical, admit to MedSurg telemetry, trend troponin, continue aspirin and statin, cardiology consult. Plan for stress test.  Stress test was abnormal, plan for Cardiac cath as per Cardiology.     Acute on chronic systolic heart failure, recent echo on 6/13/2020 showing LVEF of 35%, continue home regimen, continue IV Lasix, cardiology consult.     History of ICD in situ, monitor on telemetry.     Diabetes mellitus type 2, uncontrolled, continue insulin sliding scale, monitor. DVT Prophylaxis: Lovenox  Diet: DIET CARDIAC;   Diet NPO Time Specified Exceptions are: Sips with Meds  Code Status: Full Code    PT/OT Eval Status: Ambulatory    Dispo -in 1-2 days    Smith Lopez MD

## 2020-10-06 NOTE — CONSULTS
[address] [facility], [place]  [phone]      Attending Physician: Colton Perez MD  Reason for Consultation/Chief Complaint: Elevated blood sugar    Subjective   History of Present Illness:  Randolph Bush is a 62 y.o. patient who presented to the hospital with complaints of elevated blood sugar, also has been having constant chest pain as well as nausea and vomiting for the last few days. Troponins were assessed during this hospital admission, patient was admitted October 2, 2020 and troponins were 0.02 and 0.03.  proBNP level is 3271. Initial weight was 219 pounds. Current weight today is 210 pounds. She has been treated during this hospital admission with IV Lasix for heart failure. She has continued to have chest pain throughout this hospital admission and underwent a stress test yesterday which showed ejection fraction of 19% with a large severe fixed defect in the inferior/inferolateral wall consistent with scar. She is also complained about palpitations during his hospital admission and EP was consulted and she had a recent hospital admission in September 2020 for new SVT. She was seen by EP at that time, switch from Coreg to metoprolol and given a 2-week event monitor with plans for outpatient follow-up for this. Device interrogation currently suggested that she was having PSVT. She states she has been compliant with her medications. Patient has a history of a cardiomyopathy, reportedly it is an idiopathic cardiomyopathy, patient had previous care through [facility] and ultimately transitioned to our practice this year and was seen as a new patient in the EP clinic due to her history of cardiomyopathy status post ICD implantation. In our clinic, she establish for ICD checks and it was noted she has a [employer] device. Patient does not recall all of her history, however it appears that she also has had care through [facility] in [facility].   She was last seen there in 2016, it was noted that she chronically does have diabetes hypertension and hypercholesterolemia and has had a nonischemic cardiomyopathy with dual-chamber ICD.     Chronically, there does appear to be a history of mental health disorders and possibly mental retardation. She states she lives alone and is independent. Patient states she is compliant with her medications. She has noted that she has had heart attacks and strokes in the past however she does provide a tangential history, does not appear to be a reliable historian.     Patient has had several visits to the emergency room in hospital for chest pain last year and this year she has had several encounters for abnormal glucose, nausea and hyponatremia. Last office visit was on September 25, 2020, weight at that time was 206 pounds. She was asked to stop the lisinopril due to relative hypotension and to allow for beta-blocker and diuretic. Toprol was increased to 50 mg daily and digoxin 125 mcg was added every other day. Past Medical History:   has a past medical history of Arthritis, CAD (coronary artery disease), Cerebral artery occlusion with cerebral infarction ([address] CHF (congestive heart failure) ([address] Diabetes mellitus ([address] Hyperlipidemia, Hypertension, Mental retardation, and MI (myocardial infarction) ([facility] Surgical History:   has a past surgical history that includes back surgery; Pacemaker insertion; tumor removal; and Tubal ligation. Social History:   reports that she has never smoked. She has never used smokeless tobacco. She reports that she does not drink alcohol or use drugs. Family History:  family history includes Cancer in her mother; Heart Disease in her father; Other in her mother. Home Medications:  Were reviewed and are listed in nursing record and/or below  Prior to Admission medications    Medication Sig Start Date End Date Taking?  Authorizing Provider   metoprolol succinate (TOPROL XL) 50 MG extended release tablet Take 1 tablet by mouth daily 9/25/20   FRITZ Quintanilla CNP   digoxin (LANOXIN) 125 MCG tablet Take 1 tablet by mouth every other day 9/25/20   FRITZ Quintanilla CNP   insulin aspart (NOVOLOG FLEXPEN) 100 UNIT/ML injection pen Inject 5 Units into the skin 3 times daily (before meals) 9/22/20   Florin Torre MD   insulin glargine (LANTUS SOLOSTAR) 100 UNIT/ML injection pen Inject 30 Units into the skin 2 times daily 9/22/20   Florin Torre MD   insulin lispro (HUMALOG) 100 UNIT/ML injection vial Inject 0-6 Units into the skin 3 times daily (with meals) **Corrective Low Dose Algorithm**  Glucose: Dose:               No Insulin  140-199 1 Unit  200-249 2 Units  250-299 3 Units  300-349 4 Units  350-399 5 Units  Over 399 6 Units 9/22/20   Pantera Torre MD   clopidogrel (PLAVIX) 75 MG tablet  6/10/20   Historical Provider, MD   escitalopram (LEXAPRO) 10 MG tablet  6/15/20   Historical Provider, MD   furosemide (LASIX) 40 MG tablet Take 1 tablet by mouth 2 times daily 9/8/20 10/8/20  FRITZ Quintanilla CNP   nitroGLYCERIN (NITROSTAT) 0.4 MG SL tablet up to max of 3 total doses.  If no relief after 1 dose, call 911. 8/24/20   Rutsam Barraza MD   ondansetron [facility]) 4 MG tablet Take 1 tablet by mouth every 8 hours as needed for Nausea 8/12/20   Jamal Greenberg DO   aspirin 81 MG EC tablet Take 1 tablet by mouth daily 7/18/20   René Lewis MD   ranolazine (RANEXA) 500 MG extended release tablet Take 1 tablet by mouth 2 times daily 7/18/20   René Lewis MD   DULoxetine (CYMBALTA) 60 MG extended release capsule Take 1 capsule by mouth daily 7/18/20   René Lewis MD   FLUoxetine (PROZAC) 10 MG capsule Take 1 capsule by mouth daily 7/18/20   René Lewis MD   metFORMIN (GLUCOPHAGE) 500 MG tablet Take 2 tablets by mouth 2 times daily (with meals) 7/18/20 10/16/20  René Lewis MD (GLUTOSE) 40 % oral gel 15 g, PRN  dextrose 50 % IV solution, PRN  glucagon (rDNA) injection 1 mg, PRN  dextrose 5 % solution, PRN  enoxaparin (LOVENOX) injection 40 mg, Daily  insulin glargine (LANTUS) injection vial 30 Units, BID  insulin lispro (HUMALOG) injection vial 8 Units, TID WC  insulin lispro (HUMALOG) injection vial 0-12 Units, TID WC  insulin lispro (HUMALOG) injection vial 0-6 Units, Nightly        Allergies:  Patient has no known allergies. Review of Systems:   A 14 point review of symptoms completed. Pertinent positives identified in the HPI, all other review of symptoms negative as below.       Objective   PHYSICAL EXAM:    Vitals:    10/06/20 0320   BP: 117/78   Pulse: 72   Resp: 16   Temp: 97.8 °F (36.6 °C)   SpO2:     Weight: 210 lb 1.6 oz (95.3 kg)         General Appearance:  Alert, partially cooperative, no distress, appears stated age   Head:  Normocephalic, without obvious abnormality, atraumatic   Eyes:   Does not comply with exam   Nose: Nares normal, no drainage or sinus tenderness   Throat:  Does not comply with exam for this   Neck: Supple, symmetrical, trachea midline, no adenopathy, thyroid: not enlarged, symmetric, no tenderness/mass/nodules, no carotid bruit or JVD   Lungs:   Clear to auscultation bilaterally, respirations unlabored   Chest Wall:  No deformity or tenderness   Heart:  Regular rate and rhythm, S1, S2 normal, distant heart sounds   Abdomen:   Soft, non-tender, bowel sounds active all four quadrants,  no masses, no organomegaly   Extremities: Extremities normal, atraumatic, no cyanosis or edema   Pulses: 2+ and symmetric   Skin:  Does not comply for exam for this   Pysch:  Anxious mood and affect   Neurologic:  Does not comply for exam for this        Labs   CBC:   Lab Results   Component Value Date    WBC 9.2 10/02/2020    RBC 4.60 10/02/2020    HGB 13.8 10/02/2020    HCT 42.6 10/02/2020    MCV 92.8 10/02/2020    RDW 13.8 10/02/2020     10/02/2020 CMP:  Lab Results   Component Value Date     10/04/2020    K 3.8 10/04/2020     10/04/2020    CO2 19 10/04/2020    BUN 29 10/04/2020    CREATININE 0.7 10/04/2020    GFRAA >60 10/04/2020    AGRATIO 1.5 10/02/2020    LABGLOM >60 10/04/2020    GLUCOSE 125 10/04/2020    PROT 6.4 10/02/2020    CALCIUM 8.9 10/04/2020    BILITOT 0.4 10/02/2020    ALKPHOS 225 10/02/2020    AST 57 10/02/2020    ALT 69 10/02/2020     PT/INR:  No results found for: PTINR  HgBA1c:  Lab Results   Component Value Date    LABA1C 13.3 09/19/2020     Lab Results   Component Value Date    CKTOTAL 54 06/12/2020    TROPONINI 0.03 (H) 10/03/2020         Cardiac Data     Last EKG: Sinus tachycardia, baseline artifact, Q waves throughout the precordium and lateral leads borderline low voltage, Q waves are more prominent on current EKG than prior EKG from September 2020    Echo:    6/2020    Summary   LV systolic function is moderately reduced with an estimated EF of 35%. Moderate global hypokinesis. There is mild concentric left ventricular hypertrophy. Left ventricular cavity size is mildly dilated. Estimated LV diastolic filling pressure is normal.   Mild mitral and tricuspid regurgitation. Systolic pulmonary artery pressure (SPAP) is estimated at 35mmHg (Right   atrial pressure of 8 mmHg). No significant change from exam done 10/18/2019. Stress Test:    October 5, 2020:     Summary     Dilated LV with severe global hypokinesis. Large, severe, fixed perfusion     defect of the inferior/inferolateral wall consistent with scar. Diminished     uptake of the anterior wall consistent with breast artifact. Post stress     LVEF is abnormal at 19%. Abnormal study. Overall findings represent a high     risk scan.           Cath:    12/2018    Heladio Art MD    Physician    Cardiology    Brief Op Note    Addendum    Date of Service:  12/12/2018 10:19 AM                     Procedures: LHC, LVG, CA, sedation     LM <20%  LAD 20-30%  Cx 20-20%  RCA 20-30%              RPDA 50%. FFR 0.89  LVEF 45%          Studies:     cxr       Impression    Cardiomegaly with increasing interstitial edema.               I have reviewed labs and imaging/xray/diagnostic testing in this note.     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)    Dual ICD (implantable cardioverter-defibrillator) in place    Brain tumor (benign) ([address]    Acute on chronic combined systolic and diastolic CHF (congestive heart failure) ([address]    TIA involving right internal carotid artery    CAD in native artery    DM (diabetes mellitus), secondary, uncontrolled, w/neurologic complic ([address]    CHF (congestive heart failure) ([address]    Nonischemic cardiomyopathy ([address]    Essential hypertension    TIA (transient ischemic attack)    Hyperglycemia    Diabetic ketoacidosis without coma associated with type 2 diabetes mellitus (HCC)    Non-intractable vomiting with nausea    Chest pain    Arterial ischemic stroke, ICA, right, acute ([address]    Diabetic hyperosmolar non-ketotic state ([address]    Diabetic acidosis without coma (HCC)    Hyponatremia    Metabolic acidosis    Disorder of electrolytes    Diabetic ketoacidosis with coma associated with type 2 diabetes mellitus (HCC)    Hypernatremia    Leukocytosis    Atrial tachycardia (HCC)    DKA, type 2, not at goal [facility])    Cognitive developmental delay    Mood disorder (HCC)    Urinary tract infection with hematuria    Nausea and vomiting    Hypokalemia    Persistent fever    Syncope and collapse    S/P ICD (internal cardiac defibrillator) procedure    History of CVA (cerebrovascular accident)    Noncompliance with medications    Obesity    SVT (supraventricular tachycardia) (HCC)    Cardiomyopathy, ischemic    Type 2 diabetes mellitus with hyperglycemia, with long-term current use of insulin (HCC)       Acute on chronic systolic heart failure, continue IV diuresis, goal weight appears to be between 200-210 pounds    Nonischemic cardiomyopathy, worsening ejection fraction, would be reasonable to pursue right and left heart catheterization, particularly due to uncontrolled diabetes, discussed with patient risk/benefit/alternatives and outcomes of pursuing heart catheterization, she understands/agrees and wishes to proceed. LV dysfunction, continue beta-blocker, consider Entresto however therapy with this may be limited due to relative hypotension    Hypercholesterolemia, continue statin and fenofibrate    History of CVA, continue Plavix, also on aspirin    Status post ICD, [facility], this is been interrogated recently and is being followed by EP service, EP also following for SVT and history of nonsustained VT        DISCUSSION OF CARDIAC CATHETERIZATION PROCEDURES: The procedures, indications, risks and alternatives have been discussed with the patient and, as appropriate, with the patient's guardian . Risks discussed included, but are not limited to, bleeding, development of blood clots/emboli, damage to blood vessels, renal failure, malignant cardiac arrhythmias, stroke, heart attack, emergent coronary bypass surgery, death, dye allergy. The patient (and guardian as appropriate) expressed understanding of the aforementioned and wished to proceed. Thank you for allowing us to participate in the care of Stacey Bowers. Please call me with any questions [phone].     Elana Solis MD, [facility]   Interventional Cardiologist  [address]  [phone] [facility]  [phone] [address] [place] [address]  10/6/2020 8:56 AM

## 2020-10-06 NOTE — PLAN OF CARE
Problem: Falls - Risk of:  Goal: Will remain free from falls  Description: Will remain free from falls  10/6/2020 1919 by Susie Dow RN  Outcome: Met This Shift  10/6/2020 0737 by Sachin Nguyen RN  Outcome: Ongoing  Note: Patient will remain free of falls. Bed in the lowest position, call light within reach. Assistance when ambulating.    Goal: Absence of physical injury  Description: Absence of physical injury  Outcome: Met This Shift

## 2020-10-06 NOTE — PROGRESS NOTES
Assessment complete. VSS. Patient uncooperative this morning. Refusing all AM medications. Bed in the lowest position, call light within reach. RN notified of cardiac catheter procedure. Will contact family to obtain consent.

## 2020-10-06 NOTE — PROCEDURES
[facility]       Cardiac Catheterization Lab Report    PATIENT: Stacey Bowers  DATE: 10/6/2020  MRN: [id number]  CSN: [id number]  : 1960      Performing Physician: Ania Little MD, [address], [address], [place]  Primary Care Physician: FRITZ Arrington - NP  Admitting Provider: Stuart Gonzalez MD    Procedures Performed:   1. Left heart catheterization  2. Selective left and right coronary angiogram  3. Right heart catheterization    Procedure Findings:  1. Mild non-obstructive coronary artery disease. 2.  Moderate pulmonary hypertension  3.   Decompensated cardiac hemodynamics    Indications:   Patient Active Problem List   Diagnosis    DM (diabetes mellitus) ([address]    HTN (hypertension), benign    Dyslipidemia    CAD (coronary artery disease)    Hx CVA with residual L-sided facial droop (2018)    Dual ICD (implantable cardioverter-defibrillator) in place    Brain tumor (benign) ([address]    Acute on chronic combined systolic and diastolic CHF (congestive heart failure) ([address]    TIA involving right internal carotid artery    CAD in native artery    DM (diabetes mellitus), secondary, uncontrolled, w/neurologic complic ([address]    CHF (congestive heart failure) ([address]    Nonischemic cardiomyopathy ([address]    Essential hypertension    TIA (transient ischemic attack)    Hyperglycemia    Diabetic ketoacidosis without coma associated with type 2 diabetes mellitus (HCC)    Non-intractable vomiting with nausea    Chest pain    Arterial ischemic stroke, ICA, right, acute ([address]    Diabetic hyperosmolar non-ketotic state ([address]    Diabetic acidosis without coma ([address]    Hyponatremia    Metabolic acidosis    Disorder of electrolytes    Diabetic ketoacidosis with coma associated with type 2 diabetes mellitus (HCC)    Hypernatremia    Leukocytosis    Atrial tachycardia ([address]    DKA, type 2, not at goal [facility])    Cognitive developmental delay    Mood disorder (HCC)    Urinary tract infection with hematuria    Nausea and vomiting    Hypokalemia    Persistent fever    Syncope and collapse    S/P ICD (internal cardiac defibrillator) procedure    History of CVA (cerebrovascular accident)    Noncompliance with medications    Obesity    SVT (supraventricular tachycardia) (HCC)    Cardiomyopathy, ischemic    Type 2 diabetes mellitus with hyperglycemia, with long-term current use of insulin ([facility]       Details:   Della Ortiz was brought to the cardiac catheterization lab in a fasting state after informed consent was obtained. If the patient was able to provide written consent, it was obtained. The patient's vitals were monitored through out the procedure. The patient was sedated using the appropriate levels of sedation and ASA guidelines. The appropriate access site area was prepped and drapped in a sterile fashion. The area was anesthetized with 2% lidocaine. Using the modified Seldinger technique, an arterial sheath was introduced into the arterial access site using a single anterior wall puncture. The sheath was flushed and prepped in usual fashion. Catheters used during the procedure included 5 [place] JL4, and JR4. The catheters were advanced and removed over a .035\" wire, into the appropriate positions. Multiple angiographic views were obtained. An LV gram was obtained. ~Right heart catheterization was also performed. We utilized the right common femoral vein. Advanced  the [place] was advanced under pressure fluoroscopic guidance into the cardiac chambers. Findings:    1. Left main coronary artery was normal. It gave off the left anterior descending artery and left circumflex. 2. Left anterior descending artery has mild atherosclerotic disease. It was moderate in size. It gave off septal perforators and a moderate sized diagonal branch. The LAD covered the entire apex of the left ventricle. 3. Left circumflex has mild atherosclerotic disease.   It was moderate in size. There was a moderate sized obtuse marginal branch. 4. Right coronary artery has mild atherosclerotic disease. It was moderate in size and was the dominant artery. 5. Left ventriculogram was not performed. Left ventricular end diastolic l pressure was 26. There is no gradient across pullback of the aortic valve. Right heart catheterization findings:    Right atrial pressure of 20  RV 45/7  PA 53/8  Pulmonary wedge pressure mean of 20  RA saturation 42%  PA saturation 45%  Aortic saturation 99%  Cardiac output 2.4  Cardiac index 1.24  SVR 2433      /80   Pulse 73   Temp 98.9 °F (37.2 °C) (Oral)   Resp 16   Ht 5' 1\" (1.549 m)   Wt 210 lb 1.6 oz (95.3 kg)   SpO2 97%   BMI 39.70 kg/m²     The access site was controlled with manual pressure and/or appropriate closure device. Moderate Conscious Sedation Details: An independent trained observer pushed medications at my direction. We monitoring the patient's level of consciousness and vital signs/physiologic status throughout the procedure. CPT codes 75133 and 92473      Start time: 1400  Stop time: 1440  ASA class: 3    Sedation totals:  Versad - 2mg  Fentanyl - 25mcg    EBL - minimal <5 cc blood loss    The patient was monitored continuously with the ECG, pulse oximetry, blood pressure, and direct observation. CONCLUSIONS:    1. Mild non-obstructive coronary artery disease with known severe left ventricular dysfunction  2. Moderate pulmonary hypertension with decompensated hemodynamics    ASSESSMENT/RECOMMENDATIONS:    1. Risk Factor control  2. Advanced heart failure management.       Dave Valerio MD, [place], [place] [address] Cardiology  [place] [address]  [phone] Main central office  [phone] [facility] office  10/6/2020  3:03 PM

## 2020-10-06 NOTE — PRE SEDATION
Brief Pre-Op Note/Sedation Assessment      Jodee Gee  1960  [id number]      [id number]  1:56 PM    Planned Procedure: Cardiac Catheterization Procedure    Post Procedure Plan: Return to same level of care    Consent: I have discussed with the patient and/or the patient representative the indication, alternatives, and the possible risks and/or complications of the planned procedure and the anesthesia methods. The patient and/or patient representative appear to understand and agree to proceed. Chief Complaint: Dyspnea on Exertion  Dyspnea      Indications for Cath Procedure:  Cardiomyopathy  Anginal Classification within 2 weeks:  No symptoms  NYHA Heart Failure Class within 2 weeks: Class IV - Symptoms of HF at rest, Newly Diagnosed?  No, Heart Failure Type: Systolic  Is Cath Lab Visit Valve-related?: No  Surgical Risk: High Risk: Non-Vascular  Functional Type: < 4 METS    Anti- Anginal Meds within 2 weeks:   Yes: Beta Blockers, Aspirin and Statin (Any)    Stress or Imaging Studies Performed (within 6 months):  Stress Test with SPECT Result: Positive:  anterior, inferior and lateral Risk/Extent of Ischemia:  High     Vital Signs:  /80   Pulse 73   Temp 98.9 °F (37.2 °C) (Oral)   Resp 16   Ht 5' 1\" (1.549 m)   Wt 210 lb 1.6 oz (95.3 kg)   SpO2 97%   BMI 39.70 kg/m²     Allergies:  No Known Allergies    Past Medical History:  Past Medical History:   Diagnosis Date    Arthritis     CAD (coronary artery disease)     Cerebral artery occlusion with cerebral infarction [facility] [address]     CHF (congestive heart failure) (HCC)     Diabetes mellitus (HCC)     Hyperlipidemia     Hypertension     Mental retardation     MI (myocardial infarction) ([facility] [address]          Surgical History:  Past Surgical History:   Procedure Laterality Date    BACK SURGERY      PACEMAKER INSERTION      TUBAL LIGATION      TUMOR REMOVAL           Medications:  Current Facility-Administered Medications   Medication Dose Route Frequency Provider Last Rate Last Dose    0.9 % sodium chloride infusion   Intravenous Continuous Shivam Pollack MD 75 mL/hr at 10/06/20 1110      perflutren lipid microspheres (DEFINITY) injection 1.65 mg  1.5 mL Intravenous ONCE PRN YO Thomas MD        furosemide (LASIX) injection 40 mg  40 mg Intravenous BID YO Thomas MD        FLUoxetine (PROZAC) capsule 10 mg  10 mg Oral Daily [facility], APRN - NP   10 mg at 10/05/20 1028    escitalopram (LEXAPRO) tablet 10 mg  10 mg Oral Daily [facility], APRN - NP   10 mg at 10/05/20 1027    DULoxetine (CYMBALTA) extended release capsule 60 mg  60 mg Oral Daily [facility], APRN - NP   60 mg at 10/05/20 1028    sodium chloride flush 0.9 % injection 10 mL  10 mL Intravenous 2 times per day Joe Cooler, DO   10 mL at 10/05/20 2100    sodium chloride flush 0.9 % injection 10 mL  10 mL Intravenous PRN Joe Cooler, DO        aspirin EC tablet 81 mg  81 mg Oral Daily Graham Sanon MD   81 mg at 10/04/20 0908    atorvastatin (LIPITOR) tablet 40 mg  40 mg Oral Nightly Graham Sanon MD   40 mg at 10/05/20 2059    clopidogrel (PLAVIX) tablet 75 mg  75 mg Oral Daily Graham Sanon MD   75 mg at 10/04/20 0908    fenofibrate (TRIGLIDE) tablet 160 mg  160 mg Oral Daily Graham Sanon MD   160 mg at 10/04/20 0908    metoprolol succinate (TOPROL XL) extended release tablet 50 mg  50 mg Oral Daily Graham Sanon MD   50 mg at 10/05/20 1028    ranolazine (RANEXA) extended release tablet 500 mg  500 mg Oral BID Graham Sanon MD   500 mg at 10/05/20 2059    topiramate (TOPAMAX) tablet 200 mg  200 mg Oral Daily Graham Sanon MD   200 mg at 10/05/20 1027    sodium chloride flush 0.9 % injection 10 mL  10 mL Intravenous PRN Graham Sanon MD        potassium chloride (KLOR-CON M) extended release tablet 40 mEq  40 mEq Oral PRN Graham Sanon MD        Or    potassium bicarb-citric acid (EFFER-K) effervescent tablet 40 mEq  40 mEq Oral PRN Graham Sanon MD        Or    potassium chloride 10 mEq/100 mL IVPB (Peripheral Line)  10 mEq Intravenous PRN Graham Sanon MD        magnesium sulfate 1 g in dextrose 5% 100 mL IVPB  1 g Intravenous PRN Graham Sanon MD        acetaminophen (TYLENOL) tablet 650 mg  650 mg Oral Q6H PRN Graham Sanon MD        Or    acetaminophen (TYLENOL) suppository 650 mg  650 mg Rectal Q6H PRN Graham Sanon MD        promethazine (PHENERGAN) tablet 12.5 mg  12.5 mg Oral Q6H PRN Graham Sanon MD   12.5 mg at 10/04/20 1646    Or    ondansetron (ZOFRAN) injection 4 mg  4 mg Intravenous Q6H PRN Graham Sanon MD        glucose (GLUTOSE) 40 % oral gel 15 g  15 g Oral PRN Graham Sanon MD        dextrose 50 % IV solution  12.5 g Intravenous PRN Graham Sanon MD        glucagon (rDNA) injection 1 mg  1 mg Intramuscular PRN Graham Sanon MD        dextrose 5 % solution  100 mL/hr Intravenous PRN Graham Sanon MD        enoxaparin (LOVENOX) injection 40 mg  40 mg Subcutaneous Daily Graham Sanon MD   40 mg at 10/04/20 0907    insulin glargine (LANTUS) injection vial 30 Units  30 Units Subcutaneous BID Graham Sanon MD   30 Units at 10/04/20 2100    insulin lispro (HUMALOG) injection vial 8 Units  0.08 Units/kg Subcutaneous TID  Graham Sanon MD   8 Units at 10/04/20 0909    insulin lispro (HUMALOG) injection vial 0-12 Units  0-12 Units Subcutaneous TID  Graham Sanon MD   10 Units at 10/03/20 1818    insulin lispro (HUMALOG) injection vial 0-6 Units  0-6 Units Subcutaneous Nightly Graham Sanon MD   4 Units at 10/03/20 2005           Pre-Sedation:    Pre-Sedation Documentation and Exam:  I have assessed the patient and agree with the H&P present on the chart.     Prior History of Anesthesia Complications:   none    Modified Mallampati:  II (soft palate, uvula, fauces visible)    ASA Classification:  Class 3 - A patient with severe systemic disease that limits activity but is not incapacitating      Page Scale: Activity:  2 - Able to move 4 extremities voluntarily on command  Respiration:  2 - Able to breathe deeply and cough freely  Circulation:  2 - BP+/- 20mmHg of normal  Consciousness:  2 - Fully awake  Oxygen Saturation (color):  2 - Able to maintain oxygen saturation >92% on room air    Sedation/Anesthesia Plan:  Guard the patient's safety and welfare. Minimize physical discomfort and pain. Minimize negative psychological responses to treatment by providing sedation and analgesia and maximize the potential amnesia. Patient to meet pre-procedure discharge plan.     Medication Planned:  midazolam intravenously and fentanyl intravenously    Patient is an appropriate candidate for plan of sedation: yes      Electronically signed by Zaki Alan MD on 10/6/2020 at 1:56 PM

## 2020-10-06 NOTE — POST SEDATION
Patient:  Lorenza Jackson   :   1960    A pre-sedation re-evaluation was performed immediately at the end of the procedure. Procedure:  Cardiac cath  Medications: Procedural sedation with minimal conscious sedation  Complications: None  Estimated Blood Loss: none  Specimens: Were not obtained        [place] Medication and Procedural Reconciliation:  I agree that the documented medications and procedures performed are true. The medications were given under my order. The procedures were performed under my direct supervision.

## 2020-10-06 NOTE — PROGRESS NOTES
Heart catheterization consent obtained from patient's sister, Sandy Rios, over the phone. Confirmed by Violette Andujar.

## 2020-10-07 LAB
ANION GAP SERPL CALCULATED.3IONS-SCNC: 10 MMOL/L (ref 3–16)
BUN BLDV-MCNC: 30 MG/DL (ref 7–20)
CALCIUM SERPL-MCNC: 8.3 MG/DL (ref 8.3–10.6)
CHLORIDE BLD-SCNC: 102 MMOL/L (ref 99–110)
CO2: 27 MMOL/L (ref 21–32)
CREAT SERPL-MCNC: 0.8 MG/DL (ref 0.6–1.1)
CULTURE, BLOOD 2: NORMAL
GFR AFRICAN AMERICAN: >60
GFR NON-AFRICAN AMERICAN: >60
GLUCOSE BLD-MCNC: 107 MG/DL (ref 70–99)
GLUCOSE BLD-MCNC: 117 MG/DL (ref 70–99)
GLUCOSE BLD-MCNC: 124 MG/DL (ref 70–99)
GLUCOSE BLD-MCNC: 130 MG/DL (ref 70–99)
GLUCOSE BLD-MCNC: 72 MG/DL (ref 70–99)
PERFORMED ON: ABNORMAL
PERFORMED ON: NORMAL
POTASSIUM SERPL-SCNC: 3.3 MMOL/L (ref 3.5–5.1)
SODIUM BLD-SCNC: 139 MMOL/L (ref 136–145)

## 2020-10-07 PROCEDURE — 2580000003 HC RX 258: Performed by: INTERNAL MEDICINE

## 2020-10-07 PROCEDURE — 80048 BASIC METABOLIC PNL TOTAL CA: CPT

## 2020-10-07 PROCEDURE — 99233 SBSQ HOSP IP/OBS HIGH 50: CPT | Performed by: NURSE PRACTITIONER

## 2020-10-07 PROCEDURE — 2580000003 HC RX 258: Performed by: NURSE PRACTITIONER

## 2020-10-07 PROCEDURE — 6370000000 HC RX 637 (ALT 250 FOR IP): Performed by: NURSE PRACTITIONER

## 2020-10-07 PROCEDURE — 6360000002 HC RX W HCPCS: Performed by: INTERNAL MEDICINE

## 2020-10-07 PROCEDURE — 36592 COLLECT BLOOD FROM PICC: CPT

## 2020-10-07 PROCEDURE — 6370000000 HC RX 637 (ALT 250 FOR IP): Performed by: INTERNAL MEDICINE

## 2020-10-07 PROCEDURE — 1200000000 HC SEMI PRIVATE

## 2020-10-07 PROCEDURE — 2700000000 HC OXYGEN THERAPY PER DAY

## 2020-10-07 PROCEDURE — 6360000002 HC RX W HCPCS: Performed by: NURSE PRACTITIONER

## 2020-10-07 PROCEDURE — 94761 N-INVAS EAR/PLS OXIMETRY MLT: CPT

## 2020-10-07 PROCEDURE — 2580000003 HC RX 258: Performed by: EMERGENCY MEDICINE

## 2020-10-07 RX ORDER — DIGOXIN 125 MCG
125 TABLET ORAL DAILY
Status: DISCONTINUED | OUTPATIENT
Start: 2020-10-07 | End: 2020-10-09

## 2020-10-07 RX ORDER — METOPROLOL SUCCINATE 25 MG/1
25 TABLET, EXTENDED RELEASE ORAL DAILY
Status: DISCONTINUED | OUTPATIENT
Start: 2020-10-09 | End: 2020-10-10

## 2020-10-07 RX ORDER — AMIODARONE HYDROCHLORIDE 200 MG/1
100 TABLET ORAL DAILY
Status: DISCONTINUED | OUTPATIENT
Start: 2020-10-07 | End: 2020-10-14 | Stop reason: HOSPADM

## 2020-10-07 RX ADMIN — Medication 10 ML: at 08:12

## 2020-10-07 RX ADMIN — FLUOXETINE 10 MG: 10 CAPSULE ORAL at 08:12

## 2020-10-07 RX ADMIN — ESCITALOPRAM OXALATE 10 MG: 10 TABLET ORAL at 08:12

## 2020-10-07 RX ADMIN — ACETAMINOPHEN 650 MG: 325 TABLET ORAL at 08:19

## 2020-10-07 RX ADMIN — FENOFIBRATE 160 MG: 160 TABLET ORAL at 08:12

## 2020-10-07 RX ADMIN — ASPIRIN 81 MG: 81 TABLET ORAL at 08:12

## 2020-10-07 RX ADMIN — Medication 10 ML: at 15:33

## 2020-10-07 RX ADMIN — FUROSEMIDE 40 MG: 10 INJECTION, SOLUTION INTRAMUSCULAR; INTRAVENOUS at 08:12

## 2020-10-07 RX ADMIN — INSULIN GLARGINE 30 UNITS: 100 INJECTION, SOLUTION SUBCUTANEOUS at 11:33

## 2020-10-07 RX ADMIN — TOPIRAMATE 200 MG: 100 TABLET, FILM COATED ORAL at 08:12

## 2020-10-07 RX ADMIN — Medication 10 ML: at 13:42

## 2020-10-07 RX ADMIN — AMIODARONE HYDROCHLORIDE 100 MG: 200 TABLET ORAL at 13:35

## 2020-10-07 RX ADMIN — DULOXETINE HYDROCHLORIDE 60 MG: 60 CAPSULE, DELAYED RELEASE ORAL at 08:12

## 2020-10-07 RX ADMIN — POTASSIUM CHLORIDE 40 MEQ: 20 TABLET, EXTENDED RELEASE ORAL at 14:52

## 2020-10-07 RX ADMIN — METOPROLOL SUCCINATE 50 MG: 50 TABLET, EXTENDED RELEASE ORAL at 08:12

## 2020-10-07 RX ADMIN — RANOLAZINE 500 MG: 500 TABLET, FILM COATED, EXTENDED RELEASE ORAL at 08:12

## 2020-10-07 RX ADMIN — DIGOXIN 125 MCG: 125 TABLET ORAL at 15:33

## 2020-10-07 RX ADMIN — INSULIN GLARGINE 30 UNITS: 100 INJECTION, SOLUTION SUBCUTANEOUS at 20:53

## 2020-10-07 RX ADMIN — FUROSEMIDE 5 MG/HR: 10 INJECTION, SOLUTION INTRAMUSCULAR; INTRAVENOUS at 15:33

## 2020-10-07 RX ADMIN — CLOPIDOGREL BISULFATE 75 MG: 75 TABLET ORAL at 08:12

## 2020-10-07 ASSESSMENT — PAIN SCALES - GENERAL
PAINLEVEL_OUTOF10: 10
PAINLEVEL_OUTOF10: 0

## 2020-10-07 ASSESSMENT — PAIN DESCRIPTION - DESCRIPTORS: DESCRIPTORS: CONSTANT;SHARP;STABBING

## 2020-10-07 ASSESSMENT — PAIN DESCRIPTION - LOCATION: LOCATION: CHEST

## 2020-10-07 NOTE — PROGRESS NOTES
[facility]     Electrophysiology                                     Progress Note    Admission date:  10/2/2020    Reason for follow up visit: SVT    HPI/CC: Randolph Bush was admitted on 10/2/2020 with acute on chronic chest pain and SOB. Stress test 10/5 showed fixed defects. Echo 10/6 showed an EF of 15-20%. RHC and LHC 10/6 showed nonobstructive disease and low CI. EP following for SVT. Rhythm has been sinus with intermittent pacing and PVCs. Subjective: She is nonverbal today. Nods head yes when asked if having CP, SOB, and palpitations. Vitals:  Blood pressure (!) 88/49, pulse 79, temperature 98.2 °F (36.8 °C), temperature source Oral, resp. rate 16, height 5' 1\" (1.549 m), weight 208 lb 8 oz (94.6 kg), SpO2 96 %, not currently breastfeeding.   Temp  Av °F (36.7 °C)  Min: 97.7 °F (36.5 °C)  Max: 98.3 °F (36.8 °C)  Pulse  Av.3  Min: 71  Max: 81  BP  Min: 81/55  Max: 121/76  SpO2  Av.1 %  Min: 92 %  Max: 100 %    24 hour I/O    Intake/Output Summary (Last 24 hours) at 10/7/2020 1220  Last data filed at 10/7/2020 1137  Gross per 24 hour   Intake 720 ml   Output 2750 ml   Net -2030 ml     Current Facility-Administered Medications   Medication Dose Route Frequency Provider Last Rate Last Dose    0.9 % sodium chloride infusion   Intravenous Continuous Grant Ulloa MD   Stopped at 10/06/20 192    perflutren lipid microspheres (DEFINITY) injection 1.65 mg  1.5 mL Intravenous ONCE PRN YO Rodriguez MD        furosemide (LASIX) injection 40 mg  40 mg Intravenous BID YO Rodriguez MD   40 mg at 10/07/20 3292    FLUoxetine (PROZAC) capsule 10 mg  10 mg Oral Daily Yolanda Carney APRN - NP   10 mg at 10/07/20 0812    escitalopram (LEXAPRO) tablet 10 mg  10 mg Oral Daily Yolanda Carney APRN - NP   10 mg at 10/07/20 6767    DULoxetine (CYMBALTA) extended release capsule 60 mg  60 mg Oral Daily FRITZ Collins NP   60 mg at 10/07/20 1509    sodium chloride flush 0.9 % injection 10 mL  10 mL Intravenous 2 times per day Martin Rosa,    10 mL at 10/07/20 0312    sodium chloride flush 0.9 % injection 10 mL  10 mL Intravenous PRN Martin Rosa,         aspirin EC tablet 81 mg  81 mg Oral Daily Aziza Cole MD   81 mg at 10/07/20 5073    atorvastatin (LIPITOR) tablet 40 mg  40 mg Oral Nightly Aziza Cole MD   40 mg at 10/06/20 2214    clopidogrel (PLAVIX) tablet 75 mg  75 mg Oral Daily Aziza Cole MD   75 mg at 10/07/20 5922    fenofibrate (TRIGLIDE) tablet 160 mg  160 mg Oral Daily Aziza Cole MD   160 mg at 10/07/20 6404    metoprolol succinate (TOPROL XL) extended release tablet 50 mg  50 mg Oral Daily Aziza Cole MD   50 mg at 10/07/20 1022    ranolazine (RANEXA) extended release tablet 500 mg  500 mg Oral BID Aziza Cole MD   500 mg at 10/07/20 0511    topiramate (TOPAMAX) tablet 200 mg  200 mg Oral Daily Aziza Cole MD   200 mg at 10/07/20 3024    sodium chloride flush 0.9 % injection 10 mL  10 mL Intravenous PRN Aziza Cole MD        potassium chloride (KLOR-CON M) extended release tablet 40 mEq  40 mEq Oral PRN Aziza Cole MD        Or    potassium bicarb-citric acid (EFFER-K) effervescent tablet 40 mEq  40 mEq Oral PRN Aziza Cole MD        Or    potassium chloride 10 mEq/100 mL IVPB (Peripheral Line)  10 mEq Intravenous PRN Aziza Cole MD        magnesium sulfate 1 g in dextrose 5% 100 mL IVPB  1 g Intravenous PRN Aziza Cole MD        acetaminophen (TYLENOL) tablet 650 mg  650 mg Oral Q6H PRN Aziza Cole MD   650 mg at 10/07/20 4791    Or    acetaminophen (TYLENOL) suppository 650 mg  650 mg Rectal Q6H PRN Aziza Cole MD        promethazine (PHENERGAN) tablet 12.5 mg  12.5 mg Oral Q6H PRN Aziza Cole MD   12.5 mg at 10/04/20 1646    Or    ondansetron (ZOFRAN) injection 4 mg  4 mg Intravenous Q6H PRN Aziza Cole MD        glucose (GLUTOSE) 40 % oral gel 15 g  15 g Oral PRN Ji Magdaleno MD        dextrose 50 % IV solution  12.5 g Intravenous PRN Ji Magdaleno MD        glucagon (rDNA) injection 1 mg  1 mg Intramuscular PRN Ji Magdaleno MD        dextrose 5 % solution  100 mL/hr Intravenous PRN Ji Magdaleno MD        enoxaparin (LOVENOX) injection 40 mg  40 mg Subcutaneous Daily Ji Magdaleno MD   40 mg at 10/04/20 0907    insulin glargine (LANTUS) injection vial 30 Units  30 Units Subcutaneous BID Ji Magdaleno MD   30 Units at 10/07/20 1133    insulin lispro (HUMALOG) injection vial 8 Units  0.08 Units/kg Subcutaneous TID  Ji Magdaleno MD   8 Units at 10/04/20 0909    insulin lispro (HUMALOG) injection vial 0-12 Units  0-12 Units Subcutaneous TID  Ji Magdaleno MD   10 Units at 10/03/20 1818    insulin lispro (HUMALOG) injection vial 0-6 Units  0-6 Units Subcutaneous Nightly Ji Magdaleno MD   4 Units at 10/03/20 2005       Objective:     Telemetry monitor: sinus with intermittent pacing     Physical Exam:  Constitutional and general appearance: fatigued, no distress, slowed mentation and appears older than stated age  HEENT: PERRL, no cervical lymphadenopathy. No masses palpable.  Normal oral mucosa  Respiratory:  · Normal excursion and expansion without use of accessory muscles  · Resp auscultation: Normal breath sounds without wheezing, rhonchi, and rales  Cardiovascular:  · The apical impulse is not displaced  · Heart tones are crisp and normal. regular S1 and S2.  · Jugular venous pulsation Normal  · The carotid upstroke is normal in amplitude and contour without delay or bruit  · Peripheral pulses are symmetrical and full   Abdomen:  · No masses or tenderness  · Bowel sounds present  Extremities:  ·  No cyanosis or clubbing  ·  No lower extremity edema  ·  Skin: warm and dry  Neurological:  · Unable to fully assess, nonverbal today   · Moves all extremities     Data    Limited echo 10/6/2020:  Limited only f/u for LVEF. The left ventricular systolic function is severely reduced with an ejection fraction of 15-20 %. There is severe global hypokinesis with regional variations. Changes noted from previous echo on 6- in left ventricular function. Echo 7/98/1782:  LV systolic function is moderately reduced with an estimated EF of 35%. Moderate global hypokinesis. There is mild concentric left ventricular hypertrophy. Left ventricular cavity size is mildly dilated. Estimated LV diastolic filling pressure is normal.  Mild mitral and tricuspid regurgitation. Systolic pulmonary artery pressure (SPAP) is estimated at 35mmHg (Right atrial pressure of 8 mmHg). No significant change from exam done 10/18/2019. Stress test 10/5/2020:  Dilated LV with severe global hypokinesis. Large, severe, fixed perfusion     defect of the inferior/inferolateral wall consistent with scar. Diminished     uptake of the anterior wall consistent with breast artifact. Post stress     LVEF is abnormal at 19%. Abnormal study. Overall findings represent a high     risk scan. [facility] 10/6/2020 Eileen Trevino):  1. Mild non-obstructive coronary artery disease. 2.  Moderate pulmonary hypertension  3. Decompensated cardiac hemodynamics    [facility] 12/12/2018 (Joyce):  LM <20%  LAD 20-30%  Cx 20-20%  RCA 20-30%              RPDA 50%. FFR 0.89  LVEF 45%     ASA, statin, BBlk, ACE  Stop plavix  OK discharge    All labs and testing reviewed.   Lab Review     Renal Profile:   Lab Results   Component Value Date    CREATININE 0.7 10/04/2020    BUN 29 10/04/2020     10/04/2020    K 3.8 10/04/2020     10/04/2020    CO2 19 10/04/2020     CBC:    Lab Results   Component Value Date    WBC 9.2 10/02/2020    RBC 4.60 10/02/2020    HGB 13.8 10/02/2020    HCT 42.6 10/02/2020    MCV 92.8 10/02/2020    RDW 13.8 10/02/2020     10/02/2020     BNP:  No results found for: BNP  Fasting Lipid Panel:    Lab Results Component Value Date    CHOL 166 10/12/2019    HDL 51 10/12/2019    TRIG 142 10/12/2019     Cardiac Enzymes:  CK/MbTroponin  Lab Results   Component Value Date    CKTOTAL 54 06/12/2020    TROPONINI 0.03 10/03/2020     PT/ INR   Lab Results   Component Value Date    INR 1.02 08/31/2020    INR 1.01 08/12/2020    INR 1.16 04/16/2020    PROTIME 11.8 08/31/2020    PROTIME 11.7 08/12/2020    PROTIME 13.5 04/16/2020     PTT No results found for: PTT   Lab Results   Component Value Date    MG 1.90 10/02/2020      Lab Results   Component Value Date    TSH 0.28 09/19/2020       Assessment:  SVT: currently stable    -noted on device check  NSVT: stable  Ischemic cardiomyopathy   -EF 35% 6/2020, 15-20% 10/2020   -s/p dual chamber ICD 2015 ([employer])  Acute on chronic systolic CHF   -cardiology following  Pulmonary HTN: moderate per [facility] 10/2020  Chronic chest pain  CAD   -nonobstructive on [facility] 12/2018  DM  History of CVA  Cognitive impairment     Plan:   1. Continue Toprol  2. DAPT and Lasix per cardiology   3. No ACE/ARB due to soft BP  4. BMP now   5. Start amiodarone 100mg po QD  6. BMP, LFT, and TSH every 6 months while on amiodarone (due 3/2021)     Discussed with Dr. Shanice Alvarez. Antiarrhythmic options are limited. Will start low dose amiodarone. EP will sign off but remains available if needed. Will keep EP appointment on 11/10/2020.     FRITZ Harris-CNP  [facility]  [phone]

## 2020-10-07 NOTE — PROGRESS NOTES
Pt has continued to endorse chest pain overnight, stating it to be a 10/10. This is a soni contrast to prior assessments by other nurses, however pt will immediately return to sleep following the complaint and appears in no acute distress during assessments. VS have remained stable and consistent as does her rhythm on telemetry. Pt denies any radiation, stating that it is worsened with exacerbation and to touch. Light palpation on the sternum yielded a pain response from pt. Pt immediately went back to sleep following this finding. Continuing to monitor.      Vitals:    10/07/20 0400   BP: (!) 98/56   Pulse: 79   Resp: 18   Temp: 98 °F (36.7 °C)   SpO2: 97%

## 2020-10-07 NOTE — PROGRESS NOTES
[address]   Daily Progress Note    Admit Date:  10/2/2020  HPI:    Chief Complaint   Patient presents with    Hyperglycemia     blood sugars have been over 600 for \"the last couple of days\" per patient; reading high at time of triage    Nausea     nausea and vomiting for two days     Chest Pain     for 3 months         Interval history: Jay Jc is being followed for chest pain.    PMH of CAD, cerebral artery occlusion with cerebral infarct, DM, Hyperlipidemia, Hypertension, Mental retardation, NSVT, and MI.  s/p LHC in 12/2018 showed non-obstructive CAD, s/p [employer] defibrillator.      Subjective:  Ms. Kyle Velázquez continues to feel ill. Shortness of breath, abd bloated. Chest pain. No edema of the legs.     Objective:   BP (!) 88/49   Pulse 79   Temp 98.2 °F (36.8 °C) (Oral)   Resp 16   Ht 5' 1\" (1.549 m)   Wt 208 lb 8 oz (94.6 kg)   SpO2 96%   BMI 39.40 kg/m²       Intake/Output Summary (Last 24 hours) at 10/7/2020 1355  Last data filed at 10/7/2020 1137  Gross per 24 hour   Intake 720 ml   Output 2750 ml   Net -2030 ml       NYHA: III    Physical Exam:  General:  Awake, alert, NAD, no eye contact  Skin:  Warm and dry  Neck:  JVD not able to assess  Chest:  Clear to auscultation, no wheezes/rhonchi/rales  Cardiovascular:  RRR S1S2, no m/r/g   Abdomen:  Soft, nontender, +bowel sounds  Extremities:  Trace BLE edema    Medications:    amiodarone  100 mg Oral Daily    furosemide  40 mg Intravenous BID    FLUoxetine  10 mg Oral Daily    escitalopram  10 mg Oral Daily    DULoxetine  60 mg Oral Daily    sodium chloride flush  10 mL Intravenous 2 times per day    aspirin  81 mg Oral Daily    atorvastatin  40 mg Oral Nightly    clopidogrel  75 mg Oral Daily    fenofibrate  160 mg Oral Daily    metoprolol succinate  50 mg Oral Daily    ranolazine  500 mg Oral BID    topiramate  200 mg Oral Daily    enoxaparin  40 mg Subcutaneous Daily    insulin glargine  30 Units Subcutaneous BID    insulin lispro  0.08 Units/kg Subcutaneous TID WC    insulin lispro  0-12 Units Subcutaneous TID WC    insulin lispro  0-6 Units Subcutaneous Nightly      sodium chloride Stopped (10/06/20 1924)    dextrose         Lab Data:  CBC:   No results for input(s): WBC, HGB, PLT in the last 72 hours. BMP:    No results for input(s): NA, K, CO2, BUN, CREATININE in the last 72 hours. Invalid input(s): CA  INR:  No results for input(s): INR in the last 72 hours. BNP:    No results for input(s): PROBNP in the last 72 hours. Lab Results   Component Value Date    LVEF 35 06/13/2020        CARDIAC CATH 12/2018  LM <20%  LAD 20-30%  Cx 20-20%  RCA 20-30%              RPDA 50%. FFR 0.89  LVEF 45%     ECHO: 10/7/2019  Summary   The left ventricular systolic function is mildly reduced with an ejection   fraction of 35 - 40 %.   There is hypokinesis of the apex, anteroseptum, anterior, apical lateral and   inferior walls.   Left ventricular cavity size is moderately dilated.   Grade I diastolic dysfunction with normal filling pressure.   Changes noted from previous echo on 4- in left ventricular function.   Mild mitral regurgitation.   Right ventricular systolic function is mildly reduced .   Systolic pulmonic artery pressure (SPAP) is normal estimated at 21 mmHg   (Right atrial pressure of 3 mmHg).      Stress 1/31/20   Stress Interpretation   Mildly reduced LVEF 45%   Inferolateral hypokinesis   Mainly fixed inferior defect suggestive of scar   No significant areas of reversibility to suggest ischemia    Echo: 6/13/20  Summary   LV systolic function is moderately reduced with an estimated EF of 35%.   Moderate global hypokinesis.   There is mild concentric left ventricular hypertrophy.   Left ventricular cavity size is mildly dilated.   Estimated LV diastolic filling pressure is normal.   Mild mitral and tricuspid regurgitation.   Systolic pulmonary artery pressure (SPAP) is estimated at 35mmHg (Right  Jorge pressure of 8 mmHg).   No significant change from exam done 10/18/2019. Stress test 10/5/2020:  Dilated LV with severe global hypokinesis. Large, severe, fixed perfusion     defect of the inferior/inferolateral wall consistent with scar. Diminished     uptake of the anterior wall consistent with breast artifact. Post stress     LVEF is abnormal at 19%. Abnormal study. Overall findings represent a high     risk scan. Echo 10/5/20  Summary   Limited only f/u for LVEF. The left ventricular systolic function is severely reduced with an ejection   fraction of 15-20 %. There is severe global hypokinesis with regional variations. Changes noted from previous echo on 6- in left ventricular function    Cardiac cath 10/6/20  Findings:  1. Left main coronary artery was normal. It gave off the left anterior descending artery and left circumflex. 2. Left anterior descending artery has mild atherosclerotic disease. It was moderate in size. It gave off septal perforators and a moderate sized diagonal branch. The LAD covered the entire apex of the left ventricle. 3. Left circumflex has mild atherosclerotic disease. It was moderate in size. There was a moderate sized obtuse marginal branch.     4. Right coronary artery has mild atherosclerotic disease. It was moderate in size and was the dominant artery.     5. Left ventriculogram was not performed. Left ventricular end diastolic l pressure was 26.   There is no gradient across pullback of the aortic valve.     Right heart catheterization findings:     Right atrial pressure of 20  RV 45/7  PA 53/8  Pulmonary wedge pressure mean of 20  RA saturation 42%  PA saturation 45%  Aortic saturation 99%  Cardiac output 2.4  Cardiac index 1.24  SVR 2433      10/07/20 0416   208 lb 8 oz (94.6 kg)   Actual;Standing scale        10/06/20 0328   210 lb 1.6 oz (95.3 kg)   Actual;Standing scale       10/05/20 0351   211 lb 6.4 oz (95.9 kg)   Standing scale      10/04/20 0411   213 lb 1.6 oz (96.7 kg)   Standing scale        Principal Problem:    Chest pain  Active Problems:    Dual ICD (implantable cardioverter-defibrillator) in place    Nonischemic cardiomyopathy (HCC)    Type 2 diabetes mellitus with hyperglycemia, with long-term current use of insulin (HCC)  Resolved Problems:    * No resolved hospital problems.  *      Assessment/Plan:  ~chest pain- constant and atypical  ~acute decompensated heart failure   ~ palpitations  ~non ischemic cardimoyopathy with drop in LVEF 15%  s/p [employer] ICD   ~Acute on chronic systolic heart failure  ~hypotension  ~Hyponatremia  Dm    Plan:  Hold toprol tomorrow with decompensated hemodynamics and will plan to reduce dose on Friday  Add lasix infusion given her PCWP was still 20 and to minimize hypotension as she needs more diuresis  Add digoxin 125mcg daily   Holding ACEi due to hypotension and need room for some diuresis; recommend we start low dose Entresto prior to discharge    Dispo: 2-3 more days     Luan Cross, CELESTINO, 10/7/2020, 3:16 PM

## 2020-10-07 NOTE — PROGRESS NOTES
Hospitalist Progress Note      PCP: Kenia Lund, APRN - NP    Date of Admission: 10/2/2020    Chief Complaint: Chest pain    Hospital Course: See H&P    Subjective:   Patient is up in bed, comfortable, not in distress. No new event overnight noted. Medications:  Reviewed    Infusion Medications    sodium chloride Stopped (10/06/20 1924)    dextrose       Scheduled Medications    furosemide  40 mg Intravenous BID    FLUoxetine  10 mg Oral Daily    escitalopram  10 mg Oral Daily    DULoxetine  60 mg Oral Daily    sodium chloride flush  10 mL Intravenous 2 times per day    aspirin  81 mg Oral Daily    atorvastatin  40 mg Oral Nightly    clopidogrel  75 mg Oral Daily    fenofibrate  160 mg Oral Daily    metoprolol succinate  50 mg Oral Daily    ranolazine  500 mg Oral BID    topiramate  200 mg Oral Daily    enoxaparin  40 mg Subcutaneous Daily    insulin glargine  30 Units Subcutaneous BID    insulin lispro  0.08 Units/kg Subcutaneous TID WC    insulin lispro  0-12 Units Subcutaneous TID WC    insulin lispro  0-6 Units Subcutaneous Nightly     PRN Meds: perflutren lipid microspheres, sodium chloride flush, sodium chloride flush, potassium chloride **OR** potassium alternative oral replacement **OR** potassium chloride, magnesium sulfate, acetaminophen **OR** acetaminophen, promethazine **OR** ondansetron, glucose, dextrose, glucagon (rDNA), dextrose      Intake/Output Summary (Last 24 hours) at 10/7/2020 1030  Last data filed at 10/7/2020 1007  Gross per 24 hour   Intake 720 ml   Output 1750 ml   Net -1030 ml       Physical Exam Performed:    /62   Pulse 74   Temp 98 °F (36.7 °C) (Oral)   Resp 16   Ht 5' 1\" (1.549 m)   Wt 208 lb 8 oz (94.6 kg)   SpO2 99%   BMI 39.40 kg/m²     General appearance: No apparent distress, appears stated age and cooperative. HEENT: Pupils equal, round, and reactive to light. Conjunctivae/corneas clear. Neck: Supple, with full range of motion. No jugular venous distention. Trachea midline. Respiratory:  Normal respiratory effort. Clear to auscultation, bilaterally without Rales/Wheezes/Rhonchi. Cardiovascular: Regular rate and rhythm with normal S1/S2 without murmurs, rubs or gallops. Abdomen: Soft, non-tender, non-distended with normal bowel sounds. Musculoskeletal: No clubbing, cyanosis or edema bilaterally. Full range of motion without deformity. Skin: Skin color, texture, turgor normal.  No rashes or lesions. Neurologic:  Neurovascularly intact without any focal sensory/motor deficits. Cranial nerves: II-XII intact, grossly non-focal.  Psychiatric: Alert and oriented, thought content appropriate, normal insight  Capillary Refill: Brisk,< 3 seconds   Peripheral Pulses: +2 palpable, equal bilaterally       Labs:   No results for input(s): WBC, HGB, HCT, PLT in the last 72 hours. Recent Labs     10/04/20  1152   *   K 3.8      CO2 19*   BUN 29*   CREATININE 0.7   CALCIUM 8.9     No results for input(s): AST, ALT, BILIDIR, BILITOT, ALKPHOS in the last 72 hours. No results for input(s): INR in the last 72 hours. No results for input(s): Vinny Favre in the last 72 hours. Urinalysis:      Lab Results   Component Value Date    NITRU Negative 10/02/2020    WBCUA 10-20 06/12/2020    BACTERIA Rare 05/28/2020    RBCUA 3-4 06/12/2020    BLOODU Negative 10/02/2020    SPECGRAV 1.010 10/02/2020    GLUCOSEU >=1000 10/02/2020       Radiology:  NM Cardiac Stress Test Nuclear Imaging   Final Result      IR PICC WO SQ PORT/PUMP > 5 YEARS   Final Result   Successful placement of PICC line. XR CHEST PORTABLE   Final Result   Cardiomegaly with increasing interstitial edema.                  Assessment/Plan:    Active Hospital Problems    Diagnosis    Chest pain [R07.9]     Priority: High    Type 2 diabetes mellitus with hyperglycemia, with long-term current use of insulin (HCC) [E11.65, Z79.4]    Nonischemic cardiomyopathy ([facility] [address] [I42.8]    Dual ICD (implantable cardioverter-defibrillator) in place [Z95.810]     Chest pain, atypical, admit to MedSurg telemetry, trend troponin, continue aspirin and statin, cardiology consult. Plan for stress test.  Stress test was abnormal, plan for Cardiac cath as per Cardiology.     Acute on chronic systolic heart failure, recent echo on 6/13/2020 showing LVEF of 35%, continue home regimen, continue IV Lasix, cardiology consult. S/p [facility]-  CONCLUSIONS:     1. Mild non-obstructive coronary artery disease with known severe left ventricular dysfunction  2. Moderate pulmonary hypertension with decompensated hemodynamics     History of ICD in situ, monitor on telemetry.     Diabetes mellitus type 2, uncontrolled, continue insulin sliding scale, monitor.     DVT Prophylaxis: Lovenox  Diet: DIET CARDIAC;  Code Status: Full Code    PT/OT Eval Status: Ambulatory    Dispo -in 1-2 days    Edin Ferreira MD

## 2020-10-07 NOTE — CARE COORDINATION
Case Management/Follow up:    Chart reviewed for length of stay. Hospital day #1 as inpatient, day 4 total   Unit:  B3  Diagnosis and current status as per MD progress: Writer spoke with Dr Brown Forward, he said discharge is pending cardiology. Per EP note, added amiodarone daily and labs; of note BPs soft, SBP in 80s. Anticipated d/c date: tomorrow? Expected plan for discharge: home with [facility].  Potential barriers: BP, medication changes. No barriers to discharge plan for resumption of skilled home care. Will await discharge order. OSCAR Menon       [id number] Addendum:  Writer reviewed Cardio note, pt will be here 2-3 more days.   OSCAR Menon

## 2020-10-07 NOTE — PROGRESS NOTES
Pt is NPO, had cath yesterday. Possible duplicate order. Call placed to Cardiology to see if they are planning anything. No answer at this time.

## 2020-10-08 LAB
ANION GAP SERPL CALCULATED.3IONS-SCNC: 10 MMOL/L (ref 3–16)
BUN BLDV-MCNC: 24 MG/DL (ref 7–20)
CALCIUM SERPL-MCNC: 8.3 MG/DL (ref 8.3–10.6)
CHLORIDE BLD-SCNC: 103 MMOL/L (ref 99–110)
CO2: 28 MMOL/L (ref 21–32)
CREAT SERPL-MCNC: 0.7 MG/DL (ref 0.6–1.1)
GFR AFRICAN AMERICAN: >60
GFR NON-AFRICAN AMERICAN: >60
GLUCOSE BLD-MCNC: 133 MG/DL (ref 70–99)
GLUCOSE BLD-MCNC: 161 MG/DL (ref 70–99)
GLUCOSE BLD-MCNC: 172 MG/DL (ref 70–99)
GLUCOSE BLD-MCNC: 66 MG/DL (ref 70–99)
GLUCOSE BLD-MCNC: 68 MG/DL (ref 70–99)
GLUCOSE BLD-MCNC: 83 MG/DL (ref 70–99)
GLUCOSE BLD-MCNC: 87 MG/DL (ref 70–99)
PERFORMED ON: ABNORMAL
PERFORMED ON: NORMAL
POTASSIUM SERPL-SCNC: 3 MMOL/L (ref 3.5–5.1)
PRO-BNP: 870 PG/ML (ref 0–124)
SODIUM BLD-SCNC: 141 MMOL/L (ref 136–145)

## 2020-10-08 PROCEDURE — 2580000003 HC RX 258: Performed by: NURSE PRACTITIONER

## 2020-10-08 PROCEDURE — 6360000002 HC RX W HCPCS: Performed by: INTERNAL MEDICINE

## 2020-10-08 PROCEDURE — 99233 SBSQ HOSP IP/OBS HIGH 50: CPT | Performed by: NURSE PRACTITIONER

## 2020-10-08 PROCEDURE — 6370000000 HC RX 637 (ALT 250 FOR IP): Performed by: INTERNAL MEDICINE

## 2020-10-08 PROCEDURE — 83880 ASSAY OF NATRIURETIC PEPTIDE: CPT

## 2020-10-08 PROCEDURE — 6370000000 HC RX 637 (ALT 250 FOR IP): Performed by: NURSE PRACTITIONER

## 2020-10-08 PROCEDURE — 36592 COLLECT BLOOD FROM PICC: CPT

## 2020-10-08 PROCEDURE — 6360000002 HC RX W HCPCS: Performed by: NURSE PRACTITIONER

## 2020-10-08 PROCEDURE — 80048 BASIC METABOLIC PNL TOTAL CA: CPT

## 2020-10-08 PROCEDURE — 1200000000 HC SEMI PRIVATE

## 2020-10-08 RX ADMIN — FUROSEMIDE 5 MG/HR: 10 INJECTION, SOLUTION INTRAMUSCULAR; INTRAVENOUS at 06:04

## 2020-10-08 RX ADMIN — FENOFIBRATE 160 MG: 160 TABLET ORAL at 08:13

## 2020-10-08 RX ADMIN — CLOPIDOGREL BISULFATE 75 MG: 75 TABLET ORAL at 08:13

## 2020-10-08 RX ADMIN — POTASSIUM CHLORIDE 10 MEQ: 10 INJECTION, SOLUTION INTRAVENOUS at 11:08

## 2020-10-08 RX ADMIN — FUROSEMIDE 5 MG/HR: 10 INJECTION, SOLUTION INTRAMUSCULAR; INTRAVENOUS at 23:52

## 2020-10-08 RX ADMIN — RANOLAZINE 500 MG: 500 TABLET, FILM COATED, EXTENDED RELEASE ORAL at 08:13

## 2020-10-08 RX ADMIN — ASPIRIN 81 MG: 81 TABLET ORAL at 08:13

## 2020-10-08 RX ADMIN — POTASSIUM CHLORIDE 10 MEQ: 10 INJECTION, SOLUTION INTRAVENOUS at 12:38

## 2020-10-08 RX ADMIN — DIGOXIN 125 MCG: 125 TABLET ORAL at 08:13

## 2020-10-08 RX ADMIN — ESCITALOPRAM OXALATE 10 MG: 10 TABLET ORAL at 08:14

## 2020-10-08 RX ADMIN — POTASSIUM CHLORIDE 10 MEQ: 10 INJECTION, SOLUTION INTRAVENOUS at 06:05

## 2020-10-08 RX ADMIN — TOPIRAMATE 200 MG: 100 TABLET, FILM COATED ORAL at 08:13

## 2020-10-08 RX ADMIN — DULOXETINE HYDROCHLORIDE 60 MG: 60 CAPSULE, DELAYED RELEASE ORAL at 08:13

## 2020-10-08 RX ADMIN — RANOLAZINE 500 MG: 500 TABLET, FILM COATED, EXTENDED RELEASE ORAL at 21:23

## 2020-10-08 RX ADMIN — FLUOXETINE 10 MG: 10 CAPSULE ORAL at 08:13

## 2020-10-08 RX ADMIN — ENOXAPARIN SODIUM 40 MG: 40 INJECTION SUBCUTANEOUS at 08:13

## 2020-10-08 RX ADMIN — POTASSIUM CHLORIDE 10 MEQ: 10 INJECTION, SOLUTION INTRAVENOUS at 07:29

## 2020-10-08 RX ADMIN — AMIODARONE HYDROCHLORIDE 100 MG: 200 TABLET ORAL at 08:13

## 2020-10-08 RX ADMIN — POTASSIUM CHLORIDE 10 MEQ: 10 INJECTION, SOLUTION INTRAVENOUS at 09:44

## 2020-10-08 RX ADMIN — POTASSIUM BICARBONATE 40 MEQ: 782 TABLET, EFFERVESCENT ORAL at 08:35

## 2020-10-08 RX ADMIN — FUROSEMIDE 5 MG/HR: 10 INJECTION, SOLUTION INTRAMUSCULAR; INTRAVENOUS at 07:30

## 2020-10-08 RX ADMIN — ACETAMINOPHEN 650 MG: 325 TABLET ORAL at 08:13

## 2020-10-08 RX ADMIN — ATORVASTATIN CALCIUM 40 MG: 40 TABLET, FILM COATED ORAL at 21:23

## 2020-10-08 RX ADMIN — POTASSIUM CHLORIDE 10 MEQ: 10 INJECTION, SOLUTION INTRAVENOUS at 08:35

## 2020-10-08 ASSESSMENT — PAIN SCALES - GENERAL
PAINLEVEL_OUTOF10: 0
PAINLEVEL_OUTOF10: 0
PAINLEVEL_OUTOF10: 10
PAINLEVEL_OUTOF10: 0
PAINLEVEL_OUTOF10: 10
PAINLEVEL_OUTOF10: 0

## 2020-10-08 ASSESSMENT — PAIN DESCRIPTION - DESCRIPTORS: DESCRIPTORS: CONSTANT;STABBING

## 2020-10-08 ASSESSMENT — PAIN DESCRIPTION - LOCATION: LOCATION: CHEST

## 2020-10-08 NOTE — PROGRESS NOTES
Pt's BG was 68 at 0716. Pt given 120ml of orange juice and rechecked at 0736: BG was 66. Pt given another 240 of juice. BG rechecked and was 87.

## 2020-10-08 NOTE — PROGRESS NOTES
Hospitalist Progress Note      PCP: Evelyn Merlos APRN - NP    Date of Admission: 10/2/2020    Chief Complaint: Chest pain    Hospital Course: See H&P    Subjective:   Patient is up in bed, comfortable, not in distress. No new event overnight noted. Medications:  Reviewed    Infusion Medications    furosemide (LASIX) 1mg/ml infusion 5 mg/hr (10/08/20 0730)    dextrose       Scheduled Medications    amiodarone  100 mg Oral Daily    [START ON 10/9/2020] metoprolol succinate  25 mg Oral Daily    digoxin  125 mcg Oral Daily    FLUoxetine  10 mg Oral Daily    escitalopram  10 mg Oral Daily    DULoxetine  60 mg Oral Daily    sodium chloride flush  10 mL Intravenous 2 times per day    aspirin  81 mg Oral Daily    atorvastatin  40 mg Oral Nightly    clopidogrel  75 mg Oral Daily    fenofibrate  160 mg Oral Daily    ranolazine  500 mg Oral BID    topiramate  200 mg Oral Daily    enoxaparin  40 mg Subcutaneous Daily    insulin lispro  0-12 Units Subcutaneous TID WC    insulin lispro  0-6 Units Subcutaneous Nightly     PRN Meds: perflutren lipid microspheres, sodium chloride flush, sodium chloride flush, potassium chloride **OR** potassium alternative oral replacement **OR** potassium chloride, magnesium sulfate, acetaminophen **OR** acetaminophen, promethazine **OR** ondansetron, glucose, dextrose, glucagon (rDNA), dextrose      Intake/Output Summary (Last 24 hours) at 10/8/2020 1001  Last data filed at 10/8/2020 0930  Gross per 24 hour   Intake 1275 ml   Output 2200 ml   Net -925 ml       Physical Exam Performed:    /66   Pulse 77   Temp 98.1 °F (36.7 °C) (Oral)   Resp 16   Ht 5' 1\" (1.549 m)   Wt 203 lb 4.8 oz (92.2 kg)   SpO2 97%   BMI 38.41 kg/m²     General appearance: No apparent distress, appears stated age and cooperative. HEENT: Pupils equal, round, and reactive to light. Conjunctivae/corneas clear. Neck: Supple, with full range of motion. No jugular venous distention. cardiomyopathy ([facility] [address] [I42.8]    Dual ICD (implantable cardioverter-defibrillator) in place [Z95.810]     Chest pain, atypical, admit to [facility] telemetry, trend troponin, continue aspirin and statin, cardiology consult. Plan for stress test.  Stress test was abnormal, plan for Cardiac cath as per Cardiology.     Acute on chronic systolic heart failure, recent echo on 6/13/2020 showing LVEF of 35%, continue home regimen, continue IV Lasix, cardiology consult. S/p [facility]-  CONCLUSIONS:     1. Mild non-obstructive coronary artery disease with known severe left ventricular dysfunction  2. Moderate pulmonary hypertension with decompensated hemodynamics     Holding Toprol, plan to continue Lasix infusion since PCWP pressure was still 20, digoxin was added, holding ACE inhibitor, continue to monitor. History of ICD in situ, monitor on telemetry.     Diabetes mellitus type 2, uncontrolled, continue insulin sliding scale, monitor.     DVT Prophylaxis: Lovenox  Diet: DIET CARDIAC; Daily Fluid Restriction: 2000 ml  Code Status: Full Code    PT/OT Eval Status: Ambulatory    Dispo -in 1-2 days    Elaine Amos MD

## 2020-10-08 NOTE — PROGRESS NOTES
Morning lab draw potassium was 3.0. PRN potassium IV given; pt to receive total of 6 bags per protocol.

## 2020-10-08 NOTE — PROGRESS NOTES
Pt A&Ox4 VSS on RA denies any needs at this time. Shift assessment complete. Call light in reach bed in lowest position and locked. Bedside table in reach. Will continue to monitor.  Electronically signed by Allegra Burgos RN on 10/8/2020 at 1:33 AM

## 2020-10-08 NOTE — PLAN OF CARE
Patient's EF (Ejection Fraction) is less than 40%    Heart Failure Medications:   Diuretics[de-identified] Furosemide     (One of the following REQUIRED for EF <40%/SYSTOLIC FAILURE but MAY be used in EF% >40%/DIASTOLIC FAILURE)        ACE[de-identified] None        ARB[de-identified] None         ARNI[de-identified] None    (Beta Blockers)   NON- Evidenced Based Beta Blocker (for EF% >40%/DIASTOLIC FAILURE): None     Evidenced Based Beta Blocker::(REQUIRED for EF% <40%/SYSTOLIC FAILURE) Metoprolol SUCCinate- Toprol XL  . .................................................................................................................................................. Patient's weights and intake/output reviewed: Yes    Patient's Last Weight: 203.4 lbs obtained by standing scale. Difference of 6 lbs less than last documented weight. Intake/Output Summary (Last 24 hours) at 10/8/2020 1529  Last data filed at 10/8/2020 1326  Gross per 24 hour   Intake 555 ml   Output 1900 ml   Net -1345 ml       Comorbidities Reviewed Yes    Patient has a past medical history of Arthritis, CAD (coronary artery disease), Cerebral artery occlusion with cerebral infarction ([address] CHF (congestive heart failure) ([address] Diabetes mellitus ([address] Hyperlipidemia, Hypertension, Mental retardation, and MI (myocardial infarction) ([address] >>For CHF and Comorbidity documentation on Education Time and Topics, please see Education Tab    Progressive Mobility Assessment:  What is this patient's Current Level of Mobility?: Ambulatory-Up Ad Lois  How was this patient Mobilized today?: Edge of Bed, Up to Chair and Up in Room                 With Whom? Nurse, PCA and Self                 Level of Difficulty/Assistance: Independent     Pt resting in bed at this time on room air. Pt denies shortness of breath. Pt without lower extremity edema.      Patient and/or Family's stated Goal of Care this Admission: increase activity tolerance, better understand heart failure and disease management and be more comfortable prior to discharge        :

## 2020-10-08 NOTE — PROGRESS NOTES
ranolazine  500 mg Oral BID    topiramate  200 mg Oral Daily    enoxaparin  40 mg Subcutaneous Daily    insulin lispro  0-12 Units Subcutaneous TID WC    insulin lispro  0-6 Units Subcutaneous Nightly      furosemide (LASIX) 1mg/ml infusion 5 mg/hr (10/08/20 0730)    dextrose         Lab Data:  CBC:   No results for input(s): WBC, HGB, PLT in the last 72 hours. BMP:    Recent Labs     10/07/20  1340 10/08/20  0400    141   K 3.3* 3.0*   CO2 27 28   BUN 30* 24*   CREATININE 0.8 0.7     INR:  No results for input(s): INR in the last 72 hours. BNP:    Recent Labs     10/08/20  0400   PROBNP 870*     Lab Results   Component Value Date    LVEF 35 06/13/2020        CARDIAC CATH 12/2018  LM <20%  LAD 20-30%  Cx 20-20%  RCA 20-30%              RPDA 50%. FFR 0.89  LVEF 45%     ECHO: 10/7/2019  Summary   The left ventricular systolic function is mildly reduced with an ejection   fraction of 35 - 40 %.   There is hypokinesis of the apex, anteroseptum, anterior, apical lateral and   inferior walls.   Left ventricular cavity size is moderately dilated.   Grade I diastolic dysfunction with normal filling pressure.   Changes noted from previous echo on 4- in left ventricular function.   Mild mitral regurgitation.   Right ventricular systolic function is mildly reduced .   Systolic pulmonic artery pressure (SPAP) is normal estimated at 21 mmHg   (Right atrial pressure of 3 mmHg).      Stress 1/31/20   Stress Interpretation   Mildly reduced LVEF 45%   Inferolateral hypokinesis   Mainly fixed inferior defect suggestive of scar   No significant areas of reversibility to suggest ischemia    Echo: 6/13/20  Summary   LV systolic function is moderately reduced with an estimated EF of 35%.   Moderate global hypokinesis.   There is mild concentric left ventricular hypertrophy.   Left ventricular cavity size is mildly dilated.   Estimated LV diastolic filling pressure is normal.   Mild mitral and tricuspid regurgitation.   Systolic pulmonary artery pressure (SPAP) is estimated at 35mmHg (Right   atrial pressure of 8 mmHg).   No significant change from exam done 10/18/2019. Stress test 10/5/2020:  Dilated LV with severe global hypokinesis. Large, severe, fixed perfusion     defect of the inferior/inferolateral wall consistent with scar. Diminished     uptake of the anterior wall consistent with breast artifact. Post stress     LVEF is abnormal at 19%. Abnormal study. Overall findings represent a high     risk scan. Echo 10/5/20  Summary   Limited only f/u for LVEF. The left ventricular systolic function is severely reduced with an ejection   fraction of 15-20 %. There is severe global hypokinesis with regional variations. Changes noted from previous echo on 6- in left ventricular function    Cardiac cath 10/6/20  Findings:  1. Left main coronary artery was normal. It gave off the left anterior descending artery and left circumflex. 2. Left anterior descending artery has mild atherosclerotic disease. It was moderate in size. It gave off septal perforators and a moderate sized diagonal branch. The LAD covered the entire apex of the left ventricle. 3. Left circumflex has mild atherosclerotic disease. It was moderate in size. There was a moderate sized obtuse marginal branch.     4. Right coronary artery has mild atherosclerotic disease. It was moderate in size and was the dominant artery.     5. Left ventriculogram was not performed. Left ventricular end diastolic l pressure was 26.   There is no gradient across pullback of the aortic valve.     Right heart catheterization findings:     Right atrial pressure of 20  RV 45/7  PA 53/8  Pulmonary wedge pressure mean of 20  RA saturation 42%  PA saturation 45%  Aortic saturation 99%  Cardiac output 2.4  Cardiac index 1.24  SVR 2433      10/08/20 0416   203 lb 4.8 oz (92.2 kg)   Actual;Standing scale        10/07/20 0416   208 lb 8 oz (94.6 kg) Actual;Standing scale       10/06/20 0328   210 lb 1.6 oz (95.3 kg)   Actual;Standing scale       10/05/20 0351   211 lb 6.4 oz (95.9 kg)   Standing scale       10/04/20 0411   213 lb 1.6 oz (96.7 kg)   Standing scale       10/03/20 0008   219 lb (99.3 kg)   Actual       10/02/20 2122   480 lb (217.7 kg)   Stated              Principal Problem:    Chest pain  Active Problems:    Dual ICD (implantable cardioverter-defibrillator) in place    Nonischemic cardiomyopathy ([facility] [address]    Type 2 diabetes mellitus with hyperglycemia, with long-term current use of insulin ([facility] [address]  Resolved Problems:    * No resolved hospital problems.  *      Assessment/Plan:  ~chest pain- constant and atypical  ~acute decompensated heart failure   ~ palpitations  ~non ischemic cardimoyopathy with drop in LVEF 15%  s/p [employer] ICD   ~Acute on chronic systolic heart failure  ~hypotension  ~Hyponatremia  Dm    Plan:  Hold toprol today with decompensated hemodynamics and will plan to reduce dose on Friday  Continue lasix infusion 5mg/hr given her PCWP was still 20 and to minimize hypotension as she needs more diuresis  digoxin 125mcg daily   Amiodarone per EP  Holding ACEi due to hypotension and need room for some diuresis; recommend we start low dose Entresto prior to discharge    Dispo: once diuresed and back on PO therapy     Nuris Hahn CNP, 10/8/2020, 10:42 AM

## 2020-10-09 LAB
ANION GAP SERPL CALCULATED.3IONS-SCNC: 8 MMOL/L (ref 3–16)
BUN BLDV-MCNC: 17 MG/DL (ref 7–20)
CALCIUM SERPL-MCNC: 8 MG/DL (ref 8.3–10.6)
CHLORIDE BLD-SCNC: 97 MMOL/L (ref 99–110)
CO2: 30 MMOL/L (ref 21–32)
CREAT SERPL-MCNC: 0.7 MG/DL (ref 0.6–1.1)
GFR AFRICAN AMERICAN: >60
GFR NON-AFRICAN AMERICAN: >60
GLUCOSE BLD-MCNC: 133 MG/DL (ref 70–99)
GLUCOSE BLD-MCNC: 170 MG/DL (ref 70–99)
GLUCOSE BLD-MCNC: 223 MG/DL (ref 70–99)
GLUCOSE BLD-MCNC: 263 MG/DL (ref 70–99)
GLUCOSE BLD-MCNC: 96 MG/DL (ref 70–99)
MAGNESIUM: 1.6 MG/DL (ref 1.8–2.4)
PERFORMED ON: ABNORMAL
PERFORMED ON: NORMAL
POTASSIUM SERPL-SCNC: 3 MMOL/L (ref 3.5–5.1)
SODIUM BLD-SCNC: 135 MMOL/L (ref 136–145)

## 2020-10-09 PROCEDURE — 99233 SBSQ HOSP IP/OBS HIGH 50: CPT | Performed by: NURSE PRACTITIONER

## 2020-10-09 PROCEDURE — 1200000000 HC SEMI PRIVATE

## 2020-10-09 PROCEDURE — 6370000000 HC RX 637 (ALT 250 FOR IP): Performed by: NURSE PRACTITIONER

## 2020-10-09 PROCEDURE — 6370000000 HC RX 637 (ALT 250 FOR IP): Performed by: INTERNAL MEDICINE

## 2020-10-09 PROCEDURE — 36592 COLLECT BLOOD FROM PICC: CPT

## 2020-10-09 PROCEDURE — 6360000002 HC RX W HCPCS: Performed by: INTERNAL MEDICINE

## 2020-10-09 PROCEDURE — 80048 BASIC METABOLIC PNL TOTAL CA: CPT

## 2020-10-09 PROCEDURE — 83735 ASSAY OF MAGNESIUM: CPT

## 2020-10-09 PROCEDURE — 2580000003 HC RX 258: Performed by: EMERGENCY MEDICINE

## 2020-10-09 RX ORDER — DIGOXIN 125 MCG
125 TABLET ORAL EVERY OTHER DAY
Status: DISCONTINUED | OUTPATIENT
Start: 2020-10-10 | End: 2020-10-14 | Stop reason: HOSPADM

## 2020-10-09 RX ORDER — MAGNESIUM SULFATE IN WATER 40 MG/ML
2 INJECTION, SOLUTION INTRAVENOUS ONCE
Status: COMPLETED | OUTPATIENT
Start: 2020-10-09 | End: 2020-10-09

## 2020-10-09 RX ADMIN — POTASSIUM CHLORIDE 10 MEQ: 10 INJECTION, SOLUTION INTRAVENOUS at 11:31

## 2020-10-09 RX ADMIN — INSULIN LISPRO 3 UNITS: 100 INJECTION, SOLUTION INTRAVENOUS; SUBCUTANEOUS at 20:43

## 2020-10-09 RX ADMIN — INSULIN LISPRO 4 UNITS: 100 INJECTION, SOLUTION INTRAVENOUS; SUBCUTANEOUS at 18:25

## 2020-10-09 RX ADMIN — DULOXETINE HYDROCHLORIDE 60 MG: 60 CAPSULE, DELAYED RELEASE ORAL at 07:45

## 2020-10-09 RX ADMIN — MAGNESIUM SULFATE HEPTAHYDRATE 2 G: 40 INJECTION, SOLUTION INTRAVENOUS at 06:50

## 2020-10-09 RX ADMIN — FENOFIBRATE 160 MG: 160 TABLET ORAL at 07:45

## 2020-10-09 RX ADMIN — DIGOXIN 125 MCG: 125 TABLET ORAL at 07:46

## 2020-10-09 RX ADMIN — TOPIRAMATE 200 MG: 100 TABLET, FILM COATED ORAL at 07:45

## 2020-10-09 RX ADMIN — POTASSIUM CHLORIDE 10 MEQ: 10 INJECTION, SOLUTION INTRAVENOUS at 05:30

## 2020-10-09 RX ADMIN — POTASSIUM CHLORIDE 10 MEQ: 10 INJECTION, SOLUTION INTRAVENOUS at 07:44

## 2020-10-09 RX ADMIN — RANOLAZINE 500 MG: 500 TABLET, FILM COATED, EXTENDED RELEASE ORAL at 20:35

## 2020-10-09 RX ADMIN — POTASSIUM CHLORIDE 10 MEQ: 10 INJECTION, SOLUTION INTRAVENOUS at 06:40

## 2020-10-09 RX ADMIN — FLUOXETINE 10 MG: 10 CAPSULE ORAL at 07:45

## 2020-10-09 RX ADMIN — RANOLAZINE 500 MG: 500 TABLET, FILM COATED, EXTENDED RELEASE ORAL at 07:45

## 2020-10-09 RX ADMIN — ASPIRIN 81 MG: 81 TABLET ORAL at 07:46

## 2020-10-09 RX ADMIN — CLOPIDOGREL BISULFATE 75 MG: 75 TABLET ORAL at 07:45

## 2020-10-09 RX ADMIN — ACETAMINOPHEN 650 MG: 325 TABLET ORAL at 20:35

## 2020-10-09 RX ADMIN — POTASSIUM CHLORIDE 10 MEQ: 10 INJECTION, SOLUTION INTRAVENOUS at 09:20

## 2020-10-09 RX ADMIN — ESCITALOPRAM OXALATE 10 MG: 10 TABLET ORAL at 07:45

## 2020-10-09 RX ADMIN — ENOXAPARIN SODIUM 40 MG: 40 INJECTION SUBCUTANEOUS at 07:44

## 2020-10-09 RX ADMIN — AMIODARONE HYDROCHLORIDE 100 MG: 200 TABLET ORAL at 07:46

## 2020-10-09 RX ADMIN — ATORVASTATIN CALCIUM 40 MG: 40 TABLET, FILM COATED ORAL at 20:35

## 2020-10-09 RX ADMIN — POTASSIUM CHLORIDE 10 MEQ: 10 INJECTION, SOLUTION INTRAVENOUS at 10:29

## 2020-10-09 RX ADMIN — Medication 10 ML: at 20:35

## 2020-10-09 ASSESSMENT — PAIN SCALES - GENERAL
PAINLEVEL_OUTOF10: 1
PAINLEVEL_OUTOF10: 0

## 2020-10-09 NOTE — PROGRESS NOTES
Pt A&Ox4 VSS on RA denies any needs at this time. Shift assessment complete. Call light in reach bed in lowest position and locked. Bedside table in reach. Will continue to monitor.  Electronically signed by Nisreen Harmon RN on 10/8/2020 at 11:26 PM

## 2020-10-09 NOTE — PROGRESS NOTES
Patient's EF (Ejection Fraction) is less than 40%    Heart Failure Medications:   Diuretics[de-identified] Furosemide     (One of the following REQUIRED for EF <40%/SYSTOLIC FAILURE but MAY be used in EF% >40%/DIASTOLIC FAILURE)        ACE[de-identified] None        ARB[de-identified] None         ARNI[de-identified] None    (Beta Blockers)   NON- Evidenced Based Beta Blocker (for EF% >40%/DIASTOLIC FAILURE): None     Evidenced Based Beta Blocker::(REQUIRED for EF% <40%/SYSTOLIC FAILURE) Metoprolol SUCCinate- Toprol XL  . .................................................................................................................................................. Patient's weights and intake/output reviewed: Yes    Patient's Last Weight: 200.6 lbs obtained by standing scale. Difference of 3 lbs less than last documented weight. Intake/Output Summary (Last 24 hours) at 10/9/2020 1758  Last data filed at 10/9/2020 1607  Gross per 24 hour   Intake 435 ml   Output 1700 ml   Net -1265 ml       Comorbidities Reviewed Yes    Patient has a past medical history of Arthritis, CAD (coronary artery disease), Cerebral artery occlusion with cerebral infarction ([address] CHF (congestive heart failure) ([address] Diabetes mellitus ([address] Hyperlipidemia, Hypertension, Mental retardation, and MI (myocardial infarction) ([address] >>For CHF and Comorbidity documentation on Education Time and Topics, please see Education Tab    Progressive Mobility Assessment:  What is this patient's Current Level of Mobility?: Ambulatory-Up Ad Lois  How was this patient Mobilized today?: Edge of Bed, Up to Chair and Up in Room                 With Whom? Nurse, PCA, PT, OT and Self                 Level of Difficulty/Assistance: Independent     Pt sitting in bed at this time on room air. Pt denies shortness of breath. Pt without lower extremity edema.      Patient and/or Family's stated Goal of Care this Admission: reduce shortness of breath, increase activity tolerance and better understand heart failure and disease management prior to discharge        :

## 2020-10-09 NOTE — ADT AUTH CERT
Chest Pain - Care Day 4 (10/8/2020) by Khris Christensen RN         Review Status  Review Entered    Completed  10/9/2020 12:18        Criteria Review       Care Day: 4 Care Date: 10/8/2020 Level of Care: Inpatient Floor    Guideline Day 2    Level Of Care    (X) ICU, intermediate care, or telemetry to discharge    10/9/2020 12:18 PM EDT by Alia Herrera      Service: Med/Surg    Clinical Status    (X) * Hemodynamic stability    10/9/2020 12:18 PM EDT by Alia Herrera      /66   Pulse 77   Temp 98.1 °F (36.7 °C) (Oral)   Resp 16   Ht 5' 1\" (1.549 m)   Wt 203 lb 4.8 oz (92.2 kg)   SpO2 97%   BMI 38.41 kg/m²    ( ) * Acute coronary syndrome ruled out    10/9/2020 12:18 PM EDT by Alia Herrera      Cardiovascular:  RRR S1S2, no m/r/g    (X) * Pain absent or managed    10/9/2020 12:18 PM EDT by Windy Perez started on lasix infusion yesterday. Weight is down 5lbs since yesterday. Breathing a little better today. Still with constant chest pain. Still with constant palpitations.    (X) * Dangerous arrhythmia absent    10/9/2020 12:18 PM EDT by Alia Herrera      Cardiovascular:  RRR S1S2, no m/r/g    ( ) * No identification of etiology of pain requiring inpatient care    10/9/2020 12:18 PM EDT by Alia Herrera      started on lasix infusion yesterday. Weight is down 5lbs since yesterday. Breathing a little better today. Still with constant chest pain. Still with constant palpitations.     ( ) * Discharge plans and education understood    Activity    ( ) * Ambulatory or acceptable for next level of care    10/9/2020 12:18 PM EDT by Alia Herrera      Strict Bedrest    Routes    (X) * Oral hydration, medications, and diet    10/9/2020 12:18 PM EDT by Alia Herrera      DIET CARDIAC;MEDS: KCL 10 MEQ IV X6;TYLENOL 650 MG PO X1;LASIX CONT IV/TITRATE;HOME/PO MEDS;LOVENOX 40MG SC QD;ASA 81MG PO QD    Interventions    (X) Possible stress test or cardiac catheterization    Medications    (X) Possible aspirin or cardiac medications    10/9/2020 12:18 PM EDT by Lashanda Abdi      LOVENOX 40MG SC QD;ASA 81MG PO QD    * Milestone    Additional Notes    REVIEW 10/08/2020       INTERNAL MED:    Assessment/Plan:    ~chest pain- constant and atypical    ~acute decompensated heart failure    ~ palpitations    ~non ischemic cardimoyopathy with drop in LVEF 15%    s/p [employer] ICD    ~Acute on chronic systolic heart failure    ~hypotension    ~Hyponatremia    Dm         Plan:    Hold toprol today with decompensated hemodynamics and will plan to reduce dose on Friday    Continue lasix infusion 5mg/hr given her PCWP was still 20 and to minimize hypotension as she needs more diuresis    digoxin 125mcg daily    Amiodarone per EP    Holding ACEi due to hypotension and need room for some diuresis; recommend we start low dose Entresto prior to discharge         Dispo: once diuresed and back on PO therapy       /66   Pulse 77   Temp 98.1 °F (36.7 °C) (Oral)   Resp 16   Ht 5' 1\" (1.549 m)   Wt 203 lb 4.8 oz (92.2 kg)   SpO2 97%   BMI 38.41 kg/m²           started on lasix infusion yesterday. Weight is down 5lbs since yesterday. Breathing a little better today. Still with constant chest pain. Still with constant palpitations.        CARDIOLOGY:    Assessment/Plan:    ~chest pain- constant and atypical    ~acute decompensated heart failure    ~ palpitations    ~non ischemic cardimoyopathy with drop in LVEF 15%    s/p [employer] ICD    ~Acute on chronic systolic heart failure    ~hypotension    ~Hyponatremia    Dm         Plan:    Hold toprol today with decompensated hemodynamics and will plan to reduce dose on Friday    Continue lasix infusion 5mg/hr given her PCWP was still 20 and to minimize hypotension as she needs more diuresis    digoxin 125mcg daily    Amiodarone per EP    Holding ACEi due to hypotension and need room for some diuresis; recommend we start low dose Entresto prior to discharge            Physical Exam:    General:  Awake, alert, NAD, minimal eye contact    Skin:  Warm and dry    Neck:  JVD elevated to the ear    Chest:  Dim to auscultation, no wheezes/rhonchi/rales    Cardiovascular:  RRR S1S2, no m/r/g    Abdomen:  Soft, nontender, +bowel sounds    Extremities:  No BLE edema        Chest Pain - Care Day 3 (10/7/2020) by Kp La RN         Review Status  Review Entered    Completed  10/8/2020 16:05        Criteria Review       Care Day: 3 Care Date: 10/7/2020 Level of Care:    Guideline Day 2    Level Of Care    (X) ICU, intermediate care, or telemetry to discharge    Clinical Status    (X) * Hemodynamic stability    10/8/2020 4:05 PM EDT by Ekta Pope      98.2  16  81  88/49  96%    K  3.3  bun 30    ( ) * Acute coronary syndrome ruled out    (X) * Pain absent or managed    10/8/2020 4:05 PM EDT by Ekta Pope      rated pain 10/10    (X) * Dangerous arrhythmia absent    ( ) * No identification of etiology of pain requiring inpatient care    ( ) * Discharge plans and education understood    Activity    ( ) * Ambulatory or acceptable for next level of care    Routes    ( ) * Oral hydration, medications, and diet    10/8/2020 4:05 PM EDT by Ekta Pope      lasix 40mg iv is given x1, lasix iv gtt began @ 5 ml/hr      ranexa 500mg bid    Interventions    (X) Possible stress test or cardiac catheterization    Medications    (X) Possible aspirin or cardiac medications    10/8/2020 4:05 PM EDT by Ekta Pope      asa 81 mg qd, plavix 75 mg qd, lanoxin 125 mcg qd,cordarone 100mg qd    * Milestone    Additional Notes    10/7    Inpatient is ordered    Chf,  Lasix iv gtt began       Per Cardio:    Assessment/Plan:    ~chest pain- constant and atypical    ~acute decompensated heart failure    ~ palpitations    ~non ischemic cardimoyopathy with drop in LVEF 15%    s/p [employer] ICD    ~Acute on chronic systolic heart failure    ~hypotension    ~Hyponatremia    Dm         Plan:    Hold toprol tomorrow with decompensated hemodynamics and will plan to reduce dose on Friday    Add lasix infusion given her PCWP was still 20 and to minimize hypotension as she needs more diuresis    Add digoxin 125mcg daily    Holding ACEi due to hypotension and need room for some diuresis; recommend we start low dose Entresto prior to discharge         Dispo: 2-3 more days

## 2020-10-09 NOTE — PROGRESS NOTES
Hospitalist Progress Note      PCP: FRITZ Somers NP    Date of Admission: 10/2/2020    Chief Complaint: Chest pain    Hospital Course: See H&P    Subjective:   Patient is up in bed, comfortable, not in distress. No new event overnight noted. Medications:  Reviewed    Infusion Medications    furosemide (LASIX) 1mg/ml infusion 5 mg/hr (10/08/20 6232)    dextrose       Scheduled Medications    amiodarone  100 mg Oral Daily    metoprolol succinate  25 mg Oral Daily    digoxin  125 mcg Oral Daily    FLUoxetine  10 mg Oral Daily    escitalopram  10 mg Oral Daily    DULoxetine  60 mg Oral Daily    sodium chloride flush  10 mL Intravenous 2 times per day    aspirin  81 mg Oral Daily    atorvastatin  40 mg Oral Nightly    clopidogrel  75 mg Oral Daily    fenofibrate  160 mg Oral Daily    ranolazine  500 mg Oral BID    topiramate  200 mg Oral Daily    enoxaparin  40 mg Subcutaneous Daily    insulin lispro  0-12 Units Subcutaneous TID WC    insulin lispro  0-6 Units Subcutaneous Nightly     PRN Meds: perflutren lipid microspheres, sodium chloride flush, sodium chloride flush, potassium chloride **OR** potassium alternative oral replacement **OR** potassium chloride, magnesium sulfate, acetaminophen **OR** acetaminophen, promethazine **OR** ondansetron, glucose, dextrose, glucagon (rDNA), dextrose      Intake/Output Summary (Last 24 hours) at 10/9/2020 0954  Last data filed at 10/9/2020 0924  Gross per 24 hour   Intake 915.42 ml   Output 2300 ml   Net -1384.58 ml       Physical Exam Performed:    BP (!) 84/55   Pulse 72   Temp 97.9 °F (36.6 °C) (Axillary)   Resp 18   Ht 5' 1\" (1.549 m)   Wt 200 lb 6.4 oz (90.9 kg)   SpO2 98%   BMI 37.87 kg/m²     General appearance: No apparent distress, appears stated age and cooperative. HEENT: Pupils equal, round, and reactive to light. Conjunctivae/corneas clear. Neck: Supple, with full range of motion. No jugular venous distention.  Trachea midline. Respiratory:  Normal respiratory effort. Clear to auscultation, bilaterally without Rales/Wheezes/Rhonchi. Cardiovascular: Regular rate and rhythm with normal S1/S2 without murmurs, rubs or gallops. Abdomen: Soft, non-tender, non-distended with normal bowel sounds. Musculoskeletal: No clubbing, cyanosis or edema bilaterally. Full range of motion without deformity. Skin: Skin color, texture, turgor normal.  No rashes or lesions. Neurologic:  Neurovascularly intact without any focal sensory/motor deficits. Cranial nerves: II-XII intact, grossly non-focal.  Psychiatric: Alert and oriented, thought content appropriate, normal insight  Capillary Refill: Brisk,< 3 seconds   Peripheral Pulses: +2 palpable, equal bilaterally       Labs:   No results for input(s): WBC, HGB, HCT, PLT in the last 72 hours. Recent Labs     10/07/20  1340 10/08/20  0400 10/09/20  0400    141 135*   K 3.3* 3.0* 3.0*    103 97*   CO2 27 28 30   BUN 30* 24* 17   CREATININE 0.8 0.7 0.7   CALCIUM 8.3 8.3 8.0*     No results for input(s): AST, ALT, BILIDIR, BILITOT, ALKPHOS in the last 72 hours. No results for input(s): INR in the last 72 hours. No results for input(s): Janee Pace in the last 72 hours. Urinalysis:      Lab Results   Component Value Date    NITRU Negative 10/02/2020    WBCUA 10-20 06/12/2020    BACTERIA Rare 05/28/2020    RBCUA 3-4 06/12/2020    BLOODU Negative 10/02/2020    SPECGRAV 1.010 10/02/2020    GLUCOSEU >=1000 10/02/2020       Radiology:  NM Cardiac Stress Test Nuclear Imaging   Final Result      IR PICC WO SQ PORT/PUMP > 5 YEARS   Final Result   Successful placement of PICC line. XR CHEST PORTABLE   Final Result   Cardiomegaly with increasing interstitial edema.                  Assessment/Plan:    Active Hospital Problems    Diagnosis    Chest pain [R07.9]     Priority: High    Type 2 diabetes mellitus with hyperglycemia, with long-term current use of insulin (HCC) [E11.65,

## 2020-10-09 NOTE — PROGRESS NOTES
[address]   Daily Progress Note    Admit Date:  10/2/2020  HPI:    Chief Complaint   Patient presents with    Hyperglycemia     blood sugars have been over 600 for \"the last couple of days\" per patient; reading high at time of triage    Nausea     nausea and vomiting for two days     Chest Pain     for 3 months         Interval history: Roxi Blackman is being followed for chest pain.    PMH of CAD, cerebral artery occlusion with cerebral infarct, DM, Hyperlipidemia, Hypertension, Mental retardation, NSVT, and MI.  s/p LHC in 12/2018 showed non-obstructive CAD, s/p [employer] defibrillator.      Subjective:  Ms. Neftali Watson is feeling a little better. Not having chest pressures like she was. Palpitations are improved as well. Weight is down to 200lbs.      Objective:   BP (!) 80/50   Pulse 75   Temp 97.4 °F (36.3 °C) (Axillary)   Resp 16   Ht 5' 1\" (1.549 m)   Wt 200 lb 6.4 oz (90.9 kg)   SpO2 98%   BMI 37.87 kg/m²       Intake/Output Summary (Last 24 hours) at 10/9/2020 1310  Last data filed at 10/9/2020 1241  Gross per 24 hour   Intake 915.42 ml   Output 2300 ml   Net -1384.58 ml     NYHA: III    Physical Exam:  General:  Awake, alert, NAD, minimal eye contact, flat affect  Skin:  Warm and dry  Neck:  JVD ~10 cm h20   Chest:  Dim to auscultation, no wheezes/rhonchi/rales  Cardiovascular:  RRR S1S2, no m/r/g   Abdomen:  Soft, nontender, +bowel sounds  Extremities:  No BLE edema    Medications:    amiodarone  100 mg Oral Daily    metoprolol succinate  25 mg Oral Daily    digoxin  125 mcg Oral Daily    FLUoxetine  10 mg Oral Daily    escitalopram  10 mg Oral Daily    DULoxetine  60 mg Oral Daily    sodium chloride flush  10 mL Intravenous 2 times per day    aspirin  81 mg Oral Daily    atorvastatin  40 mg Oral Nightly    clopidogrel  75 mg Oral Daily    fenofibrate  160 mg Oral Daily    ranolazine  500 mg Oral BID    topiramate  200 mg Oral Daily    enoxaparin  40 mg Subcutaneous Daily    insulin lispro  0-12 Units Subcutaneous TID WC    insulin lispro  0-6 Units Subcutaneous Nightly      furosemide (LASIX) 1mg/ml infusion 5 mg/hr (10/08/20 5252)    dextrose         Lab Data:  CBC:   No results for input(s): WBC, HGB, PLT in the last 72 hours. BMP:    Recent Labs     10/07/20  1340 10/08/20  0400 10/09/20  0400    141 135*   K 3.3* 3.0* 3.0*   CO2 27 28 30   BUN 30* 24* 17   CREATININE 0.8 0.7 0.7     INR:  No results for input(s): INR in the last 72 hours. BNP:    Recent Labs     10/08/20  0400   PROBNP 870*     Lab Results   Component Value Date    LVEF 35 06/13/2020        CARDIAC CATH 12/2018  LM <20%  LAD 20-30%  Cx 20-20%  RCA 20-30%              RPDA 50%. FFR 0.89  LVEF 45%     ECHO: 10/7/2019  Summary   The left ventricular systolic function is mildly reduced with an ejection   fraction of 35 - 40 %.   There is hypokinesis of the apex, anteroseptum, anterior, apical lateral and   inferior walls.   Left ventricular cavity size is moderately dilated.   Grade I diastolic dysfunction with normal filling pressure.   Changes noted from previous echo on 4- in left ventricular function.   Mild mitral regurgitation.   Right ventricular systolic function is mildly reduced .   Systolic pulmonic artery pressure (SPAP) is normal estimated at 21 mmHg   (Right atrial pressure of 3 mmHg).      Stress 1/31/20   Stress Interpretation   Mildly reduced LVEF 45%   Inferolateral hypokinesis   Mainly fixed inferior defect suggestive of scar   No significant areas of reversibility to suggest ischemia    Echo: 6/13/20  Summary   LV systolic function is moderately reduced with an estimated EF of 35%.   Moderate global hypokinesis.   There is mild concentric left ventricular hypertrophy.   Left ventricular cavity size is mildly dilated.   Estimated LV diastolic filling pressure is normal.   Mild mitral and tricuspid regurgitation.   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 *      Assessment/Plan:  ~chest pain- constant and atypical  ~acute decompensated heart failure   ~ palpitations  ~non ischemic cardimoyopathy with drop in LVEF 15%  s/p [employer] ICD   ~Acute on chronic systolic heart failure  ~hypotension  ~Hyponatremia  Dm    Plan:  D/c lasix infusion today. Weight is down to 200lbs  Allow fluid to mobilize. Start Entresto tomorrow very low dose 1/2 tab of 24-26mg twice a day; hold for SBP < 100.    Continue lower dose of toprol 25mg daily   Digoxin 125mcg every other day   Amiodarone per EP    She lives alone and has no family   Dispo: 1-2 more days     Andrzej Dash CNP, 10/9/2020, 3:07 PM

## 2020-10-10 LAB
ANION GAP SERPL CALCULATED.3IONS-SCNC: 9 MMOL/L (ref 3–16)
BUN BLDV-MCNC: 19 MG/DL (ref 7–20)
CALCIUM SERPL-MCNC: 8.1 MG/DL (ref 8.3–10.6)
CHLORIDE BLD-SCNC: 101 MMOL/L (ref 99–110)
CO2: 25 MMOL/L (ref 21–32)
CREAT SERPL-MCNC: 0.8 MG/DL (ref 0.6–1.1)
EKG ATRIAL RATE: 77 BPM
EKG DIAGNOSIS: NORMAL
EKG P-R INTERVAL: 168 MS
EKG Q-T INTERVAL: 438 MS
EKG QRS DURATION: 156 MS
EKG QTC CALCULATION (BAZETT): 495 MS
EKG R AXIS: 96 DEGREES
EKG T AXIS: 265 DEGREES
EKG VENTRICULAR RATE: 77 BPM
GFR AFRICAN AMERICAN: >60
GFR NON-AFRICAN AMERICAN: >60
GLUCOSE BLD-MCNC: 157 MG/DL (ref 70–99)
GLUCOSE BLD-MCNC: 205 MG/DL (ref 70–99)
GLUCOSE BLD-MCNC: 279 MG/DL (ref 70–99)
GLUCOSE BLD-MCNC: 358 MG/DL (ref 70–99)
GLUCOSE BLD-MCNC: 414 MG/DL (ref 70–99)
MAGNESIUM: 1.9 MG/DL (ref 1.8–2.4)
PERFORMED ON: ABNORMAL
POTASSIUM SERPL-SCNC: 3.7 MMOL/L (ref 3.5–5.1)
SODIUM BLD-SCNC: 135 MMOL/L (ref 136–145)

## 2020-10-10 PROCEDURE — 6370000000 HC RX 637 (ALT 250 FOR IP): Performed by: NURSE PRACTITIONER

## 2020-10-10 PROCEDURE — 2580000003 HC RX 258: Performed by: INTERNAL MEDICINE

## 2020-10-10 PROCEDURE — 93010 ELECTROCARDIOGRAM REPORT: CPT | Performed by: INTERNAL MEDICINE

## 2020-10-10 PROCEDURE — 2500000003 HC RX 250 WO HCPCS: Performed by: INTERNAL MEDICINE

## 2020-10-10 PROCEDURE — 6370000000 HC RX 637 (ALT 250 FOR IP): Performed by: INTERNAL MEDICINE

## 2020-10-10 PROCEDURE — 2580000003 HC RX 258: Performed by: EMERGENCY MEDICINE

## 2020-10-10 PROCEDURE — 99233 SBSQ HOSP IP/OBS HIGH 50: CPT | Performed by: NURSE PRACTITIONER

## 2020-10-10 PROCEDURE — 93005 ELECTROCARDIOGRAM TRACING: CPT | Performed by: INTERNAL MEDICINE

## 2020-10-10 PROCEDURE — 6360000002 HC RX W HCPCS: Performed by: INTERNAL MEDICINE

## 2020-10-10 PROCEDURE — 36592 COLLECT BLOOD FROM PICC: CPT

## 2020-10-10 PROCEDURE — 1200000000 HC SEMI PRIVATE

## 2020-10-10 PROCEDURE — 83735 ASSAY OF MAGNESIUM: CPT

## 2020-10-10 PROCEDURE — 80048 BASIC METABOLIC PNL TOTAL CA: CPT

## 2020-10-10 RX ORDER — METOPROLOL SUCCINATE 25 MG/1
12.5 TABLET, EXTENDED RELEASE ORAL DAILY
Status: DISCONTINUED | OUTPATIENT
Start: 2020-10-11 | End: 2020-10-14 | Stop reason: HOSPADM

## 2020-10-10 RX ORDER — INSULIN GLARGINE 100 [IU]/ML
20 INJECTION, SOLUTION SUBCUTANEOUS NIGHTLY
Status: DISCONTINUED | OUTPATIENT
Start: 2020-10-10 | End: 2020-10-10

## 2020-10-10 RX ORDER — INSULIN GLARGINE 100 [IU]/ML
30 INJECTION, SOLUTION SUBCUTANEOUS NIGHTLY
Status: DISCONTINUED | OUTPATIENT
Start: 2020-10-10 | End: 2020-10-11

## 2020-10-10 RX ADMIN — DULOXETINE HYDROCHLORIDE 60 MG: 60 CAPSULE, DELAYED RELEASE ORAL at 08:27

## 2020-10-10 RX ADMIN — Medication 10 ML: at 08:30

## 2020-10-10 RX ADMIN — INSULIN LISPRO 10 UNITS: 100 INJECTION, SOLUTION INTRAVENOUS; SUBCUTANEOUS at 18:11

## 2020-10-10 RX ADMIN — MICONAZOLE NITRATE: 20 POWDER TOPICAL at 23:01

## 2020-10-10 RX ADMIN — ATORVASTATIN CALCIUM 40 MG: 40 TABLET, FILM COATED ORAL at 21:10

## 2020-10-10 RX ADMIN — ESCITALOPRAM OXALATE 10 MG: 10 TABLET ORAL at 08:29

## 2020-10-10 RX ADMIN — INSULIN LISPRO 2 UNITS: 100 INJECTION, SOLUTION INTRAVENOUS; SUBCUTANEOUS at 08:30

## 2020-10-10 RX ADMIN — INSULIN LISPRO 6 UNITS: 100 INJECTION, SOLUTION INTRAVENOUS; SUBCUTANEOUS at 21:11

## 2020-10-10 RX ADMIN — DIGOXIN 125 MCG: 125 TABLET ORAL at 08:29

## 2020-10-10 RX ADMIN — ENOXAPARIN SODIUM 40 MG: 40 INJECTION SUBCUTANEOUS at 08:27

## 2020-10-10 RX ADMIN — TOPIRAMATE 200 MG: 100 TABLET, FILM COATED ORAL at 08:27

## 2020-10-10 RX ADMIN — RANOLAZINE 500 MG: 500 TABLET, FILM COATED, EXTENDED RELEASE ORAL at 21:10

## 2020-10-10 RX ADMIN — FENOFIBRATE 160 MG: 160 TABLET ORAL at 08:29

## 2020-10-10 RX ADMIN — FLUOXETINE 10 MG: 10 CAPSULE ORAL at 08:28

## 2020-10-10 RX ADMIN — AMIODARONE HYDROCHLORIDE 100 MG: 200 TABLET ORAL at 08:28

## 2020-10-10 RX ADMIN — CLOPIDOGREL BISULFATE 75 MG: 75 TABLET ORAL at 08:29

## 2020-10-10 RX ADMIN — Medication 10 ML: at 21:10

## 2020-10-10 RX ADMIN — ASPIRIN 81 MG: 81 TABLET ORAL at 08:27

## 2020-10-10 RX ADMIN — METOPROLOL SUCCINATE 25 MG: 25 TABLET, EXTENDED RELEASE ORAL at 08:29

## 2020-10-10 RX ADMIN — INSULIN GLARGINE 30 UNITS: 100 INJECTION, SOLUTION SUBCUTANEOUS at 21:11

## 2020-10-10 RX ADMIN — RANOLAZINE 500 MG: 500 TABLET, FILM COATED, EXTENDED RELEASE ORAL at 08:27

## 2020-10-10 ASSESSMENT — PAIN DESCRIPTION - LOCATION: LOCATION: CHEST

## 2020-10-10 ASSESSMENT — PAIN SCALES - GENERAL: PAINLEVEL_OUTOF10: 3

## 2020-10-10 ASSESSMENT — PAIN DESCRIPTION - PAIN TYPE: TYPE: ACUTE PAIN

## 2020-10-10 NOTE — PLAN OF CARE
Problem: HEMODYNAMIC STATUS  Goal: Patient has stable vital signs and fluid balance  Outcome: Ongoing     Patient's EF (Ejection Fraction) is greater than 40%    Heart Failure Medications:  Diuretics[de-identified] none now, was on lasix. (One of the following REQUIRED for EF <40%/SYSTOLIC FAILURE but MAY be used in EF% >40%/DIASTOLIC FAILURE)        ACE[de-identified] None        ARB[de-identified] None         ARNI[de-identified] Sacubitril/Valsartan-Entresto    (Beta Blockers)  NON- Evidenced Based Beta Blocker (for EF% >40%/DIASTOLIC FAILURE): Metoprolol TARTrate- Lopressor    Evidenced Based Beta Blocker::(REQUIRED for EF% <40%/SYSTOLIC FAILURE) Metoprolol SUCCinate- Toprol XL  . .................................................................................................................................................. Patient's weights and intake/output reviewed: Yes    Patient's Last Weight: 200 lbs obtained by standing scale. Difference of 3 lbs less than last documented weight. Intake/Output Summary (Last 24 hours) at 10/10/2020 0113  Last data filed at 10/9/2020 2340  Gross per 24 hour   Intake 780 ml   Output 1400 ml   Net -620 ml       Comorbidities Reviewed Yes    Patient has a past medical history of Arthritis, CAD (coronary artery disease), Cerebral artery occlusion with cerebral infarction ([address] CHF (congestive heart failure) ([address] Diabetes mellitus ([address] Hyperlipidemia, Hypertension, Mental retardation, and MI (myocardial infarction) ([address] >>For CHF and Comorbidity documentation on Education Time and Topics, please see Education Tab    Progressive Mobility Assessment:  What is this patient's Current Level of Mobility?: Ambulatory-Up Ad Lois  How was this patient Mobilized today?: Edge of Bed, Up to Chair, Bedside Commode,  Up to Toilet/Shower, Up in Room, and Up in Hallway                 With Whom? Self                 Level of Difficulty/Assistance: Independent     Pt resting in bed at this time on room air.  Pt denies shortness of breath. Pt with pitting lower extremity edema.      Patient and/or Family's stated Goal of Care this Admission: reduce shortness of breath, increase activity tolerance, better understand heart failure and disease management, be more comfortable, and reduce lower extremity edema prior to discharge    The patient will demonstrate an understanding of blood sugar control by verbalizing  with the goal of completion at discharge.     :

## 2020-10-10 NOTE — PROGRESS NOTES
FLUoxetine (PROZAC) capsule 10 mg  10 mg Oral Daily Noman Pitcher, APRN - NP   10 mg at 10/10/20 0828    escitalopram (LEXAPRO) tablet 10 mg  10 mg Oral Daily Noman Pitcher, APRN - NP   10 mg at 10/10/20 0829    DULoxetine (CYMBALTA) extended release capsule 60 mg  60 mg Oral Daily Noman Pitcher, APRN - NP   60 mg at 10/10/20 0827    sodium chloride flush 0.9 % injection 10 mL  10 mL Intravenous 2 times per day Claudia Forth, DO   10 mL at 10/10/20 0830    sodium chloride flush 0.9 % injection 10 mL  10 mL Intravenous PRN Claudia Forth, DO   10 mL at 10/07/20 1533    aspirin EC tablet 81 mg  81 mg Oral Daily Marco Antonio Diaz MD   81 mg at 10/10/20 0827    atorvastatin (LIPITOR) tablet 40 mg  40 mg Oral Nightly Marco Antonio Diaz MD   40 mg at 10/09/20 2035    clopidogrel (PLAVIX) tablet 75 mg  75 mg Oral Daily Marco Antonio Diaz MD   75 mg at 10/10/20 2189    fenofibrate (TRIGLIDE) tablet 160 mg  160 mg Oral Daily Marco Antonio Diaz MD   160 mg at 10/10/20 7607    ranolazine (RANEXA) extended release tablet 500 mg  500 mg Oral BID Marco Antonio Diaz MD   500 mg at 10/10/20 0827    topiramate (TOPAMAX) tablet 200 mg  200 mg Oral Daily Marco Antonio Diaz MD   200 mg at 10/10/20 0827    sodium chloride flush 0.9 % injection 10 mL  10 mL Intravenous PRN Marco Antonio Diaz MD   10 mL at 10/07/20 1342    potassium chloride (KLOR-CON M) extended release tablet 40 mEq  40 mEq Oral PRN Marco Antonio Diaz MD   40 mEq at 10/07/20 1452    Or    potassium bicarb-citric acid (EFFER-K) effervescent tablet 40 mEq  40 mEq Oral PRN Marco Antonio Diaz MD        Or    potassium chloride 10 mEq/100 mL IVPB (Peripheral Line)  10 mEq Intravenous PRN Marco Antonio Diaz  mL/hr at 10/09/20 1131 10 mEq at 10/09/20 1131    magnesium sulfate 1 g in dextrose 5% 100 mL IVPB  1 g Intravenous PRN Marco Antonio Diaz MD        acetaminophen (TYLENOL) tablet 650 mg  650 mg Oral Q6H PRN Marco Antonio Diaz MD   650 mg at 10/09/20 2035    Or    acetaminophen (TYLENOL) suppository 650 mg  650 mg Rectal Q6H PRN Ariane Sommer MD        promethazine (PHENERGAN) tablet 12.5 mg  12.5 mg Oral Q6H PRN Ariane Sommer MD   12.5 mg at 10/04/20 1646    Or    ondansetron (ZOFRAN) injection 4 mg  4 mg Intravenous Q6H PRN Ariane Sommer MD        glucose (GLUTOSE) 40 % oral gel 15 g  15 g Oral PRN Ariane Sommer MD        dextrose 50 % IV solution  12.5 g Intravenous PRN Ariane Sommer MD        glucagon (rDNA) injection 1 mg  1 mg Intramuscular PRN Ariane Sommer MD        dextrose 5 % solution  100 mL/hr Intravenous PRN Ariane Sommer MD        enoxaparin (LOVENOX) injection 40 mg  40 mg Subcutaneous Daily Ariane Sommer MD   40 mg at 10/10/20 0827    insulin lispro (HUMALOG) injection vial 0-12 Units  0-12 Units Subcutaneous TID WC Ariane Sommer MD   2 Units at 10/10/20 0830    insulin lispro (HUMALOG) injection vial 0-6 Units  0-6 Units Subcutaneous Nightly Ariane Sommer MD   3 Units at 10/09/20 2043       Objective:     Telemetry monitor: sinus with intermittent pacing     Physical Exam:  Constitutional and general appearance: fatigued, no distress, slowed mentation and appears older than stated age  HEENT: PERRL, no cervical lymphadenopathy. No masses palpable.  Normal oral mucosa  Respiratory:  · Normal excursion and expansion without use of accessory muscles  · Resp auscultation: Normal breath sounds without wheezing, rhonchi, and rales  Cardiovascular:  · The apical impulse is not displaced  · Heart tones are crisp and normal. regular S1 and S2.  · Jugular venous pulsation Normal  · The carotid upstroke is normal in amplitude and contour without delay or bruit  · Peripheral pulses are symmetrical and full   Abdomen:  · No masses or tenderness  · Bowel sounds present  Extremities:  ·  No cyanosis or clubbing  ·  No lower extremity edema  ·  Skin: warm and dry  Neurological:  · Alert and oriented   · Moves all extremities   · Flat affect     Data    Limited echo 10/6/2020:  Limited only f/u for LVEF. The left ventricular systolic function is severely reduced with an ejection fraction of 15-20 %. There is severe global hypokinesis with regional variations. Changes noted from previous echo on 6- in left ventricular function. Echo 8/60/8284:  LV systolic function is moderately reduced with an estimated EF of 35%. Moderate global hypokinesis. There is mild concentric left ventricular hypertrophy. Left ventricular cavity size is mildly dilated. Estimated LV diastolic filling pressure is normal.  Mild mitral and tricuspid regurgitation. Systolic pulmonary artery pressure (SPAP) is estimated at 35mmHg (Right atrial pressure of 8 mmHg). No significant change from exam done 10/18/2019. Stress test 10/5/2020:  Dilated LV with severe global hypokinesis. Large, severe, fixed perfusion     defect of the inferior/inferolateral wall consistent with scar. Diminished     uptake of the anterior wall consistent with breast artifact. Post stress     LVEF is abnormal at 19%. Abnormal study. Overall findings represent a high     risk scan. RHC and C 10/6/2020 Yamilka Solorio):  1. Mild non-obstructive coronary artery disease. 2.  Moderate pulmonary hypertension  3. Decompensated cardiac hemodynamics    C 12/12/2018 (Joyce):  LM <20%  LAD 20-30%  Cx 20-20%  RCA 20-30%              RPDA 50%. FFR 0.89  LVEF 45%     ASA, statin, BBlk, ACE  Stop plavix  OK discharge    All labs and testing reviewed.   Lab Review     Renal Profile:   Lab Results   Component Value Date    CREATININE 0.8 10/10/2020    BUN 19 10/10/2020     10/10/2020    K 3.7 10/10/2020    K 3.8 10/04/2020     10/10/2020    CO2 25 10/10/2020     CBC:    Lab Results   Component Value Date    WBC 9.2 10/02/2020    RBC 4.60 10/02/2020    HGB 13.8 10/02/2020    HCT 42.6 10/02/2020    MCV 92.8 10/02/2020    RDW 13.8 10/02/2020     10/02/2020 BNP:  No results found for: BNP  Fasting Lipid Panel:    Lab Results   Component Value Date    CHOL 166 10/12/2019    HDL 51 10/12/2019    TRIG 142 10/12/2019     Cardiac Enzymes:  CK/MbTroponin  Lab Results   Component Value Date    CKTOTAL 54 06/12/2020    TROPONINI 0.03 10/03/2020     PT/ INR   Lab Results   Component Value Date    INR 1.02 08/31/2020    INR 1.01 08/12/2020    INR 1.16 04/16/2020    PROTIME 11.8 08/31/2020    PROTIME 11.7 08/12/2020    PROTIME 13.5 04/16/2020     PTT No results found for: PTT   Lab Results   Component Value Date    MG 1.90 10/10/2020      Lab Results   Component Value Date    TSH 0.28 09/19/2020       Assessment:  SVT: currently stable    -noted on device check  NSVT: stable  Ischemic cardiomyopathy   -EF 35% 6/2020, 15-20% 10/2020   -s/p dual chamber ICD 2015 ([employer])  Acute on chronic systolic CHF: now compensated    -adm weight 219 lbs (200 today)   -I&O is inaccurate   Pulmonary HTN: moderate per RHC 10/2020  Chronic chest pain  CAD   -nonobstructive on [facility] 12/2018  DM  History of CVA  Cognitive impairment     Plan:   1. Continue amiodarone, digoxin, and Ranexa   2. Decrease Toprol to 12.5mg po QD to allow for Entresto   3. Start Entresto 12-13mg po BID if BP allows   4. Lasix gtt was stopped 10/9/2020 to allow fluid to mobilize. Will start oral Lasix 10/11/2020   5. BMP in am     Difficult social situation as patient lives alone and does not have family.      Geovani Castro, APRN-CNP  [employer]  [phone]

## 2020-10-10 NOTE — PROGRESS NOTES
Hospitalist Progress Note      PCP: FRITZ Phillip NP    Date of Admission: 10/2/2020    Chief Complaint: Chest pain    Hospital Course: See H&P    Subjective:   Patient is up in bed, comfortable, not in distress. Dyspnea is not worse. Chest pain. Was on lantus Insulin at home    Medications:  Reviewed    Infusion Medications    dextrose       Scheduled Medications    digoxin  125 mcg Oral Every Other Day    sacubitril-valsartan  0.5 tablet Oral BID    amiodarone  100 mg Oral Daily    metoprolol succinate  25 mg Oral Daily    FLUoxetine  10 mg Oral Daily    escitalopram  10 mg Oral Daily    DULoxetine  60 mg Oral Daily    sodium chloride flush  10 mL Intravenous 2 times per day    aspirin  81 mg Oral Daily    atorvastatin  40 mg Oral Nightly    clopidogrel  75 mg Oral Daily    fenofibrate  160 mg Oral Daily    ranolazine  500 mg Oral BID    topiramate  200 mg Oral Daily    enoxaparin  40 mg Subcutaneous Daily    insulin lispro  0-12 Units Subcutaneous TID WC    insulin lispro  0-6 Units Subcutaneous Nightly     PRN Meds: perflutren lipid microspheres, sodium chloride flush, sodium chloride flush, potassium chloride **OR** potassium alternative oral replacement **OR** potassium chloride, magnesium sulfate, acetaminophen **OR** acetaminophen, promethazine **OR** ondansetron, glucose, dextrose, glucagon (rDNA), dextrose      Intake/Output Summary (Last 24 hours) at 10/10/2020 0854  Last data filed at 10/9/2020 2340  Gross per 24 hour   Intake 780 ml   Output 700 ml   Net 80 ml       Physical Exam Performed:    /72   Pulse 81   Temp 98.1 °F (36.7 °C) (Oral)   Resp 17   Ht 5' 1\" (1.549 m)   Wt 200 lb 1.6 oz (90.8 kg)   SpO2 100%   BMI 37.81 kg/m²     General appearance: No apparent distress, appears stated age and cooperative. HEENT: Pupils equal, round, and reactive to light. Conjunctivae/corneas clear. Neck: Supple, with full range of motion. No jugular venous distention. Trachea midline. Respiratory:  Normal respiratory effort. Clear to auscultation, bilaterally without Rales/Wheezes/Rhonchi. Cardiovascular: Regular rate and rhythm with normal S1/S2 without murmurs, rubs or gallops. Abdomen: Soft, non-tender, non-distended with normal bowel sounds. Musculoskeletal: No clubbing, cyanosis or edema bilaterally. Skin: Skin color, texture, turgor normal.  No rashes or lesions. Neurologic:  Neurovascularly intact without any focal sensory/motor deficits. Cranial nerves: II-XII intact, grossly non-focal.  Psychiatric: Alert and oriented, thought content appropriate, normal insight  Capillary Refill: Brisk,< 3 seconds   Peripheral Pulses: +2 palpable, equal bilaterally       Labs:   No results for input(s): WBC, HGB, HCT, PLT in the last 72 hours. Recent Labs     10/08/20  0400 10/09/20  0400 10/10/20  0440    135* 135*   K 3.0* 3.0* 3.7    97* 101   CO2 28 30 25   BUN 24* 17 19   CREATININE 0.7 0.7 0.8   CALCIUM 8.3 8.0* 8.1*     No results for input(s): AST, ALT, BILIDIR, BILITOT, ALKPHOS in the last 72 hours. No results for input(s): INR in the last 72 hours. No results for input(s): Joeline Reeks in the last 72 hours. Urinalysis:      Lab Results   Component Value Date    NITRU Negative 10/02/2020    WBCUA 10-20 06/12/2020    BACTERIA Rare 05/28/2020    RBCUA 3-4 06/12/2020    BLOODU Negative 10/02/2020    SPECGRAV 1.010 10/02/2020    GLUCOSEU >=1000 10/02/2020       Radiology:  NM Cardiac Stress Test Nuclear Imaging   Final Result      IR PICC WO SQ PORT/PUMP > 5 YEARS   Final Result   Successful placement of PICC line. XR CHEST PORTABLE   Final Result   Cardiomegaly with increasing interstitial edema.                  Assessment/Plan:    Active Hospital Problems    Diagnosis    Type 2 diabetes mellitus with hyperglycemia, with long-term current use of insulin (HCC) [E11.65, Z79.4]    Chest pain [R07.9]    Nonischemic cardiomyopathy ([address] [I42.8]    Dual ICD (implantable cardioverter-defibrillator) in place [Z95.810]     1. Chest pain, atypical, continue aspirin and statin, cardiology consulted. Stress test was abnormal and S/p Cardiac cath.   S/p [facility]-  CONCLUSIONS:     1. Mild non-obstructive coronary artery disease with known severe left ventricular dysfunction  2. Moderate pulmonary hypertension with decompensated hemodynamics        2. Acute on chronic systolic heart failure, recent echo on 6/13/2020 showing LVEF of 35%, continue home regimen, continue Lasix infusion discontinued yesterday. Cardiology planning to start [facility]. On Low dose Toprol. 3. History of ICD in situ, monitor on telemetry. Digoxin every other day and Amiodarone     4. Diabetes mellitus type 2, uncontrolled, continue insulin sliding scale, monitor.  Restart Lantus     DVT Prophylaxis: Lovenox  Diet: DIET CARDIAC; Daily Fluid Restriction: 2000 ml  Code Status: Full Code    PT/OT Eval Status: Ambulatory    Dispo -in 1-2 days    Robert Blevins MD

## 2020-10-11 LAB
ANION GAP SERPL CALCULATED.3IONS-SCNC: 7 MMOL/L (ref 3–16)
BASOPHILS ABSOLUTE: 0.1 K/UL (ref 0–0.2)
BASOPHILS RELATIVE PERCENT: 0.9 %
BUN BLDV-MCNC: 18 MG/DL (ref 7–20)
CALCIUM SERPL-MCNC: 8.4 MG/DL (ref 8.3–10.6)
CHLORIDE BLD-SCNC: 104 MMOL/L (ref 99–110)
CO2: 25 MMOL/L (ref 21–32)
CREAT SERPL-MCNC: 0.6 MG/DL (ref 0.6–1.1)
EOSINOPHILS ABSOLUTE: 0.3 K/UL (ref 0–0.6)
EOSINOPHILS RELATIVE PERCENT: 5 %
GFR AFRICAN AMERICAN: >60
GFR NON-AFRICAN AMERICAN: >60
GLUCOSE BLD-MCNC: 124 MG/DL (ref 70–99)
GLUCOSE BLD-MCNC: 194 MG/DL (ref 70–99)
GLUCOSE BLD-MCNC: 246 MG/DL (ref 70–99)
GLUCOSE BLD-MCNC: 85 MG/DL (ref 70–99)
GLUCOSE BLD-MCNC: 96 MG/DL (ref 70–99)
HCT VFR BLD CALC: 41.6 % (ref 36–48)
HEMOGLOBIN: 13.8 G/DL (ref 12–16)
LYMPHOCYTES ABSOLUTE: 2.8 K/UL (ref 1–5.1)
LYMPHOCYTES RELATIVE PERCENT: 41.5 %
MCH RBC QN AUTO: 29.9 PG (ref 26–34)
MCHC RBC AUTO-ENTMCNC: 33.2 G/DL (ref 31–36)
MCV RBC AUTO: 90.2 FL (ref 80–100)
MONOCYTES ABSOLUTE: 0.6 K/UL (ref 0–1.3)
MONOCYTES RELATIVE PERCENT: 9.4 %
NEUTROPHILS ABSOLUTE: 3 K/UL (ref 1.7–7.7)
NEUTROPHILS RELATIVE PERCENT: 43.2 %
PDW BLD-RTO: 14.6 % (ref 12.4–15.4)
PERFORMED ON: ABNORMAL
PERFORMED ON: NORMAL
PLATELET # BLD: 207 K/UL (ref 135–450)
PMV BLD AUTO: 9.2 FL (ref 5–10.5)
POTASSIUM REFLEX MAGNESIUM: 4 MMOL/L (ref 3.5–5.1)
POTASSIUM SERPL-SCNC: 4 MMOL/L (ref 3.5–5.1)
RBC # BLD: 4.61 M/UL (ref 4–5.2)
SODIUM BLD-SCNC: 136 MMOL/L (ref 136–145)
WBC # BLD: 6.9 K/UL (ref 4–11)

## 2020-10-11 PROCEDURE — 2500000003 HC RX 250 WO HCPCS: Performed by: INTERNAL MEDICINE

## 2020-10-11 PROCEDURE — 6370000000 HC RX 637 (ALT 250 FOR IP): Performed by: NURSE PRACTITIONER

## 2020-10-11 PROCEDURE — 80048 BASIC METABOLIC PNL TOTAL CA: CPT

## 2020-10-11 PROCEDURE — 2580000003 HC RX 258: Performed by: INTERNAL MEDICINE

## 2020-10-11 PROCEDURE — 6370000000 HC RX 637 (ALT 250 FOR IP): Performed by: INTERNAL MEDICINE

## 2020-10-11 PROCEDURE — 99233 SBSQ HOSP IP/OBS HIGH 50: CPT | Performed by: NURSE PRACTITIONER

## 2020-10-11 PROCEDURE — 36592 COLLECT BLOOD FROM PICC: CPT

## 2020-10-11 PROCEDURE — 6360000002 HC RX W HCPCS: Performed by: INTERNAL MEDICINE

## 2020-10-11 PROCEDURE — 85025 COMPLETE CBC W/AUTO DIFF WBC: CPT

## 2020-10-11 PROCEDURE — 1200000000 HC SEMI PRIVATE

## 2020-10-11 RX ORDER — INSULIN GLARGINE 100 [IU]/ML
20 INJECTION, SOLUTION SUBCUTANEOUS NIGHTLY
Status: DISCONTINUED | OUTPATIENT
Start: 2020-10-11 | End: 2020-10-14 | Stop reason: HOSPADM

## 2020-10-11 RX ADMIN — DULOXETINE HYDROCHLORIDE 60 MG: 60 CAPSULE, DELAYED RELEASE ORAL at 08:50

## 2020-10-11 RX ADMIN — INSULIN LISPRO 4 UNITS: 100 INJECTION, SOLUTION INTRAVENOUS; SUBCUTANEOUS at 13:07

## 2020-10-11 RX ADMIN — CLOPIDOGREL BISULFATE 75 MG: 75 TABLET ORAL at 08:49

## 2020-10-11 RX ADMIN — TOPIRAMATE 200 MG: 100 TABLET, FILM COATED ORAL at 08:49

## 2020-10-11 RX ADMIN — ASPIRIN 81 MG: 81 TABLET ORAL at 08:49

## 2020-10-11 RX ADMIN — RANOLAZINE 500 MG: 500 TABLET, FILM COATED, EXTENDED RELEASE ORAL at 08:50

## 2020-10-11 RX ADMIN — ENOXAPARIN SODIUM 40 MG: 40 INJECTION SUBCUTANEOUS at 08:49

## 2020-10-11 RX ADMIN — ATORVASTATIN CALCIUM 40 MG: 40 TABLET, FILM COATED ORAL at 21:08

## 2020-10-11 RX ADMIN — FENOFIBRATE 160 MG: 160 TABLET ORAL at 08:49

## 2020-10-11 RX ADMIN — FLUOXETINE 10 MG: 10 CAPSULE ORAL at 08:49

## 2020-10-11 RX ADMIN — METOPROLOL SUCCINATE 12.5 MG: 25 TABLET, EXTENDED RELEASE ORAL at 08:52

## 2020-10-11 RX ADMIN — Medication 10 ML: at 08:53

## 2020-10-11 RX ADMIN — AMIODARONE HYDROCHLORIDE 100 MG: 200 TABLET ORAL at 08:49

## 2020-10-11 RX ADMIN — ESCITALOPRAM OXALATE 10 MG: 10 TABLET ORAL at 08:49

## 2020-10-11 RX ADMIN — MICONAZOLE NITRATE: 20 POWDER TOPICAL at 21:09

## 2020-10-11 RX ADMIN — RANOLAZINE 500 MG: 500 TABLET, FILM COATED, EXTENDED RELEASE ORAL at 21:08

## 2020-10-11 RX ADMIN — MICONAZOLE NITRATE: 20 POWDER TOPICAL at 08:52

## 2020-10-11 RX ADMIN — INSULIN GLARGINE 20 UNITS: 100 INJECTION, SOLUTION SUBCUTANEOUS at 21:16

## 2020-10-11 RX ADMIN — INSULIN LISPRO 1 UNITS: 100 INJECTION, SOLUTION INTRAVENOUS; SUBCUTANEOUS at 21:16

## 2020-10-11 RX ADMIN — ACETAMINOPHEN 650 MG: 325 TABLET ORAL at 08:50

## 2020-10-11 RX ADMIN — Medication 10 ML: at 21:08

## 2020-10-11 ASSESSMENT — PAIN DESCRIPTION - LOCATION: LOCATION: CHEST

## 2020-10-11 ASSESSMENT — PAIN DESCRIPTION - PAIN TYPE: TYPE: ACUTE PAIN

## 2020-10-11 ASSESSMENT — PAIN SCALES - GENERAL
PAINLEVEL_OUTOF10: 10
PAINLEVEL_OUTOF10: 0
PAINLEVEL_OUTOF10: 10

## 2020-10-11 NOTE — PROGRESS NOTES
Hospitalist Progress Note      PCP: FRITZ Birch - ALINE    Date of Admission: 10/2/2020    Chief Complaint: Chest pain    Hospital Course: See H&P    Subjective:   Patient is eating breakfast, comfortable, not in distress. Dyspnea is not worse. BP is low. Tolerating. Blood sugar dropped to 80 this morning. Usually 400. Lantus Insulin was restarted last night.      Medications:  Reviewed    Infusion Medications    dextrose       Scheduled Medications    metoprolol succinate  12.5 mg Oral Daily    insulin glargine  30 Units Subcutaneous Nightly    miconazole   Topical BID    digoxin  125 mcg Oral Every Other Day    sacubitril-valsartan  0.5 tablet Oral BID    amiodarone  100 mg Oral Daily    FLUoxetine  10 mg Oral Daily    escitalopram  10 mg Oral Daily    DULoxetine  60 mg Oral Daily    sodium chloride flush  10 mL Intravenous 2 times per day    aspirin  81 mg Oral Daily    atorvastatin  40 mg Oral Nightly    clopidogrel  75 mg Oral Daily    fenofibrate  160 mg Oral Daily    ranolazine  500 mg Oral BID    topiramate  200 mg Oral Daily    enoxaparin  40 mg Subcutaneous Daily    insulin lispro  0-12 Units Subcutaneous TID WC    insulin lispro  0-6 Units Subcutaneous Nightly     PRN Meds: perflutren lipid microspheres, sodium chloride flush, sodium chloride flush, potassium chloride **OR** potassium alternative oral replacement **OR** potassium chloride, magnesium sulfate, acetaminophen **OR** acetaminophen, promethazine **OR** ondansetron, glucose, dextrose, glucagon (rDNA), dextrose      Intake/Output Summary (Last 24 hours) at 10/11/2020 0908  Last data filed at 10/11/2020 0531  Gross per 24 hour   Intake 1160 ml   Output 520 ml   Net 640 ml       Physical Exam Performed:    /71   Pulse 70   Temp 98.2 °F (36.8 °C) (Oral)   Resp 18   Ht 5' 1\" (1.549 m)   Wt 200 lb 4.8 oz (90.9 kg)   SpO2 99%   BMI 37.85 kg/m²     General appearance: No apparent distress, appears stated age and cooperative. HEENT: Pupils equal, round, and reactive to light. Conjunctivae/corneas clear. Neck: Supple, with full range of motion. No jugular venous distention. Trachea midline. Respiratory:  Normal respiratory effort. Clear to auscultation, bilaterally without Rales/Wheezes/Rhonchi. Cardiovascular: Regular rate and rhythm with normal S1/S2 without murmurs, rubs or gallops. Abdomen: Soft, non-tender, non-distended with normal bowel sounds. Musculoskeletal: No clubbing, cyanosis or edema bilaterally. Skin: Skin color, texture, turgor normal.  No rashes or lesions. Neurologic:  Neurovascularly intact without any focal sensory/motor deficits. Cranial nerves: II-XII intact, grossly non-focal.  Psychiatric: Alert and oriented, thought content appropriate, normal insight  Capillary Refill: Brisk,< 3 seconds   Peripheral Pulses: +2 palpable, equal bilaterally       Labs:   Recent Labs     10/11/20  0400   WBC 6.9   HGB 13.8   HCT 41.6        Recent Labs     10/09/20  0400 10/10/20  0440 10/11/20  0400   * 135* 136   K 3.0* 3.7 4.0  4.0   CL 97* 101 104   CO2 30 25 25   BUN 17 19 18   CREATININE 0.7 0.8 0.6   CALCIUM 8.0* 8.1* 8.4     No results for input(s): AST, ALT, BILIDIR, BILITOT, ALKPHOS in the last 72 hours. No results for input(s): INR in the last 72 hours. No results for input(s): Zettie Binet in the last 72 hours. Urinalysis:      Lab Results   Component Value Date    NITRU Negative 10/02/2020    WBCUA 10-20 06/12/2020    BACTERIA Rare 05/28/2020    RBCUA 3-4 06/12/2020    BLOODU Negative 10/02/2020    SPECGRAV 1.010 10/02/2020    GLUCOSEU >=1000 10/02/2020       Radiology:  NM Cardiac Stress Test Nuclear Imaging   Final Result      IR PICC WO SQ PORT/PUMP > 5 YEARS   Final Result   Successful placement of PICC line. XR CHEST PORTABLE   Final Result   Cardiomegaly with increasing interstitial edema.                  Assessment/Plan:    Active Hospital Problems Diagnosis    Type 2 diabetes mellitus with hyperglycemia, with long-term current use of insulin ([facility]) [E11.65, Z79.4]    Chest pain [R07.9]    Nonischemic cardiomyopathy ([facility]) [I42.8]    Dual ICD (implantable cardioverter-defibrillator) in place [Z95.810]     1. Chest pain, atypical, continue aspirin and statin, cardiology consulted. Stress test was abnormal and S/p Cardiac cath.   S/p [facility]-  CONCLUSIONS:   1. Mild non-obstructive coronary artery disease with known severe left ventricular dysfunction  2. Moderate pulmonary hypertension with decompensated hemodynamics        2. Acute on chronic systolic heart failure, recent echo on 6/13/2020 showing LVEF of 35%, continue home regimen, continue Lasix infusion discontinued yesterday. Cardiology started [facility]. On Low dose Toprol. BP is low    3. History of ICD in situ, monitor on telemetry. Digoxin every other day and Amiodarone     4. Diabetes mellitus type 2, uncontrolled, continue insulin sliding scale, monitor. Restarted Lantus last night. Blood sugar is only 80 today dropped from 400. Reduce Lantus dose    DVT Prophylaxis: Lovenox  Diet: DIET GENERAL;  Code Status: Full Code    PT/OT Eval Status: Ambulatory    Dispo -in 1-2 days.  Needs assisted living    Sarah Roger MD

## 2020-10-11 NOTE — PLAN OF CARE
Problem: HEMODYNAMIC STATUS  Goal: Patient has stable vital signs and fluid balance  Outcome: Ongoing     Patient's EF (Ejection Fraction) is greater than 40%    Heart Failure Medications:  Diuretics[de-identified] Furosemide    (One of the following REQUIRED for EF <40%/SYSTOLIC FAILURE but MAY be used in EF% >40%/DIASTOLIC FAILURE)        ACE[de-identified] None        ARB[de-identified] None         ARNI[de-identified] Sacubitril/Valsartan-Entresto    (Beta Blockers)  NON- Evidenced Based Beta Blocker (for EF% >40%/DIASTOLIC FAILURE): Metoprolol TARTrate- Lopressor    Evidenced Based Beta Blocker::(REQUIRED for EF% <40%/SYSTOLIC FAILURE) Metoprolol SUCCinate- Toprol XL  . .................................................................................................................................................. Patient's weights and intake/output reviewed: Yes    Patient's Last Weight: 200 lbs obtained by standing scale. Difference of 0 lbs     than last documented weight. Intake/Output Summary (Last 24 hours) at 10/11/2020 0049  Last data filed at 10/10/2020 2304  Gross per 24 hour   Intake 920 ml   Output 320 ml   Net 600 ml       Comorbidities Reviewed Yes    Patient has a past medical history of Arthritis, CAD (coronary artery disease), Cerebral artery occlusion with cerebral infarction ([address] CHF (congestive heart failure) ([address] Diabetes mellitus ([address] Hyperlipidemia, Hypertension, Mental retardation, and MI (myocardial infarction) ([address] >>For CHF and Comorbidity documentation on Education Time and Topics, please see Education Tab    Progressive Mobility Assessment:  What is this patient's Current Level of Mobility?: Ambulatory-Up Ad Lois  How was this patient Mobilized today?: Edge of Bed, Up to Chair, Bedside Commode,  Up to Toilet/Shower, Up in Room, and Up in Hallway                 With Whom? Self                 Level of Difficulty/Assistance: Independent     Pt resting in bed at this time on room air. Pt denies shortness of breath.  Pt without lower extremity edema.      Patient and/or Family's stated Goal of Care this Admission: reduce shortness of breath, increase activity tolerance, better understand heart failure and disease management, be more comfortable, and reduce lower extremity edema prior to discharge    The patient will demonstrate an understanding of blood sugar by verbalizing  with the goal of completion at discharge.     :

## 2020-10-11 NOTE — PROGRESS NOTES
CNP        amiodarone (CORDARONE) tablet 100 mg  100 mg Oral Daily Vincent FRITZ Colin - CNP   100 mg at 10/11/20 0849    perflutren lipid microspheres (DEFINITY) injection 1.65 mg  1.5 mL Intravenous ONCE PRN Yasmani Blanc MD        FLUoxetine (PROZAC) capsule 10 mg  10 mg Oral Daily Yasmani Blanc MD   10 mg at 10/11/20 0849    escitalopram (LEXAPRO) tablet 10 mg  10 mg Oral Daily Yasmani Blanc MD   10 mg at 10/11/20 0849    DULoxetine (CYMBALTA) extended release capsule 60 mg  60 mg Oral Daily Yasmani Blanc MD   60 mg at 10/11/20 0850    sodium chloride flush 0.9 % injection 10 mL  10 mL Intravenous 2 times per day Yasmani Blanc MD   10 mL at 10/11/20 0853    sodium chloride flush 0.9 % injection 10 mL  10 mL Intravenous PRN Yasmani Blanc MD   10 mL at 10/07/20 1533    aspirin EC tablet 81 mg  81 mg Oral Daily Yasmani Blanc MD   81 mg at 10/11/20 0849    atorvastatin (LIPITOR) tablet 40 mg  40 mg Oral Nightly Yasmani Blanc MD   40 mg at 10/10/20 2110    clopidogrel (PLAVIX) tablet 75 mg  75 mg Oral Daily Yasmani Blanc MD   75 mg at 10/11/20 0849    fenofibrate (TRIGLIDE) tablet 160 mg  160 mg Oral Daily Yasmani Blanc MD   160 mg at 10/11/20 0849    ranolazine (RANEXA) extended release tablet 500 mg  500 mg Oral BID Yasmani Blanc MD   500 mg at 10/11/20 0850    topiramate (TOPAMAX) tablet 200 mg  200 mg Oral Daily Yasmani Blanc MD   200 mg at 10/11/20 0849    sodium chloride flush 0.9 % injection 10 mL  10 mL Intravenous PRN Yasmani Blanc MD   10 mL at 10/07/20 1342    potassium chloride (KLOR-CON M) extended release tablet 40 mEq  40 mEq Oral PRN Yasmani Blanc MD   40 mEq at 10/07/20 1452    Or    potassium bicarb-citric acid (EFFER-K) effervescent tablet 40 mEq  40 mEq Oral PRN Yasmani Blanc MD        Or    potassium chloride 10 mEq/100 mL IVPB (Peripheral Line)  10 mEq Intravenous PRN Yasmani Blanc  mL/hr at 10/09/20 1131 10 mEq at 10/09/20 1131    magnesium sulfate 1 g in dextrose 5% 100 mL IVPB  1 g Intravenous PRN Yasmani lBanc MD        acetaminophen (TYLENOL) tablet 650 mg  650 mg Oral Q6H PRN Yasmani Blanc MD   650 mg at 10/11/20 0850    Or    acetaminophen (TYLENOL) suppository 650 mg  650 mg Rectal Q6H PRN Yasmani Blanc MD        promethazine (PHENERGAN) tablet 12.5 mg  12.5 mg Oral Q6H PRN Yasmani Blanc MD   12.5 mg at 10/04/20 1646    Or    ondansetron (ZOFRAN) injection 4 mg  4 mg Intravenous Q6H PRN Yasmani Blanc MD        glucose (GLUTOSE) 40 % oral gel 15 g  15 g Oral PRN Yasmani Blanc MD        dextrose 50 % IV solution  12.5 g Intravenous PRN Yasmani Blanc MD        glucagon (rDNA) injection 1 mg  1 mg Intramuscular PRN Yasmani Blanc MD        dextrose 5 % solution  100 mL/hr Intravenous PRN Yasmani Blanc MD        enoxaparin (LOVENOX) injection 40 mg  40 mg Subcutaneous Daily Yasmani Blanc MD   40 mg at 10/11/20 0849    insulin lispro (HUMALOG) injection vial 0-12 Units  0-12 Units Subcutaneous TID WC Yasmani Blanc MD   10 Units at 10/10/20 1811    insulin lispro (HUMALOG) injection vial 0-6 Units  0-6 Units Subcutaneous Nightly Yasmani Blanc MD   6 Units at 10/10/20 2111       Objective:     Telemetry monitor: sinus with intermittent pacing     Physical Exam:  Constitutional and general appearance: fatigued, no distress, slowed mentation and appears older than stated age  HEENT: PERRL, no cervical lymphadenopathy. No masses palpable.  Normal oral mucosa  Respiratory:  · Normal excursion and expansion without use of accessory muscles  · Resp auscultation: Normal breath sounds without wheezing, rhonchi, and rales  Cardiovascular:  · The apical impulse is not displaced  · Heart tones are crisp and normal. regular S1 and S2.  · Jugular venous pulsation Normal  · The carotid upstroke is normal in amplitude and contour without delay or bruit  · Peripheral pulses are

## 2020-10-12 LAB
ANION GAP SERPL CALCULATED.3IONS-SCNC: 8 MMOL/L (ref 3–16)
BUN BLDV-MCNC: 20 MG/DL (ref 7–20)
CALCIUM SERPL-MCNC: 8.4 MG/DL (ref 8.3–10.6)
CHLORIDE BLD-SCNC: 102 MMOL/L (ref 99–110)
CO2: 24 MMOL/L (ref 21–32)
CREAT SERPL-MCNC: 0.7 MG/DL (ref 0.6–1.1)
GFR AFRICAN AMERICAN: >60
GFR NON-AFRICAN AMERICAN: >60
GLUCOSE BLD-MCNC: 103 MG/DL (ref 70–99)
GLUCOSE BLD-MCNC: 119 MG/DL (ref 70–99)
GLUCOSE BLD-MCNC: 124 MG/DL (ref 70–99)
GLUCOSE BLD-MCNC: 143 MG/DL (ref 70–99)
GLUCOSE BLD-MCNC: 86 MG/DL (ref 70–99)
PERFORMED ON: ABNORMAL
PERFORMED ON: NORMAL
POTASSIUM SERPL-SCNC: 3.6 MMOL/L (ref 3.5–5.1)
SODIUM BLD-SCNC: 134 MMOL/L (ref 136–145)

## 2020-10-12 PROCEDURE — 99233 SBSQ HOSP IP/OBS HIGH 50: CPT | Performed by: NURSE PRACTITIONER

## 2020-10-12 PROCEDURE — 97116 GAIT TRAINING THERAPY: CPT

## 2020-10-12 PROCEDURE — 6370000000 HC RX 637 (ALT 250 FOR IP): Performed by: INTERNAL MEDICINE

## 2020-10-12 PROCEDURE — 1200000000 HC SEMI PRIVATE

## 2020-10-12 PROCEDURE — 80048 BASIC METABOLIC PNL TOTAL CA: CPT

## 2020-10-12 PROCEDURE — 2580000003 HC RX 258: Performed by: INTERNAL MEDICINE

## 2020-10-12 PROCEDURE — 6370000000 HC RX 637 (ALT 250 FOR IP): Performed by: NURSE PRACTITIONER

## 2020-10-12 PROCEDURE — 97161 PT EVAL LOW COMPLEX 20 MIN: CPT

## 2020-10-12 PROCEDURE — 36592 COLLECT BLOOD FROM PICC: CPT

## 2020-10-12 PROCEDURE — 6360000002 HC RX W HCPCS: Performed by: INTERNAL MEDICINE

## 2020-10-12 PROCEDURE — 97535 SELF CARE MNGMENT TRAINING: CPT

## 2020-10-12 PROCEDURE — 97165 OT EVAL LOW COMPLEX 30 MIN: CPT

## 2020-10-12 RX ORDER — FUROSEMIDE 40 MG/1
40 TABLET ORAL DAILY
Status: DISCONTINUED | OUTPATIENT
Start: 2020-10-13 | End: 2020-10-14

## 2020-10-12 RX ADMIN — ATORVASTATIN CALCIUM 40 MG: 40 TABLET, FILM COATED ORAL at 21:37

## 2020-10-12 RX ADMIN — SACUBITRIL AND VALSARTAN 0.5 TABLET: 24; 26 TABLET, FILM COATED ORAL at 08:46

## 2020-10-12 RX ADMIN — AMIODARONE HYDROCHLORIDE 100 MG: 200 TABLET ORAL at 08:46

## 2020-10-12 RX ADMIN — Medication 10 ML: at 08:45

## 2020-10-12 RX ADMIN — RANOLAZINE 500 MG: 500 TABLET, FILM COATED, EXTENDED RELEASE ORAL at 21:37

## 2020-10-12 RX ADMIN — MICONAZOLE NITRATE: 20 POWDER TOPICAL at 21:37

## 2020-10-12 RX ADMIN — DULOXETINE HYDROCHLORIDE 60 MG: 60 CAPSULE, DELAYED RELEASE ORAL at 08:46

## 2020-10-12 RX ADMIN — FLUOXETINE 10 MG: 10 CAPSULE ORAL at 08:46

## 2020-10-12 RX ADMIN — INSULIN GLARGINE 20 UNITS: 100 INJECTION, SOLUTION SUBCUTANEOUS at 21:36

## 2020-10-12 RX ADMIN — MICONAZOLE NITRATE: 20 POWDER TOPICAL at 08:45

## 2020-10-12 RX ADMIN — CLOPIDOGREL BISULFATE 75 MG: 75 TABLET ORAL at 08:46

## 2020-10-12 RX ADMIN — TOPIRAMATE 200 MG: 100 TABLET, FILM COATED ORAL at 08:47

## 2020-10-12 RX ADMIN — SACUBITRIL AND VALSARTAN 0.5 TABLET: 24; 26 TABLET, FILM COATED ORAL at 21:37

## 2020-10-12 RX ADMIN — FENOFIBRATE 160 MG: 160 TABLET ORAL at 08:46

## 2020-10-12 RX ADMIN — ASPIRIN 81 MG: 81 TABLET ORAL at 08:46

## 2020-10-12 RX ADMIN — ESCITALOPRAM OXALATE 10 MG: 10 TABLET ORAL at 08:46

## 2020-10-12 RX ADMIN — Medication 10 ML: at 21:37

## 2020-10-12 RX ADMIN — ENOXAPARIN SODIUM 40 MG: 40 INJECTION SUBCUTANEOUS at 08:45

## 2020-10-12 RX ADMIN — RANOLAZINE 500 MG: 500 TABLET, FILM COATED, EXTENDED RELEASE ORAL at 08:47

## 2020-10-12 ASSESSMENT — PAIN SCALES - GENERAL: PAINLEVEL_OUTOF10: 0

## 2020-10-12 ASSESSMENT — PAIN DESCRIPTION - LOCATION: LOCATION: CHEST

## 2020-10-12 ASSESSMENT — PAIN DESCRIPTION - PAIN TYPE: TYPE: ACUTE PAIN

## 2020-10-12 NOTE — PROGRESS NOTES
Physical Therapy    Facility/Department: [facility] B3 - MED SURG  Initial Assessment/Discharge Summary (1x only)    NAME: Chidi White  : 1960  MRN: [id number]    Date of Service: 10/12/2020    Discharge Recommendations:  Home with assist PRN   PT Equipment Recommendations  Equipment Needed: No    Assessment   Assessment: Pt referred for PT evaluation during current hospital stay with dx of chest pain, acute-on-chronic CHF, and uncontrolled DM. Pt currently seems to be functioning at her baseline from a physical/functional standpoint, performing transfers and amb with supervision with good standing balance. Pt states she feels to be physically functioning how she normally does and denies further PT needs at this time; will sign off. Prognosis: Good  Decision Making: Low Complexity  PT Education: Goals; General Safety;Gait Training;PT Role;Plan of Care; Functional Mobility Training;Disease Specific Education;Precautions;Transfer Training;Equipment  Patient Education: Disease-specific education: Pt educated on compensatory transfer/gait strategies with cane; pt verbalizes understanding. No Skilled PT: Independent with functional mobility   REQUIRES PT FOLLOW UP: No  Activity Tolerance: Patient Tolerated treatment well       Patient Diagnosis(es): The primary encounter diagnosis was Type 2 diabetes mellitus with hyperglycemia, with long-term current use of insulin ([address] Diagnoses of Non-intractable vomiting with nausea, unspecified vomiting type, Chest pain, unspecified type, Acute on chronic combined systolic and diastolic CHF (congestive heart failure) ([address] Elevated troponin, Acute pulmonary edema ([address] and Dual ICD (implantable cardioverter-defibrillator) in place were also pertinent to this visit.      has a past medical history of Arthritis, CAD (coronary artery disease), Cerebral artery occlusion with cerebral infarction ([address] CHF (congestive heart failure) ([address] Diabetes mellitus ([address] Hyperlipidemia, Hypertension, Mental retardation, and MI (myocardial infarction) ([facility] has a past surgical history that includes back surgery; Pacemaker insertion; tumor removal; and Tubal ligation. Restrictions  Restrictions/Precautions  Restrictions/Precautions: General Precautions  Position Activity Restriction  Other position/activity restrictions: Medium fall risk per nursing assessment, telemetry  Vision/Hearing  Vision: Impaired  Vision Exceptions: Wears glasses at all times  Hearing: Within functional limits     Subjective  General  Chart Reviewed: Yes  Patient assessed for rehabilitation services?: Yes  Family / Caregiver Present: No  Referring Practitioner: FRITZ Burrows CNP  Referral Date : 10/12/20  Diagnosis: Chest pain, acute-on-chronic CHF, uncontrolled DM  Follows Commands: Within Functional Limits  General Comment  Comments: Pt resting in bed upon entry of therapy staff  Subjective  Subjective: Pt agreeable to work with PT/OT this afternoon, pleasant and cooperative. Agreeable to get OOB and walk. Alert & oriented WFL, although appears cognitively impaired in other areas (i.e. pt perseverating on \"shower chair, shower chair\" during evaluation).   Pain Screening  Patient Currently in Pain: Denies  Intervention List: Patient able to continue with treatment    Orientation  Orientation  Overall Orientation Status: Within Functional Limits     Social/Functional History  Social/Functional History  Lives With: Alone  Type of Home: Apartment  Home Layout: One level  Home Access: Level entry  Bathroom Shower/Tub: Tub/Shower unit  Bathroom Equipment: Shower chair  Home Equipment: Cane, Rolling walker  ADL Assistance: Independent  Homemaking Assistance: Independent  Ambulation Assistance: Independent(using cane)  Transfer Assistance: Independent  Active : No  Patient's  Info: Sister  Mode of Transportation: Family  Occupation: Retired  Type of occupation:   Leisure & Hobbies: \"Getting my nails done\"    Objective  Observation/Palpation  Posture: Fair  Observation: Pt demonstrates flat affect    RLE AROM: WFL  LLE AROM : WFL  Strength RLE: WFL  Strength LLE: WFL     Bed mobility  Supine to Sit: Supervision(moving toward L side, HOB elevated ~30 degrees)  Scooting: Independent(to scoot forward to EOB)     Transfers  Sit to Stand: Supervision  Stand to sit: Supervision  Bed to Chair: Supervision(using cane, moving from bed>toilet>chair)     Ambulation  Surface: level tile  Device: Single point cane(in R hand)  Assistance: Supervision  Quality of Gait: Pt amb with slow bonnie with decreased stride length and slight increase in B stance time. Pt appears safe and steady despite slow pace, no LOB. Gait Deviations: Slow Bonnie;Decreased step length  Distance: 2 x 30 feet  Comments: Pt declined to amb further with PT today for no particular reasons. Balance  Posture: Fair  Sitting - Static: Good  Sitting - Dynamic: Good  Standing - Static: Good;-  Standing - Dynamic: Good;-    Plan   Plan: D/C from acute PT services  Safety Devices: All fall risk precautions in place, Left in chair, Call light within reach, Nurse notified, Gait belt    AM-PAC Score  AM-PAC Inpatient Mobility without Stair Climbing Raw Score : 20 (10/12/20 1503)  AM-PAC Inpatient without Stair Climbing T-Scale Score : 60.57 (10/12/20 1503)  Mobility Inpatient CMS 0-100% Score: 0 (10/12/20 1503)  Mobility Inpatient without Stair CMS G-Code Modifier : [facility] (10/12/20 1503)    Goals  Short term goals  Time Frame for Short term goals: 1 week, 10/19/20 (unless otherwise specified)  Short term goal 1: Pt will transfer supine <-> sit with supervision - MET 10/12/20  Short term goal 2: Pt will transfer sit <-> stand and bed>chair using cane with supervision - MET 10/12/20  Short term goal 3: Pt will ambulate x 30 feet using cane or least AD with supervision without LOB - MET 10/12/20  Patient Goals   Patient goals :  \"To go home\"       Therapy Time   Individual Concurrent Group Co-treatment   Time In 1430         Time Out 1455         Minutes 25         Timed Code Treatment Minutes: [address] [place], [place] #[id number]

## 2020-10-12 NOTE — PROGRESS NOTES
[facility]   Daily Progress Note    Admit Date:  10/2/2020  HPI:    Chief Complaint   Patient presents with    Hyperglycemia     blood sugars have been over 600 for \"the last couple of days\" per patient; reading high at time of triage    Nausea     nausea and vomiting for two days     Chest Pain     for 3 months     admitted on 10/2/2020 with acute on chronic chest pain and SOB. Stress test 10/5 showed fixed defects. Echo 10/6 showed an EF of 15-20%. RHC and LHC 10/6 showed nonobstructive disease, elevated filling pressures and low CI. EP following for SVT. Has also been treated for hypotension and CHF. Lasix gtt was stopped 10/9/2020. Unable to start Entresto due to hypotension. Rhythm has been sinus with intermittent pacing. Hx of NICM, sCHF, s/p dual ICD as well as HTN, HLD, SVT, CVA, uncontrolled DM, mental retardation. Subjective:  Ms. Angeles Matos lying flat in bed, on stomach. Denies chest pain or shortness of breath. Objective:   /74   Pulse 72   Temp 98.5 °F (36.9 °C) (Oral)   Resp 18   Ht 5' 1\" (1.549 m)   Wt 200 lb 6.4 oz (90.9 kg)   SpO2 98%   BMI 37.87 kg/m²       Intake/Output Summary (Last 24 hours) at 10/12/2020 1224  Last data filed at 10/12/2020 1028  Gross per 24 hour   Intake 720 ml   Output 770 ml   Net -50 ml     Wt Readings from Last 3 Encounters:   10/12/20 200 lb 6.4 oz (90.9 kg)   09/27/20 215 lb (97.5 kg)   09/25/20 206 lb 8 oz (93.7 kg)         ASSESSMENT:   1. Chest pain - resolved  2. CHF, acute on chronic systolic and RHF - diuresed 13 lbs, symptoms improved, BNP down  3. Cardiomyopathy, non-ischemic - s/p [place] dual ICD, intermittently A paced  4. HTN - now hypotensive  5. SVT - on Amio 100 mg daily, digoxin 125 mcg QOD  6. DM - A1c 13.3, per IM  7. HLD -  Oct 2019 LDL 87 and Trig 142, on statin and fenofibrate  8. TIA      PLAN:  1. Urine out put remains good. Will hold diuretic today and resume po dose tomorrow  2.  Tolerating low dose Entresto this am - continue and monitor  3. Continue low dose evidence-based beta blocker   4. Continue Amiodarone and digoxin for SVT, PVC's and tachycardia  5. Stop Plavix, continue low dose aspirin for hx TIA 2018  6. Aim for discharge tomorrow. She has a sister that brings her to appointments but lives by herself. She has follow up appointment with me on 10/19/20 at [facility]. Will need her prescriptions sent to [address] [place] [address] as they deliver her medicines to her.      Samra Dobson, APRN - CNP, 10/12/2020, 12:24 PM  [facility]   [phone]       Telemetry: SR, intermittently Atrial paced, PVC's in single, pairs and triplets  NYHA: III    Physical Exam:  General:  Awake, alert, NAD  Skin:  Warm and dry  Neck:  JVP difficult to assess  Chest:  Clear to auscultation posterior  Cardiovascular:  RRR, normal S1S2, no m/g/r  Abdomen:  Soft, nontender, +bowel sounds  Extremities:  Trace pedal edema  right femoral site without ooze, bruise or hematoma, dressing C,D,I, 2+ pulse      Medications:    insulin glargine  20 Units Subcutaneous Nightly    metoprolol succinate  12.5 mg Oral Daily    miconazole   Topical BID    digoxin  125 mcg Oral Every Other Day    sacubitril-valsartan  0.5 tablet Oral BID    amiodarone  100 mg Oral Daily    FLUoxetine  10 mg Oral Daily    escitalopram  10 mg Oral Daily    DULoxetine  60 mg Oral Daily    sodium chloride flush  10 mL Intravenous 2 times per day    aspirin  81 mg Oral Daily    atorvastatin  40 mg Oral Nightly    clopidogrel  75 mg Oral Daily    fenofibrate  160 mg Oral Daily    ranolazine  500 mg Oral BID    topiramate  200 mg Oral Daily    enoxaparin  40 mg Subcutaneous Daily    insulin lispro  0-12 Units Subcutaneous TID WC    insulin lispro  0-6 Units Subcutaneous Nightly      dextrose         Lab Data: Lab results independently reviewed by myself 10/12/20   CBC:   Recent Labs     10/11/20  0400   WBC 6.9   HGB 13.8    gradient across pullback of the aortic valve. Right heart catheterization findings:   Right atrial pressure of 20  RV 45/7  PA 53/8  Pulmonary wedge pressure mean of 20  RA saturation 42%  PA saturation 45%  Aortic saturation 99%  Cardiac output 2.4  Cardiac index 1.24  SVR 2433      Echo June 2020:    Summary   LV systolic function is moderately reduced with an estimated EF of 35%. Moderate global hypokinesis. There is mild concentric left ventricular hypertrophy. Left ventricular cavity size is mildly dilated. Estimated LV diastolic filling pressure is normal.   Mild mitral and tricuspid regurgitation. Systolic pulmonary artery pressure (SPAP) is estimated at 35mmHg (Right atrial pressure of 8 mmHg). No significant change from exam done 10/18/2019. Stress Test January 2020:   Summary     Mildly reduced LVEF 45%     Inferolateral hypokinesis     Mainly fixed inferior defect suggestive of scar     No evidence of myocardium at risk for significant reversible ischemia. STRESS MPI 4/16/18   LV function is mild-moderately reduced with global hypokinesis and ejection  fraction of 41 %. There is normal isotope uptake at stress and rest. There is no evidence of  myocardial ischemia or scar. Echo:  4/14/18:  Summary   The left ventricular systolic function is mildly reduced with an ejection fraction of 45-50 %. The left ventricle is mildly dilated. There is hypokinesis of the basal inferoseptal, basal inferior and inferolateral wall. Grade II diastolic dysfunction with elevated filing pressure. Pacer / ICD wire is visualized in the right ventricle. A bubble study was performed and fails to show evidence of shunting.

## 2020-10-12 NOTE — PROGRESS NOTES
Occupational Therapy   Occupational Therapy Initial Assessment/Treatment/Discharge Summary  1x only  Date: 10/12/2020   Patient Name: Zeke Leo  MRN: [id number]     : 1960    Date of Service: 10/12/2020    Discharge Recommendations:  Home with assist PRN       Assessment   Assessment: Patient admitted with hypoglycemia, today supervision for moblity/transfers/ADLs with use of cane. No OT needs at this time, will sign off. All questions addressed. Prognosis: Good  Decision Making: Low Complexity  OT Education: OT Role;Plan of Care;ADL Adaptive Strategies;Transfer Training  Patient Education: Disease Specific:  REQUIRES OT FOLLOW UP: No  Activity Tolerance  Activity Tolerance: Patient Tolerated treatment well  Safety Devices  Safety Devices in place: Yes  Type of devices: Left in chair;Nurse notified;Call light within reach;Gait belt           Patient Diagnosis(es): The primary encounter diagnosis was Type 2 diabetes mellitus with hyperglycemia, with long-term current use of insulin ([address] Diagnoses of Non-intractable vomiting with nausea, unspecified vomiting type, Chest pain, unspecified type, Acute on chronic combined systolic and diastolic CHF (congestive heart failure) ([address] Elevated troponin, Acute pulmonary edema ([address] and Dual ICD (implantable cardioverter-defibrillator) in place were also pertinent to this visit. has a past medical history of Arthritis, CAD (coronary artery disease), Cerebral artery occlusion with cerebral infarction ([address] CHF (congestive heart failure) ([address] Diabetes mellitus ([address] Hyperlipidemia, Hypertension, Mental retardation, and MI (myocardial infarction) ([address] has a past surgical history that includes back surgery; Pacemaker insertion; tumor removal; and Tubal ligation.            Restrictions  Restrictions/Precautions  Restrictions/Precautions: General Precautions  Position Activity Restriction  Other position/activity restrictions: Medium fall risk per nursing assessment, telemetry    Subjective   General  Chart Reviewed: Yes  Patient assessed for rehabilitation services?: Yes  Referring Practitioner: FRITZ Arguelles CNP 10/12/2020  Diagnosis: Hyperglycemia  Subjective  Subjective: Pt resting in bed agreeable to OOB to chair and using the bathroom. Patient Currently in Pain: No  Vital Signs  Patient Currently in Pain: No  Social/Functional History  Social/Functional History  Lives With: Alone  Type of Home: Apartment  Home Layout: One level  Home Access: Level entry  Bathroom Shower/Tub: Tub/Shower unit  Bathroom Equipment: Shower chair  Home Equipment: Cane, Rolling walker  ADL Assistance: Independent  Homemaking Assistance: Independent  Ambulation Assistance: Independent(using cane)  Transfer Assistance: Independent  Active : No  Patient's  Info: Sister  Mode of Transportation: Family  Occupation: Retired  Type of occupation:   Leisure & Hobbies:  \"Getting my nails done\"       Objective           Observation/Palpation  Posture: Fair  Balance  Sitting Balance: Modified independent   Standing Balance: Supervision(with cane)    Functional Mobility  Functional - Mobility Device: Cane  Activity: To/from bathroom  Assist Level: Supervision    Toilet Transfers  Toilet - Technique: Ambulating(with cane)  Equipment Used: Standard toilet  Toilet Transfer: Supervision     ADL  Grooming: Supervision  LE Dressing: Supervision(donning briefs and socks)  Toileting: Supervision     Tone RUE  RUE Tone: Normotonic  Tone LUE  LUE Tone: Normotonic  Coordination  Movements Are Fluid And Coordinated: Yes     Bed mobility  Supine to Sit: Supervision(to L with HOB elevated)  Sit to Supine: Unable to assess(up to chair at end of session)  Transfers  Sit to stand: Supervision  Stand to sit: Supervision     Cognition  Overall Cognitive Status: Exceptions  Memory: Decreased short term memory  Cognition Comment: perseverating on [employer]" and her juan pablo bear her mother gave her        LUE AROM (degrees)  LUE AROM : WFL  RUE AROM (degrees)  RUE AROM : WFL  LUE Strength  Gross LUE Strength: WFL  RUE Strength  Gross RUE Strength: WFL         Plan   Plan  Times per week: 1x only    AM-PAC Score        AM-PAC Inpatient Daily Activity Raw Score: 24 (10/12/20 1457)  AM-PAC Inpatient ADL T-Scale Score : 57.54 (10/12/20 1457)  ADL Inpatient CMS 0-100% Score: 0 (10/12/20 1457)  ADL Inpatient CMS G-Code Modifier : [facility] (10/12/20 1457)    Goals  Short term goals  Time Frame for Short term goals: 1 session  Short term goal 1: Pt will complete toilet transfers with SBA-STG met 10/12  Patient Goals   Patient goals : \"to go home\"       Therapy Time   Individual Concurrent Group Co-treatment   Time In 1430         Time Out 1455         Minutes 25         Timed Code Treatment Minutes: [address] [place] [address] [address], OTR/L

## 2020-10-12 NOTE — PLAN OF CARE
Problem: Falls - Risk of:  Goal: Will remain free from falls  Description: Will remain free from falls  Outcome: Ongoing   Fall precautions in place, bed alarm on, nonskid foot wear applied, bed in lowest position, and call light within reach. Will continue to monitor. Problem: HEMODYNAMIC STATUS  Goal: Patient has stable vital signs and fluid balance  Outcome: Ongoing     Patient's EF (Ejection Fraction) is greater than 40%    Heart Failure Medications:  Diuretics[de-identified] None    (One of the following REQUIRED for EF <40%/SYSTOLIC FAILURE but MAY be used in EF% >40%/DIASTOLIC FAILURE)        ACE[de-identified] None        ARB[de-identified] None         ARNI[de-identified] Sacubitril/Valsartan-Entresto    (Beta Blockers)  NON- Evidenced Based Beta Blocker (for EF% >40%/DIASTOLIC FAILURE): Metoprolol TARTrate- Lopressor    Evidenced Based Beta Blocker::(REQUIRED for EF% <40%/SYSTOLIC FAILURE) Metoprolol SUCCinate- Toprol XL  . .................................................................................................................................................. Patient's weights and intake/output reviewed: Yes    Patient's Last Weight: 200 lbs obtained by standing scale. Difference of 0 lbs     than last documented weight. Intake/Output Summary (Last 24 hours) at 10/12/2020 0022  Last data filed at 10/11/2020 2336  Gross per 24 hour   Intake 960 ml   Output 720 ml   Net 240 ml       Comorbidities Reviewed Yes    Patient has a past medical history of Arthritis, CAD (coronary artery disease), Cerebral artery occlusion with cerebral infarction ([facility] CHF (congestive heart failure) ([facility] [address] [facility] Diabetes mellitus ([facility] [facility] Hyperlipidemia, Hypertension, Mental retardation, and MI (myocardial infarction) ([facility] [address]      >>For CHF and Comorbidity documentation on Education Time and Topics, please see Education Tab    Progressive Mobility Assessment:  What is this patient's Current Level of Mobility?: Ambulatory-Up Ad Lois  How was this patient Mobilized today?: Edge of Bed, Up to Chair, Bedside Commode,  Up to Toilet/Shower, Up in Room, and Up in Hallway                 With Whom? Self                 Level of Difficulty/Assistance: Independent     Pt resting in bed at this time on room air. Pt denies shortness of breath. Pt without lower extremity edema.      Patient and/or Family's stated Goal of Care this Admission: reduce shortness of breath, increase activity tolerance, better understand heart failure and disease management, be more comfortable, and reduce lower extremity edema prior to discharge    The patient will demonstrate an understanding of blood sugar control by verbalizing  with the goal of completion at discharge.     :

## 2020-10-12 NOTE — PROGRESS NOTES
Hospitalist Progress Note      PCP: Juancho Carrera, APRN - NP    Date of Admission: 10/2/2020    Chief Complaint: Chest pain    Hospital Course: 61 yo female presented with SOB and chest pain     Subjective: EMR and notes reviewed pt seen and examined. No new complaints, denies chest pain or sob. Medications:  Reviewed    Infusion Medications    dextrose       Scheduled Medications    insulin glargine  20 Units Subcutaneous Nightly    metoprolol succinate  12.5 mg Oral Daily    miconazole   Topical BID    digoxin  125 mcg Oral Every Other Day    sacubitril-valsartan  0.5 tablet Oral BID    amiodarone  100 mg Oral Daily    FLUoxetine  10 mg Oral Daily    escitalopram  10 mg Oral Daily    DULoxetine  60 mg Oral Daily    sodium chloride flush  10 mL Intravenous 2 times per day    aspirin  81 mg Oral Daily    atorvastatin  40 mg Oral Nightly    clopidogrel  75 mg Oral Daily    fenofibrate  160 mg Oral Daily    ranolazine  500 mg Oral BID    topiramate  200 mg Oral Daily    enoxaparin  40 mg Subcutaneous Daily    insulin lispro  0-12 Units Subcutaneous TID WC    insulin lispro  0-6 Units Subcutaneous Nightly     PRN Meds: perflutren lipid microspheres, sodium chloride flush, sodium chloride flush, potassium chloride **OR** potassium alternative oral replacement **OR** potassium chloride, magnesium sulfate, acetaminophen **OR** acetaminophen, promethazine **OR** ondansetron, glucose, dextrose, glucagon (rDNA), dextrose      Intake/Output Summary (Last 24 hours) at 10/12/2020 1118  Last data filed at 10/12/2020 1028  Gross per 24 hour   Intake 720 ml   Output 770 ml   Net -50 ml       Physical Exam Performed:    /78   Pulse 75   Temp 97.6 °F (36.4 °C) (Oral)   Resp 18   Ht 5' 1\" (1.549 m)   Wt 200 lb 6.4 oz (90.9 kg)   SpO2 99%   BMI 37.87 kg/m²     General appearance: No apparent distress, appears stated age and cooperative. HEENT: Pupils equal, round, and reactive to light. Conjunctivae/corneas clear. Neck: Supple, with full range of motion. No jugular venous distention. Trachea midline. Respiratory:  Normal respiratory effort. Clear to auscultation, bilaterally without Rales/Wheezes/Rhonchi. Cardiovascular: Regular rate and rhythm with normal S1/S2 without murmurs, rubs or gallops. Abdomen: Soft, non-tender, non-distended with normal bowel sounds. Musculoskeletal: No clubbing, cyanosis or edema bilaterally. Skin: Skin color, texture, turgor normal.  No rashes or lesions. Neurologic:  Neurovascularly intact without any focal sensory/motor deficits. Cranial nerves: II-XII intact, grossly non-focal.  Psychiatric: Alert and oriented, thought content appropriate, normal insight  Capillary Refill: Brisk,< 3 seconds   Peripheral Pulses: +2 palpable, equal bilaterally       Labs:   Recent Labs     10/11/20  0400   WBC 6.9   HGB 13.8   HCT 41.6        Recent Labs     10/10/20  0440 10/11/20  0400 10/12/20  0315   * 136 134*   K 3.7 4.0  4.0 3.6    104 102   CO2 25 25 24   BUN 19 18 20   CREATININE 0.8 0.6 0.7   CALCIUM 8.1* 8.4 8.4     No results for input(s): AST, ALT, BILIDIR, BILITOT, ALKPHOS in the last 72 hours. No results for input(s): INR in the last 72 hours. No results for input(s): Genia Belts in the last 72 hours. Urinalysis:      Lab Results   Component Value Date    NITRU Negative 10/02/2020    WBCUA 10-20 06/12/2020    BACTERIA Rare 05/28/2020    RBCUA 3-4 06/12/2020    BLOODU Negative 10/02/2020    SPECGRAV 1.010 10/02/2020    GLUCOSEU >=1000 10/02/2020       Radiology:  NM Cardiac Stress Test Nuclear Imaging   Final Result      IR PICC WO SQ PORT/PUMP > 5 YEARS   Final Result   Successful placement of PICC line. XR CHEST PORTABLE   Final Result   Cardiomegaly with increasing interstitial edema.                  Assessment/Plan:    Active Hospital Problems    Diagnosis    Type 2 diabetes mellitus with hyperglycemia, with long-term current use of insulin ([facility]) [E11.65, Z79.4]    Chest pain [R07.9]    Nonischemic cardiomyopathy ([facility] [address] [I42.8]    Dual ICD (implantable cardioverter-defibrillator) in place [Z95.810]     Chest pain: POA Multifactorial in the setting of acute on chronic systolic heart failure and CAD and DVT   Stress test was abnormal and S/p Cardiac cath.   S/p LHC- Mild non-obstructive coronary artery disease with known severe left ventricular dysfunction Moderate pulmonary hypertension with decompensated hemodynamics    SVT- rate now sinus  - cont tele   - EP following  - cont amiodarone       Acute on chronic systolic heart failure, recent echo on 6/13/2020 showing LVEF of 35%, continue home regimen, continue Lasix infusion discontinued  -  Cardiology managing   - Meds: trailing  entresto, and BB asa and Plavix   - I/O     History of ICD in situ, monitor on telemetry. Digoxin every other day and Amiodarone     Diabetes mellitus type 2, uncontrolled, continue insulin sliding scale, monitor. Restarted Lantus last night. Blood sugar is only 80 today dropped from 400.  Reduce Lantus dose    DVT Prophylaxis: Lovenox  Diet: DIET GENERAL;  Code Status: Full Code    PT/OT Eval Status: PT/OT     Dispo - suspect 1-2 days, recs from PT/OT appreciated     FRITZ Flores - CELESTINO

## 2020-10-13 LAB
ANION GAP SERPL CALCULATED.3IONS-SCNC: 9 MMOL/L (ref 3–16)
BUN BLDV-MCNC: 16 MG/DL (ref 7–20)
CALCIUM SERPL-MCNC: 8.3 MG/DL (ref 8.3–10.6)
CHLORIDE BLD-SCNC: 108 MMOL/L (ref 99–110)
CO2: 22 MMOL/L (ref 21–32)
CREAT SERPL-MCNC: 0.6 MG/DL (ref 0.6–1.1)
GFR AFRICAN AMERICAN: >60
GFR NON-AFRICAN AMERICAN: >60
GLUCOSE BLD-MCNC: 141 MG/DL (ref 70–99)
GLUCOSE BLD-MCNC: 203 MG/DL (ref 70–99)
GLUCOSE BLD-MCNC: 254 MG/DL (ref 70–99)
GLUCOSE BLD-MCNC: 79 MG/DL (ref 70–99)
GLUCOSE BLD-MCNC: 82 MG/DL (ref 70–99)
PERFORMED ON: ABNORMAL
PERFORMED ON: NORMAL
POTASSIUM SERPL-SCNC: 3.4 MMOL/L (ref 3.5–5.1)
PRO-BNP: 662 PG/ML (ref 0–124)
SODIUM BLD-SCNC: 139 MMOL/L (ref 136–145)

## 2020-10-13 PROCEDURE — 83880 ASSAY OF NATRIURETIC PEPTIDE: CPT

## 2020-10-13 PROCEDURE — 6360000002 HC RX W HCPCS: Performed by: INTERNAL MEDICINE

## 2020-10-13 PROCEDURE — 6370000000 HC RX 637 (ALT 250 FOR IP): Performed by: INTERNAL MEDICINE

## 2020-10-13 PROCEDURE — 99233 SBSQ HOSP IP/OBS HIGH 50: CPT | Performed by: NURSE PRACTITIONER

## 2020-10-13 PROCEDURE — 6370000000 HC RX 637 (ALT 250 FOR IP): Performed by: NURSE PRACTITIONER

## 2020-10-13 PROCEDURE — 2580000003 HC RX 258: Performed by: INTERNAL MEDICINE

## 2020-10-13 PROCEDURE — 80048 BASIC METABOLIC PNL TOTAL CA: CPT

## 2020-10-13 PROCEDURE — 36592 COLLECT BLOOD FROM PICC: CPT

## 2020-10-13 PROCEDURE — 1200000000 HC SEMI PRIVATE

## 2020-10-13 RX ORDER — METOPROLOL SUCCINATE 25 MG/1
12.5 TABLET, EXTENDED RELEASE ORAL DAILY
Qty: 30 TABLET | Refills: 3 | Status: ON HOLD | OUTPATIENT
Start: 2020-10-14 | End: 2020-11-28 | Stop reason: HOSPADM

## 2020-10-13 RX ORDER — AMIODARONE HYDROCHLORIDE 100 MG/1
100 TABLET ORAL DAILY
Qty: 30 TABLET | Refills: 3 | Status: SHIPPED | OUTPATIENT
Start: 2020-10-14 | End: 2020-11-10 | Stop reason: SDUPTHER

## 2020-10-13 RX ORDER — SPIRONOLACTONE 25 MG/1
25 TABLET ORAL DAILY
Qty: 30 TABLET | Refills: 3 | Status: ON HOLD | OUTPATIENT
Start: 2020-10-13 | End: 2020-11-28 | Stop reason: HOSPADM

## 2020-10-13 RX ADMIN — INSULIN LISPRO 2 UNITS: 100 INJECTION, SOLUTION INTRAVENOUS; SUBCUTANEOUS at 13:11

## 2020-10-13 RX ADMIN — FLUOXETINE 10 MG: 10 CAPSULE ORAL at 07:55

## 2020-10-13 RX ADMIN — RANOLAZINE 500 MG: 500 TABLET, FILM COATED, EXTENDED RELEASE ORAL at 07:55

## 2020-10-13 RX ADMIN — ASPIRIN 81 MG: 81 TABLET ORAL at 07:55

## 2020-10-13 RX ADMIN — Medication 10 ML: at 22:24

## 2020-10-13 RX ADMIN — Medication 10 ML: at 22:16

## 2020-10-13 RX ADMIN — MICONAZOLE NITRATE: 20 POWDER TOPICAL at 22:23

## 2020-10-13 RX ADMIN — FENOFIBRATE 160 MG: 160 TABLET ORAL at 07:55

## 2020-10-13 RX ADMIN — Medication 10 ML: at 07:54

## 2020-10-13 RX ADMIN — SACUBITRIL AND VALSARTAN 0.5 TABLET: 24; 26 TABLET, FILM COATED ORAL at 07:54

## 2020-10-13 RX ADMIN — METOPROLOL SUCCINATE 12.5 MG: 25 TABLET, EXTENDED RELEASE ORAL at 07:55

## 2020-10-13 RX ADMIN — SACUBITRIL AND VALSARTAN 0.5 TABLET: 24; 26 TABLET, FILM COATED ORAL at 22:16

## 2020-10-13 RX ADMIN — TOPIRAMATE 200 MG: 100 TABLET, FILM COATED ORAL at 07:55

## 2020-10-13 RX ADMIN — INSULIN LISPRO 3 UNITS: 100 INJECTION, SOLUTION INTRAVENOUS; SUBCUTANEOUS at 22:17

## 2020-10-13 RX ADMIN — POTASSIUM BICARBONATE 40 MEQ: 782 TABLET, EFFERVESCENT ORAL at 10:43

## 2020-10-13 RX ADMIN — ATORVASTATIN CALCIUM 40 MG: 40 TABLET, FILM COATED ORAL at 22:16

## 2020-10-13 RX ADMIN — ESCITALOPRAM OXALATE 10 MG: 10 TABLET ORAL at 07:55

## 2020-10-13 RX ADMIN — MICONAZOLE NITRATE: 20 POWDER TOPICAL at 07:53

## 2020-10-13 RX ADMIN — FUROSEMIDE 40 MG: 40 TABLET ORAL at 07:56

## 2020-10-13 RX ADMIN — INSULIN LISPRO 4 UNITS: 100 INJECTION, SOLUTION INTRAVENOUS; SUBCUTANEOUS at 18:40

## 2020-10-13 RX ADMIN — ENOXAPARIN SODIUM 40 MG: 40 INJECTION SUBCUTANEOUS at 07:54

## 2020-10-13 RX ADMIN — RANOLAZINE 500 MG: 500 TABLET, FILM COATED, EXTENDED RELEASE ORAL at 22:16

## 2020-10-13 RX ADMIN — INSULIN GLARGINE 20 UNITS: 100 INJECTION, SOLUTION SUBCUTANEOUS at 22:17

## 2020-10-13 RX ADMIN — DULOXETINE HYDROCHLORIDE 60 MG: 60 CAPSULE, DELAYED RELEASE ORAL at 07:56

## 2020-10-13 RX ADMIN — AMIODARONE HYDROCHLORIDE 100 MG: 200 TABLET ORAL at 07:55

## 2020-10-13 ASSESSMENT — PAIN DESCRIPTION - PAIN TYPE
TYPE: ACUTE PAIN
TYPE: ACUTE PAIN

## 2020-10-13 ASSESSMENT — PAIN SCALES - GENERAL
PAINLEVEL_OUTOF10: 0

## 2020-10-13 NOTE — PROGRESS NOTES
[facility]   Daily Progress Note    Admit Date:  10/2/2020  HPI:    Chief Complaint   Patient presents with    Hyperglycemia     blood sugars have been over 600 for \"the last couple of days\" per patient; reading high at time of triage    Nausea     nausea and vomiting for two days     Chest Pain     for 3 months     admitted on 10/2/2020 with acute on chronic chest pain and SOB. Stress test 10/5 showed fixed defects. Echo 10/6 showed an EF of 15-20%. RHC and LHC 10/6 showed nonobstructive disease, elevated filling pressures and low CI. EP following for SVT. Has also been treated for hypotension and CHF. Lasix gtt was stopped 10/9/2020. Unable to start Entresto due to hypotension. Rhythm has been sinus with intermittent pacing. Hx of NICM, sCHF, s/p dual ICD as well as HTN, HLD, SVT, CVA, uncontrolled DM, mental retardation. Subjective:  Ms. Marin Sox lying flat in bed, more awake today. Still with \"some\" chest pain but then rates it 9/10. Also states breathing improved. No cough. Appetite good. Objective:   /70   Pulse 78   Temp 98.1 °F (36.7 °C) (Oral)   Resp 16   Ht 5' 1\" (1.549 m)   Wt 200 lb 1.6 oz (90.8 kg)   SpO2 98%   BMI 37.81 kg/m²       Intake/Output Summary (Last 24 hours) at 10/13/2020 1021  Last data filed at 10/13/2020 0909  Gross per 24 hour   Intake 837 ml   Output 1300 ml   Net -463 ml     Wt Readings from Last 3 Encounters:   10/13/20 200 lb 1.6 oz (90.8 kg)   09/27/20 215 lb (97.5 kg)   09/25/20 206 lb 8 oz (93.7 kg)         ASSESSMENT:   1. Chest pain - atypical of angina  2. CHF, acute on chronic systolic and RHF - diuresed 13 lbs, symptoms improved, BNP down, weight stable   3. Cardiomyopathy, non-ischemic - s/p [place] dual ICD, intermittently A paced  4. HTN - now hypotensive  5. SVT/ NSVT - on Amio 100 mg daily, digoxin 125 mcg QOD  6. DM - A1c 13.3, per IM  7.  HLD -  Oct 2019 LDL 87 and Trig 142, on statin and fenofibrate  8. TIA      PLAN:  1. Replete K today  2. Change Lasix to spironolactone 25 mg once daily  3. Continue low dose Entresto and Toprol XL  4. Continue Amiodarone and digoxin  5. Okay for discharge from cardiac perspective. Will review cardiac medications on discharge medication reconciliation form and send to [place] pharmacy for delivery. Spoke to Renaldo Nelson, they will deliver meds to patient tomorrow. Patient has follow up appointment with me in 6 days.       FRITZ June - CNP, 10/13/2020, 10:21 AM  [facility]   [phone]       Telemetry: SR, intermittently Atrial paced, PVC's in single, pairs and triplets  NYHA: III    Physical Exam:  General:  Awake, alert, NAD  Skin:  Warm and dry  Neck:  JVP difficult to assess  Chest:  Clear to auscultation posterior  Cardiovascular:  RRR, normal S1S2, no m/g/r  Abdomen:  Soft, nontender, +bowel sounds  Extremities:  Trace pedal edema        Medications:    furosemide  40 mg Oral Daily    insulin glargine  20 Units Subcutaneous Nightly    metoprolol succinate  12.5 mg Oral Daily    miconazole   Topical BID    digoxin  125 mcg Oral Every Other Day    sacubitril-valsartan  0.5 tablet Oral BID    amiodarone  100 mg Oral Daily    FLUoxetine  10 mg Oral Daily    escitalopram  10 mg Oral Daily    DULoxetine  60 mg Oral Daily    sodium chloride flush  10 mL Intravenous 2 times per day    aspirin  81 mg Oral Daily    atorvastatin  40 mg Oral Nightly    fenofibrate  160 mg Oral Daily    ranolazine  500 mg Oral BID    topiramate  200 mg Oral Daily    enoxaparin  40 mg Subcutaneous Daily    insulin lispro  0-12 Units Subcutaneous TID     insulin lispro  0-6 Units Subcutaneous Nightly      dextrose         Lab Data: Lab results independently reviewed by myself 10/12/20   CBC:   Recent Labs     10/11/20  0400   WBC 6.9   HGB 13.8        BMP:    Recent Labs     10/11/20  0400 10/12/20  0315 10/13/20  0615   NA 136 134* 139   K 4.0  4.0 3.6 3.4*   CO2 25 24 22   BUN 18 20 16   CREATININE 0.6 0.7 0.6     INR:  No results for input(s): INR in the last 72 hours. BNP:    Recent Labs     10/13/20  0615   PROBNP 662*     Cardiac Enzymes: No results for input(s): TROPONINI in the last 72 hours. Lipids:   Lab Results   Component Value Date    TRIG 142 10/12/2019    TRIG 251 10/08/2019    HDL 51 10/12/2019    HDL 43 10/08/2019    LDLCALC 87 10/12/2019    LDLCALC 108 10/08/2019       Cardiac Imaging:    Stress test 10/5/2020:  Dilated LV with severe global hypokinesis. Large, severe, fixed perfusion     defect of the inferior/inferolateral wall consistent with scar. Diminished     uptake of the anterior wall consistent with breast artifact. Post stress     LVEF is abnormal at 19%. Abnormal study. Overall findings represent a high     risk scan. Echo 10/5/20  Summary   Limited only f/u for LVEF. The left ventricular systolic function is severely reduced with an ejection   fraction of 15-20 %. There is severe global hypokinesis with regional variations. Changes noted from previous echo on 6- in left ventricular function     Cardiac cath 10/6/20  Findings:  1. Left main coronary artery was normal. It gave off the left anterior descending artery and left circumflex. 2. Left anterior descending artery has mild atherosclerotic disease. It was moderate in size. It gave off septal perforators and a moderate sized diagonal branch. The LAD covered the entire apex of the left ventricle. 3. Left circumflex has mild atherosclerotic disease. It was moderate in size. There was a moderate sized obtuse marginal branch. 4. Right coronary artery has mild atherosclerotic disease. It was moderate in size and was the dominant artery. 5. Left ventriculogram was not performed. Left ventricular end diastolic pressure was 26. There is no gradient across pullback of the aortic valve.    Right heart catheterization findings: Right atrial pressure of 20  RV 45/7  PA 53/8  Pulmonary wedge pressure mean of 20  RA saturation 42%  PA saturation 45%  Aortic saturation 99%  Cardiac output 2.4  Cardiac index 1.24  SVR 2433      Echo June 2020:    Summary   LV systolic function is moderately reduced with an estimated EF of 35%. Moderate global hypokinesis. There is mild concentric left ventricular hypertrophy. Left ventricular cavity size is mildly dilated. Estimated LV diastolic filling pressure is normal.   Mild mitral and tricuspid regurgitation. Systolic pulmonary artery pressure (SPAP) is estimated at 35mmHg (Right atrial pressure of 8 mmHg). No significant change from exam done 10/18/2019. Stress Test January 2020:   Summary     Mildly reduced LVEF 45%     Inferolateral hypokinesis     Mainly fixed inferior defect suggestive of scar     No evidence of myocardium at risk for significant reversible ischemia. STRESS MPI 4/16/18   LV function is mild-moderately reduced with global hypokinesis and ejection  fraction of 41 %. There is normal isotope uptake at stress and rest. There is no evidence of  myocardial ischemia or scar. Echo:  4/14/18:  Summary   The left ventricular systolic function is mildly reduced with an ejection fraction of 45-50 %. The left ventricle is mildly dilated. There is hypokinesis of the basal inferoseptal, basal inferior and inferolateral wall. Grade II diastolic dysfunction with elevated filing pressure. Pacer / ICD wire is visualized in the right ventricle. A bubble study was performed and fails to show evidence of shunting.

## 2020-10-13 NOTE — PROGRESS NOTES
Comprehensive Nutrition Assessment    Type and Reason for Visit:  Initial, RD Nutrition Re-Screen/LOS    Nutrition Recommendations/Plan:   1. Modify diet to carb control, no added salt, 2000 mL fluid restriction   2. Encourage PO intakes   3. Monitor nutrition adequacy, pertinent labs, bowel habits, wt changes, and clinical progress    Nutrition Assessment:  Pt admitted with SOB and chest pain. Hx of CAD, CHF, DM, HLD, HTN and mental retardation. S/p heart cath. Pt nutritionally at risk AEB fluxuating PO intakes this admission. Pt reports appetite improved today, consumed 100% of breakfast. Encouraged continued good PO intakes. BG elevated this admission, will modify diet to carb control. Will continue to monitor. Malnutrition Assessment:  Malnutrition Status: At risk for malnutrition (Comment)      Estimated Daily Nutrient Needs:  Energy (kcal):  0027-1521 kcal; Weight Used for Energy Requirements:  Ideal(48 kg)     Protein (g):  57-72 g; Weight Used for Protein Requirements:  Ideal(1.2-1.5 g/kg)        Fluid (ml/day):  Less than 2000 mL daily for CHF;  Weight Used for Fluid Requirements:  [place]      Nutrition Related Findings:  Active BS, Last BM 10/3, BG elevated, A1c of 13.3 on 9/19/20      Wounds:  None       Current Nutrition Therapies:    DIET GENERAL; Daily Fluid Restriction: 2000 ml    Anthropometric Measures:  · Height: 5' 1\" (154.9 cm)  · Current Body Weight: 200 lb (90.7 kg)   · Usual Body Weight: (~200 lb per EMR)     · Ideal Body Weight: 105 lbs; % Ideal Body Weight 190.5 %   · BMI: 37.8  · BMI Categories: Obese Class 2 (BMI 35.0 -39.9)       Nutrition Diagnosis:   · Inadequate oral intake related to inadequate protein-energy intake as evidenced by intake 0-25%, intake 26-50%      Nutrition Interventions:   Food and/or Nutrient Delivery:  Modify Current Diet  Coordination of Nutrition Care:  Continued Inpatient Monitoring    Goals:  Pt will consume greater than 50% of meals this admission Nutrition Monitoring and Evaluation:  Food/Nutrient Intake Outcomes:  Food and Nutrient Intake  Physical Signs/Symptoms Outcomes:  Biochemical Data, Weight     Discharge Planning:    Continue current diet     Electronically signed by Maggy Mckeon MS, RD, LD on 10/13/20 at 2:58 PM EDT    Contact: [phone]

## 2020-10-13 NOTE — PROGRESS NOTES
Hospitalist Progress Note      PCP: FRITZ Downing Cha, NP    Date of Admission: 10/2/2020    Chief Complaint: Chest pain    Hospital Course: 61 yo female presented with SOB and chest pain     Subjective: EMR and notes reviewed pt seen and examined. No new complaints, denies chest pain or sob. Medications:  Reviewed    Infusion Medications    dextrose       Scheduled Medications    furosemide  40 mg Oral Daily    insulin glargine  20 Units Subcutaneous Nightly    metoprolol succinate  12.5 mg Oral Daily    miconazole   Topical BID    digoxin  125 mcg Oral Every Other Day    sacubitril-valsartan  0.5 tablet Oral BID    amiodarone  100 mg Oral Daily    FLUoxetine  10 mg Oral Daily    escitalopram  10 mg Oral Daily    DULoxetine  60 mg Oral Daily    sodium chloride flush  10 mL Intravenous 2 times per day    aspirin  81 mg Oral Daily    atorvastatin  40 mg Oral Nightly    fenofibrate  160 mg Oral Daily    ranolazine  500 mg Oral BID    topiramate  200 mg Oral Daily    enoxaparin  40 mg Subcutaneous Daily    insulin lispro  0-12 Units Subcutaneous TID WC    insulin lispro  0-6 Units Subcutaneous Nightly     PRN Meds: perflutren lipid microspheres, sodium chloride flush, sodium chloride flush, potassium chloride **OR** potassium alternative oral replacement **OR** potassium chloride, magnesium sulfate, acetaminophen **OR** acetaminophen, promethazine **OR** ondansetron, glucose, dextrose, glucagon (rDNA), dextrose      Intake/Output Summary (Last 24 hours) at 10/13/2020 0819  Last data filed at 10/13/2020 0753  Gross per 24 hour   Intake 717 ml   Output 1300 ml   Net -583 ml       Physical Exam Performed:    /70   Pulse 78   Temp 98.1 °F (36.7 °C) (Oral)   Resp 16   Ht 5' 1\" (1.549 m)   Wt 200 lb 1.6 oz (90.8 kg)   SpO2 98%   BMI 37.81 kg/m²     General appearance: No apparent distress, appears stated age and cooperative. HEENT: Pupils equal, round, and reactive to light. Conjunctivae/corneas clear. Neck: Supple, with full range of motion. No jugular venous distention. Trachea midline. Respiratory:  Normal respiratory effort. Clear to auscultation, bilaterally without Rales/Wheezes/Rhonchi. Cardiovascular: Regular rate and rhythm with normal S1/S2 without murmurs, rubs or gallops. Abdomen: Soft, non-tender, non-distended with normal bowel sounds. Musculoskeletal: No clubbing, cyanosis or edema bilaterally. Skin: Skin color, texture, turgor normal.  No rashes or lesions. Neurologic:  Neurovascularly intact without any focal sensory/motor deficits. Cranial nerves: II-XII intact, grossly non-focal.  Psychiatric: Alert and oriented, thought content appropriate, normal insight  Capillary Refill: Brisk,< 3 seconds   Peripheral Pulses: +2 palpable, equal bilaterally       Labs:   Recent Labs     10/11/20  0400   WBC 6.9   HGB 13.8   HCT 41.6        Recent Labs     10/11/20  0400 10/12/20  0315 10/13/20  0615    134* 139   K 4.0  4.0 3.6 3.4*    102 108   CO2 25 24 22   BUN 18 20 16   CREATININE 0.6 0.7 0.6   CALCIUM 8.4 8.4 8.3     No results for input(s): AST, ALT, BILIDIR, BILITOT, ALKPHOS in the last 72 hours. No results for input(s): INR in the last 72 hours. No results for input(s): Vinny Favre in the last 72 hours. Urinalysis:      Lab Results   Component Value Date    NITRU Negative 10/02/2020    WBCUA 10-20 06/12/2020    BACTERIA Rare 05/28/2020    RBCUA 3-4 06/12/2020    BLOODU Negative 10/02/2020    SPECGRAV 1.010 10/02/2020    GLUCOSEU >=1000 10/02/2020       Radiology:  NM Cardiac Stress Test Nuclear Imaging   Final Result      IR PICC WO SQ PORT/PUMP > 5 YEARS   Final Result   Successful placement of PICC line. XR CHEST PORTABLE   Final Result   Cardiomegaly with increasing interstitial edema.                  Assessment/Plan:    Active Hospital Problems    Diagnosis    Acute pulmonary edema ([address] [J81.0]    Mixed hyperlipidemia [E78.2]    Type 2 diabetes mellitus with hyperglycemia, with long-term current use of insulin ([facility]) [E11.65, Z79.4]    Chest pain [R07.9]    Nonischemic cardiomyopathy ([facility] [address] [I42.8]    Dual ICD (implantable cardioverter-defibrillator) in place [Z95.810]     Chest pain: POA Multifactorial in the setting of acute on chronic systolic heart failure and CAD and DVT   Stress test was abnormal and S/p Cardiac cath.   S/p LHC- Mild non-obstructive coronary artery disease with known severe left ventricular dysfunction Moderate pulmonary hypertension with decompensated hemodynamics    SVT- rate now sinus  - cont tele   - EP following  - cont amiodarone       Acute on chronic systolic heart failure, recent echo on 6/13/2020 showing LVEF of 35%, continue home regimen, continue Lasix infusion discontinued  -  Cardiology managing   - Meds: trailing  entresto, and BB asa and Plavix   - I/O     History of ICD in situ, monitor on telemetry. Digoxin every other day and Amiodarone     Diabetes mellitus type 2, uncontrolled, continue insulin sliding scale, monitor. Restarted Lantus last night. Blood sugar is only 80 today dropped from 400.  Reduce Lantus dose    DVT Prophylaxis: Lovenox  Diet: DIET GENERAL; Daily Fluid Restriction: 2000 ml  Code Status: Full Code    PT/OT Eval Status: PT/OT     Dispo - suspect 1-2 days, recs from PT/OT appreciated     Claudeen Bonds, APRN - CNP

## 2020-10-13 NOTE — PLAN OF CARE
Patient's EF (Ejection Fraction) is less than 40%    Heart Failure Medications:   Diuretics[de-identified] None     (One of the following REQUIRED for EF <40%/SYSTOLIC FAILURE but MAY be used in EF% >40%/DIASTOLIC FAILURE)        ACE[de-identified] None        ARB[de-identified] None         ARNI[de-identified] Sacubitril/Valsartan-Entresto    (Beta Blockers)   NON- Evidenced Based Beta Blocker (for EF% >40%/DIASTOLIC FAILURE): None     Evidenced Based Beta Blocker::(REQUIRED for EF% <40%/SYSTOLIC FAILURE) Metoprolol SUCCinate- Toprol XL  . .................................................................................................................................................. Patient's weights and intake/output reviewed: Yes    Patient's Last Weight: 200.6 lbs obtained by standing scale. Difference of 0 lbs less than last documented weight. Intake/Output Summary (Last 24 hours) at 10/13/2020 1731  Last data filed at 10/13/2020 1436  Gross per 24 hour   Intake 837 ml   Output 1700 ml   Net -863 ml       Comorbidities Reviewed Yes    Patient has a past medical history of Arthritis, CAD (coronary artery disease), Cerebral artery occlusion with cerebral infarction ([address] CHF (congestive heart failure) ([address] Diabetes mellitus ([address] Hyperlipidemia, Hypertension, Mental retardation, and MI (myocardial infarction) ([address] >>For CHF and Comorbidity documentation on Education Time and Topics, please see Education Tab    Progressive Mobility Assessment:  What is this patient's Current Level of Mobility?: Ambulatory-Up Ad Lois  How was this patient Mobilized today?: Edge of Bed, Up to Chair,  Up to Toilet/Shower and Up in Room                 With Whom? Nurse, PCA, PT, OT and Self                 Level of Difficulty/Assistance: Independent     Pt resting in bed at this time on room air. Pt denies shortness of breath. Pt without lower extremity edema.      Patient and/or Family's stated Goal of Care this Admission: increase activity tolerance and be more comfortable prior to discharge        :

## 2020-10-13 NOTE — PLAN OF CARE
Problem: Nutrition  Goal: Optimal nutrition therapy  Outcome: Ongoing  Note: Nutrition Problem #1: Inadequate oral intake  Intervention: Food and/or Nutrient Delivery: Modify Current Diet  Nutritional Goals: Pt will consume greater than 50% of meals this admission

## 2020-10-14 VITALS
SYSTOLIC BLOOD PRESSURE: 101 MMHG | RESPIRATION RATE: 16 BRPM | OXYGEN SATURATION: 99 % | BODY MASS INDEX: 37.61 KG/M2 | TEMPERATURE: 97.6 F | DIASTOLIC BLOOD PRESSURE: 61 MMHG | HEART RATE: 81 BPM | HEIGHT: 61 IN | WEIGHT: 199.2 LBS

## 2020-10-14 LAB
ANION GAP SERPL CALCULATED.3IONS-SCNC: 8 MMOL/L (ref 3–16)
BUN BLDV-MCNC: 15 MG/DL (ref 7–20)
CALCIUM SERPL-MCNC: 8.4 MG/DL (ref 8.3–10.6)
CHLORIDE BLD-SCNC: 108 MMOL/L (ref 99–110)
CO2: 23 MMOL/L (ref 21–32)
CREAT SERPL-MCNC: 0.6 MG/DL (ref 0.6–1.1)
GFR AFRICAN AMERICAN: >60
GFR NON-AFRICAN AMERICAN: >60
GLUCOSE BLD-MCNC: 107 MG/DL (ref 70–99)
GLUCOSE BLD-MCNC: 90 MG/DL (ref 70–99)
GLUCOSE BLD-MCNC: 92 MG/DL (ref 70–99)
PERFORMED ON: ABNORMAL
PERFORMED ON: NORMAL
POTASSIUM SERPL-SCNC: 3.8 MMOL/L (ref 3.5–5.1)
SODIUM BLD-SCNC: 139 MMOL/L (ref 136–145)

## 2020-10-14 PROCEDURE — 6370000000 HC RX 637 (ALT 250 FOR IP): Performed by: NURSE PRACTITIONER

## 2020-10-14 PROCEDURE — 6360000002 HC RX W HCPCS: Performed by: INTERNAL MEDICINE

## 2020-10-14 PROCEDURE — 6370000000 HC RX 637 (ALT 250 FOR IP): Performed by: INTERNAL MEDICINE

## 2020-10-14 PROCEDURE — 36592 COLLECT BLOOD FROM PICC: CPT

## 2020-10-14 PROCEDURE — 2580000003 HC RX 258: Performed by: INTERNAL MEDICINE

## 2020-10-14 PROCEDURE — 99232 SBSQ HOSP IP/OBS MODERATE 35: CPT | Performed by: NURSE PRACTITIONER

## 2020-10-14 PROCEDURE — 80048 BASIC METABOLIC PNL TOTAL CA: CPT

## 2020-10-14 RX ORDER — SPIRONOLACTONE 25 MG/1
25 TABLET ORAL DAILY
Status: DISCONTINUED | OUTPATIENT
Start: 2020-10-14 | End: 2020-10-14 | Stop reason: HOSPADM

## 2020-10-14 RX ADMIN — ENOXAPARIN SODIUM 40 MG: 40 INJECTION SUBCUTANEOUS at 08:43

## 2020-10-14 RX ADMIN — ESCITALOPRAM OXALATE 10 MG: 10 TABLET ORAL at 08:44

## 2020-10-14 RX ADMIN — Medication 10 ML: at 08:44

## 2020-10-14 RX ADMIN — FENOFIBRATE 160 MG: 160 TABLET ORAL at 08:43

## 2020-10-14 RX ADMIN — MICONAZOLE NITRATE: 20 POWDER TOPICAL at 08:43

## 2020-10-14 RX ADMIN — SPIRONOLACTONE 25 MG: 25 TABLET ORAL at 08:48

## 2020-10-14 RX ADMIN — ASPIRIN 81 MG: 81 TABLET ORAL at 08:43

## 2020-10-14 RX ADMIN — TOPIRAMATE 200 MG: 100 TABLET, FILM COATED ORAL at 08:48

## 2020-10-14 RX ADMIN — METOPROLOL SUCCINATE 12.5 MG: 25 TABLET, EXTENDED RELEASE ORAL at 08:43

## 2020-10-14 RX ADMIN — AMIODARONE HYDROCHLORIDE 100 MG: 200 TABLET ORAL at 08:44

## 2020-10-14 RX ADMIN — FLUOXETINE 10 MG: 10 CAPSULE ORAL at 08:44

## 2020-10-14 RX ADMIN — DIGOXIN 125 MCG: 125 TABLET ORAL at 08:44

## 2020-10-14 RX ADMIN — DULOXETINE HYDROCHLORIDE 60 MG: 60 CAPSULE, DELAYED RELEASE ORAL at 08:43

## 2020-10-14 RX ADMIN — SACUBITRIL AND VALSARTAN 0.5 TABLET: 24; 26 TABLET, FILM COATED ORAL at 08:44

## 2020-10-14 RX ADMIN — RANOLAZINE 500 MG: 500 TABLET, FILM COATED, EXTENDED RELEASE ORAL at 08:43

## 2020-10-14 ASSESSMENT — PAIN SCALES - GENERAL
PAINLEVEL_OUTOF10: 0
PAINLEVEL_OUTOF10: 0

## 2020-10-14 NOTE — PROGRESS NOTES
Discharge orders placed by MD, verified by RN prior to discharge.  denies any discharge needs for the patient. Discharge paperwork complete, reviewed, and signed with the patient. A copy of all paperwork provided. All questions and concerns resolved at the time of review. Patient verbalized understanding of paperwork. Prescriptions provided for patient. RN removed the tele box, informed [facility] of discharge, and removed PICC prior to discharge. Patient discharged to home with [facility] transported home via taxi. Currently awaiting a Taxi.

## 2020-10-14 NOTE — CARE COORDINATION
Chart review completed. Patient a 62year old female, admitted for chest pain. Hospital day # 8. Patient currently admitted on B3. Patient followed by cardiology whom signed off 10/13, IM looking for PT/OT recs whom rec home with assistance PRN. Discharge plans at present are to return home with support thru [facility]. No DME needs at this time. CM will continue to follow. Please advise as needs arise. MARII Edward      CASE MANAGEMENT DISCHARGE SUMMARY      Discharge to: Home    Transportation: Via Taxi voucher       Confirmed discharge plan with:  Patient     Facility/Agency, name:  [address] [place] [address] Rashid WILSON [address] [facility] [address], [phone]     Patient will dc home this date with taxi pedro, Lazarus Nuñez, RN aware of need. Patient to be followed by [facility] for increased support at home. VM left with Mathieu to update on dc and will sent order and AVS once entered by MD. Patient with no DME recs. MARII Edward   [phone]: Return call received from Mariaelena at [facility] received referral and will pull orders and other info needed from [address] [place] [address] Rd.  MARII Edward

## 2020-10-14 NOTE — PROGRESS NOTES
[facility]   Daily Progress Note    Admit Date:  10/2/2020  HPI:    Chief Complaint   Patient presents with    Hyperglycemia     blood sugars have been over 600 for \"the last couple of days\" per patient; reading high at time of triage    Nausea     nausea and vomiting for two days     Chest Pain     for 3 months     admitted on 10/2/2020 with acute on chronic chest pain and SOB. Stress test 10/5 showed fixed defects. Echo 10/6 showed an EF of 15-20%. RHC and LHC 10/6 showed nonobstructive disease, elevated filling pressures and low CI. EP following for SVT. Has also been treated for hypotension and CHF. Lasix gtt was stopped 10/9/2020. Unable to start Entresto due to hypotension. Rhythm has been sinus with intermittent pacing. Hx of NICM, sCHF, s/p dual ICD as well as HTN, HLD, SVT, CVA, uncontrolled DM, mental retardation. Subjective:  Ms. Zoe Us lying in bed, has been up in room. States feels \"good\", a bit more animated today. Unclear was not discharged yesterday. Objective:   /66   Pulse 87   Temp 98.5 °F (36.9 °C) (Oral)   Resp 16   Ht 5' 1\" (1.549 m)   Wt 199 lb 3.2 oz (90.4 kg)   SpO2 100%   BMI 37.64 kg/m²       Intake/Output Summary (Last 24 hours) at 10/14/2020 1102  Last data filed at 10/14/2020 0929  Gross per 24 hour   Intake 850 ml   Output 1700 ml   Net -850 ml     Wt Readings from Last 3 Encounters:   10/14/20 199 lb 3.2 oz (90.4 kg)   09/27/20 215 lb (97.5 kg)   09/25/20 206 lb 8 oz (93.7 kg)         ASSESSMENT:   1. Chest pain - atypical of angina  2. CHF, acute on chronic systolic and RHF - diuresed 14 lbs, symptoms improved, BNP down, weight stable   3. Cardiomyopathy, non-ischemic - s/p [place] dual ICD, intermittently A paced  4. HTN - stable  5. SVT/ NSVT - on Amio 100 mg daily, digoxin 125 mcg QOD  6. DM - A1c 13.3, per IM  7. HLD -  Oct 2019 LDL 87 and Trig 142, on statin and fenofibrate  8. TIA - ASA and statin      PLAN:  1. K better  2.  Continue spironolactone 25 mg once daily  3. Continue low dose Entresto and Toprol XL  4. Continue Amiodarone and digoxin  5. Okay for discharge from cardiac perspective. Reviewe cardiac medications on discharge medication reconciliation form and sent to [facility] for delivery (today). Patient has follow up appointment with me in 5 days. Delma Claudio, APRN - CNP, 10/14/2020, 11:02 AM  [facility]   [phone]       Telemetry: SR 70-80's, intermittently Atrial paced, PVC's in single, pairs and triplets  NYHA: III    Physical Exam:  General:  Awake, alert, NAD  Skin:  Warm and dry  Neck:  JVP difficult to assess  Chest:  Clear to auscultation posterior  Cardiovascular:  RRR, normal S1S2, no m/g/r  Abdomen:  Soft, nontender, +bowel sounds  Extremities:  Trace pedal edema        Medications:    spironolactone  25 mg Oral Daily    insulin glargine  20 Units Subcutaneous Nightly    metoprolol succinate  12.5 mg Oral Daily    miconazole   Topical BID    digoxin  125 mcg Oral Every Other Day    sacubitril-valsartan  0.5 tablet Oral BID    amiodarone  100 mg Oral Daily    FLUoxetine  10 mg Oral Daily    escitalopram  10 mg Oral Daily    DULoxetine  60 mg Oral Daily    sodium chloride flush  10 mL Intravenous 2 times per day    aspirin  81 mg Oral Daily    atorvastatin  40 mg Oral Nightly    fenofibrate  160 mg Oral Daily    ranolazine  500 mg Oral BID    topiramate  200 mg Oral Daily    enoxaparin  40 mg Subcutaneous Daily    insulin lispro  0-12 Units Subcutaneous TID WC    insulin lispro  0-6 Units Subcutaneous Nightly      dextrose         Lab Data: Lab results independently reviewed by myself 10/12/20   CBC:   No results for input(s): WBC, HGB, PLT in the last 72 hours.   BMP:    Recent Labs     10/12/20  0315 10/13/20  0615 10/14/20  0630   * 139 139   K 3.6 3.4* 3.8   CO2 24 22 23   BUN 20 16 15   CREATININE 0.7 0.6 0.6     INR:  No results for input(s): INR in the last

## 2020-10-14 NOTE — DISCHARGE SUMMARY
Hospital Medicine Discharge Summary    Patient ID: Zeke Leo      Patient's PCP: FRITZ Funez NP    Admit Date: 10/2/2020     Discharge Date: 10/14/2020     Admitting Physician: Alana Iniguez MD     Discharge Physician: FRITZ Caballero CNP     Discharge Diagnoses: Active Hospital Problems    Diagnosis    Acute pulmonary edema ([address] [J81.0]    Mixed hyperlipidemia [E78.2]    Type 2 diabetes mellitus with hyperglycemia, with long-term current use of insulin (HCC) [E11.65, Z79.4]    SVT [facility]supraventricular tachycardia) ([facility]) [I47.1]    Chest pain [R07.9]    Nonischemic cardiomyopathy ([address] [I42.8]    Essential hypertension [I10]    Dual ICD (implantable cardioverter-defibrillator) in place [Z95.810]    Acute on chronic combined systolic and diastolic CHF (congestive heart failure) ([address] [I50.43]       The patient was seen and examined on day of discharge and this discharge summary is in conjunction with any daily progress note from day of discharge. Hospital Course:     63 yo female presented with SOB and chest pain   Chest pain: POA Multifactorial in the setting of acute on chronic systolic heart failure and CAD and DVT   Stress test was abnormal and S/p Cardiac cath.   S/p LHC- Mild non-obstructive coronary artery disease with known severe left ventricular dysfunction Moderate pulmonary hypertension with decompensated hemodynamics     SVT- rate now sinus  - cont tele   - EP following  - cont amiodarone       Acute on chronic systolic heart failure, recent echo on 6/13/2020 showing LVEF of 35%, continue home regimen, continue Lasix infusion discontinued  -  Cardiology managing   - Meds: trailing  entresto, and BB asa and Plavix   - I/O      History of ICD in situ, monitor on telemetry. Digoxin every other day and Amiodarone     Diabetes mellitus type 2, uncontrolled, continue insulin sliding scale, monitor. Restarted Lantus last night.  Blood sugar is only 80 today dropped from 400. Reduce Lantus dose       Physical Exam Performed:     /61   Pulse 81   Temp 97.6 °F (36.4 °C) (Oral)   Resp 16   Ht 5' 1\" (1.549 m)   Wt 199 lb 3.2 oz (90.4 kg)   SpO2 99%   BMI 37.64 kg/m²       General appearance:  No apparent distress, appears stated age and cooperative. HEENT:  Normal cephalic, atraumatic without obvious deformity. Pupils equal, round, and reactive to light. Extra ocular muscles intact. Conjunctivae/corneas clear. Neck: Supple, with full range of motion. No jugular venous distention. Trachea midline. Respiratory:  Normal respiratory effort. Clear to auscultation, bilaterally without Rales/Wheezes/Rhonchi. Cardiovascular:  Regular rate and rhythm with normal S1/S2 without murmurs, rubs or gallops. Abdomen: Soft, non-tender, non-distended with normal bowel sounds. Musculoskeletal:  No clubbing, cyanosis or edema bilaterally. Full range of motion without deformity. Skin: Skin color, texture, turgor normal.  No rashes or lesions. Neurologic:  Neurovascularly intact without any focal sensory/motor deficits. Cranial nerves: II-XII intact, grossly non-focal.  Psychiatric:  Alert and oriented, thought content appropriate, normal insight  Capillary Refill: Brisk,< 3 seconds   Peripheral Pulses: +2 palpable, equal bilaterally       Labs:  For convenience and continuity at follow-up the following most recent labs are provided:      CBC:    Lab Results   Component Value Date    WBC 6.9 10/11/2020    HGB 13.8 10/11/2020    HCT 41.6 10/11/2020     10/11/2020       Renal:    Lab Results   Component Value Date     10/14/2020    K 3.8 10/14/2020    K 4.0 10/11/2020     10/14/2020    CO2 23 10/14/2020    BUN 15 10/14/2020    CREATININE 0.6 10/14/2020    CALCIUM 8.4 10/14/2020    PHOS 3.0 04/18/2020         Significant Diagnostic Studies    Radiology:   NM Cardiac Stress Test Nuclear Imaging   Final Result      IR PICC WO SQ PORT/PUMP > 5 YEARS   Final (ZOFRAN) 4 MG tablet Take 1 tablet by mouth every 8 hours as needed for Nausea, Disp-10 tablet,R-0Print      aspirin 81 MG EC tablet Take 1 tablet by mouth daily, Disp-30 tablet,R-2Normal      ranolazine (RANEXA) 500 MG extended release tablet Take 1 tablet by mouth 2 times daily, Disp-60 tablet,R-2Normal      DULoxetine (CYMBALTA) 60 MG extended release capsule Take 1 capsule by mouth daily, Disp-30 capsule,R-0Normal      FLUoxetine (PROZAC) 10 MG capsule Take 1 capsule by mouth daily, Disp-30 capsule,R-0Normal      metFORMIN (GLUCOPHAGE) 500 MG tablet Take 2 tablets by mouth 2 times daily (with meals), Disp-120 tablet,R-2Normal      atorvastatin (LIPITOR) 40 MG tablet Take 1 tablet by mouth nightly, Disp-30 tablet,R-2Normal      fenofibrate micronized (LOFIBRA) 200 MG capsule Take 1 capsule by mouth nightly, Disp-30 capsule,R-3Normal      miconazole (MICOTIN) 2 % powder Apply topically 2 times daily. , Disp-45 g,R-1, Normal      [employer] Lancets Ultra Thin MISC 4 TIMES DAILY Starting Sat 7/18/2020, Disp-200 each,R-0, Normal      blood glucose monitor strips Test three times a day & as needed for symptoms of irregular blood glucose., Disp-100 strip, R-2, Print      topiramate (TOPAMAX) 200 MG tablet Take 1 tablet by mouth daily, Disp-60 tablet, R-0Print             Time Spent on discharge is more than 30 minutes in the examination, evaluation, counseling and review of medications and discharge plan. Signed:    FRITZ Almaraz CNP   10/17/2020      Thank you FRITZ Lin - ALINE for the opportunity to be involved in this patient's care. If you have any questions or concerns please feel free to contact me at [phone].

## 2020-10-15 ENCOUNTER — TELEPHONE (OUTPATIENT)
Dept: CARDIOLOGY CLINIC | Age: 60
End: 2020-10-15

## 2020-10-15 NOTE — TELEPHONE ENCOUNTER
Spoke with patient. Monitor results discussed per AGK. Verbalized understanding. Denied further questions at this time.

## 2020-10-19 PROBLEM — I50.42 CHRONIC COMBINED SYSTOLIC AND DIASTOLIC CONGESTIVE HEART FAILURE (HCC): Status: ACTIVE | Noted: 2018-05-08

## 2020-10-26 ENCOUNTER — APPOINTMENT (OUTPATIENT)
Dept: GENERAL RADIOLOGY | Age: 60
DRG: 391 | End: 2020-10-26
Payer: MEDICARE

## 2020-10-26 ENCOUNTER — HOSPITAL ENCOUNTER (INPATIENT)
Age: 60
LOS: 2 days | Discharge: HOME | DRG: 391 | End: 2020-10-28
Attending: EMERGENCY MEDICINE | Admitting: HOSPITALIST
Payer: MEDICARE

## 2020-10-26 ENCOUNTER — TELEPHONE (OUTPATIENT)
Dept: OTHER | Facility: CLINIC | Age: 60
End: 2020-10-26

## 2020-10-26 PROBLEM — R11.2 INTRACTABLE NAUSEA AND VOMITING: Status: ACTIVE | Noted: 2020-10-26

## 2020-10-26 LAB
A/G RATIO: 1.5 (ref 1.1–2.2)
A/G RATIO: 1.5 (ref 1.1–2.2)
A/G RATIO: 1.7 (ref 1.1–2.2)
ALBUMIN SERPL-MCNC: 3.7 G/DL (ref 3.4–5)
ALBUMIN SERPL-MCNC: 3.7 G/DL (ref 3.4–5)
ALBUMIN SERPL-MCNC: 4 G/DL (ref 3.4–5)
ALP BLD-CCNC: 101 U/L (ref 40–129)
ALP BLD-CCNC: 102 U/L (ref 40–129)
ALP BLD-CCNC: 137 U/L (ref 40–129)
ALT SERPL-CCNC: 24 U/L (ref 10–40)
ALT SERPL-CCNC: 24 U/L (ref 10–40)
ALT SERPL-CCNC: 28 U/L (ref 10–40)
ANION GAP SERPL CALCULATED.3IONS-SCNC: 12 MMOL/L (ref 3–16)
ANION GAP SERPL CALCULATED.3IONS-SCNC: 12 MMOL/L (ref 3–16)
ANION GAP SERPL CALCULATED.3IONS-SCNC: 15 MMOL/L (ref 3–16)
AST SERPL-CCNC: 25 U/L (ref 15–37)
AST SERPL-CCNC: 27 U/L (ref 15–37)
AST SERPL-CCNC: 34 U/L (ref 15–37)
BASE EXCESS VENOUS: -5 MMOL/L (ref -3–3)
BASE EXCESS VENOUS: -5.3 MMOL/L (ref -3–3)
BASOPHILS ABSOLUTE: 0.1 K/UL (ref 0–0.2)
BASOPHILS ABSOLUTE: 0.1 K/UL (ref 0–0.2)
BASOPHILS RELATIVE PERCENT: 1.1 %
BASOPHILS RELATIVE PERCENT: 2.1 %
BETA-HYDROXYBUTYRATE: 2.5 MMOL/L (ref 0–0.27)
BILIRUB SERPL-MCNC: 0.8 MG/DL (ref 0–1)
BILIRUB SERPL-MCNC: 0.9 MG/DL (ref 0–1)
BILIRUB SERPL-MCNC: 0.9 MG/DL (ref 0–1)
BILIRUBIN URINE: NEGATIVE
BLOOD, URINE: NEGATIVE
BUN BLDV-MCNC: 12 MG/DL (ref 7–20)
BUN BLDV-MCNC: 13 MG/DL (ref 7–20)
BUN BLDV-MCNC: 14 MG/DL (ref 7–20)
CALCIUM SERPL-MCNC: 8.7 MG/DL (ref 8.3–10.6)
CALCIUM SERPL-MCNC: 8.8 MG/DL (ref 8.3–10.6)
CALCIUM SERPL-MCNC: 9.6 MG/DL (ref 8.3–10.6)
CARBOXYHEMOGLOBIN: 1.5 % (ref 0–1.5)
CARBOXYHEMOGLOBIN: 1.6 % (ref 0–1.5)
CHLORIDE BLD-SCNC: 96 MMOL/L (ref 99–110)
CHLORIDE BLD-SCNC: 99 MMOL/L (ref 99–110)
CHLORIDE BLD-SCNC: 99 MMOL/L (ref 99–110)
CHP ED QC CHECK: YES
CLARITY: CLEAR
CO2: 23 MMOL/L (ref 21–32)
CO2: 23 MMOL/L (ref 21–32)
CO2: 24 MMOL/L (ref 21–32)
COLOR: YELLOW
CREAT SERPL-MCNC: 0.6 MG/DL (ref 0.6–1.1)
CREAT SERPL-MCNC: 0.7 MG/DL (ref 0.6–1.1)
CREAT SERPL-MCNC: 0.7 MG/DL (ref 0.6–1.1)
DIGOXIN LEVEL: <0.3 NG/ML (ref 0.8–2)
EKG ATRIAL RATE: 105 BPM
EKG DIAGNOSIS: NORMAL
EKG P-R INTERVAL: 140 MS
EKG Q-T INTERVAL: 362 MS
EKG QRS DURATION: 88 MS
EKG QTC CALCULATION (BAZETT): 478 MS
EKG R AXIS: 156 DEGREES
EKG T AXIS: 74 DEGREES
EKG VENTRICULAR RATE: 105 BPM
EOSINOPHILS ABSOLUTE: 0.1 K/UL (ref 0–0.6)
EOSINOPHILS ABSOLUTE: 0.2 K/UL (ref 0–0.6)
EOSINOPHILS RELATIVE PERCENT: 2 %
EOSINOPHILS RELATIVE PERCENT: 2.4 %
GFR AFRICAN AMERICAN: >60
GFR NON-AFRICAN AMERICAN: >60
GLOBULIN: 2.2 G/DL
GLOBULIN: 2.5 G/DL
GLOBULIN: 2.7 G/DL
GLUCOSE BLD-MCNC: 176 MG/DL (ref 70–99)
GLUCOSE BLD-MCNC: 226 MG/DL (ref 70–99)
GLUCOSE BLD-MCNC: 327 MG/DL (ref 70–99)
GLUCOSE BLD-MCNC: 348 MG/DL (ref 70–99)
GLUCOSE BLD-MCNC: 364 MG/DL (ref 70–99)
GLUCOSE BLD-MCNC: 371 MG/DL (ref 70–99)
GLUCOSE BLD-MCNC: 395 MG/DL
GLUCOSE BLD-MCNC: 395 MG/DL (ref 70–99)
GLUCOSE BLD-MCNC: 398 MG/DL (ref 70–99)
GLUCOSE BLD-MCNC: 413 MG/DL (ref 70–99)
GLUCOSE BLD-MCNC: 426 MG/DL (ref 70–99)
GLUCOSE URINE: >=1000 MG/DL
HCO3 VENOUS: 19.5 MMOL/L (ref 23–29)
HCO3 VENOUS: 21.4 MMOL/L (ref 23–29)
HCT VFR BLD CALC: 40.9 % (ref 36–48)
HCT VFR BLD CALC: 41.9 % (ref 36–48)
HEMOGLOBIN: 13.4 G/DL (ref 12–16)
HEMOGLOBIN: 13.7 G/DL (ref 12–16)
KETONES, URINE: 15 MG/DL
LACTIC ACID: 1.6 MMOL/L (ref 0.4–2)
LACTIC ACID: 2.3 MMOL/L (ref 0.4–2)
LACTIC ACID: 2.9 MMOL/L (ref 0.4–2)
LEUKOCYTE ESTERASE, URINE: NEGATIVE
LIPASE: 41 U/L (ref 13–60)
LYMPHOCYTES ABSOLUTE: 2.4 K/UL (ref 1–5.1)
LYMPHOCYTES ABSOLUTE: 2.4 K/UL (ref 1–5.1)
LYMPHOCYTES RELATIVE PERCENT: 31.4 %
LYMPHOCYTES RELATIVE PERCENT: 33.9 %
MAGNESIUM: 1.6 MG/DL (ref 1.8–2.4)
MAGNESIUM: 1.7 MG/DL (ref 1.8–2.4)
MCH RBC QN AUTO: 29.5 PG (ref 26–34)
MCH RBC QN AUTO: 29.7 PG (ref 26–34)
MCHC RBC AUTO-ENTMCNC: 32.6 G/DL (ref 31–36)
MCHC RBC AUTO-ENTMCNC: 32.6 G/DL (ref 31–36)
MCV RBC AUTO: 90.5 FL (ref 80–100)
MCV RBC AUTO: 91.1 FL (ref 80–100)
METHEMOGLOBIN VENOUS: 0.1 %
METHEMOGLOBIN VENOUS: 0.3 %
MICROSCOPIC EXAMINATION: ABNORMAL
MONOCYTES ABSOLUTE: 0.4 K/UL (ref 0–1.3)
MONOCYTES ABSOLUTE: 0.5 K/UL (ref 0–1.3)
MONOCYTES RELATIVE PERCENT: 6.1 %
MONOCYTES RELATIVE PERCENT: 6.2 %
NEUTROPHILS ABSOLUTE: 4 K/UL (ref 1.7–7.7)
NEUTROPHILS ABSOLUTE: 4.4 K/UL (ref 1.7–7.7)
NEUTROPHILS RELATIVE PERCENT: 55.9 %
NEUTROPHILS RELATIVE PERCENT: 58.9 %
NITRITE, URINE: NEGATIVE
O2 CONTENT, VEN: 12 VOL %
O2 CONTENT, VEN: 16 VOL %
O2 SAT, VEN: 59 %
O2 SAT, VEN: 82 %
O2 THERAPY: ABNORMAL
O2 THERAPY: ABNORMAL
PCO2, VEN: 36 MMHG (ref 40–50)
PCO2, VEN: 44.1 MMHG (ref 40–50)
PDW BLD-RTO: 15.4 % (ref 12.4–15.4)
PDW BLD-RTO: 15.7 % (ref 12.4–15.4)
PERFORMED ON: ABNORMAL
PH UA: 5 (ref 5–8)
PH VENOUS: 7.3 (ref 7.35–7.45)
PH VENOUS: 7.35 (ref 7.35–7.45)
PLATELET # BLD: 244 K/UL (ref 135–450)
PLATELET # BLD: 247 K/UL (ref 135–450)
PMV BLD AUTO: 9.4 FL (ref 5–10.5)
PMV BLD AUTO: 9.4 FL (ref 5–10.5)
PO2, VEN: 33.9 MMHG (ref 25–40)
PO2, VEN: 48.1 MMHG (ref 25–40)
POTASSIUM REFLEX MAGNESIUM: 4.2 MMOL/L (ref 3.5–5.1)
POTASSIUM SERPL-SCNC: 4.5 MMOL/L (ref 3.5–5.1)
POTASSIUM SERPL-SCNC: 4.6 MMOL/L (ref 3.5–5.1)
PRO-BNP: 5183 PG/ML (ref 0–124)
PROTEIN UA: NEGATIVE MG/DL
RBC # BLD: 4.49 M/UL (ref 4–5.2)
RBC # BLD: 4.63 M/UL (ref 4–5.2)
SARS-COV-2, NAAT: NOT DETECTED
SODIUM BLD-SCNC: 134 MMOL/L (ref 136–145)
SODIUM BLD-SCNC: 134 MMOL/L (ref 136–145)
SODIUM BLD-SCNC: 135 MMOL/L (ref 136–145)
SPECIFIC GRAVITY UA: 1.01 (ref 1–1.03)
TCO2 CALC VENOUS: 21 MMOL/L
TCO2 CALC VENOUS: 23 MMOL/L
TOTAL PROTEIN: 5.9 G/DL (ref 6.4–8.2)
TOTAL PROTEIN: 6.2 G/DL (ref 6.4–8.2)
TOTAL PROTEIN: 6.7 G/DL (ref 6.4–8.2)
TROPONIN: <0.01 NG/ML
TROPONIN: <0.01 NG/ML
URINE TYPE: ABNORMAL
UROBILINOGEN, URINE: 0.2 E.U./DL
WBC # BLD: 7.1 K/UL (ref 4–11)
WBC # BLD: 7.5 K/UL (ref 4–11)

## 2020-10-26 PROCEDURE — 96365 THER/PROPH/DIAG IV INF INIT: CPT

## 2020-10-26 PROCEDURE — 6370000000 HC RX 637 (ALT 250 FOR IP): Performed by: PHYSICIAN ASSISTANT

## 2020-10-26 PROCEDURE — 99222 1ST HOSP IP/OBS MODERATE 55: CPT | Performed by: INTERNAL MEDICINE

## 2020-10-26 PROCEDURE — 6360000002 HC RX W HCPCS: Performed by: INTERNAL MEDICINE

## 2020-10-26 PROCEDURE — 82803 BLOOD GASES ANY COMBINATION: CPT

## 2020-10-26 PROCEDURE — 96372 THER/PROPH/DIAG INJ SC/IM: CPT

## 2020-10-26 PROCEDURE — 99223 1ST HOSP IP/OBS HIGH 75: CPT | Performed by: INTERNAL MEDICINE

## 2020-10-26 PROCEDURE — 80299 QUANTITATIVE ASSAY DRUG: CPT

## 2020-10-26 PROCEDURE — 82010 KETONE BODYS QUAN: CPT

## 2020-10-26 PROCEDURE — 83690 ASSAY OF LIPASE: CPT

## 2020-10-26 PROCEDURE — 71045 X-RAY EXAM CHEST 1 VIEW: CPT

## 2020-10-26 PROCEDURE — 80162 ASSAY OF DIGOXIN TOTAL: CPT

## 2020-10-26 PROCEDURE — 2580000003 HC RX 258: Performed by: EMERGENCY MEDICINE

## 2020-10-26 PROCEDURE — 6360000002 HC RX W HCPCS: Performed by: EMERGENCY MEDICINE

## 2020-10-26 PROCEDURE — 2060000000 HC ICU INTERMEDIATE R&B

## 2020-10-26 PROCEDURE — 6370000000 HC RX 637 (ALT 250 FOR IP): Performed by: HOSPITALIST

## 2020-10-26 PROCEDURE — 83880 ASSAY OF NATRIURETIC PEPTIDE: CPT

## 2020-10-26 PROCEDURE — 96361 HYDRATE IV INFUSION ADD-ON: CPT

## 2020-10-26 PROCEDURE — 83036 HEMOGLOBIN GLYCOSYLATED A1C: CPT

## 2020-10-26 PROCEDURE — 81003 URINALYSIS AUTO W/O SCOPE: CPT

## 2020-10-26 PROCEDURE — 2580000003 HC RX 258: Performed by: HOSPITALIST

## 2020-10-26 PROCEDURE — 93005 ELECTROCARDIOGRAM TRACING: CPT | Performed by: EMERGENCY MEDICINE

## 2020-10-26 PROCEDURE — 83735 ASSAY OF MAGNESIUM: CPT

## 2020-10-26 PROCEDURE — 84484 ASSAY OF TROPONIN QUANT: CPT

## 2020-10-26 PROCEDURE — U0002 COVID-19 LAB TEST NON-CDC: HCPCS

## 2020-10-26 PROCEDURE — 85025 COMPLETE CBC W/AUTO DIFF WBC: CPT

## 2020-10-26 PROCEDURE — 6360000002 HC RX W HCPCS: Performed by: HOSPITALIST

## 2020-10-26 PROCEDURE — 93010 ELECTROCARDIOGRAM REPORT: CPT | Performed by: INTERNAL MEDICINE

## 2020-10-26 PROCEDURE — 96375 TX/PRO/DX INJ NEW DRUG ADDON: CPT

## 2020-10-26 PROCEDURE — 99285 EMERGENCY DEPT VISIT HI MDM: CPT

## 2020-10-26 PROCEDURE — 36415 COLL VENOUS BLD VENIPUNCTURE: CPT

## 2020-10-26 PROCEDURE — 83605 ASSAY OF LACTIC ACID: CPT

## 2020-10-26 PROCEDURE — 80053 COMPREHEN METABOLIC PANEL: CPT

## 2020-10-26 RX ORDER — 0.9 % SODIUM CHLORIDE 0.9 %
1000 INTRAVENOUS SOLUTION INTRAVENOUS ONCE
Status: COMPLETED | OUTPATIENT
Start: 2020-10-26 | End: 2020-10-26

## 2020-10-26 RX ORDER — ASPIRIN 81 MG/1
81 TABLET ORAL DAILY
Status: DISCONTINUED | OUTPATIENT
Start: 2020-10-26 | End: 2020-10-28 | Stop reason: HOSPADM

## 2020-10-26 RX ORDER — NICOTINE POLACRILEX 4 MG
15 LOZENGE BUCCAL PRN
Status: DISCONTINUED | OUTPATIENT
Start: 2020-10-26 | End: 2020-10-28 | Stop reason: HOSPADM

## 2020-10-26 RX ORDER — RANOLAZINE 500 MG/1
500 TABLET, EXTENDED RELEASE ORAL 2 TIMES DAILY
Status: DISCONTINUED | OUTPATIENT
Start: 2020-10-26 | End: 2020-10-28 | Stop reason: HOSPADM

## 2020-10-26 RX ORDER — ERGOCALCIFEROL 1.25 MG/1
50000 CAPSULE ORAL WEEKLY
COMMUNITY
End: 2021-03-23 | Stop reason: CLARIF

## 2020-10-26 RX ORDER — METOPROLOL SUCCINATE 25 MG/1
12.5 TABLET, EXTENDED RELEASE ORAL DAILY
Status: DISCONTINUED | OUTPATIENT
Start: 2020-10-26 | End: 2020-10-28 | Stop reason: HOSPADM

## 2020-10-26 RX ORDER — DEXTROSE MONOHYDRATE 25 G/50ML
12.5 INJECTION, SOLUTION INTRAVENOUS PRN
Status: DISCONTINUED | OUTPATIENT
Start: 2020-10-26 | End: 2020-10-28 | Stop reason: HOSPADM

## 2020-10-26 RX ORDER — PROMETHAZINE HYDROCHLORIDE 25 MG/ML
25 INJECTION, SOLUTION INTRAMUSCULAR; INTRAVENOUS ONCE
Status: COMPLETED | OUTPATIENT
Start: 2020-10-26 | End: 2020-10-26

## 2020-10-26 RX ORDER — ATORVASTATIN CALCIUM 40 MG/1
40 TABLET, FILM COATED ORAL NIGHTLY
Status: DISCONTINUED | OUTPATIENT
Start: 2020-10-26 | End: 2020-10-28 | Stop reason: HOSPADM

## 2020-10-26 RX ORDER — DEXTROSE MONOHYDRATE 50 MG/ML
100 INJECTION, SOLUTION INTRAVENOUS PRN
Status: DISCONTINUED | OUTPATIENT
Start: 2020-10-26 | End: 2020-10-28 | Stop reason: HOSPADM

## 2020-10-26 RX ORDER — ACETAMINOPHEN 325 MG/1
650 TABLET ORAL EVERY 6 HOURS PRN
Status: DISCONTINUED | OUTPATIENT
Start: 2020-10-26 | End: 2020-10-28 | Stop reason: HOSPADM

## 2020-10-26 RX ORDER — QUETIAPINE FUMARATE 25 MG/1
25 TABLET, FILM COATED ORAL NIGHTLY
Status: DISCONTINUED | OUTPATIENT
Start: 2020-10-26 | End: 2020-10-28 | Stop reason: HOSPADM

## 2020-10-26 RX ORDER — FLUOXETINE 10 MG/1
10 CAPSULE ORAL DAILY
Status: DISCONTINUED | OUTPATIENT
Start: 2020-10-26 | End: 2020-10-26

## 2020-10-26 RX ORDER — ONDANSETRON 4 MG/1
4 TABLET, FILM COATED ORAL EVERY 8 HOURS PRN
Status: DISCONTINUED | OUTPATIENT
Start: 2020-10-26 | End: 2020-10-28 | Stop reason: HOSPADM

## 2020-10-26 RX ORDER — QUETIAPINE FUMARATE 25 MG/1
50 TABLET, FILM COATED ORAL NIGHTLY
Status: ON HOLD | COMMUNITY
End: 2020-11-23

## 2020-10-26 RX ORDER — ONDANSETRON 2 MG/ML
4 INJECTION INTRAMUSCULAR; INTRAVENOUS EVERY 6 HOURS PRN
Status: DISCONTINUED | OUTPATIENT
Start: 2020-10-26 | End: 2020-10-28 | Stop reason: HOSPADM

## 2020-10-26 RX ORDER — MAGNESIUM SULFATE 1 G/100ML
1 INJECTION INTRAVENOUS ONCE
Status: COMPLETED | OUTPATIENT
Start: 2020-10-26 | End: 2020-10-26

## 2020-10-26 RX ORDER — TOPIRAMATE 100 MG/1
200 TABLET, FILM COATED ORAL DAILY
Status: DISCONTINUED | OUTPATIENT
Start: 2020-10-26 | End: 2020-10-26

## 2020-10-26 RX ORDER — AMIODARONE HYDROCHLORIDE 200 MG/1
100 TABLET ORAL DAILY
Status: DISCONTINUED | OUTPATIENT
Start: 2020-10-26 | End: 2020-10-28 | Stop reason: HOSPADM

## 2020-10-26 RX ORDER — ESCITALOPRAM OXALATE 10 MG/1
10 TABLET ORAL DAILY
Status: DISCONTINUED | OUTPATIENT
Start: 2020-10-26 | End: 2020-10-26

## 2020-10-26 RX ORDER — SPIRONOLACTONE 25 MG/1
25 TABLET ORAL DAILY
Status: DISCONTINUED | OUTPATIENT
Start: 2020-10-26 | End: 2020-10-28 | Stop reason: HOSPADM

## 2020-10-26 RX ORDER — DIGOXIN 125 MCG
125 TABLET ORAL EVERY OTHER DAY
Status: DISCONTINUED | OUTPATIENT
Start: 2020-10-26 | End: 2020-10-28 | Stop reason: HOSPADM

## 2020-10-26 RX ORDER — POLYETHYLENE GLYCOL 3350 17 G/17G
17 POWDER, FOR SOLUTION ORAL DAILY PRN
Status: DISCONTINUED | OUTPATIENT
Start: 2020-10-26 | End: 2020-10-28 | Stop reason: HOSPADM

## 2020-10-26 RX ORDER — ONDANSETRON 2 MG/ML
4 INJECTION INTRAMUSCULAR; INTRAVENOUS ONCE
Status: COMPLETED | OUTPATIENT
Start: 2020-10-26 | End: 2020-10-26

## 2020-10-26 RX ORDER — SODIUM CHLORIDE 0.9 % (FLUSH) 0.9 %
10 SYRINGE (ML) INJECTION EVERY 12 HOURS SCHEDULED
Status: DISCONTINUED | OUTPATIENT
Start: 2020-10-26 | End: 2020-10-28 | Stop reason: HOSPADM

## 2020-10-26 RX ORDER — SODIUM CHLORIDE 9 MG/ML
INJECTION, SOLUTION INTRAVENOUS
Status: DISCONTINUED
Start: 2020-10-26 | End: 2020-10-27

## 2020-10-26 RX ORDER — DULOXETIN HYDROCHLORIDE 60 MG/1
60 CAPSULE, DELAYED RELEASE ORAL DAILY
Status: DISCONTINUED | OUTPATIENT
Start: 2020-10-26 | End: 2020-10-28 | Stop reason: HOSPADM

## 2020-10-26 RX ORDER — INSULIN ASPART 100 [IU]/ML
20 INJECTION, SOLUTION INTRAVENOUS; SUBCUTANEOUS
Status: ON HOLD | COMMUNITY
End: 2020-11-13 | Stop reason: SDUPTHER

## 2020-10-26 RX ORDER — INSULIN GLARGINE 100 [IU]/ML
40 INJECTION, SOLUTION SUBCUTANEOUS 2 TIMES DAILY
Status: DISCONTINUED | OUTPATIENT
Start: 2020-10-26 | End: 2020-10-28 | Stop reason: HOSPADM

## 2020-10-26 RX ORDER — ACETAMINOPHEN 650 MG/1
650 SUPPOSITORY RECTAL EVERY 6 HOURS PRN
Status: DISCONTINUED | OUTPATIENT
Start: 2020-10-26 | End: 2020-10-28 | Stop reason: HOSPADM

## 2020-10-26 RX ORDER — NITROGLYCERIN 0.4 MG/1
0.4 TABLET SUBLINGUAL EVERY 5 MIN PRN
Status: DISCONTINUED | OUTPATIENT
Start: 2020-10-26 | End: 2020-10-28 | Stop reason: HOSPADM

## 2020-10-26 RX ORDER — FENOFIBRATE 160 MG/1
160 TABLET ORAL DAILY
Status: DISCONTINUED | OUTPATIENT
Start: 2020-10-26 | End: 2020-10-28 | Stop reason: HOSPADM

## 2020-10-26 RX ORDER — PROMETHAZINE HYDROCHLORIDE 25 MG/1
12.5 TABLET ORAL EVERY 6 HOURS PRN
Status: DISCONTINUED | OUTPATIENT
Start: 2020-10-26 | End: 2020-10-28 | Stop reason: HOSPADM

## 2020-10-26 RX ORDER — SODIUM CHLORIDE 9 MG/ML
INJECTION, SOLUTION INTRAVENOUS CONTINUOUS
Status: DISCONTINUED | OUTPATIENT
Start: 2020-10-26 | End: 2020-10-26

## 2020-10-26 RX ORDER — FUROSEMIDE 10 MG/ML
40 INJECTION INTRAMUSCULAR; INTRAVENOUS DAILY
Status: DISCONTINUED | OUTPATIENT
Start: 2020-10-26 | End: 2020-10-28 | Stop reason: HOSPADM

## 2020-10-26 RX ORDER — SODIUM CHLORIDE 0.9 % (FLUSH) 0.9 %
10 SYRINGE (ML) INJECTION PRN
Status: DISCONTINUED | OUTPATIENT
Start: 2020-10-26 | End: 2020-10-28 | Stop reason: HOSPADM

## 2020-10-26 RX ORDER — INSULIN GLARGINE 100 [IU]/ML
30 INJECTION, SOLUTION SUBCUTANEOUS 2 TIMES DAILY
Status: DISCONTINUED | OUTPATIENT
Start: 2020-10-26 | End: 2020-10-26

## 2020-10-26 RX ADMIN — RANOLAZINE 500 MG: 500 TABLET, FILM COATED, EXTENDED RELEASE ORAL at 20:42

## 2020-10-26 RX ADMIN — ATORVASTATIN CALCIUM 40 MG: 40 TABLET, FILM COATED ORAL at 20:42

## 2020-10-26 RX ADMIN — DIGOXIN 125 MCG: 125 TABLET ORAL at 11:16

## 2020-10-26 RX ADMIN — ENOXAPARIN SODIUM 40 MG: 40 INJECTION SUBCUTANEOUS at 11:19

## 2020-10-26 RX ADMIN — QUETIAPINE FUMARATE 25 MG: 25 TABLET ORAL at 20:42

## 2020-10-26 RX ADMIN — Medication 10 ML: at 11:20

## 2020-10-26 RX ADMIN — DULOXETINE HYDROCHLORIDE 60 MG: 60 CAPSULE, DELAYED RELEASE ORAL at 11:17

## 2020-10-26 RX ADMIN — SODIUM CHLORIDE 1000 ML: 9 INJECTION, SOLUTION INTRAVENOUS at 01:53

## 2020-10-26 RX ADMIN — SODIUM CHLORIDE: 9 INJECTION, SOLUTION INTRAVENOUS at 05:32

## 2020-10-26 RX ADMIN — Medication 10 ML: at 20:42

## 2020-10-26 RX ADMIN — SPIRONOLACTONE 25 MG: 25 TABLET ORAL at 11:16

## 2020-10-26 RX ADMIN — ONDANSETRON HYDROCHLORIDE 4 MG: 2 INJECTION, SOLUTION INTRAMUSCULAR; INTRAVENOUS at 03:44

## 2020-10-26 RX ADMIN — ASPIRIN 81 MG: 81 TABLET, COATED ORAL at 11:16

## 2020-10-26 RX ADMIN — METOPROLOL SUCCINATE 12.5 MG: 25 TABLET, EXTENDED RELEASE ORAL at 11:17

## 2020-10-26 RX ADMIN — INSULIN GLARGINE 40 UNITS: 100 INJECTION, SOLUTION SUBCUTANEOUS at 20:43

## 2020-10-26 RX ADMIN — FENOFIBRATE 160 MG: 160 TABLET ORAL at 11:18

## 2020-10-26 RX ADMIN — RANOLAZINE 500 MG: 500 TABLET, FILM COATED, EXTENDED RELEASE ORAL at 11:19

## 2020-10-26 RX ADMIN — MAGNESIUM SULFATE HEPTAHYDRATE 1 G: 1 INJECTION, SOLUTION INTRAVENOUS at 02:52

## 2020-10-26 RX ADMIN — AMIODARONE HYDROCHLORIDE 100 MG: 200 TABLET ORAL at 11:18

## 2020-10-26 RX ADMIN — FUROSEMIDE 40 MG: 10 INJECTION, SOLUTION INTRAMUSCULAR; INTRAVENOUS at 19:33

## 2020-10-26 RX ADMIN — MAGNESIUM SULFATE HEPTAHYDRATE 1 G: 1 INJECTION, SOLUTION INTRAVENOUS at 17:08

## 2020-10-26 RX ADMIN — PROMETHAZINE HYDROCHLORIDE 25 MG: 25 INJECTION INTRAMUSCULAR; INTRAVENOUS at 01:53

## 2020-10-26 RX ADMIN — SACUBITRIL AND VALSARTAN 0.5 TABLET: 24; 26 TABLET, FILM COATED ORAL at 11:19

## 2020-10-26 RX ADMIN — SACUBITRIL AND VALSARTAN 0.5 TABLET: 24; 26 TABLET, FILM COATED ORAL at 20:42

## 2020-10-26 ASSESSMENT — ENCOUNTER SYMPTOMS
BLOOD IN STOOL: 0
SORE THROAT: 0
SHORTNESS OF BREATH: 1
ABDOMINAL PAIN: 0
ANAL BLEEDING: 0
VOMITING: 1
COUGH: 0
DIARRHEA: 1
NAUSEA: 1
CHEST TIGHTNESS: 1
BACK PAIN: 0
COLOR CHANGE: 0

## 2020-10-26 NOTE — FLOWSHEET NOTE
10/26/20 1155   Vital Signs   Pulse 93   Heart Rate Source Monitor   /78     7 beats of v-tach noted, [employer], check on pt resting comfortably. Pt stated she had had chest pain a few minutes ago but it went away. Strip printed and placed in chart. Petra Cristobal

## 2020-10-26 NOTE — ED TRIAGE NOTES
Chief Complaint   Patient presents with    Hyperglycemia     pt in via EMS for 3 day hx of hyperglycemia. Read as HI in triage.   Pt c/o n/v.

## 2020-10-26 NOTE — PROGRESS NOTES
Handoff report given to Nixon Bañuelos RN and Prince Mata RN. Care transferred.      Electronically signed by Shelby Newby RN on 10/26/2020 at 7:56 AM
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
## 2020-10-26 NOTE — ED NOTES
Pt ambulated at this time to restroom and back with spo2 monitor. Pt only dropped to 93% with HR in the 120s.      Yasmani Luna RN  10/26/20 4242

## 2020-10-26 NOTE — ED PROVIDER NOTES
EMERGENCY DEPARTMENT ENCOUNTER        Pt Name: Jay Jc  MRN: [id number]  [place] 1960  Date of evaluation: 10/25/2020  Provider: Master Peck MD  PCP: FRITZ Dumont - NP      [address] [place] [address]       Chief Complaint   Patient presents with    Hyperglycemia     pt in via EMS for 3 day hx of hyperglycemia. Read as HI in triage. Pt c/o n/v.       HISTORY OFPRESENT ILLNESS   (Location/Symptom, Timing/Onset, Context/Setting, Quality, Duration, Modifying Factors,Severity)  Note limiting factors. Jay Jc is a 62 y.o. female presenting today due to concern for poorly controlled diabetes but stating over the last 3 days she has been vomiting at least 3 times per day and has been feeling more lightheaded and fatigued along with increasing shortness of breath. She has had chronic shortness of breath over the last few months. She was recently admitted a few weeks ago and had a abnormal stress test and ultimately had a cardiac catheterization showing concern for mild coronary disease but severely diminished ejection fraction. She also states she has intermittent chest discomfort. She does come admit to having some diarrhea. She denies any abdominal pain. She has a mild headache. No falls or trauma. She is feeling miserable at home and therefore came to the ED for further evaluation. No fever or known exposure to COVID. No unilateral numbness or weakness but she does have generalized weakness. REVIEW OF SYSTEMS    (2-9 systems for level 4, 10 or more for level 5)     Review of Systems   Constitutional: Positive for activity change, appetite change and fatigue. Negative for chills and fever. HENT: Negative for congestion and sore throat. Respiratory: Positive for chest tightness and shortness of breath. Negative for cough. Cardiovascular: Positive for chest pain. Gastrointestinal: Positive for diarrhea, nausea and vomiting.  Negative for abdominal pain, anal bleeding and blood in stool. Genitourinary: Positive for decreased urine volume. Negative for flank pain. Musculoskeletal: Negative for back pain and neck pain. Skin: Negative for color change. Neurological: Positive for weakness (generalized), light-headedness and headaches (mild). Negative for dizziness, syncope and numbness. Psychiatric/Behavioral: Negative for confusion. Positives and Pertinent negatives as per HPI.       PASTMEDICAL HISTORY     Past Medical History:   Diagnosis Date    Arthritis     CAD (coronary artery disease)     [facility] occlusion [facility]     CHF (congestive heart failure) (HCC)     [facility]     Hyperlipidemia     Hypertension     Mental retardation     [facility]     Pacemaker          SURGICAL HISTORY       Past Surgical History:   Procedure Laterality Date    BACK SURGERY      PACEMAKER INSERTION      TUBAL LIGATION      TUMOR REMOVAL           CURRENT MEDICATIONS       Current Discharge Medication List      CONTINUE these medications which have NOT CHANGED    Details   amiodarone (PACERONE) 100 MG tablet Take 1 tablet by mouth daily  Qty: 30 tablet, Refills: 3      metoprolol succinate (TOPROL XL) 25 MG extended release tablet Take 0.5 tablets by mouth daily  Qty: 30 tablet, Refills: 3      sacubitril-valsartan (ENTRESTO) 24-26 MG per tablet Take 0.5 tablets by mouth 2 times daily  Qty: 60 tablet, Refills: 3      spironolactone (ALDACTONE) 25 MG tablet Take 1 tablet by mouth daily  Qty: 30 tablet, Refills: 3      digoxin (LANOXIN) 125 MCG tablet Take 1 tablet by mouth every other day  Qty: 30 tablet, Refills: 3      insulin aspart (NOVOLOG FLEXPEN) 100 UNIT/ML injection pen Inject 5 Units into the skin 3 times daily (before meals)  Qty: 5 pen, Refills: 3      insulin glargine (LANTUS SOLOSTAR) 100 UNIT/ML injection pen Inject 30 Units into the skin 2 times daily  Qty: 5 pen, Refills: 3 insulin lispro (HUMALOG) 100 UNIT/ML injection vial Inject 0-6 Units into the skin 3 times daily (with meals) **Corrective Low Dose Algorithm**  Glucose: Dose:               No Insulin  140-199 1 Unit  200-249 2 Units  250-299 3 Units  300-349 4 Units  350-399 5 Units  Over 399 6 Units  Qty: 1 vial, Refills: 3      escitalopram (LEXAPRO) 10 MG tablet       nitroGLYCERIN (NITROSTAT) 0.4 MG SL tablet up to max of 3 total doses. If no relief after 1 dose, call 911. Qty: 25 tablet, Refills: 3      ondansetron (ZOFRAN) 4 MG tablet Take 1 tablet by mouth every 8 hours as needed for Nausea  Qty: 10 tablet, Refills: 0      aspirin 81 MG EC tablet Take 1 tablet by mouth daily  Qty: 30 tablet, Refills: 2      ranolazine (RANEXA) 500 MG extended release tablet Take 1 tablet by mouth 2 times daily  Qty: 60 tablet, Refills: 2      DULoxetine (CYMBALTA) 60 MG extended release capsule Take 1 capsule by mouth daily  Qty: 30 capsule, Refills: 0      FLUoxetine (PROZAC) 10 MG capsule Take 1 capsule by mouth daily  Qty: 30 capsule, Refills: 0      atorvastatin (LIPITOR) 40 MG tablet Take 1 tablet by mouth nightly  Qty: 30 tablet, Refills: 2      fenofibrate micronized (LOFIBRA) 200 MG capsule Take 1 capsule by mouth nightly  Qty: 30 capsule, Refills: 3      miconazole (MICOTIN) 2 % powder Apply topically 2 times daily. Qty: 45 g, Refills: 1      [employer] Lancets Ultra Thin MISC 1 each by Does not apply route 4 times daily  Qty: 200 each, Refills: 0      blood glucose monitor strips Test three times a day & as needed for symptoms of irregular blood glucose. Qty: 100 strip, Refills: 2    Comments: Brand per patient preference. May round up to next available package size.       topiramate (TOPAMAX) 200 MG tablet Take 1 tablet by mouth daily  Qty: 60 tablet, Refills: 0      metFORMIN (GLUCOPHAGE) 500 MG tablet Take 2 tablets by mouth 2 times daily (with meals)  Qty: 120 tablet, Refills: 2             ALLERGIES     Patient has no known allergies. FAMILY HISTORY       Family History   Problem Relation Age of Onset    Heart Disease Father     Cancer Mother     Other Mother           SOCIAL HISTORY       Social History     Socioeconomic History    Marital status:      Spouse name: None    Number of children: None    Years of education: None    Highest education level: None   Occupational History    None   Social Needs    Financial resource strain: None    Food insecurity     Worry: None     Inability: None    Transportation needs     Medical: None     Non-medical: None   Tobacco Use    Smoking status: Never Smoker    Smokeless tobacco: Never Used   Substance and Sexual Activity    Alcohol use: No    Drug use: No    Sexual activity: Not Currently   Lifestyle    Physical activity     Days per week: None     Minutes per session: None    Stress: None   Relationships    Social connections     Talks on phone: None     Gets together: None     Attends Cheondoism service: None     Active member of club or organization: None     Attends meetings of clubs or organizations: None     Relationship status: None    Intimate partner violence     Fear of current or ex partner: None     Emotionally abused: None     Physically abused: None     Forced sexual activity: None   Other Topics Concern    None   Social History Narrative    None       SCREENINGS    [place] Coma Scale  Eye Opening: Spontaneous  Best Verbal Response: Oriented  Best Motor Response: Obeys commands  Bc Coma Scale Score: 15           PHYSICAL EXAM    (up to 7 for level 4, 8 or more for level 5)     ED Triage Vitals   BP Temp Temp src Pulse Resp SpO2 Height Weight   -- -- -- -- -- -- -- --       Physical Exam  Vitals signs and nursing note reviewed. Constitutional:       General: She is awake. She is in acute distress (mild). Appearance: She is well-developed and well-groomed. She is morbidly obese. She is ill-appearing.  She is not toxic-appearing or diaphoretic. Interventions: She is not intubated. HENT:      Head: Normocephalic and atraumatic. Right Ear: External ear normal.      Left Ear: External ear normal.      Nose: Nose normal.      Mouth/Throat:      Mouth: Mucous membranes are dry. Eyes:      General:         Right eye: No discharge. Left eye: No discharge. Pupils: Pupils are equal, round, and reactive to light. Neck:      Musculoskeletal: Full passive range of motion without pain, normal range of motion and neck supple. Normal range of motion. No edema, erythema, neck rigidity or muscular tenderness. Trachea: No tracheal deviation. Cardiovascular:      Rate and Rhythm: Regular rhythm. Tachycardia present. Pulses: Normal pulses. Radial pulses are 2+ on the right side and 2+ on the left side. Pulmonary:      Effort: Pulmonary effort is normal. No tachypnea, bradypnea, accessory muscle usage, prolonged expiration, respiratory distress or retractions. She is not intubated. Breath sounds: Normal air entry. No stridor, decreased air movement or transmitted upper airway sounds. Examination of the right-upper field reveals decreased breath sounds. Examination of the left-upper field reveals decreased breath sounds. Examination of the right-middle field reveals decreased breath sounds. Examination of the left-middle field reveals decreased breath sounds. Examination of the right-lower field reveals decreased breath sounds. Examination of the left-lower field reveals decreased breath sounds. Decreased breath sounds present. No wheezing, rhonchi or rales. Chest:      Chest wall: No tenderness. Abdominal:      General: Abdomen is flat. Bowel sounds are normal. There is no distension. Palpations: Abdomen is soft. Abdomen is not rigid. Tenderness: There is no abdominal tenderness. There is no guarding or rebound. Negative signs include Lozano's sign and McBurney's sign.    Musculoskeletal: Normal range of motion. General: No tenderness, deformity or signs of injury. Skin:     General: Skin is warm and dry. Coloration: Skin is not jaundiced or pale. Findings: No bruising, erythema, lesion or rash. Neurological:      General: No focal deficit present. Mental Status: She is alert and oriented to person, place, and time. Mental status is at baseline. GCS: GCS eye subscore is 4. GCS verbal subscore is 5. GCS motor subscore is 6. Sensory: No sensory deficit. Motor: No weakness, tremor, atrophy, abnormal muscle tone or seizure activity. Psychiatric:         Attention and Perception: Attention normal.         Mood and Affect: Mood is anxious and depressed. Speech: Speech normal.         Behavior: Behavior normal. Behavior is cooperative.              DIAGNOSTIC RESULTS   :    Labs Reviewed   CBC WITH AUTO DIFFERENTIAL - Abnormal; Notable for the following components:       Result Value    RDW 15.7 (*)     All other components within normal limits    Narrative:     Performed at:  [facility],  [place]   Phone [phone]   COMPREHENSIVE METABOLIC PANEL - Abnormal; Notable for the following components:    Sodium 134 (*)     Chloride 96 (*)     Glucose 426 (*)     Alkaline Phosphatase 137 (*)     All other components within normal limits    Narrative:     Performed at:  [facility],  [place] [facility]   Phone [phone]   BLOOD GAS, VENOUS - Abnormal; Notable for the following components:    pH, Alberto 7.303 (*)     HCO3, Venous 21.4 (*)     Base Excess, Alberto -5.0 (*)     Carboxyhemoglobin 1.6 (*)     All other components within normal limits    Narrative:     Performed at:  [facility]) [facility],  [place], [facility] [employer] [facility]   Phone [phone]   BETA-HYDROXYBUTYRATE - Abnormal; Notable for the following components: [facility],  [place], [facility]   Phone [phone]   LACTIC ACID, PLASMA - Abnormal; Notable for the following components:    Lactic Acid 2.3 (*)     All other components within normal limits    Narrative:     Performed at:  [facility],  [place], [facility]   Phone [phone]   POCT GLUCOSE - Abnormal; Notable for the following components:    POC Glucose 395 (*)     All other components within normal limits    Narrative:     Performed at:  [facility],  [place], [facility]   Phone [phone]   POCT GLUCOSE - Normal   TROPONIN    Narrative:     Performed at:  [facility],  [place], [facility]   Phone [phone]   LIPASE    Narrative:     Performed at:  [facility],  [place], [place] [facility]   Phone [phone]   DIGOXIN LEVEL   HEMOGLOBIN A1C   LACTIC ACID, PLASMA   LACTIC ACID, PLASMA   POCT GLUCOSE   POCT GLUCOSE   POCT GLUCOSE       All other labs were within normal range or not returned asof this dictation. EKG: All EKG's are interpreted by the Emergency Department Physician who either signs or Co-signs this chart in the absence of a cardiologist.    The Ekg interpreted by me shows  sinus tachycardia, fupj=[id number]   Axis is   Right axis deviation  QTc is  borderline prolonged QT  Intervals and Durations are unremarkable.       ST Segments: nonspecific changes  Significant change from prior EKG dated - 10/10/20  No STEMI, sinus rhythm currently (was paced on old EKG)           RADIOLOGY:   Non-plain film images such as CT, Ultrasound and MRI are read by the radiologist. Aga Ashwini images are visualized and preliminarily interpreted by the  ED Provider with the belowfindings:        Interpretation per the Radiologist below, if available at the time of this note:    XR CHEST PORTABLE   Final Result   Developing CHF/volume overload. PROCEDURES   Unless otherwise noted below, none     Procedures    CRITICAL CARE TIME   Time: 15 minutes   Includes repeat examinations, speaking with consultants, lab interpretation, charting, giving IV fluids due to concern for dehydration with persistent nausea and treating sinus tachycardia (given before it was known that she had worsening heart failure)    Excludes separate billable procedures. Patient at risk for serious decompensation if not treated for this life-threatening condition. CONSULTS: Spoke with ALINE Oreilly at 7586 with cardiology and he stated Jose Mortimer was appropriate. Spoke with Dr. Princess Carmona for admission at [phone].     IP CONSULT TO CARDIOLOGY  IP CONSULT TO HOSPITALIST    EMERGENCY DEPARTMENT COURSE and DIFFERENTIAL DIAGNOSIS/MDM:   Vitals:    Vitals:    10/26/20 0229 10/26/20 0329 10/26/20 0427 10/26/20 0500   BP: 134/86 138/84  123/76   Pulse: 95 110 105 105   Resp: 16 16 16 16   Temp:    98.2 °F (36.8 °C)   TempSrc:    Oral   SpO2: 98% 97% 95% 95%   Weight:    214 lb 9.6 oz (97.3 kg)   Height:    5' 1\" (1.549 m)       Patient was given the following medications:  Medications   amiodarone (CORDARONE) tablet 100 mg (has no administration in time range)   aspirin EC tablet 81 mg (has no administration in time range)   atorvastatin (LIPITOR) tablet 40 mg (has no administration in time range)   digoxin (LANOXIN) tablet 125 mcg (has no administration in time range)   DULoxetine (CYMBALTA) extended release capsule 60 mg (has no administration in time range)   escitalopram (LEXAPRO) tablet 10 mg (has no administration in time range)   fenofibrate (TRIGLIDE) tablet 160 mg (has no administration in time range)   FLUoxetine (PROZAC) capsule 10 mg (has no administration in time range)   insulin glargine (LANTUS;BASAGLAR) injection pen 30 Units (has no administration in time range)   glucose (GLUTOSE) 40 % oral gel 15 g (has no administration in time range)   dextrose 50 % IV solution (has no administration in time range)   glucagon (rDNA) injection 1 mg (has no administration in time range)   dextrose 5 % solution (has no administration in time range)   insulin lispro (HUMALOG) injection vial 0-6 Units (has no administration in time range)   insulin lispro (HUMALOG) injection vial 0-3 Units (has no administration in time range)   metoprolol succinate (TOPROL XL) extended release tablet 12.5 mg (has no administration in time range)   nitroGLYCERIN (NITROSTAT) SL tablet 0.4 mg (has no administration in time range)   ondansetron (ZOFRAN) tablet 4 mg (has no administration in time range)   ranolazine (RANEXA) extended release tablet 500 mg (has no administration in time range)   sacubitril-valsartan (ENTRESTO) 24-26 MG per tablet 0.5 tablet (has no administration in time range)   topiramate (TOPAMAX) tablet 200 mg (has no administration in time range)   sodium chloride flush 0.9 % injection 10 mL (has no administration in time range)   sodium chloride flush 0.9 % injection 10 mL (has no administration in time range)   acetaminophen (TYLENOL) tablet 650 mg (has no administration in time range)     Or   acetaminophen (TYLENOL) suppository 650 mg (has no administration in time range)   polyethylene glycol (GLYCOLAX) packet 17 g (has no administration in time range)   promethazine (PHENERGAN) tablet 12.5 mg (has no administration in time range)     Or   ondansetron (ZOFRAN) injection 4 mg (has no administration in time range)   enoxaparin (LOVENOX) injection 40 mg (has no administration in time range)   0.9 % sodium chloride infusion (has no administration in time range)   promethazine (PHENERGAN) injection 25 mg (25 mg Intramuscular Given 10/26/20 0153)   0.9 % sodium chloride bolus (0 mLs Intravenous Stopped 10/26/20 0304)   magnesium sulfate 1 g in dextrose 5% 100 mL IVPB (0 g Intravenous Stopped 10/26/20 0422)   ondansetron (ZOFRAN) injection 4 mg (4 mg Intravenous Given 10/26/20 0344)     Patient was evaluated due to concern for intractable nausea with increasing lightheadedness and generalized ill sensation over the last couple days along with some shortness of breath. She had no abdominal tenderness on exam and at this time I have low suspicion for bowel obstruction. Chest x-ray was concerning for worsening heart failure. After Phenergan and then Zofran, she was still significantly nauseated at this time was still ill-appearing. I did speak to cardiology who felt that she could stay at [facility]. If nausea persist, she may need a CT of the abdomen for further evaluation. Otherwise, she will need further assessment in the hospital with GI and cardiology consultations. She is stable for the floor with telemetry. Troponin was negative and story not suggestive of acute coronary syndrome. No sign of any anion gap or diabetic ketoacidosis at this time in regards to the hyperglycemia. She felt comfortable with this plan. The patient tolerated their visit well. The patient and / or the family were informed of the results of any tests, a time was given to answer questions. FINAL IMPRESSION      1. Acute on chronic systolic congestive heart failure ([address]    2. Shortness of breath    3. Nausea    4. Lactic acidosis    5. Hyperglycemia    6. Ketonemia    7. Dehydration    8. Hypomagnesemia          DISPOSITION/PLAN   DISPOSITION Admitted 10/26/2020 04:03:10 AM      PATIENT REFERRED TO:  No follow-up provider specified.     DISCHARGEMEDICATIONS:  Current Discharge Medication List          DISCONTINUED MEDICATIONS:  Current Discharge Medication List                 (Please note that portions of this note were completed with a voicerecognition program.  Efforts were made to edit the dictations but occasionally words are mis-transcribed.)    Jadyn Jerez MD (electronically signed)            Alfa Bartholomew Wilian Gonzales MD  10/26/20 9805

## 2020-10-26 NOTE — CARE COORDINATION
Case Management Assessment  Initial Evaluation      Patient Name: Lorenza Jackson  YOB: 1960  Diagnosis: Intractable nausea and vomiting [R11.2]  Date / Time: 10/26/2020 12:03 AM    Admission status/Date:inpt  Chart Reviewed: Yes      Patient Interviewed: Yes   Family Interviewed:  No      Hospitalization in the last 30 days:  no      Health Care Decision Maker : pt's sister (1st enter selection under Navigator - emergency contact- Kacie 8 Relationship)   Who do you trust or have selected to make healthcare decisions for you      Met with: pt  Interview conducted  (bedside/phone):    Current PCP:   FRITZ Omalley NP      Financial  Commercial  Precert required for SNF : Y, N          3 night stay required - Y, N    ADLS  Support Systems/Care Needs: Friends/Neighbors  Transportation: family    Meal Preparation: self    Housing  Living Arrangements: apartment alone  Steps: none  Intent for return to present living arrangements: Yes  Identified Issues: no    Home Care Information  Active with Home Health Care : Yes Agency:(Services)  Type of Home Care Services: Nursing Services, Attendant  Passport/Waiver : No  :                      Phone Number:    Passport/Waiver Services: no          Durable Medical Equiptment   DME Provider:   Equipment:   Walker_x__Cane_x__RTS___ BSC___Shower Chair___Hospital Bed___W/C__x_Other________  02 at ____Liter(s)---wears(frequency)_______ HHN ___ CPAP___ BiPap___   N/A____      Home O2 Use :  No    If No for home O2---if presently on O2 during hospitalization:  No  if yes CM to follow for potential DC O2 need  Informed of need for care provider to bring portable home O2 tank on day of discharge for nursing to connect prior to leaving:   Not Indicated  Verbalized agreement/Understanding:   Not Indicated    Community Service Affiliation  Dialysis:  No    · Agency:  · Location:  · Dialysis Schedule:  · Phone:   · Fax:     Other Community Services: (ex:PT/OT,Mental Health,Wound Clinic, Cardio/Pul [address])    DISCHARGE PLAN: Explained Case Management role/services. Reviewed chart. Met with pt. Pt is high functioning MR. Pt from apartment alone and plan return. Pt active with [facility] [place] for SN. Follow.

## 2020-10-26 NOTE — PROGRESS NOTES
Spoke with Ashley GRIMALDO in cardiology. Patient was not on their list.  She will look into it. I told her about 7 beats of v-tach, and that patient was sleeping when she was checked on, but stated she had a little bit of chest pain a few minutes ago but it went away. Petra Romo

## 2020-10-26 NOTE — PLAN OF CARE
N/v, dehydration, suspecting gatroenteritis  Recent w/u for chest pain chf with findings of dilated cardiomyopathy ef 15%

## 2020-10-26 NOTE — H&P
Hospital Medicine History & Physical      PCP: FRITZ Somers NP    Date of Admission: 10/26/2020    Date of Service: Pt seen/examined on 10/26/2020     Chief Complaint:    Chief Complaint   Patient presents with    Hyperglycemia     pt in via EMS for 3 day hx of hyperglycemia. Read as HI in triage. Pt c/o n/v. History Of Present Illness: The patient is a 62 y.o. female with nonobstructive CAD, CHF (EF15%), DM2 who was recently admitted at [facility] for CHF and underwent cardiac cath (dc'd to home on 10/14/20) who presented to [facility] ED via EMS with complaint of nausea, vomiting, diarrhea, cough, shortness of breath. She is a relatively poor historian. She states she has felt unwell for the past 3 days. She describes nausea with vomiting after all oral intake. She describes diarrhea with TNTC stools x 3 days. She has not noticed blood in her vomit or her stools. She denies fever, chills. She denies abdominal pain. She also has felt short of breath for the past 3 days with a NP cough. She has been compliant with her home meds. Her BG has been reading \"high\" on her glucometer. In the ER she was afebrile, normotensive. Her BG was high, but she was not in DKA. Her proBNP was elevated to 5k (it was 662 on 10/13), and her CXR showed developing CHF. She was given 1 L NS and then started on NS at 75 cc/hr and she was admitted for further eval.    This morning she states she feels better. She has a plate with eggs, pancakes, and hash browns in front of her and she wants to eat it. She still feels short of breath.      Past Medical History:        Diagnosis Date    Arthritis     CAD (coronary artery disease)     Cerebral artery occlusion with cerebral infarction ([address]     CHF (congestive heart failure) (HCC)     Diabetes mellitus ([address]     Hyperlipidemia     Hypertension     Mental retardation     MI (myocardial infarction) ([address]     Pacemaker        Past Surgical History: Procedure Laterality Date    BACK SURGERY      PACEMAKER INSERTION      TUBAL LIGATION      TUMOR REMOVAL         Medications Prior to Admission:    Prior to Admission medications    Medication Sig Start Date End Date Taking? Authorizing Provider   amiodarone (PACERONE) 100 MG tablet Take 1 tablet by mouth daily 10/14/20  Yes FRITZ eNumann CNP   metoprolol succinate (TOPROL XL) 25 MG extended release tablet Take 0.5 tablets by mouth daily 10/14/20  Yes FRITZ Neumann CNP   sacubitril-valsartan (ENTRESTO) 24-26 MG per tablet Take 0.5 tablets by mouth 2 times daily 10/13/20  Yes FRITZ Neumann - CNP   spironolactone (ALDACTONE) 25 MG tablet Take 1 tablet by mouth daily 10/13/20  Yes FRITZ Neumann - CNP   digoxin (LANOXIN) 125 MCG tablet Take 1 tablet by mouth every other day 9/25/20  Yes FRITZ Neumann CNP   insulin aspart (NOVOLOG FLEXPEN) 100 UNIT/ML injection pen Inject 5 Units into the skin 3 times daily (before meals) 9/22/20  Yes Enriqueta Torre MD   insulin glargine (LANTUS SOLOSTAR) 100 UNIT/ML injection pen Inject 30 Units into the skin 2 times daily 9/22/20  Yes Enriqueta Torre MD   insulin lispro (HUMALOG) 100 UNIT/ML injection vial Inject 0-6 Units into the skin 3 times daily (with meals) **Corrective Low Dose Algorithm**  Glucose: Dose:               No Insulin  140-199 1 Unit  200-249 2 Units  250-299 3 Units  300-349 4 Units  350-399 5 Units  Over 399 6 Units 9/22/20  Yes Enriqueta Torre MD   escitalopram (LEXAPRO) 10 MG tablet  6/15/20  Yes Historical Provider, MD   nitroGLYCERIN (NITROSTAT) 0.4 MG SL tablet up to max of 3 total doses.  If no relief after 1 dose, call 911. 8/24/20  Yes Konstantin Kapadia MD   ondansetron [facility]) 4 MG tablet Take 1 tablet by mouth every 8 hours as needed for Nausea 8/12/20  Yes Jaxmigarrett Ashby, DO   aspirin 81 MG EC tablet Take 1 tablet by mouth daily 7/18/20  Yes Mannie Hi MD   ranolazine for syncope   Hematological: Negative for adenopathy   Psychiatric/Behavorial: Negative for anxiety    PHYSICAL EXAM:    /76   Pulse 105   Temp 98.2 °F (36.8 °C) (Oral)   Resp 16   Ht 5' 1\" (1.549 m)   Wt 214 lb 9.6 oz (97.3 kg)   SpO2 95%   BMI 40.55 kg/m²     Gen:Middle aged female. No distress. Alert. Eyes: PERRL. No sclera icterus. No conjunctival injection. ENT: No discharge. Pharynx clear. Neck: No JVD. Trachea midline. Resp: No accessory muscle use. No crackles. No wheezes. No rhonchi. CV: Regular rate. Regular rhythm. No murmur. No rub. No edema. GI: Non-tender. Non-distended. No masses. Normal bowel sounds. No hernia. Lower abdominal bruising from SC injections during last hospitalization  Skin: Warm and dry. No nodule on exposed extremities. No rash on exposed extremities. M/S: No cyanosis. No joint deformity. No clubbing. Neuro: Awake. Grossly nonfocal    Psych: Oriented x 3. No anxiety or agitation. CBC:   Recent Labs     10/26/20  0031   WBC 7.1   HGB 13.7   HCT 41.9   MCV 90.5        BMP:   Recent Labs     10/26/20  0031 10/26/20  0258   * 134*   K 4.5 4.6   CL 96* 99   CO2 23 23   BUN 14 13   CREATININE 0.7 0.7     LIVER PROFILE:   Recent Labs     10/26/20  0031 10/26/20  0258   AST 34 27   ALT 28 24   LIPASE 41.0  --    BILITOT 0.9 0.8   ALKPHOS 137* 102     PT/INR: No results for input(s): PROTIME, INR in the last 72 hours. APTT: No results for input(s): APTT in the last 72 hours.   UA:  Recent Labs     10/26/20  0107   COLORU Yellow   PHUR 5.0   CLARITYU Clear   SPECGRAV 1.015   LEUKOCYTESUR Negative   UROBILINOGEN 0.2   BILIRUBINUR Negative   BLOODU Negative   GLUCOSEU >=1000*          CARDIAC ENZYMES  Recent Labs     10/26/20  0031   TROPONINI <0.01       U/A:    Lab Results   Component Value Date    COLORU Yellow 10/26/2020    WBCUA 10-20 06/12/2020    RBCUA 3-4 06/12/2020    BACTERIA Rare 05/28/2020    CLARITYU Clear 10/26/2020    SPECGRAV 1.015 10/26/2020    LEUKOCYTESUR Negative 10/26/2020    BLOODU Negative 10/26/2020    GLUCOSEU >=1000 10/26/2020    AMORPHOUS 1+ 02/18/2020       COVID 19 RAPID TEST- ORDERED    CULTURES  None     EKG:   Suspect arm lead reversal, interpretation assumes no reversal  Sinus tachycardia  Anterolateral infarct , age undetermined    RADIOLOGY  XR CHEST PORTABLE   Final Result   Developing CHF/volume overload. Pertinent previous results reviewed     Echo 10/6/2020  Limited only f/u for LVEF. The left ventricular systolic function is severely reduced with an ejection   fraction of 15-20 %. There is severe global hypokinesis with regional variations. Changes noted from previous echo on 6- in left ventricular function. LHC/RHC 10/6/2020  CONCLUSIONS:  1. Mild non-obstructive coronary artery disease with known severe left ventricular dysfunction  2. Moderate pulmonary hypertension with decompensated hemodynamics  ASSESSMENT/RECOMMENDATIONS:  1. Risk Factor control  2. Advanced heart failure management. MINERVA Busitllos have reviewed the chart on [facility] and personally interviewed and examined patient, reviewed the data (labs and imaging) and after discussion with my PA formulated the plan. Agree with note with the following edits. HPI:   60-year-old female with nonobstructive CAD, who has had a recent cardiac cath as mentioned above, history of cardiomyopathy ejection fraction 50%, chronic systolic congestive heart failure and type 2 diabetes mellitus presented with nausea, vomiting and diarrhea. She has chronic chest pain and chronic shortness of breath, poor historian. On admission they gave her IV fluids but that has now  been discontinued, patient resumed on her Aldactone. she had an episode of nonsustained V. tach today. Mag level slightly low at 1.7. Cardiology consulted. Patient appears fatigued ,appears dyspneic, oxygen saturations are stable.    complains of chronic chest pain, ongoing for several months ,as mentioned above she has had a recent cardiac cath with nonobstructive CAD      I reviewed the patient's Past Medical History, Past Surgical History, Medications, and Allergies. Physical exam:    /78   Pulse 93   Temp 99.1 °F (37.3 °C) (Oral)   Resp 15   Ht 5' 1\" (1.549 m)   Wt 214 lb 9.6 oz (97.3 kg)   SpO2 97%   BMI 40.55 kg/m²     Gen: No distress. Alert. Patient appears fatigued appears chronically ill  Eyes: PERRL. No sclera icterus. No conjunctival injection. ENT: No discharge. Pharynx clear. Neck: Trachea midline. Normal thyroid. Resp: Diminished breath sounds no wheezes rales or rhonchi .no accessory muscle use. No crackles. No wheezes. No rhonchi. No dullness on percussion. CV: Regular rate. Regular rhythm. No murmur or rub. No edema. GI: Non-tender. Non-distended. No masses. No organomegaly. Normal bowel sounds. No hernia. Skin: Warm and dry. No nodule on exposed extremities. No rash on exposed extremities. Lymph: No cervical LAD. No supraclavicular LAD. M/S: No cyanosis. No joint deformity. No clubbing. Neuro: Awake. Reflexes 2+ symmetric bilaterally. Moves all 4 extremities, non focal  Psych: Oriented x 3. No anxiety or agitation. Telemetry strips reviewed    Electronically signed by Meredith Palacio MD on 10/26/2020 1:37 PM     ASSESSMENT/PLAN:    #Nausea, vomiting, diarrhea  - Benign abdominal exam  - Symptoms improving  - suspect viral gastroenteritis, but need to rule out COVID 19- test is PENDING   - start diet today and monitor tolerance    #Acute on chronic systolic CHF   #Nonischemic CMP (EF 15-20%)  - suspect decompensation 2/2 recent illness  - She is not hypoxic and her exam for fluid overload is relatively unremarkable on admission. , but her proBNP is 5k (it was only 600 last week) and CXR shows developing CHF  - Stop IV NS  - resume aldactone 25 mg daily (at [facility] after being on lasix gtt her home oral lasix was stopped and aldactone was started)  - cont home entresto and toprol XL  - consult cardio for further management recs     #Nonobstructive CAD  - cont ASA, statin, BB  -recent LHC reviewed     #History of SVT/NSVT  - cont amio and digoxin    #DM2   - uncontrolled with HbA1c 13.3% in 9/2020  - Cont home Lantus, resume scheduled humalog 5 U TID, and add medium dose SSI  - not in DKA    #Lactic acidosis  - resolved with IVF. No evidence of sepsis. Stop IVF    #History of TIA  - cont ASA and statin     DVT Prophylaxis: Lovenox   Diet: DIET CARDIAC;  Code Status: Full Code    Carmen Villafuerte PA-C  10/26/2020 8:37 AM        Agree with plan of care as above  Hold off on further IV fluid hydration. Diet has been advanced and she is tolerating it well. Continue to monitor in telemetry. cardiology has been consulted   resume Aldactone as at home.   Replete magnesium    Electronically signed by Magdalena Delaney MD on 10/26/2020 1:37 PM

## 2020-10-26 NOTE — PROGRESS NOTES
Patient reports severe depression and feelings of hopelessness. States she is not suicidal at this time and does not want to harm others. Will continue to monitor.

## 2020-10-26 NOTE — TELEPHONE ENCOUNTER
Writer contacted [address] [place] [address] ED provider to inform of 30 day readmission risk. ED provider informed writer admit or discharge has not been determined, until he contacts cardiologist  Continue to follow-up.

## 2020-10-26 NOTE — PROGRESS NOTES
Patient admitted to room 322 from er. Patient oriented to room, call light, bed rails, phone, lights and bathroom. Patient instructed about the schedule of the day including: vital sign frequency, lab draws, possible tests, frequency of MD and staff rounds, daily weights, I &O's and prescribed diet. bed alarm in place, patient aware of placement and reason. Telemetry box in place, patient aware of placement and reason. Bed locked, in lowest position, side rails up 2/4, call light within reach. Recliner Assessment  Patient is able to demonstrated the ability to move from a reclining position to an upright position within the recliner. 4 Eyes Skin Assessment     The patient is being assess for   Admission    I agree that 2 RN's have performed a thorough Head to Toe Skin Assessment on the patient. ALL assessment sites listed below have been assessed. Areas assessed by both nurses:   [x]   Head, Face, and Ears   [x]   Shoulders, Back, and Chest, Abdomen  [x]   Arms, Elbows, and Hands   [x]   Coccyx, Sacrum, and Ischium  [x]   Legs, Feet, and Heels        Scattered abrasions and bruising, excoriation abdominal pannus/ zachary area/ inner thighs  **SHARE this note so that the co-signing nurse is able to place an eSignature**    Co-signer eSignature: Electronically signed by Mihai Mcknight on 10/26/20 at 5:40 AM EDT    Does the Patient have Skin Breakdown?   No          Ignacio Prevention initiated:  No   Wound Care Orders initiated:  No      [facility] nurse consulted for Pressure Injury (Stage 3,4, Unstageable, DTI, NWPT, Complex wounds)and New or Established Ostomies:  No      Primary Nurse eSignature: Electronically signed by Denisa Kern RN on 10/26/20 at 5:24 AM EDT

## 2020-10-26 NOTE — ED NOTES
Pushpa Ruiz returned page at [phone] and spoke to Dr. Jarett Barragan.      Hellen Mackenzie  [id number]/75 [phone]

## 2020-10-27 LAB
ANION GAP SERPL CALCULATED.3IONS-SCNC: 10 MMOL/L (ref 3–16)
ANION GAP SERPL CALCULATED.3IONS-SCNC: 9 MMOL/L (ref 3–16)
BASOPHILS ABSOLUTE: 0.2 K/UL (ref 0–0.2)
BASOPHILS RELATIVE PERCENT: 3.3 %
BUN BLDV-MCNC: 16 MG/DL (ref 7–20)
BUN BLDV-MCNC: 21 MG/DL (ref 7–20)
CALCIUM SERPL-MCNC: 8.4 MG/DL (ref 8.3–10.6)
CALCIUM SERPL-MCNC: 9.2 MG/DL (ref 8.3–10.6)
CHLORIDE BLD-SCNC: 103 MMOL/L (ref 99–110)
CHLORIDE BLD-SCNC: 96 MMOL/L (ref 99–110)
CO2: 26 MMOL/L (ref 21–32)
CO2: 29 MMOL/L (ref 21–32)
CREAT SERPL-MCNC: 0.7 MG/DL (ref 0.6–1.1)
CREAT SERPL-MCNC: 0.8 MG/DL (ref 0.6–1.1)
EOSINOPHILS ABSOLUTE: 0.3 K/UL (ref 0–0.6)
EOSINOPHILS RELATIVE PERCENT: 3.8 %
ESTIMATED AVERAGE GLUCOSE: 289.1 MG/DL
GFR AFRICAN AMERICAN: >60
GFR AFRICAN AMERICAN: >60
GFR NON-AFRICAN AMERICAN: >60
GFR NON-AFRICAN AMERICAN: >60
GLUCOSE BLD-MCNC: 107 MG/DL (ref 70–99)
GLUCOSE BLD-MCNC: 216 MG/DL (ref 70–99)
GLUCOSE BLD-MCNC: 436 MG/DL (ref 70–99)
GLUCOSE BLD-MCNC: 70 MG/DL (ref 70–99)
GLUCOSE BLD-MCNC: 91 MG/DL (ref 70–99)
GLUCOSE BLD-MCNC: 92 MG/DL (ref 70–99)
GLUCOSE BLD-MCNC: 94 MG/DL (ref 70–99)
HBA1C MFR BLD: 11.7 %
HCT VFR BLD CALC: 41.4 % (ref 36–48)
HEMOGLOBIN: 13.4 G/DL (ref 12–16)
LYMPHOCYTES ABSOLUTE: 2.3 K/UL (ref 1–5.1)
LYMPHOCYTES RELATIVE PERCENT: 31.6 %
MAGNESIUM: 1.7 MG/DL (ref 1.8–2.4)
MAGNESIUM: 1.9 MG/DL (ref 1.8–2.4)
MCH RBC QN AUTO: 29.7 PG (ref 26–34)
MCHC RBC AUTO-ENTMCNC: 32.5 G/DL (ref 31–36)
MCV RBC AUTO: 91.6 FL (ref 80–100)
MONOCYTES ABSOLUTE: 0.7 K/UL (ref 0–1.3)
MONOCYTES RELATIVE PERCENT: 9.7 %
NEUTROPHILS ABSOLUTE: 3.7 K/UL (ref 1.7–7.7)
NEUTROPHILS RELATIVE PERCENT: 51.6 %
PDW BLD-RTO: 15.6 % (ref 12.4–15.4)
PERFORMED ON: ABNORMAL
PERFORMED ON: ABNORMAL
PERFORMED ON: NORMAL
PLATELET # BLD: 225 K/UL (ref 135–450)
PMV BLD AUTO: 8.7 FL (ref 5–10.5)
POTASSIUM REFLEX MAGNESIUM: 3.4 MMOL/L (ref 3.5–5.1)
POTASSIUM SERPL-SCNC: 3.7 MMOL/L (ref 3.5–5.1)
RBC # BLD: 4.52 M/UL (ref 4–5.2)
SODIUM BLD-SCNC: 134 MMOL/L (ref 136–145)
SODIUM BLD-SCNC: 139 MMOL/L (ref 136–145)
TROPONIN: <0.01 NG/ML
WBC # BLD: 7.3 K/UL (ref 4–11)

## 2020-10-27 PROCEDURE — 99232 SBSQ HOSP IP/OBS MODERATE 35: CPT | Performed by: INTERNAL MEDICINE

## 2020-10-27 PROCEDURE — 84484 ASSAY OF TROPONIN QUANT: CPT

## 2020-10-27 PROCEDURE — 80048 BASIC METABOLIC PNL TOTAL CA: CPT

## 2020-10-27 PROCEDURE — 6370000000 HC RX 637 (ALT 250 FOR IP): Performed by: HOSPITALIST

## 2020-10-27 PROCEDURE — 6360000002 HC RX W HCPCS: Performed by: INTERNAL MEDICINE

## 2020-10-27 PROCEDURE — 6370000000 HC RX 637 (ALT 250 FOR IP): Performed by: INTERNAL MEDICINE

## 2020-10-27 PROCEDURE — 2580000003 HC RX 258: Performed by: HOSPITALIST

## 2020-10-27 PROCEDURE — 85025 COMPLETE CBC W/AUTO DIFF WBC: CPT

## 2020-10-27 PROCEDURE — 2060000000 HC ICU INTERMEDIATE R&B

## 2020-10-27 PROCEDURE — 6370000000 HC RX 637 (ALT 250 FOR IP): Performed by: PHYSICIAN ASSISTANT

## 2020-10-27 PROCEDURE — 93268 ECG RECORD/REVIEW: CPT | Performed by: INTERNAL MEDICINE

## 2020-10-27 PROCEDURE — 83735 ASSAY OF MAGNESIUM: CPT

## 2020-10-27 PROCEDURE — 93005 ELECTROCARDIOGRAM TRACING: CPT | Performed by: INTERNAL MEDICINE

## 2020-10-27 PROCEDURE — 36415 COLL VENOUS BLD VENIPUNCTURE: CPT

## 2020-10-27 PROCEDURE — 6360000002 HC RX W HCPCS: Performed by: HOSPITALIST

## 2020-10-27 PROCEDURE — 99233 SBSQ HOSP IP/OBS HIGH 50: CPT | Performed by: INTERNAL MEDICINE

## 2020-10-27 RX ORDER — FUROSEMIDE 10 MG/ML
40 INJECTION INTRAMUSCULAR; INTRAVENOUS ONCE
Status: COMPLETED | OUTPATIENT
Start: 2020-10-27 | End: 2020-10-27

## 2020-10-27 RX ORDER — POTASSIUM CHLORIDE 7.45 MG/ML
10 INJECTION INTRAVENOUS PRN
Status: DISCONTINUED | OUTPATIENT
Start: 2020-10-27 | End: 2020-10-28 | Stop reason: HOSPADM

## 2020-10-27 RX ORDER — POTASSIUM CHLORIDE 20 MEQ/1
40 TABLET, EXTENDED RELEASE ORAL ONCE
Status: COMPLETED | OUTPATIENT
Start: 2020-10-27 | End: 2020-10-28

## 2020-10-27 RX ORDER — MAGNESIUM SULFATE 1 G/100ML
1 INJECTION INTRAVENOUS ONCE
Status: COMPLETED | OUTPATIENT
Start: 2020-10-27 | End: 2020-10-28

## 2020-10-27 RX ORDER — MAGNESIUM SULFATE 1 G/100ML
1 INJECTION INTRAVENOUS ONCE
Status: COMPLETED | OUTPATIENT
Start: 2020-10-27 | End: 2020-10-27

## 2020-10-27 RX ORDER — POTASSIUM CHLORIDE 20 MEQ/1
40 TABLET, EXTENDED RELEASE ORAL PRN
Status: DISCONTINUED | OUTPATIENT
Start: 2020-10-27 | End: 2020-10-28 | Stop reason: HOSPADM

## 2020-10-27 RX ORDER — MAGNESIUM SULFATE 1 G/100ML
1 INJECTION INTRAVENOUS PRN
Status: DISCONTINUED | OUTPATIENT
Start: 2020-10-27 | End: 2020-10-28 | Stop reason: HOSPADM

## 2020-10-27 RX ADMIN — RANOLAZINE 500 MG: 500 TABLET, FILM COATED, EXTENDED RELEASE ORAL at 21:24

## 2020-10-27 RX ADMIN — ASPIRIN 81 MG: 81 TABLET, COATED ORAL at 09:15

## 2020-10-27 RX ADMIN — ATORVASTATIN CALCIUM 40 MG: 40 TABLET, FILM COATED ORAL at 21:24

## 2020-10-27 RX ADMIN — FUROSEMIDE 40 MG: 10 INJECTION, SOLUTION INTRAMUSCULAR; INTRAVENOUS at 21:25

## 2020-10-27 RX ADMIN — MAGNESIUM SULFATE HEPTAHYDRATE 1 G: 1 INJECTION, SOLUTION INTRAVENOUS at 09:39

## 2020-10-27 RX ADMIN — SACUBITRIL AND VALSARTAN 0.5 TABLET: 24; 26 TABLET, FILM COATED ORAL at 09:15

## 2020-10-27 RX ADMIN — INSULIN GLARGINE 40 UNITS: 100 INJECTION, SOLUTION SUBCUTANEOUS at 21:25

## 2020-10-27 RX ADMIN — SACUBITRIL AND VALSARTAN 0.5 TABLET: 24; 26 TABLET, FILM COATED ORAL at 21:24

## 2020-10-27 RX ADMIN — INSULIN GLARGINE 40 UNITS: 100 INJECTION, SOLUTION SUBCUTANEOUS at 09:19

## 2020-10-27 RX ADMIN — FENOFIBRATE 160 MG: 160 TABLET ORAL at 09:14

## 2020-10-27 RX ADMIN — Medication 10 ML: at 09:15

## 2020-10-27 RX ADMIN — Medication 10 ML: at 21:24

## 2020-10-27 RX ADMIN — ENOXAPARIN SODIUM 40 MG: 40 INJECTION SUBCUTANEOUS at 09:16

## 2020-10-27 RX ADMIN — MAGNESIUM SULFATE HEPTAHYDRATE 1 G: 1 INJECTION, SOLUTION INTRAVENOUS at 11:25

## 2020-10-27 RX ADMIN — SPIRONOLACTONE 25 MG: 25 TABLET ORAL at 09:13

## 2020-10-27 RX ADMIN — RANOLAZINE 500 MG: 500 TABLET, FILM COATED, EXTENDED RELEASE ORAL at 09:13

## 2020-10-27 RX ADMIN — QUETIAPINE FUMARATE 25 MG: 25 TABLET ORAL at 21:24

## 2020-10-27 RX ADMIN — DULOXETINE HYDROCHLORIDE 60 MG: 60 CAPSULE, DELAYED RELEASE ORAL at 09:14

## 2020-10-27 RX ADMIN — POTASSIUM CHLORIDE 40 MEQ: 1500 TABLET, EXTENDED RELEASE ORAL at 09:39

## 2020-10-27 RX ADMIN — FUROSEMIDE 40 MG: 10 INJECTION, SOLUTION INTRAMUSCULAR; INTRAVENOUS at 10:21

## 2020-10-27 RX ADMIN — AMIODARONE HYDROCHLORIDE 100 MG: 200 TABLET ORAL at 09:15

## 2020-10-27 RX ADMIN — METOPROLOL SUCCINATE 12.5 MG: 25 TABLET, EXTENDED RELEASE ORAL at 09:14

## 2020-10-27 ASSESSMENT — PAIN DESCRIPTION - DESCRIPTORS: DESCRIPTORS: PRESSURE;CONSTANT

## 2020-10-27 ASSESSMENT — PAIN DESCRIPTION - LOCATION: LOCATION: CHEST

## 2020-10-27 ASSESSMENT — PAIN DESCRIPTION - PAIN TYPE: TYPE: ACUTE PAIN

## 2020-10-27 ASSESSMENT — PAIN SCALES - GENERAL
PAINLEVEL_OUTOF10: 0
PAINLEVEL_OUTOF10: 9

## 2020-10-27 NOTE — PLAN OF CARE
Problem: Falls - Risk of:  Goal: Will remain free from falls  Description: Will remain free from falls  10/27/2020 0304 by Arlene Cox RN  Outcome: Ongoing  10/26/2020 2023 by Sascha Walter RN  Outcome: Ongoing  10/26/2020 1823 by Sascha Walter RN  Outcome: Ongoing  Goal: Absence of physical injury  Description: Absence of physical injury  10/27/2020 0304 by Arlene Cox RN  Outcome: Ongoing  10/26/2020 2023 by Sascha Walter RN  Outcome: Ongoing  10/26/2020 1823 by Sascha Walter RN  Outcome: Ongoing     Problem: Skin Integrity:  Goal: Will show no infection signs and symptoms  Description: Will show no infection signs and symptoms  10/27/2020 0304 by Arlene Cox RN  Outcome: Ongoing  10/26/2020 1823 by Sascha Walter RN  Outcome: Ongoing  Goal: Absence of new skin breakdown  Description: Absence of new skin breakdown  10/27/2020 0304 by Arlene Cox RN  Outcome: Ongoing  10/26/2020 1823 by Sascha Walter RN  Outcome: Ongoing     Problem: Pain:  Goal: Pain level will decrease  Description: Pain level will decrease  Outcome: Ongoing  Goal: Control of acute pain  Description: Control of acute pain  Outcome: Ongoing  Goal: Control of chronic pain  Description: Control of chronic pain  Outcome: Ongoing  Goal: Patient's pain/discomfort is manageable  Description: Patient's pain/discomfort is manageable  Outcome: Ongoing     Problem: Discharge Planning:  Goal: Discharged to appropriate level of care  Description: Discharged to appropriate level of care  Outcome: Ongoing     Problem: Serum Glucose Level - Abnormal:  Goal: Ability to maintain appropriate glucose levels will improve  Description: Ability to maintain appropriate glucose levels will improve  Outcome: Ongoing     Problem: Sensory Perception - Impaired:  Goal: Ability to maintain a stable neurologic state will improve  Description: Ability to maintain a stable neurologic state will improve  Outcome: Ongoing     Problem: Activity:  Goal: Amount of time patient spends in regular exercise will increase  Description: Amount of time patient spends in regular exercise will increase  Outcome: Ongoing     Problem: Coping:  Goal: Ability to identify and develop effective coping behavior will improve  Description: Ability to identify and develop effective coping behavior will improve  Outcome: Ongoing     Problem: Nutritional:  Goal: Adjustment of eating patterns to appropriate times will improve  Description: Adjustment of eating patterns to appropriate times will improve  Outcome: Ongoing  Goal: Ability to make healthy dietary choices will improve  Description: Ability to make healthy dietary choices will improve  Outcome: Ongoing  Goal: Progress toward achieving an optimal weight will improve  Description: Progress toward achieving an optimal weight will improve  Outcome: Ongoing     Problem: Self-Concept:  Goal: Ability to verbalize positive feelings about self will improve  Description: Ability to verbalize positive feelings about self will improve  Outcome: Ongoing     Problem: Infection:  Goal: Will remain free from infection  Description: Will remain free from infection  Outcome: Ongoing     Problem: Safety:  Goal: Free from accidental physical injury  Description: Free from accidental physical injury  Outcome: Ongoing  Goal: Free from intentional harm  Description: Free from intentional harm  Outcome: Ongoing     Problem: Daily Care:  Goal: Daily care needs are met  Description: Daily care needs are met  Outcome: Ongoing     Problem: Skin Integrity:  Goal: Skin integrity will stabilize  Description: Skin integrity will stabilize  Outcome: Ongoing     Problem: Discharge Planning:  Goal: Patients continuum of care needs are met  Description: Patients continuum of care needs are met  Outcome: Ongoing

## 2020-10-27 NOTE — PROGRESS NOTES
Handoff report given to Saurav Booker, DILLAN and Hugo Packer RN. Care transferred.      Electronically signed by Kassie Gilliland RN on 10/27/2020 at 7:53 AM

## 2020-10-27 NOTE — PROGRESS NOTES
Shift assessment complete. See flow sheet. Scheduled meds given. See MAR. Some medications held due to conflicting home meds. On the phone with patients home pharmacy at this time. Will update med list. Call light and bedside table within reach. No further needs noted at this time. Will continue to monitor.     Kelli Lubin RN

## 2020-10-27 NOTE — PLAN OF CARE
Problem: Falls - Risk of:  Goal: Will remain free from falls  Description: Will remain free from falls  10/26/2020 2023 by Christen Miranda RN  Outcome: Ongoing  10/26/2020 1823 by Christen Miranda RN  Outcome: Ongoing  Goal: Absence of physical injury  Description: Absence of physical injury  10/26/2020 2023 by Christen Miranda RN  Outcome: Ongoing  10/26/2020 1823 by Christen Miranda RN  Outcome: Ongoing     Problem: Skin Integrity:  Goal: Will show no infection signs and symptoms  Description: Will show no infection signs and symptoms  Outcome: Ongoing  Goal: Absence of new skin breakdown  Description: Absence of new skin breakdown  Outcome: Ongoing

## 2020-10-27 NOTE — PROGRESS NOTES
SOLOSTAR) 100 UNIT/ML injection Inject 40 Units into the skin 2 times daily   Yes Historical Provider, MD   metFORMIN (GLUCOPHAGE) 1000 MG tablet Take 1,000 mg by mouth 2 times daily (with meals)   Yes Historical Provider, MD   vitamin D (ERGOCALCIFEROL) 1.25 MG (04524 UT) CAPS capsule Take 50,000 Units by mouth once a week Takes weekly on Sundays   Yes Historical Provider, MD   QUEtiapine (SEROQUEL) 25 MG tablet Take 25 mg by mouth nightly   Yes Historical Provider, MD   amiodarone (PACERONE) 100 MG tablet Take 1 tablet by mouth daily 10/14/20  Yes May FRITZ Rosa CNP   metoprolol succinate (TOPROL XL) 25 MG extended release tablet Take 0.5 tablets by mouth daily 10/14/20  Yes May ShelleyFRITZ boyce CNP   sacubitril-valsartan (ENTRESTO) 24-26 MG per tablet Take 0.5 tablets by mouth 2 times daily 10/13/20  Yes May ShelleyFRITZ boyce CNP   spironolactone (ALDACTONE) 25 MG tablet Take 1 tablet by mouth daily 10/13/20  Yes May FRITZ Rosa CNP   digoxin (LANOXIN) 125 MCG tablet Take 1 tablet by mouth every other day 9/25/20  Yes May FRITZ Rosa CNP   nitroGLYCERIN (NITROSTAT) 0.4 MG SL tablet up to max of 3 total doses.  If no relief after 1 dose, call 911. 8/24/20  Yes Reina Sorto MD   ondansetron [facility]) 4 MG tablet Take 1 tablet by mouth every 8 hours as needed for Nausea 8/12/20  Yes Patrizia Bryan DO   aspirin 81 MG EC tablet Take 1 tablet by mouth daily 7/18/20  Yes Ira Bird MD   ranolazine (RANEXA) 500 MG extended release tablet Take 1 tablet by mouth 2 times daily 7/18/20  Yes Ira Bird MD   DULoxetine (CYMBALTA) 60 MG extended release capsule Take 1 capsule by mouth daily 7/18/20  Yes Ira Bird MD   atorvastatin (LIPITOR) 40 MG tablet Take 1 tablet by mouth nightly 7/18/20  Yes Ira Bird MD   fenofibrate micronized (LOFIBRA) 200 MG capsule Take 1 capsule by mouth nightly 7/18/20  Yes Ira Bird MD miconazole (MICOTIN) 2 % powder Apply topically 2 times daily. 7/18/20  Yes Clarissa Morton MD   [employer] Lancets Ultra Thin MISC 1 each by Does not apply route 4 times daily 7/18/20  Yes Clarissa Morton MD   blood glucose monitor strips Test three times a day & as needed for symptoms of irregular blood glucose.  5/12/19  Yes Audra Jacobs MD        CURRENT Medications:  potassium chloride (KLOR-CON M) extended release tablet 40 mEq, PRN    Or  potassium bicarb-citric acid (EFFER-K) effervescent tablet 40 mEq, PRN    Or  potassium chloride 10 mEq/100 mL IVPB (Peripheral Line), PRN  magnesium sulfate 1 g in dextrose 5% 100 mL IVPB, PRN  magnesium sulfate 1 g in dextrose 5% 100 mL IVPB, Once  amiodarone (CORDARONE) tablet 100 mg, Daily  aspirin EC tablet 81 mg, Daily  atorvastatin (LIPITOR) tablet 40 mg, Nightly  digoxin (LANOXIN) tablet 125 mcg, Every Other Day  DULoxetine (CYMBALTA) extended release capsule 60 mg, Daily  fenofibrate (TRIGLIDE) tablet 160 mg, Daily  glucose (GLUTOSE) 40 % oral gel 15 g, PRN  dextrose 50 % IV solution, PRN  glucagon (rDNA) injection 1 mg, PRN  dextrose 5 % solution, PRN  metoprolol succinate (TOPROL XL) extended release tablet 12.5 mg, Daily  nitroGLYCERIN (NITROSTAT) SL tablet 0.4 mg, Q5 Min PRN  ondansetron (ZOFRAN) tablet 4 mg, Q8H PRN  ranolazine (RANEXA) extended release tablet 500 mg, BID  sacubitril-valsartan (ENTRESTO) 24-26 MG per tablet 0.5 tablet, BID  sodium chloride flush 0.9 % injection 10 mL, 2 times per day  sodium chloride flush 0.9 % injection 10 mL, PRN  acetaminophen (TYLENOL) tablet 650 mg, Q6H PRN    Or  acetaminophen (TYLENOL) suppository 650 mg, Q6H PRN  polyethylene glycol (GLYCOLAX) packet 17 g, Daily PRN  promethazine (PHENERGAN) tablet 12.5 mg, Q6H PRN    Or  ondansetron (ZOFRAN) injection 4 mg, Q6H PRN  enoxaparin (LOVENOX) injection 40 mg, Daily  spironolactone (ALDACTONE) tablet 25 mg, Daily  insulin lispro (HUMALOG) injection vial 0-12 Units, TID WC  insulin lispro (HUMALOG) injection vial 0-6 Units, Nightly  insulin lispro (HUMALOG) injection vial 20 Units, TID WC  insulin glargine (LANTUS) injection vial 40 Units, BID  QUEtiapine (SEROQUEL) tablet 25 mg, Nightly  furosemide (LASIX) injection 40 mg, Daily        Allergies:  Patient has no known allergies. Review of Systems:   A 14 point review of symptoms completed. Pertinent positives identified in the HPI, all other review of symptoms negative. Objective:     Vitals:    10/26/20 1155 10/26/20 1915 10/27/20 0153 10/27/20 0800   BP: 117/78 122/84 99/63 121/86   Pulse: 93 90 88 88   Resp:  16 16 16   Temp:  97.7 °F (36.5 °C) 97.3 °F (36.3 °C) 97.4 °F (36.3 °C)   TempSrc:  Oral Oral Oral   SpO2:  94% 95% 97%   Weight:   211 lb 3.2 oz (95.8 kg)    Height:          Weight: 211 lb 3.2 oz (95.8 kg)       PHYSICAL EXAM:    General:  Alert, cooperative, no distress, appears stated age   Head:  Normocephalic, atraumatic   Eyes:  Conjunctiva/corneas clear, anicteric sclerae    Nose: Nares normal, no drainage or sinus tenderness   Throat: No abnormalities of the lips, oral mucosa or tongue. MMM    Neck: Trachea midline. Neck supple with no lymphadenopathy, thyroid not enlarged, symmetric, no tenderness/mass/nodules, JVP to mandible with HPJ   Lungs: No wheezes, diminished at bases, no distress on room air   Chest Wall:  No deformity or tenderness to palpation   Heart:  Regular rate and rhythm, normal S1, normal S2, no murmur, no rub, no S3/S4, PMI non-palpable, no heave   Abdomen:   Obese, Soft, non-tender, with normoactive bowel sounds. No masses, no hepatosplenomegaly   Extremities: No cyanosis, clubbing. Mild non-pitting LE edema. Vascular: 2+ radial, femoral, dorsalis pedis and posterior tibial pulses bilaterally. Brisk carotid upstrokes without carotid bruit. Skin: Skin color, texture, turgor are normal with no rashes or ulceration.     Pysch: Euthymic mood, appropriate affect mmHg).   No significant change from exam done 10/18/2019.     Stress Test January 2020:   Summary     Mildly reduced LVEF 45%     Inferolateral hypokinesis     Mainly fixed inferior defect suggestive of scar     No evidence of myocardium at risk for significant reversible ischemia.           Stress test 10/5/2020:  Dilated LV with severe global hypokinesis. Large, severe, fixed perfusion     defect of the inferior/inferolateral wall consistent with scar. Diminished     uptake of the anterior wall consistent with breast artifact. Post stress     LVEF is abnormal at 19%. Abnormal study. Overall findings represent a high     risk scan.       Limited echo 10/06/20  Summary   Limited only f/u for LVEF.   The left ventricular systolic function is severely reduced with an ejection   fraction of 15-20 %.   There is severe global hypokinesis with regional variations.   Changes noted from previous echo on 6- in left ventricular function.     Cardiac cath 10/6/20  Findings:  1. Left main coronary artery was normal. It gave off the left anterior descending artery and left circumflex. 2. Left anterior descending artery has mild atherosclerotic disease.  It was moderate in size. It gave off septal perforators and a moderate sized diagonal branch. The LAD covered the entire apex of the left ventricle. 3. Left circumflex has mild atherosclerotic disease.  It was moderate in size. There was a moderate sized obtuse marginal branch.   4. Right coronary artery has mild atherosclerotic disease.  It was moderate in size and was the dominant artery.   5. Left ventriculogram was not performed.  Left ventricular end diastolic pressure was 26.  There is no gradient across pullback of the aortic valve.      Right heart catheterization findings:   Right atrial pressure of 20  RV 45/7  PA 53/8  Pulmonary wedge pressure mean of 20  RA saturation 42%  PA saturation 45%  Aortic saturation 99%  Cardiac output 2.4  Cardiac index 1.24  SVR 9229  [facility] 909      Impression and Plan   Nori Escalante is a 62 y.o. patient with prior history severe non-ischemic cardiomyopathy with LVEF 93-92%, chronic systolic congestive heart failure, dual chamber ICD, hypertension, hyperlipidemia, uncontrolled diabetes. who presented to the hospital with complaints above 10/26/20. Mild CAD by recent LHC. Decline in LVEF more recently with low output by RHC. 1. Acute on chronic systolic congestive heart failure  2. NICM, severe LV dysfunction  3. Hypertension  4. Status post ICD  5. Hyperlipidemia  6.  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)    Dual ICD (implantable cardioverter-defibrillator) in place    Brain tumor (benign) ([facility])    Chronic combined systolic and diastolic congestive heart failure ([facility])    TIA involving right internal carotid artery    CAD in native artery    DM (diabetes mellitus), secondary, uncontrolled, w/neurologic complic ([facility])    CHF (congestive heart failure) ([facility])    Nonischemic cardiomyopathy ([facility])    Essential hypertension    TIA (transient ischemic attack)    Hyperglycemia    Diabetic ketoacidosis without coma associated with type 2 diabetes mellitus (HCC)    Non-intractable vomiting with nausea    Chest pain    Arterial ischemic stroke, ICA, right, acute ([facility])    Diabetic hyperosmolar non-ketotic state ([facility])    Diabetic acidosis without coma ([facility])    Hyponatremia    Metabolic acidosis    Disorder of electrolytes    Diabetic ketoacidosis with coma associated with type 2 diabetes mellitus (HCC)    Hypernatremia    Leukocytosis    Atrial tachycardia (HCC)    DKA, type 2, not at goal [facility])    Cognitive developmental delay    Mood disorder ([facility])    Urinary tract infection with hematuria    Nausea vomiting and diarrhea    Hypokalemia    Persistent fever    Syncope and collapse    S/P ICD (internal cardiac defibrillator) procedure    History of CVA (cerebrovascular accident)    Noncompliance with medications    Obesity    SVT (supraventricular tachycardia) (HCC)    Type 2 diabetes mellitus with hyperglycemia, with long-term current use of insulin (HCC)    Acute pulmonary edema (HCC)    Mixed hyperlipidemia    Intractable nausea and vomiting    Hypomagnesemia    Lactic acidosis    NSVT (nonsustained ventricular tachycardia) (HCC)    Shortness of breath       PLAN:  1. Continue lasix 40mg IV once to twice daily targeting net negative 2L daily as tolerated. Will need transition to PO lasix when nearing euvolemia (~200lbs) and ensure that it is adequate threshold dose prior to dc. 2. Continue toprol XL 12.5mg qd, entresto 24-26mg 1/2 tablet BID, aldactone 25mg qd, ranexa 500mg BID, digoxin 0.125mg QOD, lipitor 40mg qd, baby asa qd, and amiodarone 100mg qd. 3. Note amiodarone started this month by EP doc apparently for SVT and NSVT issues with low EF. 4. No need for cardiac testing at this time. Her EF is dramatically lower than June 2020 but no CAD to explain. Continue to optimize neuro-hormonals upon reaching euvolemia and continued on OP basis. 5. Replete K>4, Mg>2. Monitor lytes and Scr with ongoing diuresis. We will continue to follow     I will address the patient's cardiac risk factors and adjusted pharmacologic treatment as needed. In addition, I have reinforced the need for patient directed risk factor modification. All questions and concerns were addressed to the patient/family. Alternatives to my treatment were discussed. Thank you for allowing us to participate in the care of David Wilkins. Please call me with any questions [phone].     Sami Vega MD   Cardiovascular Disease  [facility]  [phone] [facility]  [phone] [address] [place] [address]  10/27/2020 9:52 AM

## 2020-10-27 NOTE — PLAN OF CARE
Problem: Falls - Risk of:  Goal: Will remain free from falls  Description: Will remain free from falls  10/27/2020 1525 by Mindy Michel RN  Outcome: Ongoing  10/27/2020 0304 by Prisca Begum RN  Outcome: Ongoing  Goal: Absence of physical injury  Description: Absence of physical injury  10/27/2020 1525 by Mindy Michel RN  Outcome: Ongoing  10/27/2020 0304 by Prisca Begum RN  Outcome: Ongoing     Problem: Skin Integrity:  Goal: Will show no infection signs and symptoms  Description: Will show no infection signs and symptoms  10/27/2020 1525 by Mindy Michel RN  Outcome: Ongoing  10/27/2020 0304 by Prisca Begum RN  Outcome: Ongoing  Goal: Absence of new skin breakdown  Description: Absence of new skin breakdown  10/27/2020 1525 by Mindy Michel RN  Outcome: Ongoing  10/27/2020 0304 by Prisca Begum RN  Outcome: Ongoing     Problem: Pain:  Goal: Pain level will decrease  Description: Pain level will decrease  10/27/2020 1525 by Mindy Michel RN  Outcome: Ongoing  10/27/2020 0304 by Prisca Begum RN  Outcome: Ongoing  Goal: Control of acute pain  Description: Control of acute pain  10/27/2020 1525 by Mindy Michel RN  Outcome: Ongoing  10/27/2020 0304 by Prisca Begum RN  Outcome: Ongoing  Goal: Control of chronic pain  Description: Control of chronic pain  10/27/2020 1525 by Mindy Michel RN  Outcome: Ongoing  10/27/2020 0304 by Prisca Begum RN  Outcome: Ongoing  Goal: Patient's pain/discomfort is manageable  Description: Patient's pain/discomfort is manageable  10/27/2020 1525 by Mindy Michel RN  Outcome: Ongoing  10/27/2020 0304 by Prisca Begum RN  Outcome: Ongoing     Problem: Discharge Planning:  Goal: Discharged to appropriate level of care  Description: Discharged to appropriate level of care  10/27/2020 1525 by Mindy Michel RN  Outcome: Ongoing  10/27/2020 0304 by Prisca Begum RN  Outcome: Ongoing     Problem: Serum Glucose Level - Abnormal:  Goal: Ability to maintain appropriate glucose levels will improve  Description: Ability to maintain appropriate glucose levels will improve  10/27/2020 1525 by Ymoi Catherine RN  Outcome: Ongoing  10/27/2020 0304 by Kiera Dick RN  Outcome: Ongoing     Problem: Sensory Perception - Impaired:  Goal: Ability to maintain a stable neurologic state will improve  Description: Ability to maintain a stable neurologic state will improve  10/27/2020 1525 by Yomi Catherine RN  Outcome: Ongoing  10/27/2020 0304 by Kiera Dick RN  Outcome: Ongoing     Problem:  Activity:  Goal: Amount of time patient spends in regular exercise will increase  Description: Amount of time patient spends in regular exercise will increase  10/27/2020 1525 by Yomi Catherine RN  Outcome: Ongoing  10/27/2020 0304 by Kiera Dick RN  Outcome: Ongoing     Problem: Coping:  Goal: Ability to identify and develop effective coping behavior will improve  Description: Ability to identify and develop effective coping behavior will improve  10/27/2020 1525 by Yomi Catherine RN  Outcome: Ongoing  10/27/2020 0304 by Kiera Dick RN  Outcome: Ongoing     Problem: Nutritional:  Goal: Adjustment of eating patterns to appropriate times will improve  Description: Adjustment of eating patterns to appropriate times will improve  10/27/2020 1525 by Yomi Catherine RN  Outcome: Ongoing  10/27/2020 0304 by Kiera Dick RN  Outcome: Ongoing  Goal: Ability to make healthy dietary choices will improve  Description: Ability to make healthy dietary choices will improve  10/27/2020 1525 by Yomi Catherine RN  Outcome: Ongoing  10/27/2020 0304 by Kiera Dick RN  Outcome: Ongoing  Goal: Progress toward achieving an optimal weight will improve  Description: Progress toward achieving an optimal weight will improve  10/27/2020 1525 by Yomi Catherine RN  Outcome: Ongoing  10/27/2020 0304 by Kiera Dick RN  Outcome: Ongoing     Problem: Self-Concept:  Goal: Ability to verbalize positive feelings about self will improve  Description: Ability to verbalize positive feelings about self will improve  10/27/2020 1525 by Bry Rea RN  Outcome: Ongoing  10/27/2020 0304 by Reggie Sandoval RN  Outcome: Ongoing     Problem: Infection:  Goal: Will remain free from infection  Description: Will remain free from infection  10/27/2020 1525 by Bry Rea RN  Outcome: Ongoing  10/27/2020 0304 by Reggie Sandoval RN  Outcome: Ongoing     Problem: Safety:  Goal: Free from accidental physical injury  Description: Free from accidental physical injury  10/27/2020 1525 by Bry Rea RN  Outcome: Ongoing  10/27/2020 0304 by Reggie Sandoval RN  Outcome: Ongoing  Goal: Free from intentional harm  Description: Free from intentional harm  10/27/2020 1525 by Bry Rea RN  Outcome: Ongoing  10/27/2020 0304 by Reggie Sandoval RN  Outcome: Ongoing     Problem: Daily Care:  Goal: Daily care needs are met  Description: Daily care needs are met  10/27/2020 1525 by Bry Rea RN  Outcome: Ongoing  10/27/2020 0304 by Reggie Sandoval RN  Outcome: Ongoing     Problem: Skin Integrity:  Goal: Skin integrity will stabilize  Description: Skin integrity will stabilize  10/27/2020 1525 by Bry Rea RN  Outcome: Ongoing  10/27/2020 0304 by Reggie Sandoval RN  Outcome: Ongoing     Problem: Discharge Planning:  Goal: Patients continuum of care needs are met  Description: Patients continuum of care needs are met  10/27/2020 1525 by Bry Rea RN  Outcome: Ongoing  10/27/2020 0304 by Reggie Sandoval RN  Outcome: Ongoing

## 2020-10-27 NOTE — PROGRESS NOTES
Progress Note    Admit Date:  10/26/2020    Patient with nonobstructive CAD, severe nonischemic cardiomyopathy presented with complaints of nausea vomiting and diarrhea   She also complained of chronic chest pain and shortness of breath. S/p gentle IV hydration in the ED. Off IV fluids. , resumed on diuretics now for acute CHF . seen by cardiology    Subjective:  Ms. Julia De Paz looks better today. She does not have any nausea,  vomiting or diarrhea today. tolerating oral diet,  she does not appear dyspneic,  still has significant leg edema. she is getting IV Lasix. negative fluid balance of 1.28 L    Objective:   /86   Pulse 88   Temp 97.4 °F (36.3 °C) (Oral)   Resp 16   Ht 5' 1\" (1.549 m)   Wt 211 lb 3.2 oz (95.8 kg)   SpO2 97%   BMI 39.91 kg/m²       Intake/Output Summary (Last 24 hours) at 10/27/2020 1114  Last data filed at 10/27/2020 0949  Gross per 24 hour   Intake 1099 ml   Output 1900 ml   Net -801 ml         Physical Exam:   Gen: No distress. Alert. Patient looks better today . More awake alert and oriented. she is in no distress . patient appears chronically ill  Eyes: PERRL. No sclera icterus. No conjunctival injection. ENT: No discharge. Pharynx clear. Neck: Trachea midline. Normal thyroid. Resp: Diminished breath sounds, no wheezes rales or rhonchi .no accessory muscle use. CV: Regular rate. Regular rhythm. No murmur or rub. GI: Non-tender. Non-distended. No masses. No organomegaly. Normal bowel sounds. No hernia. Skin: Warm and dry. No nodule on exposed extremities. No rash on exposed extremities. Lymph: No cervical LAD. No supraclavicular LAD. M/S: No cyanosis. No joint deformity. No clubbing. Bilateral lower extremity 1+ edema  Neuro: Awake. Reflexes 2+ symmetric bilaterally. Moves all 4 extremities, non focal  Psych: Oriented x 3.  No anxiety or agitation.          Medications:   Scheduled Meds:   magnesium sulfate  1 g Intravenous Once    amiodarone  100 mg Oral Daily    aspirin  81 mg Oral Daily    atorvastatin  40 mg Oral Nightly    digoxin  125 mcg Oral Every Other Day    DULoxetine  60 mg Oral Daily    fenofibrate  160 mg Oral Daily    metoprolol succinate  12.5 mg Oral Daily    ranolazine  500 mg Oral BID    sacubitril-valsartan  0.5 tablet Oral BID    sodium chloride flush  10 mL Intravenous 2 times per day    enoxaparin  40 mg Subcutaneous Daily    spironolactone  25 mg Oral Daily    insulin lispro  0-12 Units Subcutaneous TID WC    insulin lispro  0-6 Units Subcutaneous Nightly    insulin lispro  20 Units Subcutaneous TID WC    insulin glargine  40 Units Subcutaneous BID    QUEtiapine  25 mg Oral Nightly    furosemide  40 mg Intravenous Daily       Continuous Infusions:   dextrose         Data:  CBC:   Recent Labs     10/26/20  0031 10/26/20  0832 10/27/20  0458   WBC 7.1 7.5 7.3   RBC 4.63 4.49 4.52   HGB 13.7 13.4 13.4   HCT 41.9 40.9 41.4   MCV 90.5 91.1 91.6   RDW 15.7* 15.4 15.6*    244 225     BMP:   Recent Labs     10/26/20  0258 10/26/20  0831 10/27/20  0458   * 135* 139   K 4.6 4.2 3.4*   CL 99 99 103   CO2 23 24 26   BUN 13 12 16   CREATININE 0.7 0.6 0.7     BNP: No results for input(s): BNP in the last 72 hours. PT/INR: No results for input(s): PROTIME, INR in the last 72 hours. APTT: No results for input(s): APTT in the last 72 hours. CARDIAC ENZYMES:   Recent Labs     10/26/20  0031 10/26/20  1242   TROPONINI <0.01 <0.01     FASTING LIPID PANEL:  Lab Results   Component Value Date    CHOL 166 10/12/2019    HDL 51 10/12/2019    TRIG 142 10/12/2019     LIVER PROFILE:   Recent Labs     10/26/20  0031 10/26/20  0258 10/26/20  0831   AST 34 27 25   ALT 28 24 24   BILITOT 0.9 0.8 0.9   ALKPHOS 137* 102 101        radiology  XR CHEST PORTABLE   Final Result   Developing CHF/volume overload.                Assessment:  Active Problems:    Nausea vomiting and diarrhea    Intractable nausea and vomiting    Hypomagnesemia Lactic acidosis    NSVT (nonsustained ventricular tachycardia) ([facility])    Shortness of breath  Resolved Problems:    * No resolved hospital problems. *      Plan:     #Nausea, vomiting, diarrhea  - Benign abdominal exam  - suspect viral gastroenteritis  - COVID ruled out,  Rapid COVID 19- test is negative  Symptoms resolved,  tolerating oral diet     #Acute on chronic systolic CHF   #Severe nonischemic CMP (EF 15-20%)  - suspect decompensation 2/2 recent illness  - She is not hypoxic and her exam for fluid overload is relatively unremarkable on admission. , but her proBNP is 5k (it was only 600 last week) and CXR shows developing CHF  - Stopped IV NS  - resumed aldactone 25 mg daily (at [id number] [address] after being on lasix gtt her home oral lasix was stopped and aldactone was started)  - cont home entresto and toprol XL  - consulted cardio for further management recs   -Seen by cardiology, placed on IV Lasix -> increased dose to 40 mg IV twice daily  - monitor strict I's and O's . negative fluid balance of 1.28 L so far.      #Nonobstructive CAD  - cont ASA, statin, BB  -recent C reviewed      #History of SVT/NSVT  - cont amio and digoxin  - monitor in tele    #Hypokalemia and hypomagnesemia  - replete    #DM2   - uncontrolled with HbA1c 13.3% in 9/2020  - Cont home Lantus, resume scheduled humalog 5 U TID, and add medium dose SSI  - not in DKA     #Lactic acidosis  - resolved with IVF. No evidence of sepsis. Stop IVF     #History of TIA  - cont ASA and statin      DVT Prophylaxis: Lovenox   Diet: DIET CARDIAC;  Code Status: Full Code        Likely discharge home on oral Lasix in the next 1 to 2 days.       Yasemin Vann MD 10/27/2020 11:14 AM

## 2020-10-28 VITALS
HEART RATE: 81 BPM | HEIGHT: 61 IN | OXYGEN SATURATION: 95 % | TEMPERATURE: 98 F | DIASTOLIC BLOOD PRESSURE: 62 MMHG | WEIGHT: 205.4 LBS | RESPIRATION RATE: 18 BRPM | BODY MASS INDEX: 38.78 KG/M2 | SYSTOLIC BLOOD PRESSURE: 110 MMHG

## 2020-10-28 PROBLEM — R11.2 INTRACTABLE NAUSEA AND VOMITING: Status: RESOLVED | Noted: 2020-10-26 | Resolved: 2020-10-28

## 2020-10-28 LAB
ANION GAP SERPL CALCULATED.3IONS-SCNC: 8 MMOL/L (ref 3–16)
BUN BLDV-MCNC: 16 MG/DL (ref 7–20)
CALCIUM SERPL-MCNC: 8.8 MG/DL (ref 8.3–10.6)
CHLORIDE BLD-SCNC: 98 MMOL/L (ref 99–110)
CO2: 27 MMOL/L (ref 21–32)
CREAT SERPL-MCNC: 0.6 MG/DL (ref 0.6–1.1)
EKG ATRIAL RATE: 93 BPM
EKG DIAGNOSIS: NORMAL
EKG P AXIS: 74 DEGREES
EKG P-R INTERVAL: 166 MS
EKG Q-T INTERVAL: 370 MS
EKG QRS DURATION: 92 MS
EKG QTC CALCULATION (BAZETT): 460 MS
EKG R AXIS: 159 DEGREES
EKG T AXIS: 45 DEGREES
EKG VENTRICULAR RATE: 93 BPM
GFR AFRICAN AMERICAN: >60
GFR NON-AFRICAN AMERICAN: >60
GLUCOSE BLD-MCNC: 108 MG/DL (ref 70–99)
GLUCOSE BLD-MCNC: 161 MG/DL (ref 70–99)
GLUCOSE BLD-MCNC: 88 MG/DL (ref 70–99)
GLUCOSE BLD-MCNC: 97 MG/DL (ref 70–99)
MAGNESIUM: 1.6 MG/DL (ref 1.8–2.4)
PERFORMED ON: ABNORMAL
PERFORMED ON: ABNORMAL
PERFORMED ON: NORMAL
POTASSIUM SERPL-SCNC: 3.7 MMOL/L (ref 3.5–5.1)
SODIUM BLD-SCNC: 133 MMOL/L (ref 136–145)

## 2020-10-28 PROCEDURE — 6360000002 HC RX W HCPCS: Performed by: HOSPITALIST

## 2020-10-28 PROCEDURE — 99239 HOSP IP/OBS DSCHRG MGMT >30: CPT | Performed by: PHYSICIAN ASSISTANT

## 2020-10-28 PROCEDURE — 80048 BASIC METABOLIC PNL TOTAL CA: CPT

## 2020-10-28 PROCEDURE — 93010 ELECTROCARDIOGRAM REPORT: CPT | Performed by: INTERNAL MEDICINE

## 2020-10-28 PROCEDURE — 6370000000 HC RX 637 (ALT 250 FOR IP): Performed by: INTERNAL MEDICINE

## 2020-10-28 PROCEDURE — 6370000000 HC RX 637 (ALT 250 FOR IP): Performed by: PHYSICIAN ASSISTANT

## 2020-10-28 PROCEDURE — 6360000002 HC RX W HCPCS: Performed by: PHYSICIAN ASSISTANT

## 2020-10-28 PROCEDURE — 83735 ASSAY OF MAGNESIUM: CPT

## 2020-10-28 PROCEDURE — 6360000002 HC RX W HCPCS: Performed by: INTERNAL MEDICINE

## 2020-10-28 PROCEDURE — 99232 SBSQ HOSP IP/OBS MODERATE 35: CPT | Performed by: INTERNAL MEDICINE

## 2020-10-28 PROCEDURE — 36415 COLL VENOUS BLD VENIPUNCTURE: CPT

## 2020-10-28 PROCEDURE — 2580000003 HC RX 258: Performed by: HOSPITALIST

## 2020-10-28 PROCEDURE — 6370000000 HC RX 637 (ALT 250 FOR IP): Performed by: HOSPITALIST

## 2020-10-28 RX ORDER — MAGNESIUM SULFATE IN WATER 40 MG/ML
2 INJECTION, SOLUTION INTRAVENOUS ONCE
Status: COMPLETED | OUTPATIENT
Start: 2020-10-28 | End: 2020-10-28

## 2020-10-28 RX ORDER — FUROSEMIDE 40 MG/1
40 TABLET ORAL DAILY
Qty: 30 TABLET | Refills: 0 | Status: ON HOLD | OUTPATIENT
Start: 2020-10-28 | End: 2020-11-13 | Stop reason: SDUPTHER

## 2020-10-28 RX ADMIN — ENOXAPARIN SODIUM 40 MG: 40 INJECTION SUBCUTANEOUS at 08:42

## 2020-10-28 RX ADMIN — MAGNESIUM SULFATE HEPTAHYDRATE 2 G: 40 INJECTION, SOLUTION INTRAVENOUS at 13:41

## 2020-10-28 RX ADMIN — INSULIN GLARGINE 40 UNITS: 100 INJECTION, SOLUTION SUBCUTANEOUS at 08:49

## 2020-10-28 RX ADMIN — DIGOXIN 125 MCG: 125 TABLET ORAL at 08:42

## 2020-10-28 RX ADMIN — AMIODARONE HYDROCHLORIDE 100 MG: 200 TABLET ORAL at 08:42

## 2020-10-28 RX ADMIN — FUROSEMIDE 40 MG: 10 INJECTION, SOLUTION INTRAMUSCULAR; INTRAVENOUS at 08:41

## 2020-10-28 RX ADMIN — DULOXETINE HYDROCHLORIDE 60 MG: 60 CAPSULE, DELAYED RELEASE ORAL at 08:42

## 2020-10-28 RX ADMIN — MAGNESIUM SULFATE HEPTAHYDRATE 1 G: 1 INJECTION, SOLUTION INTRAVENOUS at 00:03

## 2020-10-28 RX ADMIN — ASPIRIN 81 MG: 81 TABLET, COATED ORAL at 08:42

## 2020-10-28 RX ADMIN — SPIRONOLACTONE 25 MG: 25 TABLET ORAL at 08:42

## 2020-10-28 RX ADMIN — RANOLAZINE 500 MG: 500 TABLET, FILM COATED, EXTENDED RELEASE ORAL at 08:42

## 2020-10-28 RX ADMIN — FENOFIBRATE 160 MG: 160 TABLET ORAL at 08:42

## 2020-10-28 RX ADMIN — Medication 10 ML: at 08:43

## 2020-10-28 RX ADMIN — METOPROLOL SUCCINATE 12.5 MG: 25 TABLET, EXTENDED RELEASE ORAL at 08:42

## 2020-10-28 RX ADMIN — POTASSIUM CHLORIDE 40 MEQ: 1500 TABLET, EXTENDED RELEASE ORAL at 00:02

## 2020-10-28 RX ADMIN — SACUBITRIL AND VALSARTAN 0.5 TABLET: 24; 26 TABLET, FILM COATED ORAL at 08:42

## 2020-10-28 ASSESSMENT — PAIN SCALES - GENERAL
PAINLEVEL_OUTOF10: 0

## 2020-10-28 NOTE — SIGNIFICANT EVENT
[facility] updated on pt c/o, labs and cardiology recs - new orders noted and primary nurse updated.  Rene Mckenzie Clinical

## 2020-10-28 NOTE — DISCHARGE SUMMARY
Name:  Lucien Hatchet  Room:  /6663-57  MRN:    [id number]    Discharge Summary      This discharge summary is in conjunction with a complete physical exam done on the day of discharge. Discharging Provider: [facility] MISSAEL    Admit: 10/26/2020  Discharge:   10/28/2020     HPI taken from admission H&P:    The patient is a 62 y.o. female with nonobstructive CAD, CHF (EF15%), DM2 who was recently admitted at [facility] for CHF and underwent cardiac cath (dc'd to home on 10/14/20) who presented to [facility] ED via EMS with complaint of nausea, vomiting, diarrhea, cough, shortness of breath. She is a relatively poor historian. She states she has felt unwell for the past 3 days. She describes nausea with vomiting after all oral intake. She describes diarrhea with TNTC stools x 3 days. She has not noticed blood in her vomit or her stools. She denies fever, chills. She denies abdominal pain. She also has felt short of breath for the past 3 days with a NP cough. She has been compliant with her home meds. Her BG has been reading \"high\" on her glucometer.       In the ER she was afebrile, normotensive. Her BG was high, but she was not in DKA. Her proBNP was elevated to 5k (it was 662 on 10/13), and her CXR showed developing CHF. She was given 1 L NS and then started on NS at 75 cc/hr and she was admitted for further eval.     This morning she states she feels better. She has a plate with eggs, pancakes, and hash browns in front of her and she wants to eat it. She still feels short of breath.      Diagnoses this Admission and Hospital Course     #Nausea, vomiting, diarrhea  - Benign abdominal exam  - suspect viral gastroenteritis  - COVID ruled out, her rapid COVID 19- test is negative  - Symptoms resolved,  tolerating oral diet     #Acute on chronic systolic CHF   #Severe nonischemic CMP (EF 15-20%)  - suspect decompensation 2/2 recent illness  - She is not hypoxic and her exam for fluid overload is relatively unremarkable on admission. , but her proBNP is 5k (it was only 600 last week) and CXR shows developing CHF  - Stopped IV NS  - resumed aldactone 25 mg daily (at [facility] after being on lasix gtt her home oral lasix was stopped and aldactone was started)  - cont home entresto and toprol XL  - consulted cardio for further management recs - they started IV lasix and she had good output. Will transition to PO lasix 40 mg daily on discharge. She will need to see cardio in clinic and have a repeat BMP 1 week     #Nonobstructive CAD  - cont ASA, statin, BB  -recent [facility] reviewed      #History of SVT/NSVT  - cont amio and digoxin  - monitor in tele     #Hypokalemia and hypomagnesemia  - replaced     #DM2   - uncontrolled with HbA1c 13.3% in 9/2020  - Cont home Lantus, resume scheduled humalog 5 U TID, and add medium dose SSI  - not in DKA     #Lactic acidosis  - resolved with IVF.  No evidence of sepsis.  Stop IVF     #History of TIA  - cont ASA and statin      Procedures (Please Review Full Report for Details)  None     Consults    Cardio      Physical Exam at Discharge:    /62   Pulse 81   Temp 98 °F (36.7 °C) (Oral)   Resp 18   Ht 5' 1\" (1.549 m)   Wt 205 lb 6.4 oz (93.2 kg)   SpO2 95%   BMI 38.81 kg/m²       Gen: No distress. Alert. Very pleasant. Eyes: PERRL. No sclera icterus. No conjunctival injection. ENT: No discharge. Pharynx clear. Neck: Trachea midline. No jvd  Resp: Diminished breath sounds, no wheezes rales or rhonchi .no accessory muscle use. CV: Regular rate. Regular rhythm. No murmur or rub. GI: Non-tender. Non-distended. No masses. No organomegaly. Normal bowel sounds. No hernia. Skin: Warm and dry. No nodule on exposed extremities. No rash on exposed extremities. Lymph: No cervical LAD. No supraclavicular LAD. M/S: No cyanosis. No joint deformity. No clubbing. No edema  Neuro: Awake. Grossly nonfocal   Psych: Oriented x 3.  No anxiety or agitation.        CBC:   Recent Labs 10/26/20  0031 10/26/20  0832 10/27/20  0458   WBC 7.1 7.5 7.3   HGB 13.7 13.4 13.4   HCT 41.9 40.9 41.4   MCV 90.5 91.1 91.6    244 225     BMP:   Recent Labs     10/27/20  0458 10/27/20  1801 10/28/20  1148    134* 133*   K 3.4* 3.7 3.7    96* 98*   CO2 26 29 27   BUN 16 21* 16   CREATININE 0.7 0.8 0.6     LIVER PROFILE:   Recent Labs     10/26/20  0031 10/26/20  0258 10/26/20  0831   AST 34 27 25   ALT 28 24 24   LIPASE 41.0  --   --    BILITOT 0.9 0.8 0.9   ALKPHOS 137* 102 101     UA:  Recent Labs     10/26/20  0107   COLORU Yellow   PHUR 5.0   CLARITYU Clear   SPECGRAV 1.015   LEUKOCYTESUR Negative   UROBILINOGEN 0.2   BILIRUBINUR Negative   BLOODU Negative   GLUCOSEU >=1000*       CULTURES  None     RADIOLOGY  XR CHEST PORTABLE   Final Result   Developing CHF/volume overload. Discharge Medications     Medication List      START taking these medications    furosemide 40 MG tablet  Commonly known as:  Lasix  Take 1 tablet by mouth daily        CONTINUE taking these medications    amiodarone 100 MG tablet  Commonly known as:  PACERONE  Take 1 tablet by mouth daily     aspirin 81 MG EC tablet  Take 1 tablet by mouth daily     atorvastatin 40 MG tablet  Commonly known as:  LIPITOR  Take 1 tablet by mouth nightly     blood glucose test strips  Test three times a day & as needed for symptoms of irregular blood glucose.      [employer] Lancets Ultra Thin Misc  1 each by Does not apply route 4 times daily     digoxin 125 MCG tablet  Commonly known as:  LANOXIN  Take 1 tablet by mouth every other day     DULoxetine 60 MG extended release capsule  Commonly known as:  CYMBALTA  Take 1 capsule by mouth daily     fenofibrate micronized 200 MG capsule  Commonly known as:  LOFIBRA  Take 1 capsule by mouth nightly     insulin glargine 100 UNIT/ML injection  Commonly known as:  LANTUS SOLOSTAR     metFORMIN 1000 MG tablet  Commonly known as:  GLUCOPHAGE     metoprolol succinate 25 MG extended release

## 2020-10-28 NOTE — PLAN OF CARE
Problem: Falls - Risk of:  Goal: Will remain free from falls  Description: Will remain free from falls  10/27/2020 2202 by Kathy Garcia RN  Outcome: Ongoing  10/27/2020 1525 by Moise Garza RN  Outcome: Ongoing  Goal: Absence of physical injury  Description: Absence of physical injury  10/27/2020 2202 by Kathy Garcia RN  Outcome: Ongoing  10/27/2020 1525 by Moise Garza RN  Outcome: Ongoing     Problem: Skin Integrity:  Goal: Will show no infection signs and symptoms  Description: Will show no infection signs and symptoms  10/27/2020 2202 by Kathy Garcia RN  Outcome: Ongoing  10/27/2020 1525 by Moise Garza RN  Outcome: Ongoing  Goal: Absence of new skin breakdown  Description: Absence of new skin breakdown  10/27/2020 2202 by Kathy Garcia RN  Outcome: Ongoing  10/27/2020 1525 by Moise Garza RN  Outcome: Ongoing     Problem: Skin Integrity:  Goal: Absence of new skin breakdown  Description: Absence of new skin breakdown  10/27/2020 2202 by Kathy Garcia RN  Outcome: Ongoing  10/27/2020 1525 by Moise Garza RN  Outcome: Ongoing     Problem: Pain:  Goal: Pain level will decrease  Description: Pain level will decrease  10/27/2020 2202 by Kathy Garcia RN  Outcome: Ongoing  10/27/2020 1525 by Moise Garza RN  Outcome: Ongoing  Goal: Control of acute pain  Description: Control of acute pain  10/27/2020 2202 by Kathy Garcia RN  Outcome: Ongoing  10/27/2020 1525 by Moise Garza RN  Outcome: Ongoing  Goal: Control of chronic pain  Description: Control of chronic pain  10/27/2020 2202 by Kathy Garcia RN  Outcome: Ongoing  10/27/2020 1525 by Moise Garza RN  Outcome: Ongoing  Goal: Patient's pain/discomfort is manageable  Description: Patient's pain/discomfort is manageable  10/27/2020 2202 by Kathy Garcia RN  Outcome: Ongoing  10/27/2020 1525 by Moise Garza RN  Outcome: Ongoing     Problem: Discharge Planning:  Goal: Discharged to appropriate level of care  Description: Discharged to appropriate level of care  10/27/2020 2202 by Tanner Abdi RN  Outcome: Ongoing  10/27/2020 1525 by Linda Lomas RN  Outcome: Ongoing     Problem: Serum Glucose Level - Abnormal:  Goal: Ability to maintain appropriate glucose levels will improve  Description: Ability to maintain appropriate glucose levels will improve  10/27/2020 2202 by Tanner Abdi RN  Outcome: Ongoing  10/27/2020 1525 by Linda Lomas RN  Outcome: Ongoing     Problem: Sensory Perception - Impaired:  Goal: Ability to maintain a stable neurologic state will improve  Description: Ability to maintain a stable neurologic state will improve  10/27/2020 2202 by Tanner Abdi RN  Outcome: Ongoing  10/27/2020 1525 by Linda Lomas RN  Outcome: Ongoing     Problem:  Activity:  Goal: Amount of time patient spends in regular exercise will increase  Description: Amount of time patient spends in regular exercise will increase  10/27/2020 2202 by Tanner Abdi RN  Outcome: Ongoing  10/27/2020 1525 by Linda Lomas RN  Outcome: Ongoing     Problem: Coping:  Goal: Ability to identify and develop effective coping behavior will improve  Description: Ability to identify and develop effective coping behavior will improve  10/27/2020 2202 by Tanner Abdi RN  Outcome: Ongoing  10/27/2020 1525 by Linda Lomas RN  Outcome: Ongoing     Problem: Daily Care:  Goal: Daily care needs are met  Description: Daily care needs are met  10/27/2020 2202 by Tanner Abdi RN  Outcome: Ongoing  10/27/2020 1525 by Linda Lomas RN  Outcome: Ongoing     Problem: Skin Integrity:  Goal: Skin integrity will stabilize  Description: Skin integrity will stabilize  10/27/2020 2202 by Tanner Abdi RN  Outcome: Ongoing  10/27/2020 1525 by Linda Lomas RN  Outcome: Ongoing     Problem: Discharge Planning:  Goal: Patients continuum of care needs are met  Description: Patients continuum of care needs are met  10/27/2020 2202 by Patrick Jauregui, RN  Outcome: Ongoing  10/27/2020 1525 by Puja Cabrera RN  Outcome: Ongoing

## 2020-10-28 NOTE — PROGRESS NOTES
Holding IV lasix    Administering PO potassium replacement for 3.4 this AM.     Administering 1 gram IV Mag Sulfate. To be followed by another 1 Gram IV Mag Sulfate. Per Dr. Nitza Cleaning.      Will continue to evaluate and touch base with DAVID Cabrera RN

## 2020-10-28 NOTE — PROGRESS NOTES
FINDINGS: Left-sided AICD is in stable position.  Cardiomegaly again noted. [facility] pulmonary vascular congestion seen.  Small bilateral pleural effusions and bibasilar patchy opacities are noted, likely atelectasis.  Trachea midline. No pneumothorax.  Bones are unchanged.        Echo June 1676: [id number]   LV systolic function is moderately reduced with an estimated EF of 35%.   Moderate global hypokinesis.   There is mild concentric left ventricular hypertrophy.   Left ventricular cavity size is mildly dilated.   Estimated LV diastolic filling pressure is normal.   Mild mitral and tricuspid regurgitation.   Systolic pulmonary artery pressure (SPAP) is estimated at 35mmHg (Right atrial pressure of 8 mmHg).   No significant change from exam done 10/18/2019.     Stress Test January 2020:   Summary     Mildly reduced LVEF 45%     Inferolateral hypokinesis     Mainly fixed inferior defect suggestive of scar     No evidence of myocardium at risk for significant reversible ischemia.           Stress test 10/5/2020:  Dilated LV with severe global hypokinesis. Large, severe, fixed perfusion     defect of the inferior/inferolateral wall consistent with scar. Diminished     uptake of the anterior wall consistent with breast artifact. Post stress     LVEF is abnormal at 19%. Abnormal study. Overall findings represent a high     risk scan.       Limited echo 10/06/20  Summary   Limited only f/u for LVEF.   The left ventricular systolic function is severely reduced with an ejection   fraction of 15-20 %.   There is severe global hypokinesis with regional variations.   Changes noted from previous echo on 6- in left ventricular function.     Cardiac cath 10/6/20  Findings:  1. Left main coronary artery was normal. It gave off the left anterior descending artery and left circumflex. 2. Left anterior descending artery has mild atherosclerotic disease.  It was moderate in size.  It gave off septal perforators and a moderate sized diagonal branch. The LAD covered the entire apex of the left ventricle. 3. Left circumflex has mild atherosclerotic disease.  It was moderate in size. There was a moderate sized obtuse marginal branch.   4. Right coronary artery has mild atherosclerotic disease. It was moderate in size and was the dominant artery.   5. Left ventriculogram was not performed.  Left ventricular end diastolic pressure was 26.  There is no gradient across pullback of the aortic valve.      Right heart catheterization findings:   Right atrial pressure of 20  RV 45/7  PA 53/8  Pulmonary wedge pressure mean of 20  RA saturation 42%  PA saturation 45%  Aortic saturation 99%  Cardiac output 2.4  Cardiac index 1.24  SVR 2433  VGV 401     Assessment:  Yasemin Cervantes is a 62 y.o. patient who presented to [facility] 10/26/20 with c/o high sugars and SOB. I saw as consult on 9/11/2020 at [facility] and no testing needed. She has PMH: severe NICM, chronic systolic CHF, [facility] s/p dual ICD as well as HTN, HLD, uncontrolled DM, and mental disability. Most recent Lexiscan myoview stress test 10/05/20 Dilated LV with severe global HK; Large, severe, fixed perfusion defect of the inferior inferolateral wall c/w scar. Diminished  uptake of the anterior wall c/w breast artifact. Post stress  LVEF is abnormal at 19% (no sig change from 1/20 study). Most recent [facility] 10/06/20 showed mild NOCAD; RAP=20, PAP=53/8; wedge=20; Moderate pulm HTN. Most recent limited ECHO 10/06/20 showed EF=15-20% (EF=35% on 6/13/20 and 10/18/19 studies);.severe global HK. Most recent device check 10/03/20 Dual ICD with normal function. Last interrogation 9/21/20. SVT/AT and NSVTt recorded. Note recent admit [facility] 10/02/20-10/14/20 with SOB and chest pain. Treated for acute on chronic systolic CHF and cardiac testing performed (see above). Diuresed with IV lasix gtt and decreased weight about 20# down to around 200#.  She had issues with hypotension and entresto held initially but then added back. On d/c she was d/c'd from lasix 40mg qd and switched to aldactone 25mg daily. Now presents with c/o high sugars and SOB at home. Reported N/V/D also. Note she is vague and unreliable historian. Admit EKG shows possible ectopic atrial rhythm; nonspecific ST change anterolateral infarct (10/10/20 EKG atrial paced; PVC's). Note CXR showed developing CHF/volume overload. Pro=BNP 5183 (662 on 10/13/20), troponins neg x 2, Mg+ 1.70. Nursing staff noted 7 beat run of [facility] earlier today while patient was sleeping. Weight today 214#. Diagnosis of acute on chronic systolic CHF in middle-aged female with hx severe NICM. Likely due to stopping lasix on d/c from recent inpatient stay. She has LE edema, increased BNP, and radiologic findings c/w volume overload along with weight increase.     Recs:  1. Switch to po lasix 40mg daily on d/c. Net negative 4.2L out and weight decreased 9# to 205# today. 2. Continue toprol XL 12.5mg qd, entresto 24-26mg 1/2 tablet BID, aldactone 25mg qd, ranexa 500mg BID, digoxin 0.125mg QOD, lipitor 40mg qd, baby asa qd, and amiodarone 100mg qd. 3. Note amiodarone started this month by EP doc apparently for SVT and NSVT issues with low EF. 4. No need for cardiac testing at this time. Her EF is dramatically lower than June 2020 but no CAD to explain. Treat medically as best possible. OK for d/c from cardiac standpoint today if OK with IM doc. Will make sure she has f/u OV and BMP in 1 week to f/u appropriately. I again encouraged low salt and fluid restriction <64oz at home.    Signing off.        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)    Dual ICD (implantable cardioverter-defibrillator) in place    Brain tumor (benign) ([address]    Chronic combined systolic and diastolic congestive heart failure ([address]    TIA involving right internal carotid artery    CAD in native artery    DM (diabetes mellitus), secondary, uncontrolled, w/neurologic complic ([address]    CHF (congestive heart failure) ([address]    Nonischemic cardiomyopathy ([address]    Essential hypertension    TIA (transient ischemic attack)    Hyperglycemia    Diabetic ketoacidosis without coma associated with type 2 diabetes mellitus (HCC)    Non-intractable vomiting with nausea    Chest pain    Arterial ischemic stroke, ICA, right, acute ([address]    Diabetic hyperosmolar non-ketotic state ([address]    Diabetic acidosis without coma ([address]    Hyponatremia    Metabolic acidosis    Disorder of electrolytes    Diabetic ketoacidosis with coma associated with type 2 diabetes mellitus (HCC)    Hypernatremia    Leukocytosis    Atrial tachycardia (HCC)    DKA, type 2, not at goal [facility])    Cognitive developmental delay    Mood disorder (HCC)    Urinary tract infection with hematuria    Nausea vomiting and diarrhea    Hypokalemia    Persistent fever    Syncope and collapse    S/P ICD (internal cardiac defibrillator) procedure    History of CVA (cerebrovascular accident)    Noncompliance with medications    Obesity    SVT (supraventricular tachycardia) (HCC)    Type 2 diabetes mellitus with hyperglycemia, with long-term current use of insulin (HCC)    Acute pulmonary edema (HCC)    Mixed hyperlipidemia    Intractable nausea and vomiting    Hypomagnesemia    Lactic acidosis    NSVT (nonsustained ventricular tachycardia) ([facility])    Shortness of breath

## 2020-10-28 NOTE — FLOWSHEET NOTE
10/27/20 1537   Vital Signs   Temp 97.4 °F (36.3 °C)   Temp Source Oral   Pulse 91   Heart Rate Source Monitor   Resp 16   BP 93/66   BP Location Left upper arm   BP Upper/Lower Upper   Patient Position Sitting   Level of Consciousness 0   MEWS Score 2   Pain Assessment   Pain Assessment 0-10   Pain Level 0   Patient's Stated Pain Goal No pain     Patient had 8 beats of v-tach at this time. Patient alert and oriented in bed, vital signs stable. Patient reports no pain, no shortness of breath, and no dizziness.      Linda Lomas, RN

## 2020-10-28 NOTE — PROGRESS NOTES
Shift assessment complete. See flow sheet. Scheduled meds to be given by nursing students with RN Instructor. See MAR. Call light and bedside table within reach. No further needs noted at this time. Will continue to monitor.     [place] RN

## 2020-10-28 NOTE — FLOWSHEET NOTE
10/27/20 2100   Vital Signs   Temp 98.5 °F (36.9 °C)   Temp Source Oral   Pulse 91   Heart Rate Source Monitor   Resp 16   /79   Level of Consciousness 0   MEWS Score 1   Pain Assessment   Pain Assessment 0-10   Pain Level 9  (had this for past 4 months)   Patient's Stated Pain Goal No pain   Pain Type Acute pain   Pain Location Chest   Pain Descriptors Pressure; Constant   Oxygen Therapy   SpO2 97 %   O2 Device None (Room air)   Pt. Resting in bed. Call light in reach. Shift assessment completed see flow sheet. Denies any needs at this time. Will continue to monitor.

## 2020-10-28 NOTE — PROGRESS NOTES
Pt. complaining of chest pain of 9 described as pressure. New order for EKG and troponin placed at this time. Bernard Sanders called and informed. Dr. Kip Doan called and informed. New order for 1 gram mag and 40Meq PO potassium once.

## 2020-10-28 NOTE — PROGRESS NOTES
DC instructions have been reviewed with a verbal understanding. Patients IV removed with no complications. Patient dressed and stated she has all of her belongings. Patient stated she does not have a ride and a voucher for [employer] has bene approved by clinical. Patient expected to leave facility at 1610. Patient denies any needs at this time.

## 2020-10-28 NOTE — DISCHARGE INSTR - COC
Continuity of Care Form    Patient Name: Kaykay Oliveira   :  1960  MRN:  [id number]    Admit date:  10/26/2020  Discharge date:  10/28/2020    Code Status Order: Full Code   Advance Directives:   [facility] Documentation     Date/Time Healthcare Directive Type of Healthcare Directive Copy in [facility] [address] Agent's Name Healthcare Agent's Phone Number    10/26/20 5161  No, patient does not have an advance directive for healthcare treatment -- -- -- -- --          Admitting Physician:  Maria De Jesus Licea MD  PCP: FRITZ Somers NP    Discharging Nurse: Naheed Aguilera Unit/Room#: /3993-16  Discharging Unit Phone Number: [phone]    Emergency Contact:   Extended Emergency Contact Information  Primary Emergency Contact: Christa Campos  Address: [address] [place] [address] [address]           FANNIE, [address] [place] [address],[address] Phone: [phone]  Mobile Phone: [phone]  Relation: Brother/Sister    Past Surgical History:  Past Surgical History:   Procedure Laterality Date    BACK SURGERY      PACEMAKER INSERTION      TUBAL LIGATION      TUMOR REMOVAL         Immunization History:   Immunization History   Administered Date(s) Administered    Influenza 2012    Influenza A ([id number]) Vaccine IM 2009    Influenza Virus Vaccine 10/17/2014, 10/27/2017    Influenza, Quadv, 6 mo and older, IM, PF (Flulaval, Fluarix) 12/10/2018    Influenza, Quadv, IM, PF (6 mo and older Fluzone, Flulaval, Fluarix, and 3 yrs and older Afluria) 10/12/2019    Pneumococcal Polysaccharide ([id number]) 10/17/2014, 2017       Active Problems:  Patient Active Problem List   Diagnosis Code    DM (diabetes mellitus) ([facility] [address] E11.9    HTN (hypertension), benign I10    Dyslipidemia E78.5    CAD (coronary artery disease) I25.10    Hx CVA with residual L-sided facial droop (2018) I63.50    Dual ICD (implantable cardioverter-defibrillator) in place Z95.810  Brain tumor (benign) (HCC) D33.2    Chronic combined systolic and diastolic congestive heart failure (HCC) I50.42    TIA involving right internal carotid artery G45.1    CAD in native artery I25.10    DM (diabetes mellitus), secondary, uncontrolled, w/neurologic complic ([facility]) X86.37, M18.09    CHF (congestive heart failure) ([facility]) I50.9    Nonischemic cardiomyopathy (HCC) I42.8    Essential hypertension I10    TIA (transient ischemic attack) G45.9    Hyperglycemia R73.9    Diabetic ketoacidosis without coma associated with type 2 diabetes mellitus (HCC) E11.10    Non-intractable vomiting with nausea R11.2    Chest pain R07.9    Arterial ischemic stroke, ICA, right, acute ([facility]) I63.231    Diabetic hyperosmolar non-ketotic state ([facility] [address] E11.00    Diabetic acidosis without coma ([facility]) E11.10    Hyponatremia J44.8    Metabolic acidosis U46.9    Disorder of electrolytes E87.8    Diabetic ketoacidosis with coma associated with type 2 diabetes mellitus (HCC) E11.11    Hypernatremia E87.0    Leukocytosis D72.829    Atrial tachycardia (HCC) I47.1    DKA, type 2, not at goal [facility]) E11.10    Cognitive developmental delay F81.9    Mood disorder (HCC) F39    Urinary tract infection with hematuria N39.0, R31.9    Hypokalemia E87.6    Persistent fever R50.9    Syncope and collapse R55    S/P ICD (internal cardiac defibrillator) procedure Z95.810    History of CVA (cerebrovascular accident) Z80.78    Noncompliance with medications Z91.14    Obesity E66.9    SVT (supraventricular tachycardia) (HCC) I47.1    Type 2 diabetes mellitus with hyperglycemia, with long-term current use of insulin (HCC) E11.65, Z79.4    Acute pulmonary edema ([facility]) J81.0    Mixed hyperlipidemia E78.2    NSVT (nonsustained ventricular tachycardia) ([facility]) I47.2       Isolation/Infection:   Isolation          No Isolation        Patient Infection Status     Infection Onset Added Last Indicated Last Indicated By Review Planned Expiration Resolved Resolved By    None active    Resolved    COVID-19 Rule Out 10/26/20 10/26/20 10/26/20 COVID-19 (Ordered)   10/26/20 Rule-Out Test Resulted    C-diff Rule Out 09/10/20 09/10/20 09/10/20 Clostridium difficile toxin/antigen (Ordered)   09/22/20 Gene Marrero RN    COVID-19 Rule Out 04/16/20 04/16/20 04/16/20 COVID-19 (Ordered)   04/16/20 Rule-Out Test Resulted          Nurse Assessment:  Last Vital Signs: /62   Pulse 81   Temp 98 °F (36.7 °C) (Oral)   Resp 18   Ht 5' 1\" (1.549 m)   Wt 205 lb 6.4 oz (93.2 kg)   SpO2 95%   BMI 38.81 kg/m²     Last documented pain score (0-10 scale): Pain Level: 9(had this for past 4 months)  Last Weight:   Wt Readings from Last 1 Encounters:   10/28/20 205 lb 6.4 oz (93.2 kg)     Mental Status:  oriented, alert, coherent, logical, thought processes intact and able to concentrate and follow conversation    IV Access:  - None    Nursing Mobility/ADLs:  Walking   Assisted  Transfer  Assisted  Bathing  Assisted  Dressing  Assisted  Toileting  Independent  Feeding  [address]  Independent  Med Delivery   whole    Wound Care Documentation and Therapy:        Elimination:  Continence:   · Bowel: Yes  · Bladder: Yes  Urinary Catheter: None   Colostomy/Ileostomy/Ileal Conduit: No       Date of Last BM: 10/27/2020    Intake/Output Summary (Last 24 hours) at 10/28/2020 1242  Last data filed at 10/28/2020 0719  Gross per 24 hour   Intake 545 ml   Output 3200 ml   Net -2655 ml     I/O last 3 completed shifts: In: 9758 [P.O.:1321; I.V.:321]  Out: 3300 [Urine:3300]    Safety Concerns: At Risk for Falls    Impairments/Disabilities:      None    Nutrition Therapy:  Current Nutrition Therapy:   - Oral Diet:  Carb Control 4 carbs/meal (1800kcals/day)    Routes of Feeding: Oral  Liquids:  Thin Liquids  Daily Fluid Restriction: no  Last Modified Barium Swallow with Video (Video Swallowing Test): not done    Treatments at the Time of Hospital Discharge: Respiratory Treatments:   Oxygen Therapy:  is not on home oxygen therapy. Ventilator:    - No ventilator support    Rehab Therapies: Physical Therapy and Occupational Therapy  Weight Bearing Status/Restrictions: No weight bearing restirctions  Other Medical Equipment (for information only, NOT a DME order):  bedside commode  Other Treatments:     Patient's personal belongings (please select all that are sent with patient):  None    RN SIGNATURE:  Electronically signed by Jennifer Dutton RN on 10/28/20 at 2:18 PM EDT    CASE MANAGEMENT/SOCIAL WORK SECTION    Inpatient Status Date: 10/26/2020    Readmission Risk Assessment Score:  Readmission Risk              Risk of Unplanned Readmission:        59           Discharging to Facility/ Agency   · Name: [facility]  · Address:  · Phone:[phone]  · [address] (if applicable)   · Name:  · Address:  · Dialysis Schedule:  · Phone:  · Fax:    / signature: Electronically signed by Jovany Rivera RN on 10/28/20 at 12:46 PM EDT    PHYSICIAN SECTION    Prognosis: Good    Condition at Discharge: Stable    Rehab Potential (if transferring to Rehab):     Recommended Labs or Other Treatments After Discharge:     Physician Certification: I certify the above information and transfer of Jyoti Peres  is necessary for the continuing treatment of the diagnosis listed and that she requires Home Care for less 30 days.      Update Admission H&P: No change in H&P    PHYSICIAN SIGNATURE: CAITLIN Vann MD/ Electronically signed by Jovany Rievra RN on 10/28/20 at 12:46 PM EDT

## 2020-10-28 NOTE — CARE COORDINATION
DISCHARGE ORDER  Date/Time 10/28/2020 12:48 PM  Completed by: Chitra Bauer, Case Management    Patient Name: Katherine Chen      : 1960  Admitting Diagnosis: Intractable nausea and vomiting [R11.2]      Admit order Date and Status:10/26/2020 inpt  (verify MD's last order for status of admission)      Noted discharge order. If applicable PT/OT recommendation at Discharge: NA  DME recommendation by PT/OT:NA  Confirmed discharge plan: Yes  with whom____pt___________  If pt confirmed DC plan does family need to be contacted by CM No if yes who______  Discharge Plan: Chart reviewed, met with pt at bedside. Pt is returning home with BRET with [facility] for SN/PT/OT/HHA/Speech and MSW. TC to [facility] [address] and spoke with Keyla she is aware pt will discharge home today and states she can pull all needed documentation from [facility] No further dc needs voiced or identified. Reviewed chart. Role of discharge planner explained and patient verbalized understanding. Discharge order is noted. Has Home O2 in place on admit:  No    Pt is being d/c'd to home today. Pt's O2 sats are 95% on RA. Discharge timeout done with Lashaun Dixon. All discharge needs and concerns addressed.

## 2020-10-28 NOTE — PROGRESS NOTES
Patient's EF (Ejection Fraction) is less than 40%    Patient's weights and intake/output reviewed:    Patient's Last Weight: 211 lbs obtained by standing scale. Difference of *** lbs {Blank:19197::\"more\",\"less\"} than last documented weight. Intake/Output Summary (Last 24 hours) at 10/27/2020 2216  Last data filed at 10/27/2020 2124  Gross per 24 hour   Intake 1338 ml   Output 3100 ml   Net -1762 ml         Pt is currently room air. Pt denies shortness of breath. Pt without lower extremity edema. Patient and/or Family's stated Goal of Care this Admission: better understand heart failure and disease management prior to discharge      Comorbidities Reviewed Yes  Patient has a past medical history of Arthritis, CAD (coronary artery disease), Cerebral artery occlusion with cerebral infarction ([address] CHF (congestive heart failure) ([address] Diabetes mellitus ([address] Hyperlipidemia, Hypertension, Mental retardation, MI (myocardial infarction) ([address] and Pacemaker.          >>For CHF and Comorbidity documentation on Education Time and Topics, please see Education Tab

## 2020-10-29 ENCOUNTER — FOLLOWUP TELEPHONE ENCOUNTER (OUTPATIENT)
Dept: TELEMETRY | Age: 60
End: 2020-10-29

## 2020-10-29 NOTE — TELEPHONE ENCOUNTER
Care transition faxed to [facility], APRN - NP. Care transition included reason for hospitalization, procedures performed during hospitalization, services provided during hospitalization, discharge medications, and follow-up treatment and services needed.

## 2020-10-30 LAB
AMIODARONE LEVEL: <0.3 UG/ML (ref 0.5–2)
DES-AMIOD: <0.3 UG/ML

## 2020-10-30 NOTE — TELEPHONE ENCOUNTER
2nd Attempt; No Answer- Left HIPAA compliant voicemail with Non-Urgent Heart Failure Resource Line number for call back.     Earnestine Hernadez HF BSN-RN  Heart Failure Navigator  [address] [place] [address]  [phone]

## 2020-10-30 NOTE — TELEPHONE ENCOUNTER
3rd Attempt; No Answer- Left HIPAA compliant voicemail with Non-Urgent Heart Failure Resource Line number for call back.     Wanda Jeronimo HF BSN-RN  Heart Failure Navigator  [address] [facility]  [phone]

## 2020-11-02 ENCOUNTER — TELEPHONE (OUTPATIENT)
Dept: CARDIOLOGY CLINIC | Age: 60
End: 2020-11-02

## 2020-11-02 NOTE — TELEPHONE ENCOUNTER
11/10 [address] [facility] appt    11/17/2020 NPDD appt. No we do not need to resend it. I called pharmacy they will re run it and get it to the pt.

## 2020-11-02 NOTE — TELEPHONE ENCOUNTER
Cover my meds wants to know if fax was received about denial of sacubitril-valsartan (ENTRESTO) 24-26 MG per tablet    If there is anything they can assist office with please call.  TY

## 2020-11-02 NOTE — TELEPHONE ENCOUNTER
NPDD, I would like to call belen to see if the appeal has an outcome.  I want to confirm if this is the outcome of the appeal or initial request.

## 2020-11-02 NOTE — TELEPHONE ENCOUNTER
Great! Do we need to send in another prescription? I think she needs a follow up appointment too. She missed the last one.      Thank you, Brandon Maugire

## 2020-11-03 ENCOUNTER — TELEPHONE (OUTPATIENT)
Dept: OTHER | Facility: CLINIC | Age: 60
End: 2020-11-03

## 2020-11-03 ENCOUNTER — HOSPITAL ENCOUNTER (EMERGENCY)
Age: 60
Discharge: HOME | End: 2020-11-03
Attending: EMERGENCY MEDICINE
Payer: MEDICARE

## 2020-11-03 VITALS
RESPIRATION RATE: 18 BRPM | BODY MASS INDEX: 40.4 KG/M2 | HEART RATE: 98 BPM | SYSTOLIC BLOOD PRESSURE: 138 MMHG | HEIGHT: 61 IN | TEMPERATURE: 98.1 F | OXYGEN SATURATION: 99 % | DIASTOLIC BLOOD PRESSURE: 96 MMHG | WEIGHT: 214 LBS

## 2020-11-03 LAB
A/G RATIO: 1.3 (ref 1.1–2.2)
ALBUMIN SERPL-MCNC: 3.9 G/DL (ref 3.4–5)
ALP BLD-CCNC: 141 U/L (ref 40–129)
ALT SERPL-CCNC: 15 U/L (ref 10–40)
ANION GAP SERPL CALCULATED.3IONS-SCNC: 13 MMOL/L (ref 3–16)
AST SERPL-CCNC: 18 U/L (ref 15–37)
BASE EXCESS VENOUS: -3.8 MMOL/L (ref -3–3)
BASOPHILS ABSOLUTE: 0.1 K/UL (ref 0–0.2)
BASOPHILS RELATIVE PERCENT: 1.7 %
BETA-HYDROXYBUTYRATE: 1 MMOL/L (ref 0–0.27)
BILIRUB SERPL-MCNC: 0.8 MG/DL (ref 0–1)
BUN BLDV-MCNC: 16 MG/DL (ref 7–20)
CALCIUM SERPL-MCNC: 9.3 MG/DL (ref 8.3–10.6)
CARBOXYHEMOGLOBIN: 1.6 % (ref 0–1.5)
CHLORIDE BLD-SCNC: 99 MMOL/L (ref 99–110)
CO2: 22 MMOL/L (ref 21–32)
CREAT SERPL-MCNC: 0.6 MG/DL (ref 0.6–1.1)
EOSINOPHILS ABSOLUTE: 0 K/UL (ref 0–0.6)
EOSINOPHILS RELATIVE PERCENT: 0 %
GFR AFRICAN AMERICAN: >60
GFR NON-AFRICAN AMERICAN: >60
GLOBULIN: 3.1 G/DL
GLUCOSE BLD-MCNC: 239 MG/DL
GLUCOSE BLD-MCNC: 239 MG/DL (ref 70–99)
GLUCOSE BLD-MCNC: 604 MG/DL (ref 70–99)
HCO3 VENOUS: 21.6 MMOL/L (ref 23–29)
HCT VFR BLD CALC: 41 % (ref 36–48)
HEMOGLOBIN: 13.4 G/DL (ref 12–16)
LYMPHOCYTES ABSOLUTE: 0.9 K/UL (ref 1–5.1)
LYMPHOCYTES RELATIVE PERCENT: 16.5 %
MAGNESIUM: 1.9 MG/DL (ref 1.8–2.4)
MCH RBC QN AUTO: 29.8 PG (ref 26–34)
MCHC RBC AUTO-ENTMCNC: 32.6 G/DL (ref 31–36)
MCV RBC AUTO: 91.5 FL (ref 80–100)
METHEMOGLOBIN VENOUS: 0.3 %
MONOCYTES ABSOLUTE: 0.4 K/UL (ref 0–1.3)
MONOCYTES RELATIVE PERCENT: 7.5 %
NEUTROPHILS ABSOLUTE: 4.1 K/UL (ref 1.7–7.7)
NEUTROPHILS RELATIVE PERCENT: 74.3 %
O2 CONTENT, VEN: 16 VOL %
O2 SAT, VEN: 84 %
O2 THERAPY: ABNORMAL
PCO2, VEN: 40.3 MMHG (ref 40–50)
PDW BLD-RTO: 16.2 % (ref 12.4–15.4)
PERFORMED ON: ABNORMAL
PH VENOUS: 7.35 (ref 7.35–7.45)
PHOSPHORUS: 3.9 MG/DL (ref 2.5–4.9)
PLATELET # BLD: 345 K/UL (ref 135–450)
PMV BLD AUTO: 8.7 FL (ref 5–10.5)
PO2, VEN: 50 MMHG (ref 25–40)
POTASSIUM SERPL-SCNC: 4.4 MMOL/L (ref 3.5–5.1)
RBC # BLD: 4.48 M/UL (ref 4–5.2)
SODIUM BLD-SCNC: 134 MMOL/L (ref 136–145)
TCO2 CALC VENOUS: 23 MMOL/L
TOTAL PROTEIN: 7 G/DL (ref 6.4–8.2)
WBC # BLD: 5.5 K/UL (ref 4–11)

## 2020-11-03 PROCEDURE — 84100 ASSAY OF PHOSPHORUS: CPT

## 2020-11-03 PROCEDURE — 82803 BLOOD GASES ANY COMBINATION: CPT

## 2020-11-03 PROCEDURE — 93005 ELECTROCARDIOGRAM TRACING: CPT | Performed by: PHYSICIAN ASSISTANT

## 2020-11-03 PROCEDURE — 96374 THER/PROPH/DIAG INJ IV PUSH: CPT

## 2020-11-03 PROCEDURE — 6370000000 HC RX 637 (ALT 250 FOR IP): Performed by: EMERGENCY MEDICINE

## 2020-11-03 PROCEDURE — 85025 COMPLETE CBC W/AUTO DIFF WBC: CPT

## 2020-11-03 PROCEDURE — 93005 ELECTROCARDIOGRAM TRACING: CPT | Performed by: EMERGENCY MEDICINE

## 2020-11-03 PROCEDURE — 83735 ASSAY OF MAGNESIUM: CPT

## 2020-11-03 PROCEDURE — 99285 EMERGENCY DEPT VISIT HI MDM: CPT

## 2020-11-03 PROCEDURE — 80053 COMPREHEN METABOLIC PANEL: CPT

## 2020-11-03 PROCEDURE — 2580000003 HC RX 258: Performed by: EMERGENCY MEDICINE

## 2020-11-03 PROCEDURE — 82010 KETONE BODYS QUAN: CPT

## 2020-11-03 RX ORDER — DEXTROSE, SODIUM CHLORIDE, AND POTASSIUM CHLORIDE 5; .45; .15 G/100ML; G/100ML; G/100ML
INJECTION INTRAVENOUS CONTINUOUS PRN
Status: DISCONTINUED | OUTPATIENT
Start: 2020-11-03 | End: 2020-11-03 | Stop reason: HOSPADM

## 2020-11-03 RX ORDER — 0.9 % SODIUM CHLORIDE 0.9 %
15 INTRAVENOUS SOLUTION INTRAVENOUS ONCE
Status: COMPLETED | OUTPATIENT
Start: 2020-11-03 | End: 2020-11-03

## 2020-11-03 RX ORDER — SODIUM CHLORIDE 9 MG/ML
INJECTION, SOLUTION INTRAVENOUS CONTINUOUS
Status: DISCONTINUED | OUTPATIENT
Start: 2020-11-03 | End: 2020-11-03 | Stop reason: HOSPADM

## 2020-11-03 RX ORDER — ONDANSETRON 4 MG/1
4 TABLET, FILM COATED ORAL EVERY 8 HOURS PRN
Qty: 20 TABLET | Refills: 0 | Status: ON HOLD | OUTPATIENT
Start: 2020-11-03 | End: 2020-11-06 | Stop reason: SDUPTHER

## 2020-11-03 RX ORDER — DEXTROSE MONOHYDRATE 25 G/50ML
12.5 INJECTION, SOLUTION INTRAVENOUS PRN
Status: DISCONTINUED | OUTPATIENT
Start: 2020-11-03 | End: 2020-11-03 | Stop reason: HOSPADM

## 2020-11-03 RX ORDER — MORPHINE SULFATE 4 MG/ML
4 INJECTION, SOLUTION INTRAMUSCULAR; INTRAVENOUS EVERY 30 MIN PRN
Status: DISCONTINUED | OUTPATIENT
Start: 2020-11-03 | End: 2020-11-03 | Stop reason: HOSPADM

## 2020-11-03 RX ORDER — POTASSIUM CHLORIDE 7.45 MG/ML
10 INJECTION INTRAVENOUS PRN
Status: DISCONTINUED | OUTPATIENT
Start: 2020-11-03 | End: 2020-11-03 | Stop reason: HOSPADM

## 2020-11-03 RX ADMIN — SODIUM CHLORIDE 0.1 UNITS/KG/HR: 9 INJECTION, SOLUTION INTRAVENOUS at 15:03

## 2020-11-03 RX ADMIN — SODIUM CHLORIDE 1457 ML: 9 INJECTION, SOLUTION INTRAVENOUS at 15:05

## 2020-11-03 RX ADMIN — INSULIN HUMAN 10 UNITS: 100 INJECTION, SOLUTION PARENTERAL at 15:06

## 2020-11-03 ASSESSMENT — ENCOUNTER SYMPTOMS
ABDOMINAL PAIN: 1
RESPIRATORY NEGATIVE: 1
VOMITING: 1
NAUSEA: 1

## 2020-11-03 ASSESSMENT — PAIN DESCRIPTION - LOCATION: LOCATION: CHEST

## 2020-11-03 ASSESSMENT — PAIN SCALES - GENERAL: PAINLEVEL_OUTOF10: 10

## 2020-11-03 NOTE — TELEPHONE ENCOUNTER
Writer contacted Watson Carrel (PA),  ED provider to inform of 30 day readmission risk. ED provider informed writer of readmission.

## 2020-11-03 NOTE — ED NOTES
Saline lock removed intact. IV site looked unremarkable. Pressure dressing applied. Discharge instructions reviewed with Ms. Severo Elliot. She verbalized understanding. Copy of discharge instructions and prescriptions given. Ms. Severo Elliot was discharged to home in good condition per personal vehicle, friend/family driving. She exited the ED without difficulty.         Margarita Partida RN  11/03/20 4326

## 2020-11-03 NOTE — ED PROVIDER NOTES
[address] [address]  Dept: [phone]  Loc: [phone]    Open Note Time:  4:07 PM    I independently performed a history and physical on Ozzie Tapia. This is a very pleasant 62 y.o. female  who was evaluated in the emergency department for nausea, vomiting, abdominal pain    EKG  The Ekg interpreted by me shows  sinus tachycardia, wswe=897 with a rate of 107  Axis is   Normal  QTc is  normal  Intervals and Durations are unremarkable. ST Segments: normal  Delta waves, Brugada Syndrome, and Short NE are not present. Prior EKG to compare with was available. No significant changes compared to prior EKG from Oct 27, 2020    [facility]  The Ekg interpreted by me shows  sinus tachycardia, wssz=748 with a rate of 1008  Axis is   Normal  QTc is  within an acceptable range  Intervals and Durations are unremarkable. ST Segments: normal  Delta waves, Brugada Syndrome, and Short NE are not present. Prior EKG to compare with was available. No significant changes compared to prior EKG from today      Unless otherwise stated in this report or unable to obtain because of the patient's clinical or mental status as evidenced by the medical record, this patient's positive and negative responses for review of systems, constitutional, psych, eyes, ENT, cardiovascular, respiratory, gastrointestinal, neurological, genitourinary, musculoskeletal, integument systems and systems related to the presenting problem are either stated in the preceding paragraph or were not pertinent or were negative for the symptoms and/or complaints related to the medical problem. I wore goggles, surgical mask, N95 mask and gloves when I evaluated the patient.     Focused exam:  The physical exam reveals an alert and oriented patient who does not appear to be confused, non-ill-appearing, no abnormal heart sounds, no abnormal lung sounds, speaking comfortably in full sentences, diffuse abdominal tenderness to palpation without rebound and without guarding    Brief ED course/MDM:     Procedures/interventions/images ordered for this visit  Orders Placed This Encounter   Procedures    Beta-Hydroxybutyrate    Comprehensive Metabolic Panel    Magnesium    Phosphorus    CBC auto differential    Blood gas, venous    COVID-19    Diet NPO Effective Now    Notify physician if blood glucose (BG) less than 80 mg/dL   [facility] physician if blood glucose (BG) less than 200 mg/dL AND two of the following three criteria are met on two consecutive BMPs    Notify physician if multiplier less than 0.01 results in insulin rate of 0 units/hr    Nurse to change IV Fluid when blood glucose (BG) reaches 250 mg/dL    Inpatient consult to Hospitalist    POCT Glucose - every hour    EKG 12 Lead       Medications ordered for this visit  Orders Placed This Encounter   Medications    FOLLOWED BY Linked Order Group     0.9 % sodium chloride bolus     0.9 % sodium chloride infusion    dextrose 5 % and 0.45 % NaCl with KCl 20 mEq infusion    insulin regular (HUMULIN R;NOVOLIN R) injection 10 Units    insulin regular (HUMULIN R;NOVOLIN R) 100 Units in sodium chloride 0.9 % 100 mL infusion    dextrose 50 % IV solution    potassium chloride 10 mEq/100 mL IVPB (Peripheral Line)    morphine sulfate (PF) injection 4 mg       ED course notes for this visit           Patient seen and evaluated in room 12/12        Final Impression  1. Hyperglycemia    2. Nausea and vomiting, intractability of vomiting not specified, unspecified vomiting type        Blood pressure (!) 138/96, pulse 98, temperature 98.1 °F (36.7 °C), temperature source Oral, resp. rate 18, height 5' 1\" (1.549 m), weight 214 lb (97.1 kg), SpO2 99 %, not currently breastfeeding. Disposition    Pt is in stable condition upon Discharge to home.     All diagnostic, treatment, and disposition decisions were made by myself in conjunction with the KAREN/resident. For further details of the patient's emergency department visit, please see KAREN/resident documentation. Initial history and physical exam information was obtained by the KAREN/resident, also dictated a record of this visit. I independently examined and evaluated this patient and participated in the diagnostic, treatment and disposition decisions. The note was completed using Dragon voice recognition transcription. Every effort was made to ensure accuracy; however, inadvertent transcription errors may be present despite my best efforts to edit errors.     Michelle Lopez MD  [address] [place] [address]        Michelle Lopez MD  11/05/20 7122

## 2020-11-03 NOTE — ED PROVIDER NOTES
[address] ED  EMERGENCY DEPARTMENT ENCOUNTER        Pt Name: Nori Escalante  MRN: [id number]  [place] 1960  Date of evaluation: 11/3/2020  Provider: Alisa Renee PA-C  PCP: FRITZ Lizama NP     I have seen and evaluated this patient with my supervising physician Aby De Paz MD.    [address] [facility]       Chief Complaint   Patient presents with    Emesis     EMS reports that the patient has had emesis for the past few days and blood glucose was >500. Patient states that she was admitted for this a few weeks ago for the same thing. EMS place 20# in [facility].  Hyperglycemia       HISTORY OF PRESENT ILLNESS   (Location, Timing/Onset, Context/Setting, Quality, Duration, Modifying Factors, Severity, Associated Signs and Symptoms)  Note limiting factors. Nori Escalante is a 62 y.o. female with a past medical history of diabetes, hypertension, hyperlipidemia, CAD, history of CVA, brain tumor benign, cognitive developmental delay, CHF, essential hypertension, TIA brought in today by squad for evaluation of nonbilious nonbloody nausea and vomiting as well as hyperglycemia. Onset of symptoms started today. Duration of symptoms have been persistent since onset. No aggravating or alleviating complaints. She otherwise denies any other concerns at this time. She states that her blood sugar has been running \"high at home\". She has not tried anything at home for symptomatic relief. It denies any fevers or chills chest pain or shortness of breath. Denies any abdominal pain. Nursing Notes were all reviewed and agreed with or any disagreements were addressed in the HPI. REVIEW OF SYSTEMS    (2-9 systems for level 4, 10 or more for level 5)     Review of Systems   Constitutional: Negative. HENT: Negative. Respiratory: Negative. Cardiovascular: Negative. Gastrointestinal: Positive for abdominal pain, nausea and vomiting. Genitourinary: Negative. Musculoskeletal: Negative. Skin: Negative. Neurological: Negative. Positives and Pertinent negatives as per HPI. Except as noted above in the ROS, all other systems were reviewed and negative. PAST MEDICAL HISTORY     Past Medical History:   Diagnosis Date    Arthritis     CAD (coronary artery disease)     [facility]     CHF (congestive heart failure) (HCC)     Diabetes mellitus ([facility]     Hyperlipidemia     Hypertension     Mental retardation     MI (myocardial infarction) ([facility]     Pacemaker          SURGICAL HISTORY     Past Surgical History:   Procedure Laterality Date    BACK SURGERY      PACEMAKER INSERTION      TUBAL LIGATION      TUMOR REMOVAL           CURRENTMEDICATIONS       Previous Medications    AMIODARONE (PACERONE) 100 MG TABLET    Take 1 tablet by mouth daily    ASPIRIN 81 MG EC TABLET    Take 1 tablet by mouth daily    ATORVASTATIN (LIPITOR) 40 MG TABLET    Take 1 tablet by mouth nightly    BLOOD GLUCOSE MONITOR STRIPS    Test three times a day & as needed for symptoms of irregular blood glucose.     [employer] LANCETS ULTRA THIN MISC    1 each by Does not apply route 4 times daily    DIGOXIN (LANOXIN) 125 MCG TABLET    Take 1 tablet by mouth every other day    DULOXETINE (CYMBALTA) 60 MG EXTENDED RELEASE CAPSULE    Take 1 capsule by mouth daily    FENOFIBRATE MICRONIZED (LOFIBRA) 200 MG CAPSULE    Take 1 capsule by mouth nightly    FUROSEMIDE (LASIX) 40 MG TABLET    Take 1 tablet by mouth daily    INSULIN ASPART (NOVOLOG FLEXPEN) 100 UNIT/ML INJECTION PEN    Inject 20 Units into the skin 3 times daily (before meals)    INSULIN GLARGINE (LANTUS SOLOSTAR) 100 UNIT/ML INJECTION    Inject 40 Units into the skin 2 times daily    METFORMIN (GLUCOPHAGE) 1000 MG TABLET    Take 1,000 mg by mouth 2 times daily (with meals)    METOPROLOL SUCCINATE (TOPROL XL) 25 MG EXTENDED RELEASE TABLET    Take 0.5 tablets by mouth daily    MICONAZOLE Radial pulses are 2+ on the right side and 2+ on the left side. Heart sounds: Normal heart sounds. No murmur. No gallop. Pulmonary:      Effort: Pulmonary effort is normal. No respiratory distress. Breath sounds: Normal breath sounds. No decreased breath sounds, wheezing, rhonchi or rales. Chest:      Chest wall: No tenderness. Abdominal:      General: Abdomen is flat. Bowel sounds are normal.      Palpations: Abdomen is soft. Tenderness: There is no abdominal tenderness. There is no guarding or rebound. Musculoskeletal: Normal range of motion. General: No deformity. Right lower leg: No edema. Left lower leg: No edema. Skin:     General: Skin is warm and dry. Neurological:      General: No focal deficit present. Mental Status: She is alert and oriented to person, place, and time. GCS: GCS eye subscore is 4. GCS verbal subscore is 5. GCS motor subscore is 6. Psychiatric:         Behavior: Behavior normal. Behavior is cooperative.          DIAGNOSTIC RESULTS   LABS:    Labs Reviewed   BETA-HYDROXYBUTYRATE - Abnormal; Notable for the following components:       Result Value    Beta-Hydroxybutyrate 1.00 (*)     All other components within normal limits    Narrative:     CALL  [place]  [facility] tel. [phone],  Chemistry results called to and read back by Sami Rdz RN, 11/03/2020  14:51, by Ezequiel Villa  Performed at:  [place] [address],  [employer], [facility]   Phone [phone]   COMPREHENSIVE METABOLIC PANEL - Abnormal; Notable for the following components:    Sodium 134 (*)     Glucose 604 (*)     Alkaline Phosphatase 141 (*)     All other components within normal limits    Narrative:     [address] [phone],  Chemistry results called to and read back by Sami Rdz RN, 11/03/2020  14:51, by Ezequiel Villa  Performed at:  [place] [address],  [place], [facility]   Phone [phone]   CBC WITH AUTO DIFFERENTIAL - Abnormal; Notable for the following components:    RDW 16.2 (*)     Lymphocytes Absolute 0.9 (*)     All other components within normal limits    Narrative:     Performed at:  [facility],  [place], [place]   Phone [phone]   BLOOD GAS, VENOUS - Abnormal; Notable for the following components:    pH, Alberto 7.347 (*)     pO2, Alberto 50.0 (*)     HCO3, Venous 21.6 (*)     Base Excess, Alberto -3.8 (*)     Carboxyhemoglobin 1.6 (*)     All other components within normal limits    Narrative:     Performed at:  [facility],  [place], [place]   Phone [phone]   POCT GLUCOSE - Abnormal; Notable for the following components:    POC Glucose 239 (*)     All other components within normal limits    Narrative:     Performed at:  [place] [address],  [place], [facility]   Phone [phone]   POCT GLUCOSE - Normal   MAGNESIUM    Narrative:     Richelle Rocha tel. [phone],  Chemistry results called to and read back by Maria Del Rosario Estrada RN, 11/03/2020  14:51, by Alma Rosa West  Performed at:  [facility],  [place], [facility]   Phone [phone]   PHOSPHORUS    Narrative:     Richelle Rocha tel. [phone],  Chemistry results called to and read back by Maria Del Rosario Estrada RN, 11/03/2020  14:51, by Alma Rosa West  Performed at:  [facility],  [place], [place]   Phone [phone]   POCT GLUCOSE   POCT GLUCOSE   POCT GLUCOSE   POCT GLUCOSE       All other labs were within normal range or not returned as of this dictation. EKG: All EKG's are interpreted by the Emergency Department Physician in the absence of a cardiologist.  Please see their note for interpretation of EKG.       RADIOLOGY:   Non-plain film images such as CT, elevated. Blood glucose of 604. No anion gap. No acute electrolyte abnormalities. CBC reveals no acute leukocytosis. Hemoglobin of 13.4. VBG unremarkable at this time. EKG was reviewed by my attending see note for dictation. Patient initially given fluids and insulin. I did discuss this case initially with the hospitalist service Chang Burt the physician assistant and at this time plan will be to recheck patient's blood sugar and if blood sugar has improved patient will be discharged home. Repeat blood sugar was 239. Patient able to tolerate p.o. and her heart rate has improved. I did have a discussion with the patient and we had shared decision making and at this time plan will be to discharge home. Patient told to follow-up with her PCP in the next 1 to 2 days. Patient told return immediately to the ER if she experiences any new or worsening symptoms and was educated on when to return. Patient verbalized understanding of this plan and was comfortable and stable at time of discharge. I did feel comfortable sending this patient home with close follow-up instructions and strict return precautions. FINAL IMPRESSION      1. Hyperglycemia    2. Nausea and vomiting, intractability of vomiting not specified, unspecified vomiting type          DISPOSITION/PLAN   DISPOSITION Discharge - Pending Orders Complete 11/03/2020 05:11:53 PM      PATIENT REFERREDTO:  No follow-up provider specified.     DISCHARGE MEDICATIONS:  New Prescriptions    No medications on file       DISCONTINUED MEDICATIONS:  Discontinued Medications    No medications on file              (Please note that portions of this note were completed with a voice recognition program.  Efforts were made to edit the dictations but occasionally words are mis-transcribed.)    Susi Maxwell PA-C (electronically signed)            Susi Maxwell PA-C  11/03/20 [id number]

## 2020-11-04 ENCOUNTER — APPOINTMENT (OUTPATIENT)
Dept: GENERAL RADIOLOGY | Age: 60
End: 2020-11-04
Payer: MEDICARE

## 2020-11-04 ENCOUNTER — TELEPHONE (OUTPATIENT)
Dept: OTHER | Facility: CLINIC | Age: 60
End: 2020-11-04

## 2020-11-04 ENCOUNTER — HOSPITAL ENCOUNTER (OUTPATIENT)
Age: 60
Setting detail: OBSERVATION
Discharge: HOME | End: 2020-11-06
Attending: EMERGENCY MEDICINE | Admitting: INTERNAL MEDICINE
Payer: MEDICARE

## 2020-11-04 ENCOUNTER — CARE COORDINATION (OUTPATIENT)
Dept: CARE COORDINATION | Age: 60
End: 2020-11-04

## 2020-11-04 LAB
A/G RATIO: 1.2 (ref 1.1–2.2)
ALBUMIN SERPL-MCNC: 3.6 G/DL (ref 3.4–5)
ALP BLD-CCNC: 144 U/L (ref 40–129)
ALT SERPL-CCNC: 17 U/L (ref 10–40)
ANION GAP SERPL CALCULATED.3IONS-SCNC: 13 MMOL/L (ref 3–16)
ANION GAP SERPL CALCULATED.3IONS-SCNC: 14 MMOL/L (ref 3–16)
AST SERPL-CCNC: 22 U/L (ref 15–37)
BASE EXCESS VENOUS: -2.8 MMOL/L (ref -3–3)
BASOPHILS ABSOLUTE: 0.1 K/UL (ref 0–0.2)
BASOPHILS RELATIVE PERCENT: 1.2 %
BETA-HYDROXYBUTYRATE: 1.1 MMOL/L (ref 0–0.27)
BILIRUB SERPL-MCNC: 0.7 MG/DL (ref 0–1)
BILIRUBIN URINE: NEGATIVE
BLOOD, URINE: NEGATIVE
BUN BLDV-MCNC: 17 MG/DL (ref 7–20)
BUN BLDV-MCNC: 19 MG/DL (ref 7–20)
CALCIUM SERPL-MCNC: 8.9 MG/DL (ref 8.3–10.6)
CALCIUM SERPL-MCNC: 9.1 MG/DL (ref 8.3–10.6)
CARBOXYHEMOGLOBIN: 1.5 % (ref 0–1.5)
CHLORIDE BLD-SCNC: 94 MMOL/L (ref 99–110)
CHLORIDE BLD-SCNC: 97 MMOL/L (ref 99–110)
CHP ED QC CHECK: YES
CHP ED QC CHECK: YES
CLARITY: CLEAR
CO2: 19 MMOL/L (ref 21–32)
CO2: 20 MMOL/L (ref 21–32)
COLOR: ABNORMAL
CREAT SERPL-MCNC: 0.6 MG/DL (ref 0.6–1.1)
CREAT SERPL-MCNC: 0.6 MG/DL (ref 0.6–1.1)
EKG ATRIAL RATE: 107 BPM
EKG ATRIAL RATE: 108 BPM
EKG DIAGNOSIS: NORMAL
EKG DIAGNOSIS: NORMAL
EKG P AXIS: 17 DEGREES
EKG P AXIS: 32 DEGREES
EKG P-R INTERVAL: 164 MS
EKG P-R INTERVAL: 170 MS
EKG Q-T INTERVAL: 372 MS
EKG Q-T INTERVAL: 374 MS
EKG QRS DURATION: 90 MS
EKG QRS DURATION: 98 MS
EKG QTC CALCULATION (BAZETT): 498 MS
EKG QTC CALCULATION (BAZETT): 499 MS
EKG R AXIS: -6 DEGREES
EKG R AXIS: 165 DEGREES
EKG T AXIS: 62 DEGREES
EKG T AXIS: 63 DEGREES
EKG VENTRICULAR RATE: 107 BPM
EKG VENTRICULAR RATE: 108 BPM
EOSINOPHILS ABSOLUTE: 0.1 K/UL (ref 0–0.6)
EOSINOPHILS RELATIVE PERCENT: 1.1 %
GFR AFRICAN AMERICAN: >60
GFR AFRICAN AMERICAN: >60
GFR NON-AFRICAN AMERICAN: >60
GFR NON-AFRICAN AMERICAN: >60
GLOBULIN: 2.9 G/DL
GLUCOSE BLD-MCNC: 308 MG/DL
GLUCOSE BLD-MCNC: 308 MG/DL (ref 70–99)
GLUCOSE BLD-MCNC: 360 MG/DL (ref 70–99)
GLUCOSE BLD-MCNC: 510 MG/DL
GLUCOSE BLD-MCNC: 510 MG/DL (ref 70–99)
GLUCOSE BLD-MCNC: 586 MG/DL (ref 70–99)
GLUCOSE URINE: >=1000 MG/DL
HCO3 VENOUS: 20.6 MMOL/L (ref 23–29)
HCT VFR BLD CALC: 40.7 % (ref 36–48)
HEMOGLOBIN: 13.3 G/DL (ref 12–16)
KETONES, URINE: NEGATIVE MG/DL
LACTIC ACID: 2.7 MMOL/L (ref 0.4–2)
LACTIC ACID: 3.8 MMOL/L (ref 0.4–2)
LEUKOCYTE ESTERASE, URINE: NEGATIVE
LYMPHOCYTES ABSOLUTE: 1.9 K/UL (ref 1–5.1)
LYMPHOCYTES RELATIVE PERCENT: 27 %
MCH RBC QN AUTO: 29.5 PG (ref 26–34)
MCHC RBC AUTO-ENTMCNC: 32.6 G/DL (ref 31–36)
MCV RBC AUTO: 90.4 FL (ref 80–100)
METHEMOGLOBIN VENOUS: 0.1 %
MICROSCOPIC EXAMINATION: ABNORMAL
MONOCYTES ABSOLUTE: 0.6 K/UL (ref 0–1.3)
MONOCYTES RELATIVE PERCENT: 7.9 %
NEUTROPHILS ABSOLUTE: 4.5 K/UL (ref 1.7–7.7)
NEUTROPHILS RELATIVE PERCENT: 62.8 %
NITRITE, URINE: NEGATIVE
O2 CONTENT, VEN: 18 VOL %
O2 SAT, VEN: 93 %
O2 THERAPY: ABNORMAL
PCO2, VEN: 32.2 MMHG (ref 40–50)
PDW BLD-RTO: 16.6 % (ref 12.4–15.4)
PERFORMED ON: ABNORMAL
PERFORMED ON: ABNORMAL
PH UA: 5 (ref 5–8)
PH VENOUS: 7.42 (ref 7.35–7.45)
PLATELET # BLD: 366 K/UL (ref 135–450)
PMV BLD AUTO: 8.7 FL (ref 5–10.5)
PO2, VEN: 64.1 MMHG (ref 25–40)
POTASSIUM REFLEX MAGNESIUM: 4.6 MMOL/L (ref 3.5–5.1)
POTASSIUM SERPL-SCNC: 4.6 MMOL/L (ref 3.5–5.1)
PROTEIN UA: NEGATIVE MG/DL
RBC # BLD: 4.51 M/UL (ref 4–5.2)
SODIUM BLD-SCNC: 127 MMOL/L (ref 136–145)
SODIUM BLD-SCNC: 130 MMOL/L (ref 136–145)
SPECIFIC GRAVITY UA: 1.01 (ref 1–1.03)
TCO2 CALC VENOUS: 22 MMOL/L
TOTAL PROTEIN: 6.5 G/DL (ref 6.4–8.2)
URINE REFLEX TO CULTURE: ABNORMAL
URINE TYPE: ABNORMAL
UROBILINOGEN, URINE: 0.2 E.U./DL
WBC # BLD: 7.2 K/UL (ref 4–11)

## 2020-11-04 PROCEDURE — 81003 URINALYSIS AUTO W/O SCOPE: CPT

## 2020-11-04 PROCEDURE — 82010 KETONE BODYS QUAN: CPT

## 2020-11-04 PROCEDURE — 83036 HEMOGLOBIN GLYCOSYLATED A1C: CPT

## 2020-11-04 PROCEDURE — 96374 THER/PROPH/DIAG INJ IV PUSH: CPT

## 2020-11-04 PROCEDURE — 6370000000 HC RX 637 (ALT 250 FOR IP): Performed by: EMERGENCY MEDICINE

## 2020-11-04 PROCEDURE — 6370000000 HC RX 637 (ALT 250 FOR IP): Performed by: NURSE PRACTITIONER

## 2020-11-04 PROCEDURE — 82803 BLOOD GASES ANY COMBINATION: CPT

## 2020-11-04 PROCEDURE — 93010 ELECTROCARDIOGRAM REPORT: CPT | Performed by: INTERNAL MEDICINE

## 2020-11-04 PROCEDURE — 2580000003 HC RX 258: Performed by: NURSE PRACTITIONER

## 2020-11-04 PROCEDURE — 99283 EMERGENCY DEPT VISIT LOW MDM: CPT

## 2020-11-04 PROCEDURE — 36415 COLL VENOUS BLD VENIPUNCTURE: CPT

## 2020-11-04 PROCEDURE — 80053 COMPREHEN METABOLIC PANEL: CPT

## 2020-11-04 PROCEDURE — 96372 THER/PROPH/DIAG INJ SC/IM: CPT

## 2020-11-04 PROCEDURE — 85025 COMPLETE CBC W/AUTO DIFF WBC: CPT

## 2020-11-04 PROCEDURE — 71046 X-RAY EXAM CHEST 2 VIEWS: CPT

## 2020-11-04 PROCEDURE — 96361 HYDRATE IV INFUSION ADD-ON: CPT

## 2020-11-04 PROCEDURE — 83605 ASSAY OF LACTIC ACID: CPT

## 2020-11-04 RX ORDER — 0.9 % SODIUM CHLORIDE 0.9 %
1000 INTRAVENOUS SOLUTION INTRAVENOUS ONCE
Status: COMPLETED | OUTPATIENT
Start: 2020-11-04 | End: 2020-11-05

## 2020-11-04 RX ORDER — ONDANSETRON 2 MG/ML
4 INJECTION INTRAMUSCULAR; INTRAVENOUS ONCE
Status: DISCONTINUED | OUTPATIENT
Start: 2020-11-04 | End: 2020-11-04

## 2020-11-04 RX ORDER — METOCLOPRAMIDE HYDROCHLORIDE 5 MG/ML
10 INJECTION INTRAMUSCULAR; INTRAVENOUS ONCE
Status: DISCONTINUED | OUTPATIENT
Start: 2020-11-04 | End: 2020-11-04

## 2020-11-04 RX ADMIN — SODIUM CHLORIDE 1000 ML: 9 INJECTION, SOLUTION INTRAVENOUS at 20:58

## 2020-11-04 RX ADMIN — SODIUM CHLORIDE 1000 ML: 9 INJECTION, SOLUTION INTRAVENOUS at 23:00

## 2020-11-04 RX ADMIN — INSULIN HUMAN 10 UNITS: 100 INJECTION, SOLUTION PARENTERAL at 20:58

## 2020-11-04 RX ADMIN — INSULIN LISPRO 10 UNITS: 100 INJECTION, SOLUTION INTRAVENOUS; SUBCUTANEOUS at 22:39

## 2020-11-04 ASSESSMENT — ENCOUNTER SYMPTOMS
COLOR CHANGE: 0
SORE THROAT: 0
SHORTNESS OF BREATH: 0
ABDOMINAL PAIN: 0
RHINORRHEA: 0
VOMITING: 1
NAUSEA: 1

## 2020-11-04 ASSESSMENT — PAIN SCALES - GENERAL: PAINLEVEL_OUTOF10: 10

## 2020-11-04 NOTE — CARE COORDINATION
ED Visit:  Hx: Type 2 DM and DKA  Initial BS in   10/26/2020 COVID-19 test was negative    1. Hyperglycemia    2. Nausea and vomiting, intractability of vomiting not specified, unspecified vomiting type      Unable to contact pt. Pt does not have voice mail set up on her phone.

## 2020-11-04 NOTE — ED PROVIDER NOTES
[address]        Pt Name: Rose Marie Zavala  MRN: [id number]  [place] 1960  Dateof evaluation: 11/4/2020  Provider: FRITZ Foster CNP  PCP: FRITZ Aburto NP  ED Attending: No att. providers found    [address] [place] [address]       Chief Complaint   Patient presents with    Hyperglycemia       HISTORY OF PRESENTILLNESS   (Location/Symptom, Timing/Onset, Context/Setting, Quality, Duration, Modifying Factors, Severity)  Note limiting factors. Rose Marie Zavala is a 62 y.o. female for elevated blood sugar. Onset was 6months. Context includes pt states she has been having blood sugar issues for the past 6 months despite taking her medications. Pt states she has been vomiting for the past 2 days. She reports she was seen yesterday for the same and is not any better. Alleviating factors include nothing. Aggravating factors include nothing. Pain is 10/10. nothing has been used for pain today. Nursing Notes were all reviewed and agreed with or any disagreements were addressed  in the HPI. REVIEW OF SYSTEMS    (2-9 systems for level 4, 10 or more for level 5)     Review of Systems   Constitutional: Negative for fever. HENT: Negative for congestion, rhinorrhea and sore throat. Respiratory: Negative for shortness of breath. Cardiovascular: Negative for chest pain. Gastrointestinal: Positive for nausea and vomiting. Negative for abdominal pain. Endocrine:        Elevated blood sugar   Genitourinary: Negative for decreased urine volume and difficulty urinating. Musculoskeletal: Negative for arthralgias and myalgias. Skin: Negative for color change and rash. Neurological: Negative for dizziness and light-headedness. Psychiatric/Behavioral: Negative for agitation. All other systems reviewed and are negative. Positives and Pertinent negatives as per HPI.   Except as noted above in the ROS, all other systems were reviewed and negative.        PAST MEDICAL HISTORY     Past Medical History:   Diagnosis Date    Arthritis     CAD (coronary artery disease)     Cerebral artery occlusion with cerebral infarction ([facility]     x 3    CHF (congestive heart failure) ([facility]     Diabetes mellitus ([facility]     Hyperlipidemia     Hypertension     Mental retardation     MI (myocardial infarction) ([facility]     Pacemaker          SURGICAL HISTORY       Past Surgical History:   Procedure Laterality Date    BACK SURGERY      x3    PACEMAKER INSERTION      TUBAL LIGATION      TUMOR REMOVAL           CURRENT MEDICATIONS       Current Discharge Medication List      CONTINUE these medications which have NOT CHANGED    Details   !! ondansetron (ZOFRAN) 4 MG tablet Take 1 tablet by mouth every 8 hours as needed for Nausea  Qty: 20 tablet, Refills: 0      furosemide (LASIX) 40 MG tablet Take 1 tablet by mouth daily  Qty: 30 tablet, Refills: 0      insulin aspart (NOVOLOG FLEXPEN) 100 UNIT/ML injection pen Inject 20 Units into the skin 3 times daily (before meals)      insulin glargine (LANTUS SOLOSTAR) 100 UNIT/ML injection Inject 40 Units into the skin 2 times daily      metFORMIN (GLUCOPHAGE) 1000 MG tablet Take 1,000 mg by mouth 2 times daily (with meals)      vitamin D (ERGOCALCIFEROL) 1.25 MG (19683 UT) CAPS capsule Take 50,000 Units by mouth once a week Takes weekly on Sundays      QUEtiapine (SEROQUEL) 25 MG tablet Take 25 mg by mouth nightly      amiodarone (PACERONE) 100 MG tablet Take 1 tablet by mouth daily  Qty: 30 tablet, Refills: 3      metoprolol succinate (TOPROL XL) 25 MG extended release tablet Take 0.5 tablets by mouth daily  Qty: 30 tablet, Refills: 3      sacubitril-valsartan (ENTRESTO) 24-26 MG per tablet Take 0.5 tablets by mouth 2 times daily  Qty: 60 tablet, Refills: 3      spironolactone (ALDACTONE) 25 MG tablet Take 1 tablet by mouth daily  Qty: 30 tablet, Refills: 3      digoxin (LANOXIN) 125 MCG tablet Take 1 tablet by mouth every None   Tobacco Use    Smoking status: Never Smoker    Smokeless tobacco: Never Used   Substance and Sexual Activity    Alcohol use: No    Drug use: No    Sexual activity: Not Currently   Lifestyle    Physical activity     Days per week: None     Minutes per session: None    Stress: None   Relationships    Social connections     Talks on phone: None     Gets together: None     Attends [employer] service: None     Active member of club or organization: None     Attends meetings of clubs or organizations: None     Relationship status: None    Intimate partner violence     Fear of current or ex partner: None     Emotionally abused: None     Physically abused: None     Forced sexual activity: None   Other Topics Concern    None   Social History Narrative    None       SCREENINGS    [place] Coma Scale  Eye Opening: Spontaneous  Best Verbal Response: Oriented  Best Motor Response: Obeys commands  [place] Coma Scale Score: 15        PHYSICAL EXAM  (up to 7 for level 4, 8 or more for level 5)     ED Triage Vitals   BP Temp Temp src Pulse Resp SpO2 Height Weight   -- -- -- -- -- -- -- --       Physical Exam  Constitutional:       Appearance: She is well-developed. She is obese. HENT:      Head: Normocephalic and atraumatic. Neck:      Musculoskeletal: Normal range of motion. Cardiovascular:      Rate and Rhythm: Tachycardia present. Pulmonary:      Effort: Pulmonary effort is normal. No respiratory distress. Abdominal:      General: There is no distension. Palpations: Abdomen is soft. Tenderness: There is no abdominal tenderness. Musculoskeletal: Normal range of motion. Skin:     General: Skin is warm and dry. Neurological:      Mental Status: She is alert and oriented to person, place, and time.          DIAGNOSTIC RESULTS   LABS:    Labs Reviewed   CBC WITH AUTO DIFFERENTIAL - Abnormal; Notable for the following components:       Result Value    RDW 16.6 (*)     All other components within normal limits    Narrative:     Performed at:  [facility],  [place]   Phone [phone]   COMPREHENSIVE METABOLIC PANEL W/ REFLEX TO MG FOR LOW K - Abnormal; Notable for the following components:    Sodium 127 (*)     Chloride 94 (*)     CO2 20 (*)     Glucose 586 (*)     Alkaline Phosphatase 144 (*)     All other components within normal limits    Narrative:     Performed at:  [facility],  [place]   Phone [phone]   BETA-HYDROXYBUTYRATE - Abnormal; Notable for the following components:    Beta-Hydroxybutyrate 1.10 (*)     All other components within normal limits    Narrative:     Performed at:  [facility],  [place]   Phone [phone]   URINE RT REFLEX TO CULTURE - Abnormal; Notable for the following components:    Glucose, Ur >=1000 (*)     All other components within normal limits    Narrative:     Performed at:  [facility],  [place]   Phone [phone]   LACTIC ACID, PLASMA - Abnormal; Notable for the following components:    Lactic Acid 2.7 (*)     All other components within normal limits    Narrative:     Performed at:  [facility] 75,  [place]   Phone [phone]   BLOOD GAS, VENOUS - Abnormal; Notable for the following components:    pCO2, Alberto 32.2 (*)     pO2, Alberto 64.1 (*)     HCO3, Venous 20.6 (*)     All other components within normal limits    Narrative:     Performed at:  [facility]) - [facility],  [place]   Phone [phone]   LACTIC ACID, PLASMA - Abnormal; Notable for the following components:    Lactic Acid 3.8 (*)     All other components within normal limits    Narrative:     Performed at:  [facility] [address]  [place]   Phone [phone]   BASIC METABOLIC PANEL - Abnormal; Notable for the following components:    Sodium 130 (*)     Chloride 97 (*)     CO2 19 (*)     Glucose 360 (*)     All other components within normal limits    Narrative:     Performed at:  [facility] 75,  [place]   Phone [phone]   LACTIC ACID, PLASMA - Abnormal; Notable for the following components:    Lactic Acid 2.7 (*)     All other components within normal limits    Narrative:     Performed at:  [facility] 75,  [place]   Phone [phone]   POCT GLUCOSE - Abnormal; Notable for the following components:    POC Glucose 510 (*)     All other components within normal limits    Narrative:     Performed at:  [facility] 75,  [place]   Phone [phone]   POCT GLUCOSE - Abnormal; Notable for the following components:    POC Glucose 308 (*)     All other components within normal limits    Narrative:     Performed at:  [facility] 75,  [place]   Phone [phone]   POCT GLUCOSE - Abnormal; Notable for the following components:    POC Glucose 243 (*)     All other components within normal limits    Narrative:     Performed at:  [facility] 75,  [place]   Phone [phone]   POCT GLUCOSE - Abnormal; Notable for the following components:    POC Glucose 147 (*)     All other components within normal limits    Narrative:     Performed at:  [facility]) - [facility] 75,  [place]   Phone [phone]   POCT GLUCOSE - Abnormal; Notable for the following components:    POC Glucose 132 (*)     All other components within normal limits    Narrative: Performed at:  [facility] 75,  [place], [facility]   Phone [phone]   POCT GLUCOSE - Abnormal; Notable for the following components:    POC Glucose 167 (*)     All other components within normal limits    Narrative:     Performed at:  [facility],  [place], [facility]   Phone [phone]   POCT GLUCOSE - Abnormal; Notable for the following components:    POC Glucose 142 (*)     All other components within normal limits    Narrative:     Performed at:  [place] [address],  [place], [facility]   Phone [phone]   POCT GLUCOSE - Abnormal; Notable for the following components:    POC Glucose 244 (*)     All other components within normal limits    Narrative:     Performed at:  [place] [address],  [place], [facility]   Phone [phone]   POCT GLUCOSE - Normal   POCT GLUCOSE - Normal   COVID-19    Narrative:     Performed at:  [place] [address],  [place], [facility]   Phone [phone]   HEMOGLOBIN A1C    Narrative:     Performed at:  [facility]  [address] [address]   Phone [phone]   LACTIC ACID, PLASMA    Narrative:     Performed at:  [facility],  [place], [facility]   Phone [phone]   LACTIC ACID, PLASMA   CYTOLOGY, NON-GYN   SURGICAL PATHOLOGY   BASIC METABOLIC PANEL W/ REFLEX TO MG FOR LOW K   CBC WITH AUTO DIFFERENTIAL   POCT GLUCOSE   POCT GLUCOSE   POCT GLUCOSE   POCT GLUCOSE   POCT GLUCOSE   POCT GLUCOSE   POCT GLUCOSE       All other labs werewithin normal range or not returned as of this dictation. EKG:  All EKG's are interpreted by the Emergency Department Physician who either signs or Co-signs this chart in the absence of a cardiologist.  Please see their note for interpretation of EKG. RADIOLOGY:     Chest x-ray interpreted by radiologist for  FINDINGS:   Mild prominence of the pulmonary vasculature.  No focal consolidation,   pneumothorax, or pleural effusion.  Unchanged cardiomegaly with a dual lead   left chest ICD in stable configuration.  No acute bony abnormalities. Interpretation per the Radiologist below, if available at the time of this note:    XR CHEST (2 VW)   Final Result   Mild pulmonary vascular congestion. Unchanged cardiomegaly. No results found.       PROCEDURES   Unless otherwise noted below, none     Procedures     CRITICAL CARE TIME   N/A    CONSULTS:  IP CONSULT TO HOSPITALIST  IP CONSULT TO GI      EMERGENCYDEPARTMENT COURSE and DIFFERENTIAL DIAGNOSIS/MDM:   Vitals:    Vitals:    11/05/20 1130 11/05/20 1136 11/05/20 1143 11/05/20 1545   BP: 138/78 131/85 107/85 127/82   Pulse: 110 97 106 97   Resp: 20 20 20 20   Temp:    98.8 °F (37.1 °C)   TempSrc:    Oral   SpO2: 97% 98% 96% 95%   Weight:       Height:           Patient was given the following medications:  Medications   amiodarone (CORDARONE) tablet 100 mg (100 mg Oral Given 11/5/20 1201)   aspirin EC tablet 81 mg (81 mg Oral Given 11/5/20 1200)   atorvastatin (LIPITOR) tablet 40 mg (has no administration in time range)   digoxin (LANOXIN) tablet 125 mcg (125 mcg Oral Given 11/5/20 0931)   metoprolol succinate (TOPROL XL) extended release tablet 12.5 mg (12.5 mg Oral Given 11/5/20 1200)   QUEtiapine (SEROQUEL) tablet 25 mg (has no administration in time range)   ranolazine (RANEXA) extended release tablet 500 mg (500 mg Oral Given 11/5/20 1159)   metoclopramide (REGLAN) injection 10 mg (10 mg Intravenous Given 11/5/20 1555)   sodium chloride flush 0.9 % injection 10 mL (10 mLs Intravenous Given 11/5/20 1203)   sodium chloride flush 0.9 % injection 10 mL (has no administration in time range) acetaminophen (TYLENOL) tablet 650 mg (has no administration in time range)     Or   acetaminophen (TYLENOL) suppository 650 mg (has no administration in time range)   polyethylene glycol (GLYCOLAX) packet 17 g (has no administration in time range)   promethazine (PHENERGAN) tablet 12.5 mg (has no administration in time range)     Or   ondansetron (ZOFRAN) injection 4 mg (has no administration in time range)   enoxaparin (LOVENOX) injection 40 mg (40 mg Subcutaneous Given 11/5/20 1202)   glucose (GLUTOSE) 40 % oral gel 15 g (has no administration in time range)   dextrose 50 % IV solution (has no administration in time range)   glucagon (rDNA) injection 1 mg (has no administration in time range)   dextrose 5 % solution (has no administration in time range)   insulin lispro (HUMALOG) injection vial 0-18 Units (3 Units Subcutaneous Given 11/5/20 1608)   0.9 % sodium chloride infusion ( Intravenous New Bag 11/5/20 0434)   DULoxetine (CYMBALTA) extended release capsule 60 mg (has no administration in time range)   insulin glargine (LANTUS) injection vial 25 Units (25 Units Subcutaneous Not Given 11/5/20 1615)   sacubitril-valsartan (ENTRESTO) 24-26 MG per tablet 0.5 tablet (has no administration in time range)   spironolactone (ALDACTONE) tablet 25 mg (has no administration in time range)   sucralfate (CARAFATE) tablet 1 g (1 g Oral Not Given 11/5/20 1203)   pantoprazole (PROTONIX) injection 40 mg (40 mg Intravenous Given 11/5/20 1202)   insulin lispro (HUMALOG) injection vial 10 Units (has no administration in time range)   0.9 % sodium chloride bolus (0 mLs Intravenous Stopped 11/5/20 0005)   insulin regular (HUMULIN R;NOVOLIN R) injection 10 Units (10 Units Intravenous Given 11/4/20 2058)   0.9 % sodium chloride bolus (0 mLs Intravenous Stopped 11/5/20 0110)   insulin lispro (HUMALOG) injection vial 10 Units (10 Units Subcutaneous Given 11/4/20 2239)       Patient was seen and evaluated by myself and Dr. Kylah Ibarra. Patient here for concerns of elevated blood sugar. Patient reports that she takes metformin 1000 mg daily NovoLog 20 units 3 times a day and Lantus 40 units twice a day. Patient reports that she has been using her medications as prescribed. Patient states that her blood sugars have not been controlled with these medications. She also reports that she has had nausea and vomiting for the last 2 days. On exam the patient is awake and alert she was found to be tachycardic. She was given IV fluids and lab values have been reviewed. Patient did have an elevated blood sugar with a point-of-care at 510. Her lactic was 2.7. There was difficulty getting an IV placed on her and administering her fluids. Repeat labs have worsened. Patient was offered nausea medications multiple times and states \"they do not work \". She would not take them. Patient was recently seen for the same symptoms. Based on worsening labs despite IV fluids the hospitalist was consulted. Hospitalist is accepted the patient for admission. Patient's care was transitioned to the inpatient unit at this time. The patient tolerated their visit well. I have evaluated this patient. My supervising physician was available for consultation. The patient and / or the family were informed of the results of any tests, a time was given to answer questions, a plan was proposed and they agreed with plan. FINAL IMPRESSION      1. Elevated blood sugar    2. Lactic acidosis    3. Nausea    4. Tachycardia    5.  Nausea and vomiting, intractability of vomiting not specified, unspecified vomiting type          DISPOSITION/PLAN   DISPOSITION Admitted 11/05/2020 12:18:50 AM      PATIENT REFERRED TO:  Matthew Alfaro DO  [address] [address]  [phone]    In 5 weeks        DISCHARGE MEDICATIONS:  Current Discharge Medication List          DISCONTINUED MEDICATIONS:  Current Discharge Medication List                 (Please note that portions of this note were completed with a voice recognition program.  Efforts were made to edit the dictations but occasionally words are mis-transcribed.)    FRITZ Justice CNP (electronically signed)         FRITZ Justice CNP  11/05/20 1081

## 2020-11-04 NOTE — TELEPHONE ENCOUNTER
RN Access attempted to contact Veronica Newell (NP) office to schedule ED f/u appt. Attempted twice. Someone picks up the phone but appears to not work. Hear static and hangs up.

## 2020-11-05 PROBLEM — R11.2 INTRACTABLE NAUSEA AND VOMITING: Status: ACTIVE | Noted: 2020-11-05

## 2020-11-05 LAB
ESTIMATED AVERAGE GLUCOSE: 286.2 MG/DL
GLUCOSE BLD-MCNC: 132 MG/DL (ref 70–99)
GLUCOSE BLD-MCNC: 142 MG/DL (ref 70–99)
GLUCOSE BLD-MCNC: 147 MG/DL (ref 70–99)
GLUCOSE BLD-MCNC: 167 MG/DL (ref 70–99)
GLUCOSE BLD-MCNC: 243 MG/DL (ref 70–99)
GLUCOSE BLD-MCNC: 244 MG/DL (ref 70–99)
HBA1C MFR BLD: 11.6 %
LACTIC ACID: 1.6 MMOL/L (ref 0.4–2)
LACTIC ACID: 2.7 MMOL/L (ref 0.4–2)
LACTIC ACID: 3.8 MMOL/L (ref 0.4–2)
PERFORMED ON: ABNORMAL
SARS-COV-2, NAAT: NOT DETECTED

## 2020-11-05 PROCEDURE — 3609012400 HC EGD TRANSORAL BIOPSY SINGLE/MULTIPLE: Performed by: INTERNAL MEDICINE

## 2020-11-05 PROCEDURE — 99223 1ST HOSP IP/OBS HIGH 75: CPT | Performed by: INTERNAL MEDICINE

## 2020-11-05 PROCEDURE — 2709999900 HC NON-CHARGEABLE SUPPLY: Performed by: INTERNAL MEDICINE

## 2020-11-05 PROCEDURE — 36415 COLL VENOUS BLD VENIPUNCTURE: CPT

## 2020-11-05 PROCEDURE — 83605 ASSAY OF LACTIC ACID: CPT

## 2020-11-05 PROCEDURE — 6370000000 HC RX 637 (ALT 250 FOR IP): Performed by: INTERNAL MEDICINE

## 2020-11-05 PROCEDURE — 6360000002 HC RX W HCPCS: Performed by: INTERNAL MEDICINE

## 2020-11-05 PROCEDURE — 2500000003 HC RX 250 WO HCPCS: Performed by: INTERNAL MEDICINE

## 2020-11-05 PROCEDURE — U0002 COVID-19 LAB TEST NON-CDC: HCPCS

## 2020-11-05 PROCEDURE — 99152 MOD SED SAME PHYS/QHP 5/>YRS: CPT | Performed by: INTERNAL MEDICINE

## 2020-11-05 PROCEDURE — 88305 TISSUE EXAM BY PATHOLOGIST: CPT

## 2020-11-05 PROCEDURE — G0378 HOSPITAL OBSERVATION PER HR: HCPCS

## 2020-11-05 PROCEDURE — 7100000011 HC PHASE II RECOVERY - ADDTL 15 MIN: Performed by: INTERNAL MEDICINE

## 2020-11-05 PROCEDURE — 2580000003 HC RX 258: Performed by: INTERNAL MEDICINE

## 2020-11-05 PROCEDURE — C9113 INJ PANTOPRAZOLE SODIUM, VIA: HCPCS | Performed by: INTERNAL MEDICINE

## 2020-11-05 PROCEDURE — 7100000010 HC PHASE II RECOVERY - FIRST 15 MIN: Performed by: INTERNAL MEDICINE

## 2020-11-05 RX ORDER — NICOTINE POLACRILEX 4 MG
15 LOZENGE BUCCAL PRN
Status: DISCONTINUED | OUTPATIENT
Start: 2020-11-05 | End: 2020-11-06 | Stop reason: HOSPADM

## 2020-11-05 RX ORDER — INSULIN GLARGINE 100 [IU]/ML
40 INJECTION, SOLUTION SUBCUTANEOUS 2 TIMES DAILY
Status: DISCONTINUED | OUTPATIENT
Start: 2020-11-05 | End: 2020-11-05

## 2020-11-05 RX ORDER — MIDAZOLAM HYDROCHLORIDE 5 MG/ML
INJECTION INTRAMUSCULAR; INTRAVENOUS PRN
Status: DISCONTINUED | OUTPATIENT
Start: 2020-11-05 | End: 2020-11-05 | Stop reason: ALTCHOICE

## 2020-11-05 RX ORDER — QUETIAPINE FUMARATE 25 MG/1
25 TABLET, FILM COATED ORAL NIGHTLY
Status: DISCONTINUED | OUTPATIENT
Start: 2020-11-05 | End: 2020-11-06 | Stop reason: HOSPADM

## 2020-11-05 RX ORDER — DULOXETIN HYDROCHLORIDE 60 MG/1
60 CAPSULE, DELAYED RELEASE ORAL DAILY
Status: DISCONTINUED | OUTPATIENT
Start: 2020-11-05 | End: 2020-11-05

## 2020-11-05 RX ORDER — SODIUM CHLORIDE 9 MG/ML
INJECTION, SOLUTION INTRAVENOUS CONTINUOUS
Status: DISCONTINUED | OUTPATIENT
Start: 2020-11-05 | End: 2020-11-06

## 2020-11-05 RX ORDER — DEXTROSE MONOHYDRATE 25 G/50ML
12.5 INJECTION, SOLUTION INTRAVENOUS PRN
Status: DISCONTINUED | OUTPATIENT
Start: 2020-11-05 | End: 2020-11-06 | Stop reason: HOSPADM

## 2020-11-05 RX ORDER — PROMETHAZINE HYDROCHLORIDE 25 MG/1
12.5 TABLET ORAL EVERY 6 HOURS PRN
Status: DISCONTINUED | OUTPATIENT
Start: 2020-11-05 | End: 2020-11-06 | Stop reason: HOSPADM

## 2020-11-05 RX ORDER — INSULIN GLARGINE 100 [IU]/ML
25 INJECTION, SOLUTION SUBCUTANEOUS 2 TIMES DAILY
Status: DISCONTINUED | OUTPATIENT
Start: 2020-11-05 | End: 2020-11-06 | Stop reason: HOSPADM

## 2020-11-05 RX ORDER — SODIUM CHLORIDE 0.9 % (FLUSH) 0.9 %
10 SYRINGE (ML) INJECTION PRN
Status: DISCONTINUED | OUTPATIENT
Start: 2020-11-05 | End: 2020-11-06 | Stop reason: HOSPADM

## 2020-11-05 RX ORDER — SODIUM CHLORIDE 0.9 % (FLUSH) 0.9 %
10 SYRINGE (ML) INJECTION EVERY 12 HOURS SCHEDULED
Status: DISCONTINUED | OUTPATIENT
Start: 2020-11-05 | End: 2020-11-06 | Stop reason: HOSPADM

## 2020-11-05 RX ORDER — ONDANSETRON 2 MG/ML
4 INJECTION INTRAMUSCULAR; INTRAVENOUS EVERY 6 HOURS PRN
Status: DISCONTINUED | OUTPATIENT
Start: 2020-11-05 | End: 2020-11-06 | Stop reason: HOSPADM

## 2020-11-05 RX ORDER — FENTANYL CITRATE 50 UG/ML
INJECTION, SOLUTION INTRAMUSCULAR; INTRAVENOUS PRN
Status: DISCONTINUED | OUTPATIENT
Start: 2020-11-05 | End: 2020-11-05 | Stop reason: ALTCHOICE

## 2020-11-05 RX ORDER — METOCLOPRAMIDE HYDROCHLORIDE 5 MG/ML
10 INJECTION INTRAMUSCULAR; INTRAVENOUS EVERY 6 HOURS
Status: DISCONTINUED | OUTPATIENT
Start: 2020-11-05 | End: 2020-11-06

## 2020-11-05 RX ORDER — SUCRALFATE 1 G/1
1 TABLET ORAL EVERY 6 HOURS SCHEDULED
Status: DISCONTINUED | OUTPATIENT
Start: 2020-11-05 | End: 2020-11-06 | Stop reason: HOSPADM

## 2020-11-05 RX ORDER — POLYETHYLENE GLYCOL 3350 17 G/17G
17 POWDER, FOR SOLUTION ORAL DAILY PRN
Status: DISCONTINUED | OUTPATIENT
Start: 2020-11-05 | End: 2020-11-06 | Stop reason: HOSPADM

## 2020-11-05 RX ORDER — ATORVASTATIN CALCIUM 40 MG/1
40 TABLET, FILM COATED ORAL NIGHTLY
Status: DISCONTINUED | OUTPATIENT
Start: 2020-11-05 | End: 2020-11-06 | Stop reason: HOSPADM

## 2020-11-05 RX ORDER — DEXTROSE MONOHYDRATE 50 MG/ML
100 INJECTION, SOLUTION INTRAVENOUS PRN
Status: DISCONTINUED | OUTPATIENT
Start: 2020-11-05 | End: 2020-11-06 | Stop reason: HOSPADM

## 2020-11-05 RX ORDER — DULOXETIN HYDROCHLORIDE 60 MG/1
60 CAPSULE, DELAYED RELEASE ORAL DAILY
Status: DISCONTINUED | OUTPATIENT
Start: 2020-11-06 | End: 2020-11-06 | Stop reason: HOSPADM

## 2020-11-05 RX ORDER — RANOLAZINE 500 MG/1
500 TABLET, EXTENDED RELEASE ORAL 2 TIMES DAILY
Status: DISCONTINUED | OUTPATIENT
Start: 2020-11-05 | End: 2020-11-06 | Stop reason: HOSPADM

## 2020-11-05 RX ORDER — PANTOPRAZOLE SODIUM 40 MG/10ML
40 INJECTION, POWDER, LYOPHILIZED, FOR SOLUTION INTRAVENOUS DAILY
Status: DISCONTINUED | OUTPATIENT
Start: 2020-11-05 | End: 2020-11-06 | Stop reason: HOSPADM

## 2020-11-05 RX ORDER — ACETAMINOPHEN 325 MG/1
650 TABLET ORAL EVERY 6 HOURS PRN
Status: DISCONTINUED | OUTPATIENT
Start: 2020-11-05 | End: 2020-11-06 | Stop reason: HOSPADM

## 2020-11-05 RX ORDER — ASPIRIN 81 MG/1
81 TABLET ORAL DAILY
Status: DISCONTINUED | OUTPATIENT
Start: 2020-11-05 | End: 2020-11-06 | Stop reason: HOSPADM

## 2020-11-05 RX ORDER — AMIODARONE HYDROCHLORIDE 200 MG/1
100 TABLET ORAL DAILY
Status: DISCONTINUED | OUTPATIENT
Start: 2020-11-05 | End: 2020-11-06 | Stop reason: HOSPADM

## 2020-11-05 RX ORDER — SPIRONOLACTONE 25 MG/1
25 TABLET ORAL DAILY
Status: DISCONTINUED | OUTPATIENT
Start: 2020-11-06 | End: 2020-11-06 | Stop reason: HOSPADM

## 2020-11-05 RX ORDER — SPIRONOLACTONE 25 MG/1
25 TABLET ORAL DAILY
Status: DISCONTINUED | OUTPATIENT
Start: 2020-11-05 | End: 2020-11-05

## 2020-11-05 RX ORDER — METOPROLOL SUCCINATE 25 MG/1
12.5 TABLET, EXTENDED RELEASE ORAL DAILY
Status: DISCONTINUED | OUTPATIENT
Start: 2020-11-05 | End: 2020-11-06 | Stop reason: HOSPADM

## 2020-11-05 RX ORDER — DIGOXIN 125 MCG
125 TABLET ORAL EVERY OTHER DAY
Status: DISCONTINUED | OUTPATIENT
Start: 2020-11-05 | End: 2020-11-06 | Stop reason: HOSPADM

## 2020-11-05 RX ORDER — ACETAMINOPHEN 650 MG/1
650 SUPPOSITORY RECTAL EVERY 6 HOURS PRN
Status: DISCONTINUED | OUTPATIENT
Start: 2020-11-05 | End: 2020-11-06 | Stop reason: HOSPADM

## 2020-11-05 RX ADMIN — Medication 10 ML: at 12:03

## 2020-11-05 RX ADMIN — RANOLAZINE 500 MG: 500 TABLET, FILM COATED, EXTENDED RELEASE ORAL at 20:13

## 2020-11-05 RX ADMIN — METOCLOPRAMIDE 10 MG: 5 INJECTION, SOLUTION INTRAMUSCULAR; INTRAVENOUS at 12:02

## 2020-11-05 RX ADMIN — AMIODARONE HYDROCHLORIDE 100 MG: 200 TABLET ORAL at 12:01

## 2020-11-05 RX ADMIN — SODIUM CHLORIDE: 9 INJECTION, SOLUTION INTRAVENOUS at 04:34

## 2020-11-05 RX ADMIN — ENOXAPARIN SODIUM 40 MG: 40 INJECTION SUBCUTANEOUS at 12:02

## 2020-11-05 RX ADMIN — DIGOXIN 125 MCG: 125 TABLET ORAL at 09:31

## 2020-11-05 RX ADMIN — SUCRALFATE 1 G: 1 TABLET ORAL at 20:13

## 2020-11-05 RX ADMIN — QUETIAPINE FUMARATE 25 MG: 25 TABLET ORAL at 20:13

## 2020-11-05 RX ADMIN — MICONAZOLE NITRATE: 20 POWDER TOPICAL at 22:03

## 2020-11-05 RX ADMIN — Medication 10 ML: at 20:13

## 2020-11-05 RX ADMIN — METOCLOPRAMIDE 10 MG: 5 INJECTION, SOLUTION INTRAMUSCULAR; INTRAVENOUS at 15:55

## 2020-11-05 RX ADMIN — ASPIRIN 81 MG: 81 TABLET, COATED ORAL at 12:00

## 2020-11-05 RX ADMIN — METOPROLOL SUCCINATE 12.5 MG: 25 TABLET, EXTENDED RELEASE ORAL at 12:00

## 2020-11-05 RX ADMIN — ATORVASTATIN CALCIUM 40 MG: 40 TABLET, FILM COATED ORAL at 20:13

## 2020-11-05 RX ADMIN — SUCRALFATE 1 G: 1 TABLET ORAL at 12:01

## 2020-11-05 RX ADMIN — METOCLOPRAMIDE 10 MG: 5 INJECTION, SOLUTION INTRAMUSCULAR; INTRAVENOUS at 20:13

## 2020-11-05 RX ADMIN — PANTOPRAZOLE SODIUM 40 MG: 40 INJECTION, POWDER, FOR SOLUTION INTRAVENOUS at 12:02

## 2020-11-05 RX ADMIN — RANOLAZINE 500 MG: 500 TABLET, FILM COATED, EXTENDED RELEASE ORAL at 11:59

## 2020-11-05 ASSESSMENT — PAIN DESCRIPTION - DESCRIPTORS: DESCRIPTORS: TIGHTNESS

## 2020-11-05 ASSESSMENT — PAIN - FUNCTIONAL ASSESSMENT: PAIN_FUNCTIONAL_ASSESSMENT: 0-10

## 2020-11-05 ASSESSMENT — PAIN SCALES - GENERAL: PAINLEVEL_OUTOF10: 9

## 2020-11-05 NOTE — PROGRESS NOTES
Shift assessment completed. See flow sheet. Medications given. Patient is awake and alert. Patient denies further needs at this time. Call light within reach. Will continue to monitor.

## 2020-11-05 NOTE — H&P
Gastroenterology Preop Assessment    Patient:   Dalton Miranda   :    1960   Facility:   [address]  Referring/PCP: FRITZ Gusman NP  Date:     2020    Subjective:   Procedure: egd    HPI/Reason for procedure:  63 yo female with hypertension, hyperlipidemia, DM, CHF, CAD with history of CVA with complaints of nausea and vomiting. Has uncontrolled diabetes and hyperglycemic on admission.        Past Medical History:   Diagnosis Date    Arthritis     CAD (coronary artery disease)     Cerebral artery occlusion with cerebral infarction ([facility]     x 3    CHF (congestive heart failure) (HCC)     Diabetes mellitus ([facility]     Hyperlipidemia     Hypertension     Mental retardation     MI (myocardial infarction) ([facility]     Pacemaker      Past Surgical History:   Procedure Laterality Date    BACK SURGERY      x3    PACEMAKER INSERTION      TUBAL LIGATION      TUMOR REMOVAL         Social:   Social History     Tobacco Use    Smoking status: Never Smoker    Smokeless tobacco: Never Used   Substance Use Topics    Alcohol use: No     Family:   Family History   Problem Relation Age of Onset    Heart Disease Father     Cancer Mother     Other Mother        Scheduled Medications:    amiodarone  100 mg Oral Daily    aspirin  81 mg Oral Daily    atorvastatin  40 mg Oral Nightly    digoxin  125 mcg Oral Every Other Day    metoprolol succinate  12.5 mg Oral Daily    QUEtiapine  25 mg Oral Nightly    ranolazine  500 mg Oral BID    metoclopramide  10 mg Intravenous Q6H    sodium chloride flush  10 mL Intravenous 2 times per day    enoxaparin  40 mg Subcutaneous Daily    insulin lispro  0-18 Units Subcutaneous Q4H    [START ON 2020] DULoxetine  60 mg Oral Daily    insulin glargine  25 Units Subcutaneous BID    [START ON 2020] sacubitril-valsartan  0.5 tablet Oral BID    [START ON 2020] spironolactone  25 mg Oral Daily    sucralfate  1 g Oral 4 times per 8/12/20   Isaac Ashby,    aspirin 81 MG EC tablet Take 1 tablet by mouth daily 7/18/20   Mannie Hi MD   ranolazine (RANEXA) 500 MG extended release tablet Take 1 tablet by mouth 2 times daily 7/18/20   Mannie Hi MD   DULoxetine (CYMBALTA) 60 MG extended release capsule Take 1 capsule by mouth daily 7/18/20   Mannie Hi MD   atorvastatin (LIPITOR) 40 MG tablet Take 1 tablet by mouth nightly 7/18/20   Mannie Hi MD   fenofibrate micronized (LOFIBRA) 200 MG capsule Take 1 capsule by mouth nightly 7/18/20   Mannie Hi MD   miconazole (MICOTIN) 2 % powder Apply topically 2 times daily. 7/18/20   Mannie Hi MD   [employer] Lancets Ultra Thin MISC 1 each by Does not apply route 4 times daily 7/18/20   Mannie Hi MD   blood glucose monitor strips Test three times a day & as needed for symptoms of irregular blood glucose.  5/12/19   Robert Herrera MD         Current Facility-Administered Medications:     amiodarone (CORDARONE) tablet 100 mg, 100 mg, Oral, Daily, Rosemary King MD    aspirin EC tablet 81 mg, 81 mg, Oral, Daily, Rosemary King MD    atorvastatin (LIPITOR) tablet 40 mg, 40 mg, Oral, Nightly, Rosemary King MD    digoxin (LANOXIN) tablet 125 mcg, 125 mcg, Oral, Every Other Day, Rosemary King MD, 125 mcg at 11/05/20 0931    metoprolol succinate (TOPROL XL) extended release tablet 12.5 mg, 12.5 mg, Oral, Daily, Rosemary King MD    QUEtiapine (SEROQUEL) tablet 25 mg, 25 mg, Oral, Nightly, Rosemary King MD    ranolazine (RANEXA) extended release tablet 500 mg, 500 mg, Oral, BID, Rosemary King MD    metoclopramide (REGLAN) injection 10 mg, 10 mg, Intravenous, Q6H, Rosemary King MD    sodium chloride flush 0.9 % injection 10 mL, 10 mL, Intravenous, 2 times per day, Rosemary King MD    sodium chloride flush 0.9 % injection 10 mL, 10 mL, Intravenous, PRN, MD Sindi Glez acetaminophen (TYLENOL) tablet 650 mg, 650 mg, Oral, Q6H PRN **OR** acetaminophen (TYLENOL) suppository 650 mg, 650 mg, Rectal, Q6H PRN, Fly Junior MD    polyethylene glycol (GLYCOLAX) packet 17 g, 17 g, Oral, Daily PRN, Fly Junior MD    promethazine (PHENERGAN) tablet 12.5 mg, 12.5 mg, Oral, Q6H PRN **OR** ondansetron (ZOFRAN) injection 4 mg, 4 mg, Intravenous, Q6H PRN, Fly Junior MD    enoxaparin (LOVENOX) injection 40 mg, 40 mg, Subcutaneous, Daily, Fly Junior MD    glucose (GLUTOSE) 40 % oral gel 15 g, 15 g, Oral, PRN, Fly Junior MD    dextrose 50 % IV solution, 12.5 g, Intravenous, PRN, Fly Junior MD    glucagon (rDNA) injection 1 mg, 1 mg, Intramuscular, PRN, Fly Junior MD    dextrose 5 % solution, 100 mL/hr, Intravenous, PRN, Fly Junior MD    insulin lispro (HUMALOG) injection vial 0-18 Units, 0-18 Units, Subcutaneous, Q4H, Pete Chaudhry MD    0.9 % sodium chloride infusion, , Intravenous, Continuous, Fly Junior MD, Last Rate: 50 mL/hr at 11/05/20 0434    [START ON 11/6/2020] DULoxetine (CYMBALTA) extended release capsule 60 mg, 60 mg, Oral, Daily, Phil Tsang MD    insulin glargine (LANTUS) injection vial 25 Units, 25 Units, Subcutaneous, BID, MD Vanda Branch  [START ON 11/6/2020] sacubitril-valsartan (ENTRESTO) 24-26 MG per tablet 0.5 tablet, 0.5 tablet, Oral, BID, MD Vanda Branch  [START ON 11/6/2020] spironolactone (ALDACTONE) tablet 25 mg, 25 mg, Oral, Daily, Phil Tsang MD    sucralfate (CARAFATE) tablet 1 g, 1 g, Oral, 4 times per day, Phil Tsang MD    pantoprazole (PROTONIX) injection 40 mg, 40 mg, Intravenous, Daily, hPil Tsang MD  [facility]  [START ON 11/6/2020] insulin lispro (HUMALOG) injection vial 10 Units, 10 Units, Subcutaneous, TID AC, Phil Tsang MD      Infusions:    dextrose      sodium chloride 50 mL/hr at 11/05/20 0434     PRN Medications: sodium chloride flush, acetaminophen **OR** acetaminophen, polyethylene glycol, promethazine **OR** ondansetron, glucose, dextrose, glucagon (rDNA), dextrose  Allergies: No Known Allergies      Objective:     Physical Exam:   /86   Pulse 107   Temp 97.4 °F (36.3 °C) (Temporal)   Resp 16   Ht 5' 1\" (1.549 m)   Wt 209 lb 12.8 oz (95.2 kg)   SpO2 95%   BMI 39.64 kg/m²     HEENT: NCAT  Lungs: CTAB  CV: RRR  Abd: soft, ntd  Ext: dpi    Lab and Imaging Review   Labs:  CBC:   Recent Labs     11/03/20  1411 11/04/20 1950   WBC 5.5 7.2   HGB 13.4 13.3   HCT 41.0 40.7   MCV 91.5 90.4    366     BMP:   Recent Labs     11/03/20  1411 11/04/20 1950 11/04/20  2205   * 127* 130*   K 4.4 4.6 4.6   CL 99 94* 97*   CO2 22 20* 19*   PHOS 3.9  --   --    BUN 16 19 17   CREATININE 0.6 0.6 0.6     LIVER PROFILE:   Recent Labs     11/03/20  1411 11/04/20 1950   AST 18 22   ALT 15 17   PROT 7.0 6.5   BILITOT 0.8 0.7   ALKPHOS 141* 144*     PT/INR: No results for input(s): INR in the last 72 hours. Invalid input(s): PT    Pre-Procedure Assessment / Plan:  ASA: Class 3 - A patient with severe systemic disease that limits activity but is not incapacitating  Airway: Mallampati: II (soft palate, uvula, fauces visible)  Level of Sedation Plan:Deep sedation  Post Procedure plan: Return to same level of care      Plan:   1. egd    I assessed the patient and find that the patient is in satisfactory condition to proceed with the planned procedure and sedation plan. I have explained the risk, benefits, and alternatives to the procedure; the patient understands and agrees to proceed.        Maricruz Gutiérrez  11/5/2020

## 2020-11-05 NOTE — ED NOTES
Pt given a few ice chips. She sts her nausea is a little better even though she has refused to take medications. Vikki Eis aware.       Armando Lopez RN  11/05/20 0004

## 2020-11-05 NOTE — PLAN OF CARE
Problem: Pain:  Goal: Pain level will decrease  Description: Pain level will decrease  Outcome: Ongoing  Goal: Control of acute pain  Description: Control of acute pain  Outcome: Ongoing  Goal: Control of chronic pain  Description: Control of chronic pain  Outcome: Ongoing     Problem: Falls - Risk of:  Goal: Will remain free from falls  Description: Will remain free from falls  Outcome: Ongoing  Goal: Absence of physical injury  Description: Absence of physical injury  Outcome: Ongoing     Problem: OXYGENATION/RESPIRATORY FUNCTION  Goal: Patient will maintain patent airway  Outcome: Ongoing  Note:   Patient's EF (Ejection Fraction) is less than 40%          . .................................................................................................................................................. Patient's weights and intake/output reviewed: Yes    Patient's Last Weight: 209 lbs obtained by standing scale. Intake/Output Summary (Last 24 hours) at 11/5/2020 0741  Last data filed at 11/5/2020 0110  Gross per 24 hour   Intake 2000 ml   Output --   Net 2000 ml       Comorbidities Reviewed Yes    Patient has a past medical history of Arthritis, CAD (coronary artery disease), Cerebral artery occlusion with cerebral infarction ([facility] [address] CHF (congestive heart failure) ([facility] [address] [facility] Diabetes mellitus ([facility] [address] [facility] Hyperlipidemia, Hypertension, Mental retardation, MI (myocardial infarction) ([facility] and Pacemaker. >>For CHF and Comorbidity documentation on Education Time and Topics, please see Education Tab                    Pt resting in bed at this time on room air. Pt denies shortness of breath. Pt without lower extremity edema.      Patient and/or Family's stated Goal of Care this Admission: be more comfortable prior to discharge        :   Goal: Patient will achieve/maintain normal respiratory rate/effort  Description: Respiratory rate and effort will be within normal limits for the patient  Outcome: Ongoing

## 2020-11-05 NOTE — CARE COORDINATION
Case Management Assessment  Initial Evaluation      Patient Name: Flora Mcclendon  YOB: 1960  Diagnosis: Intractable nausea and vomiting [R11.2]  Date / Time: 11/4/2020  6:33 PM    Admission status/Date:obs  Chart Reviewed: Yes      Patient Interviewed: Yes   Family Interviewed:  No      Hospitalization in the last 30 days:  Yes      Health Care Decision Maker :   Primary Decision Maker: Christa Campos - Brother/Sister - [phone]    (CM - must 1st enter selection under Navigator - emergency contact- [place] Relationship and pick relationship)   Who do you trust or have selected to make healthcare decisions for you      Met with: pt  Interview conducted  (bedside/phone):    Current PCP:   FRITZ Westfall NP      Financial  Commercial  Precert required for SNF : Y, N          3 night stay required - Y, N    ADLS  Support Systems/Care Needs: Friends/Neighbors  Transportation: none    Meal Preparation: self    Housing  Living Arrangements: apartment alone  Steps: none  Intent for return to present living arrangements: Yes  Identified Issues: no    Home Care Information  Active with [address] : Yes - [address] Agency:(Services)  Type of Home Care Services: None  Passport/Waiver : No  :                      Phone Number:    Passport/Waiver Services: no          Durable Medical Equiptment   DME Provider:   Equipment:   Walker_x__Cane_x__RTS___ BSC___Shower Chair___Hospital Bed___W/C____Other________  02 at ____Liter(s)---wears(frequency)_______ HHN ___ CPAP___ BiPap___   N/A____      Home O2 Use :  No    If No for home O2---if presently on O2 during hospitalization:  No  if yes CM to follow for potential DC O2 need  Informed of need for care provider to bring portable home O2 tank on day of discharge for nursing to connect prior to leaving:   Not Indicated  Verbalized agreement/Understanding:   Not Indicated    Community Service Affiliation  Dialysis:  No · Agency:  · Location:  · Dialysis Schedule:  · Phone:   · Fax: Other Community Services: (ex:PT/OT,Mental Health,Wound Clinic, Cardio/Pul [address])    DISCHARGE PLAN: Explained Case Management role/services. Reviewed chart. Met with pt. Pt is high functioning MR. Pt from apartment alone and plan return with [address] for [place]. Follow.

## 2020-11-05 NOTE — ED NOTES
2347- perfect serve to Dr. Gabrielle Mon for admission consult per Mina Gomez CNP. Rashid Vivas  20 2347    0018- Dr. Gabrielle Mon put in admission orders at this time, consult complete.      Rashid Vivas  20 0021

## 2020-11-05 NOTE — ED NOTES
Pt refusing the reglan and Synetta Ebbs saying \"it wont work so there is no reason to take it\". Omar Thomas NP  aware.       Rangel Sharma RN  11/04/20 [address] [place] [address] DILLAN Brooks  11/04/20 1981

## 2020-11-05 NOTE — PROGRESS NOTES
I have recommended that this patient have a flu shot but she declines at this time. I have discussed the risks and benefits of this vaccination with her. The patient verbalizes understanding.

## 2020-11-05 NOTE — OP NOTE
Esophagogastroduodenoscopy Note    Patient:   Zeke Leo    YOB: 1960    Facility:     [address] ENDOSCOPY  [address] [address]   [Outpatient]   Referring/PCP: FRITZ Funez NP    Procedure:   Esophagogastroduodenoscopy with biopsy  Date:     11/5/2020   Endoscopist:  Christofer Fried     Preoperative Diagnosis:   Nausea, vomiting      Postoperative Diagnosis:  Erosive gastritis, erosive duodenitis    Anesthesia:  MAC    Estimated blood loss: Minimal    Complications: None    Description of Procedure:  Informed consent was obtained from the patient after explanation of the procedure including indications, description of the procedure,  benefits and possible risks and complications of the procedure, and alternatives. Questions were answered. The patient's history was reviewed and a directed physical examination was performed prior to the procedure. Patient was monitored throughout the procedure with pulse oximetry and periodic assessment of vital signs. Patient was sedated as noted above. The Nursing staff and I performed a time out. With the patient in the left lateral decubitus position, the Olympus videoendoscope was placed in the patient's mouth and under direct visualization passed into the esophagus. The scope was ultimately passed to the second portion of the duodenum. Visualization was performed during both introduction and withdrawal of the endoscope and retroflexed view of the proximal stomach was obtained. Findings[de-identified]   Esophagus: normal. The findings do support a diagnosis of Wells's Esophagus. Irregular squamocolumnar junction with 2 cm tongue from GE junction. Biopsies obtained. Stomach: Erosive gastritis.   Biopsies obtained  Duodenum: Superficial ulcerations through the small bowel    Recommendations:   Await pathology results  BID PPI for 8 weeks  Consider carafate 1 gram qid for 4 weeks  Good glucose control  OK with discharge from GI perspective when symptoms controlled.   Can follow up in GI clinic in 4-6 weeks    Sabine Painitng, [address] [address]      [address] [facility]     Phone: [phone]     Fax: [phone]

## 2020-11-05 NOTE — H&P
Gastroenterology Preop Assessment    Patient:   Saúl Friedman   :    1960   Facility:   [facility]  Referring/PCP: FRITZ Reich NP  Date:     2020    Subjective:   Procedure: egd with biopsy    HPI/Reason for procedure:  Erosive gastritis    Past Medical History:   Diagnosis Date    Arthritis     CAD (coronary artery disease)     Cerebral artery occlusion with cerebral infarction [facility] [address]     x 3    CHF (congestive heart failure) ([facility] [address] [facility]     Diabetes mellitus ([facility] [address] [facility]     Hyperlipidemia     Hypertension     Mental retardation     MI (myocardial infarction) ([facility] [address]     Pacemaker      Past Surgical History:   Procedure Laterality Date    BACK SURGERY      x3    PACEMAKER INSERTION      TUBAL LIGATION      TUMOR REMOVAL         Social:   Social History     Tobacco Use    Smoking status: Never Smoker    Smokeless tobacco: Never Used   Substance Use Topics    Alcohol use: No     Family:   Family History   Problem Relation Age of Onset    Heart Disease Father     Cancer Mother     Other Mother        Scheduled Medications:    amiodarone  100 mg Oral Daily    aspirin  81 mg Oral Daily    atorvastatin  40 mg Oral Nightly    digoxin  125 mcg Oral Every Other Day    metoprolol succinate  12.5 mg Oral Daily    QUEtiapine  25 mg Oral Nightly    ranolazine  500 mg Oral BID    metoclopramide  10 mg Intravenous Q6H    sodium chloride flush  10 mL Intravenous 2 times per day    enoxaparin  40 mg Subcutaneous Daily    insulin lispro  0-18 Units Subcutaneous Q4H    [START ON 2020] DULoxetine  60 mg Oral Daily    insulin glargine  25 Units Subcutaneous BID    [START ON 2020] sacubitril-valsartan  0.5 tablet Oral BID    [START ON 2020] spironolactone  25 mg Oral Daily    sucralfate  1 g Oral 4 times per day    pantoprazole  40 mg Intravenous Daily    [START ON 2020] insulin lispro  10 Units Subcutaneous TID AC       Current Medications:    Prior to Admission medications    Medication Sig Start Date End Date Taking? Authorizing Provider   ondansetron (ZOFRAN) 4 MG tablet Take 1 tablet by mouth every 8 hours as needed for Nausea 11/3/20   Tish Bosch PA-C   furosemide (LASIX) 40 MG tablet Take 1 tablet by mouth daily 10/28/20 11/27/20  Zoila Rendon PA-C   insulin aspart (NOVOLOG FLEXPEN) 100 UNIT/ML injection pen Inject 20 Units into the skin 3 times daily (before meals)    Historical Provider, MD   insulin glargine (LANTUS SOLOSTAR) 100 UNIT/ML injection Inject 40 Units into the skin 2 times daily    Historical Provider, MD   metFORMIN (GLUCOPHAGE) 1000 MG tablet Take 1,000 mg by mouth 2 times daily (with meals)    Historical Provider, MD   vitamin D (ERGOCALCIFEROL) 1.25 MG (57409 UT) CAPS capsule Take 50,000 Units by mouth once a week Takes weekly on Sundays    Historical Provider, MD   QUEtiapine (SEROQUEL) 25 MG tablet Take 25 mg by mouth nightly    Historical Provider, MD   amiodarone (PACERONE) 100 MG tablet Take 1 tablet by mouth daily 10/14/20   FRITZ Lo CNP   metoprolol succinate (TOPROL XL) 25 MG extended release tablet Take 0.5 tablets by mouth daily 10/14/20   FRITZ Lo CNP   sacubitril-valsartan (ENTRESTO) 24-26 MG per tablet Take 0.5 tablets by mouth 2 times daily 10/13/20   FRITZ Lo CNP   spironolactone (ALDACTONE) 25 MG tablet Take 1 tablet by mouth daily 10/13/20   FRITZ Lo CNP   digoxin (LANOXIN) 125 MCG tablet Take 1 tablet by mouth every other day 9/25/20   FRITZ Lo CNP   nitroGLYCERIN (NITROSTAT) 0.4 MG SL tablet up to max of 3 total doses.  If no relief after 1 dose, call 911. 8/24/20   Vic Denise MD   ondansetron [facility]) 4 MG tablet Take 1 tablet by mouth every 8 hours as needed for Nausea 8/12/20   Cookie Quiñones DO   aspirin 81 MG EC tablet Take 1 tablet by mouth daily 7/18/20   Deepika Gipson MD   ranolazine (RANEXA) 500 MG extended release tablet Take 1 tablet by mouth 2 times daily 7/18/20   Deepika Gipson MD   DULoxetine (CYMBALTA) 60 MG extended release capsule Take 1 capsule by mouth daily 7/18/20   Deepika Gipson MD   atorvastatin (LIPITOR) 40 MG tablet Take 1 tablet by mouth nightly 7/18/20   Deepika Gipson MD   fenofibrate micronized (LOFIBRA) 200 MG capsule Take 1 capsule by mouth nightly 7/18/20   Deepika Gipson MD   miconazole (MICOTIN) 2 % powder Apply topically 2 times daily. 7/18/20   Deepika Gipson MD   [employer] Lancets Ultra Thin MISC 1 each by Does not apply route 4 times daily 7/18/20   Deepika Gipson MD   blood glucose monitor strips Test three times a day & as needed for symptoms of irregular blood glucose.  5/12/19   Fernando Kelley MD         Current Facility-Administered Medications:     amiodarone (CORDARONE) tablet 100 mg, 100 mg, Oral, Daily, Anastasiia Hopkins MD    aspirin EC tablet 81 mg, 81 mg, Oral, Daily, Anastasiia Hopkins MD    atorvastatin (LIPITOR) tablet 40 mg, 40 mg, Oral, Nightly, Anastasiia Hopkins MD    digoxin (LANOXIN) tablet 125 mcg, 125 mcg, Oral, Every Other Day, Anastasiia Hopkins MD, 125 mcg at 11/05/20 0931    metoprolol succinate (TOPROL XL) extended release tablet 12.5 mg, 12.5 mg, Oral, Daily, Anastasiia Hopkins MD    QUEtiapine (SEROQUEL) tablet 25 mg, 25 mg, Oral, Nightly, Anastasiia Hopkins MD    ranolazine (RANEXA) extended release tablet 500 mg, 500 mg, Oral, BID, Anastasiia Hopkins MD    metoclopramide (REGLAN) injection 10 mg, 10 mg, Intravenous, Q6H, Anastasiia Hopkins MD    sodium chloride flush 0.9 % injection 10 mL, 10 mL, Intravenous, 2 times per day, Anastasiia Hopkins MD    sodium chloride flush 0.9 % injection 10 mL, 10 mL, Intravenous, PRN, Anastasiia Hopkins MD    acetaminophen (TYLENOL) tablet 650 mg, 650 mg, Oral, Q6H PRN **OR** acetaminophen (TYLENOL) suppository 650 mg, 650 mg, Rectal, Q6H PRN, Katrina Brewer, MD [place] Sic[place] polyethylene glycol (GLYCOLAX) packet 17 g, 17 g, Oral, Daily PRN, Nhan Vivas MD    promethazine (PHENERGAN) tablet 12.5 mg, 12.5 mg, Oral, Q6H PRN **OR** ondansetron (ZOFRAN) injection 4 mg, 4 mg, Intravenous, Q6H PRN, Nhan Vivas MD    enoxaparin (LOVENOX) injection 40 mg, 40 mg, Subcutaneous, Daily, hNan Vivas MD    glucose (GLUTOSE) 40 % oral gel 15 g, 15 g, Oral, PRN, Nhan Vivas MD    dextrose 50 % IV solution, 12.5 g, Intravenous, PRN, Nhan Vivas MD    glucagon (rDNA) injection 1 mg, 1 mg, Intramuscular, PRN, Nhan Vivas MD    dextrose 5 % solution, 100 mL/hr, Intravenous, PRN, Nhan Vivas MD    insulin lispro (HUMALOG) injection vial 0-18 Units, 0-18 Units, Subcutaneous, Q4H, Pete Chaudhry MD    0.9 % sodium chloride infusion, , Intravenous, Continuous, Nhan Vivas MD, Last Rate: 50 mL/hr at 11/05/20 0434    [START ON 11/6/2020] DULoxetine (CYMBALTA) extended release capsule 60 mg, 60 mg, Oral, Daily, Bibi Lund MD    insulin glargine (LANTUS) injection vial 25 Units, 25 Units, Subcutaneous, BID, MD Milagro Flores  [START ON 11/6/2020] sacubitril-valsartan (ENTRESTO) 24-26 MG per tablet 0.5 tablet, 0.5 tablet, Oral, BID, MD Milagro Flores  [START ON 11/6/2020] spironolactone (ALDACTONE) tablet 25 mg, 25 mg, Oral, Daily, Bibi Lund MD    sucralfate (CARAFATE) tablet 1 g, 1 g, Oral, 4 times per day, Bibi Lund MD    pantoprazole (PROTONIX) injection 40 mg, 40 mg, Intravenous, Daily, MD Milagro Flores  [START ON 11/6/2020] insulin lispro (HUMALOG) injection vial 10 Units, 10 Units, Subcutaneous, TID AC, Bibi Lund MD    midazolam (VERSED) injection, , , PRN, Elizabeth Asif DO, 2 mg at 11/05/20 1106    fentaNYL (SUBLIMAZE) injection, , , PRN, Elizabeth Asif DO, 25 mcg at 11/05/20 1107      Infusions:    dextrose      sodium chloride 50 mL/hr at 11/05/20 0434     PRN Medications: sodium chloride flush, acetaminophen **OR** acetaminophen, polyethylene glycol, promethazine **OR** ondansetron, glucose, dextrose, glucagon (rDNA), dextrose, midazolam, fentaNYL  Allergies: No Known Allergies      Objective:     Physical Exam:   /78   Pulse 117   Temp 97.4 °F (36.3 °C) (Temporal)   Resp (!) 41   Ht 5' 1\" (1.549 m)   Wt 209 lb 12.8 oz (95.2 kg)   SpO2 97%   BMI 39.64 kg/m²     HEENT: NCAT  Lungs: CTAB  CV: RRR  Abd: soft, ntd  Ext: dpi    Lab and Imaging Review   Labs:  CBC:   Recent Labs     11/03/20  1411 11/04/20 1950   WBC 5.5 7.2   HGB 13.4 13.3   HCT 41.0 40.7   MCV 91.5 90.4    366     BMP:   Recent Labs     11/03/20  1411 11/04/20 1950 11/04/20  2205   * 127* 130*   K 4.4 4.6 4.6   CL 99 94* 97*   CO2 22 20* 19*   PHOS 3.9  --   --    BUN 16 19 17   CREATININE 0.6 0.6 0.6     LIVER PROFILE:   Recent Labs     11/03/20  1411 11/04/20  1950   AST 18 22   ALT 15 17   PROT 7.0 6.5   BILITOT 0.8 0.7   ALKPHOS 141* 144*     PT/INR: No results for input(s): INR in the last 72 hours. Invalid input(s): PT    Pre-Procedure Assessment / Plan:  ASA: Class 3 - A patient with severe systemic disease that limits activity but is not incapacitating  Airway: Mallampati: II (soft palate, uvula, fauces visible)  Level of Sedation Plan:Deep sedation  Post Procedure plan: Return to same level of care      Plan:   1. EGD with biopsy    I assessed the patient and find that the patient is in satisfactory condition to proceed with the planned procedure and sedation plan. I have explained the risk, benefits, and alternatives to the procedure; the patient understands and agrees to proceed.        Nafisa Li  11/5/2020

## 2020-11-05 NOTE — PROGRESS NOTES
Patient admitted to room 310 from ED. Patient oriented to room, call light, bed rails, phone, lights and bathroom. Patient instructed about the schedule of the day including: vital sign frequency, lab draws, possible tests, frequency of MD and staff rounds, daily weights, I &O's and prescribed diet. Engaged bed alarm in place, patient aware of placement and reason. Telemetry box in place, patient aware of placement and reason. Bed locked, in lowest position, side rails up 2/4, call light within reach. Recliner Assessment  Patient is able to demonstrated the ability to move from a reclining position to an upright position within the recliner. 4 Eyes Skin Assessment     The patient is being assess for   Admission    I agree that 2 RN's have performed a thorough Head to Toe Skin Assessment on the patient. ALL assessment sites listed below have been assessed. Areas assessed by both nurses:   [x]   Head, Face, and Ears   [x]   Shoulders, Back, and Chest, Abdomen  [x]   Arms, Elbows, and Hands   [x]   Coccyx, Sacrum, and Ischium  []   Legs, Feet, and Heels        *No skin issues noted, pt declines viewing of legs/feet. **SHARE this note so that the co-signing nurse is able to place an eSignature**    Co-signer eSignature: Electronically signed by Ellen Reyna RN on 11/5/20 at 6:38 AM EST    Does the Patient have Skin Breakdown?   No          Ignacio Prevention initiated:  No   Wound Care Orders initiated:  NA      [facility] nurse consulted for Pressure Injury (Stage 3,4, Unstageable, DTI, NWPT, Complex wounds)and New or Established Ostomies:  NA      Primary Nurse eSignature: Electronically signed by Tru Blakely RN on 11/5/20 at 4:22 AM EST

## 2020-11-05 NOTE — ED NOTES
Called [facility] to report negative COVID. Floor nurse will come down.       Roderick Lobo RN  11/05/20 1184

## 2020-11-06 VITALS
SYSTOLIC BLOOD PRESSURE: 130 MMHG | HEIGHT: 61 IN | BODY MASS INDEX: 39.29 KG/M2 | TEMPERATURE: 98.4 F | OXYGEN SATURATION: 99 % | HEART RATE: 87 BPM | RESPIRATION RATE: 17 BRPM | WEIGHT: 208.1 LBS | DIASTOLIC BLOOD PRESSURE: 75 MMHG

## 2020-11-06 LAB
ANION GAP SERPL CALCULATED.3IONS-SCNC: 11 MMOL/L (ref 3–16)
BASOPHILS ABSOLUTE: 0.1 K/UL (ref 0–0.2)
BASOPHILS RELATIVE PERCENT: 1.2 %
BUN BLDV-MCNC: 16 MG/DL (ref 7–20)
CALCIUM SERPL-MCNC: 8.4 MG/DL (ref 8.3–10.6)
CHLORIDE BLD-SCNC: 104 MMOL/L (ref 99–110)
CO2: 22 MMOL/L (ref 21–32)
CREAT SERPL-MCNC: <0.5 MG/DL (ref 0.6–1.1)
EOSINOPHILS ABSOLUTE: 0.2 K/UL (ref 0–0.6)
EOSINOPHILS RELATIVE PERCENT: 2.4 %
GFR AFRICAN AMERICAN: >60
GFR NON-AFRICAN AMERICAN: >60
GLUCOSE BLD-MCNC: 102 MG/DL (ref 70–99)
GLUCOSE BLD-MCNC: 110 MG/DL (ref 70–99)
GLUCOSE BLD-MCNC: 116 MG/DL (ref 70–99)
GLUCOSE BLD-MCNC: 136 MG/DL (ref 70–99)
GLUCOSE BLD-MCNC: 183 MG/DL (ref 70–99)
HCT VFR BLD CALC: 38.8 % (ref 36–48)
HEMOGLOBIN: 12.8 G/DL (ref 12–16)
LYMPHOCYTES ABSOLUTE: 2.7 K/UL (ref 1–5.1)
LYMPHOCYTES RELATIVE PERCENT: 38.6 %
MAGNESIUM: 1.5 MG/DL (ref 1.8–2.4)
MCH RBC QN AUTO: 29.4 PG (ref 26–34)
MCHC RBC AUTO-ENTMCNC: 32.9 G/DL (ref 31–36)
MCV RBC AUTO: 89.4 FL (ref 80–100)
MONOCYTES ABSOLUTE: 0.7 K/UL (ref 0–1.3)
MONOCYTES RELATIVE PERCENT: 10.1 %
NEUTROPHILS ABSOLUTE: 3.4 K/UL (ref 1.7–7.7)
NEUTROPHILS RELATIVE PERCENT: 47.7 %
PDW BLD-RTO: 16.5 % (ref 12.4–15.4)
PERFORMED ON: ABNORMAL
PLATELET # BLD: 318 K/UL (ref 135–450)
PMV BLD AUTO: 8.1 FL (ref 5–10.5)
POTASSIUM REFLEX MAGNESIUM: 3.3 MMOL/L (ref 3.5–5.1)
RBC # BLD: 4.34 M/UL (ref 4–5.2)
SODIUM BLD-SCNC: 137 MMOL/L (ref 136–145)
WBC # BLD: 7.1 K/UL (ref 4–11)

## 2020-11-06 PROCEDURE — 99238 HOSP IP/OBS DSCHRG MGMT 30/<: CPT | Performed by: INTERNAL MEDICINE

## 2020-11-06 PROCEDURE — 6360000002 HC RX W HCPCS: Performed by: INTERNAL MEDICINE

## 2020-11-06 PROCEDURE — 80048 BASIC METABOLIC PNL TOTAL CA: CPT

## 2020-11-06 PROCEDURE — 36415 COLL VENOUS BLD VENIPUNCTURE: CPT

## 2020-11-06 PROCEDURE — 6370000000 HC RX 637 (ALT 250 FOR IP): Performed by: INTERNAL MEDICINE

## 2020-11-06 PROCEDURE — 85025 COMPLETE CBC W/AUTO DIFF WBC: CPT

## 2020-11-06 PROCEDURE — 2580000003 HC RX 258: Performed by: INTERNAL MEDICINE

## 2020-11-06 PROCEDURE — C9113 INJ PANTOPRAZOLE SODIUM, VIA: HCPCS | Performed by: INTERNAL MEDICINE

## 2020-11-06 PROCEDURE — G0378 HOSPITAL OBSERVATION PER HR: HCPCS

## 2020-11-06 PROCEDURE — 96375 TX/PRO/DX INJ NEW DRUG ADDON: CPT

## 2020-11-06 PROCEDURE — 96376 TX/PRO/DX INJ SAME DRUG ADON: CPT

## 2020-11-06 PROCEDURE — 96372 THER/PROPH/DIAG INJ SC/IM: CPT

## 2020-11-06 PROCEDURE — 83735 ASSAY OF MAGNESIUM: CPT

## 2020-11-06 RX ORDER — PANTOPRAZOLE SODIUM 40 MG/1
40 TABLET, DELAYED RELEASE ORAL DAILY
Qty: 90 TABLET | Refills: 0 | Status: SHIPPED | OUTPATIENT
Start: 2020-11-06

## 2020-11-06 RX ORDER — SUCRALFATE 1 G/1
1 TABLET ORAL 3 TIMES DAILY
Qty: 90 TABLET | Refills: 0 | Status: SHIPPED | OUTPATIENT
Start: 2020-11-06 | End: 2021-03-23 | Stop reason: CLARIF

## 2020-11-06 RX ORDER — METOCLOPRAMIDE 10 MG/1
10 TABLET ORAL
Qty: 30 TABLET | Refills: 0 | Status: SHIPPED | OUTPATIENT
Start: 2020-11-06 | End: 2021-03-23 | Stop reason: CLARIF

## 2020-11-06 RX ORDER — ONDANSETRON 4 MG/1
4 TABLET, FILM COATED ORAL EVERY 8 HOURS PRN
Qty: 20 TABLET | Refills: 0 | Status: ON HOLD | OUTPATIENT
Start: 2020-11-06 | End: 2020-11-13 | Stop reason: HOSPADM

## 2020-11-06 RX ADMIN — SPIRONOLACTONE 25 MG: 25 TABLET ORAL at 10:28

## 2020-11-06 RX ADMIN — METOCLOPRAMIDE 10 MG: 5 INJECTION, SOLUTION INTRAMUSCULAR; INTRAVENOUS at 04:35

## 2020-11-06 RX ADMIN — SUCRALFATE 1 G: 1 TABLET ORAL at 06:02

## 2020-11-06 RX ADMIN — SACUBITRIL AND VALSARTAN 0.5 TABLET: 24; 26 TABLET, FILM COATED ORAL at 10:28

## 2020-11-06 RX ADMIN — Medication 10 ML: at 10:29

## 2020-11-06 RX ADMIN — ONDANSETRON HYDROCHLORIDE 4 MG: 2 INJECTION, SOLUTION INTRAMUSCULAR; INTRAVENOUS at 00:49

## 2020-11-06 RX ADMIN — SODIUM CHLORIDE: 9 INJECTION, SOLUTION INTRAVENOUS at 06:53

## 2020-11-06 RX ADMIN — SUCRALFATE 1 G: 1 TABLET ORAL at 00:01

## 2020-11-06 RX ADMIN — DULOXETINE HYDROCHLORIDE 60 MG: 60 CAPSULE, DELAYED RELEASE ORAL at 10:27

## 2020-11-06 RX ADMIN — ENOXAPARIN SODIUM 40 MG: 40 INJECTION SUBCUTANEOUS at 10:28

## 2020-11-06 RX ADMIN — RANOLAZINE 500 MG: 500 TABLET, FILM COATED, EXTENDED RELEASE ORAL at 10:34

## 2020-11-06 RX ADMIN — PANTOPRAZOLE SODIUM 40 MG: 40 INJECTION, POWDER, FOR SOLUTION INTRAVENOUS at 10:29

## 2020-11-06 RX ADMIN — METOPROLOL SUCCINATE 12.5 MG: 25 TABLET, EXTENDED RELEASE ORAL at 10:28

## 2020-11-06 RX ADMIN — ASPIRIN 81 MG: 81 TABLET, COATED ORAL at 10:28

## 2020-11-06 RX ADMIN — AMIODARONE HYDROCHLORIDE 100 MG: 200 TABLET ORAL at 10:27

## 2020-11-06 RX ADMIN — MICONAZOLE NITRATE: 20 POWDER TOPICAL at 10:34

## 2020-11-06 NOTE — CARE COORDINATION
DISCHARGE ORDER  Date/Time 2020 11:58 AM  Completed by: Erica Huang, Case Management    Patient Name: Sakshi Bradshaw      : 1960  Admitting Diagnosis: Intractable nausea and vomiting [R11.2]      Admit order Date and Status:2020 inpt  (verify MD's last order for status of admission)      Noted discharge order. If applicable PT/OT recommendation at Discharge: NA  DME recommendation by PT/OT:NA  Confirmed discharge plan: Yes  with whom__pt_____________  If pt confirmed DC plan does family need to be contacted by CM No if yes who______  Discharge Plan: Chart reviewed, met with pt at bedside. Pt is returning home with [facility]. TC to [facility] [address]  and spoke with Mervin Thomas, he states he does not need new orders as pt was here under observation status. AVS faxed to [facility] to [phone]. No further needs voiced or identified. Reviewed chart. Role of discharge planner explained and patient verbalized understanding. Discharge order is noted. Has Home O2 in place on admit:  No  Pt is being d/c'd to home today. Pt's O2 sats are 99% on RA. Discharge timeout done with [employer]. All discharge needs and concerns addressed.

## 2020-11-06 NOTE — DISCHARGE SUMMARY
Name:  Adam Ni  Room:  /7010-48  MRN:    [id number]    Discharge Summary      This discharge summary is in conjunction with a complete physical exam done on the day of discharge. Attending Physician: Dr. Jhonatan Benavides  Discharging Physician: Dr. Luz Rivera: 11/4/2020  Discharge:   11/6/2020    HPI:    The patient is a 62 y.o. female with hypertension, hyperlipidemia, DM type 2, CHF, CAD, h/o CVA who presents to [facility] with c/o hyperglycemia, nausea, and vomiting. Ongoing for 6 months. Reports taking her medications. She has been vomiting the last 2 days. She was seen in the ED for the same 2 days ago. Symptoms are not improved. She was last admitted to [facility] 10/26-10/28 for the same. She was tachycardic. Labs with hyponatremia, lactic acidosis. Glucose 308. Rapid COVID negative. CXR with mild pulmonary vascular congestion, unchanged cardiomegaly. Patient admitted for further management/care. GI consulted. Diagnoses this Admission and Hospital Course     #Nausea, vomiting  - Benign abdominal exam  - likely diabetic gastroparesis   - added PPI, Reglan, Carafate.  - GI Consulted. - Antiemetics as needed. S/p EGD with erosive gastritis and duodenitis. D/c home no PPI, Carafate, Reglan. F/u OP with GI.       #DM2   - uncontrolled. 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  Trended Lactic     #History of TIA  - cont'd ASA and statin      DVT Prophylaxis: Lovenox     Procedures (Please Review Full Report for Details)  EGD: Erosive gastritis, erosive duodenitis    Consults    GI  Cardiology      Physical Exam at Discharge:    /75   Pulse 87   Temp 98.4 °F (36.9 °C) (Oral)   Resp 17   Ht 5' 1\" (1.549 m)   Wt 208 lb 1.6 oz (94.4 kg)   SpO2 99%   BMI 39.32 kg/m²     Gen:  Middle aged female sitting up in bed, No distress. Alert. Eyes: PERRL. No sclera icterus. No conjunctival injection. ENT: No discharge. Pharynx clear. Neck: No JVD. No Carotid Bruit. Trachea midline. Resp: No accessory muscle use. No crackles. No wheezes. No rhonchi. CV: Regular rate. Regular rhythm. No murmur. No rub. No edema. GI: Non-tender. Non-distended. No masses. No organomegaly. Normal bowel sounds. No hernia. Skin: Warm and dry. No nodule on exposed extremities. No rash on exposed extremities. M/S: No cyanosis. No joint deformity. No clubbing. Neuro: Awake. Grossly nonfocal    Psych: Oriented x 3. No anxiety or agitation.        CBC:   Recent Labs     11/03/20 1411 11/04/20 1950 11/06/20 0427   WBC 5.5 7.2 7.1   HGB 13.4 13.3 12.8   HCT 41.0 40.7 38.8   MCV 91.5 90.4 89.4    366 318     BMP:   Recent Labs     11/03/20 1411 11/04/20 1950 11/04/20 2205 11/06/20 0428   * 127* 130* 137   K 4.4 4.6 4.6 3.3*   CL 99 94* 97* 104   CO2 22 20* 19* 22   PHOS 3.9  --   --   --    BUN 16 19 17 16   CREATININE 0.6 0.6 0.6 <0.5*     LIVER PROFILE:   Recent Labs     11/03/20 1411 11/04/20 1950   AST 18 22   ALT 15 17   BILITOT 0.8 0.7   ALKPHOS 141* 144*     UA:  Recent Labs     11/04/20 2201   COLORU Straw   PHUR 5.0   CLARITYU Clear   SPECGRAV 1.010   LEUKOCYTESUR Negative   UROBILINOGEN 0.2   BILIRUBINUR Negative   BLOODU Negative   GLUCOSEU >=1000*         CULTURES  N/A    RADIOLOGY  XR CHEST (2 VW)   Final Result   Mild pulmonary vascular congestion. Unchanged cardiomegaly.            Pertinent previous results reviewed      Echo 10/6/2020  Limited only f/u for LVEF.   The left ventricular systolic function is severely reduced with an ejection   fraction of 15-20 %.   There is severe global known as:  LOFIBRA  Take 1 capsule by mouth nightly     furosemide 40 MG tablet  Commonly known as:  Lasix  Take 1 tablet by mouth daily     insulin glargine 100 UNIT/ML injection  Commonly known as:  LANTUS SOLOSTAR     metFORMIN 1000 MG tablet  Commonly known as:  GLUCOPHAGE     metoprolol succinate 25 MG extended release tablet  Commonly known as:  TOPROL XL  Take 0.5 tablets by mouth daily     miconazole 2 % powder  Commonly known as:  MICOTIN  Apply topically 2 times daily. nitroGLYCERIN 0.4 MG SL tablet  Commonly known as:  NITROSTAT  up to max of 3 total doses. If no relief after 1 dose, call 911. NovoLOG FlexPen 100 UNIT/ML injection pen  Generic drug:  insulin aspart     QUEtiapine 25 MG tablet  Commonly known as:  SEROQUEL     ranolazine 500 MG extended release tablet  Commonly known as:  RANEXA  Take 1 tablet by mouth 2 times daily     sacubitril-valsartan 24-26 MG per tablet  Commonly known as:  ENTRESTO  Take 0.5 tablets by mouth 2 times daily     spironolactone 25 MG tablet  Commonly known as:  ALDACTONE  Take 1 tablet by mouth daily     vitamin D 1.25 MG (53394 UT) Caps capsule  Commonly known as:  ERGOCALCIFEROL           Where to Get Your Medications      These medications were sent to [facility] [phone] - F [phone]  [address] [place] [address]    Phone:  [phone]   · metoclopramide 10 MG tablet  · ondansetron 4 MG tablet  · pantoprazole 40 MG tablet  · sucralfate 1 GM tablet           Discharged in stable condition to home. Follow Up: Follow up with PCP.     Estelle Hamlin MD 12/2/2020 7:32 PM

## 2020-11-06 NOTE — PROGRESS NOTES
Patient educated on discharge instructions as well as new medications use, dosage, administration and possible side effects. Patient verified knowledge. IV removed without difficulty and dry dressing in place. Telemetry monitor removed and returned to [address] [place] [address]. Pt left facility in stable condition to home with all of their personal belongings.      Rashad Rolle RN

## 2020-11-06 NOTE — PROGRESS NOTES
Patient is refusing to eat or drink. Patient has also made statements that she wishes she were dead. Dr. Marni Mancini was perfect served with these concerns. [place] RE: Court Iglesias 1960 RM: 310 Patient has been refusing to eat and drink. She says she isn't eating anything here. She has stated she just wants to die. Do you suggest a psych consult?  Need Callback: NO CALLBACK REQ    His response was \"No\"

## 2020-11-06 NOTE — PROGRESS NOTES
Saw pt for staff concerns over statements of wanting to harm herself. Pt tearful, holding stuffed animal in her arms and offered insight as to her emotions over her current illness and how her family has not been sympathetic to her illness. Pt adamantly denies any suicidal thoughts and states \"I would never do that. \" Supportive listening provided. Pt updated that telesitter is being placed for additional staff interaction for her safety - states understanding and again states \"I would never hurt myself. \" Pt agrees with POC and willing to stay tonight, earlier expressed desire to leave AMA. Primary nurse present for this conversation and nocturinist updated as well.  Juvencio Kim Clinical

## 2020-11-06 NOTE — PROGRESS NOTES
Progress Note  Date:2020       Room:/0310-02  Patient Name:Estela Eisenberg     Date of Birth:[birth year]     Age:60 y.o. Subjective    Subjective:  Symptoms:  Stable. Pain:  She reports no pain. Review of Systems  Objective         Vitals Last 24 Hours:  TEMPERATURE:  Temp  Av.6 °F (36.4 °C)  Min: 96.9 °F (36.1 °C)  Max: 98.8 °F (37.1 °C)  RESPIRATIONS RANGE: Resp  Av.7  Min: 14  Max: 42  PULSE OXIMETRY RANGE: SpO2  Av.3 %  Min: 87 %  Max: 100 %  PULSE RANGE: Pulse  Av.4  Min: 65  Max: 139  BLOOD PRESSURE RANGE: Systolic (97YRE), MQN:732 , Min:107 , BHW:099   ; Diastolic (90MRZ), FUD:22, Min:78, Max:99    I/O (24Hr): Intake/Output Summary (Last 24 hours) at 2020 1024  Last data filed at 2020 0853  Gross per 24 hour   Intake 1072 ml   Output 765 ml   Net 307 ml     Objective:  General Appearance: In no acute distress and not in pain. Vital signs: (most recent): Blood pressure (!) 129/94, pulse 65, temperature 96.9 °F (36.1 °C), temperature source Oral, resp. rate 18, height 5' 1\" (1.549 m), weight 208 lb 1.6 oz (94.4 kg), SpO2 98 %, not currently breastfeeding. Abdomen: Abdomen is soft. Bowel sounds are normal.   There is no abdominal tenderness. Labs/Imaging/Diagnostics    Labs:  CBC:  Recent Labs     20  1411 20  1950 20  0427   WBC 5.5 7.2 7.1   RBC 4.48 4.51 4.34   HGB 13.4 13.3 12.8   HCT 41.0 40.7 38.8   MCV 91.5 90.4 89.4   RDW 16.2* 16.6* 16.5*    366 318     CHEMISTRIES:  Recent Labs     20  1411  20  1950 20  2205 20  22120  0428   *  --  127* 130*  --  137   K 4.4  --  4.6 4.6  --  3.3*   CL 99  --  94* 97*  --  104   CO2 22  --  20* 19*  --  22   BUN 16  --  19 17  --  16   CREATININE 0.6  --  0.6 0.6  --  <0.5*   GLUCOSE 604*   < > 586* 360* 308 136*   PHOS 3.9  --   --   --   --   --    MG 1.90  --   --   --   --  1.50*    < > = values in this interval not displayed.

## 2020-11-06 NOTE — PROGRESS NOTES
Patient is requesting to leave AMA. Patient states she is tired of being here. Marcianne Favre spoke with patient, Patient seemed depressed and also made similar statements to her. I contacted the nocturnist, Dr. Mayra Dumont, and relayed our conversations. He requested we \"pink slip\" her or put her on a psych hold. Clinical was contacted at that time to come speak with patient.

## 2020-11-06 NOTE — PROGRESS NOTES
Spoke with pt since dayshift nurse reported pt made statements about wanting to be dead. Pt stated \" I didn't mean that I was just mad. I'm not stupid I would never hurt myself. I am just frustrated. \" I relayed this to . No need for psych hold. Let clinical know as well.  Electronically signed by Zainab Tapia RN on 11/5/2020 at 8:18 PM

## 2020-11-06 NOTE — PROGRESS NOTES
Pt assessment complete; see flow sheets. Pt currently laying in bed with call light within reach. Goal to be D/C today. Pt denies any other care needs at this time.   Tyler Newell, RN

## 2020-11-06 NOTE — PROGRESS NOTES
Report received from [address]. RN states \"pt said It's better to be dead than go through all this suffering\". Day shift RN called MD and placed the pt on 72 hr hold. Pt refuses any suicidal thoughts now. Tele sitter in place. Pt is upset and emotional over her illness and lack of family support. Pt states \"I would not kill my self\". Supportive listening provided. Will monitor.

## 2020-11-06 NOTE — DISCHARGE INSTR - COC
residual L-sided facial droop (April 2018) I63.50    Dual ICD (implantable cardioverter-defibrillator) in place Z80.65    Brain tumor (benign) ([facility] D33.2    Chronic combined systolic and diastolic congestive heart failure ([facility]) I50.42    TIA involving right internal carotid artery G45.1    CAD in native artery I25.10    DM (diabetes mellitus), secondary, uncontrolled, w/neurologic complic ([facility]) B47.84, R76.57    CHF (congestive heart failure) ([facility]) I50.9    Cardiomyopathy ([facility]) I42.9    Essential hypertension I10    TIA (transient ischemic attack) G45.9    Elevated blood sugar R73.9    Diabetic ketoacidosis without coma associated with type 2 diabetes mellitus ([facility]) E11.10    Non-intractable vomiting with nausea R11.2    Chest pain R07.9    Arterial ischemic stroke, ICA, right, acute ([facility]) I63.231    Diabetic hyperosmolar non-ketotic state [facility] E11.00    Diabetic acidosis without coma ([facility]) E11.10    Hyponatremia J66.2    Metabolic acidosis R59.2    Disorder of electrolytes E87.8    Diabetic ketoacidosis with coma associated with type 2 diabetes mellitus ([facility]) E11.11    Hypernatremia E87.0    Leukocytosis D72.829    Atrial tachycardia ([facility]) I47.1    DKA, type 2, not at goal ([facility] E11.10    Cognitive developmental delay F81.9    Mood disorder ([facility]) F39    Urinary tract infection with hematuria N39.0, R31.9    Hypokalemia E87.6    Persistent fever R50.9    Syncope and collapse R55    S/P ICD (internal cardiac defibrillator) procedure Z95.810    History of CVA (cerebrovascular accident) Z80.78    Noncompliance with medications Z91.14    Obesity E66.9    SVT (supraventricular tachycardia) ([facility]) I47.1    Type 2 diabetes mellitus with hyperglycemia, with long-term current use of insulin ([facility]) E11.65, Z79.4    Acute pulmonary edema ([facility]) J81.0    Mixed hyperlipidemia E78.2    Lactic acidosis E87.2    NSVT (nonsustained ventricular tachycardia) ([facility]) I47.2    Intractable nausea and vomiting R11.2    Tachycardia R00.0    Uncontrolled type 2 diabetes mellitus with hyperglycemia ([facility]) E11.65       Isolation/Infection:   Isolation          No Isolation        Patient Infection Status     Infection Onset Added Last Indicated Last Indicated By Review Planned Expiration Resolved Resolved By    None active    Resolved    COVID-19 Rule Out 20 COVID-19 (Ordered)   20 Rule-Out Test Resulted    COVID-19 Rule Out 20 COVID-19 (Ordered)   20 Reginaldo Ken RN    Ordered COVID swab and discontinued previous ED visit.      COVID-19 Rule Out 10/26/20 10/26/20 10/26/20 COVID-19 (Ordered)   10/26/20 Rule-Out Test Resulted    C-diff Rule Out 09/10/20 09/10/20 09/10/20 Clostridium difficile toxin/antigen (Ordered)   20 Jett Ewing RN    COVID-19 Rule Out 20 COVID-19 (Ordered)   20 Rule-Out Test Resulted          Nurse Assessment:  Last Vital Signs: /75   Pulse 87   Temp 98.4 °F (36.9 °C) (Oral)   Resp 17   Ht 5' 1\" (1.549 m)   Wt 208 lb 1.6 oz (94.4 kg)   SpO2 99%   BMI 39.32 kg/m²     Last documented pain score (0-10 scale): Pain Level: 10  Last Weight:   Wt Readings from Last 1 Encounters:   20 208 lb 1.6 oz (94.4 kg)     Mental Status:  {IP PT MENTAL STATUS:67342}    IV Access:  { ADEN IV ACCESS:847078746}    Nursing Mobility/ADLs:  Walking   {[facility] DME IRLT:031637074}  Transfer  {[facility] DME FUXJ:571167051}  Bathing  {[facility] DME OZBY:253221386}  Dressing  {[facility] DME GXKV:270648948}  Toileting  {[facility] DME FSGA:370735337}  Feeding  {[facility] DME HBKY:990495832}  Med Admin  {[facility] DME VIII:052363031}  Med Delivery   { ADEN MED Delivery:192652509}    Wound Care Documentation and Therapy:        Elimination:  Continence:   · Bowel: {YES / AA:45530}  · Bladder: {YES / Z}  Urinary Catheter: {Urinary Catheter:255423799}   Colostomy/Ileostomy/Ileal Conduit: {YES / UX:97709}       Date of Last BM: ***    Intake/Output Summary (Last 24 hours) at 2020 1209  Last data filed at 2020 0853  Gross per 24 hour   Intake 1072 ml   Output 765 ml   Net 307 ml     I/O last 3 completed shifts:   In: 1072 [P.O.:340; I.V.:732]  Out: 1065 [Urine:1050; Emesis/NG output:15]    Safety Concerns:     [address] [employer] Safety Concerns:703608663}    Impairments/Disabilities:      [address] [employer] Impairments/Disabilities:814818925}    Nutrition Therapy:  Current Nutrition Therapy:   [address] [employer] Diet List:480178975}    Routes of Feeding: {[facility] DME Other Feedings:007263547}  Liquids: {Slp liquid thickness:76430}  Daily Fluid Restriction: {[facility] DME Yes amt example:805249067}  Last Modified Barium Swallow with Video (Video Swallowing Test): {Done Not Done ZZNT:230299197}    Treatments at the Time of Hospital Discharge:   Respiratory Treatments: ***  Oxygen Therapy:  {Therapy; copd oxygen:10187}  Ventilator:    { CC Vent CECC:518748084}    Rehab Therapies: {THERAPEUTIC INTERVENTION:1107027160}  Weight Bearing Status/Restrictions: [facility] Weight Bearin}  Other Medical Equipment (for information only, NOT a DME order):  {EQUIPMENT:994104846}  Other Treatments: ***    Patient's personal belongings (please select all that are sent with patient):  {[facility] DME Belongings:735703295}    RN SIGNATURE:  {Esignature:327726971}    CASE MANAGEMENT/SOCIAL WORK SECTION    Inpatient Status Date: 2020    Readmission Risk Assessment Score:  Readmission Risk              Risk of Unplanned Readmission:        0           Discharging to Facility/ Agency   · Name: [facility]  · Address:  · Phone:[phone]  · Fax:[phone]    Dialysis Facility (if applicable)   · Name:  · Address:  · Dialysis Schedule:  · Phone:  · Fax:    / signature: Electronically signed by Rena Nichole RN on 20 at 12:09 PM EST    PHYSICIAN SECTION    Prognosis: Good    Condition at Discharge: Stable    Rehab Potential (if transferring to Rehab): Recommended Labs or Other Treatments After Discharge:     Physician Certification: I certify the above information and transfer of Rose Hanson  is necessary for the continuing treatment of the diagnosis listed and that she requires Home Care for less 30 days.      Update Admission H&P: No change in H&P    PHYSICIAN SIGNATURE:  CAITLIN Webb MD/Electronically signed by Bonni Sicard, RN on 11/6/20 at 12:10 PM EST

## 2020-11-06 NOTE — PLAN OF CARE
Problem: Pain:  Goal: Control of acute pain  Description: Control of acute pain  Outcome: Ongoing   Pt has Tylenol RN for pain as needed. Call light in reach. Problem: Falls - Risk of:  Goal: Absence of physical injury  Description: Absence of physical injury  Outcome: Ongoing     Fall precautions in place, bed alarm on, nonskid foot wear applied, bed in lowest position, and call light within reach. Will continue to monitor.

## 2020-11-09 ENCOUNTER — TELEPHONE (OUTPATIENT)
Dept: CARDIOLOGY CLINIC | Age: 60
End: 2020-11-09

## 2020-11-09 NOTE — PROGRESS NOTES
11/10/2020    Primary Care Physician: FRITZ Westfall NP     TELEHEALTH EVALUATION -- Audio (During [place] public health emergency)    Maggi Mensah is a 63 y.o. female who presents for evaluation for s/p defibrillator x5 years. She has a PMH of CAD, cerebral artery occlusion with cerebral infarct, DM, Hyperlipidemia, Hypertension, Mental retardation, NSVT, and MI. She underwent LHC in 2018. This showed an EF of 45%. She presented to ED on 1/15/2020 with chest pain that was worse with palpitations from her defibrillator firing. At her office visit on 20 she stated she is here for her pacemaker firing. She stated that she can't feel her legs and arms. She uses a cane to walk with. This has been going on for 3 months. She has had 3 strokes since 2019. She stated that she has not followed up with neurologist.  She stated that her sugars have been high. She did not want to see an endocrinologist.  She states that she had 7 heart attacks and she thinks the last one was a year ago. She stated that she passes out a lot and the last time was a couple of days ago and she hit her head. Her device check 20 showed she has had atrial tachycardia. Interval history since last office visit: Today 11/10/20 she reports she is not feeling well from a cardiac standpoint. She reports she is extremely SOB and just feels terrible. She reports she is taking her medications as prescribed and tolerates them well. Flora Mcclendon (:  1960) has requested an audio/video evaluation for the following concern(s):    Chief Complaint   Patient presents with    Shortness of Breath    Tachycardia     atrial tachycardia     6 Month Follow-Up        Review of Systems    Prior to Visit Medications    Medication Sig Taking?  Authorizing Provider   ondansetron (ZOFRAN) 4 MG tablet Take 1 tablet by mouth every 8 hours as needed for Nausea Yes Ismael Cobos MD Jose D Bartholomew MD   DULoxetine (CYMBALTA) 60 MG extended release capsule Take 1 capsule by mouth daily Yes Shannan Zhu MD   atorvastatin (LIPITOR) 40 MG tablet Take 1 tablet by mouth nightly Yes Shnanan Zhu MD   fenofibrate micronized (LOFIBRA) 200 MG capsule Take 1 capsule by mouth nightly Yes Shannan Zhu MD   miconazole (MICOTIN) 2 % powder Apply topically 2 times daily. Yes Shannan Zhu MD   [employer] Ultra Thin MISC 1 each by Does not apply route 4 times daily Yes Shannan Zhu MD   blood glucose monitor strips Test three times a day & as needed for symptoms of irregular blood glucose.  Yes Howard Del Castillo MD       Social History     Tobacco Use    Smoking status: Never Smoker    Smokeless tobacco: Never Used   Substance Use Topics    Alcohol use: No    Drug use: No        No Known Allergies,   Past Medical History:   Diagnosis Date    Arthritis     CAD (coronary artery disease)     Cerebral artery occlusion with cerebral infarction ([facility]     x 3    CHF (congestive heart failure) (HCC)     Diabetes mellitus ([facility]     Hyperlipidemia     Hypertension     Mental retardation     MI (myocardial infarction) ([facility]     Pacemaker    ,   Past Surgical History:   Procedure Laterality Date    BACK SURGERY      x3    PACEMAKER INSERTION      TUBAL LIGATION      TUMOR REMOVAL      UPPER GASTROINTESTINAL ENDOSCOPY N/A 11/5/2020    EGD BIOPSY performed by Pascual Fleming DO at [facility]   ,   Social History     Tobacco Use    Smoking status: Never Smoker    Smokeless tobacco: Never Used   Substance Use Topics    Alcohol use: No    Drug use: No   ,   Family History   Problem Relation Age of Onset    Heart Disease Father     Cancer Mother     Other Mother    ,   Immunization History   Administered Date(s) Administered    Influenza 11/05/2012    Influenza A (Q8D2-12) Vaccine IM 12/22/2009    Influenza Virus Vaccine 10/17/2014, 10/27/2017    Influenza, Doralee Age, 6 mo and older, IM, PF (Flulaval, Fluarix) 12/10/2018    Influenza, Quadv, IM, PF (6 mo and older Fluzone, Flulaval, Fluarix, and 3 yrs and older Afluria) 10/12/2019    Pneumococcal Polysaccharide ([id number]) 10/17/2014, 07/13/2017   ,   Health Maintenance   Topic Date Due    Hepatitis C screen  1960    Diabetic foot exam  09/08/1971    Diabetic retinal exam  09/08/1971    HIV screen  09/08/1976    Diabetic microalbuminuria test  09/08/1979    Hepatitis B vaccine (1 of 3 - Risk 3-dose series) 09/08/1980    DTaP/Tdap/Td vaccine (1 - Tdap) 09/08/1980    Cervical cancer screen  09/08/1982    Breast cancer screen  09/08/2011    Shingles Vaccine (1 of 2) 09/08/2011    Colon cancer screen colonoscopy  09/08/2011    Annual Wellness Visit (AWV)  06/23/2019    Flu vaccine (1) 09/01/2020    Lipid screen  10/12/2020    A1C test (Diabetic or Prediabetic)  02/04/2021    TSH testing  09/19/2021    Potassium monitoring  11/06/2021    Creatinine monitoring  11/06/2021    Pneumococcal 0-64 years Vaccine  Completed    Hepatitis A vaccine  Aged Out    Hib vaccine  Aged Out    Meningococcal (ACWY) vaccine  Aged Out           DATA:  ECHO: (limited) 10/6/20:     Summary   Limited only f/u for LVEF. The left ventricular systolic function is severely reduced with an ejection   fraction of 15-20 %. There is severe global hypokinesis with regional variations. Changes noted from previous echo on 6- in left ventricular function. STRESS TEST: 10/5/20: Summary   Dilated LV with severe global hypokinesis. Large, severe, fixed perfusion  defect of the inferior/inferolateral wall consistent with scar. Diminished  uptake of the anterior wall consistent with breast artifact. Post stress    LVEF is abnormal at 19%. Abnormal study. Overall findings represent a high  risk scan.      LEFT HEART CATH: 10/6/20: CONCLUSIONS:     1. Mild non-obstructive coronary artery disease with known severe left ventricular dysfunction  2. Moderate pulmonary hypertension with decompensated hemodynamics     ASSESSMENT/RECOMMENDATIONS:     1. Risk Factor control  2. Advanced heart failure management. IMPRESSION:  1. Cardiomyopathy  Mrs. Zoe Us is a pleasant 63year old female with a medical history significant for mental retardation, ?ischemic cardiomypathy?, cerebral vascular disease, diabetes mellitus, hypertension, hyperlipidemia, NSVT, and SVT who presents from home for evaluation of device discharge (inappropriate). Etiology of cardiomyopathy. She reports having chronic chest pressure. Nuclear stress test from 2018 showed depressed ejection fraction without signs of ischemic pathology. I will ask that she get a stress test.  If negative will ask that cardiology evaluate her for continued chest pain.    - Nuclear stress test.              ~[employer] [facility] defibrillator placed in [address]              ~Echo 10/7/2009 EF 35-40%  11/10/2020  Patient continues to be symptomatic. Etiology unclear. We will refer to pulmonology for evaluation for possible lung disease causing her chronic shortness of breath. - Consult pulmonology for shortness of breath. 2.  DM  Patient refuses referral to endocrinology and would like to continue to manage her diabetes. ~Hemoglobin A1C 14.3 on 10/11/2019   11/10/2020  - Plan as per above. 3.  Atrial tachycardia  Patient had an inappropriate defibrillation for ventricular fibrillation as she reached VF zone. Evaluation of device showed a VAAV response and A drive via wobble. I suspect AT is result of tachycardia based on above. No true VT as far as I can tell for now. She is interested in ablation. No atrial fibrillation seen on device. I will ask that she get evaluation by neurology because of symptoms and history. I will ask that she undergo evaluation for ischemic pathology. We will follow up AFTER above evaluation. .  We will consider AT ablation to help decrease defibrillation. We will also need to see if she has a POA prior to ablation. ~on device check today     11/10/2020  Device and monitor suggests increasing atrial tachycardia however duration short and unlikely to be a good candidate for ablation.  - Increase amiodarone.  - Routine labs for amiodarone monitoring and chest radiograph. -     4. Chest tightness              ~ Plan as per above. 11/10/2020  - Plan as per above. 5.  Extremity numbness H/O 3 strokes              ~referral to neuro given  - Follow up with neurology. PLAN:  1. Referral placed to Pulmonology for SOB  2. Increase amiodarone to 200 mg QD for SVT  3. LABS - TSH, CMP, LIVER in Spring 2021 for amiodarone  4. Follow up with EP NP in 3 months     This note was scribed in the presence of Zakia Sandy MD by Joanne Villeda. Mary Davis RN. William Sloan is a 62 y.o. female being evaluated by a Virtual Visit (video visit) encounter to address concerns as mentioned above. A caregiver was present when appropriate. Due to this being a TeleHealth encounter (During [place] [address] public health emergency), evaluation of the following organ systems was limited: Vitals/Constitutional/EENT/Resp/CV/GI//MS/Neuro/Skin/Heme-Lymph-Imm. Pursuant to the emergency declaration under the [facility], [address] [place] [address] authority and the [employer] Act, this Virtual Visit was conducted with patient's (and/or legal guardian's) consent, to reduce the patient's risk of exposure to COVID-19 and provide necessary medical care. The patient (and/or legal guardian) has also been advised to contact this office for worsening conditions or problems, and seek emergency medical treatment and/or call 911 if deemed necessary.      Patient identification was verified at the start of the visit: YES    Total time spent on this encounter: approximately 25 minutes     Services were provided through a video synchronous discussion virtually to substitute for in-person clinic visit. Patient and provider were located at their individual homes. The scribe's documentation has been prepared under my direction and personally reviewed by me in its entirety. I confirm that the note above accurately reflects all work, physical examination, the discussion of treatments and procedures, and medical decision making performed by me. Estefani Rankin MD personally performed the services described in this documentation as scribed by nurse in my presence, and is both accurate and complete.     Electronically signed by Lindsey Harrell MD on 11/10/2020 at 12:51 PM

## 2020-11-09 NOTE — TELEPHONE ENCOUNTER
----- Message from Eduard Castellanos MD sent at 11/6/2020  5:02 PM EST -----  Looks like SVT may be worsening. Can we get her in to see NPBB to see how she is doing and if having any symptoms. May need to increase amiodarone.

## 2020-11-10 ENCOUNTER — TELEPHONE (OUTPATIENT)
Dept: CARDIOLOGY CLINIC | Age: 60
End: 2020-11-10

## 2020-11-10 ENCOUNTER — VIRTUAL VISIT (OUTPATIENT)
Dept: CARDIOLOGY CLINIC | Age: 60
End: 2020-11-10
Payer: MEDICARE

## 2020-11-10 ENCOUNTER — HOSPITAL ENCOUNTER (INPATIENT)
Age: 60
LOS: 2 days | Discharge: HOME HEALTH | DRG: 637 | End: 2020-11-13
Attending: EMERGENCY MEDICINE | Admitting: INTERNAL MEDICINE
Payer: MEDICARE

## 2020-11-10 ENCOUNTER — TELEPHONE (OUTPATIENT)
Dept: OTHER | Facility: CLINIC | Age: 60
End: 2020-11-10

## 2020-11-10 ENCOUNTER — APPOINTMENT (OUTPATIENT)
Dept: GENERAL RADIOLOGY | Age: 60
DRG: 637 | End: 2020-11-10
Payer: MEDICARE

## 2020-11-10 LAB
A/G RATIO: 1.2 (ref 1.1–2.2)
ALBUMIN SERPL-MCNC: 3.6 G/DL (ref 3.4–5)
ALP BLD-CCNC: 119 U/L (ref 40–129)
ALT SERPL-CCNC: 13 U/L (ref 10–40)
ANION GAP SERPL CALCULATED.3IONS-SCNC: 18 MMOL/L (ref 3–16)
AST SERPL-CCNC: 17 U/L (ref 15–37)
BASOPHILS ABSOLUTE: 0.1 K/UL (ref 0–0.2)
BASOPHILS RELATIVE PERCENT: 0.9 %
BILIRUB SERPL-MCNC: 0.9 MG/DL (ref 0–1)
BUN BLDV-MCNC: 9 MG/DL (ref 7–20)
CALCIUM SERPL-MCNC: 9 MG/DL (ref 8.3–10.6)
CHLORIDE BLD-SCNC: 96 MMOL/L (ref 99–110)
CO2: 23 MMOL/L (ref 21–32)
CREAT SERPL-MCNC: 0.6 MG/DL (ref 0.6–1.1)
EOSINOPHILS ABSOLUTE: 0 K/UL (ref 0–0.6)
EOSINOPHILS RELATIVE PERCENT: 0.6 %
ETHANOL: 11 MG/DL (ref 0–0.08)
GFR AFRICAN AMERICAN: >60
GFR NON-AFRICAN AMERICAN: >60
GLOBULIN: 2.9 G/DL
GLUCOSE BLD-MCNC: 428 MG/DL (ref 70–99)
HCT VFR BLD CALC: 40 % (ref 36–48)
HEMOGLOBIN: 13.3 G/DL (ref 12–16)
LYMPHOCYTES ABSOLUTE: 1.6 K/UL (ref 1–5.1)
LYMPHOCYTES RELATIVE PERCENT: 22.3 %
MCH RBC QN AUTO: 29.3 PG (ref 26–34)
MCHC RBC AUTO-ENTMCNC: 33.2 G/DL (ref 31–36)
MCV RBC AUTO: 88.4 FL (ref 80–100)
MONOCYTES ABSOLUTE: 0.4 K/UL (ref 0–1.3)
MONOCYTES RELATIVE PERCENT: 5.7 %
NEUTROPHILS ABSOLUTE: 5.1 K/UL (ref 1.7–7.7)
NEUTROPHILS RELATIVE PERCENT: 70.5 %
PDW BLD-RTO: 16.6 % (ref 12.4–15.4)
PLATELET # BLD: 314 K/UL (ref 135–450)
PMV BLD AUTO: 8.5 FL (ref 5–10.5)
POTASSIUM REFLEX MAGNESIUM: 4.4 MMOL/L (ref 3.5–5.1)
PRO-BNP: 3737 PG/ML (ref 0–124)
RBC # BLD: 4.53 M/UL (ref 4–5.2)
SALICYLATE, SERUM: 1 MG/DL (ref 15–30)
SODIUM BLD-SCNC: 137 MMOL/L (ref 136–145)
TOTAL PROTEIN: 6.5 G/DL (ref 6.4–8.2)
TROPONIN: 0.02 NG/ML
WBC # BLD: 7.3 K/UL (ref 4–11)

## 2020-11-10 PROCEDURE — 99214 OFFICE O/P EST MOD 30 MIN: CPT | Performed by: INTERNAL MEDICINE

## 2020-11-10 PROCEDURE — 96372 THER/PROPH/DIAG INJ SC/IM: CPT

## 2020-11-10 PROCEDURE — 96374 THER/PROPH/DIAG INJ IV PUSH: CPT

## 2020-11-10 PROCEDURE — 80053 COMPREHEN METABOLIC PANEL: CPT

## 2020-11-10 PROCEDURE — 99284 EMERGENCY DEPT VISIT MOD MDM: CPT

## 2020-11-10 PROCEDURE — 6370000000 HC RX 637 (ALT 250 FOR IP): Performed by: EMERGENCY MEDICINE

## 2020-11-10 PROCEDURE — 71045 X-RAY EXAM CHEST 1 VIEW: CPT

## 2020-11-10 PROCEDURE — G0480 DRUG TEST DEF 1-7 CLASSES: HCPCS

## 2020-11-10 PROCEDURE — 93005 ELECTROCARDIOGRAM TRACING: CPT | Performed by: EMERGENCY MEDICINE

## 2020-11-10 PROCEDURE — 85025 COMPLETE CBC W/AUTO DIFF WBC: CPT

## 2020-11-10 PROCEDURE — 84484 ASSAY OF TROPONIN QUANT: CPT

## 2020-11-10 PROCEDURE — 83880 ASSAY OF NATRIURETIC PEPTIDE: CPT

## 2020-11-10 RX ORDER — AMIODARONE HYDROCHLORIDE 200 MG/1
200 TABLET ORAL DAILY
Qty: 90 TABLET | Refills: 3 | Status: SHIPPED | OUTPATIENT
Start: 2020-11-10

## 2020-11-10 RX ORDER — FUROSEMIDE 10 MG/ML
40 INJECTION INTRAMUSCULAR; INTRAVENOUS ONCE
Status: COMPLETED | OUTPATIENT
Start: 2020-11-10 | End: 2020-11-11

## 2020-11-10 RX ORDER — ASPIRIN 325 MG
325 TABLET ORAL ONCE
Status: COMPLETED | OUTPATIENT
Start: 2020-11-10 | End: 2020-11-10

## 2020-11-10 RX ADMIN — ASPIRIN 325 MG: 325 TABLET ORAL at 21:39

## 2020-11-10 ASSESSMENT — PAIN SCALES - GENERAL: PAINLEVEL_OUTOF10: 6

## 2020-11-11 PROBLEM — F32.A DEPRESSIVE DISORDER: Status: ACTIVE | Noted: 2020-04-16

## 2020-11-11 LAB
ALBUMIN SERPL-MCNC: 3.6 G/DL (ref 3.4–5)
AMPHETAMINE SCREEN, URINE: NORMAL
ANION GAP SERPL CALCULATED.3IONS-SCNC: 13 MMOL/L (ref 3–16)
ANION GAP SERPL CALCULATED.3IONS-SCNC: 17 MMOL/L (ref 3–16)
ANION GAP SERPL CALCULATED.3IONS-SCNC: 19 MMOL/L (ref 3–16)
BACTERIA: ABNORMAL /HPF
BARBITURATE SCREEN URINE: NORMAL
BASE EXCESS VENOUS: 1.6 MMOL/L (ref -3–3)
BENZODIAZEPINE SCREEN, URINE: NORMAL
BILIRUBIN URINE: NEGATIVE
BLOOD, URINE: ABNORMAL
BUN BLDV-MCNC: 10 MG/DL (ref 7–20)
BUN BLDV-MCNC: 8 MG/DL (ref 7–20)
BUN BLDV-MCNC: 9 MG/DL (ref 7–20)
CALCIUM SERPL-MCNC: 8.4 MG/DL (ref 8.3–10.6)
CALCIUM SERPL-MCNC: 8.6 MG/DL (ref 8.3–10.6)
CALCIUM SERPL-MCNC: 8.9 MG/DL (ref 8.3–10.6)
CANNABINOID SCREEN URINE: NORMAL
CARBOXYHEMOGLOBIN: 1.5 % (ref 0–1.5)
CHLORIDE BLD-SCNC: 101 MMOL/L (ref 99–110)
CHLORIDE BLD-SCNC: 95 MMOL/L (ref 99–110)
CHLORIDE BLD-SCNC: 98 MMOL/L (ref 99–110)
CHP ED QC CHECK: YES
CLARITY: ABNORMAL
CO2: 22 MMOL/L (ref 21–32)
CO2: 24 MMOL/L (ref 21–32)
CO2: 24 MMOL/L (ref 21–32)
COCAINE METABOLITE SCREEN URINE: NORMAL
COLOR: YELLOW
CREAT SERPL-MCNC: 0.6 MG/DL (ref 0.6–1.1)
CREAT SERPL-MCNC: <0.5 MG/DL (ref 0.6–1.1)
CREAT SERPL-MCNC: <0.5 MG/DL (ref 0.6–1.1)
EKG ATRIAL RATE: 106 BPM
EKG ATRIAL RATE: 107 BPM
EKG DIAGNOSIS: NORMAL
EKG DIAGNOSIS: NORMAL
EKG P AXIS: 13 DEGREES
EKG P AXIS: 9 DEGREES
EKG P-R INTERVAL: 154 MS
EKG P-R INTERVAL: 166 MS
EKG Q-T INTERVAL: 372 MS
EKG Q-T INTERVAL: 376 MS
EKG QRS DURATION: 84 MS
EKG QRS DURATION: 88 MS
EKG QTC CALCULATION (BAZETT): 496 MS
EKG QTC CALCULATION (BAZETT): 499 MS
EKG R AXIS: 101 DEGREES
EKG R AXIS: 158 DEGREES
EKG T AXIS: 37 DEGREES
EKG T AXIS: 38 DEGREES
EKG VENTRICULAR RATE: 106 BPM
EKG VENTRICULAR RATE: 107 BPM
EPITHELIAL CELLS, UA: ABNORMAL /HPF (ref 0–5)
GFR AFRICAN AMERICAN: >60
GFR NON-AFRICAN AMERICAN: >60
GLUCOSE BLD-MCNC: 110 MG/DL (ref 70–99)
GLUCOSE BLD-MCNC: 111 MG/DL (ref 70–99)
GLUCOSE BLD-MCNC: 112 MG/DL (ref 70–99)
GLUCOSE BLD-MCNC: 125 MG/DL (ref 70–99)
GLUCOSE BLD-MCNC: 127 MG/DL (ref 70–99)
GLUCOSE BLD-MCNC: 132 MG/DL (ref 70–99)
GLUCOSE BLD-MCNC: 137 MG/DL (ref 70–99)
GLUCOSE BLD-MCNC: 152 MG/DL (ref 70–99)
GLUCOSE BLD-MCNC: 213 MG/DL (ref 70–99)
GLUCOSE BLD-MCNC: 254 MG/DL (ref 70–99)
GLUCOSE BLD-MCNC: 315 MG/DL (ref 70–99)
GLUCOSE BLD-MCNC: 324 MG/DL
GLUCOSE BLD-MCNC: 324 MG/DL (ref 70–99)
GLUCOSE BLD-MCNC: 339 MG/DL (ref 70–99)
GLUCOSE URINE: >=1000 MG/DL
HCO3 VENOUS: 24.5 MMOL/L (ref 23–29)
KETONES, URINE: 15 MG/DL
LACTIC ACID: 2.5 MMOL/L (ref 0.4–2)
LACTIC ACID: 2.8 MMOL/L (ref 0.4–2)
LEUKOCYTE ESTERASE, URINE: ABNORMAL
Lab: NORMAL
MAGNESIUM: 1.5 MG/DL (ref 1.8–2.4)
MAGNESIUM: 1.5 MG/DL (ref 1.8–2.4)
METHADONE SCREEN, URINE: NORMAL
METHEMOGLOBIN VENOUS: 0.3 %
MICROSCOPIC EXAMINATION: YES
NITRITE, URINE: NEGATIVE
O2 CONTENT, VEN: 20 VOL %
O2 SAT, VEN: 99 %
O2 THERAPY: ABNORMAL
OPIATE SCREEN URINE: NORMAL
OXYCODONE URINE: NORMAL
PCO2, VEN: 33.5 MMHG (ref 40–50)
PERFORMED ON: ABNORMAL
PH UA: 5.5
PH UA: 5.5 (ref 5–8)
PH VENOUS: 7.48 (ref 7.35–7.45)
PHENCYCLIDINE SCREEN URINE: NORMAL
PHOSPHORUS: 2.2 MG/DL (ref 2.5–4.9)
PHOSPHORUS: 2.5 MG/DL (ref 2.5–4.9)
PO2, VEN: 147.7 MMHG (ref 25–40)
POTASSIUM REFLEX MAGNESIUM: 3.9 MMOL/L (ref 3.5–5.1)
POTASSIUM SERPL-SCNC: 3 MMOL/L (ref 3.5–5.1)
POTASSIUM SERPL-SCNC: 3.4 MMOL/L (ref 3.5–5.1)
PROPOXYPHENE SCREEN: NORMAL
PROTEIN UA: ABNORMAL MG/DL
RBC UA: ABNORMAL /HPF (ref 0–4)
SODIUM BLD-SCNC: 135 MMOL/L (ref 136–145)
SODIUM BLD-SCNC: 138 MMOL/L (ref 136–145)
SODIUM BLD-SCNC: 140 MMOL/L (ref 136–145)
SPECIFIC GRAVITY UA: 1.02 (ref 1–1.03)
TCO2 CALC VENOUS: 26 MMOL/L
TROPONIN: 0.01 NG/ML
TROPONIN: <0.01 NG/ML
TROPONIN: <0.01 NG/ML
URINE REFLEX TO CULTURE: YES
URINE TYPE: ABNORMAL
UROBILINOGEN, URINE: 0.2 E.U./DL
WBC UA: ABNORMAL /HPF (ref 0–5)
YEAST: PRESENT /HPF

## 2020-11-11 PROCEDURE — 2580000003 HC RX 258: Performed by: INTERNAL MEDICINE

## 2020-11-11 PROCEDURE — 81001 URINALYSIS AUTO W/SCOPE: CPT

## 2020-11-11 PROCEDURE — 80307 DRUG TEST PRSMV CHEM ANLYZR: CPT

## 2020-11-11 PROCEDURE — 99222 1ST HOSP IP/OBS MODERATE 55: CPT | Performed by: INTERNAL MEDICINE

## 2020-11-11 PROCEDURE — 36415 COLL VENOUS BLD VENIPUNCTURE: CPT

## 2020-11-11 PROCEDURE — 84100 ASSAY OF PHOSPHORUS: CPT

## 2020-11-11 PROCEDURE — 80069 RENAL FUNCTION PANEL: CPT

## 2020-11-11 PROCEDURE — 6360000002 HC RX W HCPCS: Performed by: INTERNAL MEDICINE

## 2020-11-11 PROCEDURE — 99223 1ST HOSP IP/OBS HIGH 75: CPT | Performed by: INTERNAL MEDICINE

## 2020-11-11 PROCEDURE — 6370000000 HC RX 637 (ALT 250 FOR IP): Performed by: EMERGENCY MEDICINE

## 2020-11-11 PROCEDURE — 6370000000 HC RX 637 (ALT 250 FOR IP): Performed by: INTERNAL MEDICINE

## 2020-11-11 PROCEDURE — 83735 ASSAY OF MAGNESIUM: CPT

## 2020-11-11 PROCEDURE — 84484 ASSAY OF TROPONIN QUANT: CPT

## 2020-11-11 PROCEDURE — 83605 ASSAY OF LACTIC ACID: CPT

## 2020-11-11 PROCEDURE — 82803 BLOOD GASES ANY COMBINATION: CPT

## 2020-11-11 PROCEDURE — 99223 1ST HOSP IP/OBS HIGH 75: CPT | Performed by: PSYCHIATRY & NEUROLOGY

## 2020-11-11 PROCEDURE — 93010 ELECTROCARDIOGRAM REPORT: CPT | Performed by: INTERNAL MEDICINE

## 2020-11-11 PROCEDURE — 94761 N-INVAS EAR/PLS OXIMETRY MLT: CPT

## 2020-11-11 PROCEDURE — 6360000002 HC RX W HCPCS: Performed by: EMERGENCY MEDICINE

## 2020-11-11 PROCEDURE — 2500000003 HC RX 250 WO HCPCS: Performed by: INTERNAL MEDICINE

## 2020-11-11 PROCEDURE — 2060000000 HC ICU INTERMEDIATE R&B

## 2020-11-11 PROCEDURE — 93005 ELECTROCARDIOGRAM TRACING: CPT | Performed by: INTERNAL MEDICINE

## 2020-11-11 PROCEDURE — 87086 URINE CULTURE/COLONY COUNT: CPT

## 2020-11-11 RX ORDER — PANTOPRAZOLE SODIUM 40 MG/1
40 TABLET, DELAYED RELEASE ORAL DAILY
Status: DISCONTINUED | OUTPATIENT
Start: 2020-11-11 | End: 2020-11-13 | Stop reason: HOSPADM

## 2020-11-11 RX ORDER — AMIODARONE HYDROCHLORIDE 200 MG/1
200 TABLET ORAL DAILY
Status: DISCONTINUED | OUTPATIENT
Start: 2020-11-11 | End: 2020-11-13 | Stop reason: HOSPADM

## 2020-11-11 RX ORDER — DEXTROSE AND SODIUM CHLORIDE 5; .45 G/100ML; G/100ML
INJECTION, SOLUTION INTRAVENOUS CONTINUOUS PRN
Status: DISCONTINUED | OUTPATIENT
Start: 2020-11-11 | End: 2020-11-11

## 2020-11-11 RX ORDER — NICOTINE POLACRILEX 4 MG
15 LOZENGE BUCCAL PRN
Status: DISCONTINUED | OUTPATIENT
Start: 2020-11-11 | End: 2020-11-11

## 2020-11-11 RX ORDER — FUROSEMIDE 40 MG/1
40 TABLET ORAL DAILY
Status: DISCONTINUED | OUTPATIENT
Start: 2020-11-11 | End: 2020-11-11

## 2020-11-11 RX ORDER — SPIRONOLACTONE 25 MG/1
25 TABLET ORAL DAILY
Status: DISCONTINUED | OUTPATIENT
Start: 2020-11-11 | End: 2020-11-13 | Stop reason: HOSPADM

## 2020-11-11 RX ORDER — POTASSIUM CHLORIDE 7.45 MG/ML
10 INJECTION INTRAVENOUS
Status: DISCONTINUED | OUTPATIENT
Start: 2020-11-11 | End: 2020-11-11

## 2020-11-11 RX ORDER — 0.9 % SODIUM CHLORIDE 0.9 %
15 INTRAVENOUS SOLUTION INTRAVENOUS ONCE
Status: COMPLETED | OUTPATIENT
Start: 2020-11-11 | End: 2020-11-12

## 2020-11-11 RX ORDER — POTASSIUM CHLORIDE 750 MG/1
40 TABLET, EXTENDED RELEASE ORAL ONCE
Status: COMPLETED | OUTPATIENT
Start: 2020-11-11 | End: 2020-11-11

## 2020-11-11 RX ORDER — DIGOXIN 125 MCG
125 TABLET ORAL EVERY OTHER DAY
Status: DISCONTINUED | OUTPATIENT
Start: 2020-11-11 | End: 2020-11-13 | Stop reason: HOSPADM

## 2020-11-11 RX ORDER — PROCHLORPERAZINE EDISYLATE 5 MG/ML
10 INJECTION INTRAMUSCULAR; INTRAVENOUS EVERY 6 HOURS PRN
Status: DISCONTINUED | OUTPATIENT
Start: 2020-11-11 | End: 2020-11-13 | Stop reason: HOSPADM

## 2020-11-11 RX ORDER — RANOLAZINE 500 MG/1
500 TABLET, EXTENDED RELEASE ORAL 2 TIMES DAILY
Status: DISCONTINUED | OUTPATIENT
Start: 2020-11-11 | End: 2020-11-13 | Stop reason: HOSPADM

## 2020-11-11 RX ORDER — POTASSIUM CHLORIDE 7.45 MG/ML
10 INJECTION INTRAVENOUS ONCE
Status: DISCONTINUED | OUTPATIENT
Start: 2020-11-11 | End: 2020-11-11

## 2020-11-11 RX ORDER — POTASSIUM CHLORIDE 750 MG/1
40 TABLET, EXTENDED RELEASE ORAL 2 TIMES DAILY WITH MEALS
Status: DISCONTINUED | OUTPATIENT
Start: 2020-11-12 | End: 2020-11-13

## 2020-11-11 RX ORDER — FUROSEMIDE 10 MG/ML
40 INJECTION INTRAMUSCULAR; INTRAVENOUS 2 TIMES DAILY
Status: DISCONTINUED | OUTPATIENT
Start: 2020-11-11 | End: 2020-11-13

## 2020-11-11 RX ORDER — METOPROLOL SUCCINATE 25 MG/1
12.5 TABLET, EXTENDED RELEASE ORAL DAILY
Status: DISCONTINUED | OUTPATIENT
Start: 2020-11-11 | End: 2020-11-12

## 2020-11-11 RX ORDER — FLUCONAZOLE 100 MG/1
150 TABLET ORAL ONCE
Status: COMPLETED | OUTPATIENT
Start: 2020-11-11 | End: 2020-11-11

## 2020-11-11 RX ORDER — INSULIN GLARGINE 100 [IU]/ML
24 INJECTION, SOLUTION SUBCUTANEOUS 2 TIMES DAILY
Status: DISCONTINUED | OUTPATIENT
Start: 2020-11-11 | End: 2020-11-13 | Stop reason: HOSPADM

## 2020-11-11 RX ORDER — DEXTROSE MONOHYDRATE 25 G/50ML
12.5 INJECTION, SOLUTION INTRAVENOUS PRN
Status: DISCONTINUED | OUTPATIENT
Start: 2020-11-11 | End: 2020-11-11

## 2020-11-11 RX ORDER — DEXTROSE MONOHYDRATE 25 G/50ML
12.5 INJECTION, SOLUTION INTRAVENOUS PRN
Status: DISCONTINUED | OUTPATIENT
Start: 2020-11-11 | End: 2020-11-12 | Stop reason: DRUGHIGH

## 2020-11-11 RX ORDER — DEXTROSE AND SODIUM CHLORIDE 5; .45 G/100ML; G/100ML
INJECTION, SOLUTION INTRAVENOUS
Status: COMPLETED
Start: 2020-11-11 | End: 2020-11-11

## 2020-11-11 RX ORDER — DEXTROSE MONOHYDRATE 50 MG/ML
100 INJECTION, SOLUTION INTRAVENOUS PRN
Status: DISCONTINUED | OUTPATIENT
Start: 2020-11-11 | End: 2020-11-11

## 2020-11-11 RX ORDER — ATORVASTATIN CALCIUM 40 MG/1
40 TABLET, FILM COATED ORAL NIGHTLY
Status: DISCONTINUED | OUTPATIENT
Start: 2020-11-11 | End: 2020-11-13 | Stop reason: HOSPADM

## 2020-11-11 RX ORDER — QUETIAPINE FUMARATE 25 MG/1
25 TABLET, FILM COATED ORAL NIGHTLY
Status: DISCONTINUED | OUTPATIENT
Start: 2020-11-11 | End: 2020-11-13 | Stop reason: HOSPADM

## 2020-11-11 RX ORDER — ASPIRIN 81 MG/1
81 TABLET ORAL DAILY
Status: DISCONTINUED | OUTPATIENT
Start: 2020-11-11 | End: 2020-11-13 | Stop reason: HOSPADM

## 2020-11-11 RX ORDER — POTASSIUM CHLORIDE 7.45 MG/ML
10 INJECTION INTRAVENOUS PRN
Status: DISCONTINUED | OUTPATIENT
Start: 2020-11-11 | End: 2020-11-12

## 2020-11-11 RX ORDER — DULOXETIN HYDROCHLORIDE 60 MG/1
60 CAPSULE, DELAYED RELEASE ORAL DAILY
Status: DISCONTINUED | OUTPATIENT
Start: 2020-11-11 | End: 2020-11-13 | Stop reason: HOSPADM

## 2020-11-11 RX ORDER — MAGNESIUM SULFATE IN WATER 40 MG/ML
2 INJECTION, SOLUTION INTRAVENOUS ONCE
Status: COMPLETED | OUTPATIENT
Start: 2020-11-11 | End: 2020-11-11

## 2020-11-11 RX ORDER — MAGNESIUM SULFATE 1 G/100ML
1 INJECTION INTRAVENOUS PRN
Status: DISCONTINUED | OUTPATIENT
Start: 2020-11-11 | End: 2020-11-12

## 2020-11-11 RX ORDER — POTASSIUM CHLORIDE 750 MG/1
20 TABLET, EXTENDED RELEASE ORAL ONCE
Status: COMPLETED | OUTPATIENT
Start: 2020-11-11 | End: 2020-11-11

## 2020-11-11 RX ORDER — SUCRALFATE 1 G/1
1 TABLET ORAL 3 TIMES DAILY
Status: DISCONTINUED | OUTPATIENT
Start: 2020-11-11 | End: 2020-11-13 | Stop reason: HOSPADM

## 2020-11-11 RX ORDER — METOCLOPRAMIDE 10 MG/1
10 TABLET ORAL
Status: DISCONTINUED | OUTPATIENT
Start: 2020-11-11 | End: 2020-11-13 | Stop reason: HOSPADM

## 2020-11-11 RX ORDER — SODIUM CHLORIDE 0.9 % (FLUSH) 0.9 %
SYRINGE (ML) INJECTION
Status: DISPENSED
Start: 2020-11-11 | End: 2020-11-12

## 2020-11-11 RX ORDER — SODIUM CHLORIDE 9 MG/ML
INJECTION, SOLUTION INTRAVENOUS CONTINUOUS
Status: DISCONTINUED | OUTPATIENT
Start: 2020-11-11 | End: 2020-11-11

## 2020-11-11 RX ORDER — NITROGLYCERIN 0.4 MG/1
0.4 TABLET SUBLINGUAL EVERY 5 MIN PRN
Status: DISCONTINUED | OUTPATIENT
Start: 2020-11-11 | End: 2020-11-13 | Stop reason: HOSPADM

## 2020-11-11 RX ADMIN — ATORVASTATIN CALCIUM 40 MG: 40 TABLET, FILM COATED ORAL at 20:08

## 2020-11-11 RX ADMIN — POTASSIUM CHLORIDE 10 MEQ: 7.46 INJECTION, SOLUTION INTRAVENOUS at 05:10

## 2020-11-11 RX ADMIN — RANOLAZINE 500 MG: 500 TABLET, FILM COATED, EXTENDED RELEASE ORAL at 10:37

## 2020-11-11 RX ADMIN — INSULIN GLARGINE 24 UNITS: 100 INJECTION, SOLUTION SUBCUTANEOUS at 20:18

## 2020-11-11 RX ADMIN — SODIUM CHLORIDE 9.98 UNITS/HR: 9 INJECTION, SOLUTION INTRAVENOUS at 04:48

## 2020-11-11 RX ADMIN — SPIRONOLACTONE 25 MG: 25 TABLET ORAL at 16:34

## 2020-11-11 RX ADMIN — PANTOPRAZOLE SODIUM 40 MG: 40 TABLET, DELAYED RELEASE ORAL at 10:38

## 2020-11-11 RX ADMIN — FUROSEMIDE 40 MG: 10 INJECTION, SOLUTION INTRAMUSCULAR; INTRAVENOUS at 16:34

## 2020-11-11 RX ADMIN — SUCRALFATE 1 G: 1 TABLET ORAL at 12:58

## 2020-11-11 RX ADMIN — FLUCONAZOLE 150 MG: 100 TABLET ORAL at 04:42

## 2020-11-11 RX ADMIN — RANOLAZINE 500 MG: 500 TABLET, FILM COATED, EXTENDED RELEASE ORAL at 20:08

## 2020-11-11 RX ADMIN — METOCLOPRAMIDE 10 MG: 10 TABLET ORAL at 12:58

## 2020-11-11 RX ADMIN — AMIODARONE HYDROCHLORIDE 200 MG: 200 TABLET ORAL at 10:28

## 2020-11-11 RX ADMIN — DEXTROSE AND SODIUM CHLORIDE: 5; 450 INJECTION, SOLUTION INTRAVENOUS at 06:02

## 2020-11-11 RX ADMIN — POTASSIUM CHLORIDE 40 MEQ: 750 TABLET, EXTENDED RELEASE ORAL at 14:21

## 2020-11-11 RX ADMIN — MAGNESIUM SULFATE HEPTAHYDRATE 2 G: 40 INJECTION, SOLUTION INTRAVENOUS at 14:24

## 2020-11-11 RX ADMIN — INSULIN LISPRO 10 UNITS: 100 INJECTION, SOLUTION INTRAVENOUS; SUBCUTANEOUS at 01:14

## 2020-11-11 RX ADMIN — FUROSEMIDE 40 MG: 10 INJECTION, SOLUTION INTRAMUSCULAR; INTRAVENOUS at 01:13

## 2020-11-11 RX ADMIN — SACUBITRIL AND VALSARTAN 0.5 TABLET: 24; 26 TABLET, FILM COATED ORAL at 10:27

## 2020-11-11 RX ADMIN — INSULIN LISPRO 3 UNITS: 100 INJECTION, SOLUTION INTRAVENOUS; SUBCUTANEOUS at 12:58

## 2020-11-11 RX ADMIN — CEFTRIAXONE SODIUM 1 G: 1 INJECTION, POWDER, FOR SOLUTION INTRAMUSCULAR; INTRAVENOUS at 04:42

## 2020-11-11 RX ADMIN — INSULIN GLARGINE 24 UNITS: 100 INJECTION, SOLUTION SUBCUTANEOUS at 12:59

## 2020-11-11 RX ADMIN — POTASSIUM CHLORIDE 20 MEQ: 750 TABLET, EXTENDED RELEASE ORAL at 14:22

## 2020-11-11 RX ADMIN — SACUBITRIL AND VALSARTAN 0.5 TABLET: 24; 26 TABLET, FILM COATED ORAL at 20:09

## 2020-11-11 RX ADMIN — ENOXAPARIN SODIUM 40 MG: 100 INJECTION SUBCUTANEOUS at 10:26

## 2020-11-11 RX ADMIN — DULOXETINE HYDROCHLORIDE 60 MG: 60 CAPSULE, DELAYED RELEASE ORAL at 10:28

## 2020-11-11 RX ADMIN — MICONAZOLE NITRATE: 20 POWDER TOPICAL at 10:26

## 2020-11-11 RX ADMIN — DIGOXIN 125 MCG: 125 TABLET ORAL at 10:28

## 2020-11-11 RX ADMIN — INSULIN LISPRO 9 UNITS: 100 INJECTION, SOLUTION INTRAVENOUS; SUBCUTANEOUS at 16:34

## 2020-11-11 RX ADMIN — ASPIRIN 81 MG: 81 TABLET, COATED ORAL at 10:28

## 2020-11-11 RX ADMIN — SODIUM CHLORIDE 1497 ML: 9 INJECTION, SOLUTION INTRAVENOUS at 05:13

## 2020-11-11 RX ADMIN — METOPROLOL SUCCINATE 12.5 MG: 25 TABLET, EXTENDED RELEASE ORAL at 10:25

## 2020-11-11 RX ADMIN — SUCRALFATE 1 G: 1 TABLET ORAL at 20:08

## 2020-11-11 RX ADMIN — SUCRALFATE 1 G: 1 TABLET ORAL at 10:41

## 2020-11-11 RX ADMIN — QUETIAPINE FUMARATE 25 MG: 25 TABLET ORAL at 20:08

## 2020-11-11 ASSESSMENT — PAIN DESCRIPTION - ORIENTATION
ORIENTATION: MID

## 2020-11-11 ASSESSMENT — PAIN DESCRIPTION - LOCATION
LOCATION: CHEST
LOCATION: CHEST

## 2020-11-11 ASSESSMENT — PAIN SCALES - GENERAL
PAINLEVEL_OUTOF10: 3
PAINLEVEL_OUTOF10: 3
PAINLEVEL_OUTOF10: 10
PAINLEVEL_OUTOF10: 0

## 2020-11-11 ASSESSMENT — PAIN DESCRIPTION - FREQUENCY
FREQUENCY: CONTINUOUS
FREQUENCY: CONTINUOUS

## 2020-11-11 ASSESSMENT — PAIN DESCRIPTION - DESCRIPTORS
DESCRIPTORS: DULL
DESCRIPTORS: DULL

## 2020-11-11 NOTE — PROGRESS NOTES
Perfect serve to Dr Den Willard:    in ICU for DKA- which is resolved. Pt having some fluid overload related to the DKA protocol replacements- Cardiology on the case. Per Dr Kristina Gaines patient can move out of ICU. FYI    Awaiting a return message or f/u orders.

## 2020-11-11 NOTE — CONSULTS
Psychiatry Consult dictated    Dx;   1. Depressive DO unspecified  2. CHF    Rec:  1. Patient is not endorsing any SI at this time. 2. She appears frustrated with her cardiac issues as they affect her daily routine. She state that she was tired of living with these physical problems    3. DC sitter. DC SI precautions    4.  Does not require inpatient psychiatry nor follow up

## 2020-11-11 NOTE — PROGRESS NOTES
Dr. Mira Palm at the bedside to examine patient. Stop DKA protocol. Pt could move out of ICU. See progress note for details.

## 2020-11-11 NOTE — CONSULTS
CARDIOLOGY CONSULTATION        Patient Name: Stacey Bowers  Date of admission: 11/10/2020  5:43 PM  Admission Dx: DKA, type 2, not at goal [facility]) [E11.10]  Requesting Physician: Leanne Zhu MD  Primary Care physician: FRITZ Arrington NP    Reason for Consultation/Chief Complaint: Congestive heart failure     History of Present Illness:     Stacey Bowers is a 62 y.o. patient with prior history notable for coronary artery disease non-obstructive by recent LHC, CVA, diabetes, hyperlipidemia, hypertension, NSVT status post ICD, Non-ischemic cardiomyopathy with severe LV dysfunction (15-20%) by last echocardiogram and significant scar burden by last nuclear stress, who presented to the hospital with complaints of frequent palpitations and dyspnea. Found to have glucose 400's, DKA, admitted for subsequent management. On admission, Vitals notable for: afebrile, , /77, 96% room air. Labs revealed K 4.4, Scr 0.6, BNP 3737, Troponin 0.02 with down-trend, glucose 428, AG 18. Lactate mildly elevated at 2.8. CXR showed marked vascular congestion/interstitial edema and small bilateral pleural effusions. EKG with sinus tachycardia, PAC's, prior lateral infarct, poor R wave progression/cannot rule out anterior infarct and low voltage. Unchanged from prior. The patient is well-known to me as I evaluated her during her prior recent admission. She states that she went home and was unable to keep medications down due to nausea/vomiting. She has had increasing shortness of breath, orthopnea, and edema. She has chronic, unchanged chest pain. Complains of palpitations ongoing. No dizziness, syncope or device shocks that she's aware of. Of note, the patient was last admitted earlier this month with nausea/vomiting on heels of admission late October for the same. Noted to have lactic acidosis at that time. Her most recent [address] from October showed low cardiac index.  Discharged on metoprolol, 11/6/20   Jorge Frausto MD   furosemide (LASIX) 40 MG tablet Take 1 tablet by mouth daily 10/28/20 11/27/20  Hannah Willett PA-C   insulin aspart (NOVOLOG FLEXPEN) 100 UNIT/ML injection pen Inject 20 Units into the skin 3 times daily (before meals)    Historical Provider, MD   insulin glargine (LANTUS SOLOSTAR) 100 UNIT/ML injection Inject 40 Units into the skin 2 times daily    Historical Provider, MD   metFORMIN (GLUCOPHAGE) 1000 MG tablet Take 1,000 mg by mouth 2 times daily (with meals)    Historical Provider, MD   vitamin D (ERGOCALCIFEROL) 1.25 MG (21503 UT) CAPS capsule Take 50,000 Units by mouth once a week Takes weekly on Sundays    Historical Provider, MD   QUEtiapine (SEROQUEL) 25 MG tablet Take 25 mg by mouth nightly    Historical Provider, MD   metoprolol succinate (TOPROL XL) 25 MG extended release tablet Take 0.5 tablets by mouth daily 10/14/20   Samra Points, APRN - CNP   sacubitril-valsartan (ENTRESTO) 24-26 MG per tablet Take 0.5 tablets by mouth 2 times daily 10/13/20   Samra Points, APRN - CNP   spironolactone (ALDACTONE) 25 MG tablet Take 1 tablet by mouth daily 10/13/20   Samra Points, APRN - CNP   digoxin (LANOXIN) 125 MCG tablet Take 1 tablet by mouth every other day 9/25/20   Samra Points, APRN - CNP   nitroGLYCERIN (NITROSTAT) 0.4 MG SL tablet up to max of 3 total doses.  If no relief after 1 dose, call 911. 8/24/20   Ct Borden MD   aspirin 81 MG EC tablet Take 1 tablet by mouth daily 7/18/20   Cresencio Ayala MD   ranolazine (RANEXA) 500 MG extended release tablet Take 1 tablet by mouth 2 times daily 7/18/20   Cresencio Ayala MD   DULoxetine (CYMBALTA) 60 MG extended release capsule Take 1 capsule by mouth daily 7/18/20   Cresencio Ayala MD   atorvastatin (LIPITOR) 40 MG tablet Take 1 tablet by mouth nightly 7/18/20   Cresencio Ayala MD   fenofibrate micronized (LOFIBRA) 200 MG capsule Take 1 capsule by mouth nightly 7/18/20 Thelma Granado MD   miconazole (MICOTIN) 2 % powder Apply topically 2 times daily. 7/18/20   Thelma Granado MD   [employer] Lancets Ultra Thin MISC 1 each by Does not apply route 4 times daily 7/18/20   Thelma Granado MD   blood glucose monitor strips Test three times a day & as needed for symptoms of irregular blood glucose.  5/12/19   Morgan Barroso MD        CURRENT Medications:  cefTRIAXone (ROCEPHIN) 1 g IVPB in 50 mL D5W minibag, Q24H  aspirin EC tablet 81 mg, Daily  amiodarone (CORDARONE) tablet 200 mg, Daily  atorvastatin (LIPITOR) tablet 40 mg, Nightly  digoxin (LANOXIN) tablet 125 mcg, Every Other Day  DULoxetine (CYMBALTA) extended release capsule 60 mg, Daily  metoclopramide (REGLAN) tablet 10 mg, TID WC  metoprolol succinate (TOPROL XL) extended release tablet 12.5 mg, Daily  miconazole (MICOTIN) 2 % powder, BID  nitroGLYCERIN (NITROSTAT) SL tablet 0.4 mg, Q5 Min PRN  pantoprazole (PROTONIX) tablet 40 mg, Daily  QUEtiapine (SEROQUEL) tablet 25 mg, Nightly  ranolazine (RANEXA) extended release tablet 500 mg, BID  sacubitril-valsartan (ENTRESTO) 24-26 MG per tablet 0.5 tablet, BID  sucralfate (CARAFATE) tablet 1 g, TID  enoxaparin (LOVENOX) injection 40 mg, Daily  dextrose 50 % IV solution, PRN  potassium chloride 10 mEq/100 mL IVPB (Peripheral Line), PRN  magnesium sulfate 1 g in dextrose 5% 100 mL IVPB, PRN  sodium phosphate 10 mmol in dextrose 5 % 250 mL IVPB, PRN    Or  sodium phosphate 15 mmol in dextrose 5 % 250 mL IVPB, PRN    Or  sodium phosphate 20 mmol in dextrose 5 % 500 mL IVPB, PRN  0.9 % sodium chloride infusion, Continuous  prochlorperazine (COMPAZINE) injection 10 mg, Q6H PRN  influenza quadrivalent split vaccine (FLUZONE;FLUARIX;FLULAVAL;AFLURIA) injection 0.5 mL, Prior to discharge  insulin glargine (LANTUS) injection vial 24 Units, BID  insulin lispro (HUMALOG) injection vial 0-18 Units, TID WC  insulin lispro (HUMALOG) injection vial 0-9 Units, Nightly  furosemide (LASIX) tablet 40 mg, Daily  potassium chloride (KLOR-CON M) extended release tablet 40 mEq, Once  potassium chloride (KLOR-CON M) extended release tablet 20 mEq, Once  magnesium sulfate 2 g in 50 mL IVPB premix, Once        Allergies:  Patient has no known allergies. Review of Systems:   A 14 point review of symptoms completed. Pertinent positives identified in the HPI, all other review of symptoms negative as below. Objective:     Vitals:    11/11/20 1028 11/11/20 1109 11/11/20 1200 11/11/20 1300   BP:   93/71 132/89   Pulse: 115 108 109 85   Resp:  27 18 16   Temp:       TempSrc:       SpO2:  100% 100% 95%   Weight:       Height:          Weight: 220 lb 0.3 oz (99.8 kg)       PHYSICAL EXAM:    General:  Alert, cooperative, frustrated appearing, tearful, appears stated age   Head:  Normocephalic, atraumatic   Eyes:  Conjunctiva/corneas clear, anicteric sclerae    Nose: Nares normal, no drainage or sinus tenderness   Throat: No abnormalities of the lips, oral mucosa or tongue. Neck: Trachea midline. Neck supple with no lymphadenopathy, thyroid not enlarged, symmetric, no tenderness/mass/nodules, elevated JVP to mandible   Lungs:   Diminished breath sounds at the bases, no wheezes, no distress   Chest Wall:  No deformity or tenderness to palpation   Heart:  Tachycardia, irregular, normal S1, normal S2, no murmur, no rub, no S3/S4, PMI non-displaced and she has RV lift. Abdomen:   Obese, soft, non-tender, with normoactive bowel sounds. No masses, no hepatosplenomegaly   Extremities: No cyanosis, clubbing. 1+ pitting LE bilaterally. Vascular: 2+ radial, brachial, femoral, dorsalis pedis and posterior tibial pulses bilaterally. Brisk carotid upstrokes without carotid bruit. Skin: Skin color, texture, turgor are normal with no rashes or ulceration. Pysch: Euthymic mood, appropriate affect   Neurologic: Oriented to person, place and time. No slurred speech or facial asymmetry. No motor or sensory deficits on gross examination. Labs:   CBC:   Lab Results   Component Value Date    WBC 7.3 11/10/2020    RBC 4.53 11/10/2020    HGB 13.3 11/10/2020    HCT 40.0 11/10/2020    MCV 88.4 11/10/2020    RDW 16.6 11/10/2020     11/10/2020     CMP:  Lab Results   Component Value Date     11/11/2020    K 3.0 11/11/2020    K 3.9 11/11/2020    CL 98 11/11/2020    CO2 24 11/11/2020    BUN 8 11/11/2020    CREATININE <0.5 11/11/2020    GFRAA >60 11/11/2020    AGRATIO 1.2 11/10/2020    LABGLOM >60 11/11/2020    GLUCOSE 125 11/11/2020    PROT 6.5 11/10/2020    CALCIUM 8.6 11/11/2020    BILITOT 0.9 11/10/2020    ALKPHOS 119 11/10/2020    AST 17 11/10/2020    ALT 13 11/10/2020     PT/INR:  No results found for: PTINR  HgBA1c:  Lab Results   Component Value Date    LABA1C 11.6 11/04/2020     Lab Results   Component Value Date    CKTOTAL 54 06/12/2020    TROPONINI <0.01 11/11/2020         Cardiac Data:     EKG: personally reviewed. Repeat EKG pending. Telemetry review with sinus tachycardia, frequent PAC's, PVC's and runs of NSVT. ECHO: (limited) 10/6/20:     Summary   Limited only f/u for LVEF.   The left ventricular systolic function is severely reduced with an ejection   fraction of 15-20 %.   There is severe global hypokinesis with regional variations.   Changes noted from previous echo on 6- in left ventricular function.     Echo 6/12/20  Summary   LV systolic function is moderately reduced with an estimated EF of 35%. Moderate global hypokinesis. There is mild concentric left ventricular hypertrophy. Left ventricular cavity size is mildly dilated. Estimated LV diastolic filling pressure is normal.   Mild mitral and tricuspid regurgitation. Systolic pulmonary artery pressure (SPAP) is estimated at 35mmHg (Right   atrial pressure of 8 mmHg). No significant change from exam done 10/18/2019.       STRESS TEST: 10/5/20:    Dilated LV with severe global hypokinesis. Large, severe, fixed perfusion  defect of the inferior/inferolateral wall consistent with scar. Diminished  uptake of the anterior wall consistent with breast artifact. Post stress    LVEF is abnormal at 19%. Abnormal study. Overall findings represent a high risk scan.      LEFT HEART CATH: 10/6/20: CONCLUSIONS:   Coronary anatomy:  1. Left main coronary artery was normal. It gave off the left anterior descending artery and left circumflex.     2. Left anterior descending artery has mild atherosclerotic disease. It was moderate in size. It gave off septal perforators and a moderate sized diagonal branch. The LAD covered the entire apex of the left ventricle.      3. Left circumflex has mild atherosclerotic disease. It was moderate in size. There was a moderate sized obtuse marginal branch.     4. Right coronary artery has mild atherosclerotic disease. It was moderate in size and was the dominant artery.     5. Left ventriculogram was not performed. Left ventricular end diastolic l pressure was 26. There is no gradient across pullback of the aortic valve.     Right heart catheterization findings:     Right atrial pressure of 20  RV 45/7  PA 53/8  Pulmonary wedge pressure mean of 20  RA saturation 42%  PA saturation 45%  Aortic saturation 99%  Cardiac output 2.4  Cardiac index 1.24  SVR 2433       1.  Mild non-obstructive coronary artery disease with known severe left ventricular dysfunction  2.  Moderate pulmonary hypertension with decompensated hemodynamics    Impression and Plan:   Saúl Friedman is a 62 y.o. patient with prior history notable for coronary artery disease non-obstructive by recent LHC, CVA, diabetes, hyperlipidemia, hypertension, NSVT status post ICD, Non-ischemic cardiomyopathy with severe LV dysfunction (15-20%) by last echocardiogram and significant scar burden by last nuclear stress, who presented to the hospital with complaints of frequent palpitations and dyspnea. Found to have glucose 400's, DKA, admitted for subsequent management. 1.  Acute on chronic congestive heart failure, reduced EF, low CI by recent RHC. 2.  Dyspnea owing to #1  3. Non-ischemic cardiomyopathy with Severe LV dysfunction  4. Status post ICD  5. Atrial tachycardia, on amiodarone, recent increase  6. NSVT  7.  Non-obstructive CAD by [facility] 10/2020, chronic non-cardiac chest pain  8. Diabetes, uncontrolled, with DKA this admission  9. CVA  10. Hypertension, controlled  11. Moderate pulmonary hypertension   12.  Hyperlipidemia       Patient Active Problem List   Diagnosis    DM (diabetes mellitus) ([address]    HTN (hypertension), benign    Dyslipidemia    CAD (coronary artery disease)    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)    Dual ICD (implantable cardioverter-defibrillator) in place    Brain tumor (benign) ([address]    Chronic combined systolic and diastolic congestive heart failure ([address]    TIA involving right internal carotid artery    CAD in native artery    DM (diabetes mellitus), secondary, uncontrolled, w/neurologic complic ([address]    CHF (congestive heart failure) ([address]    Cardiomyopathy ([address]    Essential hypertension    TIA (transient ischemic attack)    Elevated blood sugar    Diabetic ketoacidosis without coma associated with type 2 diabetes mellitus (HCC)    Non-intractable vomiting with nausea    Chest pain    Arterial ischemic stroke, ICA, right, acute ([address]    Diabetic hyperosmolar non-ketotic state ([address]    Diabetic acidosis without coma ([address]    Hyponatremia    Metabolic acidosis    Disorder of electrolytes    Diabetic ketoacidosis with coma associated with type 2 diabetes mellitus (HCC)    Hypernatremia    Leukocytosis    Atrial tachycardia (HCC)    DKA, type 2, not at goal [facility])    Cognitive developmental delay    Depressive disorder    Urinary tract infection without hematuria    Hypokalemia    Persistent fever    Syncope and collapse    S/P ICD (internal cardiac defibrillator) procedure    History of CVA (cerebrovascular accident)    Noncompliance with medications    Obesity    SVT (supraventricular tachycardia) (HCC)    Type 2 diabetes mellitus with hyperglycemia, with long-term current use of insulin (HCC)    Acute pulmonary edema (HCC)    Mixed hyperlipidemia    Lactic acidosis    NSVT (nonsustained ventricular tachycardia) (HCC)    Intractable nausea and vomiting    Tachycardia    Uncontrolled type 2 diabetes mellitus with hyperglycemia ([facility] [address]       PLAN:  1. Change back to IV lasix as she is significantly volume overloaded. 40mg IV BID and track strict I/O, daily standing weights to guide therapy. Aim 2-2.5 L net negative daily as electrolytes/renal function/BP's tolerate. 2. Continue neurohormonal therapies including low dose metoprolol XL and very low dose entresto; look to titrate both toward optimal doses as nearing euvolemia as hemodynamics allow. 3. Restart spironolactone 25mg daily to boost potassium levels. Replete K >4, Mg >2. Monitor ongoing with diuresis. 4. Continue amiodarone at new dosing 200mg daily; continue ranexa at present dosing. 5. Check digoxin level in AM to exclude dig toxicity as it relates to underlying GI symptoms; continue digoxin if level is within range  6. Repeat 12 lead for irregular heart rhythm; exclude AF, though appears sinus with PAC's by cardiac monitor. 7. Continue aspirin, statin. We will continue to follow. She remains high risk for re-admission given her difficulty with social life, ongoing GI issues limiting medication compliance and severe cardiomyopathy. We will need to titrate neurohormonal therapies and get her on stable dose of diuretic that is threshold for her prior to discharge to improve her chance of success on transitioning back to outpatient. I will address the patient's cardiac risk factors and adjusted pharmacologic treatment as needed.  In addition, I have reinforced the need for patient directed risk factor modification. All questions and concerns were addressed to the patient/family. Alternatives to my treatment were discussed. Thank you for allowing us to participate in the care of Kaykay Tee. Please call me with any questions [phone].     Rosy Nuñez MD   Cardiovascular Disease  [address]  [phone] [facility]  [phone] [address] [place] [address]  11/11/2020 1:32 PM

## 2020-11-11 NOTE — ED PROVIDER NOTES
Surgical History:   Procedure Laterality Date    BACK SURGERY      x3    PACEMAKER INSERTION      TUBAL LIGATION      TUMOR REMOVAL      UPPER GASTROINTESTINAL ENDOSCOPY N/A 11/5/2020    EGD BIOPSY performed by Mae Sanches DO at [facility]     Family History   Problem Relation Age of Onset    Heart Disease Father     Cancer Mother     Other Mother      Social History     Socioeconomic History    Marital status:       Spouse name: Not on file    Number of children: Not on file    Years of education: Not on file    Highest education level: Not on file   Occupational History    Not on file   Social Needs    Financial resource strain: Not on file    Food insecurity     Worry: Not on file     Inability: Not on file    Transportation needs     Medical: Not on file     Non-medical: Not on file   Tobacco Use    Smoking status: Never Smoker    Smokeless tobacco: Never Used   Substance and Sexual Activity    Alcohol use: No    Drug use: No    Sexual activity: Not Currently   Lifestyle    Physical activity     Days per week: Not on file     Minutes per session: Not on file    Stress: Not on file   Relationships    Social connections     Talks on phone: Not on file     Gets together: Not on file     Attends Mandaen service: Not on file     Active member of club or organization: Not on file     Attends meetings of clubs or organizations: Not on file     Relationship status: Not on file    Intimate partner violence     Fear of current or ex partner: Not on file     Emotionally abused: Not on file     Physically abused: Not on file     Forced sexual activity: Not on file   Other Topics Concern    Not on file   Social History Narrative    Not on file     Current Facility-Administered Medications   Medication Dose Route Frequency Provider Last Rate Last Dose    cefTRIAXone (ROCEPHIN) 1 g IVPB in 50 mL D5W minibag  1 g Intravenous Q24H Christin Davis, DO        fluconazole (DIFLUCAN) tablet 150 mg  150 mg Oral Once [place], DO        potassium chloride 10 mEq/100 mL IVPB (Peripheral Line)  10 mEq Intravenous Q1H Christin Prasnal, DO         Current Outpatient Medications   Medication Sig Dispense Refill    amiodarone (CORDARONE) 200 MG tablet Take 1 tablet by mouth daily 90 tablet 3    ondansetron (ZOFRAN) 4 MG tablet Take 1 tablet by mouth every 8 hours as needed for Nausea 20 tablet 0    metoclopramide (REGLAN) 10 MG tablet Take 1 tablet by mouth 3 times daily (with meals) 30 tablet 0    sucralfate (CARAFATE) 1 GM tablet Take 1 tablet by mouth 3 times daily 90 tablet 0    pantoprazole (PROTONIX) 40 MG tablet Take 1 tablet by mouth daily 90 tablet 0    furosemide (LASIX) 40 MG tablet Take 1 tablet by mouth daily 30 tablet 0    insulin aspart (NOVOLOG FLEXPEN) 100 UNIT/ML injection pen Inject 20 Units into the skin 3 times daily (before meals)      insulin glargine (LANTUS SOLOSTAR) 100 UNIT/ML injection Inject 40 Units into the skin 2 times daily      metFORMIN (GLUCOPHAGE) 1000 MG tablet Take 1,000 mg by mouth 2 times daily (with meals)      vitamin D (ERGOCALCIFEROL) 1.25 MG (90552 UT) CAPS capsule Take 50,000 Units by mouth once a week Takes weekly on Sundays      QUEtiapine (SEROQUEL) 25 MG tablet Take 25 mg by mouth nightly      metoprolol succinate (TOPROL XL) 25 MG extended release tablet Take 0.5 tablets by mouth daily 30 tablet 3    sacubitril-valsartan (ENTRESTO) 24-26 MG per tablet Take 0.5 tablets by mouth 2 times daily 60 tablet 3    spironolactone (ALDACTONE) 25 MG tablet Take 1 tablet by mouth daily 30 tablet 3    digoxin (LANOXIN) 125 MCG tablet Take 1 tablet by mouth every other day 30 tablet 3    nitroGLYCERIN (NITROSTAT) 0.4 MG SL tablet up to max of 3 total doses.  If no relief after 1 dose, call 911. 25 tablet 3    aspirin 81 MG EC tablet Take 1 tablet by mouth daily 30 tablet 2    ranolazine (RANEXA) 500 MG extended release tablet Take 1 tablet by mouth 2 times daily 60 tablet 2    DULoxetine (CYMBALTA) 60 MG extended release capsule Take 1 capsule by mouth daily 30 capsule 0    atorvastatin (LIPITOR) 40 MG tablet Take 1 tablet by mouth nightly 30 tablet 2    fenofibrate micronized (LOFIBRA) 200 MG capsule Take 1 capsule by mouth nightly 30 capsule 3    miconazole (MICOTIN) 2 % powder Apply topically 2 times daily. 45 g 1    [employer] Lancets Ultra Thin MISC 1 each by Does not apply route 4 times daily 200 each 0    blood glucose monitor strips Test three times a day & as needed for symptoms of irregular blood glucose. 100 strip 2     No Known Allergies    REVIEW OF SYSTEMS  10 systems reviewed, pertinent positives per HPI otherwise noted to be negative. PHYSICAL EXAM  BP (!) 143/101   Pulse 120   Temp 98.7 °F (37.1 °C)   Resp 14   Ht 5' 1\" (1.549 m)   Wt 220 lb 0.3 oz (99.8 kg)   SpO2 96%   BMI 41.57 kg/m²    Physical exam:  General appearance: awake and cooperative. No distress. Non toxic appearing. Skin: Warm and dry. No rashes or lesions. HENT: Normocephalic. Atraumatic. Mucus membranes are dry. Neck: supple  Eyes: LISA. EOM intact. Heart: RRR. No murmurs. Lungs: Respirations unlabored. Crackles in posterior lower lobes. No wheezes, rales, or rhonchi. Good air exchange  Abdomen: No tenderness. Soft. Non distended. No peritoneal signs. Musculoskeletal: 2+ pitting edema bilaterally. Compartments soft. No deformity. No tenderness in the extremities. All extremities neurovascularly intact. Radial, Dp, and PT pulses +2/4 bilaterally  Neurological: Alert and oriented. No focal deficits. No aphasia or dysarthria. No gait ataxia. Psychiatric: Depressed mood and tearful affect. LABS  I have reviewed all labs for this visit.    Results for orders placed or performed during the hospital encounter of 11/10/20   CBC Auto Differential   Result Value Ref Range    WBC 7.3 4.0 - 11.0 K/uL    RBC 4.53 4.00 - 5.20 M/uL    Hemoglobin 13.3 12.0 - 16.0 g/dL    Hematocrit 40.0 36.0 - 48.0 %    MCV 88.4 80.0 - 100.0 fL    MCH 29.3 26.0 - 34.0 pg    MCHC 33.2 31.0 - 36.0 g/dL    RDW 16.6 (H) 12.4 - 15.4 %    Platelets 816 417 - 791 K/uL    MPV 8.5 5.0 - 10.5 fL    Neutrophils % 70.5 %    Lymphocytes % 22.3 %    Monocytes % 5.7 %    Eosinophils % 0.6 %    Basophils % 0.9 %    Neutrophils Absolute 5.1 1.7 - 7.7 K/uL    Lymphocytes Absolute 1.6 1.0 - 5.1 K/uL    Monocytes Absolute 0.4 0.0 - 1.3 K/uL    Eosinophils Absolute 0.0 0.0 - 0.6 K/uL    Basophils Absolute 0.1 0.0 - 0.2 K/uL   Comprehensive Metabolic Panel w/ Reflex to MG   Result Value Ref Range    Sodium 137 136 - 145 mmol/L    Potassium reflex Magnesium 4.4 3.5 - 5.1 mmol/L    Chloride 96 (L) 99 - 110 mmol/L    CO2 23 21 - 32 mmol/L    Anion Gap 18 (H) 3 - 16    Glucose 428 (H) 70 - 99 mg/dL    BUN 9 7 - 20 mg/dL    CREATININE 0.6 0.6 - 1.1 mg/dL    GFR Non-African American >60 >60    GFR African American >60 >60    Calcium 9.0 8.3 - 10.6 mg/dL    Total Protein 6.5 6.4 - 8.2 g/dL    Alb 3.6 3.4 - 5.0 g/dL    Albumin/Globulin Ratio 1.2 1.1 - 2.2    Total Bilirubin 0.9 0.0 - 1.0 mg/dL    Alkaline Phosphatase 119 40 - 129 U/L    ALT 13 10 - 40 U/L    AST 17 15 - 37 U/L    Globulin 2.9 g/dL   Troponin   Result Value Ref Range    Troponin 0.02 (H) <0.01 ng/mL   Ethanol   Result Value Ref Range    Ethanol Lvl 11 mg/dL   Salicylate   Result Value Ref Range    Salicylate, Serum 1.0 (L) 15.0 - 30.0 mg/dL   Drug screen multi urine   Result Value Ref Range    Amphetamine Screen, Urine Neg Negative <1000ng/mL    Barbiturate Screen, Ur Neg Negative <200 ng/mL    Benzodiazepine Screen, Urine Neg Negative <200 ng/mL    Cannabinoid Scrn, Ur Neg Negative <50 ng/mL    Cocaine Metabolite Screen, Urine Neg Negative <300 ng/mL    Opiate Scrn, Ur Neg Negative <300 ng/mL    PCP Screen, Urine Neg Negative <25 ng/mL    Methadone Screen, Urine Neg Negative <300 ng/mL    Propoxyphene Scrn, Ur Neg Negative <300 ng/mL Oxycodone Urine Neg Negative <100 ng/ml    pH, UA 5.5     Drug Screen Comment: see below    Urinalysis Reflex to Culture    Specimen: Urine, clean catch   Result Value Ref Range    Color, UA Yellow Straw/Yellow    Clarity, UA SL CLOUDY (A) Clear    Glucose, Ur >=1000 (A) Negative mg/dL    Bilirubin Urine Negative Negative    Ketones, Urine 15 (A) Negative mg/dL    Specific Gravity, UA 1.020 1.005 - 1.030    Blood, Urine TRACE-INTACT (A) Negative    pH, UA 5.5 5.0 - 8.0    Protein, UA TRACE (A) Negative mg/dL    Urobilinogen, Urine 0.2 <2.0 E.U./dL    Nitrite, Urine Negative Negative    Leukocyte Esterase, Urine TRACE (A) Negative    Microscopic Examination YES     Urine Type NotGiven     Urine Reflex to Culture Yes    Troponin   Result Value Ref Range    Troponin 0.01 <0.01 ng/mL   Brain Natriuretic Peptide   Result Value Ref Range    Pro-BNP 3,737 (H) 0 - 124 pg/mL   Basic Metabolic Panel w/ Reflex to MG   Result Value Ref Range    Sodium 138 136 - 145 mmol/L    Potassium reflex Magnesium 3.9 3.5 - 5.1 mmol/L    Chloride 95 (L) 99 - 110 mmol/L    CO2 24 21 - 32 mmol/L    Anion Gap 19 (H) 3 - 16    Glucose 339 (H) 70 - 99 mg/dL    BUN 10 7 - 20 mg/dL    CREATININE 0.6 0.6 - 1.1 mg/dL    GFR Non-African American >60 >60    GFR African American >60 >60    Calcium 8.9 8.3 - 10.6 mg/dL   Microscopic Urinalysis   Result Value Ref Range    WBC, UA 21-50 (A) 0 - 5 /HPF    RBC, UA 3-4 0 - 4 /HPF    Epithelial Cells, UA 0-1 0 - 5 /HPF    Bacteria, UA 1+ (A) None Seen /HPF    Yeast, UA Present (A) None Seen /HPF   POCT glucose   Result Value Ref Range    Glucose 324 mg/dL    QC OK?  Yes    POCT Glucose   Result Value Ref Range    POC Glucose 324 (H) 70 - 99 mg/dl    Performed on [id number]    EKG 12 Lead   Result Value Ref Range    Ventricular Rate 107 BPM    Atrial Rate 107 BPM    P-R Interval 154 ms    QRS Duration 84 ms    Q-T Interval 372 ms    QTc Calculation (Bazett) 496 ms    P Axis 9 degrees    R Axis 101 degrees    T Portable    Result Date: 10/26/2020  EXAMINATION: ONE XRAY VIEW OF THE CHEST 10/26/2020 2:08 am COMPARISON: 10/02/2020 HISTORY: ORDERING SYSTEM PROVIDED HISTORY: SOB TECHNOLOGIST PROVIDED HISTORY: Reason for exam:->SOB Reason for Exam: palpitations Acuity: Acute Type of Exam: Ongoing Additional signs and symptoms: pt c/o constant palpitations for several months, hyperglycemia FINDINGS: Left-sided AICD is in stable position. Cardiomegaly again noted. Mild pulmonary vascular congestion seen. Small bilateral pleural effusions and bibasilar patchy opacities are noted, likely atelectasis. Trachea midline. No pneumothorax. Bones are unchanged. Developing CHF/volume overload. ED COURSE/MDM  Patient seen and evaluated. Old records reviewed. Labs and imaging reviewed and results discussed with patient. The patient is a 52-year-old female presenting for suicidal ideation and chest pain. She is tachycardic to 117 on arrival.  She is afebrile, not hypoxic. On exam she is nontoxic-appearing. She is crying and tearful. She denies active suicidal ideation she states she is depressed. She has had multiple medical problems, medical work-up is obtained. She has an EKG that is nonischemic. Her troponin is mildly elevated at 0.02. This could be demand since her heart rate is tachycardic to 117. She has a bit of interstitial edema and mild pleural effusions on chest x-ray, appears to have a mild CHF exacerbation. However she is not hypoxic on ambulation. She is given dose of Lasix here. I repeated her troponin at the 3-hour yanet, and actually improved to 0.01. I do not feel she requires admission for her chest pain specifically. She also was noted to have hyperglycemia in the 400s with an elevated anion gap of 18. I did not give fluids based on her CHF. I did give 10 units of subcu insulin, and then repeated her BMP. There is actually worsening of her anion gap to 19.   Therefore she will require insulin drip, first time of bleeding her potassium. She also was found to have a UTI. She is persistently tachycardic and with her  multiple medical issues occurring today, she will be admitted medically and have psychiatric evaluation on consult. She does not require transfer to [place] for EP evaluation as I spoke with her electrophysiologist Dr. Jerica Cruz who states that nothing needs to be emergently done. During the patient's ED course, the patient was given:  Medications   cefTRIAXone (ROCEPHIN) 1 g IVPB in 50 mL D5W minibag (has no administration in time range)   fluconazole (DIFLUCAN) tablet 150 mg (has no administration in time range)   potassium chloride 10 mEq/100 mL IVPB (Peripheral Line) (has no administration in time range)   aspirin tablet 325 mg (325 mg Oral Given 11/10/20 2139)   insulin lispro (HUMALOG) injection vial 10 Units (10 Units Subcutaneous Given 11/11/20 0114)   furosemide (LASIX) injection 40 mg (40 mg Intravenous Given 11/11/20 0113)        CLINICAL IMPRESSION  1. Acute on chronic combined systolic and diastolic congestive heart failure ([address]    2. Tachycardia    3. Urinary tract infection without hematuria, site unspecified    4. Depressive disorder    5. Diabetic ketoacidosis without coma associated with type 2 diabetes mellitus (HCC)        Blood pressure (!) 143/101, pulse 120, temperature 98.7 °F (37.1 °C), resp. rate 14, height 5' 1\" (1.549 m), weight 220 lb 0.3 oz (99.8 kg), SpO2 96 %, not currently breastfeeding. Patient was given scripts for the following medications. I counseled patient how to take these medications. New Prescriptions    No medications on file       Follow-up with:  No follow-up provider specified. DISCLAIMER: This chart was created using Dragon dictation software. Efforts were made by me to ensure accuracy, however some errors may be present due to limitations of this technology and occasionally words are not transcribed correctly. Lisette, [place]  11/11/20 5972

## 2020-11-11 NOTE — ED NOTES
Call placed to Pulmonology @ [address]  11/11/20 2962    Dr Galen Awan returned the call to [facility]  11/11/20 4046

## 2020-11-11 NOTE — ED NOTES
[id number] Perfect served hospitalist for admission per Dr. Alice Amador returned call to Dr. Harley Palmer  11/10/20 [address]  11/10/20 4857

## 2020-11-11 NOTE — H&P
History and Physical        HISTORY OF PRESENT ILLNESS: 60-year-old female history of hypertension, type 2 diabetes, chronic systolic congestive heart failure, coronary artery disease, history of CVA, SVT/NSVT presented to emergency room with feeling very depressed because of her chronic medical problems. She has been having frequent palpitations associated with shortness of breath. Also has been having chest pain which is chronic. On evaluation the emergency room her blood sugars were in the 400s. She had a mild anion gap at 18. Started on insulin drip and admitted to ICU    Patient has No Known Allergies.     Past Medical History:   Diagnosis Date    Arthritis     CAD (coronary artery disease)     Cerebral artery occlusion with cerebral infarction ([address]     x 3    [facility]     Diabetes mellitus ([address]     Hyperlipidemia     Hypertension     Mental retardation     [facility]     Pacemaker        Past Surgical History:   Procedure Laterality Date    BACK SURGERY      x3    PACEMAKER INSERTION      TUBAL LIGATION      TUMOR REMOVAL      UPPER GASTROINTESTINAL ENDOSCOPY N/A 11/5/2020    EGD BIOPSY performed by Mari Waller DO at [facility]       Scheduled Meds:   cefTRIAXone (ROCEPHIN) IV  1 g Intravenous Q24H    aspirin  81 mg Oral Daily    amiodarone  200 mg Oral Daily    atorvastatin  40 mg Oral Nightly    digoxin  125 mcg Oral Every Other Day    DULoxetine  60 mg Oral Daily    metoclopramide  10 mg Oral TID WC    metoprolol succinate  12.5 mg Oral Daily    miconazole   Topical BID    pantoprazole  40 mg Oral Daily    QUEtiapine  25 mg Oral Nightly    ranolazine  500 mg Oral BID    sacubitril-valsartan  0.5 tablet Oral BID    sucralfate  1 g Oral TID    enoxaparin  40 mg Subcutaneous Daily    influenza virus vaccine  0.5 mL Intramuscular Prior to discharge    insulin glargine  24 Units Subcutaneous BID    insulin lispro  0-18 Units Subcutaneous TID WC    insulin lispro  0-9 Units Subcutaneous Nightly       Continuous Infusions:   sodium chloride         PRN Meds:  nitroGLYCERIN, dextrose, potassium chloride, magnesium sulfate, sodium phosphate IVPB **OR** sodium phosphate IVPB **OR** sodium phosphate IVPB, prochlorperazine       reports that she has never smoked. She has never used smokeless tobacco.    Family History   Problem Relation Age of Onset    Heart Disease Father     Cancer Mother     Other Mother        Social History     Socioeconomic History    Marital status:       Spouse name: None    Number of children: None    Years of education: None    Highest education level: None   Occupational History    None   Social Needs    Financial resource strain: None    Food insecurity     Worry: None     Inability: None    Transportation needs     Medical: None     Non-medical: None   Tobacco Use    Smoking status: Never Smoker    Smokeless tobacco: Never Used   Substance and Sexual Activity    Alcohol use: No    Drug use: No    Sexual activity: Not Currently   Lifestyle    Physical activity     Days per week: None     Minutes per session: None    Stress: None   Relationships    Social connections     Talks on phone: None     Gets together: None     Attends [employer]ist service: None     Active member of club or organization: None     Attends meetings of clubs or organizations: None     Relationship status: None    Intimate partner violence     Fear of current or ex partner: None     Emotionally abused: None     Physically abused: None     Forced sexual activity: None   Other Topics Concern    None   Social History Narrative    None     REVIEW OF SYSTEMS:   Constitutional: Negative for fever   HENT: Negative for sore throat   Eyes: Negative for redness   Respiratory:+ for dyspnea, non productive cough   Cardiovascular: Negative for chest pain   Gastrointestinal: Negative for vomiting, diarrhea Genitourinary: Negative for hematuria   Musculoskeletal: Negative for arthralgias   Skin: Negative for rash   Neurological: Negative for syncope   Hematological: Negative for adenopathy         Vitals:    11/11/20 1109   BP:    Pulse: 108   Resp: 27   Temp:    SpO2: 100%     Gen: No distress. Alert. Eyes: PERRL. No sclera icterus. No conjunctival injection. ENT: No discharge. Pharynx clear. Neck: Trachea midline. Normal thyroid. Resp: No accessory muscle use. No crackles. No wheezes. No rhonchi. No dullness on percussion. CV: Regular rate. Regular rhythm. No murmur or rub. No edema. GI: Non-tender. Non-distended. No masses. No organomegaly. Normal bowel sounds. No hernia. Skin: Warm and dry. No nodule on exposed extremities. No rash on exposed extremities. Lymph: No cervical LAD. No supraclavicular LAD. M/S: No cyanosis. No joint deformity. No clubbing. Neuro: Awake. Psych: Oriented x 3. No anxiety or agitation. CBC:   Recent Labs     11/10/20  1930   WBC 7.3   HGB 13.3   HCT 40.0   MCV 88.4        BMP:   Recent Labs     11/10/20  1930 11/11/20  0215 11/11/20  0655    138 140   K 4.4 3.9 3.4*   CL 96* 95* 101   CO2 23 24 22   PHOS  --   --  2.2*   BUN 9 10 9   CREATININE 0.6 0.6 <0.5*     LIVER PROFILE:   Recent Labs     11/10/20  1930   AST 17   ALT 13   BILITOT 0.9   ALKPHOS 119     PT/INR: No results for input(s): PROTIME, INR in the last 72 hours. APTT: No results for input(s): APTT in the last 72 hours. UA:  Recent Labs     11/11/20  0200   COLORU Yellow   PHUR 5.5  5.5   WBCUA 21-50*   RBCUA 3-4   YEAST Present*   BACTERIA 1+*   CLARITYU SL CLOUDY*   SPECGRAV 1.020   LEUKOCYTESUR TRACE*   UROBILINOGEN 0.2   BILIRUBINUR Negative   BLOODU TRACE-INTACT*   GLUCOSEU >=1000*       Chest imaging was reviewed by me   Marked cardiomegaly with pulmonary vascular congestion/interstitial edema.    Probable small bilateral pleural effusions.  Radiographic appearance   consistent with CHF. ASSESSMENT:  Active Problems:    DKA, type 2, not at goal [facility])    Depressive disorder  Resolved Problems:    * No resolved hospital problems. *        PLAN:  1. Uncontrolled diabetes with possible mild DKA. She has never had a history of DKA in the past.  She was placed on DKA protocol with insulin drip and IV fluids. Anion gap is very minimally elevated. She is not acidotic. We will discontinue the DKA protocol and initiate Lantus insulin and sliding scale insulin. Discontinue IV fluids. Hold Metformin for now    2. Chronic systolic congestive heart failure. Status post AICD. Stable. Continue digoxin, Entresto and metoprolol. Start home dose Lasix. 3.  Palpitations. SVT/NSVT. Being followed by EP. Amiodarone dose has been recently increased. They are considering ablation. Nonobstructive CAD. Stable. History of TIA. Continue aspirin and statin    Depression. Seen by psychiatry. No suicidal ideation. No need for sitter. No need for inpatient psychiatric treatment    UTI. Continue Rocephin and Diflucan. Obtain urine cultures    Carb control diet. Lovenox for DVT prophylaxis.         Lilly Hernández 11/11/2020 11:50 AM

## 2020-11-11 NOTE — PROGRESS NOTES
Bedside report received from [facility] in ED. Pt transferred to ICU room 15. Admission questions completed while patient in the ED    Admission assessment was completed (see flow sheet). Pt is A/O, complains of chest Pain- chronic. Denies any other needs, denies suicidal ideations. Respirations are even, unlabored, with clear/ diminished sounds. Scheduled medications to follow- whole with water. Call light within reach. Bed in lowest position. Bed alarm on. Will continue to monitor. Refused 4 eyes. Patient is able to demonstrated the ability to move from a reclining position to an upright position within the recliner.

## 2020-11-11 NOTE — CONSULTS
Patient is being seen at the request of Dr. Junior Cobos for a consultation for DKA    HISTORY OF PRESENT ILLNESS: This is a 70-year-old female with a significant cardiac history, type 2 diabetes and depression who presented to the emergency department on 11/10/2020 with a 1 day history of moderate substernal chest pain, 6 out of 10, left-sided, similar to her prior chest pain, associated with shortness of breath and palpitations. She has had similar symptoms many times in the past.  Her chest pain resolved during this admission. In the emergency department she was found to have hyperglycemia with a very mild anion gap, with associated ketonuria. Her venous blood gas showed alkalemia. Of note she was resting quietly and comfortably in bed and appeared comfortable throughout the history and physical with the exception of when I asked her if she was having chest pain to which she replied that she was having an awful unbearable  chest pain. PAST MEDICAL HISTORY:  Past Medical History:   Diagnosis Date    Arthritis     CAD (coronary artery disease)     Cerebral artery occlusion with cerebral infarction ([address]     x 3    CHF (congestive heart failure) (HCC)     Diabetes mellitus ([address]     Hyperlipidemia     Hypertension     Mental retardation     MI (myocardial infarction) ([address]     Pacemaker      PAST SURGICAL HISTORY:  Past Surgical History:   Procedure Laterality Date    BACK SURGERY      x3    PACEMAKER INSERTION      TUBAL LIGATION      TUMOR REMOVAL      UPPER GASTROINTESTINAL ENDOSCOPY N/A 11/5/2020    EGD BIOPSY performed by Teri Álvarez DO at [facility]       FAMILY HISTORY:  family history includes Cancer in her mother; Heart Disease in her father; Other in her mother. SOCIAL HISTORY:   reports that she has never smoked.  She has never used smokeless tobacco.    Scheduled Meds:   cefTRIAXone (ROCEPHIN) IV  1 g Intravenous Q24H    aspirin  81 mg Oral Daily    amiodarone  200 mg Oral Daily    atorvastatin  40 mg Oral Nightly    digoxin  125 mcg Oral Every Other Day    DULoxetine  60 mg Oral Daily    metoclopramide  10 mg Oral TID     metoprolol succinate  12.5 mg Oral Daily    miconazole   Topical BID    pantoprazole  40 mg Oral Daily    QUEtiapine  25 mg Oral Nightly    ranolazine  500 mg Oral BID    sacubitril-valsartan  0.5 tablet Oral BID    sucralfate  1 g Oral TID    enoxaparin  40 mg Subcutaneous Daily    influenza virus vaccine  0.5 mL Intramuscular Prior to discharge    insulin glargine  24 Units Subcutaneous BID    insulin lispro  0-18 Units Subcutaneous TID     insulin lispro  0-9 Units Subcutaneous Nightly    furosemide  40 mg Intravenous BID    spironolactone  25 mg Oral Daily    sodium chloride flush         Continuous Infusions:   sodium chloride       PRN Meds:  nitroGLYCERIN, dextrose, potassium chloride, magnesium sulfate, sodium phosphate IVPB **OR** sodium phosphate IVPB **OR** sodium phosphate IVPB, prochlorperazine    ALLERGIES:  Patient has No Known Allergies. REVIEW OF SYSTEMS:  Constitutional: Negative for fever  HENT: Negative for sore throat  Eyes: Negative for redness   Respiratory: +dyspnea   Cardiovascular: + for chest pain  Gastrointestinal: + nausea/vomiting   Genitourinary: Negative for hematuria   Musculoskeletal: Negative for arthralgias   Skin: Negative for rash  Neurological: Negative for syncope  Hematological: Negative for adenopathy  Psychiatric/Behavorial: Negative for anxiety    PHYSICAL EXAM:  Blood pressure (!) 119/90, pulse 116, temperature 98 °F (36.7 °C), temperature source Oral, resp. rate 25, height 5' 1\" (1.549 m), weight 220 lb 0.3 oz (99.8 kg), SpO2 99 %, not currently breastfeeding.' on room air  Gen: No distress. Eyes: PERRL. No sclera icterus. No conjunctival injection. ENT: No discharge. Pharynx clear. Neck: Trachea midline. No obvious mass. Resp: No accessory muscle use.  No crackles. No wheezes. No rhonchi. No dullness on percussion. CV: Regular rate. Regular rhythm. No murmur or rub. No edema. Peripheral pulses are 2+. Capillary refill is less than 3 seconds. GI: Non-tender. Non-distended. No hernia. Skin: Warm and dry. No nodule on exposed extremities. Lymph: No cervical LAD. No supraclavicular LAD. M/S: No cyanosis. No joint deformity. No clubbing. Neuro: Awake. Alert. Moves all four extremities. Psych: Oriented x 3. No anxiety. LABS:  CBC:   Recent Labs     11/10/20  1930   WBC 7.3   HGB 13.3   HCT 40.0   MCV 88.4        BMP:   Recent Labs     11/11/20  0215 11/11/20  0655 11/11/20  1209    140 135*   K 3.9 3.4* 3.0*   CL 95* 101 98*   CO2 24 22 24   PHOS  --  2.2* 2.5   BUN 10 9 8   CREATININE 0.6 <0.5* <0.5*     LIVER PROFILE:   Recent Labs     11/10/20  1930   AST 17   ALT 13   BILITOT 0.9   ALKPHOS 119     PT/INR: No results for input(s): PROTIME, INR in the last 72 hours. APTT: No results for input(s): APTT in the last 72 hours. UA:  Recent Labs     11/11/20  0200   COLORU Yellow   PHUR 5.5  5.5   WBCUA 21-50*   RBCUA 3-4   YEAST Present*   BACTERIA 1+*   CLARITYU SL CLOUDY*   SPECGRAV 1.020   LEUKOCYTESUR TRACE*   UROBILINOGEN 0.2   BILIRUBINUR Negative   BLOODU TRACE-INTACT*   GLUCOSEU >=1000*     No results for input(s): PHART, RRI5USO, PO2ART in the last 72 hours.     Chest imaging was reviewed by me and showed   11/10/2020 pulmonary vascular congestion    8/22/2020 CT chest: No PE, multinodular goiter, pleural effusions, interlobular septal thickening, no concerning findings in the lung parenchyma    ASSESSMENT:  · Diabetic ketoacidosis, very mild, has resolved  · Type II diabetes mellitus   · Chest pain, similar to prior  · H/O non-occlusive CAD  · Acute on chronic systolic CHF, last EF 88-24%  · H/O CVA  · H/O NSVT s/p ICD  · UTI    PLAN:  Insulin has been transition to long-acting by internal medicine, which I agree with  Diuresis per cardiology  Metoprolol, Entresto, spironolactone, amiodarone, digoxin, aspirin and statin per cardiology  Diabetic education  Rocephin and Diflucan per internal medicine  Psychiatry has seen  Prophylaxis: DVT - lovenox; Mupirocin  I will not plan to follow once transferred out of ICU. Please call with any questions or concerns: [phone] (personal cell) or [phone] (service).

## 2020-11-11 NOTE — ED NOTES
2918 Dr. Jennifer Nunes called and spoke with Dr. Elis Fuentes. Patient will be admitted     [facility]  11/11/20 6005

## 2020-11-11 NOTE — PROGRESS NOTES
This RN is taking care of pt until bed is open. DKA protocol has been started with an initial BS of 213. Insulin drip going at 9.98 units/hr along with initial bolus of NS and K+ replacement. Antibiotic was administered along with antifungal. Pt is tearful and sts \"I am done, no one cares just leave me alone. \" Pt was assured we do care and we are here to help and any way including possible resources for mental health. Sitter is at bedside due to pt being suicidal. Pt is A+OX4. Bilateral lung sounds are diminished. Pt has no requests and/or questions at this time. Call light within reach, will continue to monitor.

## 2020-11-11 NOTE — PROGRESS NOTES
Pt now started on multiplier for DKA protocol. Multiplier of 0.03 started for a BS of 137- D5-0.45% fluids started.

## 2020-11-12 LAB
ANION GAP SERPL CALCULATED.3IONS-SCNC: 12 MMOL/L (ref 3–16)
BUN BLDV-MCNC: 11 MG/DL (ref 7–20)
CALCIUM SERPL-MCNC: 8.3 MG/DL (ref 8.3–10.6)
CHLORIDE BLD-SCNC: 99 MMOL/L (ref 99–110)
CO2: 24 MMOL/L (ref 21–32)
CREAT SERPL-MCNC: <0.5 MG/DL (ref 0.6–1.1)
DIGOXIN LEVEL: <0.3 NG/ML (ref 0.8–2)
GFR AFRICAN AMERICAN: >60
GFR NON-AFRICAN AMERICAN: >60
GLUCOSE BLD-MCNC: 184 MG/DL (ref 70–99)
GLUCOSE BLD-MCNC: 191 MG/DL (ref 70–99)
GLUCOSE BLD-MCNC: 217 MG/DL (ref 70–99)
GLUCOSE BLD-MCNC: 260 MG/DL (ref 70–99)
GLUCOSE BLD-MCNC: 274 MG/DL (ref 70–99)
MAGNESIUM: 1.7 MG/DL (ref 1.8–2.4)
PERFORMED ON: ABNORMAL
PHOSPHORUS: 2.6 MG/DL (ref 2.5–4.9)
POTASSIUM SERPL-SCNC: 3.4 MMOL/L (ref 3.5–5.1)
SODIUM BLD-SCNC: 135 MMOL/L (ref 136–145)
URINE CULTURE, ROUTINE: NORMAL

## 2020-11-12 PROCEDURE — 6370000000 HC RX 637 (ALT 250 FOR IP): Performed by: INTERNAL MEDICINE

## 2020-11-12 PROCEDURE — 84100 ASSAY OF PHOSPHORUS: CPT

## 2020-11-12 PROCEDURE — 99233 SBSQ HOSP IP/OBS HIGH 50: CPT | Performed by: INTERNAL MEDICINE

## 2020-11-12 PROCEDURE — 80048 BASIC METABOLIC PNL TOTAL CA: CPT

## 2020-11-12 PROCEDURE — 99232 SBSQ HOSP IP/OBS MODERATE 35: CPT | Performed by: INTERNAL MEDICINE

## 2020-11-12 PROCEDURE — 2060000000 HC ICU INTERMEDIATE R&B

## 2020-11-12 PROCEDURE — 83735 ASSAY OF MAGNESIUM: CPT

## 2020-11-12 PROCEDURE — 80162 ASSAY OF DIGOXIN TOTAL: CPT

## 2020-11-12 PROCEDURE — 6360000002 HC RX W HCPCS: Performed by: INTERNAL MEDICINE

## 2020-11-12 PROCEDURE — 2500000003 HC RX 250 WO HCPCS: Performed by: INTERNAL MEDICINE

## 2020-11-12 PROCEDURE — 2580000003 HC RX 258

## 2020-11-12 PROCEDURE — 2580000003 HC RX 258: Performed by: INTERNAL MEDICINE

## 2020-11-12 PROCEDURE — 36415 COLL VENOUS BLD VENIPUNCTURE: CPT

## 2020-11-12 RX ORDER — SODIUM CHLORIDE 9 MG/ML
INJECTION, SOLUTION INTRAVENOUS
Status: COMPLETED
Start: 2020-11-12 | End: 2020-11-12

## 2020-11-12 RX ORDER — DEXTROSE MONOHYDRATE 50 MG/ML
100 INJECTION, SOLUTION INTRAVENOUS PRN
Status: DISCONTINUED | OUTPATIENT
Start: 2020-11-12 | End: 2020-11-13 | Stop reason: HOSPADM

## 2020-11-12 RX ORDER — MAGNESIUM SULFATE IN WATER 40 MG/ML
2 INJECTION, SOLUTION INTRAVENOUS ONCE
Status: COMPLETED | OUTPATIENT
Start: 2020-11-12 | End: 2020-11-12

## 2020-11-12 RX ORDER — POTASSIUM CHLORIDE 750 MG/1
40 TABLET, EXTENDED RELEASE ORAL ONCE
Status: COMPLETED | OUTPATIENT
Start: 2020-11-12 | End: 2020-11-12

## 2020-11-12 RX ORDER — METOPROLOL SUCCINATE 25 MG/1
25 TABLET, EXTENDED RELEASE ORAL DAILY
Status: DISCONTINUED | OUTPATIENT
Start: 2020-11-13 | End: 2020-11-13 | Stop reason: HOSPADM

## 2020-11-12 RX ORDER — SODIUM CHLORIDE 0.9 % (FLUSH) 0.9 %
SYRINGE (ML) INJECTION
Status: DISPENSED
Start: 2020-11-12 | End: 2020-11-12

## 2020-11-12 RX ORDER — METOPROLOL SUCCINATE 25 MG/1
12.5 TABLET, EXTENDED RELEASE ORAL ONCE
Status: COMPLETED | OUTPATIENT
Start: 2020-11-12 | End: 2020-11-12

## 2020-11-12 RX ORDER — NICOTINE POLACRILEX 4 MG
15 LOZENGE BUCCAL PRN
Status: DISCONTINUED | OUTPATIENT
Start: 2020-11-12 | End: 2020-11-13 | Stop reason: HOSPADM

## 2020-11-12 RX ORDER — DEXTROSE MONOHYDRATE 25 G/50ML
12.5 INJECTION, SOLUTION INTRAVENOUS PRN
Status: DISCONTINUED | OUTPATIENT
Start: 2020-11-12 | End: 2020-11-13 | Stop reason: HOSPADM

## 2020-11-12 RX ADMIN — FUROSEMIDE 40 MG: 10 INJECTION, SOLUTION INTRAMUSCULAR; INTRAVENOUS at 10:21

## 2020-11-12 RX ADMIN — CEFTRIAXONE SODIUM 1 G: 1 INJECTION, POWDER, FOR SOLUTION INTRAMUSCULAR; INTRAVENOUS at 02:09

## 2020-11-12 RX ADMIN — INSULIN LISPRO 3 UNITS: 100 INJECTION, SOLUTION INTRAVENOUS; SUBCUTANEOUS at 16:35

## 2020-11-12 RX ADMIN — FUROSEMIDE 40 MG: 10 INJECTION, SOLUTION INTRAMUSCULAR; INTRAVENOUS at 18:33

## 2020-11-12 RX ADMIN — MICONAZOLE NITRATE: 20 POWDER TOPICAL at 22:00

## 2020-11-12 RX ADMIN — RANOLAZINE 500 MG: 500 TABLET, FILM COATED, EXTENDED RELEASE ORAL at 09:49

## 2020-11-12 RX ADMIN — ATORVASTATIN CALCIUM 40 MG: 40 TABLET, FILM COATED ORAL at 21:51

## 2020-11-12 RX ADMIN — SPIRONOLACTONE 25 MG: 25 TABLET ORAL at 09:49

## 2020-11-12 RX ADMIN — POTASSIUM CHLORIDE 40 MEQ: 750 TABLET, EXTENDED RELEASE ORAL at 11:56

## 2020-11-12 RX ADMIN — ASPIRIN 81 MG: 81 TABLET, COATED ORAL at 09:49

## 2020-11-12 RX ADMIN — PANTOPRAZOLE SODIUM 40 MG: 40 TABLET, DELAYED RELEASE ORAL at 09:49

## 2020-11-12 RX ADMIN — METOPROLOL SUCCINATE 12.5 MG: 25 TABLET, EXTENDED RELEASE ORAL at 09:49

## 2020-11-12 RX ADMIN — SUCRALFATE 1 G: 1 TABLET ORAL at 14:06

## 2020-11-12 RX ADMIN — POTASSIUM CHLORIDE 40 MEQ: 750 TABLET, EXTENDED RELEASE ORAL at 16:32

## 2020-11-12 RX ADMIN — METOCLOPRAMIDE 10 MG: 10 TABLET ORAL at 09:49

## 2020-11-12 RX ADMIN — ENOXAPARIN SODIUM 40 MG: 100 INJECTION SUBCUTANEOUS at 10:20

## 2020-11-12 RX ADMIN — INSULIN LISPRO 6 UNITS: 100 INJECTION, SOLUTION INTRAVENOUS; SUBCUTANEOUS at 08:08

## 2020-11-12 RX ADMIN — SACUBITRIL AND VALSARTAN 0.5 TABLET: 24; 26 TABLET, FILM COATED ORAL at 09:48

## 2020-11-12 RX ADMIN — METOCLOPRAMIDE 10 MG: 10 TABLET ORAL at 11:56

## 2020-11-12 RX ADMIN — DULOXETINE HYDROCHLORIDE 60 MG: 60 CAPSULE, DELAYED RELEASE ORAL at 09:50

## 2020-11-12 RX ADMIN — POTASSIUM CHLORIDE 40 MEQ: 750 TABLET, EXTENDED RELEASE ORAL at 09:49

## 2020-11-12 RX ADMIN — SODIUM CHLORIDE 250 ML: 9 INJECTION, SOLUTION INTRAVENOUS at 02:10

## 2020-11-12 RX ADMIN — METOPROLOL SUCCINATE 12.5 MG: 25 TABLET, EXTENDED RELEASE ORAL at 11:56

## 2020-11-12 RX ADMIN — INSULIN GLARGINE 24 UNITS: 100 INJECTION, SOLUTION SUBCUTANEOUS at 21:52

## 2020-11-12 RX ADMIN — METFORMIN HYDROCHLORIDE 1000 MG: 500 TABLET ORAL at 10:30

## 2020-11-12 RX ADMIN — SACUBITRIL AND VALSARTAN 0.5 TABLET: 24; 26 TABLET, FILM COATED ORAL at 21:52

## 2020-11-12 RX ADMIN — METOCLOPRAMIDE 10 MG: 10 TABLET ORAL at 16:32

## 2020-11-12 RX ADMIN — AMIODARONE HYDROCHLORIDE 200 MG: 200 TABLET ORAL at 09:49

## 2020-11-12 RX ADMIN — QUETIAPINE FUMARATE 25 MG: 25 TABLET ORAL at 21:52

## 2020-11-12 RX ADMIN — METFORMIN HYDROCHLORIDE 1000 MG: 500 TABLET ORAL at 16:32

## 2020-11-12 RX ADMIN — SUCRALFATE 1 G: 1 TABLET ORAL at 21:51

## 2020-11-12 RX ADMIN — RANOLAZINE 500 MG: 500 TABLET, FILM COATED, EXTENDED RELEASE ORAL at 21:51

## 2020-11-12 RX ADMIN — INSULIN GLARGINE 24 UNITS: 100 INJECTION, SOLUTION SUBCUTANEOUS at 08:08

## 2020-11-12 RX ADMIN — MAGNESIUM SULFATE HEPTAHYDRATE 2 G: 40 INJECTION, SOLUTION INTRAVENOUS at 10:25

## 2020-11-12 RX ADMIN — MICONAZOLE NITRATE: 20 POWDER TOPICAL at 12:07

## 2020-11-12 RX ADMIN — SUCRALFATE 1 G: 1 TABLET ORAL at 09:49

## 2020-11-12 RX ADMIN — INSULIN LISPRO 9 UNITS: 100 INJECTION, SOLUTION INTRAVENOUS; SUBCUTANEOUS at 11:46

## 2020-11-12 ASSESSMENT — PAIN SCALES - GENERAL
PAINLEVEL_OUTOF10: 0

## 2020-11-12 NOTE — PROGRESS NOTES
No change in exam noted and pt assisted to bedside commode to void.  She is steady on her feet Paola Fried

## 2020-11-12 NOTE — PROGRESS NOTES
Pulmonary & Critical Care Medicine ICU Progress Note    CC: Chest pain, DKA    Events of Last 24 hours: DKA has resolved, seen by psychiatry, EKG showed sinus tach with PVC  She tells me that her heart is bothering her because it feels like it is pounding    Invasive Lines: IV: Peripheral    MV: None     / / /   No results for input(s): PHART, ILM5VCG, PO2ART in the last 72 hours. IV:      Vitals:  Blood pressure (!) 117/96, pulse 99, temperature 97.6 °F (36.4 °C), temperature source Axillary, resp. rate 16, height 5' 1\" (1.549 m), weight 223 lb 1.6 oz (101.2 kg), SpO2 97 %, not currently breastfeeding. on room air  Temp  Av.9 °F (36.6 °C)  Min: 97.6 °F (36.4 °C)  Max: 98.3 °F (36.8 °C)    Intake/Output Summary (Last 24 hours) at 2020 0634  Last data filed at 2020 0256  Gross per 24 hour   Intake 1136 ml   Output 500 ml   Net 636 ml     EXAM:  General: Appears chronically unwell  Eyes: PERRL. No sclera icterus. No conjunctival injection. ENT: No discharge. Pharynx clear. Neck: Trachea midline. Normal thyroid. Resp: No accessory muscle use. No crackles. No wheezing. No rhonchi. No dullness on percussion. CV: Regular rate. Regular rhythm. No mumur or rub. + edema. Peripheral pulses are 2+. Capillary refill is less than 3 seconds. GI: Non-tender. Non-distended. No masses. No organomegaly. Normal bowel sounds. No hernia. Skin: Warm and dry. No nodule on exposed extremities. No rash on exposed extremities. Lymph: No cervical LAD. No supraclavicular LAD. M/S: No cyanosis. No joint deformity. No clubbing. Neuro: A&O.  Patellar reflexes are symmetric  Psych: No agitation, no anxiety, affect is full     Scheduled Meds:   cefTRIAXone (ROCEPHIN) IV  1 g Intravenous Q24H    aspirin  81 mg Oral Daily    amiodarone  200 mg Oral Daily    atorvastatin  40 mg Oral Nightly    digoxin  125 mcg Oral Every Other Day    DULoxetine  60 mg Oral Daily    metoclopramide  10 mg Oral TID WC    metoprolol succinate  12.5 mg Oral Daily    miconazole   Topical BID    pantoprazole  40 mg Oral Daily    QUEtiapine  25 mg Oral Nightly    ranolazine  500 mg Oral BID    sacubitril-valsartan  0.5 tablet Oral BID    sucralfate  1 g Oral TID    enoxaparin  40 mg Subcutaneous Daily    influenza virus vaccine  0.5 mL Intramuscular Prior to discharge    insulin glargine  24 Units Subcutaneous BID    insulin lispro  0-18 Units Subcutaneous TID WC    insulin lispro  0-9 Units Subcutaneous Nightly    furosemide  40 mg Intravenous BID    spironolactone  25 mg Oral Daily    potassium chloride  40 mEq Oral BID WC     PRN Meds:  nitroGLYCERIN, dextrose, potassium chloride, magnesium sulfate, sodium phosphate IVPB **OR** sodium phosphate IVPB **OR** sodium phosphate IVPB, prochlorperazine    Results:  CBC:   Recent Labs     11/10/20  1930   WBC 7.3   HGB 13.3   HCT 40.0   MCV 88.4        BMP:   Recent Labs     11/11/20  0655 11/11/20  1209 11/12/20  0421    135* 135*   K 3.4* 3.0* 3.4*    98* 99   CO2 22 24 24   PHOS 2.2* 2.5 2.6   BUN 9 8 11   CREATININE <0.5* <0.5* <0.5*     LIVER PROFILE:   Recent Labs     11/10/20  1930   AST 17   ALT 13   BILITOT 0.9   ALKPHOS 119       Cultures:  Urine was not sent    Films:  Chest imaging was reviewed by me and showed   11/10/2020 pulmonary vascular congestion    8/22/2020 CT chest: No PE, multinodular goiter, pleural effusions, interlobular septal thickening, no concerning findings in the lung parenchyma    ASSESSMENT:  · Diabetic ketoacidosis, very mild, has resolved  · Type II diabetes mellitus   · Chest pain, similar to prior  · H/O non-occlusive CAD  · Acute on chronic systolic CHF, last EF 14-98%  · H/O CVA  · H/O NSVT s/p ICD  · UTI    PLAN:  Insulin has been transition to long-acting by internal medicine, which I agree with; FSBS monitoring   Diuresis per cardiology  Metoprolol, Entresto, spironolactone, amiodarone, digoxin, aspirin and statin per cardiology  Rocephin per internal medicine; urine culture was added  Psychiatry has seen  Prophylaxis: DVT - lovenox; Mupirocin  I will not plan to follow once transferred out of ICU.   Please call with any questions or concerns: [phone] (personal cell) or [phone] (service) *

## 2020-11-12 NOTE — PROGRESS NOTES
Initial exam completed- See doc flowsheet for assessment findings. Pt resting quietly in bed and denies any complaints of pain or shortness of breath. All lines and monitoring devices are in place . Vitals and SpO2 stable. Call light is within easy reach. Plan of care and goals reviewed.  Lesvia House RN

## 2020-11-12 NOTE — PROGRESS NOTES
Bedside report given to [address] [place] [address] and care of pt transferred Christen Feliz RN

## 2020-11-12 NOTE — CONSULTS
[address]                 [address] [address]                                  CONSULTATION    PATIENT NAME: Nicolette Umana                    :        1960  MED REC NO:   [id number]                          ROOM:       3008  ACCOUNT NO:   [de-identified]                           ADMIT DATE: 11/10/2020  PROVIDER:     Ioana Linares MD    CONSULT DATE:  2020    CHIEF COMPLAINT:  Chest pain, suicidality. HISTORY OF PRESENT ILLNESS:  The patient is a 52-year-old female with a  history of diabetes, hypertension, coronary artery disease, and CHF. She presented to the ED after EMS was called because there were concerns  that she had stated that she wanted to kill herself. She stated that it  was a misunderstanding and she stated that a nurse apparently had called  to check on her and she had made a comment to the nurse that she was  \"tired of her heart beating this way. \"  The nurse interpreted this as  what she was having thoughts of suicide. She denies thoughts of this,  denies any prior attempts. She states she has chest pain and became  frustrated with the situation. She apparently is tearful and a neighbor  called the EMS since the patient had made some vague suicide statements. She denied SI once the EMS arrived. She appears to have a history of similar behaviors. I saw her on  consult for this on 2020 when she was in DKA and had depression. She has not been compliant with her treatment, but was not suicidal and  do not require any psychiatric sources at that time. PRIOR PSYCHIATRIC HISTORY:  No inpatient nor outpatient. PAST MEDICAL HISTORY:  Coronary artery disease, diabetes,  hyperlipidemia, hypertension, CHF. FAMILY PSYCHIATRIC HISTORY:  Unknown. SOCIAL HISTORY:  Lives in her own place in [facility]. She has a nurse who  comes into her home to help with her medications.   Lives with her sister  in [place]. DRUGS AND ALCOHOL:  Denies any use. ALLERGIES TO MEDICATIONS:  Unknown. REVIEW OF SYSTEMS:  Pertinent positives in the HPI, otherwise negative. PHYSICAL EXAMINATION:  Physical exam per Dr. Elias Monet, 11/11/2020. VITAL SIGNS:  Temperature 97.6, pulse 85, respirations 16, blood  pressure 130/89. She is 220 pounds. LEGAL ISSUES:  None. LABORATORY DATA:  Laboratories reviewed. Drug screen negative. TRAUMA HISTORY:  None reported. MENTAL STATUS EXAMINATION:  The patient is a 45-year-old female who is  relatively pleasant. She appears to be somewhat delayed cognitively. Insight and judgment were impaired. She is oriented to person, place,  and time. Denies being suicidal or homicidal.  Denied any auditory or  visual hallucinations. Thoughts are coherent and logical.  Speech is  normal rate and tone. Fund of knowledge and language is fair. Attention and concentration are adequate, able to recall three objects  immediately. She said her mood was okay. Affect was slightly  constricted. Did not show any abnormalities or movements. ASSESSMENT:  AXIS I:  1. Depressive disorder, unspecified. 2.  Cognitive delay. AXIS II:  Deferred. AXIS III:  See medical history. AXIS IV:  Moderate. AXIS V:  55. PLAN:  1. The patient does not require inpatient psychiatric treatment. 2.  Discontinue suicidality. Suicidal precautions _____ were  considered. 3.  Transfer to medical when stabilized. 4.  I would not change any of her psychotropic medication at this time. 5.  Return home when stabilized. 6.  Spent approximately 110 minutes on this evaluation with more than  50% of the time discussing the patient's care and treatment options.         Jean Lind MD    D: 11/11/2020 22:02:24       T: 11/11/2020 23:09:18     [email or web address]  Job#: [id number]     Doc#: [id number]    CC:

## 2020-11-12 NOTE — PROGRESS NOTES
4 Eyes Skin Assessment     The patient is being assess for   Shift Handoff given to: Ger MORALES RN  I agree that 2 RN's have performed a thorough Head to Toe Skin Assessment on the patient. ALL assessment sites listed below have been assessed. Areas assessed by both nurses:   [x]   Head, Face, and Ears   [x]   Shoulders, Back, and Chest, Abdomen  [x]   Arms, Elbows, and Hands   [x]   Coccyx, Sacrum, and Ischium  [x]   Legs, Feet, and Heels        **SHARE this note so that the co-signing nurse is able to place an eSignature**    Co-signer eSignature: Electronically signed by Nelli Mckeon RN on 11/12/20 at 4:57 PM EST    Does the Patient have Skin Breakdown?   No          Ignacio Prevention initiated:  Yes   Wound Care Orders initiated:  No      [facility] nurse consulted for Pressure Injury (Stage 3,4, Unstageable, DTI, NWPT, Complex wounds)and New or Established Ostomies:  No      Primary Nurse eSignature: Electronically signed by Wilfred Witt RN on 11/12/20 at 5:36 AM EST

## 2020-11-12 NOTE — PROGRESS NOTES
Vitals:    11/12/20 1822   BP: 109/76   Pulse: 100   Resp: 18   Temp: 98 °F (36.7 °C)   SpO2:        Pt in bed, lying down. Alert oriented X4. IV lasix as ordered. Pt oriented to room and call light. Bed alarm in place. Will monitor.

## 2020-11-12 NOTE — PROGRESS NOTES
Patient is able to demonstrated the ability to move from a reclining position to an upright position within the recliner.  Fadi Spencer RN

## 2020-11-12 NOTE — PLAN OF CARE
Problem: Pain:  Goal: Pain level will decrease  Description: Pain level will decrease  11/12/2020 0307 by Supriya Rausch RN  Outcome: Ongoing  11/12/2020 0101 by Supriya Rausch RN  Outcome: Ongoing  Goal: Control of acute pain  Description: Control of acute pain  11/12/2020 0307 by Supriya Rausch RN  Outcome: Ongoing  11/12/2020 0101 by Supriya Rausch RN  Outcome: Ongoing  Goal: Control of chronic pain  Description: Control of chronic pain  11/12/2020 0307 by Supriya Rausch RN  Outcome: Ongoing  11/12/2020 0101 by Supriya Rausch RN  Outcome: Ongoing     Problem: Falls - Risk of:  Goal: Will remain free from falls  Description: Will remain free from falls  Outcome: Ongoing  Goal: Absence of physical injury  Description: Absence of physical injury  Outcome: Ongoing

## 2020-11-12 NOTE — PROGRESS NOTES
Admit: 11/10/2020    Name:  Jevon Sanchez  Room:  [facility]  MRN:    [id number]    Critical Care Daily Progress Note for 2020     Interval History:     Doing well today    Scheduled Meds:   sodium chloride flush        magnesium sulfate  2 g Intravenous Once    metFORMIN  1,000 mg Oral BID WC    cefTRIAXone (ROCEPHIN) IV  1 g Intravenous Q24H    aspirin  81 mg Oral Daily    amiodarone  200 mg Oral Daily    atorvastatin  40 mg Oral Nightly    digoxin  125 mcg Oral Every Other Day    DULoxetine  60 mg Oral Daily    metoclopramide  10 mg Oral TID WC    metoprolol succinate  12.5 mg Oral Daily    miconazole   Topical BID    pantoprazole  40 mg Oral Daily    QUEtiapine  25 mg Oral Nightly    ranolazine  500 mg Oral BID    sacubitril-valsartan  0.5 tablet Oral BID    sucralfate  1 g Oral TID    enoxaparin  40 mg Subcutaneous Daily    influenza virus vaccine  0.5 mL Intramuscular Prior to discharge    insulin glargine  24 Units Subcutaneous BID    insulin lispro  0-18 Units Subcutaneous TID WC    insulin lispro  0-9 Units Subcutaneous Nightly    furosemide  40 mg Intravenous BID    spironolactone  25 mg Oral Daily    potassium chloride  40 mEq Oral BID WC       Continuous Infusions:      PRN Meds:  nitroGLYCERIN, dextrose, prochlorperazine                  Objective:     Temp  Av.9 °F (36.6 °C)  Min: 97.6 °F (36.4 °C)  Max: 98.3 °F (36.8 °C)  Pulse  Av.1  Min: 70  Max: 120  BP  Min: 93/71  Max: 159/95  SpO2  Av.3 %  Min: 90 %  Max: 100 %  Patient Vitals for the past 4 hrs:   BP Temp Temp src Pulse Resp SpO2   20 0900 128/87 -- -- 70 21 98 %   20 0800 129/79 -- -- 98 23 94 %   20 0702 121/82 -- -- 95 13 96 %   20 0700 -- 97.6 °F (36.4 °C) Oral 102 23 100 %   20 0600 (!) 117/96 -- -- 99 12 97 %         Intake/Output Summary (Last 24 hours) at 2020 0954  Last data filed at 2020 0600  Gross per 24 hour   Intake 1136 ml   Output 500 ml Net 636 ml       Physical Exam:  Gen: No distress. Alert. Eyes: PERRL. No sclera icterus. No conjunctival injection. ENT: No discharge. Pharynx clear. Neck: Trachea midline. Normal thyroid. Resp: No accessory muscle use. No crackles. No wheezes. No rhonchi. No dullness on percussion. CV: Regular rate. Regular rhythm. No murmur or rub. No edema. GI: Non-tender. Non-distended. No masses. No organomegaly. Normal bowel sounds. No hernia. Skin: Warm and dry. No nodule on exposed extremities. No rash on exposed extremities. Lymph: No cervical LAD. No supraclavicular LAD. M/S: No cyanosis. No joint deformity. No clubbing. Neuro: Awake. Psych: Oriented x 3. No anxiety or agitation. Lab Data:  CBC:   Recent Labs     11/10/20  1930   WBC 7.3   RBC 4.53   HGB 13.3   HCT 40.0   MCV 88.4   RDW 16.6*        BMP:   Recent Labs     11/11/20  0655 11/11/20  1209 11/12/20  0421    135* 135*   K 3.4* 3.0* 3.4*    98* 99   CO2 22 24 24   PHOS 2.2* 2.5 2.6   BUN 9 8 11   CREATININE <0.5* <0.5* <0.5*     BNP: No results for input(s): BNP in the last 72 hours. PT/INR: No results for input(s): PROTIME, INR in the last 72 hours. APTT:No results for input(s): APTT in the last 72 hours. CARDIAC ENZYMES:   Recent Labs     11/11/20  0005 11/11/20  0655 11/11/20  1209   TROPONINI 0.01 <0.01 <0.01     FASTING LIPID PANEL:  Lab Results   Component Value Date    CHOL 166 10/12/2019    HDL 51 10/12/2019    TRIG 142 10/12/2019     LIVER PROFILE:   Recent Labs     11/10/20  1930   AST 17   ALT 13   BILITOT 0.9   ALKPHOS 119       Chest imaging was reviewed by me   Marked cardiomegaly with pulmonary vascular congestion/interstitial edema. Probable small bilateral pleural effusions.  Radiographic appearance   consistent with CHF.      Assessment & Plan:     Patient Active Problem List    Diagnosis Date Noted    Acute on chronic systolic CHF (congestive heart failure) (HCC)     Intractable nausea and vomiting 11/05/2020    Tachycardia     Uncontrolled type 2 diabetes mellitus with hyperglycemia (HCC)     Lactic acidosis     NSVT (nonsustained ventricular tachycardia) (HCC)     Acute pulmonary edema (HCC)     Mixed hyperlipidemia     Type 2 diabetes mellitus with hyperglycemia, with long-term current use of insulin ([address] 10/03/2020    SVT (supraventricular tachycardia) ([address]     Obesity 08/24/2020    History of CVA (cerebrovascular accident)     Noncompliance with medications     Syncope and collapse 06/13/2020    S/P ICD (internal cardiac defibrillator) procedure     Hypokalemia     Persistent fever     DKA, type 2, not at goal [facility]) 04/16/2020    Cognitive developmental delay 04/16/2020    Depressive disorder 04/16/2020    Urinary tract infection without hematuria     Atrial tachycardia ([address] 01/28/2020    Leukocytosis     Diabetic ketoacidosis with coma associated with type 2 diabetes mellitus ([address] 12/21/2019    Diabetic acidosis without coma ([address]     Hyponatremia     Metabolic acidosis     Disorder of electrolytes     Hypernatremia     Diabetic hyperosmolar non-ketotic state ([address] 12/20/2019    Arterial ischemic stroke, ICA, right, acute ([address]     Chest pain 10/07/2019    Non-intractable vomiting with nausea     Diabetic ketoacidosis without coma associated with type 2 diabetes mellitus ([address]     Elevated blood sugar 04/09/2019    TIA (transient ischemic attack) 09/17/2018    Cardiomyopathy ([address]     Essential hypertension     CHF (congestive heart failure) ([address] 06/13/2018    Dual ICD (implantable cardioverter-defibrillator) in place 05/08/2018    Brain tumor (benign) ([address] 05/08/2018    Chronic combined systolic and diastolic congestive heart failure ([address] 05/08/2018    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)     TIA involving right internal carotid artery     CAD in native artery     DM (diabetes mellitus), secondary, uncontrolled, w/neurologic complic [facility]     DM (diabetes mellitus) ([facility]     HTN (hypertension), benign     Dyslipidemia     CAD (coronary artery disease)        1. Uncontrolled diabetes with possible mild DKA. She has never had a history of DKA in the past.  She was placed on DKA protocol with insulin drip and IV fluids. Anion gap is very minimally elevated. She is not acidotic. We will discontinue the DKA protocol and initiate Lantus insulin and sliding scale insulin. Discontinue IV fluids. Hold Metformin for now     2. Chronic systolic congestive heart failure. Status post AICD. Stable. Continue digoxin, Entresto and metoprolol. Start home dose Lasix.     3.  Palpitations. SVT/NSVT. Being followed by EP. Amiodarone dose has been recently increased. They are considering ablation.     Nonobstructive CAD. Stable.     History of TIA. Continue aspirin and statin     Depression. Seen by psychiatry. No suicidal ideation. No need for sitter. No need for inpatient psychiatric treatment     UTI. Continue Rocephin and Diflucan. Obtain urine cultures- reviewed. D/c abx     Carb control diet. Lovenox for DVT prophylaxis.   Transfer to PCU    Ferny Bosch

## 2020-11-12 NOTE — PROGRESS NOTES
Shift handoff report given to Gaby Chapa RN at bedside. Pt is Awake and alert  IV handoff completed. Call light within reach, bed in lowest position, bed alarm on. End of shift checks completed. Pt has been free of falls for duration of shift. Marco Antonio Gerber

## 2020-11-12 NOTE — PROGRESS NOTES
[address] Daily Progress Note      Admit Date:  11/10/2020    Subjective:  Ms. Angeles Matos is being seen today for f/u systolic CHF. She feels \"so-so\" today. She c/o mild dull CP at times and intermittent SOB but overall better.      Objective:   BP (!) 124/114   Pulse 108   Temp 97.6 °F (36.4 °C) (Oral)   Resp 16   Ht 5' 1\" (1.549 m)   Wt 223 lb 1.6 oz (101.2 kg)   SpO2 99%   BMI 42.15 kg/m²       Intake/Output Summary (Last 24 hours) at 11/12/2020 1037  Last data filed at 11/12/2020 0600  Gross per 24 hour   Intake 1136 ml   Output 500 ml   Net 636 ml       TELEMETRY: Sinus tachycardia    Physical Exam:  General:  Awake, alert, NAD  Skin:  Warm and dry  Neck:  JVD none obvious  Chest:  Clear to auscultation, respiration easy  Cardiovascular:  RRR S1S2  Abdomen:  Soft NT  Extremities:  1+ BLE edema of lower legs and 1-2+ edema of feet    Medications:    sodium chloride flush        magnesium sulfate  2 g Intravenous Once    metFORMIN  1,000 mg Oral BID WC    aspirin  81 mg Oral Daily    amiodarone  200 mg Oral Daily    atorvastatin  40 mg Oral Nightly    digoxin  125 mcg Oral Every Other Day    DULoxetine  60 mg Oral Daily    metoclopramide  10 mg Oral TID WC    metoprolol succinate  12.5 mg Oral Daily    miconazole   Topical BID    pantoprazole  40 mg Oral Daily    QUEtiapine  25 mg Oral Nightly    ranolazine  500 mg Oral BID    sacubitril-valsartan  0.5 tablet Oral BID    sucralfate  1 g Oral TID    enoxaparin  40 mg Subcutaneous Daily    influenza virus vaccine  0.5 mL Intramuscular Prior to discharge    insulin glargine  24 Units Subcutaneous BID    insulin lispro  0-18 Units Subcutaneous TID WC    insulin lispro  0-9 Units Subcutaneous Nightly    furosemide  40 mg Intravenous BID    spironolactone  25 mg Oral Daily    potassium chloride  40 mEq Oral BID WC      dextrose       glucose, dextrose, glucagon (rDNA), dextrose, nitroGLYCERIN, prochlorperazine    Lab Data:  CBC:   Recent Labs     11/10/20  1930   WBC 7.3   HGB 13.3   HCT 40.0   MCV 88.4        BMP:   Recent Labs     11/11/20  0655 11/11/20  1209 11/12/20  0421    135* 135*   K 3.4* 3.0* 3.4*    98* 99   CO2 22 24 24   PHOS 2.2* 2.5 2.6   BUN 9 8 11   CREATININE <0.5* <0.5* <0.5*     LIVER PROFILE:   Recent Labs     11/10/20  1930   AST 17   ALT 13   BILITOT 0.9   ALKPHOS 119        Echo June 8463: LSRUIHI   LV systolic function is moderately reduced with an estimated EF of 35%.   Moderate global hypokinesis.   There is mild concentric left ventricular hypertrophy.   Left ventricular cavity size is mildly dilated.   Estimated LV diastolic filling pressure is normal.   Mild mitral and tricuspid regurgitation.   Systolic pulmonary artery pressure (SPAP) is estimated at 35mmHg (Right atrial pressure of 8 mmHg).   No significant change from exam done 10/18/2019.     Stress Test January 2020:   Summary     Mildly reduced LVEF 45%     Inferolateral hypokinesis     Mainly fixed inferior defect suggestive of scar     No evidence of myocardium at risk for significant reversible ischemia.           Stress test 10/5/2020:  Dilated LV with severe global hypokinesis. Large, severe, fixed perfusion     defect of the inferior/inferolateral wall consistent with scar. Diminished     uptake of the anterior wall consistent with breast artifact. Post stress     LVEF is abnormal at 19%. Abnormal study. Overall findings represent a high     risk scan.       Limited echo 10/06/20  Summary   Limited only f/u for LVEF.   The left ventricular systolic function is severely reduced with an ejection   fraction of 15-20 %.   There is severe global hypokinesis with regional variations.   Changes noted from previous echo on 6- in left ventricular function.     Cardiac cath 10/6/20  Findings:  1. Left main coronary artery was normal. It gave off the left anterior descending artery and left circumflex.   2. Left anterior descending artery has mild atherosclerotic disease.  It was moderate in size. It gave off septal perforators and a moderate sized diagonal branch. The LAD covered the entire apex of the left ventricle. 3. Left circumflex has mild atherosclerotic disease.  It was moderate in size. There was a moderate sized obtuse marginal branch.   4. Right coronary artery has mild atherosclerotic disease. It was moderate in size and was the dominant artery.   5. Left ventriculogram was not performed.  Left ventricular end diastolic pressure was 26.  There is no gradient across pullback of the aortic valve.      Right heart catheterization findings:   Right atrial pressure of 20  RV 45/7  PA 53/8  Pulmonary wedge pressure mean of 20  RA saturation 42%  PA saturation 45%  Aortic saturation 99%  Cardiac output 2.4  Cardiac index 1.24  SVR 2433  IVR 827     Assessment:  Estela Eisenberg is a 62 y. o. patient who presented to [facility] 11/11/20 with c/o SOB, depression, and palpitations. She has PMH: severe NICM, chronic systolic CHF, mild NOCAD, NSVT, hx PAT, [facility] s/p dual ICD as well as HTN, HLD, uncontrolled DM, and mental disability. Most recent Lexiscan myoview stress test 10/05/20 Dilated LV with severe global HK; Large, severe, fixed perfusion defect of the inferior inferolateral wall c/w scar. Diminished  uptake of the anterior wall c/w breast artifact. Post stress  LVEF is abnormal at 19% (no sig change from 1/20 study). Most recent [facility] 10/06/20 showed mild NOCAD; RAP=20, PAP=53/8; wedge=20; Moderate pulm HTN.  Most recent limited ECHO 10/06/20 showed EF=15-20% (EF=35% on 6/13/20 and 10/18/19 studies);.severe global HK.  Note recent device evaluation 11/5/20 showed no sustained VT/VF, 3 episodes NSVT, increasing AT/AF burden with rapid rates. Note EP Dr. Ike Allen noted no true VT but rather AT with inappropriate defibrillation; no Afib. He increased amiodarone to 200mg daily.  Note EKG 11/11/20 ST 106bpm; PVC; nonspecific ST change; anterolateral infarct. VHE=9021 (5183 in 10/26); 3 negative Britney              Diagnosis of acute on chronic systolic CHF and mild DKA in middle-aged female with hx severe NICM, NSVT and PAT.      Recs:  1. DM management per IM team.  2. Continue IV lasix 40mg BID for diuresis. Weight vtjtp=715# (note prior admits in low 200's # range). 3. Increase toprol XL to 25mg daily for LV dysfunction, ST, and hx PAT,  4. Continue amiodarone 200mg daily (recently increased by EP doc). 5. Continue digoxin 0.125mg QOD, lipitor 40mg qd, baby aspirin qd, entresto 0.5mg 24-26mg BID, aldactone 25mg qd, KDur 40mEq BID, and ranexa 500mg BID. 6. Mg sulfate repletion 2gm IV x 1 today. Replete K+ 3.4 today. 7. Digoxin level < 0.3. Keep dosing same given recent increase of amiodarone. 8. No need for cardiac testing at this time.      Patient Active Problem List    Diagnosis Date Noted    Acute on chronic systolic CHF (congestive heart failure) (HCC)     Intractable nausea and vomiting 11/05/2020    Tachycardia     Uncontrolled type 2 diabetes mellitus with hyperglycemia (HCC)     Lactic acidosis     NSVT (nonsustained ventricular tachycardia) (HCC)     Acute pulmonary edema (HCC)     Mixed hyperlipidemia     Type 2 diabetes mellitus with hyperglycemia, with long-term current use of insulin ([address] 10/03/2020    SVT (supraventricular tachycardia) [facility]     Obesity 08/24/2020    History of CVA (cerebrovascular accident)     Noncompliance with medications     Syncope and collapse 06/13/2020    S/P ICD (internal cardiac defibrillator) procedure     Hypokalemia     Persistent fever     DKA, type 2, not at goal [facility]) 04/16/2020    Cognitive developmental delay 04/16/2020    Depressive disorder 04/16/2020    Urinary tract infection without hematuria     Atrial tachycardia ([address] 01/28/2020    Leukocytosis     Diabetic ketoacidosis with coma associated with type 2 diabetes mellitus ([address] 12/21/2019    Diabetic acidosis without coma ([address]     Hyponatremia     Metabolic acidosis     Disorder of electrolytes     Hypernatremia     Diabetic hyperosmolar non-ketotic state ([address] 12/20/2019    Arterial ischemic stroke, ICA, right, acute ([address]     Chest pain 10/07/2019    Non-intractable vomiting with nausea     Diabetic ketoacidosis without coma associated with type 2 diabetes mellitus ([address]     Elevated blood sugar 04/09/2019    TIA (transient ischemic attack) 09/17/2018    Cardiomyopathy ([address]     Essential hypertension     CHF (congestive heart failure) ([address] 06/13/2018    Dual ICD (implantable cardioverter-defibrillator) in place 05/08/2018    Brain tumor (benign) ([address] 05/08/2018    Chronic combined systolic and diastolic congestive heart failure ([address] 05/08/2018    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)     TIA involving right internal carotid artery     CAD in native artery     DM (diabetes mellitus), secondary, uncontrolled, w/neurologic complic ([address]     DM (diabetes mellitus) ([address]     HTN (hypertension), benign     Dyslipidemia     CAD (coronary artery disease)        Darling Colindres MD 11/12/2020 10:37 AM

## 2020-11-13 VITALS
RESPIRATION RATE: 18 BRPM | HEIGHT: 61 IN | BODY MASS INDEX: 41.64 KG/M2 | WEIGHT: 220.56 LBS | DIASTOLIC BLOOD PRESSURE: 63 MMHG | TEMPERATURE: 97.3 F | OXYGEN SATURATION: 97 % | HEART RATE: 99 BPM | SYSTOLIC BLOOD PRESSURE: 104 MMHG

## 2020-11-13 LAB
ANION GAP SERPL CALCULATED.3IONS-SCNC: 10 MMOL/L (ref 3–16)
BUN BLDV-MCNC: 16 MG/DL (ref 7–20)
CALCIUM SERPL-MCNC: 8.4 MG/DL (ref 8.3–10.6)
CHLORIDE BLD-SCNC: 101 MMOL/L (ref 99–110)
CO2: 24 MMOL/L (ref 21–32)
CREAT SERPL-MCNC: 0.7 MG/DL (ref 0.6–1.1)
GFR AFRICAN AMERICAN: >60
GFR NON-AFRICAN AMERICAN: >60
GLUCOSE BLD-MCNC: 108 MG/DL (ref 70–99)
GLUCOSE BLD-MCNC: 128 MG/DL (ref 70–99)
GLUCOSE BLD-MCNC: 93 MG/DL (ref 70–99)
GLUCOSE BLD-MCNC: 96 MG/DL (ref 70–99)
MAGNESIUM: 1.6 MG/DL (ref 1.8–2.4)
PERFORMED ON: ABNORMAL
PERFORMED ON: ABNORMAL
PERFORMED ON: NORMAL
PHOSPHORUS: 3.4 MG/DL (ref 2.5–4.9)
POTASSIUM SERPL-SCNC: 4.4 MMOL/L (ref 3.5–5.1)
SODIUM BLD-SCNC: 135 MMOL/L (ref 136–145)

## 2020-11-13 PROCEDURE — 6370000000 HC RX 637 (ALT 250 FOR IP): Performed by: INTERNAL MEDICINE

## 2020-11-13 PROCEDURE — 99239 HOSP IP/OBS DSCHRG MGMT >30: CPT | Performed by: INTERNAL MEDICINE

## 2020-11-13 PROCEDURE — 83735 ASSAY OF MAGNESIUM: CPT

## 2020-11-13 PROCEDURE — 99232 SBSQ HOSP IP/OBS MODERATE 35: CPT | Performed by: INTERNAL MEDICINE

## 2020-11-13 PROCEDURE — 36415 COLL VENOUS BLD VENIPUNCTURE: CPT

## 2020-11-13 PROCEDURE — 2580000003 HC RX 258

## 2020-11-13 PROCEDURE — 80048 BASIC METABOLIC PNL TOTAL CA: CPT

## 2020-11-13 PROCEDURE — 6360000002 HC RX W HCPCS: Performed by: INTERNAL MEDICINE

## 2020-11-13 PROCEDURE — 6370000000 HC RX 637 (ALT 250 FOR IP): Performed by: NURSE PRACTITIONER

## 2020-11-13 PROCEDURE — 84100 ASSAY OF PHOSPHORUS: CPT

## 2020-11-13 RX ORDER — FUROSEMIDE 40 MG/1
40 TABLET ORAL 2 TIMES DAILY
Qty: 60 TABLET | Refills: 0 | Status: ON HOLD | OUTPATIENT
Start: 2020-11-13 | End: 2020-11-23

## 2020-11-13 RX ORDER — FUROSEMIDE 40 MG/1
40 TABLET ORAL 2 TIMES DAILY
Status: DISCONTINUED | OUTPATIENT
Start: 2020-11-13 | End: 2020-11-13 | Stop reason: HOSPADM

## 2020-11-13 RX ORDER — INSULIN GLARGINE 100 [IU]/ML
24 INJECTION, SOLUTION SUBCUTANEOUS NIGHTLY
Qty: 5 PEN | Refills: 0 | Status: ON HOLD
Start: 2020-11-13 | End: 2021-02-05 | Stop reason: SDUPTHER

## 2020-11-13 RX ORDER — INSULIN ASPART 100 [IU]/ML
10 INJECTION, SOLUTION INTRAVENOUS; SUBCUTANEOUS
Qty: 5 PEN | Refills: 0 | Status: ON HOLD
Start: 2020-11-13 | End: 2021-02-05 | Stop reason: SDUPTHER

## 2020-11-13 RX ORDER — SODIUM CHLORIDE 0.9 % (FLUSH) 0.9 %
SYRINGE (ML) INJECTION
Status: COMPLETED
Start: 2020-11-13 | End: 2020-11-13

## 2020-11-13 RX ADMIN — ENOXAPARIN SODIUM 40 MG: 100 INJECTION SUBCUTANEOUS at 09:21

## 2020-11-13 RX ADMIN — RANOLAZINE 500 MG: 500 TABLET, FILM COATED, EXTENDED RELEASE ORAL at 09:21

## 2020-11-13 RX ADMIN — SUCRALFATE 1 G: 1 TABLET ORAL at 09:20

## 2020-11-13 RX ADMIN — METFORMIN HYDROCHLORIDE 1000 MG: 500 TABLET ORAL at 09:20

## 2020-11-13 RX ADMIN — METOCLOPRAMIDE 10 MG: 10 TABLET ORAL at 09:21

## 2020-11-13 RX ADMIN — FUROSEMIDE 40 MG: 40 TABLET ORAL at 09:31

## 2020-11-13 RX ADMIN — MICONAZOLE NITRATE: 20 POWDER TOPICAL at 09:25

## 2020-11-13 RX ADMIN — METOPROLOL SUCCINATE 25 MG: 25 TABLET, EXTENDED RELEASE ORAL at 09:21

## 2020-11-13 RX ADMIN — SUCRALFATE 1 G: 1 TABLET ORAL at 12:47

## 2020-11-13 RX ADMIN — AMIODARONE HYDROCHLORIDE 200 MG: 200 TABLET ORAL at 09:21

## 2020-11-13 RX ADMIN — METOCLOPRAMIDE 10 MG: 10 TABLET ORAL at 12:45

## 2020-11-13 RX ADMIN — SACUBITRIL AND VALSARTAN 0.5 TABLET: 24; 26 TABLET, FILM COATED ORAL at 09:20

## 2020-11-13 RX ADMIN — DIGOXIN 125 MCG: 125 TABLET ORAL at 09:20

## 2020-11-13 RX ADMIN — Medication 10 ML: at 09:21

## 2020-11-13 RX ADMIN — DULOXETINE HYDROCHLORIDE 60 MG: 60 CAPSULE, DELAYED RELEASE ORAL at 09:21

## 2020-11-13 RX ADMIN — PANTOPRAZOLE SODIUM 40 MG: 40 TABLET, DELAYED RELEASE ORAL at 09:21

## 2020-11-13 RX ADMIN — MAGNESIUM GLUCONATE 500 MG ORAL TABLET 400 MG: 500 TABLET ORAL at 12:47

## 2020-11-13 RX ADMIN — ASPIRIN 81 MG: 81 TABLET, COATED ORAL at 09:21

## 2020-11-13 RX ADMIN — SPIRONOLACTONE 25 MG: 25 TABLET ORAL at 09:21

## 2020-11-13 NOTE — DISCHARGE INSTR - COC
Continuity of Care Form    Patient Name: Katherine Chen   :  1960  MRN:  [id number]    Admit date:  11/10/2020  Discharge date:  2020    Code Status Order: Full Code   Advance Directives:   Advance Care Flowsheet Documentation     Date/Time Healthcare Directive Type of Healthcare Directive Copy in [address] Agent's Name Healthcare Agent's Phone Number    20 1230  No, patient does not have an advance directive for healthcare treatment -- -- -- -- --          Admitting Physician:  Lucas Smith MD  PCP: [facility], APRN - NP    Discharging Nurse: [facility] Unit/Room#: /9574-04  Discharging Unit Phone Number: ***    Emergency Contact:   Extended Emergency Contact Information  Primary Emergency Contact: Christa Campos  Address: [address] [place] [address] [id number] Phone: [phone]  Mobile Phone: [phone]  Relation: Brother/Sister    Past Surgical History:  Past Surgical History:   Procedure Laterality Date    BACK SURGERY      x3    PACEMAKER INSERTION      TUBAL LIGATION      TUMOR REMOVAL      UPPER GASTROINTESTINAL ENDOSCOPY N/A 2020    EGD BIOPSY performed by Azucena George DO at [address]       Immunization History:   Immunization History   Administered Date(s) Administered    Influenza 2012    Influenza A (D6P0-39) Vaccine IM 2009    Influenza Virus Vaccine 10/17/2014, 10/27/2017    Influenza, Quadv, 6 mo and older, IM, PF (Flulaval, Fluarix) 12/10/2018    Influenza, Quadv, IM, PF (6 mo and older Fluzone, Flulaval, Fluarix, and 3 yrs and older Afluria) 10/12/2019    Pneumococcal Polysaccharide ([id number]) 10/17/2014, 2017       Active Problems:  Patient Active Problem List   Diagnosis Code    DM (diabetes mellitus) ([facility] E11.9    HTN (hypertension), benign I10    Dyslipidemia E78.5    CAD (coronary artery disease) I25.10    Hx CVA Intractable nausea and vomiting R11.2    Tachycardia R00.0    Uncontrolled type 2 diabetes mellitus with hyperglycemia ([facility]) E11.65    Acute on chronic systolic CHF (congestive heart failure) ([facility]) I50.23       Isolation/Infection:   Isolation          No Isolation        Patient Infection Status     Infection Onset Added Last Indicated Last Indicated By Review Planned Expiration Resolved Resolved By    None active    Resolved    COVID-19 Rule Out 11/05/20 11/05/20 11/05/20 COVID-19 (Ordered)   11/05/20 Rule-Out Test Resulted    COVID-19 Rule Out 11/03/20 11/03/20 11/03/20 COVID-19 (Ordered)   11/05/20 Osmel Valdez RN    Ordered COVID swab and discontinued previous ED visit.      COVID-19 Rule Out 10/26/20 10/26/20 10/26/20 COVID-19 (Ordered)   10/26/20 Rule-Out Test Resulted    C-diff Rule Out 09/10/20 09/10/20 09/10/20 Clostridium difficile toxin/antigen (Ordered)   09/22/20 Mariaa Parker RN    COVID-19 Rule Out 04/16/20 04/16/20 04/16/20 COVID-19 (Ordered)   04/16/20 Rule-Out Test Resulted          Nurse Assessment:  Last Vital Signs: /63   Pulse 99   Temp 97.3 °F (36.3 °C) (Oral)   Resp 18   Ht 5' 1\" (1.549 m)   Wt 220 lb 9 oz (100 kg)   SpO2 97%   BMI 41.67 kg/m²     Last documented pain score (0-10 scale): Pain Level: 0  Last Weight:   Wt Readings from Last 1 Encounters:   11/13/20 220 lb 9 oz (100 kg)     Mental Status:  {IP PT MENTAL STATUS:20030}    IV Access:  {[facility] IV ACCESS:788556100}    Nursing Mobility/ADLs:  Walking   {CHP DME HNQJ:261818293}  Transfer  {CHP DME ZUBB:706103999}  Bathing  {CHP DME HXJZ:334825392}  Dressing  {CHP DME AMIK:536002930}  Toileting  {CHP DME MMNT:820588330}  Feeding  {CHP DME AVJN:864730640}  Med Admin  {CHP DME IJSS:422738046}  Med Delivery   { ADEN MED Delivery:976492053}    Wound Care Documentation and Therapy:        Elimination:  Continence:   · Bowel: {YES / SR:01447}  · Bladder: {YES / CQ:42882}  Urinary Catheter: {Urinary Catheter:453388245} Colostomy/Ileostomy/Ileal Conduit: {YES / YY:72096}       Date of Last BM: ***    Intake/Output Summary (Last 24 hours) at 2020 1121  Last data filed at 2020 0915  Gross per 24 hour   Intake 480 ml   Output 700 ml   Net -220 ml     I/O last 3 completed shifts:   In: 360 [P.O.:360]  Out: 1500 [Urine:1500]    Safety Concerns:     [address] [employer] Safety Concerns:664823584}    Impairments/Disabilities:      [address] [employer] Impairments/Disabilities:644970720}    Nutrition Therapy:  Current Nutrition Therapy:   [address] [employer] Diet List:389867937}    Routes of Feeding: {CHP DME Other Feedings:605921512}  Liquids: {Slp liquid thickness:91376}  Daily Fluid Restriction: {CHP DME Yes amt example:529569775}  Last Modified Barium Swallow with Video (Video Swallowing Test): {Done Not Done HBCU:389285801}    Treatments at the Time of Hospital Discharge:   Respiratory Treatments: ***  Oxygen Therapy:  {Therapy; copd oxygen:80345}  Ventilator:    {MH CC Vent YJMA:585986211}    Rehab Therapies: {THERAPEUTIC INTERVENTION:6688512826}  Weight Bearing Status/Restrictions: [address]  Weight Bearin}  Other Medical Equipment (for information only, NOT a DME order):  {EQUIPMENT:044029702}  Other Treatments: ***    Patient's personal belongings (please select all that are sent with patient):  {[facility] DME Belongings:194394641}    RN SIGNATURE:  {Esignature:571734226}    CASE MANAGEMENT/SOCIAL WORK SECTION    Inpatient Status Date: 2020    Readmission Risk Assessment Score:  Readmission Risk              Risk of Unplanned Readmission:        69           Discharging to Facility/ Agency   · Name: [place] [facility]  · Address:  · Phone:[phone]  · Fax:    Dialysis Facility (if applicable)   · Name:  · Address:  · Dialysis Schedule:  · Phone:  · Fax:    / signature: Electronically signed by Omi Chirinos RN on 20 at 11:21 AM EST    PHYSICIAN SECTION    Prognosis: Good    Condition at Discharge: Stable    Rehab

## 2020-11-13 NOTE — PROGRESS NOTES
Looked for EKG strips that night shift RN Alen West stated she put in chart but were not there. Checked with the [address] and no strips there either. Went back and looked at the time that the low HR was documented and it was determined that the HR could not have been in the 40s. Bandar Cheng notified, no strips could be found. Pacer not picking up at times. Call light in reach will monitor.

## 2020-11-13 NOTE — PROGRESS NOTES
Report received from [address] [place] [address]. Pt expresses no needs at this time. 4-eyes skin assessment performed. Care transferred.

## 2020-11-13 NOTE — DISCHARGE SUMMARY
Name:  Jamel Dhillon  Room:  /5529-71  MRN:    [id number]    Discharge Summary      This discharge summary is in conjunction with a complete physical exam done on the day of discharge. Attending Physician: Dr. Ainsley Mcclendon  Discharging Physician: Dr. Drake Mercadoyer: 11/10/2020  Discharge:   11/13/2020    HPI:  81-year-old female history of hypertension, type 2 diabetes, chronic systolic congestive heart failure, coronary artery disease, history of CVA, SVT/NSVT presented to emergency room with feeling very depressed because of her chronic medical problems. She has been having frequent palpitations associated with shortness of breath. Also has been having chest pain which is chronic. On evaluation the emergency room her blood sugars were in the 400s. She had a mild anion gap at 18. Started on insulin drip and admitted to ICU    Diagnoses this Admission and Hospital Course   1.  Uncontrolled diabetes with possible mild DKA. Maisha Ruby has never had a history of DKA in the past.  She was placed on DKA protocol with insulin drip and IV fluids.  Anion gap is very minimally elevated.  She is not acidotic.  We discontinued the DKA protocol and initiated Lantus insulin and sliding scale insulin.  Discontinued IV fluids.  Held Metformin for now. Sugars are stable. D/c on Lantus. F/u OP with PCP.      2.  Acute on Chronic systolic congestive heart failure.  Status post AICD.  Stable.  Continued digoxin, Entresto and metoprolol. Diuresed with IV Lasix. increased lasix to 40 bid at d/c.      3.  Palpitations.  SVT/NSVT.  Being followed by EP.  Amiodarone dose has been recently increased. EP is considering ablation.     Nonobstructive CAD.  Stable.     History of TIA.  Continued aspirin and statin     Depression.  Seen by psychiatry.  No suicidal ideation.  No need for sitter.  No need for inpatient psychiatric treatment     UTI.  Recd Rocephin and Diflucan.  Obtain urine cultures- reviewed.  D/c abx      Lovenox daily (before meals)  What changed:  how much to take        CONTINUE taking these medications    amiodarone 200 MG tablet  Commonly known as:  CORDARONE  Take 1 tablet by mouth daily     aspirin 81 MG EC tablet  Take 1 tablet by mouth daily     atorvastatin 40 MG tablet  Commonly known as:  LIPITOR  Take 1 tablet by mouth nightly     blood glucose test strips  Test three times a day & as needed for symptoms of irregular blood glucose. [employer] Lancets Ultra Thin Misc  1 each by Does not apply route 4 times daily     digoxin 125 MCG tablet  Commonly known as:  LANOXIN  Take 1 tablet by mouth every other day     DULoxetine 60 MG extended release capsule  Commonly known as:  CYMBALTA  Take 1 capsule by mouth daily     fenofibrate micronized 200 MG capsule  Commonly known as:  LOFIBRA  Take 1 capsule by mouth nightly     metFORMIN 1000 MG tablet  Commonly known as:  GLUCOPHAGE     metoclopramide 10 MG tablet  Commonly known as:  REGLAN  Take 1 tablet by mouth 3 times daily (with meals)     metoprolol succinate 25 MG extended release tablet  Commonly known as:  TOPROL XL  Take 0.5 tablets by mouth daily     miconazole 2 % powder  Commonly known as:  MICOTIN  Apply topically 2 times daily. nitroGLYCERIN 0.4 MG SL tablet  Commonly known as:  NITROSTAT  up to max of 3 total doses. If no relief after 1 dose, call 911.      pantoprazole 40 MG tablet  Commonly known as:  Protonix  Take 1 tablet by mouth daily     QUEtiapine 25 MG tablet  Commonly known as:  SEROQUEL     ranolazine 500 MG extended release tablet  Commonly known as:  RANEXA  Take 1 tablet by mouth 2 times daily     sacubitril-valsartan 24-26 MG per tablet  Commonly known as:  ENTRESTO  Take 0.5 tablets by mouth 2 times daily     spironolactone 25 MG tablet  Commonly known as:  ALDACTONE  Take 1 tablet by mouth daily     sucralfate 1 GM tablet  Commonly known as:  CARAFATE  Take 1 tablet by mouth 3 times daily     vitamin D 1.25 MG (22294 UT) Caps capsule  Commonly known as:  ERGOCALCIFEROL        STOP taking these medications    insulin glargine 100 UNIT/ML injection  Commonly known as:  LANTUS SOLOSTAR  Replaced by:  Lantus SoloStar 100 UNIT/ML injection pen     ondansetron 4 MG tablet  Commonly known as:  Zofran           Where to Get Your Medications      These medications were sent to [address] [address], [address] [address]    Phone:  [phone]   · furosemide 40 MG tablet     Information about where to get these medications is not yet available    Ask your nurse or doctor about these medications  · Lantus SoloStar 100 UNIT/ML injection pen  · NovoLOG FlexPen 100 UNIT/ML injection pen           Discharged in stable condition to home. Follow Up: Follow up with PCP. ANNAMARIE Ritchie.

## 2020-11-13 NOTE — PROGRESS NOTES
Admit: 11/10/2020    Name:  Manuela Knight  Room:  /3884-15  MRN:    [id number]    Daily Progress Note for 2020     Interval History:     Doing well today    Scheduled Meds:   metFORMIN  1,000 mg Oral BID WC    metoprolol succinate  25 mg Oral Daily    aspirin  81 mg Oral Daily    amiodarone  200 mg Oral Daily    atorvastatin  40 mg Oral Nightly    digoxin  125 mcg Oral Every Other Day    DULoxetine  60 mg Oral Daily    metoclopramide  10 mg Oral TID WC    miconazole   Topical BID    pantoprazole  40 mg Oral Daily    QUEtiapine  25 mg Oral Nightly    ranolazine  500 mg Oral BID    sacubitril-valsartan  0.5 tablet Oral BID    sucralfate  1 g Oral TID    enoxaparin  40 mg Subcutaneous Daily    influenza virus vaccine  0.5 mL Intramuscular Prior to discharge    insulin glargine  24 Units Subcutaneous BID    insulin lispro  0-18 Units Subcutaneous TID WC    insulin lispro  0-9 Units Subcutaneous Nightly    furosemide  40 mg Intravenous BID    spironolactone  25 mg Oral Daily    potassium chloride  40 mEq Oral BID WC       Continuous Infusions:   dextrose         PRN Meds:  glucose, dextrose, glucagon (rDNA), dextrose, nitroGLYCERIN, prochlorperazine                  Objective:     Temp  Av.7 °F (36.5 °C)  Min: 97.2 °F (36.2 °C)  Max: 98.1 °F (36.7 °C)  Pulse  Av.5  Min: 70  Max: 108  BP  Min: 100/81  Max: 159/98  SpO2  Av.8 %  Min: 92 %  Max: 100 %  No data found. Intake/Output Summary (Last 24 hours) at 2020 0828  Last data filed at 2020 0223  Gross per 24 hour   Intake 360 ml   Output 1500 ml   Net -1140 ml       Physical Exam:  Gen: No distress. Alert. Eyes: PERRL. No sclera icterus. No conjunctival injection. ENT: No discharge. Pharynx clear. Neck: Trachea midline. Normal thyroid. Resp: No accessory muscle use. No crackles. No wheezes. No rhonchi. No dullness on percussion. CV: Regular rate. Regular rhythm. No murmur or rub. No edema.    GI: Non-tender. Non-distended. No masses. No organomegaly. Normal bowel sounds. No hernia. Skin: Warm and dry. No nodule on exposed extremities. No rash on exposed extremities. Lymph: No cervical LAD. No supraclavicular LAD. M/S: No cyanosis. No joint deformity. No clubbing. Neuro: Awake. Psych: Oriented x 3. No anxiety or agitation. Lab Data:  CBC:   Recent Labs     11/10/20  1930   WBC 7.3   RBC 4.53   HGB 13.3   HCT 40.0   MCV 88.4   RDW 16.6*        BMP:   Recent Labs     11/11/20  1209 11/12/20  0421 11/13/20  0626   * 135* 135*   K 3.0* 3.4* 4.4   CL 98* 99 101   CO2 24 24 24   PHOS 2.5 2.6 3.4   BUN 8 11 16   CREATININE <0.5* <0.5* 0.7     BNP: No results for input(s): BNP in the last 72 hours. PT/INR: No results for input(s): PROTIME, INR in the last 72 hours. APTT:No results for input(s): APTT in the last 72 hours. CARDIAC ENZYMES:   Recent Labs     11/11/20  0005 11/11/20  0655 11/11/20  1209   TROPONINI 0.01 <0.01 <0.01     FASTING LIPID PANEL:  Lab Results   Component Value Date    CHOL 166 10/12/2019    HDL 51 10/12/2019    TRIG 142 10/12/2019     LIVER PROFILE:   Recent Labs     11/10/20  1930   AST 17   ALT 13   BILITOT 0.9   ALKPHOS 119       Chest imaging was reviewed by me   Marked cardiomegaly with pulmonary vascular congestion/interstitial edema. Probable small bilateral pleural effusions.  Radiographic appearance   consistent with CHF.      Assessment & Plan:     Patient Active Problem List    Diagnosis Date Noted    Acute on chronic systolic CHF (congestive heart failure) (HCC)     Intractable nausea and vomiting 11/05/2020    Tachycardia     Uncontrolled type 2 diabetes mellitus with hyperglycemia (HCC)     Lactic acidosis     NSVT (nonsustained ventricular tachycardia) (HCC)     Acute pulmonary edema (HCC)     Mixed hyperlipidemia     Type 2 diabetes mellitus with hyperglycemia, with long-term current use of insulin ([facility] [address] 10/03/2020    SVT elevated. She is not acidotic. We will discontinue the DKA protocol and initiate Lantus insulin and sliding scale insulin. Discontinue IV fluids. Hold Metformin for now. Sugars are stable     2. Chronic systolic congestive heart failure. Status post AICD. Stable. Continue digoxin, Entresto and metoprolol. increase lasix to 40 bid      3.  Palpitations. SVT/NSVT. Being followed by EP. Amiodarone dose has been recently increased. EP is considering ablation.     Nonobstructive CAD. Stable.     History of TIA. Continue aspirin and statin     Depression. Seen by psychiatry. No suicidal ideation. No need for sitter. No need for inpatient psychiatric treatment     UTI. Recd Rocephin and Diflucan. Obtain urine cultures- reviewed. D/c abx     Carb control diet. Lovenox for DVT prophylaxis.     D/c home     [facility]

## 2020-11-13 NOTE — PROGRESS NOTES
HR 27-40 on monitor, pacer not pacing on monitor. Pt in bed, eyes closed. No distress noted.  Will monitor  Isela Grand

## 2020-11-13 NOTE — PROGRESS NOTES
The anticipated benefits and the potential consequences of refusing the Influenza vaccine were discussed. Patient continues to refuse.       Nurse eSignature: Electronically signed by Jonathan Barillas RN on 11/13/20 at 3:42 PM EST

## 2020-11-13 NOTE — PROGRESS NOTES
[facility]ig[facility]an[facility] service here, patient leaving in stable condition via w/c with all personal belongings.

## 2020-11-13 NOTE — CARE COORDINATION
Case Management Assessment  Initial Evaluation and DC Note      Patient Name: Era Perea  YOB: 1960  Diagnosis: DKA, type 2, not at goal [facility]) [E11.10]  Date / Time: 11/10/2020  5:43 PM    Admission status/Date:11/11/2020 inpt  Chart Reviewed: Yes      Patient Interviewed: Yes   Family Interviewed:  No      Hospitalization in the last 30 days:  Yes      Lidan :   Supplemental (Other) Decision Maker: Christa Campos - Brother/Sister - [phone]    (CM - must 1st enter selection under Navigator - emergency contact- Lidan Relationship and pick relationship)   Who do you trust or have selected to make healthcare decisions for you      Met with: pt  Interview conducted  (bedside/phone):bedside    Current PCP:   [address] [place] [address] required for SNF : Y, N          3 night stay required - Y, N    ADLS  Support Systems/Care Needs:    Transportation: family    Meal Preparation: self    Housing  Living Arrangements: apt alone  Steps: 0  Intent for return to present living arrangements: Yes  Identified Issues:     [address] with [address] : Yes - [facility] Agency:(Services)SN     Passport/Waiver : No  :                      Phone Number:    Passport/Waiver Services:           Durable Medical Equiptment   DME Provider: none  Equipment:   Walker_X__Cane_X__RTS___ BSC___Shower Chair___Hospital Bed___W/C_X___Other________  02 at ____Liter(s)---wears(frequency)_______ HHN ___ CPAP___ BiPap___   N/A____      Home O2 Use :  No    If No for home O2---if presently on O2 during hospitalization:  No      Community Service Affiliation  Dialysis:  No    · Agency:  · Location:  · Dialysis Schedule:  · Phone:   · Fax: Other Community Services: (ex:PT/OT,Mental Health,Wound Clinic, Cardio/Pul [address])    DISCHARGE PLAN: Explained Case Management role/services.  Chart reviewed, met with pt at bedside. Pt is from apt alone and is planning to return with BRET with [facility] for SN. Pt states she will pay for a cab to transport her home. DC order noted. Pt discharging home with [facility]  for SN. TC to [facility] [address] and pt was discharged from their services yesterday, per Keyla they can accept her for SN with new orders. Keyla states she can pull all needed documents from [facility]. No further dc needs voiced or denitrified. SpO2 97% on RA. Discharge timeout done with Carroll Poles. All discharge needs and concerns addressed.

## 2020-11-13 NOTE — PROGRESS NOTES
Physician Progress Note      PATIENT:               Maricruz Kaur  CSN #:                  [id number]  :                       1960  ADMIT DATE:       11/10/2020 5:43 PM  [address] [place] DATE:  RESPONDING  PROVIDER #:        Mechelle Bazan CNP          QUERY TEXT:    Pt admitted with DKA. Noted documentation of  acute on chronic systolic CHF   per cardiology consultant on 20. If possible, please document in   progress notes and discharge summary:    The medical record reflects the following:  Risk Factors: has chronic systolic CHF  Clinical Indicators: Cardio-  Diagnosis of acute on chronic systolic CHF and   mild DKA. No need for cardiac testing at this time. BNP 3737  Treatment: IV Lasix 40mg BID, increase troprol XL 25mg daily, monitor   labs/images, cardiology consult    Thank You Phillip Diane RN, [email or web address]  Options provided:  -- Acute on chronic systolic CHF  confirmed present on admission  -- Acute on chronic systolic CHF  confirmed not present on admission  -- Acute on chronic systolic CHF ruled out  -- Other - I will add my own diagnosis  -- Disagree - Not applicable / Not valid  -- Disagree - Clinically unable to determine / Unknown  -- Refer to Clinical Documentation Reviewer    PROVIDER RESPONSE TEXT:    The diagnosis of acute on chronic systolic CHF was confirmed as present on   admission.     Query created by: Amalia Yanes on 2020 10:58 AM      Electronically signed by:  Mechelle Bazan CNP 2020 11:42 AM

## 2020-11-13 NOTE — PROGRESS NOTES
Copies of discharge given to patient and discharge paper instructions gone over with patient. Patient shows V/U. All questions answered. Prescriptions sent to outpatient pharmacy. Patient waiting on new order for magnesium to be delivered to her room. IV d/c with tip intact. Pressure held over site and no bleeding noted. Heart monitor removed and cleaned and returned to monitor room. All belongings packed and patient transport will be put in once patient is ready. Will have to call [employer] service when patient is ready d/t no family available per patient. Patient has cash to pay taxi. Will call and set up for patient.

## 2020-11-13 NOTE — PROGRESS NOTES
Dr. Lev Leiva made aware of pacer issues overnight. Printed telemetry strips were placed in the soft chart.

## 2020-11-13 NOTE — PROGRESS NOTES
3:39am - 3:54am.      Tele monitors ring out asytole or gwendolyn several times. After further review it actually is not gwendolyn or asystole. The amplitude of the QRS is very small therefore the machine is reading it inaccurately. Dr. Raúl Fish aware and brought this to our attention along with Garrett Balderas  [facility]

## 2020-11-13 NOTE — PROGRESS NOTES
Handoff report given to [facility].. Performed 4eyes skin assessment for transfer pt with [facility].. Care transferred.

## 2020-11-13 NOTE — PROGRESS NOTES
4 Eyes Skin Assessment     The patient is being assess for   Transfer to New Unit    I agree that 2 RN's have performed a thorough Head to Toe Skin Assessment on the patient. ALL assessment sites listed below have been assessed. Areas assessed by both nurses:   [x]   Head, Face, and Ears   [x]   Shoulders, Back, and Chest, Abdomen  [x]   Arms, Elbows, and Hands   [x]   Coccyx, Sacrum, and Ischium  [x]   Legs, Feet, and Heels        Redness to groin, nystatin ordered. **SHARE this note so that the co-signing nurse is able to place an eSignature**    Co-signer eSignature: Electronically signed by Marie Silva RN on 11/13/20 at 3:15 AM EST    Does the Patient have Skin Breakdown?   No          Ignacio Prevention initiated:  No   Wound Care Orders initiated:  No      WO nurse consulted for Pressure Injury (Stage 3,4, Unstageable, DTI, NWPT, Complex wounds)and New or Established Ostomies:  No      Primary Nurse eSignature: Electronically signed by Jocelyn Tompkins RN on 11/12/20 at 7:30 PM EST

## 2020-11-13 NOTE — PROGRESS NOTES
[facility] Daily Progress Note      Admit Date:  11/10/2020    Subjective:  Ms. Arely Frausto is seen for systolic CHF  She is feeling better. She is lying supine and not short of breath  She has poor appetite. [place]  She has nonischemic cardiomyopathy  ROS:  12 point ROS negative in all areas as listed below except as in [place]  Constitutional, EENT,  GI, , Musculoskeletal, skin, neurological, hematological, endocrine, Psychiatric    Past Medical History:   Diagnosis Date    Arthritis     CAD (coronary artery disease)     Cerebral artery occlusion with cerebral infarction ([address]     x 3    [facility]est[facility] failure) [facility]     Diabetes mellitus ([facility]     Hyperlipidemia     Hypertension     Mental retardation     MI (myocardial infarction) ([facility] [address]     Pacemaker      Past Surgical History:   Procedure Laterality Date    BACK SURGERY      x3    PACEMAKER INSERTION      TUBAL LIGATION      TUMOR REMOVAL      UPPER GASTROINTESTINAL ENDOSCOPY N/A 11/5/2020    EGD BIOPSY performed by Freeman Mccracken DO at [address] [facility] END[facility]COPY       Objective:   BP (!) 159/98 Comment: high b/p  Pulse 106   Temp 97.4 °F (36.3 °C) (Oral)   Resp 18   Ht 5' 1\" (1.549 m)   Wt 220 lb 9 oz (100 kg)   SpO2 98%   BMI 41.67 kg/m²       Intake/Output Summary (Last 24 hours) at 11/13/2020 0843  Last data filed at 11/13/2020 1201  Gross per 24 hour   Intake 360 ml   Output 1500 ml   Net -1140 ml       TELEMETRY: NSR  Physical Exam:  General: No Respiratory distress, appears well developed and well nourished. She is obese  Eyes:  Sclera nonicteric  Nose/Sinuses:  negative findings: nose shows no deformity, asymmetry, or inflammation, nasal mucosa normal, septum midline with no perforation or bleeding  Back:  no pain to palpation  Joint:  no active joint inflammation  Musculoskeletal:  negative  Skin:  Warm and dry  Neck:  Negative for JVD and Carotid Bruits.    Chest:  Clear to auscultation, respiration easy  Cardiovascular: RRR, S1S2 normal, no murmur, no rub or thrill. Abdomen:  Soft normal liver and spleen  Extremities:   No edema, clubbing, cyanosis,  Pulses:  pedal pulses 2+ right left nonpalpable  . Neuro: intact    Medications:    metFORMIN  1,000 mg Oral BID WC    metoprolol succinate  25 mg Oral Daily    aspirin  81 mg Oral Daily    amiodarone  200 mg Oral Daily    atorvastatin  40 mg Oral Nightly    digoxin  125 mcg Oral Every Other Day    DULoxetine  60 mg Oral Daily    metoclopramide  10 mg Oral TID WC    miconazole   Topical BID    pantoprazole  40 mg Oral Daily    QUEtiapine  25 mg Oral Nightly    ranolazine  500 mg Oral BID    sacubitril-valsartan  0.5 tablet Oral BID    sucralfate  1 g Oral TID    enoxaparin  40 mg Subcutaneous Daily    influenza virus vaccine  0.5 mL Intramuscular Prior to discharge    insulin glargine  24 Units Subcutaneous BID    insulin lispro  0-18 Units Subcutaneous TID WC    insulin lispro  0-9 Units Subcutaneous Nightly    furosemide  40 mg Intravenous BID    spironolactone  25 mg Oral Daily    potassium chloride  40 mEq Oral BID WC      dextrose       glucose, dextrose, glucagon (rDNA), dextrose, nitroGLYCERIN, prochlorperazine    Lab Data:  CBC:   Recent Labs     11/10/20  1930   WBC 7.3   HGB 13.3   HCT 40.0   MCV 88.4        BMP:   Recent Labs     11/11/20  1209 11/12/20  0421 11/13/20  0626   * 135* 135*   K 3.0* 3.4* 4.4   CL 98* 99 101   CO2 24 24 24   PHOS 2.5 2.6 3.4   BUN 8 11 16   CREATININE <0.5* <0.5* 0.7     LIVER PROFILE:   Recent Labs     11/10/20  1930   AST 17   ALT 13   BILITOT 0.9   ALKPHOS 119     PT/INR: No results for input(s): PROTIME, INR in the last 72 hours. APTT: No results for input(s): APTT in the last 72 hours. BNP:  No results for input(s): BNP in the last 72 hours.   IMAGING:  Limited echo 10/06/20  Summary   Limited only f/u for LVEF.   The left ventricular systolic function is severely reduced with an ejection   fraction of 15-20 %.   There is severe global hypokinesis with regional variations.   Changes noted from previous echo on 6- in left ventricular function.      Cardiac cath 10/6/20  Findings:  1. Left main coronary artery was normal. It gave off the left anterior descending artery and left circumflex. 2. Left anterior descending artery has mild atherosclerotic disease.  It was moderate in size. It gave off septal perforators and a moderate sized diagonal branch. The LAD covered the entire apex of the left ventricle. 3. Left circumflex has mild atherosclerotic disease.  It was moderate in size. There was a moderate sized obtuse marginal branch.   4. Right coronary artery has mild atherosclerotic disease.  It was moderate in size and was the dominant artery.   5. Left ventriculogram was not performed.  Left ventricular end diastolic pressure was 26.  There is no gradient across pullback of the aortic valve.      Right heart catheterization findings:   Right atrial pressure of 20  RV 45/7  PA 53/8  Pulmonary wedge pressure mean of 20  RA saturation 42%  PA saturation 45%  Aortic saturation 99%  Cardiac output 2.4  Cardiac index 1.24  SVR 2433  EUC 244  Assessment:  Nonischemic cardiomyopathy  Acute on chronic systolic CHF  I reviewed her monitor strips from this am  She did not have heart rate in 30's amplitude was low and monitor did not  the heart rate but complexes do not show bradycardia  Patient Active Problem List    Diagnosis Date Noted    Acute on chronic systolic CHF (congestive heart failure) (HCC)     Intractable nausea and vomiting 11/05/2020    Tachycardia     Uncontrolled type 2 diabetes mellitus with hyperglycemia (HCC)     Lactic acidosis     NSVT (nonsustained ventricular tachycardia) (HCC)     Acute pulmonary edema (HCC)     Mixed hyperlipidemia     Type 2 diabetes mellitus with hyperglycemia, with long-term current use of insulin ([facility] 10/03/2020    SVT (supraventricular tachycardia) ([address]     Obesity 08/24/2020    History of CVA (cerebrovascular accident)     Noncompliance with medications     Syncope and collapse 06/13/2020    S/P ICD (internal cardiac defibrillator) procedure     Hypokalemia     Persistent fever     DKA, type 2, not at goal [facility]) 04/16/2020    Cognitive developmental delay 04/16/2020    Depressive disorder 04/16/2020    Urinary tract infection without hematuria     Atrial tachycardia ([address] 01/28/2020    Leukocytosis     Diabetic ketoacidosis with coma associated with type 2 diabetes mellitus ([address] 12/21/2019    Diabetic acidosis without coma ([address]     Hyponatremia     Metabolic acidosis     Disorder of electrolytes     Hypernatremia     Diabetic hyperosmolar non-ketotic state ([address] 12/20/2019    Arterial ischemic stroke, ICA, right, acute ([address]     Chest pain 10/07/2019    Non-intractable vomiting with nausea     Diabetic ketoacidosis without coma associated with type 2 diabetes mellitus ([address]     Elevated blood sugar 04/09/2019    TIA (transient ischemic attack) 09/17/2018    Nonischemic cardiomyopathy ([address]     Essential hypertension     CHF (congestive heart failure) ([address] 06/13/2018    Dual ICD (implantable cardioverter-defibrillator) in place 05/08/2018    Brain tumor (benign) ([address] 05/08/2018    Chronic combined systolic and diastolic congestive heart failure ([address] 05/08/2018    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)     TIA involving right internal carotid artery     CAD in native artery     DM (diabetes mellitus), secondary, uncontrolled, w/neurologic complic ([address]     DM (diabetes mellitus) ([address]     HTN (hypertension), benign     Dyslipidemia     CAD (coronary artery disease)        Plan:  1. OK to discharge  2. I will send her home on arni betablockers dig diuretics and aldactone  3. Salt and fluid restriction  4.  She has been seen by psychiatrist this admission and they have approved her current

## 2020-11-13 NOTE — PROGRESS NOTES
Shift assessment complete. See flow sheet. Patient resting quietly in bed. Medications given whole with applesauce. Micotin applied powder to groin. Pt states no issues or complaints. Call light and bedside table in reach. Bed locked in low position, 2/4 bed rails up. Bed alarm is on. Will follow.

## 2020-11-13 NOTE — PROGRESS NOTES
Patient initially refused to let phlebotomist draw blood this morning. Pt states she just wants all this to stop so she can sleep. This RN spoke to her, and she did eventually agree to lab draw.

## 2020-11-16 ENCOUNTER — APPOINTMENT (OUTPATIENT)
Dept: CT IMAGING | Age: 60
End: 2020-11-16
Payer: MEDICARE

## 2020-11-16 ENCOUNTER — HOSPITAL ENCOUNTER (EMERGENCY)
Age: 60
Discharge: ANOTHER HOSPITAL | End: 2020-11-17
Attending: STUDENT IN AN ORGANIZED HEALTH CARE EDUCATION/TRAINING PROGRAM
Payer: MEDICARE

## 2020-11-16 ENCOUNTER — APPOINTMENT (OUTPATIENT)
Dept: GENERAL RADIOLOGY | Age: 60
End: 2020-11-16
Payer: MEDICARE

## 2020-11-16 ENCOUNTER — FOLLOWUP TELEPHONE ENCOUNTER (OUTPATIENT)
Dept: TELEMETRY | Age: 60
End: 2020-11-16

## 2020-11-16 LAB
A/G RATIO: 1.2 (ref 1.1–2.2)
ALBUMIN SERPL-MCNC: 3.7 G/DL (ref 3.4–5)
ALP BLD-CCNC: 148 U/L (ref 40–129)
ALT SERPL-CCNC: 15 U/L (ref 10–40)
ANION GAP SERPL CALCULATED.3IONS-SCNC: 14 MMOL/L (ref 3–16)
AST SERPL-CCNC: 20 U/L (ref 15–37)
BACTERIA: ABNORMAL /HPF
BASE EXCESS VENOUS: -2.6 MMOL/L (ref -3–3)
BASOPHILS ABSOLUTE: 0.1 K/UL (ref 0–0.2)
BASOPHILS RELATIVE PERCENT: 1.2 %
BETA-HYDROXYBUTYRATE: 1.7 MMOL/L (ref 0–0.27)
BILIRUB SERPL-MCNC: 1.1 MG/DL (ref 0–1)
BILIRUBIN URINE: NEGATIVE
BLOOD, URINE: NEGATIVE
BUN BLDV-MCNC: 11 MG/DL (ref 7–20)
CALCIUM SERPL-MCNC: 8.9 MG/DL (ref 8.3–10.6)
CARBOXYHEMOGLOBIN: 2.2 % (ref 0–1.5)
CHLORIDE BLD-SCNC: 96 MMOL/L (ref 99–110)
CLARITY: CLEAR
CO2: 23 MMOL/L (ref 21–32)
COLOR: YELLOW
CREAT SERPL-MCNC: 0.6 MG/DL (ref 0.6–1.1)
EOSINOPHILS ABSOLUTE: 0 K/UL (ref 0–0.6)
EOSINOPHILS RELATIVE PERCENT: 0.6 %
EPITHELIAL CELLS, UA: ABNORMAL /HPF (ref 0–5)
GFR AFRICAN AMERICAN: >60
GFR NON-AFRICAN AMERICAN: >60
GLOBULIN: 3.1 G/DL
GLUCOSE BLD-MCNC: 288 MG/DL (ref 70–99)
GLUCOSE BLD-MCNC: 293 MG/DL
GLUCOSE BLD-MCNC: 293 MG/DL (ref 70–99)
GLUCOSE URINE: >=1000 MG/DL
HCO3 VENOUS: 23.6 MMOL/L (ref 23–29)
HCT VFR BLD CALC: 41.8 % (ref 36–48)
HEMOGLOBIN: 13.5 G/DL (ref 12–16)
KETONES, URINE: ABNORMAL MG/DL
LACTIC ACID, SEPSIS: 1.8 MMOL/L (ref 0.4–1.9)
LACTIC ACID, SEPSIS: 2 MMOL/L (ref 0.4–1.9)
LEUKOCYTE ESTERASE, URINE: NEGATIVE
LYMPHOCYTES ABSOLUTE: 1.3 K/UL (ref 1–5.1)
LYMPHOCYTES RELATIVE PERCENT: 18.7 %
MCH RBC QN AUTO: 29 PG (ref 26–34)
MCHC RBC AUTO-ENTMCNC: 32.3 G/DL (ref 31–36)
MCV RBC AUTO: 89.8 FL (ref 80–100)
METHEMOGLOBIN VENOUS: 0.3 %
MICROSCOPIC EXAMINATION: YES
MONOCYTES ABSOLUTE: 0.4 K/UL (ref 0–1.3)
MONOCYTES RELATIVE PERCENT: 6.3 %
NEUTROPHILS ABSOLUTE: 4.9 K/UL (ref 1.7–7.7)
NEUTROPHILS RELATIVE PERCENT: 73.2 %
NITRITE, URINE: NEGATIVE
O2 CONTENT, VEN: 15 VOL %
O2 SAT, VEN: 77 %
O2 THERAPY: ABNORMAL
PCO2, VEN: 46.5 MMHG (ref 40–50)
PDW BLD-RTO: 17 % (ref 12.4–15.4)
PERFORMED ON: ABNORMAL
PH UA: 6 (ref 5–8)
PH VENOUS: 7.32 (ref 7.35–7.45)
PLATELET # BLD: 301 K/UL (ref 135–450)
PMV BLD AUTO: 8.8 FL (ref 5–10.5)
PO2, VEN: 44.5 MMHG (ref 25–40)
POTASSIUM REFLEX MAGNESIUM: 4.1 MMOL/L (ref 3.5–5.1)
PRO-BNP: 2943 PG/ML (ref 0–124)
PROCALCITONIN: 0.05 NG/ML (ref 0–0.15)
PROTEIN UA: ABNORMAL MG/DL
RBC # BLD: 4.66 M/UL (ref 4–5.2)
RBC UA: ABNORMAL /HPF (ref 0–4)
SODIUM BLD-SCNC: 133 MMOL/L (ref 136–145)
SPECIFIC GRAVITY UA: 1.02 (ref 1–1.03)
TCO2 CALC VENOUS: 25 MMOL/L
TOTAL PROTEIN: 6.8 G/DL (ref 6.4–8.2)
TROPONIN: <0.01 NG/ML
URINE REFLEX TO CULTURE: YES
URINE TYPE: ABNORMAL
UROBILINOGEN, URINE: 0.2 E.U./DL
WBC # BLD: 6.7 K/UL (ref 4–11)
WBC UA: ABNORMAL /HPF (ref 0–5)

## 2020-11-16 PROCEDURE — 81001 URINALYSIS AUTO W/SCOPE: CPT

## 2020-11-16 PROCEDURE — 84484 ASSAY OF TROPONIN QUANT: CPT

## 2020-11-16 PROCEDURE — 96374 THER/PROPH/DIAG INJ IV PUSH: CPT

## 2020-11-16 PROCEDURE — 85025 COMPLETE CBC W/AUTO DIFF WBC: CPT

## 2020-11-16 PROCEDURE — 83605 ASSAY OF LACTIC ACID: CPT

## 2020-11-16 PROCEDURE — 83880 ASSAY OF NATRIURETIC PEPTIDE: CPT

## 2020-11-16 PROCEDURE — 6360000002 HC RX W HCPCS: Performed by: NURSE PRACTITIONER

## 2020-11-16 PROCEDURE — 71046 X-RAY EXAM CHEST 2 VIEWS: CPT

## 2020-11-16 PROCEDURE — U0003 INFECTIOUS AGENT DETECTION BY NUCLEIC ACID (DNA OR RNA); SEVERE ACUTE RESPIRATORY SYNDROME CORONAVIRUS 2 (SARS-COV-2) (CORONAVIRUS DISEASE [COVID-19]), AMPLIFIED PROBE TECHNIQUE, MAKING USE OF HIGH THROUGHPUT TECHNOLOGIES AS DESCRIBED BY CMS-2020-01-R: HCPCS

## 2020-11-16 PROCEDURE — 99285 EMERGENCY DEPT VISIT HI MDM: CPT

## 2020-11-16 PROCEDURE — 87040 BLOOD CULTURE FOR BACTERIA: CPT

## 2020-11-16 PROCEDURE — 93005 ELECTROCARDIOGRAM TRACING: CPT | Performed by: NURSE PRACTITIONER

## 2020-11-16 PROCEDURE — 82803 BLOOD GASES ANY COMBINATION: CPT

## 2020-11-16 PROCEDURE — 96372 THER/PROPH/DIAG INJ SC/IM: CPT

## 2020-11-16 PROCEDURE — 84145 PROCALCITONIN (PCT): CPT

## 2020-11-16 PROCEDURE — 82010 KETONE BODYS QUAN: CPT

## 2020-11-16 PROCEDURE — 80053 COMPREHEN METABOLIC PANEL: CPT

## 2020-11-16 PROCEDURE — U0002 COVID-19 LAB TEST NON-CDC: HCPCS

## 2020-11-16 PROCEDURE — 6360000004 HC RX CONTRAST MEDICATION: Performed by: NURSE PRACTITIONER

## 2020-11-16 PROCEDURE — 71260 CT THORAX DX C+: CPT

## 2020-11-16 RX ORDER — FUROSEMIDE 10 MG/ML
40 INJECTION INTRAMUSCULAR; INTRAVENOUS ONCE
Status: COMPLETED | OUTPATIENT
Start: 2020-11-16 | End: 2020-11-16

## 2020-11-16 RX ADMIN — FUROSEMIDE 40 MG: 10 INJECTION, SOLUTION INTRAMUSCULAR; INTRAVENOUS at 22:40

## 2020-11-16 RX ADMIN — ENOXAPARIN SODIUM 100 MG: 100 INJECTION SUBCUTANEOUS at 22:44

## 2020-11-16 RX ADMIN — IOPAMIDOL 85 ML: 755 INJECTION, SOLUTION INTRAVENOUS at 22:12

## 2020-11-16 ASSESSMENT — ENCOUNTER SYMPTOMS
NAUSEA: 1
DIARRHEA: 1
VOMITING: 1
COUGH: 0
SORE THROAT: 0
SHORTNESS OF BREATH: 1
RHINORRHEA: 0
COLOR CHANGE: 0
ABDOMINAL PAIN: 0

## 2020-11-16 ASSESSMENT — PAIN DESCRIPTION - DESCRIPTORS: DESCRIPTORS: ACHING

## 2020-11-16 ASSESSMENT — PAIN DESCRIPTION - LOCATION: LOCATION: CHEST

## 2020-11-16 ASSESSMENT — PAIN SCALES - GENERAL: PAINLEVEL_OUTOF10: 10

## 2020-11-16 ASSESSMENT — PAIN DESCRIPTION - PAIN TYPE: TYPE: ACUTE PAIN

## 2020-11-16 NOTE — TELEPHONE ENCOUNTER
1st Attempt; No Answer- Left HIPAA compliant voicemail with Non-Urgent Heart Failure Resource Line number for call back.     Enrique Quevedo HF BSN-RN  Heart Failure Navigator  [address] [facility]  [phone]

## 2020-11-16 NOTE — TELEPHONE ENCOUNTER
3rd Attempt; No Answer- Left HIPAA compliant voicemail with Non-Urgent Heart Failure Resource Line number for call back.     Delma White HF BSN-RN  Heart Failure Navigator  [address] [place] [address]  [phone]

## 2020-11-16 NOTE — TELEPHONE ENCOUNTER
2nd Attempt; No Answer- Left HIPAA compliant voicemail with Non-Urgent Heart Failure Resource Line number for call back.     Nicole Jaquez HF BSN-RN  Heart Failure Navigator  [address] [facility]\""  [phone]

## 2020-11-17 ENCOUNTER — HOSPITAL ENCOUNTER (INPATIENT)
Age: 60
LOS: 3 days | Discharge: HOME | DRG: 175 | End: 2020-11-20
Attending: INTERNAL MEDICINE | Admitting: INTERNAL MEDICINE
Payer: MEDICARE

## 2020-11-17 ENCOUNTER — NURSE ONLY (OUTPATIENT)
Dept: CARDIOLOGY CLINIC | Age: 60
End: 2020-11-17

## 2020-11-17 ENCOUNTER — CARE COORDINATION (OUTPATIENT)
Dept: CARE COORDINATION | Age: 60
End: 2020-11-17

## 2020-11-17 VITALS
RESPIRATION RATE: 18 BRPM | SYSTOLIC BLOOD PRESSURE: 124 MMHG | OXYGEN SATURATION: 97 % | BODY MASS INDEX: 41.54 KG/M2 | HEIGHT: 61 IN | HEART RATE: 120 BPM | TEMPERATURE: 97.7 F | WEIGHT: 220 LBS | DIASTOLIC BLOOD PRESSURE: 88 MMHG

## 2020-11-17 PROBLEM — I26.99 PULMONARY EMBOLISM ON LEFT (HCC): Status: ACTIVE | Noted: 2020-11-17

## 2020-11-17 LAB
APTT: 33.8 SEC (ref 24.2–36.2)
APTT: 44.5 SEC (ref 24.2–36.2)
EKG ATRIAL RATE: 115 BPM
EKG DIAGNOSIS: NORMAL
EKG P AXIS: 3 DEGREES
EKG P-R INTERVAL: 174 MS
EKG Q-T INTERVAL: 330 MS
EKG QRS DURATION: 80 MS
EKG QTC CALCULATION (BAZETT): 456 MS
EKG R AXIS: 110 DEGREES
EKG T AXIS: 32 DEGREES
EKG VENTRICULAR RATE: 115 BPM
GLUCOSE BLD-MCNC: 237 MG/DL (ref 70–99)
GLUCOSE BLD-MCNC: 284 MG/DL (ref 70–99)
PERFORMED ON: ABNORMAL
PERFORMED ON: ABNORMAL

## 2020-11-17 PROCEDURE — 93308 TTE F-UP OR LMTD: CPT

## 2020-11-17 PROCEDURE — 85730 THROMBOPLASTIN TIME PARTIAL: CPT

## 2020-11-17 PROCEDURE — 97110 THERAPEUTIC EXERCISES: CPT

## 2020-11-17 PROCEDURE — 97116 GAIT TRAINING THERAPY: CPT

## 2020-11-17 PROCEDURE — 97161 PT EVAL LOW COMPLEX 20 MIN: CPT

## 2020-11-17 PROCEDURE — 6370000000 HC RX 637 (ALT 250 FOR IP): Performed by: INTERNAL MEDICINE

## 2020-11-17 PROCEDURE — 2060000000 HC ICU INTERMEDIATE R&B

## 2020-11-17 PROCEDURE — 97165 OT EVAL LOW COMPLEX 30 MIN: CPT

## 2020-11-17 PROCEDURE — 83036 HEMOGLOBIN GLYCOSYLATED A1C: CPT

## 2020-11-17 PROCEDURE — 2580000003 HC RX 258: Performed by: INTERNAL MEDICINE

## 2020-11-17 PROCEDURE — 93010 ELECTROCARDIOGRAM REPORT: CPT | Performed by: INTERNAL MEDICINE

## 2020-11-17 PROCEDURE — 2500000003 HC RX 250 WO HCPCS: Performed by: INTERNAL MEDICINE

## 2020-11-17 PROCEDURE — 99222 1ST HOSP IP/OBS MODERATE 55: CPT | Performed by: INTERNAL MEDICINE

## 2020-11-17 PROCEDURE — 36415 COLL VENOUS BLD VENIPUNCTURE: CPT

## 2020-11-17 PROCEDURE — 6360000002 HC RX W HCPCS: Performed by: INTERNAL MEDICINE

## 2020-11-17 RX ORDER — FENOFIBRATE 160 MG/1
160 TABLET ORAL DAILY
Status: DISCONTINUED | OUTPATIENT
Start: 2020-11-17 | End: 2020-11-20 | Stop reason: HOSPADM

## 2020-11-17 RX ORDER — DEXTROSE MONOHYDRATE 50 MG/ML
100 INJECTION, SOLUTION INTRAVENOUS PRN
Status: DISCONTINUED | OUTPATIENT
Start: 2020-11-17 | End: 2020-11-20 | Stop reason: HOSPADM

## 2020-11-17 RX ORDER — HEPARIN SODIUM 1000 [USP'U]/ML
80 INJECTION, SOLUTION INTRAVENOUS; SUBCUTANEOUS ONCE
Status: DISCONTINUED | OUTPATIENT
Start: 2020-11-17 | End: 2020-11-17

## 2020-11-17 RX ORDER — METOPROLOL SUCCINATE 25 MG/1
12.5 TABLET, EXTENDED RELEASE ORAL DAILY
Status: DISCONTINUED | OUTPATIENT
Start: 2020-11-17 | End: 2020-11-20 | Stop reason: HOSPADM

## 2020-11-17 RX ORDER — DIGOXIN 125 MCG
125 TABLET ORAL EVERY OTHER DAY
Status: DISCONTINUED | OUTPATIENT
Start: 2020-11-17 | End: 2020-11-20 | Stop reason: HOSPADM

## 2020-11-17 RX ORDER — HEPARIN SODIUM 10000 [USP'U]/100ML
18 INJECTION, SOLUTION INTRAVENOUS CONTINUOUS
Status: DISCONTINUED | OUTPATIENT
Start: 2020-11-17 | End: 2020-11-17

## 2020-11-17 RX ORDER — HEPARIN SODIUM 10000 [USP'U]/100ML
13.5 INJECTION, SOLUTION INTRAVENOUS CONTINUOUS
Status: DISCONTINUED | OUTPATIENT
Start: 2020-11-17 | End: 2020-11-18 | Stop reason: DRUGHIGH

## 2020-11-17 RX ORDER — HEPARIN SODIUM 1000 [USP'U]/ML
2700 INJECTION, SOLUTION INTRAVENOUS; SUBCUTANEOUS PRN
Status: DISCONTINUED | OUTPATIENT
Start: 2020-11-17 | End: 2020-11-20 | Stop reason: HOSPADM

## 2020-11-17 RX ORDER — ATORVASTATIN CALCIUM 40 MG/1
40 TABLET, FILM COATED ORAL NIGHTLY
Status: DISCONTINUED | OUTPATIENT
Start: 2020-11-17 | End: 2020-11-20 | Stop reason: HOSPADM

## 2020-11-17 RX ORDER — FUROSEMIDE 10 MG/ML
40 INJECTION INTRAMUSCULAR; INTRAVENOUS DAILY
Status: DISCONTINUED | OUTPATIENT
Start: 2020-11-17 | End: 2020-11-20

## 2020-11-17 RX ORDER — PROMETHAZINE HYDROCHLORIDE 25 MG/1
12.5 TABLET ORAL EVERY 6 HOURS PRN
Status: DISCONTINUED | OUTPATIENT
Start: 2020-11-17 | End: 2020-11-20 | Stop reason: HOSPADM

## 2020-11-17 RX ORDER — ACETAMINOPHEN 325 MG/1
650 TABLET ORAL EVERY 6 HOURS PRN
Status: DISCONTINUED | OUTPATIENT
Start: 2020-11-17 | End: 2020-11-20 | Stop reason: HOSPADM

## 2020-11-17 RX ORDER — ASPIRIN 81 MG/1
81 TABLET ORAL DAILY
Status: DISCONTINUED | OUTPATIENT
Start: 2020-11-17 | End: 2020-11-20 | Stop reason: HOSPADM

## 2020-11-17 RX ORDER — FAMOTIDINE 20 MG/1
20 TABLET, FILM COATED ORAL DAILY PRN
Status: DISCONTINUED | OUTPATIENT
Start: 2020-11-17 | End: 2020-11-20 | Stop reason: HOSPADM

## 2020-11-17 RX ORDER — MAGNESIUM SULFATE IN WATER 40 MG/ML
2 INJECTION, SOLUTION INTRAVENOUS PRN
Status: DISCONTINUED | OUTPATIENT
Start: 2020-11-17 | End: 2020-11-19

## 2020-11-17 RX ORDER — DEXTROSE MONOHYDRATE 25 G/50ML
12.5 INJECTION, SOLUTION INTRAVENOUS PRN
Status: DISCONTINUED | OUTPATIENT
Start: 2020-11-17 | End: 2020-11-20 | Stop reason: HOSPADM

## 2020-11-17 RX ORDER — ONDANSETRON 2 MG/ML
4 INJECTION INTRAMUSCULAR; INTRAVENOUS EVERY 6 HOURS PRN
Status: DISCONTINUED | OUTPATIENT
Start: 2020-11-17 | End: 2020-11-20 | Stop reason: HOSPADM

## 2020-11-17 RX ORDER — INSULIN GLARGINE 100 [IU]/ML
15 INJECTION, SOLUTION SUBCUTANEOUS NIGHTLY
Status: DISCONTINUED | OUTPATIENT
Start: 2020-11-17 | End: 2020-11-20 | Stop reason: HOSPADM

## 2020-11-17 RX ORDER — AMIODARONE HYDROCHLORIDE 200 MG/1
200 TABLET ORAL DAILY
Status: DISCONTINUED | OUTPATIENT
Start: 2020-11-17 | End: 2020-11-20 | Stop reason: HOSPADM

## 2020-11-17 RX ORDER — SODIUM CHLORIDE 0.9 % (FLUSH) 0.9 %
10 SYRINGE (ML) INJECTION EVERY 12 HOURS SCHEDULED
Status: DISCONTINUED | OUTPATIENT
Start: 2020-11-17 | End: 2020-11-20 | Stop reason: HOSPADM

## 2020-11-17 RX ORDER — SODIUM CHLORIDE 0.9 % (FLUSH) 0.9 %
10 SYRINGE (ML) INJECTION PRN
Status: DISCONTINUED | OUTPATIENT
Start: 2020-11-17 | End: 2020-11-20 | Stop reason: HOSPADM

## 2020-11-17 RX ORDER — HEPARIN SODIUM 1000 [USP'U]/ML
5500 INJECTION, SOLUTION INTRAVENOUS; SUBCUTANEOUS PRN
Status: DISCONTINUED | OUTPATIENT
Start: 2020-11-17 | End: 2020-11-20 | Stop reason: HOSPADM

## 2020-11-17 RX ORDER — ACETAMINOPHEN 650 MG/1
650 SUPPOSITORY RECTAL EVERY 6 HOURS PRN
Status: DISCONTINUED | OUTPATIENT
Start: 2020-11-17 | End: 2020-11-20 | Stop reason: HOSPADM

## 2020-11-17 RX ORDER — POTASSIUM CHLORIDE 7.45 MG/ML
10 INJECTION INTRAVENOUS PRN
Status: DISCONTINUED | OUTPATIENT
Start: 2020-11-17 | End: 2020-11-19

## 2020-11-17 RX ORDER — NICOTINE POLACRILEX 4 MG
15 LOZENGE BUCCAL PRN
Status: DISCONTINUED | OUTPATIENT
Start: 2020-11-17 | End: 2020-11-20 | Stop reason: HOSPADM

## 2020-11-17 RX ADMIN — INSULIN LISPRO 3 UNITS: 100 INJECTION, SOLUTION INTRAVENOUS; SUBCUTANEOUS at 21:49

## 2020-11-17 RX ADMIN — SACUBITRIL AND VALSARTAN 0.5 TABLET: 24; 26 TABLET, FILM COATED ORAL at 11:43

## 2020-11-17 RX ADMIN — SODIUM CHLORIDE, PRESERVATIVE FREE 10 ML: 5 INJECTION INTRAVENOUS at 22:00

## 2020-11-17 RX ADMIN — SODIUM CHLORIDE, PRESERVATIVE FREE 10 ML: 5 INJECTION INTRAVENOUS at 10:21

## 2020-11-17 RX ADMIN — FENOFIBRATE 160 MG: 160 TABLET ORAL at 11:43

## 2020-11-17 RX ADMIN — DIGOXIN 125 MCG: 125 TABLET ORAL at 11:43

## 2020-11-17 RX ADMIN — METOPROLOL SUCCINATE 12.5 MG: 25 TABLET, EXTENDED RELEASE ORAL at 11:42

## 2020-11-17 RX ADMIN — INSULIN GLARGINE 15 UNITS: 100 INJECTION, SOLUTION SUBCUTANEOUS at 21:48

## 2020-11-17 RX ADMIN — SACUBITRIL AND VALSARTAN 0.5 TABLET: 24; 26 TABLET, FILM COATED ORAL at 21:49

## 2020-11-17 RX ADMIN — FUROSEMIDE 40 MG: 10 INJECTION, SOLUTION INTRAMUSCULAR; INTRAVENOUS at 11:43

## 2020-11-17 RX ADMIN — ASPIRIN 81 MG: 81 TABLET, COATED ORAL at 11:42

## 2020-11-17 RX ADMIN — HEPARIN SODIUM 12.3 ML/HR: 10000 INJECTION, SOLUTION INTRAVENOUS at 10:37

## 2020-11-17 RX ADMIN — INSULIN LISPRO 4 UNITS: 100 INJECTION, SOLUTION INTRAVENOUS; SUBCUTANEOUS at 17:09

## 2020-11-17 RX ADMIN — AMIODARONE HYDROCHLORIDE 200 MG: 200 TABLET ORAL at 11:42

## 2020-11-17 RX ADMIN — ATORVASTATIN CALCIUM 40 MG: 40 TABLET, FILM COATED ORAL at 21:49

## 2020-11-17 ASSESSMENT — PAIN SCALES - GENERAL
PAINLEVEL_OUTOF10: 0
PAINLEVEL_OUTOF10: 3
PAINLEVEL_OUTOF10: 0
PAINLEVEL_OUTOF10: 10

## 2020-11-17 ASSESSMENT — PAIN DESCRIPTION - LOCATION
LOCATION: CHEST
LOCATION: CHEST

## 2020-11-17 ASSESSMENT — PAIN - FUNCTIONAL ASSESSMENT: PAIN_FUNCTIONAL_ASSESSMENT: ACTIVITIES ARE NOT PREVENTED

## 2020-11-17 ASSESSMENT — PAIN DESCRIPTION - PAIN TYPE
TYPE: ACUTE PAIN
TYPE: ACUTE PAIN

## 2020-11-17 NOTE — ED NOTES
2308- Call placed to [facility] for transfer consult per Bianca Myers  11/16/20 2310       Devonte Myers  11/16/20 2312    0020- Dr. Chidi Le returned page spoke with Bianca Myers  11/17/20 0037    0305- pt is going to [facility]     Bed: 441-1  Report: [phone]  Accepting: Chidi Le  Transport: [employer]  ETA: [id number]         Devonte Myers  11/17/20 0304

## 2020-11-17 NOTE — H&P
Hospital Medicine History & Physical      PCP: FRITZ Arrington NP    Date of Admission: 11/17/2020    Date of Service: Pt seen/examined on 18 Nov and Admitted to Inpatient with expected LOS greater than two midnights due to medical therapy. Chief Complaint:  SOB      History Of Present Illness:       62 y.o. female w/ hx HTN/CAD/CHF who presented to [facility] with a 3 day hx of acute but progressive SOB/CARRION w/ associated ankle swelling. She also reports that she does not feel well. She has had nausea vomiting and diarrhea as well. Patient also reports moderately severe non-localized but non-exertional chest discomfort. The patient denies any fever/chills or other signs/sxs of systemic illness or identifiable aggravating/alleviating factors. Past Medical History:          Diagnosis Date    Arthritis     CAD (coronary artery disease)     Cerebral artery occlusion with cerebral infarction ([facility]     x 3    CHF (congestive heart failure) (HCC)     Diabetes mellitus ([facility] [facility]     Hyperlipidemia     Hypertension     Mental retardation     MI (myocardial infarction) ([facility]     Pacemaker        Past Surgical History:          Procedure Laterality Date    BACK SURGERY      x3    PACEMAKER INSERTION      TUBAL LIGATION      TUMOR REMOVAL      UPPER GASTROINTESTINAL ENDOSCOPY N/A 11/5/2020    EGD BIOPSY performed by Freeman Mccracken DO at [facility]       Medications Prior to Admission:      Prior to Admission medications    Medication Sig Start Date End Date Taking?  Authorizing Provider   furosemide (LASIX) 40 MG tablet Take 1 tablet by mouth 2 times daily 11/13/20 12/13/20 Yes Jarvis Menchaca MD   magnesium oxide (MAG-OX) 400 (241.3 Mg) MG TABS tablet Take 1 tablet by mouth daily 11/13/20  Yes FRITZ Ling - CNP   amiodarone (CORDARONE) 200 MG tablet Take 1 tablet by mouth daily 11/10/20  Yes Roe Hamilton MD   metoclopramide (REGLAN) 10 MG tablet Take 1 tablet by mouth 3 times daily (with meals) 11/6/20  Yes Mayank Jama MD   sucralfate (CARAFATE) 1 GM tablet Take 1 tablet by mouth 3 times daily 11/6/20  Yes Mayank Jama MD   pantoprazole (PROTONIX) 40 MG tablet Take 1 tablet by mouth daily 11/6/20  Yes Mayank Jama MD   vitamin D (ERGOCALCIFEROL) 1.25 MG (04046 UT) CAPS capsule Take 50,000 Units by mouth once a week Takes weekly on Sundays   Yes Historical Provider, MD   metoprolol succinate (TOPROL XL) 25 MG extended release tablet Take 0.5 tablets by mouth daily 10/14/20  Yes FRITZ Victor CNP   spironolactone (ALDACTONE) 25 MG tablet Take 1 tablet by mouth daily 10/13/20  Yes FRITZ Victor CNP   aspirin 81 MG EC tablet Take 1 tablet by mouth daily 7/18/20  Yes Lauren Palma MD   ranolazine (RANEXA) 500 MG extended release tablet Take 1 tablet by mouth 2 times daily 7/18/20  Yes Lauren Palma MD   DULoxetine (CYMBALTA) 60 MG extended release capsule Take 1 capsule by mouth daily 7/18/20  Yes Lauren Palma MD   atorvastatin (LIPITOR) 40 MG tablet Take 1 tablet by mouth nightly 7/18/20  Yes Lauren Palma MD   insulin aspart (NOVOLOG FLEXPEN) 100 UNIT/ML injection pen Inject 10 Units into the skin 3 times daily (before meals) 11/13/20   Joann Vila MD   insulin glargine (LANTUS SOLOSTAR) 100 UNIT/ML injection pen Inject 24 Units into the skin nightly 11/13/20   Joann iVla MD   QUEtiapine (SEROQUEL) 25 MG tablet Take 25 mg by mouth nightly    Historical Provider, MD   sacubitril-valsartan (ENTRESTO) 24-26 MG per tablet Take 0.5 tablets by mouth 2 times daily 10/13/20   FRITZ Victor CNP   digoxin (LANOXIN) 125 MCG tablet Take 1 tablet by mouth every other day 9/25/20   FRITZ Victor CNP   nitroGLYCERIN (NITROSTAT) 0.4 MG SL tablet up to max of 3 total doses.  If no relief after 1 dose, call 911. 8/24/20   [facility] MD Rocio   fenofibrate micronized (LOFIBRA) 200 MG capsule Take 1 capsule by mouth nightly 7/18/20   Carine Wyatt MD   miconazole (MICOTIN) 2 % powder Apply topically 2 times daily. 7/18/20   Carine Wyatt MD   [employer] Lancets Ultra Thin MISC 1 each by Does not apply route 4 times daily 7/18/20   Carine Wyatt MD   blood glucose monitor strips Test three times a day & as needed for symptoms of irregular blood glucose. 5/12/19   Marija Riddle MD       Allergies:  Patient has no known allergies. Social History:      The patient currently lives independently    TOBACCO:   reports that she has never smoked. She has never used smokeless tobacco.  ETOH:   reports no history of alcohol use. Family History:      Reviewed in detail and negative for DM, CVA. Positive as follows:        Problem Relation Age of Onset    Heart Disease Father     Cancer Mother     Other Mother        REVIEW OF SYSTEMS:   Pertinent positives as noted in the HPI. All other systems reviewed and negative. PHYSICAL EXAM:    /76   Pulse 96   Temp 98.3 °F (36.8 °C) (Axillary)   Resp 20   SpO2 99%     General appearance:  No apparent distress, appears stated age and cooperative. HEENT:  Normal cephalic, atraumatic without obvious deformity. Pupils equal, round, and reactive to light. Extra ocular muscles intact. Conjunctivae/corneas clear. Neck: Supple, with full range of motion. No jugular venous distention. Trachea midline. Respiratory:  Normal respiratory effort. Clear to auscultation, bilaterally without Rales/Wheezes/Rhonchi. Cardiovascular:  Regular rate and rhythm with normal S1/S2 without murmurs, rubs or gallops. Abdomen: Soft, non-tender, non-distended with normal bowel sounds. Musculoskeletal:  No clubbing, cyanosis or edema bilaterally. Full range of motion without deformity. Skin: Skin color, texture, turgor normal.  No rashes or lesions.   Neurologic:  Neurovascularly intact without any focal sensory/motor deficits. Cranial nerves: II-XII intact, grossly non-focal.  Psychiatric:  Alert and oriented, thought content appropriate, normal insight  Capillary Refill: Brisk,< 3 seconds   Peripheral Pulses: +2 palpable, equal bilaterally       CXR:  I have reviewed the CXR with the following interpretation: Vascular congestion  EKG:  I have reviewed the EKG with the following interpretation: Tachy w/out acute ischemic changes. Labs:     Recent Labs     11/16/20 2000   WBC 6.7   HGB 13.5   HCT 41.8        Recent Labs     11/16/20 2000   *   K 4.1   CL 96*   CO2 23   BUN 11   CREATININE 0.6   CALCIUM 8.9     Recent Labs     11/16/20 2000   AST 20   ALT 15   BILITOT 1.1*   ALKPHOS 148*     No results for input(s): INR in the last 72 hours. Recent Labs     11/16/20 2000   TROPONINI <0.01       Urinalysis:      Lab Results   Component Value Date    NITRU Negative 11/16/2020    WBCUA 21-50 11/16/2020    BACTERIA Rare 11/16/2020    RBCUA 3-4 11/16/2020    BLOODU Negative 11/16/2020    SPECGRAV 1.020 11/16/2020    GLUCOSEU >=1000 11/16/2020         ASSESSMENT:    Active Hospital Problems    Diagnosis Date Noted    Pulmonary emboli ([facility] [I26.99] 11/17/2020    Obesity [E66.9] 08/24/2020    CHF (congestive heart failure) ([facility] [facility] [I50.9] 06/13/2018    CAD (coronary artery disease) [I25.10]     DM (diabetes mellitus) ([facility] [E11.9]     Dyslipidemia [E78.5]        PLAN:      Pulmonary Embolism - CTPA w/ acute LLL Pulmonary embolism w/out evidence of R hear strain but w/ pulmonary HTN. Echo w/out evidence of R heart strain and confirmed moderate Pericardial effusion seen on CT scan w/out evidence of tamponade. Started on Heparin gtt - continued. Anticipate NOAC at discharge    HTN/CAD - w/ known CAD but no evidence of active signs/sxs of ischemia/failure. Currently controlled on home meds w/ vitals reviewed and documented as above.     CHF - acute on chronic combined systolic/diastolic failure w/ reduced EF 20% by Echo dated this admission w/ Grade 3 diastolic dysfunction. Cardiology consulted and appreciated. Continue diuresis as written. Continue current medical mgt. S/P ICD    HyperLipidemia - controlled on home Statin. Continue, w/ f/u and med adjustment w/ PCP    DM2 - controlled on home Insulin - continued at reduced dose. Follow FSBS/SSI medium regimen. Last HbA1c 11.6% dated Nov 2020. Anticipate resuming/continuing home regimen at discharge. Morbid Obesity -  With BMI >40. Complicating assessment and treatment. Placing patient at risk for multiple co-morbidities as well as early death and contributing to the patient's presentation. Counseled on weight loss. DVT Prophylaxis: Heparin gtt  Diet: DIET CARDIAC;  Code Status: Full Code      PT/OT Eval Status: seen w/ recs for home w/ assist    Dispo - possibly Thurs 19 Nov pending clinical course and subspecialty recs. Jennie Gonzales MD    Thank you FRITZ Nash NP for the opportunity to be involved in this patient's care. If you have any questions or concerns please feel free to contact me at [phone].

## 2020-11-17 NOTE — FLOWSHEET NOTE
11/17/20 1020   Vital Signs   Temp 98.3 °F (36.8 °C)   Temp Source Axillary   Pulse 96   Heart Rate Source Monitor   Resp 20   /76   BP Location Left upper arm   BP Upper/Lower Upper   MAP (mmHg) 92   Patient Position Semi fowlers   Level of Consciousness 0   MEWS Score 1   Patient Currently in Pain Denies   Pain Assessment   Pain Assessment 0-10   Oxygen Therapy   SpO2 99 %   O2 Device None (Room air)

## 2020-11-17 NOTE — ED PROVIDER NOTES
I independently examined and evaluated Krysta Garcia. All diagnostic, treatment, and disposition decisions were made by myself in conjunction with the advanced practice provider. For all further details of the patient's emergency department visit, please see the advanced practice provider's documentation. Primary Care Physician: FRITZ Phillip - NP    History: This is a 62 y.o. female who presents to the Emergency Department with complaint of shortness of breath. Longstanding history of same, history of congestive heart failure, states she is compliant with water pills however she feels like they do not work. Does complain of some mild lower extremity edema. No active chest pain on my evaluation no abdominal pain no nausea no vomiting. Patient appears to be somewhat tearful describing condition. No clinical signs of DVT on exam, patient denies history of DVT/PE. Patient was found to have subsegmental pulmonary bolus on CT PE study, started on Lovenox, elevation in BNP. Patient irrigation showing 30 to 45 minutes of SVT as well as an episode of nonsustained V. tach    Physical:     height is 5' 1\" (1.549 m) and weight is 220 lb (99.8 kg). Her oral temperature is 97.7 °F (36.5 °C). Her blood pressure is 144/82 (abnormal) and her pulse is 115. Her respiration is 20 and oxygen saturation is 96%.    62 y.o. female   Alert and orient x3  Heart tachycardic but regular  Lung sounds are clear bilaterally  Abdomen soft nontender  MSK: Intact sensation and pulses distally with 1+ edema bilateral lower extremity no obvious signs of DVT on exam.    Impression: Respiratory distress, tachycardia    Plan: Evaluation for congestive heart failure, rule out pneumonia, eval PE    EKG Interpretation    The Ekg interpreted by me shows  sinus tachycardia, [id number]=[id number] with a rate of   Axis is   Normal  QTc is  normal  Intervals and Durations are unremarkable.       ST Segments: no acute change    No significant change

## 2020-11-17 NOTE — PROGRESS NOTES
Physical Therapy    Facility/Department: [facility] C4 PCU  Initial Assessment, treatment, DC summary    NAME: Jyoti Peres  : 1960  MRN: [id number]    Date of Service: 2020    Discharge Recommendations:  24 hour supervision or assist, Home with Home health PT   PT Equipment Recommendations  Equipment Needed: No    Assessment   Assessment: Pt referred for PT evaluation during current hospital stay with a diagnosis of pulmonary embolisim. Pt is currently functioning at baseline, is at mod I to indep for all mobility and does not need further PT in acute setting. Recommend home with 24 hr sup/assist at DC from acute setting  Treatment Diagnosis: decreased endurance  Prognosis: Good  Decision Making: Low Complexity  PT Education: Goals; General Safety;Gait Training;PT Role;Disease Specific Education;Plan of Care; Functional Mobility Training;Home Exercise Program;Precautions;Transfer Training;Weight-bearing Education  Patient Education: Pt verbalized understanding of importance of OOB activity  Barriers to Learning: no  No Skilled PT: At baseline function  REQUIRES PT FOLLOW UP: No  Activity Tolerance  Activity Tolerance: Patient Tolerated treatment well;Patient limited by endurance  Activity Tolerance: pt with SOB after amb on RA, O2 sats at 98%       Patient Diagnosis(es): There were no encounter diagnoses. has a past medical history of Arthritis, CAD (coronary artery disease), Cerebral artery occlusion with cerebral infarction ([address] CHF (congestive heart failure) ([address] Diabetes mellitus ([address] Hyperlipidemia, Hypertension, Mental retardation, MI (myocardial infarction) ([address] and Pacemaker. has a past surgical history that includes Pacemaker insertion; tumor removal; Tubal ligation; back surgery; and Upper gastrointestinal endoscopy (N/A, 2020).     Restrictions  Restrictions/Precautions  Restrictions/Precautions: General Precautions, Isolation(covid R/O)  Position Activity Restriction  Other position/activity restrictions: IV  Vision/Hearing  Vision: Impaired  Vision Exceptions: Wears glasses at all times  Hearing: Within functional limits     Subjective  General  Chart Reviewed: Yes  Patient assessed for rehabilitation services?: Yes  Response To Previous Treatment: Not applicable  Family / Caregiver Present: No  Referring Practitioner: Emil Mcelroy DO  Referral Date : 11/17/20  Diagnosis: pulmonary embolism  Follows Commands: Within Functional Limits  General Comment  Comments: Pt restingin bed upon entry, RN cleared pt for therapy  Subjective  Subjective: Pt agreeable to PT  Pain Screening  Patient Currently in Pain: Yes  Pain Assessment  Pain Assessment: 0-10  Pain Level: 10  Pain Type: Acute pain  Pain Location: Chest  Non-Pharmaceutical Pain Intervention(s): Ambulation/Increased Activity;Repositioned; Therapeutic presence;Distraction  Vital Signs  Patient Currently in Pain: Yes  Pre Treatment Pain Screening  Intervention List: Patient able to continue with treatment    Orientation  Orientation  Overall Orientation Status: Within Normal Limits  Social/Functional History  Social/Functional History  Lives With: Alone  Type of Home: Apartment  Home Layout: One level  Home Access: Level entry  Bathroom Shower/Tub: Walk-in shower  Bathroom Toilet: Standard  Bathroom Equipment: Shower chair  Home Equipment: Cane, 4 wheeled walker, Reacher  Receives Help From: Friend(s), Home health(RN visits every two weeks)  ADL Assistance: Independent  Homemaking Responsibilities: Yes  Meal Prep Responsibility: Primary  Laundry Responsibility: Primary  Cleaning Responsibility: Primary  Shopping Responsibility: Secondary  Ambulation Assistance: Independent(with cane all the time)  Transfer Assistance: Independent  Active : No  Patient's  Info: friend  Occupation: Retired  Type of occupation:   Leisure & Hobbies: get nails done, listen to country music         Objective          AROM RLE (degrees)  RLE AROM: WNL  AROM LLE (degrees)  LLE AROM : WNL  Strength RLE  Strength RLE: WNL  Strength LLE  Strength LLE: WNL        Bed mobility  Supine to Sit: Modified independent  Sit to Supine: Unable to assess(up in chair at EOS)  Scooting: Independent  Transfers  Sit to Stand: Independent  Stand to sit: Independent  Bed to Chair: Modified independent  Ambulation  Ambulation?: Yes  Ambulation 1  Surface: level tile  Device: No Device  Assistance: Modified Independent  Quality of Gait: No LOB, mild M/L sway  Gait Deviations: Slow Ana; Increased DILIA; Decreased step length;Decreased step height  Distance: 25 ft plus 50 ft  Stairs/Curb  Stairs?: No     Balance  Posture: Fair  Sitting - Static: Good  Sitting - Dynamic: Good  Standing - Static: Good  Standing - Dynamic: Good;-  Exercises  Quad Sets: x 10 B  Heelslides: x 10 B  Gluteal Sets: x 10 B  Hip Flexion: x 10 B  Hip Abduction: x 10 B clamshells with pillow sueeze  Knee Long Arc Quad: x 10 B  Ankle Pumps: x 10 B very limited ROM due to c/o swelling and pain, educated re importance of ankle AROM to assist with swelling     Plan   Plan  Times per week: one time only  Safety Devices  Type of devices:  All fall risk precautions in place, Left in chair, Call light within reach, Chair alarm in place, Nurse notified, Gait belt, Patient at risk for falls                                                             AM-PAC Score  AM-PAC Inpatient Mobility Raw Score : 20 (11/17/20 1405)  AM-PAC Inpatient T-Scale Score : 47.67 (11/17/20 1405)  Mobility Inpatient CMS 0-100% Score: 35.83 (11/17/20 1405)  Mobility Inpatient CMS G-Code Modifier : [facility] (11/17/20 1405)          Goals  Short term goals  Time Frame for Short term goals: 11/17/20  Short term goal 1: Pt will perform bed mobility and transfers with mod I; goal met  Short term goal 2: Pt will ambulate 50 ft with no AD and mod I; goal met  Patient Goals   Patient goals : \"to go home\"       Therapy Time   Individual Concurrent Group

## 2020-11-17 NOTE — CONSULTS
[facility]  [phone]      Attending Physician: Konstantin Kapadia MD  Reason for Consultation/Chief Complaint: Shortness of breath    Subjective   History of Present Illness:  Yasemin Cervantes is a 62 y.o. patient who presented to the hospital with complaints of shortness of breath, patient has been placed in isolation for Covid rule out, history is per chart review as patient is in isolation. I did call into the patient's room was able to speak the patient indicates she feels about the same. She continues to have chest pain and shortness of breath. In the course of work-up, she was found to have pulmonary embolism and is being treated with heparin. Troponin level was found to be negative, proBNP level was 2943. Patient had an echo today which showed severe LV dysfunction, also RV dysfunction was noted with moderate pericardial effusion. Patient has had several admissions this year for uncontrolled diabetes, our consult service also saw patient during the recent hospital admission from October 2, 2020 until October 14, 2020. At that time, patient was noted to have chest pain, troponin levels were elevated at 0.02 and 0.03 with a proBNP level of 3271. Patient had a stress test that time showed ejection fraction of 19%, she underwent cardiac catheterization during that hospital admission which showed mild nonobstructive CAD with severe left ventricular dysfunction. She was noted to have heart failure and was treated medically. She had also recently been seen by our EP service in September 2020 for a new SVT. At that time, she was switched from carvedilol to metoprolol.     Patient has a history of a idiopathic (nonischemic) cardiomyopathy, patient had previous care through [facility] and ultimately transitioned to our practice this year and was seen as a new patient in the EP clinic due to her history of cardiomyopathy status post ICD implantation.  In our clinic, she listed in nursing record and/or below  Prior to Admission medications    Medication Sig Start Date End Date Taking?  Authorizing Provider   furosemide (LASIX) 40 MG tablet Take 1 tablet by mouth 2 times daily 11/13/20 12/13/20 Yes Ni Urban MD   magnesium oxide (MAG-OX) 400 (241.3 Mg) MG TABS tablet Take 1 tablet by mouth daily 11/13/20  Yes FRITZ Kaur CNP   amiodarone (CORDARONE) 200 MG tablet Take 1 tablet by mouth daily 11/10/20  Yes Lakia Becker MD   metoclopramide (REGLAN) 10 MG tablet Take 1 tablet by mouth 3 times daily (with meals) 11/6/20  Yes Lito Cervantes MD   sucralfate (CARAFATE) 1 GM tablet Take 1 tablet by mouth 3 times daily 11/6/20  Yes Lito Cervantes MD   pantoprazole (PROTONIX) 40 MG tablet Take 1 tablet by mouth daily 11/6/20  Yes Lito Cervantes MD   vitamin D (ERGOCALCIFEROL) 1.25 MG (42216 UT) CAPS capsule Take 50,000 Units by mouth once a week Takes weekly on Sundays   Yes Historical Provider, MD   metoprolol succinate (TOPROL XL) 25 MG extended release tablet Take 0.5 tablets by mouth daily 10/14/20  Yes FRITZ Brown CNP   spironolactone (ALDACTONE) 25 MG tablet Take 1 tablet by mouth daily 10/13/20  Yes FRITZ Brown CNP   aspirin 81 MG EC tablet Take 1 tablet by mouth daily 7/18/20  Yes Erin Dalton MD   ranolazine (RANEXA) 500 MG extended release tablet Take 1 tablet by mouth 2 times daily 7/18/20  Yes Erin Dalton MD   DULoxetine (CYMBALTA) 60 MG extended release capsule Take 1 capsule by mouth daily 7/18/20  Yes Erin aDlton MD   atorvastatin (LIPITOR) 40 MG tablet Take 1 tablet by mouth nightly 7/18/20  Yes Erin Dalton MD   insulin aspart (NOVOLOG FLEXPEN) 100 UNIT/ML injection pen Inject 10 Units into the skin 3 times daily (before meals) 11/13/20   Ni Urban MD   insulin glargine (LANTUS SOLOSTAR) 100 UNIT/ML injection pen Inject 24 Units into the skin nightly 11/13/20   Martha Marie MD   QUEtiapine (SEROQUEL) 25 MG tablet Take 25 mg by mouth nightly    Historical Provider, MD   sacubitril-valsartan (ENTRESTO) 24-26 MG per tablet Take 0.5 tablets by mouth 2 times daily 10/13/20   FRITZ Lo CNP   digoxin (LANOXIN) 125 MCG tablet Take 1 tablet by mouth every other day 9/25/20   FRITZ Lo CNP   nitroGLYCERIN (NITROSTAT) 0.4 MG SL tablet up to max of 3 total doses. If no relief after 1 dose, call 911. 8/24/20   Vic Denise MD   fenofibrate micronized (LOFIBRA) 200 MG capsule Take 1 capsule by mouth nightly 7/18/20   Deepika Gipson MD   miconazole (MICOTIN) 2 % powder Apply topically 2 times daily. 7/18/20   Deepika Gipson MD   [employer] Lancets Ultra Thin MISC 1 each by Does not apply route 4 times daily 7/18/20   Deepika Gipson MD   blood glucose monitor strips Test three times a day & as needed for symptoms of irregular blood glucose. 5/12/19   Fernando Kelley MD        CURRENT Medications:  sodium chloride flush 0.9 % injection 10 mL, 2 times per day  sodium chloride flush 0.9 % injection 10 mL, PRN  potassium chloride 10 mEq/100 mL IVPB (Peripheral Line), PRN  magnesium sulfate 2 g in 50 mL IVPB premix, PRN  acetaminophen (TYLENOL) tablet 650 mg, Q6H PRN    Or  acetaminophen (TYLENOL) suppository 650 mg, Q6H PRN  promethazine (PHENERGAN) tablet 12.5 mg, Q6H PRN    Or  ondansetron (ZOFRAN) injection 4 mg, Q6H PRN  famotidine (PEPCID) tablet 20 mg, Daily PRN  heparin (porcine) injection 5,500 Units, PRN  heparin (porcine) injection 2,700 Units, PRN  heparin 25,000 unit in sodium chloride 0.45% 250 mL infusion, Continuous  perflutren lipid microspheres (DEFINITY) injection 1.65 mg, ONCE PRN        Allergies:  Patient has no known allergies.      Review of Systems:   Not able to obtain, patient is in Covid isolation      Objective   PHYSICAL EXAM:    Vitals:    11/17/20 1020   BP: 124/76 Pulse: 96   Resp: 20   Temp: 98.3 °F (36.8 °C)   SpO2: 99%            Unable to perform exam as patient is in isolation room    Labs   CBC:   Lab Results   Component Value Date    WBC 6.7 11/16/2020    RBC 4.66 11/16/2020    HGB 13.5 11/16/2020    HCT 41.8 11/16/2020    MCV 89.8 11/16/2020    RDW 17.0 11/16/2020     11/16/2020     CMP:  Lab Results   Component Value Date     11/16/2020    K 4.1 11/16/2020    CL 96 11/16/2020    CO2 23 11/16/2020    BUN 11 11/16/2020    CREATININE 0.6 11/16/2020    GFRAA >60 11/16/2020    AGRATIO 1.2 11/16/2020    LABGLOM >60 11/16/2020    GLUCOSE 293 11/16/2020    PROT 6.8 11/16/2020    CALCIUM 8.9 11/16/2020    BILITOT 1.1 11/16/2020    ALKPHOS 148 11/16/2020    AST 20 11/16/2020    ALT 15 11/16/2020     PT/INR:  No results found for: PTINR  HgBA1c:  Lab Results   Component Value Date    LABA1C 11.6 11/04/2020     Lab Results   Component Value Date    CKTOTAL 54 06/12/2020    TROPONINI <0.01 11/16/2020         Cardiac Data     Last EKG:   Sinus tachycardia, right axis, low voltage, anterior infarct, similar to prior EKG    Echo:    10/2020     Limited only f/u for LVEF. The left ventricular systolic function is severely reduced with an ejection   fraction of 15-20 %. There is severe global hypokinesis with regional variations. Changes noted from previous echo on 6- in left ventricular function.      Today:       Summary    STAT limited echo for pericardial effusion.    Left ventricular systolic function is severely reduced with a visually    estimated ejection fraction of <20 %.    The left ventricle is mildly dilated in size with normal wall thickness.    There is severe global hypokinesis with regional variation.    Grade III diastolic dysfunction with elevated LV pressure.    The right ventricle is mildly dilated with mildly reduced systolic function.    Biatrial enlargement.    Moderate sized primarily posterior pericardial effusion.   Leonid Okeefe is no puncture. The sheath was flushed and prepped in usual fashion. Catheters used during the procedure included 5 Dominican JL4, and JR4. The catheters were advanced and removed over a .035\" wire, into the appropriate positions. Multiple angiographic views were obtained. An LV gram was obtained. ~Right heart catheterization was also performed. We utilized the right common femoral vein. Advanced  the [place] Hidden was advanced under pressure fluoroscopic guidance into the cardiac chambers.     Findings:     1. Left main coronary artery was normal. It gave off the left anterior descending artery and left circumflex.     2. Left anterior descending artery has mild atherosclerotic disease. It was moderate in size. It gave off septal perforators and a moderate sized diagonal branch. The LAD covered the entire apex of the left ventricle.      3. Left circumflex has mild atherosclerotic disease. It was moderate in size. There was a moderate sized obtuse marginal branch.     4. Right coronary artery has mild atherosclerotic disease. It was moderate in size and was the dominant artery.     5. Left ventriculogram was not performed. Left ventricular end diastolic l pressure was 26. There is no gradient across pullback of the aortic valve.     Right heart catheterization findings:     Right atrial pressure of 20  RV 45/7  PA 53/8  Pulmonary wedge pressure mean of 20  RA saturation 42%  PA saturation 45%  Aortic saturation 99%  Cardiac output 2.4  Cardiac index 1.24  SVR 2433       /80   Pulse 73   Temp 98.9 °F (37.2 °C) (Oral)   Resp 16   Ht 5' 1\" (1.549 m)   Wt 210 lb 1.6 oz (95.3 kg)   SpO2 97%   BMI 39.70 kg/m²      The access site was controlled with manual pressure and/or appropriate closure device.     Moderate Conscious Sedation Details: An independent trained observer pushed medications at my direction.  We monitoring the patient's level of consciousness and vital signs/physiologic status throughout the procedure. CPT codes 44003 and 89346        Start time: 1400  Stop time: 1440  ASA class: 3     Sedation totals:  Versad - 2mg  Fentanyl - 25mcg     EBL - minimal <5 cc blood loss     The patient was monitored continuously with the ECG, pulse oximetry, blood pressure, and direct observation.        CONCLUSIONS:     1. Mild non-obstructive coronary artery disease with known severe left ventricular dysfunction  2. Moderate pulmonary hypertension with decompensated hemodynamics     ASSESSMENT/RECOMMENDATIONS:     1. Risk Factor control  2. Advanced heart failure management.         Studies:     cxr       Impression    Cardiomegaly and pulmonary vascular congestion.  Small bilateral effusions    and adjacent atelectasis/airspace disease.             Ct chest    Impression    Left lower lobe lobar and segmental pulmonary emboli.  No evidence of right    heart strain.         Dilated pulmonary trunk suggesting pulmonary hypertension.         Contrast reflux into the IVC suggesting diminished right heart output.         Moderate pericardial effusion.         Moderate right and trace left pleural effusions.         Mild bibasilar atelectasis.  Subtle ground-glass lower lobe infiltrate likely    represents mild pulmonary edema. I have reviewed labs and imaging/xray/diagnostic testing in this note.     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)    Dual ICD (implantable cardioverter-defibrillator) in place    Brain tumor (benign) ([address]    Chronic combined systolic and diastolic congestive heart failure ([address]    TIA involving right internal carotid artery    CAD in native artery    DM (diabetes mellitus), secondary, uncontrolled, w/neurologic complic ([address]    CHF (congestive heart failure) [facility]    Nonischemic cardiomyopathy ([facility]    Essential hypertension    TIA (transient ischemic attack)    Elevated blood sugar    Diabetic ketoacidosis without coma associated with type 2 diabetes mellitus (HCC)    Non-intractable vomiting with nausea    Chest pain    Arterial ischemic stroke, ICA, right, acute ([address]    Diabetic hyperosmolar non-ketotic state ([address]    Diabetic acidosis without coma ([facility]    Hyponatremia    Metabolic acidosis    Disorder of electrolytes    Diabetic ketoacidosis with coma associated with type 2 diabetes mellitus (HCC)    Hypernatremia    Leukocytosis    Atrial tachycardia (HCC)    DKA, type 2, not at goal [facility])    Cognitive developmental delay    Depressive disorder    Urinary tract infection without hematuria    Hypokalemia    Persistent fever    Syncope and collapse    S/P ICD (internal cardiac defibrillator) procedure    History of CVA (cerebrovascular accident)    Noncompliance with medications    Obesity    SVT (supraventricular tachycardia) (HCC)    Type 2 diabetes mellitus with hyperglycemia, with long-term current use of insulin (HCC)    Acute pulmonary edema (HCC)    Mixed hyperlipidemia    Lactic acidosis    NSVT (nonsustained ventricular tachycardia) (HCC)    Intractable nausea and vomiting    Tachycardia    Uncontrolled type 2 diabetes mellitus with hyperglycemia (HCC)    Acute on chronic systolic CHF (congestive heart failure) (HCC)    Pulmonary emboli (HCC)       Acute on chronic systolic heart failure, will treat with IV diuretics    Nonischemic cardiomyopathy, resume Toprol and Entresto, patient has had hypotension the past, will need to monitor for that and consider reductions in these medicines. From past notes, she had to get switched to spironolactone as an alternative therapy. We will try these therapies first (bblocker/entresto).     Hypercholesterolemia, continue statin and fenofibrate     History of CVA, continue aspirin     Status post ICD, [facility], this is been interrogated recently and is being followed by EP service, EP also following for SVT and history of nonsustained VT, continue digoxin and amiodarone    Thank you for allowing us to participate in the care of Jyoti Peres. Please call me with any questions [phone].     Myrna Lofton MD, [facility]   Interventional Cardiologist  [facility]  [phone] [facility]  [phone] [address] [place] [address]  11/17/2020 10:35 AM

## 2020-11-17 NOTE — PROGRESS NOTES
Occupational Therapy   Occupational Therapy Initial/discharge Assessment  1 x only    Date: 2020   Patient Name: Della Ortiz  MRN: [id number]     : 1960    Date of Service: 2020    Discharge Recommendations:  Home with assist PRN       Assessment      OT Education: OT Role  Patient Education: disease specific:  importance of getting up to chair & OOB for exercise, meals  No Skilled OT: At baseline function  REQUIRES OT FOLLOW UP: No  Activity Tolerance  Activity Tolerance: Patient Tolerated treatment well  Activity Tolerance: High dodge's:  BP = 128/65, HR= 101, 92% O 2 sats on RA; sitting  up EOB:  129/ 93, HR = 111; vitals stable  Safety Devices  Safety Devices in place: Yes  Type of devices: Call light within reach; Left in chair;Nurse notified           Patient Diagnosis(es): There were no encounter diagnoses. has a past medical history of Arthritis, CAD (coronary artery disease), Cerebral artery occlusion with cerebral infarction ([facility] CHF (congestive heart failure) ([facility] [facility] Diabetes mellitus ([facility] [address] [facility] Hyperlipidemia, Hypertension, Mental retardation, MI (myocardial infarction) ([facility] [address] and Pacemaker. has a past surgical history that includes Pacemaker insertion; tumor removal; Tubal ligation; back surgery; and Upper gastrointestinal endoscopy (N/A, 2020).            Restrictions  Restrictions/Precautions  Restrictions/Precautions: General Precautions, Isolation(COVID R/O)  Position Activity Restriction  Other position/activity restrictions: IV    Subjective   General  Chart Reviewed: Yes  Patient assessed for rehabilitation services?: Yes  Family / Caregiver Present: No  Referring Practitioner: Dr. Danya Ruff  Diagnosis: SOB, R/O PE CoVID  General Comment  Comments: RN cleared pt for OT eval; pt resting in bed, agreeable to therapy  Patient Currently in Pain: Yes  Pain Assessment  Pain Assessment: 0-10  Pain Level: 3  Pain Type: Acute pain  Pain Location: Chest  Functional Pain Assessment: Activities are not prevented  Non-Pharmaceutical Pain Intervention(s): Ambulation/Increased Activity; Therapeutic presence;Repositioned  Pre Treatment Pain Screening  Intervention List: Patient able to continue with treatment  Vital Signs  Patient Currently in Pain: Yes  Social/Functional History  Social/Functional History  Lives With: Alone  Type of Home: Apartment  Home Layout: One level  Home Access: Level entry  Bathroom Shower/Tub: Walk-in shower  Bathroom Toilet: Standard  Bathroom Equipment: Shower chair  Home Equipment: Cane, 4 wheeled walker, Reacher  Receives Help From: Friend(s), Home health(RN visits every two weeks)  ADL Assistance: Independent  Homemaking Responsibilities: Yes  Meal Prep Responsibility: Primary  Laundry Responsibility: Primary  Cleaning Responsibility: Primary  Shopping Responsibility: Secondary  Ambulation Assistance: Independent(with cane all the time)  Transfer Assistance: Independent  Active : No  Patient's  Info: friend  Occupation: Retired  Type of occupation:   Leisure & Hobbies: get nails done, listen to country music       Objective   Vision: Impaired  Vision Exceptions: Wears glasses at all times  Hearing: Within functional limits    Orientation  Overall Orientation Status: Within Functional Limits     Balance  Sitting Balance: Independent  Standing Balance: Modified independent (with cane)  Standing Balance  Time: 1-3 minutes x 2  Activity: bathroom/functional mobility in room  Functional Mobility  Functional - Mobility Device: Cane  Activity: To/from bathroom  Assist Level: Modified independent   Toilet Transfers  Toilet - Technique: Ambulating(independently with cane)  Equipment Used: Grab bars  Toilet Transfer: Modified independent  ADL  Grooming: Independent(standing at sink to wash hands after toileting)  LE Dressing: Independent  Toileting: Independent    RUE Tone: Normotonic  LUE Tone: Normotonic  Coordination  Movements Are Fluid And Coordinated: Yes     Bed mobility  Supine to Sit: Modified independent  Sit to Supine: Unable to assess(Left up in chair upon exiting, pt agreeable)  Scooting: Independent  Transfers  Sit to stand: Independent  Stand to sit: Independent     Vision - Basic Assessment  Prior Vision: Wears glasses all the time     Cognition  Overall Cognitive Status: WFL       Type of ROM/Therapeutic Exercise: AROM BUE in chair  Hand flex/ext: x  10  Reps  Wrist flex/ext:  X  10 Reps  Elbow flex/ext:  x  10  Reps  Forearm sup/pron:  x  10  Reps  Shld flex/ext:  x   10 Reps    LUE AROM : WFL  RUE AROM : WFL          Plan     OT eval only;       AM-PAC Score        AM-PAC Inpatient Daily Activity Raw Score: 22 (11/17/20 1249)  AM-PAC Inpatient ADL T-Scale Score : 47.1 (11/17/20 1249)  ADL Inpatient CMS 0-100% Score: 25.8 (11/17/20 1249)  ADL Inpatient CMS G-Code Modifier : Neoma Ruffing (11/17/20 1249)    Goals  Patient Goals   Patient goals : home when able       Therapy Time   Individual Concurrent Group Co-treatment   Time In [facility]         Time Out [address]         Minutes [address] [place], [place]

## 2020-11-17 NOTE — CARE COORDINATION
CASE MANAGEMENT INITIAL ASSESSMENT      Reviewed chart and completed assessment with: patient  Explained Case Management role/services. Primary contact information: [address]:  Who do you trust or have selected to make healthcare decisions for you? Name:   Chance maravilla  Phone  Number: [phone]  Can this person be reached and be able to respond quickly, such as within a few minutes or hours? Yes  Who would be your back-up decision maker? Name n/a   Phone Number:n/a     Admit date/status: 11/17/20 Inpatient  Diagnosis: PE   Is this a Readmission?:  Yes      Insurance:[employer] [place], Medicaid   Precert required for SNF: Yes       3 night stay required: No    Living arrangements, Adls, care needs, prior to admission: lives alone in an apartment    Transportation: friends and family      Durable Medical Equipment at home:  Walker_X_Cane_X_RTS__ BSC__Shower Chair_X_  02__ HHN__ CPAP__  BiPap__  Hospital Bed__ W/C___ Other__________    Services in the home and/or outpatient, prior to admission: active with [facility]    Dialysis Facility (if applicable)   · Name:  · Address:  · Dialysis Schedule:  · Phone:  · Fax:    PT/OT recs: none    Hospital Exemption Notification (HEN): not initiated     Barriers to discharge: non    Plan/comments: Patient is independent at home with the exception of driving. She is active with [facility] and plans to resume with them at discharge. Please notify CM should any other needs arise.

## 2020-11-17 NOTE — DISCHARGE INSTR - COC
Continuity of Care Form    Patient Name: Nori Escalante   :  1960  MRN:  [id number]    Admit date:  2020  Discharge date:  ***    Code Status Order: Full Code   Advance Directives:     Admitting Physician:  Olu Lubin DO  PCP: FRITZ Lizama NP    Discharging Nurse: [facility] Unit/Room#: 3455/6883-89  Discharging Unit Phone Number: ***    Emergency Contact:   Extended Emergency Contact Information  Primary Emergency Contact: Christa Campos  Address: [address] [place] [address] [id number] [facility], [address] [place] [address] Erika [facility],[address] Phone: [phone]  Mobile Phone: [phone]  Relation: Brother/Sister    Past Surgical History:  Past Surgical History:   Procedure Laterality Date    BACK SURGERY      x3    PACEMAKER INSERTION      TUBAL LIGATION      TUMOR REMOVAL      UPPER GASTROINTESTINAL ENDOSCOPY N/A 2020    EGD BIOPSY performed by Shana Daley DO at [address]       Immunization History:   Immunization History   Administered Date(s) Administered    Influenza 2012    Influenza A (O0T6-54) Vaccine IM 2009    Influenza Virus Vaccine 10/17/2014, 10/27/2017    Influenza, Quadv, 6 mo and older, IM, PF (Flulaval, Fluarix) 12/10/2018    Influenza, Quadv, IM, PF (6 mo and older Fluzone, Flulaval, Fluarix, and 3 yrs and older Afluria) 10/12/2019    Pneumococcal Polysaccharide (Chmosteqn48) 10/17/2014, 2017       Active Problems:  Patient Active Problem List   Diagnosis Code    DM (diabetes mellitus) ([facility]) E11.9    HTN (hypertension), benign I10    Dyslipidemia E78.5    CAD (coronary artery disease) I25.10    Hx CVA with residual L-sided facial droop (2018) I63.50    Dual ICD (implantable cardioverter-defibrillator) in place Z80.65    Brain tumor (benign) ([facility]) D33.2    Chronic combined systolic and diastolic congestive heart failure (HCC) I50.42    TIA involving right internal carotid artery G45.1    CAD in native artery I25.10    DM (diabetes mellitus), secondary, uncontrolled, w/neurologic complic ([facility]) A21.17, N57.50    CHF (congestive heart failure) ([facility]) I50.9    Nonischemic cardiomyopathy ([facility]) I42.8    Essential hypertension I10    TIA (transient ischemic attack) G45.9    Elevated blood sugar R73.9    Diabetic ketoacidosis without coma associated with type 2 diabetes mellitus ([facility]) E11.10    Non-intractable vomiting with nausea R11.2    Chest pain R07.9    Arterial ischemic stroke, ICA, right, acute ([facility]) I63.231    Diabetic hyperosmolar non-ketotic state ([facility] E11.00    Diabetic acidosis without coma ([facility]) E11.10    Hyponatremia S30.9    Metabolic acidosis B63.5    Disorder of electrolytes E87.8    Diabetic ketoacidosis with coma associated with type 2 diabetes mellitus ([facility]) E11.11    Hypernatremia E87.0    Leukocytosis D72.829    Atrial tachycardia ([facility]) I47.1    DKA, type 2, not at goal ([facility] E11.10    Cognitive developmental delay F81.9    Depressive disorder F32.9    Urinary tract infection without hematuria N39.0    Hypokalemia E87.6    Persistent fever R50.9    Syncope and collapse R55    S/P ICD (internal cardiac defibrillator) procedure Z95.810    History of CVA (cerebrovascular accident) Z80.78    Noncompliance with medications Z91.14    Obesity E66.9    SVT (supraventricular tachycardia) ([facility]) I47.1    Type 2 diabetes mellitus with hyperglycemia, with long-term current use of insulin ([facility]) E11.65, Z79.4    Acute pulmonary edema ([facility]) J81.0    Mixed hyperlipidemia E78.2    Lactic acidosis E87.2    NSVT (nonsustained ventricular tachycardia) ([facility]) I47.2    Intractable nausea and vomiting R11.2    Tachycardia R00.0    Uncontrolled type 2 diabetes mellitus with hyperglycemia ([facility]) E11.65    Acute on chronic systolic CHF (congestive heart failure) ([facility]) I50.23    Pulmonary emboli ([facility]) I26.99       Isolation/Infection:   Isolation          Droplet Plus        Patient Infection Status     Infection Onset Added Last Indicated Last Indicated By Review Planned Expiration Resolved Resolved By    COVID-19 Rule Out 11/16/20 11/16/20 11/16/20 COVID-19 (Ordered) 11/23/20 11/30/20      Resolved    COVID-19 Rule Out 11/05/20 11/05/20 11/05/20 COVID-19 (Ordered)   11/05/20 Rule-Out Test Resulted    COVID-19 Rule Out 11/03/20 11/03/20 11/03/20 COVID-19 (Ordered)   11/05/20 Reginaldo Ken RN    Ordered COVID swab and discontinued previous ED visit. COVID-19 Rule Out 10/26/20 10/26/20 10/26/20 COVID-19 (Ordered)   10/26/20 Rule-Out Test Resulted    C-diff Rule Out 09/10/20 09/10/20 09/10/20 Clostridium difficile toxin/antigen (Ordered)   09/22/20 Jett Ewing RN    COVID-19 Rule Out 04/16/20 04/16/20 04/16/20 COVID-19 (Ordered)   04/16/20 Rule-Out Test Resulted          Nurse Assessment:  Last Vital Signs: /76   Pulse 96   Temp 98.3 °F (36.8 °C) (Axillary)   Resp 20   SpO2 99%     Last documented pain score (0-10 scale): Pain Level: 0  Last Weight:   Wt Readings from Last 1 Encounters:   11/16/20 220 lb (99.8 kg)     Mental Status:  oriented and alert    IV Access:  - None    Nursing Mobility/ADLs:  Walking   {CHP DME WRSY:436900292}  Transfer  {P DME CMFM:868946189}  Bathing  {CHP DME PIXO:675373982}  Dressing  {CHP DME NJON:536583922}  Toileting  {CHP DME PAZB:915315994}  Feeding  {CHP DME OWHZ:791194449}  Med Admin  {CHP DME NFGZ:821711756}  Med Delivery   { ADEN MED Delivery:117335776}    Wound Care Documentation and Therapy:        Elimination:  Continence:   · Bowel: {YES / EU:77900}  · Bladder: {YES / AT:93489}  Urinary Catheter: None   Colostomy/Ileostomy/Ileal Conduit: No       Date of Last BM: 11/20/2020    Intake/Output Summary (Last 24 hours) at 11/17/2020 1134  Last data filed at 11/17/2020 0912  Gross per 24 hour   Intake --   Output 400 ml   Net -400 ml     No intake/output data recorded.     Safety Concerns:     None    Impairments/Disabilities: None    Nutrition Therapy:  Current Nutrition Therapy:   - Oral Diet:  General    Routes of Feeding: Oral  Liquids: Thin Liquids  Daily Fluid Restriction: no  Last Modified Barium Swallow with Video (Video Swallowing Test):     Treatments at the Time of Hospital Discharge:   Respiratory Treatments: ***  Oxygen Therapy:  is not on home oxygen therapy. Ventilator:    - No ventilator support    Rehab Therapies: ***  Weight Bearing Status/Restrictions: [address] [employer] Weight Bearin}  Other Medical Equipment (for information only, NOT a DME order):  {EQUIPMENT:077570653}  Other Treatments: ***    Patient's personal belongings (please select all that are sent with patient):  Jignesh GRIMALDO SIGNATURE:  Electronically signed by Becca Langford RN on 20 at 3:02 PM EST    CASE MANAGEMENT/SOCIAL WORK SECTION    Inpatient Status Date: ***    Readmission Risk Assessment Score:  Readmission Risk              Risk of Unplanned Readmission:        68           Discharging to Facility/ Agency   · Name: [facility]   · Address:  · Phone: [phone]  · Fax:    Dialysis Facility (if applicable)   · Name:  · Address:  · Dialysis Schedule:  · Phone:  · Fax:    / signature: Electronically signed by Tiny Richardson RN on 20 at 2:57 PM EST    PHYSICIAN SECTION    Prognosis: Good    Condition at Discharge: Stable    Rehab Potential (if transferring to Rehab): Good    Recommended Labs or Other Treatments After Discharge: ***  Recommended Follow-up, Labs or Other Treatments After Discharge:    Skilled Nursing/PT/OT              Physician Certification: I certify the above information and transfer of William Sloan  is necessary for the continuing treatment of the diagnosis listed and that she requires Home Care for {GREATER/LESS:068319814} 30 days.      Update Admission H&P: {CHP DME Changes in NPII}    PHYSICIAN SIGNATURE:  Electronically signed by Tyrone Gillespie MD  on 20 at 3:05 PM EST

## 2020-11-17 NOTE — ED PROVIDER NOTES
[address] ED  EMERGENCY DEPARTMENT ENCOUNTER        Pt Name: Oly York  MRN: [id number]  [place] 1960  Date of evaluation: 11/16/2020  Provider: FRITZ Haile CNP  PCP: FRITZ Mckee NP  ED Attending: No att. providers found    [address]       Chief Complaint   Patient presents with    Shortness of Breath     states ankles are swollen       HISTORY OF PRESENT ILLNESS   (Location/Symptom, Timing/Onset, Context/Setting, Quality, Duration, Modifying Factors, Severity)  Note limiting factors. Oly York is a 62 y.o. female for shortness of breath. Onset was 3 days. Context includes patient reports that she has been short of breath and had increased ankle swelling for the last 3 days. Patient reports that she does not feel well. She has had nausea vomiting and diarrhea as well. Patient also reports chest discomfort. Patient reports that she takes multiple medications including diuretics and states \"they do not work \". Patient states that she does have a cardiologist however cannot remember her name. Patient does report that she has had multiple MIs and previous strokes. She denies any fevers. Alleviating factors include nothing. Aggravating factors include thing. Pain is 10/10. Nothing has been used for pain today. Nursing Notes were all reviewed and agreed with or any disagreements were addressed  in the HPI. REVIEW OF SYSTEMS  (2-9 systems for level 4, 10 or more for level 5)     Review of Systems   Constitutional: Negative for fever. HENT: Negative for congestion, rhinorrhea and sore throat. Respiratory: Positive for shortness of breath. Negative for cough. Cardiovascular: Positive for chest pain and leg swelling. Gastrointestinal: Positive for diarrhea, nausea and vomiting. Negative for abdominal pain. Genitourinary: Negative for decreased urine volume and difficulty urinating.    Musculoskeletal: Negative for arthralgias and myalgias. Skin: Negative for color change and rash. Neurological: Negative for dizziness and light-headedness. Psychiatric/Behavioral: Negative for agitation. All other systems reviewed and are negative. Positivesand Pertinent negatives as per HPI. Except as noted above in the ROS, all other systems were reviewed and negative.        PAST MEDICAL HISTORY     Past Medical History:   Diagnosis Date    Arthritis     CAD (coronary artery disease)     Cerebral artery occlusion with [facility] infarction [facility]     x 3    CHF (congestive heart failure) ([facility]     Diabetes mellitus ([facility]     Hyperlipidemia     Hypertension     Mental retardation     MI (myocardial infarction) ([facility]     Pacemaker          SURGICAL HISTORY       Past Surgical History:   Procedure Laterality Date    BACK SURGERY      x3    PACEMAKER INSERTION      TUBAL LIGATION      TUMOR REMOVAL      UPPER GASTROINTESTINAL ENDOSCOPY N/A 11/5/2020    EGD BIOPSY performed by Karla Lund DO at [address] [facility]       Discharge Medication List as of 11/17/2020  4:54 AM      CONTINUE these medications which have NOT CHANGED    Details   insulin aspart (NOVOLOG FLEXPEN) 100 UNIT/ML injection pen Inject 10 Units into the skin 3 times daily (before meals), Disp-5 pen,R-0NO PRINT      insulin glargine (LANTUS SOLOSTAR) 100 UNIT/ML injection pen Inject 24 Units into the skin nightly, Disp-5 pen,R-0NO PRINT      furosemide (LASIX) 40 MG tablet Take 1 tablet by mouth 2 times daily, Disp-60 tablet,R-0Normal      magnesium oxide (MAG-OX) 400 (241.3 Mg) MG TABS tablet Take 1 tablet by mouth daily, Disp-30 tablet,R-0Normal      amiodarone (CORDARONE) 200 MG tablet Take 1 tablet by mouth daily, Disp-90 tablet,R-3Normal      metoclopramide (REGLAN) 10 MG tablet Take 1 tablet by mouth 3 times daily (with meals), Disp-30 tablet,R-0Normal      sucralfate (CARAFATE) 1 GM tablet Take 1 tablet by mouth 3 times daily, Disp-90 tablet,R-0Normal      pantoprazole (PROTONIX) 40 MG tablet Take 1 tablet by mouth daily, Disp-90 tablet,R-0Normal      vitamin D (ERGOCALCIFEROL) 1.25 MG (75151 UT) CAPS capsule Take 50,000 Units by mouth once a week Takes weekly on SundaysHistorical Med      QUEtiapine (SEROQUEL) 25 MG tablet Take 25 mg by mouth nightlyHistorical Med      metoprolol succinate (TOPROL XL) 25 MG extended release tablet Take 0.5 tablets by mouth daily, Disp-30 tablet,R-3Normal      sacubitril-valsartan (ENTRESTO) 24-26 MG per tablet Take 0.5 tablets by mouth 2 times daily, Disp-60 tablet,R-3Normal      spironolactone (ALDACTONE) 25 MG tablet Take 1 tablet by mouth daily, Disp-30 tablet,R-3Normal      digoxin (LANOXIN) 125 MCG tablet Take 1 tablet by mouth every other day, Disp-30 tablet,R-3Normal      nitroGLYCERIN (NITROSTAT) 0.4 MG SL tablet up to max of 3 total doses. If no relief after 1 dose, call 911., Disp-25 tablet,R-3Print      aspirin 81 MG EC tablet Take 1 tablet by mouth daily, Disp-30 tablet,R-2Normal      ranolazine (RANEXA) 500 MG extended release tablet Take 1 tablet by mouth 2 times daily, Disp-60 tablet,R-2Normal      DULoxetine (CYMBALTA) 60 MG extended release capsule Take 1 capsule by mouth daily, Disp-30 capsule,R-0Normal      atorvastatin (LIPITOR) 40 MG tablet Take 1 tablet by mouth nightly, Disp-30 tablet,R-2Normal      fenofibrate micronized (LOFIBRA) 200 MG capsule Take 1 capsule by mouth nightly, Disp-30 capsule,R-3Normal      miconazole (MICOTIN) 2 % powder Apply topically 2 times daily. , Disp-45 g,R-1, Normal      [employer] Lancets Ultra Thin MISC 4 TIMES DAILY Starting Sat 7/18/2020, Disp-200 each,R-0, Normal      blood glucose monitor strips Test three times a day & as needed for symptoms of irregular blood glucose., Disp-100 strip, R-2, Print               ALLERGIES     Patient has no known allergies.     FAMILY HISTORY       Family History   Problem Relation Age of Onset    Heart field reveals decreased breath sounds. Examination of the right-lower field reveals decreased breath sounds. Examination of the left-lower field reveals decreased breath sounds. Decreased breath sounds present. No wheezing or rhonchi. Abdominal:      General: There is no distension. Palpations: Abdomen is soft. Tenderness: There is no abdominal tenderness. Musculoskeletal: Normal range of motion. Skin:     General: Skin is warm and dry. Neurological:      Mental Status: She is alert and oriented to person, place, and time.          DIAGNOSTIC RESULTS   LABS:    Labs Reviewed   CBC WITH AUTO DIFFERENTIAL - Abnormal; Notable for the following components:       Result Value    RDW 17.0 (*)     All other components within normal limits    Narrative:     Performed at:  [facility],  [place]   Phone [phone]   LACTATE, SEPSIS - Abnormal; Notable for the following components:    Lactic Acid, Sepsis 2.0 (*)     All other components within normal limits    Narrative:     Performed at:  [place] [address],  [place]   Phone [phone]   URINE RT REFLEX TO CULTURE - Abnormal; Notable for the following components:    Glucose, Ur >=1000 (*)     Ketones, Urine TRACE (*)     Protein, UA TRACE (*)     All other components within normal limits    Narrative:     Performed at:  [facility],  [place]   Phone [phone]   BRAIN NATRIURETIC PEPTIDE - Abnormal; Notable for the following components:    Pro-BNP 2,943 (*)     All other components within normal limits    Narrative:     Performed at:  [facility],  [place]   Phone [phone]   BLOOD GAS, VENOUS - Abnormal; Notable for the following components:    pH, Alberto 7.324 (*)     pO2, Alberto 44.5 (*) [facility]  [address],  [place], [facility]   Phone [phone]       All other labs were within normal range or not returned as of this dictation. EKG: All EKG's are interpreted by the Emergency Department Physician who either signs or Co-signs this chart in the absence of a cardiologist.  Please see their note for interpretation of EKG. RADIOLOGY:   Chest x-ray interpreted by radiologist for  FINDINGS:   Cardiomegaly and pulmonary vascular congestion.  Limited low lung volume   study.  Small bilateral effusions and adjacent atelectasis/airspace disease. No pleural effusion or pneumothorax.  Mild generalized osteopenia.  Osseous   structures and soft tissues are grossly intact. CT chest pulmonary embolism with contrast interpreted by radiologist for  Impression:     Left lower lobe lobar and segmental pulmonary emboli.  No evidence of right   heart strain. Dilated pulmonary trunk suggesting pulmonary hypertension. Contrast reflux into the IVC suggesting diminished right heart output. Moderate pericardial effusion. Moderate right and trace left pleural effusions. Mild bibasilar atelectasis.  Subtle ground-glass lower lobe infiltrate likely   represents mild pulmonary edema. Interpretation per the Radiologist below, if available at the time of this note:    CT CHEST PULMONARY EMBOLISM W CONTRAST   Final Result   Left lower lobe lobar and segmental pulmonary emboli. No evidence of right   heart strain. Dilated pulmonary trunk suggesting pulmonary hypertension. Contrast reflux into the IVC suggesting diminished right heart output. Moderate pericardial effusion. Moderate right and trace left pleural effusions. Mild bibasilar atelectasis. Subtle ground-glass lower lobe infiltrate likely   represents mild pulmonary edema.       Findings were discussed with CELESTINO Ivey at 10:29 pm on 11/16/2020 by Dr. Irene Musa. XR CHEST (2 VW)   Final Result   Cardiomegaly and pulmonary vascular congestion. Small bilateral effusions   and adjacent atelectasis/airspace disease. No results found. PROCEDURES   Unless otherwise noted below, none     Procedures    CRITICAL CARE TIME   N/A    CONSULTS:  IP CONSULT TO HOSPITALIST  IP CONSULT TO CARDIOLOGY  IP CONSULT TO HOSPITALIST      EMERGENCY DEPARTMENT COURSE and DIFFERENTIAL DIAGNOSIS/MDM:   Vitals:    Vitals:    11/16/20 2140 11/17/20 0020 11/17/20 0324 11/17/20 0337   BP: (!) 140/95 (!) 145/85 122/75 124/88   Pulse: 106 103 102 120   Resp: 22 22 21 18   Temp:       TempSrc:       SpO2: 100% 97% 98% 97%   Weight:       Height:           Patient was given the following medications:  Medications   furosemide (LASIX) injection 40 mg (40 mg Intravenous Given 11/16/20 2240)   iopamidol (ISOVUE-370) 76 % injection 85 mL (85 mLs Intravenous Given 11/16/20 2212)   enoxaparin (LOVENOX) injection 100 mg (100 mg Subcutaneous Given 11/16/20 2244)         Patient was seen and evaluated by myself and . Patient here today for complaints of shortness of breath for the last 3 days. Patient does not use oxygen at home. She does report nausea vomiting and diarrhea. She has no risk factors for C. difficile with the exception of being in the ED frequently. Patient reports that she has also had chest pain. On exam the patient is awake and alert she was found to be tachycardic. Patient does have history of CHF and has complaints of leg swelling so she was not initially provided with IV fluids. She was given Lasix and lab values have been reviewed and interpreted. Patient did have a BNP of 2943. CT of her chest was obtained and was concerning for a pericardial effusion that appears to be new, pleural effusions, and left lower lobe PE. Patient did have her pacer interrogated and also revealed 30 to 40 seconds of SVT every hour for a few hours.   She also had nonsustained V. tach. Patient does not report that she is anticoagulated. Consult was placed to [facility] hospitalist however the hospitalist declined recommending the patient go to [facility]. I did consult with cardiology to evaluate plan and cardiology recommended transfer to [facility] as well. Consult was placed to the hospitalist [facility]. [facility] hospitalist is accepted. Patient's care was transferred to the [facility] hospitalist.        The patient tolerated their visit well. They were seen and evaluated by the attending physician, No att. providers found who agreed with the assessment and plan. The patient and / or the family were informed of the results of any tests, a time was given to answer questions, a plan was proposed and they agreed with plan. FINAL IMPRESSION      1. Other acute pulmonary embolism without acute cor pulmonale ([address]    2. Pericardial effusion    3. Elevated brain natriuretic peptide (BNP) level          DISPOSITION/PLAN   DISPOSITION Decision To Transfer 11/17/2020 04:54:31 AM      PATIENT REFERRED TO:  No follow-up provider specified.     DISCHARGE MEDICATIONS:  Discharge Medication List as of 11/17/2020  4:54 AM          DISCONTINUED MEDICATIONS:  Discharge Medication List as of 11/17/2020  4:54 AM      STOP taking these medications       metFORMIN (GLUCOPHAGE) 1000 MG tablet Comments:   Reason for Stopping:                      (Please note that portions of this note were completed with a voice recognition program.  Efforts were made to edit the dictations but occasionally words are mis-transcribed.)    FRITZ Catalan CNP (electronically signed)       FRITZ Catalan CNP  11/17/20 9555

## 2020-11-17 NOTE — CONSULTS
Pharmacy to Manage Heparin Infusion per Hospital Policy    Dx: PE  Pt wt = 68.6 kg adj. Baseline aPTT = ordered  Pt. Received Enoxaparin 100 mg dose on 11/16 2230 [facility]    High Dose Heparin Infusion  No heparin bolus ordered. Start Heparin infusion at 12.3 ml/hr around 1000 today. Recheck aPTT in 6 hours. Goal aPTT = 49-76 seconds. Bernadette Olguin Pharm D.11/17/2020 6:26 AM    11/17  Aptt= 44.5 sec at 1607  Per protocol, give 2700 unit bolus and increase drip rate to 13.7 mL/hr  Next aptt in 6 hours  Genaro BlevinsD 11/17/20  5:13 PM      11/18 0320  aptt = 86.1 sec  Rate = 12.3 ml/hr  Next aptt 1000  Bernadette Olguin Pharm D.11/18/2020 4:04 AM    11/18/20  2115  High dose Heparin GTT:  APTT at 2000 is 122.5 seconds. Pause the infusion for 1hr and restart at the previous rate of 12. 3mL/hr. Recheck the aPTT at 0400. Luan Dwyer PharmD 11/18/2020 9:14 PM    11/19/2020  Recent Labs     11/19/20  0340   APTT 83.9*     Decrease heparin gtt to 11.6 mL/hr. Recheck aPTT in 6 hours. Dania Leija, PharmD  11/19/2020 4:20 AM    11/19 1125  aPTT = 61.7 sec  Continue with current heparin rate of 11.6 ml/hr  Recheck aPTT in 6 hours at [address]  PharmD  11/19/2020 at 11:59 AM    11/19/20  1837  High dose Heparin GTT:  APTT at 1800 is 55.8 seconds. No changes. Continue infusion at 11.6mL/hr and repeat the aPTT with AM labs. Therapeutic range is 49-76 seconds.   Luan Dwyer PharmD 11/19/2020 6:36 PM

## 2020-11-17 NOTE — PROGRESS NOTES
Patient admitted to room 441 from [facility]. Patient oriented to room, call light, bed rails, phone, lights and bathroom. Patient instructed about the schedule of the day including: vital sign frequency, lab draws, possible tests, frequency of MD and staff rounds, including RN/MD rounding together at bedside, daily weights, and I &O's. Patient instructed about prescribed diet, how to use 8MENU, and television. Bed alarm in place, patient aware of placement and reason. Telemetry box in place, patient aware of placement and reason. Bed locked, in lowest position, side rails up 2/4, call light within reach. Will continue to monitor.

## 2020-11-17 NOTE — PROGRESS NOTES
The following is a merlin on demand report from a pacemaker/ICD that was interrogated on 11- 08:59 PM  EST at [facility]. The patient is currently enrolled in [employer]. Pt was transferred to [facility] and admitted. Last interrogation 11/5. Hx AT/AF/SVT/NSVT. ( toprol xl, amio, mag-ox, digoxin). Dual icd w/  Normal function. V AUTOCAPTURE IN HIGH OUTPUT MODE. Corvue is within normal limits. Svt/nsvt recorded, no tachy therapies given. See PACEART report under Cardiology tab.

## 2020-11-18 LAB
A/G RATIO: 1.1 (ref 1.1–2.2)
ALBUMIN SERPL-MCNC: 3.6 G/DL (ref 3.4–5)
ALP BLD-CCNC: 110 U/L (ref 40–129)
ALT SERPL-CCNC: 16 U/L (ref 10–40)
ANION GAP SERPL CALCULATED.3IONS-SCNC: 10 MMOL/L (ref 3–16)
APTT: 122.5 SEC (ref 24.2–36.2)
APTT: 33.3 SEC (ref 24.2–36.2)
APTT: 86.1 SEC (ref 24.2–36.2)
AST SERPL-CCNC: 32 U/L (ref 15–37)
BASOPHILS ABSOLUTE: 0.1 K/UL (ref 0–0.2)
BASOPHILS RELATIVE PERCENT: 1 %
BILIRUB SERPL-MCNC: 0.8 MG/DL (ref 0–1)
BUN BLDV-MCNC: 10 MG/DL (ref 7–20)
CALCIUM SERPL-MCNC: 9 MG/DL (ref 8.3–10.6)
CHLORIDE BLD-SCNC: 98 MMOL/L (ref 99–110)
CO2: 28 MMOL/L (ref 21–32)
CREAT SERPL-MCNC: 0.6 MG/DL (ref 0.6–1.1)
EKG ATRIAL RATE: 89 BPM
EKG DIAGNOSIS: NORMAL
EKG P AXIS: 16 DEGREES
EKG P-R INTERVAL: 168 MS
EKG Q-T INTERVAL: 398 MS
EKG QRS DURATION: 94 MS
EKG QTC CALCULATION (BAZETT): 484 MS
EKG R AXIS: 125 DEGREES
EKG T AXIS: 20 DEGREES
EKG VENTRICULAR RATE: 89 BPM
EOSINOPHILS ABSOLUTE: 0.3 K/UL (ref 0–0.6)
EOSINOPHILS RELATIVE PERCENT: 4.7 %
ESTIMATED AVERAGE GLUCOSE: 283.4 MG/DL
GFR AFRICAN AMERICAN: >60
GFR NON-AFRICAN AMERICAN: >60
GLOBULIN: 3.2 G/DL
GLUCOSE BLD-MCNC: 120 MG/DL (ref 70–99)
GLUCOSE BLD-MCNC: 208 MG/DL (ref 70–99)
GLUCOSE BLD-MCNC: 245 MG/DL (ref 70–99)
GLUCOSE BLD-MCNC: 275 MG/DL (ref 70–99)
GLUCOSE BLD-MCNC: 88 MG/DL (ref 70–99)
HBA1C MFR BLD: 11.5 %
HCT VFR BLD CALC: 42 % (ref 36–48)
HEMOGLOBIN: 13.4 G/DL (ref 12–16)
LYMPHOCYTES ABSOLUTE: 2.1 K/UL (ref 1–5.1)
LYMPHOCYTES RELATIVE PERCENT: 34.4 %
MAGNESIUM: 1.4 MG/DL (ref 1.8–2.4)
MCH RBC QN AUTO: 28.6 PG (ref 26–34)
MCHC RBC AUTO-ENTMCNC: 31.9 G/DL (ref 31–36)
MCV RBC AUTO: 89.7 FL (ref 80–100)
MONOCYTES ABSOLUTE: 0.6 K/UL (ref 0–1.3)
MONOCYTES RELATIVE PERCENT: 10.2 %
NEUTROPHILS ABSOLUTE: 3.1 K/UL (ref 1.7–7.7)
NEUTROPHILS RELATIVE PERCENT: 49.7 %
PDW BLD-RTO: 17.1 % (ref 12.4–15.4)
PERFORMED ON: ABNORMAL
PERFORMED ON: NORMAL
PLATELET # BLD: 265 K/UL (ref 135–450)
PLATELET SLIDE REVIEW: ADEQUATE
PMV BLD AUTO: 8.9 FL (ref 5–10.5)
POTASSIUM REFLEX MAGNESIUM: 4.1 MMOL/L (ref 3.5–5.1)
RBC # BLD: 4.68 M/UL (ref 4–5.2)
SARS-COV-2, PCR: NOT DETECTED
SLIDE REVIEW: ABNORMAL
SODIUM BLD-SCNC: 136 MMOL/L (ref 136–145)
TOTAL PROTEIN: 6.8 G/DL (ref 6.4–8.2)
WBC # BLD: 6.2 K/UL (ref 4–11)

## 2020-11-18 PROCEDURE — 6360000002 HC RX W HCPCS: Performed by: INTERNAL MEDICINE

## 2020-11-18 PROCEDURE — 2060000000 HC ICU INTERMEDIATE R&B

## 2020-11-18 PROCEDURE — 80053 COMPREHEN METABOLIC PANEL: CPT

## 2020-11-18 PROCEDURE — 93005 ELECTROCARDIOGRAM TRACING: CPT | Performed by: NURSE PRACTITIONER

## 2020-11-18 PROCEDURE — 83735 ASSAY OF MAGNESIUM: CPT

## 2020-11-18 PROCEDURE — 99233 SBSQ HOSP IP/OBS HIGH 50: CPT | Performed by: NURSE PRACTITIONER

## 2020-11-18 PROCEDURE — 85730 THROMBOPLASTIN TIME PARTIAL: CPT

## 2020-11-18 PROCEDURE — 6370000000 HC RX 637 (ALT 250 FOR IP): Performed by: INTERNAL MEDICINE

## 2020-11-18 PROCEDURE — 93010 ELECTROCARDIOGRAM REPORT: CPT | Performed by: INTERNAL MEDICINE

## 2020-11-18 PROCEDURE — 85025 COMPLETE CBC W/AUTO DIFF WBC: CPT

## 2020-11-18 PROCEDURE — 2500000003 HC RX 250 WO HCPCS: Performed by: INTERNAL MEDICINE

## 2020-11-18 PROCEDURE — 36415 COLL VENOUS BLD VENIPUNCTURE: CPT

## 2020-11-18 PROCEDURE — 2580000003 HC RX 258: Performed by: INTERNAL MEDICINE

## 2020-11-18 RX ORDER — HEPARIN SODIUM 10000 [USP'U]/100ML
11.6 INJECTION, SOLUTION INTRAVENOUS CONTINUOUS
Status: DISCONTINUED | OUTPATIENT
Start: 2020-11-18 | End: 2020-11-20

## 2020-11-18 RX ADMIN — ATORVASTATIN CALCIUM 40 MG: 40 TABLET, FILM COATED ORAL at 21:35

## 2020-11-18 RX ADMIN — INSULIN LISPRO 4 UNITS: 100 INJECTION, SOLUTION INTRAVENOUS; SUBCUTANEOUS at 10:19

## 2020-11-18 RX ADMIN — ASPIRIN 81 MG: 81 TABLET, COATED ORAL at 10:14

## 2020-11-18 RX ADMIN — FENOFIBRATE 160 MG: 160 TABLET ORAL at 10:15

## 2020-11-18 RX ADMIN — SACUBITRIL AND VALSARTAN 0.5 TABLET: 24; 26 TABLET, FILM COATED ORAL at 21:35

## 2020-11-18 RX ADMIN — METOPROLOL SUCCINATE 12.5 MG: 25 TABLET, EXTENDED RELEASE ORAL at 10:15

## 2020-11-18 RX ADMIN — HEPARIN SODIUM 12.3 ML/HR: 10000 INJECTION, SOLUTION INTRAVENOUS at 13:20

## 2020-11-18 RX ADMIN — INSULIN LISPRO 4 UNITS: 100 INJECTION, SOLUTION INTRAVENOUS; SUBCUTANEOUS at 13:17

## 2020-11-18 RX ADMIN — AMIODARONE HYDROCHLORIDE 200 MG: 200 TABLET ORAL at 10:14

## 2020-11-18 RX ADMIN — SODIUM CHLORIDE, PRESERVATIVE FREE 10 ML: 5 INJECTION INTRAVENOUS at 10:16

## 2020-11-18 RX ADMIN — MAGNESIUM SULFATE HEPTAHYDRATE 2 G: 40 INJECTION, SOLUTION INTRAVENOUS at 04:19

## 2020-11-18 RX ADMIN — HEPARIN SODIUM 5500 UNITS: 1000 INJECTION INTRAVENOUS; SUBCUTANEOUS at 14:05

## 2020-11-18 RX ADMIN — FUROSEMIDE 40 MG: 10 INJECTION, SOLUTION INTRAMUSCULAR; INTRAVENOUS at 10:14

## 2020-11-18 RX ADMIN — SACUBITRIL AND VALSARTAN 0.5 TABLET: 24; 26 TABLET, FILM COATED ORAL at 10:15

## 2020-11-18 ASSESSMENT — PAIN SCALES - GENERAL
PAINLEVEL_OUTOF10: 0

## 2020-11-18 ASSESSMENT — ENCOUNTER SYMPTOMS: SHORTNESS OF BREATH: 1

## 2020-11-18 NOTE — FLOWSHEET NOTE
11/17/20 2130   Assessment   Charting Type Shift assessment   Neurological   Neuro (WDL) WDL   Level of Consciousness 0   Orientation Level Oriented X4   Cognition Appropriate judgement; Follows commands; Appropriate safety awareness; Appropriate attention/concentration; Appropriate for developmental age   Language Clear   Size R Pupil (mm) 3   R Pupil Shape Round   R Pupil Reaction Brisk   Size L Pupil (mm) 3   L Pupil Shape Round   L Pupil Reaction Brisk   R Hand  Moderate   L Hand  Moderate   R Foot Dorsiflexion Moderate   L Foot Dorsiflexion Moderate   R Foot Plantar Flexion Moderate   L Foot Plantar Flexion Moderate   RUE Motor Response Responds to command   LUE Motor Response Responds to command   RLE Motor Response Responds to command   LLE Motor Response Responds to command   Gag Present   Tongue Deviation None   [place] Coma Scale   Eye Opening 4   Best Verbal Response 5   Best Motor Response 6   [place] Coma Scale Score 15   NIH/MNHISS Stroke Scale   NIH/MNIHSS Stroke Scale Assessed No   HEENT   HEENT (WDL) X   Right Eye Impaired vision   Left Eye Impaired vision   Nose Intact   Throat Intact   Neck Symmetrical;Trachea midline   Tongue Pink & moist   Voice Normal   Mucous Membrane Moist;Pink; Intact   Teeth Missing teeth   Respiratory   Respiratory (WDL) X   Respiratory Pattern Regular   Respiratory Depth Normal   Respiratory Quality/Effort Unlabored;Dyspnea with exertion   Chest Assessment Chest expansion symmetrical;Trachea midline   L Breath Sounds Diminished   R Breath Sounds Diminished   Cardiac   Cardiac (WDL) X   Cardiac Regularity Irregular   Heart Sounds S1, S2   Cardiac Rhythm NSR   Ectopy Multifocal PVC's;Ventricular tachycardia   Ectopy Frequency Occasional   Rhythm Interpretation   Pulse 84   Cardiac Monitor   Telemetry Monitor On Yes   Telemetry Audible Yes   Telemetry Alarms Set Yes   Pacemaker   Pacemaker Yes   Pacemaker Type Other (Comment)  (AICD)   Pacemaker Location Left chest Gastrointestinal   Abdominal (WDL) WDL   RUQ Bowel Sounds Active   LUQ Bowel Sounds Active   RLQ Bowel Sounds Active   LLQ Bowel Sounds Active   Abdomen Inspection Soft;Rounded   Tenderness No guarding;Nontender   Peripheral Vascular   Peripheral Vascular (WDL) X   Edema Generalized   Edema Generalized Trace   RLE Edema +1;Non-pitting   LLE Edema +1;Non-pitting   Sensation RUE Full sensation   Sensation LUE Full sensation   Sensation RLE Full sensation   Sensation LLE Full sensation   RUE Neurovascular Assessment   Capillary Refill Less than/equal to 3 seconds   Color Appropriate for ethnicity   Temperature Warm   R Radial Pulse +2   LUE Neurovascular Assessment   Capillary Refill Less than/equal to 3 seconds   Color Appropriate for ethnicity   Temperature Warm   L Radial Pulse +2   RLE Neurovascular Assessment   Capillary Refill Less than/equal to 3 seconds   Color Appropriate for ethnicity   Temperature Warm   R Post Tibial Pulse +2   R Pedal Pulse +2   LLE Neurovascular Assessment   Capillary Refill Less than/equal to 3 seconds   Color Appropriate for ethnicity   Temperature Warm   L Pedal Pulse +2   Skin Color/Condition   Skin Color/Condition (WDL) WDL   Skin Color Appropriate for ethnicity   Skin Condition/Temp Warm;Dry   Skin Integrity   Skin Integrity (WDL) WDL   Skin Integrity Bruising; Abrasion   Location scattered   Preventative Dressing No   Assessed this shift? Yes   Skin Fold Management No   Multiple Skin Integrity Sites No   Musculoskeletal   Musculoskeletal (WDL) WDL   RUE Full movement   LUE Full movement   RL Extremity Full movement   LL Extremity Full movement   Genitourinary   Genitourinary (WDL) WDL   Flank Tenderness No   Suprapubic Tenderness No   Dysuria No   Urine Assessment   Incontinence No   Urine Color Yellow/straw   Urine Appearance Clear   Urine Odor No odor   Anus/Rectum   Anus/Rectum (WDL) WDL   Psychosocial   Psychosocial (WDL) X   Patient Behaviors Withdrawn; Cooperative; Tearful

## 2020-11-18 NOTE — PROGRESS NOTES
0109: NP paged- \"Pt direct admit from [facility], admitted for PE. EF <20%, history of Vtach and NSVT, does have ICD and takes amio and dig. Pt with 21 beats of vtach, and then 16 immediately following. Pt asymptomatic and now back in NSR. Do you have any new orders at this time? Please advise. \"

## 2020-11-18 NOTE — PLAN OF CARE
Problem: Pain:  Description: Pain management should include both nonpharmacologic and pharmacologic interventions.   Goal: Pain level will decrease  Description: Pain level will decrease  11/18/2020 1701 by Benny Roldan RN  Outcome: Ongoing  11/18/2020 0332 by Linda Fermin RN  Outcome: Ongoing  Goal: Control of acute pain  Description: Control of acute pain  11/18/2020 1701 by Benny Roldan RN  Outcome: Ongoing  11/18/2020 0332 by Linda Fermin RN  Outcome: Ongoing  Goal: Control of chronic pain  Description: Control of chronic pain  11/18/2020 1701 by Benny Roldan RN  Outcome: Ongoing  11/18/2020 0332 by Linda Fermin RN  Outcome: Ongoing

## 2020-11-18 NOTE — PROGRESS NOTES
[facility]  Cardiology  Progress Note    Admission date:  2020    Reason for follow up visit: CHF    HPI/CC: William Sloan is a 62 y.o. female who was transferred from [facility] 2020 for shortness of breath. Found to have PE. Echo showed EF <20%, moderate pericardial effusion. Rhythm has been sinus. Subjective: States ongoing chest pain and shortness of breath for > 3 months. Vitals:  Blood pressure (!) 126/90, pulse 88, temperature 97.8 °F (36.6 °C), resp. rate 20, weight 215 lb (97.5 kg), SpO2 97 %, not currently breastfeeding.   Temp  Av °F (36.7 °C)  Min: 97.8 °F (36.6 °C)  Max: 98.2 °F (36.8 °C)  Pulse  Av.6  Min: 78  Max: 88  BP  Min: 90/56  Max: 126/90  SpO2  Av.6 %  Min: 96 %  Max: 99 %    24 hour I/O    Intake/Output Summary (Last 24 hours) at 2020 1218  Last data filed at 2020 1106  Gross per 24 hour   Intake 413.62 ml   Output 2850 ml   Net -2436.38 ml     Current Facility-Administered Medications   Medication Dose Route Frequency Provider Last Rate Last Dose    sodium chloride flush 0.9 % injection 10 mL  10 mL Intravenous 2 times per day Jena Mirna A Dalton, DO   10 mL at 20 1016    sodium chloride flush 0.9 % injection 10 mL  10 mL Intravenous PRN Jena Medinaes A Dalton, DO        potassium chloride 10 mEq/100 mL IVPB (Peripheral Line)  10 mEq Intravenous PRN Aziza Blocker Dalton, DO        magnesium sulfate 2 g in 50 mL IVPB premix  2 g Intravenous PRN Ester Purpura, DO   Stopped at 20 0620    acetaminophen (TYLENOL) tablet 650 mg  650 mg Oral Q6H PRN Ahmad A Dalton, DO        Or    acetaminophen (TYLENOL) suppository 650 mg  650 mg Rectal Q6H PRN Jena Mirna A Dalton, DO        promethazine (PHENERGAN) tablet 12.5 mg  12.5 mg Oral Q6H PRN Ahmad A Dalton, DO        Or    ondansetron (ZOFRAN) injection 4 mg  4 mg Intravenous Q6H PRN Beronicad A Dalton, DO        famotidine (PEPCID) tablet 20 mg  20 mg Oral Daily PRN Beronicad A Dalton, DO        heparin (porcine) injection 5,500 Units  5,500 Units Intravenous PRN Erickson June, DO        heparin (porcine) injection 2,700 Units  2,700 Units Intravenous PRN Miguelito Hamiltonzi,         heparin 25,000 unit in sodium chloride 0.45% 250 mL infusion  12.3 mL/hr Intravenous Continuous Miguelito Hoffman DO 12.3 mL/hr at 11/18/20 0418 12.3 mL/hr at 11/18/20 0418    perflutren lipid microspheres (DEFINITY) injection 1.65 mg  1.5 mL Intravenous ONCE PRN Freeman Palacio MD        aspirin EC tablet 81 mg  81 mg Oral Daily Freeman Palacio MD   81 mg at 11/18/20 1014    atorvastatin (LIPITOR) tablet 40 mg  40 mg Oral Nightly Freeman Palacio MD   40 mg at 11/17/20 2149    fenofibrate (TRIGLIDE) tablet 160 mg  160 mg Oral Daily Freeman Palacio MD   160 mg at 11/18/20 1015    digoxin (LANOXIN) tablet 125 mcg  125 mcg Oral Every Other Day Freeman Palacio MD   125 mcg at 11/17/20 1143    metoprolol succinate (TOPROL XL) extended release tablet 12.5 mg  12.5 mg Oral Daily Freeman Palacio MD   12.5 mg at 11/18/20 1015    sacubitril-valsartan (ENTRESTO) 24-26 MG per tablet 0.5 tablet  0.5 tablet Oral BID Freeman Palacio MD   0.5 tablet at 11/18/20 1015    amiodarone (CORDARONE) tablet 200 mg  200 mg Oral Daily Freeman Palacio MD   200 mg at 11/18/20 1014    furosemide (LASIX) injection 40 mg  40 mg Intravenous Daily Freeman Palacio MD   40 mg at 11/18/20 1014    insulin glargine (LANTUS) injection vial 15 Units  15 Units Subcutaneous Nightly Jolie Lombard, MD   15 Units at 11/17/20 2148    glucose (GLUTOSE) 40 % oral gel 15 g  15 g Oral PRN Jolie Lombard, MD        dextrose 50 % IV solution  12.5 g Intravenous PRN Jolie Lombard, MD        glucagon (rDNA) injection 1 mg  1 mg Intramuscular PRN Jolie Lombard, MD        dextrose 5 % solution  100 mL/hr Intravenous PRN Jolie Lombard, MD        insulin lispro (HUMALOG) injection vial 0-12 Units  0-12 Units Subcutaneous TID  Jolie Lombard, MD   4 Units at 11/18/20 1019    insulin lispro (HUMALOG) injection vial 0-6 Units  0-6 Units Subcutaneous Nightly Jacklyn White MD   3 Units at 11/17/20 2489     Review of Systems   Constitutional: Positive for fatigue. Respiratory: Positive for shortness of breath. Cardiovascular: Positive for chest pain. Neurological: Positive for dizziness. Objective:     Telemetry monitor: SR    Physical Exam:  Constitutional:  Comfortable and alert, NAD, appears older than stated age  Eyes: PERRL, sclera nonicteric  Neck:  Supple, no masses, no thyroidmegaly, no JVD  Skin:  Warm and dry; no rash or lesions  Heart: Regular, normal apex, S1 and S2 normal, no M/G/R  Lungs:  Normal respiratory effort; clear; no wheezing/rhonchi/rales  Abdomen: soft, non tender, + bowel sounds  Extremities:  No edema or cyanosis; no clubbing  Neuro: alert and oriented, moves legs and arms equally, normal mood and affect    Data Reviewed:    Echo 11/17/2020:  STAT limited echo for pericardial effusion. Left ventricular systolic function is severely reduced with a visually   estimated ejection fraction of <20 %. The left ventricle is mildly dilated in size with normal wall thickness. There is severe global hypokinesis with regional variation. Grade III diastolic dysfunction with elevated LV pressure. The right ventricle is mildly dilated with mildly reduced systolic function. Biatrial enlargement. Moderate sized primarily posterior pericardial effusion. There is no echocardiographic evidence of tamponade physiology. Moderate tricuspid regurgitation. Systolic pulmonary artery pressure (SPAP) is elevated and estimated at 57   mmHg (right atrial pressure 15 mmHg) consistent with moderate pulmonary   hypertension. Coronary angiogram 10/6/2020:  1. Left heart catheterization  2. Selective left and right coronary angiogram  3. Right heart catheterization  Procedure Findings:  1. Mild non-obstructive coronary artery disease.   2.  Moderate pulmonary 11/16/2020    HGB 13.5 11/16/2020    HCT 41.8 11/16/2020    MCV 89.8 11/16/2020    RDW 17.0 11/16/2020     11/16/2020     BNP:    Lab Results   Component Value Date    PROBNP 2,943 11/16/2020    PROBNP 3,737 11/10/2020    PROBNP 5,183 10/26/2020    PROBNP 662 10/13/2020    PROBNP 870 10/08/2020     Fasting Lipid Panel:    Lab Results   Component Value Date    CHOL 166 10/12/2019    HDL 51 10/12/2019    TRIG 142 10/12/2019     Cardiac Enzymes:  CK/MbTroponin  Lab Results   Component Value Date    CKTOTAL 54 06/12/2020    TROPONINI <0.01 11/16/2020     PT/ INR   Lab Results   Component Value Date    INR 1.02 08/31/2020    INR 1.01 08/12/2020    INR 1.16 04/16/2020    PROTIME 11.8 08/31/2020    PROTIME 11.7 08/12/2020    PROTIME 13.5 04/16/2020     PTT No results found for: PTT   Lab Results   Component Value Date    MG 1.40 11/18/2020      Lab Results   Component Value Date    TSH 0.28 09/19/2020     All labs and imaging reviewed today    Assessment:  Chronic chest pain  Shortness of breath  Acute on chronic systolic CHF: improved, -7.8I  Nonischemic cardiomyopathy: known history, EF <20% on echo 11/2020, EF 35% 6/2020   - s/p dual chamber ICD 2015  Pericardial effusion  PE  Pulmonary HTN  CAD: mild on angiogram 10/6/2020  NSVT  HLD  DM  History of CVA  Cognitive impairment    Plan:   1. Continue lasix IV 40 mg daily as renal function and BP allow  2. Continue aspirin, statin, amiodarone, digoxin, toprol, entresto  3. Heparin gtt for PE, transition to [address] when appropriate  4. Repeat echo within 1 week for pericardial effusion  5. Daily weights, strict I/Os  6.  Keep K>4, Mg >2    Pema Reyez APRN-CELESTINO  [facility]  [phone]

## 2020-11-19 LAB
A/G RATIO: 1.2 (ref 1.1–2.2)
ALBUMIN SERPL-MCNC: 3.3 G/DL (ref 3.4–5)
ALP BLD-CCNC: 88 U/L (ref 40–129)
ALT SERPL-CCNC: 10 U/L (ref 10–40)
ANION GAP SERPL CALCULATED.3IONS-SCNC: 10 MMOL/L (ref 3–16)
APTT: 55.8 SEC (ref 24.2–36.2)
APTT: 61.7 SEC (ref 24.2–36.2)
APTT: 83.9 SEC (ref 24.2–36.2)
AST SERPL-CCNC: 16 U/L (ref 15–37)
BASOPHILS ABSOLUTE: 0.2 K/UL (ref 0–0.2)
BASOPHILS RELATIVE PERCENT: 3.4 %
BILIRUB SERPL-MCNC: 0.9 MG/DL (ref 0–1)
BUN BLDV-MCNC: 8 MG/DL (ref 7–20)
CALCIUM SERPL-MCNC: 8.7 MG/DL (ref 8.3–10.6)
CHLORIDE BLD-SCNC: 102 MMOL/L (ref 99–110)
CO2: 27 MMOL/L (ref 21–32)
CREAT SERPL-MCNC: <0.5 MG/DL (ref 0.6–1.1)
EOSINOPHILS ABSOLUTE: 0.2 K/UL (ref 0–0.6)
EOSINOPHILS RELATIVE PERCENT: 3 %
GFR AFRICAN AMERICAN: >60
GFR NON-AFRICAN AMERICAN: >60
GLOBULIN: 2.8 G/DL
GLUCOSE BLD-MCNC: 166 MG/DL (ref 70–99)
GLUCOSE BLD-MCNC: 287 MG/DL (ref 70–99)
GLUCOSE BLD-MCNC: 358 MG/DL (ref 70–99)
GLUCOSE BLD-MCNC: 86 MG/DL (ref 70–99)
GLUCOSE BLD-MCNC: 97 MG/DL (ref 70–99)
HCT VFR BLD CALC: 42 % (ref 36–48)
HEMOGLOBIN: 13.4 G/DL (ref 12–16)
LYMPHOCYTES ABSOLUTE: 2.5 K/UL (ref 1–5.1)
LYMPHOCYTES RELATIVE PERCENT: 36.5 %
MAGNESIUM: 1.6 MG/DL (ref 1.8–2.4)
MCH RBC QN AUTO: 28.1 PG (ref 26–34)
MCHC RBC AUTO-ENTMCNC: 31.9 G/DL (ref 31–36)
MCV RBC AUTO: 88.1 FL (ref 80–100)
MONOCYTES ABSOLUTE: 0.6 K/UL (ref 0–1.3)
MONOCYTES RELATIVE PERCENT: 8.7 %
NEUTROPHILS ABSOLUTE: 3.3 K/UL (ref 1.7–7.7)
NEUTROPHILS RELATIVE PERCENT: 48.4 %
PDW BLD-RTO: 16.9 % (ref 12.4–15.4)
PERFORMED ON: ABNORMAL
PERFORMED ON: NORMAL
PLATELET # BLD: 294 K/UL (ref 135–450)
PMV BLD AUTO: 8.7 FL (ref 5–10.5)
POTASSIUM REFLEX MAGNESIUM: 3.5 MMOL/L (ref 3.5–5.1)
RBC # BLD: 4.77 M/UL (ref 4–5.2)
SODIUM BLD-SCNC: 139 MMOL/L (ref 136–145)
TOTAL PROTEIN: 6.1 G/DL (ref 6.4–8.2)
WBC # BLD: 6.8 K/UL (ref 4–11)

## 2020-11-19 PROCEDURE — 2500000003 HC RX 250 WO HCPCS: Performed by: INTERNAL MEDICINE

## 2020-11-19 PROCEDURE — 6370000000 HC RX 637 (ALT 250 FOR IP): Performed by: INTERNAL MEDICINE

## 2020-11-19 PROCEDURE — 83735 ASSAY OF MAGNESIUM: CPT

## 2020-11-19 PROCEDURE — 2580000003 HC RX 258: Performed by: INTERNAL MEDICINE

## 2020-11-19 PROCEDURE — 85730 THROMBOPLASTIN TIME PARTIAL: CPT

## 2020-11-19 PROCEDURE — 85025 COMPLETE CBC W/AUTO DIFF WBC: CPT

## 2020-11-19 PROCEDURE — 2060000000 HC ICU INTERMEDIATE R&B

## 2020-11-19 PROCEDURE — 80053 COMPREHEN METABOLIC PANEL: CPT

## 2020-11-19 PROCEDURE — 6360000002 HC RX W HCPCS: Performed by: INTERNAL MEDICINE

## 2020-11-19 PROCEDURE — 36415 COLL VENOUS BLD VENIPUNCTURE: CPT

## 2020-11-19 PROCEDURE — 99233 SBSQ HOSP IP/OBS HIGH 50: CPT | Performed by: NURSE PRACTITIONER

## 2020-11-19 RX ORDER — POTASSIUM CHLORIDE 20 MEQ/1
40 TABLET, EXTENDED RELEASE ORAL 3 TIMES DAILY
Status: COMPLETED | OUTPATIENT
Start: 2020-11-19 | End: 2020-11-19

## 2020-11-19 RX ORDER — MAGNESIUM SULFATE IN WATER 40 MG/ML
2 INJECTION, SOLUTION INTRAVENOUS ONCE
Status: DISCONTINUED | OUTPATIENT
Start: 2020-11-19 | End: 2020-11-20 | Stop reason: HOSPADM

## 2020-11-19 RX ADMIN — POTASSIUM CHLORIDE 40 MEQ: 20 TABLET, EXTENDED RELEASE ORAL at 15:26

## 2020-11-19 RX ADMIN — AMIODARONE HYDROCHLORIDE 200 MG: 200 TABLET ORAL at 08:18

## 2020-11-19 RX ADMIN — SACUBITRIL AND VALSARTAN 0.5 TABLET: 24; 26 TABLET, FILM COATED ORAL at 21:20

## 2020-11-19 RX ADMIN — ASPIRIN 81 MG: 81 TABLET, COATED ORAL at 08:19

## 2020-11-19 RX ADMIN — POTASSIUM CHLORIDE 40 MEQ: 20 TABLET, EXTENDED RELEASE ORAL at 10:04

## 2020-11-19 RX ADMIN — FUROSEMIDE 40 MG: 10 INJECTION, SOLUTION INTRAMUSCULAR; INTRAVENOUS at 08:13

## 2020-11-19 RX ADMIN — INSULIN LISPRO 5 UNITS: 100 INJECTION, SOLUTION INTRAVENOUS; SUBCUTANEOUS at 21:26

## 2020-11-19 RX ADMIN — FENOFIBRATE 160 MG: 160 TABLET ORAL at 08:17

## 2020-11-19 RX ADMIN — INSULIN LISPRO 2 UNITS: 100 INJECTION, SOLUTION INTRAVENOUS; SUBCUTANEOUS at 12:24

## 2020-11-19 RX ADMIN — MAGNESIUM SULFATE HEPTAHYDRATE 2 G: 40 INJECTION, SOLUTION INTRAVENOUS at 08:15

## 2020-11-19 RX ADMIN — POTASSIUM CHLORIDE 40 MEQ: 20 TABLET, EXTENDED RELEASE ORAL at 21:19

## 2020-11-19 RX ADMIN — METOPROLOL SUCCINATE 12.5 MG: 25 TABLET, EXTENDED RELEASE ORAL at 08:16

## 2020-11-19 RX ADMIN — SACUBITRIL AND VALSARTAN 0.5 TABLET: 24; 26 TABLET, FILM COATED ORAL at 08:18

## 2020-11-19 RX ADMIN — HEPARIN SODIUM 11.6 ML/HR: 10000 INJECTION, SOLUTION INTRAVENOUS at 17:42

## 2020-11-19 RX ADMIN — ACETAMINOPHEN 650 MG: 325 TABLET ORAL at 21:19

## 2020-11-19 RX ADMIN — DIGOXIN 125 MCG: 125 TABLET ORAL at 08:17

## 2020-11-19 RX ADMIN — SODIUM CHLORIDE, PRESERVATIVE FREE 10 ML: 5 INJECTION INTRAVENOUS at 09:57

## 2020-11-19 RX ADMIN — INSULIN GLARGINE 15 UNITS: 100 INJECTION, SOLUTION SUBCUTANEOUS at 21:20

## 2020-11-19 RX ADMIN — ATORVASTATIN CALCIUM 40 MG: 40 TABLET, FILM COATED ORAL at 21:19

## 2020-11-19 RX ADMIN — INSULIN LISPRO 6 UNITS: 100 INJECTION, SOLUTION INTRAVENOUS; SUBCUTANEOUS at 16:45

## 2020-11-19 ASSESSMENT — ENCOUNTER SYMPTOMS: SHORTNESS OF BREATH: 1

## 2020-11-19 ASSESSMENT — PAIN SCALES - GENERAL
PAINLEVEL_OUTOF10: 0
PAINLEVEL_OUTOF10: 6
PAINLEVEL_OUTOF10: 2
PAINLEVEL_OUTOF10: 3

## 2020-11-19 NOTE — PROGRESS NOTES
[address]  Cardiology  Progress Note    Admission date:  2020    Reason for follow up visit: CHF    HPI/CC: Ozzie Tapia is a 62 y.o. female who was transferred from [facility] 2020 for shortness of breath. Found to have PE. Echo showed EF <20%, moderate pericardial effusion. Rhythm has been sinus. Subjective: States ongoing chest pain and shortness of breath for > 3 months, just gets worse every day. On room air, laying flat. Vitals:  Blood pressure 115/74, pulse 83, temperature 98.2 °F (36.8 °C), temperature source Oral, resp. rate 16, weight 208 lb 14.4 oz (94.8 kg), SpO2 99 %, not currently breastfeeding.   Temp  Av.1 °F (36.7 °C)  Min: 97.8 °F (36.6 °C)  Max: 98.4 °F (36.9 °C)  Pulse  Av.6  Min: 83  Max: 88  BP  Min: 97/63  Max: 131/81  SpO2  Av.6 %  Min: 94 %  Max: 99 %    24 hour I/O    Intake/Output Summary (Last 24 hours) at 2020 0833  Last data filed at 2020 5924  Gross per 24 hour   Intake 250 ml   Output 1650 ml   Net -1400 ml     Current Facility-Administered Medications   Medication Dose Route Frequency Provider Last Rate Last Dose    heparin 25,000 unit in sodium chloride 0.45% 250 mL infusion  11.6 mL/hr Intravenous Continuous Ct Borden MD 11.6 mL/hr at 20 0428 11.6 mL/hr at 20 0428    sodium chloride flush 0.9 % injection 10 mL  10 mL Intravenous 2 times per day Darrelyn [place] A Dalton, DO   10 mL at 20 1016    sodium chloride flush 0.9 % injection 10 mL  10 mL Intravenous PRN Darrelyn [place] A Dalton, DO        potassium chloride 10 mEq/100 mL IVPB (Peripheral Line)  10 mEq Intravenous PRN Damaris Hoffman, DO        magnesium sulfate 2 g in 50 mL IVPB premix  2 g Intravenous PRN Beronicad SUMEET Hoffman, DO 25 mL/hr at 20 0815 2 g at 20 0815    acetaminophen (TYLENOL) tablet 650 mg  650 mg Oral Q6H PRN Miguelito Hoffman DO        Or    acetaminophen (TYLENOL) suppository 650 mg  650 mg Rectal Q6H PRN Arabella Simmons DO        promethazine (PHENERGAN) tablet 12.5 mg  12.5 mg Oral Q6H PRN Ahmad A Dalton, DO        Or    ondansetron (ZOFRAN) injection 4 mg  4 mg Intravenous Q6H PRN Ahmad A Dalton, DO        famotidine (PEPCID) tablet 20 mg  20 mg Oral Daily PRN Ahmad A Dalton, DO        heparin (porcine) injection 5,500 Units  5,500 Units Intravenous PRN Rogena Fresh Dalton, DO   5,500 Units at 11/18/20 1405    heparin (porcine) injection 2,700 Units  2,700 Units Intravenous PRN Ahmad A Dalton, DO        perflutren lipid microspheres (DEFINITY) injection 1.65 mg  1.5 mL Intravenous ONCE PRN Elba Maldonado MD        aspirin EC tablet 81 mg  81 mg Oral Daily Elba Maldonado MD   81 mg at 11/19/20 9671    atorvastatin (LIPITOR) tablet 40 mg  40 mg Oral Nightly Elba Maldonado MD   40 mg at 11/18/20 2135    fenofibrate (TRIGLIDE) tablet 160 mg  160 mg Oral Daily Elba Maldonado MD   160 mg at 11/19/20 2363    digoxin (LANOXIN) tablet 125 mcg  125 mcg Oral Every Other Day Elba Maldonado MD   125 mcg at 11/19/20 1992    metoprolol succinate (TOPROL XL) extended release tablet 12.5 mg  12.5 mg Oral Daily Elba Maldonado MD   12.5 mg at 11/19/20 2711    sacubitril-valsartan (ENTRESTO) 24-26 MG per tablet 0.5 tablet  0.5 tablet Oral BID Elba Maldonado MD   0.5 tablet at 11/19/20 0818    amiodarone (CORDARONE) tablet 200 mg  200 mg Oral Daily Elba Maldonado MD   200 mg at 11/19/20 0818    furosemide (LASIX) injection 40 mg  40 mg Intravenous Daily Elba Maldonado MD   40 mg at 11/19/20 0813    insulin glargine (LANTUS) injection vial 15 Units  15 Units Subcutaneous Nightly Ryan Gamble MD   15 Units at 11/17/20 2148    glucose (GLUTOSE) 40 % oral gel 15 g  15 g Oral PRN Ryan Gamble MD        dextrose 50 % IV solution  12.5 g Intravenous PRN Ryan Gamble MD        glucagon (rDNA) injection 1 mg  1 mg Intramuscular PRN Ryan Gamble MD        dextrose 5 % solution  100 mL/hr Intravenous PRN Ryan Gamble MD        insulin lispro (HUMALOG) injection vial 0-12 Units  0-12 Units Subcutaneous TID WC Isabel Zavala MD   4 Units at 11/18/20 1317    insulin lispro (HUMALOG) injection vial 0-6 Units  0-6 Units Subcutaneous Nightly Isabel Zavala MD   3 Units at 11/17/20 9154     Review of Systems   Constitutional: Positive for fatigue. Respiratory: Positive for shortness of breath. Cardiovascular: Positive for chest pain. Neurological: Positive for dizziness. Objective:     Telemetry monitor: SR    Physical Exam:  Constitutional:  Comfortable and alert, NAD, appears older than stated age  Eyes: PERRL, sclera nonicteric  Neck:  Supple, no masses, no thyroidmegaly, no JVD  Skin:  Warm and dry; no rash or lesions  Heart: Regular, normal apex, S1 and S2 normal, no M/G/R  Lungs:  Normal respiratory effort; clear; no wheezing/rhonchi/rales  Abdomen: soft, non tender, + bowel sounds  Extremities:  No edema or cyanosis; no clubbing  Neuro: alert and oriented, moves legs and arms equally, normal mood and affect    Data Reviewed:    Echo 11/17/2020:  STAT limited echo for pericardial effusion. Left ventricular systolic function is severely reduced with a visually   estimated ejection fraction of <20 %. The left ventricle is mildly dilated in size with normal wall thickness. There is severe global hypokinesis with regional variation. Grade III diastolic dysfunction with elevated LV pressure. The right ventricle is mildly dilated with mildly reduced systolic function. Biatrial enlargement. Moderate sized primarily posterior pericardial effusion. There is no echocardiographic evidence of tamponade physiology. Moderate tricuspid regurgitation. Systolic pulmonary artery pressure (SPAP) is elevated and estimated at 57   mmHg (right atrial pressure 15 mmHg) consistent with moderate pulmonary   hypertension. Coronary angiogram 10/6/2020:  1. Left heart catheterization  2. Selective left and right coronary angiogram  3.   Right heart catheterization  Procedure Findings:  1. Mild non-obstructive coronary artery disease. 2.  Moderate pulmonary hypertension  3. Decompensated cardiac hemodynamics  Findings:  1. Left main coronary artery was normal. It gave off the left anterior descending artery and left circumflex. 2. Left anterior descending artery has mild atherosclerotic disease. It was moderate in size. It gave off septal perforators and a moderate sized diagonal branch. The LAD covered the entire apex of the left ventricle. 3. Left circumflex has mild atherosclerotic disease. It was moderate in size. There was a moderate sized obtuse marginal branch. 4. Right coronary artery has mild atherosclerotic disease. It was moderate in size and was the dominant artery. 5. Left ventriculogram was not performed. Left ventricular end diastolic l pressure was 26. There is no gradient across pullback of the aortic valve. Right heart catheterization findings:  Right atrial pressure of 20  RV 45/7  PA 53/8  Pulmonary wedge pressure mean of 20  RA saturation 42%  PA saturation 45%  Aortic saturation 99%  Cardiac output 2.4  Cardiac index 1.24  SVR 2433    CONCLUSIONS:  1. Mild non-obstructive coronary artery disease with known severe left ventricular dysfunction  2. Moderate pulmonary hypertension with decompensated hemodynamics  ASSESSMENT/RECOMMENDATIONS:  1. Risk Factor control  2. Advanced heart failure management. Stress test 10/5/2020:  Dilated LV with severe global hypokinesis. Large, severe, fixed perfusion     defect of the inferior/inferolateral wall consistent with scar. Diminished     uptake of the anterior wall consistent with breast artifact. Post stress     LVEF is abnormal at 19%. Abnormal study. Overall findings represent a high     risk scan.       Lab Reviewed:     Renal Profile:  Lab Results   Component Value Date    CREATININE <0.5 11/19/2020    BUN 8 11/19/2020     11/19/2020    K 3.5 11/19/2020    CL 102 11/19/2020    CO2 27 11/19/2020     CBC:    Lab Results   Component Value Date    WBC 6.8 11/19/2020    RBC 4.77 11/19/2020    HGB 13.4 11/19/2020    HCT 42.0 11/19/2020    MCV 88.1 11/19/2020    RDW 16.9 11/19/2020     11/19/2020     BNP:    Lab Results   Component Value Date    PROBNP 2,943 11/16/2020    PROBNP 3,737 11/10/2020    PROBNP 5,183 10/26/2020    PROBNP 662 10/13/2020    PROBNP 870 10/08/2020     Fasting Lipid Panel:    Lab Results   Component Value Date    CHOL 166 10/12/2019    HDL 51 10/12/2019    TRIG 142 10/12/2019     Cardiac Enzymes:  CK/MbTroponin  Lab Results   Component Value Date    CKTOTAL 54 06/12/2020    TROPONINI <0.01 11/16/2020     PT/ INR   Lab Results   Component Value Date    INR 1.02 08/31/2020    INR 1.01 08/12/2020    INR 1.16 04/16/2020    PROTIME 11.8 08/31/2020    PROTIME 11.7 08/12/2020    PROTIME 13.5 04/16/2020     PTT No results found for: PTT   Lab Results   Component Value Date    MG 1.60 11/19/2020      Lab Results   Component Value Date    TSH 0.28 09/19/2020     All labs and imaging reviewed today    Assessment:  Chest pain: unchanged, chronic  Shortness of breath: unchanged  Acute on chronic systolic CHF: improved, -4L, -7 lbs overnight  Nonischemic cardiomyopathy: known history, EF <20% on echo 11/2020, EF 35% 6/2020   - s/p dual chamber ICD 2015  Pericardial effusion: moderate on echo 11/2020  PE  Pulmonary HTN  CAD: mild on angiogram 10/6/2020  NSVT  HLD  DM  History of CVA  Cognitive impairment    Plan:   1. Replace magnesium  2. Continue lasix IV 40 mg daily as renal function and BP allow, likely transition to po tomorrow  3. Echo tomorrow for pericardial effusion  4. Continue aspirin, statin, amiodarone, digoxin, toprol, entresto  5. Heparin gtt for PE, transition to [address] when appropriate  6. Daily weights, strict I/Os  7.  Keep K>4, Mg >2    Ana María Daly, APRN-CNP  [facility]  [phone]

## 2020-11-19 NOTE — PROGRESS NOTES
Hospitalist Progress Note      PCP: FRITZ Phan - NP    Date of Admission: 11/17/2020    Chief Complaint: SOB    Subjective: No new c/o. Medications:  Reviewed    Infusion Medications    heparin (Porcine) 11.6 mL/hr (11/19/20 0428)    dextrose       Scheduled Medications    sodium chloride flush  10 mL Intravenous 2 times per day    aspirin  81 mg Oral Daily    atorvastatin  40 mg Oral Nightly    fenofibrate  160 mg Oral Daily    digoxin  125 mcg Oral Every Other Day    metoprolol succinate  12.5 mg Oral Daily    sacubitril-valsartan  0.5 tablet Oral BID    amiodarone  200 mg Oral Daily    furosemide  40 mg Intravenous Daily    insulin glargine  15 Units Subcutaneous Nightly    insulin lispro  0-12 Units Subcutaneous TID WC    insulin lispro  0-6 Units Subcutaneous Nightly     PRN Meds: sodium chloride flush, potassium chloride, magnesium sulfate, acetaminophen **OR** acetaminophen, promethazine **OR** ondansetron, famotidine, heparin (porcine), heparin (porcine), perflutren lipid microspheres, glucose, dextrose, glucagon (rDNA), dextrose      Intake/Output Summary (Last 24 hours) at 11/19/2020 0916  Last data filed at 11/19/2020 0844  Gross per 24 hour   Intake 730 ml   Output 2050 ml   Net -1320 ml       Physical Exam Performed:    /74   Pulse 83   Temp 98.2 °F (36.8 °C) (Oral)   Resp 16   Wt 208 lb 14.4 oz (94.8 kg)   SpO2 99%   BMI 39.47 kg/m²     General appearance: No apparent distress, appears stated age and cooperative. HEENT: Pupils equal, round, and reactive to light. Conjunctivae/corneas clear. Neck: Supple, with full range of motion. No jugular venous distention. Trachea midline. Respiratory:  Normal respiratory effort. Clear to auscultation, bilaterally without Rales/Wheezes/Rhonchi. Cardiovascular: Regular rate and rhythm with normal S1/S2 without murmurs, rubs or gallops.   Abdomen: Soft, non-tender, non-distended with normal bowel sounds. Musculoskeletal: No clubbing, cyanosis or edema bilaterally. Full range of motion without deformity. Skin: Skin color, texture, turgor normal.  No rashes or lesions. Neurologic:  Neurovascularly intact without any focal sensory/motor deficits. Cranial nerves: II-XII intact, grossly non-focal.  Psychiatric: Alert and oriented, thought content appropriate, normal insight  Capillary Refill: Brisk,< 3 seconds   Peripheral Pulses: +2 palpable, equal bilaterally       Labs:   Recent Labs     11/16/20 2000 11/18/20  1257 11/19/20  0340   WBC 6.7 6.2 6.8   HGB 13.5 13.4 13.4   HCT 41.8 42.0 42.0    265 294     Recent Labs     11/16/20 2000 11/18/20 0320 11/19/20  0340   * 136 139   K 4.1 4.1 3.5   CL 96* 98* 102   CO2 23 28 27   BUN 11 10 8   CREATININE 0.6 0.6 <0.5*   CALCIUM 8.9 9.0 8.7     Recent Labs     11/16/20 2000 11/18/20 0320 11/19/20  0340   AST 20 32 16   ALT 15 16 10   BILITOT 1.1* 0.8 0.9   ALKPHOS 148* 110 88     No results for input(s): INR in the last 72 hours. Recent Labs     11/16/20 2000   TROPONINI <0.01       Urinalysis:      Lab Results   Component Value Date    NITRU Negative 11/16/2020    WBCUA 21-50 11/16/2020    BACTERIA Rare 11/16/2020    RBCUA 3-4 11/16/2020    BLOODU Negative 11/16/2020    SPECGRAV 1.020 11/16/2020    GLUCOSEU >=1000 11/16/2020       Consults:    IP CONSULT TO CARDIOLOGY      Assessment/Plan:    Active Hospital Problems    Diagnosis    Pulmonary emboli (HCC) [I26.99]    Obesity [E66.9]    CHF (congestive heart failure) ([facility] [I50.9]    CAD (coronary artery disease) [I25.10]    DM (diabetes mellitus) ([facility] [E11.9]    Dyslipidemia [E78.5]       Pulmonary Embolism - CTPA w/ acute LLL Pulmonary embolism w/out evidence of R heart strain but w/ pulmonary HTN. Echo w/out evidence of R heart strain and confirmed moderate Pericardial effusion seen on CT scan w/out evidence of tamponade. Started on Heparin gtt - continued.   Anticipate NOAC at

## 2020-11-19 NOTE — PLAN OF CARE
Problem: Falls - Risk of:  Goal: Will remain free from falls  Description: Will remain free from falls  Outcome: Ongoing   Bed locked and in lowest position. Call light within reach. Belongings at bedside. Bed alarm active. Problem: Serum Glucose Level - Abnormal:  Goal: Ability to maintain appropriate glucose levels has stabilized  Description: Ability to maintain appropriate glucose levels has stabilized  Outcome: Ongoing   Blood glucose checks.  Insulin provided as needed

## 2020-11-19 NOTE — PROGRESS NOTES
Lab was unable to draw 1700 aptt. Called pharmacy @[phone] to inform them. Stated to try again ASAP. Informed lab they needed to come back to try again within 30min. Lab stated they would be back.

## 2020-11-19 NOTE — PROGRESS NOTES
[facility] Hospitalist:    Pt admitted for PE.  patients blood sugar was 88 and patient is not eating. Has 15 units of Lantus ordered. Okay to hold?

## 2020-11-20 VITALS
WEIGHT: 208.6 LBS | DIASTOLIC BLOOD PRESSURE: 76 MMHG | RESPIRATION RATE: 18 BRPM | SYSTOLIC BLOOD PRESSURE: 113 MMHG | OXYGEN SATURATION: 97 % | TEMPERATURE: 98.1 F | BODY MASS INDEX: 39.41 KG/M2 | HEART RATE: 100 BPM

## 2020-11-20 LAB
ALBUMIN SERPL-MCNC: 3.3 G/DL (ref 3.4–5)
ANION GAP SERPL CALCULATED.3IONS-SCNC: 9 MMOL/L (ref 3–16)
APTT: 31.8 SEC (ref 24.2–36.2)
BUN BLDV-MCNC: 12 MG/DL (ref 7–20)
CALCIUM SERPL-MCNC: 8.8 MG/DL (ref 8.3–10.6)
CHLORIDE BLD-SCNC: 100 MMOL/L (ref 99–110)
CO2: 25 MMOL/L (ref 21–32)
CREAT SERPL-MCNC: 0.7 MG/DL (ref 0.6–1.1)
CULTURE, BLOOD 2: NORMAL
EKG ATRIAL RATE: 90 BPM
EKG DIAGNOSIS: NORMAL
EKG P AXIS: 18 DEGREES
EKG P-R INTERVAL: 178 MS
EKG Q-T INTERVAL: 386 MS
EKG QRS DURATION: 90 MS
EKG QTC CALCULATION (BAZETT): 472 MS
EKG R AXIS: 134 DEGREES
EKG T AXIS: 16 DEGREES
EKG VENTRICULAR RATE: 90 BPM
GFR AFRICAN AMERICAN: >60
GFR NON-AFRICAN AMERICAN: >60
GLUCOSE BLD-MCNC: 226 MG/DL (ref 70–99)
GLUCOSE BLD-MCNC: 330 MG/DL (ref 70–99)
GLUCOSE BLD-MCNC: 348 MG/DL (ref 70–99)
GLUCOSE BLD-MCNC: 386 MG/DL (ref 70–99)
GLUCOSE BLD-MCNC: 394 MG/DL (ref 70–99)
GLUCOSE BLD-MCNC: 434 MG/DL (ref 70–99)
HCT VFR BLD CALC: 41.4 % (ref 36–48)
HEMOGLOBIN: 13.2 G/DL (ref 12–16)
MAGNESIUM: 1.7 MG/DL (ref 1.8–2.4)
MCH RBC QN AUTO: 28.4 PG (ref 26–34)
MCHC RBC AUTO-ENTMCNC: 31.9 G/DL (ref 31–36)
MCV RBC AUTO: 89.1 FL (ref 80–100)
PDW BLD-RTO: 17.1 % (ref 12.4–15.4)
PERFORMED ON: ABNORMAL
PHOSPHORUS: 3.3 MG/DL (ref 2.5–4.9)
PLATELET # BLD: 281 K/UL (ref 135–450)
PMV BLD AUTO: 8.8 FL (ref 5–10.5)
POTASSIUM SERPL-SCNC: 4.6 MMOL/L (ref 3.5–5.1)
RBC # BLD: 4.65 M/UL (ref 4–5.2)
SODIUM BLD-SCNC: 134 MMOL/L (ref 136–145)
TROPONIN: <0.01 NG/ML
WBC # BLD: 5.6 K/UL (ref 4–11)

## 2020-11-20 PROCEDURE — 80069 RENAL FUNCTION PANEL: CPT

## 2020-11-20 PROCEDURE — 99232 SBSQ HOSP IP/OBS MODERATE 35: CPT | Performed by: NURSE PRACTITIONER

## 2020-11-20 PROCEDURE — 83735 ASSAY OF MAGNESIUM: CPT

## 2020-11-20 PROCEDURE — 85730 THROMBOPLASTIN TIME PARTIAL: CPT

## 2020-11-20 PROCEDURE — 6370000000 HC RX 637 (ALT 250 FOR IP): Performed by: NURSE PRACTITIONER

## 2020-11-20 PROCEDURE — 84484 ASSAY OF TROPONIN QUANT: CPT

## 2020-11-20 PROCEDURE — 6370000000 HC RX 637 (ALT 250 FOR IP): Performed by: INTERNAL MEDICINE

## 2020-11-20 PROCEDURE — 85027 COMPLETE CBC AUTOMATED: CPT

## 2020-11-20 PROCEDURE — 93010 ELECTROCARDIOGRAM REPORT: CPT | Performed by: INTERNAL MEDICINE

## 2020-11-20 PROCEDURE — 93308 TTE F-UP OR LMTD: CPT

## 2020-11-20 PROCEDURE — 93005 ELECTROCARDIOGRAM TRACING: CPT | Performed by: INTERNAL MEDICINE

## 2020-11-20 PROCEDURE — 36415 COLL VENOUS BLD VENIPUNCTURE: CPT

## 2020-11-20 RX ORDER — TRAMADOL HYDROCHLORIDE 50 MG/1
50 TABLET ORAL ONCE
Status: DISCONTINUED | OUTPATIENT
Start: 2020-11-20 | End: 2020-11-20 | Stop reason: HOSPADM

## 2020-11-20 RX ORDER — FUROSEMIDE 40 MG/1
40 TABLET ORAL 2 TIMES DAILY
Status: DISCONTINUED | OUTPATIENT
Start: 2020-11-20 | End: 2020-11-20 | Stop reason: HOSPADM

## 2020-11-20 RX ADMIN — ASPIRIN 81 MG: 81 TABLET, COATED ORAL at 08:54

## 2020-11-20 RX ADMIN — APIXABAN 10 MG: 5 TABLET, FILM COATED ORAL at 08:54

## 2020-11-20 RX ADMIN — FENOFIBRATE 160 MG: 160 TABLET ORAL at 08:54

## 2020-11-20 RX ADMIN — FUROSEMIDE 40 MG: 40 TABLET ORAL at 08:54

## 2020-11-20 RX ADMIN — INSULIN LISPRO 10 UNITS: 100 INJECTION, SOLUTION INTRAVENOUS; SUBCUTANEOUS at 16:58

## 2020-11-20 RX ADMIN — INSULIN LISPRO 4 UNITS: 100 INJECTION, SOLUTION INTRAVENOUS; SUBCUTANEOUS at 08:57

## 2020-11-20 RX ADMIN — FUROSEMIDE 40 MG: 40 TABLET ORAL at 16:58

## 2020-11-20 RX ADMIN — AMIODARONE HYDROCHLORIDE 200 MG: 200 TABLET ORAL at 08:54

## 2020-11-20 RX ADMIN — METOPROLOL SUCCINATE 12.5 MG: 25 TABLET, EXTENDED RELEASE ORAL at 08:54

## 2020-11-20 RX ADMIN — SACUBITRIL AND VALSARTAN 0.5 TABLET: 24; 26 TABLET, FILM COATED ORAL at 08:55

## 2020-11-20 RX ADMIN — INSULIN LISPRO 10 UNITS: 100 INJECTION, SOLUTION INTRAVENOUS; SUBCUTANEOUS at 11:59

## 2020-11-20 ASSESSMENT — PAIN DESCRIPTION - FREQUENCY: FREQUENCY: CONTINUOUS

## 2020-11-20 ASSESSMENT — PAIN DESCRIPTION - ORIENTATION: ORIENTATION: LEFT

## 2020-11-20 ASSESSMENT — PAIN DESCRIPTION - DESCRIPTORS: DESCRIPTORS: SHARP

## 2020-11-20 ASSESSMENT — PAIN DESCRIPTION - ONSET: ONSET: ON-GOING

## 2020-11-20 ASSESSMENT — PAIN - FUNCTIONAL ASSESSMENT: PAIN_FUNCTIONAL_ASSESSMENT: ACTIVITIES ARE NOT PREVENTED

## 2020-11-20 ASSESSMENT — PAIN SCALES - GENERAL
PAINLEVEL_OUTOF10: 10
PAINLEVEL_OUTOF10: 0
PAINLEVEL_OUTOF10: 0

## 2020-11-20 ASSESSMENT — ENCOUNTER SYMPTOMS: SHORTNESS OF BREATH: 1

## 2020-11-20 ASSESSMENT — PAIN DESCRIPTION - LOCATION: LOCATION: CHEST

## 2020-11-20 ASSESSMENT — PAIN DESCRIPTION - PAIN TYPE: TYPE: ACUTE PAIN

## 2020-11-20 ASSESSMENT — PAIN DESCRIPTION - PROGRESSION: CLINICAL_PROGRESSION: NOT CHANGED

## 2020-11-20 NOTE — CARE COORDINATION
Chart reviewed day #3. Per Cardiology: 1. Lost IV access, transition to po lasix  2. Echo today for pericardial effusion  3. Continue aspirin, statin, amiodarone, digoxin, toprol, entresto  4. Eliquis for PE  5. Daily weights, strict I/Os  6. Keep K>4, Mg >2  7. If echo shows stable pericardial effusion, ok to discharge from cardiology perspective. She has a follow up appointment 12/18/2020. Spoke with patient at bedside and she understands the plan. She states that she will need a cab voucher when she gets discharged. She is active with [facility] and will resume with them at discharge. 1823 addendum: Discharge order noted. CASE MANAGEMENT DISCHARGE SUMMARY      Discharge to: home with [facility]    Transportation: aliza RN getting cab voucher      Confirmed discharge plan with: patient/sister/RN/Mathieu with [facility]     Patient: yes     Family, name and contact number: Poonam Barraza [phone]     Facility/Agency, name:  [facility] faxed not needed, can pull from epic   Phone number for report to facility: [phone]     RN, name: Shane Miles    Note: Discharging nurse to complete [facility], reconcile [facility], and place final copy with patient's discharge packet.

## 2020-11-20 NOTE — PROGRESS NOTES
Hospitalist Progress Note      PCP: FRITZ Del Castillo - [facility]    Date of Admission: 11/17/2020    Chief Complaint: SOB    Subjective: No new c/o. Medications:  Reviewed    Infusion Medications    dextrose       Scheduled Medications    traMADol  50 mg Oral Once    apixaban  10 mg Oral BID    furosemide  40 mg Oral BID    magnesium sulfate  2 g Intravenous Once    sodium chloride flush  10 mL Intravenous 2 times per day    aspirin  81 mg Oral Daily    atorvastatin  40 mg Oral Nightly    fenofibrate  160 mg Oral Daily    digoxin  125 mcg Oral Every Other Day    metoprolol succinate  12.5 mg Oral Daily    sacubitril-valsartan  0.5 tablet Oral BID    amiodarone  200 mg Oral Daily    insulin glargine  15 Units Subcutaneous Nightly    insulin lispro  0-12 Units Subcutaneous TID WC    insulin lispro  0-6 Units Subcutaneous Nightly     PRN Meds: perflutren lipid microspheres, sodium chloride flush, acetaminophen **OR** acetaminophen, promethazine **OR** ondansetron, famotidine, heparin (porcine), heparin (porcine), perflutren lipid microspheres, glucose, dextrose, glucagon (rDNA), dextrose      Intake/Output Summary (Last 24 hours) at 11/20/2020 0903  Last data filed at 11/20/2020 0522  Gross per 24 hour   Intake 960 ml   Output 2750 ml   Net -1790 ml       Physical Exam Performed:    /73   Pulse 94   Temp 97.8 °F (36.6 °C) (Oral)   Resp 16   Wt 208 lb 9.6 oz (94.6 kg)   SpO2 99%   BMI 39.41 kg/m²     General appearance: No apparent distress, appears stated age and cooperative. HEENT: Pupils equal, round, and reactive to light. Conjunctivae/corneas clear. Neck: Supple, with full range of motion. No jugular venous distention. Trachea midline. Respiratory:  Normal respiratory effort. Clear to auscultation, bilaterally without Rales/Wheezes/Rhonchi. Cardiovascular: Regular rate and rhythm with normal S1/S2 without murmurs, rubs or gallops.   Abdomen: Soft, non-tender, non-distended with normal bowel sounds. Musculoskeletal: No clubbing, cyanosis or edema bilaterally. Full range of motion without deformity. Skin: Skin color, texture, turgor normal.  No rashes or lesions. Neurologic:  Neurovascularly intact without any focal sensory/motor deficits. Cranial nerves: II-XII intact, grossly non-focal.  Psychiatric: Alert and oriented, thought content appropriate, normal insight  Capillary Refill: Brisk,< 3 seconds   Peripheral Pulses: +2 palpable, equal bilaterally       Labs:   Recent Labs     11/18/20  1257 11/19/20  0340   WBC 6.2 6.8   HGB 13.4 13.4   HCT 42.0 42.0    294     Recent Labs     11/18/20  0320 11/19/20  0340    139   K 4.1 3.5   CL 98* 102   CO2 28 27   BUN 10 8   CREATININE 0.6 <0.5*   CALCIUM 9.0 8.7     Recent Labs     11/18/20  0320 11/19/20  0340   AST 32 16   ALT 16 10   BILITOT 0.8 0.9   ALKPHOS 110 88     No results for input(s): INR in the last 72 hours. Recent Labs     11/20/20  0203   TROPONINI <0.01       Urinalysis:      Lab Results   Component Value Date    NITRU Negative 11/16/2020    WBCUA 21-50 11/16/2020    BACTERIA Rare 11/16/2020    RBCUA 3-4 11/16/2020    BLOODU Negative 11/16/2020    SPECGRAV 1.020 11/16/2020    GLUCOSEU >=1000 11/16/2020       Consults:    IP CONSULT TO CARDIOLOGY      Assessment/Plan:    Active Hospital Problems    Diagnosis    Pulmonary emboli (HCC) [I26.99]    Obesity [E66.9]    CHF (congestive heart failure) ([facility] [facility] [I50.9]    CAD (coronary artery disease) [I25.10]    DM [facility]di[facility] [E11.9]    Dyslipidemia [E78.5]       Pulmonary Embolism - CTPA w/ acute LLL Pulmonary embolism w/out evidence of R heart strain but w/ pulmonary HTN. Echo w/out evidence of R heart strain and confirmed moderate Pericardial effusion seen on CT scan w/out evidence of tamponade. Started on Heparin gtt - changed to NOAC 20 Nov.      HTN/CAD - w/ known CAD but no evidence of active signs/sxs of ischemia/failure.  Currently controlled on home meds w/ vitals reviewed and documented as above.     CHF - acute on chronic combined systolic/diastolic failure w/ reduced EF 20% by Echo dated this admission w/ Grade 3 diastolic dysfunction. Cardiology consulted and appreciated. Continue diuresis as written. Continue current medical mgt. S/P prior ICD     Hx NSVT - Cardiology following and appreciated on Amiodarone/Dig. Goal K+ >4 and Mag >2 - replaced PO/IV 19 Nov respectively. Follow closely w/ active diuresis    HypoMagnesemia - etiology clinically unable to determine. Follow serial labs and replace PRN - ordered IV 19 Nov.  Reviewed and documented as above. HyperLipidemia - controlled on home Statin. Continue, w/ f/u and med adjustment w/ PCP     DM2 - controlled on home Insulin - continued at reduced dose. Follow FSBS/SSI medium regimen. Last HbA1c 11.6% dated Nov 2020. Anticipate resuming/continuing home regimen at discharge.      Morbid Obesity -  With BMI >40. Complicating assessment and treatment. Placing patient at risk for multiple co-morbidities as well as early death and contributing to the patient's presentation.  Counseled on weight loss.         DVT Prophylaxis: Heparin gtt  Diet: DIET CARDIAC;  Code Status: Full Code      PT/OT Eval Status: seen w/ recs for home w/ assist     Dispo - possibly Friday 20 Nov pending clinical course and subspecialty Huber Perea MD

## 2020-11-20 NOTE — ADT AUTH CERT
Pulmonary Embolism - Care Day 3 (11/19/2020) by Donna Banks RN         Review Status  Review Entered    Completed  11/20/2020 09:01        Criteria Review       Care Day: 3 Care Date: 11/19/2020 Level of Care: Intermediate Care    Guideline Day 3    Level Of Care    (X) ICU or floor    11/20/2020 9:01 AM EST by Rick Huertas      mag 1.60  k 3.5    Clinical Status    (X) * No bleeding or evidence of new emboli    (X) * Oxygenation at baseline or improved    (X) * Dangerous arrhythmia absent    11/20/2020 9:01 AM EST by Rick Huertas      sr    (X) * Pain absent or managed    11/20/2020 9:01 AM EST by Rick Huertas      97.9  18  94  100/65  98%ra    Activity    (X) * Ambulatory    11/20/2020 9:01 AM EST by Rick Huertas      +2 ble edema    Routes    (X) Oral hydration, medications    11/20/2020 9:01 AM EST by Rick Huertas      cordarone 200mg qd  entresto 0.5 mg  bid  asa 81 mg qd  lanoxin 125mcg given  klor 40meq is given x3 doses, magnesium 2 gm iv given  tylenol 650mg given  lasix 40mg iv qd    (X) Usual diet    Interventions    (X) Possible PTT    11/20/2020 9:01 AM EST by Rick Huertas      PTT 83.9, 61.7, 55.8    (X) Pulse oximetry    (X) Possible cardiac monitoring    11/20/2020 9:01 AM EST by Rick Huertas      nsr    Medications    (X) Anticoagulants [I]    11/20/2020 9:01 AM EST by Rick Huertas      heparin iv gtt continues    * Milestone    Additional Notes    11/19    Per Cardiology:    States ongoing chest pain and shortness of breath for > 3 months, just gets worse every day. On room air, laying flat.     Chest pain: unchanged, chronic    Shortness of breath: unchanged    Acute on chronic systolic CHF: improved, -4L, -7 lbs overnight    Nonischemic cardiomyopathy: known history, EF <20% on echo 11/2020, EF 35% 6/2020                - s/p dual chamber ICD 2015    Pericardial effusion: moderate on echo 11/2020    PE    Pulmonary HTN    CAD: mild on angiogram 10/6/2020    NSVT    HLD    DM    History of CVA

## 2020-11-20 NOTE — PROGRESS NOTES
[address]  Cardiology  Progress Note    Admission date:  2020    Reason for follow up visit: CHF    HPI/CC: Della Ortiz is a 62 y.o. female who was transferred from [facility] 2020 for shortness of breath. Found to have PE. Echo showed EF <20%, moderate pericardial effusion. Rhythm has been sinus. Subjective: Feels slightly better today, states ongoing chest pain and shortness of breath for > 3 months. Vitals:  Blood pressure 110/73, pulse 94, temperature 97.8 °F (36.6 °C), temperature source Oral, resp. rate 16, weight 208 lb 9.6 oz (94.6 kg), SpO2 96 %, not currently breastfeeding.   Temp  Av °F (36.7 °C)  Min: 97.8 °F (36.6 °C)  Max: 98.4 °F (36.9 °C)  Pulse  Av.3  Min: 79  Max: 110  BP  Min: 96/71  Max: 127/84  SpO2  Av.5 %  Min: 96 %  Max: 98 %    24 hour I/O    Intake/Output Summary (Last 24 hours) at 2020 8032  Last data filed at 2020 0522  Gross per 24 hour   Intake 1440 ml   Output 3150 ml   Net -1710 ml     Current Facility-Administered Medications   Medication Dose Route Frequency Provider Last Rate Last Dose    traMADol (ULTRAM) tablet 50 mg  50 mg Oral Once FRITZ Shields NP        apixaban (ELIQUIS) tablet 10 mg  10 mg Oral BID Jacklyn White MD        magnesium sulfate 2 g in 50 mL IVPB premix  2 g Intravenous Once Jacklyn White MD        sodium chloride flush 0.9 % injection 10 mL  10 mL Intravenous 2 times per day Rohit Hoffman, DO   10 mL at 20 0957    sodium chloride flush 0.9 % injection 10 mL  10 mL Intravenous PRN Shireen Hoffman, DO        acetaminophen (TYLENOL) tablet 650 mg  650 mg Oral Q6H PRN Shireen Hamiltonzi, DO   650 mg at 20 2119    Or    acetaminophen (TYLENOL) suppository 650 mg  650 mg Rectal Q6H PRN Shireen oHffman, DO        promethazine (PHENERGAN) tablet 12.5 mg  12.5 mg Oral Q6H PRN Miguelito Hoffman DO        Or    ondansetron (ZOFRAN) injection 4 mg  4 mg Intravenous Q6H PRN Shireen Hoffman DO  famotidine (PEPCID) tablet 20 mg  20 mg Oral Daily PRN Kalyn Hoffman DO        heparin (porcine) injection 5,500 Units  5,500 Units Intravenous PRN Kalyn Hoffman DO   5,500 Units at 11/18/20 1405    heparin (porcine) injection 2,700 Units  2,700 Units Intravenous PRN Miguelito Hoffman DO        perflutren lipid microspheres (DEFINITY) injection 1.65 mg  1.5 mL Intravenous ONCE PRN Caesar Wong MD        aspirin EC tablet 81 mg  81 mg Oral Daily Caesar Wong MD   81 mg at 11/19/20 7001    atorvastatin (LIPITOR) tablet 40 mg  40 mg Oral Nightly Caesar Wong MD   40 mg at 11/19/20 2119    fenofibrate (TRIGLIDE) tablet 160 mg  160 mg Oral Daily Caesar Wong MD   160 mg at 11/19/20 1542    digoxin (LANOXIN) tablet 125 mcg  125 mcg Oral Every Other Day Caesar Wong MD   125 mcg at 11/19/20 0262    metoprolol succinate (TOPROL XL) extended release tablet 12.5 mg  12.5 mg Oral Daily Caesar Wong MD   12.5 mg at 11/19/20 7977    sacubitril-valsartan (ENTRESTO) 24-26 MG per tablet 0.5 tablet  0.5 tablet Oral BID Caesar Wong MD   0.5 tablet at 11/19/20 2120    amiodarone (CORDARONE) tablet 200 mg  200 mg Oral Daily Caesar Wong MD   200 mg at 11/19/20 0818    furosemide (LASIX) injection 40 mg  40 mg Intravenous Daily Caesar Wong MD   40 mg at 11/19/20 0813    insulin glargine (LANTUS) injection vial 15 Units  15 Units Subcutaneous Nightly Hilario Hamman, MD   15 Units at 11/19/20 2120    glucose (GLUTOSE) 40 % oral gel 15 g  15 g Oral PRN Hilario Hamman, MD        dextrose 50 % IV solution  12.5 g Intravenous PRN Hilario Hamman, MD        glucagon (rDNA) injection 1 mg  1 mg Intramuscular PRN Hilario Hamman, MD        dextrose 5 % solution  100 mL/hr Intravenous PRN Hilario Hamman, MD        insulin lispro (HUMALOG) injection vial 0-12 Units  0-12 Units Subcutaneous TID WC Hilario Hamman, MD   6 Units at 11/19/20 1640    insulin lispro (HUMALOG) injection vial 0-6 Units  0-6 Units Subcutaneous Nightly Rustam Barraza MD   5 Units at 11/19/20 2126     Review of Systems   Constitutional: Positive for fatigue. Respiratory: Positive for shortness of breath. Cardiovascular: Positive for chest pain. Neurological: Positive for dizziness. Objective:     Telemetry monitor: SR    Physical Exam:  Constitutional:  Comfortable and alert, NAD, appears older than stated age  Eyes: PERRL, sclera nonicteric  Neck:  Supple, no masses, no thyroidmegaly, no JVD  Skin:  Warm and dry; no rash or lesions  Heart: Regular, normal apex, S1 and S2 normal, no M/G/R  Lungs:  Normal respiratory effort; clear; no wheezing/rhonchi/rales  Abdomen: soft, non tender, + bowel sounds  Extremities:  No edema or cyanosis; no clubbing  Neuro: alert and oriented, moves legs and arms equally, normal mood and affect    Data Reviewed:    Echo 11/17/2020:  STAT limited echo for pericardial effusion. Left ventricular systolic function is severely reduced with a visually   estimated ejection fraction of <20 %. The left ventricle is mildly dilated in size with normal wall thickness. There is severe global hypokinesis with regional variation. Grade III diastolic dysfunction with elevated LV pressure. The right ventricle is mildly dilated with mildly reduced systolic function. Biatrial enlargement. Moderate sized primarily posterior pericardial effusion. There is no echocardiographic evidence of tamponade physiology. Moderate tricuspid regurgitation. Systolic pulmonary artery pressure (SPAP) is elevated and estimated at 57   mmHg (right atrial pressure 15 mmHg) consistent with moderate pulmonary   hypertension. Coronary angiogram 10/6/2020:  1. Left heart catheterization  2. Selective left and right coronary angiogram  3. Right heart catheterization  Procedure Findings:  1. Mild non-obstructive coronary artery disease. 2.  Moderate pulmonary hypertension  3.   Decompensated cardiac hemodynamics  Findings:  1. Left main coronary artery was normal. It gave off the left anterior descending artery and left circumflex. 2. Left anterior descending artery has mild atherosclerotic disease. It was moderate in size. It gave off septal perforators and a moderate sized diagonal branch. The LAD covered the entire apex of the left ventricle. 3. Left circumflex has mild atherosclerotic disease. It was moderate in size. There was a moderate sized obtuse marginal branch. 4. Right coronary artery has mild atherosclerotic disease. It was moderate in size and was the dominant artery. 5. Left ventriculogram was not performed. Left ventricular end diastolic l pressure was 26. There is no gradient across pullback of the aortic valve. Right heart catheterization findings:  Right atrial pressure of 20  RV 45/7  PA 53/8  Pulmonary wedge pressure mean of 20  RA saturation 42%  PA saturation 45%  Aortic saturation 99%  Cardiac output 2.4  Cardiac index 1.24  SVR 2433    CONCLUSIONS:  1. Mild non-obstructive coronary artery disease with known severe left ventricular dysfunction  2. Moderate pulmonary hypertension with decompensated hemodynamics  ASSESSMENT/RECOMMENDATIONS:  1. Risk Factor control  2. Advanced heart failure management. Stress test 10/5/2020:  Dilated LV with severe global hypokinesis. Large, severe, fixed perfusion     defect of the inferior/inferolateral wall consistent with scar. Diminished     uptake of the anterior wall consistent with breast artifact. Post stress     LVEF is abnormal at 19%. Abnormal study. Overall findings represent a high     risk scan.       Lab Reviewed:     Renal Profile:  Lab Results   Component Value Date    CREATININE <0.5 11/19/2020    BUN 8 11/19/2020     11/19/2020    K 3.5 11/19/2020     11/19/2020    CO2 27 11/19/2020     CBC:    Lab Results   Component Value Date    WBC 6.8 11/19/2020    RBC 4.77 11/19/2020    HGB 13.4 11/19/2020    HCT 42.0 11/19/2020    MCV 88.1 11/19/2020    RDW 16.9 11/19/2020     11/19/2020     BNP:    Lab Results   Component Value Date    PROBNP 2,943 11/16/2020    PROBNP 3,737 11/10/2020    PROBNP 5,183 10/26/2020    PROBNP 662 10/13/2020    PROBNP 870 10/08/2020     Fasting Lipid Panel:    Lab Results   Component Value Date    CHOL 166 10/12/2019    HDL 51 10/12/2019    TRIG 142 10/12/2019     Cardiac Enzymes:  CK/MbTroponin  Lab Results   Component Value Date    CKTOTAL 54 06/12/2020    TROPONINI <0.01 11/20/2020     PT/ INR   Lab Results   Component Value Date    INR 1.02 08/31/2020    INR 1.01 08/12/2020    INR 1.16 04/16/2020    PROTIME 11.8 08/31/2020    PROTIME 11.7 08/12/2020    PROTIME 13.5 04/16/2020     PTT No results found for: PTT   Lab Results   Component Value Date    MG 1.60 11/19/2020      Lab Results   Component Value Date    TSH 0.28 09/19/2020     All labs and imaging reviewed today    Assessment:  Chest pain: unchanged, chronic  Shortness of breath: unchanged  Acute on chronic systolic CHF: improved, -4.7A, -7 lbs   Nonischemic cardiomyopathy: known history, EF <20% on echo 11/2020, EF 35% 6/2020   - s/p dual chamber ICD 2015  Pericardial effusion: moderate on echo 11/2020  PE  Pulmonary HTN  CAD: mild on angiogram 10/6/2020  NSVT  HLD  DM  History of CVA  Cognitive impairment    Plan:   1. Lost IV access, transition to po lasix  2. Echo today for pericardial effusion  3. Continue aspirin, statin, amiodarone, digoxin, toprol, entresto  4. Eliquis for PE  5. Daily weights, strict I/Os  6. Keep K>4, Mg >2  7. If echo shows stable pericardial effusion, ok to discharge from cardiology perspective. She has a follow up appointment 12/18/2020.     Stevie Davies, APRN-CNP  [address]  [phone]

## 2020-11-20 NOTE — PROGRESS NOTES
Pt lost her IV iv access.  rn unable to get an iv. TL notified and attempted unsuccessfully to get an Iv

## 2020-11-21 LAB — BLOOD CULTURE, ROUTINE: NORMAL

## 2020-11-22 ENCOUNTER — APPOINTMENT (OUTPATIENT)
Dept: GENERAL RADIOLOGY | Age: 60
DRG: 291 | End: 2020-11-22
Payer: MEDICARE

## 2020-11-22 ENCOUNTER — HOSPITAL ENCOUNTER (INPATIENT)
Age: 60
LOS: 5 days | Discharge: HOME | DRG: 291 | End: 2020-11-28
Attending: EMERGENCY MEDICINE | Admitting: HOSPITALIST
Payer: MEDICARE

## 2020-11-22 PROCEDURE — 99284 EMERGENCY DEPT VISIT MOD MDM: CPT

## 2020-11-22 PROCEDURE — 96372 THER/PROPH/DIAG INJ SC/IM: CPT

## 2020-11-22 PROCEDURE — 71046 X-RAY EXAM CHEST 2 VIEWS: CPT

## 2020-11-22 PROCEDURE — 93005 ELECTROCARDIOGRAM TRACING: CPT | Performed by: EMERGENCY MEDICINE

## 2020-11-22 ASSESSMENT — ENCOUNTER SYMPTOMS
COUGH: 0
ABDOMINAL PAIN: 0
DIARRHEA: 0
SHORTNESS OF BREATH: 1
SORE THROAT: 0
EYE DISCHARGE: 0
NAUSEA: 0
BACK PAIN: 0
VOMITING: 0

## 2020-11-22 NOTE — DISCHARGE SUMMARY
Hospital Medicine Discharge Summary    Patient ID: William Sloan      Patient's PCP: FRITZ Estevez - [facility]    Admit Date: 11/17/2020     Discharge Date: 11/20/2020      Admitting Physician: Ester Ames DO     Discharge Physician: Galen Douglass MD     Discharge Diagnoses: Active Hospital Problems    Diagnosis    Pulmonary emboli (HCC) [I26.99]    Obesity [E66.9]    CHF (congestive heart failure) (HCC) [I50.9]    CAD (coronary artery disease) [I25.10]    DM (diabetes mellitus) ([facility] [facility] [E11.9]    Dyslipidemia [E78.5]       The patient was seen and examined on day of discharge and this discharge summary is in conjunction with any daily progress note from day of discharge. Hospital Course:       Pulmonary Embolism - CTPA w/ acute LLL Pulmonary embolism w/out evidence of R heart strain but w/ pulmonary HTN.  Echo w/out evidence of R heart strain and confirmed moderate Pericardial effusion seen on CT scan w/out evidence of tamponade.  Started on Heparin gtt - changed to NOAC 20 Nov.      HTN/CAD - w/ known CAD but no evidence of active signs/sxs of ischemia/failure. Currently controlled on home meds.     CHF - acute on chronic combined systolic/diastolic failure w/ reduced EF 20% by Echo dated this admission w/ Grade 3 diastolic dysfunction.  Cardiology consulted and appreciated.  Continue diuresis as written.  Continue current medical mgt. S/P prior ICD     Hx NSVT - Cardiology following and appreciated on Amiodarone/Dig. Goal K+ >4 and Mag >2 - replaced PO/IV 19 Nov respectively. Followed closely w/ active diuresis     HypoMagnesemia - etiology clinically unable to determine. Followed serial labs and replaced PRN - ordered IV 19 Nov.        HyperLipidemia - controlled on home Statin. Continue, w/ f/u and med adjustment w/ PCP     DM2 - controlled on home Insulin - continued at reduced dose.  Follow FSBS/SSI medium regimen. Last HbA1c 11.6% dated Nov 2020.  Resumed home regimen at discharge.      Morbid Obesity -  With BMI >40. Complicating assessment and treatment. Placing patient at risk for multiple co-morbidities as well as early death and contributing to the patient's presentation. Counseled on weight loss.          Labs: For convenience and continuity at follow-up the following most recent labs are provided:      CBC:    Lab Results   Component Value Date    WBC 5.6 11/20/2020    HGB 13.2 11/20/2020    HCT 41.4 11/20/2020     11/20/2020       Renal:    Lab Results   Component Value Date     11/20/2020    K 4.6 11/20/2020    K 3.5 11/19/2020     11/20/2020    CO2 25 11/20/2020    BUN 12 11/20/2020    CREATININE 0.7 11/20/2020    CALCIUM 8.8 11/20/2020    PHOS 3.3 11/20/2020         Significant Diagnostic Studies    Radiology:   No orders to display          Consults:     IP CONSULT TO CARDIOLOGY  IP CONSULT TO HOME CARE NEEDS    Disposition: home     Condition at Discharge: Stable    Discharge Instructions/Follow-up:  w/ PCP 1-2 weeks and subspecialists as arranged.      Code Status:  Full Code    Activity: activity as tolerated    Diet: regular diet      Discharge Medications:     Discharge Medication List as of 11/20/2020  3:13 PM           Details   apixaban starter pack (ELIQUIS DVT/PE STARTER PACK) 5 MG TBPK tablet Take 1 tablet by mouth See Admin Instructions, Disp-74 tablet,R-0Print              Details   insulin aspart (NOVOLOG FLEXPEN) 100 UNIT/ML injection pen Inject 10 Units into the skin 3 times daily (before meals), Disp-5 pen,R-0NO PRINT      insulin glargine (LANTUS SOLOSTAR) 100 UNIT/ML injection pen Inject 24 Units into the skin nightly, Disp-5 pen,R-0NO PRINT      furosemide (LASIX) 40 MG tablet Take 1 tablet by mouth 2 times daily, Disp-60 tablet,R-0Normal      magnesium oxide (MAG-OX) 400 (241.3 Mg) MG TABS tablet Take 1 tablet by mouth daily, Disp-30 tablet,R-0Normal      amiodarone (CORDARONE) 200 MG tablet Take 1 tablet by mouth daily, Disp-90 tablet,R-3Normal      metoclopramide (REGLAN) 10 MG tablet Take 1 tablet by mouth 3 times daily (with meals), Disp-30 tablet,R-0Normal      sucralfate (CARAFATE) 1 GM tablet Take 1 tablet by mouth 3 times daily, Disp-90 tablet,R-0Normal      pantoprazole (PROTONIX) 40 MG tablet Take 1 tablet by mouth daily, Disp-90 tablet,R-0Normal      vitamin D (ERGOCALCIFEROL) 1.25 MG (61445 UT) CAPS capsule Take 50,000 Units by mouth once a week Takes weekly on SundaysHistorical Med      QUEtiapine (SEROQUEL) 25 MG tablet Take 25 mg by mouth nightlyHistorical Med      metoprolol succinate (TOPROL XL) 25 MG extended release tablet Take 0.5 tablets by mouth daily, Disp-30 tablet,R-3Normal      sacubitril-valsartan (ENTRESTO) 24-26 MG per tablet Take 0.5 tablets by mouth 2 times daily, Disp-60 tablet,R-3Normal      spironolactone (ALDACTONE) 25 MG tablet Take 1 tablet by mouth daily, Disp-30 tablet,R-3Normal      digoxin (LANOXIN) 125 MCG tablet Take 1 tablet by mouth every other day, Disp-30 tablet,R-3Normal      nitroGLYCERIN (NITROSTAT) 0.4 MG SL tablet up to max of 3 total doses. If no relief after 1 dose, call 911., Disp-25 tablet,R-3Print      aspirin 81 MG EC tablet Take 1 tablet by mouth daily, Disp-30 tablet,R-2Normal      ranolazine (RANEXA) 500 MG extended release tablet Take 1 tablet by mouth 2 times daily, Disp-60 tablet,R-2Normal      DULoxetine (CYMBALTA) 60 MG extended release capsule Take 1 capsule by mouth daily, Disp-30 capsule,R-0Normal      atorvastatin (LIPITOR) 40 MG tablet Take 1 tablet by mouth nightly, Disp-30 tablet,R-2Normal      fenofibrate micronized (LOFIBRA) 200 MG capsule Take 1 capsule by mouth nightly, Disp-30 capsule,R-3Normal      miconazole (MICOTIN) 2 % powder Apply topically 2 times daily. , Disp-45 g,R-1, Normal      [employer] Lancets Ultra Thin MISC 4 TIMES DAILY Starting Sat 7/18/2020, Disp-200 each,R-0, Normal      blood glucose monitor strips Test three times a day & as needed for symptoms of irregular blood glucose., Disp-100 strip, R-2, Print             Time Spent on discharge is more than 30 minutes in the examination, evaluation, counseling and review of medications and discharge plan. Signed:    Ct Borden MD   11/22/2020      Thank you FRITZ Del Castillo - [facility] for the opportunity to be involved in this patient's care. If you have any questions or concerns please feel free to contact me at [phone].

## 2020-11-23 ENCOUNTER — APPOINTMENT (OUTPATIENT)
Dept: INTERVENTIONAL RADIOLOGY/VASCULAR | Age: 60
DRG: 291 | End: 2020-11-23
Payer: MEDICARE

## 2020-11-23 PROBLEM — I50.9 HEART FAILURE (HCC): Status: ACTIVE | Noted: 2020-11-23

## 2020-11-23 LAB
A/G RATIO: 1.1 (ref 1.1–2.2)
ALBUMIN SERPL-MCNC: 3.7 G/DL (ref 3.4–5)
ALP BLD-CCNC: 125 U/L (ref 40–129)
ALT SERPL-CCNC: 18 U/L (ref 10–40)
ANION GAP SERPL CALCULATED.3IONS-SCNC: 12 MMOL/L (ref 3–16)
ANION GAP SERPL CALCULATED.3IONS-SCNC: 13 MMOL/L (ref 3–16)
ANION GAP SERPL CALCULATED.3IONS-SCNC: 17 MMOL/L (ref 3–16)
AST SERPL-CCNC: 26 U/L (ref 15–37)
BASOPHILS ABSOLUTE: 0 K/UL (ref 0–0.2)
BASOPHILS RELATIVE PERCENT: 0.6 %
BETA-HYDROXYBUTYRATE: 0.6 MMOL/L (ref 0–0.27)
BILIRUB SERPL-MCNC: 0.9 MG/DL (ref 0–1)
BUN BLDV-MCNC: 11 MG/DL (ref 7–20)
BUN BLDV-MCNC: 11 MG/DL (ref 7–20)
BUN BLDV-MCNC: 13 MG/DL (ref 7–20)
CALCIUM SERPL-MCNC: 8.8 MG/DL (ref 8.3–10.6)
CALCIUM SERPL-MCNC: 9.2 MG/DL (ref 8.3–10.6)
CALCIUM SERPL-MCNC: 9.5 MG/DL (ref 8.3–10.6)
CHLORIDE BLD-SCNC: 95 MMOL/L (ref 99–110)
CHLORIDE BLD-SCNC: 98 MMOL/L (ref 99–110)
CHLORIDE BLD-SCNC: 99 MMOL/L (ref 99–110)
CO2: 20 MMOL/L (ref 21–32)
CO2: 22 MMOL/L (ref 21–32)
CO2: 25 MMOL/L (ref 21–32)
CREAT SERPL-MCNC: 0.7 MG/DL (ref 0.6–1.1)
EKG ATRIAL RATE: 104 BPM
EKG DIAGNOSIS: NORMAL
EKG P AXIS: 33 DEGREES
EKG P-R INTERVAL: 174 MS
EKG Q-T INTERVAL: 358 MS
EKG QRS DURATION: 96 MS
EKG QTC CALCULATION (BAZETT): 470 MS
EKG R AXIS: 42 DEGREES
EKG T AXIS: 71 DEGREES
EKG VENTRICULAR RATE: 104 BPM
EOSINOPHILS ABSOLUTE: 0.2 K/UL (ref 0–0.6)
EOSINOPHILS RELATIVE PERCENT: 2.2 %
GFR AFRICAN AMERICAN: >60
GFR NON-AFRICAN AMERICAN: >60
GLOBULIN: 3.3 G/DL
GLUCOSE BLD-MCNC: 183 MG/DL (ref 70–99)
GLUCOSE BLD-MCNC: 229 MG/DL (ref 70–99)
GLUCOSE BLD-MCNC: 239 MG/DL (ref 70–99)
GLUCOSE BLD-MCNC: 251 MG/DL (ref 70–99)
GLUCOSE BLD-MCNC: 292 MG/DL (ref 70–99)
GLUCOSE BLD-MCNC: 310 MG/DL (ref 70–99)
GLUCOSE BLD-MCNC: 402 MG/DL (ref 70–99)
GLUCOSE BLD-MCNC: 481 MG/DL (ref 70–99)
HCT VFR BLD CALC: 41 % (ref 36–48)
HEMOGLOBIN: 13.4 G/DL (ref 12–16)
INR BLD: 1.29 (ref 0.86–1.14)
LYMPHOCYTES ABSOLUTE: 2.5 K/UL (ref 1–5.1)
LYMPHOCYTES RELATIVE PERCENT: 36.9 %
MAGNESIUM: 1.5 MG/DL (ref 1.8–2.4)
MCH RBC QN AUTO: 28.7 PG (ref 26–34)
MCHC RBC AUTO-ENTMCNC: 32.7 G/DL (ref 31–36)
MCV RBC AUTO: 87.9 FL (ref 80–100)
MONOCYTES ABSOLUTE: 0.6 K/UL (ref 0–1.3)
MONOCYTES RELATIVE PERCENT: 8 %
NEUTROPHILS ABSOLUTE: 3.6 K/UL (ref 1.7–7.7)
NEUTROPHILS RELATIVE PERCENT: 52.3 %
PDW BLD-RTO: 17.3 % (ref 12.4–15.4)
PERFORMED ON: ABNORMAL
PLATELET # BLD: 231 K/UL (ref 135–450)
PMV BLD AUTO: 9.2 FL (ref 5–10.5)
POTASSIUM REFLEX MAGNESIUM: 3.7 MMOL/L (ref 3.5–5.1)
POTASSIUM REFLEX MAGNESIUM: 4.4 MMOL/L (ref 3.5–5.1)
POTASSIUM REFLEX MAGNESIUM: 4.5 MMOL/L (ref 3.5–5.1)
PRO-BNP: 3082 PG/ML (ref 0–124)
PROTHROMBIN TIME: 15 SEC (ref 10–13.2)
RBC # BLD: 4.66 M/UL (ref 4–5.2)
SARS-COV-2, NAAT: NOT DETECTED
SODIUM BLD-SCNC: 132 MMOL/L (ref 136–145)
SODIUM BLD-SCNC: 132 MMOL/L (ref 136–145)
SODIUM BLD-SCNC: 137 MMOL/L (ref 136–145)
TOTAL PROTEIN: 7 G/DL (ref 6.4–8.2)
TROPONIN: <0.01 NG/ML
WBC # BLD: 6.9 K/UL (ref 4–11)

## 2020-11-23 PROCEDURE — 05H933Z INSERTION OF INFUSION DEVICE INTO RIGHT BRACHIAL VEIN, PERCUTANEOUS APPROACH: ICD-10-PCS | Performed by: INTERNAL MEDICINE

## 2020-11-23 PROCEDURE — 36415 COLL VENOUS BLD VENIPUNCTURE: CPT

## 2020-11-23 PROCEDURE — 83880 ASSAY OF NATRIURETIC PEPTIDE: CPT

## 2020-11-23 PROCEDURE — 83735 ASSAY OF MAGNESIUM: CPT

## 2020-11-23 PROCEDURE — 6360000002 HC RX W HCPCS: Performed by: NURSE PRACTITIONER

## 2020-11-23 PROCEDURE — 93010 ELECTROCARDIOGRAM REPORT: CPT | Performed by: INTERNAL MEDICINE

## 2020-11-23 PROCEDURE — 84484 ASSAY OF TROPONIN QUANT: CPT

## 2020-11-23 PROCEDURE — 85610 PROTHROMBIN TIME: CPT

## 2020-11-23 PROCEDURE — 6370000000 HC RX 637 (ALT 250 FOR IP): Performed by: PHYSICIAN ASSISTANT

## 2020-11-23 PROCEDURE — C1751 CATH, INF, PER/CENT/MIDLINE: HCPCS

## 2020-11-23 PROCEDURE — 82010 KETONE BODYS QUAN: CPT

## 2020-11-23 PROCEDURE — 2580000003 HC RX 258: Performed by: EMERGENCY MEDICINE

## 2020-11-23 PROCEDURE — 85025 COMPLETE CBC W/AUTO DIFF WBC: CPT

## 2020-11-23 PROCEDURE — 76937 US GUIDE VASCULAR ACCESS: CPT

## 2020-11-23 PROCEDURE — 80053 COMPREHEN METABOLIC PANEL: CPT

## 2020-11-23 PROCEDURE — 6360000002 HC RX W HCPCS: Performed by: HOSPITALIST

## 2020-11-23 PROCEDURE — 6370000000 HC RX 637 (ALT 250 FOR IP): Performed by: EMERGENCY MEDICINE

## 2020-11-23 PROCEDURE — 36410 VNPNXR 3YR/> PHY/QHP DX/THER: CPT

## 2020-11-23 PROCEDURE — 6360000002 HC RX W HCPCS: Performed by: PHYSICIAN ASSISTANT

## 2020-11-23 PROCEDURE — 2580000003 HC RX 258: Performed by: HOSPITALIST

## 2020-11-23 PROCEDURE — 99222 1ST HOSP IP/OBS MODERATE 55: CPT | Performed by: PHYSICIAN ASSISTANT

## 2020-11-23 PROCEDURE — 83036 HEMOGLOBIN GLYCOSYLATED A1C: CPT

## 2020-11-23 PROCEDURE — 6370000000 HC RX 637 (ALT 250 FOR IP): Performed by: HOSPITALIST

## 2020-11-23 PROCEDURE — 2060000000 HC ICU INTERMEDIATE R&B

## 2020-11-23 PROCEDURE — U0002 COVID-19 LAB TEST NON-CDC: HCPCS

## 2020-11-23 RX ORDER — METOPROLOL SUCCINATE 25 MG/1
12.5 TABLET, EXTENDED RELEASE ORAL DAILY
Status: DISCONTINUED | OUTPATIENT
Start: 2020-11-23 | End: 2020-11-27

## 2020-11-23 RX ORDER — FUROSEMIDE 10 MG/ML
20 INJECTION INTRAMUSCULAR; INTRAVENOUS 2 TIMES DAILY
Status: DISCONTINUED | OUTPATIENT
Start: 2020-11-23 | End: 2020-11-23

## 2020-11-23 RX ORDER — QUETIAPINE FUMARATE 25 MG/1
50 TABLET, FILM COATED ORAL NIGHTLY
Status: DISCONTINUED | OUTPATIENT
Start: 2020-11-23 | End: 2020-11-27

## 2020-11-23 RX ORDER — ASPIRIN 81 MG/1
81 TABLET ORAL DAILY
Status: DISCONTINUED | OUTPATIENT
Start: 2020-11-23 | End: 2020-11-28 | Stop reason: HOSPADM

## 2020-11-23 RX ORDER — DIGOXIN 125 MCG
125 TABLET ORAL EVERY OTHER DAY
Status: DISCONTINUED | OUTPATIENT
Start: 2020-11-23 | End: 2020-11-28 | Stop reason: HOSPADM

## 2020-11-23 RX ORDER — PANTOPRAZOLE SODIUM 40 MG/1
40 TABLET, DELAYED RELEASE ORAL DAILY
Status: DISCONTINUED | OUTPATIENT
Start: 2020-11-23 | End: 2020-11-28 | Stop reason: HOSPADM

## 2020-11-23 RX ORDER — RANOLAZINE 500 MG/1
500 TABLET, EXTENDED RELEASE ORAL 2 TIMES DAILY
Status: DISCONTINUED | OUTPATIENT
Start: 2020-11-23 | End: 2020-11-28 | Stop reason: HOSPADM

## 2020-11-23 RX ORDER — FUROSEMIDE 20 MG/1
20 TABLET ORAL ONCE
Status: COMPLETED | OUTPATIENT
Start: 2020-11-23 | End: 2020-11-23

## 2020-11-23 RX ORDER — DULOXETIN HYDROCHLORIDE 60 MG/1
60 CAPSULE, DELAYED RELEASE ORAL DAILY
Status: DISCONTINUED | OUTPATIENT
Start: 2020-11-23 | End: 2020-11-28 | Stop reason: HOSPADM

## 2020-11-23 RX ORDER — MAGNESIUM SULFATE 1 G/100ML
1 INJECTION INTRAVENOUS PRN
Status: DISCONTINUED | OUTPATIENT
Start: 2020-11-23 | End: 2020-11-28 | Stop reason: HOSPADM

## 2020-11-23 RX ORDER — ACETAMINOPHEN 325 MG/1
650 TABLET ORAL EVERY 6 HOURS PRN
Status: DISCONTINUED | OUTPATIENT
Start: 2020-11-23 | End: 2020-11-28 | Stop reason: HOSPADM

## 2020-11-23 RX ORDER — MAGNESIUM SULFATE IN WATER 40 MG/ML
2 INJECTION, SOLUTION INTRAVENOUS ONCE
Status: COMPLETED | OUTPATIENT
Start: 2020-11-23 | End: 2020-11-23

## 2020-11-23 RX ORDER — SODIUM CHLORIDE 0.9 % (FLUSH) 0.9 %
10 SYRINGE (ML) INJECTION EVERY 12 HOURS SCHEDULED
Status: DISCONTINUED | OUTPATIENT
Start: 2020-11-23 | End: 2020-11-28 | Stop reason: HOSPADM

## 2020-11-23 RX ORDER — ATORVASTATIN CALCIUM 40 MG/1
40 TABLET, FILM COATED ORAL NIGHTLY
Status: DISCONTINUED | OUTPATIENT
Start: 2020-11-23 | End: 2020-11-28 | Stop reason: HOSPADM

## 2020-11-23 RX ORDER — ACETAMINOPHEN 650 MG/1
650 SUPPOSITORY RECTAL EVERY 6 HOURS PRN
Status: DISCONTINUED | OUTPATIENT
Start: 2020-11-23 | End: 2020-11-28 | Stop reason: HOSPADM

## 2020-11-23 RX ORDER — POLYETHYLENE GLYCOL 3350 17 G/17G
17 POWDER, FOR SOLUTION ORAL DAILY PRN
Status: DISCONTINUED | OUTPATIENT
Start: 2020-11-23 | End: 2020-11-28 | Stop reason: HOSPADM

## 2020-11-23 RX ORDER — FUROSEMIDE 10 MG/ML
20 INJECTION INTRAMUSCULAR; INTRAVENOUS ONCE
Status: DISCONTINUED | OUTPATIENT
Start: 2020-11-23 | End: 2020-11-23

## 2020-11-23 RX ORDER — DEXTROSE MONOHYDRATE 50 MG/ML
100 INJECTION, SOLUTION INTRAVENOUS PRN
Status: DISCONTINUED | OUTPATIENT
Start: 2020-11-23 | End: 2020-11-28 | Stop reason: HOSPADM

## 2020-11-23 RX ORDER — AMIODARONE HYDROCHLORIDE 200 MG/1
200 TABLET ORAL DAILY
Status: DISCONTINUED | OUTPATIENT
Start: 2020-11-23 | End: 2020-11-28 | Stop reason: HOSPADM

## 2020-11-23 RX ORDER — FENOFIBRATE 160 MG/1
160 TABLET ORAL DAILY
Status: DISCONTINUED | OUTPATIENT
Start: 2020-11-23 | End: 2020-11-28 | Stop reason: HOSPADM

## 2020-11-23 RX ORDER — ONDANSETRON 2 MG/ML
4 INJECTION INTRAMUSCULAR; INTRAVENOUS EVERY 6 HOURS PRN
Status: DISCONTINUED | OUTPATIENT
Start: 2020-11-23 | End: 2020-11-28 | Stop reason: HOSPADM

## 2020-11-23 RX ORDER — METOCLOPRAMIDE 10 MG/1
10 TABLET ORAL
Status: DISCONTINUED | OUTPATIENT
Start: 2020-11-23 | End: 2020-11-28 | Stop reason: HOSPADM

## 2020-11-23 RX ORDER — OXYCODONE HYDROCHLORIDE 5 MG/1
2.5 TABLET ORAL EVERY 6 HOURS PRN
Status: DISCONTINUED | OUTPATIENT
Start: 2020-11-23 | End: 2020-11-28 | Stop reason: HOSPADM

## 2020-11-23 RX ORDER — FUROSEMIDE 10 MG/ML
40 INJECTION INTRAMUSCULAR; INTRAVENOUS 2 TIMES DAILY
Status: DISCONTINUED | OUTPATIENT
Start: 2020-11-23 | End: 2020-11-27

## 2020-11-23 RX ORDER — ONDANSETRON 4 MG/1
4 TABLET, FILM COATED ORAL EVERY 8 HOURS PRN
COMMUNITY
End: 2020-12-02 | Stop reason: SDUPTHER

## 2020-11-23 RX ORDER — ERGOCALCIFEROL 1.25 MG/1
50000 CAPSULE ORAL WEEKLY
Status: DISCONTINUED | OUTPATIENT
Start: 2020-11-29 | End: 2020-11-28 | Stop reason: HOSPADM

## 2020-11-23 RX ORDER — SODIUM CHLORIDE 0.9 % (FLUSH) 0.9 %
10 SYRINGE (ML) INJECTION PRN
Status: DISCONTINUED | OUTPATIENT
Start: 2020-11-23 | End: 2020-11-28 | Stop reason: HOSPADM

## 2020-11-23 RX ORDER — SUCRALFATE 1 G/1
1 TABLET ORAL 3 TIMES DAILY
Status: DISCONTINUED | OUTPATIENT
Start: 2020-11-23 | End: 2020-11-28 | Stop reason: HOSPADM

## 2020-11-23 RX ORDER — SPIRONOLACTONE 25 MG/1
25 TABLET ORAL DAILY
Status: DISCONTINUED | OUTPATIENT
Start: 2020-11-23 | End: 2020-11-27

## 2020-11-23 RX ORDER — QUETIAPINE FUMARATE 25 MG/1
25 TABLET, FILM COATED ORAL NIGHTLY
Status: DISCONTINUED | OUTPATIENT
Start: 2020-11-23 | End: 2020-11-23

## 2020-11-23 RX ORDER — QUETIAPINE FUMARATE 50 MG/1
50 TABLET, EXTENDED RELEASE ORAL NIGHTLY
COMMUNITY

## 2020-11-23 RX ORDER — PROMETHAZINE HYDROCHLORIDE 25 MG/1
12.5 TABLET ORAL EVERY 6 HOURS PRN
Status: DISCONTINUED | OUTPATIENT
Start: 2020-11-23 | End: 2020-11-28 | Stop reason: HOSPADM

## 2020-11-23 RX ORDER — NITROGLYCERIN 0.4 MG/1
0.4 TABLET SUBLINGUAL EVERY 5 MIN PRN
Status: DISCONTINUED | OUTPATIENT
Start: 2020-11-23 | End: 2020-11-28 | Stop reason: HOSPADM

## 2020-11-23 RX ORDER — INSULIN GLARGINE 100 [IU]/ML
24 INJECTION, SOLUTION SUBCUTANEOUS NIGHTLY
Status: DISCONTINUED | OUTPATIENT
Start: 2020-11-23 | End: 2020-11-28 | Stop reason: HOSPADM

## 2020-11-23 RX ORDER — DEXTROSE MONOHYDRATE 25 G/50ML
12.5 INJECTION, SOLUTION INTRAVENOUS PRN
Status: DISCONTINUED | OUTPATIENT
Start: 2020-11-23 | End: 2020-11-28 | Stop reason: HOSPADM

## 2020-11-23 RX ORDER — LIDOCAINE HYDROCHLORIDE 10 MG/ML
5 INJECTION, SOLUTION INFILTRATION; PERINEURAL ONCE
Status: DISCONTINUED | OUTPATIENT
Start: 2020-11-23 | End: 2020-11-28 | Stop reason: HOSPADM

## 2020-11-23 RX ORDER — FUROSEMIDE 40 MG/1
40 TABLET ORAL DAILY
Status: ON HOLD | COMMUNITY
End: 2020-11-28 | Stop reason: HOSPADM

## 2020-11-23 RX ORDER — NICOTINE POLACRILEX 4 MG
15 LOZENGE BUCCAL PRN
Status: DISCONTINUED | OUTPATIENT
Start: 2020-11-23 | End: 2020-11-28 | Stop reason: HOSPADM

## 2020-11-23 RX ADMIN — ATORVASTATIN CALCIUM 40 MG: 40 TABLET, FILM COATED ORAL at 21:05

## 2020-11-23 RX ADMIN — SPIRONOLACTONE 25 MG: 25 TABLET ORAL at 08:17

## 2020-11-23 RX ADMIN — SACUBITRIL AND VALSARTAN 0.5 TABLET: 24; 26 TABLET, FILM COATED ORAL at 21:03

## 2020-11-23 RX ADMIN — FUROSEMIDE 40 MG: 10 INJECTION, SOLUTION INTRAMUSCULAR; INTRAVENOUS at 17:23

## 2020-11-23 RX ADMIN — METOCLOPRAMIDE 10 MG: 10 TABLET ORAL at 17:23

## 2020-11-23 RX ADMIN — DULOXETINE HYDROCHLORIDE 60 MG: 60 CAPSULE, DELAYED RELEASE ORAL at 08:17

## 2020-11-23 RX ADMIN — INSULIN LISPRO 2 UNITS: 100 INJECTION, SOLUTION INTRAVENOUS; SUBCUTANEOUS at 13:54

## 2020-11-23 RX ADMIN — Medication 10 ML: at 16:03

## 2020-11-23 RX ADMIN — FUROSEMIDE 20 MG: 20 TABLET ORAL at 03:44

## 2020-11-23 RX ADMIN — METOPROLOL SUCCINATE 12.5 MG: 25 TABLET, EXTENDED RELEASE ORAL at 08:18

## 2020-11-23 RX ADMIN — SUCRALFATE 1 G: 1 TABLET ORAL at 08:18

## 2020-11-23 RX ADMIN — INSULIN LISPRO 4 UNITS: 100 INJECTION, SOLUTION INTRAVENOUS; SUBCUTANEOUS at 08:25

## 2020-11-23 RX ADMIN — SACUBITRIL AND VALSARTAN 0.5 TABLET: 24; 26 TABLET, FILM COATED ORAL at 08:18

## 2020-11-23 RX ADMIN — PANTOPRAZOLE SODIUM 40 MG: 40 TABLET, DELAYED RELEASE ORAL at 08:18

## 2020-11-23 RX ADMIN — DIGOXIN 125 MCG: 125 TABLET ORAL at 08:17

## 2020-11-23 RX ADMIN — RANOLAZINE 500 MG: 500 TABLET, FILM COATED, EXTENDED RELEASE ORAL at 08:18

## 2020-11-23 RX ADMIN — Medication 10 ML: at 21:06

## 2020-11-23 RX ADMIN — Medication 10 ML: at 22:54

## 2020-11-23 RX ADMIN — METOCLOPRAMIDE 10 MG: 10 TABLET ORAL at 08:17

## 2020-11-23 RX ADMIN — PROMETHAZINE HYDROCHLORIDE 12.5 MG: 25 TABLET ORAL at 21:05

## 2020-11-23 RX ADMIN — SUCRALFATE 1 G: 1 TABLET ORAL at 13:54

## 2020-11-23 RX ADMIN — APIXABAN 10 MG: 5 TABLET, FILM COATED ORAL at 21:04

## 2020-11-23 RX ADMIN — FUROSEMIDE 20 MG: 10 INJECTION, SOLUTION INTRAMUSCULAR; INTRAVENOUS at 09:30

## 2020-11-23 RX ADMIN — METOCLOPRAMIDE 10 MG: 10 TABLET ORAL at 13:54

## 2020-11-23 RX ADMIN — INSULIN HUMAN 8 UNITS: 100 INJECTION, SOLUTION PARENTERAL at 05:58

## 2020-11-23 RX ADMIN — INSULIN HUMAN 5 UNITS: 100 INJECTION, SOLUTION PARENTERAL at 03:44

## 2020-11-23 RX ADMIN — INSULIN LISPRO 1 UNITS: 100 INJECTION, SOLUTION INTRAVENOUS; SUBCUTANEOUS at 17:28

## 2020-11-23 RX ADMIN — SUCRALFATE 1 G: 1 TABLET ORAL at 21:04

## 2020-11-23 RX ADMIN — QUETIAPINE FUMARATE 50 MG: 25 TABLET ORAL at 21:04

## 2020-11-23 RX ADMIN — ASPIRIN 81 MG: 81 TABLET, COATED ORAL at 08:18

## 2020-11-23 RX ADMIN — RANOLAZINE 500 MG: 500 TABLET, FILM COATED, EXTENDED RELEASE ORAL at 21:04

## 2020-11-23 RX ADMIN — AMIODARONE HYDROCHLORIDE 200 MG: 200 TABLET ORAL at 08:17

## 2020-11-23 RX ADMIN — FENOFIBRATE 160 MG: 160 TABLET ORAL at 08:18

## 2020-11-23 RX ADMIN — MAGNESIUM SULFATE HEPTAHYDRATE 2 G: 40 INJECTION, SOLUTION INTRAVENOUS at 16:03

## 2020-11-23 RX ADMIN — INSULIN GLARGINE 24 UNITS: 100 INJECTION, SOLUTION SUBCUTANEOUS at 21:09

## 2020-11-23 ASSESSMENT — PAIN DESCRIPTION - PAIN TYPE: TYPE: CHRONIC PAIN

## 2020-11-23 ASSESSMENT — PAIN DESCRIPTION - FREQUENCY: FREQUENCY: CONTINUOUS

## 2020-11-23 ASSESSMENT — PAIN DESCRIPTION - DESCRIPTORS: DESCRIPTORS: ACHING

## 2020-11-23 ASSESSMENT — PAIN DESCRIPTION - PROGRESSION: CLINICAL_PROGRESSION: NOT CHANGED

## 2020-11-23 ASSESSMENT — PAIN - FUNCTIONAL ASSESSMENT: PAIN_FUNCTIONAL_ASSESSMENT: PREVENTS OR INTERFERES SOME ACTIVE ACTIVITIES AND ADLS

## 2020-11-23 ASSESSMENT — PAIN SCALES - GENERAL: PAINLEVEL_OUTOF10: 10

## 2020-11-23 ASSESSMENT — PAIN DESCRIPTION - ORIENTATION: ORIENTATION: MID

## 2020-11-23 ASSESSMENT — PAIN DESCRIPTION - ONSET: ONSET: ON-GOING

## 2020-11-23 ASSESSMENT — PAIN DESCRIPTION - LOCATION: LOCATION: CHEST

## 2020-11-23 NOTE — ED NOTES
Patient refused an IV. Dr. Alexandra Hyman suggested a straight stick for labs. RN straight stuck for labs and got flash, but patient jerked her arm and vein was lost.  RN referred labs to charge RN.      Juliette Paiz RN  11/23/20 0273

## 2020-11-23 NOTE — FLOWSHEET NOTE
11/23/20 1251   Vital Signs   Temp 97.6 °F (36.4 °C)   Temp Source Oral   Pulse 93   Heart Rate Source Monitor   Resp 16   /73   BP Location Right upper arm   BP Upper/Lower Upper   Patient Position Sitting   Level of Consciousness 0   MEWS Score 1   Patient Currently in Pain Yes   Height and Weight   Height 5' 1\" (1.549 m)   Weight 205 lb 3 oz (93.1 kg)   Weight Method Actual;Standing scale   BSA (Calculated - sq m) 2 sq meters   BMI (Calculated) 38.9   Pain Assessment   Pain Assessment 0-10   Pain Level 10   Pain Type Chronic pain   Pain Location Chest   Pain Orientation Mid   Pain Descriptors Aching   Pain Frequency Continuous   Pain Onset On-going   Clinical Progression Not changed   Functional Pain Assessment Prevents or interferes some active activities and ADLs   Non-Pharmaceutical Pain Intervention(s) Ambulation/Increased Activity; Emotional support; Rest   Oxygen Therapy   SpO2 99 %   Pulse Oximeter Device Mode Continuous   Pulse Oximeter Device Location Left;Finger   O2 Device None (Room air)   Admission completed VSS, SR on monitor and remains on RA. Slow to answer questions and gives short reply. States chronic CP, patient resting.

## 2020-11-23 NOTE — ED NOTES
Peripheral IV was placed to left upper arm using ultrasound. Spoke with Romana [facility] who stated that as long as the patient has IV access then the PICC order can be discontinued.      Arpan Murillo RN  11/23/20 0683

## 2020-11-23 NOTE — PROGRESS NOTES
Patient admitted to room 303 from ED. Patient oriented to room, call light, bed rails, phone, lights and bathroom. Patient instructed about the schedule of the day including: vital sign frequency, lab draws, possible tests, frequency of MD and staff rounds, daily weights, I &O's and prescribed diet. bed alarm in place, patient aware of placement and reason. Telemetry box in place, patient aware of placement and reason. Bed locked, in lowest position, side rails up 2/4, call light within reach. Recliner Assessment  Patient is able to demonstrate the ability to move from a reclining position to an upright position within the recliner. 4 Eyes Skin Assessment     The patient is being assess for   Admission    I agree that 2 RN's have performed a thorough Head to Toe Skin Assessment on the patient. ALL assessment sites listed below have been assessed. Areas assessed by both nurses: Joey Garcia and Kamryn Gagnon  [x]   Head, Face, and Ears   [x]   Shoulders, Back, and Chest, Abdomen  [x]   Arms, Elbows, and Hands   [x]   Coccyx, Sacrum, and Ischium  [x]   Legs, Feet, and Heels        Scratches to lower legs    **SHARE this note so that the co-signing nurse is able to place an eSignature**    Co-signer eSignature: Electronically signed by Ellen Canchola RN on 11/23/20 at 3:23 PM EST    Does the Patient have Skin Breakdown?   No          Ignacio Prevention initiated:  No   Wound Care Orders initiated:  No      [facility] nurse consulted for Pressure Injury (Stage 3,4, Unstageable, DTI, NWPT, Complex wounds)and New or Established Ostomies:  No      Primary Nurse eSignature: Electronically signed by Velia Deluna RN on 11/23/20 at 1:27 PM EST

## 2020-11-23 NOTE — H&P
Yes Jennie Gonzales MD   insulin aspart (NOVOLOG FLEXPEN) 100 UNIT/ML injection pen Inject 10 Units into the skin 3 times daily (before meals) 11/13/20  Yes Katherine Palencia MD   insulin glargine (LANTUS SOLOSTAR) 100 UNIT/ML injection pen Inject 24 Units into the skin nightly 11/13/20  Yes Katherine Palencia MD   furosemide (LASIX) 40 MG tablet Take 1 tablet by mouth 2 times daily 11/13/20 12/13/20 Yes Katherine Palencia MD   magnesium oxide (MAG-OX) 400 (241.3 Mg) MG TABS tablet Take 1 tablet by mouth daily 11/13/20  Yes FRITZ Lopes CNP   amiodarone (CORDARONE) 200 MG tablet Take 1 tablet by mouth daily 11/10/20  Yes Eduard Bentley MD   metoclopramide (REGLAN) 10 MG tablet Take 1 tablet by mouth 3 times daily (with meals) 11/6/20  Yes Morgan Barroso MD   sucralfate (CARAFATE) 1 GM tablet Take 1 tablet by mouth 3 times daily 11/6/20  Yes Morgan Barroso MD   pantoprazole (PROTONIX) 40 MG tablet Take 1 tablet by mouth daily 11/6/20  Yes Morgan Barroso MD   vitamin D (ERGOCALCIFEROL) 1.25 MG (90702 UT) CAPS capsule Take 50,000 Units by mouth once a week Takes weekly on Sundays   Yes Historical Provider, MD   QUEtiapine (SEROQUEL) 25 MG tablet Take 25 mg by mouth nightly   Yes Historical Provider, MD   metoprolol succinate (TOPROL XL) 25 MG extended release tablet Take 0.5 tablets by mouth daily 10/14/20  Yes FRITZ Blanchard CNP   sacubitril-valsartan (ENTRESTO) 24-26 MG per tablet Take 0.5 tablets by mouth 2 times daily 10/13/20  Yes FRITZ Blanchard CNP   spironolactone (ALDACTONE) 25 MG tablet Take 1 tablet by mouth daily 10/13/20  Yes FRITZ Blanchard CNP   digoxin (LANOXIN) 125 MCG tablet Take 1 tablet by mouth every other day 9/25/20  Yes FRITZ Blanchard CNP   nitroGLYCERIN (NITROSTAT) 0.4 MG SL tablet up to max of 3 total doses.  If no relief after 1 dose, call 911. 8/24/20  Yes Jennie Gonzales MD   aspirin 81 MG EC tablet Take 1 tablet by mouth daily 7/18/20  Yes Thelma Granado MD   ranolazine (RANEXA) 500 MG extended release tablet Take 1 tablet by mouth 2 times daily 7/18/20  Yes Thelma Granado MD   DULoxetine (CYMBALTA) 60 MG extended release capsule Take 1 capsule by mouth daily 7/18/20  Yes Thelma Granado MD   atorvastatin (LIPITOR) 40 MG tablet Take 1 tablet by mouth nightly 7/18/20  Yes Thelma Granado MD   fenofibrate micronized (LOFIBRA) 200 MG capsule Take 1 capsule by mouth nightly 7/18/20  Yes Thelma Granado MD   miconazole (MICOTIN) 2 % powder Apply topically 2 times daily. 7/18/20  Yes Thelma Granado MD   [employer] Lancets Ultra Thin MISC 1 each by Does not apply route 4 times daily 7/18/20  Yes Thelma Granado MD   blood glucose monitor strips Test three times a day & as needed for symptoms of irregular blood glucose. 5/12/19  Yes Morgan Barroso MD       Allergies:  Patient has no known allergies. Social History:  The patient currently lives at home    TOBACCO:   reports that she has never smoked. She has never used smokeless tobacco.  ETOH:   reports no history of alcohol use.       Family History:   Positive as follows:        Problem Relation Age of Onset    Heart Disease Father     Cancer Mother     Other Mother        REVIEW OF SYSTEMS:     Constitutional: Negative for fever   HENT: Negative for sore throat   Eyes: Negative for redness   Respiratory: + cough, + SOB, + CARRION  Cardiovascular: + chest pain, + edema   Gastrointestinal: Negative for vomiting, diarrhea   Genitourinary: Negative for hematuria   Musculoskeletal: Negative for arthralgias   Skin: Negative for rash   Neurological: Negative for syncope   Hematological: Negative for adenopathy   Psychiatric/Behavorial: Negative for anxiety    PHYSICAL EXAM:    BP (!) 128/92   Pulse 106   Temp 98.2 °F (36.8 °C) (Oral)   Resp 23   Ht 5' 1\" (1.549 m)   Wt 208 lb (94.3 kg)   SpO2 97% BMI 39.30 kg/m²   Gen: No distress. Alert. Eyes: No conjunctival injection. ENT: No discharge. Pharynx clear. Neck: N Trachea midline. Resp: No accessory muscle use. No crackles. No wheezes. No rhonchi. Diminished breath sounds throughout   CV: Regular rate. Regular rhythm. No murmur. No rub. ++ edema. Capillary Refill: Brisk,< 3 seconds   Peripheral Pulses: +2 palpable, equal bilaterally   GI: Non-tender. Non-distended. Normal bowel sounds. Skin: Warm and dry. M/S: No cyanosis. No joint deformity. No clubbing. Neuro: Awake. Grossly nonfocal    Psych: Oriented x 3. No anxiety or agitation. CBC:   Recent Labs     11/23/20 0153   WBC 6.9   HGB 13.4   HCT 41.0   MCV 87.9        BMP:   Recent Labs     11/23/20 0153 11/23/20  0943   * 137   K 4.5 4.4   CL 95* 99   CO2 20* 25   BUN 13 11   CREATININE 0.7 0.7     LIVER PROFILE:   Recent Labs     11/23/20 0153   AST 26   ALT 18   BILITOT 0.9   ALKPHOS 125     PT/INR:   Recent Labs     11/23/20 0153   PROTIME 15.0*   INR 1.29*     CARDIAC ENZYMES  Recent Labs     11/23/20 0153 11/23/20  0931   TROPONINI <0.01 <0.01     CULTURES  COVD-19: not detected     EKG:  I have reviewed the EKG with the following interpretation:   Sinus tachycardia with Premature atrial complexes  Anterolateral infarct , age undetermined  Abnormal ECG    RADIOLOGY  XR CHEST (2 VW)   Preliminary Result   1. Cardiomegaly with mild congestive heart failure. Pertinent previous results reviewed   Echo  Summary 11/20/20   Limited exam for pericardial effusion. EF visually estimated at < 20%. Small to Moderate circumferential pericardial effusion without tamponade   physiology. The effusion does not appear to be significantly changed since   the previous exam on 11/17/2020. There is a moderate to large left pleural effusion. Mild to moderate tricuspid regurgitation.    Systolic pulmonary artery pressure (SPAP) is elevated and estimated at 41   mmHg (right atrial pressure 15 mmHg) consistent with mild pulmonary   hypertension. ASSESSMENT/PLAN:  Acute on chronic combined CHF   - Most recent echo with EF <20% and grade III DD  - s/p AICD  - BNP elevated, troponin negative   - CXR with cardiomegaly and CHF changes   - Strict I&Os, daily weights, low sodium diet   - Cardiology consult   - Continue IV Lasix 40 mg BID  - continue Entresto, BB, aldactone    Uncontrolled DM2  - Continue Humalog and Lantus   -  on arrival with AG 17   - BHB elevated     Recently dx PE  - was just discharged from [facility] [place] on 11/22 with acute LLL PE without evidence of right heart strain   - Echo showed a moderate pericardial effusion-->may need limited Echo if continues with symptoms    - Discharged home on Eliquis, continue      Hx of NSVT   - continue amio and dig   - tele monitor   - Reported 22 beats of VTach by cardiac monitor   - Cardiology following   - replace electrolytes    Hypomagnesemia   - mild, 1.5  - Sliding scale replacement     CAD  - troponin negative   - EKG with sinus tachycardia and PACs with low voltage   - No acute ACS symptoms, stable     HLD  - Continue statin     HTN  - BP is controlled  - Continue home medications     Obesity  - Body mass index is 39.3 kg/m². - Complicating assessment and treatment. Placing patient at risk for multiple co-morbidities as well as early death and contributing to the patient's presentation.   - Counseled on weight loss. DVT Prophylaxis: Eliquis   Diet: DIET LOW SODIUM 2 GM;   Code Status: Full Code    Hospital course and plan of care discussed with Dr. Arsenio Sheets.       Mindy Thomas PA-C  11/23/2020 10:56 AM

## 2020-11-23 NOTE — ED NOTES
Bed: 04  Expected date:   Expected time:   Means of arrival:   Comments:  Luz Kaufman RN  11/23/20 0001

## 2020-11-23 NOTE — ED PROVIDER NOTES
Musculoskeletal: Negative for back pain and myalgias. Skin: Negative for rash. Neurological: Negative for weakness and headaches. Hematological: Does not bruise/bleed easily. Psychiatric/Behavioral: Negative for confusion. Positives and Pertinent negatives as per HPI. Except as noted above in the ROS, all other systems were reviewed and negative. PAST MEDICAL HISTORY     Past Medical History:   Diagnosis Date    Arthritis     CAD (coronary artery disease)     Cerebral artery occlusion with cerebral infarction ([facility]     x 3    CHF (congestive heart failure) (HCC)     Diabetes mellitus ([facility]     Hyperlipidemia     Hypertension     Mental retardation     MI (myocardial infarction) ([facility]     Pacemaker          SURGICAL HISTORY     Past Surgical History:   Procedure Laterality Date    BACK SURGERY      x3    PACEMAKER INSERTION      TUBAL LIGATION      TUMOR REMOVAL      UPPER GASTROINTESTINAL ENDOSCOPY N/A 11/5/2020    EGD BIOPSY performed by Chelo Grimes DO at [address]       Previous Medications    AMIODARONE (CORDARONE) 200 MG TABLET    Take 1 tablet by mouth daily    APIXABAN STARTER PACK (ELIQUIS DVT/PE STARTER PACK) 5 MG TBPK TABLET    Take 1 tablet by mouth See Admin Instructions    ASPIRIN 81 MG EC TABLET    Take 1 tablet by mouth daily    ATORVASTATIN (LIPITOR) 40 MG TABLET    Take 1 tablet by mouth nightly    BLOOD GLUCOSE MONITOR STRIPS    Test three times a day & as needed for symptoms of irregular blood glucose.     [employer] LANCETS ULTRA THIN MISC    1 each by Does not apply route 4 times daily    DIGOXIN (LANOXIN) 125 MCG TABLET    Take 1 tablet by mouth every other day    DULOXETINE (CYMBALTA) 60 MG EXTENDED RELEASE CAPSULE    Take 1 capsule by mouth daily    FENOFIBRATE MICRONIZED (LOFIBRA) 200 MG CAPSULE    Take 1 capsule by mouth nightly    FUROSEMIDE (LASIX) 40 MG TABLET    Take 1 tablet by mouth 2 times daily    INSULIN ASPART (NOVOLOG FLEXPEN) 100 UNIT/ML INJECTION PEN    Inject 10 Units into the skin 3 times daily (before meals)    INSULIN GLARGINE (LANTUS SOLOSTAR) 100 UNIT/ML INJECTION PEN    Inject 24 Units into the skin nightly    MAGNESIUM OXIDE (MAG-OX) 400 (241.3 MG) MG TABS TABLET    Take 1 tablet by mouth daily    METOCLOPRAMIDE (REGLAN) 10 MG TABLET    Take 1 tablet by mouth 3 times daily (with meals)    METOPROLOL SUCCINATE (TOPROL XL) 25 MG EXTENDED RELEASE TABLET    Take 0.5 tablets by mouth daily    MICONAZOLE (MICOTIN) 2 % POWDER    Apply topically 2 times daily. NITROGLYCERIN (NITROSTAT) 0.4 MG SL TABLET    up to max of 3 total doses. If no relief after 1 dose, call 911. PANTOPRAZOLE (PROTONIX) 40 MG TABLET    Take 1 tablet by mouth daily    QUETIAPINE (SEROQUEL) 25 MG TABLET    Take 25 mg by mouth nightly    RANOLAZINE (RANEXA) 500 MG EXTENDED RELEASE TABLET    Take 1 tablet by mouth 2 times daily    SACUBITRIL-VALSARTAN (ENTRESTO) 24-26 MG PER TABLET    Take 0.5 tablets by mouth 2 times daily    SPIRONOLACTONE (ALDACTONE) 25 MG TABLET    Take 1 tablet by mouth daily    SUCRALFATE (CARAFATE) 1 GM TABLET    Take 1 tablet by mouth 3 times daily    VITAMIN D (ERGOCALCIFEROL) 1.25 MG (26408 UT) CAPS CAPSULE    Take 50,000 Units by mouth once a week Takes weekly on Sundays         ALLERGIES     Patient has no known allergies. FAMILYHISTORY       Family History   Problem Relation Age of Onset    Heart Disease Father     Cancer Mother     Other Mother           SOCIAL HISTORY       Social History     Socioeconomic History    Marital status:       Spouse name: None    Number of children: None    Years of education: None    Highest education level: None   Occupational History    None   Social Needs    Financial resource strain: None    Food insecurity     Worry: None     Inability: None    Transportation needs     Medical: None     Non-medical: None   Tobacco Use    Smoking status: Never Smoker  Smokeless tobacco: Never Used   Substance and Sexual Activity    Alcohol use: No    Drug use: No    Sexual activity: Not Currently   Lifestyle    Physical activity     Days per week: None     Minutes per session: None    Stress: None   Relationships    Social connections     Talks on phone: None     Gets together: None     Attends Faith service: None     Active member of club or organization: None     Attends meetings of clubs or organizations: None     Relationship status: None    Intimate partner violence     Fear of current or ex partner: None     Emotionally abused: None     Physically abused: None     Forced sexual activity: None   Other Topics Concern    None   Social History Narrative    None       SCREENINGS             PHYSICAL EXAM    (up to 7 for level 4, 8 or more for level 5)     ED Triage Vitals [11/22/20 2202]   BP Temp Temp Source Pulse Resp SpO2 Height Weight   134/79 97.8 °F (36.6 °C) Oral 99 16 97 % 5' 1\" (1.549 m) 208 lb (94.3 kg)       Physical Exam  Vitals signs and nursing note reviewed. Constitutional:       General: She is not in acute distress. Appearance: She is well-developed and overweight. She is not ill-appearing or toxic-appearing. Comments: Tearful, wearing dirty clothes. HENT:      Head: Normocephalic and atraumatic. Right Ear: External ear normal.      Left Ear: External ear normal.      Nose: Nose normal.      Mouth/Throat:      Pharynx: No oropharyngeal exudate. Eyes:      Conjunctiva/sclera: Conjunctivae normal.      Pupils: Pupils are equal, round, and reactive to light. Neck:      Musculoskeletal: Normal range of motion and neck supple. Cardiovascular:      Rate and Rhythm: Normal rate and regular rhythm. Heart sounds: Normal heart sounds. No murmur. No friction rub. No gallop. Pulmonary:      Effort: Pulmonary effort is normal. No respiratory distress. Breath sounds: Normal breath sounds. No wheezing.    Abdominal: General: Bowel sounds are normal. There is no distension. Palpations: Abdomen is soft. Tenderness: There is no abdominal tenderness. There is no guarding or rebound. Musculoskeletal: Normal range of motion. General: No tenderness. Comments: 1+ pitting edema to the legs. Lymphadenopathy:      Cervical: No cervical adenopathy. Skin:     General: Skin is warm and dry. Findings: No rash. Neurological:      Mental Status: She is alert and oriented to person, place, and time. Cranial Nerves: No cranial nerve deficit. Psychiatric:         Behavior: Behavior is cooperative.          DIAGNOSTIC RESULTS   LABS:    Results for orders placed or performed during the hospital encounter of 11/22/20   CBC Auto Differential   Result Value Ref Range    WBC 6.9 4.0 - 11.0 K/uL    RBC 4.66 4.00 - 5.20 M/uL    Hemoglobin 13.4 12.0 - 16.0 g/dL    Hematocrit 41.0 36.0 - 48.0 %    MCV 87.9 80.0 - 100.0 fL    MCH 28.7 26.0 - 34.0 pg    MCHC 32.7 31.0 - 36.0 g/dL    RDW 17.3 (H) 12.4 - 15.4 %    Platelets 790 033 - 136 K/uL    MPV 9.2 5.0 - 10.5 fL    Neutrophils % 52.3 %    Lymphocytes % 36.9 %    Monocytes % 8.0 %    Eosinophils % 2.2 %    Basophils % 0.6 %    Neutrophils Absolute 3.6 1.7 - 7.7 K/uL    Lymphocytes Absolute 2.5 1.0 - 5.1 K/uL    Monocytes Absolute 0.6 0.0 - 1.3 K/uL    Eosinophils Absolute 0.2 0.0 - 0.6 K/uL    Basophils Absolute 0.0 0.0 - 0.2 K/uL   Comprehensive Metabolic Panel w/ Reflex to MG   Result Value Ref Range    Sodium 132 (L) 136 - 145 mmol/L    Potassium reflex Magnesium 4.5 3.5 - 5.1 mmol/L    Chloride 95 (L) 99 - 110 mmol/L    CO2 20 (L) 21 - 32 mmol/L    Anion Gap 17 (H) 3 - 16    Glucose 481 (H) 70 - 99 mg/dL    BUN 13 7 - 20 mg/dL    CREATININE 0.7 0.6 - 1.1 mg/dL    GFR Non-African American >60 >60    GFR African American >60 >60    Calcium 9.2 8.3 - 10.6 mg/dL    Total Protein 7.0 6.4 - 8.2 g/dL    Alb 3.7 3.4 - 5.0 g/dL    Albumin/Globulin Ratio 1.1 1.1 - 2.2 Total Bilirubin 0.9 0.0 - 1.0 mg/dL    Alkaline Phosphatase 125 40 - 129 U/L    ALT 18 10 - 40 U/L    AST 26 15 - 37 U/L    Globulin 3.3 g/dL   Troponin   Result Value Ref Range    Troponin <0.01 <0.01 ng/mL   Brain Natriuretic Peptide   Result Value Ref Range    Pro-BNP 3,082 (H) 0 - 124 pg/mL   Protime-INR   Result Value Ref Range    Protime 15.0 (H) 10.0 - 13.2 sec    INR 1.29 (H) 0.86 - 1.14   Beta-Hydroxybutyrate   Result Value Ref Range    Beta-Hydroxybutyrate 0.60 (H) 0.00 - 0.27 mmol/L   COVID-19   Result Value Ref Range    SARS-CoV-2, NAAT Not Detected Not Detected   POCT Glucose   Result Value Ref Range    POC Glucose 402 (H) 70 - 99 mg/dl    Performed on ACCU-CHEK    EKG 12 Lead   Result Value Ref Range    Ventricular Rate 104 BPM    Atrial Rate 104 BPM    P-R Interval 174 ms    QRS Duration 96 ms    Q-T Interval 358 ms    QTc Calculation (Bazett) 470 ms    P Axis 33 degrees    R Axis 42 degrees    T Axis 71 degrees    Diagnosis       Sinus tachycardia with Premature atrial complexesAnterolateral infarct , age undeterminedAbnormal ECGNo significant change was foundWhen compared with ECG of11.20.20Confirmed by RYLEE WAY MD ([id number]) on 11/23/2020 7:43:33 AM       All other labs were within normal range ornot returned as of this dictation. EKG: All EKG's are interpreted by the Emergency Department Physician who either signs or Co-signs this chart in the absence of a cardiologist.  Please see their note for interpretation of EKG.     EKG Interpretation    Interpreted by emergency department physician    Rhythm: sinus tachycardia  Rate: tachycardia  Axis: normal  Ectopy: none  Conduction: normal  ST Segments: normal  T Waves: normal  Q Waves: atero-lateral leads    Clinical Impression: sinus tachycardia, prior anterolateral infarct      RADIOLOGY:   Non-plain film images such as CT, Ultrasound and MRI are read by the radiologist.  Plain radiographic images are visualized and preliminarily interpreted by time range)   sacubitril-valsartan (ENTRESTO) 24-26 MG per tablet 0.5 tablet (has no administration in time range)   spironolactone (ALDACTONE) tablet 25 mg (has no administration in time range)   sucralfate (CARAFATE) tablet 1 g (has no administration in time range)   vitamin D (ERGOCALCIFEROL) capsule 50,000 Units (has no administration in time range)   glucose (GLUTOSE) 40 % oral gel 15 g (has no administration in time range)   dextrose 50 % IV solution (has no administration in time range)   glucagon (rDNA) injection 1 mg (has no administration in time range)   dextrose 5 % solution (has no administration in time range)   insulin lispro (HUMALOG) injection vial 0-6 Units (has no administration in time range)   insulin lispro (HUMALOG) injection vial 0-3 Units (has no administration in time range)   sodium chloride flush 0.9 % injection 10 mL (has no administration in time range)   sodium chloride flush 0.9 % injection 10 mL (has no administration in time range)   acetaminophen (TYLENOL) tablet 650 mg (has no administration in time range)     Or   acetaminophen (TYLENOL) suppository 650 mg (has no administration in time range)   polyethylene glycol (GLYCOLAX) packet 17 g (has no administration in time range)   promethazine (PHENERGAN) tablet 12.5 mg (has no administration in time range)     Or   ondansetron (ZOFRAN) injection 4 mg (has no administration in time range)   enoxaparin (LOVENOX) injection 40 mg (has no administration in time range)   lidocaine 1 % injection 5 mL (has no administration in time range)   sodium chloride flush 0.9 % injection 10 mL (has no administration in time range)   sodium chloride flush 0.9 % injection 10 mL (has no administration in time range)   insulin regular (HUMULIN R;NOVOLIN R) injection 5 Units (5 Units Subcutaneous Given 11/23/20 0344)   furosemide (LASIX) tablet 20 mg (20 mg Oral Given 11/23/20 0344)   insulin regular (HUMULIN R;NOVOLIN R) injection 8 Units (8 Units Subcutaneous Given 11/23/20 0558)     Patient has peripheral edema and shortness of breath. She has an EF of between 15-20%. Her workup at this point shows CHF exacerbation. She is also ketotic. Patient is a difficult IV start. She is refusing any additional IV start attempts, except for a PICC line. PICC team consulted. Patient in the meantime has been given sub-q insulin and oral lasix. Dr. Elizabeth Hill has accepted admission. Secondary to the [place] pandemic, patient is currently holding in the ER awaiting an available bed. Patient is agreeable with the plan. I spoke with Dr. Elizabeth Hill. We thoroughly discussed the history, physical exam, laboratory and imaging studies, as well as, emergency department course. Based upon that discussion, we've decided to admit Yasemin Cervantes for further observation and evaluation of Katia Eisenberg's dyspnea. As I have deemed necessary from their history, physical, and studies, I have considered and evaluated Yasemin Cervantes for the following diagnoses:  ACUTE CORONARY SYNDROME, CHRONIC OBSTRUCTIVE PULMONARY DISEASE, CONGESTIVE HEART FAILURE, PERICARDIAL TAMPONADE, PNEUMONIA, PNEUMOTHORAX, PULMONARY EMBOLISM, SEPSIS, and THORACIC DISSECTION. Vitals:  Blood pressure 106/83, pulse 113, temperature 97.8 °F (36.6 °C), temperature source Oral, resp. rate 18, height 5' 1\" (1.549 m), weight 208 lb (94.3 kg), SpO2 100 %, not currently breastfeeding. The patient understands the importance of follow up and reasons to return. FINAL IMPRESSION      1. Acute on chronic systolic congestive heart failure (HCC)    2. Ketosis ([address]    3.  Dyspnea and respiratory abnormalities          DISPOSITION/PLAN   DISPOSITION Admitted 11/23/2020 06:25:23 AM      (Please note that portions of this note were completed with a voice recognition program.  Efforts were made to edit the dictations but occasionally words are mis-transcribed.)    Miesha Mcneill MD(electronically signed) Miesha Mcneill MD  11/23/20 5537

## 2020-11-23 NOTE — PROGRESS NOTES
Nutrition Assessment     Type and Reason for Visit: Initial, Positive Nutrition Screen(+ screen for nausea/vomiting)    Nutrition Recommendations/Plan:   1. Continue Low-Na + 2000 ml diet order and added CCC-4 restriction  2. Monitor po intake, weight and nutrition related labs    Nutrition Assessment:  pt is nutritionally compromised AEB pt reports of nausea and vomiting and pt is at risk for further compromise d/t CHF and uncontrolled diabetes; will continue Low Na, 2000 ml fluid restriction + added CCC-4 diet restriction for her diabetes    Malnutrition Assessment:  Malnutrition Status: At risk for malnutrition     Estimated Daily Nutrient Needs:  Energy (kcal): 9934-3596 kcals/day based on 15-18 kcals/kg/CBW; Weight Used for Energy Requirements:  Current     Protein (g): 58-67 g protein/day based on 1.2-1.4 g/kg/IBW;  Weight Used for Protein Requirements:  Ideal        Fluid (ml/day): 6554-9816 mls/day; Weight Used for Fluid Requirements:  1 ml/kcal      Nutrition Related Findings: pt was admitted for SOB r/t CHF and uncontrolled DM; hx CHF, DM, HLD, HTN, CAD, MR; currently on aspirin, digoxin, triglide, lasix, lantus 24 units nightly, low-dose SSI, reglan, protonix, sucralfate, spironolactone, ergocalciferol; skin color is appropriate for ethnicity and warm; on admit pt's BS over 400 and pt reported it had been that way for days      Current Nutrition Therapies:    DIET LOW SODIUM 2 GM; Carb Control: 4 carb choices (60 gms)/meal; 2000 ml    Anthropometric Measures:  · Height: 5' 1\" (154.9 cm)  · Current Body Wt: 205 lb 3 oz (93.1 kg)(11/22/20)   · BMI: 38.8    Nutrition Diagnosis:   · Altered nutrition-related lab values related to endocrine dysfuntion, cardiac dysfunction, cognitive or neurological impairment, altered GI function as evidenced by lab values, poor intake prior to admission, nausea, vomiting      Nutrition Interventions:   Food and/or Nutrient Delivery:  Modify Current Diet  Nutrition Education/Counseling:  No recommendation at this time   Coordination of Nutrition Care:  Continue to monitor while inpatient    Goals:  pt will consume 75% or greater of meals x 3 meals per day       Nutrition Monitoring and Evaluation:   Behavioral-Environmental Outcomes:  Knowledge or Skill   Food/Nutrient Intake Outcomes:  Food and Nutrient Intake  Physical Signs/Symptoms Outcomes:  Biochemical Data, Hemodynamic Status, Meal Time Behavior, Nutrition Focused Physical Findings, Skin, Weight     Discharge Planning:    Continue current diet     Electronically signed by James Aviles on 11/23/20 at 1:55 PM EST    Contact: [phone]

## 2020-11-24 LAB
ANION GAP SERPL CALCULATED.3IONS-SCNC: 12 MMOL/L (ref 3–16)
BUN BLDV-MCNC: 13 MG/DL (ref 7–20)
CALCIUM SERPL-MCNC: 8.8 MG/DL (ref 8.3–10.6)
CHLORIDE BLD-SCNC: 97 MMOL/L (ref 99–110)
CO2: 25 MMOL/L (ref 21–32)
CREAT SERPL-MCNC: 0.7 MG/DL (ref 0.6–1.1)
ESTIMATED AVERAGE GLUCOSE: 283.4 MG/DL
GFR AFRICAN AMERICAN: >60
GFR NON-AFRICAN AMERICAN: >60
GLUCOSE BLD-MCNC: 138 MG/DL (ref 70–99)
GLUCOSE BLD-MCNC: 163 MG/DL (ref 70–99)
GLUCOSE BLD-MCNC: 177 MG/DL (ref 70–99)
GLUCOSE BLD-MCNC: 203 MG/DL (ref 70–99)
GLUCOSE BLD-MCNC: 237 MG/DL (ref 70–99)
GLUCOSE BLD-MCNC: 283 MG/DL (ref 70–99)
HBA1C MFR BLD: 11.5 %
PERFORMED ON: ABNORMAL
POTASSIUM REFLEX MAGNESIUM: 3.7 MMOL/L (ref 3.5–5.1)
SODIUM BLD-SCNC: 134 MMOL/L (ref 136–145)

## 2020-11-24 PROCEDURE — 6360000002 HC RX W HCPCS: Performed by: PHYSICIAN ASSISTANT

## 2020-11-24 PROCEDURE — 99232 SBSQ HOSP IP/OBS MODERATE 35: CPT | Performed by: INTERNAL MEDICINE

## 2020-11-24 PROCEDURE — 6370000000 HC RX 637 (ALT 250 FOR IP): Performed by: PHYSICIAN ASSISTANT

## 2020-11-24 PROCEDURE — 2580000003 HC RX 258: Performed by: EMERGENCY MEDICINE

## 2020-11-24 PROCEDURE — 6370000000 HC RX 637 (ALT 250 FOR IP): Performed by: INTERNAL MEDICINE

## 2020-11-24 PROCEDURE — 36415 COLL VENOUS BLD VENIPUNCTURE: CPT

## 2020-11-24 PROCEDURE — 6370000000 HC RX 637 (ALT 250 FOR IP): Performed by: HOSPITALIST

## 2020-11-24 PROCEDURE — 2580000003 HC RX 258: Performed by: HOSPITALIST

## 2020-11-24 PROCEDURE — 80048 BASIC METABOLIC PNL TOTAL CA: CPT

## 2020-11-24 PROCEDURE — 99222 1ST HOSP IP/OBS MODERATE 55: CPT | Performed by: INTERNAL MEDICINE

## 2020-11-24 PROCEDURE — 2060000000 HC ICU INTERMEDIATE R&B

## 2020-11-24 RX ORDER — METOLAZONE 2.5 MG/1
5 TABLET ORAL ONCE
Status: COMPLETED | OUTPATIENT
Start: 2020-11-24 | End: 2020-11-24

## 2020-11-24 RX ADMIN — Medication 10 ML: at 15:45

## 2020-11-24 RX ADMIN — Medication 10 ML: at 21:05

## 2020-11-24 RX ADMIN — SUCRALFATE 1 G: 1 TABLET ORAL at 21:03

## 2020-11-24 RX ADMIN — SACUBITRIL AND VALSARTAN 0.5 TABLET: 24; 26 TABLET, FILM COATED ORAL at 21:05

## 2020-11-24 RX ADMIN — INSULIN GLARGINE 24 UNITS: 100 INJECTION, SOLUTION SUBCUTANEOUS at 21:06

## 2020-11-24 RX ADMIN — FUROSEMIDE 40 MG: 10 INJECTION, SOLUTION INTRAMUSCULAR; INTRAVENOUS at 17:06

## 2020-11-24 RX ADMIN — APIXABAN 10 MG: 5 TABLET, FILM COATED ORAL at 21:04

## 2020-11-24 RX ADMIN — Medication 10 ML: at 17:08

## 2020-11-24 RX ADMIN — QUETIAPINE FUMARATE 50 MG: 25 TABLET ORAL at 21:03

## 2020-11-24 RX ADMIN — METOCLOPRAMIDE 10 MG: 10 TABLET ORAL at 17:07

## 2020-11-24 RX ADMIN — ATORVASTATIN CALCIUM 40 MG: 40 TABLET, FILM COATED ORAL at 21:03

## 2020-11-24 RX ADMIN — RANOLAZINE 500 MG: 500 TABLET, FILM COATED, EXTENDED RELEASE ORAL at 21:04

## 2020-11-24 RX ADMIN — METOLAZONE 5 MG: 2.5 TABLET ORAL at 17:13

## 2020-11-24 NOTE — PROGRESS NOTES
Patient's EF (Ejection Fraction) is less than 40%    Patient's weights and intake/output reviewed:    Patient's Last Weight: 206 lbs 5 oz obtained by standing scale. Difference of 1 lbs more than last documented weight. Intake/Output Summary (Last 24 hours) at 11/24/2020 0307  Last data filed at 11/24/2020 0203  Gross per 24 hour   Intake 472 ml   Output 1300 ml   Net -828 ml         Pt is currently on room air. Pt denies shortness of breath. Pt with nonpitting lower extremity edema. Patient and/or Family's stated Goal of Care this Admission: reduce shortness of breath, increase activity tolerance, better understand heart failure and disease management, be more comfortable, reduce lower extremity edema and unable to assess, will attempt again prior to discharge      Comorbidities Reviewed Yes  Patient has a past medical history of Arthritis, CAD (coronary artery disease), Cerebral artery occlusion with cerebral infarction ([address] CHF (congestive heart failure) ([address] Diabetes mellitus ([address] Hyperlipidemia, Hypertension, Mental retardation, MI (myocardial infarction) ([address] and Pacemaker.          >>For CHF and Comorbidity documentation on Education Time and Topics, please see Education Tab

## 2020-11-24 NOTE — PROGRESS NOTES
Attempted to draw labs from PICC in right arm, no blood return. Line flushes fine. Pt scheduled for lab collect. Lab notified.

## 2020-11-24 NOTE — PROGRESS NOTES
Continuous Infusions:   dextrose         Data:  CBC:   Recent Labs     11/23/20  0153   WBC 6.9   RBC 4.66   HGB 13.4   HCT 41.0   MCV 87.9   RDW 17.3*        BMP:   Recent Labs     11/23/20  0153 11/23/20  0943 11/23/20  1403   * 137 132*   K 4.5 4.4 3.7   CL 95* 99 98*   CO2 20* 25 22   BUN 13 11 11   CREATININE 0.7 0.7 0.7     BNP: No results for input(s): BNP in the last 72 hours. PT/INR:   Recent Labs     11/23/20 0153   PROTIME 15.0*   INR 1.29*     APTT: No results for input(s): APTT in the last 72 hours. CARDIAC ENZYMES:   Recent Labs     11/23/20  0153 11/23/20  0931 11/23/20  1403   TROPONINI <0.01 <0.01 <0.01     FASTING LIPID PANEL:  Lab Results   Component Value Date    CHOL 166 10/12/2019    HDL 51 10/12/2019    TRIG 142 10/12/2019     LIVER PROFILE:   Recent Labs     11/23/20 0153   AST 26   ALT 18   BILITOT 0.9   ALKPHOS 125          IR MIDLINE CATH   Final Result   Ultrasound for placement of PICC line. XR CHEST (2 VW)   Final Result   1. Cardiomegaly with mild congestive heart failure. Pertinent previous results reviewed   Echo  Summary 11/20/20   Limited exam for pericardial effusion.   EF visually estimated at < 20%.   Small to Moderate circumferential pericardial effusion without tamponade   physiology. The effusion does not appear to be significantly changed since   the previous exam on 11/17/2020.  Juris Clemencia is a moderate to large left pleural effusion.   Mild to moderate tricuspid regurgitation.   Systolic pulmonary artery pressure (SPAP) is elevated and estimated at 41   mmHg (right atrial pressure 15 mmHg) consistent with mild pulmonary   hypertension. Assessment:  Active Problems:    Hyperlipidemia    Dyspnea and respiratory abnormalities    Heart failure (HCC)  Resolved Problems:    * No resolved hospital problems.  *      Plan:    Acute on chronic combined CHF    severe non ischemic CMP  Non obstructive  CAD  H/O NSVT  Pericardial effusion  - Most recent echo with EF <20% and grade III DD  - s/p AICD   - BNP elevated, troponin negative   - CXR with cardiomegaly and CHF changes   - Strict I&Os, daily weights, low sodium diet   - Cardiology consulted  - Continue IV Lasix 40 mg BID. 1 dose metalozone added  - continue Entresto, BB, aldactone  - recent  Echo showed a moderate pericardial effusion-->rpt  Echo    Uncontrolled DM2  - Continue Humalog and Lantus   -  on arrival with AG 17   - BHB elevated      Recently dx PE  - was just discharged from [facility] [place] on 11/22 with acute LLL PE without evidence of right heart strain-> discharged home on Eliquis--> continue       Hx of NSVT   - continue amio and dig   - tele monitor   - Reported 22 beats of VTach by cardiac monitor   - Cardiology following   - replace electrolytes     Hypomagnesemia   - mild, 1.5  - Sliding scale replacement      CAD  - troponin negative   - EKG with sinus tachycardia and PACs with low voltage   - No acute ACS symptoms, stable      HLD  - Continue statin      HTN  - BP is controlled  - Continue home medications      Obesity  - Body mass index is 39.3 kg/m². - Complicating assessment and treatment.  Placing patient at risk for multiple co-morbidities as well as early death and contributing to the patient's presentation.   - Counseled on weight loss.     DVT Prophylaxis: Eliquis   Diet: DIET LOW SODIUM 2 GM;   Code Status: Full Code         Josh Martino MD 11/24/2020 3:08 PM

## 2020-11-24 NOTE — CONSULTS
Palliative Care Initial Note  Palliative Care Admit date:11/24/20    Advance Directives: full code    Plan of care/goals: home with palliative care    Social/Spiritual:    Plan: met with the pt. Pt active with [facility] [place] and agreeable to add Palliative Care as she has had multiple admissions in the past several months and needs extra support in the home. Writer left  for pt's [facility] nurse,Flora. Referral called to [facility] for their palliative care program and [facility] nurse to meet with the pt at [phone] today. [facility] spoke with Aron Vanegas, nurse from [place] and pt has signed paperwork for palliative care program and SW will need to be added at d/c with [facility] [place] as pt is interested in getting into assisted living. Follow.      Reason for consult:    ___ Advance Care Planning  _x__ Transition of Care Planning  ___ Psychosocial/Spiritual Support  ___ Symptom Management

## 2020-11-24 NOTE — FLOWSHEET NOTE
11/24/20 0938   Vital Signs   Temp 97 °F (36.1 °C)   Temp Source Oral   Pulse 76   Heart Rate Source Monitor   Resp 18   BP 89/68   BP Location Left lower arm   Patient Position Lying left side   Level of Consciousness 0   MEWS Score 2   Oxygen Therapy   SpO2 94 %   O2 Device None (Room air)     AM assessment completed. Patient refused all AM meds. LCTA as well as diminished to RLL. Denies any SOB or cough. Trace edema noted to BLE. Denies any urinary s/s. Patient in bed sleeping. Call light and bedside table within reach. Will continue to monitor.

## 2020-11-24 NOTE — PROGRESS NOTES
Contacted Rodrigo @ [phone]  Maria Del Rosario Knox is rep for our area about interrogating pacemaker 11-24-20 @ [phone].  Pelon Ruiz

## 2020-11-24 NOTE — CONSULTS
echocardiogram for pericardial effusion prior to discharge.         Chai Burnette MD    D: 11/24/2020 9:48:09       T: 11/24/2020 15:05:06     [email or web address]  Job#: [id number]     Doc#: [id number]    CC:

## 2020-11-24 NOTE — PROGRESS NOTES
Pt resting in bed with eyes closed. Shift assessment complete and all meds given per MAR. Beverage and snack offered. Bed in lowest position and call light within reach. All belongings within reach. Pt denies any further needs at this time. Will continue to monitor and assess.

## 2020-11-25 LAB
ANION GAP SERPL CALCULATED.3IONS-SCNC: 8 MMOL/L (ref 3–16)
BUN BLDV-MCNC: 14 MG/DL (ref 7–20)
CALCIUM SERPL-MCNC: 8.6 MG/DL (ref 8.3–10.6)
CHLORIDE BLD-SCNC: 96 MMOL/L (ref 99–110)
CO2: 28 MMOL/L (ref 21–32)
CREAT SERPL-MCNC: 0.8 MG/DL (ref 0.6–1.1)
GFR AFRICAN AMERICAN: >60
GFR NON-AFRICAN AMERICAN: >60
GLUCOSE BLD-MCNC: 209 MG/DL (ref 70–99)
GLUCOSE BLD-MCNC: 246 MG/DL (ref 70–99)
GLUCOSE BLD-MCNC: 262 MG/DL (ref 70–99)
GLUCOSE BLD-MCNC: 272 MG/DL (ref 70–99)
GLUCOSE BLD-MCNC: 326 MG/DL (ref 70–99)
GLUCOSE BLD-MCNC: 330 MG/DL (ref 70–99)
MAGNESIUM: 1.4 MG/DL (ref 1.8–2.4)
PERFORMED ON: ABNORMAL
POTASSIUM SERPL-SCNC: 3.4 MMOL/L (ref 3.5–5.1)
SODIUM BLD-SCNC: 132 MMOL/L (ref 136–145)

## 2020-11-25 PROCEDURE — 80048 BASIC METABOLIC PNL TOTAL CA: CPT

## 2020-11-25 PROCEDURE — 2060000000 HC ICU INTERMEDIATE R&B

## 2020-11-25 PROCEDURE — 93308 TTE F-UP OR LMTD: CPT

## 2020-11-25 PROCEDURE — 36415 COLL VENOUS BLD VENIPUNCTURE: CPT

## 2020-11-25 PROCEDURE — 2580000003 HC RX 258: Performed by: EMERGENCY MEDICINE

## 2020-11-25 PROCEDURE — 83735 ASSAY OF MAGNESIUM: CPT

## 2020-11-25 PROCEDURE — 99233 SBSQ HOSP IP/OBS HIGH 50: CPT | Performed by: INTERNAL MEDICINE

## 2020-11-25 PROCEDURE — 2580000003 HC RX 258: Performed by: HOSPITALIST

## 2020-11-25 PROCEDURE — 99223 1ST HOSP IP/OBS HIGH 75: CPT | Performed by: PSYCHIATRY & NEUROLOGY

## 2020-11-25 PROCEDURE — 6360000002 HC RX W HCPCS: Performed by: PHYSICIAN ASSISTANT

## 2020-11-25 PROCEDURE — 6370000000 HC RX 637 (ALT 250 FOR IP): Performed by: HOSPITALIST

## 2020-11-25 PROCEDURE — 6370000000 HC RX 637 (ALT 250 FOR IP): Performed by: PHYSICIAN ASSISTANT

## 2020-11-25 RX ADMIN — AMIODARONE HYDROCHLORIDE 200 MG: 200 TABLET ORAL at 09:41

## 2020-11-25 RX ADMIN — PROMETHAZINE HYDROCHLORIDE 12.5 MG: 25 TABLET ORAL at 21:12

## 2020-11-25 RX ADMIN — RANOLAZINE 500 MG: 500 TABLET, FILM COATED, EXTENDED RELEASE ORAL at 09:41

## 2020-11-25 RX ADMIN — DULOXETINE HYDROCHLORIDE 60 MG: 60 CAPSULE, DELAYED RELEASE ORAL at 09:41

## 2020-11-25 RX ADMIN — Medication 10 ML: at 16:51

## 2020-11-25 RX ADMIN — SUCRALFATE 1 G: 1 TABLET ORAL at 14:15

## 2020-11-25 RX ADMIN — FUROSEMIDE 40 MG: 10 INJECTION, SOLUTION INTRAMUSCULAR; INTRAVENOUS at 09:42

## 2020-11-25 RX ADMIN — MAGNESIUM SULFATE HEPTAHYDRATE 1 G: 1 INJECTION, SOLUTION INTRAVENOUS at 14:15

## 2020-11-25 RX ADMIN — SACUBITRIL AND VALSARTAN 0.5 TABLET: 24; 26 TABLET, FILM COATED ORAL at 09:41

## 2020-11-25 RX ADMIN — INSULIN LISPRO 3 UNITS: 100 INJECTION, SOLUTION INTRAVENOUS; SUBCUTANEOUS at 12:12

## 2020-11-25 RX ADMIN — INSULIN LISPRO 4 UNITS: 100 INJECTION, SOLUTION INTRAVENOUS; SUBCUTANEOUS at 16:51

## 2020-11-25 RX ADMIN — QUETIAPINE FUMARATE 50 MG: 25 TABLET ORAL at 21:12

## 2020-11-25 RX ADMIN — METOCLOPRAMIDE 10 MG: 10 TABLET ORAL at 16:51

## 2020-11-25 RX ADMIN — METOCLOPRAMIDE 10 MG: 10 TABLET ORAL at 12:12

## 2020-11-25 RX ADMIN — INSULIN LISPRO 2 UNITS: 100 INJECTION, SOLUTION INTRAVENOUS; SUBCUTANEOUS at 09:45

## 2020-11-25 RX ADMIN — ASPIRIN 81 MG: 81 TABLET, COATED ORAL at 09:42

## 2020-11-25 RX ADMIN — SPIRONOLACTONE 25 MG: 25 TABLET ORAL at 09:42

## 2020-11-25 RX ADMIN — FENOFIBRATE 160 MG: 160 TABLET ORAL at 09:41

## 2020-11-25 RX ADMIN — Medication 10 ML: at 21:13

## 2020-11-25 RX ADMIN — RANOLAZINE 500 MG: 500 TABLET, FILM COATED, EXTENDED RELEASE ORAL at 21:11

## 2020-11-25 RX ADMIN — Medication 10 ML: at 09:42

## 2020-11-25 RX ADMIN — MAGNESIUM SULFATE HEPTAHYDRATE 1 G: 1 INJECTION, SOLUTION INTRAVENOUS at 10:44

## 2020-11-25 RX ADMIN — Medication 10 ML: at 21:12

## 2020-11-25 RX ADMIN — MAGNESIUM SULFATE HEPTAHYDRATE 1 G: 1 INJECTION, SOLUTION INTRAVENOUS at 11:45

## 2020-11-25 RX ADMIN — METOPROLOL SUCCINATE 12.5 MG: 25 TABLET, EXTENDED RELEASE ORAL at 09:42

## 2020-11-25 RX ADMIN — METOCLOPRAMIDE 10 MG: 10 TABLET ORAL at 09:41

## 2020-11-25 RX ADMIN — SUCRALFATE 1 G: 1 TABLET ORAL at 09:42

## 2020-11-25 RX ADMIN — APIXABAN 10 MG: 5 TABLET, FILM COATED ORAL at 21:11

## 2020-11-25 RX ADMIN — APIXABAN 10 MG: 5 TABLET, FILM COATED ORAL at 09:42

## 2020-11-25 RX ADMIN — SUCRALFATE 1 G: 1 TABLET ORAL at 21:11

## 2020-11-25 RX ADMIN — DIGOXIN 125 MCG: 125 TABLET ORAL at 09:42

## 2020-11-25 RX ADMIN — INSULIN GLARGINE 24 UNITS: 100 INJECTION, SOLUTION SUBCUTANEOUS at 21:17

## 2020-11-25 RX ADMIN — ATORVASTATIN CALCIUM 40 MG: 40 TABLET, FILM COATED ORAL at 21:11

## 2020-11-25 RX ADMIN — MAGNESIUM SULFATE HEPTAHYDRATE 1 G: 1 INJECTION, SOLUTION INTRAVENOUS at 12:29

## 2020-11-25 RX ADMIN — PANTOPRAZOLE SODIUM 40 MG: 40 TABLET, DELAYED RELEASE ORAL at 09:41

## 2020-11-25 NOTE — PROGRESS NOTES
CARDIOLOGY PROGRESS NOTE      Patient Name: Urbano Timmons  Date of admission: 11/22/2020 10:07 PM  Admission Dx: Heart failure ([facility] [address] [I50.9]  Reason for Consult: Just of heart failure  Requesting Physician: Arabella Noble MD  Primary Care physician: FRITZ Nash NP    Subjective:     Urbano Timmons is a 62 y.o. patient with a prior history notable for longstanding severe LV dysfunction due to nonischemic cardiomyopathy, with recurrent admissions for congestive heart failure (3 admissions this past month alone), nonobstructive coronary artery disease by cath 10/2020, small to moderate pericardial effusion stable by serial echo, pulmonary embolism, nonsustained ventricular tachycardia, status post ICD, hyperlipidemia, diabetes, cerebrovascular accident, who presented to the hospital 11/22/2020 with complaints of progressive and severe shortness of breath as well as chest discomfort. She notes symptoms are worse when lying down, worse with activity, improved with rest or sitting up. She was noted to be volume up by exam.  Diuresis was initiated with IV Lasix as well as metolazone. Follow-up echo was recommended prior to discharge for pericardial effusion. EKG on admission notable for sinus tachycardia with anterolateral infarct, old, low voltage limb leads. Admission laboratories notable for low sodium, normal creatinine, low magnesium. Serial troponins negative. proBNP 3082, slightly decreased from the last 11/10/2020. Charted I/O4 72/3500 for net negative over 3 L since admission. Weight however charted at 207, increased by 2 pounds since admission. BP this AM by myself is 103/72 with HR 89 bpm.    Today, she continues to feel short of breath at rest.  She continues to have chest discomfort and no matter what position she is resting in. Not presently requiring oxygen therapy. Blood pressure is low normal.  She denies dizziness or near syncope.     Home Medications:  Were reviewed and are listed in nursing record and/or below  Prior to Admission medications    Medication Sig Start Date End Date Taking?  Authorizing Provider   metFORMIN (GLUCOPHAGE) 1000 MG tablet Take 1,000 mg by mouth 2 times daily (with meals)   Yes Historical Provider, MD   furosemide (LASIX) 40 MG tablet Take 40 mg by mouth daily   Yes Historical Provider, MD   QUEtiapine (SEROQUEL XR) 50 MG extended release tablet Take 50 mg by mouth nightly   Yes Historical Provider, MD   ondansetron (ZOFRAN) 4 MG tablet Take 4 mg by mouth every 8 hours as needed for Nausea or Vomiting   Yes Historical Provider, MD   apixaban starter pack (ELIQUIS DVT/PE STARTER PACK) 5 MG TBPK tablet Take 1 tablet by mouth See Admin Instructions 11/20/20 12/20/20 Yes Lindwood Cabot, MD   insulin aspart (NOVOLOG FLEXPEN) 100 UNIT/ML injection pen Inject 10 Units into the skin 3 times daily (before meals) 11/13/20  Yes Divine Villatoro MD   insulin glargine (LANTUS SOLOSTAR) 100 UNIT/ML injection pen Inject 24 Units into the skin nightly 11/13/20  Yes Divine Villatoro MD   amiodarone (CORDARONE) 200 MG tablet Take 1 tablet by mouth daily 11/10/20  Yes Rocio Hilton MD   metoclopramide (REGLAN) 10 MG tablet Take 1 tablet by mouth 3 times daily (with meals) 11/6/20  Yes Latesha Leggett MD   sucralfate (CARAFATE) 1 GM tablet Take 1 tablet by mouth 3 times daily 11/6/20  Yes Latesha Leggett MD   pantoprazole (PROTONIX) 40 MG tablet Take 1 tablet by mouth daily 11/6/20  Yes Latesha Leggett MD   vitamin D (ERGOCALCIFEROL) 1.25 MG (62312 UT) CAPS capsule Take 50,000 Units by mouth once a week Takes weekly on Sundays   Yes Historical Provider, MD   metoprolol succinate (TOPROL XL) 25 MG extended release tablet Take 0.5 tablets by mouth daily 10/14/20  Yes FRITZ Dutta CNP   sacubitril-valsartan (ENTRESTO) 24-26 MG per tablet Take 0.5 tablets by mouth 2 times daily 10/13/20  Yes FRITZ Dutta CNP 500 mg, BID  sacubitril-valsartan (ENTRESTO) 24-26 MG per tablet 0.5 tablet, BID  spironolactone (ALDACTONE) tablet 25 mg, Daily  sucralfate (CARAFATE) tablet 1 g, TID  [START ON 11/29/2020] vitamin D (ERGOCALCIFEROL) capsule 50,000 Units, Weekly  glucose (GLUTOSE) 40 % oral gel 15 g, PRN  dextrose 50 % IV solution, PRN  glucagon (rDNA) injection 1 mg, PRN  dextrose 5 % solution, PRN  insulin lispro (HUMALOG) injection vial 0-6 Units, TID WC  insulin lispro (HUMALOG) injection vial 0-3 Units, Nightly  sodium chloride flush 0.9 % injection 10 mL, 2 times per day  sodium chloride flush 0.9 % injection 10 mL, PRN  acetaminophen (TYLENOL) tablet 650 mg, Q6H PRN    Or  acetaminophen (TYLENOL) suppository 650 mg, Q6H PRN  polyethylene glycol (GLYCOLAX) packet 17 g, Daily PRN  promethazine (PHENERGAN) tablet 12.5 mg, Q6H PRN    Or  ondansetron (ZOFRAN) injection 4 mg, Q6H PRN  lidocaine 1 % injection 5 mL, Once  sodium chloride flush 0.9 % injection 10 mL, 2 times per day  sodium chloride flush 0.9 % injection 10 mL, PRN  furosemide (LASIX) injection 40 mg, BID  oxyCODONE (ROXICODONE) immediate release tablet 2.5 mg, Q6H PRN  QUEtiapine (SEROQUEL) tablet 50 mg, Nightly  magnesium sulfate 1 g in dextrose 5% 100 mL IVPB, PRN  apixaban (ELIQUIS) tablet 10 mg, BID    Followed by  Garrett Valderrama ON 11/27/2020] apixaban (ELIQUIS) tablet 5 mg, BID        Allergies:  Patient has no known allergies. Review of Systems:   A 14 point review of symptoms completed. Pertinent positives identified in the HPI, all other review of symptoms negative.        Objective:     Vitals:    11/24/20 0938 11/24/20 1530 11/24/20 1959 11/25/20 0219   BP: 89/68 112/86 97/63 88/63   Pulse: 76 83 96 84   Resp: 18 18 16 16   Temp: 97 °F (36.1 °C) 97 °F (36.1 °C) 98.5 °F (36.9 °C) 96.7 °F (35.9 °C)   TempSrc: Oral Oral Oral Oral   SpO2: 94% 97% 93% 91%   Weight:    207 lb 1 oz (93.9 kg)   Height:          Weight: 207 lb 1 oz (93.9 kg)       PHYSICAL EXAM: 11/24/2020    BILITOT 0.9 11/23/2020    ALKPHOS 125 11/23/2020    AST 26 11/23/2020    ALT 18 11/23/2020     PT/INR:  No results found for: PTINR  HgBA1c:  Lab Results   Component Value Date    LABA1C 11.5 11/23/2020     Lab Results   Component Value Date    CKTOTAL 54 06/12/2020    TROPONINI <0.01 11/23/2020         Interval Testing/Data:     TTE 11/20/20  Summary   Limited exam for pericardial effusion. EF visually estimated at < 20%. Small to Moderate circumferential pericardial effusion without tamponade   physiology. The effusion does not appear to be significantly changed since   the previous exam on 11/17/2020. There is a moderate to large left pleural effusion. Mild to moderate tricuspid regurgitation. Systolic pulmonary artery pressure (SPAP) is elevated and estimated at 41   mmHg (right atrial pressure 15 mmHg) consistent with mild pulmonary   hypertension. TTE 11/17/20   Summary   STAT limited echo for pericardial effusion. Left ventricular systolic function is severely reduced with a visually   estimated ejection fraction of <20 %. The left ventricle is mildly dilated in size with normal wall thickness. There is severe global hypokinesis with regional variation. Grade III diastolic dysfunction with elevated LV pressure. The right ventricle is mildly dilated with mildly reduced systolic function. Biatrial enlargement. Moderate sized primarily posterior pericardial effusion. There is no echocardiographic evidence of tamponade physiology. Moderate tricuspid regurgitation. Systolic pulmonary artery pressure (SPAP) is elevated and estimated at 57   mmHg (right atrial pressure 15 mmHg) consistent with moderate pulmonary   hypertension.      Left heart catheterization and Right heart catheterization findings 10/7/20:     Findings:     1. Left main coronary artery was normal. It gave off the left anterior descending artery and left circumflex.     2. Left anterior descending artery has mild atherosclerotic disease. It was moderate in size. It gave off septal perforators and a moderate sized diagonal branch. The LAD covered the entire apex of the left ventricle.      3. Left circumflex has mild atherosclerotic disease. It was moderate in size. There was a moderate sized obtuse marginal branch.     4. Right coronary artery has mild atherosclerotic disease. It was moderate in size and was the dominant artery.     5. Left ventriculogram was not performed. Left ventricular end diastolic l pressure was 26. There is no gradient across pullback of the aortic valve. Right atrial pressure of 20  RV 45/7  PA 53/8  Pulmonary wedge pressure mean of 20  RA saturation 42%  PA saturation 45%  Aortic saturation 99%  Cardiac output 2.4  Cardiac index 1.24  SVR 2433      CONCLUSIONS:     1. Mild non-obstructive coronary artery disease with known severe left ventricular dysfunction  2.   Moderate pulmonary hypertension with decompensated hemodynamics     [id number] Score for Atrial Fibrillation Stroke Risk   Risk   Factors  Component Value   C CHF Yes 1   H HTN Yes 1   A2 Age >= 76 No,  (65 y.o.) 0   D DM Yes 1   S2 Prior Stroke/TIA Yes 2   V Vascular Disease No 0   A Age 74-69 No,  (65 y.o.) 0   Sc Sex female 1    [id number]  Score  6   Score last updated 11/25/20 4:81 AM EST    Click here for a link to the UpToDate guideline \"Atrial Fibrillation: Anticoagulation therapy to prevent embolization    Disclaimer: Risk Score calculation is dependent on accuracy of patient problem list and past encounter diagnosis.       Impression and Plan   Nori Escalante is a 62 y.o. patient with a prior history notable for longstanding severe LV dysfunction due to nonischemic cardiomyopathy, with recurrent admissions for congestive heart failure (3 admissions this past month alone), nonobstructive coronary artery disease by cath 10/2020, small to moderate pericardial effusion stable by serial echo, pulmonary embolism, nonsustained ventricular tachycardia, status post ICD, hyperlipidemia, diabetes, cerebrovascular accident, who presented to the hospital 11/22/2020 with complaints of progressive and severe shortness of breath as well as chest discomfort. She notes symptoms are worse when lying down, worse with activity, improved with rest or sitting up. She was noted to be volume up by exam.  Diuresis was initiated with IV Lasix as well as metolazone. Follow-up echo was recommended prior to discharge for pericardial effusion. Overall, course is marked by recurrent admissions for acute on chronic congestive heart failure, ventricular arrhythmia by interrogation, poor social support and limited insight into her condition. She has advanced stage disease and taken together, she does not appear to be a candidate for advanced therapies. 1.  Shortness of breath, multifactorial, primarily due to her  nonischemic cardiomyopathy and decompensated congestive heart failure. She has pericardial effusion as well as history of pulmonary embolus recently. 2.  Chest pain, multifactorial, chronic. Her EKG is not consistent with pericarditis though chest discomfort may be related to underlying pericardial effusion, elevated pressures in the setting of congestive heart failure, or recent pulmonary embolus. 3.  Acute-on-chronic systolic congestive heart failure. 4.  Non-ischemic cardiomyopathy with severe LV dysfunction, ejection fraction is less than 20%. 5.  Coronary artery disease, nonobstructive by cardiac catheterization  in 10/2020. 6.  Pericardial effusion noted on echocardiogram of 11/17/2020 to be  moderate. Subsequently described on 11/20/2020 echo to be small to  moderate, without significant change from several days prior. 7.  Non-sustained ventricular tachycardia. 8.  Status post ICD. 9.  Diabetes. 10.  Hyperlipidemia. 11.  History of CVA.     RECOMMENDATIONS:  1.     As she is nearing euvolemia and continues to have dyspnea at rest, anticipate repeat limited transthoracic echocardiogram to once again assess her pericardial effusion. Ordered for today. 2.    Will give 1 more dose IV this a.m. and possibly transition to p.o. Lasix this evening. She reports that her home dosing 40 mg oral daily Lasix did make her urinate effectively. 3.  Continue neurohormonal regimen and monitor blood pressures closely.   Given her advanced heart failure anticipate her blood pressures run chronically low normal.    4.  Continue Eliquis for history of pulmonary embolism and atrial fibrillation.       Patient Active Problem List   Diagnosis    DM (diabetes mellitus) ([address]    HTN (hypertension), benign    Dyslipidemia    CAD (coronary artery disease)    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)    Dual ICD (implantable cardioverter-defibrillator) in place    Brain tumor (benign) ([address]    Chronic combined systolic and diastolic congestive heart failure ([address]    TIA involving right internal carotid artery    CAD in native artery    DM (diabetes mellitus), secondary, uncontrolled, w/neurologic complic ([address]    CHF (congestive heart failure) ([address]    Nonischemic cardiomyopathy ([address]    Essential hypertension    TIA (transient ischemic attack)    Elevated blood sugar    Diabetic ketoacidosis without coma associated with type 2 diabetes mellitus (HCC)    Non-intractable vomiting with nausea    Chest pain    Arterial ischemic stroke, ICA, right, acute ([address]    Diabetic hyperosmolar non-ketotic state ([address]    Diabetic acidosis without coma ([address]    Hyponatremia    Metabolic acidosis    Disorder of electrolytes    Diabetic ketoacidosis with coma associated with type 2 diabetes mellitus (HCC)    Hypernatremia    Leukocytosis    Atrial tachycardia (HCC)    DKA, type 2, not at goal [facility])    Cognitive developmental delay    Depressive disorder    Urinary tract infection without hematuria    Hypokalemia    Persistent fever    Syncope and collapse    S/P ICD (internal cardiac defibrillator) procedure    History of CVA (cerebrovascular accident)    Noncompliance with medications    Obesity    SVT (supraventricular tachycardia) (HCC)    Type 2 diabetes mellitus with hyperglycemia, with long-term current use of insulin (HCC)    Acute pulmonary edema (HCC)    Hyperlipidemia    Lactic acidosis    NSVT (nonsustained ventricular tachycardia) (HCC)    Dyspnea and respiratory abnormalities    Intractable nausea and vomiting    Tachycardia    Uncontrolled type 2 diabetes mellitus with hyperglycemia (HCC)    Acute on chronic systolic CHF (congestive heart failure) (HCC)    Pulmonary emboli (HCC)    Heart failure (HCC)    Pericardial effusion           I will address the patient's cardiac risk factors and adjusted pharmacologic treatment as needed. In addition, I have reinforced the need for patient directed risk factor modification. All questions and concerns were addressed to the patient/family. Alternatives to my treatment were discussed. Thank you for allowing us to participate in the care of Ozzie Tapia. Please call me with any questions [phone].     Lauren Phillips MD   Cardiovascular Disease  [facility]  [phone] [facility]  [phone] [address] [place] [address]  11/25/2020 8:05 AM

## 2020-11-25 NOTE — PROGRESS NOTES
Pt resting in bed with eyes closed taking a snooze. Pt was politely woken up. Dt pepsi and turkey box lunch provided. Shift assessment complete and all meds given per MAR. Pt stated she does not want her labs taken tonight or in the morning. Called lab and notified them pt is refusing labs. Pts glucose was 283. 24 units of Lantus and 2 units of Humalog given. Bed in lowest position and call light within reach. Pt denies any further needs at this time. Will continue to monitor and assess.

## 2020-11-25 NOTE — PROGRESS NOTES
Progress Note    Admit Date:  11/22/2020    Pt with severe CMP. Non compliant . admitted with CHF . Behavior issues . cardio and psych consulted. Subjective:  Ms. Freedom Lozano is in no distress. Denies any dyspnea now.   + leg edema       Objective:   BP 93/63   Pulse 91   Temp 96.9 °F (36.1 °C) (Oral)   Resp 18   Ht 5' 1\" (1.549 m)   Wt 207 lb 1 oz (93.9 kg)   SpO2 97%   BMI 39.12 kg/m²       Intake/Output Summary (Last 24 hours) at 11/25/2020 1320  Last data filed at 11/25/2020 1314  Gross per 24 hour   Intake 240 ml   Output 3200 ml   Net -2960 ml         Physical Exam:  General: awake, alert, oriented  No distress  Skin:  Warm and dry  Neck:  JVD absent. Neck supple  Chest:  Clear to auscultation, respiration easy. No wheezes, rales or rhonchi. Cardiovascular:  RRR ,S1S2 normal  Abdomen:  Soft, non tender, non distended, BS +  Extremities:  2 + bibiana LE edema. Intact peripheral pulses.  Brisk cap refill, < 2 secs  Neuro: non focal      Medications:   Scheduled Meds:   amiodarone  200 mg Oral Daily    aspirin  81 mg Oral Daily    atorvastatin  40 mg Oral Nightly    digoxin  125 mcg Oral Every Other Day    DULoxetine  60 mg Oral Daily    fenofibrate  160 mg Oral Daily    insulin glargine  24 Units Subcutaneous Nightly    metoclopramide  10 mg Oral TID     metoprolol succinate  12.5 mg Oral Daily    pantoprazole  40 mg Oral Daily    ranolazine  500 mg Oral BID    sacubitril-valsartan  0.5 tablet Oral BID    spironolactone  25 mg Oral Daily    sucralfate  1 g Oral TID    [START ON 11/29/2020] vitamin D  50,000 Units Oral Weekly    insulin lispro  0-6 Units Subcutaneous TID     insulin lispro  0-3 Units Subcutaneous Nightly    sodium chloride flush  10 mL Intravenous 2 times per day    lidocaine 1 % injection  5 mL Intradermal Once    sodium chloride flush  10 mL Intravenous 2 times per day    furosemide  40 mg Intravenous BID    QUEtiapine  50 mg Oral Nightly    apixaban  10 mg Oral BID    Followed by   Nhan Cornejo ON 11/27/2020] apixaban  5 mg Oral BID       Continuous Infusions:   dextrose         Data:  CBC:   Recent Labs     11/23/20  0153   WBC 6.9   RBC 4.66   HGB 13.4   HCT 41.0   MCV 87.9   RDW 17.3*        BMP:   Recent Labs     11/23/20  1403 11/24/20  1802 11/25/20  0954   * 134* 132*   K 3.7 3.7 3.4*   CL 98* 97* 96*   CO2 22 25 28   BUN 11 13 14   CREATININE 0.7 0.7 0.8     BNP: No results for input(s): BNP in the last 72 hours. PT/INR:   Recent Labs     11/23/20  0153   PROTIME 15.0*   INR 1.29*     APTT: No results for input(s): APTT in the last 72 hours. CARDIAC ENZYMES:   Recent Labs     11/23/20  0153 11/23/20  0931 11/23/20  1403   TROPONINI <0.01 <0.01 <0.01     FASTING LIPID PANEL:  Lab Results   Component Value Date    CHOL 166 10/12/2019    HDL 51 10/12/2019    TRIG 142 10/12/2019     LIVER PROFILE:   Recent Labs     11/23/20  0153   AST 26   ALT 18   BILITOT 0.9   ALKPHOS 125        Radiology  IR MIDLINE CATH   Final Result   Ultrasound for placement of PICC line. XR CHEST (2 VW)   Final Result   1. Cardiomegaly with mild congestive heart failure. Pertinent previous results reviewed   Echo  Summary 11/20/20   Limited exam for pericardial effusion.   EF visually estimated at < 20%.   Small to Moderate circumferential pericardial effusion without tamponade   physiology. The effusion does not appear to be significantly changed since   the previous exam on 11/17/2020.  [place] is a moderate to large left pleural effusion.   Mild to moderate tricuspid regurgitation.   Systolic pulmonary artery pressure (SPAP) is elevated and estimated at 41   mmHg (right atrial pressure 15 mmHg) consistent with mild pulmonary   hypertension.      ECHO repeated today 11/25/2020   Conclusions        Summary    Left ventricular systolic function is reduced with ejection fraction    estimated at 10-15 %.    There is severe global hypokinesis.    Left ventricular size is moderately increased.    There is mild concentric left ventricular hypertrophy.   Freya Mimes is a left ventricular apical thrombus present.   Freya Mimes is a small circumferential pericardial effusion noted with tamponade    physiology only by doppler flow but not by other criteria.           Assessment:  Active Problems:    CHF (congestive heart failure) (HCC)    Depressive disorder    Hyperlipidemia    Dyspnea and respiratory abnormalities    Heart failure (HCC)    Pericardial effusion  Resolved Problems:    * No resolved hospital problems. *      Plan:    Acute on chronic combined CHF   severe non ischemic CMP  Non obstructive  CAD  H/O NSVT  Pericardial effusion   Apical thrombus  - Seen in consultation by cardiology  - BNP elevated, troponin negative   - pt has AICD  - CXR with cardiomegaly and CHF changes   - Strict I&Os, daily weights, low sodium diet   - Most recent echo with EF <20% and grade III DD -> ECHO repeated this admission- 11/25 , and reviewed by cardiology. EF 10 to 15%. Moderate pericardial effusion. No pericardial tamponade per cardiologist.    -Was given IV Lasix, continued Entresto, BB, aldactone-> based on today's echo report and patient's low blood pressure, cardiologist has held all diuretics and antihypertensives.    Monitor blood pressure closely.   -Continue full dose anticoagulation with Eliquis    Uncontrolled DM2  - Continue Humalog and Lantus   -  on arrival with AG 17   - BHB elevated      Recently dx PE  - was just discharged from [facility] [place] on 11/22 with acute LLL PE without evidence of right heart strain-> discharged home on Eliquis--> continue       Hx of NSVT   - continue amio and dig   - tele monitor   - Reported 22 beats of VTach by cardiac monitor   - Cardiology following   - replace electrolytes     Hypomagnesemia   - mild, 1.5  - Sliding scale replacement      CAD  - troponin negative   - EKG with sinus tachycardia and PACs with low voltage   - No acute ACS symptoms, stable      HLD  - Continue statin      HTN  - BP is  is low today .   -As mentioned above all antihypertensives on hold . controlled     Obesity  - Body mass index is 39.3 kg/m². - Complicating assessment and treatment. Placing patient at risk for multiple co-morbidities as well as early death and contributing to the patient's presentation.   - Counseled on weight loss. Depression  Behavioral issues  -Patient's home health agency requested psychiatric evaluation. Issues with noncompliance and multiple admissions.  -Seen in consultation by psychiatry. she is competent.   -Recommending assisted living facility.  -Continue Seroquel and Cymbalta     DVT Prophylaxis: Eliquis   Diet: DIET LOW SODIUM 2 GM;   Code Status: Full Code     dispo- will need placement at Assisted living facility     Magdalena Delaney MD 11/25/2020 1:20 PM

## 2020-11-25 NOTE — PROGRESS NOTES
Hold Entresto, lasix, and Spironolactone this evening and in am. Held on [facility] Monitor for low BP along with tachycardia per Dr. Antione Rayo.

## 2020-11-25 NOTE — PROGRESS NOTES
Unable to draw blood from midline, took down dressing, line flushed multiple times, and arm moved and had patient cough. Still unable to draw, called Radiology RN for suggestions.

## 2020-11-25 NOTE — PROGRESS NOTES
Consult called in to [facility] spoke with [facility], 11-25-20 @2797.  Harrison Gomez

## 2020-11-25 NOTE — PROGRESS NOTES
Physician Progress Note      PATIENT:               Buster Murillo  CSN #:                  [id number]  :                       1960  ADMIT DATE:       2020 10:07 PM  [address] [place] [place] DATE:  RESPONDING  PROVIDER #:        Bernden Penaloza MD          QUERY TEXT:    Pt admitted with acute on chronic combined CHF and has recent pulmonary   embolism documented. If possible, please document in progress notes and   discharge summary if you are evaluating and/or treating any of the following: The medical record reflects the following:  Risk Factors: recent PE  Clinical Indicators: H&P- Recently dx PE - was just discharged from [facility] on    with acute LLL PE without evidence of right heart strain  Treatment: continue Eliquis, monitor labs/images, monitor vitals, cardiology   consult    Thank You Alexa Mariee RN. [email or web address]  Options provided:  -- Acute pulmonary embolism  -- Recurrent acute pulmonary embolism  -- Chronic pulmonary embolism  -- Other - I will add my own diagnosis  -- Disagree - Not applicable / Not valid  -- Disagree - Clinically unable to determine / Unknown  -- Refer to Clinical Documentation Reviewer    PROVIDER RESPONSE TEXT:    This patient has acute pulmonary embolism.     Query created by: Mervin Walden on 2020 9:36 AM      Electronically signed by:  Brenden Penaloza MD 2020 5:55 AM

## 2020-11-25 NOTE — FLOWSHEET NOTE
11/25/20 0936   Vital Signs   Temp 96.9 °F (36.1 °C)   Temp Source Oral   Pulse 91   Heart Rate Source Monitor   Resp 18   BP 93/63   BP Location Left upper arm   BP Upper/Lower Upper   Patient Position Semi fowlers   Level of Consciousness 0   MEWS Score 2   Patient Currently in Pain Denies   Oxygen Therapy   SpO2 97 %   O2 Device None (Room air)   Assessment completed and documented VSS, SR with PPM/AICD, and remains on RA.

## 2020-11-25 NOTE — CONSULTS
for her chest pain. Feels like this is the same as she felt while admitted for her PE. Prior psychiatry consult 11/11/2020:     CHIEF COMPLAINT:  Chest pain, suicidality.     HISTORY OF PRESENT ILLNESS:  The patient is a 67-year-old female with a  history of diabetes, hypertension, coronary artery disease, and CHF. She presented to the ED after EMS was called because there were concerns  that she had stated that she wanted to kill herself. She stated that it  was a misunderstanding and she stated that a nurse apparently had called  to check on her and she had made a comment to the nurse that she was  \"tired of her heart beating this way. \"  The nurse interpreted this as  what she was having thoughts of suicide. She denies thoughts of this,  denies any prior attempts. She states she has chest pain and became  frustrated with the situation. She apparently is tearful and a neighbor  called the EMS since the patient had made some vague suicide statements. She denied SI once the EMS arrived.     She appears to have a history of similar behaviors. I saw her on  consult for this on 04/16/2020 when she was in DKA and had depression. She has not been compliant with her treatment, but was not suicidal and  do not require any psychiatric sources at that time.     PRIOR PSYCHIATRIC HISTORY:  No inpatient nor outpatient.     PAST MEDICAL HISTORY:  Coronary artery disease, diabetes,  hyperlipidemia, hypertension, CHF.     FAMILY PSYCHIATRIC HISTORY:  Unknown.     SOCIAL HISTORY:  Lives in her own place in [facility]. She has a nurse who  comes into her home to help with her medications. Sister lives in [facility] but ha conflicts with her.   On Disability     DRUGS AND ALCOHOL:  Denies any use.     ALLERGIES TO MEDICATIONS:  Unknown.     REVIEW OF SYSTEMS:  Pertinent positives in the HPI, otherwise negative.     PHYSICAL EXAMINATION:  Physical exam per Hever ABDULLAHI 11/22/2020  VITAL SIGNS:  Temperature 96.9  pulse 91, respirations 18, blood pressure  93/63  She is 207 pounds.     LEGAL ISSUES:  None.     LABORATORY DATA:  Laboratories reviewed. Drug screen negative.     TRAUMA HISTORY:  None reported.     The patient is a 67-year-old female who is  Cooperative and pleasant. She is oriented to person, place,  and time. Denies being suicidal or homicidal.  Denied any auditory or  visual hallucinations. Thoughts are coherent and logical.  Speech is  normal rate and tone. Fund of knowledge and language is fair. Attention and concentration are adequate, able to recall three objects  immediately. She said her mood was \"fine\". Affect was slightly  constricted. Did not show any abnormalities or movements.     Medications Prior to Admission:    Home Medications           Prior to Admission medications    Medication Sig Start Date End Date Taking?  Authorizing Provider   metFORMIN (GLUCOPHAGE) 1000 MG tablet Take 1,000 mg by mouth 2 times daily (with meals)     Yes Historical Provider, MD   apixaban starter pack (ELIQUIS DVT/PE STARTER PACK) 5 MG TBPK tablet Take 1 tablet by mouth See Admin Instructions 11/20/20 12/20/20 Yes Hilario Hamman, MD   insulin aspart (NOVOLOG FLEXPEN) 100 UNIT/ML injection pen Inject 10 Units into the skin 3 times daily (before meals) 11/13/20   Yes Terry Hays MD   insulin glargine (LANTUS SOLOSTAR) 100 UNIT/ML injection pen Inject 24 Units into the skin nightly 11/13/20   Yes Terry Hays MD   furosemide (LASIX) 40 MG tablet Take 1 tablet by mouth 2 times daily 11/13/20 12/13/20 Yes Terry Hays MD   magnesium oxide (MAG-OX) 400 (241.3 Mg) MG TABS tablet Take 1 tablet by mouth daily 11/13/20   Yes FRITZ Hanson - CNP   amiodarone (CORDARONE) 200 MG tablet Take 1 tablet by mouth daily 11/10/20   Yes Facundo Bledsoe MD   metoclopramide (REGLAN) 10 MG tablet Take 1 tablet by mouth 3 times daily (with meals) 11/6/20   Yes Randy Astudillo MD monitor strips Test three times a day & as needed for symptoms of irregular blood glucose. 5/12/19   Yes Mona Rivera MD           Allergies:  Patient has no known allergies.

## 2020-11-25 NOTE — PROGRESS NOTES
Echo results reviewed. Small moderate pericardial effusion that appears unchanged relative to last echo. There is some Doppler evidence of tamponade physiology. Given her blood pressure has been in the 75U systolic I performed pulses paradoxus which is less than 10. She is not presently tachycardic. I do not feel she is in pericardial tamponade clinically. Remove utilized too much diuresis too quickly. Diuretic holiday this evening is appropriate and we are holding antihypertensives for the a.m. until reevaluation takes place. Additionally, echo discloses new finding of apical LV thrombus. This is not seen on the prior echo although apical views were challenging. Duration unknown. Patient was recently started on Eliquis for management of DVT, this should be adequate to manage LV thrombus. We will continue to follow.     Ponce Dixon MD   Cardiovascular Disease  [facility]  [phone] [facility]  [phone] [address] [place] [address]

## 2020-11-25 NOTE — FLOWSHEET NOTE
11/25/20 1411   Vital Signs   Temp 97.2 °F (36.2 °C)   Temp Source Oral   Pulse 86   Heart Rate Source Monitor   Resp 20   BP Location Left upper arm   Patient Position Left side;Supine   Level of Consciousness 0   Patient Currently in Pain Denies   Oxygen Therapy   SpO2 95 %   O2 Device None (Room air)   Patient refused 2 pm labs, continue to have difficulty with midline and blood return.

## 2020-11-26 LAB
ANION GAP SERPL CALCULATED.3IONS-SCNC: 10 MMOL/L (ref 3–16)
BUN BLDV-MCNC: 16 MG/DL (ref 7–20)
CALCIUM SERPL-MCNC: 8.6 MG/DL (ref 8.3–10.6)
CHLORIDE BLD-SCNC: 99 MMOL/L (ref 99–110)
CO2: 25 MMOL/L (ref 21–32)
CREAT SERPL-MCNC: 0.8 MG/DL (ref 0.6–1.1)
GFR AFRICAN AMERICAN: >60
GFR NON-AFRICAN AMERICAN: >60
GLUCOSE BLD-MCNC: 111 MG/DL (ref 70–99)
GLUCOSE BLD-MCNC: 141 MG/DL (ref 70–99)
GLUCOSE BLD-MCNC: 196 MG/DL (ref 70–99)
GLUCOSE BLD-MCNC: 235 MG/DL (ref 70–99)
GLUCOSE BLD-MCNC: 263 MG/DL (ref 70–99)
GLUCOSE BLD-MCNC: 350 MG/DL (ref 70–99)
PERFORMED ON: ABNORMAL
POTASSIUM REFLEX MAGNESIUM: 3.7 MMOL/L (ref 3.5–5.1)
SODIUM BLD-SCNC: 134 MMOL/L (ref 136–145)

## 2020-11-26 PROCEDURE — 2580000003 HC RX 258: Performed by: EMERGENCY MEDICINE

## 2020-11-26 PROCEDURE — 2060000000 HC ICU INTERMEDIATE R&B

## 2020-11-26 PROCEDURE — 6370000000 HC RX 637 (ALT 250 FOR IP): Performed by: HOSPITALIST

## 2020-11-26 PROCEDURE — 6370000000 HC RX 637 (ALT 250 FOR IP): Performed by: PHYSICIAN ASSISTANT

## 2020-11-26 PROCEDURE — 36415 COLL VENOUS BLD VENIPUNCTURE: CPT

## 2020-11-26 PROCEDURE — 99233 SBSQ HOSP IP/OBS HIGH 50: CPT | Performed by: INTERNAL MEDICINE

## 2020-11-26 PROCEDURE — 80048 BASIC METABOLIC PNL TOTAL CA: CPT

## 2020-11-26 RX ADMIN — INSULIN GLARGINE 24 UNITS: 100 INJECTION, SOLUTION SUBCUTANEOUS at 23:04

## 2020-11-26 RX ADMIN — DULOXETINE HYDROCHLORIDE 60 MG: 60 CAPSULE, DELAYED RELEASE ORAL at 09:46

## 2020-11-26 RX ADMIN — RANOLAZINE 500 MG: 500 TABLET, FILM COATED, EXTENDED RELEASE ORAL at 09:46

## 2020-11-26 RX ADMIN — SUCRALFATE 1 G: 1 TABLET ORAL at 22:57

## 2020-11-26 RX ADMIN — ATORVASTATIN CALCIUM 40 MG: 40 TABLET, FILM COATED ORAL at 22:58

## 2020-11-26 RX ADMIN — APIXABAN 10 MG: 5 TABLET, FILM COATED ORAL at 22:57

## 2020-11-26 RX ADMIN — FENOFIBRATE 160 MG: 160 TABLET ORAL at 09:46

## 2020-11-26 RX ADMIN — QUETIAPINE FUMARATE 50 MG: 25 TABLET ORAL at 22:57

## 2020-11-26 RX ADMIN — PANTOPRAZOLE SODIUM 40 MG: 40 TABLET, DELAYED RELEASE ORAL at 09:46

## 2020-11-26 RX ADMIN — APIXABAN 10 MG: 5 TABLET, FILM COATED ORAL at 09:46

## 2020-11-26 RX ADMIN — RANOLAZINE 500 MG: 500 TABLET, FILM COATED, EXTENDED RELEASE ORAL at 22:57

## 2020-11-26 RX ADMIN — Medication 10 ML: at 22:58

## 2020-11-26 RX ADMIN — METOCLOPRAMIDE 10 MG: 10 TABLET ORAL at 09:53

## 2020-11-26 RX ADMIN — SUCRALFATE 1 G: 1 TABLET ORAL at 09:46

## 2020-11-26 RX ADMIN — ASPIRIN 81 MG: 81 TABLET, COATED ORAL at 09:46

## 2020-11-26 ASSESSMENT — PAIN SCALES - GENERAL: PAINLEVEL_OUTOF10: 0

## 2020-11-26 NOTE — PROGRESS NOTES
[facility]   Electrophysiology Progress Note     Admit Date: 11/22/2020     Reason for follow up: SOB     HPI and Interval History: 62 y.o. female presented with SOB and acute on chronic systolic heart failure. Recently discharged from hospital with heart failure symptoms. C/o of SOB, increasing LE swelling, orthopnea and chest pain. History of severe LV dysfunction and NICMP, s/p ICD ([employer], implanted at [place], [place]), pericardial effusion, DM, and prior CVA. Also history of non-compliance according to the chart. Patient seen and examined. Clinical notes reviewed. Telemetry reviewed. No major events overnight. Continues having SOB and CARRION. No chest pain. Assessment and plan:   - Non-ischemic cardiomyopathy   Severe biventricular failure    On Toprol XL    Entresto. Aldactone 25 mg/day   Digoxin. BP is borderline. Prognosis is guarded due to severe LV dysfunction, pericardial effusion and LV thrombus. Needs advanced heart failure follow up. Continue strict I/Os and weigh daily.      - Pericardial effusion    Small to moderate    No sign of tamponade physiology. - LV thrombus    New diagnosis. On Eliquis. - History of PAT and tachycardia, with history of inappropriate AICD shock. On Amiodarone therapy. TSH normal.    AST/ALT normal.      - Mild non-obstructive CAD    Stable. D/c   - History of pulmonary embolism    On Eliquis.    - S/p ICD    Follow up with device clinic.     - DM    - History of CVA        Active Hospital Problems    Diagnosis Date Noted    Pericardial effusion [I31.3]     Heart failure ([facility] [I50.9] 11/23/2020    Dyspnea and respiratory abnormalities [R06.00, R06.89]     Hyperlipidemia [E78.5]     Depressive disorder [F32.9] 04/16/2020    CHF (congestive heart failure) ([facility] [address] [I50.9] 06/13/2018       Diagnostic studies:     ECG: sinus tachycardia, low voltage     TTE 11/20/20  Summary   Limited exam for pericardial effusion.   EF visually estimated at < 20%.   Small to Moderate circumferential pericardial effusion without tamponade   physiology. The effusion does not appear to be significantly changed since   the previous exam on 11/17/2020.  Dell Farihatz is a moderate to large left pleural effusion.   Mild to moderate tricuspid regurgitation.   Systolic pulmonary artery pressure (SPAP) is elevated and estimated at 41   mmHg (right atrial pressure 15 mmHg) consistent with mild pulmonary   hypertension.     TTE 11/17/20   Summary   STAT limited echo for pericardial effusion.   Left ventricular systolic function is severely reduced with a visually   estimated ejection fraction of <20 %.   The left ventricle is mildly dilated in size with normal wall thickness.   There is severe global hypokinesis with regional variation.   Grade III diastolic dysfunction with elevated LV pressure.   The right ventricle is mildly dilated with mildly reduced systolic function.   Biatrial enlargement.   Moderate sized primarily posterior pericardial effusion.   There is no echocardiographic evidence of tamponade physiology.   Moderate tricuspid regurgitation.   Systolic pulmonary artery pressure (SPAP) is elevated and estimated at 57   mmHg (right atrial pressure 15 mmHg) consistent with moderate pulmonary   hypertension.     Left heart catheterization and Right heart catheterization findings 10/7/20:     Findings:     1. Left main coronary artery was normal. It gave off the left anterior descending artery and left circumflex.     2. Left anterior descending artery has mild atherosclerotic disease.  It was moderate in size. It gave off septal perforators and a moderate sized diagonal branch. The LAD covered the entire apex of the left ventricle.      3. Left circumflex has mild atherosclerotic disease.  It was moderate in size. There was a moderate sized obtuse marginal branch.     4. Right coronary artery has mild atherosclerotic disease.  It was moderate in size and was the dominant artery.     5. Left ventriculogram was not performed.  Left ventricular end diastolic l pressure was 26.  There is no gradient across pullback of the aortic valve.     Right atrial pressure of 20  RV 45/7  PA 53/8  Pulmonary wedge pressure mean of 20  RA saturation 42%  PA saturation 45%  Aortic saturation 99%  Cardiac output 2.4  Cardiac index 1.24  SVR 2433  BNI 323     CONCLUSIONS:     1. Mild non-obstructive coronary artery disease with known severe left ventricular dysfunction  2.  Moderate pulmonary hypertension with decompensated hemodynamics      I independently reviewed the cardiac diagnostic studies, ECG and relevant imaging studies. Physical Examination:  Vitals:    20 0300   BP: 101/62   Pulse: 83   Resp: 16   Temp: 97.8 °F (36.6 °C)   SpO2: 93%      In: 240 [P.O.:240]  Out: -    Wt Readings from Last 3 Encounters:   20 206 lb 14.4 oz (93.8 kg)   20 208 lb 9.6 oz (94.6 kg)   20 220 lb (99.8 kg)     Temp  Av.4 °F (36.3 °C)  Min: 96.9 °F (36.1 °C)  Max: 97.8 °F (36.6 °C)  Pulse  Av.8  Min: 83  Max: 91  BP  Min: 82/40  Max: 101/62  SpO2  Av.8 %  Min: 93 %  Max: 97 %    Intake/Output Summary (Last 24 hours) at 2020 0755  Last data filed at 2020 1848  Gross per 24 hour   Intake 1500 ml   Output 2000 ml   Net -500 ml       I independently reviewed all cardiac tracing from cardiac telemetry. · Constitutional: Oriented. No distress. · Head: Normocephalic and atraumatic. · Mouth/Throat: Oropharynx is clear and moist.   · Eyes: Conjunctivae normal. EOM are normal.   · Neck: Neck supple. + JVD present. · Cardiovascular: Normal rate, regular rhythm, N5&F4. + systolic murmur    · Pulmonary/Chest: Decreased bilateral respiratory sounds. No rhonchi. · Abdominal: Soft. No tenderness. · Musculoskeletal: No tenderness. + edema    · Lymphadenopathy: Has no cervical adenopathy. · Neurological: Alert and oriented. Historical Provider, MD   ondansetron (ZOFRAN) 4 MG tablet Take 4 mg by mouth every 8 hours as needed for Nausea or Vomiting   Yes Historical Provider, MD   apixaban starter pack (ELIQUIS DVT/PE STARTER PACK) 5 MG TBPK tablet Take 1 tablet by mouth See Admin Instructions 11/20/20 12/20/20 Yes Peng Kate MD   insulin aspart (NOVOLOG FLEXPEN) 100 UNIT/ML injection pen Inject 10 Units into the skin 3 times daily (before meals) 11/13/20  Yes Ary Murillo MD   insulin glargine (LANTUS SOLOSTAR) 100 UNIT/ML injection pen Inject 24 Units into the skin nightly 11/13/20  Yes Ary Murillo MD   amiodarone (CORDARONE) 200 MG tablet Take 1 tablet by mouth daily 11/10/20  Yes Joe Mcneill MD   metoclopramide (REGLAN) 10 MG tablet Take 1 tablet by mouth 3 times daily (with meals) 11/6/20  Yes Mango Leal MD   sucralfate (CARAFATE) 1 GM tablet Take 1 tablet by mouth 3 times daily 11/6/20  Yes Mango Leal MD   pantoprazole (PROTONIX) 40 MG tablet Take 1 tablet by mouth daily 11/6/20  Yes Mango Leal MD   vitamin D (ERGOCALCIFEROL) 1.25 MG (18401 UT) CAPS capsule Take 50,000 Units by mouth once a week Takes weekly on Sundays   Yes Historical Provider, MD   metoprolol succinate (TOPROL XL) 25 MG extended release tablet Take 0.5 tablets by mouth daily 10/14/20  Yes FRITZ Martin CNP   sacubitril-valsartan (ENTRESTO) 24-26 MG per tablet Take 0.5 tablets by mouth 2 times daily 10/13/20  Yes FRITZ Martin CNP   spironolactone (ALDACTONE) 25 MG tablet Take 1 tablet by mouth daily 10/13/20  Yes FRITZ Martin CNP   digoxin (LANOXIN) 125 MCG tablet Take 1 tablet by mouth every other day 9/25/20  Yes FRITZ Martin CNP   nitroGLYCERIN (NITROSTAT) 0.4 MG SL tablet up to max of 3 total doses.  If no relief after 1 dose, call 911. 8/24/20  Yes Peng Kate MD   aspirin 81 MG EC tablet Take 1 tablet by mouth daily 7/18/20  Yes Anirudh Saenz MD   ranolazine (RANEXA) 500 MG extended release tablet Take 1 tablet by mouth 2 times daily 7/18/20  Yes Anirudh Saenz MD   DULoxetine (CYMBALTA) 60 MG extended release capsule Take 1 capsule by mouth daily 7/18/20  Yes Anirudh Saenz MD   atorvastatin (LIPITOR) 40 MG tablet Take 1 tablet by mouth nightly 7/18/20  Yes Anirudh Saenz MD   fenofibrate micronized (LOFIBRA) 200 MG capsule Take 1 capsule by mouth nightly 7/18/20  Yes Anirudh Saenz MD   miconazole (MICOTIN) 2 % powder Apply topically 2 times daily. 7/18/20  Yes Anirudh Saenz MD   [employer] Lancets Ultra Thin MISC 1 each by Does not apply route 4 times daily 7/18/20  Yes Anirudh Saenz MD   blood glucose monitor strips Test three times a day & as needed for symptoms of irregular blood glucose. 5/12/19  Yes Viky Faith MD       Review of System:  [x] Full ROS obtained and negative except as mentioned in HPI    Relevant and available labs, and cardiovascular diagnostics reviewed. Reviewed. Recent Labs     11/24/20  1802 11/25/20  0954 11/26/20  0459   * 132* 134*   K 3.7 3.4* 3.7   CL 97* 96* 99   CO2 25 28 25   BUN 13 14 16   CREATININE 0.7 0.8 0.8     No results for input(s): WBC, HGB, HCT, MCV, PLT in the last 72 hours. Estimated Creatinine Clearance: 79 mL/min (based on SCr of 0.8 mg/dL). No results found for: BNP    I independently reviewed all cardiac tracing from cardiac telemetry. I independently reviewed relevant and available cardiac diagnostic tests ECG, CXR, Echo, Stress test, Device interrogation, Holter, CT scan. Outside medical records via Care everywhere reviewed and summarized in H&P above. Complex medical condition with multiple medical problems affecting prognosis   Severe exacerbation of underlying medical condition requiring hospitalization and at risk of decompensation.        Thank you for allowing me to participate in the care of Flora Mcclendon     All questions and concerns were addressed to the patient/family. Alternatives to my treatment were discussed. I have discussed the above stated plan and the patient verbalized understanding and agreed with the plan. NOTE: This report was transcribed using voice recognition software. Every effort was made to ensure accuracy, however, inadvertent computerized transcription errors may be present.      Donelda Meckel, MD, MPH  [place] [address]   Office: [phone]

## 2020-11-26 NOTE — PROGRESS NOTES
Handoff report given to Patricio, [address] [facility]. Care transferred. Pt is stable at this time.

## 2020-11-26 NOTE — PROGRESS NOTES
AM assessment completed. Scheduled medications given per MAR. VSS on room air. A/O x4. Denies any needs, call light in reach. Will monitor.

## 2020-11-26 NOTE — PROGRESS NOTES
Progress Note    Admit Date:  11/22/2020    Pt with severe CMP EF 10-15%. Non compliant. Admitted with CHF. Behavior issues . Cardio and psych consulted. Subjective:  Ms. Liz Gregory is in no distress. She lying flat and prone on her stomach in bed - denies dyspnea, reports overall feeling unwell. Resolving leg edema. Objective:   BP (!) 87/58   Pulse 84   Temp 97.9 °F (36.6 °C) (Oral)   Resp 16   Ht 5' 1\" (1.549 m)   Wt 206 lb 14.4 oz (93.8 kg)   SpO2 94%   BMI 39.09 kg/m²       Intake/Output Summary (Last 24 hours) at 11/26/2020 1320  Last data filed at 11/26/2020 1215  Gross per 24 hour   Intake 1260 ml   Output 2000 ml   Net -740 ml         Physical Exam:  General: awake, alert, oriented  No distress  Skin:  Warm and dry  Neck:  JVD absent. Neck supple  Chest:  Clear to auscultation, respiration easy. No wheezes, rales or rhonchi. Cardiovascular:  RRR ,S1S2 normal  Abdomen:  Soft, non tender, non distended, BS +  Extremities:  2 + bibiana LE edema. Intact peripheral pulses.  Brisk cap refill, < 2 secs  Neuro: non focal      Medications:   Scheduled Meds:   amiodarone  200 mg Oral Daily    aspirin  81 mg Oral Daily    atorvastatin  40 mg Oral Nightly    digoxin  125 mcg Oral Every Other Day    DULoxetine  60 mg Oral Daily    fenofibrate  160 mg Oral Daily    insulin glargine  24 Units Subcutaneous Nightly    metoclopramide  10 mg Oral TID WC    [Held by provider] metoprolol succinate  12.5 mg Oral Daily    pantoprazole  40 mg Oral Daily    ranolazine  500 mg Oral BID    [Held by provider] sacubitril-valsartan  0.5 tablet Oral BID    [Held by provider] spironolactone  25 mg Oral Daily    sucralfate  1 g Oral TID    [START ON 11/29/2020] vitamin D  50,000 Units Oral Weekly    insulin lispro  0-6 Units Subcutaneous TID     insulin lispro  0-3 Units Subcutaneous Nightly    sodium chloride flush  10 mL Intravenous 2 times per day    lidocaine 1 % injection  5 mL Intradermal Once mentioned above all antihypertensives on hold . controlled       Obesity  - Body mass index is 39.3 kg/m². - Complicating assessment and treatment. Placing patient at risk for multiple co-morbidities as well as early death and contributing to the patient's presentation.   - Counseled on weight loss. Depression  Behavioral issues  -Patient's home health agency requested psychiatric evaluation. Issues with noncompliance and multiple admissions.  -Seen in consultation by psychiatry. she is competent.   -Recommending assisted living facility.  -Continue Seroquel and Cymbalta         DVT Prophylaxis: Eliquis   Diet: DIET LOW SODIUM 2 GM;   Code Status: Full Code     dispo - will need placement at Assisted living facility     Georgia Glover MD 11/26/2020 1:20 PM

## 2020-11-26 NOTE — PROGRESS NOTES
Patient's EF (Ejection Fraction) is less than 40%    Patient's weights and intake/output reviewed:    Patient's Last Weight: 206 lbs 14.4 oz obtained by standing scale. Difference of 0 lbs (no change) than last documented weight. Intake/Output Summary (Last 24 hours) at 11/26/2020 0425  Last data filed at 11/25/2020 1848  Gross per 24 hour   Intake 1500 ml   Output 2000 ml   Net -500 ml         Pt is currently on room air. Pt denies shortness of breath. Pt with nonpitting lower extremity edema. Patient and/or Family's stated Goal of Care this Admission: reduce shortness of breath, increase activity tolerance, better understand heart failure and disease management, be more comfortable, reduce lower extremity edema and unable to assess, will attempt again prior to discharge      Comorbidities Reviewed Yes  Patient has a past medical history of Arthritis, CAD (coronary artery disease), Cerebral artery occlusion with cerebral infarction ([address] CHF (congestive heart failure) ([address] Diabetes mellitus ([address] Hyperlipidemia, Hypertension, Mental retardation, MI (myocardial infarction) ([address] and Pacemaker.          >>For CHF and Comorbidity documentation on Education Time and Topics, please see Education Tab

## 2020-11-26 NOTE — PLAN OF CARE
Problem: OXYGENATION/RESPIRATORY FUNCTION  Goal: Patient will maintain patent airway  Outcome: Ongoing  Goal: Patient will achieve/maintain normal respiratory rate/effort  Description: Respiratory rate and effort will be within normal limits for the patient  Outcome: Ongoing     Problem: HEMODYNAMIC STATUS  Goal: Patient has stable vital signs and fluid balance  Outcome: Ongoing     Problem: FLUID AND ELECTROLYTE IMBALANCE  Goal: Fluid and electrolyte balance are achieved/maintained  Outcome: Ongoing     Problem: ACTIVITY INTOLERANCE/IMPAIRED MOBILITY  Goal: Mobility/activity is maintained at optimum level for patient  Outcome: Ongoing     Problem: Pain:  Goal: Pain level will decrease  Description: Pain level will decrease  Outcome: Ongoing  Goal: Control of acute pain  Description: Control of acute pain  Outcome: Ongoing  Goal: Control of chronic pain  Description: Control of chronic pain  Outcome: Ongoing

## 2020-11-27 LAB
ANION GAP SERPL CALCULATED.3IONS-SCNC: 9 MMOL/L (ref 3–16)
BUN BLDV-MCNC: 18 MG/DL (ref 7–20)
CALCIUM SERPL-MCNC: 8.6 MG/DL (ref 8.3–10.6)
CHLORIDE BLD-SCNC: 96 MMOL/L (ref 99–110)
CO2: 23 MMOL/L (ref 21–32)
CREAT SERPL-MCNC: 0.7 MG/DL (ref 0.6–1.1)
GFR AFRICAN AMERICAN: >60
GFR NON-AFRICAN AMERICAN: >60
GLUCOSE BLD-MCNC: 138 MG/DL (ref 70–99)
GLUCOSE BLD-MCNC: 193 MG/DL (ref 70–99)
GLUCOSE BLD-MCNC: 201 MG/DL (ref 70–99)
GLUCOSE BLD-MCNC: 269 MG/DL (ref 70–99)
GLUCOSE BLD-MCNC: 328 MG/DL (ref 70–99)
PERFORMED ON: ABNORMAL
POTASSIUM REFLEX MAGNESIUM: 4 MMOL/L (ref 3.5–5.1)
SODIUM BLD-SCNC: 128 MMOL/L (ref 136–145)

## 2020-11-27 PROCEDURE — 2580000003 HC RX 258: Performed by: EMERGENCY MEDICINE

## 2020-11-27 PROCEDURE — 2580000003 HC RX 258: Performed by: HOSPITALIST

## 2020-11-27 PROCEDURE — 2060000000 HC ICU INTERMEDIATE R&B

## 2020-11-27 PROCEDURE — 6370000000 HC RX 637 (ALT 250 FOR IP): Performed by: PHYSICIAN ASSISTANT

## 2020-11-27 PROCEDURE — 99233 SBSQ HOSP IP/OBS HIGH 50: CPT | Performed by: INTERNAL MEDICINE

## 2020-11-27 PROCEDURE — 80048 BASIC METABOLIC PNL TOTAL CA: CPT

## 2020-11-27 PROCEDURE — 36415 COLL VENOUS BLD VENIPUNCTURE: CPT

## 2020-11-27 PROCEDURE — 6370000000 HC RX 637 (ALT 250 FOR IP): Performed by: INTERNAL MEDICINE

## 2020-11-27 PROCEDURE — 6370000000 HC RX 637 (ALT 250 FOR IP): Performed by: HOSPITALIST

## 2020-11-27 RX ORDER — TORSEMIDE 20 MG/1
20 TABLET ORAL DAILY
Status: DISCONTINUED | OUTPATIENT
Start: 2020-11-28 | End: 2020-11-28 | Stop reason: HOSPADM

## 2020-11-27 RX ORDER — QUETIAPINE FUMARATE 25 MG/1
25 TABLET, FILM COATED ORAL NIGHTLY
Status: DISCONTINUED | OUTPATIENT
Start: 2020-11-27 | End: 2020-11-28 | Stop reason: HOSPADM

## 2020-11-27 RX ORDER — CARVEDILOL 3.12 MG/1
3.12 TABLET ORAL 2 TIMES DAILY WITH MEALS
Status: DISCONTINUED | OUTPATIENT
Start: 2020-11-27 | End: 2020-11-28 | Stop reason: HOSPADM

## 2020-11-27 RX ADMIN — METOCLOPRAMIDE 10 MG: 10 TABLET ORAL at 16:21

## 2020-11-27 RX ADMIN — INSULIN LISPRO 4 UNITS: 100 INJECTION, SOLUTION INTRAVENOUS; SUBCUTANEOUS at 17:05

## 2020-11-27 RX ADMIN — CARVEDILOL 3.12 MG: 3.12 TABLET, FILM COATED ORAL at 10:32

## 2020-11-27 RX ADMIN — SUCRALFATE 1 G: 1 TABLET ORAL at 20:05

## 2020-11-27 RX ADMIN — DULOXETINE HYDROCHLORIDE 60 MG: 60 CAPSULE, DELAYED RELEASE ORAL at 10:31

## 2020-11-27 RX ADMIN — APIXABAN 5 MG: 5 TABLET, FILM COATED ORAL at 10:33

## 2020-11-27 RX ADMIN — ASPIRIN 81 MG: 81 TABLET, COATED ORAL at 10:32

## 2020-11-27 RX ADMIN — Medication 10 ML: at 20:16

## 2020-11-27 RX ADMIN — INSULIN GLARGINE 24 UNITS: 100 INJECTION, SOLUTION SUBCUTANEOUS at 20:07

## 2020-11-27 RX ADMIN — Medication 10 ML: at 10:47

## 2020-11-27 RX ADMIN — APIXABAN 5 MG: 5 TABLET, FILM COATED ORAL at 20:06

## 2020-11-27 RX ADMIN — Medication 10 ML: at 10:34

## 2020-11-27 RX ADMIN — RANOLAZINE 500 MG: 500 TABLET, FILM COATED, EXTENDED RELEASE ORAL at 10:31

## 2020-11-27 RX ADMIN — AMIODARONE HYDROCHLORIDE 200 MG: 200 TABLET ORAL at 10:33

## 2020-11-27 RX ADMIN — FENOFIBRATE 160 MG: 160 TABLET ORAL at 10:33

## 2020-11-27 RX ADMIN — CARVEDILOL 3.12 MG: 3.12 TABLET, FILM COATED ORAL at 18:19

## 2020-11-27 RX ADMIN — DIGOXIN 125 MCG: 125 TABLET ORAL at 10:32

## 2020-11-27 RX ADMIN — SUCRALFATE 1 G: 1 TABLET ORAL at 16:21

## 2020-11-27 RX ADMIN — SUCRALFATE 1 G: 1 TABLET ORAL at 10:32

## 2020-11-27 RX ADMIN — METOCLOPRAMIDE 10 MG: 10 TABLET ORAL at 12:22

## 2020-11-27 RX ADMIN — QUETIAPINE FUMARATE 25 MG: 25 TABLET ORAL at 20:05

## 2020-11-27 RX ADMIN — PANTOPRAZOLE SODIUM 40 MG: 40 TABLET, DELAYED RELEASE ORAL at 10:52

## 2020-11-27 RX ADMIN — RANOLAZINE 500 MG: 500 TABLET, FILM COATED, EXTENDED RELEASE ORAL at 20:05

## 2020-11-27 RX ADMIN — INSULIN LISPRO 1 UNITS: 100 INJECTION, SOLUTION INTRAVENOUS; SUBCUTANEOUS at 12:22

## 2020-11-27 RX ADMIN — ATORVASTATIN CALCIUM 40 MG: 40 TABLET, FILM COATED ORAL at 20:05

## 2020-11-27 ASSESSMENT — PAIN SCALES - GENERAL
PAINLEVEL_OUTOF10: 0
PAINLEVEL_OUTOF10: 0

## 2020-11-27 NOTE — CARE COORDINATION
INTERDISCIPLINARY PLAN OF CARE CONFERENCE    Date/Time: 11/25/2020 11:28 AM  Completed by: Colt Carias, Case Management      Patient Name:  Abdon Montes  YOB: 1960  Admitting Diagnosis: Heart failure ([address] [I50.9]     Admit Date/Time:  11/22/2020 10:07 PM    Chart reviewed. Interdisciplinary team contacted or reviewed plan related to patient progress and discharge plans. Disciplines included Case Management, Nursing, and Dietitian. Current Status:ongoing  PT/OT recommendation for discharge plan of care: n/a    Expected D/C Disposition:  Home  Confirmed plan with patient and/or family Yes confirmed with: (name) pt    Discharge Plan Comments: Reviewed chart. Role of discharge planner explained and patient verbalized understanding. Pt is from home alone and plans to return. Pt is active with [facility]. Per Melody CUEVAS RN, ALEXYS, noted on 11/24: \"Writer spoke with Franck Dale from [facility] [place] and she requests psych evaluation as pt has become increasingly difficult to manage at home with severe mood swings and non compliant with medication management. Pt needs daily assistance with medication management. Please contact pt's NP, Nate Santiago with further questions as she has been making home visits. Writer spoke with bedside nurse,Vickie, and she will speak with Arlyn Minaya r/t psych consult\". \"Derek, nurse from [place] and pt has signed paperwork for palliative care program and SW will need to be added at d/c with [facility] [place] as pt is interested in getting into assisted living\"      ALEXYS spoke with Dr. Den Willard this AM and she has ordered a psych consult for pt.         Home O2 in place on admit: No  Pt informed of need to bring portable home O2 tank on day of discharge for nursing to connect prior to leaving:  Not Indicated  Verbalized agreement/Understanding:  Not Indicated
INTERDISCIPLINARY PLAN OF CARE CONFERENCE    Date/Time: 11/27/2020 10:11 AM  Completed by: Erna Mensah, Case Management      Patient Name:  Dalton Miranda  YOB: 1960  Admitting Diagnosis: Heart failure ([facility] [address] [I50.9]     Admit Date/Time:  11/22/2020 10:07 PM    Chart reviewed. Interdisciplinary team contacted or reviewed plan related to patient progress and discharge plans. Disciplines included Case Management, Nursing, and Dietitian. Current Status:ongonig  PT/OT recommendation for discharge plan of care: n/a    Expected D/C Disposition:  Home  Confirmed plan with patient and/or family Yes confirmed with: (name) pt    Discharge Plan Comments: Reviewed chart. Role of discharge planner explained and patient verbalized understanding. Pt is from home alone and plans to return. Pt was holding \"Sharon the Bear\" when CM spoke with pt. Pt was cleared by psych when Dr. Darin Lentz saw her on 11/25. He stated that pt \"had capacity\" at that time. It was also reported by pt in his note that \"Report stated that she is irritable and noncompliant with treatment, which she denied. Khris Correa described that she sees a [facility] nurse for15 minutes every 2 weeks and her PCP visits her at her apt every month. \".    Pt plans to return home a this time with resumption of care with [facility] and CM will add a SW at time of d/c. They are still following her, although state that they may d/c services soon d/t non-compliance. Pt also has signed up with [facility] at home.        Home O2 in place on admit: No  Pt informed of need to bring portable home O2 tank on day of discharge for nursing to connect prior to leaving:  Not Indicated  Verbalized agreement/Understanding:  Not Indicated
Received call from [address] [place] [address] regarding palliative care. Per CM, pt is active with [facility]. Encouraged Caryl to contact [place] to set up palliative program and inform pt is active with [facility]. Liaison will no longer be following pt for [facility]. needs.     Electronically signed by Reyes Day RN on 11/24/2020 at 11:10 AM
Health,Wound Clinic, Cardio/Pul [address])    DISCHARGE PLAN: Explained Case Management role/services. Reviewed chart. Pt is in the ED. Pt cont to live at home alone and is active with [facility] for SN. CM spoke with pt nurse, Layne Hyatt, with [facility] ([phone]) who states that pt is often non-compliant and doesn't always take her medications as prescribed and often refuses to allow staff in to see her. Layne Hyatt states that pt has had \"high mood swings\". Pt is also active with NP, Yelena Urbina, with [place]. Will cont to follow for discharge needs.

## 2020-11-27 NOTE — PROGRESS NOTES
[facility]   Electrophysiology Progress Note     Admit Date: 11/22/2020     Reason for follow up: SOB     HPI and Interval History: 62 y.o. female presented with SOB and acute on chronic systolic heart failure. Recently discharged from hospital with heart failure symptoms. C/o of SOB, increasing LE swelling, orthopnea and chest pain. History of severe LV dysfunction and NICMP, s/p ICD ([employer], implanted at [place], [place]), pericardial effusion, DM, and prior CVA. Also history of non-compliance according to the chart. Patient seen and examined. Clinical notes reviewed. Telemetry reviewed. No major events overnight. She is in bed. States that she is feeling the same. No chest pain. Reports shortness of breath with activity. Assessment and plan:   - Non-ischemic cardiomyopathy   Severe biventricular failure    On Toprol XL    Entresto. Aldactone 25 mg/day   Digoxin. Has has compliance and psych issues in the past.    Prognosis is guarded due to severe LV dysfunction, pericardial effusion and LV thrombus. Needs advanced heart failure follow up. Continue strict I/Os and weigh daily.      - Pericardial effusion    Small to moderate    No sign of tamponade physiology. Continue OMT. - LV thrombus    New diagnosis. On Eliquis. History of CVA in the past.     - NSVT on monitor, asymptomatic. On amiodarone. S/p AICD. - History of PAT and tachycardia, with history of inappropriate AICD shock. On Amiodarone therapy. TSH normal.    AST/ALT normal.      - Mild non-obstructive CAD    Stable. D/c'd Ranexa     - History of pulmonary embolism    On Eliquis. - S/p ICD    Follow up with device clinic.     - DM: Poorly controlled. - History of CVA     - Non-compliance and psych issues.       Active Hospital Problems    Diagnosis Date Noted    Pericardial effusion [I31.3]     Heart failure ([address] [I50.9] 11/23/2020    Dyspnea and respiratory abnormalities [R06.00, R06.89]     Hyperlipidemia [E78.5]     Depressive disorder [F32.9] 04/16/2020    CHF (congestive heart failure) ([facility] [I50.9] 06/13/2018       Diagnostic studies:     ECG: sinus tachycardia, low voltage     TTE 11/20/20  Summary   Limited exam for pericardial effusion.   EF visually estimated at < 20%.   Small to Moderate circumferential pericardial effusion without tamponade   physiology. The effusion does not appear to be significantly changed since   the previous exam on 11/17/2020.  Juanetta Cornet is a moderate to large left pleural effusion.   Mild to moderate tricuspid regurgitation.   Systolic pulmonary artery pressure (SPAP) is elevated and estimated at 41   mmHg (right atrial pressure 15 mmHg) consistent with mild pulmonary   hypertension.     TTE 11/17/20   Summary   STAT limited echo for pericardial effusion.   Left ventricular systolic function is severely reduced with a visually   estimated ejection fraction of <20 %.   The left ventricle is mildly dilated in size with normal wall thickness.   There is severe global hypokinesis with regional variation.   Grade III diastolic dysfunction with elevated LV pressure.   The right ventricle is mildly dilated with mildly reduced systolic function.   Biatrial enlargement.   Moderate sized primarily posterior pericardial effusion.   There is no echocardiographic evidence of tamponade physiology.   Moderate tricuspid regurgitation.   Systolic pulmonary artery pressure (SPAP) is elevated and estimated at 57   mmHg (right atrial pressure 15 mmHg) consistent with moderate pulmonary   hypertension.     Left heart catheterization and Right heart catheterization findings 10/7/20:     Findings:     1. Left main coronary artery was normal. It gave off the left anterior descending artery and left circumflex.     2. Left anterior descending artery has mild atherosclerotic disease.  It was moderate in size.  It gave off septal perforators and a moderate sized diagonal branch. The LAD covered the entire apex of the left ventricle.      3. Left circumflex has mild atherosclerotic disease.  It was moderate in size. There was a moderate sized obtuse marginal branch.     4. Right coronary artery has mild atherosclerotic disease. It was moderate in size and was the dominant artery.     5. Left ventriculogram was not performed.  Left ventricular end diastolic l pressure was 26.  There is no gradient across pullback of the aortic valve.     Right atrial pressure of 20  RV 45/7  PA 53/8  Pulmonary wedge pressure mean of 20  RA saturation 42%  PA saturation 45%  Aortic saturation 99%  Cardiac output 2.4  Cardiac index 1.24  SVR 2433  [facility] 503     CONCLUSIONS:     1. Mild non-obstructive coronary artery disease with known severe left ventricular dysfunction  2.  Moderate pulmonary hypertension with decompensated hemodynamics      I independently reviewed the cardiac diagnostic studies, ECG and relevant imaging studies. Physical Examination:  Vitals:    20 0735   BP: 101/61   Pulse: 88   Resp: 16   Temp: 97.1 °F (36.2 °C)   SpO2: 97%      In: 430 [P.O.:420; I.V.:10]  Out: 1100    Wt Readings from Last 3 Encounters:   20 198 lb 7 oz (90 kg)   20 208 lb 9.6 oz (94.6 kg)   20 220 lb (99.8 kg)     Temp  Av.5 °F (36.4 °C)  Min: 97 °F (36.1 °C)  Max: 98.1 °F (36.7 °C)  Pulse  Av.5  Min: 84  Max: 94  BP  Min: 87/58  Max: 113/82  SpO2  Av %  Min: 94 %  Max: 97 %    Intake/Output Summary (Last 24 hours) at 2020 0820  Last data filed at 2020 0515  Gross per 24 hour   Intake 730 ml   Output 2100 ml   Net -1370 ml       I independently reviewed all cardiac tracing from cardiac telemetry. · Constitutional: Oriented. No distress. · Head: Normocephalic and atraumatic. · Mouth/Throat: Oropharynx is clear and moist.   · Eyes: Conjunctivae normal. EOM are normal.   · Neck: Neck supple. No JVD present.     · Cardiovascular: Normal rate, regular rhythm, F7&I0.  + faint systolic murmur   · Pulmonary/Chest: Bilateral respiratory sounds. No rhonchi. · Abdominal: Soft. No tenderness. · Musculoskeletal: No tenderness. No edema    · Lymphadenopathy: Has no cervical adenopathy. · Neurological: Alert and oriented. Follows command, No Gross deficit   · Skin: Skin is warm, No rash noted. · Psychiatric: Has a normal behavior       Scheduled Meds:   amiodarone  200 mg Oral Daily    aspirin  81 mg Oral Daily    atorvastatin  40 mg Oral Nightly    digoxin  125 mcg Oral Every Other Day    DULoxetine  60 mg Oral Daily    fenofibrate  160 mg Oral Daily    insulin glargine  24 Units Subcutaneous Nightly    metoclopramide  10 mg Oral TID WC    [Held by provider] metoprolol succinate  12.5 mg Oral Daily    pantoprazole  40 mg Oral Daily    ranolazine  500 mg Oral BID    [Held by provider] sacubitril-valsartan  0.5 tablet Oral BID    [Held by provider] spironolactone  25 mg Oral Daily    sucralfate  1 g Oral TID    [START ON 11/29/2020] vitamin D  50,000 Units Oral Weekly    insulin lispro  0-6 Units Subcutaneous TID     insulin lispro  0-3 Units Subcutaneous Nightly    sodium chloride flush  10 mL Intravenous 2 times per day    lidocaine 1 % injection  5 mL Intradermal Once    sodium chloride flush  10 mL Intravenous 2 times per day    [Held by provider] furosemide  40 mg Intravenous BID    QUEtiapine  50 mg Oral Nightly    apixaban  10 mg Oral BID    Followed by   Creston Sicard apixaban  5 mg Oral BID     Continuous Infusions:   dextrose       PRN Meds:perflutren lipid microspheres, nitroGLYCERIN, glucose, dextrose, glucagon (rDNA), dextrose, sodium chloride flush, acetaminophen **OR** acetaminophen, polyethylene glycol, promethazine **OR** ondansetron, sodium chloride flush, oxyCODONE, magnesium sulfate     Prior to Admission medications    Medication Sig Start Date End Date Taking?  Authorizing Provider   metFORMIN (GLUCOPHAGE) 1000 MG tablet Take 1,000 mg by mouth 2 times daily (with meals)   Yes Historical Provider, MD   furosemide (LASIX) 40 MG tablet Take 40 mg by mouth daily   Yes Historical Provider, MD   QUEtiapine (SEROQUEL XR) 50 MG extended release tablet Take 50 mg by mouth nightly   Yes Historical Provider, MD   ondansetron (ZOFRAN) 4 MG tablet Take 4 mg by mouth every 8 hours as needed for Nausea or Vomiting   Yes Historical Provider, MD   apixaban starter pack (ELIQUIS DVT/PE STARTER PACK) 5 MG TBPK tablet Take 1 tablet by mouth See Admin Instructions 11/20/20 12/20/20 Yes Jacklyn White MD   insulin aspart (NOVOLOG FLEXPEN) 100 UNIT/ML injection pen Inject 10 Units into the skin 3 times daily (before meals) 11/13/20  Yes Allegra Feldman MD   insulin glargine (LANTUS SOLOSTAR) 100 UNIT/ML injection pen Inject 24 Units into the skin nightly 11/13/20  Yes Allegra Feldman MD   amiodarone (CORDARONE) 200 MG tablet Take 1 tablet by mouth daily 11/10/20  Yes Kalina Cain MD   metoclopramide (REGLAN) 10 MG tablet Take 1 tablet by mouth 3 times daily (with meals) 11/6/20  Yes Anthony Rivera MD   sucralfate (CARAFATE) 1 GM tablet Take 1 tablet by mouth 3 times daily 11/6/20  Yes Anthony Rivera MD   pantoprazole (PROTONIX) 40 MG tablet Take 1 tablet by mouth daily 11/6/20  Yes Anthony Rivera MD   vitamin D (ERGOCALCIFEROL) 1.25 MG (28555 UT) CAPS capsule Take 50,000 Units by mouth once a week Takes weekly on Sundays   Yes Historical Provider, MD   metoprolol succinate (TOPROL XL) 25 MG extended release tablet Take 0.5 tablets by mouth daily 10/14/20  Yes FRITZ Mariscal CNP   sacubitril-valsartan (ENTRESTO) 24-26 MG per tablet Take 0.5 tablets by mouth 2 times daily 10/13/20  Yes FRITZ Mariscal CNP   spironolactone (ALDACTONE) 25 MG tablet Take 1 tablet by mouth daily 10/13/20  Yes Mayra Toma Potter, APRN - CNP   digoxin (LANOXIN) 125 MCG tablet Take 1 tablet by mouth every other day 9/25/20  Yes Worth Leyden, APRN - CNP   nitroGLYCERIN (NITROSTAT) 0.4 MG SL tablet up to max of 3 total doses. If no relief after 1 dose, call 911. 8/24/20  Yes Shon Ibarra MD   aspirin 81 MG EC tablet Take 1 tablet by mouth daily 7/18/20  Yes Clarissa Morton MD   ranolazine (RANEXA) 500 MG extended release tablet Take 1 tablet by mouth 2 times daily 7/18/20  Yes Clarissa Morton MD   DULoxetine (CYMBALTA) 60 MG extended release capsule Take 1 capsule by mouth daily 7/18/20  Yes Clarissa Morton MD   atorvastatin (LIPITOR) 40 MG tablet Take 1 tablet by mouth nightly 7/18/20  Yes Clarissa Morton MD   fenofibrate micronized (LOFIBRA) 200 MG capsule Take 1 capsule by mouth nightly 7/18/20  Yes Clarissa Morton MD   miconazole (MICOTIN) 2 % powder Apply topically 2 times daily. 7/18/20  Yes Clarissa Morton MD   [employer] Lancets Ultra Thin MISC 1 each by Does not apply route 4 times daily 7/18/20  Yes Clarissa Morton MD   blood glucose monitor strips Test three times a day & as needed for symptoms of irregular blood glucose. 5/12/19  Yes Audra Jacobs MD       Review of System:  [x] Full ROS obtained and negative except as mentioned in HPI    Relevant and available labs, and cardiovascular diagnostics reviewed. Reviewed. Recent Labs     11/25/20  0954 11/26/20  0459 11/27/20  0357   * 134* 128*   K 3.4* 3.7 4.0   CL 96* 99 96*   CO2 28 25 23   BUN 14 16 18   CREATININE 0.8 0.8 0.7     No results for input(s): WBC, HGB, HCT, MCV, PLT in the last 72 hours. Estimated Creatinine Clearance: 88 mL/min (based on SCr of 0.7 mg/dL). No results found for: BNP    I independently reviewed all cardiac tracing from cardiac telemetry. I independently reviewed relevant and available cardiac diagnostic tests ECG, CXR, Echo, Stress test, Device interrogation, Holter, CT scan.     Outside medical records via Care everywhere reviewed and summarized in H&P above. Complex medical condition with multiple medical problems affecting prognosis   Severe exacerbation of underlying medical condition requiring hospitalization and at risk of decompensation. Thank you for allowing me to participate in the care of [facility]     All questions and concerns were addressed to the patient/family. Alternatives to my treatment were discussed. I have discussed the above stated plan and the patient verbalized understanding and agreed with the plan. NOTE: This report was transcribed using voice recognition software. Every effort was made to ensure accuracy, however, inadvertent computerized transcription errors may be present.      Winnie Aguilar MD, MPH  [facility]   Office: [phone]

## 2020-11-27 NOTE — PROGRESS NOTES
Progress Note    Admit Date:  11/22/2020    Pt with severe CMP EF 10-15%. Non compliant. Admitted with CHF. Behavior issues . Cardio and psych consulted. Subjective:  Ms. Chava العراقي is in no distress. She is lying on her left side. Breathing comfortable. Objective:   /61   Pulse 88   Temp 97.1 °F (36.2 °C) (Oral)   Resp 16   Ht 5' 1\" (1.549 m)   Wt 198 lb 7 oz (90 kg)   SpO2 97%   BMI 37.49 kg/m²       Intake/Output Summary (Last 24 hours) at 11/27/2020 1356  Last data filed at 11/27/2020 0755  Gross per 24 hour   Intake 682 ml   Output 1100 ml   Net -418 ml         Physical Exam:  General: awake, alert, oriented  No distress  Skin:  Warm and dry  Neck:  JVD absent. Neck supple  Chest:  Clear to auscultation, respiration easy. No wheezes, rales or rhonchi. Cardiovascular:  RRR ,S1S2 normal  Abdomen:  Soft, non tender, non distended, BS +  Extremities:  2 + bibiana LE edema. Intact peripheral pulses.  Brisk cap refill, < 2 secs  Neuro: non focal      Medications:   Scheduled Meds:   QUEtiapine  25 mg Oral Nightly    [START ON 11/28/2020] torsemide  20 mg Oral Daily    carvedilol  3.125 mg Oral BID WC    amiodarone  200 mg Oral Daily    aspirin  81 mg Oral Daily    atorvastatin  40 mg Oral Nightly    digoxin  125 mcg Oral Every Other Day    DULoxetine  60 mg Oral Daily    fenofibrate  160 mg Oral Daily    insulin glargine  24 Units Subcutaneous Nightly    metoclopramide  10 mg Oral TID WC    pantoprazole  40 mg Oral Daily    ranolazine  500 mg Oral BID    sucralfate  1 g Oral TID    [START ON 11/29/2020] vitamin D  50,000 Units Oral Weekly    insulin lispro  0-6 Units Subcutaneous TID WC    insulin lispro  0-3 Units Subcutaneous Nightly    sodium chloride flush  10 mL Intravenous 2 times per day    lidocaine 1 % injection  5 mL Intradermal Once    sodium chloride flush  10 mL Intravenous 2 times per day    apixaban  5 mg Oral BID       Continuous Infusions:   dextrose Data:  CBC:   No results for input(s): WBC, RBC, HGB, HCT, MCV, RDW, PLT in the last 72 hours. BMP:   Recent Labs     11/25/20  0954 11/26/20  0459 11/27/20  0357   * 134* 128*   K 3.4* 3.7 4.0   CL 96* 99 96*   CO2 28 25 23   BUN 14 16 18   CREATININE 0.8 0.8 0.7     BNP: No results for input(s): BNP in the last 72 hours. PT/INR:   No results for input(s): PROTIME, INR in the last 72 hours. APTT: No results for input(s): APTT in the last 72 hours. CARDIAC ENZYMES:   No results for input(s): CKMB, CKMBINDEX, TROPONINI in the last 72 hours. Invalid input(s): CKTOTAL;3  FASTING LIPID PANEL:  Lab Results   Component Value Date    CHOL 166 10/12/2019    HDL 51 10/12/2019    TRIG 142 10/12/2019     LIVER PROFILE:   No results for input(s): AST, ALT, ALB, BILIDIR, BILITOT, ALKPHOS in the last 72 hours. Radiology  IR MIDLINE CATH   Final Result   Ultrasound for placement of PICC line. XR CHEST (2 VW)   Final Result   1. Cardiomegaly with mild congestive heart failure. Pertinent previous results reviewed   Echo  Summary 11/20/20   Limited exam for pericardial effusion.   EF visually estimated at < 20%.   Small to Moderate circumferential pericardial effusion without tamponade   physiology. The effusion does not appear to be significantly changed since   the previous exam on 11/17/2020.  Kendra Oleary is a moderate to large left pleural effusion.   Mild to moderate tricuspid regurgitation.   Systolic pulmonary artery pressure (SPAP) is elevated and estimated at 41   mmHg (right atrial pressure 15 mmHg) consistent with mild pulmonary   hypertension.      ECHO repeated today 11/25/2020   Conclusions        Summary    Left ventricular systolic function is reduced with ejection fraction    estimated at 10-15 %.    There is severe global hypokinesis.    Left ventricular size is moderately increased.    There is mild concentric left ventricular hypertrophy.   Kendra Oleary is a left ventricular apical thrombus present.   Calvin Zafaro is a small circumferential pericardial effusion noted with tamponade    physiology only by doppler flow but not by other criteria.           Assessment:  Active Problems:    CHF (congestive heart failure) (HCC)    Depressive disorder    Hyperlipidemia    Dyspnea and respiratory abnormalities    Heart failure (HCC)    Pericardial effusion  Resolved Problems:    * No resolved hospital problems. *      Plan:    Acute on chronic combined CHF   severe non ischemic CMP  Non obstructive  CAD  H/O NSVT  Pericardial effusion   Left Ventricular Apical thrombus  - cardiology involved. - pt has AICD  - CXR with cardiomegaly and CHF changes   - ECHO repeated this admission - 11/25 - EF 10 to 15%. Moderate pericardial effusion. No pericardial tamponade clinically. - Was given IV Lasix  - Entresto, BB, aldactone and lasix on hold due to hypotension on 11/26. - I will try to resume her BB with coreg 3.125BID. - will then try to resume on PO torsemide tomorrow. - cont to hold entresto and aldactone. - Monitor blood pressure closely. - Continue full dose anticoagulation with Eliquis      Uncontrolled DM2 - BG much better today  - Continue Humalog and Lantus   -  on arrival   - BG has been labile - she does not adhere to dietary restrictions, refused meds several times.        Recently dx PE  - was just discharged from [facility] [place] on 11/22 with acute LLL PE without evidence of right heart strain-> discharged home on Eliquis--> continue         Hx of NSVT   - continue amio and dig   - tele monitor   - Reported 22 beats of VTach by cardiac monitor   - Cardiology following   - replace electrolytes       Hypomagnesemia   - mild, 1.5  - Sliding scale replacement        CAD  - troponin negative   - EKG with sinus tachycardia and PACs with low voltage   - No acute ACS symptoms, stable        HLD  - Continue statin      HTN  - BP is low today . - med changes as above.        Obesity  - Body mass index is 39.3 kg/m². - Complicating assessment and treatment. Placing patient at risk for multiple co-morbidities as well as early death and contributing to the patient's presentation.   - Counseled on weight loss. Depression  Behavioral issues  -Patient's home health agency requested psychiatric evaluation. Issues with noncompliance and multiple admissions.  -Seen in consultation by psychiatry. she is competent. -Recommending assisted living facility.  -Continue Seroquel and Cymbalta         DVT Prophylaxis: Eliquis   Diet: DIET LOW SODIUM 2 GM;   Code Status: Full Code     dispo - will need placement at Assisted living facility. Hopefully d/c over the weekend.      Fabiola Roman MD 11/27/2020 1:56 PM

## 2020-11-27 NOTE — PROGRESS NOTES
Pt assessed. Diminished to auscultation in bilateral lobes. Scheduled medications given at this time. Pt denies any needs. Call light and bedside table with-in easy reach. Petra Malik

## 2020-11-27 NOTE — PROGRESS NOTES
VSS - afebrile. Pt is alert and oriented x 4 with no history of falls. Assessment completed as charted. Bed is in lowest position with 2/4 bed rails raised, bed alarm turned on, wheels locked and call light within reach - patient wearing non-skid socks and verbalizes understanding to call out for assistance. No further requests at this time. Will continue to monitor.      Vitals:    11/26/20 2247   BP: 113/82   Pulse: 94   Resp: 18   Temp: 97 °F (36.1 °C)   SpO2: 95%

## 2020-11-28 VITALS
HEIGHT: 61 IN | OXYGEN SATURATION: 95 % | SYSTOLIC BLOOD PRESSURE: 113 MMHG | BODY MASS INDEX: 36.63 KG/M2 | HEART RATE: 90 BPM | WEIGHT: 194 LBS | RESPIRATION RATE: 16 BRPM | TEMPERATURE: 97 F | DIASTOLIC BLOOD PRESSURE: 81 MMHG

## 2020-11-28 LAB
ANION GAP SERPL CALCULATED.3IONS-SCNC: 13 MMOL/L (ref 3–16)
BUN BLDV-MCNC: 16 MG/DL (ref 7–20)
CALCIUM SERPL-MCNC: 8.9 MG/DL (ref 8.3–10.6)
CHLORIDE BLD-SCNC: 99 MMOL/L (ref 99–110)
CO2: 20 MMOL/L (ref 21–32)
CREAT SERPL-MCNC: 0.7 MG/DL (ref 0.6–1.1)
GFR AFRICAN AMERICAN: >60
GFR NON-AFRICAN AMERICAN: >60
GLUCOSE BLD-MCNC: 136 MG/DL (ref 70–99)
GLUCOSE BLD-MCNC: 156 MG/DL (ref 70–99)
GLUCOSE BLD-MCNC: 159 MG/DL (ref 70–99)
GLUCOSE BLD-MCNC: 293 MG/DL (ref 70–99)
PERFORMED ON: ABNORMAL
POTASSIUM REFLEX MAGNESIUM: 4.3 MMOL/L (ref 3.5–5.1)
SODIUM BLD-SCNC: 132 MMOL/L (ref 136–145)

## 2020-11-28 PROCEDURE — 97530 THERAPEUTIC ACTIVITIES: CPT

## 2020-11-28 PROCEDURE — 6370000000 HC RX 637 (ALT 250 FOR IP): Performed by: HOSPITALIST

## 2020-11-28 PROCEDURE — 97161 PT EVAL LOW COMPLEX 20 MIN: CPT

## 2020-11-28 PROCEDURE — 6370000000 HC RX 637 (ALT 250 FOR IP): Performed by: PHYSICIAN ASSISTANT

## 2020-11-28 PROCEDURE — 97165 OT EVAL LOW COMPLEX 30 MIN: CPT

## 2020-11-28 PROCEDURE — 99232 SBSQ HOSP IP/OBS MODERATE 35: CPT | Performed by: INTERNAL MEDICINE

## 2020-11-28 PROCEDURE — 80048 BASIC METABOLIC PNL TOTAL CA: CPT

## 2020-11-28 PROCEDURE — 36415 COLL VENOUS BLD VENIPUNCTURE: CPT

## 2020-11-28 PROCEDURE — 6370000000 HC RX 637 (ALT 250 FOR IP): Performed by: INTERNAL MEDICINE

## 2020-11-28 PROCEDURE — 2580000003 HC RX 258: Performed by: EMERGENCY MEDICINE

## 2020-11-28 PROCEDURE — 2580000003 HC RX 258: Performed by: HOSPITALIST

## 2020-11-28 RX ORDER — CARVEDILOL 3.12 MG/1
3.12 TABLET ORAL 2 TIMES DAILY WITH MEALS
Qty: 60 TABLET | Refills: 3 | Status: SHIPPED | OUTPATIENT
Start: 2020-11-28 | End: 2021-03-23 | Stop reason: CLARIF

## 2020-11-28 RX ORDER — TORSEMIDE 20 MG/1
20 TABLET ORAL DAILY
Qty: 30 TABLET | Refills: 3 | Status: SHIPPED | OUTPATIENT
Start: 2020-11-28 | End: 2021-03-23 | Stop reason: CLARIF

## 2020-11-28 RX ADMIN — FENOFIBRATE 160 MG: 160 TABLET ORAL at 10:16

## 2020-11-28 RX ADMIN — CARVEDILOL 3.12 MG: 3.12 TABLET, FILM COATED ORAL at 10:25

## 2020-11-28 RX ADMIN — SUCRALFATE 1 G: 1 TABLET ORAL at 10:17

## 2020-11-28 RX ADMIN — RANOLAZINE 500 MG: 500 TABLET, FILM COATED, EXTENDED RELEASE ORAL at 10:16

## 2020-11-28 RX ADMIN — ASPIRIN 81 MG: 81 TABLET, COATED ORAL at 10:17

## 2020-11-28 RX ADMIN — PANTOPRAZOLE SODIUM 40 MG: 40 TABLET, DELAYED RELEASE ORAL at 10:16

## 2020-11-28 RX ADMIN — METOCLOPRAMIDE 10 MG: 10 TABLET ORAL at 12:30

## 2020-11-28 RX ADMIN — Medication 10 ML: at 10:20

## 2020-11-28 RX ADMIN — TORSEMIDE 20 MG: 20 TABLET ORAL at 10:17

## 2020-11-28 RX ADMIN — METOCLOPRAMIDE 10 MG: 10 TABLET ORAL at 10:25

## 2020-11-28 RX ADMIN — AMIODARONE HYDROCHLORIDE 200 MG: 200 TABLET ORAL at 10:16

## 2020-11-28 RX ADMIN — INSULIN LISPRO 3 UNITS: 100 INJECTION, SOLUTION INTRAVENOUS; SUBCUTANEOUS at 12:31

## 2020-11-28 RX ADMIN — APIXABAN 5 MG: 5 TABLET, FILM COATED ORAL at 10:19

## 2020-11-28 RX ADMIN — Medication 10 ML: at 10:21

## 2020-11-28 RX ADMIN — DULOXETINE HYDROCHLORIDE 60 MG: 60 CAPSULE, DELAYED RELEASE ORAL at 10:17

## 2020-11-28 ASSESSMENT — PAIN SCALES - GENERAL: PAINLEVEL_OUTOF10: 0

## 2020-11-28 NOTE — DISCHARGE INSTR - COC
Continuity of Care Form    Patient Name: Estrella Padilla   :  1960  MRN:  [id number]    Admit date:  2020  Discharge date:  2020    Code Status Order: Full Code   Advance Directives:   Advance Care Flowsheet Documentation       Date/Time Healthcare Directive Type of Healthcare Directive Copy in [address] Agent's Name Healthcare Agent's Phone Number    20 0831  No, patient does not have an advance directive for healthcare treatment -- -- -- -- --            Admitting Physician:  Mendel Hall, MD  PCP: FRITZ Lyles - NP    Discharging Nurse: [facility] Unit/Room#: /5318-68  Discharging Unit Phone Number: [phone]    Emergency Contact:   Extended Emergency Contact Information  Primary Emergency Contact: Christa Campos  Address: [address] [place] [address] 3           [facility], [address] [facility] [place] [facility] [place] Phone: [phone]  Mobile Phone: [phone]  Relation: Brother/Sister    Past Surgical History:  Past Surgical History:   Procedure Laterality Date    BACK SURGERY      x3    IR MIDLINE CATH  2020    IR MIDLINE CATH 2020 [address] SPECIAL PROCEDURES    PACEMAKER INSERTION      Boyd pacemaker    TUBAL LIGATION      TUMOR REMOVAL      UPPER GASTROINTESTINAL ENDOSCOPY N/A 2020    EGD BIOPSY performed by Harley Hobson DO at [address]       Immunization History:   Immunization History   Administered Date(s) Administered    Influenza 2012    Influenza A ([id number]) Vaccine IM 2009    Influenza Virus Vaccine 10/17/2014, 10/27/2017    Influenza, Quadv, 6 mo and older, IM, PF (Flulaval, Fluarix) 12/10/2018    Influenza, Quadv, IM, PF (6 mo and older Fluzone, Flulaval, Fluarix, and 3 yrs and older Afluria) 10/12/2019    Pneumococcal Polysaccharide ([id number]) 10/17/2014, 2017       Active Problems:  Patient Active Problem List   Diagnosis Code    DM (diabetes mellitus) ([facility] E11.9    HTN (hypertension), benign I10    Dyslipidemia E78.5    CAD (coronary artery disease) I25.10    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) I63.50    Dual ICD (implantable cardioverter-defibrillator) in place Z80.65    Brain tumor (benign) ([facility] [place] [facility] D33.2    Chronic combined systolic and diastolic congestive heart failure (HCC) I50.42    TIA involving right internal carotid artery G45.1    CAD in native artery I25.10    DM (diabetes mellitus), secondary, uncontrolled, w/neurologic complic ([facility]) Z12.93, F76.06    CHF (congestive heart failure) ([facility]) I50.9    Nonischemic cardiomyopathy ([facility]) I42.8    Essential hypertension I10    TIA (transient ischemic attack) G45.9    Elevated blood sugar R73.9    Diabetic ketoacidosis without coma associated with type 2 diabetes mellitus ([facility]) E11.10    Non-intractable vomiting with nausea R11.2    Chest pain R07.9    Arterial ischemic stroke, ICA, right, acute ([facility]) I63.231    Diabetic hyperosmolar non-ketotic state ([facility] [place] [facility] E11.00    Diabetic acidosis without coma ([facility]) E11.10    Hyponatremia X22.6    Metabolic acidosis L34.1    Disorder of electrolytes E87.8    Diabetic ketoacidosis with coma associated with type 2 diabetes mellitus ([facility]) E11.11    Hypernatremia E87.0    Leukocytosis D72.829    Atrial tachycardia ([facility]) I47.1    DKA, type 2, not at goal ([facility] E11.10    Cognitive developmental delay F81.9    Depressive disorder F32.9    Urinary tract infection without hematuria N39.0    Hypokalemia E87.6    Persistent fever R50.9    Syncope and collapse R55    S/P ICD (internal cardiac defibrillator) procedure Z95.810    History of CVA (cerebrovascular accident) Z80.78    Noncompliance with medications Z91.14    Obesity E66.9    SVT (supraventricular tachycardia) ([facility]) I47.1    Type 2 diabetes mellitus with hyperglycemia, with long-term current use of insulin ([facility]) E11.65, Z79.4    Acute pulmonary edema ([facility]) J81.0    Hyperlipidemia E78.5    Lactic acidosis E87.2    NSVT (nonsustained ventricular tachycardia) ([facility]) I47.2    Dyspnea and respiratory abnormalities R06.00, R06.89    Intractable nausea and vomiting R11.2    Tachycardia R00.0    Uncontrolled type 2 diabetes mellitus with hyperglycemia ([facility]) E11.65    Acute on chronic systolic CHF (congestive heart failure) ([facility]) I50.23    Pulmonary emboli ([facility]) I26.99    Heart failure ([facility]) I50.9    Pericardial effusion I31.3       Isolation/Infection:   Isolation            No Isolation          Unreconciled External Infections       Infection Onset Last Indicated Last Received Source    No mapped external infections found      . Unmapped Infections (1)        MRSA 10/31/14 09/09/16 09/08/20                   Patient Infection Status       Infection Onset Added Last Indicated Last Indicated By Review Planned Expiration Resolved Resolved By    None active    Resolved    COVID-19 Rule Out 11/23/20 11/23/20 11/23/20 COVID-19 (Ordered)   11/23/20 Rule-Out Test Resulted    COVID-19 Rule Out 11/16/20 11/16/20 11/16/20 COVID-19 (Ordered)   11/18/20 Rule-Out Test Resulted    COVID-19 Rule Out 11/05/20 11/05/20 11/05/20 COVID-19 (Ordered)   11/05/20 Rule-Out Test Resulted    COVID-19 Rule Out 11/03/20 11/03/20 11/03/20 COVID-19 (Ordered)   11/05/20 Vaibhav Ruvalcaba RN    Ordered COVID swab and discontinued previous ED visit.      COVID-19 Rule Out 10/26/20 10/26/20 10/26/20 COVID-19 (Ordered)   10/26/20 Rule-Out Test Resulted    C-diff Rule Out 09/10/20 09/10/20 09/10/20 Clostridium difficile toxin/antigen (Ordered)   09/22/20 Misael Ayers RN    COVID-19 Rule Out 04/16/20 04/16/20 04/16/20 COVID-19 (Ordered)   04/16/20 Rule-Out Test Resulted            Nurse Assessment:  Last Vital Signs: /75   Pulse 85   Temp 97.9 °F (36.6 °C) (Oral)   Resp 15   Ht 5' 1\" (1.549 m)   Wt 194 lb (88 kg)   SpO2 94%   BMI 36.66 kg/m²     Last documented pain score (0-10 scale): Pain Level: 0  Last Weight:   Wt Readings from Last 1 Encounters:   11/28/20 194 lb (88 kg)     Mental Status:  alert and coherent    IV Access:  - None    Nursing Mobility/ADLs:  Walking   Independent  Transfer  Independent  Bathing  Independent  Dressing  Independent  Toileting  Independent  Feeding  Independent  Med [address]  Med Delivery   whole    Wound Care Documentation and Therapy:        Elimination:  Continence:   · Bowel: Yes  · Bladder: Yes  Urinary Catheter: None   Colostomy/Ileostomy/Ileal Conduit: No       Date of Last BM: 11/28/2020    Intake/Output Summary (Last 24 hours) at 11/28/2020 0953  Last data filed at 11/28/2020 0903  Gross per 24 hour   Intake 1224 ml   Output 1600 ml   Net -376 ml     I/O last 3 completed shifts: In: 1236 [P.O.:1236]  Out: 1100 [Urine:1100]    Safety Concerns:     None    Impairments/Disabilities:      Medical History of High Functioning Mental Retardation     Nutrition Therapy:  Current Nutrition Therapy:   - Oral Diet:  Carb Control 3 carbs/meal (1500kcals/day)    Routes of Feeding: Oral  Liquids: No Restrictions  Daily Fluid Restriction: yes - amount 2000  Last Modified Barium Swallow with Video (Video Swallowing Test): not done    Treatments at the Time of Hospital Discharge:   Respiratory Treatments: See [facility]  Oxygen Therapy:  is not on home oxygen therapy.   Ventilator:    - No ventilator support    Rehab Therapies: Physical Therapy and Occupational Therapy  Weight Bearing Status/Restrictions: No weight bearing restirctions  Other Medical Equipment (for information only, NOT a DME order):  None    Other Treatments: none    Patient's personal belongings (please select all that are sent with patient):  Patient left [facility] with all personal belongings    RN SIGNATURE:  Electronically signed by Nikolay Rogers RN on 11/28/20 at 11:07 AM EST    CASE MANAGEMENT/SOCIAL WORK SECTION    Inpatient Status Date: 11/23/20    Readmission Risk Assessment Score:  Readmission Risk              Risk of Unplanned Readmission:        76           Discharging to Facility/ Agency   · Name: [facility]  · Phone: [phone]  · Fax: [phone]      / signature: Electronically signed by Jessica Dent RN on 11/28/20 at 10:23 AM EST    PHYSICIAN SECTION    Prognosis: Fair    Condition at Discharge: Stable    Rehab Potential (if transferring to Rehab): Fair    Recommended Labs or Other Treatments After Discharge:     Resume home care    Physician Certification: I certify the above information and transfer of Oly York  is necessary for the continuing treatment of the diagnosis listed and that she requires Home Care for greater 30 days.      Update Admission H&P: No change in H&P    PHYSICIAN SIGNATURE:  Electronically signed by Sukhi Diez MD on 11/28/20 at 9:54 AM EST

## 2020-11-28 NOTE — PROGRESS NOTES
[facility]   Electrophysiology Progress Note     Admit Date: 11/22/2020     Reason for follow up: SOB     HPI and Interval History: 62 y.o. female presented with SOB and acute on chronic systolic heart failure. Recently discharged from hospital with heart failure symptoms. C/o of SOB, increasing LE swelling, orthopnea and chest pain. History of severe LV dysfunction and NICMP, s/p ICD ([employer], implanted at [place], [place]), pericardial effusion, DM, and prior CVA. Also history of non-compliance according to the chart. Patient seen and examined. Clinical notes reviewed. Telemetry reviewed. Reports no new complaint. Assessment and plan:     - Non-ischemic cardiomyopathy   Severe biventricular failure    On Toprol XL    Entresto. Aldactone 25 mg/day   Digoxin. I/Os - 5 lit   Weight down to 194 lbs from 205 lb on admission      Has has compliance and psych issues in the past.    Prognosis is guarded due to severe LV dysfunction, pericardial effusion and LV thrombus. Needs advanced heart failure follow up. Continue strict I/Os and weigh daily.      - Pericardial effusion    Small to moderate    No sign of tamponade physiology. Continue OMT. - LV thrombus    New diagnosis. On Eliquis. History of CVA in the past.     - NSVT on monitor, asymptomatic. On amiodarone. S/p AICD. - History of PAT and tachycardia, with history of inappropriate AICD shock. On Amiodarone therapy. TSH normal.    AST/ALT normal.      - Mild non-obstructive CAD    Stable. D/c'd Ranexa     - History of pulmonary embolism    On Eliquis. - S/p ICD    Follow up with device clinic.     - DM: Poorly controlled. - History of CVA     - Non-compliance and psych issues.       - Awaiting placement to SNF.   - PT/OT     Active Hospital Problems    Diagnosis Date Noted    Pericardial effusion [I31.3]     Heart failure ([address] [I50.9] 11/23/2020    Dyspnea and respiratory sized diagonal branch. The LAD covered the entire apex of the left ventricle.      3. Left circumflex has mild atherosclerotic disease.  It was moderate in size. There was a moderate sized obtuse marginal branch.     4. Right coronary artery has mild atherosclerotic disease. It was moderate in size and was the dominant artery.     5. Left ventriculogram was not performed.  Left ventricular end diastolic l pressure was 26.  There is no gradient across pullback of the aortic valve.     Right atrial pressure of 20  RV 45/7  PA 53/8  Pulmonary wedge pressure mean of 20  RA saturation 42%  PA saturation 45%  Aortic saturation 99%  Cardiac output 2.4  Cardiac index 1.24  SVR 2433  FJA 300     CONCLUSIONS:     1. Mild non-obstructive coronary artery disease with known severe left ventricular dysfunction  2.  Moderate pulmonary hypertension with decompensated hemodynamics      I independently reviewed the cardiac diagnostic studies, ECG and relevant imaging studies. Physical Examination:  Vitals:    20 0236   BP: 104/75   Pulse: 85   Resp: 15   Temp: 97.9 °F (36.6 °C)   SpO2: 94%      In: 732 [P.O.:732]  Out: 1600    Wt Readings from Last 3 Encounters:   20 194 lb (88 kg)   20 208 lb 9.6 oz (94.6 kg)   20 220 lb (99.8 kg)     Temp  Av.8 °F (36.6 °C)  Min: 97.7 °F (36.5 °C)  Max: 97.9 °F (36.6 °C)  Pulse  Av.5  Min: 85  Max: 94  BP  Min: 96/68  Max: 109/76  SpO2  Av %  Min: 94 %  Max: 97 %    Intake/Output Summary (Last 24 hours) at 2020 0905  Last data filed at 2020 6307  Gross per 24 hour   Intake 1224 ml   Output 1600 ml   Net -376 ml       I independently reviewed all cardiac tracing from cardiac telemetry. · Constitutional: Oriented. No distress. · Head: Normocephalic and atraumatic. · Mouth/Throat: Oropharynx is clear and moist.   · Eyes: Conjunctivae normal. EOM are normal.   · Neck: Neck supple. No JVD present.     · Cardiovascular: Normal rate, regular rhythm, E1&J9.  + faint systolic murmur   · Pulmonary/Chest: Bilateral respiratory sounds. No rhonchi. · Abdominal: Soft. No tenderness. · Musculoskeletal: No tenderness. No edema    · Lymphadenopathy: Has no cervical adenopathy. · Neurological: Alert and oriented. Follows command, No Gross deficit   · Skin: Skin is warm, No rash noted. · Psychiatric: Has a normal behavior       Scheduled Meds:   QUEtiapine  25 mg Oral Nightly    torsemide  20 mg Oral Daily    carvedilol  3.125 mg Oral BID WC    amiodarone  200 mg Oral Daily    aspirin  81 mg Oral Daily    atorvastatin  40 mg Oral Nightly    digoxin  125 mcg Oral Every Other Day    DULoxetine  60 mg Oral Daily    fenofibrate  160 mg Oral Daily    insulin glargine  24 Units Subcutaneous Nightly    metoclopramide  10 mg Oral TID WC    pantoprazole  40 mg Oral Daily    ranolazine  500 mg Oral BID    sucralfate  1 g Oral TID    [START ON 11/29/2020] vitamin D  50,000 Units Oral Weekly    insulin lispro  0-6 Units Subcutaneous TID WC    insulin lispro  0-3 Units Subcutaneous Nightly    sodium chloride flush  10 mL Intravenous 2 times per day    lidocaine 1 % injection  5 mL Intradermal Once    sodium chloride flush  10 mL Intravenous 2 times per day    apixaban  5 mg Oral BID     Continuous Infusions:   dextrose       PRN Meds:perflutren lipid microspheres, nitroGLYCERIN, glucose, dextrose, glucagon (rDNA), dextrose, sodium chloride flush, acetaminophen **OR** acetaminophen, polyethylene glycol, promethazine **OR** ondansetron, sodium chloride flush, oxyCODONE, magnesium sulfate     Prior to Admission medications    Medication Sig Start Date End Date Taking?  Authorizing Provider   metFORMIN (GLUCOPHAGE) 1000 MG tablet Take 1,000 mg by mouth 2 times daily (with meals)   Yes Historical Provider, MD   furosemide (LASIX) 40 MG tablet Take 40 mg by mouth daily   Yes Historical Provider, MD   QUEtiapine (SEROQUEL XR) 50 MG extended release tablet Take 50 mg by mouth nightly   Yes Historical Provider, MD   ondansetron (ZOFRAN) 4 MG tablet Take 4 mg by mouth every 8 hours as needed for Nausea or Vomiting   Yes Historical Provider, MD   apixaban starter pack (ELIQUIS DVT/PE STARTER PACK) 5 MG TBPK tablet Take 1 tablet by mouth See Admin Instructions 11/20/20 12/20/20 Yes Tae Lucas MD   insulin aspart (NOVOLOG FLEXPEN) 100 UNIT/ML injection pen Inject 10 Units into the skin 3 times daily (before meals) 11/13/20  Yes Issac Gutierrez MD   insulin glargine (LANTUS SOLOSTAR) 100 UNIT/ML injection pen Inject 24 Units into the skin nightly 11/13/20  Yes Issac Gutierrez MD   amiodarone (CORDARONE) 200 MG tablet Take 1 tablet by mouth daily 11/10/20  Yes Thanh Walker MD   metoclopramide (REGLAN) 10 MG tablet Take 1 tablet by mouth 3 times daily (with meals) 11/6/20  Yes Jeanne Cole MD   sucralfate (CARAFATE) 1 GM tablet Take 1 tablet by mouth 3 times daily 11/6/20  Yes Jeanne Cole MD   pantoprazole (PROTONIX) 40 MG tablet Take 1 tablet by mouth daily 11/6/20  Yes Jeanne Cole MD   vitamin D (ERGOCALCIFEROL) 1.25 MG (74799 UT) CAPS capsule Take 50,000 Units by mouth once a week Takes weekly on Sundays   Yes Historical Provider, MD   metoprolol succinate (TOPROL XL) 25 MG extended release tablet Take 0.5 tablets by mouth daily 10/14/20  Yes [facility] - CNP   sacubitril-valsartan (ENTRESTO) 24-26 MG per tablet Take 0.5 tablets by mouth 2 times daily 10/13/20  Yes José Miguel Rowell, FRITZ - CNP   spironolactone (ALDACTONE) 25 MG tablet Take 1 tablet by mouth daily 10/13/20  Yes José Miguel Sorin, FRITZ - CNP   digoxin (LANOXIN) 125 MCG tablet Take 1 tablet by mouth every other day 9/25/20  Yes José Miguel Sorin, FRITZ - CNP   nitroGLYCERIN (NITROSTAT) 0.4 MG SL tablet up to max of 3 total doses.  If no relief after 1 dose, call 911. 8/24/20  Yes Tae Lucas MD   aspirin 81 MG EC tablet Take 1 tablet by mouth daily 7/18/20  Yes Keith Hernandez MD   ranolazine (RANEXA) 500 MG extended release tablet Take 1 tablet by mouth 2 times daily 7/18/20  Yes Keith Hernandez MD   DULoxetine (CYMBALTA) 60 MG extended release capsule Take 1 capsule by mouth daily 7/18/20  Yes Keith Hernandez MD   atorvastatin (LIPITOR) 40 MG tablet Take 1 tablet by mouth nightly 7/18/20  Yes Keith Hernandez MD   fenofibrate micronized (LOFIBRA) 200 MG capsule Take 1 capsule by mouth nightly 7/18/20  Yes Keith Hernandez MD   miconazole (MICOTIN) 2 % powder Apply topically 2 times daily. 7/18/20  Yes Keith Hernandez MD   [employer] Lancets Ultra Thin MISC 1 each by Does not apply route 4 times daily 7/18/20  Yes Keith Hernandez MD   blood glucose monitor strips Test three times a day & as needed for symptoms of irregular blood glucose. 5/12/19  Yes Magdiel Sawyer MD       Review of System:  [x] Full ROS obtained and negative except as mentioned in HPI    Relevant and available labs, and cardiovascular diagnostics reviewed. Reviewed. Recent Labs     11/26/20  0459 11/27/20  0357 11/28/20  0521   * 128* 132*   K 3.7 4.0 4.3   CL 99 96* 99   CO2 25 23 20*   BUN 16 18 16   CREATININE 0.8 0.7 0.7     No results for input(s): WBC, HGB, HCT, MCV, PLT in the last 72 hours. Estimated Creatinine Clearance: 87 mL/min (based on SCr of 0.7 mg/dL). No results found for: BNP    I independently reviewed all cardiac tracing from cardiac telemetry. I independently reviewed relevant and available cardiac diagnostic tests ECG, CXR, Echo, Stress test, Device interrogation, Holter, CT scan. Outside medical records via Care everywhere reviewed and summarized in H&P above. Complex medical condition with multiple medical problems affecting prognosis   Severe exacerbation of underlying medical condition requiring hospitalization and at risk of decompensation.        Thank you for allowing me to participate in the care of Flora Mcclendon     All questions and concerns were addressed to the patient/family. Alternatives to my treatment were discussed. I have discussed the above stated plan and the patient verbalized understanding and agreed with the plan. NOTE: This report was transcribed using voice recognition software. Every effort was made to ensure accuracy, however, inadvertent computerized transcription errors may be present.      Donelda Meckel, MD, MPH  [facility]   Office: [phone]

## 2020-11-28 NOTE — PROGRESS NOTES
Inpatient Occupational Therapy  Evaluation and Treatment    Unit: PCU  Date:  11/28/2020  Patient Name:    Stacey Bowers  Admitting diagnosis:  Heart failure [facility]) [I50.9]  Admit Date:  11/22/2020  Precautions/Restrictions/WB Status/ Lines/ Wounds/ Oxygen: fall risk, IV, bed/chair alarm, telemetry and continuous pulse ox    Treatment Time:  8158-0114  Treatment Number: 1     Billable Treatment Time: 30 minutes   Total Treatment Time:   40   minutes    Patient Goals for Therapy:  \" to go home \"      Discharge Recommendations: Home with PRN assist and home therapy  DME needs for discharge: Needs Met       Therapy recommendations for staff: Independent for transfers. History of Present Illness: per Vasile  The patient is a 57 y.o. female with CAD, CHF, HLD, HTN, DM, MRDD with recent dx of PE who presented to [facility] ED with complaint of SOB. Patient reports that she was just discharged home from [facility] and has continued to have chest pain and shortness of breath and reports swelling in her feet and ankles as well. She reports a cough now too but no fevers. She reports being compliant with her home medications including her anticoagulants. She reports not trying anything for her chest pain. Feels like this is the same as she felt while admitted for her PE.       Per Rob Reyes y.o. female presented with SOB and acute on chronic systolic heart failure.  Recently discharged from hospital with heart failure symptoms. C/o of SOB, increasing LE swelling, orthopnea and chest pain.        History of severe LV dysfunction and NICMP, s/p ICD (St. Esdras, implanted at [address] [place] [address]), pericardial effusion, DM, and prior CVA. Also history of non-compliance according to the chart. Home Health S4 Level Recommendation:  Level 2 Social  AM-PAC Score: AM-PAC Inpatient Daily Activity Raw Score: 21    Preadmission Environment    Pt.  [address] environment:    apartment  (1st floor)   Steps to enter first floor:   No steps      Steps to second floor: N/A  Bathroom:       Tub/Shower unit and Standard height toilet:  3-in-1 commode, Tub transfer bench, Grab bars in shower, hand-held shower  Equipment owned:      SPC and Standard Walker (this is possibly a RW, pt's story changes), tub transfer bench     Preadmission Status / PLOF:  History of falls             No  Pt. Able to drive          No - pt walks to grocery store, or friend takes her  Pt Fully independent with ADL's         Yes  Pt. Required assistance from family for:  Independent PTA    Pt. 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 Upper Extremity ROM:    WFL,  pt able to perform all bed mobility, transfers, and gait without ROM limitation. Upper Extremity Strength:    BUE strength WFL, but not formally assessed w/ MMT    Upper Extremity Sensation    WNL    Upper Extremity Proprioception:  WNL    Coordination and Tone  WNL    Balance  Functional Sitting Balance: WNL  Functional Standing Balance: WNL    Bed mobility:    Supine to sit:    Independent  Sit to supine:   Not Tested  Rolling:    Independent  Scooting in sitting:  Independent  Scooting to head of bed:   Not Tested    Bridging:   Independent    Transfers:    Sit to stand:  Supervision  Stand to sit:  Supervision  Bed to chair:   Supervision  Standard toilet: Not Tested  Bed to [facility]:  Not Tested    Dressing:      UE:   Not Tested  LE:    Independent    Bathing:    UE:  Not Tested  LE:  Not Tested    Eating:   Independent    Toileting:  Not Tested    Activity Tolerance   Pt completed therapy session with No adverse symptoms noted w/activity   BP HR SpO2   Supine, at rest 110/76 92bpm 99   Seated at /75 88bpm 95%     Positioning Needs:   Up in chair, call light and needs in reach. Exercise / Activities Initiated:   N/A    Patient/Family Education:   Role of OT  Recommendations for DC    Assessment of Deficits:   Pt demonstrated decreased activity tolerance, however, not limiting completion of daily needs. Pt is not in need of acute skilled occupational therapy services at this time. Pt may benefit from skilled occupational therapy within her own home in order to increase independence and confidence. At end of evaluation, pt. In bed side chair with call light and needs within reach. RN notified. Pt discharged from acute skilled occupational therapy. No goals set. No plan of care established. Please reorder if pt status changes. Thank you.          PHU Russo, OTR/L   [id number]           If patient discharges from this facility prior to next visit, this note will serve as the Discharge Summary

## 2020-11-28 NOTE — FLOWSHEET NOTE
11/28/20 0236   Vitals   Temp 97.9 °F (36.6 °C)   Temp Source Oral   Pulse 85   Heart Rate Source Monitor   Resp 15   /75   BP Location Left upper arm   BP Upper/Lower Upper   BP Method Automatic   Patient Position Semi fowlers   Level of Consciousness 0   MEWS Score 1   Patient Currently in Pain Denies   Oxygen Therapy   SpO2 94 %   O2 Device None (Room air)   Pt. In bed resting with eyes closed. No changes noted. Vitals WNL. Will continue to monitor,call light within reach.

## 2020-11-28 NOTE — PLAN OF CARE
Problem: OXYGENATION/RESPIRATORY FUNCTION  Goal: Patient will maintain patent airway  Outcome: Ongoing     Problem: HEMODYNAMIC STATUS  Goal: Patient has stable vital signs and fluid balance  Outcome: Ongoing     Problem: FLUID AND ELECTROLYTE IMBALANCE  Goal: Fluid and electrolyte balance are achieved/maintained  Outcome: Ongoing     Problem: Pain:  Goal: Pain level will decrease  Description: Pain level will decrease  Outcome: Ongoing

## 2020-11-28 NOTE — PROGRESS NOTES
Patient's EF (Ejection Fraction) is less than 40%    Patient's weights and intake/output reviewed:    Patient's Last Weight: 194.0 lbs obtained by standing scale. Difference of 4.4 lbs less than last documented weight. Intake/Output Summary (Last 24 hours) at 11/28/2020 1123  Last data filed at 11/28/2020 0903  Gross per 24 hour   Intake 1224 ml   Output 1600 ml   Net -376 ml         Pt is currently not on supplemental O2. Pt denies shortness of breath. Pt without lower extremity edema. Patient and/or Family's stated Goal of Care this Admission: reduce shortness of breath, increase activity tolerance, better understand heart failure and disease management, be more comfortable, reduce lower extremity edema and unable to assess, will attempt again prior to discharge      Comorbidities Reviewed Yes  Patient has a past medical history of Arthritis, CAD (coronary artery disease), Cerebral artery occlusion with cerebral infarction ([address] CHF (congestive heart failure) ([address] Diabetes mellitus ([address] Hyperlipidemia, Hypertension, Mental retardation, MI (myocardial infarction) ([address] and Pacemaker.          >>For CHF and Comorbidity documentation on Education Time and Topics, please see Education Tab    Elfego Feliz RN

## 2020-11-28 NOTE — DISCHARGE SUMMARY
Recently dx PE  - was just discharged from [facility] on 11/22 with acute LLL PE without evidence of right heart strain-> discharged home on Eliquis--> continue          Hx of NSVT   - continue amio and dig   - tele monitor   - Reported 22 beats of VTach by cardiac monitor   - Cardiology following   - replace electrolytes        Hypomagnesemia   - mild, 1.5  - Sliding scale replacement         CAD  - troponin negative   - EKG with sinus tachycardia and PACs with low voltage   - No acute ACS symptoms, stable         HLD  - Continue statin      HTN  - BP is low today . - med changes as above.         Obesity  - Body mass index is 39.3 kg/m². - Complicating assessment and treatment. Placing patient at risk for multiple co-morbidities as well as early death and contributing to the patient's presentation.   - Counseled on weight loss.        Depression  Behavioral issues  -Patient's home health agency requested psychiatric evaluation. Issues with noncompliance and multiple admissions.  -Seen in consultation by psychiatry. she is competent. -Recommending assisted living facility.  -Continue Seroquel and Cymbalta            Physical Exam Performed:     /81   Pulse 90   Temp 97 °F (36.1 °C) (Oral)   Resp 16   Ht 5' 1\" (1.549 m)   Wt 194 lb (88 kg)   SpO2 95%   BMI 36.66 kg/m²       General: awake, alert, oriented  No distress  Skin:  Warm and dry  Neck:  JVD absent. Neck supple  Chest:  Clear to auscultation, respiration easy. No wheezes, rales or rhonchi. Cardiovascular:  RRR ,S1S2 normal  Abdomen:  Soft, non tender, non distended, BS +  Extremities:  2 + bibiana LE edema. Intact peripheral pulses. Brisk cap refill, < 2 secs  Neuro: non focal         Labs:  For convenience and continuity at follow-up the following most recent labs are provided:      CBC:    Lab Results   Component Value Date    WBC 6.9 11/23/2020    HGB 13.4 11/23/2020    HCT 41.0 11/23/2020     11/23/2020       Renal:    Lab Results   Component Value Date     11/28/2020    K 4.3 11/28/2020    CL 99 11/28/2020    CO2 20 11/28/2020    BUN 16 11/28/2020    CREATININE 0.7 11/28/2020    CALCIUM 8.9 11/28/2020    PHOS 3.3 11/20/2020         Significant Diagnostic Studies    Radiology:   IR MIDLINE CATH   Final Result   Ultrasound for placement of PICC line. XR CHEST (2 VW)   Final Result   1. Cardiomegaly with mild congestive heart failure. Consults:     IP CONSULT TO SOCIAL WORK  IP CONSULT TO CARDIOLOGY  PALLIATIVE CARE EVAL  IP CONSULT TO PSYCHIATRY  IP CONSULT TO HOME CARE NEEDS    Disposition:  home     Condition at Discharge: Stable    Discharge Instructions/Follow-up:  PCP 1 week. Cardiology 1 week. Home care ongoing.      Code Status:  Full Code     Activity: activity as tolerated    Diet: cardiac diet and diabetic diet      Discharge Medications:     Current Discharge Medication List           Details   carvedilol (COREG) 3.125 MG tablet Take 1 tablet by mouth 2 times daily (with meals)  Qty: 60 tablet, Refills: 3      torsemide (DEMADEX) 20 MG tablet Take 1 tablet by mouth daily  Qty: 30 tablet, Refills: 3              Details   metFORMIN (GLUCOPHAGE) 1000 MG tablet Take 1,000 mg by mouth 2 times daily (with meals)      QUEtiapine (SEROQUEL XR) 50 MG extended release tablet Take 50 mg by mouth nightly      ondansetron (ZOFRAN) 4 MG tablet Take 4 mg by mouth every 8 hours as needed for Nausea or Vomiting      apixaban starter pack (ELIQUIS DVT/PE STARTER PACK) 5 MG TBPK tablet Take 1 tablet by mouth See Admin Instructions  Qty: 74 tablet, Refills: 0      insulin aspart (NOVOLOG FLEXPEN) 100 UNIT/ML injection pen Inject 10 Units into the skin 3 times daily (before meals)  Qty: 5 pen, Refills: 0      insulin glargine (LANTUS SOLOSTAR) 100 UNIT/ML injection pen Inject 24 Units into the skin nightly  Qty: 5 pen, Refills: 0      amiodarone (CORDARONE) 200 MG tablet Take 1 tablet by mouth daily  Qty: 90 tablet, Refills: 3    Associated Diagnoses: SVT (supraventricular tachycardia) ([address] Atrial tachycardia (HCC)      metoclopramide (REGLAN) 10 MG tablet Take 1 tablet by mouth 3 times daily (with meals)  Qty: 30 tablet, Refills: 0      sucralfate (CARAFATE) 1 GM tablet Take 1 tablet by mouth 3 times daily  Qty: 90 tablet, Refills: 0      pantoprazole (PROTONIX) 40 MG tablet Take 1 tablet by mouth daily  Qty: 90 tablet, Refills: 0      vitamin D (ERGOCALCIFEROL) 1.25 MG (55393 UT) CAPS capsule Take 50,000 Units by mouth once a week Takes weekly on Sundays      digoxin (LANOXIN) 125 MCG tablet Take 1 tablet by mouth every other day  Qty: 30 tablet, Refills: 3      nitroGLYCERIN (NITROSTAT) 0.4 MG SL tablet up to max of 3 total doses. If no relief after 1 dose, call 911. Qty: 25 tablet, Refills: 3      aspirin 81 MG EC tablet Take 1 tablet by mouth daily  Qty: 30 tablet, Refills: 2      ranolazine (RANEXA) 500 MG extended release tablet Take 1 tablet by mouth 2 times daily  Qty: 60 tablet, Refills: 2      DULoxetine (CYMBALTA) 60 MG extended release capsule Take 1 capsule by mouth daily  Qty: 30 capsule, Refills: 0      atorvastatin (LIPITOR) 40 MG tablet Take 1 tablet by mouth nightly  Qty: 30 tablet, Refills: 2      fenofibrate micronized (LOFIBRA) 200 MG capsule Take 1 capsule by mouth nightly  Qty: 30 capsule, Refills: 3      miconazole (MICOTIN) 2 % powder Apply topically 2 times daily. Qty: 45 g, Refills: 1      [employer] Lancets Ultra Thin MISC 1 each by Does not apply route 4 times daily  Qty: 200 each, Refills: 0      blood glucose monitor strips Test three times a day & as needed for symptoms of irregular blood glucose. Qty: 100 strip, Refills: 2    Comments: Brand per patient preference. May round up to next available package size. Time Spent on discharge is more than 30 minutes in the examination, evaluation, counseling and review of medications and discharge plan.       Signed:    Lucy Ramos MD   11/28/2020      Thank you FRITZ Aburto NP for the opportunity to be involved in this patient's care. If you have any questions or concerns please feel free to contact me at [phone].

## 2020-11-28 NOTE — FLOWSHEET NOTE
11/27/20 1901   Vitals   Temp 97.7 °F (36.5 °C)   Temp Source Oral   Pulse 93   Heart Rate Source Monitor   Resp 16   /76   BP Location Left upper arm   BP Upper/Lower Upper   BP Method Automatic   Level of Consciousness 0   MEWS Score 1   Patient Currently in Pain Denies   Oxygen Therapy   SpO2 97 %   O2 Device None (Room air)   Shift assessment completed-see flow sheet. Patient in bed awake,alert and oriented x4. Vitals WNL. Evening medications given per order. Patient denies any discomfort at this time. Pt.denies any needs. Will continue to monitor,call light within reach.

## 2020-11-28 NOTE — PROGRESS NOTES
Patient educated on discharge instructions as well as new medications use, dosage, administration and possible side effects. Patient verified knowledge. PICC Line removed without difficulty and dry occlusive dressing in place. Telemetry monitor removed and returned to [facility]. Pt left facility in stable condition to home (with home care) with all of their personal belongings.       Christen Miranda RN

## 2020-11-28 NOTE — PROGRESS NOTES
PT/OT consult and treat orders placed. D/C orders placed. Will be going home with same home care she had on admission.

## 2020-11-28 NOTE — PROGRESS NOTES
Inpatient Physical Therapy Evaluation and Treatment    Unit: PCU  Date:  11/28/2020  Patient Name:    Roxi Blackman  Admitting diagnosis:  Heart failure [facility]) [I50.9]  Admit Date:  11/22/2020  Precautions/Restrictions/WB Status/ Lines/ Wounds/ Oxygen: Fall risk, Lines -IV and Telemetry    Treatment Time:  11:48-12:28  Treatment Number:  1   Timed Code Treatment Minutes: 30 minutes  Total Treatment Minutes:  40 minutes    Patient Goals for Therapy: \" to go home \"          Discharge Recommendations: Home PRN assist and with home PT   DME needs for discharge: Needs Met       Therapy recommendation for EMS Transport: NA    Therapy recommendations for staff: Independent with use of rolling walker (RW) for all transfers and ambulation to/from [facility]  to/from chair  to/from bathroom    History Of Present Illness:     per Sharan Chahal  The patient is a 62 y.o. female with CAD, CHF, HLD, HTN, DM, MRDD with recent dx of PE who presented to [facility] ED with complaint of SOB. Patient reports that she was just discharged home from [facility] and has continued to have chest pain and shortness of breath and reports swelling in her feet and ankles as well. She reports a cough now too but no fevers. She reports being compliant with her home medications including her anticoagulants. She reports not trying anything for her chest pain. Feels like this is the same as she felt while admitted for her PE. Elgin Sampson   62 y.o. female presented with SOB and acute on chronic systolic heart failure. Recently discharged from hospital with heart failure symptoms. C/o of SOB, increasing LE swelling, orthopnea and chest pain.        History of severe LV dysfunction and NICMP, s/p ICD (St. Esdras, implanted at [facility]), pericardial effusion, DM, and prior CVA. Also history of non-compliance according to the chart.   Home Health S4 Level Recommendation:  Level 2 Social  AM-PAC Mobility Score    AM-PAC Inpatient Mobility Raw Score : 23 Information obtained from OT/PT note on 2/1/2020 and edited as needed.   Preadmission Environment    Pt. [address] [place] [address] Bl environment:    apartment  (1st floor)   Steps to enter first floor:   No steps      Steps to second floor: N/A  Bathroom:       Tub/Shower unit and Standard height toilet:  3-in-1 commode, Tub transfer bench, Grab bars in shower, hand-held shower  Equipment owned:      SPC and Standard Walker (this is possibly a RW, pt's story changes), tub transfer bench     Preadmission Status / PLOF:  History of falls             No  Pt. Able to drive          No - pt walks to grocery store, or friend takes her  Pt Fully independent with ADL's         Yes  Pt. Required assistance from family for:  Independent PTA    Pt. 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 Pt agreeable to this PT eval & tx. Upper Extremity ROM/Strength  Please see OT evaluation.       Lower Extremity ROM / Strength   AROM WFL: Yes  ROM limitations:   WFL for transfers/ambulation    Lower Extremity Sensation    NT    Lower Extremity Proprioception:   NT    Coordination and Tone  NT    Balance  Sitting:  Normal; Independent  Comments:     Standing: Good ; Supervision  Comments: used RW    Bed Mobility   Supine to Sit:    Independent  Sit to Supine:   Not Tested  Rolling:   Not Tested  Scooting in sitting: Independent  Scooting in supine:  Not Tested    Transfer Training     Sit to stand:   Supervision  Stand to sit:   Supervision  Bed to Chair:   Supervision with use of gait belt and rolling walker (RW)    Gait gait completed as indicated below  Distance:      50 ft  Deviations (firm surface/linoleum):  decreased bonnie  Assistive Device Used:    gait belt and rolling walker (RW)  Level of Assist:    Supervision  Comment:     Stair Training deferred, pt does not have stairs in home environment  Activity Tolerance   Pt completed therapy session with No adverse symptoms noted w/activity   BP HR SpO2   Supine, at rest 110/76 92bpm 99   Seated at /75 88bpm 95%     Positioning Needs   Pt up in chair, no alarm needed, positioned in proper neutral alignment and pressure relief provided. Call light provided and all needs within reach    Other  RN aware of mobility status    Patient/Family Education   Pt educated on role of inpatient PT, POC, importance of continued activity, DC recommendations, safety awareness and transfer techniques. Assessment  Pt seen for Physical Therapy evaluation in acute care setting. Pt demonstrated decreased Activity tolerance as well as decreased independence with Ambulation and Transfers. Patient presents with decreased activity tolerance/endurance. Patient is scheduled to dc this date. Recommending home with [address] [place] [address] with home PT upon discharge as patient functioning would benefit from continued therapy services      [address] [place] [address] acute care PT    Signature: Vincent Dixon, PT #[id number]    If patient discharges from this facility prior to next visit, this note will serve as the Discharge Summary.

## 2020-11-28 NOTE — PROGRESS NOTES
PICC Line removed. Occlusive dressing in place. Patient to lie flat for 30 minutes. Will continue to monitor.

## 2020-11-28 NOTE — PLAN OF CARE
Discharge Care Plan Complete  Problem: OXYGENATION/RESPIRATORY FUNCTION  Goal: Patient will maintain patent airway  11/28/2020 1125 by Craig Davalos RN  Outcome: Completed  11/27/2020 2233 by Dina Oleary RN  Outcome: Ongoing  Goal: Patient will achieve/maintain normal respiratory rate/effort  Description: Respiratory rate and effort will be within normal limits for the patient  Outcome: Completed     Problem: HEMODYNAMIC STATUS  Goal: Patient has stable vital signs and fluid balance  11/28/2020 1125 by Craig Davalos RN  Outcome: Completed  11/27/2020 2233 by Dina Oleary RN  Outcome: Ongoing     Problem: FLUID AND ELECTROLYTE IMBALANCE  Goal: Fluid and electrolyte balance are achieved/maintained  11/28/2020 1125 by Craig Davalos RN  Outcome: Completed  11/27/2020 2233 by Dina Oleary RN  Outcome: Ongoing     Problem: ACTIVITY INTOLERANCE/IMPAIRED MOBILITY  Goal: Mobility/activity is maintained at optimum level for patient  Outcome: Completed     Problem: Pain:  Goal: Pain level will decrease  Description: Pain level will decrease  11/28/2020 1125 by Craig Davalos RN  Outcome: Completed  11/27/2020 2233 by Dina Oleary RN  Outcome: Ongoing  Goal: Control of acute pain  Description: Control of acute pain  Outcome: Completed  Goal: Control of chronic pain  Description: Control of chronic pain  Outcome: Completed

## 2020-11-30 ENCOUNTER — FOLLOWUP TELEPHONE ENCOUNTER (OUTPATIENT)
Dept: TELEMETRY | Age: 60
End: 2020-11-30

## 2020-11-30 NOTE — TELEPHONE ENCOUNTER
Care transition faxed to [facility], APRN - NP. Care transition included reason for hospitalization, procedures performed during hospitalization, services provided during hospitalization, discharge medications, and follow-up treatment and services needed.

## 2020-11-30 NOTE — TELEPHONE ENCOUNTER
1st Attempt; No Answer- Left HIPAA compliant voicemail with Non-Urgent Heart Failure Resource Line number for call back.     Caroline Ghosh HF BSN-RN  Heart Failure Navigator  [address] [place] [address]  [phone]

## 2020-12-01 NOTE — TELEPHONE ENCOUNTER
2nd Attempt; No Answer- Left HIPAA compliant voicemail with Non-Urgent Heart Failure Resource Line number for call back.     Lizzette Guerrero HF BSN-RN  Heart Failure Navigator  [address] [place] [address]  [phone]

## 2020-12-01 NOTE — TELEPHONE ENCOUNTER
3rd Attempt; No Answer- Left HIPAA compliant voicemail with Non-Urgent Heart Failure Resource Line number for call back.     Otis Ramos HF BSN-RN  Heart Failure Navigator  [address] [place] [address]  [phone]

## 2020-12-02 ENCOUNTER — HOSPITAL ENCOUNTER (EMERGENCY)
Age: 60
Discharge: HOME | End: 2020-12-02
Attending: EMERGENCY MEDICINE
Payer: MEDICARE

## 2020-12-02 ENCOUNTER — TELEPHONE (OUTPATIENT)
Dept: OTHER | Facility: CLINIC | Age: 60
End: 2020-12-02

## 2020-12-02 ENCOUNTER — APPOINTMENT (OUTPATIENT)
Dept: GENERAL RADIOLOGY | Age: 60
End: 2020-12-02
Payer: MEDICARE

## 2020-12-02 VITALS
RESPIRATION RATE: 16 BRPM | TEMPERATURE: 98.5 F | HEIGHT: 61 IN | OXYGEN SATURATION: 98 % | WEIGHT: 194 LBS | DIASTOLIC BLOOD PRESSURE: 72 MMHG | HEART RATE: 91 BPM | BODY MASS INDEX: 36.63 KG/M2 | SYSTOLIC BLOOD PRESSURE: 125 MMHG

## 2020-12-02 LAB
A/G RATIO: 1.2 (ref 1.1–2.2)
ALBUMIN SERPL-MCNC: 3.6 G/DL (ref 3.4–5)
ALP BLD-CCNC: 93 U/L (ref 40–129)
ALT SERPL-CCNC: 12 U/L (ref 10–40)
ANION GAP SERPL CALCULATED.3IONS-SCNC: 12 MMOL/L (ref 3–16)
AST SERPL-CCNC: 14 U/L (ref 15–37)
BASOPHILS ABSOLUTE: 0 K/UL (ref 0–0.2)
BASOPHILS RELATIVE PERCENT: 0.7 %
BILIRUB SERPL-MCNC: 1.1 MG/DL (ref 0–1)
BILIRUBIN URINE: NEGATIVE
BLOOD, URINE: NEGATIVE
BUN BLDV-MCNC: 11 MG/DL (ref 7–20)
CALCIUM SERPL-MCNC: 8.7 MG/DL (ref 8.3–10.6)
CHLORIDE BLD-SCNC: 93 MMOL/L (ref 99–110)
CLARITY: CLEAR
CO2: 24 MMOL/L (ref 21–32)
COLOR: YELLOW
CREAT SERPL-MCNC: 0.6 MG/DL (ref 0.6–1.1)
DIGOXIN LEVEL: <0.3 NG/ML (ref 0.8–2)
EKG ATRIAL RATE: 91 BPM
EKG DIAGNOSIS: NORMAL
EKG P AXIS: 24 DEGREES
EKG P-R INTERVAL: 172 MS
EKG Q-T INTERVAL: 402 MS
EKG QRS DURATION: 104 MS
EKG QTC CALCULATION (BAZETT): 494 MS
EKG R AXIS: 49 DEGREES
EKG T AXIS: 75 DEGREES
EKG VENTRICULAR RATE: 91 BPM
EOSINOPHILS ABSOLUTE: 0.2 K/UL (ref 0–0.6)
EOSINOPHILS RELATIVE PERCENT: 2.9 %
GFR AFRICAN AMERICAN: >60
GFR NON-AFRICAN AMERICAN: >60
GLOBULIN: 2.9 G/DL
GLUCOSE BLD-MCNC: 352 MG/DL (ref 70–99)
GLUCOSE URINE: >=1000 MG/DL
HCT VFR BLD CALC: 43.2 % (ref 36–48)
HEMOGLOBIN: 13.8 G/DL (ref 12–16)
KETONES, URINE: NEGATIVE MG/DL
LEUKOCYTE ESTERASE, URINE: NEGATIVE
LIPASE: 17 U/L (ref 13–60)
LYMPHOCYTES ABSOLUTE: 1.1 K/UL (ref 1–5.1)
LYMPHOCYTES RELATIVE PERCENT: 18 %
MAGNESIUM: 1.5 MG/DL (ref 1.8–2.4)
MCH RBC QN AUTO: 28 PG (ref 26–34)
MCHC RBC AUTO-ENTMCNC: 32 G/DL (ref 31–36)
MCV RBC AUTO: 87.5 FL (ref 80–100)
MICROSCOPIC EXAMINATION: ABNORMAL
MONOCYTES ABSOLUTE: 0.4 K/UL (ref 0–1.3)
MONOCYTES RELATIVE PERCENT: 6.5 %
NEUTROPHILS ABSOLUTE: 4.5 K/UL (ref 1.7–7.7)
NEUTROPHILS RELATIVE PERCENT: 71.9 %
NITRITE, URINE: NEGATIVE
PDW BLD-RTO: 17 % (ref 12.4–15.4)
PH UA: 5.5 (ref 5–8)
PLATELET # BLD: 268 K/UL (ref 135–450)
PMV BLD AUTO: 9.6 FL (ref 5–10.5)
POTASSIUM REFLEX MAGNESIUM: 3.2 MMOL/L (ref 3.5–5.1)
PRO-BNP: 2428 PG/ML (ref 0–124)
PROTEIN UA: NEGATIVE MG/DL
RBC # BLD: 4.93 M/UL (ref 4–5.2)
SODIUM BLD-SCNC: 129 MMOL/L (ref 136–145)
SPECIFIC GRAVITY UA: 1.02 (ref 1–1.03)
TOTAL PROTEIN: 6.5 G/DL (ref 6.4–8.2)
TROPONIN: <0.01 NG/ML
URINE TYPE: ABNORMAL
UROBILINOGEN, URINE: 0.2 E.U./DL
WBC # BLD: 6.2 K/UL (ref 4–11)

## 2020-12-02 PROCEDURE — 83690 ASSAY OF LIPASE: CPT

## 2020-12-02 PROCEDURE — 93010 ELECTROCARDIOGRAM REPORT: CPT | Performed by: INTERNAL MEDICINE

## 2020-12-02 PROCEDURE — 83735 ASSAY OF MAGNESIUM: CPT

## 2020-12-02 PROCEDURE — 80162 ASSAY OF DIGOXIN TOTAL: CPT

## 2020-12-02 PROCEDURE — 99284 EMERGENCY DEPT VISIT MOD MDM: CPT

## 2020-12-02 PROCEDURE — 93005 ELECTROCARDIOGRAM TRACING: CPT | Performed by: EMERGENCY MEDICINE

## 2020-12-02 PROCEDURE — 83880 ASSAY OF NATRIURETIC PEPTIDE: CPT

## 2020-12-02 PROCEDURE — 6370000000 HC RX 637 (ALT 250 FOR IP): Performed by: EMERGENCY MEDICINE

## 2020-12-02 PROCEDURE — 80053 COMPREHEN METABOLIC PANEL: CPT

## 2020-12-02 PROCEDURE — 71045 X-RAY EXAM CHEST 1 VIEW: CPT

## 2020-12-02 PROCEDURE — 84484 ASSAY OF TROPONIN QUANT: CPT

## 2020-12-02 PROCEDURE — 85025 COMPLETE CBC W/AUTO DIFF WBC: CPT

## 2020-12-02 PROCEDURE — 81003 URINALYSIS AUTO W/O SCOPE: CPT

## 2020-12-02 RX ORDER — ONDANSETRON 4 MG/1
4 TABLET, FILM COATED ORAL EVERY 8 HOURS PRN
Qty: 24 TABLET | Refills: 0 | Status: SHIPPED | OUTPATIENT
Start: 2020-12-02

## 2020-12-02 RX ORDER — ONDANSETRON 4 MG/1
4 TABLET, ORALLY DISINTEGRATING ORAL ONCE
Status: COMPLETED | OUTPATIENT
Start: 2020-12-02 | End: 2020-12-02

## 2020-12-02 RX ADMIN — ONDANSETRON 4 MG: 4 TABLET, ORALLY DISINTEGRATING ORAL at 06:28

## 2020-12-02 ASSESSMENT — ENCOUNTER SYMPTOMS
RHINORRHEA: 0
NAUSEA: 1
SHORTNESS OF BREATH: 0
DIARRHEA: 1
WHEEZING: 0
COUGH: 0
PHOTOPHOBIA: 0
ABDOMINAL DISTENTION: 0
BACK PAIN: 0
VOMITING: 1

## 2020-12-02 ASSESSMENT — PAIN SCALES - GENERAL: PAINLEVEL_OUTOF10: 10

## 2020-12-02 ASSESSMENT — PAIN DESCRIPTION - LOCATION: LOCATION: CHEST

## 2020-12-02 ASSESSMENT — PAIN DESCRIPTION - PAIN TYPE: TYPE: ACUTE PAIN

## 2020-12-02 NOTE — ED PROVIDER NOTES
Emergency Department Provider Note  Location: [place] [address] ED  12/2/2020     Patient Identification  Zeke Leo is a 62 y.o. female    Chief Complaint  Emesis (pt arrives via EMS, c/o nausea, vomiting, diarrhea for 2 days. pt states home nausea meds not working)          HPI  (History provided by patient)  Patient is a 64-year-old female history of CAD, CHF with reduced EF 10 to 15% status post [facility] defibrillator, on digoxin on Eliquis, recently discharged November 28 who presents with nausea vomiting diarrhea and chest discomfort. Patient states that symptoms have been present for 2 days. She reports her chest pain is similar to recurring pain that she has had over the past 3 to 4 months. She states that is a constant pain when it starts achy sensation in the center of her chest.  Is not pleuritic. She states it is exertional.  Patient states she feels slightly more short of breath. She reports a cough. No fevers no chills. Nonbloody nonbilious emesis. Watery stools without blood. Patient was discharged over the past week for CHF exacerbation and left ventricular thrombus. I have reviewed the following nursing documentation:  Allergies: No Known Allergies    Past medical history:  has a past medical history of Arthritis, CAD (coronary artery disease), Cerebral artery occlusion with cerebral infarction ([address] CHF (congestive heart failure) ([address] Diabetes mellitus ([address] Hyperlipidemia, Hypertension, Mental retardation, MI (myocardial infarction) ([address] and Pacemaker. Past surgical history:  has a past surgical history that includes Pacemaker insertion; tumor removal; Tubal ligation; back surgery; Upper gastrointestinal endoscopy (N/A, 11/5/2020); and IR MIDLINE CATH (11/23/2020). Home medications:   Prior to Admission medications    Medication Sig Start Date End Date Taking?  Authorizing Provider   carvedilol (COREG) 3.125 MG tablet Take 1 tablet by mouth 2 times daily (with meals) 11/28/20  Yes Toshia Humphries MD   torsemide (DEMADEX) 20 MG tablet Take 1 tablet by mouth daily 11/28/20  Yes Toshia Humphries MD   metFORMIN (GLUCOPHAGE) 1000 MG tablet Take 1,000 mg by mouth 2 times daily (with meals)   Yes Historical Provider, MD   QUEtiapine (SEROQUEL XR) 50 MG extended release tablet Take 50 mg by mouth nightly   Yes Historical Provider, MD   ondansetron (ZOFRAN) 4 MG tablet Take 4 mg by mouth every 8 hours as needed for Nausea or Vomiting   Yes Historical Provider, MD   apixaban starter pack (ELIQUIS DVT/PE STARTER PACK) 5 MG TBPK tablet Take 1 tablet by mouth See Admin Instructions 11/20/20 12/20/20 Yes Eduard Lynne MD   insulin aspart (NOVOLOG FLEXPEN) 100 UNIT/ML injection pen Inject 10 Units into the skin 3 times daily (before meals) 11/13/20  Yes Cony Duenas MD   insulin glargine (LANTUS SOLOSTAR) 100 UNIT/ML injection pen Inject 24 Units into the skin nightly 11/13/20  Yes Cony Duenas MD   amiodarone (CORDARONE) 200 MG tablet Take 1 tablet by mouth daily 11/10/20  Yes Nora Persaud MD   metoclopramide (REGLAN) 10 MG tablet Take 1 tablet by mouth 3 times daily (with meals) 11/6/20  Yes Marija Riddle MD   sucralfate (CARAFATE) 1 GM tablet Take 1 tablet by mouth 3 times daily 11/6/20  Yes Marija Riddle MD   pantoprazole (PROTONIX) 40 MG tablet Take 1 tablet by mouth daily 11/6/20  Yes Marija Riddle MD   vitamin D (ERGOCALCIFEROL) 1.25 MG (43257 UT) CAPS capsule Take 50,000 Units by mouth once a week Takes weekly on Sundays   Yes Historical Provider, MD   digoxin (LANOXIN) 125 MCG tablet Take 1 tablet by mouth every other day 9/25/20  Yes Nicki Merlin, APRN - CNP   nitroGLYCERIN (NITROSTAT) 0.4 MG SL tablet up to max of 3 total doses.  If no relief after 1 dose, call 911. 8/24/20  Yes Eduard Lynne MD   aspirin 81 MG EC tablet Take 1 tablet by mouth daily 7/18/20  Yes Carine Wyatt MD ranolazine (RANEXA) 500 MG extended release tablet Take 1 tablet by mouth 2 times daily 7/18/20  Yes Leandro Mtz MD   DULoxetine (CYMBALTA) 60 MG extended release capsule Take 1 capsule by mouth daily 7/18/20  Yes Leandro Mtz MD   atorvastatin (LIPITOR) 40 MG tablet Take 1 tablet by mouth nightly 7/18/20  Yes Leandro Mtz MD   fenofibrate micronized (LOFIBRA) 200 MG capsule Take 1 capsule by mouth nightly 7/18/20  Yes Leandro Mtz MD   miconazole (MICOTIN) 2 % powder Apply topically 2 times daily. 7/18/20  Yes Leandro Mtz MD   [employer] Lancets Ultra Thin MISC 1 each by Does not apply route 4 times daily 7/18/20  Yes Leandro Mtz MD   blood glucose monitor strips Test three times a day & as needed for symptoms of irregular blood glucose. 5/12/19  Yes Ismael Cobos MD       Social history:  reports that she has never smoked. She has never used smokeless tobacco. She reports that she does not drink alcohol or use drugs. Family history:    Family History   Problem Relation Age of Onset    Heart Disease Father     Cancer Mother     Other Mother          ROS  Review of Systems   Constitutional: Negative for chills and fever. HENT: Negative for congestion and rhinorrhea. Eyes: Negative for photophobia and visual disturbance. Respiratory: Negative for cough, shortness of breath and wheezing. Cardiovascular: Positive for chest pain. Negative for palpitations. Gastrointestinal: Positive for diarrhea, nausea and vomiting. Negative for abdominal distention. Genitourinary: Negative for dysuria and hematuria. Musculoskeletal: Negative for back pain and neck pain. Skin: Negative for rash and wound. Neurological: Negative for syncope and weakness. Psychiatric/Behavioral: Negative for agitation and confusion.          Exam  ED Triage Vitals   BP Temp Temp src Pulse Resp SpO2 Height Weight   -- -- -- -- -- -- -- --       Physical Type of Exam: Initial FINDINGS: A single frontal view of the chest demonstrates no acute skeletal abnormality. There is a stable left subclavian pacemaker/AICD noted. There has been interval enlargement of the cardiopericardial silhouette, suggestive of an enlarging pericardial effusion as seen on prior studies. There are small bilateral pleural effusions. There is associated bibasilar airspace disease. The mid and upper lung zones are clear. There is no evidence of a pneumothorax. 1. Small bilateral pleural effusions with associated bibasilar airspace disease, most likely passive atelectasis, though aspiration or pneumonia are also diagnostic considerations. 2. Interval enlargement of the cardiopericardial silhouette, most consistent with an enlarging pericardial effusion.          Labs  Results for orders placed or performed during the hospital encounter of 12/02/20   CBC Auto Differential   Result Value Ref Range    WBC 6.2 4.0 - 11.0 K/uL    RBC 4.93 4.00 - 5.20 M/uL    Hemoglobin 13.8 12.0 - 16.0 g/dL    Hematocrit 43.2 36.0 - 48.0 %    MCV 87.5 80.0 - 100.0 fL    MCH 28.0 26.0 - 34.0 pg    MCHC 32.0 31.0 - 36.0 g/dL    RDW 17.0 (H) 12.4 - 15.4 %    Platelets 678 695 - 980 K/uL    MPV 9.6 5.0 - 10.5 fL    Neutrophils % 71.9 %    Lymphocytes % 18.0 %    Monocytes % 6.5 %    Eosinophils % 2.9 %    Basophils % 0.7 %    Neutrophils Absolute 4.5 1.7 - 7.7 K/uL    Lymphocytes Absolute 1.1 1.0 - 5.1 K/uL    Monocytes Absolute 0.4 0.0 - 1.3 K/uL    Eosinophils Absolute 0.2 0.0 - 0.6 K/uL    Basophils Absolute 0.0 0.0 - 0.2 K/uL   EKG 12 Lead   Result Value Ref Range    Ventricular Rate 91 BPM    Atrial Rate 91 BPM    P-R Interval 172 ms    QRS Duration 104 ms    Q-T Interval 402 ms    QTc Calculation (Bazett) 494 ms    P Axis 24 degrees    R Axis 49 degrees    T Axis 75 degrees    Diagnosis       Sinus rhythm with sinus arrhythmia with occasional Premature ventricular complexesAnterolateral infarct , age undeterminedAbnormal ECG         [facility]  Patient seen and evaluated. Relevant records reviewed. 79-year-old female with multiple comorbidities CHF with reduced EF status post pacer recently discharged from hospital for CHF exacerbation who presents with nausea vomiting diarrhea. On exam she is overall well-appearing no acute distress, reassuring vital signs. She has an overall reassuring and unremarkable physical exam.  Her EKG does not show any clear ischemic pattern no evidence of strain or QT alternans. Her vitals are normal.  Not concerned for acute pericardial disease process. Labs, imaging pending at this time. Patient's care signed out to oncoming physician Dr. Lashell Chacon who is aware of plan to follow-up with pending orders and reassess patient for ultimate disposition. See her note for final disposition details. 7:12 AM      Clinical Impression:  1. Non-intractable vomiting with nausea, unspecified vomiting type              Blood pressure 118/85, pulse 87, temperature 98.5 °F (36.9 °C), temperature source Oral, resp. rate 16, height 5' 1\" (1.549 m), weight 194 lb (88 kg), SpO2 96 %, not currently breastfeeding. Patient was given scripts for the following medications. I counseled patient how to take these medications. New Prescriptions    No medications on file       Disposition referral (if applicable):  No follow-up provider specified. Total critical care time is 0 minutes, which excludes separately billable procedures and updating family. Time spent is specifically for management of the presenting complaint and symptoms initially, direct bedside care, reevaluation, review of records, and consultation. There was a high probability of clinically significant life-threatening deterioration in the patient's condition, which required my urgent intervention.      This chart was generated in part by using Dragon Dictation system and may contain errors related to that system including errors in grammar, punctuation, and spelling, as well as words and phrases that may be inappropriate. If there are any questions or concerns please feel free to contact the dictating provider for clarification.      Kaykay Cabral MD  [address] MD  12/02/20 0303

## 2020-12-02 NOTE — ED NOTES
Attempted to have lab come draw patient due to her being a hard stick. She states she is not able to send due to them being on the floor.       Dacia Wooten RN  12/02/20 2055

## 2020-12-02 NOTE — ED PROVIDER NOTES
Emergency Department Encounter  Location: [place] [address] ED  Open Note Time:  7:16 AM    Patient: Saúl Friedman  MRN: [id number]  : 1960  Date of evaluation: 2020  ED Provider: Titus Gonsalez MD    7:16 AM  Saúl Friedman was checked out to me by Dr. Kyle Valenzuela. Please see his/her initial documentation for details of the patient's initial ED presentation, physical exam and completed studies. In brief, Saúl Friedman is a 62 y.o. female who presented to the emergency department nausea vomiting and diarrhea. Current studies pending and/or plan: Labs and reassessment    I wore goggles, surgical mask, N95 mask and gloves when I evaluated the patient. Physical Exam  Constitutional:       General: She is not in acute distress. Appearance: Normal appearance. She is normal weight. She is not ill-appearing, toxic-appearing or diaphoretic. HENT:      Head: Normocephalic and atraumatic. Eyes:      Pupils: Pupils are equal, round, and reactive to light. Cardiovascular:      Rate and Rhythm: Normal rate and regular rhythm. Pulses: Normal pulses. Heart sounds: Normal heart sounds. Pulmonary:      Effort: Pulmonary effort is normal. No respiratory distress. Breath sounds: Normal breath sounds. No wheezing or rales. Abdominal:      General: Abdomen is flat. Bowel sounds are normal. There is no distension. Palpations: Abdomen is soft. Tenderness: There is no abdominal tenderness. There is no guarding. Musculoskeletal: Normal range of motion. Skin:     General: Skin is warm and dry. Neurological:      General: No focal deficit present. Mental Status: She is alert.          I have reviewed and interpreted all of the currently available lab results and diagnostics from this visit:      Procedures/interventions/images ordered for this visit  Orders Placed This Encounter   Procedures    XR CHEST PORTABLE    CBC Auto Differential    Urinalysis, reflex to microscopic    Comprehensive Metabolic Panel w/ Reflex to MG    Lipase    Troponin    Brain Natriuretic Peptide    Digoxin Level    Telemetry monitoring    EKG 12 Lead    Insert peripheral IV       Medications ordered for this visit  Orders Placed This Encounter   Medications    ondansetron (ZOFRAN-ODT) disintegrating tablet 4 mg       LABS  Results for orders placed or performed during the hospital encounter of 12/02/20   CBC Auto Differential   Result Value Ref Range    WBC 6.2 4.0 - 11.0 K/uL    RBC 4.93 4.00 - 5.20 M/uL    Hemoglobin 13.8 12.0 - 16.0 g/dL    Hematocrit 43.2 36.0 - 48.0 %    MCV 87.5 80.0 - 100.0 fL    MCH 28.0 26.0 - 34.0 pg    MCHC 32.0 31.0 - 36.0 g/dL    RDW 17.0 (H) 12.4 - 15.4 %    Platelets 611 386 - 970 K/uL    MPV 9.6 5.0 - 10.5 fL    Neutrophils % 71.9 %    Lymphocytes % 18.0 %    Monocytes % 6.5 %    Eosinophils % 2.9 %    Basophils % 0.7 %    Neutrophils Absolute 4.5 1.7 - 7.7 K/uL    Lymphocytes Absolute 1.1 1.0 - 5.1 K/uL    Monocytes Absolute 0.4 0.0 - 1.3 K/uL    Eosinophils Absolute 0.2 0.0 - 0.6 K/uL    Basophils Absolute 0.0 0.0 - 0.2 K/uL   EKG 12 Lead   Result Value Ref Range    Ventricular Rate 91 BPM    Atrial Rate 91 BPM    P-R Interval 172 ms    QRS Duration 104 ms    Q-T Interval 402 ms    QTc Calculation (Bazett) 494 ms    P Axis 24 degrees    R Axis 49 degrees    T Axis 75 degrees    Diagnosis       Sinus rhythm with sinus arrhythmia with occasional Premature ventricular complexesAnterolateral infarct , age undeterminedAbnormal ECG       IMAGING  Xr Chest Portable    Result Date: 12/2/2020  EXAMINATION: ONE XRAY VIEW OF THE CHEST 12/2/2020 6:45 am COMPARISON: 11/22/2020, 11/16/2020 HISTORY: ORDERING SYSTEM PROVIDED HISTORY: CP, n/v TECHNOLOGIST PROVIDED HISTORY: Reason for exam:->CP, n/v Reason for Exam: sob, cp Acuity: Acute Type of Exam: Initial FINDINGS: A single frontal view of the chest demonstrates no acute skeletal abnormality.   There is a stable left subclavian pacemaker/AICD noted. There has been interval enlargement of the cardiopericardial silhouette, suggestive of an enlarging pericardial effusion as seen on prior studies. There are small bilateral pleural effusions. There is associated bibasilar airspace disease. The mid and upper lung zones are clear. There is no evidence of a pneumothorax. 1. Small bilateral pleural effusions with associated bibasilar airspace disease, most likely passive atelectasis, though aspiration or pneumonia are also diagnostic considerations. 2. Interval enlargement of the cardiopericardial silhouette, most consistent with an enlarging pericardial effusion. Final ED Course and MDM:    ED course notes for this visit       9:49 AM EST  I went into the room, she was lying in the bed comfortably, watching TV, no obvious distress. She states she still experiencing nausea, not actively vomiting. When I asked about chest pain or shortness of breath she states she always has chest pain she always feels short of breath and is no different than her normal baseline. She is willing to attempt to drink soda at this time. I did have a discussion with Dr. Sincere Turner about possibly doing further evaluation of the abnormal chest x-ray. He states that this time the AP chest x-ray can have variable distortion and he does not believe that this is a worsening pericardial effusion he did not believe that we needed to proceed with any further. I agree given the fact that the patient does not have any change in her shortness of breath or chest pain. She is also not hypotensive. Patient already had multiple evaluations regarding    Pt presents with vomiting. Abdominal exam is benign. After anti-emetic, patient was reassessed and has been tolerating PO challenge without difficulty.   Patient has a benign abdominal exam. There is no significant evidence of severe dehydration, surgical abdominal process, severe electrolyte abnormality, toxicity, shock, sepsis, hemodynamic or cardiopulmonary instability, increased intracranial pressure, or any disease process requiring other immediate surgical or further ER medical intervention at this time. It is understood that if the patient is not improving as expected or if other new symptoms or signs of concern develop, other etiologies or diagnoses may need to be considered requiring other tests, treatments, consultations, and/or admission. The diagnosis, plan, expected course, follow-up, and return precautions were discussed and all questions were answered. Final Impression    1. Non-intractable vomiting with nausea, unspecified vomiting type    2. Chronic chest pain    3. Chronic shortness of breath    4. Pericardial effusion without cardiac tamponade        Blood pressure 125/72, pulse 91, temperature 98.5 °F (36.9 °C), temperature source Oral, resp. rate 16, height 5' 1\" (1.549 m), weight 194 lb (88 kg), SpO2 98 %, not currently breastfeeding. Disposition  At this point I do not feel the patient requires further work up and it is reasonable to discharge the patient. I had a discussion with the patient and/or their surrogate regarding diagnosis, diagnostic testing results, treatment/ plan of care, and follow up. Patient and/or companions verbalized understanding of the ED workup, any relevant findings as well as any incidental findings, and the disposition and plan. There was shared decision-making between myself as well as the patient and/or their surrogate and we are all in agreement with discharge home. There was an opportunity for questions and all questions were answered to the best of my ability and to the satisfaction of the patient and/or patient family. Patient agreed to follow upas recommend for further evaluation/treatment. The patient was given strict return precautions as we discussed symptoms that would necessitate return to the ED.  Patient will return to ED for new/worsening symptoms. The patient verbalized their understanding and agreement with the above plan. Please refer to AVS for further details regarding discharge instructions. Patient was given scripts for the following medications. I counseled patient how to take these medications. Discharge Medication List as of 12/2/2020 10:25 AM          Pt is in stable condition upon Discharge to home. The note was completed using Dragon voice recognition transcription. Every effort was made to ensure accuracy; however, inadvertent transcription errors may be present despite my best efforts to edit errors.     Trupti Sanches MD  [address] [place] [address]        Trupti Sanches MD  12/02/20 1081

## 2020-12-06 ENCOUNTER — TELEPHONE (OUTPATIENT)
Dept: OTHER | Facility: CLINIC | Age: 60
End: 2020-12-06

## 2020-12-06 ENCOUNTER — APPOINTMENT (OUTPATIENT)
Dept: GENERAL RADIOLOGY | Age: 60
End: 2020-12-06
Payer: MEDICARE

## 2020-12-06 ENCOUNTER — HOSPITAL ENCOUNTER (EMERGENCY)
Age: 60
Discharge: HOME | End: 2020-12-06
Attending: EMERGENCY MEDICINE
Payer: MEDICARE

## 2020-12-06 VITALS
SYSTOLIC BLOOD PRESSURE: 153 MMHG | RESPIRATION RATE: 18 BRPM | DIASTOLIC BLOOD PRESSURE: 102 MMHG | OXYGEN SATURATION: 93 % | HEIGHT: 61 IN | TEMPERATURE: 97.5 F | BODY MASS INDEX: 33.99 KG/M2 | HEART RATE: 92 BPM | WEIGHT: 180 LBS

## 2020-12-06 LAB
A/G RATIO: 1.1 (ref 1.1–2.2)
ALBUMIN SERPL-MCNC: 3.5 G/DL (ref 3.4–5)
ALP BLD-CCNC: 101 U/L (ref 40–129)
ALT SERPL-CCNC: 11 U/L (ref 10–40)
ANION GAP SERPL CALCULATED.3IONS-SCNC: 15 MMOL/L (ref 3–16)
AST SERPL-CCNC: 20 U/L (ref 15–37)
BASE EXCESS VENOUS: -3.2 MMOL/L (ref -3–3)
BASOPHILS ABSOLUTE: 0.1 K/UL (ref 0–0.2)
BASOPHILS RELATIVE PERCENT: 1.9 %
BILIRUB SERPL-MCNC: 0.6 MG/DL (ref 0–1)
BUN BLDV-MCNC: 11 MG/DL (ref 7–20)
CALCIUM SERPL-MCNC: 8.7 MG/DL (ref 8.3–10.6)
CARBOXYHEMOGLOBIN: 1.7 % (ref 0–1.5)
CHLORIDE BLD-SCNC: 97 MMOL/L (ref 99–110)
CO2: 20 MMOL/L (ref 21–32)
CREAT SERPL-MCNC: <0.5 MG/DL (ref 0.6–1.1)
EOSINOPHILS ABSOLUTE: 0.1 K/UL (ref 0–0.6)
EOSINOPHILS RELATIVE PERCENT: 1.8 %
GFR AFRICAN AMERICAN: >60
GFR NON-AFRICAN AMERICAN: >60
GLOBULIN: 3.2 G/DL
GLUCOSE BLD-MCNC: 323 MG/DL (ref 70–99)
HCO3 VENOUS: 21.5 MMOL/L (ref 23–29)
HCT VFR BLD CALC: 41.5 % (ref 36–48)
HEMOGLOBIN: 13.2 G/DL (ref 12–16)
LIPASE: 37 U/L (ref 13–60)
LYMPHOCYTES ABSOLUTE: 1 K/UL (ref 1–5.1)
LYMPHOCYTES RELATIVE PERCENT: 25.8 %
MCH RBC QN AUTO: 27.9 PG (ref 26–34)
MCHC RBC AUTO-ENTMCNC: 31.8 G/DL (ref 31–36)
MCV RBC AUTO: 87.8 FL (ref 80–100)
METHEMOGLOBIN VENOUS: 0.3 %
MONOCYTES ABSOLUTE: 0.3 K/UL (ref 0–1.3)
MONOCYTES RELATIVE PERCENT: 8 %
NEUTROPHILS ABSOLUTE: 2.4 K/UL (ref 1.7–7.7)
NEUTROPHILS RELATIVE PERCENT: 62.5 %
O2 CONTENT, VEN: 19 VOL %
O2 SAT, VEN: 98 %
O2 THERAPY: ABNORMAL
PCO2, VEN: 37.6 MMHG (ref 40–50)
PDW BLD-RTO: 17.6 % (ref 12.4–15.4)
PH VENOUS: 7.38 (ref 7.35–7.45)
PLATELET # BLD: 226 K/UL (ref 135–450)
PMV BLD AUTO: 9 FL (ref 5–10.5)
PO2, VEN: 125.6 MMHG (ref 25–40)
POTASSIUM REFLEX MAGNESIUM: 4 MMOL/L (ref 3.5–5.1)
PRO-BNP: 2699 PG/ML (ref 0–124)
RBC # BLD: 4.73 M/UL (ref 4–5.2)
SODIUM BLD-SCNC: 132 MMOL/L (ref 136–145)
TCO2 CALC VENOUS: 23 MMOL/L
TOTAL PROTEIN: 6.7 G/DL (ref 6.4–8.2)
TROPONIN: <0.01 NG/ML
TROPONIN: <0.01 NG/ML
WBC # BLD: 3.8 K/UL (ref 4–11)

## 2020-12-06 PROCEDURE — 99285 EMERGENCY DEPT VISIT HI MDM: CPT

## 2020-12-06 PROCEDURE — 6360000002 HC RX W HCPCS: Performed by: EMERGENCY MEDICINE

## 2020-12-06 PROCEDURE — 83690 ASSAY OF LIPASE: CPT

## 2020-12-06 PROCEDURE — 96375 TX/PRO/DX INJ NEW DRUG ADDON: CPT

## 2020-12-06 PROCEDURE — 82803 BLOOD GASES ANY COMBINATION: CPT

## 2020-12-06 PROCEDURE — 83880 ASSAY OF NATRIURETIC PEPTIDE: CPT

## 2020-12-06 PROCEDURE — 84484 ASSAY OF TROPONIN QUANT: CPT

## 2020-12-06 PROCEDURE — 71045 X-RAY EXAM CHEST 1 VIEW: CPT

## 2020-12-06 PROCEDURE — 6360000002 HC RX W HCPCS: Performed by: STUDENT IN AN ORGANIZED HEALTH CARE EDUCATION/TRAINING PROGRAM

## 2020-12-06 PROCEDURE — 96374 THER/PROPH/DIAG INJ IV PUSH: CPT

## 2020-12-06 PROCEDURE — 80053 COMPREHEN METABOLIC PANEL: CPT

## 2020-12-06 PROCEDURE — 85025 COMPLETE CBC W/AUTO DIFF WBC: CPT

## 2020-12-06 PROCEDURE — 93005 ELECTROCARDIOGRAM TRACING: CPT | Performed by: EMERGENCY MEDICINE

## 2020-12-06 PROCEDURE — 96376 TX/PRO/DX INJ SAME DRUG ADON: CPT

## 2020-12-06 RX ORDER — MORPHINE SULFATE 4 MG/ML
4 INJECTION, SOLUTION INTRAMUSCULAR; INTRAVENOUS
Status: DISCONTINUED | OUTPATIENT
Start: 2020-12-06 | End: 2020-12-06 | Stop reason: HOSPADM

## 2020-12-06 RX ORDER — MORPHINE SULFATE 2 MG/ML
2 INJECTION, SOLUTION INTRAMUSCULAR; INTRAVENOUS ONCE
Status: COMPLETED | OUTPATIENT
Start: 2020-12-06 | End: 2020-12-06

## 2020-12-06 RX ORDER — ONDANSETRON 2 MG/ML
4 INJECTION INTRAMUSCULAR; INTRAVENOUS EVERY 30 MIN PRN
Status: DISCONTINUED | OUTPATIENT
Start: 2020-12-06 | End: 2020-12-06 | Stop reason: HOSPADM

## 2020-12-06 RX ADMIN — ONDANSETRON HYDROCHLORIDE 4 MG: 2 INJECTION, SOLUTION INTRAMUSCULAR; INTRAVENOUS at 06:50

## 2020-12-06 RX ADMIN — MORPHINE SULFATE 2 MG: 2 INJECTION, SOLUTION INTRAMUSCULAR; INTRAVENOUS at 07:56

## 2020-12-06 RX ADMIN — MORPHINE SULFATE 4 MG: 4 INJECTION INTRAVENOUS at 06:50

## 2020-12-06 ASSESSMENT — PAIN SCALES - GENERAL
PAINLEVEL_OUTOF10: 10

## 2020-12-06 ASSESSMENT — PAIN DESCRIPTION - LOCATION: LOCATION: ABDOMEN;CHEST

## 2020-12-06 NOTE — ED PROVIDER NOTES
Patient was received in signout from Dr. Leana Savage. Please refer to her note for initial HPI, physical exam, MDM. In short, patient is a 72-year-old female with history of CHF with a EF of 10 to 15%, presenting with concerns for chest pain and abdominal pain. She states that these are chronic in nature and unchanged. She states that she is also feeling generally weak. She denies any fevers or chills, shortness of breath. States that she has been having vomiting but that she always vomits. She states that her symptoms today are unchanged from her baseline. At the time of signout, pending studies included labs, troponin. She does have a history of recurrent pericardial effusion as well, no physiologic evidence of tamponade at this time. She has no other complaints or concerns.     Labs Reviewed   BLOOD GAS, VENOUS - Abnormal; Notable for the following components:       Result Value    pCO2, Alberto 37.6 (*)     pO2, Alberto 125.6 (*)     HCO3, Venous 21.5 (*)     Base Excess, Alberto -3.2 (*)     Carboxyhemoglobin 1.7 (*)     All other components within normal limits    Narrative:     Performed at:  [place] [address],  [place], [facility]   Phone [phone]   CBC WITH AUTO DIFFERENTIAL - Abnormal; Notable for the following components:    WBC 3.8 (*)     RDW 17.6 (*)     All other components within normal limits    Narrative:     Performed at:  [place] [address],  [place], [facility]   Phone [phone]   COMPREHENSIVE METABOLIC PANEL W/ REFLEX TO MG FOR LOW K - Abnormal; Notable for the following components:    Sodium 132 (*)     Chloride 97 (*)     CO2 20 (*)     Glucose 323 (*)     CREATININE <0.5 (*)     All other components within normal limits    Narrative:     Performed at:  [place] [address],  [place], [facility]   Phone [phone]   BRAIN NATRIURETIC PEPTIDE - Abnormal; Notable for the following components:    Pro-BNP 2,699 (*)     All other components within normal limits    Narrative:     Performed at:  [facility] [address],  [place], [facility]   Phone [phone]   TROPONIN    Narrative:     Performed at:  [facility] [address],  [place], [facility]   Phone [phone]   LIPASE    Narrative:     Performed at:  [facility]) [facility] [address],  [place], [facility]   Phone [phone]   TROPONIN     XR CHEST PORTABLE   Final Result   No radiographic evidence of acute cardiopulmonary disease. Stable marked cardiomegaly. Patient's labs are reassuring and appear to be at her baseline. No evidence of tamponade physiology. She had no episodes of vomiting while in the department. Patient was reassessed, symptoms are unchanged but she states that she always has the symptoms and that they are chronic. I discussed with the patient that she should return for any worsening symptoms, advised PCP follow-up. Her troponin is negative x2. Chest x-ray is reassuring. Her BNP appears to be at her baseline. Patient will be discharged. She is given return precautions. Advised PCP follow-up.          Sierra Roberts MD  12/06/20 6482

## 2020-12-06 NOTE — ED PROVIDER NOTES
Emergency Physician Note  [address] ED  [address] [address]  Dept: [phone]  Loc: [phone]  Open Note Time:  6:12 AM EST    Chief Complaint  Abdominal Pain (pt c/o of N/V x 2 days; denies any diarrhea) and Chest Pain (also c/o of chest pain x 2 days)       History of Present Illness  Nikki Garcia is a 62 y.o. female  has a past medical history of Arthritis, CAD (coronary artery disease), Cerebral artery occlusion with cerebral infarction ([address] CHF (congestive heart failure) ([address] Diabetes mellitus ([address] Hyperlipidemia, Hypertension, Mental retardation, MI (myocardial infarction) ([facility] and Pacemaker. who presents to the ED for chest pain and abdominal pain. Chest pain is midsternal, sharp, constant. She states this is same chest pain for which I evaluated her for on Wednesday, December 2. She states the pain is unchanged. She also has lower abdominal pain that she also describes as sharp. Pain is unchanged for which when I evaluated her and December 2. She reports that she wanted to come to the ER because \"she could not take the pain any longer\". She is tried Tylenol at home without relief. She reports vomiting at home. She states she has chronic diarrhea that is unchanged. Denies fever, chills, malaise,   shortness of breath, cough,  diarrhea, headache, sore throat, dysuria, back pain, rash. No palliative/provocative factors.        Unless otherwise stated in this report or unable to obtain because of the patient's clinical or mental status as evidenced by the medical record, this patient's positive and negative responses for review of systems, constitutional, psych, eyes, ENT, cardiovascular, respiratory, gastrointestinal, neurological, genitourinary, musculoskeletal, integument systems and systems related to the presenting problem are either stated in the preceding paragraph or were not pertinent or were negative for the symptoms and/or complaints related to the medical problem. I have reviewed the following from the nursing documentation:      Prior to Admission medications    Medication Sig Start Date End Date Taking?  Authorizing Provider   ondansetron (ZOFRAN) 4 MG tablet Take 1 tablet by mouth every 8 hours as needed for Nausea or Vomiting 12/2/20  Yes Donnell March MD   carvedilol (COREG) 3.125 MG tablet Take 1 tablet by mouth 2 times daily (with meals) 11/28/20  Yes Kirby Morillo MD   torsemide (DEMADEX) 20 MG tablet Take 1 tablet by mouth daily 11/28/20  Yes Kirby Morillo MD   metFORMIN (GLUCOPHAGE) 1000 MG tablet Take 1,000 mg by mouth 2 times daily (with meals)   Yes Historical Provider, MD   QUEtiapine (SEROQUEL XR) 50 MG extended release tablet Take 50 mg by mouth nightly   Yes Historical Provider, MD   apixaban starter pack (ELIQUIS DVT/PE STARTER PACK) 5 MG TBPK tablet Take 1 tablet by mouth See Admin Instructions 11/20/20 12/20/20 Yes Jennie Gonzales MD   insulin aspart (NOVOLOG FLEXPEN) 100 UNIT/ML injection pen Inject 10 Units into the skin 3 times daily (before meals) 11/13/20  Yes Katherine Palencia MD   insulin glargine (LANTUS SOLOSTAR) 100 UNIT/ML injection pen Inject 24 Units into the skin nightly 11/13/20  Yes Katherine Palencia MD   amiodarone (CORDARONE) 200 MG tablet Take 1 tablet by mouth daily 11/10/20  Yes Eduard Bentley MD   metoclopramide (REGLAN) 10 MG tablet Take 1 tablet by mouth 3 times daily (with meals) 11/6/20  Yes Morgan Barroso MD   sucralfate (CARAFATE) 1 GM tablet Take 1 tablet by mouth 3 times daily 11/6/20  Yes Morgan Barroso MD   pantoprazole (PROTONIX) 40 MG tablet Take 1 tablet by mouth daily 11/6/20  Yes Morgan Barroso MD   vitamin D (ERGOCALCIFEROL) 1.25 MG (32318 UT) CAPS capsule Take 50,000 Units by mouth once a week Takes weekly on Sundays   Yes Historical Provider, MD   digoxin (LANOXIN) 125 MCG tablet Take 1 tablet by mouth every other day 9/25/20  Yes FRITZ Blanchard - CNP   nitroGLYCERIN (NITROSTAT) 0.4 MG SL tablet up to max of 3 total doses. If no relief after 1 dose, call 911. 8/24/20  Yes Jennie Gonzales MD   aspirin 81 MG EC tablet Take 1 tablet by mouth daily 7/18/20  Yes Thelma Granado MD   ranolazine (RANEXA) 500 MG extended release tablet Take 1 tablet by mouth 2 times daily 7/18/20  Yes Thelma Granado MD   DULoxetine (CYMBALTA) 60 MG extended release capsule Take 1 capsule by mouth daily 7/18/20  Yes Thelma Granado MD   atorvastatin (LIPITOR) 40 MG tablet Take 1 tablet by mouth nightly 7/18/20  Yes Thelma Granado MD   fenofibrate micronized (LOFIBRA) 200 MG capsule Take 1 capsule by mouth nightly 7/18/20  Yes Thelma Granado MD   miconazole (MICOTIN) 2 % powder Apply topically 2 times daily. 7/18/20  Yes Thelma Granado MD   [employer] Lancets Ultra Thin MISC 1 each by Does not apply route 4 times daily 7/18/20  Yes Thelma Granado MD   blood glucose monitor strips Test three times a day & as needed for symptoms of irregular blood glucose.  5/12/19  Yes Morgan Barroso MD       Allergies as of 12/06/2020    (No Known Allergies)       Past Medical History:   Diagnosis Date    Arthritis     CAD (coronary artery disease)     Cerebral artery occlusion with [facility] infarction ([facility]     x 3    CHF (congestive heart failure) ([facility]     Diabetes mellitus ([facility]     Hyperlipidemia     Hypertension     Mental retardation     MI (myocardial infarction) ([facility]     Pacemaker         Surgical History:   Past Surgical History:   Procedure Laterality Date    BACK SURGERY      x3    IR MIDLINE CATH  11/23/2020    IR MIDLINE CATH 11/23/2020 [facility] PROCEDURES    PACEMAKER INSERTION      Boyd pacemaker    TUBAL LIGATION      TUMOR REMOVAL      UPPER GASTROINTESTINAL ENDOSCOPY N/A 11/5/2020    EGD BIOPSY performed by Gold Ch DO at [facility] [place] ordered for this visit  Orders Placed This Encounter   Medications    morphine sulfate (PF) injection 4 mg    ondansetron (ZOFRAN) injection 4 mg       ED course notes for this visit       I wore   N95 Envo mask with filter protection, facial shield and gloves when I evaluated the patient. I evaluated the patient in room 04/04    I have signed out [facility] Emergency Department care to my colleague, Dr. Martin Freire. We discussed the pertinent history, physical exam, completed/pending test results (if applicable) and current treatment plan. Please refer to his/her chart for the patients remaining Emergency Department course and final disposition. The note was completed using Dragon voice recognition transcription. Every effort was made to ensure accuracy; however, inadvertent transcription errors may be present despite my best efforts to edit errors.     Nguyen Sinclair MD  [address] [place] [address]        Nguyen Sinclair MD  12/06/20 [id number]

## 2020-12-06 NOTE — TELEPHONE ENCOUNTER
Writer contacted  ED provider to inform of 30 day readmission risk. No Decision on disposition at this time. As pt is still being evaluated.

## 2020-12-07 ENCOUNTER — HOSPITAL ENCOUNTER (EMERGENCY)
Age: 60
Discharge: HOME | DRG: 871 | End: 2020-12-07
Attending: EMERGENCY MEDICINE
Payer: MEDICARE

## 2020-12-07 ENCOUNTER — TELEPHONE (OUTPATIENT)
Dept: OTHER | Facility: CLINIC | Age: 60
End: 2020-12-07

## 2020-12-07 VITALS
OXYGEN SATURATION: 95 % | DIASTOLIC BLOOD PRESSURE: 88 MMHG | WEIGHT: 180 LBS | BODY MASS INDEX: 34.01 KG/M2 | RESPIRATION RATE: 16 BRPM | SYSTOLIC BLOOD PRESSURE: 128 MMHG | HEART RATE: 91 BPM | TEMPERATURE: 98.3 F

## 2020-12-07 DIAGNOSIS — R73.9 HYPERGLYCEMIA: ICD-10-CM

## 2020-12-07 DIAGNOSIS — R11.2 NON-INTRACTABLE VOMITING WITH NAUSEA, UNSPECIFIED VOMITING TYPE: Primary | ICD-10-CM

## 2020-12-07 DIAGNOSIS — N30.00 ACUTE CYSTITIS WITHOUT HEMATURIA: ICD-10-CM

## 2020-12-07 LAB
A/G RATIO: 1 (ref 1.1–2.2)
ALBUMIN SERPL-MCNC: 3.5 G/DL (ref 3.4–5)
ALP BLD-CCNC: 88 U/L (ref 40–129)
ALT SERPL-CCNC: 11 U/L (ref 10–40)
ANION GAP SERPL CALCULATED.3IONS-SCNC: 16 MMOL/L (ref 3–16)
AST SERPL-CCNC: 18 U/L (ref 15–37)
BACTERIA: ABNORMAL /HPF
BASE EXCESS VENOUS: -1.9 MMOL/L (ref -3–3)
BASOPHILS ABSOLUTE: 0 K/UL (ref 0–0.2)
BASOPHILS RELATIVE PERCENT: 1 %
BETA-HYDROXYBUTYRATE: 3.2 MMOL/L (ref 0–0.27)
BILIRUB SERPL-MCNC: 0.6 MG/DL (ref 0–1)
BILIRUBIN URINE: ABNORMAL
BLOOD, URINE: NEGATIVE
BUN BLDV-MCNC: 11 MG/DL (ref 7–20)
CALCIUM SERPL-MCNC: 8.9 MG/DL (ref 8.3–10.6)
CARBOXYHEMOGLOBIN: 9.4 % (ref 0–1.5)
CHLORIDE BLD-SCNC: 96 MMOL/L (ref 99–110)
CLARITY: ABNORMAL
CO2: 22 MMOL/L (ref 21–32)
COLOR: YELLOW
CREAT SERPL-MCNC: 0.6 MG/DL (ref 0.6–1.1)
EKG ATRIAL RATE: 96 BPM
EKG ATRIAL RATE: 98 BPM
EKG DIAGNOSIS: NORMAL
EKG DIAGNOSIS: NORMAL
EKG P AXIS: 35 DEGREES
EKG P-R INTERVAL: 158 MS
EKG P-R INTERVAL: 160 MS
EKG Q-T INTERVAL: 392 MS
EKG Q-T INTERVAL: 406 MS
EKG QRS DURATION: 100 MS
EKG QRS DURATION: 96 MS
EKG QTC CALCULATION (BAZETT): 500 MS
EKG QTC CALCULATION (BAZETT): 512 MS
EKG R AXIS: -77 DEGREES
EKG R AXIS: 85 DEGREES
EKG T AXIS: 88 DEGREES
EKG T AXIS: 90 DEGREES
EKG VENTRICULAR RATE: 96 BPM
EKG VENTRICULAR RATE: 98 BPM
EOSINOPHILS ABSOLUTE: 0.1 K/UL (ref 0–0.6)
EOSINOPHILS RELATIVE PERCENT: 2.8 %
EPITHELIAL CELLS, UA: ABNORMAL /HPF (ref 0–5)
GFR AFRICAN AMERICAN: >60
GFR NON-AFRICAN AMERICAN: >60
GLOBULIN: 3.5 G/DL
GLUCOSE BLD-MCNC: 261 MG/DL (ref 70–99)
GLUCOSE BLD-MCNC: 299 MG/DL (ref 70–99)
GLUCOSE URINE: >=1000 MG/DL
HCO3 VENOUS: 21 MMOL/L (ref 23–29)
HCT VFR BLD CALC: 42.4 % (ref 36–48)
HEMOGLOBIN: 13.7 G/DL (ref 12–16)
KETONES, URINE: 40 MG/DL
LEUKOCYTE ESTERASE, URINE: NEGATIVE
LIPASE: 36 U/L (ref 13–60)
LYMPHOCYTES ABSOLUTE: 1.4 K/UL (ref 1–5.1)
LYMPHOCYTES RELATIVE PERCENT: 26.7 %
MCH RBC QN AUTO: 28 PG (ref 26–34)
MCHC RBC AUTO-ENTMCNC: 32.2 G/DL (ref 31–36)
MCV RBC AUTO: 86.8 FL (ref 80–100)
METHEMOGLOBIN VENOUS: 0.3 %
MICROSCOPIC EXAMINATION: YES
MONOCYTES ABSOLUTE: 0.3 K/UL (ref 0–1.3)
MONOCYTES RELATIVE PERCENT: 6.7 %
NEUTROPHILS ABSOLUTE: 3.2 K/UL (ref 1.7–7.7)
NEUTROPHILS RELATIVE PERCENT: 62.8 %
NITRITE, URINE: NEGATIVE
O2 CONTENT, VEN: 19 VOL %
O2 SAT, VEN: 98 %
O2 THERAPY: ABNORMAL
PCO2, VEN: 31 MMHG (ref 40–50)
PDW BLD-RTO: 17.3 % (ref 12.4–15.4)
PERFORMED ON: ABNORMAL
PH UA: 6 (ref 5–8)
PH VENOUS: 7.45 (ref 7.35–7.45)
PLATELET # BLD: 236 K/UL (ref 135–450)
PMV BLD AUTO: 9 FL (ref 5–10.5)
PO2, VEN: 93.9 MMHG (ref 25–40)
POTASSIUM REFLEX MAGNESIUM: 3.8 MMOL/L (ref 3.5–5.1)
PROTEIN UA: ABNORMAL MG/DL
RBC # BLD: 4.88 M/UL (ref 4–5.2)
RBC UA: ABNORMAL /HPF (ref 0–4)
SODIUM BLD-SCNC: 134 MMOL/L (ref 136–145)
SPECIFIC GRAVITY UA: 1.02 (ref 1–1.03)
TCO2 CALC VENOUS: 22 MMOL/L
TOTAL PROTEIN: 7 G/DL (ref 6.4–8.2)
TROPONIN: <0.01 NG/ML
URINE REFLEX TO CULTURE: YES
URINE TYPE: ABNORMAL
UROBILINOGEN, URINE: 1 E.U./DL
WBC # BLD: 5.1 K/UL (ref 4–11)
WBC UA: ABNORMAL /HPF (ref 0–5)

## 2020-12-07 PROCEDURE — 81001 URINALYSIS AUTO W/SCOPE: CPT

## 2020-12-07 PROCEDURE — 85025 COMPLETE CBC W/AUTO DIFF WBC: CPT

## 2020-12-07 PROCEDURE — 2580000003 HC RX 258: Performed by: PHYSICIAN ASSISTANT

## 2020-12-07 PROCEDURE — 99285 EMERGENCY DEPT VISIT HI MDM: CPT

## 2020-12-07 PROCEDURE — 93010 ELECTROCARDIOGRAM REPORT: CPT | Performed by: INTERNAL MEDICINE

## 2020-12-07 PROCEDURE — 82010 KETONE BODYS QUAN: CPT

## 2020-12-07 PROCEDURE — 84484 ASSAY OF TROPONIN QUANT: CPT

## 2020-12-07 PROCEDURE — 6360000002 HC RX W HCPCS: Performed by: PHYSICIAN ASSISTANT

## 2020-12-07 PROCEDURE — 87186 SC STD MICRODIL/AGAR DIL: CPT

## 2020-12-07 PROCEDURE — 80053 COMPREHEN METABOLIC PANEL: CPT

## 2020-12-07 PROCEDURE — 82803 BLOOD GASES ANY COMBINATION: CPT

## 2020-12-07 PROCEDURE — 87086 URINE CULTURE/COLONY COUNT: CPT

## 2020-12-07 PROCEDURE — 87088 URINE BACTERIA CULTURE: CPT

## 2020-12-07 PROCEDURE — 83690 ASSAY OF LIPASE: CPT

## 2020-12-07 PROCEDURE — 96374 THER/PROPH/DIAG INJ IV PUSH: CPT

## 2020-12-07 PROCEDURE — 93005 ELECTROCARDIOGRAM TRACING: CPT | Performed by: EMERGENCY MEDICINE

## 2020-12-07 RX ORDER — 0.9 % SODIUM CHLORIDE 0.9 %
1000 INTRAVENOUS SOLUTION INTRAVENOUS ONCE
Status: COMPLETED | OUTPATIENT
Start: 2020-12-07 | End: 2020-12-07

## 2020-12-07 RX ORDER — ONDANSETRON 2 MG/ML
4 INJECTION INTRAMUSCULAR; INTRAVENOUS EVERY 6 HOURS PRN
Status: DISCONTINUED | OUTPATIENT
Start: 2020-12-07 | End: 2020-12-07 | Stop reason: HOSPADM

## 2020-12-07 RX ORDER — ONDANSETRON 4 MG/1
4 TABLET, FILM COATED ORAL EVERY 8 HOURS PRN
Qty: 20 TABLET | Refills: 0 | Status: SHIPPED | OUTPATIENT
Start: 2020-12-07 | End: 2021-02-02 | Stop reason: SDUPTHER

## 2020-12-07 RX ORDER — CEPHALEXIN 500 MG/1
500 CAPSULE ORAL 4 TIMES DAILY
Qty: 28 CAPSULE | Refills: 0 | Status: SHIPPED | OUTPATIENT
Start: 2020-12-07 | End: 2020-12-10

## 2020-12-07 RX ADMIN — SODIUM CHLORIDE 1000 ML: 9 INJECTION, SOLUTION INTRAVENOUS at 15:40

## 2020-12-07 RX ADMIN — ONDANSETRON HYDROCHLORIDE 4 MG: 2 INJECTION, SOLUTION INTRAMUSCULAR; INTRAVENOUS at 15:40

## 2020-12-07 ASSESSMENT — PAIN SCALES - GENERAL: PAINLEVEL_OUTOF10: 10

## 2020-12-07 ASSESSMENT — PAIN DESCRIPTION - LOCATION: LOCATION: CHEST

## 2020-12-07 NOTE — ED PROVIDER NOTES
I independently performed a history and physical on Krystal Metro. All diagnostic, treatment, and disposition decisions were made by myself in conjunction with the advanced practice provider. I have participated in the medical decision making and directed the treatment plan and disposition of the patient. For further details of [facility] emergency department encounter, please see the advanced practice provider's documentation. CHIEF COMPLAINT  Chief Complaint   Patient presents with    Emesis     nausea, vomiting and emesis for 2 weeks. negative covid. Briefly, Krystal Russell is a 62 y.o. female  who presents to the ED complaining of nausea vomiting generalized malaise. Notes occasional chest pain. Patient well-known to the emergency department for multiple frequent visits    FOCUSED PHYSICAL EXAMINATION  BP (!) 149/99   Pulse 98   Temp 98.3 °F (36.8 °C) (Oral)   Resp 20   Wt 180 lb (81.6 kg)   SpO2 99%   BMI 34.01 kg/m²      Focused physical examination:  General appearance:  Cooperative. Lying in bed in no acute distress. Skin:  Warm. Dry. Eye:  Extraocular movements intact. Ears, nose, mouth and throat:  Oral mucosa moist,  Neck:  Trachea midline. Heart:  Regular rate and rhythm  Perfusion:  intact  Respiratory:  Lungs clear to auscultation bilaterally. Respirations nonlabored. Abdominal:   Non distended. Nontender  Neurological:  Alert and oriented x 3. Moves all extremities spontaneously  Musculoskeletal:   Normal ROM, no deformities          Psychiatric:  Normal mood      EKG: Sinus rhythm rate of 96 bpm.  Anterior lateral Q waves. Occasional PVCs. No ST elevation. When compared to prior EKG from 12/6/2020 just yesterday, no significant change. MDM: Well-known patient to the emergency department here with generalized complaints of nausea and vomiting fatigue. Noted occasional chest pain.   Multiple frequent similar presentations admitted

## 2020-12-07 NOTE — ED PROVIDER NOTES
[address] ED  EMERGENCY DEPARTMENT ENCOUNTER        Pt Name: Bre Jones  MRN: [id number]  [place] 1960  Date of evaluation: 12/7/2020  Provider: Deepthi Hairston PA-C  PCP: FRITZ Cristina NP     I have seen and evaluated this patient with my supervising physician Dr. Artem Robles. CHIEF COMPLAINT       Chief Complaint   Patient presents with    Emesis     nausea, vomiting and emesis for 2 weeks. negative covid. HISTORY OF PRESENT ILLNESS   (Location, Timing/Onset, Context/Setting, Quality, Duration, Modifying Factors, Severity, Associated Signs and Symptoms)  Note limiting factors. Bre Jones is a 62 y.o. female with a past medical history of diabetes, hypertension, CAD, cerebral artery occlusion with cerebral infarct, congestive heart failure, hyperlipidemia, MR, myocardial infarction and pacemaker presents to the ED by private vehicle for complaints of nonbilious nonbloody nausea and vomiting x2 weeks. Onset of symptoms x2 weeks. Duration symptoms have been persistent since onset. She also admits to generalized abdominal pain. Rates pain 10 out of 10 no radiation of pain. Also reports intermittent midsternal chest pain. Denies any current chest pain. She states she has Zofran at home which has not helped with her nausea. She denies any other complaint such as fevers or chills. Denies any urinary complaints. She has not tried anything else at home for symptomatic relief. Nothing seems to make symptoms better or worse at this time. No aggravating or alleviating complaints. Patient was tested for Covid which was negative. She denies any sick contacts. Denies any other complaints. Nursing Notes were all reviewed and agreed with or any disagreements were addressed in the HPI. REVIEW OF SYSTEMS    (2-9 systems for level 4, 10 or more for level 5)     Review of Systems   Constitutional: Negative. HENT: Negative. Respiratory: Negative. Cardiovascular: Positive for chest pain. Gastrointestinal: Positive for abdominal pain, nausea and vomiting. Genitourinary: Negative. Musculoskeletal: Negative. Skin: Negative. Neurological: Negative. Positives and Pertinent negatives as per HPI. Except as noted above in the ROS, all other systems were reviewed and negative.        PAST MEDICAL HISTORY     Past Medical History:   Diagnosis Date    Arthritis     CAD (coronary artery disease)     Cerebral artery occlusion with cerebral infarction ([facility] [address]     x 3    CHF (congestive heart failure) (HCC)     Diabetes mellitus ([facility] [address]     Hyperlipidemia     Hypertension     Mental retardation     MI (myocardial infarction) ([facility] [address]     Pacemaker          SURGICAL HISTORY     Past Surgical History:   Procedure Laterality Date    BACK SURGERY      x3    IR MIDLINE CATH  11/23/2020    IR MIDLINE CATH 11/23/2020 [facility] SPECIAL PROCEDURES    PACEMAKER INSERTION      Boyd pacemaker    TUBAL LIGATION      TUMOR REMOVAL      UPPER GASTROINTESTINAL ENDOSCOPY N/A 11/5/2020    EGD BIOPSY performed by Yvonne Hahn DO at [address]       Discharge Medication List as of 12/7/2020  5:42 PM      CONTINUE these medications which have NOT CHANGED    Details   !! ondansetron (ZOFRAN) 4 MG tablet Take 1 tablet by mouth every 8 hours as needed for Nausea or Vomiting, Disp-24 tablet,R-0Print      carvedilol (COREG) 3.125 MG tablet Take 1 tablet by mouth 2 times daily (with meals), Disp-60 tablet,R-3Print      torsemide (DEMADEX) 20 MG tablet Take 1 tablet by mouth daily, Disp-30 tablet,R-3Print      metFORMIN (GLUCOPHAGE) 1000 MG tablet Take 1,000 mg by mouth 2 times daily (with meals)Historical Med      QUEtiapine (SEROQUEL XR) 50 MG extended release tablet Take 50 mg by mouth nightlyHistorical Med      apixaban starter pack (ELIQUIS DVT/PE STARTER PACK) 5 MG TBPK tablet Take 1 tablet by mouth See Admin Instructions, Disp-74 tablet,R-0Print      insulin aspart (NOVOLOG FLEXPEN) 100 UNIT/ML injection pen Inject 10 Units into the skin 3 times daily (before meals), Disp-5 pen,R-0NO PRINT      insulin glargine (LANTUS SOLOSTAR) 100 UNIT/ML injection pen Inject 24 Units into the skin nightly, Disp-5 pen,R-0NO PRINT      amiodarone (CORDARONE) 200 MG tablet Take 1 tablet by mouth daily, Disp-90 tablet,R-3Normal      metoclopramide (REGLAN) 10 MG tablet Take 1 tablet by mouth 3 times daily (with meals), Disp-30 tablet,R-0Normal      sucralfate (CARAFATE) 1 GM tablet Take 1 tablet by mouth 3 times daily, Disp-90 tablet,R-0Normal      pantoprazole (PROTONIX) 40 MG tablet Take 1 tablet by mouth daily, Disp-90 tablet,R-0Normal      vitamin D (ERGOCALCIFEROL) 1.25 MG (17630 UT) CAPS capsule Take 50,000 Units by mouth once a week Takes weekly on SundaysHistorical Med      digoxin (LANOXIN) 125 MCG tablet Take 1 tablet by mouth every other day, Disp-30 tablet,R-3Normal      nitroGLYCERIN (NITROSTAT) 0.4 MG SL tablet up to max of 3 total doses. If no relief after 1 dose, call 911., Disp-25 tablet,R-3Print      aspirin 81 MG EC tablet Take 1 tablet by mouth daily, Disp-30 tablet,R-2Normal      ranolazine (RANEXA) 500 MG extended release tablet Take 1 tablet by mouth 2 times daily, Disp-60 tablet,R-2Normal      DULoxetine (CYMBALTA) 60 MG extended release capsule Take 1 capsule by mouth daily, Disp-30 capsule,R-0Normal      atorvastatin (LIPITOR) 40 MG tablet Take 1 tablet by mouth nightly, Disp-30 tablet,R-2Normal      fenofibrate micronized (LOFIBRA) 200 MG capsule Take 1 capsule by mouth nightly, Disp-30 capsule,R-3Normal      miconazole (MICOTIN) 2 % powder Apply topically 2 times daily. , Disp-45 g,R-1, Normal      [employer] Lancets Ultra Thin MISC 4 TIMES DAILY Starting Sat 7/18/2020, Disp-200 each,R-0, Normal      blood glucose monitor strips Test three times a day & as needed for symptoms of irregular blood glucose., Disp-100 strip, R-2, Print !! - Potential duplicate medications found. Please discuss with provider. ALLERGIES     Patient has no known allergies. FAMILYHISTORY       Family History   Problem Relation Age of Onset    Heart Disease Father     Cancer Mother     Other Mother           SOCIAL HISTORY       Social History     Tobacco Use    Smoking status: Never Smoker    Smokeless tobacco: Never Used   Substance Use Topics    Alcohol use: No    Drug use: No       SCREENINGS    Bc Coma Scale  Eye Opening: Spontaneous  Best Verbal Response: Oriented  Best Motor Response: Obeys commands  Bc Coma Scale Score: 15        PHYSICAL EXAM    (up to 7 for level 4, 8 or more for level 5)     ED Triage Vitals [12/07/20 1344]   BP Temp Temp Source Pulse Resp SpO2 Height Weight   (!) 147/92 98.3 °F (36.8 °C) Oral 95 18 99 % -- 180 lb (81.6 kg)       Physical Exam  Vitals signs and nursing note reviewed. Constitutional:       General: She is awake. She is not in acute distress. Appearance: Normal appearance. She is well-developed. She is not ill-appearing, toxic-appearing or diaphoretic. HENT:      Head: Normocephalic and atraumatic. Nose: Nose normal.      Mouth/Throat:      Mouth: Mucous membranes are dry. Eyes:      General:         Right eye: No discharge. Left eye: No discharge. Neck:      Musculoskeletal: Normal range of motion and neck supple. Cardiovascular:      Rate and Rhythm: Normal rate and regular rhythm. Pulses:           Radial pulses are 2+ on the right side and 2+ on the left side. Heart sounds: Normal heart sounds. No murmur. No gallop. Pulmonary:      Effort: Pulmonary effort is normal. No respiratory distress. Breath sounds: Normal breath sounds. No decreased breath sounds, wheezing, rhonchi or rales. Chest:      Chest wall: No tenderness. Abdominal:      General: Abdomen is flat. Bowel sounds are normal.      Palpations: Abdomen is soft.       Tenderness: There is no abdominal tenderness. There is no guarding or rebound. Musculoskeletal: Normal range of motion. General: No deformity. Right lower leg: No edema. Left lower leg: No edema. Skin:     General: Skin is warm and dry. Neurological:      General: No focal deficit present. Mental Status: She is alert and oriented to person, place, and time. GCS: GCS eye subscore is 4. GCS verbal subscore is 5. GCS motor subscore is 6. Psychiatric:         Behavior: Behavior normal. Behavior is cooperative.          DIAGNOSTIC RESULTS   LABS:    Labs Reviewed   CBC WITH AUTO DIFFERENTIAL - Abnormal; Notable for the following components:       Result Value    RDW 17.3 (*)     All other components within normal limits    Narrative:     Performed at:  [place] [address],  [place] [facility]   Phone [phone]   COMPREHENSIVE METABOLIC PANEL W/ REFLEX TO MG FOR LOW K - Abnormal; Notable for the following components:    Sodium 134 (*)     Chloride 96 (*)     Glucose 299 (*)     Albumin/Globulin Ratio 1.0 (*)     All other components within normal limits    Narrative:     Performed at:  [place] [address],  [place] [facility]   Phone [phone]   URINE RT REFLEX TO CULTURE - Abnormal; Notable for the following components:    Clarity, UA SL CLOUDY (*)     Glucose, Ur >=1000 (*)     Bilirubin Urine SMALL (*)     Ketones, Urine 40 (*)     Protein, UA TRACE (*)     All other components within normal limits    Narrative:     Performed at:  [place] [address],  [place] [facility]   Phone [phone]   BLOOD GAS, VENOUS - Abnormal; Notable for the following components:    pCO2, Alberto 31.0 (*)     pO2, Alberto 93.9 (*)     HCO3, Venous 21.0 (*)     Carboxyhemoglobin 9.4 (*)     All other components within normal limits    Narrative:     Performed at:  [facility]) - [facility],  [place], [place]   Phone [phone]   BETA-HYDROXYBUTYRATE - Abnormal; Notable for the following components:    Beta-Hydroxybutyrate 3.20 (*)     All other components within normal limits    Narrative:     Performed at:  [facility],  [place], [place]   Phone [phone]   MICROSCOPIC URINALYSIS - Abnormal; Notable for the following components:    WBC, UA 10-20 (*)     Bacteria, UA 4+ (*)     All other components within normal limits    Narrative:     Performed at:  [place] [address],  [place], [place]   Phone [phone]   POCT GLUCOSE - Abnormal; Notable for the following components:    POC Glucose 261 (*)     All other components within normal limits    Narrative:     Performed at:  [place] [address],  [place], [place]   Phone [phone]   CULTURE, URINE   LIPASE    Narrative:     Performed at:  [place] [address],  [place], [place]   Phone [phone]   TROPONIN    Narrative:     Performed at:  [facility]) - [facility],  [place], [place]   Phone [phone]   POCT GLUCOSE       All other labs were within normal range or not returned as of this dictation. EKG: All EKG's are interpreted by the Emergency Department Physician in the absence of a cardiologist.  Please see their note for interpretation of EKG.       RADIOLOGY:   Non-plain film images such as CT, Ultrasound and MRI are read by the radiologist. Plain radiographic images are visualized and preliminarily interpreted by the ED Provider with the below findings:        Interpretation per the Radiologist below, if available at the time of this note:    No orders to display     Xr Chest Portable    Result Date: 12/6/2020  EXAMINATION: [facility] THE CHEST 12/6/2020 5:58 am COMPARISON: Chest x-ray dated 12/02/2020 HISTORY: ORDERING SYSTEM PROVIDED HISTORY: Chest pain TECHNOLOGIST PROVIDED HISTORY: Reason for exam:->Chest pain Reason for Exam: abdo pain and chest pain Acuity: Acute Type of Exam: Unknown Additional signs and symptoms: pt c/o chest pain, sob FINDINGS: LINES/TUBES/OTHER: Left subclavian AICD is again noted with leads grossly unchanged in position. HEART/MEDIASTINUM: The cardiac silhouette is markedly enlarged, but stable. PLEURA/LUNGS: There are no focal consolidations or pleural effusions. There is no appreciable pneumothorax. BONES/SOFT TISSUE: No acute abnormality. No radiographic evidence of acute cardiopulmonary disease. Stable marked cardiomegaly. Xr Chest Portable    Result Date: 12/2/2020  EXAMINATION: ONE XRAY VIEW OF THE CHEST 12/2/2020 6:45 am COMPARISON: 11/22/2020, 11/16/2020 HISTORY: ORDERING SYSTEM PROVIDED HISTORY: CP, n/v TECHNOLOGIST PROVIDED HISTORY: Reason for exam:->CP, n/v Reason for Exam: sob, cp Acuity: Acute Type of Exam: Initial FINDINGS: A single frontal view of the chest demonstrates no acute skeletal abnormality. There is a stable left subclavian pacemaker/AICD noted. There has been interval enlargement of the cardiopericardial silhouette, suggestive of an enlarging pericardial effusion as seen on prior studies. There are small bilateral pleural effusions. There is associated bibasilar airspace disease. The mid and upper lung zones are clear. There is no evidence of a pneumothorax. 1. Small bilateral pleural effusions with associated bibasilar airspace disease, most likely passive atelectasis, though aspiration or pneumonia are also diagnostic considerations. 2. Interval enlargement of the cardiopericardial silhouette, most consistent with an enlarging pericardial effusion.            PROCEDURES   Unless otherwise noted below, none     Procedures    CRITICAL CARE TIME N/A    CONSULTS:  None      EMERGENCY DEPARTMENT COURSE and DIFFERENTIAL DIAGNOSIS/MDM:   Vitals:    Vitals:    12/07/20 1344 12/07/20 1653 12/07/20 1749   BP: (!) 147/92 (!) 149/99 128/88   Pulse: 95 98 91   Resp: 18 20 16   Temp: 98.3 °F (36.8 °C)     TempSrc: Oral     SpO2: 99% 99% 95%   Weight: 180 lb (81.6 kg)         Patient was given the following medications:  Medications   0.9 % sodium chloride bolus (0 mLs Intravenous Stopped 12/7/20 1545)           Patient brought into the ED by squad for evaluation of nonbilious nonbloody nausea and vomiting with generalized abdominal pain. Patient has been seen several times in this ED for similar complaints. On exam today she is alert oriented afebrile breathing on room air satting at 95%. Nontoxic in appearance in no acute respiratory distress. Patient appears well. Patient resting comfortably on the exam table. Patient refused fluids as well as antiemetics here in the ED. She has not had any episodes of emesis while here in the ED. POCT glucose of 261. CBC reveals no acute leukocytosis. Hemoglobin of 13.7. No acute electrolyte abnormalities. No anion gap. Kidney function unremarkable. Lipase of 36. VBG unremarkable. Beta hydroxybutyrate elevated at 3.20. EKG was obtained see note of my attending for dictation and interpretation. Troponin was less than 0.01. Urine negative for leukocytes and nitrates however WBCs were present as well as 4+ bacteria. Patient started on antibiotics. Patient turned over to my attending at 1700 awaiting final disposition and p.o. challenge. Please see note for final disposition awaiting p.o. challenge at this time. Patient was stable at this time. FINAL IMPRESSION      1. Non-intractable vomiting with nausea, unspecified vomiting type    2. Hyperglycemia    3.  Acute cystitis without hematuria          DISPOSITION/PLAN   DISPOSITION Decision To Discharge 12/07/2020 05:40:10 PM      PATIENT REFERREDTO:  Cristian Bai, FRITZ - NP  [address] [place] [address] [phone]    Schedule an appointment as soon as possible for a visit today      [facility] ED  [address] [address]  [phone]  Schedule an appointment as soon as possible for a visit   As needed, If symptoms worsen      DISCHARGE MEDICATIONS:  Discharge Medication List as of 12/7/2020  5:42 PM      START taking these medications    Details   !! ondansetron (ZOFRAN) 4 MG tablet Take 1 tablet by mouth every 8 hours as needed for Nausea, Disp-20 tablet,R-0Print      cephALEXin (KEFLEX) 500 MG capsule Take 1 capsule by mouth 4 times daily for 7 days, Disp-28 capsule,R-0Print       !! - Potential duplicate medications found. Please discuss with provider.           DISCONTINUED MEDICATIONS:  Discharge Medication List as of 12/7/2020  5:42 PM                 (Please note that portions of this note were completed with a voice recognition program.  Efforts were made to edit the dictations but occasionally words are mis-transcribed.)    Shahnaz Rosa PA-C (electronically signed)            Shahnaz Rosa PA-C  12/08/20 0275

## 2020-12-07 NOTE — TELEPHONE ENCOUNTER
Writer contacted  to inform of 30 day readmission risk. Dr. Guerrero Massed informed writer there is no decision on disposition at this time.  is still waiting on pt results and hoping to get pt's symptoms under control to get pt discharged.

## 2020-12-07 NOTE — ED NOTES
Fluids not infusing. Asked pt if site felt tender, pt stated \"It's peachy. \" Attempted to flush. Pt sighing heavily multiple times. Asked pt again if site was tender. Pt sighs and states yes. Pt refused to have IV restarted or VS at this time.       Jeramie Ness RN  12/07/20 6004

## 2020-12-08 ENCOUNTER — TELEPHONE (OUTPATIENT)
Dept: OTHER | Facility: CLINIC | Age: 60
End: 2020-12-08

## 2020-12-08 ASSESSMENT — ENCOUNTER SYMPTOMS
RESPIRATORY NEGATIVE: 1
NAUSEA: 1
VOMITING: 1
ABDOMINAL PAIN: 1

## 2020-12-08 NOTE — TELEPHONE ENCOUNTER
RN Access contacted Nitza Yost ([facility]) office to schedule ED f/u appt. Spoke with Maggy Anderson, she stated NP will go see pt but she is not sure what day. She stated NP will get to her this week and she will contact pt to inform her.

## 2020-12-09 ENCOUNTER — TELEPHONE (OUTPATIENT)
Dept: OTHER | Facility: CLINIC | Age: 60
End: 2020-12-09

## 2020-12-09 ENCOUNTER — APPOINTMENT (OUTPATIENT)
Dept: CT IMAGING | Age: 60
DRG: 871 | End: 2020-12-09
Payer: MEDICARE

## 2020-12-09 ENCOUNTER — APPOINTMENT (OUTPATIENT)
Dept: GENERAL RADIOLOGY | Age: 60
DRG: 871 | End: 2020-12-09
Payer: MEDICARE

## 2020-12-09 ENCOUNTER — HOSPITAL ENCOUNTER (INPATIENT)
Age: 60
LOS: 5 days | Discharge: HOSPICE | DRG: 871 | End: 2020-12-15
Attending: EMERGENCY MEDICINE | Admitting: INTERNAL MEDICINE
Payer: MEDICARE

## 2020-12-09 DIAGNOSIS — R73.9 HYPERGLYCEMIA: ICD-10-CM

## 2020-12-09 DIAGNOSIS — N39.0 URINARY TRACT INFECTION WITHOUT HEMATURIA, SITE UNSPECIFIED: ICD-10-CM

## 2020-12-09 DIAGNOSIS — E83.42 HYPOMAGNESEMIA: ICD-10-CM

## 2020-12-09 DIAGNOSIS — U07.1 COVID-19: Primary | ICD-10-CM

## 2020-12-09 DIAGNOSIS — J18.9 MULTIFOCAL PNEUMONIA: ICD-10-CM

## 2020-12-09 DIAGNOSIS — R11.2 NON-INTRACTABLE VOMITING WITH NAUSEA, UNSPECIFIED VOMITING TYPE: ICD-10-CM

## 2020-12-09 LAB
A/G RATIO: 1.2 (ref 1.1–2.2)
ALBUMIN SERPL-MCNC: 3.6 G/DL (ref 3.4–5)
ALP BLD-CCNC: 84 U/L (ref 40–129)
ALT SERPL-CCNC: 9 U/L (ref 10–40)
ANION GAP SERPL CALCULATED.3IONS-SCNC: 17 MMOL/L (ref 3–16)
AST SERPL-CCNC: 16 U/L (ref 15–37)
BACTERIA: ABNORMAL /HPF
BASE EXCESS VENOUS: -1.3 MMOL/L (ref -3–3)
BASOPHILS ABSOLUTE: 0 K/UL (ref 0–0.2)
BASOPHILS RELATIVE PERCENT: 0.8 %
BETA-HYDROXYBUTYRATE: 3.8 MMOL/L (ref 0–0.27)
BILIRUB SERPL-MCNC: 0.7 MG/DL (ref 0–1)
BILIRUBIN URINE: ABNORMAL
BLOOD, URINE: ABNORMAL
BUN BLDV-MCNC: 9 MG/DL (ref 7–20)
CALCIUM SERPL-MCNC: 8.4 MG/DL (ref 8.3–10.6)
CARBOXYHEMOGLOBIN: 1.3 % (ref 0–1.5)
CHLORIDE BLD-SCNC: 92 MMOL/L (ref 99–110)
CLARITY: CLEAR
CO2: 23 MMOL/L (ref 21–32)
COLOR: YELLOW
CREAT SERPL-MCNC: 0.7 MG/DL (ref 0.6–1.1)
EOSINOPHILS ABSOLUTE: 0 K/UL (ref 0–0.6)
EOSINOPHILS RELATIVE PERCENT: 0.2 %
EPITHELIAL CELLS, UA: ABNORMAL /HPF (ref 0–5)
GFR AFRICAN AMERICAN: >60
GFR NON-AFRICAN AMERICAN: >60
GLOBULIN: 3 G/DL
GLUCOSE BLD-MCNC: 273 MG/DL (ref 70–99)
GLUCOSE URINE: >=1000 MG/DL
HCO3 VENOUS: 23.6 MMOL/L (ref 23–29)
HCT VFR BLD CALC: 39.5 % (ref 36–48)
HEMOGLOBIN: 12.9 G/DL (ref 12–16)
KETONES, URINE: 40 MG/DL
LACTIC ACID, SEPSIS: 2 MMOL/L (ref 0.4–1.9)
LACTIC ACID, SEPSIS: 2.9 MMOL/L (ref 0.4–1.9)
LEUKOCYTE ESTERASE, URINE: NEGATIVE
LIPASE: 21 U/L (ref 13–60)
LYMPHOCYTES ABSOLUTE: 1 K/UL (ref 1–5.1)
LYMPHOCYTES RELATIVE PERCENT: 19 %
MAGNESIUM: 1.6 MG/DL (ref 1.8–2.4)
MCH RBC QN AUTO: 28 PG (ref 26–34)
MCHC RBC AUTO-ENTMCNC: 32.7 G/DL (ref 31–36)
MCV RBC AUTO: 85.7 FL (ref 80–100)
METHEMOGLOBIN VENOUS: 0.3 %
MICROSCOPIC EXAMINATION: YES
MONOCYTES ABSOLUTE: 0.4 K/UL (ref 0–1.3)
MONOCYTES RELATIVE PERCENT: 7.2 %
NEUTROPHILS ABSOLUTE: 3.6 K/UL (ref 1.7–7.7)
NEUTROPHILS RELATIVE PERCENT: 72.8 %
NITRITE, URINE: POSITIVE
O2 CONTENT, VEN: 13 VOL %
O2 SAT, VEN: 71 %
O2 THERAPY: NORMAL
ORGANISM: ABNORMAL
PCO2, VEN: 40.5 MMHG (ref 40–50)
PDW BLD-RTO: 17 % (ref 12.4–15.4)
PH UA: 5.5 (ref 5–8)
PH VENOUS: 7.38 (ref 7.35–7.45)
PLATELET # BLD: 227 K/UL (ref 135–450)
PMV BLD AUTO: 8.9 FL (ref 5–10.5)
PO2, VEN: 38 MMHG (ref 25–40)
POTASSIUM REFLEX MAGNESIUM: 3.5 MMOL/L (ref 3.5–5.1)
PRO-BNP: 4997 PG/ML (ref 0–124)
PROCALCITONIN: 0.05 NG/ML (ref 0–0.15)
PROTEIN UA: ABNORMAL MG/DL
RBC # BLD: 4.61 M/UL (ref 4–5.2)
RBC UA: ABNORMAL /HPF (ref 0–4)
SARS-COV-2, NAAT: DETECTED
SODIUM BLD-SCNC: 132 MMOL/L (ref 136–145)
SPECIFIC GRAVITY UA: >=1.03 (ref 1–1.03)
TCO2 CALC VENOUS: 25 MMOL/L
TOTAL PROTEIN: 6.6 G/DL (ref 6.4–8.2)
TROPONIN: <0.01 NG/ML
TROPONIN: <0.01 NG/ML
URINE CULTURE, ROUTINE: ABNORMAL
URINE REFLEX TO CULTURE: YES
URINE TYPE: ABNORMAL
UROBILINOGEN, URINE: 1 E.U./DL
WBC # BLD: 5 K/UL (ref 4–11)
WBC UA: ABNORMAL /HPF (ref 0–5)

## 2020-12-09 PROCEDURE — 83690 ASSAY OF LIPASE: CPT

## 2020-12-09 PROCEDURE — 82803 BLOOD GASES ANY COMBINATION: CPT

## 2020-12-09 PROCEDURE — 81001 URINALYSIS AUTO W/SCOPE: CPT

## 2020-12-09 PROCEDURE — 87186 SC STD MICRODIL/AGAR DIL: CPT

## 2020-12-09 PROCEDURE — 83880 ASSAY OF NATRIURETIC PEPTIDE: CPT

## 2020-12-09 PROCEDURE — 99285 EMERGENCY DEPT VISIT HI MDM: CPT

## 2020-12-09 PROCEDURE — 71250 CT THORAX DX C-: CPT

## 2020-12-09 PROCEDURE — 71045 X-RAY EXAM CHEST 1 VIEW: CPT

## 2020-12-09 PROCEDURE — 87088 URINE BACTERIA CULTURE: CPT

## 2020-12-09 PROCEDURE — 83735 ASSAY OF MAGNESIUM: CPT

## 2020-12-09 PROCEDURE — 6360000002 HC RX W HCPCS: Performed by: EMERGENCY MEDICINE

## 2020-12-09 PROCEDURE — 87086 URINE CULTURE/COLONY COUNT: CPT

## 2020-12-09 PROCEDURE — 83605 ASSAY OF LACTIC ACID: CPT

## 2020-12-09 PROCEDURE — 84145 PROCALCITONIN (PCT): CPT

## 2020-12-09 PROCEDURE — 82010 KETONE BODYS QUAN: CPT

## 2020-12-09 PROCEDURE — 93005 ELECTROCARDIOGRAM TRACING: CPT | Performed by: EMERGENCY MEDICINE

## 2020-12-09 PROCEDURE — 80053 COMPREHEN METABOLIC PANEL: CPT

## 2020-12-09 PROCEDURE — 85025 COMPLETE CBC W/AUTO DIFF WBC: CPT

## 2020-12-09 PROCEDURE — 84484 ASSAY OF TROPONIN QUANT: CPT

## 2020-12-09 PROCEDURE — U0002 COVID-19 LAB TEST NON-CDC: HCPCS

## 2020-12-09 RX ORDER — 0.9 % SODIUM CHLORIDE 0.9 %
500 INTRAVENOUS SOLUTION INTRAVENOUS ONCE
Status: COMPLETED | OUTPATIENT
Start: 2020-12-09 | End: 2020-12-10

## 2020-12-09 RX ORDER — CIPROFLOXACIN 2 MG/ML
400 INJECTION, SOLUTION INTRAVENOUS ONCE
Status: COMPLETED | OUTPATIENT
Start: 2020-12-09 | End: 2020-12-10

## 2020-12-09 RX ORDER — MAGNESIUM SULFATE 1 G/100ML
1 INJECTION INTRAVENOUS ONCE
Status: COMPLETED | OUTPATIENT
Start: 2020-12-09 | End: 2020-12-10

## 2020-12-09 RX ORDER — ONDANSETRON 2 MG/ML
4 INJECTION INTRAMUSCULAR; INTRAVENOUS ONCE
Status: COMPLETED | OUTPATIENT
Start: 2020-12-09 | End: 2020-12-09

## 2020-12-09 RX ADMIN — ONDANSETRON HYDROCHLORIDE 4 MG: 2 INJECTION, SOLUTION INTRAMUSCULAR; INTRAVENOUS at 18:59

## 2020-12-09 RX ADMIN — CIPROFLOXACIN 400 MG: 2 INJECTION, SOLUTION INTRAVENOUS at 19:06

## 2020-12-09 ASSESSMENT — PAIN SCALES - GENERAL: PAINLEVEL_OUTOF10: 10

## 2020-12-10 PROBLEM — A41.9 SEPSIS (HCC): Status: ACTIVE | Noted: 2020-12-10

## 2020-12-10 LAB
A/G RATIO: 1.2 (ref 1.1–2.2)
ALBUMIN SERPL-MCNC: 3.5 G/DL (ref 3.4–5)
ALP BLD-CCNC: 83 U/L (ref 40–129)
ALT SERPL-CCNC: 10 U/L (ref 10–40)
ANION GAP SERPL CALCULATED.3IONS-SCNC: 16 MMOL/L (ref 3–16)
AST SERPL-CCNC: 23 U/L (ref 15–37)
BASOPHILS ABSOLUTE: 0.1 K/UL (ref 0–0.2)
BASOPHILS RELATIVE PERCENT: 0.9 %
BILIRUB SERPL-MCNC: 0.8 MG/DL (ref 0–1)
BUN BLDV-MCNC: 9 MG/DL (ref 7–20)
CALCIUM SERPL-MCNC: 8.1 MG/DL (ref 8.3–10.6)
CHLORIDE BLD-SCNC: 93 MMOL/L (ref 99–110)
CO2: 23 MMOL/L (ref 21–32)
CREAT SERPL-MCNC: 0.7 MG/DL (ref 0.6–1.1)
EKG ATRIAL RATE: 113 BPM
EKG DIAGNOSIS: NORMAL
EKG P AXIS: 65 DEGREES
EKG P-R INTERVAL: 158 MS
EKG Q-T INTERVAL: 366 MS
EKG QRS DURATION: 98 MS
EKG QTC CALCULATION (BAZETT): 502 MS
EKG R AXIS: 99 DEGREES
EKG T AXIS: 6 DEGREES
EKG VENTRICULAR RATE: 113 BPM
EOSINOPHILS ABSOLUTE: 0 K/UL (ref 0–0.6)
EOSINOPHILS RELATIVE PERCENT: 0 %
GFR AFRICAN AMERICAN: >60
GFR NON-AFRICAN AMERICAN: >60
GLOBULIN: 2.9 G/DL
GLUCOSE BLD-MCNC: 343 MG/DL (ref 70–99)
GLUCOSE BLD-MCNC: 353 MG/DL (ref 70–99)
GLUCOSE BLD-MCNC: 365 MG/DL (ref 70–99)
GLUCOSE BLD-MCNC: 400 MG/DL (ref 70–99)
GLUCOSE BLD-MCNC: 89 MG/DL (ref 70–99)
HCT VFR BLD CALC: 40.3 % (ref 36–48)
HEMOGLOBIN: 13.1 G/DL (ref 12–16)
LACTIC ACID: 2.3 MMOL/L (ref 0.4–2)
LACTIC ACID: 2.9 MMOL/L (ref 0.4–2)
LYMPHOCYTES ABSOLUTE: 0.6 K/UL (ref 1–5.1)
LYMPHOCYTES RELATIVE PERCENT: 10.6 %
MCH RBC QN AUTO: 28 PG (ref 26–34)
MCHC RBC AUTO-ENTMCNC: 32.4 G/DL (ref 31–36)
MCV RBC AUTO: 86.4 FL (ref 80–100)
MONOCYTES ABSOLUTE: 0.3 K/UL (ref 0–1.3)
MONOCYTES RELATIVE PERCENT: 4.9 %
NEUTROPHILS ABSOLUTE: 4.9 K/UL (ref 1.7–7.7)
NEUTROPHILS RELATIVE PERCENT: 83.6 %
PDW BLD-RTO: 17.4 % (ref 12.4–15.4)
PERFORMED ON: ABNORMAL
PERFORMED ON: NORMAL
PLATELET # BLD: 212 K/UL (ref 135–450)
PLATELET SLIDE REVIEW: ADEQUATE
PMV BLD AUTO: 9.1 FL (ref 5–10.5)
POTASSIUM REFLEX MAGNESIUM: 4.4 MMOL/L (ref 3.5–5.1)
RBC # BLD: 4.67 M/UL (ref 4–5.2)
SLIDE REVIEW: ABNORMAL
SODIUM BLD-SCNC: 132 MMOL/L (ref 136–145)
TOTAL PROTEIN: 6.4 G/DL (ref 6.4–8.2)
WBC # BLD: 5.9 K/UL (ref 4–11)

## 2020-12-10 PROCEDURE — 99223 1ST HOSP IP/OBS HIGH 75: CPT | Performed by: PHYSICIAN ASSISTANT

## 2020-12-10 PROCEDURE — 6370000000 HC RX 637 (ALT 250 FOR IP): Performed by: PHYSICIAN ASSISTANT

## 2020-12-10 PROCEDURE — 6370000000 HC RX 637 (ALT 250 FOR IP): Performed by: INTERNAL MEDICINE

## 2020-12-10 PROCEDURE — 6360000002 HC RX W HCPCS: Performed by: INTERNAL MEDICINE

## 2020-12-10 PROCEDURE — 93010 ELECTROCARDIOGRAM REPORT: CPT | Performed by: INTERNAL MEDICINE

## 2020-12-10 PROCEDURE — 2580000003 HC RX 258: Performed by: INTERNAL MEDICINE

## 2020-12-10 PROCEDURE — 83036 HEMOGLOBIN GLYCOSYLATED A1C: CPT

## 2020-12-10 PROCEDURE — 87040 BLOOD CULTURE FOR BACTERIA: CPT

## 2020-12-10 PROCEDURE — 2500000003 HC RX 250 WO HCPCS: Performed by: INTERNAL MEDICINE

## 2020-12-10 PROCEDURE — 99222 1ST HOSP IP/OBS MODERATE 55: CPT | Performed by: INTERNAL MEDICINE

## 2020-12-10 PROCEDURE — 6360000002 HC RX W HCPCS: Performed by: EMERGENCY MEDICINE

## 2020-12-10 PROCEDURE — 2060000000 HC ICU INTERMEDIATE R&B

## 2020-12-10 PROCEDURE — 80053 COMPREHEN METABOLIC PANEL: CPT

## 2020-12-10 PROCEDURE — 2580000003 HC RX 258: Performed by: EMERGENCY MEDICINE

## 2020-12-10 PROCEDURE — 85025 COMPLETE CBC W/AUTO DIFF WBC: CPT

## 2020-12-10 PROCEDURE — 83605 ASSAY OF LACTIC ACID: CPT

## 2020-12-10 RX ORDER — DIGOXIN 125 MCG
125 TABLET ORAL EVERY OTHER DAY
Status: DISCONTINUED | OUTPATIENT
Start: 2020-12-10 | End: 2020-12-15 | Stop reason: HOSPADM

## 2020-12-10 RX ORDER — PROCHLORPERAZINE EDISYLATE 5 MG/ML
10 INJECTION INTRAMUSCULAR; INTRAVENOUS EVERY 6 HOURS PRN
Status: DISCONTINUED | OUTPATIENT
Start: 2020-12-10 | End: 2020-12-15 | Stop reason: HOSPADM

## 2020-12-10 RX ORDER — ACETAMINOPHEN 650 MG/1
650 SUPPOSITORY RECTAL EVERY 6 HOURS PRN
Status: DISCONTINUED | OUTPATIENT
Start: 2020-12-10 | End: 2020-12-15 | Stop reason: HOSPADM

## 2020-12-10 RX ORDER — AMIODARONE HYDROCHLORIDE 200 MG/1
200 TABLET ORAL DAILY
Status: DISCONTINUED | OUTPATIENT
Start: 2020-12-10 | End: 2020-12-15 | Stop reason: HOSPADM

## 2020-12-10 RX ORDER — INSULIN GLARGINE 100 [IU]/ML
24 INJECTION, SOLUTION SUBCUTANEOUS NIGHTLY
Status: DISCONTINUED | OUTPATIENT
Start: 2020-12-10 | End: 2020-12-10

## 2020-12-10 RX ORDER — DULOXETIN HYDROCHLORIDE 60 MG/1
60 CAPSULE, DELAYED RELEASE ORAL DAILY
Status: DISCONTINUED | OUTPATIENT
Start: 2020-12-10 | End: 2020-12-15 | Stop reason: HOSPADM

## 2020-12-10 RX ORDER — INSULIN GLARGINE 100 [IU]/ML
30 INJECTION, SOLUTION SUBCUTANEOUS NIGHTLY
Status: DISCONTINUED | OUTPATIENT
Start: 2020-12-10 | End: 2020-12-15 | Stop reason: HOSPADM

## 2020-12-10 RX ORDER — GUAIFENESIN/DEXTROMETHORPHAN 100-10MG/5
5 SYRUP ORAL EVERY 4 HOURS PRN
Status: DISCONTINUED | OUTPATIENT
Start: 2020-12-10 | End: 2020-12-15 | Stop reason: HOSPADM

## 2020-12-10 RX ORDER — DEXTROSE MONOHYDRATE 25 G/50ML
12.5 INJECTION, SOLUTION INTRAVENOUS PRN
Status: DISCONTINUED | OUTPATIENT
Start: 2020-12-10 | End: 2020-12-15 | Stop reason: HOSPADM

## 2020-12-10 RX ORDER — SODIUM CHLORIDE 0.9 % (FLUSH) 0.9 %
10 SYRINGE (ML) INJECTION PRN
Status: DISCONTINUED | OUTPATIENT
Start: 2020-12-10 | End: 2020-12-15 | Stop reason: HOSPADM

## 2020-12-10 RX ORDER — DEXTROSE MONOHYDRATE 50 MG/ML
100 INJECTION, SOLUTION INTRAVENOUS PRN
Status: DISCONTINUED | OUTPATIENT
Start: 2020-12-10 | End: 2020-12-15 | Stop reason: HOSPADM

## 2020-12-10 RX ORDER — NICOTINE POLACRILEX 4 MG
15 LOZENGE BUCCAL PRN
Status: DISCONTINUED | OUTPATIENT
Start: 2020-12-10 | End: 2020-12-15 | Stop reason: HOSPADM

## 2020-12-10 RX ORDER — SODIUM CHLORIDE 0.9 % (FLUSH) 0.9 %
10 SYRINGE (ML) INJECTION EVERY 12 HOURS SCHEDULED
Status: DISCONTINUED | OUTPATIENT
Start: 2020-12-10 | End: 2020-12-15 | Stop reason: HOSPADM

## 2020-12-10 RX ORDER — CIPROFLOXACIN 2 MG/ML
400 INJECTION, SOLUTION INTRAVENOUS EVERY 12 HOURS
Status: DISCONTINUED | OUTPATIENT
Start: 2020-12-10 | End: 2020-12-10

## 2020-12-10 RX ORDER — CARVEDILOL 3.12 MG/1
3.12 TABLET ORAL 2 TIMES DAILY WITH MEALS
Status: DISCONTINUED | OUTPATIENT
Start: 2020-12-10 | End: 2020-12-15 | Stop reason: HOSPADM

## 2020-12-10 RX ORDER — ATORVASTATIN CALCIUM 40 MG/1
40 TABLET, FILM COATED ORAL NIGHTLY
Status: DISCONTINUED | OUTPATIENT
Start: 2020-12-10 | End: 2020-12-15 | Stop reason: HOSPADM

## 2020-12-10 RX ORDER — SUCRALFATE 1 G/1
1 TABLET ORAL
Status: DISCONTINUED | OUTPATIENT
Start: 2020-12-10 | End: 2020-12-15 | Stop reason: HOSPADM

## 2020-12-10 RX ORDER — DOCUSATE SODIUM 100 MG/1
100 CAPSULE, LIQUID FILLED ORAL DAILY
Status: DISCONTINUED | OUTPATIENT
Start: 2020-12-10 | End: 2020-12-15 | Stop reason: HOSPADM

## 2020-12-10 RX ORDER — NITROGLYCERIN 0.4 MG/1
0.4 TABLET SUBLINGUAL EVERY 5 MIN PRN
Status: DISCONTINUED | OUTPATIENT
Start: 2020-12-10 | End: 2020-12-15 | Stop reason: HOSPADM

## 2020-12-10 RX ORDER — ASPIRIN 81 MG/1
81 TABLET ORAL DAILY
Status: DISCONTINUED | OUTPATIENT
Start: 2020-12-10 | End: 2020-12-15 | Stop reason: HOSPADM

## 2020-12-10 RX ORDER — POLYETHYLENE GLYCOL 3350 17 G/17G
17 POWDER, FOR SOLUTION ORAL DAILY
Status: DISCONTINUED | OUTPATIENT
Start: 2020-12-10 | End: 2020-12-15 | Stop reason: HOSPADM

## 2020-12-10 RX ORDER — METOCLOPRAMIDE 10 MG/1
10 TABLET ORAL
Status: DISCONTINUED | OUTPATIENT
Start: 2020-12-10 | End: 2020-12-15 | Stop reason: HOSPADM

## 2020-12-10 RX ORDER — ACETAMINOPHEN 325 MG/1
650 TABLET ORAL EVERY 6 HOURS PRN
Status: DISCONTINUED | OUTPATIENT
Start: 2020-12-10 | End: 2020-12-15 | Stop reason: HOSPADM

## 2020-12-10 RX ORDER — QUETIAPINE FUMARATE 50 MG/1
50 TABLET, EXTENDED RELEASE ORAL NIGHTLY
Status: DISCONTINUED | OUTPATIENT
Start: 2020-12-10 | End: 2020-12-15 | Stop reason: HOSPADM

## 2020-12-10 RX ORDER — RANOLAZINE 500 MG/1
500 TABLET, EXTENDED RELEASE ORAL 2 TIMES DAILY
Status: DISCONTINUED | OUTPATIENT
Start: 2020-12-10 | End: 2020-12-15 | Stop reason: HOSPADM

## 2020-12-10 RX ORDER — PANTOPRAZOLE SODIUM 40 MG/1
40 TABLET, DELAYED RELEASE ORAL DAILY
Status: DISCONTINUED | OUTPATIENT
Start: 2020-12-10 | End: 2020-12-15 | Stop reason: HOSPADM

## 2020-12-10 RX ORDER — SODIUM CHLORIDE 9 MG/ML
INJECTION, SOLUTION INTRAVENOUS CONTINUOUS
Status: DISCONTINUED | OUTPATIENT
Start: 2020-12-10 | End: 2020-12-10

## 2020-12-10 RX ORDER — FENOFIBRATE 160 MG/1
160 TABLET ORAL DAILY
Status: DISCONTINUED | OUTPATIENT
Start: 2020-12-10 | End: 2020-12-15 | Stop reason: HOSPADM

## 2020-12-10 RX ORDER — TORSEMIDE 20 MG/1
20 TABLET ORAL DAILY
Status: DISCONTINUED | OUTPATIENT
Start: 2020-12-10 | End: 2020-12-10

## 2020-12-10 RX ADMIN — POLYETHYLENE GLYCOL (3350) 17 G: 17 POWDER, FOR SOLUTION ORAL at 17:08

## 2020-12-10 RX ADMIN — INSULIN LISPRO 12 UNITS: 100 INJECTION, SOLUTION INTRAVENOUS; SUBCUTANEOUS at 13:14

## 2020-12-10 RX ADMIN — VANCOMYCIN HYDROCHLORIDE 2000 MG: 10 INJECTION, POWDER, LYOPHILIZED, FOR SOLUTION INTRAVENOUS at 02:08

## 2020-12-10 RX ADMIN — METOCLOPRAMIDE 10 MG: 10 TABLET ORAL at 08:26

## 2020-12-10 RX ADMIN — SODIUM CHLORIDE 500 ML: 9 INJECTION, SOLUTION INTRAVENOUS at 02:31

## 2020-12-10 RX ADMIN — FENOFIBRATE 160 MG: 160 TABLET ORAL at 12:23

## 2020-12-10 RX ADMIN — APIXABAN 5 MG: 5 TABLET, FILM COATED ORAL at 22:21

## 2020-12-10 RX ADMIN — INSULIN LISPRO 8 UNITS: 100 INJECTION, SOLUTION INTRAVENOUS; SUBCUTANEOUS at 17:17

## 2020-12-10 RX ADMIN — CIPROFLOXACIN 400 MG: 2 INJECTION, SOLUTION INTRAVENOUS at 05:22

## 2020-12-10 RX ADMIN — INSULIN LISPRO 10 UNITS: 100 INJECTION, SOLUTION INTRAVENOUS; SUBCUTANEOUS at 17:17

## 2020-12-10 RX ADMIN — APIXABAN 5 MG: 5 TABLET, FILM COATED ORAL at 08:25

## 2020-12-10 RX ADMIN — METOCLOPRAMIDE 10 MG: 10 TABLET ORAL at 14:31

## 2020-12-10 RX ADMIN — DULOXETINE HYDROCHLORIDE 60 MG: 60 CAPSULE, DELAYED RELEASE ORAL at 08:27

## 2020-12-10 RX ADMIN — SUCRALFATE 1 G: 1 TABLET ORAL at 08:26

## 2020-12-10 RX ADMIN — MEROPENEM 1 G: 1 INJECTION, POWDER, FOR SOLUTION INTRAVENOUS at 23:55

## 2020-12-10 RX ADMIN — INSULIN LISPRO 8 UNITS: 100 INJECTION, SOLUTION INTRAVENOUS; SUBCUTANEOUS at 08:31

## 2020-12-10 RX ADMIN — ATORVASTATIN CALCIUM 40 MG: 40 TABLET, FILM COATED ORAL at 22:20

## 2020-12-10 RX ADMIN — DIGOXIN 125 MCG: 125 TABLET ORAL at 08:26

## 2020-12-10 RX ADMIN — RANOLAZINE 500 MG: 500 TABLET, FILM COATED, EXTENDED RELEASE ORAL at 22:37

## 2020-12-10 RX ADMIN — MICONAZOLE NITRATE: 20 POWDER TOPICAL at 22:42

## 2020-12-10 RX ADMIN — MICONAZOLE NITRATE: 20 POWDER TOPICAL at 12:23

## 2020-12-10 RX ADMIN — TORSEMIDE 20 MG: 20 TABLET ORAL at 08:25

## 2020-12-10 RX ADMIN — MEROPENEM 1 G: 1 INJECTION, POWDER, FOR SOLUTION INTRAVENOUS at 17:08

## 2020-12-10 RX ADMIN — RANOLAZINE 500 MG: 500 TABLET, FILM COATED, EXTENDED RELEASE ORAL at 12:23

## 2020-12-10 RX ADMIN — CEFEPIME 2 G: 2 INJECTION, POWDER, FOR SOLUTION INTRAVENOUS at 04:11

## 2020-12-10 RX ADMIN — SUCRALFATE 1 G: 1 TABLET ORAL at 10:32

## 2020-12-10 RX ADMIN — SUCRALFATE 1 G: 1 TABLET ORAL at 16:17

## 2020-12-10 RX ADMIN — CARVEDILOL 3.12 MG: 3.12 TABLET, FILM COATED ORAL at 08:27

## 2020-12-10 RX ADMIN — AMIODARONE HYDROCHLORIDE 200 MG: 200 TABLET ORAL at 08:26

## 2020-12-10 RX ADMIN — GUAIFENESIN AND DEXTROMETHORPHAN 5 ML: 100; 10 SYRUP ORAL at 05:22

## 2020-12-10 RX ADMIN — MAGNESIUM SULFATE HEPTAHYDRATE 1 G: 1 INJECTION, SOLUTION INTRAVENOUS at 02:31

## 2020-12-10 RX ADMIN — PANTOPRAZOLE SODIUM 40 MG: 40 TABLET, DELAYED RELEASE ORAL at 08:27

## 2020-12-10 RX ADMIN — ASPIRIN 81 MG: 81 TABLET, COATED ORAL at 08:26

## 2020-12-10 RX ADMIN — DOCUSATE SODIUM 100 MG: 100 CAPSULE ORAL at 17:07

## 2020-12-10 RX ADMIN — METOCLOPRAMIDE 10 MG: 10 TABLET ORAL at 16:28

## 2020-12-10 RX ADMIN — SODIUM CHLORIDE: 9 INJECTION, SOLUTION INTRAVENOUS at 04:12

## 2020-12-10 RX ADMIN — Medication 10 ML: at 22:25

## 2020-12-10 RX ADMIN — GUAIFENESIN AND DEXTROMETHORPHAN 5 ML: 100; 10 SYRUP ORAL at 23:53

## 2020-12-10 ASSESSMENT — PAIN SCALES - GENERAL
PAINLEVEL_OUTOF10: 0

## 2020-12-10 NOTE — CONSULTS
Patient is being seen at the request of Dr. Rajiv Blake for a consultation for COVID pneumonia     HISTORY OF PRESENT ILLNESS: This is a 78-year-old female with a history of CAD, CVA, CHF and MR who presented to the emergency department on 12/9/2020 with a 3-day history of severe nausea with associated emesis, associated with hematemesis. She also has shortness of breath, myalgias and just generally feels badly. In the emergency department she was found to be positive for Covid. Evaluation included a CT chest on 12/9/2020 at [facility] that showed bilateral pulmonary infiltrates with small effusions. PAST MEDICAL HISTORY:  Past Medical History:   Diagnosis Date    Arthritis     CAD (coronary artery disease)     Cerebral artery occlusion with cerebral infarction ([address]     x 3    CH[facility]congestive [facility] failure) ([facility] [address]     COVID-19 12/09/2020    Diabetes mellitus ([facility] [address]     ESBL (extended spectrum beta-lactamase) producing bacteria infection 12/07/2020    E. COLI-URINE    Hyperlipidemia     Hypertension     Mental retardation     MI (myocardial infarction) ([facility] [address]     Pacemaker      PAST SURGICAL HISTORY:  Past Surgical History:   Procedure Laterality Date    BACK SURGERY      x3    IR MIDLINE CATH  11/23/2020    IR MIDLINE CATH 11/23/2020 [facility] SPECIAL PROCEDURES    PACEMAKER INSERTION      Boyd pacemaker    TUBAL LIGATION      TUMOR REMOVAL      UPPER GASTROINTESTINAL ENDOSCOPY N/A 11/5/2020    EGD BIOPSY performed by Rani Barnard DO at [address]:  family history includes Cancer in her mother; Heart Disease in her father; Other in her mother. SOCIAL HISTORY:   reports that she has never smoked.  She has never used smokeless tobacco.    Scheduled Meds:   amiodarone  200 mg Oral Daily    apixaban  5 mg Oral BID    aspirin  81 mg Oral Daily    atorvastatin  40 mg Oral Nightly    carvedilol  3.125 mg Oral BID WC    digoxin  125 mcg Oral Every Other Day    fenofibrate  160 mg Oral Daily    DULoxetine  60 mg Oral Daily    metoclopramide  10 mg Oral TID WC    miconazole   Topical BID    pantoprazole  40 mg Oral Daily    [Held by provider] QUEtiapine  50 mg Oral Nightly    ranolazine  500 mg Oral BID    sucralfate  1 g Oral TID AC    sodium chloride flush  10 mL Intravenous 2 times per day    insulin lispro  0-12 Units Subcutaneous TID WC    insulin lispro  0-6 Units Subcutaneous Nightly    meropenem  1 g Intravenous Q8H    insulin lispro  10 Units Subcutaneous TID WC    insulin glargine  30 Units Subcutaneous Nightly    docusate sodium  100 mg Oral Daily    polyethylene glycol  17 g Oral Daily     Continuous Infusions:   dextrose       PRN Meds:  [Held by provider] prochlorperazine, nitroGLYCERIN, sodium chloride flush, acetaminophen **OR** acetaminophen, glucose, dextrose, glucagon (rDNA), dextrose, guaiFENesin-dextromethorphan    ALLERGIES:  Patient has No Known Allergies. REVIEW OF SYSTEMS:  Constitutional: Negative for fever  HENT: Negative for sore throat  Eyes: Negative for redness   Respiratory: + for dyspnea, cough  Cardiovascular: Negative for chest pain  Gastrointestinal: + Emesis  Genitourinary: Negative for hematuria   Musculoskeletal: + for arthralgias   Skin: Negative for rash  Neurological: Negative for syncope  Hematological: Negative for adenopathy  Psychiatric/Behavorial: Negative for anxiety    PHYSICAL EXAM:  Blood pressure 110/86, pulse 110, temperature 98.2 °F (36.8 °C), temperature source Temporal, resp. rate 29, height 5' 1\" (1.549 m), weight 180 lb (81.6 kg), SpO2 97 %, not currently breastfeeding.' on RA  Gen: Appears unwell, clutching her juan pablo bear  Eyes: PERRL. No sclera icterus. No conjunctival injection. ENT: No discharge. Pharynx clear. Neck: Trachea midline. No obvious mass. Resp: No accessory muscle use. + crackles. No wheezes. No rhonchi. No dullness on percussion. CV: Regular rate. Regular rhythm. No murmur or rub. + edema. Peripheral pulses are 2+. Capillary refill is less than 3 seconds. GI: Non-tender. Non-distended. No hernia. Skin: Warm and dry. No nodule on exposed extremities. Lymph: No cervical LAD. No supraclavicular LAD. M/S: No cyanosis. No joint deformity. No clubbing. Neuro: Awake. Alert. Moves all four extremities. Psych: Oriented x 3. No anxiety. LABS:  CBC:   Recent Labs     12/09/20  1845 12/10/20  0840   WBC 5.0 5.9   HGB 12.9 13.1   HCT 39.5 40.3   MCV 85.7 86.4    212     BMP:   Recent Labs     12/09/20  1845 12/10/20  0840   * 132*   K 3.5 4.4   CL 92* 93*   CO2 23 23   BUN 9 9   CREATININE 0.7 0.7     LIVER PROFILE:   Recent Labs     12/09/20  1845 12/10/20  0840   AST 16 23   ALT 9* 10   LIPASE 21.0  --    BILITOT 0.7 0.8   ALKPHOS 84 83     PT/INR: No results for input(s): PROTIME, INR in the last 72 hours. APTT: No results for input(s): APTT in the last 72 hours. UA:  Recent Labs     12/09/20  2030   COLORU Yellow   PHUR 5.5   WBCUA 10-20*   RBCUA 0-2   BACTERIA 4+*   CLARITYU Clear   SPECGRAV >=1.030   LEUKOCYTESUR Negative   UROBILINOGEN 1.0   BILIRUBINUR SMALL*   BLOODU TRACE-INTACT*   GLUCOSEU >=1000*     No results for input(s): PHART, TBY3KGL, PO2ART in the last 72 hours. Chest imaging was reviewed by me and showed   CT chest 12/9/2020  Shows patchy nodular bilateral infiltrates with areas of groundglass opacity new compared to the 11/16/2020 CT consistent with acute infection     FINDINGS:    Mediastinum: Pericardial perfusion is again seen, not significantly changed. Coronary calcifications are identified.  Aortic caliber is normal.  A few    small mediastinal lymph nodes are again noted.         Lungs/pleura: Small bilateral pleural effusions have decreased in volume. There is no pneumothorax.  The central airways are patent.  There are    bilateral nodular infiltrates.         Upper Abdomen:  The visualized upper abdomen

## 2020-12-10 NOTE — ED PROVIDER NOTES
[address] ED      CHIEF COMPLAINT  Emesis (x 3 days)       HISTORY OF PRESENT ILLNESS  Bridger Ewing is a 62 y.o. female with a past medical history of systolic CHF with EF 34%, pacemaker defibrillator, diabetes, and coronary artery disease who presents to the ED complaining of vomiting, cough and fatigue. The patient states the symptoms have been occurring for the past 3 days. She describes several episodes of nonbloody emesis. She denies diarrhea or abdominal pain. She has her chest pain today that is unchanged. She also describes some shortness of breath that is unchanged, as well as lightheadedness. She was diagnosed with a UTI a few days ago and placed on Keflex. No other complaints, modifying factors or associated symptoms. I have reviewed the following from the nursing documentation. Past Medical History:   Diagnosis Date    Arthritis     CAD (coronary artery disease)     Cerebral artery occlusion with cerebral infarction ([facility] [address]     x 3    CHF (congestive heart failure) (HCC)     Diabetes mellitus ([facility] [address]     ESBL (extended spectrum beta-lactamase) producing bacteria infection 12/07/2020    E. COLI-URINE    Hyperlipidemia     Hypertension     Mental retardation     MI (myocardial infarction) ([facility] [address]     Pacemaker      Past Surgical History:   Procedure Laterality Date    BACK SURGERY      x3    IR MIDLINE CATH  11/23/2020    IR MIDLINE CATH 11/23/2020 [facility] SPECIAL PROCEDURES    PACEMAKER INSERTION      Boyd pacemaker    TUBAL LIGATION      TUMOR REMOVAL      UPPER GASTROINTESTINAL ENDOSCOPY N/A 11/5/2020    EGD BIOPSY performed by Delores Page DO at [facility]     Family History   Problem Relation Age of Onset    Heart Disease Father     Cancer Mother     Other Mother      Social History     Socioeconomic History    Marital status:       Spouse name: Not on file    Number of children: Not on file    Years of education: Not on file    Highest education level: Not on file   Occupational History    Not on file   Social Needs    Financial resource strain: Not on file    Food insecurity     Worry: Not on file     Inability: Not on file    Transportation needs     Medical: Not on file     Non-medical: Not on file   Tobacco Use    Smoking status: Never Smoker    Smokeless tobacco: Never Used   Substance and Sexual Activity    Alcohol use: No    Drug use: No    Sexual activity: Not Currently   Lifestyle    Physical activity     Days per week: Not on file     Minutes per session: Not on file    Stress: Not on file   Relationships    Social connections     Talks on phone: Not on file     Gets together: Not on file     Attends Zoroastrianism service: Not on file     Active member of club or organization: Not on file     Attends meetings of clubs or organizations: Not on file     Relationship status: Not on file    Intimate partner violence     Fear of current or ex partner: Not on file     Emotionally abused: Not on file     Physically abused: Not on file     Forced sexual activity: Not on file   Other Topics Concern    Not on file   Social History Narrative    Not on file     Current Facility-Administered Medications   Medication Dose Route Frequency Provider Last Rate Last Dose    cefepime (MAXIPIME) 2 g IVPB minibag  2 g Intravenous Once [employer], DO        vancomycin (VANCOCIN) 2,000 mg in dextrose 5 % 500 mL IVPB  25 mg/kg Intravenous Once [employer],  mL/hr at 12/10/20 0208 2,000 mg at 12/10/20 0208    0.9 % sodium chloride bolus  500 mL Intravenous Once [employer], DO        magnesium sulfate 1 g in dextrose 5% 100 mL IVPB  1 g Intravenous Once [employer], DO         Current Outpatient Medications   Medication Sig Dispense Refill    ondansetron (ZOFRAN) 4 MG tablet Take 1 tablet by mouth every 8 hours as needed for Nausea 20 tablet 0    ondansetron (ZOFRAN) 4 MG tablet Take 1 tablet by mouth every 8 hours as needed for Nausea or Vomiting 24 tablet 0    carvedilol (COREG) 3.125 MG tablet Take 1 tablet by mouth 2 times daily (with meals) 60 tablet 3    torsemide (DEMADEX) 20 MG tablet Take 1 tablet by mouth daily 30 tablet 3    metFORMIN (GLUCOPHAGE) 1000 MG tablet Take 1,000 mg by mouth 2 times daily (with meals)      QUEtiapine (SEROQUEL XR) 50 MG extended release tablet Take 50 mg by mouth nightly      apixaban starter pack (ELIQUIS DVT/PE STARTER PACK) 5 MG TBPK tablet Take 1 tablet by mouth See Admin Instructions 74 tablet 0    insulin aspart (NOVOLOG FLEXPEN) 100 UNIT/ML injection pen Inject 10 Units into the skin 3 times daily (before meals) 5 pen 0    insulin glargine (LANTUS SOLOSTAR) 100 UNIT/ML injection pen Inject 24 Units into the skin nightly 5 pen 0    amiodarone (CORDARONE) 200 MG tablet Take 1 tablet by mouth daily 90 tablet 3    metoclopramide (REGLAN) 10 MG tablet Take 1 tablet by mouth 3 times daily (with meals) 30 tablet 0    sucralfate (CARAFATE) 1 GM tablet Take 1 tablet by mouth 3 times daily 90 tablet 0    pantoprazole (PROTONIX) 40 MG tablet Take 1 tablet by mouth daily 90 tablet 0    vitamin D (ERGOCALCIFEROL) 1.25 MG (67184 UT) CAPS capsule Take 50,000 Units by mouth once a week Takes weekly on Sundays      digoxin (LANOXIN) 125 MCG tablet Take 1 tablet by mouth every other day 30 tablet 3    nitroGLYCERIN (NITROSTAT) 0.4 MG SL tablet up to max of 3 total doses.  If no relief after 1 dose, call 911. 25 tablet 3    aspirin 81 MG EC tablet Take 1 tablet by mouth daily 30 tablet 2    ranolazine (RANEXA) 500 MG extended release tablet Take 1 tablet by mouth 2 times daily 60 tablet 2    DULoxetine (CYMBALTA) 60 MG extended release capsule Take 1 capsule by mouth daily 30 capsule 0    atorvastatin (LIPITOR) 40 MG tablet Take 1 tablet by mouth nightly 30 tablet 2    fenofibrate micronized (LOFIBRA) 200 MG capsule Take 1 capsule by mouth nightly 30 capsule 3    miconazole (MICOTIN) 2 % powder Apply topically 2 times daily. 45 g 1    [employer] Lancets Ultra Thin MISC 1 each by Does not apply route 4 times daily 200 each 0    blood glucose monitor strips Test three times a day & as needed for symptoms of irregular blood glucose. 100 strip 2     No Known Allergies    REVIEW OF SYSTEMS  10 systems reviewed, pertinent positives per HPI otherwise noted to be negative. PHYSICAL EXAM  /76   Pulse 109   Temp 99.2 °F (37.3 °C) (Axillary)   Resp 20   Wt 180 lb (81.6 kg)   SpO2 99%   BMI 34.01 kg/m²    Physical exam:  General appearance: awake and cooperative. No distress. Chronically ill appearing. Skin: Warm and dry. No rashes or lesions. HENT: Normocephalic. Atraumatic. Mucus membranes are dry  Neck: supple  Eyes: LISA. EOM intact. Heart: Tachycardic rate. Regular rhythm. No murmurs. Lungs: Respirations unlabored. CTAB. No wheezes, rales, or rhonchi. Good air exchange  Abdomen: No tenderness. Soft. Non distended. No peritoneal signs. Musculoskeletal: No extremity edema. Compartments soft. No deformity. No tenderness in the extremities. All extremities neurovascularly intact. Radial, Dp, and PT pulses +2/4 bilaterally  Neurological: Alert and oriented. No focal deficits. No aphasia or dysarthria. Psychiatric: Normal mood and affect. LABS  I have reviewed all labs for this visit.    Results for orders placed or performed during the hospital encounter of 12/09/20   CBC Auto Differential   Result Value Ref Range    WBC 5.0 4.0 - 11.0 K/uL    RBC 4.61 4.00 - 5.20 M/uL    Hemoglobin 12.9 12.0 - 16.0 g/dL    Hematocrit 39.5 36.0 - 48.0 %    MCV 85.7 80.0 - 100.0 fL    MCH 28.0 26.0 - 34.0 pg    MCHC 32.7 31.0 - 36.0 g/dL    RDW 17.0 (H) 12.4 - 15.4 %    Platelets 748 299 - 751 K/uL    MPV 8.9 5.0 - 10.5 fL    Neutrophils % 72.8 %    Lymphocytes % 19.0 %    Monocytes % 7.2 %    Eosinophils % 0.2 %    Basophils % 0.8 %    Neutrophils Absolute 3.6 1.7 - 7.7 K/uL    Lymphocytes Absolute 1.0 1.0 - 5.1 K/uL    Monocytes Absolute 0.4 0.0 - 1.3 K/uL    Eosinophils Absolute 0.0 0.0 - 0.6 K/uL    Basophils Absolute 0.0 0.0 - 0.2 K/uL   Comprehensive Metabolic Panel w/ Reflex to MG   Result Value Ref Range    Sodium 132 (L) 136 - 145 mmol/L    Potassium reflex Magnesium 3.5 3.5 - 5.1 mmol/L    Chloride 92 (L) 99 - 110 mmol/L    CO2 23 21 - 32 mmol/L    Anion Gap 17 (H) 3 - 16    Glucose 273 (H) 70 - 99 mg/dL    BUN 9 7 - 20 mg/dL    CREATININE 0.7 0.6 - 1.1 mg/dL    GFR Non-African American >60 >60    GFR African American >60 >60    Calcium 8.4 8.3 - 10.6 mg/dL    Total Protein 6.6 6.4 - 8.2 g/dL    Alb 3.6 3.4 - 5.0 g/dL    Albumin/Globulin Ratio 1.2 1.1 - 2.2    Total Bilirubin 0.7 0.0 - 1.0 mg/dL    Alkaline Phosphatase 84 40 - 129 U/L    ALT 9 (L) 10 - 40 U/L    AST 16 15 - 37 U/L    Globulin 3.0 g/dL   Lipase   Result Value Ref Range    Lipase 21.0 13.0 - 60.0 U/L   Troponin   Result Value Ref Range    Troponin <0.01 <0.01 ng/mL   Urinalysis Reflex to Culture    Specimen: Urine, clean catch   Result Value Ref Range    Color, UA Yellow Straw/Yellow    Clarity, UA Clear Clear    Glucose, Ur >=1000 (A) Negative mg/dL    Bilirubin Urine SMALL (A) Negative    Ketones, Urine 40 (A) Negative mg/dL    Specific Gravity, UA >=1.030 1.005 - 1.030    Blood, Urine TRACE-INTACT (A) Negative    pH, UA 5.5 5.0 - 8.0    Protein, UA TRACE (A) Negative mg/dL    Urobilinogen, Urine 1.0 <2.0 E.U./dL    Nitrite, Urine POSITIVE (A) Negative    Leukocyte Esterase, Urine Negative Negative    Microscopic Examination YES     Urine Type NotGiven     Urine Reflex to Culture Yes    Lactate, Sepsis   Result Value Ref Range    Lactic Acid, Sepsis 2.0 (H) 0.4 - 1.9 mmol/L   Lactate, Sepsis   Result Value Ref Range    Lactic Acid, Sepsis 2.9 (H) 0.4 - 1.9 mmol/L   Magnesium   Result Value Ref Range    Magnesium 1.60 (L) 1.80 - 2.40 mg/dL   COVID-19   Result Value Ref Range    SARS-CoV-2, NAAT DETECTED (AA) Not Detected Microscopic Urinalysis   Result Value Ref Range    WBC, UA 10-20 (A) 0 - 5 /HPF    RBC, UA 0-2 0 - 4 /HPF    Epithelial Cells, UA 2-5 0 - 5 /HPF    Bacteria, UA 4+ (A) None Seen /HPF   Blood gas, venous   Result Value Ref Range    pH, Alberto 7.384 7.350 - 7.450    pCO2, Alberto 40.5 40.0 - 50.0 mmHg    pO2, Alberto 38.0 25.0 - 40.0 mmHg    HCO3, Venous 23.6 23.0 - 29.0 mmol/L    Base Excess, Alberto -1.3 -3.0 - 3.0 mmol/L    O2 Sat, Alberto 71 Not Established %    Carboxyhemoglobin 1.3 0.0 - 1.5 %    MetHgb, Alberto 0.3 <1.5 %    TC02 (Calc), Alberto 25 Not Established mmol/L    O2 Content, Alberto 13 Not Established VOL %    O2 Therapy Unknown    Beta-Hydroxybutyrate   Result Value Ref Range    Beta-Hydroxybutyrate 3.80 (H) 0.00 - 0.27 mmol/L   Procalcitonin   Result Value Ref Range    Procalcitonin 0.05 0.00 - 0.15 ng/mL   Brain Natriuretic Peptide   Result Value Ref Range    Pro-BNP 4,997 (H) 0 - 124 pg/mL   Troponin   Result Value Ref Range    Troponin <0.01 <0.01 ng/mL       ECG  The Ekg interpreted by me shows  sinus tachycardia, [id number]   Axis is   Normal  QTc is  within an acceptable range  Intervals and Durations are unremarkable. ST Segments: no acute change  No significant change from prior EKG dated 12/7/20    RADIOLOGY  Xr Chest (2 Vw)    Result Date: 11/23/2020  EXAMINATION: TWO XRAY VIEWS OF THE CHEST 11/22/2020 10:43 pm COMPARISON: November 16, 2020 HISTORY: ORDERING SYSTEM PROVIDED HISTORY: shortness of breath TECHNOLOGIST PROVIDED HISTORY: Reason for exam:->shortness of breath Reason for Exam: SOB Acuity: Unknown Type of Exam: Unknown Additional signs and symptoms: pt c/o SOB today FINDINGS: Left chest wall pacer in place. Cardiomegaly. The lungs show retrocardiac opacity with pleural effusion. Trace right effusion is also seen. Mild edema. No pneumothorax. No acute osseous abnormality. 1. Cardiomegaly with mild congestive heart failure.      Xr Chest (2 Vw)    Result Date: 11/16/2020  EXAMINATION: TWO XRAY VIEWS OF THE CHEST 11/16/2020 7:15 pm COMPARISON: 01/10/2020 HISTORY: ORDERING SYSTEM PROVIDED HISTORY: Chest Discomfort TECHNOLOGIST PROVIDED HISTORY: Reason for exam:->Chest Discomfort Reason for Exam: Chest Discomfort, shortness of breath Acuity: Acute Type of Exam: Initial FINDINGS: Cardiomegaly and pulmonary vascular congestion. Limited low lung volume study. Small bilateral effusions and adjacent atelectasis/airspace disease. No pleural effusion or pneumothorax. Mild generalized osteopenia. Osseous structures and soft tissues are grossly intact. Cardiomegaly and pulmonary vascular congestion. Small bilateral effusions and adjacent atelectasis/airspace disease. Ct Chest Wo Contrast    Result Date: 12/9/2020  EXAMINATION: CT OF THE CHEST WITHOUT CONTRAST 12/9/2020 9:18 pm TECHNIQUE: CT of the chest was performed without the administration of intravenous contrast. Multiplanar reformatted images are provided for review. Dose modulation, iterative reconstruction, and/or weight based adjustment of the mA/kV was utilized to reduce the radiation dose to as low as reasonably achievable. COMPARISON: 11/16/2020 HISTORY: ORDERING SYSTEM PROVIDED HISTORY: pneumonia vs edema TECHNOLOGIST PROVIDED HISTORY: Reason for exam:->pneumonia vs edema Reason for Exam: Emesis (x 3 days) Chest pain FINDINGS: Mediastinum: Pericardial perfusion is again seen, not significantly changed. Coronary calcifications are identified. Aortic caliber is normal.  A few small mediastinal lymph nodes are again noted. Lungs/pleura: Small bilateral pleural effusions have decreased in volume. There is no pneumothorax. The central airways are patent. There are bilateral nodular infiltrates. Upper Abdomen: The visualized upper abdomen is unremarkable. Soft Tissues/Bones: Body wall edema has improved. Multinodular goiter is again seen. 1. Bilateral pulmonary opacities are favored to represent pneumonia.  2. Pericardial and bilateral pleural effusions. 3. Multinodular goiter. Thyroid ultrasound could be obtained for further evaluation if clinically warranted. Xr Chest Portable    Result Date: 12/9/2020  EXAMINATION: ONE XRAY VIEW OF THE CHEST 12/9/2020 3:42 pm COMPARISON: 12/06/2020 HISTORY: ORDERING SYSTEM PROVIDED HISTORY: chest pain TECHNOLOGIST PROVIDED HISTORY: Reason for exam:->chest pain Reason for Exam: emesis, three days Acuity: Unknown Type of Exam: Unknown FINDINGS: Cardial pericardial silhouette is again noted to be very enlarged, similar when compared to the previous exam.  The pulmonary vasculature is mildly congested. There is patchy infiltrates noted within the lungs bilaterally, greater on the right. No pneumothorax is seen. No free air. No acute bony abnormality. Bipolar pacemaker/ICD again noted. Pulmonary vascular congestion along with patchy infiltrates in both lungs, greater on the right. Mild pulmonary edema is primarily considered, multifocal pneumonia should also be included in the differential.     Xr Chest Portable    Result Date: 12/6/2020  EXAMINATION: ONE XRAY VIEW OF THE CHEST 12/6/2020 5:58 am COMPARISON: Chest x-ray dated 12/02/2020 HISTORY: ORDERING SYSTEM PROVIDED HISTORY: Chest pain TECHNOLOGIST PROVIDED HISTORY: Reason for exam:->Chest pain Reason for Exam: abdo pain and chest pain Acuity: Acute Type of Exam: Unknown Additional signs and symptoms: pt c/o chest pain, sob FINDINGS: LINES/TUBES/OTHER: Left subclavian AICD is again noted with leads grossly unchanged in position. HEART/MEDIASTINUM: The cardiac silhouette is markedly enlarged, but stable. PLEURA/LUNGS: There are no focal consolidations or pleural effusions. There is no appreciable pneumothorax. BONES/SOFT TISSUE: No acute abnormality. No radiographic evidence of acute cardiopulmonary disease. Stable marked cardiomegaly.      Xr Chest Portable    Result Date: 12/2/2020  EXAMINATION: ONE XRAY VIEW OF THE CHEST 12/2/2020 6:45 am COMPARISON: 11/22/2020, 11/16/2020 HISTORY: ORDERING SYSTEM PROVIDED HISTORY: CP, n/v TECHNOLOGIST PROVIDED HISTORY: Reason for exam:->CP, n/v Reason for Exam: sob, cp Acuity: Acute Type of Exam: Initial FINDINGS: A single frontal view of the chest demonstrates no acute skeletal abnormality. There is a stable left subclavian pacemaker/AICD noted. There has been interval enlargement of the cardiopericardial silhouette, suggestive of an enlarging pericardial effusion as seen on prior studies. There are small bilateral pleural effusions. There is associated bibasilar airspace disease. The mid and upper lung zones are clear. There is no evidence of a pneumothorax. 1. Small bilateral pleural effusions with associated bibasilar airspace disease, most likely passive atelectasis, though aspiration or pneumonia are also diagnostic considerations. 2. Interval enlargement of the cardiopericardial silhouette, most consistent with an enlarging pericardial effusion. Xr Chest Portable    Result Date: 11/10/2020  EXAMINATION: ONE XRAY VIEW OF THE CHEST 11/10/2020 7:12 pm COMPARISON: Chest x-ray dated 11/04/2020 HISTORY: ORDERING SYSTEM PROVIDED HISTORY: chest pain TECHNOLOGIST PROVIDED HISTORY: Reason for exam:->chest pain Reason for Exam: chest pain FINDINGS: LINES/TUBES/OTHER: Left subclavian AICD is again noted with leads grossly unchanged in position. HEART/MEDIASTINUM: The cardiac silhouette is markedly enlarged, but stable. The mediastinal silhouette is within normal limits. PLEURA/LUNGS: There is pulmonary vascular congestion/interstitial edema. There is probable small bilateral pleural effusions. There is no appreciable pneumothorax. BONES/SOFT TISSUE: No acute abnormality. Marked cardiomegaly with pulmonary vascular congestion/interstitial edema. Probable small bilateral pleural effusions. Radiographic appearance consistent with CHF.      Ct Chest Pulmonary Embolism W Contrast    Result Date: 11/16/2020  EXAMINATION: CTA OF THE CHEST 11/16/2020 10:12 pm TECHNIQUE: CTA of the chest was performed after the administration of intravenous contrast.  Multiplanar reformatted images are provided for review. MIP images are provided for review. Dose modulation, iterative reconstruction, and/or weight based adjustment of the mA/kV was utilized to reduce the radiation dose to as low as reasonably achievable. COMPARISON: Chest radiograph earlier today. HISTORY: ORDERING SYSTEM PROVIDED HISTORY: tachy sob cp TECHNOLOGIST PROVIDED HISTORY: Reason for exam:->tachy sob cp Reason for Exam: Pt c/o SOB Acuity: Unknown Type of Exam: Unknown Tachycardia, shortness of breath, chest pain FINDINGS: Pulmonary Arteries: Pulmonary arteries are adequately opacified for evaluation. Left pulmonary artery filling defect in the inferior lobar branch with segmental extension. Dilated pulmonary trunk measuring 29 mm in diameter. Mediastinum: Calcified adenopathy compatible with prior granulomatous disease. Moderate pericardial effusion. Right atrial and ventricular leads in place. Normal RV to LV ratio. Multiple calcified thyroid nodules. Largest discrete nodule measuring 1.9 cm. Lungs/pleura: Basilar predominant airspace disease bilaterally. Moderate right and trace left pleural effusions. Subtle ground-glass noted in the lower lobes bilaterally. Upper Abdomen: Contrast reflux into the IVC and hepatic veins. Calcified splenic granulomas redemonstrated. No acute abnormality identified. Soft Tissues/Bones: No acute bone or soft tissue abnormality. Left lower lobe lobar and segmental pulmonary emboli. No evidence of right heart strain. Dilated pulmonary trunk suggesting pulmonary hypertension. Contrast reflux into the IVC suggesting diminished right heart output. Moderate pericardial effusion. Moderate right and trace left pleural effusions. Mild bibasilar atelectasis.   Subtle ground-glass lower lobe infiltrate likely represents mild pulmonary edema. Findings were discussed with CELESTINO Lizeth Comes at 10:29 pm on 11/16/2020 by Dr. Maria T Love. Ir Midline Cath    Result Date: 11/23/2020  EXAMINATION: LIMITED ULTRASOUND OF THE ARM FOR PICC ACCESS, 11/23/2020 TECHNIQUE: The PICC team used ultrasound and VPS guidance to place a PICC line. HISTORY: ORDERING SYSTEM PROVIDED HISTORY: difficult stick TECHNOLOGIST PROVIDED HISTORY: Reason for exam:->difficult stick How many lumens are being requested?->1 What site is the preferred site?->Brachial What side should this line be placed? ->Either Reason for Exam: limited access Acuity: Acute Type of Exam: Initial Additional signs and symptoms: 15cm, 4Fr single lumen power PICC, Rt Brachial, US guidance, 0 seconds fluoro Relevant Medical/Surgical History: 15cm, 4Fr single lumen power PICC, , US guidance, 0 seconds fluoro FINDINGS: 4 ultrasound images demonstrate patency of the Rt Brachial vein, which was used for access for placement of a PICC by the PICC team, without a radiologist present. VPS guidance was used by the PICC team By report, a 15 cm, 4 Fr single lumen power PICC was placed. Ultrasound for placement of PICC line. ED COURSE/MDM  Patient seen and evaluated. Old records reviewed. Labs and imaging reviewed and results discussed with patient. The patient presents for nausea fatigue and cough. She is tachycardic to 114 on arrival.  She is not hypoxic or febrile. On exam she is chronically ill appearing but acutely nontoxic. She is coughing on exam.  I did suspect she may have Covid since yet she has multiple ER visits and admissions. She does have a positive rapid Covid. She also has questionable pneumonia on chest x-ray, I did do chest CT in order to further discern potential infiltrates and there is concern for multifocal pneumonia on chest CT. Therefore since the patient has been admitted recently she is treated for HCAP.   I had initially treated her with ciprofloxacin since her recent urine culture from 2 days ago shows that she is resistant to cephalosporins. She is tachycardic but otherwise has normal respirations and is afebrile without a leukocytosis and there is no sign of sepsis. She is hyperglycemic with mildly elevated anion gap of 17 although I do not suspect DKA. She has a normal venous blood gas. Patient did have a lactic acidosis initially 2, and is increased to 2.9. She had been given a small fluid bolus as clinically I felt she was dehydrated. Due to the fact that she is persistently tachycardic with this UTI and pneumonia I feel she warrants admission. I spoke with Dr. Aminta Jauregui who accepts. During the patient's ED course, the patient was given:  Medications   0.9 % sodium chloride bolus (has no administration in time range)   magnesium sulfate 1 g in dextrose 5% 100 mL IVPB (has no administration in time range)   cefepime (MAXIPIME) 2 g IVPB minibag (has no administration in time range)   vancomycin (VANCOCIN) 2,000 mg in dextrose 5 % 500 mL IVPB (2,000 mg Intravenous New Bag 12/10/20 0208)   ondansetron (ZOFRAN) injection 4 mg (4 mg Intravenous Given 12/9/20 1859)   ciprofloxacin (CIPRO) IVPB 400 mg (0 mg Intravenous Stopped 12/10/20 0206)        CLINICAL IMPRESSION  1. COVID-19    2. Urinary tract infection without hematuria, site unspecified    3. Multifocal pneumonia    4. Hyperglycemia    5. Hypomagnesemia    6. Non-intractable vomiting with nausea, unspecified vomiting type        Blood pressure 134/76, pulse 109, temperature 99.2 °F (37.3 °C), temperature source Axillary, resp. rate 20, weight 180 lb (81.6 kg), SpO2 99 %, not currently breastfeeding. Patient was given scripts for the following medications. I counseled patient how to take these medications. New Prescriptions    No medications on file       Follow-up with:  No follow-up provider specified. DISCLAIMER: This chart was created using Dragon dictation software.

## 2020-12-10 NOTE — PROGRESS NOTES
Pt verbalized that she was afraid to die of COVID and would rather end her own life. When I clarified with pt if she meant that she was suicidal pt denied being suicidal. Offered to have pt seen by psych and pt stated that she would never harm herself. Pt stated that she was just scared and wants to be admitted medically.

## 2020-12-10 NOTE — PLAN OF CARE
61 yo F w/ frequent admits for CHF, DKA, recurrent N/V who p/w N/V, CP, abd pain. Recent admit to [facility] for PE. Found to have UTI (failed OP Keflex) and COVID +. Not requiring O2. Sepsis  COVID + and/or multifocal PNA (HAP) vs CHF/pulm edema  UTI, failed op keflex (resistent). Sensitive to flouroquinolones. N/V  Lactic acidosis  Abd pain, unclear etiology  CP, related to recent PE? On Eliquis.

## 2020-12-10 NOTE — CARE COORDINATION
Chart review completed. MITUL notes pt is in the process of being admitted and is in isolation precautions for Covid-19. Called pt's phone number on the facesheet and there was no answer. Attempting calling pt's sister Deborah Ortiz listed in emergency contacts but there was no answer and voicemail was full; unable to leave a message. CM will attempt again when able to complete the assessment. Please notify CM if needs or concerns arise.

## 2020-12-10 NOTE — FLOWSHEET NOTE
12/10/20 1530 12/10/20 1542 12/10/20 1545   Vital Signs   BP (!) 78/52 86/63 100/69      12/10/20 1630   Vital Signs   /86     Pt's BP down to 78/52, pt asymptomatic. BP rebounded to normal within 15 minutes. Will continue to monitor.

## 2020-12-10 NOTE — PROGRESS NOTES
Shift assessment complete, morning medications given, patient resting with no complaints of pain, will continue to monitor.  Letty Browne RN

## 2020-12-10 NOTE — CONSULTS
Pharmacy Note  Vancomycin Consult    Magdi Rhodes is a 62 y.o., COVID-19 positive female, started on Vancomycin for gram positive coverage of pneumonia/UTI; consult received from Dr. Vahid Posada to manage therapy. Also receiving the following antibiotics: cefepime and ciprofloxacin.     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)    Dual ICD (implantable cardioverter-defibrillator) in place    Brain tumor (benign) ([address]    Chronic combined systolic and diastolic congestive heart failure (HCC)    TIA involving right internal carotid artery    CAD in native artery    DM (diabetes mellitus), secondary, uncontrolled, w/neurologic complic (HCC)    CHF (congestive heart failure) (HCC)    Nonischemic cardiomyopathy ([address]    Essential hypertension    TIA (transient ischemic attack)    Elevated blood sugar    Diabetic ketoacidosis without coma associated with type 2 diabetes mellitus (HCC)    Non-intractable vomiting with nausea    Chest pain    Arterial ischemic stroke, ICA, right, acute ([address]    Diabetic hyperosmolar non-ketotic state ([address]    Diabetic acidosis without coma (HCC)    Hyponatremia    Metabolic acidosis    Disorder of electrolytes    Diabetic ketoacidosis with coma associated with type 2 diabetes mellitus (HCC)    Hypernatremia    Leukocytosis    Atrial tachycardia ([address]    DKA, type 2, not at goal [facility])    Cognitive developmental delay    Depressive disorder    Urinary tract infection without hematuria    Hypokalemia    Persistent fever    Syncope and collapse    S/P ICD (internal cardiac defibrillator) procedure    History of CVA (cerebrovascular accident)    Noncompliance with medications    Obesity    SVT (supraventricular tachycardia) ([address]    Type 2 diabetes mellitus with hyperglycemia, with long-term current use of insulin ([address]    Acute pulmonary edema (HCC)    Hyperlipidemia    Lactic acidosis    NSVT (nonsustained ventricular tachycardia) (HCC)    Dyspnea and respiratory abnormalities    Intractable nausea and vomiting    Tachycardia    Uncontrolled type 2 diabetes mellitus with hyperglycemia (HCC)    Acute on chronic systolic CHF (congestive heart failure) (HCC)    Pulmonary emboli (HCC)    Heart failure (HCC)    Pericardial effusion    Sepsis (HCC)       Allergies:  Patient has no known allergies. Temp max: 99.2    Recent Labs     12/07/20  1440 12/09/20  1845   BUN 11 9       Recent Labs     12/07/20  1440 12/09/20  1845   CREATININE 0.6 0.7       Recent Labs     12/07/20  1440 12/09/20  1845   WBC 5.1 5.0       No intake or output data in the 24 hours ending 12/10/20 6402    Culture Date      Source                       Results      Ht Readings from Last 1 Encounters:   12/10/20 5' 1\" (1.549 m)        Wt Readings from Last 1 Encounters:   12/09/20 180 lb (81.6 kg)         Body mass index is 34.01 kg/m². Estimated Creatinine Clearance: 84 mL/min (based on SCr of 0.7 mg/dL). Goal Trough Level: 15-19 mcg/mL    Assessment/Plan:  Will initiate Vancomycin with a one time loading dose of 2000 mg x1, followed by 1000 mg IV every 12 hours. A vancomycin trough has been ordered for 12/11 @ 1330 prior to the 4th dose. Thank you for the consult. Will continue to follow.

## 2020-12-10 NOTE — H&P
Hospital Medicine History & Physical      PCP: FRITZ Washington NP    Date of Admission: 12/9/2020    Date of Service: Pt seen/examined on 12/10/2020    Chief Complaint:    Chief Complaint   Patient presents with    Emesis     x 3 days         History Of Present Illness: The patient is a 62 y.o. female with a PMH of CVA, Chronic Combined CHF, Severe Non-Ischemic Cardiomyopathy, Non-Obstructive CAD, hx of NSVT, hx of pericardial effusion, AICD, type II DM, recent PE, HLD, HTN and depression who presented to the ED with complaint of nausea and emesis x 3 days. Reports she is vomiting blood. Has associated mid chest pain - state this pain radiates bilaterally to UE and LE. Reports the pain feels similar to the pain she had when she was diagnosed with her PEs. Worse with emesis. Denies any abdominal pain. She is constipated and her last BM was a couple of days ago. No diarrhea. She does have a cough. Denies any covid exposures. Work up in the ED: Low grade temps of 99.2. Mildly tachycardic. BP stable. No hypoxia. Na 132. Cr normal. Mg 1.6 on arrival. Lactic acid 2.9. BG elevated in 300-400s, no signs of DKA. BNP 4997. Trop neg x 2. No leukocytosis. Rapid COVID test was positive. UA is positive for nitrites - with E. Coli. CT chest showed bilateral pulmonary opacities, pericardial (not significantly changed) and bilateral pleural effusion (small, decreased in volume)    Past Medical History:        Diagnosis Date    Arthritis     CAD (coronary artery disease)     Cerebral artery occlusion with cerebral infarction ([address]     x 3    CHF (congestive heart failure) ([address]     COVID-19 12/09/2020    Diabetes mellitus ([address]     ESBL (extended spectrum beta-lactamase) producing bacteria infection 12/07/2020    E. COLI-URINE    Hyperlipidemia     Hypertension     Mental retardation     MI (myocardial infarction) ([address]     Pacemaker        Past Surgical History:        Procedure Laterality Date    BACK SURGERY      x3    IR MIDLINE CATH  11/23/2020    IR MIDLINE CATH 11/23/2020 [facility] SPECIAL PROCEDURES    PACEMAKER INSERTION      Boyd pacemaker    TUBAL LIGATION      TUMOR REMOVAL      UPPER GASTROINTESTINAL ENDOSCOPY N/A 11/5/2020    EGD BIOPSY performed by Madeline Arora DO at [facility]       Medications Prior to Admission:    Prior to Admission medications    Medication Sig Start Date End Date Taking?  Authorizing Provider   ondansetron (ZOFRAN) 4 MG tablet Take 1 tablet by mouth every 8 hours as needed for Nausea 12/7/20   Gilbert Welsh PA-C   ondansetron (ZOFRAN) 4 MG tablet Take 1 tablet by mouth every 8 hours as needed for Nausea or Vomiting 12/2/20   Altagracia Newell MD   carvedilol (COREG) 3.125 MG tablet Take 1 tablet by mouth 2 times daily (with meals) 11/28/20   Star Olguin MD   torsemide (DEMADEX) 20 MG tablet Take 1 tablet by mouth daily 11/28/20   Star Olguin MD   metFORMIN (GLUCOPHAGE) 1000 MG tablet Take 1,000 mg by mouth 2 times daily (with meals)    Historical Provider, MD   QUEtiapine (SEROQUEL XR) 50 MG extended release tablet Take 50 mg by mouth nightly    Historical Provider, MD   apixaban starter pack (ELIQUIS DVT/PE STARTER PACK) 5 MG TBPK tablet Take 1 tablet by mouth See Admin Instructions 11/20/20 12/20/20  Carmen Almanzar MD   insulin aspart (NOVOLOG FLEXPEN) 100 UNIT/ML injection pen Inject 10 Units into the skin 3 times daily (before meals) 11/13/20   Ruben Gonzalez MD   insulin glargine (LANTUS SOLOSTAR) 100 UNIT/ML injection pen Inject 24 Units into the skin nightly 11/13/20   Ruben Gonzalez MD   amiodarone (CORDARONE) 200 MG tablet Take 1 tablet by mouth daily 11/10/20   YO Tanner MD   metoclopramide (REGLAN) 10 MG tablet Take 1 tablet by mouth 3 times daily (with meals) 11/6/20   Elisa Batres MD   sucralfate (CARAFATE) 1 GM tablet Take 1 tablet by mouth 3 times daily 11/6/20   Rogena Po Darold Merlin, MD   pantoprazole (PROTONIX) 40 MG tablet Take 1 tablet by mouth daily 11/6/20   Michaela Carrion MD   vitamin D (ERGOCALCIFEROL) 1.25 MG (23116 UT) CAPS capsule Take 50,000 Units by mouth once a week Takes weekly on Sundays    Historical MD Tammy   digoxin (LANOXIN) 125 MCG tablet Take 1 tablet by mouth every other day 9/25/20   FRITZ Gonzalez - CNP   nitroGLYCERIN (NITROSTAT) 0.4 MG SL tablet up to max of 3 total doses. If no relief after 1 dose, call 911. 8/24/20   Ursula Guzman MD   aspirin 81 MG EC tablet Take 1 tablet by mouth daily 7/18/20   Fern Brian MD   ranolazine (RANEXA) 500 MG extended release tablet Take 1 tablet by mouth 2 times daily 7/18/20   Fern Brian MD   DULoxetine (CYMBALTA) 60 MG extended release capsule Take 1 capsule by mouth daily 7/18/20   Fern Brian MD   atorvastatin (LIPITOR) 40 MG tablet Take 1 tablet by mouth nightly 7/18/20   Fern Brian MD   fenofibrate micronized (LOFIBRA) 200 MG capsule Take 1 capsule by mouth nightly 7/18/20   Fern Brian MD   miconazole (MICOTIN) 2 % powder Apply topically 2 times daily. 7/18/20   Fern Brian MD   [employer] Lancets Ultra Thin MISC 1 each by Does not apply route 4 times daily 7/18/20   Fern Brian MD   blood glucose monitor strips Test three times a day & as needed for symptoms of irregular blood glucose. 5/12/19   Michaela Carrion MD       Allergies:  Patient has no known allergies. Social History:     TOBACCO:   reports that she has never smoked. She has never used smokeless tobacco.  ETOH:   reports no history of alcohol use.       Family History:   Positive as follows:        Problem Relation Age of Onset    Heart Disease Father     Cancer Mother     Other Mother        REVIEW OF SYSTEMS:     Constitutional: Negative for fever   HENT: Negative for sore throat   Eyes: Negative for redness   Respiratory: + dyspnea, + cough Cardiovascular: + chest pain   Gastrointestinal: + vomiting, no diarrhea   Genitourinary: Negative for hematuria   Musculoskeletal: Negative for arthralgias   Skin: Negative for rash   Neurological: Negative for syncope   Hematological: Negative for adenopathy   Psychiatric/Behavorial: Negative for anxiety    PHYSICAL EXAM:    BP (!) 133/100   Pulse 105   Temp 99 °F (37.2 °C) (Oral)   Resp 20   Ht 5' 1\" (1.549 m)   Wt 180 lb (81.6 kg)   SpO2 95%   BMI 34.01 kg/m²   Gen: No distress. Alert. Middle aged  female, obese, resting confortably  Eyes: PERRL. No sclera icterus. No conjunctival injection. Neck: Trachea midline. Resp: No accessory muscle use. No crackles. No wheezes. No rhonchi. On RA  CV: Regular rate. Regular rhythm. No murmur. No rub. No edema. GI: Soft, obese, Non-tender. Non-distended. Normal bowel sounds. Skin: Warm and dry. No rash on exposed extremities. Neuro: Awake. Grossly nonfocal    Psych: Oriented x 3. No anxiety or agitation.      CBC:   Recent Labs     12/07/20  1440 12/09/20  1845 12/10/20  0840   WBC 5.1 5.0 5.9   HGB 13.7 12.9 13.1   HCT 42.4 39.5 40.3   MCV 86.8 85.7 86.4    227 212     BMP:   Recent Labs     12/07/20  1440 12/09/20  1845 12/10/20  0840   * 132* 132*   K 3.8 3.5 4.4   CL 96* 92* 93*   CO2 22 23 23   BUN 11 9 9   CREATININE 0.6 0.7 0.7     LIVER PROFILE:   Recent Labs     12/07/20  1440 12/09/20  1845 12/10/20  0840   AST 18 16 23   ALT 11 9* 10   LIPASE 36.0 21.0  --    BILITOT 0.6 0.7 0.8   ALKPHOS 88 84 83     UA:  Recent Labs     12/09/20  2030   COLORU Yellow   PHUR 5.5   WBCUA 10-20*   RBCUA 0-2   BACTERIA 4+*   CLARITYU Clear   SPECGRAV >=1.030   LEUKOCYTESUR Negative   UROBILINOGEN 1.0   BILIRUBINUR SMALL*   BLOODU TRACE-INTACT*   GLUCOSEU >=1000*      CARDIAC ENZYMES  Recent Labs     12/07/20  1415 12/09/20  1845 12/09/20  2310   TROPONINI <0.01 <0.01 <0.01     U/A:    Lab Results   Component Value Date    COLORU Yellow physiology. Moderate tricuspid regurgitation. Systolic pulmonary artery pressure (SPAP) is elevated and estimated at 57   mmHg (right atrial pressure 15 mmHg) consistent with moderate pulmonary   hypertension.     ASSESSMENT/PLAN:    Sepsis  UTI  Hx of ESBL  - source: UTI, also with COVID below; failed OP Keflex  - tachypnea, tachycardia, lactic acidosis, no fevers, WBC normal, not hypotensive  - Admit to PCU, tele  - urine cx with E. Coli - sensitivities pending, blood cx x2 pending  - Abx: Merrem D#1, trend lactic acid  - will d/c IVF in setting of severe systolic CHF    Nausea  Hematemesis  Constipation  - no abdominal pain, no melena or hematochezia  - on Eliquis (also ASA) for recent PE  - Hgb stable at 13.1  - etiology: poss 2/2 COVID?  - monitor H&H q 12hr  - [facility], [facility]  - hold off on GI consult at this time  - caution with PRN anti-emetics d/t QT prolongation, will continue Eliquis at this time & monitor    Chest Pain  Prolonged QTc  - reports pain feels similar to pain that started with her PE; worse with emesis  - EKG: non acute  - trend trop: neg x 2   - tele, repeat EKG tomorrow  - hold PRN Compazine, Seroquel  - avoid QT prolonging agents as able - will be difficult d/t pt recurrent emesis    Hypomagnesemia  - 1.6  - s/p 1 g Mg  - repeat Mg tomorrow    Lactic Acidosis  - 2.3  - likely 2/2 infection  - trend, stop IVF (see below)    COVID Infection  - droplet + precaution  - no hypoxia, on RA  - CXR: bilateral pulmonary opacities favored to represent PNA  - supportive measures, robitussin    Type II DM with Hyperglycemia  - uncontrolled, not in DKA  - increase Lantus 24 > 30 units nightly, add novolog 10 units TID, med SSI  - POC Glucose, carb control diet    Pericardial Effusion - stable  Bilateral Pleural Effusions - decreased in vol  Chronic Combined CHF - appears compensated  Severe Non-Ischemic Cardiomyopathy  Non-Obstructive CAD  H/o NSVT  S/p AICD  - last echo 11/17/2020: EF <20%, severe global hypokinesis, grade III DD, mod TR, mod pulm HTN  - BNP: 4997  - CT chest with unchanged pericardial effusion and small bilateral pleural effusions - decreased in size  - no salt added diet, I&Os, daily weights  - cont ASA, Coreg, Amiodarone, Digoxin, Ranexa, hold Torsemide    HX of PE   - recently diagnosed on 11/22  - AC on Eliquis  - will continue for now    HLD  - cont Triglide    HTN  - controlled  - cont Coreg  - monitor     GERD  - cont Protonix, Carafate    Obesity  - Body mass index is 34.01 kg/m². - Counseled on weight loss. Depression  - cont Cymbalta, hold Seroquel    DVT Prophylaxis: Eliquis  Diet: DIET CARDIAC; Carb Control: 4 carb choices (60 gms)/meal; No Added Salt (3-4 GM);  Daily Fluid Restriction: 1800 ml  Code Status: Full Code    Discussed plan of care with Davina Medrano MD.    Trenton Loredo PA-C 2:38 PM 12/10/2020

## 2020-12-11 ENCOUNTER — APPOINTMENT (OUTPATIENT)
Dept: INTERVENTIONAL RADIOLOGY/VASCULAR | Age: 60
DRG: 871 | End: 2020-12-11
Payer: MEDICARE

## 2020-12-11 LAB
ANION GAP SERPL CALCULATED.3IONS-SCNC: 15 MMOL/L (ref 3–16)
BASOPHILS ABSOLUTE: 0.2 K/UL (ref 0–0.2)
BASOPHILS RELATIVE PERCENT: 4.2 %
BUN BLDV-MCNC: 17 MG/DL (ref 7–20)
CALCIUM SERPL-MCNC: 8 MG/DL (ref 8.3–10.6)
CHLORIDE BLD-SCNC: 93 MMOL/L (ref 99–110)
CO2: 23 MMOL/L (ref 21–32)
CREAT SERPL-MCNC: 0.7 MG/DL (ref 0.6–1.1)
EKG ATRIAL RATE: 103 BPM
EKG DIAGNOSIS: NORMAL
EKG P AXIS: 27 DEGREES
EKG P-R INTERVAL: 170 MS
EKG Q-T INTERVAL: 390 MS
EKG QRS DURATION: 102 MS
EKG QTC CALCULATION (BAZETT): 510 MS
EKG R AXIS: 113 DEGREES
EKG T AXIS: 52 DEGREES
EKG VENTRICULAR RATE: 103 BPM
EOSINOPHILS ABSOLUTE: 0 K/UL (ref 0–0.6)
EOSINOPHILS RELATIVE PERCENT: 0.1 %
ESTIMATED AVERAGE GLUCOSE: 294.8 MG/DL
GFR AFRICAN AMERICAN: >60
GFR NON-AFRICAN AMERICAN: >60
GLUCOSE BLD-MCNC: 130 MG/DL (ref 70–99)
GLUCOSE BLD-MCNC: 143 MG/DL (ref 70–99)
GLUCOSE BLD-MCNC: 150 MG/DL (ref 70–99)
GLUCOSE BLD-MCNC: 159 MG/DL (ref 70–99)
GLUCOSE BLD-MCNC: 185 MG/DL (ref 70–99)
GLUCOSE BLD-MCNC: 217 MG/DL (ref 70–99)
HBA1C MFR BLD: 11.9 %
HCT VFR BLD CALC: 39.6 % (ref 36–48)
HEMOGLOBIN: 12.9 G/DL (ref 12–16)
LACTIC ACID: 3.8 MMOL/L (ref 0.4–2)
LYMPHOCYTES ABSOLUTE: 0.6 K/UL (ref 1–5.1)
LYMPHOCYTES RELATIVE PERCENT: 13.8 %
MAGNESIUM: 1.4 MG/DL (ref 1.8–2.4)
MCH RBC QN AUTO: 27.8 PG (ref 26–34)
MCHC RBC AUTO-ENTMCNC: 32.6 G/DL (ref 31–36)
MCV RBC AUTO: 85.4 FL (ref 80–100)
MONOCYTES ABSOLUTE: 0.2 K/UL (ref 0–1.3)
MONOCYTES RELATIVE PERCENT: 4.8 %
NEUTROPHILS ABSOLUTE: 3.2 K/UL (ref 1.7–7.7)
NEUTROPHILS RELATIVE PERCENT: 77.1 %
ORGANISM: ABNORMAL
PDW BLD-RTO: 17.5 % (ref 12.4–15.4)
PERFORMED ON: ABNORMAL
PLATELET # BLD: 206 K/UL (ref 135–450)
PMV BLD AUTO: 9.1 FL (ref 5–10.5)
POTASSIUM SERPL-SCNC: 3.4 MMOL/L (ref 3.5–5.1)
RBC # BLD: 4.64 M/UL (ref 4–5.2)
SODIUM BLD-SCNC: 131 MMOL/L (ref 136–145)
URINE CULTURE, ROUTINE: ABNORMAL
WBC # BLD: 4.2 K/UL (ref 4–11)

## 2020-12-11 PROCEDURE — 99232 SBSQ HOSP IP/OBS MODERATE 35: CPT | Performed by: PHYSICIAN ASSISTANT

## 2020-12-11 PROCEDURE — 87040 BLOOD CULTURE FOR BACTERIA: CPT

## 2020-12-11 PROCEDURE — 99232 SBSQ HOSP IP/OBS MODERATE 35: CPT | Performed by: INTERNAL MEDICINE

## 2020-12-11 PROCEDURE — 94761 N-INVAS EAR/PLS OXIMETRY MLT: CPT

## 2020-12-11 PROCEDURE — 80048 BASIC METABOLIC PNL TOTAL CA: CPT

## 2020-12-11 PROCEDURE — 6370000000 HC RX 637 (ALT 250 FOR IP): Performed by: INTERNAL MEDICINE

## 2020-12-11 PROCEDURE — 2580000003 HC RX 258: Performed by: PHYSICIAN ASSISTANT

## 2020-12-11 PROCEDURE — 93010 ELECTROCARDIOGRAM REPORT: CPT | Performed by: INTERNAL MEDICINE

## 2020-12-11 PROCEDURE — 85025 COMPLETE CBC W/AUTO DIFF WBC: CPT

## 2020-12-11 PROCEDURE — 2580000003 HC RX 258: Performed by: INTERNAL MEDICINE

## 2020-12-11 PROCEDURE — 6360000002 HC RX W HCPCS: Performed by: PHYSICIAN ASSISTANT

## 2020-12-11 PROCEDURE — 2060000000 HC ICU INTERMEDIATE R&B

## 2020-12-11 PROCEDURE — 2700000000 HC OXYGEN THERAPY PER DAY

## 2020-12-11 PROCEDURE — 36415 COLL VENOUS BLD VENIPUNCTURE: CPT

## 2020-12-11 PROCEDURE — 36573 INSJ PICC RS&I 5 YR+: CPT

## 2020-12-11 PROCEDURE — 6360000002 HC RX W HCPCS: Performed by: INTERNAL MEDICINE

## 2020-12-11 PROCEDURE — 93005 ELECTROCARDIOGRAM TRACING: CPT | Performed by: PHYSICIAN ASSISTANT

## 2020-12-11 PROCEDURE — 6370000000 HC RX 637 (ALT 250 FOR IP): Performed by: PHYSICIAN ASSISTANT

## 2020-12-11 PROCEDURE — 83735 ASSAY OF MAGNESIUM: CPT

## 2020-12-11 PROCEDURE — 83605 ASSAY OF LACTIC ACID: CPT

## 2020-12-11 PROCEDURE — C1751 CATH, INF, PER/CENT/MIDLINE: HCPCS

## 2020-12-11 RX ORDER — MAGNESIUM SULFATE IN WATER 40 MG/ML
2 INJECTION, SOLUTION INTRAVENOUS ONCE
Status: COMPLETED | OUTPATIENT
Start: 2020-12-11 | End: 2020-12-11

## 2020-12-11 RX ORDER — MAGNESIUM SULFATE 1 G/100ML
1 INJECTION INTRAVENOUS PRN
Status: DISCONTINUED | OUTPATIENT
Start: 2020-12-11 | End: 2020-12-15 | Stop reason: HOSPADM

## 2020-12-11 RX ORDER — TORSEMIDE 20 MG/1
20 TABLET ORAL DAILY
Status: DISCONTINUED | OUTPATIENT
Start: 2020-12-12 | End: 2020-12-15 | Stop reason: HOSPADM

## 2020-12-11 RX ORDER — POTASSIUM CHLORIDE 20 MEQ/1
40 TABLET, EXTENDED RELEASE ORAL ONCE
Status: COMPLETED | OUTPATIENT
Start: 2020-12-11 | End: 2020-12-11

## 2020-12-11 RX ORDER — SODIUM CHLORIDE 9 MG/ML
INJECTION, SOLUTION INTRAVENOUS CONTINUOUS
Status: DISPENSED | OUTPATIENT
Start: 2020-12-11 | End: 2020-12-11

## 2020-12-11 RX ADMIN — MICONAZOLE NITRATE: 20 POWDER TOPICAL at 20:39

## 2020-12-11 RX ADMIN — ASPIRIN 81 MG: 81 TABLET, COATED ORAL at 09:33

## 2020-12-11 RX ADMIN — MEROPENEM 1 G: 1 INJECTION, POWDER, FOR SOLUTION INTRAVENOUS at 16:28

## 2020-12-11 RX ADMIN — METOCLOPRAMIDE 10 MG: 10 TABLET ORAL at 09:32

## 2020-12-11 RX ADMIN — INSULIN GLARGINE 30 UNITS: 100 INJECTION, SOLUTION SUBCUTANEOUS at 20:44

## 2020-12-11 RX ADMIN — INSULIN LISPRO 4 UNITS: 100 INJECTION, SOLUTION INTRAVENOUS; SUBCUTANEOUS at 11:42

## 2020-12-11 RX ADMIN — APIXABAN 5 MG: 5 TABLET, FILM COATED ORAL at 09:32

## 2020-12-11 RX ADMIN — INSULIN LISPRO 2 UNITS: 100 INJECTION, SOLUTION INTRAVENOUS; SUBCUTANEOUS at 16:30

## 2020-12-11 RX ADMIN — MEROPENEM 1 G: 1 INJECTION, POWDER, FOR SOLUTION INTRAVENOUS at 23:38

## 2020-12-11 RX ADMIN — SUCRALFATE 1 G: 1 TABLET ORAL at 11:40

## 2020-12-11 RX ADMIN — PANTOPRAZOLE SODIUM 40 MG: 40 TABLET, DELAYED RELEASE ORAL at 05:40

## 2020-12-11 RX ADMIN — INSULIN LISPRO 10 UNITS: 100 INJECTION, SOLUTION INTRAVENOUS; SUBCUTANEOUS at 16:30

## 2020-12-11 RX ADMIN — METOCLOPRAMIDE 10 MG: 10 TABLET ORAL at 17:49

## 2020-12-11 RX ADMIN — SODIUM CHLORIDE: 9 INJECTION, SOLUTION INTRAVENOUS at 16:27

## 2020-12-11 RX ADMIN — SUCRALFATE 1 G: 1 TABLET ORAL at 17:49

## 2020-12-11 RX ADMIN — RANOLAZINE 500 MG: 500 TABLET, FILM COATED, EXTENDED RELEASE ORAL at 20:39

## 2020-12-11 RX ADMIN — SUCRALFATE 1 G: 1 TABLET ORAL at 09:33

## 2020-12-11 RX ADMIN — DULOXETINE HYDROCHLORIDE 60 MG: 60 CAPSULE, DELAYED RELEASE ORAL at 09:33

## 2020-12-11 RX ADMIN — Medication 10 ML: at 09:34

## 2020-12-11 RX ADMIN — METOCLOPRAMIDE 10 MG: 10 TABLET ORAL at 11:40

## 2020-12-11 RX ADMIN — RANOLAZINE 500 MG: 500 TABLET, FILM COATED, EXTENDED RELEASE ORAL at 09:31

## 2020-12-11 RX ADMIN — Medication 10 ML: at 20:40

## 2020-12-11 RX ADMIN — MAGNESIUM SULFATE HEPTAHYDRATE 2 G: 40 INJECTION, SOLUTION INTRAVENOUS at 12:27

## 2020-12-11 RX ADMIN — POTASSIUM CHLORIDE 40 MEQ: 1500 TABLET, EXTENDED RELEASE ORAL at 12:27

## 2020-12-11 RX ADMIN — AMIODARONE HYDROCHLORIDE 200 MG: 200 TABLET ORAL at 09:33

## 2020-12-11 RX ADMIN — MICONAZOLE NITRATE: 20 POWDER TOPICAL at 09:36

## 2020-12-11 RX ADMIN — GUAIFENESIN AND DEXTROMETHORPHAN 5 ML: 100; 10 SYRUP ORAL at 20:42

## 2020-12-11 RX ADMIN — INSULIN LISPRO 10 UNITS: 100 INJECTION, SOLUTION INTRAVENOUS; SUBCUTANEOUS at 11:42

## 2020-12-11 RX ADMIN — ATORVASTATIN CALCIUM 40 MG: 40 TABLET, FILM COATED ORAL at 20:39

## 2020-12-11 RX ADMIN — CARVEDILOL 3.12 MG: 3.12 TABLET, FILM COATED ORAL at 17:49

## 2020-12-11 RX ADMIN — POLYETHYLENE GLYCOL (3350) 17 G: 17 POWDER, FOR SOLUTION ORAL at 09:32

## 2020-12-11 RX ADMIN — INSULIN LISPRO 10 UNITS: 100 INJECTION, SOLUTION INTRAVENOUS; SUBCUTANEOUS at 09:32

## 2020-12-11 RX ADMIN — FENOFIBRATE 160 MG: 160 TABLET ORAL at 09:32

## 2020-12-11 RX ADMIN — MEROPENEM 1 G: 1 INJECTION, POWDER, FOR SOLUTION INTRAVENOUS at 09:31

## 2020-12-11 RX ADMIN — APIXABAN 5 MG: 5 TABLET, FILM COATED ORAL at 20:39

## 2020-12-11 NOTE — PROGRESS NOTES
Pt's O2 sat OK as long as she lays quietly but drops to 83 with coughing and movement. O2 placed on pt at 2 liters. O2 sat staying above 95% even with movement now. Pt with persistent dry cough.

## 2020-12-11 NOTE — PROGRESS NOTES
Subcutaneous Nightly    meropenem  1 g Intravenous Q8H    insulin lispro  10 Units Subcutaneous TID     insulin glargine  30 Units Subcutaneous Nightly    docusate sodium  100 mg Oral Daily    polyethylene glycol  17 g Oral Daily       Continuous Infusions:   dextrose         PRN Meds:  magnesium sulfate, [Held by provider] prochlorperazine, nitroGLYCERIN, sodium chloride flush, acetaminophen **OR** acetaminophen, glucose, dextrose, glucagon (rDNA), dextrose, guaiFENesin-dextromethorphan      Data:  CBC:   Recent Labs     12/09/20  1845 12/10/20  0840 12/11/20  0330   WBC 5.0 5.9 4.2   HGB 12.9 13.1 12.9   HCT 39.5 40.3 39.6   MCV 85.7 86.4 85.4    212 206     BMP:   Recent Labs     12/09/20  1845 12/10/20  0840 12/11/20  0330   * 132* 131*   K 3.5 4.4 3.4*   CL 92* 93* 93*   CO2 23 23 23   BUN 9 9 17   CREATININE 0.7 0.7 0.7     LIVER PROFILE:   Recent Labs     12/09/20 1845 12/10/20  0840   AST 16 23   ALT 9* 10   LIPASE 21.0  --    BILITOT 0.7 0.8   ALKPHOS 84 83     PT/INR: No results for input(s): PROTIME, INR in the last 72 hours. CULTURES  Blood: NGTD  Urine:  Escherichia coli (esbl) (1)     Antibiotic  Interpretation  JANELLE  Status     ampicillin  Resistant  >=32  mcg/mL      ceFAZolin  Resistant  >=64  mcg/mL      cefepime  Resistant        cefTRIAXone  Resistant        ciprofloxacin  Sensitive  0.5  mcg/mL      ertapenem  Sensitive  <=0.12  mcg/mL      gentamicin  Sensitive  <=1  mcg/mL      levofloxacin  Sensitive  1  mcg/mL      nitrofurantoin  Sensitive  <=16  mcg/mL      trimethoprim-sulfamethoxazole  Sensitive  <=20  mcg/mL             RADIOLOGY  CT CHEST WO CONTRAST   Final Result   1. Bilateral pulmonary opacities are favored to represent pneumonia. 2. Pericardial and bilateral pleural effusions. 3. Multinodular goiter. Thyroid ultrasound could be obtained for further   evaluation if clinically warranted.          XR CHEST PORTABLE   Final Result   Pulmonary vascular control diet     #Pericardial Effusion - stable  #Bilateral Pleural Effusions - decreased in vol  #Chronic Combined CHF - appears compensated  #Severe Non-Ischemic Cardiomyopathy  #Non-Obstructive CAD  #H/o NSVT  #S/p AICD  - last echo 11/17/2020: EF <20%, severe global hypokinesis, grade III DD, mod TR, mod pulm HTN  - BNP: 4997  - CT chest with unchanged pericardial effusion and small bilateral pleural effusions - decreased in size  - no salt added diet, I&Os, daily weights  - cont ASA, Coreg, Amiodarone, Digoxin, Ranexa  - held torsemide with sepsis on admit- plan to resume on 12/12     #Recent pulmonary embolism   - recently diagnosed on 11/22/2020  - AC on Eliquis    #GERD  - cont Protonix, Carafate     #Obesity  - Body mass index is 34.01 kg/m². - Counseled on weight loss.      #Depression  - cont Cymbalta, hold Seroquel    DVT Prophylaxis: Eliquis   Diet: DIET CARDIAC; Carb Control: 4 carb choices (60 gms)/meal; No Added Salt (3-4 GM); Daily Fluid Restriction: 1800 ml  Code Status: Full Code    Dispo: Cont care. She is overall improved and remains on RA. She needs midline placed for plan for IV Invanz on discharge. CM following for IV abx needs.   She may be ready for discharge 12/12     Luis Redmond PA-C  12/11/2020 1:08 PM

## 2020-12-11 NOTE — PROGRESS NOTES
AM assessment completed. Scheduled medications given per MAR. VSS on 2 lpm. A/O x4. Denies any needs, call light in reach. Will monitor. Withdrawn but cooperative. Inpatient hold for PCU in same day surgery.

## 2020-12-11 NOTE — PROGRESS NOTES
4 Eyes Skin Assessment     The patient is being assess for   Admission    I agree that 2 RN's have performed a thorough Head to Toe Skin Assessment on the patient. ALL assessment sites listed below have been assessed. Areas assessed by both nurses:   [x]   Head, Face, and Ears   [x]   Shoulders, Back, and Chest, Abdomen  [x]   Arms, Elbows, and Hands   [x]   Coccyx, Sacrum, and Ischium  [x]   Legs, Feet, and Heels        Scattered bruising    **SHARE this note so that the co-signing nurse is able to place an eSignature**    Co-signer eSignature: Electronically signed by Barbie Boggs RN on 12/15/20 at 8:35 AM EST    Does the Patient have Skin Breakdown? No          Ignacio Prevention initiated:  Yes   Wound Care Orders initiated:  Yes      [id number]  nurse consulted for Pressure Injury (Stage 3,4, Unstageable, DTI, NWPT, Complex wounds)and New or Established Ostomies:  No      Primary Nurse eSignature: Electronically signed by Barbie Boggs RN on 12/11/20 at 4:11 AM EST    Patient is not able to demonstrate the ability to move from a reclining position to an upright position within the recliner due to weakness.

## 2020-12-11 NOTE — CARE COORDINATION
Case Management Assessment  Initial Evaluation      Patient Name: Lyly Gordon  YOB: 1960  Diagnosis: Sepsis ([facility] [A41.9]  Sepsis ([facility] [A41.9]  Date / Time: 12/9/2020  5:45 PM    Admission status/Date:  Inpatient 12/10/20  Chart Reviewed: Yes      Patient Interviewed: No   Family Interviewed:  Yes - Monica maravilla      Hospitalization in the last 30 days:  Yes      Health Care Decision Maker :   Supplemental (Other) Decision Maker: Christa Campos - Brother/Sister - [phone]    (CM - must 1st enter selection under Navigator - emergency contact- [place] Relationship and pick relationship)   Who do you trust or have selected to make healthcare decisions for you    Interview conducted by phone w/sisterMonica    Current PCP: FRITZ Roblero    Financial  BCBS Medicare  Precert required for SNF : Y, N          3 night stay required - Y, N    ADLS  Support Systems/Care Needs: Friends/Neighbors  Transportation: sister or friend    Meal Preparation: self    Housing  Living Arrangements: alone in 1st fl apt  Steps:   Intent for return to present living arrangements: Yes  Identified Issues:  Non compliance    Home Care Information  Active with [address] : No Agency:(Services)     Passport/Waiver : No  :                      Phone Number:    Passport/Waiver Services:           Durable Medical Equiptment   DME Provider:   Equipment:   Walker_x__Cane_x__RTS___ BSC___Shower Chair___Hospital Bed___W/C____Other________  02 at ____Liter(s)---wears(frequency)_______ HHN ___ CPAP___ BiPap___   N/A____      Home O2 Use :  Yes    If No for home O2---if presently on O2 during hospitalization:  Yes  if yes CM to follow for potential DC O2 need  Informed of need for care provider to bring portable home O2 tank on day of discharge for nursing to connect prior to leaving:   Yes  Verbalized agreement/Understanding:   Yes    Community Service Affiliation  Dialysis:  No · Agency:  · Location:  · Dialysis Schedule:  · Phone:   · Fax: Other Community Services:    DISCHARGE PLAN: Explained Case Management role/services. Patient is in isolation. Called and spoke with her sister Alea Torres. She states patient lives alone in an apt. She uses a cane to ambulate and has a walker. She had home care for SN visits through [facility]. Called [facility]. agency and per Shaheed Raya her services were cancelled last week. She states that patient was noncompliant but they would consider restarting her services if she needed and requested them. Nicole Benson states that patient just started getting oxygen that was ordered after a home visit from her PCP. Chris Joshi is checking to see who her oxygen company is with. CM will continue to follow for home care needs. 13:30  Will need IV abx for home. May dc over weekend needing IV Invanz 1gm daily for total of 8 days. 1st day today, 12/11/20. Lashawn Bath is checking with [facility].

## 2020-12-11 NOTE — PROGRESS NOTES
Pulmonary Progress Note    CC: Nausea, emesis, myalgias, shortness of breath    Subjective: No complaints, is happy, just wants her sister called    IV line: Peripheral      Intake/Output Summary (Last 24 hours) at 12/11/2020 1417  Last data filed at 12/11/2020 0927  Gross per 24 hour   Intake 360 ml   Output 220 ml   Net 140 ml       Exam:   BP (!) 103/90   Pulse 102   Temp 98.8 °F (37.1 °C) (Oral)   Resp 20   Ht 5' 1\" (1.549 m)   Wt 180 lb (81.6 kg)   SpO2 98%   BMI 34.01 kg/m²  on 2 L  Gen: No distress. Alert. Clutching her juan pablo bear  Eyes: PERRL. No sclera icterus. No conjunctival injection. ENT: No discharge. Pharynx clear. Neck: Trachea midline. Normal thyroid. Resp: No accessory muscle use. No crackles. No wheezes. No rhonchi. No dullness on percussion. CV: Regular rate. Regular rhythm. No murmur or rub. No edema. GI: Obese non-tender  Skin: Warm and dry  M/S: No cyanosis. No joint deformity. No clubbing. Neuro: Awake.      Scheduled Meds:   magnesium sulfate  2 g Intravenous Once    [START ON 12/12/2020] torsemide  20 mg Oral Daily    amiodarone  200 mg Oral Daily    apixaban  5 mg Oral BID    aspirin  81 mg Oral Daily    atorvastatin  40 mg Oral Nightly    carvedilol  3.125 mg Oral BID WC    digoxin  125 mcg Oral Every Other Day    fenofibrate  160 mg Oral Daily    DULoxetine  60 mg Oral Daily    metoclopramide  10 mg Oral TID WC    miconazole   Topical BID    pantoprazole  40 mg Oral Daily    [Held by provider] QUEtiapine  50 mg Oral Nightly    ranolazine  500 mg Oral BID    sucralfate  1 g Oral TID AC    sodium chloride flush  10 mL Intravenous 2 times per day    insulin lispro  0-12 Units Subcutaneous TID     insulin lispro  0-6 Units Subcutaneous Nightly    meropenem  1 g Intravenous Q8H    insulin lispro  10 Units Subcutaneous TID     insulin glargine  30 Units Subcutaneous Nightly    docusate sodium  100 mg Oral Daily    polyethylene glycol  17 g Oral Daily     Continuous Infusions:   dextrose       PRN Meds:  magnesium sulfate, [Held by provider] prochlorperazine, nitroGLYCERIN, sodium chloride flush, acetaminophen **OR** acetaminophen, glucose, dextrose, glucagon (rDNA), dextrose, guaiFENesin-dextromethorphan    Labs:  CBC:   Recent Labs     12/09/20  1845 12/10/20  0840 12/11/20  0330   WBC 5.0 5.9 4.2   HGB 12.9 13.1 12.9   HCT 39.5 40.3 39.6   MCV 85.7 86.4 85.4    212 206     BMP:   Recent Labs     12/09/20  1845 12/10/20  0840 12/11/20  0330   * 132* 131*   K 3.5 4.4 3.4*   CL 92* 93* 93*   CO2 23 23 23   BUN 9 9 17   CREATININE 0.7 0.7 0.7     LIVER PROFILE:   Recent Labs     12/09/20  1845 12/10/20  0840   AST 16 23   ALT 9* 10   LIPASE 21.0  --    BILITOT 0.7 0.8   ALKPHOS 84 83     PT/INR: No results for input(s): PROTIME, INR in the last 72 hours. APTT: No results for input(s): APTT in the last 72 hours. UA:  Recent Labs     12/09/20 2030   COLORU Yellow   PHUR 5.5   WBCUA 10-20*   RBCUA 0-2   BACTERIA 4+*   CLARITYU Clear   SPECGRAV >=1.030   LEUKOCYTESUR Negative   UROBILINOGEN 1.0   BILIRUBINUR SMALL*   BLOODU TRACE-INTACT*   GLUCOSEU >=1000*     No results for input(s): PHART, DNJ8HZJ, PO2ART in the last 72 hours. Cultures:   12/9/2020 SARS-CoV-2 positive  12/7/2020 urine E. coli ESBL Carbapenem sensitive  12/9/2020 urine E. coli ESBL    Films:  CT chest 12/9/2020  Shows patchy nodular bilateral infiltrates with areas of groundglass opacity new compared to the 11/16/2020 CT consistent with acute infection     FINDINGS:    Mediastinum: Pericardial perfusion is again seen, not significantly changed. Coronary calcifications are identified.  Aortic caliber is normal.  A few    small mediastinal lymph nodes are again noted.         Lungs/pleura: Small bilateral pleural effusions have decreased in volume.     There is no pneumothorax.  The central airways are patent.  There are    bilateral nodular infiltrates.         Upper Abdomen: The visualized upper abdomen is unremarkable.         Soft Tissues/Bones: Body wall edema has improved.  Multinodular goiter is    again seen.              Impression    1. Bilateral pulmonary opacities are favored to represent pneumonia. 2. Pericardial and bilateral pleural effusions. 3. Multinodular goiter.  Thyroid ultrasound could be obtained for further    evaluation if clinically warranted. ASSESSMENT:  · COVID-19 pneumonia - new bilateral nodular and groundglass opacities compared with November CT consistent with COVID-19 infection  · Recent pulmonary embolism 11/16/2020  · UTI: E. coli, ESBL  · Nausea and emesis  · Diabetes with severe hyperglycemia  · Small pleural effusions  · Systolic and diastolic CHF  · Nonischemic cardiomyopathy with EF 10 to 15%  · SP AICD  · Left ventricular apical thrombus    PLAN:  COVID-19 isolation, droplet plus  Remdesevir, CCP and Decadron are indicated only with hypoxemia, I recommend close monitoring  Eliquis for recent PE and left ventricular apical thrombus  Merrem day #2 for ESBL urine   I recommend not using supplemental oxygen unless the patient's oxygen saturation is less than 92%.   If supplemental oxygen is truly needed, consideration should be given to Decadron, Remdesevir and CCP  Will follow with you

## 2020-12-11 NOTE — CARE COORDINATION
Annette  Received referral regarding home IV infusion from Rosanna MUNGUIA CM. Per [facility] [place], [facility]. to service for SN. Possible dc over weekend. Sent benefit verification to Annette on Invanz 1 g IV daily x7 days per Rosanna REARDON. Liaison to follow up with CM when coverage information received. 4:02 Telephone call to [employer] and spoke with Alisa Park in intake. Coverage information not available yet. Request to Alisa Park for Annette to call CM if after 5:00pm today with coverage information [phone]. Annette aware [facility] for SN.     Electronically signed by Bry Layne RN on 12/11/2020 at 1:58 PM

## 2020-12-11 NOTE — PROGRESS NOTES
Dr. Vibha Porter related information concerning reasoning for possible drops in BP related to EF of 10%, MD moulton.  Umer Hyatt RN

## 2020-12-12 LAB
ABO/RH: NORMAL
ALBUMIN SERPL-MCNC: 3 G/DL (ref 3.4–5)
ALP BLD-CCNC: 120 U/L (ref 40–129)
ALT SERPL-CCNC: 24 U/L (ref 10–40)
ANION GAP SERPL CALCULATED.3IONS-SCNC: 12 MMOL/L (ref 3–16)
ANTIBODY SCREEN: NORMAL
AST SERPL-CCNC: 78 U/L (ref 15–37)
BASOPHILS ABSOLUTE: 0.2 K/UL (ref 0–0.2)
BASOPHILS RELATIVE PERCENT: 3.8 %
BILIRUB SERPL-MCNC: 1.2 MG/DL (ref 0–1)
BILIRUBIN DIRECT: 0.8 MG/DL (ref 0–0.3)
BILIRUBIN, INDIRECT: 0.4 MG/DL (ref 0–1)
BLOOD BANK DISPENSE STATUS: NORMAL
BLOOD BANK PRODUCT CODE: NORMAL
BPU ID: NORMAL
BUN BLDV-MCNC: 26 MG/DL (ref 7–20)
CALCIUM SERPL-MCNC: 8.2 MG/DL (ref 8.3–10.6)
CHLORIDE BLD-SCNC: 90 MMOL/L (ref 99–110)
CO2: 26 MMOL/L (ref 21–32)
CREAT SERPL-MCNC: 0.6 MG/DL (ref 0.6–1.1)
DESCRIPTION BLOOD BANK: NORMAL
EKG ATRIAL RATE: 102 BPM
EKG DIAGNOSIS: NORMAL
EKG P AXIS: 49 DEGREES
EKG P-R INTERVAL: 176 MS
EKG Q-T INTERVAL: 360 MS
EKG QRS DURATION: 100 MS
EKG QTC CALCULATION (BAZETT): 469 MS
EKG R AXIS: 131 DEGREES
EKG T AXIS: 29 DEGREES
EKG VENTRICULAR RATE: 102 BPM
EOSINOPHILS ABSOLUTE: 0 K/UL (ref 0–0.6)
EOSINOPHILS RELATIVE PERCENT: 0 %
GFR AFRICAN AMERICAN: >60
GFR NON-AFRICAN AMERICAN: >60
GLUCOSE BLD-MCNC: 110 MG/DL (ref 70–99)
GLUCOSE BLD-MCNC: 113 MG/DL (ref 70–99)
GLUCOSE BLD-MCNC: 117 MG/DL (ref 70–99)
GLUCOSE BLD-MCNC: 147 MG/DL (ref 70–99)
GLUCOSE BLD-MCNC: 184 MG/DL (ref 70–99)
GLUCOSE BLD-MCNC: 244 MG/DL (ref 70–99)
GLUCOSE BLD-MCNC: 74 MG/DL (ref 70–99)
HCT VFR BLD CALC: 40.8 % (ref 36–48)
HEMOGLOBIN: 13.2 G/DL (ref 12–16)
LACTIC ACID: 2.3 MMOL/L (ref 0.4–2)
LYMPHOCYTES ABSOLUTE: 0.8 K/UL (ref 1–5.1)
LYMPHOCYTES RELATIVE PERCENT: 15.7 %
MAGNESIUM: 1.9 MG/DL (ref 1.8–2.4)
MCH RBC QN AUTO: 27.8 PG (ref 26–34)
MCHC RBC AUTO-ENTMCNC: 32.3 G/DL (ref 31–36)
MCV RBC AUTO: 85.9 FL (ref 80–100)
MONOCYTES ABSOLUTE: 0.3 K/UL (ref 0–1.3)
MONOCYTES RELATIVE PERCENT: 6 %
NEUTROPHILS ABSOLUTE: 3.8 K/UL (ref 1.7–7.7)
NEUTROPHILS RELATIVE PERCENT: 74.5 %
PDW BLD-RTO: 17.3 % (ref 12.4–15.4)
PERFORMED ON: ABNORMAL
PERFORMED ON: NORMAL
PLATELET # BLD: 225 K/UL (ref 135–450)
PMV BLD AUTO: 9.3 FL (ref 5–10.5)
POTASSIUM SERPL-SCNC: 4.1 MMOL/L (ref 3.5–5.1)
RBC # BLD: 4.75 M/UL (ref 4–5.2)
SODIUM BLD-SCNC: 128 MMOL/L (ref 136–145)
TOTAL PROTEIN: 6.1 G/DL (ref 6.4–8.2)
WBC # BLD: 5.1 K/UL (ref 4–11)

## 2020-12-12 PROCEDURE — 6360000002 HC RX W HCPCS: Performed by: INTERNAL MEDICINE

## 2020-12-12 PROCEDURE — 2500000003 HC RX 250 WO HCPCS: Performed by: INTERNAL MEDICINE

## 2020-12-12 PROCEDURE — 85025 COMPLETE CBC W/AUTO DIFF WBC: CPT

## 2020-12-12 PROCEDURE — 6370000000 HC RX 637 (ALT 250 FOR IP): Performed by: INTERNAL MEDICINE

## 2020-12-12 PROCEDURE — 86850 RBC ANTIBODY SCREEN: CPT

## 2020-12-12 PROCEDURE — 6370000000 HC RX 637 (ALT 250 FOR IP): Performed by: PHYSICIAN ASSISTANT

## 2020-12-12 PROCEDURE — 2580000003 HC RX 258: Performed by: INTERNAL MEDICINE

## 2020-12-12 PROCEDURE — 86900 BLOOD TYPING SEROLOGIC ABO: CPT

## 2020-12-12 PROCEDURE — 80076 HEPATIC FUNCTION PANEL: CPT

## 2020-12-12 PROCEDURE — XW033E5 INTRODUCTION OF REMDESIVIR ANTI-INFECTIVE INTO PERIPHERAL VEIN, PERCUTANEOUS APPROACH, NEW TECHNOLOGY GROUP 5: ICD-10-PCS | Performed by: INTERNAL MEDICINE

## 2020-12-12 PROCEDURE — 99233 SBSQ HOSP IP/OBS HIGH 50: CPT | Performed by: INTERNAL MEDICINE

## 2020-12-12 PROCEDURE — 93010 ELECTROCARDIOGRAM REPORT: CPT | Performed by: INTERNAL MEDICINE

## 2020-12-12 PROCEDURE — 2060000000 HC ICU INTERMEDIATE R&B

## 2020-12-12 PROCEDURE — 94761 N-INVAS EAR/PLS OXIMETRY MLT: CPT

## 2020-12-12 PROCEDURE — 99232 SBSQ HOSP IP/OBS MODERATE 35: CPT | Performed by: PHYSICIAN ASSISTANT

## 2020-12-12 PROCEDURE — 83605 ASSAY OF LACTIC ACID: CPT

## 2020-12-12 PROCEDURE — 80048 BASIC METABOLIC PNL TOTAL CA: CPT

## 2020-12-12 PROCEDURE — 36415 COLL VENOUS BLD VENIPUNCTURE: CPT

## 2020-12-12 PROCEDURE — 2700000000 HC OXYGEN THERAPY PER DAY

## 2020-12-12 PROCEDURE — 83735 ASSAY OF MAGNESIUM: CPT

## 2020-12-12 PROCEDURE — 93005 ELECTROCARDIOGRAM TRACING: CPT | Performed by: PHYSICIAN ASSISTANT

## 2020-12-12 PROCEDURE — 86901 BLOOD TYPING SEROLOGIC RH(D): CPT

## 2020-12-12 RX ORDER — DEXAMETHASONE SODIUM PHOSPHATE 10 MG/ML
10 INJECTION, SOLUTION INTRAMUSCULAR; INTRAVENOUS ONCE
Status: COMPLETED | OUTPATIENT
Start: 2020-12-12 | End: 2020-12-12

## 2020-12-12 RX ORDER — DEXAMETHASONE SODIUM PHOSPHATE 4 MG/ML
6 INJECTION, SOLUTION INTRA-ARTICULAR; INTRALESIONAL; INTRAMUSCULAR; INTRAVENOUS; SOFT TISSUE DAILY
Status: DISCONTINUED | OUTPATIENT
Start: 2020-12-13 | End: 2020-12-14

## 2020-12-12 RX ORDER — 0.9 % SODIUM CHLORIDE 0.9 %
20 INTRAVENOUS SOLUTION INTRAVENOUS ONCE
Status: COMPLETED | OUTPATIENT
Start: 2020-12-12 | End: 2020-12-12

## 2020-12-12 RX ORDER — 0.9 % SODIUM CHLORIDE 0.9 %
30 INTRAVENOUS SOLUTION INTRAVENOUS PRN
Status: DISCONTINUED | OUTPATIENT
Start: 2020-12-12 | End: 2020-12-15 | Stop reason: HOSPADM

## 2020-12-12 RX ADMIN — PANTOPRAZOLE SODIUM 40 MG: 40 TABLET, DELAYED RELEASE ORAL at 06:30

## 2020-12-12 RX ADMIN — INSULIN LISPRO 2 UNITS: 100 INJECTION, SOLUTION INTRAVENOUS; SUBCUTANEOUS at 17:37

## 2020-12-12 RX ADMIN — REMDESIVIR 200 MG: 100 INJECTION, POWDER, LYOPHILIZED, FOR SOLUTION INTRAVENOUS at 09:13

## 2020-12-12 RX ADMIN — ATORVASTATIN CALCIUM 40 MG: 40 TABLET, FILM COATED ORAL at 22:57

## 2020-12-12 RX ADMIN — FENOFIBRATE 160 MG: 160 TABLET ORAL at 08:29

## 2020-12-12 RX ADMIN — DIGOXIN 125 MCG: 125 TABLET ORAL at 08:29

## 2020-12-12 RX ADMIN — INSULIN LISPRO 10 UNITS: 100 INJECTION, SOLUTION INTRAVENOUS; SUBCUTANEOUS at 17:33

## 2020-12-12 RX ADMIN — METOCLOPRAMIDE 10 MG: 10 TABLET ORAL at 08:29

## 2020-12-12 RX ADMIN — Medication 10 ML: at 08:43

## 2020-12-12 RX ADMIN — SUCRALFATE 1 G: 1 TABLET ORAL at 12:20

## 2020-12-12 RX ADMIN — DOCUSATE SODIUM 100 MG: 100 CAPSULE ORAL at 08:29

## 2020-12-12 RX ADMIN — INSULIN LISPRO 10 UNITS: 100 INJECTION, SOLUTION INTRAVENOUS; SUBCUTANEOUS at 08:28

## 2020-12-12 RX ADMIN — APIXABAN 5 MG: 5 TABLET, FILM COATED ORAL at 22:56

## 2020-12-12 RX ADMIN — MICONAZOLE NITRATE: 20 POWDER TOPICAL at 08:43

## 2020-12-12 RX ADMIN — METOCLOPRAMIDE 10 MG: 10 TABLET ORAL at 12:20

## 2020-12-12 RX ADMIN — SODIUM CHLORIDE 20 ML: 9 INJECTION, SOLUTION INTRAVENOUS at 17:39

## 2020-12-12 RX ADMIN — TORSEMIDE 20 MG: 20 TABLET ORAL at 08:28

## 2020-12-12 RX ADMIN — MICONAZOLE NITRATE: 20 POWDER TOPICAL at 23:15

## 2020-12-12 RX ADMIN — METOCLOPRAMIDE 10 MG: 10 TABLET ORAL at 17:31

## 2020-12-12 RX ADMIN — POLYETHYLENE GLYCOL (3350) 17 G: 17 POWDER, FOR SOLUTION ORAL at 08:30

## 2020-12-12 RX ADMIN — GUAIFENESIN AND DEXTROMETHORPHAN 5 ML: 100; 10 SYRUP ORAL at 22:56

## 2020-12-12 RX ADMIN — RANOLAZINE 500 MG: 500 TABLET, FILM COATED, EXTENDED RELEASE ORAL at 08:28

## 2020-12-12 RX ADMIN — SUCRALFATE 1 G: 1 TABLET ORAL at 06:29

## 2020-12-12 RX ADMIN — INSULIN GLARGINE 30 UNITS: 100 INJECTION, SOLUTION SUBCUTANEOUS at 22:56

## 2020-12-12 RX ADMIN — MEROPENEM 1 G: 1 INJECTION, POWDER, FOR SOLUTION INTRAVENOUS at 17:30

## 2020-12-12 RX ADMIN — RANOLAZINE 500 MG: 500 TABLET, FILM COATED, EXTENDED RELEASE ORAL at 23:12

## 2020-12-12 RX ADMIN — ASPIRIN 81 MG: 81 TABLET, COATED ORAL at 08:29

## 2020-12-12 RX ADMIN — AMIODARONE HYDROCHLORIDE 200 MG: 200 TABLET ORAL at 08:30

## 2020-12-12 RX ADMIN — SUCRALFATE 1 G: 1 TABLET ORAL at 17:31

## 2020-12-12 RX ADMIN — DULOXETINE HYDROCHLORIDE 60 MG: 60 CAPSULE, DELAYED RELEASE ORAL at 08:44

## 2020-12-12 RX ADMIN — DEXAMETHASONE SODIUM PHOSPHATE 10 MG: 10 INJECTION, SOLUTION INTRAMUSCULAR; INTRAVENOUS at 10:09

## 2020-12-12 RX ADMIN — APIXABAN 5 MG: 5 TABLET, FILM COATED ORAL at 08:29

## 2020-12-12 RX ADMIN — MEROPENEM 1 G: 1 INJECTION, POWDER, FOR SOLUTION INTRAVENOUS at 08:31

## 2020-12-12 RX ADMIN — Medication 10 ML: at 22:57

## 2020-12-12 NOTE — PROGRESS NOTES
AM assessment completed. Scheduled medications given per MAR. VSS on 2 lpm. A/O x4. Denies any needs, call light in reach. Will monitor.

## 2020-12-12 NOTE — CONSULTS
Remdesivir protocol:  Please give 200mg IV x1 this morning followed by 100mg IVPB x 4 additional days.   Continue to monitor LFTs etc per consult request.  Madelaine Cosme PharmD 12/12/2020 8:22 AM

## 2020-12-12 NOTE — PROGRESS NOTES
Progress Note    Admit Date:  12/9/2020    Subjective:  Ms. Gamal Nicole  Reports feeling better today. Feels less SOB. Eating well. No acute complaints. Acute Hypoxia overnight. Started on decadron/remdesivir and CCP ordered. Pulm consulted      Objective:   Patient Vitals for the past 4 hrs:   BP Temp Pulse Resp SpO2   12/12/20 0730 94/81 98.3 °F (36.8 °C) 96 22 98 %       Intake/Output Summary (Last 24 hours) at 12/12/2020 0906  Last data filed at 12/12/2020 0848  Gross per 24 hour   Intake 10 ml   Output 220 ml   Net -210 ml       Physical Exam:    Gen: No distress. Alert. Eyes: No conjunctival injection. Barbara-orbital edema  ENT: No discharge. Pharynx clear. Neck: Trachea midline. Resp: No accessory muscle use. + crackles in base . No wheezes. No rhonchi. CV: Regular rate. Regular rhythm. No murmur. No rub.  ++ edema. Capillary Refill: Brisk,< 3 seconds   Peripheral Pulses: +2 palpable, equal bilaterally   GI: Non-tender. Non-distended. Normal bowel sounds. Skin: Warm and dry. No nodule on exposed extremities. No rash on exposed extremities. M/S: No cyanosis. No joint deformity. No clubbing. Neuro: Awake. Grossly nonfocal    Psych: Oriented x 3. No anxiety or agitation.        Scheduled Meds:   dexamethasone  10 mg Intravenous Once    [START ON 12/13/2020] dexamethasone  6 mg Intravenous Daily    remdesivir IVPB  200 mg Intravenous Once    Followed by   Anthony Nations ON 12/13/2020] remdesivir IVPB  100 mg Intravenous Q24H    torsemide  20 mg Oral Daily    amiodarone  200 mg Oral Daily    apixaban  5 mg Oral BID    aspirin  81 mg Oral Daily    atorvastatin  40 mg Oral Nightly    carvedilol  3.125 mg Oral BID WC    digoxin  125 mcg Oral Every Other Day    fenofibrate  160 mg Oral Daily    DULoxetine  60 mg Oral Daily    metoclopramide  10 mg Oral TID WC    miconazole   Topical BID    pantoprazole  40 mg Oral Daily    [Held by provider] QUEtiapine  50 mg Oral Nightly CT CHEST WO CONTRAST   Final Result   1. Bilateral pulmonary opacities are favored to represent pneumonia. 2. Pericardial and bilateral pleural effusions. 3. Multinodular goiter. Thyroid ultrasound could be obtained for further   evaluation if clinically warranted. XR CHEST PORTABLE   Final Result   Pulmonary vascular congestion along with patchy infiltrates in both lungs,   greater on the right. Mild pulmonary edema is primarily considered,   multifocal pneumonia should also be included in the differential.             Assessment/Plan:  Sepsis  - source: UTI, also with COVID below; failed OP Keflex  - tachypnea, tachycardia, lactic acidosis, no fevers, WBC normal, not hypotensive  - blood cx NG, urine + ESBL. Abx as below  - will d/c IVF in setting of severe systolic CHF     UTI 2/2 E Coli ESBL  - Merrem D#3  - Midline cath ordered with plan for IV invanz on discharge   - CM following for assistance with dc planning. Piyush Perez does not want to go to a NH for IV abx    Nausea- resolved   Hematemesis- resolved   Constipation  - she has frequent ER visits for n/v.  Her exam is currently benign. She reported hematemesis on her day of admit, but now denies it (of note, she is on Eliquis and ASA), h/h is stable   - on [facility] Reglan, add colace, [facility] Miralax  - caution with PRN anti-emetics d/t QT prolongation    Chest Pain- resolved   - reports pain feels similar to pain that started with her PE; worse with emesis  - EKG: non acute  - trend trop: neg x 2   - tele monitoring in place    Prolonged QTc  - 502 ms on admit-> 510ms today   - hold PRN Compazine, Seroquel  - avoid QT prolonging agents as able    - repeat EKG 12/12  - cont on tele      Hypomagnesemia  Hypokalemia  - replacement ordered 12/11    Lactic Acidosis  - 2.3-> 2.9->2.3  - likely 2/2 infection  - sp IVF, now off with h/o CHF.       COVID 19 Infection  - droplet + precaution  - no hypoxia, on RA-->now with hypoxia and on 4L NC O2 - Pulmonology consult   - Continue Decadron D# 1, Continue Remdesivir D#1   - CXR: bilateral pulmonary opacities favored to represent PNA  - supportive measures, robitussin     Type II DM with Hyperglycemia  - uncontrolled, not in DKA  - increase Lantus 24 > 30 units nightly, add novolog 10 units TID, med SSI  - POC Glucose, carb control diet     Pericardial Effusion - stable  Bilateral Pleural Effusions - decreased in vol  Chronic Combined CHF - appears compensated  Severe Non-Ischemic Cardiomyopathy  Non-Obstructive CAD  H/o NSVT  S/p AICD  - last echo 11/17/2020: EF <20%, severe global hypokinesis, grade III DD, mod TR, mod pulm HTN  - BNP: 4997  - CT chest with unchanged pericardial effusion and small bilateral pleural effusions - decreased in size  - no salt added diet, I&Os, daily weights  - cont ASA, Coreg, Amiodarone, Digoxin, Ranexa  - held torsemide with sepsis on admit- plan to resume on 12/12     Recent pulmonary embolism   - recently diagnosed on 11/22/2020  - AC on Eliquis    GERD  - cont Protonix, Carafate     Obesity  - Body mass index is 34.01 kg/m². - Counseled on weight loss.      Depression  - cont Cymbalta, hold Seroquel    DVT Prophylaxis: Eliquis   Diet: DIET CARDIAC; Carb Control: 4 carb choices (60 gms)/meal; No Added Salt (3-4 GM); Daily Fluid Restriction: 1800 ml  Code Status: Full Code    Hypoxia with COVID-19, continue steroids and remdesivir.  Continue Torsemide and watch fluid status closely     Emiliano العراقي PA-C  12/12/2020 9:16 PM

## 2020-12-12 NOTE — PROGRESS NOTES
Pt very agitated in bed, pulled out IV. Pt states she is very anxious and just wants to go home. Changed wet gown/sheets, pt currently resting in bed with no other needs expressed.

## 2020-12-12 NOTE — PROGRESS NOTES
Writer called for bedside monitor reading \"v-tach\". Pt. Currently at [address] with rate 114-120. Pt. At 2l n/c, sat 98%. Pt. Arnaldo Hernandez \"anxious\" but she does NOT present in cardiac or respiratory distress. Updated shift DILLAN Mcmillan and acting clinical L. [address] [place] [address]. No new orders placed at this time. Lynette Pacheco RN Resource/Clinical .

## 2020-12-12 NOTE — PROGRESS NOTES
 pantoprazole  40 mg Oral Daily    [Held by provider] QUEtiapine  50 mg Oral Nightly    ranolazine  500 mg Oral BID    sucralfate  1 g Oral TID AC    sodium chloride flush  10 mL Intravenous 2 times per day    insulin lispro  0-12 Units Subcutaneous TID WC    insulin lispro  0-6 Units Subcutaneous Nightly    meropenem  1 g Intravenous Q8H    insulin lispro  10 Units Subcutaneous TID WC    insulin glargine  30 Units Subcutaneous Nightly    docusate sodium  100 mg Oral Daily    polyethylene glycol  17 g Oral Daily     Continuous Infusions:   dextrose       PRN Meds:  sodium chloride, magnesium sulfate, [Held by provider] prochlorperazine, nitroGLYCERIN, sodium chloride flush, acetaminophen **OR** acetaminophen, glucose, dextrose, glucagon (rDNA), dextrose, guaiFENesin-dextromethorphan    Labs:  CBC:   Recent Labs     12/10/20  0840 12/11/20  0330 12/12/20  0607   WBC 5.9 4.2 5.1   HGB 13.1 12.9 13.2   HCT 40.3 39.6 40.8   MCV 86.4 85.4 85.9    206 225     BMP:   Recent Labs     12/10/20  0840 12/11/20  0330 12/12/20  0607   * 131* 128*   K 4.4 3.4* 4.1   CL 93* 93* 90*   CO2 23 23 26   BUN 9 17 26*   CREATININE 0.7 0.7 0.6     LIVER PROFILE:   Recent Labs     12/09/20  1845 12/10/20  0840 12/12/20  0607   AST 16 23 78*   ALT 9* 10 24   LIPASE 21.0  --   --    BILIDIR  --   --  0.8*   BILITOT 0.7 0.8 1.2*   ALKPHOS 84 83 120     PT/INR: No results for input(s): PROTIME, INR in the last 72 hours. APTT: No results for input(s): APTT in the last 72 hours. UA:  Recent Labs     12/09/20  2030   COLORU Yellow   PHUR 5.5   WBCUA 10-20*   RBCUA 0-2   BACTERIA 4+*   CLARITYU Clear   SPECGRAV >=1.030   LEUKOCYTESUR Negative   UROBILINOGEN 1.0   BILIRUBINUR SMALL*   BLOODU TRACE-INTACT*   GLUCOSEU >=1000*     No results for input(s): PHART, GXL4BGC, PO2ART in the last 72 hours.      Cultures:   12/9/2020 SARS-CoV-2 positive  12/7/2020 urine E. coli ESBL Carbapenem sensitive 12/9/2020 urine E. coli ESBL    Films:  CT chest 12/9/2020  Shows patchy nodular bilateral infiltrates with areas of groundglass opacity new compared to the 11/16/2020 CT consistent with acute infection     FINDINGS:    Mediastinum: Pericardial perfusion is again seen, not significantly changed. Coronary calcifications are identified.  Aortic caliber is normal.  A few    small mediastinal lymph nodes are again noted.         Lungs/pleura: Small bilateral pleural effusions have decreased in volume. There is no pneumothorax.  The central airways are patent.  There are    bilateral nodular infiltrates.         Upper Abdomen: The visualized upper abdomen is unremarkable.         Soft Tissues/Bones: Body wall edema has improved.  Multinodular goiter is    again seen.              Impression    1. Bilateral pulmonary opacities are favored to represent pneumonia. 2. Pericardial and bilateral pleural effusions. 3. Multinodular goiter.  Thyroid ultrasound could be obtained for further    evaluation if clinically warranted. ASSESSMENT:  · Acute hypoxemic respiratory failure   · COVID-19 pneumonia - new bilateral nodular and groundglass opacities compared with November CT consistent with COVID-19 infection  · Recent pulmonary embolism 11/16/2020  · UTI: E. coli, ESBL  · Nausea and emesis  · Diabetes with severe hyperglycemia  · Small pleural effusions  · Systolic and diastolic CHF  · Nonischemic cardiomyopathy with EF 10 to 15%  · SP AICD  · Left ventricular apical thrombus    PLAN:  COVID-19 isolation, droplet plus  Supplemental oxygen to maintain SaO2 >92%; wean as tolerated    Remdesevir D#1, LFT monitoring for high risk medication  CCP is indicated based upon EUA.   I consented the patient to receive CCP after explaining the risks, potential benefits, meaning of emergency use authorization and absence of FDA approval.   Decadron D#1   Inhaled bronchodilators only as needed, MDI preferred Eliquis for recent PE and left ventricular apical thrombus  Merrem day #3 for ESBL urine   Will follow with you

## 2020-12-12 NOTE — CARE COORDINATION
Rec'd vmm from [facility] re [facility] benefit check with [employer]. Pt is covered at 100% for home IV antibiotics.

## 2020-12-13 LAB
A/G RATIO: 0.9 (ref 1.1–2.2)
ALBUMIN SERPL-MCNC: 2.6 G/DL (ref 3.4–5)
ALP BLD-CCNC: 101 U/L (ref 40–129)
ALT SERPL-CCNC: 33 U/L (ref 10–40)
ANION GAP SERPL CALCULATED.3IONS-SCNC: 9 MMOL/L (ref 3–16)
AST SERPL-CCNC: 92 U/L (ref 15–37)
BILIRUB SERPL-MCNC: 0.9 MG/DL (ref 0–1)
BUN BLDV-MCNC: 25 MG/DL (ref 7–20)
CALCIUM SERPL-MCNC: 7.7 MG/DL (ref 8.3–10.6)
CHLORIDE BLD-SCNC: 95 MMOL/L (ref 99–110)
CO2: 27 MMOL/L (ref 21–32)
CREAT SERPL-MCNC: 0.7 MG/DL (ref 0.6–1.1)
GFR AFRICAN AMERICAN: >60
GFR NON-AFRICAN AMERICAN: >60
GLOBULIN: 2.8 G/DL
GLUCOSE BLD-MCNC: 206 MG/DL (ref 70–99)
GLUCOSE BLD-MCNC: 211 MG/DL (ref 70–99)
GLUCOSE BLD-MCNC: 254 MG/DL (ref 70–99)
GLUCOSE BLD-MCNC: 274 MG/DL (ref 70–99)
GLUCOSE BLD-MCNC: 283 MG/DL (ref 70–99)
GLUCOSE BLD-MCNC: 336 MG/DL (ref 70–99)
PERFORMED ON: ABNORMAL
POTASSIUM SERPL-SCNC: 3.8 MMOL/L (ref 3.5–5.1)
SODIUM BLD-SCNC: 131 MMOL/L (ref 136–145)
TOTAL PROTEIN: 5.4 G/DL (ref 6.4–8.2)

## 2020-12-13 PROCEDURE — 99232 SBSQ HOSP IP/OBS MODERATE 35: CPT | Performed by: PHYSICIAN ASSISTANT

## 2020-12-13 PROCEDURE — 6370000000 HC RX 637 (ALT 250 FOR IP): Performed by: PHYSICIAN ASSISTANT

## 2020-12-13 PROCEDURE — 80053 COMPREHEN METABOLIC PANEL: CPT

## 2020-12-13 PROCEDURE — 2580000003 HC RX 258: Performed by: INTERNAL MEDICINE

## 2020-12-13 PROCEDURE — 2500000003 HC RX 250 WO HCPCS: Performed by: INTERNAL MEDICINE

## 2020-12-13 PROCEDURE — 94761 N-INVAS EAR/PLS OXIMETRY MLT: CPT

## 2020-12-13 PROCEDURE — 6360000002 HC RX W HCPCS: Performed by: INTERNAL MEDICINE

## 2020-12-13 PROCEDURE — 6370000000 HC RX 637 (ALT 250 FOR IP): Performed by: INTERNAL MEDICINE

## 2020-12-13 PROCEDURE — 36415 COLL VENOUS BLD VENIPUNCTURE: CPT

## 2020-12-13 PROCEDURE — 2700000000 HC OXYGEN THERAPY PER DAY

## 2020-12-13 PROCEDURE — 2060000000 HC ICU INTERMEDIATE R&B

## 2020-12-13 PROCEDURE — 99233 SBSQ HOSP IP/OBS HIGH 50: CPT | Performed by: INTERNAL MEDICINE

## 2020-12-13 RX ORDER — SODIUM CHLORIDE 9 MG/ML
INJECTION, SOLUTION INTRAVENOUS
Status: DISPENSED
Start: 2020-12-13 | End: 2020-12-13

## 2020-12-13 RX ADMIN — POLYETHYLENE GLYCOL (3350) 17 G: 17 POWDER, FOR SOLUTION ORAL at 08:08

## 2020-12-13 RX ADMIN — MEROPENEM 1 G: 1 INJECTION, POWDER, FOR SOLUTION INTRAVENOUS at 08:26

## 2020-12-13 RX ADMIN — AMIODARONE HYDROCHLORIDE 200 MG: 200 TABLET ORAL at 08:08

## 2020-12-13 RX ADMIN — REMDESIVIR 100 MG: 100 INJECTION, POWDER, LYOPHILIZED, FOR SOLUTION INTRAVENOUS at 08:11

## 2020-12-13 RX ADMIN — MICONAZOLE NITRATE: 20 POWDER TOPICAL at 22:22

## 2020-12-13 RX ADMIN — INSULIN GLARGINE 30 UNITS: 100 INJECTION, SOLUTION SUBCUTANEOUS at 22:19

## 2020-12-13 RX ADMIN — DULOXETINE HYDROCHLORIDE 60 MG: 60 CAPSULE, DELAYED RELEASE ORAL at 08:09

## 2020-12-13 RX ADMIN — SUCRALFATE 1 G: 1 TABLET ORAL at 12:04

## 2020-12-13 RX ADMIN — METOCLOPRAMIDE 10 MG: 10 TABLET ORAL at 08:12

## 2020-12-13 RX ADMIN — MEROPENEM 1 G: 1 INJECTION, POWDER, FOR SOLUTION INTRAVENOUS at 01:27

## 2020-12-13 RX ADMIN — CARVEDILOL 3.12 MG: 3.12 TABLET, FILM COATED ORAL at 08:08

## 2020-12-13 RX ADMIN — SUCRALFATE 1 G: 1 TABLET ORAL at 16:48

## 2020-12-13 RX ADMIN — APIXABAN 5 MG: 5 TABLET, FILM COATED ORAL at 22:20

## 2020-12-13 RX ADMIN — TORSEMIDE 20 MG: 20 TABLET ORAL at 08:07

## 2020-12-13 RX ADMIN — ASPIRIN 81 MG: 81 TABLET, COATED ORAL at 08:07

## 2020-12-13 RX ADMIN — INSULIN LISPRO 10 UNITS: 100 INJECTION, SOLUTION INTRAVENOUS; SUBCUTANEOUS at 12:03

## 2020-12-13 RX ADMIN — SUCRALFATE 1 G: 1 TABLET ORAL at 08:09

## 2020-12-13 RX ADMIN — MICONAZOLE NITRATE: 20 POWDER TOPICAL at 08:11

## 2020-12-13 RX ADMIN — APIXABAN 5 MG: 5 TABLET, FILM COATED ORAL at 08:08

## 2020-12-13 RX ADMIN — INSULIN LISPRO 6 UNITS: 100 INJECTION, SOLUTION INTRAVENOUS; SUBCUTANEOUS at 12:03

## 2020-12-13 RX ADMIN — FENOFIBRATE 160 MG: 160 TABLET ORAL at 08:07

## 2020-12-13 RX ADMIN — DOCUSATE SODIUM 100 MG: 100 CAPSULE ORAL at 08:09

## 2020-12-13 RX ADMIN — INSULIN LISPRO 10 UNITS: 100 INJECTION, SOLUTION INTRAVENOUS; SUBCUTANEOUS at 16:48

## 2020-12-13 RX ADMIN — INSULIN LISPRO 10 UNITS: 100 INJECTION, SOLUTION INTRAVENOUS; SUBCUTANEOUS at 08:14

## 2020-12-13 RX ADMIN — DEXAMETHASONE SODIUM PHOSPHATE 6 MG: 4 INJECTION, SOLUTION INTRAMUSCULAR; INTRAVENOUS at 10:28

## 2020-12-13 RX ADMIN — RANOLAZINE 500 MG: 500 TABLET, FILM COATED, EXTENDED RELEASE ORAL at 08:07

## 2020-12-13 RX ADMIN — METOCLOPRAMIDE 10 MG: 10 TABLET ORAL at 12:04

## 2020-12-13 RX ADMIN — ATORVASTATIN CALCIUM 40 MG: 40 TABLET, FILM COATED ORAL at 22:20

## 2020-12-13 RX ADMIN — INSULIN LISPRO 4 UNITS: 100 INJECTION, SOLUTION INTRAVENOUS; SUBCUTANEOUS at 16:48

## 2020-12-13 RX ADMIN — PANTOPRAZOLE SODIUM 40 MG: 40 TABLET, DELAYED RELEASE ORAL at 08:09

## 2020-12-13 RX ADMIN — INSULIN LISPRO 4 UNITS: 100 INJECTION, SOLUTION INTRAVENOUS; SUBCUTANEOUS at 08:14

## 2020-12-13 RX ADMIN — METOCLOPRAMIDE 10 MG: 10 TABLET ORAL at 16:48

## 2020-12-13 RX ADMIN — MEROPENEM 1 G: 1 INJECTION, POWDER, FOR SOLUTION INTRAVENOUS at 16:45

## 2020-12-13 ASSESSMENT — PAIN SCALES - GENERAL: PAINLEVEL_OUTOF10: 0

## 2020-12-13 NOTE — PROGRESS NOTES
Pulmonary Progress Note    CC: Nausea, emesis, myalgias, shortness of breath    Subjective:   Oxygen titrated down overnight    IV line: 12/11/2020 midline      Intake/Output Summary (Last 24 hours) at 12/13/2020 0754  Last data filed at 12/12/2020 1740  Gross per 24 hour   Intake 1159.15 ml   Output    Net 1159.15 ml       Exam:   BP (!) 103/58   Pulse 83   Temp 95.8 °F (35.4 °C) (Axillary)   Resp 22   Ht 5' 1\" (1.549 m)   Wt 207 lb (93.9 kg)   SpO2 98%   BMI 39.11 kg/m²  on 1 L  Constitutional:  No acute distress. HENT: Normal nose. Oropharynx is clear and moist.    Eyes: Conjunctivae anicteric. PERRL  Neck:  Neck supple. No tracheal deviation present. No thyromegaly present. Cardiovascular: Normal rate, regular rhythm, normal heart sounds. No JVD. No murmur heard.+ LE edema. Pulmonary/Chest: No accessory muscle usage. No wheezes. Few rales. + decreased breath sounds. Chest wall is not dull to percussion. Abdominal: Soft. No distension and no mass. No tenderness   Musculoskeletal: No cyanosis. No clubbing. No joint deformity. Lymphadenopathy: No cervical or supraclavicular adenopathy. Skin: Skin is warm and dry. No rash or nodules on the exposed extremities  Psychiatric: Normal mood and affect. Behavior is normal.  Neuro:  Alert, awake, speech clear today, cranial nerves are grossly intact.      Scheduled Meds:   dexamethasone  6 mg Intravenous Daily    remdesivir IVPB  100 mg Intravenous Q24H    torsemide  20 mg Oral Daily    amiodarone  200 mg Oral Daily    apixaban  5 mg Oral BID    aspirin  81 mg Oral Daily    atorvastatin  40 mg Oral Nightly    carvedilol  3.125 mg Oral BID WC    digoxin  125 mcg Oral Every Other Day    fenofibrate  160 mg Oral Daily    DULoxetine  60 mg Oral Daily    metoclopramide  10 mg Oral TID WC    miconazole   Topical BID    pantoprazole  40 mg Oral Daily    [Held by provider] QUEtiapine  50 mg Oral Nightly    ranolazine  500 mg Oral BID  sucralfate  1 g Oral TID AC    sodium chloride flush  10 mL Intravenous 2 times per day    insulin lispro  0-12 Units Subcutaneous TID WC    insulin lispro  0-6 Units Subcutaneous Nightly    meropenem  1 g Intravenous Q8H    insulin lispro  10 Units Subcutaneous TID WC    insulin glargine  30 Units Subcutaneous Nightly    docusate sodium  100 mg Oral Daily    polyethylene glycol  17 g Oral Daily     Continuous Infusions:   dextrose       PRN Meds:  sodium chloride, magnesium sulfate, [Held by provider] prochlorperazine, nitroGLYCERIN, sodium chloride flush, acetaminophen **OR** acetaminophen, glucose, dextrose, glucagon (rDNA), dextrose, guaiFENesin-dextromethorphan    Labs:  CBC:   Recent Labs     12/10/20  0840 12/11/20  0330 12/12/20  0607   WBC 5.9 4.2 5.1   HGB 13.1 12.9 13.2   HCT 40.3 39.6 40.8   MCV 86.4 85.4 85.9    206 225     BMP:   Recent Labs     12/10/20  0840 12/11/20  0330 12/12/20  0607   * 131* 128*   K 4.4 3.4* 4.1   CL 93* 93* 90*   CO2 23 23 26   BUN 9 17 26*   CREATININE 0.7 0.7 0.6     LIVER PROFILE:   Recent Labs     12/10/20  0840 12/12/20  0607   AST 23 78*   ALT 10 24   BILIDIR  --  0.8*   BILITOT 0.8 1.2*   ALKPHOS 83 120     PT/INR: No results for input(s): PROTIME, INR in the last 72 hours. APTT: No results for input(s): APTT in the last 72 hours. UA:  No results for input(s): NITRITE, COLORU, PHUR, LABCAST, WBCUA, RBCUA, MUCUS, TRICHOMONAS, YEAST, BACTERIA, CLARITYU, SPECGRAV, LEUKOCYTESUR, UROBILINOGEN, BILIRUBINUR, BLOODU, GLUCOSEU, AMORPHOUS in the last 72 hours. Invalid input(s): KETONESU  No results for input(s): PHART, XGE9BZL, PO2ART in the last 72 hours.      Cultures:   12/9/2020 SARS-CoV-2 positive  12/7/2020 urine E. coli ESBL Carbapenem sensitive  12/9/2020 urine E. coli ESBL  12/11/2020 blood no growth to date    Films:  CT chest 12/9/2020 Shows patchy nodular bilateral infiltrates with areas of groundglass opacity new compared to the 11/16/2020 CT consistent with acute infection     FINDINGS:    Mediastinum: Pericardial perfusion is again seen, not significantly changed. Coronary calcifications are identified.  Aortic caliber is normal.  A few    small mediastinal lymph nodes are again noted.         Lungs/pleura: Small bilateral pleural effusions have decreased in volume. There is no pneumothorax.  The central airways are patent.  There are    bilateral nodular infiltrates.         Upper Abdomen: The visualized upper abdomen is unremarkable.         Soft Tissues/Bones: Body wall edema has improved.  Multinodular goiter is    again seen.              Impression    1. Bilateral pulmonary opacities are favored to represent pneumonia. 2. Pericardial and bilateral pleural effusions. 3. Multinodular goiter.  Thyroid ultrasound could be obtained for further    evaluation if clinically warranted.         ASSESSMENT:  · Acute hypoxemic respiratory failure   · COVID-19 pneumonia - new bilateral nodular and groundglass opacities compared with November CT consistent with COVID-19 infection  · Recent pulmonary embolism 11/16/2020  · UTI: E. coli, ESBL  · Nausea and emesis  · Diabetes with severe hyperglycemia  · Small pleural effusions  · Systolic and diastolic CHF  · Nonischemic cardiomyopathy with EF 10 to 15%  · SP AICD  · Left ventricular apical thrombus    PLAN:  COVID-19 isolation, droplet plus  Supplemental oxygen to maintain SaO2 >92%; wean as tolerated    Remdesevir D#2, LFT monitoring for high risk medication --AST is increasing but is not 5 times the upper limit of normal  CCP 1 unit on 12/12/2020  Decadron D#2   Inhaled bronchodilators only as needed, MDI preferred  Eliquis for recent PE and left ventricular apical thrombus  Merrem day #4 for ESBL urine   Will follow with you

## 2020-12-13 NOTE — PROGRESS NOTES
O2 100% on 4 liters O2 turned to 2 liters. O2 sat remains 100%. O2 turned off.  Pt sleeping quietlly

## 2020-12-13 NOTE — PROGRESS NOTES
AM assessment completed. Scheduled medications given per MAR. VSS on 1 lpm. A/O x4. Denies any needs, call light in reach. Will monitor.

## 2020-12-13 NOTE — PROGRESS NOTES
Progress Note    Admit Date:  12/9/2020    Subjective:  Ms. Williams Erazo reports she is feeling better than she has in months. Feels her SOB has improved as well as her swelling. O2 down to 1L. Weight up 7 lbs. Objective:   Patient Vitals for the past 4 hrs:   BP Temp Temp src Pulse Resp SpO2   12/13/20 0800 126/77 96.7 °F (35.9 °C) Oral 94 20 95 %       Intake/Output Summary (Last 24 hours) at 12/13/2020 1033  Last data filed at 12/12/2020 1740  Gross per 24 hour   Intake 1149.15 ml   Output    Net 1149.15 ml       Physical Exam:    Gen: No distress. Alert. Eyes: No conjunctival injection. ENT: No discharge. Pharynx clear. Neck: Trachea midline. Resp: No accessory muscle use. + Faint crackles in base . No wheezes. No rhonchi. CV: Regular rate. Regular rhythm. No murmur. No rub. Trace edema. Capillary Refill: Brisk,< 3 seconds   Peripheral Pulses: +2 palpable, equal bilaterally   GI: Non-tender. Non-distended. Normal bowel sounds. Skin: Warm and dry. No nodule on exposed extremities. No rash on exposed extremities. M/S: No cyanosis. No joint deformity. No clubbing. Neuro: Awake. Grossly nonfocal    Psych: Oriented x 3. No anxiety or agitation.        Scheduled Meds:   dexamethasone  6 mg Intravenous Daily    remdesivir IVPB  100 mg Intravenous Q24H    torsemide  20 mg Oral Daily    amiodarone  200 mg Oral Daily    apixaban  5 mg Oral BID    aspirin  81 mg Oral Daily    atorvastatin  40 mg Oral Nightly    carvedilol  3.125 mg Oral BID WC    digoxin  125 mcg Oral Every Other Day    fenofibrate  160 mg Oral Daily    DULoxetine  60 mg Oral Daily    metoclopramide  10 mg Oral TID WC    miconazole   Topical BID    pantoprazole  40 mg Oral Daily    [Held by provider] QUEtiapine  50 mg Oral Nightly    ranolazine  500 mg Oral BID    sucralfate  1 g Oral TID AC    sodium chloride flush  10 mL Intravenous 2 times per day    insulin lispro  0-12 Units Subcutaneous TID WC  insulin lispro  0-6 Units Subcutaneous Nightly    meropenem  1 g Intravenous Q8H    insulin lispro  10 Units Subcutaneous TID     insulin glargine  30 Units Subcutaneous Nightly    docusate sodium  100 mg Oral Daily    polyethylene glycol  17 g Oral Daily       Continuous Infusions:   sodium chloride      dextrose         PRN Meds:  sodium chloride, magnesium sulfate, [Held by provider] prochlorperazine, nitroGLYCERIN, sodium chloride flush, acetaminophen **OR** acetaminophen, glucose, dextrose, glucagon (rDNA), dextrose, guaiFENesin-dextromethorphan      Data:  CBC:   Recent Labs     12/11/20  0330 12/12/20  0607   WBC 4.2 5.1   HGB 12.9 13.2   HCT 39.6 40.8   MCV 85.4 85.9    225     BMP:   Recent Labs     12/11/20 0330 12/12/20  0607   * 128*   K 3.4* 4.1   CL 93* 90*   CO2 23 26   BUN 17 26*   CREATININE 0.7 0.6     LIVER PROFILE:   Recent Labs     12/12/20  0607   AST 78*   ALT 24   BILIDIR 0.8*   BILITOT 1.2*   ALKPHOS 120       CULTURES  Blood: NGTD  SARS-CoV-2, NAAT DETECTEDPanic     Urine:  Escherichia coli (esbl) (1)     Antibiotic  Interpretation  JANELLE  Status     ampicillin  Resistant  >=32  mcg/mL      ceFAZolin  Resistant  >=64  mcg/mL      cefepime  Resistant        cefTRIAXone  Resistant        ciprofloxacin  Sensitive  0.5  mcg/mL      ertapenem  Sensitive  <=0.12  mcg/mL      gentamicin  Sensitive  <=1  mcg/mL      levofloxacin  Sensitive  1  mcg/mL      nitrofurantoin  Sensitive  <=16  mcg/mL      trimethoprim-sulfamethoxazole  Sensitive  <=20  mcg/mL             RADIOLOGY  IR MIDLINE CATH   Final Result   Successful placement of midline catheter. CT CHEST WO CONTRAST   Final Result   1. Bilateral pulmonary opacities are favored to represent pneumonia. 2. Pericardial and bilateral pleural effusions. 3. Multinodular goiter. Thyroid ultrasound could be obtained for further   evaluation if clinically warranted.          XR CHEST PORTABLE   Final Result - POC Glucose, carb control diet     Pericardial Effusion - stable  Bilateral Pleural Effusions - decreased in vol  Chronic Combined CHF - appears compensated  Severe Non-Ischemic Cardiomyopathy  Non-Obstructive CAD  H/o NSVT  S/p AICD  - last echo 11/17/2020: EF <20%, severe global hypokinesis, grade III DD, mod TR, mod pulm HTN  - BNP: 4997  - CT chest with unchanged pericardial effusion and small bilateral pleural effusions - decreased in size  - no salt added diet, I&Os, daily weights  - cont ASA, Coreg, Amiodarone, Digoxin, Ranexa  - held torsemide with sepsis on admit- plan to resume on 12/12     Recent pulmonary embolism   - recently diagnosed on 11/22/2020  - AC on Eliquis    GERD  - cont Protonix, Carafate     Obesity  - Body mass index is 34.01 kg/m². - Counseled on weight loss.      Depression  - cont Cymbalta, hold Seroquel    DVT Prophylaxis: Eliquis   Diet: DIET CARDIAC; Carb Control: 4 carb choices (60 gms)/meal; No Added Salt (3-4 GM); Daily Fluid Restriction: 1800 ml  Code Status: Full Code    Significant improvement in symptoms and O2 requirement. Weight is up but edema appears improved.  Continue current POC    Worthy MISSAEL Jama  12/13/2020 10:33 AM

## 2020-12-14 LAB
A/G RATIO: 0.9 (ref 1.1–2.2)
ALBUMIN SERPL-MCNC: 3 G/DL (ref 3.4–5)
ALP BLD-CCNC: 200 U/L (ref 40–129)
ALT SERPL-CCNC: 84 U/L (ref 10–40)
ANION GAP SERPL CALCULATED.3IONS-SCNC: 11 MMOL/L (ref 3–16)
AST SERPL-CCNC: 185 U/L (ref 15–37)
BILIRUB SERPL-MCNC: 1 MG/DL (ref 0–1)
BLOOD CULTURE, ROUTINE: NORMAL
BUN BLDV-MCNC: 38 MG/DL (ref 7–20)
CALCIUM SERPL-MCNC: 8.3 MG/DL (ref 8.3–10.6)
CHLORIDE BLD-SCNC: 89 MMOL/L (ref 99–110)
CO2: 26 MMOL/L (ref 21–32)
CREAT SERPL-MCNC: 0.8 MG/DL (ref 0.6–1.1)
GFR AFRICAN AMERICAN: >60
GFR NON-AFRICAN AMERICAN: >60
GLOBULIN: 3.4 G/DL
GLUCOSE BLD-MCNC: 231 MG/DL (ref 70–99)
GLUCOSE BLD-MCNC: 269 MG/DL (ref 70–99)
GLUCOSE BLD-MCNC: 281 MG/DL (ref 70–99)
GLUCOSE BLD-MCNC: 361 MG/DL (ref 70–99)
GLUCOSE BLD-MCNC: 361 MG/DL (ref 70–99)
GLUCOSE BLD-MCNC: 363 MG/DL (ref 70–99)
PERFORMED ON: ABNORMAL
POTASSIUM SERPL-SCNC: 4 MMOL/L (ref 3.5–5.1)
SODIUM BLD-SCNC: 126 MMOL/L (ref 136–145)
TOTAL PROTEIN: 6.4 G/DL (ref 6.4–8.2)

## 2020-12-14 PROCEDURE — 2580000003 HC RX 258: Performed by: INTERNAL MEDICINE

## 2020-12-14 PROCEDURE — 2500000003 HC RX 250 WO HCPCS: Performed by: INTERNAL MEDICINE

## 2020-12-14 PROCEDURE — 6360000002 HC RX W HCPCS: Performed by: INTERNAL MEDICINE

## 2020-12-14 PROCEDURE — 6370000000 HC RX 637 (ALT 250 FOR IP): Performed by: INTERNAL MEDICINE

## 2020-12-14 PROCEDURE — 99233 SBSQ HOSP IP/OBS HIGH 50: CPT | Performed by: INTERNAL MEDICINE

## 2020-12-14 PROCEDURE — 6370000000 HC RX 637 (ALT 250 FOR IP): Performed by: PHYSICIAN ASSISTANT

## 2020-12-14 PROCEDURE — 36415 COLL VENOUS BLD VENIPUNCTURE: CPT

## 2020-12-14 PROCEDURE — 99232 SBSQ HOSP IP/OBS MODERATE 35: CPT | Performed by: INTERNAL MEDICINE

## 2020-12-14 PROCEDURE — 80053 COMPREHEN METABOLIC PANEL: CPT

## 2020-12-14 PROCEDURE — 2060000000 HC ICU INTERMEDIATE R&B

## 2020-12-14 RX ORDER — DEXAMETHASONE SODIUM PHOSPHATE 10 MG/ML
6 INJECTION, SOLUTION INTRAMUSCULAR; INTRAVENOUS DAILY
Status: DISCONTINUED | OUTPATIENT
Start: 2020-12-14 | End: 2020-12-15 | Stop reason: HOSPADM

## 2020-12-14 RX ADMIN — FENOFIBRATE 160 MG: 160 TABLET ORAL at 09:36

## 2020-12-14 RX ADMIN — SUCRALFATE 1 G: 1 TABLET ORAL at 06:43

## 2020-12-14 RX ADMIN — INSULIN LISPRO 10 UNITS: 100 INJECTION, SOLUTION INTRAVENOUS; SUBCUTANEOUS at 17:13

## 2020-12-14 RX ADMIN — INSULIN LISPRO 10 UNITS: 100 INJECTION, SOLUTION INTRAVENOUS; SUBCUTANEOUS at 12:02

## 2020-12-14 RX ADMIN — RANOLAZINE 500 MG: 500 TABLET, FILM COATED, EXTENDED RELEASE ORAL at 21:50

## 2020-12-14 RX ADMIN — METOCLOPRAMIDE 10 MG: 10 TABLET ORAL at 09:37

## 2020-12-14 RX ADMIN — DULOXETINE HYDROCHLORIDE 60 MG: 60 CAPSULE, DELAYED RELEASE ORAL at 09:37

## 2020-12-14 RX ADMIN — DOCUSATE SODIUM 100 MG: 100 CAPSULE ORAL at 09:37

## 2020-12-14 RX ADMIN — APIXABAN 5 MG: 5 TABLET, FILM COATED ORAL at 21:49

## 2020-12-14 RX ADMIN — MEROPENEM 1 G: 1 INJECTION, POWDER, FOR SOLUTION INTRAVENOUS at 23:37

## 2020-12-14 RX ADMIN — PANTOPRAZOLE SODIUM 40 MG: 40 TABLET, DELAYED RELEASE ORAL at 06:42

## 2020-12-14 RX ADMIN — MEROPENEM 1 G: 1 INJECTION, POWDER, FOR SOLUTION INTRAVENOUS at 17:02

## 2020-12-14 RX ADMIN — RANOLAZINE 500 MG: 500 TABLET, FILM COATED, EXTENDED RELEASE ORAL at 09:36

## 2020-12-14 RX ADMIN — MEROPENEM 1 G: 1 INJECTION, POWDER, FOR SOLUTION INTRAVENOUS at 00:57

## 2020-12-14 RX ADMIN — ATORVASTATIN CALCIUM 40 MG: 40 TABLET, FILM COATED ORAL at 21:50

## 2020-12-14 RX ADMIN — TORSEMIDE 20 MG: 20 TABLET ORAL at 09:37

## 2020-12-14 RX ADMIN — ASPIRIN 81 MG: 81 TABLET, COATED ORAL at 09:36

## 2020-12-14 RX ADMIN — REMDESIVIR 100 MG: 100 INJECTION, POWDER, LYOPHILIZED, FOR SOLUTION INTRAVENOUS at 09:36

## 2020-12-14 RX ADMIN — MICONAZOLE NITRATE: 20 POWDER TOPICAL at 09:38

## 2020-12-14 RX ADMIN — RANOLAZINE 500 MG: 500 TABLET, FILM COATED, EXTENDED RELEASE ORAL at 00:56

## 2020-12-14 RX ADMIN — INSULIN GLARGINE 30 UNITS: 100 INJECTION, SOLUTION SUBCUTANEOUS at 21:52

## 2020-12-14 RX ADMIN — CARVEDILOL 3.12 MG: 3.12 TABLET, FILM COATED ORAL at 17:11

## 2020-12-14 RX ADMIN — METOCLOPRAMIDE 10 MG: 10 TABLET ORAL at 17:11

## 2020-12-14 RX ADMIN — INSULIN LISPRO 10 UNITS: 100 INJECTION, SOLUTION INTRAVENOUS; SUBCUTANEOUS at 12:03

## 2020-12-14 RX ADMIN — MEROPENEM 1 G: 1 INJECTION, POWDER, FOR SOLUTION INTRAVENOUS at 12:02

## 2020-12-14 RX ADMIN — Medication 10 ML: at 00:56

## 2020-12-14 RX ADMIN — INSULIN LISPRO 10 UNITS: 100 INJECTION, SOLUTION INTRAVENOUS; SUBCUTANEOUS at 10:00

## 2020-12-14 RX ADMIN — DEXAMETHASONE SODIUM PHOSPHATE 6 MG: 10 INJECTION, SOLUTION INTRAMUSCULAR; INTRAVENOUS at 09:59

## 2020-12-14 RX ADMIN — SUCRALFATE 1 G: 1 TABLET ORAL at 09:40

## 2020-12-14 RX ADMIN — Medication 10 ML: at 21:50

## 2020-12-14 RX ADMIN — DIGOXIN 125 MCG: 125 TABLET ORAL at 09:37

## 2020-12-14 RX ADMIN — INSULIN LISPRO 6 UNITS: 100 INJECTION, SOLUTION INTRAVENOUS; SUBCUTANEOUS at 17:12

## 2020-12-14 RX ADMIN — CARVEDILOL 3.12 MG: 3.12 TABLET, FILM COATED ORAL at 09:37

## 2020-12-14 RX ADMIN — APIXABAN 5 MG: 5 TABLET, FILM COATED ORAL at 09:37

## 2020-12-14 RX ADMIN — AMIODARONE HYDROCHLORIDE 200 MG: 200 TABLET ORAL at 09:37

## 2020-12-14 ASSESSMENT — PAIN SCALES - GENERAL: PAINLEVEL_OUTOF10: 0

## 2020-12-14 NOTE — CARE COORDINATION
INTERDISCIPLINARY PLAN OF CARE CONFERENCE    Date/Time: 12/14/2020 10:34 AM  Completed by: MARII Luis. Case Management      Patient Name:  Chai Stephenson  YOB: 1960  Admitting Diagnosis: Sepsis ([facility] [address] [A41.9]  Sepsis ([facility] [address] [A41.9]     Admit Date/Time:  12/9/2020  5:45 PM    Chart reviewed. Interdisciplinary team contacted or reviewed plan related to patient progress and discharge plans. Disciplines included Case Management, Nursing, and Dietitian. Current Status:ongoing   PT/OT recommendation for discharge plan of care: n/a    Expected D/C Disposition:  Home  Confirmed plan with patient and/or family Yes confirmed with: pt's  sister Kemi Harmon at [phone]    Discharge Plan Comments: chart review completed. Spoke with pt's sister Silva Olivares as pt is in covid-19 isolation and has no cell phone listed in epic. Silva Olivares stated the plan is for pt to return home when able and can check in on pt when able. She stated that pt is able to stay with her for a little if she wanted to get more assistance. They are planning on getting a mobile home together in the spring. She denied current needs from CM.  notes that pt may discharge home on IV ABX. Called and spoke with arthur Bonds at [place] [address] at [phone] to inquire if they could accept back as pt may need IV ABX. Jean-Pierre Sanchez stated they have a call out to the nurse for pt's area to determine if they can take back. Jean-Pierre Sanchez will call CM back. Home O2 in place on admit: Yes. Unknown company at this point,    Addendum at 1:36pm: Received call from arthur Bonds at [place] [address] stating they are UNABLE to accept pt back due to staffing and noncompliance. Spoke with Abby Mike RN who stated pt is not teachable to do IV ABX at home. Pt lives alone and sister reported she would check on pt.     [place]sh[place] to [place], [place] to make her aware of the above

## 2020-12-14 NOTE — PROGRESS NOTES
Progress Note    Admit Date:  12/9/2020    Subjective:  Ms. Williams Erazo reports she is feeling well. Would like to go home today. Still on 1L NC O2.     Objective:   Patient Vitals for the past 4 hrs:   BP Temp Temp src Pulse Resp SpO2   12/14/20 1139 116/69 97.4 °F (36.3 °C) Temporal 89 17 90 %       Intake/Output Summary (Last 24 hours) at 12/14/2020 1417  Last data filed at 12/14/2020 0516  Gross per 24 hour   Intake 340 ml   Output 1050 ml   Net -710 ml       Physical Exam:    Gen: No distress. Alert. Eyes: No conjunctival injection. ENT: No discharge. Pharynx clear. Neck: Trachea midline. Resp: No accessory muscle use. + Faint crackles in base-->improved . No wheezes. No rhonchi. CV: Regular rate. Regular rhythm. No murmur. No rub. Trace edema. Capillary Refill: Brisk,< 3 seconds   Peripheral Pulses: +2 palpable, equal bilaterally   GI: Non-tender. Non-distended. Normal bowel sounds. Skin: Warm and dry. No nodule on exposed extremities. No rash on exposed extremities. M/S: No cyanosis. No joint deformity. No clubbing. Neuro: Awake. Grossly nonfocal    Psych: Oriented x 3. No anxiety or agitation. [facility] have reviewed the chart on [facility] and personally interviewed and examined patient, reviewed the data (labs and imaging) and after discussion with my PA formulated the plan. Agree with note with the following edits. HPI:     I reviewed the patient's Past Medical History, Past Surgical History, Medications, and Allergies. No symptoms but remains on 1 L oxygen  Denies any dysuric symptoms        General: middle aged female, obese,   Awake, alert and oriented.  Appears to be not in any distress  Mucous Membranes:  Pink , anicteric  Neck: No JVD, no carotid bruit, no thyromegaly  Chest:  Clear to auscultation bilaterally, no added sounds  Cardiovascular:  RRR S1S2 heard, no murmurs or gallops  Abdomen:  Soft, undistended, non tender, no organomegaly, BS present Extremities: right upper ext picc  No edema or cyanosis.  Distal pulses well felt  Neurological : grossly normal              Scheduled Meds:   [START ON 12/15/2020] influenza virus vaccine  0.5 mL Intramuscular Once    dexamethasone  6 mg Intravenous Daily    remdesivir IVPB  100 mg Intravenous Q24H    torsemide  20 mg Oral Daily    amiodarone  200 mg Oral Daily    apixaban  5 mg Oral BID    aspirin  81 mg Oral Daily    atorvastatin  40 mg Oral Nightly    carvedilol  3.125 mg Oral BID WC    digoxin  125 mcg Oral Every Other Day    fenofibrate  160 mg Oral Daily    DULoxetine  60 mg Oral Daily    metoclopramide  10 mg Oral TID WC    miconazole   Topical BID    pantoprazole  40 mg Oral Daily    [Held by provider] QUEtiapine  50 mg Oral Nightly    ranolazine  500 mg Oral BID    sucralfate  1 g Oral TID AC    sodium chloride flush  10 mL Intravenous 2 times per day    insulin lispro  0-12 Units Subcutaneous TID WC    insulin lispro  0-6 Units Subcutaneous Nightly    meropenem  1 g Intravenous Q8H    insulin lispro  10 Units Subcutaneous TID WC    insulin glargine  30 Units Subcutaneous Nightly    docusate sodium  100 mg Oral Daily    polyethylene glycol  17 g Oral Daily       Continuous Infusions:   dextrose         PRN Meds:  sodium chloride, magnesium sulfate, [Held by provider] prochlorperazine, nitroGLYCERIN, sodium chloride flush, acetaminophen **OR** acetaminophen, glucose, dextrose, glucagon (rDNA), dextrose, guaiFENesin-dextromethorphan      Data:  CBC:   Recent Labs     12/12/20  0607   WBC 5.1   HGB 13.2   HCT 40.8   MCV 85.9        BMP:   Recent Labs     12/12/20  0607 12/13/20  1122   * 131*   K 4.1 3.8   CL 90* 95*   CO2 26 27   BUN 26* 25*   CREATININE 0.6 0.7     LIVER PROFILE:   Recent Labs     12/12/20  0607 12/13/20  1122   AST 78* 92*   ALT 24 33   BILIDIR 0.8*  --    BILITOT 1.2* 0.9   ALKPHOS 120 101       CULTURES  Blood: NGTD SARS-CoV-2, NAAT DETECTEDPanic     Urine:  Escherichia coli (esbl) (1)     Antibiotic  Interpretation  JANELLE  Status     ampicillin  Resistant  >=32  mcg/mL      ceFAZolin  Resistant  >=64  mcg/mL      cefepime  Resistant        cefTRIAXone  Resistant        ciprofloxacin  Sensitive  0.5  mcg/mL      ertapenem  Sensitive  <=0.12  mcg/mL      gentamicin  Sensitive  <=1  mcg/mL      levofloxacin  Sensitive  1  mcg/mL      nitrofurantoin  Sensitive  <=16  mcg/mL      trimethoprim-sulfamethoxazole  Sensitive  <=20  mcg/mL             RADIOLOGY  IR MIDLINE CATH   Final Result   Successful placement of midline catheter. CT CHEST WO CONTRAST   Final Result   1. Bilateral pulmonary opacities are favored to represent pneumonia. 2. Pericardial and bilateral pleural effusions. 3. Multinodular goiter. Thyroid ultrasound could be obtained for further   evaluation if clinically warranted. XR CHEST PORTABLE   Final Result   Pulmonary vascular congestion along with patchy infiltrates in both lungs,   greater on the right. Mild pulmonary edema is primarily considered,   multifocal pneumonia should also be included in the differential.             Assessment/Plan:  Sepsis  - source: UTI, also with COVID below; failed OP Keflex  - tachypnea, tachycardia, lactic acidosis, no fevers, WBC normal, not hypotensive  - blood cx NG, urine + ESBL. Abx as below  - will d/c IVF in setting of severe systolic CHF     UTI 2/2 E Coli ESBL  - Merrem D#5  - Midline cath ordered with plan for IV invanz on discharge   -  following for assistance with dc planning.   Mar Triana does not want to go to a NH for IV abx  - No [facility] to accept patient at this time, she is not teachable, sister unable to come everyday to help her, patient does not drive and would not have a ride daily for infusion at the hospital     Nausea- resolved   Hematemesis- resolved   Constipation - she has frequent ER visits for n/v.  Her exam is currently benign. She reported hematemesis on her day of admit, but now denies it (of note, she is on Eliquis and ASA), h/h is stable   - on [facility] Reglan, add colace, [facility] Miralax  - caution with PRN anti-emetics d/t QT prolongation    Chest Pain- resolved   - reports pain feels similar to pain that started with her PE; worse with emesis  - EKG: non acute  - trend trop: neg x 2   - tele monitoring in place    Prolonged QTc  - 502 ms on admit-> 510ms today-> 469   - hold PRN Compazine, Seroquel  - avoid QT prolonging agents as able    - repeat EKG 12/12  - cont on tele      Hypomagnesemia  Hypokalemia  - replacement ordered 12/11    Lactic Acidosis  - 2.3-> 2.9->2.3  - likely 2/2 infection  - sp IVF, now off with h/o CHF.       COVID 19 Infection  - droplet + precaution  - no hypoxia, on RA-->now with hypoxia and on 4L NC O2   - Pulmonology consult   - Continue Decadron D# 3, Continue Remdesivir D#3  - CXR: bilateral pulmonary opacities favored to represent PNA  - supportive measures, robitussin     Type II DM with Hyperglycemia  - uncontrolled, not in DKA  - increase Lantus 24 > 30 units nightly, add novolog 10 units TID, med SSI  - POC Glucose, carb control diet     Pericardial Effusion - stable  Bilateral Pleural Effusions - decreased in vol  Chronic Combined CHF - appears compensated  Severe Non-Ischemic Cardiomyopathy  Non-Obstructive CAD  H/o NSVT  S/p AICD  - last echo 11/17/2020: EF <20%, severe global hypokinesis, grade III DD, mod TR, mod pulm HTN  - BNP: 4997  - CT chest with unchanged pericardial effusion and small bilateral pleural effusions - decreased in size  - no salt added diet, I&Os, daily weights  - cont ASA, Coreg, Amiodarone, Digoxin, Ranexa  - held torsemide with sepsis on admit- plan to resume on 12/12     Recent pulmonary embolism   - recently diagnosed on 11/22/2020  - AC on Eliquis    GERD  - cont Protonix, Carafate     Obesity - Body mass index is 34.01 kg/m². - Counseled on weight loss.      Depression  - cont Cymbalta, hold Seroquel    DVT Prophylaxis: Eliquis   Diet: DIET CARDIAC; Carb Control: 4 carb choices (60 gms)/meal; No Added Salt (3-4 GM); Daily Fluid Restriction: 1800 ml  Code Status: Full Code    Significant improvement in symptoms and O2 requirement. Will likely need home O2, walk test pending. IV Abx on discharge but barriers to setting up daily infusions at home at this time. Continue remdesivir and decadron.      Gil Gonzales PA-C  12/14/2020 2:17 PM     Agree with above  Changes made to note    Hany Hahn MD 12/14/2020 2:40 PM

## 2020-12-14 NOTE — PROGRESS NOTES
There was an order for a CMP this am. It was not done. I placed an order for a STAT CMP this early afternoon. It was not done. I have now spoken with her bedside nurse who has kindly agreed to follow up on this for me. CMP results noted. Increase in LFT.   Will obtain LFT prior to am dose Remdesivir

## 2020-12-14 NOTE — PROGRESS NOTES
Pulmonary Progress Note    CC: Nausea, emesis, myalgias, shortness of breath    Subjective:   Desaturations only with sleep  Says she feels better than she has in a long time    IV line: 12/11/2020 midline      Intake/Output Summary (Last 24 hours) at 12/14/2020 1006  Last data filed at 12/14/2020 0516  Gross per 24 hour   Intake 340 ml   Output 1050 ml   Net -710 ml       Exam:   BP (!) 130/50   Pulse 96   Temp 96.7 °F (35.9 °C) (Temporal)   Resp 18   Ht 5' 1\" (1.549 m)   Wt 207 lb (93.9 kg)   SpO2 99%   BMI 39.11 kg/m²  on 1 L  Constitutional:  No acute distress. HENT: Normal nose. Oropharynx is clear and moist.    Eyes: Conjunctivae anicteric. PERRL  Neck:  Neck supple. No tracheal deviation present. No thyromegaly present. Cardiovascular: Normal rate, regular rhythm, normal heart sounds. No JVD. No murmur heard.+ LE edema. Pulmonary/Chest: No accessory muscle usage. No wheezes. Few rales. + decreased breath sounds. Chest wall is not dull to percussion. Abdominal: Soft. No distension and no mass. No tenderness   Musculoskeletal: No cyanosis. No clubbing. No joint deformity. Lymphadenopathy: No cervical or supraclavicular adenopathy. Skin: Skin is warm and dry. No rash or nodules on the exposed extremities  Psychiatric: Normal mood and affect. Behavior is normal.  Neuro:  Alert, awake, speech clear today, cranial nerves are grossly intact.      Scheduled Meds:   [START ON 12/15/2020] influenza virus vaccine  0.5 mL Intramuscular Once    dexamethasone  6 mg Intravenous Daily    remdesivir IVPB  100 mg Intravenous Q24H    torsemide  20 mg Oral Daily    amiodarone  200 mg Oral Daily    apixaban  5 mg Oral BID    aspirin  81 mg Oral Daily    atorvastatin  40 mg Oral Nightly    carvedilol  3.125 mg Oral BID WC    digoxin  125 mcg Oral Every Other Day    fenofibrate  160 mg Oral Daily    DULoxetine  60 mg Oral Daily    metoclopramide  10 mg Oral TID WC    miconazole   Topical BID  pantoprazole  40 mg Oral Daily    [Held by provider] QUEtiapine  50 mg Oral Nightly    ranolazine  500 mg Oral BID    sucralfate  1 g Oral TID AC    sodium chloride flush  10 mL Intravenous 2 times per day    insulin lispro  0-12 Units Subcutaneous TID WC    insulin lispro  0-6 Units Subcutaneous Nightly    meropenem  1 g Intravenous Q8H    insulin lispro  10 Units Subcutaneous TID WC    insulin glargine  30 Units Subcutaneous Nightly    docusate sodium  100 mg Oral Daily    polyethylene glycol  17 g Oral Daily     Continuous Infusions:   dextrose       PRN Meds:  sodium chloride, magnesium sulfate, [Held by provider] prochlorperazine, nitroGLYCERIN, sodium chloride flush, acetaminophen **OR** acetaminophen, glucose, dextrose, glucagon (rDNA), dextrose, guaiFENesin-dextromethorphan    Labs:  CBC:   Recent Labs     12/12/20  0607   WBC 5.1   HGB 13.2   HCT 40.8   MCV 85.9        BMP:   Recent Labs     12/12/20  0607 12/13/20  1122   * 131*   K 4.1 3.8   CL 90* 95*   CO2 26 27   BUN 26* 25*   CREATININE 0.6 0.7     LIVER PROFILE:   Recent Labs     12/12/20  0607 12/13/20  1122   AST 78* 92*   ALT 24 33   BILIDIR 0.8*  --    BILITOT 1.2* 0.9   ALKPHOS 120 101     PT/INR: No results for input(s): PROTIME, INR in the last 72 hours. APTT: No results for input(s): APTT in the last 72 hours. UA:  No results for input(s): NITRITE, COLORU, PHUR, LABCAST, WBCUA, RBCUA, MUCUS, TRICHOMONAS, YEAST, BACTERIA, CLARITYU, SPECGRAV, LEUKOCYTESUR, UROBILINOGEN, BILIRUBINUR, BLOODU, GLUCOSEU, AMORPHOUS in the last 72 hours. Invalid input(s): KETONESU  No results for input(s): PHART, FIL0IGL, PO2ART in the last 72 hours.      Cultures:   12/9/2020 SARS-CoV-2 positive  12/7/2020 urine E. coli ESBL Carbapenem sensitive  12/9/2020 urine E. coli ESBL  12/11/2020 blood no growth to date    Films:  CT chest 12/9/2020 Shows patchy nodular bilateral infiltrates with areas of groundglass opacity new compared to the 11/16/2020 CT consistent with acute infection     FINDINGS:    Mediastinum: Pericardial perfusion is again seen, not significantly changed. Coronary calcifications are identified.  Aortic caliber is normal.  A few    small mediastinal lymph nodes are again noted.         Lungs/pleura: Small bilateral pleural effusions have decreased in volume. There is no pneumothorax.  The central airways are patent.  There are    bilateral nodular infiltrates.         Upper Abdomen: The visualized upper abdomen is unremarkable.         Soft Tissues/Bones: Body wall edema has improved.  Multinodular goiter is    again seen.              Impression    1. Bilateral pulmonary opacities are favored to represent pneumonia. 2. Pericardial and bilateral pleural effusions. 3. Multinodular goiter.  Thyroid ultrasound could be obtained for further    evaluation if clinically warranted. ASSESSMENT:  · Acute hypoxemic respiratory failure   · COVID-19 pneumonia - new bilateral nodular and groundglass opacities compared with November CT consistent with COVID-19 infection  · Recent pulmonary embolism 11/16/2020  · UTI: E. coli, ESBL  · Nausea and emesis  · Diabetes with severe hyperglycemia  · Small pleural effusions  · Systolic and diastolic CHF  · Nonischemic cardiomyopathy with EF 10 to 15%  · SP AICD  · Left ventricular apical thrombus    PLAN:  COVID-19 isolation, droplet plus  Supplemental oxygen to maintain SaO2 >92%; wean as tolerated    Remdesevir D#3, LFT monitoring for high risk medication --AST is increasing but is not 5 times the upper limit of normal --CMP was ordered this morning and not done. I have reordered as a stat lab.   CCP 1 unit on 12/12/2020  Decadron D#3   Inhaled bronchodilators only as needed, MDI preferred  Eliquis for recent PE and left ventricular apical thrombus  Merrem day #4 for ESBL urine Will follow with you

## 2020-12-14 NOTE — FLOWSHEET NOTE
12/14/20 0848   Vital Signs   Temp 96.7 °F (35.9 °C)   Temp Source Temporal   Pulse 96   Heart Rate Source Monitor   Resp 18   BP (!) 130/50   BP Location Left upper arm   Level of Consciousness Alert (0)   MEWS Score 1   Patient Currently in Pain No   BP Upper/Lower Upper   Oxygen Therapy   SpO2 99 %   O2 Device Nasal cannula   O2 Flow Rate (L/min) 1 L/min   Shift assessment complete. See flow sheet. Scheduled meds given. See MAR. Call light and bedside table within reach. No further needs noted at this time. Will continue to monitor.

## 2020-12-15 VITALS
SYSTOLIC BLOOD PRESSURE: 108 MMHG | OXYGEN SATURATION: 95 % | BODY MASS INDEX: 39.67 KG/M2 | HEART RATE: 96 BPM | TEMPERATURE: 97 F | RESPIRATION RATE: 22 BRPM | WEIGHT: 210.1 LBS | DIASTOLIC BLOOD PRESSURE: 79 MMHG | HEIGHT: 61 IN

## 2020-12-15 LAB
A/G RATIO: 1 (ref 1.1–2.2)
ALBUMIN SERPL-MCNC: 2.8 G/DL (ref 3.4–5)
ALP BLD-CCNC: 173 U/L (ref 40–129)
ALT SERPL-CCNC: 96 U/L (ref 10–40)
ANION GAP SERPL CALCULATED.3IONS-SCNC: 9 MMOL/L (ref 3–16)
ANISOCYTOSIS: ABNORMAL
AST SERPL-CCNC: 209 U/L (ref 15–37)
BANDED NEUTROPHILS RELATIVE PERCENT: 1 % (ref 0–7)
BASOPHILS ABSOLUTE: 0 K/UL (ref 0–0.2)
BASOPHILS RELATIVE PERCENT: 0 %
BILIRUB SERPL-MCNC: 1 MG/DL (ref 0–1)
BUN BLDV-MCNC: 39 MG/DL (ref 7–20)
BURR CELLS: ABNORMAL
CALCIUM SERPL-MCNC: 8.4 MG/DL (ref 8.3–10.6)
CHLORIDE BLD-SCNC: 93 MMOL/L (ref 99–110)
CO2: 29 MMOL/L (ref 21–32)
CREAT SERPL-MCNC: 0.7 MG/DL (ref 0.6–1.1)
CULTURE, BLOOD 2: NORMAL
EOSINOPHILS ABSOLUTE: 0 K/UL (ref 0–0.6)
EOSINOPHILS RELATIVE PERCENT: 0 %
GFR AFRICAN AMERICAN: >60
GFR NON-AFRICAN AMERICAN: >60
GLOBULIN: 2.9 G/DL
GLUCOSE BLD-MCNC: 119 MG/DL (ref 70–99)
GLUCOSE BLD-MCNC: 126 MG/DL (ref 70–99)
GLUCOSE BLD-MCNC: 126 MG/DL (ref 70–99)
GLUCOSE BLD-MCNC: 139 MG/DL (ref 70–99)
GLUCOSE BLD-MCNC: 97 MG/DL (ref 70–99)
HCT VFR BLD CALC: 39.8 % (ref 36–48)
HEMOGLOBIN: 13 G/DL (ref 12–16)
LYMPHOCYTES ABSOLUTE: 0.9 K/UL (ref 1–5.1)
LYMPHOCYTES RELATIVE PERCENT: 9 %
MCH RBC QN AUTO: 27.7 PG (ref 26–34)
MCHC RBC AUTO-ENTMCNC: 32.6 G/DL (ref 31–36)
MCV RBC AUTO: 84.8 FL (ref 80–100)
MONOCYTES ABSOLUTE: 1.1 K/UL (ref 0–1.3)
MONOCYTES RELATIVE PERCENT: 12 %
MYELOCYTE PERCENT: 1 %
NEUTROPHILS ABSOLUTE: 7.5 K/UL (ref 1.7–7.7)
NEUTROPHILS RELATIVE PERCENT: 77 %
OVALOCYTES: ABNORMAL
PDW BLD-RTO: 17.8 % (ref 12.4–15.4)
PERFORMED ON: ABNORMAL
PERFORMED ON: NORMAL
PLATELET # BLD: 259 K/UL (ref 135–450)
PLATELET SLIDE REVIEW: ADEQUATE
PMV BLD AUTO: 10.3 FL (ref 5–10.5)
POIKILOCYTES: ABNORMAL
POLYCHROMASIA: ABNORMAL
POTASSIUM REFLEX MAGNESIUM: 4.6 MMOL/L (ref 3.5–5.1)
RBC # BLD: 4.69 M/UL (ref 4–5.2)
SLIDE REVIEW: ABNORMAL
SMUDGE CELLS: PRESENT
SODIUM BLD-SCNC: 131 MMOL/L (ref 136–145)
TARGET CELLS: ABNORMAL
TOTAL PROTEIN: 5.7 G/DL (ref 6.4–8.2)
VACUOLATED NEUTROPHILS: PRESENT
WBC # BLD: 9.5 K/UL (ref 4–11)

## 2020-12-15 PROCEDURE — G0008 ADMIN INFLUENZA VIRUS VAC: HCPCS | Performed by: INTERNAL MEDICINE

## 2020-12-15 PROCEDURE — 6370000000 HC RX 637 (ALT 250 FOR IP): Performed by: INTERNAL MEDICINE

## 2020-12-15 PROCEDURE — 99239 HOSP IP/OBS DSCHRG MGMT >30: CPT | Performed by: INTERNAL MEDICINE

## 2020-12-15 PROCEDURE — 6360000002 HC RX W HCPCS: Performed by: INTERNAL MEDICINE

## 2020-12-15 PROCEDURE — 36415 COLL VENOUS BLD VENIPUNCTURE: CPT

## 2020-12-15 PROCEDURE — 90686 IIV4 VACC NO PRSV 0.5 ML IM: CPT | Performed by: INTERNAL MEDICINE

## 2020-12-15 PROCEDURE — 85025 COMPLETE CBC W/AUTO DIFF WBC: CPT

## 2020-12-15 PROCEDURE — 2580000003 HC RX 258: Performed by: INTERNAL MEDICINE

## 2020-12-15 PROCEDURE — 80053 COMPREHEN METABOLIC PANEL: CPT

## 2020-12-15 PROCEDURE — 6370000000 HC RX 637 (ALT 250 FOR IP): Performed by: PHYSICIAN ASSISTANT

## 2020-12-15 PROCEDURE — 99233 SBSQ HOSP IP/OBS HIGH 50: CPT | Performed by: INTERNAL MEDICINE

## 2020-12-15 RX ORDER — DEXAMETHASONE 6 MG/1
6 TABLET ORAL DAILY
Qty: 6 TABLET | Refills: 0 | Status: SHIPPED | OUTPATIENT
Start: 2020-12-15 | End: 2020-12-21

## 2020-12-15 RX ADMIN — SUCRALFATE 1 G: 1 TABLET ORAL at 18:46

## 2020-12-15 RX ADMIN — ASPIRIN 81 MG: 81 TABLET, COATED ORAL at 08:55

## 2020-12-15 RX ADMIN — INSULIN LISPRO 10 UNITS: 100 INJECTION, SOLUTION INTRAVENOUS; SUBCUTANEOUS at 11:36

## 2020-12-15 RX ADMIN — PANTOPRAZOLE SODIUM 40 MG: 40 TABLET, DELAYED RELEASE ORAL at 08:00

## 2020-12-15 RX ADMIN — DOCUSATE SODIUM 100 MG: 100 CAPSULE ORAL at 08:55

## 2020-12-15 RX ADMIN — METOCLOPRAMIDE 10 MG: 10 TABLET ORAL at 18:46

## 2020-12-15 RX ADMIN — FENOFIBRATE 160 MG: 160 TABLET ORAL at 08:55

## 2020-12-15 RX ADMIN — DULOXETINE HYDROCHLORIDE 60 MG: 60 CAPSULE, DELAYED RELEASE ORAL at 08:55

## 2020-12-15 RX ADMIN — TORSEMIDE 20 MG: 20 TABLET ORAL at 08:54

## 2020-12-15 RX ADMIN — MICONAZOLE NITRATE: 20 POWDER TOPICAL at 08:56

## 2020-12-15 RX ADMIN — Medication 10 ML: at 08:57

## 2020-12-15 RX ADMIN — DEXAMETHASONE SODIUM PHOSPHATE 6 MG: 10 INJECTION, SOLUTION INTRAMUSCULAR; INTRAVENOUS at 08:54

## 2020-12-15 RX ADMIN — RANOLAZINE 500 MG: 500 TABLET, FILM COATED, EXTENDED RELEASE ORAL at 08:54

## 2020-12-15 RX ADMIN — METOCLOPRAMIDE 10 MG: 10 TABLET ORAL at 08:55

## 2020-12-15 RX ADMIN — METOCLOPRAMIDE 10 MG: 10 TABLET ORAL at 11:35

## 2020-12-15 RX ADMIN — APIXABAN 5 MG: 5 TABLET, FILM COATED ORAL at 08:55

## 2020-12-15 RX ADMIN — INFLUENZA A VIRUS A/VICTORIA/2454/2019 IVR-207 (H1N1) ANTIGEN (PROPIOLACTONE INACTIVATED), INFLUENZA A VIRUS A/HONG KONG/2671/2019 IVR-208 (H3N2) ANTIGEN (PROPIOLACTONE INACTIVATED), INFLUENZA B VIRUS B/VICTORIA/705/2018 BVR-11 ANTIGEN (PROPIOLACTONE INACTIVATED), INFLUENZA B VIRUS B/PHUKET/3073/2013 BVR-1B ANTIGEN (PROPIOLACTONE INACTIVATED) 0.5 ML: 15; 15; 15; 15 INJECTION, SUSPENSION INTRAMUSCULAR at 18:56

## 2020-12-15 RX ADMIN — INSULIN LISPRO 10 UNITS: 100 INJECTION, SOLUTION INTRAVENOUS; SUBCUTANEOUS at 16:56

## 2020-12-15 RX ADMIN — AMIODARONE HYDROCHLORIDE 200 MG: 200 TABLET ORAL at 08:54

## 2020-12-15 RX ADMIN — SUCRALFATE 1 G: 1 TABLET ORAL at 11:35

## 2020-12-15 RX ADMIN — MEROPENEM 1 G: 1 INJECTION, POWDER, FOR SOLUTION INTRAVENOUS at 08:56

## 2020-12-15 RX ADMIN — INSULIN LISPRO 10 UNITS: 100 INJECTION, SOLUTION INTRAVENOUS; SUBCUTANEOUS at 08:56

## 2020-12-15 RX ADMIN — MEROPENEM 1 G: 1 INJECTION, POWDER, FOR SOLUTION INTRAVENOUS at 16:37

## 2020-12-15 RX ADMIN — SUCRALFATE 1 G: 1 TABLET ORAL at 08:01

## 2020-12-15 NOTE — DISCHARGE SUMMARY
Name:  Mary Kate Díaz  Room:  EN12/EN-12  MRN:    [id number]    Discharge Summary      This discharge summary is in conjunction with a complete physical exam done on the day of discharge. Discharging Physician: Dr. Valdez Bridger: 12/9/2020  Discharge:   12/15/2020    HPI taken from admission H&P:    The patient is a 62 y.o. female with a PMH of CVA, Chronic Combined CHF, Severe Non-Ischemic Cardiomyopathy, Non-Obstructive CAD, hx of NSVT, hx of pericardial effusion, AICD, type II DM, recent PE, HLD, HTN and depression who presented to the ED with complaint of nausea and emesis x 3 days. Reports she is vomiting blood. Has associated mid chest pain - state this pain radiates bilaterally to UE and LE. Reports the pain feels similar to the pain she had when she was diagnosed with her PEs. Worse with emesis. Denies any abdominal pain. She is constipated and her last BM was a couple of days ago. No diarrhea. She does have a cough. Denies any covid exposures.     Work up in the ED: Low grade temps of 99.2. Mildly tachycardic. BP stable. No hypoxia. Na 132. Cr normal. Mg 1.6 on arrival. Lactic acid 2.9. BG elevated in 300-400s, no signs of DKA. BNP 4997. Trop neg x 2. No leukocytosis. Rapid COVID test was positive. UA is positive for nitrites - with E. Coli. CT chest showed bilateral pulmonary opacities, pericardial (not significantly changed) and bilateral pleural effusion (small, decreased in volume)    Diagnoses this Admission and Hospital Course   Sepsis  - source: UTI, also with COVID below; failed OP Keflex  - tachypnea, tachycardia, lactic acidosis, no fevers, WBC normal, not hypotensive  - blood cx NG, urine + ESBL. Abx as below  - will d/c IVF in setting of severe systolic CHF      UTI 2/2 E Coli ESBL  - Merrem D#6  - Midline cath ordered with plan for IV invanz on discharge   - CM following for assistance with dc planning.   Saroj Cuevas does not want to go to a NH for IV abx - home with Odell Brown for IV Abx completion (3 more days)     Nausea- resolved   Hematemesis- resolved   Constipation  - she has frequent ER visits for n/v.  Her exam is currently benign.   She reported hematemesis on her day of admit, but now denies it (of note, she is on Eliquis and ASA), h/h is stable   - on [facility] Reglan, add colace, [facility] Miralax  - caution with PRN anti-emetics d/t QT prolongation     Chest Pain- resolved   - reports pain feels similar to pain that started with her PE; worse with emesis  - EKG: non acute  - trend trop: neg x 2   - tele monitoring in place     Prolonged QTc  - 502 ms on admit-> 510ms today-> 469   - hold PRN Compazine, Seroquel  - avoid QT prolonging agents as able    - repeat EKG 12/12  - cont on tele   - Okay to resume Seroquel      Hypomagnesemia  Hypokalemia  - replacement ordered 12/11     Lactic Acidosis  - 2.3-> 2.9->2.3  - likely 2/2 infection  - sp IVF, now off with h/o CHF.       COVID 19 Infection  - droplet + precaution  - no hypoxia, on RA-->now with hypoxia and on 4L NC O2   - Pulmonology consult   - Continue Decadron D# 4, Continue Remdesivir D#3--stopped 2/2 elevated LFTs-->needs repeat LFTS in 24 hours after discharge  - CXR: bilateral pulmonary opacities favored to represent PNA  - supportive measures, robitussin     Type II DM with Hyperglycemia  - uncontrolled, not in DKA  - increase Lantus 24 > 30 units nightly, add novolog 10 units TID, med SSI  - POC Glucose, carb control diet     Pericardial Effusion - stable  Bilateral Pleural Effusions - decreased in vol  Chronic Combined CHF - appears compensated  Severe Non-Ischemic Cardiomyopathy  Non-Obstructive CAD  H/o NSVT  S/p AICD  - last echo 11/17/2020: EF <20%, severe global hypokinesis, grade III DD, mod TR, mod pulm HTN  - BNP: 4997  - CT chest with unchanged pericardial effusion and small bilateral pleural effusions - decreased in size  - no salt added diet, I&Os, daily weights - cont ASA, Coreg, Amiodarone, Digoxin, Ranexa  - held torsemide with sepsis on admit- plan to resume on 12/12     Recent pulmonary embolism   - recently diagnosed on 11/22/2020  - AC on Eliquis     GERD  - cont Protonix, Carafate     Obesity  - Body mass index is 34.01 kg/m². - Counseled on weight loss.      Depression  - cont Cymbalta, hold Seroquel    Procedures (Please Review Full Report for Details)  None     Consults    Pulmonology    Physical Exam at Discharge:    /73   Pulse 88   Temp 97.6 °F (36.4 °C) (Temporal)   Resp 24   Ht 5' 1\" (1.549 m)   Wt 210 lb 1.6 oz (95.3 kg)   SpO2 95%   BMI 39.70 kg/m²     General: middle aged female, obese,   Awake, alert and oriented. Appears to be not in any distress  Mucous Membranes:  Pink , anicteric  Neck: No JVD, no carotid bruit, no thyromegaly  Chest:  Clear to auscultation bilaterally, no added sounds  Cardiovascular:  RRR S1S2 heard, no murmurs or gallops  Abdomen:  Soft, undistended, non tender, no organomegaly, BS present  Extremities: right upper ext picc  No edema or cyanosis. Distal pulses well felt  Neurological : grossly normal       CBC: No results for input(s): WBC, HGB, HCT, MCV, PLT in the last 72 hours.   BMP:   Recent Labs     12/13/20  1122 12/14/20  1703 12/15/20  0730   * 126* 131*   K 3.8 4.0 4.6   CL 95* 89* 93*   CO2 27 26 29   BUN 25* 38* 39*   CREATININE 0.7 0.8 0.7     LIVER PROFILE:   Recent Labs     12/13/20  1122 12/14/20  1703 12/15/20  0730   AST 92* 185* 209*   ALT 33 84* 96*   BILITOT 0.9 1.0 1.0   ALKPHOS 101 200* 173*     CULTURES  SARS-CoV-2, NAAT DETECTEDPanic    Blood Cx: NGTD  Escherichia coli (esbl) (1)    Antibiotic Interpretation JANELLE Status    ampicillin Resistant >=32 mcg/mL     ceFAZolin Resistant >=64 mcg/mL     cefepime Resistant       cefTRIAXone Resistant       ciprofloxacin Sensitive 0.5 mcg/mL     ertapenem Sensitive <=0.12 mcg/mL     gentamicin Sensitive <=1 mcg/mL levofloxacin Sensitive 1 mcg/mL     nitrofurantoin Sensitive <=16 mcg/mL     trimethoprim-sulfamethoxazole Sensitive <=20 mcg/mL             RADIOLOGY  IR MIDLINE CATH   Final Result   Successful placement of midline catheter. CT CHEST WO CONTRAST   Final Result   1. Bilateral pulmonary opacities are favored to represent pneumonia. 2. Pericardial and bilateral pleural effusions. 3. Multinodular goiter. Thyroid ultrasound could be obtained for further   evaluation if clinically warranted. XR CHEST PORTABLE   Final Result   Pulmonary vascular congestion along with patchy infiltrates in both lungs,   greater on the right. Mild pulmonary edema is primarily considered,   multifocal pneumonia should also be included in the differential.             Discharge Medications     Medication List      START taking these medications    dexamethasone 6 MG tablet  Commonly known as: DECADRON  Take 1 tablet by mouth daily for 6 days     ertapenem  infusion  Commonly known as: INVANZ  Infuse 1,000 mg intravenously every 24 hours for 3 days Compound per protocol. CONTINUE taking these medications    amiodarone 200 MG tablet  Commonly known as: CORDARONE  Take 1 tablet by mouth daily     aspirin 81 MG EC tablet  Take 1 tablet by mouth daily     atorvastatin 40 MG tablet  Commonly known as: LIPITOR  Take 1 tablet by mouth nightly     blood glucose test strips  Test three times a day & as needed for symptoms of irregular blood glucose.      carvedilol 3.125 MG tablet  Commonly known as: COREG  Take 1 tablet by mouth 2 times daily (with meals)     [employer] Lancets Ultra Thin Misc  1 each by Does not apply route 4 times daily     digoxin 125 MCG tablet  Commonly known as: LANOXIN  Take 1 tablet by mouth every other day     DULoxetine 60 MG extended release capsule  Commonly known as: CYMBALTA  Take 1 capsule by mouth daily     fenofibrate micronized 200 MG capsule  Commonly known as: LOFIBRA Take 1 capsule by mouth nightly     Lantus SoloStar 100 UNIT/ML injection pen  Generic drug: insulin glargine  Inject 24 Units into the skin nightly     metFORMIN 1000 MG tablet  Commonly known as: GLUCOPHAGE     metoclopramide 10 MG tablet  Commonly known as: REGLAN  Take 1 tablet by mouth 3 times daily (with meals)     miconazole 2 % powder  Commonly known as: MICOTIN  Apply topically 2 times daily. nitroGLYCERIN 0.4 MG SL tablet  Commonly known as: NITROSTAT  up to max of 3 total doses. If no relief after 1 dose, call 911. NovoLOG FlexPen 100 UNIT/ML injection pen  Generic drug: insulin aspart  Inject 10 Units into the skin 3 times daily (before meals)     * ondansetron 4 MG tablet  Commonly known as: ZOFRAN  Take 1 tablet by mouth every 8 hours as needed for Nausea or Vomiting     * ondansetron 4 MG tablet  Commonly known as: Zofran  Take 1 tablet by mouth every 8 hours as needed for Nausea     pantoprazole 40 MG tablet  Commonly known as: Protonix  Take 1 tablet by mouth daily     QUEtiapine 50 MG extended release tablet  Commonly known as: SEROQUEL XR     ranolazine 500 MG extended release tablet  Commonly known as: RANEXA  Take 1 tablet by mouth 2 times daily     sucralfate 1 GM tablet  Commonly known as: CARAFATE  Take 1 tablet by mouth 3 times daily     torsemide 20 MG tablet  Commonly known as: DEMADEX  Take 1 tablet by mouth daily     vitamin D 1.25 MG (49050 UT) Caps capsule  Commonly known as: ERGOCALCIFEROL         * This list has 2 medication(s) that are the same as other medications prescribed for you. Read the directions carefully, and ask your doctor or other care provider to review them with you.             STOP taking these medications    cephALEXin 500 MG capsule  Commonly known as: Roberts Lava your doctor about these medications    apixaban starter pack 5 MG Tbpk tablet  Commonly known as: Eliquis DVT/PE Starter Pack  Take 1 tablet by mouth See Admin Instructions Where to Get Your Medications      These medications were sent to [facility]  [address] [place] [address],  [id number]    Phone: [phone]   · dexamethasone 6 MG tablet     You can get these medications from any pharmacy    Bring a paper prescription for each of these medications  · ertapenem  infusion       Discharged in stable condition to home with Odell Brown for IV ABx    Follow Up:   Follow up with PCP    Pt seen and examined  Ok to dc home  Need LFT in 24 hrs      Cesar Garduno MD 12/15/2020 6:51 PM

## 2020-12-15 NOTE — PROGRESS NOTES
MarcioCelsa [facility]- called concerning pt's upcoming D/C.  [facility] may be able to do pt's ATX but to call the CM in the am.

## 2020-12-15 NOTE — DISCHARGE INSTR - COC
Continuity of Care Form    Patient Name: Ramone Daley   :  1960  MRN:  [id number]    Admit date:  2020  Discharge date:  12/15/2020    Code Status Order: Full Code   Advance Directives:   Advance Care Flowsheet Documentation       Date/Time Healthcare Directive Type of Healthcare Directive Copy in [address] Agent's Name Healthcare Agent's Phone Number    12/10/20 1057  No, patient does not have an advance directive for healthcare treatment -- -- -- -- --            Admitting Physician:  Nick Fan MD  PCP: FRITZ Silverio NP    Discharging Nurse: [facility] Unit/Room#: [address] [facility] Unit Phone Number: [phone]    Emergency Contact:   Extended Emergency Contact Information  Primary Emergency Contact: Christa Campos  Address: [address] [place] [address], [address] [place] [address],[address] Phone: [phone]  Mobile Phone: [phone]  Relation: Brother/Sister    Past Surgical History:  Past Surgical History:   Procedure Laterality Date    BACK SURGERY      x3    IR MIDLINE CATH  2020    IR MIDLINE CATH 2020 [address] SPECIAL PROCEDURES    IR MIDLINE CATH  2020    IR MIDLINE CATH 2020 [address] SPECIAL PROCEDURES    PACEMAKER INSERTION      Boyd pacemaker    TUBAL LIGATION      TUMOR REMOVAL      UPPER GASTROINTESTINAL ENDOSCOPY N/A 2020    EGD BIOPSY performed by Annettemodesta Robledo DO at [address]       Immunization History:   Immunization History   Administered Date(s) Administered    Influenza 2012    Influenza A (F2O7-33) Vaccine IM 2009    Influenza Virus Vaccine 10/17/2014, 10/27/2017    Influenza, Quadv, 6 mo and older, IM, PF (Flulaval, Fluarix) 12/10/2018    Influenza, Quadv, IM, PF (6 mo and older Fluzone, Flulaval, Fluarix, and 3 yrs and older Afluria) 10/12/2019    Pneumococcal Polysaccharide (Ctaolguzl48) 10/17/2014, 2017 Active Problems:  Patient Active Problem List   Diagnosis Code    DM (diabetes mellitus) ([facility] E11.9    HTN (hypertension), benign I10    Dyslipidemia E78.5    CAD (coronary artery disease) I25.10    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) I63.50    Dual ICD (implantable cardioverter-defibrillator) in place Z80.65    Brain tumor (benign) ([facility] D33.2    Chronic combined systolic and diastolic congestive heart failure ([facility]) I50.42    TIA involving right internal carotid artery G45.1    CAD in native artery I25.10    DM (diabetes mellitus), secondary, uncontrolled, w/neurologic complic ([facility]) G19.00, M12.73    CHF (congestive heart failure) ([facility]) I50.9    Non-ischemic cardiomyopathy ([facility]) I42.8    Essential hypertension I10    TIA (transient ischemic attack) G45.9    Hyperglycemia R73.9    Diabetic ketoacidosis without coma associated with type 2 diabetes mellitus ([facility] E11.10    Non-intractable vomiting R11.10    Chest pain R07.9    Arterial ischemic stroke, ICA, right, acute ([facility]) I63.231    Diabetic hyperosmolar non-ketotic state ([facility] E11.00    Diabetic acidosis without coma ([facility] E11.10    Hyponatremia K97.5    Metabolic acidosis U81.7    Disorder of electrolytes E87.8    Diabetic ketoacidosis with coma associated with type 2 diabetes mellitus ([facility]) E11.11    Hypernatremia E87.0    Leukocytosis D72.829    Atrial tachycardia ([facility]) I47.1    DKA, type 2, not at goal ([facility] E11.10    Cognitive developmental delay F81.9    Depressive disorder F32.9    Urinary tract infection without hematuria N39.0    Hypokalemia E87.6    Persistent fever R50.9    Syncope and collapse R55    S/P ICD (internal cardiac defibrillator) procedure Z95.810    History of CVA (cerebrovascular accident) Z80.78    Noncompliance with medications Z91.14    Obesity E66.9    SVT (supraventricular tachycardia) ([facility]) I47.1  Type 2 diabetes mellitus with hyperglycemia, with long-term current use of insulin ([facility]) E11.65, Z79.4    Acute pulmonary edema ([facility]) J81.0    Hyperlipidemia E78.5    Hypomagnesemia E83.42    Lactic acidosis E87.2    NSVT (nonsustained ventricular tachycardia) ([facility]) I47.2    Dyspnea and respiratory abnormalities R06.00, R06.89    Intractable nausea and vomiting R11.2    Tachycardia R00.0    Uncontrolled type 2 diabetes mellitus with hyperglycemia (HCC) E11.65    Acute on chronic systolic CHF (congestive heart failure) ([facility]) I50.23    Pulmonary emboli ([facility]) I26.99    Heart failure ([facility]) I50.9    Pericardial effusion I31.3    Sepsis ([facility]) A41.9    COVID-19 U07.1    Multifocal pneumonia J18.9    Pneumonia due to COVID-19 virus U07.1, J12.89    Acute hypoxemic respiratory failure ([facility]) J96.01    High risk medication use Z79.899       Isolation/Infection:   Isolation            Droplet Plus  Contact          Unreconciled Outside Infections       Enable clinical decision support by reconciling outside information with the patient's chart. .      Infection Onset Last Indicated Last Received Source    No mapped external infections found      .     Unmapped Infections        MRSA 10/31/14 09/09/16 09/08/20 [facility]                  Patient Infection Status       Infection Onset Added Last Indicated Last Indicated By Review Planned Expiration Resolved Resolved By    COVID-19 12/09/20 12/09/20 12/09/20 COVID-19 12/16/20 12/23/20      ESBL (Extended Spectrum Beta Lactamase) 12/07/20 12/09/20 12/09/20 Culture, Urine        Resolved    COVID-19 Rule Out 12/09/20 12/09/20 12/09/20 COVID-19 (Ordered)   12/09/20 Rule-Out Test Resulted    COVID-19 Rule Out 11/23/20 11/23/20 11/23/20 COVID-19 (Ordered)   11/23/20 Rule-Out Test Resulted    COVID-19 Rule Out 11/16/20 11/16/20 11/16/20 COVID-19 (Ordered)   11/18/20 Rule-Out Test Resulted COVID-19 Rule Out 11/05/20 11/05/20 11/05/20 COVID-19 (Ordered)   11/05/20 Rule-Out Test Resulted    COVID-19 Rule Out 11/03/20 11/03/20 11/03/20 COVID-19 (Ordered)   11/05/20 Christiane Grewal RN    Ordered COVID swab and discontinued previous ED visit. COVID-19 Rule Out 10/26/20 10/26/20 10/26/20 COVID-19 (Ordered)   10/26/20 Rule-Out Test Resulted    C-diff Rule Out 09/10/20 09/10/20 09/10/20 Clostridium difficile toxin/antigen (Ordered)   09/22/20 Byron Castillo RN    COVID-19 Rule Out 04/16/20 04/16/20 04/16/20 COVID-19 (Ordered)   04/16/20 Rule-Out Test Resulted            Nurse Assessment:  Last Vital Signs: /73   Pulse 88   Temp 97.6 °F (36.4 °C) (Temporal)   Resp 24   Ht 5' 1\" (1.549 m)   Wt 210 lb 1.6 oz (95.3 kg)   SpO2 95%   BMI 39.70 kg/m²     Last documented pain score (0-10 scale): Pain Level: 0  Last Weight:   Wt Readings from Last 1 Encounters:   12/15/20 210 lb 1.6 oz (95.3 kg)     Mental Status:  oriented and alert    IV Access:  - None  - midline RUE    Nursing Mobility/ADLs:  Walking   Independent  Transfer  Independent  Bathing  Independent  Dressing  Independent  Toileting  Independent  Feeding  [address]  Independent  Med Delivery   whole    Wound Care Documentation and Therapy:        Elimination:  Continence:   · Bowel: Yes  · Bladder: Yes  Urinary Catheter: None   Colostomy/Ileostomy/Ileal Conduit: No       Date of Last BM: 12/14    Intake/Output Summary (Last 24 hours) at 12/15/2020 1409  Last data filed at 12/14/2020 2312  Gross per 24 hour   Intake 170 ml   Output    Net 170 ml     I/O last 3 completed shifts: In: 290 [P.O.:240; I.V.:50]  Out: -     Safety Concerns:     History of Seizures    Impairments/Disabilities:      None    Nutrition Therapy:  Current Nutrition Therapy:   - Oral Diet:  Carb Control 4 carbs/meal (1800kcals/day)    Routes of Feeding: Oral  Liquids:  Thin Liquids  Daily Fluid Restriction: yes - amount 2000 Last Modified Barium Swallow with Video (Video Swallowing Test): not done    Treatments at the Time of Hospital Discharge:   Respiratory Treatments: ***  Oxygen Therapy:  is on oxygen at 2 L/min per nasal cannula. Ventilator:    - No ventilator support    Rehab Therapies: Physical Therapy and Occupational Therapy  Weight Bearing Status/Restrictions: No weight bearing restirctions  Other Medical Equipment (for information only, NOT a DME order):  walker  Other Treatments: ***    Patient's personal belongings (please select all that are sent with patient):  all belongings sent with patient    RN SIGNATURE:  Electronically signed by Rebecca Brady RN on 12/15/20 at 3:10 PM EST    CASE MANAGEMENT/SOCIAL WORK SECTION    Inpatient Status Date: 12/10/2020  Readmission Risk Assessment Score:  Readmission Risk              Risk of Unplanned Readmission:        85           Discharging to Facility/ Agency   · Name: [facility]  · Address:  · Phone: [phone]  · Fax: [phone]    Dialysis Facility (if applicable)   · Name:  · Address:  · Dialysis Schedule:  · Phone:  · Fax:    / signature: Electronically signed by Erick Love RN on 12/15/20 at 2:28 PM EST    PHYSICIAN SECTION    Prognosis: Good    Condition at Discharge: Stable    Rehab Potential (if transferring to Rehab): Fair    Recommended Labs or Other Treatments After Discharge:  LFT in 24 hrs  Remove picc after completion of abx      Physician Certification: I certify the above information and transfer of [facility]  is necessary for the continuing treatment of the diagnosis listed and that she requires Home Care for {GREATER/LESS:[id number]} 30 days.      Update Admission H&P: No change in H&P    PHYSICIAN SIGNATURE:  Electronically signed by Elisa Batres MD on 12/15/20 at 2:10 PM EST

## 2020-12-15 NOTE — DISCHARGE INSTR - COC
Continuity of Care Form    Patient Name: Linette Schultz   :  1960  MRN:  [id number]    Admit date:  2020  Discharge date:   12/15/2020    Code Status Order: Full Code   Advance Directives:   Advance Care Flowsheet Documentation     Date/Time Healthcare Directive Type of Healthcare Directive Copy in [address] Agent's Name Healthcare Agent's Phone Number    12/10/20 1057  No, patient does not have an advance directive for healthcare treatment -- -- -- -- --          Admitting Physician:  Amparo Cuellar MD  PCP: FRITZ Knight NP    Discharging Nurse: [facility] Unit/Room#: EN12/EN-12  Discharging Unit Phone Number: ***    Emergency Contact:   Extended Emergency Contact Information  Primary Emergency Contact: BethChrista  Address: [address] [place] [address], [address] [place] [address],[address] Phone: [phone]  Mobile Phone: [phone]  Relation: Brother/Sister    Past Surgical History:  Past Surgical History:   Procedure Laterality Date    BACK SURGERY      x3    IR MIDLINE CATH  2020    IR MIDLINE CATH 2020 [facility]    IR MIDLINE CATH  2020    IR MIDLINE CATH 2020 [facility]    PACEMAKER INSERTION      Boyd pacemaker    TUBAL LIGATION      TUMOR REMOVAL      UPPER GASTROINTESTINAL ENDOSCOPY N/A 2020    EGD BIOPSY performed by Cami Chavez DO at [address] [facility]       Immunization History:   Immunization History   Administered Date(s) Administered    Influenza 2012    Influenza A ([id number]) Vaccine IM 2009    Influenza Virus Vaccine 10/17/2014, 10/27/2017    Influenza, Quadv, 6 mo and older, IM, PF (Flulaval, Fluarix) 12/10/2018    Influenza, Quadv, IM, PF (6 mo and older Fluzone, Flulaval, Fluarix, and 3 yrs and older Afluria) 10/12/2019    Pneumococcal Polysaccharide ([id number]) 10/17/2014, 2017       Active Problems: Patient Active Problem List   Diagnosis Code    DM (diabetes mellitus) ([facility] E11.9    HTN (hypertension), benign I10    Dyslipidemia E78.5    CAD (coronary artery disease) I25.10    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) I63.50    Dual ICD (implantable cardioverter-defibrillator) in place Z80.65    Brain tumor (benign) ([facility] D33.2    Chronic combined systolic and diastolic congestive heart failure (HCC) I50.42    TIA involving right internal carotid artery G45.1    CAD in native artery I25.10    DM (diabetes mellitus), secondary, uncontrolled, w/neurologic complic ([facility]) C07.76, D27.36    CHF (congestive heart failure) ([facility]) I50.9    Non-ischemic cardiomyopathy ([facility]) I42.8    Essential hypertension I10    TIA (transient ischemic attack) G45.9    Hyperglycemia R73.9    Diabetic ketoacidosis without coma associated with type 2 diabetes mellitus ([facility] E11.10    Non-intractable vomiting R11.10    Chest pain R07.9    Arterial ischemic stroke, ICA, right, acute ([facility]) I63.231    Diabetic hyperosmolar non-ketotic state ([facility] E11.00    Diabetic acidosis without coma ([facility] E11.10    Hyponatremia A61.0    Metabolic acidosis E40.2    Disorder of electrolytes E87.8    Diabetic ketoacidosis with coma associated with type 2 diabetes mellitus ([facility]) E11.11    Hypernatremia E87.0    Leukocytosis D72.829    Atrial tachycardia ([facility]) I47.1    DKA, type 2, not at goal ([facility] E11.10    Cognitive developmental delay F81.9    Depressive disorder F32.9    Urinary tract infection without hematuria N39.0    Hypokalemia E87.6    Persistent fever R50.9    Syncope and collapse R55    S/P ICD (internal cardiac defibrillator) procedure Z95.810    History of CVA (cerebrovascular accident) Z80.78    Noncompliance with medications Z91.14    Obesity E66.9    SVT (supraventricular tachycardia) ([facility]) I47.1  Type 2 diabetes mellitus with hyperglycemia, with long-term current use of insulin ([facility]) E11.65, Z79.4    Acute pulmonary edema ([facility]) J81.0    Hyperlipidemia E78.5    Hypomagnesemia E83.42    Lactic acidosis E87.2    NSVT (nonsustained ventricular tachycardia) ([facility]) I47.2    Dyspnea and respiratory abnormalities R06.00, R06.89    Intractable nausea and vomiting R11.2    Tachycardia R00.0    Uncontrolled type 2 diabetes mellitus with hyperglycemia ([facility]) E11.65    Acute on chronic systolic CHF (congestive heart failure) ([facility]) I50.23    Pulmonary emboli ([facility]) I26.99    Heart failure ([facility]) I50.9    Pericardial effusion I31.3    Sepsis ([facility]) A41.9    COVID-19 U07.1    Multifocal pneumonia J18.9    Pneumonia due to COVID-19 virus U07.1, J12.89    Acute hypoxemic respiratory failure ([facility]) J96.01    High risk medication use Z79.899       Isolation/Infection:   Isolation          Droplet Plus  Contact        Patient Infection Status     Infection Onset Added Last Indicated Last Indicated By Review Planned Expiration Resolved Resolved By    COVID-19 12/09/20 12/09/20 12/09/20 COVID-19 12/16/20 12/23/20      ESBL (Extended Spectrum Beta Lactamase) 12/07/20 12/09/20 12/09/20 Culture, Urine        Resolved    COVID-19 Rule Out 12/09/20 12/09/20 12/09/20 COVID-19 (Ordered)   12/09/20 Rule-Out Test Resulted    COVID-19 Rule Out 11/23/20 11/23/20 11/23/20 COVID-19 (Ordered)   11/23/20 Rule-Out Test Resulted    COVID-19 Rule Out 11/16/20 11/16/20 11/16/20 COVID-19 (Ordered)   11/18/20 Rule-Out Test Resulted    COVID-19 Rule Out 11/05/20 11/05/20 11/05/20 COVID-19 (Ordered)   11/05/20 Rule-Out Test Resulted    COVID-19 Rule Out 11/03/20 11/03/20 11/03/20 COVID-19 (Ordered)   11/05/20 Barb Tovar RN    Ordered COVID swab and discontinued previous ED visit.      COVID-19 Rule Out 10/26/20 10/26/20 10/26/20 COVID-19 (Ordered)   10/26/20 Rule-Out Test Resulted C-diff Rule Out 09/10/20 09/10/20 09/10/20 Clostridium difficile toxin/antigen (Ordered)   20 Sabrina Mack RN    COVID-19 Rule Out 20 COVID-19 (Ordered)   20 Rule-Out Test Resulted          Nurse Assessment:  Last Vital Signs: /73   Pulse 88   Temp 97.6 °F (36.4 °C) (Temporal)   Resp 24   Ht 5' 1\" (1.549 m)   Wt 210 lb 1.6 oz (95.3 kg)   SpO2 95%   BMI 39.70 kg/m²     Last documented pain score (0-10 scale): Pain Level: 0  Last Weight:   Wt Readings from Last 1 Encounters:   12/15/20 210 lb 1.6 oz (95.3 kg)     Mental Status:  {IP PT MENTAL STATUS:}    IV Access:  { ADEN IV ACCESS:624455666}    Nursing Mobility/ADLs:  Walking   {CHP DME IPOR:045114760}  Transfer  {CHP DME IPXP:407315603}  Bathing  {CHP DME UDET:348708549}  Dressing  {CHP DME NSDT:932672029}  Toileting  {P DME HWM}  Feeding  {P DME OEOA:880786469}  Med Admin  {CHP DME DXNQ:117685661}  Med Delivery   { ADEN MED Delivery:194595226}    Wound Care Documentation and Therapy:        Elimination:  Continence:   · Bowel: {YES / EP:97098}  · Bladder: {YES / :34498}  Urinary Catheter: {Urinary Catheter:568713962}   Colostomy/Ileostomy/Ileal Conduit: {YES / :24274}       Date of Last BM: ***    Intake/Output Summary (Last 24 hours) at 12/15/2020 1408  Last data filed at 2020 2312  Gross per 24 hour   Intake 170 ml   Output    Net 170 ml     I/O last 3 completed shifts: In: 290 [P.O.:240;  I.V.:50]  Out: -     Safety Concerns:     [address] Safety Concerns:797658783}    Impairments/Disabilities:      [address] Impairments/Disabilities:883071700}    Nutrition Therapy:  Current Nutrition Therapy:   [address] ADEN Diet List:565468136}    Routes of Feeding: {CHP DME Other Feedings:124681031}  Liquids: {Slp liquid thickness:67702}  Daily Fluid Restriction: {CHP DME Yes amt example:905973055}  Last Modified Barium Swallow with Video (Video Swallowing Test): {Done Not Done KJEY:234585300} Treatments at the Time of Hospital Discharge:   Respiratory Treatments: ***  Oxygen Therapy:  {Therapy; copd oxygen:64274}  Ventilator:    {MH CC Vent CESR:201793105}    Rehab Therapies: {THERAPEUTIC INTERVENTION:4329365224}  Weight Bearing Status/Restrictions: 50Major GAMEZ Weight Bearin}  Other Medical Equipment (for information only, NOT a DME order):  {EQUIPMENT:711756022}  Other Treatments: ***    Patient's personal belongings (please select all that are sent with patient):  {P DME Belongings:330741342}    RN SIGNATURE:  {Esignature:855272440}    CASE MANAGEMENT/SOCIAL WORK SECTION    Inpatient Status Date: ***    Readmission Risk Assessment Score:  Readmission Risk              Risk of Unplanned Readmission:        85           Discharging to Facility/ Agency   · Name: [facility]  · Address:  · Phone: [phone]  · Fax: [phone]    Dialysis Facility (if applicable)   · Name:  · Address:  · Dialysis Schedule:  · Phone:  · Fax:    / signature: Electronically signed by Sergio Dodson RN on 12/15/20 at 2:10 PM EST    PHYSICIAN SECTION    Prognosis: {Prognosis:3856914678}    Condition at Discharge: 50[address]ares Patient Condition:300659089}    Rehab Potential (if transferring to Rehab): {Prognosis:6381323364}    Recommended Labs or Other Treatments After Discharge: ***    Physician Certification: I certify the above information and transfer of Keyshawn Jay  is necessary for the continuing treatment of the diagnosis listed and that she requires {Admit to Appropriate Level of Care:62007} for {GREATER/LESS:978230041} 30 days.      Update Admission H&P: {CHP DME Changes in IKKCS:061048749}    PHYSICIAN SIGNATURE:  {Esignature:943351277}

## 2020-12-15 NOTE — CARE COORDINATION
DISCHARGE ORDER  Date/Time 12/15/2020 2:15 PM  Completed by: Juaquin Helton, Case Management    Patient Name: Juan Luis Sexton      : 1960  Admitting Diagnosis: Sepsis ([facility] [A41.9]  Sepsis ([facility] [A41.9]      Admit order Date and Status: IP 12/10/2020  (verify MD's last order for status of admission)      Noted discharge order. If applicable PT/OT recommendation at Discharge: NA  DME recommendation by PT/OT:NA  Confirmed discharge plan: Christa Campos (sister)  Discharge Plan:  Pt will DC to her home today under hospice care through [facility]. Per [facility] ( ) at [facility] home O2 was already set up and will continue when she returns home today. [facility] has agreed to 3 days of IV Invanz to finish her 8 day course. Hard copy of script faxed to [facility]. Transportation was arranged with [place] [address] for [facility] for  at 18:30 PM.  attempted to call pt's sister Hollie Pantoja to notify her of DCP- phone rang multiple times with out answer. D/W [facility]. Reviewed chart. Role of discharge planner explained and patient verbalized understanding. Discharge order is noted. Has Home O2 in place on admit:  Yes    Pt is being d/c'd to home with [facility] today. Pt's O2 sats are 95% on 2 liters. Discharge timeout done with [facility]. All discharge needs and concerns addressed.

## 2020-12-15 NOTE — PROGRESS NOTES
Pt resting in bed. Plan of care reviewed with pt concerning possible D/C tomorrow to home with [facility] involved. Pt very agreeable.

## 2020-12-15 NOTE — PROGRESS NOTES
Pulmonary Progress Note    CC: Nausea, emesis, myalgias, shortness of breath    Subjective:   No shortness of breath, continues to feel good    IV line: 12/11/2020 midline      Intake/Output Summary (Last 24 hours) at 12/15/2020 0832  Last data filed at 12/14/2020 2312  Gross per 24 hour   Intake 290 ml   Output    Net 290 ml       Exam:   BP 93/67   Pulse 84   Temp 97.2 °F (36.2 °C) (Temporal)   Resp 23   Ht 5' 1\" (1.549 m)   Wt 208 lb 1.6 oz (94.4 kg)   SpO2 95%   BMI 39.32 kg/m²  on 1 L  Constitutional:  No acute distress. HENT: Normal nose. Oropharynx is clear and moist.    Eyes: Conjunctivae anicteric. PERRL  Neck:  Neck supple. No tracheal deviation present. No thyromegaly present. Cardiovascular: Normal rate, regular rhythm, normal heart sounds. No JVD. No murmur heard.+ LE edema. Pulmonary/Chest: No accessory muscle usage. No wheezes. Few rales. + decreased breath sounds. Chest wall is not dull to percussion. Abdominal: Soft. No distension and no mass. No tenderness   Musculoskeletal: No cyanosis. No clubbing. No joint deformity. Lymphadenopathy: No cervical or supraclavicular adenopathy. Skin: Skin is warm and dry. No rash or nodules on the exposed extremities  Psychiatric: Normal mood and affect. Behavior is normal.  Neuro:  Alert, awake, speech clear today, cranial nerves are grossly intact.      Scheduled Meds:   influenza virus vaccine  0.5 mL Intramuscular Once    dexamethasone  6 mg Intravenous Daily    torsemide  20 mg Oral Daily    amiodarone  200 mg Oral Daily    apixaban  5 mg Oral BID    aspirin  81 mg Oral Daily    atorvastatin  40 mg Oral Nightly    carvedilol  3.125 mg Oral BID WC    digoxin  125 mcg Oral Every Other Day    fenofibrate  160 mg Oral Daily    DULoxetine  60 mg Oral Daily    metoclopramide  10 mg Oral TID WC    miconazole   Topical BID    pantoprazole  40 mg Oral Daily    [Held by provider] QUEtiapine  50 mg Oral Nightly  ranolazine  500 mg Oral BID    sucralfate  1 g Oral TID AC    sodium chloride flush  10 mL Intravenous 2 times per day    insulin lispro  0-12 Units Subcutaneous TID WC    insulin lispro  0-6 Units Subcutaneous Nightly    meropenem  1 g Intravenous Q8H    insulin lispro  10 Units Subcutaneous TID WC    insulin glargine  30 Units Subcutaneous Nightly    docusate sodium  100 mg Oral Daily    polyethylene glycol  17 g Oral Daily     Continuous Infusions:   dextrose       PRN Meds:  sodium chloride, magnesium sulfate, [Held by provider] prochlorperazine, nitroGLYCERIN, sodium chloride flush, acetaminophen **OR** acetaminophen, glucose, dextrose, glucagon (rDNA), dextrose, guaiFENesin-dextromethorphan    Labs:  CBC:   No results for input(s): WBC, HGB, HCT, MCV, PLT in the last 72 hours. BMP:   Recent Labs     12/13/20  1122 12/14/20  1703   * 126*   K 3.8 4.0   CL 95* 89*   CO2 27 26   BUN 25* 38*   CREATININE 0.7 0.8     LIVER PROFILE:   Recent Labs     12/13/20  1122 12/14/20  1703   AST 92* 185*   ALT 33 84*   BILITOT 0.9 1.0   ALKPHOS 101 200*     PT/INR: No results for input(s): PROTIME, INR in the last 72 hours. APTT: No results for input(s): APTT in the last 72 hours. UA:  No results for input(s): NITRITE, COLORU, PHUR, LABCAST, WBCUA, RBCUA, MUCUS, TRICHOMONAS, YEAST, BACTERIA, CLARITYU, SPECGRAV, LEUKOCYTESUR, UROBILINOGEN, BILIRUBINUR, BLOODU, GLUCOSEU, AMORPHOUS in the last 72 hours. Invalid input(s): KETONESU  No results for input(s): PHART, OBB6LUJ, PO2ART in the last 72 hours.      Cultures:   12/9/2020 SARS-CoV-2 positive  12/7/2020 urine E. coli ESBL Carbapenem sensitive  12/9/2020 urine E. coli ESBL  12/11/2020 blood no growth to date    Films:  CT chest 12/9/2020  Shows patchy nodular bilateral infiltrates with areas of groundglass opacity new compared to the 11/16/2020 CT consistent with acute infection     FINDINGS: Mediastinum: Pericardial perfusion is again seen, not significantly changed. Coronary calcifications are identified.  Aortic caliber is normal.  A few    small mediastinal lymph nodes are again noted.         Lungs/pleura: Small bilateral pleural effusions have decreased in volume. There is no pneumothorax.  The central airways are patent.  There are    bilateral nodular infiltrates.         Upper Abdomen: The visualized upper abdomen is unremarkable.         Soft Tissues/Bones: Body wall edema has improved.  Multinodular goiter is    again seen.              Impression    1. Bilateral pulmonary opacities are favored to represent pneumonia. 2. Pericardial and bilateral pleural effusions. 3. Multinodular goiter.  Thyroid ultrasound could be obtained for further    evaluation if clinically warranted. ASSESSMENT:  · Acute hypoxemic respiratory failure   · COVID-19 pneumonia    · Recent pulmonary embolism 11/16/2020  · UTI: E. coli, ESBL  · Nausea and emesis  · Diabetes with severe hyperglycemia  · Small pleural effusions  · Systolic and diastolic CHF  · Nonischemic cardiomyopathy with EF 10 to 15%  · SP AICD  · Left ventricular apical thrombus    PLAN:  COVID-19 isolation, droplet plus  Supplemental oxygen to maintain SaO2 >92%; wean as tolerated    Received 3 days Remdesevir, LFT monitoring for high risk medication -- AST has markedly increased to almost 10 times previous value--Remdesevir was stopped by me yesterday and should not be resumed  CCP 1 unit on 12/12/2020  Decadron D#4  Inhaled bronchodilators only as needed, MDI preferred  Eliquis for recent PE and left ventricular apical thrombus  Merrem day #5 for ESBL urine   Will follow with you. If goes home with hospice, no pulmonary f/u is needed. However, I do recommend repeat LFT in 24-48 hours to make sure transaminases do not continue to rise. I communicated with hospitalist/PA.

## 2020-12-25 ENCOUNTER — HOSPITAL ENCOUNTER (EMERGENCY)
Age: 60
Discharge: HOME | End: 2020-12-26
Attending: STUDENT IN AN ORGANIZED HEALTH CARE EDUCATION/TRAINING PROGRAM
Payer: MEDICARE

## 2020-12-25 ENCOUNTER — APPOINTMENT (OUTPATIENT)
Dept: GENERAL RADIOLOGY | Age: 60
End: 2020-12-25
Payer: MEDICARE

## 2020-12-25 VITALS
HEART RATE: 108 BPM | OXYGEN SATURATION: 100 % | WEIGHT: 190 LBS | BODY MASS INDEX: 35.87 KG/M2 | RESPIRATION RATE: 16 BRPM | SYSTOLIC BLOOD PRESSURE: 145 MMHG | TEMPERATURE: 98.5 F | DIASTOLIC BLOOD PRESSURE: 98 MMHG | HEIGHT: 61 IN

## 2020-12-25 PROCEDURE — 99284 EMERGENCY DEPT VISIT MOD MDM: CPT

## 2020-12-25 PROCEDURE — 90471 IMMUNIZATION ADMIN: CPT | Performed by: STUDENT IN AN ORGANIZED HEALTH CARE EDUCATION/TRAINING PROGRAM

## 2020-12-25 PROCEDURE — 6360000002 HC RX W HCPCS: Performed by: STUDENT IN AN ORGANIZED HEALTH CARE EDUCATION/TRAINING PROGRAM

## 2020-12-25 PROCEDURE — 6370000000 HC RX 637 (ALT 250 FOR IP): Performed by: STUDENT IN AN ORGANIZED HEALTH CARE EDUCATION/TRAINING PROGRAM

## 2020-12-25 PROCEDURE — 73562 X-RAY EXAM OF KNEE 3: CPT

## 2020-12-25 PROCEDURE — 90715 TDAP VACCINE 7 YRS/> IM: CPT | Performed by: STUDENT IN AN ORGANIZED HEALTH CARE EDUCATION/TRAINING PROGRAM

## 2020-12-25 RX ORDER — ACETAMINOPHEN 500 MG
500 TABLET ORAL ONCE
Status: COMPLETED | OUTPATIENT
Start: 2020-12-25 | End: 2020-12-25

## 2020-12-25 RX ADMIN — TETANUS TOXOID, REDUCED DIPHTHERIA TOXOID AND ACELLULAR PERTUSSIS VACCINE, ADSORBED 0.5 ML: 5; 2.5; 8; 8; 2.5 SUSPENSION INTRAMUSCULAR at 22:36

## 2020-12-25 RX ADMIN — ACETAMINOPHEN 500 MG: 500 TABLET ORAL at 22:35

## 2020-12-25 ASSESSMENT — ENCOUNTER SYMPTOMS
SHORTNESS OF BREATH: 0
COUGH: 0
ABDOMINAL PAIN: 0
BACK PAIN: 0

## 2020-12-25 ASSESSMENT — PAIN SCALES - GENERAL: PAINLEVEL_OUTOF10: 4

## 2020-12-26 NOTE — ED PROVIDER NOTES
[address] ED  EMERGENCY DEPARTMENT ENCOUNTER      Pt Name: Jyoti Peres  MRN: [id number]  [place] 1960  Date of evaluation: 12/25/2020  Provider: Genaro Hagen, [address] [place] [address]       Chief Complaint   Patient presents with    Knee Pain     bialt knee pain with abrasions after fell.  states tripped and fell. denies hitting head         HISTORY OF PRESENT ILLNESS   (Location/Symptom, Timing/Onset, Context/Setting, Quality, Duration, Modifying Factors, Severity)  Note limiting factors. Jyoti Peres is a 62 y.o. female who presents to the emergency department complaining of bilateral anterior knee pain. Patient slip and fall on ice, does ambulate with assistance of a cane. Did not hit head or lose consciousness. Patient denies blood thinner use. Patient denies neck or back pain or any other complaint at this time. Patient states that overall she is feeling well, states that her sister was concerned due to relatively recent admissions for Covid infection. No longer having chest pain, shortness of breath, cough, fevers chills or body aches. Nursing Notes were reviewed. PAST MEDICAL HISTORY     Past Medical History:   Diagnosis Date    Arthritis     CAD (coronary artery disease)     Cerebral artery occlusion with cerebral infarction ([address]     x 3    CHF (congestive heart failure) ([address]     COVID-19 12/09/2020    Diabetes mellitus ([address]     ESBL (extended spectrum beta-lactamase) producing bacteria infection 12/09/2020    E. COLI-URINE    Hyperlipidemia     Hypertension     Mental retardation     MI (myocardial infarction) ([address]     Pacemaker          SURGICAL HISTORY       Past Surgical History:   Procedure Laterality Date    BACK SURGERY      x3    IR MIDLINE CATH  11/23/2020    IR MIDLINE CATH 11/23/2020 [facility] SPECIAL PROCEDURES    IR MIDLINE CATH  12/11/2020    IR MIDLINE [facility]    PACEMAKER INSERTION [employer] pacemaker    TUBAL LIGATION      TUMOR REMOVAL      UPPER GASTROINTESTINAL ENDOSCOPY N/A 11/5/2020    EGD BIOPSY performed by Freeman Mccracken DO at [address]       Previous Medications    AMIODARONE (CORDARONE) 200 MG TABLET    Take 1 tablet by mouth daily    APIXABAN (ELIQUIS) 5 MG TABS TABLET    Take 1 tablet by mouth 2 times daily    ASPIRIN 81 MG EC TABLET    Take 1 tablet by mouth daily    ATORVASTATIN (LIPITOR) 40 MG TABLET    Take 1 tablet by mouth nightly    BLOOD GLUCOSE MONITOR STRIPS    Test three times a day & as needed for symptoms of irregular blood glucose. CARVEDILOL (COREG) 3.125 MG TABLET    Take 1 tablet by mouth 2 times daily (with meals)    [employer] LANCETS ULTRA THIN MISC    1 each by Does not apply route 4 times daily    DIGOXIN (LANOXIN) 125 MCG TABLET    Take 1 tablet by mouth every other day    DULOXETINE (CYMBALTA) 60 MG EXTENDED RELEASE CAPSULE    Take 1 capsule by mouth daily    FENOFIBRATE MICRONIZED (LOFIBRA) 200 MG CAPSULE    Take 1 capsule by mouth nightly    INSULIN ASPART (NOVOLOG FLEXPEN) 100 UNIT/ML INJECTION PEN    Inject 10 Units into the skin 3 times daily (before meals)    INSULIN GLARGINE (LANTUS SOLOSTAR) 100 UNIT/ML INJECTION PEN    Inject 24 Units into the skin nightly    METFORMIN (GLUCOPHAGE) 1000 MG TABLET    Take 1,000 mg by mouth 2 times daily (with meals)    METOCLOPRAMIDE (REGLAN) 10 MG TABLET    Take 1 tablet by mouth 3 times daily (with meals)    MICONAZOLE (MICOTIN) 2 % POWDER    Apply topically 2 times daily. NITROGLYCERIN (NITROSTAT) 0.4 MG SL TABLET    up to max of 3 total doses. If no relief after 1 dose, call 911.     ONDANSETRON (ZOFRAN) 4 MG TABLET    Take 1 tablet by mouth every 8 hours as needed for Nausea or Vomiting    ONDANSETRON (ZOFRAN) 4 MG TABLET    Take 1 tablet by mouth every 8 hours as needed for Nausea    PANTOPRAZOLE (PROTONIX) 40 MG TABLET    Take 1 tablet by mouth daily QUETIAPINE (SEROQUEL XR) 50 MG EXTENDED RELEASE TABLET    Take 50 mg by mouth nightly    RANOLAZINE (RANEXA) 500 MG EXTENDED RELEASE TABLET    Take 1 tablet by mouth 2 times daily    SUCRALFATE (CARAFATE) 1 GM TABLET    Take 1 tablet by mouth 3 times daily    TORSEMIDE (DEMADEX) 20 MG TABLET    Take 1 tablet by mouth daily    VITAMIN D (ERGOCALCIFEROL) 1.25 MG (95231 UT) CAPS CAPSULE    Take 50,000 Units by mouth once a week Takes weekly on Sundays       ALLERGIES     Patient has no known allergies. FAMILY HISTORY       Family History   Problem Relation Age of Onset    Heart Disease Father     Cancer Mother     Other Mother           SOCIAL HISTORY       Social History     Socioeconomic History    Marital status:       Spouse name: None    Number of children: None    Years of education: None    Highest education level: None   Occupational History    None   Social Needs    Financial resource strain: None    Food insecurity     Worry: None     Inability: None    Transportation needs     Medical: None     Non-medical: None   Tobacco Use    Smoking status: Never Smoker    Smokeless tobacco: Never Used   Substance and Sexual Activity    Alcohol use: No    Drug use: No    Sexual activity: Not Currently   Lifestyle    Physical activity     Days per week: None     Minutes per session: None    Stress: None   Relationships    Social connections     Talks on phone: None     Gets together: None     Attends [employer]ism service: None     Active member of club or organization: None     Attends meetings of clubs or organizations: None     Relationship status: None    Intimate partner violence     Fear of current or ex partner: None     Emotionally abused: None     Physically abused: None     Forced sexual activity: None   Other Topics Concern    None   Social History Narrative    None       SCREENINGS        [place] Coma Scale  Eye Opening: Spontaneous  Best Verbal Response: Oriented Best Motor Response: Obeys commands  [place] Coma Scale Score: 15                   REVIEW OF SYSTEMS    (2-9 systems for level 4, 10 or more for level 5)   Review of Systems   Constitutional: Negative for fever. HENT: Negative. Respiratory: Negative for cough and shortness of breath. Cardiovascular: Negative for chest pain and leg swelling. Gastrointestinal: Negative for abdominal pain. Musculoskeletal: Positive for arthralgias. Negative for back pain, neck pain and neck stiffness. Skin: Positive for wound. Hematological: Does not bruise/bleed easily. Psychiatric/Behavioral: Negative for confusion. PHYSICAL EXAM    (up to 7 for level 4, 8 or more for level 5)     ED Triage Vitals   BP Temp Temp src Pulse Resp SpO2 Height Weight   -- -- -- -- -- -- -- --       Physical Exam  Constitutional:       General: She is not in acute distress. Appearance: She is not diaphoretic. HENT:      Head: Normocephalic and atraumatic. Eyes:      Pupils: Pupils are equal, round, and reactive to light. Neck:      Musculoskeletal: Normal range of motion and neck supple. Trachea: No tracheal deviation. Cardiovascular:      Rate and Rhythm: Normal rate and regular rhythm. Pulmonary:      Effort: Pulmonary effort is normal. No respiratory distress. Abdominal:      General: There is no distension. Palpations: Abdomen is soft. Musculoskeletal: Normal range of motion. Comments: Pain-free range of motion bilateral knees does have tenderness to palpation anteriorly. Skin:     General: Skin is warm. Comments: Superficial abrasion anterior aspect of right knee. Neurological:      Mental Status: She is oriented to person, place, and time.          DIAGNOSTIC RESULTS     EKG: All EKG's are interpreted by the Emergency Department Physician who either signs or Co-signs this chart in the absence of a cardiologist.      RADIOLOGY: Non-plain film images such as CT, Ultrasound and MRI are read by the radiologist. Plain radiographic images are visualized and preliminarily interpreted by the emergency physician. Interpretation per the Radiologist below, if available at the time of this note:    XR KNEE RIGHT (3 VIEWS)   Final Result   No acute bony or joint abnormality         XR KNEE LEFT (3 VIEWS)   Final Result   No acute bony or joint abnormality               LABS:  Labs Reviewed - No data to display    All other labs were within normal range or not returned as of this dictation. EMERGENCY DEPARTMENT COURSE and DIFFERENTIAL DIAGNOSIS/MDM:   Kana Najera is a 62 y.o. female who presents to the emergency department with the complaint of mechanical slip and fall down onto bilateral knees with superficial abrasion bilaterally. Does have tenderness over the bony prominences anteriorly. No significant pain with range of motion of knee, neurovascular tact distally. Does not have pain at the midline spine. No head injury no loss of consciousness denies blood thinner use, no chest pain shortness of breath. Heart regular in rhythm lungs are clear abdomen soft nontender pelvis is stable. There is no tenderness to palpation or with range of motion all major joints excluding knees bilaterally. Will obtain plain film imaging rule out fracture. Will treat with Tylenol. Will update tetanus. Tetanus updated, bilateral knee imaging reviewed no signs of acute osseous abnormality. Patient was able to ambulate in ER without difficulty. Patient to be discharged home to follow-up with PCP. CRITICAL CARE TIME   Total Critical Care time was 0 minutes, excluding separately reportable procedures. There was a high probability of clinically significant/life threatening deterioration in the patient's condition which required my urgent intervention.      Clinical concern   Intervention     CONSULTS:  None    PROCEDURES: Unless otherwise noted below, none     Procedures        FINAL IMPRESSION      1. Contusion of knee, unspecified laterality, initial encounter          DISPOSITION/PLAN   DISPOSITION  dc      PATIENT [facility]:  [facility]  [address] [facility]  [phone]    If symptoms worsen    FRITZ Evangelista NP  [address]   [place] [facility]  [phone]    Schedule an appointment as soon as possible for a visit in 2 days        DISCHARGE MEDICATIONS:  New Prescriptions    No medications on file     Controlled Substances Monitoring:     RX Monitoring 4/14/2018   Attestation The Prescription Monitoring Report for this patient was reviewed today. Periodic Controlled Substance Monitoring Possible medication side effects, risk of tolerance/dependence & alternative treatments discussed.        (Please note that portions of this note were completed with a voice recognition program.  Efforts were made to edit the dictations but occasionally words are mis-transcribed.)    Justina Porter DO (electronically signed)  Attending Emergency Physician            Justina Porter DO  12/25/20 2135

## 2020-12-28 ENCOUNTER — TELEPHONE (OUTPATIENT)
Dept: OTHER | Facility: CLINIC | Age: 60
End: 2020-12-28

## 2020-12-28 NOTE — TELEPHONE ENCOUNTER
RN Access attempted to contact [facility] to schedule ED f/u appt. Attempt unsuccessful. Voicemail left for office to contact pt ASAP with f/u appt.

## 2021-01-11 ENCOUNTER — TELEPHONE (OUTPATIENT)
Dept: CARDIOLOGY CLINIC | Age: 61
End: 2021-01-11

## 2021-01-11 NOTE — TELEPHONE ENCOUNTER
Pt called into office today and scheduled appt with [address] for 1/14 for swelling in feet, FYI

## 2021-01-11 NOTE — TELEPHONE ENCOUNTER
Per [address] [facility],[address] note, patient was to F/u. With [facility] (EP NP) in 3 months. There are appointments available in February 202 for [facility] schedule. Please call patient to schedule.

## 2021-01-14 ENCOUNTER — NURSE ONLY (OUTPATIENT)
Dept: CARDIOLOGY CLINIC | Age: 61
End: 2021-01-14
Payer: MEDICARE

## 2021-01-14 ENCOUNTER — OFFICE VISIT (OUTPATIENT)
Dept: CARDIOLOGY CLINIC | Age: 61
End: 2021-01-14
Payer: MEDICARE

## 2021-01-14 VITALS
HEART RATE: 104 BPM | SYSTOLIC BLOOD PRESSURE: 118 MMHG | DIASTOLIC BLOOD PRESSURE: 70 MMHG | BODY MASS INDEX: 38.71 KG/M2 | WEIGHT: 205 LBS | HEIGHT: 61 IN

## 2021-01-14 DIAGNOSIS — J12.82 PNEUMONIA DUE TO COVID-19 VIRUS: ICD-10-CM

## 2021-01-14 DIAGNOSIS — I25.10 CORONARY ARTERY DISEASE INVOLVING NATIVE CORONARY ARTERY OF NATIVE HEART WITHOUT ANGINA PECTORIS: Chronic | ICD-10-CM

## 2021-01-14 DIAGNOSIS — U07.1 PNEUMONIA DUE TO COVID-19 VIRUS: ICD-10-CM

## 2021-01-14 DIAGNOSIS — I63.231 ARTERIAL ISCHEMIC STROKE, ICA, RIGHT, ACUTE (HCC): ICD-10-CM

## 2021-01-14 DIAGNOSIS — I63.50 CEREBRAL ARTERY OCCLUSION WITH CEREBRAL INFARCTION (HCC): Chronic | ICD-10-CM

## 2021-01-14 DIAGNOSIS — R00.0 TACHYCARDIA: ICD-10-CM

## 2021-01-14 DIAGNOSIS — I47.1 ATRIAL TACHYCARDIA (HCC): ICD-10-CM

## 2021-01-14 DIAGNOSIS — R55 SYNCOPE AND COLLAPSE: ICD-10-CM

## 2021-01-14 DIAGNOSIS — I50.42 CHRONIC COMBINED SYSTOLIC AND DIASTOLIC CONGESTIVE HEART FAILURE (HCC): ICD-10-CM

## 2021-01-14 DIAGNOSIS — G45.9 TIA (TRANSIENT ISCHEMIC ATTACK): ICD-10-CM

## 2021-01-14 DIAGNOSIS — I47.1 SVT (SUPRAVENTRICULAR TACHYCARDIA) (HCC): ICD-10-CM

## 2021-01-14 DIAGNOSIS — Z95.810 DUAL ICD (IMPLANTABLE CARDIOVERTER-DEFIBRILLATOR) IN PLACE: ICD-10-CM

## 2021-01-14 DIAGNOSIS — I42.8 NON-ISCHEMIC CARDIOMYOPATHY (HCC): ICD-10-CM

## 2021-01-14 DIAGNOSIS — I47.1 ATRIAL TACHYCARDIA (HCC): Primary | ICD-10-CM

## 2021-01-14 DIAGNOSIS — I25.10 CAD IN NATIVE ARTERY: ICD-10-CM

## 2021-01-14 DIAGNOSIS — I47.29 NSVT (NONSUSTAINED VENTRICULAR TACHYCARDIA): ICD-10-CM

## 2021-01-14 PROCEDURE — 1036F TOBACCO NON-USER: CPT | Performed by: INTERNAL MEDICINE

## 2021-01-14 PROCEDURE — G8427 DOCREV CUR MEDS BY ELIG CLIN: HCPCS | Performed by: INTERNAL MEDICINE

## 2021-01-14 PROCEDURE — G8482 FLU IMMUNIZE ORDER/ADMIN: HCPCS | Performed by: INTERNAL MEDICINE

## 2021-01-14 PROCEDURE — 99214 OFFICE O/P EST MOD 30 MIN: CPT | Performed by: INTERNAL MEDICINE

## 2021-01-14 PROCEDURE — 93000 ELECTROCARDIOGRAM COMPLETE: CPT | Performed by: INTERNAL MEDICINE

## 2021-01-14 PROCEDURE — 93283 PRGRMG EVAL IMPLANTABLE DFB: CPT | Performed by: INTERNAL MEDICINE

## 2021-01-14 PROCEDURE — 3017F COLORECTAL CA SCREEN DOC REV: CPT | Performed by: INTERNAL MEDICINE

## 2021-01-14 PROCEDURE — 1111F DSCHRG MED/CURRENT MED MERGE: CPT | Performed by: INTERNAL MEDICINE

## 2021-01-14 PROCEDURE — G8417 CALC BMI ABV UP PARAM F/U: HCPCS | Performed by: INTERNAL MEDICINE

## 2021-01-14 NOTE — PROGRESS NOTES
[address]   Electrophysiology Consult Note              Date:  January 14, 2021  Patient name: Jessica Delarosa  YOB: 1960    Primary Care Provider: FRITZ Bustillo NP    CHIEF COMPLAINT:   Chief Complaint   Patient presents with    3 Month Follow-Up    Edema     feet , ankles    Congestive Heart Failure    Coronary Artery Disease    Other     device management. HISTORY OF PRESENT ILLNESS: Jessica Delarosa is a 62 y.o. female who presents for evaluation for s/p defibrillator x5 years. She has a PMH of CAD, cerebral artery occlusion with cerebral infarct, DM, Hyperlipidemia, Hypertension, Mental retardation, NSVT, and MI. She underwent LHC in 12/2018. This showed an EF of 45%. She presented to ED on 1/15/2020 with chest pain that was worse with palpitations from her defibrillator firing. At her office visit on 1/28/20 she stated she is here for her pacemaker firing. She stated that she can't feel her legs and arms. She uses a cane to walk with. This has been going on for 3 months. She has had 3 strokes since April 2019. She stated that she has not followed up with neurologist.  She stated that her sugars have been high. She did not want to see an endocrinologist.  She states that she had 7 heart attacks and she thinks the last one was a year ago. She stated that she passes out a lot and the last time was a couple of days ago and she hit her head. Her device check 1/28/20 showed she has had atrial tachycardia. Interval history since last office visit: On11/10/20 she reported she is not feeling well from a cardiac standpoint. She reports she is extremely SOB and just feels terrible. Her Amiodarone was increased d/t SVT and she was referred to pulmonology for SOB. She was admitted to ICU 11/13/2020 for DKA. She presented to the ED again on 11/22/2020 for SOB with CHF exacerbation. She went to the ED 12/9/2020 and was diagnosed with COVID-19 and a UTI. She was discharged home with hospice care. Today, 1/14/2021, patient's device shows AP <1%,  <1%, AT/AF burden 1%, 3 years of battery life remaining. She report she is doing much better since hospitalization. She reports that she is no longer wearing her oxygen. She reports improved exercise tolerance. She reports she was able to ascend 9 flights of stairs today without difficulty. She reports she was recently baptized and is in good spirits. She is here today with her friend who is a CNA who helps care for her at home. Her friend is working on trying to get her into an ECF. Patient reports she keeps forgetting to plug her remote device monitor in but she will plug it in when she gets home. She reports she is taking all medications as prescribed and tolerates them well. She denies any bleeding or bruising issues. Patient denies current edema, chest pain, sob, palpitations, dizziness or syncope. Past Medical History:   has a past medical history of Arthritis, CAD (coronary artery disease), Cerebral artery occlusion with cerebral infarction ([address] CHF (congestive heart failure) ([address] COVID-19, Diabetes mellitus ([address] ESBL (extended spectrum beta-lactamase) producing bacteria infection, Hyperlipidemia, Hypertension, Mental retardation, MI (myocardial infarction) ([address] and Pacemaker. Past Surgical History:   has a past surgical history that includes Pacemaker insertion; tumor removal; Tubal ligation; back surgery; Upper gastrointestinal endoscopy (N/A, 11/5/2020); IR MIDLINE CATH (11/23/2020); and IR MIDLINE CATH (12/11/2020). Allergies:  Patient has no known allergies. Social History:   reports that she has never smoked. She has never used smokeless tobacco. She reports that she does not drink alcohol or use drugs. Family History: family history includes Cancer in her mother; Heart Disease in her father; Other in her mother. Home Medications:    Prior to Admission medications    Medication Sig Start Date End Date Taking?  Authorizing Provider   apixaban (ELIQUIS) 5 MG TABS tablet Take 1 tablet by mouth 2 times daily 12/15/20  Yes Ryan Alvarez MD   ondansetron [facility]) 4 MG tablet Take 1 tablet by mouth every 8 hours as needed for Nausea 12/7/20  Yes Dawna Palomares PA-C   ondansetron (ZOFRAN) 4 MG tablet Take 1 tablet by mouth every 8 hours as needed for Nausea or Vomiting 12/2/20  Yes Barb Norton MD   carvedilol (COREG) 3.125 MG tablet Take 1 tablet by mouth 2 times daily (with meals) 11/28/20  Yes Rubio Leblanc MD   torsemide (DEMADEX) 20 MG tablet Take 1 tablet by mouth daily 11/28/20  Yes Rubio Leblanc MD   metFORMIN (GLUCOPHAGE) 1000 MG tablet Take 1,000 mg by mouth 2 times daily (with meals)   Yes Historical Provider, MD   QUEtiapine (SEROQUEL XR) 50 MG extended release tablet Take 50 mg by mouth nightly   Yes Historical Provider, MD   insulin aspart (NOVOLOG FLEXPEN) 100 UNIT/ML injection pen Inject 10 Units into the skin 3 times daily (before meals) 11/13/20  Yes Jacqui Garcia MD   insulin glargine (LANTUS SOLOSTAR) 100 UNIT/ML injection pen Inject 24 Units into the skin nightly 11/13/20  Yes Jacqui Garcia MD   amiodarone (CORDARONE) 200 MG tablet Take 1 tablet by mouth daily 11/10/20  Yes Michael Miranda MD metoclopramide (REGLAN) 10 MG tablet Take 1 tablet by mouth 3 times daily (with meals) 11/6/20  Yes Pamela Lopez MD   sucralfate (CARAFATE) 1 GM tablet Take 1 tablet by mouth 3 times daily 11/6/20  Yes Pamela Lopez MD   pantoprazole (PROTONIX) 40 MG tablet Take 1 tablet by mouth daily 11/6/20  Yes Pamela Lopez MD   vitamin D (ERGOCALCIFEROL) 1.25 MG (01938 UT) CAPS capsule Take 50,000 Units by mouth once a week Takes weekly on Sundays   Yes Alexandria Munoz MD   digoxin (LANOXIN) 125 MCG tablet Take 1 tablet by mouth every other day 9/25/20  Yes Ledon Scheuermann, APRN - CNP   nitroGLYCERIN (NITROSTAT) 0.4 MG SL tablet up to max of 3 total doses. If no relief after 1 dose, call 911. 8/24/20  Yes Rach Tobin MD   aspirin 81 MG EC tablet Take 1 tablet by mouth daily 7/18/20  Yes Renee Escalona MD   ranolazine (RANEXA) 500 MG extended release tablet Take 1 tablet by mouth 2 times daily 7/18/20  Yes Renee Escalona MD   DULoxetine (CYMBALTA) 60 MG extended release capsule Take 1 capsule by mouth daily 7/18/20  Yes Renee Escalona MD   atorvastatin (LIPITOR) 40 MG tablet Take 1 tablet by mouth nightly 7/18/20  Yes Renee Escalona MD   fenofibrate micronized (LOFIBRA) 200 MG capsule Take 1 capsule by mouth nightly 7/18/20  Yes Renee Escalona MD   miconazole (MICOTIN) 2 % powder Apply topically 2 times daily. 7/18/20  Yes Renee Escalona MD   [employer] Lancets Ultra Thin MISC 1 each by Does not apply route 4 times daily 7/18/20  Yes Renee Escalona MD   blood glucose monitor strips Test three times a day & as needed for symptoms of irregular blood glucose. 5/12/19  Yes Pamela Lopez MD       REVIEW OF SYSTEMS:    All 14-point review of systems are completed and  pertinent positives are mentioned in the history of present illness. Other  systems are reviewed and are negative.     Physical Examination: /70   Pulse 104   Ht 5' 1\" (1.549 m)   Wt 205 lb (93 kg)   BMI 38.73 kg/m²      Constitutional and General Appearance:    alert, cooperative, no distress and appears stated age  [de-identified]:    PERRLA, no cervical lymphadenopathy. No masses palpable. Normal oral  mucosa  Respiratory:  · Normal excursion and expansion without use of accessory muscles  · Resp Auscultation: Normal breath sounds without dullness or wheezing  Cardiovascular:  · The apical impulse is not displaced  · RRR S1S2 w/o M/G/R  · No masses or tenderness  · Bowel sounds present  Extremities:  ·  No Cyanosis or Clubbing  ·  Lower extremity edema: None  · Skin: Warm and dry  Neurological:  · Alert and oriented. · Moves all extremities well  · No abnormalities of mood, affect, memory, mentation, or behavior are noted    DATA:    EC2021: Sinus  Tachycardia  - frequent multiform ectopic ventricular beats, 104 BPM.     DATA:  ECHO: (limited) 10/6/20:     Summary   Limited only f/u for LVEF. The left ventricular systolic function is severely reduced with an ejection   fraction of 15-20 %. There is severe global hypokinesis with regional variations. Changes noted from previous echo on 2020 in left ventricular function. STRESS TEST: 10/5/20: Summary   Dilated LV with severe global hypokinesis. Large, severe, fixed perfusion  defect of the inferior/inferolateral wall consistent with scar. Diminished  uptake of the anterior wall consistent with breast artifact. Post stress    LVEF is abnormal at 19%. Abnormal study. Overall findings represent a high  risk scan. LEFT HEART CATH: 10/6/20: CONCLUSIONS:     1. Mild non-obstructive coronary artery disease with known severe left ventricular dysfunction  2. Moderate pulmonary hypertension with decompensated hemodynamics     ASSESSMENT/RECOMMENDATIONS:     1. Risk Factor control  2. Advanced heart failure management. PLAN:  IMPRESSION:  1.  Cardiomyopathy Mrs. Erum David is a pleasant 63year old female with a medical history significant for mental retardation, ?ischemic cardiomypathy?, cerebral vascular disease, diabetes mellitus, hypertension, hyperlipidemia, NSVT, and SVT who presents from home for evaluation of device discharge (inappropriate). Etiology of cardiomyopathy. She reports having chronic chest pressure. Nuclear stress test from 2018 showed depressed ejection fraction without signs of ischemic pathology. I will ask that she get a stress test.  If negative will ask that cardiology evaluate her for continued chest pain.    - Nuclear stress test.              ~[employer] defibrillator placed in [facility], [address] [place] [address]              ~Echo 10/7/2009 EF 35-40%  11/10/2020  Patient continues to be symptomatic. Etiology unclear. We will refer to pulmonology for evaluation for possible lung disease causing her chronic shortness of breath. - Consult pulmonology for shortness of breath. 1/14/2021  Patient's LVEF now severely depressed. She was placed on on hospice but has clinically improved. She is on carvedilol for GDMT. Her blood pressure is ok however she is mildly tachycardic, likely from her heart failure. I would like to add ACE/ARB however will hold off and refer to HF team.    - Referral to HF team for GDMT initiation.  - Continue remote monitoring of [address] ICD. Device function appropriate. 2.  DM  Patient refuses referral to endocrinology and would like to continue to manage her diabetes. ~Hemoglobin A1C 14.3 on 10/11/2019   11/10/2020  - Plan as per above.     3.  Atrial tachycardia Patient had an inappropriate defibrillation for ventricular fibrillation as she reached VF zone. Evaluation of device showed a VAAV response and A drive via wobble. I suspect AT is result of tachycardia based on above. No true VT as far as I can tell for now. She is interested in ablation. No atrial fibrillation seen on device. I will ask that she get evaluation by neurology because of symptoms and history. I will ask that she undergo evaluation for ischemic pathology. We will follow up AFTER above evaluation. .  We will consider AT ablation to help decrease defibrillation. We will also need to see if she has a POA prior to ablation. ~on device check today     11/10/2020  Device and monitor suggests increasing atrial tachycardia however duration short and unlikely to be a good candidate for ablation.  - Increase amiodarone.  - Routine labs for amiodarone monitoring and chest radiograph.    01/14/2021  Patient' continues to have episodes of PSVT. From what I can tell this is largely asymptomatic.    - Continue amiodarone. - Amiodarone labs Q6 months. 4.  Chest tightness              ~ Plan as per above. 11/10/2020  - Plan as per above. 5.  Extremity numbness H/O 3 strokes              ~referral to neuro given  - Follow up with neurology. 6. LV thrombus. - Continue apixaban. - Follow up with cardiology and HF.    RECOMMENDATIONS:  1. Remote device checks. Make sure that your remote device monitor is plugged in.   2. Make a new patient appointment with Dr. Hannah Rm for CHF. 3. Continue medications as prescribed. 4. Follow up with EP NP in 6 months. QUALITY MEASURES  1. Tobacco Cessation Counseling: NA  2. Retake of BP if >140/90:   NA  3. Documentation to PCP/referring for new patient:  Sent to PCP at close of office visit  4. CAD patient on anti-platelet: Yes  5. CAD patient on STATIN therapy:  Yes  6. Patient with CHF and aFib on anticoagulation: Yes, Eliquis. This note has been scribed in the presence of Elissa Li MD by Sachin Watson RN. The scribe's documentation has been prepared under my direction and personally reviewed by me in its entirety. I confirm that the note above accurately reflects all work, physical examination, the discussion of treatments and procedures, and medical decision making performed by me. Gilbert Wyatt MD personally performed the services described in this documentation as scribed by nurse in my presence, and is both accurate and complete. Electronically signed by Anirudh Frias MD on 1/14/2021 at 2:11 PM     All questions and concerns were addressed to the patient/family. Alternatives to my treatment were discussed. Dr. Lexii Zamora MD  Electrophysiology  [place] [address]. [address] [place] [address]. [address]. [place] [address]  Phone: [phone]  Fax: [phone]     NOTE: This report was transcribed using voice recognition software. Every effort was made to ensure accuracy, however, inadvertent computerized transcription errors may be present.

## 2021-01-14 NOTE — PROGRESS NOTES
Patient presents to the device clinic today for a programming evaluation for her defibrillator. Patient has a history of CVA, TIA, NICM, AT, syncope and collapse, SVT, and NSVT. Takes amiodarone, Eliquis, Coreg, and digoxin. Last device interrogation was on 11/17. Since then, numerous AMS events recorded - overall burden 1%. The longest recorded AMS event was on 1/8 x 11 minutes. 6 NSVT events recorded - 2 EGMs available - longest event on 12/27 and 1/7 each x 10 seconds. AP <1%  <1%    All sensing and pacing parameters are within normal range. No changes need to be made at this time. Patient education was provided about device functionality, in home monitoring, and any other patient questions and/or concerns were addressed. Reiterated the importance of remote monitoring with the patient and asked her to please make sure her bedside monitor remains plugged in - has been disconnected since 2/2020 w/ multiple attempts made to contact patient. Patient voices understanding. Please see interrogation for more detail. Patient will see Dr. Lolita Huertas today in office. Patient will follow up in 3 months in office or remotely. See Paceart report under the Cardiology tab.

## 2021-01-14 NOTE — PATIENT INSTRUCTIONS
RECOMMENDATIONS:  1. Remote device checks. Make sure that your remote device monitor is plugged in.   2. Make a new patient appointment with Dr. Delfina Christy for CHF. 3. Continue medications as prescribed. 4. Follow up with EP NP in 6 months.

## 2021-01-25 ENCOUNTER — TELEPHONE (OUTPATIENT)
Dept: CARDIOLOGY CLINIC | Age: 61
End: 2021-01-25

## 2021-01-25 NOTE — TELEPHONE ENCOUNTER
I have not seen her since October hospitalization. She has had multiple hospitalizations and ED visits since then. I thought she had gone home with Hospice. She saw Dr. Tigist Atkins last week and was okay. Would defer to PCP or who ever has been refilling/ adjusting her medicines. She has an appointment next week with Dr. Brett Shabazz.      Javed Man, APRN - CNP

## 2021-01-25 NOTE — TELEPHONE ENCOUNTER
Spoke with patient and Cheyenne her hospice nurse. Cheyenne states that her legs/feet are definitely worse. 4+ edema, feet are red and toes are purple, almost modeling. States crackles through out her lungs. She states that she will discuss with the hospice doctor as to putting her on a different diuretic, but also discussing with patient code status and what she would like to pursue.

## 2021-02-02 ENCOUNTER — APPOINTMENT (OUTPATIENT)
Dept: GENERAL RADIOLOGY | Age: 61
DRG: 637 | End: 2021-02-02
Payer: MEDICARE

## 2021-02-02 ENCOUNTER — APPOINTMENT (OUTPATIENT)
Dept: CT IMAGING | Age: 61
DRG: 637 | End: 2021-02-02
Payer: MEDICARE

## 2021-02-02 ENCOUNTER — HOSPITAL ENCOUNTER (OUTPATIENT)
Age: 61
Discharge: HOME | DRG: 637 | End: 2021-02-05
Attending: STUDENT IN AN ORGANIZED HEALTH CARE EDUCATION/TRAINING PROGRAM | Admitting: INTERNAL MEDICINE
Payer: MEDICARE

## 2021-02-02 DIAGNOSIS — E16.2 HYPOGLYCEMIA: ICD-10-CM

## 2021-02-02 DIAGNOSIS — R56.9 NEW ONSET SEIZURE (HCC): ICD-10-CM
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
## 2021-02-02 DIAGNOSIS — S00.83XA CONTUSION OF FACE, INITIAL ENCOUNTER: ICD-10-CM

## 2021-02-02 PROBLEM — I50.22 CHRONIC SYSTOLIC CHF (CONGESTIVE HEART FAILURE) (HCC): Status: ACTIVE | Noted: 2018-05-08

## 2021-02-02 LAB
A/G RATIO: 1.2 (ref 1.1–2.2)
ALBUMIN SERPL-MCNC: 3.7 G/DL (ref 3.4–5)
ALP BLD-CCNC: 65 U/L (ref 40–129)
ALT SERPL-CCNC: 16 U/L (ref 10–40)
ANION GAP SERPL CALCULATED.3IONS-SCNC: 10 MMOL/L (ref 3–16)
ANION GAP SERPL CALCULATED.3IONS-SCNC: 8 MMOL/L (ref 3–16)
AST SERPL-CCNC: 44 U/L (ref 15–37)
BACTERIA: ABNORMAL /HPF
BASE EXCESS VENOUS: -0.1 MMOL/L (ref -3–3)
BASOPHILS ABSOLUTE: 0.1 K/UL (ref 0–0.2)
BASOPHILS RELATIVE PERCENT: 1.4 %
BILIRUB SERPL-MCNC: 0.5 MG/DL (ref 0–1)
BILIRUBIN URINE: NEGATIVE
BLOOD, URINE: NEGATIVE
BUN BLDV-MCNC: 23 MG/DL (ref 7–20)
BUN BLDV-MCNC: 23 MG/DL (ref 7–20)
CALCIUM SERPL-MCNC: 8.7 MG/DL (ref 8.3–10.6)
CALCIUM SERPL-MCNC: 8.9 MG/DL (ref 8.3–10.6)
CARBOXYHEMOGLOBIN: 2.4 % (ref 0–1.5)
CHLORIDE BLD-SCNC: 101 MMOL/L (ref 99–110)
CHLORIDE BLD-SCNC: 97 MMOL/L (ref 99–110)
CHP ED QC CHECK: NORMAL
CHP ED QC CHECK: YES
CLARITY: CLEAR
CO2: 26 MMOL/L (ref 21–32)
CO2: 27 MMOL/L (ref 21–32)
COLOR: YELLOW
CREAT SERPL-MCNC: 0.6 MG/DL (ref 0.6–1.1)
CREAT SERPL-MCNC: 0.6 MG/DL (ref 0.6–1.1)
DIGOXIN LEVEL: <0.3 NG/ML (ref 0.8–2)
EKG ATRIAL RATE: 90 BPM
EKG DIAGNOSIS: NORMAL
EKG P AXIS: 4 DEGREES
EKG P-R INTERVAL: 202 MS
EKG Q-T INTERVAL: 412 MS
EKG QRS DURATION: 108 MS
EKG QTC CALCULATION (BAZETT): 504 MS
EKG R AXIS: 122 DEGREES
EKG T AXIS: -20 DEGREES
EKG VENTRICULAR RATE: 90 BPM
EOSINOPHILS ABSOLUTE: 0.1 K/UL (ref 0–0.6)
EOSINOPHILS RELATIVE PERCENT: 1.2 %
EPITHELIAL CELLS, UA: ABNORMAL /HPF (ref 0–5)
GFR AFRICAN AMERICAN: >60
GFR AFRICAN AMERICAN: >60
GFR NON-AFRICAN AMERICAN: >60
GFR NON-AFRICAN AMERICAN: >60
GLOBULIN: 3.1 G/DL
GLUCOSE BLD-MCNC: 103 MG/DL (ref 70–99)
GLUCOSE BLD-MCNC: 109 MG/DL (ref 70–99)
GLUCOSE BLD-MCNC: 112 MG/DL
GLUCOSE BLD-MCNC: 112 MG/DL (ref 70–99)
GLUCOSE BLD-MCNC: 160 MG/DL (ref 70–99)
GLUCOSE BLD-MCNC: 161 MG/DL (ref 70–99)
GLUCOSE BLD-MCNC: 48 MG/DL (ref 70–99)
GLUCOSE BLD-MCNC: 50 MG/DL (ref 70–99)
GLUCOSE BLD-MCNC: 53 MG/DL (ref 70–99)
GLUCOSE BLD-MCNC: 55 MG/DL (ref 70–99)
GLUCOSE BLD-MCNC: 62 MG/DL
GLUCOSE BLD-MCNC: 62 MG/DL (ref 70–99)
GLUCOSE BLD-MCNC: 70 MG/DL
GLUCOSE BLD-MCNC: 70 MG/DL (ref 70–99)
GLUCOSE BLD-MCNC: 80 MG/DL
GLUCOSE BLD-MCNC: 80 MG/DL (ref 70–99)
GLUCOSE BLD-MCNC: 86 MG/DL (ref 70–99)
GLUCOSE BLD-MCNC: 89 MG/DL (ref 70–99)
GLUCOSE BLD-MCNC: 89 MG/DL (ref 70–99)
GLUCOSE BLD-MCNC: 94 MG/DL (ref 70–99)
GLUCOSE URINE: 250 MG/DL
HCO3 VENOUS: 25.6 MMOL/L (ref 23–29)
HCT VFR BLD CALC: 37.5 % (ref 36–48)
HEMOGLOBIN: 12.1 G/DL (ref 12–16)
KETONES, URINE: NEGATIVE MG/DL
LACTIC ACID, SEPSIS: 1.8 MMOL/L (ref 0.4–1.9)
LEUKOCYTE ESTERASE, URINE: ABNORMAL
LYMPHOCYTES ABSOLUTE: 1.3 K/UL (ref 1–5.1)
LYMPHOCYTES RELATIVE PERCENT: 13.6 %
MCH RBC QN AUTO: 28.5 PG (ref 26–34)
MCHC RBC AUTO-ENTMCNC: 32.3 G/DL (ref 31–36)
MCV RBC AUTO: 88.1 FL (ref 80–100)
METHEMOGLOBIN VENOUS: 0.3 %
MICROSCOPIC EXAMINATION: YES
MONOCYTES ABSOLUTE: 0.6 K/UL (ref 0–1.3)
MONOCYTES RELATIVE PERCENT: 6.4 %
NEUTROPHILS ABSOLUTE: 7.4 K/UL (ref 1.7–7.7)
NEUTROPHILS RELATIVE PERCENT: 77.4 %
NITRITE, URINE: NEGATIVE
O2 CONTENT, VEN: 16 VOL %
O2 SAT, VEN: 92 %
O2 THERAPY: ABNORMAL
PCO2, VEN: 46.2 MMHG (ref 40–50)
PDW BLD-RTO: 21.7 % (ref 12.4–15.4)
PERFORMED ON: ABNORMAL
PERFORMED ON: NORMAL
PH UA: 6.5 (ref 5–8)
PH VENOUS: 7.36 (ref 7.35–7.45)
PLATELET # BLD: 264 K/UL (ref 135–450)
PMV BLD AUTO: 8.5 FL (ref 5–10.5)
PO2, VEN: 66.4 MMHG (ref 25–40)
POTASSIUM REFLEX MAGNESIUM: 5.7 MMOL/L (ref 3.5–5.1)
POTASSIUM SERPL-SCNC: 4.2 MMOL/L (ref 3.5–5.1)
PRO-BNP: 2609 PG/ML (ref 0–124)
PROTEIN UA: NEGATIVE MG/DL
RBC # BLD: 4.26 M/UL (ref 4–5.2)
RBC UA: ABNORMAL /HPF (ref 0–4)
SARS-COV-2, NAAT: NOT DETECTED
SODIUM BLD-SCNC: 132 MMOL/L (ref 136–145)
SODIUM BLD-SCNC: 137 MMOL/L (ref 136–145)
SPECIFIC GRAVITY UA: 1.01 (ref 1–1.03)
TCO2 CALC VENOUS: 27 MMOL/L
TOTAL PROTEIN: 6.8 G/DL (ref 6.4–8.2)
TROPONIN: <0.01 NG/ML
URINE TYPE: ABNORMAL
UROBILINOGEN, URINE: 2 E.U./DL
WBC # BLD: 9.6 K/UL (ref 4–11)
WBC UA: ABNORMAL /HPF (ref 0–5)

## 2021-02-02 PROCEDURE — 83880 ASSAY OF NATRIURETIC PEPTIDE: CPT

## 2021-02-02 PROCEDURE — 96375 TX/PRO/DX INJ NEW DRUG ADDON: CPT

## 2021-02-02 PROCEDURE — U0002 COVID-19 LAB TEST NON-CDC: HCPCS

## 2021-02-02 PROCEDURE — 2580000003 HC RX 258: Performed by: PHYSICIAN ASSISTANT

## 2021-02-02 PROCEDURE — 93010 ELECTROCARDIOGRAM REPORT: CPT | Performed by: INTERNAL MEDICINE

## 2021-02-02 PROCEDURE — 90471 IMMUNIZATION ADMIN: CPT | Performed by: STUDENT IN AN ORGANIZED HEALTH CARE EDUCATION/TRAINING PROGRAM

## 2021-02-02 PROCEDURE — 70486 CT MAXILLOFACIAL W/O DYE: CPT

## 2021-02-02 PROCEDURE — 80053 COMPREHEN METABOLIC PANEL: CPT

## 2021-02-02 PROCEDURE — 72125 CT NECK SPINE W/O DYE: CPT

## 2021-02-02 PROCEDURE — 99223 1ST HOSP IP/OBS HIGH 75: CPT | Performed by: INTERNAL MEDICINE

## 2021-02-02 PROCEDURE — 81001 URINALYSIS AUTO W/SCOPE: CPT

## 2021-02-02 PROCEDURE — 70450 CT HEAD/BRAIN W/O DYE: CPT

## 2021-02-02 PROCEDURE — 2580000003 HC RX 258: Performed by: INTERNAL MEDICINE

## 2021-02-02 PROCEDURE — 96372 THER/PROPH/DIAG INJ SC/IM: CPT

## 2021-02-02 PROCEDURE — 6360000002 HC RX W HCPCS: Performed by: STUDENT IN AN ORGANIZED HEALTH CARE EDUCATION/TRAINING PROGRAM

## 2021-02-02 PROCEDURE — G0378 HOSPITAL OBSERVATION PER HR: HCPCS

## 2021-02-02 PROCEDURE — 96365 THER/PROPH/DIAG IV INF INIT: CPT

## 2021-02-02 PROCEDURE — 96366 THER/PROPH/DIAG IV INF ADDON: CPT

## 2021-02-02 PROCEDURE — 93005 ELECTROCARDIOGRAM TRACING: CPT | Performed by: STUDENT IN AN ORGANIZED HEALTH CARE EDUCATION/TRAINING PROGRAM

## 2021-02-02 PROCEDURE — 6370000000 HC RX 637 (ALT 250 FOR IP): Performed by: PHYSICIAN ASSISTANT

## 2021-02-02 PROCEDURE — 84484 ASSAY OF TROPONIN QUANT: CPT

## 2021-02-02 PROCEDURE — 72170 X-RAY EXAM OF PELVIS: CPT

## 2021-02-02 PROCEDURE — 99284 EMERGENCY DEPT VISIT MOD MDM: CPT

## 2021-02-02 PROCEDURE — 6370000000 HC RX 637 (ALT 250 FOR IP): Performed by: INTERNAL MEDICINE

## 2021-02-02 PROCEDURE — 71045 X-RAY EXAM CHEST 1 VIEW: CPT

## 2021-02-02 PROCEDURE — 83036 HEMOGLOBIN GLYCOSYLATED A1C: CPT

## 2021-02-02 PROCEDURE — 2000000000 HC ICU R&B

## 2021-02-02 PROCEDURE — 36415 COLL VENOUS BLD VENIPUNCTURE: CPT

## 2021-02-02 PROCEDURE — 80162 ASSAY OF DIGOXIN TOTAL: CPT

## 2021-02-02 PROCEDURE — 83605 ASSAY OF LACTIC ACID: CPT

## 2021-02-02 PROCEDURE — 2700000000 HC OXYGEN THERAPY PER DAY

## 2021-02-02 PROCEDURE — 87086 URINE CULTURE/COLONY COUNT: CPT

## 2021-02-02 PROCEDURE — 6360000002 HC RX W HCPCS: Performed by: PHYSICIAN ASSISTANT

## 2021-02-02 PROCEDURE — 2580000003 HC RX 258

## 2021-02-02 PROCEDURE — 82803 BLOOD GASES ANY COMBINATION: CPT

## 2021-02-02 PROCEDURE — 85025 COMPLETE CBC W/AUTO DIFF WBC: CPT

## 2021-02-02 PROCEDURE — 94761 N-INVAS EAR/PLS OXIMETRY MLT: CPT

## 2021-02-02 PROCEDURE — 90715 TDAP VACCINE 7 YRS/> IM: CPT | Performed by: STUDENT IN AN ORGANIZED HEALTH CARE EDUCATION/TRAINING PROGRAM

## 2021-02-02 RX ORDER — CARVEDILOL 3.12 MG/1
3.12 TABLET ORAL 2 TIMES DAILY WITH MEALS
Status: DISCONTINUED | OUTPATIENT
Start: 2021-02-02 | End: 2021-02-05 | Stop reason: HOSPADM

## 2021-02-02 RX ORDER — LORAZEPAM 2 MG/ML
1 INJECTION INTRAMUSCULAR EVERY 4 HOURS PRN
Status: DISCONTINUED | OUTPATIENT
Start: 2021-02-02 | End: 2021-02-05 | Stop reason: HOSPADM

## 2021-02-02 RX ORDER — DEXTROSE MONOHYDRATE 50 MG/ML
INJECTION, SOLUTION INTRAVENOUS CONTINUOUS
Status: DISCONTINUED | OUTPATIENT
Start: 2021-02-02 | End: 2021-02-02

## 2021-02-02 RX ORDER — DEXTROSE AND SODIUM CHLORIDE 5; .9 G/100ML; G/100ML
INJECTION, SOLUTION INTRAVENOUS
Status: DISCONTINUED
Start: 2021-02-02 | End: 2021-02-02

## 2021-02-02 RX ORDER — ACETAMINOPHEN 650 MG/1
650 SUPPOSITORY RECTAL EVERY 6 HOURS PRN
Status: DISCONTINUED | OUTPATIENT
Start: 2021-02-02 | End: 2021-02-05 | Stop reason: HOSPADM

## 2021-02-02 RX ORDER — ACETAMINOPHEN 325 MG/1
650 TABLET ORAL EVERY 6 HOURS PRN
Status: DISCONTINUED | OUTPATIENT
Start: 2021-02-02 | End: 2021-02-05 | Stop reason: HOSPADM

## 2021-02-02 RX ORDER — ATORVASTATIN CALCIUM 40 MG/1
40 TABLET, FILM COATED ORAL NIGHTLY
Status: DISCONTINUED | OUTPATIENT
Start: 2021-02-02 | End: 2021-02-05 | Stop reason: HOSPADM

## 2021-02-02 RX ORDER — ASPIRIN 81 MG/1
81 TABLET ORAL DAILY
Status: DISCONTINUED | OUTPATIENT
Start: 2021-02-02 | End: 2021-02-05 | Stop reason: HOSPADM

## 2021-02-02 RX ORDER — DEXTROSE AND SODIUM CHLORIDE 5; .9 G/100ML; G/100ML
1000 INJECTION, SOLUTION INTRAVENOUS CONTINUOUS
Status: DISCONTINUED | OUTPATIENT
Start: 2021-02-02 | End: 2021-02-02

## 2021-02-02 RX ORDER — SUCRALFATE 1 G/1
1 TABLET ORAL 3 TIMES DAILY
Status: DISCONTINUED | OUTPATIENT
Start: 2021-02-02 | End: 2021-02-05 | Stop reason: HOSPADM

## 2021-02-02 RX ORDER — DEXTROSE MONOHYDRATE 25 G/50ML
INJECTION, SOLUTION INTRAVENOUS
Status: COMPLETED
Start: 2021-02-02 | End: 2021-02-02

## 2021-02-02 RX ORDER — NITROGLYCERIN 0.4 MG/1
0.4 TABLET SUBLINGUAL EVERY 5 MIN PRN
Status: DISCONTINUED | OUTPATIENT
Start: 2021-02-02 | End: 2021-02-05 | Stop reason: HOSPADM

## 2021-02-02 RX ORDER — POLYETHYLENE GLYCOL 3350 17 G/17G
17 POWDER, FOR SOLUTION ORAL DAILY PRN
Status: DISCONTINUED | OUTPATIENT
Start: 2021-02-02 | End: 2021-02-05 | Stop reason: HOSPADM

## 2021-02-02 RX ORDER — AMIODARONE HYDROCHLORIDE 200 MG/1
200 TABLET ORAL DAILY
Status: DISCONTINUED | OUTPATIENT
Start: 2021-02-02 | End: 2021-02-05 | Stop reason: HOSPADM

## 2021-02-02 RX ORDER — DEXTROSE MONOHYDRATE 50 MG/ML
100 INJECTION, SOLUTION INTRAVENOUS PRN
Status: DISCONTINUED | OUTPATIENT
Start: 2021-02-02 | End: 2021-02-02 | Stop reason: SDUPTHER

## 2021-02-02 RX ORDER — DIGOXIN 125 MCG
125 TABLET ORAL EVERY OTHER DAY
Status: DISCONTINUED | OUTPATIENT
Start: 2021-02-02 | End: 2021-02-05 | Stop reason: HOSPADM

## 2021-02-02 RX ORDER — LORAZEPAM 2 MG/ML
2 INJECTION INTRAMUSCULAR ONCE
Status: COMPLETED | OUTPATIENT
Start: 2021-02-02 | End: 2021-02-02

## 2021-02-02 RX ORDER — SODIUM CHLORIDE 0.9 % (FLUSH) 0.9 %
10 SYRINGE (ML) INJECTION EVERY 12 HOURS SCHEDULED
Status: DISCONTINUED | OUTPATIENT
Start: 2021-02-02 | End: 2021-02-05 | Stop reason: HOSPADM

## 2021-02-02 RX ORDER — PANTOPRAZOLE SODIUM 40 MG/1
40 TABLET, DELAYED RELEASE ORAL DAILY
Status: DISCONTINUED | OUTPATIENT
Start: 2021-02-02 | End: 2021-02-05 | Stop reason: HOSPADM

## 2021-02-02 RX ORDER — DEXTROSE MONOHYDRATE 25 G/50ML
12.5 INJECTION, SOLUTION INTRAVENOUS PRN
Status: DISCONTINUED | OUTPATIENT
Start: 2021-02-02 | End: 2021-02-05 | Stop reason: HOSPADM

## 2021-02-02 RX ORDER — DEXTROSE MONOHYDRATE 50 MG/ML
INJECTION, SOLUTION INTRAVENOUS CONTINUOUS
Status: DISCONTINUED | OUTPATIENT
Start: 2021-02-02 | End: 2021-02-05 | Stop reason: HOSPADM

## 2021-02-02 RX ORDER — DULOXETIN HYDROCHLORIDE 60 MG/1
60 CAPSULE, DELAYED RELEASE ORAL DAILY
Status: DISCONTINUED | OUTPATIENT
Start: 2021-02-02 | End: 2021-02-03

## 2021-02-02 RX ORDER — FENOFIBRATE 160 MG/1
160 TABLET ORAL DAILY
Status: DISCONTINUED | OUTPATIENT
Start: 2021-02-02 | End: 2021-02-05 | Stop reason: HOSPADM

## 2021-02-02 RX ORDER — PROMETHAZINE HYDROCHLORIDE 25 MG/1
12.5 TABLET ORAL EVERY 6 HOURS PRN
Status: DISCONTINUED | OUTPATIENT
Start: 2021-02-02 | End: 2021-02-05 | Stop reason: HOSPADM

## 2021-02-02 RX ORDER — RANOLAZINE 500 MG/1
500 TABLET, EXTENDED RELEASE ORAL 2 TIMES DAILY
Status: DISCONTINUED | OUTPATIENT
Start: 2021-02-02 | End: 2021-02-05 | Stop reason: HOSPADM

## 2021-02-02 RX ORDER — ONDANSETRON 2 MG/ML
4 INJECTION INTRAMUSCULAR; INTRAVENOUS EVERY 6 HOURS PRN
Status: DISCONTINUED | OUTPATIENT
Start: 2021-02-02 | End: 2021-02-05 | Stop reason: HOSPADM

## 2021-02-02 RX ORDER — NICOTINE POLACRILEX 4 MG
15 LOZENGE BUCCAL PRN
Status: DISCONTINUED | OUTPATIENT
Start: 2021-02-02 | End: 2021-02-05 | Stop reason: HOSPADM

## 2021-02-02 RX ORDER — SODIUM CHLORIDE 0.9 % (FLUSH) 0.9 %
10 SYRINGE (ML) INJECTION PRN
Status: DISCONTINUED | OUTPATIENT
Start: 2021-02-02 | End: 2021-02-05 | Stop reason: HOSPADM

## 2021-02-02 RX ORDER — ONDANSETRON 2 MG/ML
4 INJECTION INTRAMUSCULAR; INTRAVENOUS
Status: DISCONTINUED | OUTPATIENT
Start: 2021-02-02 | End: 2021-02-03

## 2021-02-02 RX ADMIN — CARVEDILOL 3.12 MG: 3.12 TABLET, FILM COATED ORAL at 17:32

## 2021-02-02 RX ADMIN — DEXTROSE MONOHYDRATE 50 ML: 25 INJECTION, SOLUTION INTRAVENOUS at 05:56

## 2021-02-02 RX ADMIN — MUPIROCIN: 20 OINTMENT TOPICAL at 20:58

## 2021-02-02 RX ADMIN — AMIODARONE HYDROCHLORIDE 200 MG: 200 TABLET ORAL at 17:32

## 2021-02-02 RX ADMIN — LEVETIRACETAM 1000 MG: 100 INJECTION, SOLUTION INTRAVENOUS at 14:30

## 2021-02-02 RX ADMIN — DIGOXIN 125 MCG: 125 TABLET ORAL at 17:32

## 2021-02-02 RX ADMIN — PANTOPRAZOLE SODIUM 40 MG: 40 TABLET, DELAYED RELEASE ORAL at 17:32

## 2021-02-02 RX ADMIN — SUCRALFATE 1 G: 1 TABLET ORAL at 20:58

## 2021-02-02 RX ADMIN — LORAZEPAM 2 MG: 2 INJECTION INTRAMUSCULAR; INTRAVENOUS at 13:30

## 2021-02-02 RX ADMIN — DULOXETINE HYDROCHLORIDE 60 MG: 60 CAPSULE, DELAYED RELEASE ORAL at 17:32

## 2021-02-02 RX ADMIN — ENOXAPARIN SODIUM 80 MG: 80 INJECTION SUBCUTANEOUS at 17:32

## 2021-02-02 RX ADMIN — DEXTROSE MONOHYDRATE: 50 INJECTION, SOLUTION INTRAVENOUS at 17:44

## 2021-02-02 RX ADMIN — ASPIRIN 81 MG: 81 TABLET, COATED ORAL at 17:32

## 2021-02-02 RX ADMIN — RANOLAZINE 500 MG: 500 TABLET, FILM COATED, EXTENDED RELEASE ORAL at 20:58

## 2021-02-02 RX ADMIN — Medication 10 ML: at 20:58

## 2021-02-02 RX ADMIN — TETANUS TOXOID, REDUCED DIPHTHERIA TOXOID AND ACELLULAR PERTUSSIS VACCINE, ADSORBED 0.5 ML: 5; 2.5; 8; 8; 2.5 SUSPENSION INTRAMUSCULAR at 07:23

## 2021-02-02 RX ADMIN — FENOFIBRATE 160 MG: 160 TABLET ORAL at 17:32

## 2021-02-02 RX ADMIN — ATORVASTATIN CALCIUM 40 MG: 40 TABLET, FILM COATED ORAL at 20:59

## 2021-02-02 RX ADMIN — SUCRALFATE 1 G: 1 TABLET ORAL at 17:32

## 2021-02-02 NOTE — PROGRESS NOTES
The patient was transported from ED to ICU by ICU nursing staff. Patient moved to the bed. She is alert and oriented times 4. Speech is slurred. She is solmunlent. Dr. Yari Olivas at bedside. Update provided. New orders received. See orders. Dr. Jenn Alegre on the unit. Orders clarified. See orders.  Electronically signed by Balbina Mcnamara RN on 2/2/2021 at 5:49 PM

## 2021-02-02 NOTE — ED NOTES
Writer spoke with [facility] and notified of patient admission. Ok to continue hospice services due to admission for unrelated reason. Will cont to monitor.      Tricia Munoz RN  02/02/21 0124

## 2021-02-02 NOTE — CONSULTS
Patient is being seen at the request of Malissa Montes De Oca for a consultation for hypoglycemia, seizure    HISTORY OF PRESENT ILLNESS: 61 yo female enrolled in hospice who presented to the ED after fall from her bed with associated \"low\" blood sugar reading. She received glucagon and oral glucose. In the emergency department, she was noted to have a single tonic clonic seizure. She can provide no additional history. PAST MEDICAL HISTORY:  Past Medical History:   Diagnosis Date    Arthritis     CAD (coronary artery disease)     [facility]bral [facility] occlusion [facility]     x 3    CHF (congestive heart failure) [facility]     COVID-19 12/09/2020    Diabetes mellitus ([facility]     ESBL (extended spectrum beta-lactamase) producing bacteria infection 12/09/2020    E. COLI-URINE    Hyperlipidemia     Hypertension     Mental retardation     [facility]     Pacemaker      PAST SURGICAL HISTORY:  Past Surgical History:   Procedure Laterality Date    BACK SURGERY      x3    IR MIDLINE CATH  11/23/2020    IR MIDLINE CATH 11/23/2020 [facility]    IR MIDLINE CATH  12/11/2020    [facility]    PACEMAKER INSERTION      Boyd pacemaker    TUBAL LIGATION      TUMOR REMOVAL      UPPER GASTROINTESTINAL ENDOSCOPY N/A 11/5/2020    EGD BIOPSY performed by Jamia Myers DO at [address]:  family history includes Cancer in her mother; Heart Disease in her father; Other in her mother. SOCIAL HISTORY:   reports that she has never smoked. She has never used smokeless tobacco.    Scheduled Meds:   levetiracetam  1,000 mg Intravenous Daily     Continuous Infusions:   dextrose 5 % and 0.9 % NaCl       PRN Meds:  ondansetron    ALLERGIES:  Patient has No Known Allergies.     REVIEW OF SYSTEMS:  Unable to obtain because the patient is poorly communicative, does tell me her head feels like bricks    PHYSICAL EXAM:  Blood pressure (!) 150/91, pulse 109, temperature 97.4 °F (36.3 °C), temperature source Oral, resp. rate 17, height 5' 1\" (1.549 m), weight 180 lb (81.6 kg), SpO2 98 %, not currently breastfeeding.' on RA  General: ill appearing. Eyes: PERRL. No sclera icterus. No conjunctival injection. ENT: No discharge. Pharynx clear. Neck: Trachea midline. Normal thyroid. Resp: No accessory muscle use. No crackles. No wheezing. No rhonchi. No dullness on percussion. CV: Regular rate. Regular rhythm. No mumur or rub. No edema. Peripheral pulses are 2+. Capillary refill is less than 3 seconds. GI: Non-tender. Non-distended. No masses. No organomegaly. Normal bowel sounds. No hernia. Skin: Warm and dry. No nodule on exposed extremities. Facial abrasion. Lymph: No cervical LAD. No supraclavicular LAD. M/S: No cyanosis. No joint deformity. No clubbing. Neuro: Awake, answers simple questions. Patellar reflexes are symmetric. Psych: No agitation, no anxiety, affect is full. LABS:  CBC:   Recent Labs     02/02/21  0531   WBC 9.6   HGB 12.1   HCT 37.5   MCV 88.1        BMP:   Recent Labs     02/02/21  0531 02/02/21  0819   * 137   K 5.7* 4.2   CL 97* 101   CO2 27 26   BUN 23* 23*   CREATININE 0.6 0.6     LIVER PROFILE:   Recent Labs     02/02/21  0531   AST 44*   ALT 16   BILITOT 0.5   ALKPHOS 65     PT/INR: No results for input(s): PROTIME, INR in the last 72 hours. APTT: No results for input(s): APTT in the last 72 hours. UA:  Recent Labs     02/02/21  0826   COLORU Yellow   PHUR 6.5   WBCUA 10-20*   RBCUA 0-2   BACTERIA Rare*   CLARITYU Clear   SPECGRAV 1.010   LEUKOCYTESUR SMALL*   UROBILINOGEN 2.0*   BILIRUBINUR Negative   BLOODU Negative   GLUCOSEU 250*     No results for input(s): PHART, VMC5LRE, PO2ART in the last 72 hours.     Cultures:   12/9/2020 SARS-CoV-2 positive  12/7/2020 urine E. coli ESBL Carbapenem sensitive  12/9/2020 urine E. coli ESBL  12/11/2020 blood no growth to date     Films:  CT chest 12/9/2020  Shows patchy nodular bilateral infiltrates with areas of groundglass opacity new compared to the 11/16/2020 CT consistent with acute infection    CXR 2/2/21 shows probable left effusion, I reviewed CT above which shows very small effusions bilaterally     HCT 2/2/21   BRAIN/VENTRICLES: There is no acute intracranial hemorrhage, mass effect or   midline shift.  No abnormal extra-axial fluid collection.  The gray-white   differentiation is maintained without evidence of an acute infarct.  There is   no evidence of hydrocephalus.        ASSESSMENT:  · Tonic clonic seizure witnessed in ED  · Acute hypoglycemia  · H/O craniotomy   · Fall with reassuring imaging of head, C spine, face and pelvis   · Pulmonary embolism 11/16/2020  · Recent UTI: E. coli, ESBL  · Diabetes    · Chronic small pleural effusions  · Systolic and diastolic CHF  · Nonischemic cardiomyopathy with EF <20%  · S/P AICD  · Left ventricular apical thrombus  · H/O COVID infection in December 2020    PLAN:  · IV ativan for any seizure activity, EEG to rule out status  · Hold oral hypoglycemics  · Dextrose infusion   · Continues under hospice care   · Full dose lovenox for h/o PE & LV thrombus; can be converted back to DOAC once shows stability  · Prophylaxis: bactroban  · D/W Dr. Eliel Alan

## 2021-02-02 NOTE — ED PROVIDER NOTES
[address] ED      CHIEF COMPLAINT  Fall (Roommated called and states she fell out of bed. Pt has abrasions to her head. Pt is unresponsive at this time. BS en route was \"low\". EMS unable to get IV, glucagon given. )       HISTORY OF PRESENT ILLNESS  Flores Shock is a 62 y.o. female  who presents to the ED complaining of low blood sugar, fall. Patient was brought in by EMS with concerns for fall from her bed, reports that her roommate heard her fall and called EMS. Her blood sugar was reading \"low\" when they arrived. She did receive glucagon as well as some oral glucose but were unable to obtain an IV. Upon arrival in the ED, patient was initially opening eyes and waved to me with her right hand but was unable to contribute further to the history at that time. No other complaints, modifying factors or associated symptoms. I have reviewed the following from the nursing documentation. Past Medical History:   Diagnosis Date    Arthritis     CAD (coronary artery disease)     Cerebral artery occlusion with cerebral infarction ([address]     x 3    CHF (congestive heart failure) ([address]     COVID-19 12/09/2020    Diabetes mellitus ([address]     ESBL (extended spectrum beta-lactamase) producing bacteria infection 12/09/2020    E. COLI-URINE    Hyperlipidemia     Hypertension     Mental retardation     MI (myocardial infarction) ([address]     Pacemaker      Past Surgical History:   Procedure Laterality Date    BACK SURGERY      x3    IR MIDLINE CATH  11/23/2020    IR MIDLINE CATH 11/23/2020 [facility]    IR MIDLINE CATH  12/11/2020    IR MIDLINE CATH 12/11/2020 [facility]    PACEMAKER INSERTION      Boyd pacemaker    TUBAL LIGATION      TUMOR REMOVAL      UPPER GASTROINTESTINAL ENDOSCOPY N/A 11/5/2020    EGD BIOPSY performed by Madeline Arora DO at [facility]     Family History   Problem Relation Age of Onset    Heart Disease Father     Cancer Mother     Other Mother      Social History     Socioeconomic History    Marital status:      Spouse name: Not on file    Number of children: Not on file    Years of education: Not on file    Highest education level: Not on file   Occupational History    Not on file   Social Needs    Financial resource strain: Not on file    Food insecurity     Worry: Not on file     Inability: Not on file    Transportation needs     Medical: Not on file     Non-medical: Not on file   Tobacco Use    Smoking status: Never Smoker    Smokeless tobacco: Never Used   Substance and Sexual Activity    Alcohol use: No    Drug use: No    Sexual activity: Not Currently   Lifestyle    Physical activity     Days per week: Not on file     Minutes per session: Not on file    Stress: Not on file   Relationships    Social connections     Talks on phone: Not on file     Gets together: Not on file     Attends Rastafari service: Not on file     Active member of club or organization: Not on file     Attends meetings of clubs or organizations: Not on file     Relationship status: Not on file    Intimate partner violence     Fear of current or ex partner: Not on file     Emotionally abused: Not on file     Physically abused: Not on file     Forced sexual activity: Not on file   Other Topics Concern    Not on file   Social History Narrative    Not on file     No current facility-administered medications for this encounter.       Current Outpatient Medications   Medication Sig Dispense Refill    apixaban (ELIQUIS) 5 MG TABS tablet Take 1 tablet by mouth 2 times daily 60 tablet 0    carvedilol (COREG) 3.125 MG tablet Take 1 tablet by mouth 2 times daily (with meals) 60 tablet 3    torsemide (DEMADEX) 20 MG tablet Take 1 tablet by mouth daily 30 tablet 3    metFORMIN (GLUCOPHAGE) 1000 MG tablet Take 1,000 mg by mouth 2 times daily (with meals)      QUEtiapine (SEROQUEL XR) 50 MG extended release tablet Take 50 mg by mouth nightly      insulin aspart (NOVOLOG FLEXPEN) 100 UNIT/ML injection pen Inject 10 Units into the skin 3 times daily (before meals) 5 pen 0    insulin glargine (LANTUS SOLOSTAR) 100 UNIT/ML injection pen Inject 24 Units into the skin nightly 5 pen 0    amiodarone (CORDARONE) 200 MG tablet Take 1 tablet by mouth daily 90 tablet 3    metoclopramide (REGLAN) 10 MG tablet Take 1 tablet by mouth 3 times daily (with meals) 30 tablet 0    sucralfate (CARAFATE) 1 GM tablet Take 1 tablet by mouth 3 times daily 90 tablet 0    pantoprazole (PROTONIX) 40 MG tablet Take 1 tablet by mouth daily 90 tablet 0    vitamin D (ERGOCALCIFEROL) 1.25 MG (01684 UT) CAPS capsule Take 50,000 Units by mouth once a week Takes weekly on Sundays      digoxin (LANOXIN) 125 MCG tablet Take 1 tablet by mouth every other day 30 tablet 3    aspirin 81 MG EC tablet Take 1 tablet by mouth daily 30 tablet 2    ranolazine (RANEXA) 500 MG extended release tablet Take 1 tablet by mouth 2 times daily 60 tablet 2    DULoxetine (CYMBALTA) 60 MG extended release capsule Take 1 capsule by mouth daily 30 capsule 0    atorvastatin (LIPITOR) 40 MG tablet Take 1 tablet by mouth nightly 30 tablet 2    fenofibrate micronized (LOFIBRA) 200 MG capsule Take 1 capsule by mouth nightly 30 capsule 3    miconazole (MICOTIN) 2 % powder Apply topically 2 times daily. 45 g 1    ondansetron (ZOFRAN) 4 MG tablet Take 1 tablet by mouth every 8 hours as needed for Nausea or Vomiting 24 tablet 0    nitroGLYCERIN (NITROSTAT) 0.4 MG SL tablet up to max of 3 total doses. If no relief after 1 dose, call 911. 25 tablet 3    [employer] Lancets Ultra Thin MISC 1 each by Does not apply route 4 times daily 200 each 0    blood glucose monitor strips Test three times a day & as needed for symptoms of irregular blood glucose. 100 strip 2     No Known Allergies    REVIEW OF SYSTEMS  10 systems reviewed, pertinent positives per HPI otherwise noted to be negative.     PHYSICAL EXAM  BP 109/75   Pulse 90   Temp 97.4 °F (36.3 °C) (Oral)   Resp 14   Ht 5' 1\" (1.549 m)   Wt 180 lb (81.6 kg)   SpO2 100%   BMI 34.01 kg/m²    GENERAL APPEARANCE: Awake and alert. Cooperative. No acute distress. HENT: Abrasions to left temple, no active bleeding. NECK: Supple. EYES: PERRL. EOM's grossly intact. HEART/CHEST: RRR. No murmurs. LUNGS: Respirations unlabored. CTAB. Good air exchange. Speaking comfortably in full sentences. ABDOMEN: No tenderness. Soft. Non-distended. No masses. No organomegaly. No guarding or rebound. MUSCULOSKELETAL: No extremity edema. Compartments soft. No deformity. No tenderness in the extremities. All extremities neurovascularly intact. SKIN: Warm and dry. No acute rashes. NEUROLOGICAL: Alert and oriented. CN's 2-12 intact. No gross facial drooping. Strength 5/5, sensation intact. Moves all extremities spontaneously. PSYCHIATRIC: Normal mood and affect. LABS  I have reviewed all labs for this visit.    Results for orders placed or performed during the hospital encounter of 02/02/21   CBC Auto Differential   Result Value Ref Range    WBC 9.6 4.0 - 11.0 K/uL    RBC 4.26 4.00 - 5.20 M/uL    Hemoglobin 12.1 12.0 - 16.0 g/dL    Hematocrit 37.5 36.0 - 48.0 %    MCV 88.1 80.0 - 100.0 fL    MCH 28.5 26.0 - 34.0 pg    MCHC 32.3 31.0 - 36.0 g/dL    RDW 21.7 (H) 12.4 - 15.4 %    Platelets 464 463 - 152 K/uL    MPV 8.5 5.0 - 10.5 fL    Neutrophils % 77.4 %    Lymphocytes % 13.6 %    Monocytes % 6.4 %    Eosinophils % 1.2 %    Basophils % 1.4 %    Neutrophils Absolute 7.4 1.7 - 7.7 K/uL    Lymphocytes Absolute 1.3 1.0 - 5.1 K/uL    Monocytes Absolute 0.6 0.0 - 1.3 K/uL    Eosinophils Absolute 0.1 0.0 - 0.6 K/uL    Basophils Absolute 0.1 0.0 - 0.2 K/uL   Comprehensive Metabolic Panel w/ Reflex to MG   Result Value Ref Range    Sodium 132 (L) 136 - 145 mmol/L    Potassium reflex Magnesium 5.7 (H) 3.5 - 5.1 mmol/L    Chloride 97 (L) 99 - 110 mmol/L    CO2 27 21 - 32 mmol/L Anion Gap 8 3 - 16    Glucose 160 (H) 70 - 99 mg/dL    BUN 23 (H) 7 - 20 mg/dL    CREATININE 0.6 0.6 - 1.1 mg/dL    GFR Non-African American >60 >60    GFR African American >60 >60    Calcium 8.9 8.3 - 10.6 mg/dL    Total Protein 6.8 6.4 - 8.2 g/dL    Albumin 3.7 3.4 - 5.0 g/dL    Albumin/Globulin Ratio 1.2 1.1 - 2.2    Total Bilirubin 0.5 0.0 - 1.0 mg/dL    Alkaline Phosphatase 65 40 - 129 U/L    ALT 16 10 - 40 U/L    AST 44 (H) 15 - 37 U/L    Globulin 3.1 g/dL   Troponin   Result Value Ref Range    Troponin <0.01 <0.01 ng/mL   Blood Gas, Venous   Result Value Ref Range    pH, Alberto 7.362 7.350 - 7.450    pCO2, Alberto 46.2 40.0 - 50.0 mmHg    pO2, Alberto 66.4 (H) 25.0 - 40.0 mmHg    HCO3, Venous 25.6 23.0 - 29.0 mmol/L    Base Excess, Alberto -0.1 -3.0 - 3.0 mmol/L    O2 Sat, Alberto 92 Not Established %    Carboxyhemoglobin 2.4 (H) 0.0 - 1.5 %    MetHgb, Alberto 0.3 <1.5 %    TC02 (Calc), Alberto 27 Not Established mmol/L    O2 Content, Alberto 16 Not Established VOL %    O2 Therapy Unknown    Lactate, Sepsis   Result Value Ref Range    Lactic Acid, Sepsis 1.8 0.4 - 1.9 mmol/L   Brain Natriuretic Peptide   Result Value Ref Range    Pro-BNP 2,609 (H) 0 - 124 pg/mL   COVID-19   Result Value Ref Range    SARS-CoV-2, NAAT Not Detected Not Detected   POCT Glucose   Result Value Ref Range    POC Glucose 50 (L) 70 - 99 mg/dl    Performed on ACCU-CHEK    POCT Glucose   Result Value Ref Range    POC Glucose 86 70 - 99 mg/dl    Performed on ACCU-CHEK    EKG 12 Lead   Result Value Ref Range    Ventricular Rate 90 BPM    Atrial Rate 90 BPM    P-R Interval 202 ms    QRS Duration 108 ms    Q-T Interval 412 ms    QTc Calculation (Bazett) 504 ms    P Axis 4 degrees    R Axis 122 degrees    T Axis -20 degrees    Diagnosis       Normal sinus rhythmAnterolateral infarct , age undeterminedProlonged QTAbnormal ECGNo previous ECGs available       ECG  The Ekg interpreted by me shows  normal sinus rhythm with a rate of 90  Axis is   right  QTc is  Prolonged at 504  Intervals and Durations are unremarkable. ST Segments: nonspecific changes, T wave flattening in leads I through III, aVR, III, aVF, and V1, V2, V6. No significant change from prior EKG dated 12/9/2020    RADIOLOGY  XR PELVIS (1-2 VIEWS)   Final Result   No acute osseous abnormality. XR CHEST PORTABLE   Final Result   Stable cardiomegaly. Left pleural effusion and/or basilar atelectasis. CT MAXILLOFACIAL WO CONTRAST   Final Result   Head:      No acute traumatic intracranial abnormality. There is atrophy and   small-vessel ischemic change. There is encephalomalacia beneath the   craniotomy site on the right      Facial bones :      No definite facial bone fracture      Mild paranasal sinus disease         CT CERVICAL SPINE WO CONTRAST   Final Result   No acute abnormality of the cervical spine. CT HEAD WO CONTRAST   Final Result   Head:      No acute traumatic intracranial abnormality. There is atrophy and   small-vessel ischemic change. There is encephalomalacia beneath the   craniotomy site on the right      Facial bones :      No definite facial bone fracture      Mild paranasal sinus disease           ED COURSE/MDM  Patient seen and evaluated. Old records reviewed. Labs and imaging reviewed and results discussed with patient. Patient brought in with concerns for fall from bed, low blood sugar. Full HPI as detailed above. Upon arrival in the ED, vitals reassuring. Patient was initially opening eyes to voice, would follow some simple commands. She had received glucagon and oral dextrose prior to arrival.  Blood sugar was done to be in the 30s. IV access was obtained, she was given an amp of D50. Her mental status rapidly improved, she stated that she did not remember what exactly happened, does not remember falling but denies any pain at this time. She states that she is currently feeling well.   She is moving all extremities spontaneously and 5 out of 5 strength in bilateral upper and lower extremities. She does have an abrasion to the left side of her head. Repeat glucose was found to be 50, patient was given an additional amp of D50 with improvement of her blood sugar to 86. Her mental status continued to improve. Given her history of fall, CT of the head, C-spine, max face were performed as well as x-ray of the chest and pelvis. Imaging was negative for any acute fractures. Potassium was mildly elevated however sample was hemolyzed. Repeat will be ordered. Patient was assessed for several hours in the ED, remained stable throughout the course of her stay. She was alert and oriented, stated that she is feeling well enough to be discharged. At this time, pending studies include urine, repeat BMP/glucose recheck. Patient is eating and drinking here in the department. She will be signed out to the oncoming physician. Please refer to her note for any additional issues. At this point, I suspect that the patient will likely be discharged is blood sugars remain stable. During the patient's ED course, the patient was given:  Medications   dextrose 50 % solution (50 mLs  Given 2/2/21 0438)   Tetanus-Diphth-Acell Pertussis (BOOSTRIX) injection 0.5 mL (0.5 mLs Intramuscular Given 2/2/21 8393)        CLINICAL IMPRESSION  No diagnosis found. Blood pressure 109/75, pulse 90, temperature 97.4 °F (36.3 °C), temperature source Oral, resp. rate 14, height 5' 1\" (1.549 m), weight 180 lb (81.6 kg), SpO2 100 %, not currently breastfeeding. DISPOSITION  Corybecka Mckenna was signed out in stable condition. Patient was given scripts for the following medications. I counseled patient how to take these medications. New Prescriptions    No medications on file       Follow-up with:  No follow-up provider specified. DISCLAIMER: This chart was created using Dragon dictation software.   Efforts were made by me to ensure accuracy, however some errors may be present

## 2021-02-02 NOTE — PROGRESS NOTES
4 Eyes Skin Assessment     The patient is being assess for   Admission    I agree that 2 RN's have performed a thorough Head to Toe Skin Assessment on the patient. ALL assessment sites listed below have been assessed. Areas assessed by both nurses:   [x]   Head, Face, and Ears   [x]   Shoulders, Back, and Chest, Abdomen  [x]   Arms, Elbows, and Hands   [x]   Coccyx, Sacrum, and Ischium  [x]   Legs, Feet, and Heels        The patient has a large bruise lateral from her left eye from a a fall at home. She has multiple old dried scabs on her BLE. Her feet are gage from her ankles down but are blanchable. She has blanchable erythema on her coccyx. **SHARE this note so that the co-signing nurse is able to place an eSignature**    Co-signer eSignature: Electronically signed by Nidia Francis RN on 2/2/21 at 6:56 PM EST    Does the Patient have Skin Breakdown?   No          Ignacio Prevention initiated:  Yes   Wound Care Orders initiated:  Yes      [id number]  nurse consulted for Pressure Injury (Stage 3,4, Unstageable, DTI, NWPT, Complex wounds)and New or Established Ostomies:  No      Primary Nurse eSignature: Electronically signed by Electronically signed by Lucía Wu RN on 2/3/2021 at 1:08 AM

## 2021-02-02 NOTE — ED NOTES
Patient vomited at this time. Patient diaphoretic and shakey. Writer rechecked FSBS at 79. Patient thinks her abdomen is upset from all the juice she has been drinking. Dr. Feliciano Fairchild notified. Will continue to monitor.      Castillo Found, DILLAN  02/02/21 1030

## 2021-02-02 NOTE — ED NOTES
Patient's oxygen saturation drops down into the mid 80's while patient is resting, but recovers to 93% when aroused. Patient placed on 2L of O2 at this time.       Trixie Dixon RN  02/02/21 1115

## 2021-02-02 NOTE — PLAN OF CARE
Admit to Med Surg, observation    Persistent Hypoglycemia - denied poor PO intake, took Lantus last night  Fall at home

## 2021-02-02 NOTE — H&P
Hospital Medicine History & Physical      PCP: FRITZ Connelly NP    Date of Admission: 2/2/2021    Date of Service: Pt seen/examined on 2/2/2021    Chief Complaint:    Chief Complaint   Patient presents with    Fall     Roommated called and states she fell out of bed. Pt has abrasions to her head. Pt is unresponsive at this time. BS en route was \"low\". EMS unable to get IV, glucagon given. History Of Present Illness:       62 y.o. female with  who presented to [facility] ED with complaint of fall and hypoglycemia. Time of my exam, patient is unable to answer any questions. History obtained from ED providers note. Lori Miller is a 62 y.o. female  who presents to the ED complaining of low blood sugar, fall. Patient was brought in by EMS with concerns for fall from her bed, reports that her roommate heard her fall and called EMS. Her blood sugar was reading \"low\" when they arrived. She did receive glucagon as well as some oral glucose but were unable to obtain an IV. Upon arrival in the ED, patient was initially opening eyes and waved to me with her right hand but was unable to contribute further to the history at that time\"     Patient is under hospice care with [facility] who has been notified of admission. Past Medical History:        Diagnosis Date    Arthritis     CAD (coronary artery disease)     Cerebral artery occlusion with cerebral infarction ([address]     x 3    CHF (congestive heart failure) ([address]     COVID-19 12/09/2020    Diabetes mellitus ([address]     ESBL (extended spectrum beta-lactamase) producing bacteria infection 12/09/2020    E. COLI-URINE    Hyperlipidemia     Hypertension     Mental retardation     MI (myocardial infarction) ([address]     Pacemaker        Past Surgical History:        Procedure Laterality Date    BACK SURGERY      x3    IR MIDLINE CATH  11/23/2020    IR MIDLINE CATH 11/23/2020 [facility] SPECIAL PROCEDURES    IR MIDLINE CATH  12/11/2020    IR other day 9/25/20  Yes Adolfo Prader, APRN - CNP   aspirin 81 MG EC tablet Take 1 tablet by mouth daily 7/18/20  Yes Julio Rosales MD   ranolazine (RANEXA) 500 MG extended release tablet Take 1 tablet by mouth 2 times daily 7/18/20  Yes Julio Rosales MD   DULoxetine (CYMBALTA) 60 MG extended release capsule Take 1 capsule by mouth daily 7/18/20  Yes Julio Rosales MD   atorvastatin (LIPITOR) 40 MG tablet Take 1 tablet by mouth nightly 7/18/20  Yes Julio Rosales MD   fenofibrate micronized (LOFIBRA) 200 MG capsule Take 1 capsule by mouth nightly 7/18/20  Yes Julio Rosales MD   miconazole (MICOTIN) 2 % powder Apply topically 2 times daily. 7/18/20  Yes Julio Rosales MD   ondansetron (ZOFRAN) 4 MG tablet Take 1 tablet by mouth every 8 hours as needed for Nausea or Vomiting 12/2/20   Arthur Lazo MD   nitroGLYCERIN (NITROSTAT) 0.4 MG SL tablet up to max of 3 total doses. If no relief after 1 dose, call 911. 8/24/20   Mannie Brambila MD   [employer] Lancets Ultra Thin MISC 1 each by Does not apply route 4 times daily 7/18/20   Julio Rosales MD   blood glucose monitor strips Test three times a day & as needed for symptoms of irregular blood glucose. 5/12/19   Noah Mendoza MD       Allergies:  Patient has no known allergies. Social History:  The patient currently lives at home    TOBACCO:   reports that she has never smoked. She has never used smokeless tobacco.  ETOH:   reports no history of alcohol use.       Family History:   Positive as follows:        Problem Relation Age of Onset    Heart Disease Father     Cancer Mother     Other Mother        REVIEW OF SYSTEMS:     MABEL 2/2 AMS from active seizure during examination     PHYSICAL EXAM:    BP (!) 122/90   Pulse 103   Temp 97.4 °F (36.3 °C) (Oral)   Resp 17   Ht 5' 1\" (1.549 m)   Wt 180 lb (81.6 kg)   SpO2 (!) 83%   BMI 34.01 kg/m²   Gen: Actively seizing   Eyes: deviated gaze to the R, head turned to R  ENT: blood coming from mouth (presumed tongue biting), clenched teeth   Neck:  Trachea midline. Resp: + beelly breathing, zachary-orbital cyanosis, grunting, on NRB  CV: Tachycardia. Regular rhythm. + murmur. No rub. +++ edema. Capillary Refill: Brisk,< 3 seconds   Peripheral Pulses: +2 palpable, equal bilaterally   GI: Non-tender. Non-distended. Normal bowel sounds. Skin: Warm and dry. Ecchymosis to L temporal scalp with ecchymosis and edema of the L pinna and ear   M/S: No cyanosis. No joint deformity. No clubbing. Neuro: Actively seizing, drowsy and post-ictal after seizure   Psych: Marquis Us have reviewed the chart on Lin[facility] Schultz and personally interviewed and examined patient, reviewed the data (labs and imaging) and after discussion with my PA formulated the plan. Agree with note with the following edits. HPI:     I reviewed the patient's Past Medical History, Past Surgical History, Medications, and Allergies. 62 y.o. female with hx of  CVA, Chronic Combined CHF, Severe Non-Ischemic Cardiomyopathy, Non-Obstructive CAD, hx of NSVT, hx of pericardial effusion, AICD, type II DM, recent PE, HLD, HTN and depression  presented to [facility] ED with complaint of fall and hypoglycemia ,  brought in by EMS with concerns for fall from her bed, reports that her roommate heard her fall and called EMS. Catherine Frias blood sugar was reading \"low\" when they arrived. Alli Reed did receive glucagon as well as some oral glucose but were unable to obtain an IV. Apparently pt has witnessed seizure today in ER although her sugar was above 80 . Had tongue biting with post ictal confusion  Emergency ct on arrival and post seizure were neg     Patient is under hospice care with [facility] who has been notified of admission. Pt currently drowsy sec to ativan         General:  Middle aged female drowsy arousable  .  Appears to be not in any distress  Mucous Membranes:  Pink , anicteric  Neck: No JVD, no carotid bruit, no thyromegaly  Chest:  Clear to auscultation bilaterally, diminished in bases  Left sided AICD  Cardiovascular:  RRR S1S2 heard, no murmurs or gallops  Abdomen:  Soft, undistended, non tender, no organomegaly, BS present  Extremities:3+ bibiana LE edema . Distal pulses well felt  Skin - large rash on left side of face involving ear  Pupils bibiana equal and reactive   Neuro - drowsy , able to follow commands normally        CBC:   Recent Labs     02/02/21  0531   WBC 9.6   HGB 12.1   HCT 37.5   MCV 88.1        BMP:   Recent Labs     02/02/21  0531 02/02/21  0819   * 137   K 5.7* 4.2   CL 97* 101   CO2 27 26   BUN 23* 23*   CREATININE 0.6 0.6     LIVER PROFILE:   Recent Labs     02/02/21  0531   AST 44*   ALT 16   BILITOT 0.5   ALKPHOS 65     UA:  Recent Labs     02/02/21  0826   COLORU Yellow   PHUR 6.5   WBCUA 10-20*   RBCUA 0-2   BACTERIA Rare*   CLARITYU Clear   SPECGRAV 1.010   LEUKOCYTESUR SMALL*   UROBILINOGEN 2.0*   BILIRUBINUR Negative   BLOODU Negative   GLUCOSEU 250*          CARDIAC ENZYMES  Recent Labs     02/02/21  0531   TROPONINI <0.01     CULTURES  SARS-CoV-2, NAAT Not Detected      EKG:  I have reviewed the EKG with the following interpretation:   Normal sinus rhythm  Anterolateral infarct , age undetermined  Prolonged QT  Abnormal ECG  No previous ECGs available    RADIOLOGY  XR PELVIS (1-2 VIEWS)   Final Result   No acute osseous abnormality. XR CHEST PORTABLE   Final Result   Stable cardiomegaly. Left pleural effusion and/or basilar atelectasis. CT MAXILLOFACIAL WO CONTRAST   Final Result   Head:      No acute traumatic intracranial abnormality. There is atrophy and   small-vessel ischemic change.   There is encephalomalacia beneath the   craniotomy site on the right      Facial bones :      No definite facial bone fracture      Mild paranasal sinus disease         CT CERVICAL SPINE WO CONTRAST   Final Result   No acute abnormality of the cervical spine. CT HEAD WO CONTRAST   Final Result   Head:      No acute traumatic intracranial abnormality. There is atrophy and   small-vessel ischemic change.   There is encephalomalacia beneath the   craniotomy site on the right      Facial bones :      No definite facial bone fracture      Mild paranasal sinus disease               ASSESSMENT/PLAN:    New onset seizures  -Patient noted to have active seizure during admission    -Tonic-clonic seizure with deviated gaze to the right, head deviation to right, contractures of bilateral upper extremities with diffuse shaking, + tongue biting, lasted ~1 minute  -Seizure activity stopped patient was still given IV Ativan 2 mg x 1  -Not actively on any antiepileptics  -Does have a history of craniotomy with encephalomalacia seen below this area but I am unable to find prior neurology notes--> CT head repeat without acute findings       -MRI brain cannot be done with AICD in place  -EEG pending- interrogate AICD for any arrythmia  --IV Keppra loading with 1 g, continue daily  -As needed Ativan as needed for seizure-like activity  -Admit to ICU, critical care consulted    Hypoglycemia  - In patient with Type II DM, insulin dependent   - Was hyperglycemic during last admission and Lantus and SSI were increased   -  on arrival after receiving oral glucose en route via EMS-->dropped to 48, given amp D50, BG improved to 110 and dropped again to 62   - Started on IV D5 fluids, Full diet   - Hold home Metformin, Lantus   - Check Hgb A1c      Acute metabolic Encephalopathy  - Suspect 2.2 seizure and hypoglycemia  - Baseline AAO x 4   - See above, monitor     Fall   - Suspect related to hypoglycemia   - CT head: s/p craniotomy on R with encephalomalacia beneath the craniotomy site  - CT C spine: no acute abnormality   CT Max/Face: no defnitie facial bone fracture, mild paranasal sinus disease   - Pelvic Xray WNL  - superficial rug rash to left side of face   - Updated on tetanus shot in ED     Hyperkalemia  - K: 5.7 on admission   - IVF, resolved  - No acute EKG changes       Chronic combined CHF   Severe non-ischemic Cardiomyopathy   Non-obstructive CAD  Hx of NSVT  - S/p AICD placement   - Last Echo with EF <20%, severe global hypokinesis, grade II DD, mod TR and mod pulm HTN  - + edema   - Continue ASA, BB, amio, digoxin, Ranexa   - Now on D5 fluids for persistent hypoglycemia, monitor fluid status closely   - Home diuretics held on admission, resume when appropriate  - Under hospice care     Chronic Pleural Effusion   - Stable cardiomegaly and L pleural effusion noted on CXR   - Monitor     Hx of PE  - dx in Nov 2020   - AC on Eliquis-->transitioned to Lovenox for now with AMS 2/2 hypoglycemia and seizures     Recent COVID-19 Infection   - + COVID 12/9/20  - Rapid on admission was negative   - Was tx with decadron and remdesivir, Remdesivir stopped 2/2 elevated LFTs    Recent UTI   - ESBL E coli at the beginning of December, tx with Merrem x 6 days and discharged on Invanz   - UA with small LE and 10-20 WBC   - Check urine cx    GERD  - Continue PPI     HLD  - Continue statin     Depression   - Continue home medications     Prolonged QT interval   - as had prolonged QT in the past   - Monitor QT interval with repeat EKG in AM  - Okay to continue home medications for now     Obesity  - Body mass index is 34.01 kg/m². - Complicating assessment and treatment. Placing patient at risk for multiple co-morbidities as well as early death and contributing to the patient's presentation.   - Counseled on weight loss.     DVT Prophylaxis: Lovenox 1mg/kg BID  Diet: No diet orders on file   Code Status: Prior      Lori Frost PA-C  2/2/2021 12:07 PM     Agree with above  Changes made to note    Nancy Reyes MD 2/2/2021 7:31 PM

## 2021-02-02 NOTE — ED TRIAGE NOTES
Pt states she lives alone. EMS states her roommate called. Pt states it was probably her downstairs neighbor. Pt unresponsive upon arrival. 1 amp of D50 given and patient is A&O x4.

## 2021-02-02 NOTE — ED PROVIDER NOTES
3:35 PM: I discussed the history, physical examination, laboratory and imaging studies, and treatment plan with Dr. Saad Booth. Bre Jones was signed out to me in stable condition. Please see Dr. Celaya Doctor documentation for details of their history, physical, and laboratory studies. Upon re-examination, a summary of Rhea Eisenberg's history, physical examination, and studies are as follows: Patient presenting for evaluation after being found to be significantly hypoglycemic. She was reported to have fallen at home and her blood sugar read as low. No initial access was obtainable therefore glucagon was given. Upon arrival she was awake and alert. She is complaining of some discomfort where she has abrasions to the left side of her face. At time of signout, CT scans of the head neck and facial bones showed no significant traumatic injury or any intracranial bleeding. Her blood sugars had dropped after initially 2 doses of D50, and she has been now given food and we are pending a repeat blood sugar. If improved, she likely will be able to be discharged, versus admitted if she has persistent hypoglycemia despite recurrent administration of sugar and food. .  On exam, she is awake and alert. She is answering questions appropriately. She does have abrasions noted to the left side of her face without any noted deformity.        Results for orders placed or performed during the hospital encounter of 02/02/21   CBC Auto Differential   Result Value Ref Range    WBC 9.6 4.0 - 11.0 K/uL    RBC 4.26 4.00 - 5.20 M/uL    Hemoglobin 12.1 12.0 - 16.0 g/dL    Hematocrit 37.5 36.0 - 48.0 %    MCV 88.1 80.0 - 100.0 fL    MCH 28.5 26.0 - 34.0 pg    MCHC 32.3 31.0 - 36.0 g/dL    RDW 21.7 (H) 12.4 - 15.4 %    Platelets 880 489 - 452 K/uL    MPV 8.5 5.0 - 10.5 fL    Neutrophils % 77.4 %    Lymphocytes % 13.6 %    Monocytes % 6.4 %    Eosinophils % 1.2 %    Basophils % 1.4 %    Neutrophils Absolute 7.4 1.7 - 7.7 K/uL Lymphocytes Absolute 1.3 1.0 - 5.1 K/uL    Monocytes Absolute 0.6 0.0 - 1.3 K/uL    Eosinophils Absolute 0.1 0.0 - 0.6 K/uL    Basophils Absolute 0.1 0.0 - 0.2 K/uL   Comprehensive Metabolic Panel w/ Reflex to MG   Result Value Ref Range    Sodium 132 (L) 136 - 145 mmol/L    Potassium reflex Magnesium 5.7 (H) 3.5 - 5.1 mmol/L    Chloride 97 (L) 99 - 110 mmol/L    CO2 27 21 - 32 mmol/L    Anion Gap 8 3 - 16    Glucose 160 (H) 70 - 99 mg/dL    BUN 23 (H) 7 - 20 mg/dL    CREATININE 0.6 0.6 - 1.1 mg/dL    GFR Non-African American >60 >60    GFR African American >60 >60    Calcium 8.9 8.3 - 10.6 mg/dL    Total Protein 6.8 6.4 - 8.2 g/dL    Albumin 3.7 3.4 - 5.0 g/dL    Albumin/Globulin Ratio 1.2 1.1 - 2.2    Total Bilirubin 0.5 0.0 - 1.0 mg/dL    Alkaline Phosphatase 65 40 - 129 U/L    ALT 16 10 - 40 U/L    AST 44 (H) 15 - 37 U/L    Globulin 3.1 g/dL   Troponin   Result Value Ref Range    Troponin <0.01 <0.01 ng/mL   Urinalysis, reflex to microscopic   Result Value Ref Range    Color, UA Yellow Straw/Yellow    Clarity, UA Clear Clear    Glucose, Ur 250 (A) Negative mg/dL    Bilirubin Urine Negative Negative    Ketones, Urine Negative Negative mg/dL    Specific Gravity, UA 1.010 1.005 - 1.030    Blood, Urine Negative Negative    pH, UA 6.5 5.0 - 8.0    Protein, UA Negative Negative mg/dL    Urobilinogen, Urine 2.0 (A) <2.0 E.U./dL    Nitrite, Urine Negative Negative    Leukocyte Esterase, Urine SMALL (A) Negative    Microscopic Examination YES     Urine Type NotGiven    Blood Gas, Venous   Result Value Ref Range    pH, Alberto 7.362 7.350 - 7.450    pCO2, Alberto 46.2 40.0 - 50.0 mmHg    pO2, Alberto 66.4 (H) 25.0 - 40.0 mmHg    HCO3, Venous 25.6 23.0 - 29.0 mmol/L    Base Excess, Alberto -0.1 -3.0 - 3.0 mmol/L    O2 Sat, Alberto 92 Not Established %    Carboxyhemoglobin 2.4 (H) 0.0 - 1.5 %    MetHgb, Alberto 0.3 <1.5 %    TC02 (Calc), Alberto 27 Not Established mmol/L    O2 Content, Alberto 16 Not Established VOL %    O2 Therapy Unknown    Lactate, Sepsis   Result Value Ref Range    Lactic Acid, Sepsis 1.8 0.4 - 1.9 mmol/L   Brain Natriuretic Peptide   Result Value Ref Range    Pro-BNP 2,609 (H) 0 - 124 pg/mL   COVID-19   Result Value Ref Range    SARS-CoV-2, NAAT Not Detected Not Detected   Basic Metabolic Panel   Result Value Ref Range    Sodium 137 136 - 145 mmol/L    Potassium 4.2 3.5 - 5.1 mmol/L    Chloride 101 99 - 110 mmol/L    CO2 26 21 - 32 mmol/L    Anion Gap 10 3 - 16    Glucose 48 (LL) 70 - 99 mg/dL    BUN 23 (H) 7 - 20 mg/dL    CREATININE 0.6 0.6 - 1.1 mg/dL    GFR Non-African American >60 >60    GFR African American >60 >60    Calcium 8.7 8.3 - 10.6 mg/dL   Microscopic Urinalysis   Result Value Ref Range    WBC, UA 10-20 (A) 0 - 5 /HPF    RBC, UA 0-2 0 - 4 /HPF    Epithelial Cells, UA 0-1 0 - 5 /HPF    Bacteria, UA Rare (A) None Seen /HPF   POCT Glucose   Result Value Ref Range    POC Glucose 50 (L) 70 - 99 mg/dl    Performed on ACCU-CHEK    POCT Glucose   Result Value Ref Range    POC Glucose 86 70 - 99 mg/dl    Performed on ACCU-CHEK    POCT glucose   Result Value Ref Range    Glucose 70 mg/dL   POCT Glucose   Result Value Ref Range    POC Glucose 53 (L) 70 - 99 mg/dl    Performed on ACCU-CHEK    POCT Glucose   Result Value Ref Range    POC Glucose 55 (L) 70 - 99 mg/dl    Performed on ACCU-CHEK    POCT glucose   Result Value Ref Range    Glucose 112 mg/dL    QC OK?  yes    POCT Glucose   Result Value Ref Range    POC Glucose 112 (H) 70 - 99 mg/dl    Performed on ACCU-CHEK    POCT glucose   Result Value Ref Range    Glucose 62 mg/dL   POCT Glucose   Result Value Ref Range    POC Glucose 62 (L) 70 - 99 mg/dl    Performed on ACCU-CHEK    POCT Glucose   Result Value Ref Range    POC Glucose 70 70 - 99 mg/dl    Performed on ACCU-CHEK    POCT Glucose   Result Value Ref Range    POC Glucose 89 70 - 99 mg/dl    Performed on ACCU-CHEK    POCT Glucose   Result Value Ref Range    POC Glucose 80 70 - 99 mg/dl    Performed on ACCU-CHEK     Glucose 80 mg/dL acute abnormality. SINUSES: The visualized paranasal sinuses and mastoid air cells demonstrate no acute abnormality. SOFT TISSUES/SKULL:  The patient is status post right parietal craniotomy     No significant interval change since the previous exam.     Ct Head Wo Contrast    Result Date: 2/2/2021  EXAMINATION: CT OF THE HEAD WITHOUT CONTRAST; CT OF THE FACE WITHOUT CONTRAST  2/2/2021 6:19 am TECHNIQUE: CT of the head was performed without the administration of intravenous contrast. Dose modulation, iterative reconstruction, and/or weight based adjustment of the mA/kV was utilized to reduce the radiation dose to as low as reasonably achievable.; CT of the face was performed without the administration of intravenous contrast. Multiplanar reformatted images are provided for review. Dose modulation, iterative reconstruction, and/or weight based adjustment of the mA/kV was utilized to reduce the radiation dose to as low as reasonably achievable. COMPARISON: June 2020 HISTORY: ORDERING SYSTEM PROVIDED HISTORY: fall TECHNOLOGIST PROVIDED HISTORY: Reason for exam:->fall Has a \"code stroke\" or \"stroke alert\" been called? ->No Decision Support Exception->Emergency Medical Condition (MA) Reason for Exam: Fall (Roommate called and states she fell out of bed. Pt has abrasions to her head. Pt is unresponsive at this time. BS en route was \"low\". EMS unable to get IV, glucagon given. ) Acuity: Acute Type of Exam: Initial; ORDERING SYSTEM PROVIDED HISTORY: fall, jaw pain TECHNOLOGIST PROVIDED HISTORY: Reason for exam:->fall, jaw pain Reason for Exam: Fall (Roommate called and states she fell out of bed. Pt has abrasions to her head. Pt is unresponsive at this time. BS en route was \"low\". EMS unable to get IV, glucagon given. ) Acuity: Acute Type of Exam: Initial FINDINGS: Head: BRAIN/VENTRICLES: Ventricles are midline in position. No intracerebral masses are identified. No mass effect. No midline shift.   No acute intracranial hemorrhage is seen. There is prominence of the sulci, compatible with atrophy. 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 Dural thickening is seen underneath the craniotomy site. .  Shiva Sheller is seen beneath the craniotomy site ORBITS: Calcifications are seen posteriorly in the globes, similar to prior SINUSES: The visualized paranasal sinuses and mastoid air cells demonstrate no acute abnormality. SOFT TISSUES/SKULL:  No significant mastoid opacification noted. Trace mucosal thickening is seen in the left maxillary sinus. Trace mucosal thickening is seen in the ethmoid air cells. Post craniotomy changes are noted Facial bones: Frontal bones appear intact. Orbital walls appear intact. Zygomatic arches appear intact. Walls of the maxillary sinuses appear intact. No malalignment at the temporomandibular joints. There is streak artifact from dental amalgam. Mandible appears intact Mild mucosal thickening is seen in the ethmoid air cells. Mild mucosal thickening is seen in the maxillary sinuses. Scattered small lymph nodes are seen within the neck     Head: No acute traumatic intracranial abnormality. There is atrophy and small-vessel ischemic change. There is encephalomalacia beneath the craniotomy site on the right Facial bones : No definite facial bone fracture Mild paranasal sinus disease     Ct Cervical Spine Wo Contrast    Result Date: 2/2/2021  EXAMINATION: CT OF THE CERVICAL SPINE WITHOUT CONTRAST 2/2/2021 6:19 am TECHNIQUE: CT of the cervical spine was performed without the administration of intravenous contrast. Multiplanar reformatted images are provided for review. Dose modulation, iterative reconstruction, and/or weight based adjustment of the mA/kV was utilized to reduce the radiation dose to as low as reasonably achievable. COMPARISON: None.  HISTORY: ORDERING SYSTEM PROVIDED HISTORY: fall TECHNOLOGIST PROVIDED HISTORY: Reason for exam:->fall Decision Support Exception->Emergency Medical Condition (MA) Reason for Exam: Fall (Roommate called and states she fell out of bed. Pt has abrasions to her head. Pt is unresponsive at this time. BS en route was \"low\". EMS unable to get IV, glucagon given. ) Acuity: Acute Type of Exam: Initial neck pain. FINDINGS: BONES/ALIGNMENT: There is no acute fracture or traumatic malalignment. DEGENERATIVE CHANGES: No significant degenerative changes. SOFT TISSUES: There is no prevertebral soft tissue swelling. No acute abnormality of the cervical spine. Xr Chest Portable    Result Date: 2/2/2021  EXAMINATION: ONE XRAY VIEW OF THE CHEST 2/2/2021 1:52 pm COMPARISON: Chest x-ray earlier today HISTORY: ORDERING SYSTEM PROVIDED HISTORY: SOb TECHNOLOGIST PROVIDED HISTORY: Reason for exam:->SOb Reason for Exam: SOB Acuity: Acute Type of Exam: Initial FINDINGS: Unchanged enlargement of the cardiopericardial shadow. Very poor visualization of the left mid to lower lung due to the enlarged shadow. Perihilar and left lower lobe disease appears present, no evidence of change. No evidence of pneumothorax. Suggestion of a left pleural effusion, unchanged. No evidence of right pleural effusion     Stable chest over the past 7 hours. Left perihilar and lower lobe pulmonary disease and suggestion of pleural effusion appears unchanged as does enlargement of the cardiopericardial shadow     Xr Chest Portable    Result Date: 2/2/2021  EXAMINATION: ONE XRAY VIEW OF THE CHEST 2/2/2021 6:03 am COMPARISON: Chest x-ray dated 12/09/2020 HISTORY: ORDERING SYSTEM PROVIDED HISTORY: fall TECHNOLOGIST PROVIDED HISTORY: Reason for exam:->fall Reason for Exam: fall altered mental status Acuity: Acute Type of Exam: Initial FINDINGS: LINES/TUBES/OTHER: Left subclavian AICD again noted with leads grossly unchanged in position. HEART/MEDIASTINUM: The cardiac silhouette is markedly enlarged, but stable. The mediastinal silhouette is stable.  PLEURA/LUNGS: There is left pleural effusion and/or basilar atelectasis. There are no focal consolidations or pleural effusions. There is no appreciable pneumothorax. BONES/SOFT TISSUE: No acute abnormality. Stable cardiomegaly. Left pleural effusion and/or basilar atelectasis. Ct Maxillofacial Wo Contrast    Result Date: 2/2/2021  EXAMINATION: CT OF THE HEAD WITHOUT CONTRAST; CT OF THE FACE WITHOUT CONTRAST  2/2/2021 6:19 am TECHNIQUE: CT of the head was performed without the administration of intravenous contrast. Dose modulation, iterative reconstruction, and/or weight based adjustment of the mA/kV was utilized to reduce the radiation dose to as low as reasonably achievable.; CT of the face was performed without the administration of intravenous contrast. Multiplanar reformatted images are provided for review. Dose modulation, iterative reconstruction, and/or weight based adjustment of the mA/kV was utilized to reduce the radiation dose to as low as reasonably achievable. COMPARISON: June 2020 HISTORY: ORDERING SYSTEM PROVIDED HISTORY: fall TECHNOLOGIST PROVIDED HISTORY: Reason for exam:->fall Has a \"code stroke\" or \"stroke alert\" been called? ->No Decision Support Exception->Emergency Medical Condition (MA) Reason for Exam: Fall (Roommate called and states she fell out of bed. Pt has abrasions to her head. Pt is unresponsive at this time. BS en route was \"low\". EMS unable to get IV, glucagon given. ) Acuity: Acute Type of Exam: Initial; ORDERING SYSTEM PROVIDED HISTORY: fall, jaw pain TECHNOLOGIST PROVIDED HISTORY: Reason for exam:->fall, jaw pain Reason for Exam: Fall (Roommate called and states she fell out of bed. Pt has abrasions to her head. Pt is unresponsive at this time. BS en route was \"low\". EMS unable to get IV, glucagon given. ) Acuity: Acute Type of Exam: Initial FINDINGS: Head: BRAIN/VENTRICLES: Ventricles are midline in position. No intracerebral masses are identified. No mass effect. No midline shift.   No acute intracranial most appropriate for further management. Patient admitted to the hospitalist team while awaiting bed had episode possible seizure. The hospitalist KAYLEN Easton was actually in the emergency department to evaluate the patient at the time of the seizure and we have ordered CT scan of the head, as well as portable chest that she had been vomiting just prior to this seizure type activity and her breathing was somewhat labored immediately after the seizure activity but may be secondary to postictal state. She is placed on supplemental oxygen to maintain her oxygen saturations above 90%. Patient will be upgraded to the ICU and family states that she will notify the critical care team.    I spoke with Julian Pierce with the hospitalist team. We thoroughly discussed the history, physical exam, laboratory and imaging studies, as well as, emergency department course. Based upon that discussion, we've decided to admit Bre Jones for further observation and evaluation of Edson Eisenberg's persistent hypoglycemia and now apparent seizure in the ED. As I have deemed necessary from their history, physical and studies, I have considered and evaluated Bre Jones for the following diagnoses:  DIABETES, INTRACRANIAL HEMORRHAGE, MENINGITIS, SEPSIS SUBARACHNOID HEMORRHAGE, SUBDURAL HEMATOMA, & STROKE. The total Critical Care time is 46 minutes which excludes separately billable procedures. FINAL IMPRESSION  1. Fall, initial encounter    2. Contusion of face, initial encounter    3. Hypoglycemia        Vitals:  Blood pressure (!) 150/91, pulse 109, temperature 97.4 °F (36.3 °C), temperature source Oral, resp. rate 17, height 5' 1\" (1.549 m), weight 180 lb (81.6 kg), SpO2 98 %, not currently breastfeeding. Final Disposition:  Jesús Shah was admitted in stable condition.      Laney Love MD  02/02/21 [id number]

## 2021-02-03 ENCOUNTER — APPOINTMENT (OUTPATIENT)
Dept: GENERAL RADIOLOGY | Age: 61
DRG: 637 | End: 2021-02-03
Payer: MEDICARE

## 2021-02-03 ENCOUNTER — APPOINTMENT (OUTPATIENT)
Dept: INTERVENTIONAL RADIOLOGY/VASCULAR | Age: 61
DRG: 637 | End: 2021-02-03
Payer: MEDICARE

## 2021-02-03 LAB
ANION GAP SERPL CALCULATED.3IONS-SCNC: 9 MMOL/L (ref 3–16)
BASOPHILS ABSOLUTE: 0.1 K/UL (ref 0–0.2)
BASOPHILS RELATIVE PERCENT: 1.2 %
BUN BLDV-MCNC: 23 MG/DL (ref 7–20)
CALCIUM SERPL-MCNC: 8.9 MG/DL (ref 8.3–10.6)
CHLORIDE BLD-SCNC: 102 MMOL/L (ref 99–110)
CO2: 24 MMOL/L (ref 21–32)
CREAT SERPL-MCNC: <0.5 MG/DL (ref 0.6–1.1)
EOSINOPHILS ABSOLUTE: 0 K/UL (ref 0–0.6)
EOSINOPHILS RELATIVE PERCENT: 0.6 %
ESTIMATED AVERAGE GLUCOSE: 231.7 MG/DL
GFR AFRICAN AMERICAN: >60
GFR NON-AFRICAN AMERICAN: >60
GLUCOSE BLD-MCNC: 123 MG/DL (ref 70–99)
GLUCOSE BLD-MCNC: 130 MG/DL (ref 70–99)
GLUCOSE BLD-MCNC: 164 MG/DL (ref 70–99)
GLUCOSE BLD-MCNC: 180 MG/DL (ref 70–99)
GLUCOSE BLD-MCNC: 193 MG/DL (ref 70–99)
GLUCOSE BLD-MCNC: 222 MG/DL (ref 70–99)
GLUCOSE BLD-MCNC: 63 MG/DL (ref 70–99)
GLUCOSE BLD-MCNC: 69 MG/DL (ref 70–99)
GLUCOSE BLD-MCNC: 72 MG/DL (ref 70–99)
GLUCOSE BLD-MCNC: 78 MG/DL (ref 70–99)
GLUCOSE BLD-MCNC: 87 MG/DL (ref 70–99)
GLUCOSE BLD-MCNC: 89 MG/DL (ref 70–99)
HBA1C MFR BLD: 9.7 %
HCT VFR BLD CALC: 36.2 % (ref 36–48)
HEMOGLOBIN: 11.5 G/DL (ref 12–16)
LYMPHOCYTES ABSOLUTE: 2 K/UL (ref 1–5.1)
LYMPHOCYTES RELATIVE PERCENT: 26.5 %
MCH RBC QN AUTO: 28.4 PG (ref 26–34)
MCHC RBC AUTO-ENTMCNC: 31.9 G/DL (ref 31–36)
MCV RBC AUTO: 89 FL (ref 80–100)
MONOCYTES ABSOLUTE: 0.7 K/UL (ref 0–1.3)
MONOCYTES RELATIVE PERCENT: 9.2 %
NEUTROPHILS ABSOLUTE: 4.7 K/UL (ref 1.7–7.7)
NEUTROPHILS RELATIVE PERCENT: 62.5 %
PDW BLD-RTO: 21.6 % (ref 12.4–15.4)
PERFORMED ON: ABNORMAL
PERFORMED ON: NORMAL
PLATELET # BLD: 115 K/UL (ref 135–450)
PMV BLD AUTO: 9 FL (ref 5–10.5)
POTASSIUM REFLEX MAGNESIUM: 4 MMOL/L (ref 3.5–5.1)
RBC # BLD: 4.07 M/UL (ref 4–5.2)
SODIUM BLD-SCNC: 135 MMOL/L (ref 136–145)
URINE CULTURE, ROUTINE: NORMAL
WBC # BLD: 7.5 K/UL (ref 4–11)

## 2021-02-03 PROCEDURE — 97116 GAIT TRAINING THERAPY: CPT

## 2021-02-03 PROCEDURE — 97535 SELF CARE MNGMENT TRAINING: CPT

## 2021-02-03 PROCEDURE — 96375 TX/PRO/DX INJ NEW DRUG ADDON: CPT

## 2021-02-03 PROCEDURE — 6370000000 HC RX 637 (ALT 250 FOR IP): Performed by: PHYSICIAN ASSISTANT

## 2021-02-03 PROCEDURE — 6360000002 HC RX W HCPCS: Performed by: INTERNAL MEDICINE

## 2021-02-03 PROCEDURE — 36573 INSJ PICC RS&I 5 YR+: CPT

## 2021-02-03 PROCEDURE — 6370000000 HC RX 637 (ALT 250 FOR IP): Performed by: INTERNAL MEDICINE

## 2021-02-03 PROCEDURE — 2580000003 HC RX 258: Performed by: INTERNAL MEDICINE

## 2021-02-03 PROCEDURE — 97166 OT EVAL MOD COMPLEX 45 MIN: CPT

## 2021-02-03 PROCEDURE — 97530 THERAPEUTIC ACTIVITIES: CPT

## 2021-02-03 PROCEDURE — 36415 COLL VENOUS BLD VENIPUNCTURE: CPT

## 2021-02-03 PROCEDURE — 02HV33Z INSERTION OF INFUSION DEVICE INTO SUPERIOR VENA CAVA, PERCUTANEOUS APPROACH: ICD-10-PCS | Performed by: INTERNAL MEDICINE

## 2021-02-03 PROCEDURE — 95816 EEG AWAKE AND DROWSY: CPT | Performed by: PSYCHIATRY & NEUROLOGY

## 2021-02-03 PROCEDURE — 99233 SBSQ HOSP IP/OBS HIGH 50: CPT | Performed by: INTERNAL MEDICINE

## 2021-02-03 PROCEDURE — 85025 COMPLETE CBC W/AUTO DIFF WBC: CPT

## 2021-02-03 PROCEDURE — 2060000000 HC ICU INTERMEDIATE R&B

## 2021-02-03 PROCEDURE — 71045 X-RAY EXAM CHEST 1 VIEW: CPT

## 2021-02-03 PROCEDURE — 96376 TX/PRO/DX INJ SAME DRUG ADON: CPT

## 2021-02-03 PROCEDURE — C1769 GUIDE WIRE: HCPCS

## 2021-02-03 PROCEDURE — 80048 BASIC METABOLIC PNL TOTAL CA: CPT

## 2021-02-03 PROCEDURE — 95816 EEG AWAKE AND DROWSY: CPT

## 2021-02-03 PROCEDURE — 97162 PT EVAL MOD COMPLEX 30 MIN: CPT

## 2021-02-03 RX ORDER — DULOXETIN HYDROCHLORIDE 60 MG/1
60 CAPSULE, DELAYED RELEASE ORAL DAILY
Status: DISCONTINUED | OUTPATIENT
Start: 2021-02-04 | End: 2021-02-04

## 2021-02-03 RX ORDER — FUROSEMIDE 10 MG/ML
20 INJECTION INTRAMUSCULAR; INTRAVENOUS 2 TIMES DAILY
Status: DISCONTINUED | OUTPATIENT
Start: 2021-02-03 | End: 2021-02-04

## 2021-02-03 RX ORDER — SODIUM CHLORIDE 0.9 % (FLUSH) 0.9 %
10 SYRINGE (ML) INJECTION EVERY 12 HOURS SCHEDULED
Status: DISCONTINUED | OUTPATIENT
Start: 2021-02-03 | End: 2021-02-05 | Stop reason: HOSPADM

## 2021-02-03 RX ORDER — LIDOCAINE HYDROCHLORIDE 10 MG/ML
5 INJECTION, SOLUTION EPIDURAL; INFILTRATION; INTRACAUDAL; PERINEURAL ONCE
Status: DISCONTINUED | OUTPATIENT
Start: 2021-02-03 | End: 2021-02-05 | Stop reason: HOSPADM

## 2021-02-03 RX ORDER — QUETIAPINE FUMARATE 50 MG/1
50 TABLET, EXTENDED RELEASE ORAL NIGHTLY
Status: DISCONTINUED | OUTPATIENT
Start: 2021-02-03 | End: 2021-02-05 | Stop reason: HOSPADM

## 2021-02-03 RX ORDER — SODIUM CHLORIDE 0.9 % (FLUSH) 0.9 %
10 SYRINGE (ML) INJECTION PRN
Status: DISCONTINUED | OUTPATIENT
Start: 2021-02-03 | End: 2021-02-05 | Stop reason: HOSPADM

## 2021-02-03 RX ORDER — LEVETIRACETAM 500 MG/1
500 TABLET ORAL 2 TIMES DAILY
Status: DISCONTINUED | OUTPATIENT
Start: 2021-02-03 | End: 2021-02-05 | Stop reason: HOSPADM

## 2021-02-03 RX ADMIN — FENOFIBRATE 160 MG: 160 TABLET ORAL at 08:35

## 2021-02-03 RX ADMIN — MUPIROCIN: 20 OINTMENT TOPICAL at 08:35

## 2021-02-03 RX ADMIN — RANOLAZINE 500 MG: 500 TABLET, FILM COATED, EXTENDED RELEASE ORAL at 08:35

## 2021-02-03 RX ADMIN — APIXABAN 5 MG: 5 TABLET, FILM COATED ORAL at 23:18

## 2021-02-03 RX ADMIN — SUCRALFATE 1 G: 1 TABLET ORAL at 23:17

## 2021-02-03 RX ADMIN — LEVETIRACETAM 500 MG: 500 TABLET, FILM COATED ORAL at 08:35

## 2021-02-03 RX ADMIN — ATORVASTATIN CALCIUM 40 MG: 40 TABLET, FILM COATED ORAL at 23:18

## 2021-02-03 RX ADMIN — LEVETIRACETAM 500 MG: 500 TABLET, FILM COATED ORAL at 23:19

## 2021-02-03 RX ADMIN — APIXABAN 5 MG: 5 TABLET, FILM COATED ORAL at 11:57

## 2021-02-03 RX ADMIN — ASPIRIN 81 MG: 81 TABLET, COATED ORAL at 08:35

## 2021-02-03 RX ADMIN — PANTOPRAZOLE SODIUM 40 MG: 40 TABLET, DELAYED RELEASE ORAL at 08:35

## 2021-02-03 RX ADMIN — Medication 10 ML: at 23:18

## 2021-02-03 RX ADMIN — Medication 10 ML: at 23:19

## 2021-02-03 RX ADMIN — Medication 10 ML: at 11:41

## 2021-02-03 RX ADMIN — FUROSEMIDE 20 MG: 10 INJECTION, SOLUTION INTRAMUSCULAR; INTRAVENOUS at 16:56

## 2021-02-03 RX ADMIN — MUPIROCIN: 20 OINTMENT TOPICAL at 23:40

## 2021-02-03 RX ADMIN — CARVEDILOL 3.12 MG: 3.12 TABLET, FILM COATED ORAL at 16:57

## 2021-02-03 RX ADMIN — RANOLAZINE 500 MG: 500 TABLET, FILM COATED, EXTENDED RELEASE ORAL at 23:18

## 2021-02-03 RX ADMIN — FUROSEMIDE 20 MG: 10 INJECTION, SOLUTION INTRAMUSCULAR; INTRAVENOUS at 11:41

## 2021-02-03 RX ADMIN — AMIODARONE HYDROCHLORIDE 200 MG: 200 TABLET ORAL at 08:35

## 2021-02-03 RX ADMIN — CARVEDILOL 3.12 MG: 3.12 TABLET, FILM COATED ORAL at 08:35

## 2021-02-03 RX ADMIN — QUETIAPINE 50 MG: 50 TABLET, EXTENDED RELEASE ORAL at 23:18

## 2021-02-03 RX ADMIN — SUCRALFATE 1 G: 1 TABLET ORAL at 08:35

## 2021-02-03 RX ADMIN — SUCRALFATE 1 G: 1 TABLET ORAL at 16:57

## 2021-02-03 ASSESSMENT — PAIN SCALES - GENERAL: PAINLEVEL_OUTOF10: 0

## 2021-02-03 NOTE — FLOWSHEET NOTE
02/03/21 1400   Vital Signs   Temp 97 °F (36.1 °C)   Temp Source Oral   Pulse 88   Heart Rate Source Monitor   Resp 18   /84   BP Location Left upper arm   Patient Position Semi fowlers   Level of Consciousness Alert (0)   MEWS Score 1   Patient Currently in Pain Denies   Oxygen Therapy   SpO2 99 %   Pulse Oximeter Device Mode Intermittent   Pulse Oximeter Device Location Finger   O2 Device None (Room air)   Patient transferred to PCU at this time. Orders have been released. Patient denies any further needs. VSS.

## 2021-02-03 NOTE — PROGRESS NOTES
Patient's friend Tereza Thomas updated. Patient states that it is okay to updated and that Tereza Thomas is a close friend who helps take care of her.

## 2021-02-03 NOTE — PROGRESS NOTES
AM assessment completed. Awake and alert at this time. Sugars in the 60s. Awake eating breakfast. Only PIV has infiltrated with d5 infusion. Currently no working line. Clinical admin called for placement. Encouraging PO intake. Will re check sugar in 15 mins. BLE edema. Bruising to face. Will monitor.  Milagro Sutton RN

## 2021-02-03 NOTE — PROGRESS NOTES
Inpatient Occupational Therapy  Evaluation and Treatment    Unit: ICU  Date:  2/3/2021  Patient Name:    Arianne Robles  Admitting diagnosis:  Hypoglycemia [E16.2]  New onset seizure [facility]) [R56.9]  Admit Date:  2/2/2021  Precautions/Restrictions/WB Status/ Lines/ Wounds/ Oxygen:    Fall risk, Bed/chair alarm, Telemetry, Continuous pulse oximetry and Isolation Precautions: Contact, pacemaker, seizure precautions   female enrolled in hospice , The patient is status post right parietal craniotomy    Treatment Time:  8534-2383   Treatment Number: 1   Timed code treatment minutes 27 minutes   Total Treatment minutes:   37   minutes    Patient Goals for Therapy:  \" go home  \"      Discharge Recommendations: 24/7 sup/assist   DME needs for discharge: Needs Met       Therapy recommendations for staff:   Assist of 1 with use of rolling walker (RW) for all ambulation to/from bathroom    History of Present Illness: per H&P on 2-2-21    62 y.o. female  who presents to the ED complaining of low blood sugar, fall. Patient was brought in by EMS with concerns for fall from her bed, reports that her roommate heard her fall and called EMS. Her blood sugar was reading \"low\" when they arrived. She did receive glucagon as well as some oral glucose but were unable to obtain an IV. Upon arrival in the ED, patient was initially opening eyes and waved to me with her right hand but was unable to contribute further to the history at that time.     Patient noted to have active seizure during admission  2-2 Soha   -Tonic-clonic seizure with deviated gaze to the right, head deviation to right, contractures of bilateral upper extremities with diffuse shaking, + tongue biting, lasted ~1 minute  -Seizure activity stopped patient was still given IV Ativan 2 mg x 1  -Not actively on any antiepileptics  -Does have a history of craniotomy with encephalomalacia seen below this area but I am unable to find prior neurology notes--> CT head repeat without acute findings     Home Health S4 Level Recommendation:  Level 1 Standard  AM-PAC Score: 21    Preadmission Environment    Pt. Lives Alone              Friend who lives upstairs stays with patient overnight. Pt's friend is her POA per pt. Home environment:            apartment   Steps to enter first floor: No steps  Steps to second floor:         N/A  Bathroom: tub/shower unit, grab bars and tub transfer bench, handheld shower, standard height toilet (able to push up from sink)  Equipment owned: RW, shower chair/bench, BSC, SPC and reacher     Preadmission Status:  Pt. Able to drive: No - friend drives  Pt Fully independent with ADLs: Yes  Pt. Required assistance from family for: Bathing, Cleaning, Cooking and Laundry               Friend assists with cooking, cleaning, and laundry. Go together to go to grocery store              HHA 2x/wk to assist with showers  Pt. independent for transfers and gait and walked with Altagracia Schwarz              Pt uses cart at grocery store. History of falls          Yes - prior to admission during seizure, pt also fell in yard on Adonis day and sprained ankle and scratched elbow (still present)    Pain  No      Cognition    A&O Person, Place, Time and Situation   Able to follow 2 step commands    Subjective-flat affect   Patient lying supine in bed with no family present. Pt agreeable to this OT eval & tx.      Upper Extremity ROM:    WFL    Upper Extremity Strength:    4/5    Upper Extremity Sensation    WFL    Upper Extremity Proprioception:  NT    Coordination and Tone  WFL    Balance  Functional Sitting Balance:  WFL  Functional Standing Balance:Impaired    Bed mobility:    Supine to sit:   Modified Independent  Sit to supine:   Modified Independent  Rolling:    Not Tested  Scooting in sitting:  Modified Independent  Scooting to head of bed:   Not Tested    Bridging:   Not Tested    Transfers:    Sit to stand:  SBA  Stand to sit:  SBA  Bed to chair: NT  Standard toilet: CGA  Bed to [facility]:  Not Tested    Dressing:      UE:   Not Tested  LE:    Modified Independent and Supervision for donning pullups and supervision for doffing and donning socks     Bathing:    UE:  Ind bathing face  LE:  Not Tested    Eating:   Not Tested    Toileting:  CGA- wipe standing     Activity Tolerance   Pt completed therapy session with fatigue and decreased balance   Sp02 96%  Positioning Needs:   Pt in bed, alarm set, positioned in proper neutral alignment and pressure relief provided. Exercise / Activities Initiated:   N/A    Patient/Family Education:   Role of OT    Assessment of Deficits: Pt seen for Occupational therapy evaluation in acute care setting. Pt demonstrated decreased Activity tolerance, ADLs, Balance , Bed mobility, Strength and Transfers. Pt functioning below baseline and will likely benefit from skilled occupational therapy services to maximize safety and independence. Goal(s) : To be met in 3 Visits:  1). Bed to toilet/BSC: CGA with RW    To be met in 5 Visits:  1). Supine to/from Sit:  Independent  2). Upper Body Bathing:   Independent  3). Lower Body Bathing:   CGA  4). Upper Body Dressing:  Independent  5). Lower Body Dressing:  Modified Independent  6). Pt to demonstrate UE exs x 15 reps with minimal cues    Rehabilitation Potential:  Good for goals listed above. Strengths for achieving goals include: Pt cooperative  Barriers to achieving goals include:  Complexity of condition     Plan: To be seen 3-5 x/wk while in acute care setting for therapeutic exercises, bed mobility, transfers, dressing, bathing, family/patient education, ADL/IADL retraining, energy conservation training.      Lonnie Zamora OTR/L [id number]          If patient discharges from this facility prior to next visit, this note will serve as the Discharge Summary

## 2021-02-03 NOTE — PROGRESS NOTES
Pt resting in bed, VSS. Denies SOB or pain. Assessment complete see doc flow. NSR on tele, SPO2 100% on 2L O2 via NC with CSPO2 monitoring. N.C. O2 was removed pt now on RA. Pt A&O x 2 to person and place. Bilateral lung sounds are diminished, pt very tired and falls asleep when asking questions. BS maintaining >70. D5 Infusing at 100 ml/hr. Pts' POA calling for update. Update given no more questions at this time. Pt did mention she is missing her \"juan pablo bear\" and is tearful. Unable to locate pts' belonging at this time. Pt educated not to get oob without assistance, pt verbalized understanding. Pt repositioned for comfort. Call light within reach will continue to monitor.

## 2021-02-03 NOTE — PROGRESS NOTES
4 Eyes Skin Assessment     The patient is being assess for   Transfer to New Unit    I agree that 2 RN's have performed a thorough Head to Toe Skin Assessment on the patient. ALL assessment sites listed below have been assessed. Areas assessed by both nurses:   [x]   Head, Face, and Ears   [x]   Shoulders, Back, and Chest, Abdomen  [x]   Arms, Elbows, and Hands   [x]   Coccyx, Sacrum, and Ischium  [x]   Legs, Feet, and Heels        Scabs to BLE and right knee and right elbow. Bruising to left outer eye/face. **SHARE this note so that the co-signing nurse is able to place an eSignature**    Co-signer eSignature: Electronically signed by Shantell Langford RN on 2/3/21 at 2:18 PM EST    Does the Patient have Skin Breakdown?   Yes LDA WOUND CARE was Initiated documentation include the Barbara-wound, Wound Assessment, Measurements, Dressing Treatment, Drainage, and Color\",          Ignacio Prevention initiated:  Yes   Wound Care Orders initiated:  Yes      [id number]  nurse consulted for Pressure Injury (Stage 3,4, Unstageable, DTI, NWPT, Complex wounds)and New or Established Ostomies:  Yes      Primary Nurse eSignature: Electronically signed by Gaston Moreno RN on 2/3/21 at 1:27 PM EST

## 2021-02-03 NOTE — PROGRESS NOTES
IM Progress Note    Admit Date:  2/2/2021  1    Interval history:  Admitted for hypoglycemia and new onset seizure  Hx of ischemic CM, CKD, DM, depression on home hospice care     Subjective:  Ms. Hazel Farmer seen up in bed, more awake, alert and better oriented  Is not aware of fall at home or seizure here    ON RA, no further seizures overnight  Sugars improved, off D 5 fluids tolerating diet      Objective:   /82   Pulse 82   Temp 98.2 °F (36.8 °C) (Axillary)   Resp 17   Ht 5' 1\" (1.549 m)   Wt 180 lb (81.6 kg)   SpO2 99%   BMI 34.01 kg/m²   No intake or output data in the 24 hours ending 02/03/21 0732    Physical Exam:  .General:  Middle aged female awake, alert and oriented   . Appears to be not in any distress  Mucous Membranes:  Pink , anicteric  Neck: No JVD, no carotid bruit, no thyromegaly  Chest:  Clear to auscultation bilaterally, diminished in bases  Left sided AICD  Cardiovascular:  RRR S1S2 heard, no murmurs or gallops  Abdomen:  Soft, undistended, non tender, no organomegaly, BS present  Extremities:3+ bibiana LE edema . Distal pulses well felt  Skin - large rash on left side of face involving ear  Pupils bibiana equal and reactive   Neuro - non focal today .  , able to follow commands normally     Medications:   Scheduled Medications:    amiodarone  200 mg Oral Daily    aspirin  81 mg Oral Daily    atorvastatin  40 mg Oral Nightly    carvedilol  3.125 mg Oral BID WC    digoxin  125 mcg Oral Every Other Day    DULoxetine  60 mg Oral Daily    fenofibrate  160 mg Oral Daily    pantoprazole  40 mg Oral Daily    ranolazine  500 mg Oral BID    sucralfate  1 g Oral TID    sodium chloride flush  10 mL Intravenous 2 times per day    levetiracetam  1,000 mg Intravenous Daily    mupirocin   Nasal BID     I   dextrose 50 mL/hr at 02/02/21 1744     nitroGLYCERIN, sodium chloride flush, promethazine **OR** ondansetron, polyethylene glycol, acetaminophen **OR** acetaminophen, glucose, dextrose, glucagon (rDNA), ondansetron, LORazepam    Lab Data:  Recent Labs     02/02/21  0531 02/03/21  0425   WBC 9.6 7.5   HGB 12.1 11.5*   HCT 37.5 36.2   MCV 88.1 89.0    115*     Recent Labs     02/02/21  0531 02/02/21  0819 02/03/21  0425   * 137 135*   K 5.7* 4.2 4.0   CL 97* 101 102   CO2 27 26 24   BUN 23* 23* 23*   CREATININE 0.6 0.6 <0.5*     Recent Labs     02/02/21  0531   TROPONINI <0.01       Coagulation:   Lab Results   Component Value Date    INR 1.29 11/23/2020    APTT 31.8 11/20/2020     Cardiac markers:   Lab Results   Component Value Date    CKTOTAL 54 06/12/2020    TROPONINI <0.01 02/02/2021         Lab Results   Component Value Date    ALT 16 02/02/2021    AST 44 (H) 02/02/2021    ALKPHOS 65 02/02/2021    BILITOT 0.5 02/02/2021       Lab Results   Component Value Date    INR 1.29 (H) 11/23/2020    INR 1.02 08/31/2020    INR 1.01 08/12/2020    PROTIME 15.0 (H) 11/23/2020    PROTIME 11.8 08/31/2020    PROTIME 11.7 08/12/2020         EKG:  I have reviewed the EKG with the following interpretation:   Normal sinus rhythm  Anterolateral infarct , age undetermined  Prolonged QT  Abnormal ECG  No previous ECGs available     RADIOLOGY  XR PELVIS (1-2 VIEWS)   Final Result   No acute osseous abnormality.           XR CHEST PORTABLE   Final Result   Stable cardiomegaly.       Left pleural effusion and/or basilar atelectasis.         CT MAXILLOFACIAL WO CONTRAST   Final Result   Head:       No acute traumatic intracranial abnormality. There is atrophy and   small-vessel ischemic change. There is encephalomalacia beneath the   craniotomy site on the right       Facial bones :       No definite facial bone fracture       Mild paranasal sinus disease           CT CERVICAL SPINE WO CONTRAST   Final Result   No acute abnormality of the cervical spine.           CT HEAD WO CONTRAST   Final Result   Head:       No acute traumatic intracranial abnormality.   There is atrophy and   small-vessel ischemic change.   There is encephalomalacia beneath the   craniotomy site on the right       Facial bones :       No definite facial bone fracture       Mild paranasal sinus disease                  urine pending    ASSESSMENT/PLAN:     New onset seizures  -Patient noted to have active seizure during admission    -Tonic-clonic seizure with deviated gaze to the right, head deviation to right, contractures of bilateral upper extremities with diffuse shaking, + tongue biting, lasted ~1 minute  -Seizure activity stopped patient was still given IV Ativan 2 mg x 1  -Not actively on any antiepileptics  -Does have a history of craniotomy with encephalomalacia seen below this area but I am unable to find prior neurology notes--> CT head repeat without acute findings         -MRI brain cannot be done with AICD in place  -EEG pending- interrogate AICD for any arrythmia  --IV Keppra 1 gm given and changed to 500 mg bid   -As needed Ativan as needed for seizure-like activity  -Admit to ICU, critical care consulted     Hypoglycemia  - In patient with Type II DM, insulin dependent   - Was hyperglycemic during last admission and Lantus and SSI were increased   -  on arrival after receiving oral glucose en route via EMS-->dropped to 48, given amp D50, BG improved to 110 and dropped again to 62   - Started on IV D5 fluids, improved, resume diet  - wean off D 5 fluids   - Hold home Metformin, Lantus   - Check Hgb A1c       Acute metabolic Encephalopathy  - Suspect 2.2 seizure and hypoglycemia  - Baseline AAO x 4   - resolved      Fall   - Suspect related to hypoglycemia   - CT head: s/p craniotomy on R with encephalomalacia beneath the craniotomy site  - CT C spine: no acute abnormality   CT Max/Face: no defnitie facial bone fracture, mild paranasal sinus disease   - Pelvic Xray WNL  - superficial rug rash to left side of face   - Updated on tetanus shot in ED      Hyperkalemia  - K: 5.7 on admission   - IVF, resolved  - No acute EKG changes         Chronic combined CHF   Severe non-ischemic Cardiomyopathy   Non-obstructive CAD  Hx of NSVT    - S/p AICD placement   - Last Echo with EF <20%, severe global hypokinesis, grade II DD, mod TR and mod pulm HTN  - + edema   - Continue ASA, BB, amio, digoxin, Ranexa   - Home diuretics held on admission, resumed   - Under hospice care      Chronic Pleural Effusion   - Stable cardiomegaly and L pleural effusion noted on CXR   - Monitor      Hx of PE  - dx in Nov 2020   - AC on Eliquis     Recent COVID-19 Infection   - + COVID 12/9/20  - Rapid on admission was negative   - Was tx with decadron and remdesivir, Remdesivir stopped 2/2 elevated LFTs     Recent UTI   - ESBL E coli at the beginning of December, tx with Merrem x 6 days and discharged on Invanz   - UA with small LE and 10-20 WBC   - Check urine cx     GERD  - Continue PPI      HLD  - Continue statin      Depression   - Continue home medications      Prolonged QT interval   - as had prolonged QT in the past   - Monitor QT interval with repeat EKG in AM  - Okay to continue home medications for now      Obesity  - Body mass index is 34.01 kg/m². - Complicating assessment and treatment.  Placing patient at risk for multiple co-morbidities as well as early death and contributing to the patient's presentation.   - Counseled on weight loss.     DVT Prophylaxis: eliquis   Diet: clear liquid - adv  Code Status: [facility]    Start PT         Sergey Gibson MD 2/3/2021 7:32 AM

## 2021-02-03 NOTE — PROGRESS NOTES
Pulmonary & Critical Care Medicine ICU Progress Note    CC: Fall, hypoglycemia, seizure    Events of Last 24 hours: Admitted to ICU after witnessed seizure  Lethargic overnight  More alert this am  BS was 68 this am after loss of IV and no D5, improved to 86 with oral intake     Invasive Lines: IV: none     IV:   dextrose 50 mL/hr at 21 1744       Vitals:  Blood pressure 113/73, pulse 82, temperature 97.7 °F (36.5 °C), temperature source Axillary, resp. rate 16, height 5' 1\" (1.549 m), weight 180 lb (81.6 kg), SpO2 97 %, not currently breastfeeding. on RA  Temp  Av.3 °F (36.8 °C)  Min: 97.7 °F (36.5 °C)  Max: 98.9 °F (37.2 °C)  No intake or output data in the 24 hours ending 21 0608  EXAM:  General: ill appearing    Eyes: PERRL. No sclera icterus. No conjunctival injection. ENT: No discharge. Pharynx clear. Neck: Trachea midline. Normal thyroid. Resp: No accessory muscle use. No crackles. No wheezing. No rhonchi. No dullness on percussion. CV: Regular rate. Regular rhythm. No mumur or rub. No edema. Peripheral pulses are 2+. Capillary refill is less than 3 seconds. GI: Non-tender. Non-distended. No masses. No organomegaly. Normal bowel sounds. No hernia. Skin: Warm and dry. No nodule on exposed extremities. Rash/burn on left side of face  Lymph: No cervical LAD. No supraclavicular LAD. M/S: No cyanosis. No joint deformity. No clubbing. Neuro: A&O to person, [place].  Patellar reflexes are symmetric  Psych: No agitation, no anxiety, affect is full     Scheduled Meds:   amiodarone  200 mg Oral Daily    aspirin  81 mg Oral Daily    atorvastatin  40 mg Oral Nightly    carvedilol  3.125 mg Oral BID WC    digoxin  125 mcg Oral Every Other Day    DULoxetine  60 mg Oral Daily    fenofibrate  160 mg Oral Daily    pantoprazole  40 mg Oral Daily    ranolazine  500 mg Oral BID    sucralfate  1 g Oral TID    sodium chloride flush  10 mL Intravenous 2 times per day    levetiracetam 1,000 mg Intravenous Daily    enoxaparin  1 mg/kg Subcutaneous BID    mupirocin   Nasal BID     PRN Meds:  nitroGLYCERIN, sodium chloride flush, promethazine **OR** ondansetron, polyethylene glycol, acetaminophen **OR** acetaminophen, glucose, dextrose, glucagon (rDNA), ondansetron, LORazepam    Results:  CBC:   Recent Labs     02/02/21  0531 02/03/21  0425   WBC 9.6 7.5   HGB 12.1 11.5*   HCT 37.5 36.2   MCV 88.1 89.0    115*     BMP:   Recent Labs     02/02/21  0531 02/02/21  0819 02/03/21  0425   * 137 135*   K 5.7* 4.2 4.0   CL 97* 101 102   CO2 27 26 24   BUN 23* 23* 23*   CREATININE 0.6 0.6 <0.5*     LIVER PROFILE:   Recent Labs     02/02/21  0531   AST 44*   ALT 16   BILITOT 0.5   ALKPHOS 65     Cultures:   12/9/2020 SARS-CoV-2 positive  12/7/2020 urine E. coli ESBL Carbapenem sensitive  12/9/2020 urine E. coli ESBL  12/11/2020 blood no growth to date     Films:  CT chest 12/9/2020  Shows patchy nodular bilateral infiltrates with areas of groundglass opacity new compared to the 11/16/2020 CT consistent with acute infection    CXR 2/2/21 shows probable left effusion, I reviewed CT above which shows very small effusions bilaterally     HCT 2/2/21   BRAIN/VENTRICLES: There is no acute intracranial hemorrhage, mass effect or   midline shift.  No abnormal extra-axial fluid collection.  The gray-white   differentiation is maintained without evidence of an acute infarct.  There is   no evidence of hydrocephalus.        ASSESSMENT:  · Tonic clonic seizure witnessed in ED  · Acute hypoglycemia  · H/O craniotomy   · Fall with reassuring imaging of head, C spine, face and pelvis   · Pulmonary embolism 11/16/2020  · Recent UTI: E. coli, ESBL  · Diabetes    · Chronic small pleural effusions  · Systolic and diastolic CHF  · Nonischemic cardiomyopathy with EF <20%  · S/P AICD  · Left ventricular apical thrombus  · H/O COVID infection in December 2020  · MRDD  · TCP    PLAN:  · IV ativan for any seizure activity, EEG to rule out status  · Keppra per internal medicine  · Hold oral hypoglycemics  · Dextrose infusion - needs IV access  · With acute TCP, stop lovenox, resume DOAC   · Prophylaxis: bactroban  · DNR: Continues under hospice care   · Okay for floor once blood sugars stable

## 2021-02-03 NOTE — CARE COORDINATION
Case Management Assessment  Initial Evaluation      Patient Name: Sowmya Johnson  YOB: 1960  Diagnosis: Hypoglycemia [E16.2]  New onset seizure [facility]) [R56.9]  Date / Time: 2/2/2021  5:15 AM    Admission status/Date:2/2/2021 inpt  Chart Reviewed: Yes      Patient Interviewed: No   Family Interviewed:  Yes - sister Erick Madrigal      Hospitalization in the last 30 days:  No      Health Care Decision Maker :   Supplemental (Other) Decision Maker: Christa Campos - Brother/Sister - [phone]    (CM - must 1st enter selection under Navigator - emergency contact- [address] Relationship and pick relationship)   Who do you trust or have selected to make healthcare decisions for you      Met with: sister Darlene Guevara conducted  (bedside/phone):telephone    Current PCP:   [address] Medicare  Precert required for SNF : Y          3 night stay required - [employer]  Support Systems/Care Needs:    Transportation: sister/friend    Meal Preparation: self/friend    Housing  Living Arrangements: lives in 1st floor apt alone  Steps: 0  Intent for return to present living arrangements: Yes  Identified Issues:     [address] with [address] : Yes - [facility]:(Services)   [address] [phone]    Passport/Waiver : No  :                      Phone Number:    Passport/Waiver Services:           Durable Medical Equiptment   DME Provider: [facility]  Equipment:   Walker_x__Cane_x__RTS___ BSC___Shower Chair___Hospital Bed___W/C____Other________  02 at __x__Liter(s)---wears(frequency)_PRN______ HHN ___ CPAP___ BiPap___   N/A____      Home O2 Use :  Yes -PRN   If No for home O2---if presently on O2 during hospitalization:  Yes  if yes CM to follow for potential DC O2 need  Informed of need for care provider to bring portable home O2 tank on day of discharge for nursing to connect prior to leaving:   Needs reinforced    Community

## 2021-02-03 NOTE — PROGRESS NOTES
Informed Dr. Lisbeth Fountain that EEG was complete and results were available via the [address] [place] [address].

## 2021-02-03 NOTE — PROGRESS NOTES
Sugar up to 87. Unable to place PIV. Orders for PICC line from Dr Rosita Graves. Radiology RN aware. Pt agreeable.

## 2021-02-03 NOTE — PROGRESS NOTES
Pt resting in bed, VSS. Denies SOB or pain. Assessment complete see doc flow. SR/PVCs on tele, SPO2 94% on RA via NC with CSPO2 monitoring. Pt A&O x 2. Pt slept most of the night. Attends changed no BM. No abnormalities found in neuro checks. BS maintaining above 70. No replacements needed this morning. Pt educated not to get oob without assistance, pt verbalized understanding. Pt repositioned for comfort. Call light within reach will continue to monitor.

## 2021-02-03 NOTE — PROGRESS NOTES
Reassessment completed. Bilateral lung sounds are diminished. Pt still drowsy, will wake up and respond but will fall back asleep immediatly. Pts' attends changed and all linens changed due to leaky bloody IV-IV removed. Unable to obtain new point of access X 1 attempt. Per report all peripheral lines required U/S guidance. BS remains <70-will continue to monitor.

## 2021-02-03 NOTE — PROGRESS NOTES
Inpatient Physical Therapy Evaluation and Treatment    Unit: PCU  Date:  2/3/2021  Patient Name:    Magdi Rhodes  Admitting diagnosis:  Hypoglycemia [E16.2]  New onset seizure [facility]) [R56.9]  Admit Date:  2/2/2021  Precautions/Restrictions/WB Status/ Lines/ Wounds/ Oxygen: Fall risk, Bed/chair alarm, Telemetry, Continuous pulse oximetry and Isolation Precautions: Contact, pacemaker, seizure precautions    Treatment Time:  15:28-16:02  Treatment Number:  1   Timed Code Treatment Minutes: 24 minutes  Total Treatment Minutes:  34  minutes    Patient Goals for Therapy: \" go home with help from my friend \"          Discharge Recommendations: Home 24 hr assist  and Home PT  DME needs for discharge: Needs Met       Therapy recommendation for EMS Transport: can transport by wheelchair    Therapy recommendations for staff:   Assist of 1 with use of rolling walker (RW) and gait belt for all transfers and ambulation to/from bathroom    History of Present Illness: (Per H&P by Dr. Becky Aguilar on 2/2/21)  62 y.o. female with  who presented to [facility] ED with complaint of fall and hypoglycemia. Time of my exam, patient is unable to answer any questions. History obtained from ED providers note. \"Estela Eisenberg is a 62 y.o. female  who presents to the ED complaining of low blood sugar, fall.  Patient was brought in by EMS with concerns for fall from her bed, reports that her roommate heard her fall and called EMS. Thomas Arredondo blood sugar was reading \"low\" when they arrived. Sharon Aguilar did receive glucagon as well as some oral glucose but were unable to obtain an IV.  Upon arrival in the ED, patient was initially opening eyes and waved to me with her right hand but was unable to contribute further to the history at that time\"   Patient is under hospice care with [facility] who has been notified of admission.    New onset seizures  -Patient noted to have active seizure during admission    -Tonic-clonic seizure with deviated gaze to the 5   Hamstring 4+   Iliopsoas 4  L LE  Quad   4+   Ant Tib  5   Hamstring 4+   Iliopsoas 4-    Lower Extremity Sensation    WNL    Lower Extremity Proprioception:   WNL    Coordination and Tone  WNL    Balance  Sitting:  Normal; Independent  Comments: for static and dynamic sitting tasks    Standing: Fair +; CGA  Comments: moderate sway with no UE support, improved balance with unilateral UE support for pericare in standing   Best balance with B UE support on RW but continues to have mild intermittent sway to L    Bed Mobility   Supine to Sit:    Modified Independent with HOB elevated  Sit to Supine:   Independent  Rolling:   Independent  Scooting in sitting: Independent  Scooting in supine:  Not Tested    Transfer Training     Sit to stand:   CGA from bed and low commode (some unsteadiness and decreased safety awareness due to fatigue)  Stand to sit:   CGA to bed and low commode  Bed to Chair:   Not Tested with use of N/A    Gait gait completed as indicated below  Distance:      20 + 20 ft  Deviations (firm surface/linoleum):  decreased bonnie, shuffles and decreased step length bilaterally  Assistive Device Used:    gait belt and rolling walker (RW)  Level of Assist:    CGA-Min A  Comment: pt moderately unsteady requiring assist from therapist to correct    Stair Training deferred, pt does not have stairs in home environment    Activity Tolerance   Pt completed therapy session with significant fatigue. Vitals WFL throughout session    Positioning Needs   Pt in bed, alarm set, positioned in proper neutral alignment and pressure relief provided. Call light provided and all needs within reach    Exercises Initiated  Rayshawn deferred secondary to treatment focus on functional mobility  NA    Other  See OT note for assist with lower body dressing.      Patient/Family Education   Pt educated on role of inpatient PT, POC, importance of continued activity, DC recommendations, safety awareness, transfer techniques and calling for assist with mobility. Assessment  Pt seen for Physical Therapy evaluation in acute care setting. Pt demonstrated decreased Activity tolerance, Balance and Safety as well as decreased independence with Ambulation and Transfers. Pt was mildly unsteady with ambulation requiring assist from therapist intermittently. Pt was very fatigued this session and has not been out of bed prior to assessment, contributing to pt's performance. Anticipate that pt will progress well. Recommending Home 24 hr assist and with home PT upon discharge as patient functioning below baseline level and would benefit from continued therapy services    Goals : To be met in 3 visits:  1). Independent with LE Ex x 10 reps    To be met in 6 visits:  1). Supine to/from sit: Independent  2). Sit to/from stand: Modified Independent  3). Bed to chair: Modified Independent with RW  4). Gait: Ambulate 50 ft.  with  Modified Independent and use of rolling walker (RW)  5). Tolerate B LE exercises 3 sets of 10-15 reps  6). Stand 5 minute with unilateral UE support and no more than SBA    Rehabilitation Potential: Good  Strengths for achieving goals include:   Pt motivated, PLOF, Pt cooperative and supportive friend   Barriers to achieving goals include:    Weakness    Plan    To be seen 3-5 x / week  while in acute care setting for therapeutic exercises, bed mobility, transfers, progressive gait training, balance training, and family/patient education. Signature: Gabrielle Reyes PT, DPT #[id number]    If patient discharges from this facility prior to next visit, this note will serve as the Discharge Summary.

## 2021-02-04 ENCOUNTER — TELEPHONE (OUTPATIENT)
Dept: CARDIOLOGY CLINIC | Age: 61
End: 2021-02-04

## 2021-02-04 ENCOUNTER — TELEPHONE (OUTPATIENT)
Dept: INTERNAL MEDICINE CLINIC | Age: 61
End: 2021-02-04

## 2021-02-04 LAB
GLUCOSE BLD-MCNC: 186 MG/DL (ref 70–99)
GLUCOSE BLD-MCNC: 191 MG/DL (ref 70–99)
GLUCOSE BLD-MCNC: 203 MG/DL (ref 70–99)
GLUCOSE BLD-MCNC: 239 MG/DL (ref 70–99)
GLUCOSE BLD-MCNC: 270 MG/DL (ref 70–99)
PERFORMED ON: ABNORMAL

## 2021-02-04 PROCEDURE — 99232 SBSQ HOSP IP/OBS MODERATE 35: CPT | Performed by: INTERNAL MEDICINE

## 2021-02-04 PROCEDURE — 51702 INSERT TEMP BLADDER CATH: CPT

## 2021-02-04 PROCEDURE — 6370000000 HC RX 637 (ALT 250 FOR IP): Performed by: INTERNAL MEDICINE

## 2021-02-04 PROCEDURE — 2060000000 HC ICU INTERMEDIATE R&B

## 2021-02-04 PROCEDURE — 97530 THERAPEUTIC ACTIVITIES: CPT

## 2021-02-04 PROCEDURE — 2580000003 HC RX 258: Performed by: INTERNAL MEDICINE

## 2021-02-04 PROCEDURE — 97116 GAIT TRAINING THERAPY: CPT

## 2021-02-04 PROCEDURE — 99233 SBSQ HOSP IP/OBS HIGH 50: CPT | Performed by: INTERNAL MEDICINE

## 2021-02-04 PROCEDURE — 51798 US URINE CAPACITY MEASURE: CPT

## 2021-02-04 RX ORDER — NICOTINE POLACRILEX 4 MG
15 LOZENGE BUCCAL PRN
Status: DISCONTINUED | OUTPATIENT
Start: 2021-02-04 | End: 2021-02-04 | Stop reason: SDUPTHER

## 2021-02-04 RX ORDER — DEXTROSE MONOHYDRATE 50 MG/ML
100 INJECTION, SOLUTION INTRAVENOUS PRN
Status: DISCONTINUED | OUTPATIENT
Start: 2021-02-04 | End: 2021-02-05 | Stop reason: HOSPADM

## 2021-02-04 RX ORDER — INSULIN GLARGINE 100 [IU]/ML
10 INJECTION, SOLUTION SUBCUTANEOUS DAILY
Status: DISCONTINUED | OUTPATIENT
Start: 2021-02-04 | End: 2021-02-05 | Stop reason: HOSPADM

## 2021-02-04 RX ORDER — DEXTROSE MONOHYDRATE 25 G/50ML
12.5 INJECTION, SOLUTION INTRAVENOUS PRN
Status: DISCONTINUED | OUTPATIENT
Start: 2021-02-04 | End: 2021-02-04 | Stop reason: SDUPTHER

## 2021-02-04 RX ORDER — TORSEMIDE 20 MG/1
20 TABLET ORAL DAILY
Status: DISCONTINUED | OUTPATIENT
Start: 2021-02-04 | End: 2021-02-05 | Stop reason: HOSPADM

## 2021-02-04 RX ORDER — DULOXETIN HYDROCHLORIDE 30 MG/1
30 CAPSULE, DELAYED RELEASE ORAL DAILY
Status: DISCONTINUED | OUTPATIENT
Start: 2021-02-04 | End: 2021-02-05 | Stop reason: HOSPADM

## 2021-02-04 RX ADMIN — ATORVASTATIN CALCIUM 40 MG: 40 TABLET, FILM COATED ORAL at 21:37

## 2021-02-04 RX ADMIN — FENOFIBRATE 160 MG: 160 TABLET ORAL at 10:39

## 2021-02-04 RX ADMIN — LEVETIRACETAM 500 MG: 500 TABLET, FILM COATED ORAL at 21:37

## 2021-02-04 RX ADMIN — Medication 10 ML: at 10:40

## 2021-02-04 RX ADMIN — SUCRALFATE 1 G: 1 TABLET ORAL at 10:39

## 2021-02-04 RX ADMIN — SUCRALFATE 1 G: 1 TABLET ORAL at 15:53

## 2021-02-04 RX ADMIN — LEVETIRACETAM 500 MG: 500 TABLET, FILM COATED ORAL at 10:39

## 2021-02-04 RX ADMIN — APIXABAN 5 MG: 5 TABLET, FILM COATED ORAL at 21:37

## 2021-02-04 RX ADMIN — Medication 10 ML: at 21:38

## 2021-02-04 RX ADMIN — SUCRALFATE 1 G: 1 TABLET ORAL at 21:37

## 2021-02-04 RX ADMIN — TORSEMIDE 20 MG: 20 TABLET ORAL at 10:53

## 2021-02-04 RX ADMIN — Medication 10 ML: at 21:39

## 2021-02-04 RX ADMIN — INSULIN LISPRO 3 UNITS: 100 INJECTION, SOLUTION INTRAVENOUS; SUBCUTANEOUS at 12:52

## 2021-02-04 RX ADMIN — CARVEDILOL 3.12 MG: 3.12 TABLET, FILM COATED ORAL at 17:44

## 2021-02-04 RX ADMIN — QUETIAPINE 50 MG: 50 TABLET, EXTENDED RELEASE ORAL at 21:37

## 2021-02-04 RX ADMIN — AMIODARONE HYDROCHLORIDE 200 MG: 200 TABLET ORAL at 10:53

## 2021-02-04 RX ADMIN — ASPIRIN 81 MG: 81 TABLET, COATED ORAL at 10:39

## 2021-02-04 RX ADMIN — RANOLAZINE 500 MG: 500 TABLET, FILM COATED, EXTENDED RELEASE ORAL at 21:37

## 2021-02-04 RX ADMIN — INSULIN GLARGINE 10 UNITS: 100 INJECTION, SOLUTION SUBCUTANEOUS at 11:25

## 2021-02-04 RX ADMIN — RANOLAZINE 500 MG: 500 TABLET, FILM COATED, EXTENDED RELEASE ORAL at 10:39

## 2021-02-04 RX ADMIN — PANTOPRAZOLE SODIUM 40 MG: 40 TABLET, DELAYED RELEASE ORAL at 10:44

## 2021-02-04 RX ADMIN — DIGOXIN 125 MCG: 125 TABLET ORAL at 10:53

## 2021-02-04 RX ADMIN — INSULIN LISPRO 2 UNITS: 100 INJECTION, SOLUTION INTRAVENOUS; SUBCUTANEOUS at 17:44

## 2021-02-04 RX ADMIN — APIXABAN 5 MG: 5 TABLET, FILM COATED ORAL at 10:39

## 2021-02-04 RX ADMIN — DULOXETINE HYDROCHLORIDE 30 MG: 30 CAPSULE, DELAYED RELEASE ORAL at 10:39

## 2021-02-04 NOTE — PROCEDURES
Patient: Kota Barrera    MR Number: [id number]  YOB: 1960  Date of Visit: 2/3/2021    Clinical History:  The patient is a 62y.o. years old female with acute encephalopathy and possible seizure. Medications reviewed. Method: The EEG was performed utilizing the international 10/20 of electrode placements of both referential and bipolar montages. The patient was awake and drowsy through out the recording. .    Findings: The background of the EEG showed generalized diffuse slowing in the mixture of delta and theta with average amplitude. This generalized slowing was symmetric, non rhythmical, and continuous. No spike or sharp waves were seen. Photic stimulation did not activate EEG. Impression: This EEG is abnormal.  The generalized diffuse slowing is suggestive of moderate diffuse encephalopathy. . There is no evidence of epileptiform discharges, focal, or lateralizing abnormalities.       Verner Richards, MD      Board certified in clinical neurophysiology

## 2021-02-04 NOTE — PROGRESS NOTES
Inpatient Physical Therapy Daily Treatment Note    Unit: PCU  Date:  2/4/2021  Patient Name:    Chai Stephenson  Admitting diagnosis:  Hypoglycemia [E16.2]  New onset seizure [facility]) [R56.9]  Admit Date:  2/2/2021  Precautions/Restrictions:  Fall risk, Bed/chair alarm, Telemetry, Continuous pulse oximetry and Isolation Precautions: Contact, pacemaker, seizure precautions      Discharge Recommendations: Home 24 hr assist  and Home PT  DME needs for discharge: Needs Met       Therapy recommendation for EMS Transport: can transport by wheelchair    Therapy recommendations for staff:   Assist of 1 (close SPV) with use of rolling walker (RW) and gait belt for all transfers and ambulation to/from bathroom  within room    History of Present Illness:   Per H&P by Dr. Moni Styles on 2/2/21  60 y.o. female with  who presented to [facility] with complaint of fall and hypoglycemia. Time of my exam, patient is unable to answer any questions.  History obtained from ED providers note.    \"Estela Eisenberg is a 62 y.o. female  who presents to the ED complaining of low blood sugar, fall.  Patient was brought in by EMS with concerns for fall from her bed, reports that her roommate heard her fall and called EMS. Yamile Jama blood sugar was reading \"low\" when they arrived. Walt Sanchez did receive glucagon as well as some oral glucose but were unable to obtain an IV.  Upon arrival in the ED, patient was initially opening eyes and waved to me with her right hand but was unable to contribute further to the history at that time\"   Patient is under hospice care with [facility] who has been notified of admission.   New onset seizures  -Patient noted to have active seizure during admission    -Tonic-clonic seizure with deviated gaze to the right, head deviation to right, contractures of bilateral upper extremities with diffuse shaking, + tongue biting, lasted ~1 minute  - CT head repeat with no acute findings  Fall prior to admission - pelvic x-ray negative  PMH: BQJUO-30 (Dec 2020), depression, hx PE, CHF, NSVT, non-ischemic cardiomyopathy, s/p craniotomy on R with encephalomalacia beneath the craniotomy site, CAD, CVA (x3), DM, HDL, HT, mental retardation, MI, pacemaker    Home Health S4 Level Recommendation: Level 1 Standard  AM-PAC Mobility Score   AM-PAC Inpatient Mobility Raw Score : 22       Treatment Time:  8111-1642  Treatment number: 2  Timed Code Treatment Minutes: 26 minutes  Total Treatment Minutes:  26  minutes    Cognition    A&O x4   Able to follow 2 step commands    Subjective  Patient lying supine in bed with no family present   Pt agreeable to this PT tx. Pain   No  Location: N/A  Rating:    NA/10  Pain Medicine Status: No request made     Bed Mobility   Supine to Sit:    Independent  Sit to Supine:   Independent  Rolling:   Independent  Scooting:   Independent    Transfer Training     Sit to stand:   Modified Independent to RW  Stand to sit:   Modified Independent  Bed to Chair:   Supervision with use of rolling walker (RW)    Gait Training gait completed as indicated below  Distance:      40 ft x 2  Deviations (firm surface/linoleum):  decreased bonnie and step through pattern, good heel strike contact B   Assistive Device Used:    gait belt and rolling walker (RW)  Level of Assist:    Close Supervision  Comment: Pt safely negotiated obstacles in room. Pt ambulated to bathroom using RW, close SPV. Pt toileting IND. Cues to complete hand hygiene.      Stair Training deferred, pt does not have stairs in the home environment  # of Steps:   N/A  Level of Assist:  Not Tested  UE Support:  NA  Assistive Device:  N/A  Pattern:   N/A  Comments:     Therapeutic Exercise Rayshawn deferred secondary to treatment focus on functional mobility and balance   NA    Balance  Sitting:  Normal; Independent  Comments: Sitting EOB     Standing: Good - ; Supervision  Comments: Tinetti Assessment performed this date     Tinetti Balance and Gait Score: 23/28 (low fall risk)  Discussed results with patient and recommend continued use of RW for BUE support. Patient Education      Role of PT, POC, Discharge recommendations, DC recommendations and safety awareness. Positioning Needs       Pt in bed per request, no alarm needed, positioned in proper neutral alignment and pressure relief provided. Call light provided and all needs within reach    ROM Measurements N/A  Knee Flexion:  Knee Extension:     Activity Tolerance   Pt completed therapy session with No adverse symptoms noted w/activity. Supine (at rest)   SpO2: 98% on RA  HR: 79 bpm  After gait   SpO2: 97% on RA  HR:  89 bpm  Supine (end of session)   Vitals stable on RA. Other  Patient presenting with flat affect throughout treatment session however pleasant. Patient enjoyed talking about her juan pablo bears and porcelain dolls at home. Assessment :  Patient demonstrated good progress toward goals. Completed Tinetti Balance and Gait Assessment to determine fall risk. Patient scored a 23/28 indicating low fall risk. Recommend continue use of RW for BUE support. Patient ambulating in room distances using RW, close SPV for safety. Overall good safety awareness. Recommending Home 24 hr assist and with home PT upon discharge as patient functioning close to baseline level and would benefit from continued therapy services    Goals (all goals ongoing unless otherwise indicated)  To be met in 3 visits:  1). Independent with LE Ex x 10 reps     To be met in 6 visits:  1). Supine to/from sit: Independent- Met 2/4  2). Sit to/from stand: Modified Independent- Met 2/4  3). Bed to chair: Modified Independent with RW  4). Gait: Ambulate 50 ft.  with  Modified Independent and use of rolling walker (RW)  5). Tolerate B LE exercises 3 sets of 10-15 reps  6). Stand 5 minute with unilateral UE support and no more than SBA       Plan   Continue with plan of care.     Signature: Sweta Bolden PT, DPT #[id number]      If patient discharges from this facility prior to next visit, this note will serve as the Discharge Summary.

## 2021-02-04 NOTE — PROGRESS NOTES
Assessment complete as per flow sheets. VSS. Patient is A/O x 2- self and place. Reorients easily. Unlabored breathing at rest on room air. Normal sinus rhythm on cardiac monitor. Bowel sounds + x4 quads. Patient denies pain. Comfort measures provided. RUE PICC line appears WNL. Dressing is C/D/I. Discussed POC, labs, testing, medical equipment and unit routine. Answered questions at this time. Meds as per MAR. Safety measures in place and will continue to monitor.

## 2021-02-04 NOTE — PROGRESS NOTES
EOS report and transfer of care to [facility]. Patient resting in bed, stable condition at hand off.

## 2021-02-04 NOTE — PROGRESS NOTES
No urine output has been documented in I&O.     @06:35- Patient denies the urge to void. Bladder scan shows 766ml.    @ 06:50-Orders received for tinoco catheter, placed by writer using sterile technique. Patient tolerated well. Immediate return of 950ml nilson colored urine - clamped by writer to prevent bladder spasms. @07:00 - Tinoco unclamped, emptied another 300ml. Patient denies any discomfort.

## 2021-02-04 NOTE — PROGRESS NOTES
IM Progress Note    Admit Date:  2/2/2021  2    Interval history:  Admitted for hypoglycemia and new onset seizure  Hx of ischemic CM, CKD, DM, depression on home hospice care     picc placed for poor IV access    Subjective:  Ms. Marguerite Leiva seen up in bed  No acute issues  Dooley placed overnight  ON RA, no further seizures overnight  Sugars improved, off D 5 fluids tolerating diet      Objective:   /62   Pulse 82   Temp 97.9 °F (36.6 °C) (Axillary)   Resp 18   Ht 5' 1\" (1.549 m)   Wt 217 lb 1 oz (98.5 kg)   SpO2 93%   BMI 41.01 kg/m²       Intake/Output Summary (Last 24 hours) at 2/4/2021 0855  Last data filed at 2/4/2021 0700  Gross per 24 hour   Intake 545 ml   Output 1250 ml   Net -705 ml       Physical Exam:    .General:  Middle aged female awake, alert and oriented   . Appears to be not in any distress  Mucous Membranes:  Pink , anicteric  Neck: No JVD, no carotid bruit, no thyromegaly  Chest:  Clear to auscultation bilaterally, diminished in bases  Left sided AICD  Cardiovascular:  RRR S1S2 heard, no murmurs or gallops  Abdomen:  Soft, undistended, non tender, no organomegaly, BS present  Extremities:improving 3 + bibiana LE edema now trace . Distal pulses well felt  Skin - large rash on left side of face involving ear  Pupils bibiana equal and reactive   Neuro - non focal today .  , able to follow commands normally     Medications:   Scheduled Medications:    DULoxetine  30 mg Oral Daily    levETIRAcetam  500 mg Oral BID    furosemide  20 mg Intravenous BID    apixaban  5 mg Oral BID    QUEtiapine  50 mg Oral Nightly    lidocaine 1 % injection  5 mL Intradermal Once    sodium chloride flush  10 mL Intravenous 2 times per day    amiodarone  200 mg Oral Daily    aspirin  81 mg Oral Daily    atorvastatin  40 mg Oral Nightly    carvedilol  3.125 mg Oral BID WC    digoxin  125 mcg Oral Every Other Day    fenofibrate  160 mg Oral Daily    pantoprazole  40 mg Oral Daily    ranolazine  500 mg Oral BID seizure and hypoglycemia  - Baseline AAO x 4   - resolved now , cut down sedative meds      Fall   - Suspect related to hypoglycemia   - CT head: s/p craniotomy on R with encephalomalacia beneath the craniotomy site  - CT C spine: no acute abnormality   CT Max/Face: no defnitie facial bone fracture, mild paranasal sinus disease   - Pelvic Xray WNL  - superficial rug rash to left side of face   - Updated on tetanus shot in ED      Hyperkalemia  - K: 5.7 on admission   - IVF, resolved  - No acute EKG changes         Chronic combined CHF   Severe non-ischemic Cardiomyopathy   Non-obstructive CAD  Hx of NSVT    - S/p AICD placement   - Last Echo with EF <20%, severe global hypokinesis, grade II DD, mod TR and mod pulm HTN  - + edema   - Continue ASA, BB, amio, digoxin, Ranexa   - Home diuretics held on admission, resumed now  - Under hospice care      Chronic Pleural Effusion   - Stable cardiomegaly and L pleural effusion noted on CXR   - Monitor , ongoing diuresis      Hx of PE  - dx in Nov 2020   - AC on Eliquis     Recent COVID-19 Infection   - + COVID 12/9/20  - Rapid on admission was negative   - Was tx with decadron and remdesivir, Remdesivir stopped 2/2 elevated LFTs        GERD  - Continue PPI      HLD  - Continue statin      Depression   - Continue home medications      Prolonged QT interval   - as had prolonged QT in the past   - Monitor QT interval with repeat EKG in AM  - Okay to continue home medications for now      Obesity  - Body mass index is 34.01 kg/m². - Complicating assessment and treatment.  Placing patient at risk for multiple co-morbidities as well as early death and contributing to the patient's presentation.   - Counseled on weight loss.     DVT Prophylaxis: eliquis   Diet: clear liquid - adv  Code Status: Gordo Vale MD 2/4/2021 8:55 AM

## 2021-02-04 NOTE — PROGRESS NOTES
Pt assessed. Diminished to auscultation in bilateral lobes. +2 pitting edema noted in BLE. Scheduled medications given at this time. Pt denies any needs. Call light and bedside table with-in easy reach. Petra Sousa

## 2021-02-04 NOTE — PROGRESS NOTES
Pulmonary & Critical Care Medicine Progress Note    CC: Fall, hypoglycemia, seizure    Events of Last 24 hours: Patient transferred out of ICU. She feels better. Invasive Lines: IV: none     IV:   dextrose 50 mL/hr at 21 1744       Vitals:  Blood pressure 100/62, pulse 82, temperature 97.9 °F (36.6 °C), temperature source Axillary, resp. rate 18, height 5' 1\" (1.549 m), weight 217 lb 1 oz (98.5 kg), SpO2 93 %, not currently breastfeeding. on RA  Temp  Av.5 °F (36.4 °C)  Min: 97 °F (36.1 °C)  Max: 97.9 °F (36.6 °C)    Intake/Output Summary (Last 24 hours) at 2021 6054  Last data filed at 2021 0700  Gross per 24 hour   Intake 545 ml   Output 1250 ml   Net -705 ml     EXAM:  General: ill appearing    Eyes: PERRL. No sclera icterus. No conjunctival injection. ENT: No discharge. Pharynx clear. Neck: Trachea midline. Normal thyroid. Resp: No accessory muscle use. No resp distress   CV: Regular rate. Regular rhythm. No mumur or rub. No edema. GI: Non-tender. Non-distended. No masses. Skin: Warm and dry. No nodule on exposed extremities. Rash/burn on left side of face  Lymph: No cervical LAD. No supraclavicular LAD. M/S: No cyanosis. No joint deformity. No clubbing. Neuro: A&O x3 . CN grossly intact, moves all extremities, conversant.      Scheduled Meds:   levETIRAcetam  500 mg Oral BID    furosemide  20 mg Intravenous BID    DULoxetine  60 mg Oral Daily    apixaban  5 mg Oral BID    QUEtiapine  50 mg Oral Nightly    lidocaine 1 % injection  5 mL Intradermal Once    sodium chloride flush  10 mL Intravenous 2 times per day    amiodarone  200 mg Oral Daily    aspirin  81 mg Oral Daily    atorvastatin  40 mg Oral Nightly    carvedilol  3.125 mg Oral BID WC    digoxin  125 mcg Oral Every Other Day    fenofibrate  160 mg Oral Daily    pantoprazole  40 mg Oral Daily    ranolazine  500 mg Oral BID    sucralfate  1 g Oral TID    sodium chloride flush  10 mL Intravenous 2 times per day    mupirocin   Nasal BID     PRN Meds:  sodium chloride flush, nitroGLYCERIN, sodium chloride flush, promethazine **OR** ondansetron, polyethylene glycol, acetaminophen **OR** acetaminophen, glucose, dextrose, glucagon (rDNA), LORazepam    Results:  CBC:   Recent Labs     02/02/21  0531 02/03/21  0425   WBC 9.6 7.5   HGB 12.1 11.5*   HCT 37.5 36.2   MCV 88.1 89.0    115*     BMP:   Recent Labs     02/02/21  0531 02/02/21  0819 02/03/21  0425   * 137 135*   K 5.7* 4.2 4.0   CL 97* 101 102   CO2 27 26 24   BUN 23* 23* 23*   CREATININE 0.6 0.6 <0.5*     LIVER PROFILE:   Recent Labs     02/02/21  0531   AST 44*   ALT 16   BILITOT 0.5   ALKPHOS 65     Cultures:   12/9/2020 SARS-CoV-2 positive  12/7/2020 urine E. coli ESBL Carbapenem sensitive  12/9/2020 urine E. coli ESBL  12/11/2020 blood no growth to date     Films:  No new      HCT 2/2/21   BRAIN/VENTRICLES: There is no acute intracranial hemorrhage, mass effect or   midline shift.  No abnormal extra-axial fluid collection.  The gray-white   differentiation is maintained without evidence of an acute infarct.  There is   no evidence of hydrocephalus. ASSESSMENT:  · Tonic clonic seizure witnessed in ED  · Acute hypoglycemia  · H/O craniotomy   · Fall with reassuring imaging of head, C spine, face and pelvis   · Pulmonary embolism 11/16/2020  · Recent UTI: E. coli, ESBL  · Diabetes    · Chronic small pleural effusions  · Systolic and diastolic CHF  · Nonischemic cardiomyopathy with EF <20%  · S/P AICD  · Left ventricular apical thrombus  · H/O COVID infection in December 2020  · MRDD  · TCP    PLAN:  · IV ativan for any seizure activity  · Keppra per internal medicine  · Hold oral hypoglycemics  · [email or web address]  · With acute TCP, on DOAC   · Prophylaxis: bactroban  · DNR: Continues under hospice care   · We will sign off.  Please call with questions

## 2021-02-05 VITALS
TEMPERATURE: 97.4 F | RESPIRATION RATE: 18 BRPM | DIASTOLIC BLOOD PRESSURE: 83 MMHG | WEIGHT: 214 LBS | HEART RATE: 81 BPM | SYSTOLIC BLOOD PRESSURE: 123 MMHG | BODY MASS INDEX: 40.4 KG/M2 | OXYGEN SATURATION: 98 % | HEIGHT: 61 IN

## 2021-02-05 LAB
GLUCOSE BLD-MCNC: 126 MG/DL (ref 70–99)
GLUCOSE BLD-MCNC: 162 MG/DL (ref 70–99)
GLUCOSE BLD-MCNC: 238 MG/DL (ref 70–99)
GLUCOSE BLD-MCNC: 259 MG/DL (ref 70–99)
GLUCOSE BLD-MCNC: 31 MG/DL (ref 70–99)
PERFORMED ON: ABNORMAL

## 2021-02-05 PROCEDURE — 6370000000 HC RX 637 (ALT 250 FOR IP): Performed by: INTERNAL MEDICINE

## 2021-02-05 PROCEDURE — 2580000003 HC RX 258: Performed by: INTERNAL MEDICINE

## 2021-02-05 PROCEDURE — 99239 HOSP IP/OBS DSCHRG MGMT >30: CPT | Performed by: INTERNAL MEDICINE

## 2021-02-05 RX ORDER — INSULIN ASPART 100 [IU]/ML
4 INJECTION, SOLUTION INTRAVENOUS; SUBCUTANEOUS
Qty: 5 PEN | Refills: 0
Start: 2021-02-05 | End: 2021-03-23 | Stop reason: CLARIF

## 2021-02-05 RX ORDER — INSULIN GLARGINE 100 [IU]/ML
8 INJECTION, SOLUTION SUBCUTANEOUS NIGHTLY
Qty: 5 PEN | Refills: 0
Start: 2021-02-05

## 2021-02-05 RX ORDER — LEVETIRACETAM 500 MG/1
500 TABLET ORAL 2 TIMES DAILY
Qty: 60 TABLET | Refills: 0 | Status: SHIPPED | OUTPATIENT
Start: 2021-02-05 | End: 2021-03-23 | Stop reason: CLARIF

## 2021-02-05 RX ORDER — DULOXETIN HYDROCHLORIDE 30 MG/1
30 CAPSULE, DELAYED RELEASE ORAL DAILY
Qty: 30 CAPSULE | Refills: 0 | Status: SHIPPED | OUTPATIENT
Start: 2021-02-05

## 2021-02-05 RX ADMIN — DULOXETINE HYDROCHLORIDE 30 MG: 30 CAPSULE, DELAYED RELEASE ORAL at 10:20

## 2021-02-05 RX ADMIN — AMIODARONE HYDROCHLORIDE 200 MG: 200 TABLET ORAL at 10:20

## 2021-02-05 RX ADMIN — ASPIRIN 81 MG: 81 TABLET, COATED ORAL at 10:20

## 2021-02-05 RX ADMIN — Medication 10 ML: at 10:22

## 2021-02-05 RX ADMIN — APIXABAN 5 MG: 5 TABLET, FILM COATED ORAL at 10:20

## 2021-02-05 RX ADMIN — Medication 10 ML: at 10:21

## 2021-02-05 RX ADMIN — FENOFIBRATE 160 MG: 160 TABLET ORAL at 10:20

## 2021-02-05 RX ADMIN — RANOLAZINE 500 MG: 500 TABLET, FILM COATED, EXTENDED RELEASE ORAL at 10:20

## 2021-02-05 RX ADMIN — INSULIN LISPRO 2 UNITS: 100 INJECTION, SOLUTION INTRAVENOUS; SUBCUTANEOUS at 12:20

## 2021-02-05 RX ADMIN — PANTOPRAZOLE SODIUM 40 MG: 40 TABLET, DELAYED RELEASE ORAL at 10:19

## 2021-02-05 RX ADMIN — TORSEMIDE 20 MG: 20 TABLET ORAL at 10:20

## 2021-02-05 RX ADMIN — CARVEDILOL 3.12 MG: 3.12 TABLET, FILM COATED ORAL at 10:19

## 2021-02-05 RX ADMIN — LEVETIRACETAM 500 MG: 500 TABLET, FILM COATED ORAL at 10:19

## 2021-02-05 RX ADMIN — SUCRALFATE 1 G: 1 TABLET ORAL at 14:19

## 2021-02-05 RX ADMIN — SUCRALFATE 1 G: 1 TABLET ORAL at 10:20

## 2021-02-05 NOTE — PROGRESS NOTES
Patient noted to be at 96% at rest on room air. Pt ambulated 50 feet and dropped to 84% on room air. Pt then ambulated 50 feet on 2lpm and did not drop below 95%. Petra Miramontes

## 2021-02-05 NOTE — PROGRESS NOTES
End of shift report given to [employer]. Pt in stable condition, care transferred at this time.  Electronically signed by Elba Rivera RN on 2/5/2021 at 7:46 AM

## 2021-02-05 NOTE — TELEPHONE ENCOUNTER
2nd attempt at trying to reach [place] at [phone], phone rings once and goes to busy tone again. Tried reaching pt at [phone], call just rings, no vm picks up.

## 2021-02-05 NOTE — DISCHARGE SUMMARY
Name:  Megan Velez  Room:  /7155-33  MRN:    [id number]    Discharge Summary      This discharge summary is in conjunction with a complete physical exam done on the day of discharge. Discharging Physician: Apryl Garcia MD      Admit: 2/2/2021  Discharge:  2/5/2021    HPI taken from admission H&P:      62 y.o. female with  who presented to [facility] ED with complaint of fall and hypoglycemia.     Time of my exam, patient is unable to answer any questions. History obtained from ED providers note.      \"Estela Eisenberg is a 62 y.o. female  who presents to the ED complaining of low blood sugar, fall.  Patient was brought in by EMS with concerns for fall from her bed, reports that her roommate heard her fall and called EMS. Nelda Lexi blood sugar was reading \"low\" when they arrived. Trish Gaona did receive glucagon as well as some oral glucose but were unable to obtain an IV.  Upon arrival in the ED, patient was initially opening eyes and waved to me with her right hand but was unable to contribute further to the history at that time\"      Patient is under hospice care with [facility] who has been notified of admission. Diagnoses this Admission and Hospital Course     New onset seizures  - Pt noted to have active seizure during admission    - Tonic-clonic seizure with deviated gaze to the right, head deviation to right, contractures of bilateral upper extremities with diffuse shaking, + tongue biting, lasted ~1 minute  - Seizure activity stopped - pt was still given IV Ativan 2 mg x 1  - Not actively on any antiepileptics  - Does have a hx of craniotomy with encephalomalacia for hx of benign brain tumor removal - CT head repeat without acute findings   - MRI brain cannot be done with AICD in place  - EEG with no epileptiform features .   -  Interrogated  AICD and showed no arrythmia  - IV Keppra 1 gm given and changed to 500 mg bid   - will continue keppra as seizure occurred when sugars were normal   - see neuro home medications      Prolonged QT interval   - has had prolonged QT in the past   - tele     Obesity  - Body mass index is 34.01 kg/m². - Complicating assessment and treatment. Placing patient at risk for multiple co-morbidities as well as early death and contributing to the patient's presentation.   - Counseled on weight loss. Procedures (Please Review Full Report for Details)    EEG - abnormal, the generalized diffuse slowing is suggestive of moderate diffuse encephalopathy. . There is no evidence of epileptiform discharges, focal, or lateralizing abnormalities. Consults    Pulmonology    Physical Exam at Discharge:    /83   Pulse 81   Temp 97.4 °F (36.3 °C) (Oral)   Resp 18   Ht 5' 1\" (1.549 m)   Wt 214 lb (97.1 kg)   SpO2 98%   BMI 40.43 kg/m²   . General:  Middle aged female awake, alert and oriented   . Appears to be not in any distress  Mucous Membranes:  Pink , anicteric  Neck: No JVD, no carotid bruit, no thyromegaly  Chest:  Clear to auscultation bilaterally, diminished in bases  Left sided AICD  Cardiovascular:  RRR S1S2 heard, no murmurs or gallops  Abdomen:  Soft, undistended, non tender, no organomegaly, BS present  Extremities:improving 2 + bibiana LE edema now trace . Distal pulses well felt  Skin - large rash on left side of face involving ear  Pupils bibiana equal and reactive   Neuro - non focal today . , able to follow commands normally    CBC:   Recent Labs     02/03/21  0425   WBC 7.5   HGB 11.5*   HCT 36.2   MCV 89.0   *     BMP:   Recent Labs     02/03/21  0425   *   K 4.0      CO2 24   BUN 23*   CREATININE <0.5*     CULTURES    Urine cx: NGTD    RADIOLOGY    IR PICC WO SQ PORT/PUMP > 5 YEARS 2/3/2021   Final Result      XR CHEST PORTABLE 2/3/2021   Final Result   1. Interval placement of a right arm PICC, in satisfactory position. 2. Stable small left pleural effusion and multifocal airspace disease within   the mid and lower left lung zones.    3. Stable cardiomegaly. XR CHEST PORTABLE 2/2/2021   Final Result   Stable chest over the past 7 hours. Left perihilar and lower lobe pulmonary   disease and suggestion of pleural effusion appears unchanged as does   enlargement of the cardiopericardial shadow         CT HEAD WO CONTRAST 2/2/2021   Final Result   No significant interval change since the previous exam.         XR PELVIS (1-2 VIEWS) 2/2/2021   Final Result   No acute osseous abnormality. XR CHEST PORTABLE 2/2/2021   Final Result   Stable cardiomegaly. Left pleural effusion and/or basilar atelectasis. CT MAXILLOFACIAL WO CONTRAST 2/2/2021   Final Result   Head:      No acute traumatic intracranial abnormality. There is atrophy and   small-vessel ischemic change. There is encephalomalacia beneath the   craniotomy site on the right      Facial bones :      No definite facial bone fracture      Mild paranasal sinus disease         CT CERVICAL SPINE WO CONTRAST 2/2/20201   Final Result   No acute abnormality of the cervical spine. CT HEAD WO CONTRAST 2/2/2021   Final Result   Head:      No acute traumatic intracranial abnormality. There is atrophy and   small-vessel ischemic change.   There is encephalomalacia beneath the   craniotomy site on the right      Facial bones :      No definite facial bone fracture      Mild paranasal sinus disease           Discharge Medications     Medication List      START taking these medications    levETIRAcetam 500 MG tablet  Commonly known as: KEPPRA  Take 1 tablet by mouth 2 times daily        CHANGE how you take these medications    DULoxetine 30 MG extended release capsule  Commonly known as: CYMBALTA  Take 1 capsule by mouth daily  What changed:   · medication strength  · how much to take     Lantus SoloStar 100 UNIT/ML injection pen  Generic drug: insulin glargine  Inject 8 Units into the skin nightly  What changed: how much to take     NovoLOG FlexPen 100 UNIT/ML injection pen  Generic as: ERGOCALCIFEROL        STOP taking these medications    metFORMIN 1000 MG tablet  Commonly known as: GLUCOPHAGE           Where to Get Your Medications      You can get these medications from any pharmacy    Bring a paper prescription for each of these medications  · DULoxetine 30 MG extended release capsule  · levETIRAcetam 500 MG tablet     Information about where to get these medications is not yet available    Ask your nurse or doctor about these medications  · Lantus SoloStar 100 UNIT/ML injection pen  · NovoLOG FlexPen 100 UNIT/ML injection pen           Discharged in stable condition to home. Follow Up: Follow up with PCP in 1 week.     Cristina Haddad MD 2/25/2021 12:45 PM

## 2021-02-05 NOTE — PROGRESS NOTES
Called scripts into [employer]. Patient educated on discharge instructions as well as new medications use, dosage, administration and possible side effects. Patient verified knowledge. IV removed without difficulty and dry dressing in place. Telemetry monitor removed and returned to [address] [place] [address]. Pt left facility in stable condition to Home with all of their personal belongings. \  Petra Roy

## 2021-02-05 NOTE — TELEPHONE ENCOUNTER
2-5-21 Tried contacting [facility] at [phone], call rings then goes straight to busy tone. Will try her again at another time.

## 2021-02-05 NOTE — DISCHARGE INSTR - COC
Continuity of Care Form    Patient Name: Jodi Vázquez   :  1960  MRN:  [id number]    Admit date:  2021  Discharge date:  2021    Code Status Order: DNR-CC   Advance Directives:     Admitting Physician:  Tonny Mckenzie MD  PCP: FRITZ Mcfarlane NP    Discharging Nurse: [facility] Unit/Room#: /1591-62  Discharging Unit Phone Number: ***    Emergency Contact:   Extended Emergency Contact Information  Primary Emergency Contact: Christa Campos  Address: [address]           [place], [address] Phone: [phone]  Mobile Phone: [phone]  Relation: Brother/Sister  Secondary Emergency Contact: Vinicius Mejia  Mobile Phone: [phone]  Relation: Other    Past Surgical History:  Past Surgical History:   Procedure Laterality Date    BACK SURGERY      x3    IR MIDLINE CATH  2020    IR MIDLINE CATH 2020 [facility]    IR MIDLINE CATH  2020    IR MIDLINE CATH 2020 [facility] PROC[facility]    PACEMAKER INSERTION      Boyd pacemaker    TUBAL LIGATION      TUMOR REMOVAL      UPPER GASTROINTESTINAL ENDOSCOPY N/A 2020    EGD BIOPSY performed by Maribeth Pena DO at [address]       Immunization History:   Immunization History   Administered Date(s) Administered    Influenza 2012    Influenza A (I6S7-22) Vaccine IM 2009    Influenza Virus Vaccine 10/17/2014, 10/27/2017    Influenza, Quadv, 6 mo and older, IM, PF (Flulaval, Fluarix) 12/10/2018    Influenza, Quadv, IM, PF (6 mo and older Fluzone, Flulaval, Fluarix, and 3 yrs and older Afluria) 10/12/2019, 12/15/2020    Pneumococcal Polysaccharide ([id number]) 10/17/2014, 2017    Tdap (Boostrix, Adacel) 2020, 2021       Active Problems:  Patient Active Problem List   Diagnosis Code    DM (diabetes mellitus) ([facility] E11.9    HTN (hypertension), benign I10    Dyslipidemia E78.5    CAD (coronary artery disease) I25.10    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) I63.50    Dual ICD (implantable cardioverter-defibrillator) in place Z80.65    Brain tumor (benign) ([facility]) D33.2    Chronic systolic CHF (congestive heart failure) ([facility]) I50.22    TIA involving right internal carotid artery G45.1    CAD in native artery I25.10    DM (diabetes mellitus), secondary, uncontrolled, w/neurologic complic ([facility]) F97.20, K24.75    CHF (congestive heart failure) ([facility]) I50.9    Non-ischemic cardiomyopathy ([facility]) I42.8    Essential hypertension I10    TIA (transient ischemic attack) G45.9    Hyperglycemia R73.9    Diabetic ketoacidosis without coma associated with type 2 diabetes mellitus ([address] E11.10    Non-intractable vomiting R11.10    Chest pain R07.9    Arterial ischemic stroke, ICA, right, acute ([facility]) I63.231    Diabetic hyperosmolar non-ketotic state ([address] E11.00    Diabetic acidosis without coma ([address] E11.10    Hyponatremia O30.7    Metabolic acidosis J87.3    Disorder of electrolytes E87.8    Diabetic ketoacidosis with coma associated with type 2 diabetes mellitus ([facility]) E11.11    Hypernatremia E87.0    Leukocytosis D72.829    Atrial tachycardia ([facility]) I47.1    DKA, type 2, not at goal ([address] E11.10    Cognitive developmental delay F81.9    Depressive disorder F32.9    Urinary tract infection without hematuria N39.0    Hypokalemia E87.6    Persistent fever R50.9    Syncope and collapse R55    S/P ICD (internal cardiac defibrillator) procedure Z95.810    History of CVA (cerebrovascular accident) Z80.78    Noncompliance with medications Z91.14    Obesity E66.9    SVT (supraventricular tachycardia) ([facility]) I47.1    Type 2 diabetes mellitus with hyperglycemia, with long-term current use of insulin ([facility]) E11.65, Z79.4    Acute pulmonary edema ([facility]) J81.0    Hyperlipidemia E78.5    Hypomagnesemia E83.42    Lactic acidosis E87.2    NSVT (nonsustained ventricular tachycardia) ([facility] [address] [facility] I47.2    Dyspnea and respiratory abnormalities R06.00, R06.89    Intractable nausea and vomiting R11.2    Tachycardia R00.0    Uncontrolled type 2 diabetes mellitus with hyperglycemia ([facility]) E11.65    Acute on chronic systolic CHF (congestive heart failure) ([facility]) I50.23    Pulmonary emboli ([facility]) I26.99    Heart failure ([facility]) I50.9    Pericardial effusion I31.3    Sepsis ([facility]) A41.9    COVID-19 U07.1    Multifocal pneumonia J18.9    Pneumonia due to COVID-19 virus U07.1, J12.82    Acute hypoxemic respiratory failure ([facility]) J96.01    High risk medication use Z79.899    Hypoglycemia E16.2    New onset seizure ([facility] R56.9    Fall W19. XXXA    Contusion of face S00.83XA       Isolation/Infection:   Isolation          No Isolation        Patient Infection Status     Infection Onset Added Last Indicated Last Indicated By Review Planned Expiration Resolved Resolved By    None active    Resolved    COVID-19 Rule Out 21 COVID-19 (Ordered)   21 Rule-Out Test Resulted    COVID-19 20 COVID-19   20     COVID-19 Rule Out 20 COVID-19 (Ordered)   20 Rule-Out Test Resulted    ESBL (Extended Spectrum Beta Lactamase) 20 Culture, Urine   21 Tatyana Stone RN    COVID-19 Rule Out 20 COVID-19 (Ordered)   20 Rule-Out Test Resulted    COVID-19 Rule Out 20 COVID-19 (Ordered)   20 Rule-Out Test Resulted    COVID-19 Rule Out 20 COVID-19 (Ordered)   20 Rule-Out Test Resulted    COVID-19 Rule Out 20 COVID-19 (Ordered)   20 Jenna Stover RN    Ordered COVID swab and discontinued previous ED visit.      COVID-19 Rule Out 10/26/20 10/26/20 10/26/20 COVID-19 (Ordered)   10/26/20 Rule-Out Test Resulted    C-diff Rule Out 09/10/20 09/10/20 09/10/20 Clostridium difficile toxin/antigen (Ordered)   09/22/20 Edvin Douglas RN    COVID-19 Rule Out 04/16/20 04/16/20 04/16/20 COVID-19 (Ordered)   04/16/20 Rule-Out Test Resulted          Nurse Assessment:  Last Vital Signs: /83   Pulse 81   Temp 97.4 °F (36.3 °C) (Oral)   Resp 18   Ht 5' 1\" (1.549 m)   Wt 214 lb (97.1 kg)   SpO2 98%   BMI 40.43 kg/m²     Last documented pain score (0-10 scale): Pain Level: 0  Last Weight:   Wt Readings from Last 1 Encounters:   02/05/21 214 lb (97.1 kg)     Mental Status:  {IP PT MENTAL STATUS:20030}    IV Access:  { ADEN IV ACCESS:098096539}    Nursing Mobility/ADLs:  Walking   {CHP DME REZY:763031654}  Transfer  {P DME REHO:541480266}  Bathing  {CHP DME NHSU:511493446}  Dressing  {CHP DME NGVU:875280051}  Toileting  {CHP DME VUJS:342451905}  Feeding  {P DME GFAT:105024364}  Med Admin  {P DME KDHO:340776684}  Med Delivery   { ADEN MED Delivery:043684311}    Wound Care Documentation and Therapy:        Elimination:  Continence:   · Bowel: {YES / EY:85778}  · Bladder: {YES / PP:28601}  Urinary Catheter: {Urinary Catheter:610898808}   Colostomy/Ileostomy/Ileal Conduit: {YES / OL:32493}       Date of Last BM: ***    Intake/Output Summary (Last 24 hours) at 2/5/2021 1530  Last data filed at 2/5/2021 1345  Gross per 24 hour   Intake 600 ml   Output 2101 ml   Net -1501 ml     I/O last 3 completed shifts:   In: 600 [P.O.:600]  Out: 12 [Urine:2700; Stool:1]    Safety Concerns:     [employer] Safety Concerns:178145265}    Impairments/Disabilities:      [employer] Impairments/Disabilities:938527002}    Nutrition Therapy:  Current Nutrition Therapy:   [employer] Diet List:107734343}    Routes of Feeding: {CHP DME Other Feedings:294100896}  Liquids: {Slp liquid thickness:68652}  Daily Fluid Restriction: {CHP DME Yes amt example:682894839}  Last Modified Barium Swallow with Video (Video Swallowing Test): {Done Not Done TLFX:150787226}    Treatments at the Time of Hospital Discharge:   Respiratory Treatments: ***  Oxygen

## 2021-02-05 NOTE — PROGRESS NOTES
fenofibrate  160 mg Oral Daily    pantoprazole  40 mg Oral Daily    ranolazine  500 mg Oral BID    sucralfate  1 g Oral TID    sodium chloride flush  10 mL Intravenous 2 times per day    mupirocin   Nasal BID     I   dextrose      dextrose 50 mL/hr at 02/02/21 1744     dextrose, sodium chloride flush, nitroGLYCERIN, sodium chloride flush, promethazine **OR** ondansetron, polyethylene glycol, acetaminophen **OR** acetaminophen, glucose, dextrose, glucagon (rDNA), LORazepam    Lab Data:  Recent Labs     02/03/21  0425   WBC 7.5   HGB 11.5*   HCT 36.2   MCV 89.0   *     Recent Labs     02/03/21  0425   *   K 4.0      CO2 24   BUN 23*   CREATININE <0.5*     No results for input(s): CKTOTAL, CKMB, CKMBINDEX, TROPONINI in the last 72 hours. Coagulation:   Lab Results   Component Value Date    INR 1.29 11/23/2020    APTT 31.8 11/20/2020     Cardiac markers:   Lab Results   Component Value Date    CKTOTAL 54 06/12/2020    TROPONINI <0.01 02/02/2021         Lab Results   Component Value Date    ALT 16 02/02/2021    AST 44 (H) 02/02/2021    ALKPHOS 65 02/02/2021    BILITOT 0.5 02/02/2021       Lab Results   Component Value Date    INR 1.29 (H) 11/23/2020    INR 1.02 08/31/2020    INR 1.01 08/12/2020    PROTIME 15.0 (H) 11/23/2020    PROTIME 11.8 08/31/2020    PROTIME 11.7 08/12/2020         EKG:  I have reviewed the EKG with the following interpretation:   Normal sinus rhythm  Anterolateral infarct , age undetermined  Prolonged QT  Abnormal ECG  No previous ECGs available      EEG -      RADIOLOGY  XR PELVIS (1-2 VIEWS)   Final Result   No acute osseous abnormality.           XR CHEST PORTABLE   Final Result   Stable cardiomegaly.       Left pleural effusion and/or basilar atelectasis.         CT MAXILLOFACIAL WO CONTRAST   Final Result   Head:       No acute traumatic intracranial abnormality. There is atrophy and   small-vessel ischemic change.   There is encephalomalacia beneath the craniotomy site on the right       Facial bones :       No definite facial bone fracture       Mild paranasal sinus disease           CT CERVICAL SPINE WO CONTRAST   Final Result   No acute abnormality of the cervical spine.           CT HEAD WO CONTRAST   Final Result   Head:       No acute traumatic intracranial abnormality. There is atrophy and   small-vessel ischemic change. There is encephalomalacia beneath the   craniotomy site on the right       Facial bones :       No definite facial bone fracture       Mild paranasal sinus disease                  urine <10 K mixed hipolito    ASSESSMENT/PLAN:     New onset seizures  -Patient noted to have active seizure during admission    -Tonic-clonic seizure with deviated gaze to the right, head deviation to right, contractures of bilateral upper extremities with diffuse shaking, + tongue biting, lasted ~1 minute  -Seizure activity stopped patient was still given IV Ativan 2 mg x 1  -Not actively on any antiepileptics  -Does have a history of craniotomy with encephalomalacia for hx of benign brain tumor removal- CT head repeat without acute findings         -MRI brain cannot be done with AICD in place  -EEG with no epileptiform features .   -  Interrogated  AICD and showed no  arrythmia    --IV Keppra 1 gm given and changed to 500 mg bid     - will continue keppra as seizure occurred when sugars were normal   - see neuro as outpt   - no further seizures    dc planning is ok        Hypoglycemia  - In patient with Type II DM, insulin dependent   - Was hyperglycemic during last admission and Lantus and SSI were increased   -  on arrival after receiving oral glucose en route via EMS-->dropped to 48, given amp D50, BG improved to 110 and dropped again to 62   - Started on IV D5 fluids, improved, resume diet  - weaned off D 5 fluids   -- Checked Hgb A1c  at 9.7    Stop metformin and cut down novolog to 4 units with meals    Take lantus 10 units at night only if sugar

## 2021-02-05 NOTE — CARE COORDINATION
DISCHARGE ORDER  Date/Time 2021 10:58 AM  Completed by: Alexei Mera, Case Management    Patient Name: Daniel Griffin      : 1960  Admitting Diagnosis: Hypoglycemia [E16.2]  New onset seizure ([address] [R56.9]      Admit order Date and Status:2021 inpt  (verify MD's last order for status of admission)      Noted discharge order. If applicable PT/OT recommendation at Discharge: Home 24 hr assist  and Home PT  DME recommendation by PT/OT:Needs Met  Confirmed discharge plan: Yes  with whom_pt______________  If pt confirmed DC plan does family need to be contacted by CM No if yes who______  Discharge Plan: Chart reviewed. Pt is her own LG. Spoke with pt at bedside and she states she is returning home and her Geri Montague lives upstairs and will provide 24/7 assistance. Attempted to call Petra Echols and phone continues to ring busy after multiple attempts over one hour. Pt would like to resume her care with [facility] for SN and is refusing any therapy at this time. TC to [facility] and spoke with Fiona Christie who is made aware pt will discharge home today. She states they will see the pt today. 1330 met with pt and SUSY Palacios at bedside. Petra Echols states she is able to provide 24/7 care to the pt. Pt is now wanting PT/OT which per Fiona Christie @ [facility] they cannot provide under hospice care. Pt wanting to cancel hospice and Adriana made aware. Pt now wanting [facility] with any agency except [facility]. TC to HCP and they are out of network. TC to [facility] and spoke with Laura they can accept the pt for a [facility] for Monday for SN/PT/OT. All needed documentation faxed to [address] [place] [address] at this time. Pt has home O2 with [facility] which will be canceled. Pt ambulated and qualifies for home O2 @ 2L. Pt has no preference in DME agency. TC to Maisha Solis with [employer] and he can provide pt with home O2. Maisha Solis states pt will have hospice equipment until at least Monday and he will deliver equipment to her on Monday.  Pt supplied with portable O2 tank from [employer]. Pt and Suzanne did verify that pt has a concentrator at her home. All needed documentation needed for home O2 needs is in chart. No further dc needs voiced or identified. Reviewed chart. Role of discharge planner explained and patient verbalized understanding. Discharge order is noted. Has Home O2 in place on admit:  No    Pt is being d/c'd to home today. Pt's O2 sats are 98% on RA. Discharge timeout done with Petra. All discharge needs and concerns addressed.

## 2021-02-05 NOTE — PROGRESS NOTES
Pt assessed. Diminished to auscultation in bilateral lobes. Scheduled medications given at this time. Pt denies any needs. Call light and bedside table with-in easy reach. Petra Serrano

## 2021-02-05 NOTE — PROGRESS NOTES
Shift assessment complete.  Call light and bedside table within reach Electronically signed by Jenelle Arteaga RN on 2/4/2021 at 9:34 PM

## 2021-02-12 ENCOUNTER — TELEPHONE (OUTPATIENT)
Dept: INTERNAL MEDICINE CLINIC | Age: 61
End: 2021-02-12

## 2021-02-12 PROBLEM — Z86.69 HX OF MIGRAINES: Status: ACTIVE | Noted: 2018-05-10

## 2021-02-12 PROBLEM — Z59.9 FINANCIAL DIFFICULTIES: Status: ACTIVE | Noted: 2018-12-18

## 2021-02-12 PROBLEM — T14.91XA SUICIDE ATTEMPT (HCC): Status: ACTIVE | Noted: 2017-07-16

## 2021-02-12 PROBLEM — M19.90 DJD (DEGENERATIVE JOINT DISEASE): Status: ACTIVE | Noted: 2017-06-07

## 2021-02-12 PROBLEM — I73.00 RAYNAUD PHENOMENON: Status: ACTIVE | Noted: 2017-06-07

## 2021-02-12 PROBLEM — F33.9 EPISODE OF RECURRENT MAJOR DEPRESSIVE DISORDER (HCC): Status: ACTIVE | Noted: 2017-07-16

## 2021-02-12 PROBLEM — T50.901A OVERDOSE: Status: ACTIVE | Noted: 2017-07-13

## 2021-02-12 PROBLEM — Z86.39 HISTORY OF DIABETES MELLITUS: Status: ACTIVE | Noted: 2017-08-20

## 2021-02-12 PROBLEM — F41.9 ANXIETY: Status: ACTIVE | Noted: 2021-02-12
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
## 2021-02-12 PROBLEM — M54.2 NECK PAIN: Status: ACTIVE | Noted: 2017-06-28

## 2021-02-12 PROBLEM — R32 INCONTINENCE: Status: ACTIVE | Noted: 2021-02-12

## 2021-03-01 ENCOUNTER — TELEPHONE (OUTPATIENT)
Dept: CARDIOLOGY CLINIC | Age: 61
End: 2021-03-01

## 2021-03-02 NOTE — TELEPHONE ENCOUNTER
Tried reaching Zule[place]. Ongoing issue with calling her number and it ringing and going to busy signal.  (see previous messages)  Wanted to inform her that [address] needs to come from PCP. Will send refill for Ranexa to Dr. Lizz [place].

## 2021-03-05 RX ORDER — RANOLAZINE 500 MG/1
500 TABLET, EXTENDED RELEASE ORAL 2 TIMES DAILY
Qty: 60 TABLET | Refills: 2 | Status: SHIPPED | OUTPATIENT
Start: 2021-03-05 | End: 2021-03-23 | Stop reason: CLARIF

## 2021-03-17 NOTE — TELEPHONE ENCOUNTER
Script for Ranexa sent in. Please contact patient to clarify Keppra should come from PCP. She needs a follow up appointment with us.     Jonathan Earl, APRN - CNP

## 2021-03-23 ENCOUNTER — OFFICE VISIT (OUTPATIENT)
Dept: CARDIOLOGY CLINIC | Age: 61
End: 2021-03-23
Payer: MEDICARE

## 2021-03-23 ENCOUNTER — NURSE ONLY (OUTPATIENT)
Dept: CARDIOLOGY CLINIC | Age: 61
End: 2021-03-23
Payer: MEDICARE

## 2021-03-23 VITALS
DIASTOLIC BLOOD PRESSURE: 80 MMHG | WEIGHT: 247 LBS | SYSTOLIC BLOOD PRESSURE: 120 MMHG | HEIGHT: 61 IN | BODY MASS INDEX: 46.63 KG/M2 | OXYGEN SATURATION: 95 % | HEART RATE: 93 BPM

## 2021-03-23 DIAGNOSIS — I63.231 ARTERIAL ISCHEMIC STROKE, ICA, RIGHT, ACUTE (HCC): ICD-10-CM

## 2021-03-23 DIAGNOSIS — I42.8 NON-ISCHEMIC CARDIOMYOPATHY (HCC): ICD-10-CM

## 2021-03-23 DIAGNOSIS — Z95.810 DUAL ICD (IMPLANTABLE CARDIOVERTER-DEFIBRILLATOR) IN PLACE: ICD-10-CM

## 2021-03-23 DIAGNOSIS — I47.1 SVT (SUPRAVENTRICULAR TACHYCARDIA) (HCC): ICD-10-CM

## 2021-03-23 DIAGNOSIS — R55 SYNCOPE AND COLLAPSE: ICD-10-CM

## 2021-03-23 DIAGNOSIS — R06.02 SHORTNESS OF BREATH: ICD-10-CM

## 2021-03-23 DIAGNOSIS — G45.9 TIA (TRANSIENT ISCHEMIC ATTACK): ICD-10-CM

## 2021-03-23 DIAGNOSIS — I25.10 CORONARY ARTERY DISEASE INVOLVING NATIVE CORONARY ARTERY OF NATIVE HEART WITHOUT ANGINA PECTORIS: ICD-10-CM

## 2021-03-23 DIAGNOSIS — I47.29 NSVT (NONSUSTAINED VENTRICULAR TACHYCARDIA): ICD-10-CM

## 2021-03-23 DIAGNOSIS — I50.22 SYSTOLIC CHF, CHRONIC (HCC): Primary | ICD-10-CM

## 2021-03-23 DIAGNOSIS — R00.0 TACHYCARDIA: ICD-10-CM

## 2021-03-23 DIAGNOSIS — I47.1 ATRIAL TACHYCARDIA (HCC): ICD-10-CM

## 2021-03-23 DIAGNOSIS — Z86.73 HISTORY OF CVA (CEREBROVASCULAR ACCIDENT): ICD-10-CM

## 2021-03-23 DIAGNOSIS — I50.22 CHRONIC SYSTOLIC CHF (CONGESTIVE HEART FAILURE) (HCC): ICD-10-CM

## 2021-03-23 PROCEDURE — 93290 INTERROG DEV EVAL ICPMS IP: CPT | Performed by: INTERNAL MEDICINE

## 2021-03-23 PROCEDURE — 99214 OFFICE O/P EST MOD 30 MIN: CPT | Performed by: INTERNAL MEDICINE

## 2021-03-23 PROCEDURE — 93283 PRGRMG EVAL IMPLANTABLE DFB: CPT | Performed by: INTERNAL MEDICINE

## 2021-03-23 RX ORDER — FLUTICASONE PROPIONATE 50 MCG
1 SPRAY, SUSPENSION (ML) NASAL DAILY
COMMUNITY

## 2021-03-23 RX ORDER — FUROSEMIDE 40 MG/1
40 TABLET ORAL DAILY
Status: ON HOLD | COMMUNITY
End: 2021-04-05 | Stop reason: HOSPADM

## 2021-03-23 RX ORDER — LISINOPRIL 5 MG/1
5 TABLET ORAL DAILY
Qty: 30 TABLET | Refills: 11 | Status: ON HOLD | OUTPATIENT
Start: 2021-03-23 | End: 2021-04-05 | Stop reason: HOSPADM

## 2021-03-23 NOTE — PROGRESS NOTES
[address]  Advanced CHF/Pulmonary Hypertension   Cardiac Evaluation      Alex Marin  YOB: 1960    Date of Visit:  3/23/21      Chief Complaint   Patient presents with    New Patient    Shortness of Breath    Edema    Chest Pain     tightness in chest when walking        History of Present Illness:  Alex Marin is a 62 y.o. female who presents from referral from [facility] for consultation and management of CHF and CM. She has a history of CAD, cerebral artery occlusion with cerebral infarct, DM, Hyperlipidemia, Hypertension, Mental retardation, NSVT, and MI. She underwent LHC in 12/2018. This showed an EF of 45%. She presented to ED on 1/15/2020 with chest pain that was worse with palpitations from her defibrillator firing. Her limited echo from 10/06/20 showed her EF was 15-20%. She was admitted to ICU 11/13/2020 for DKA. She presented to the ED again on 11/22/2020 for SOB with CHF exacerbation. She went to the ED 12/9/2020 and was diagnosed with COVID-19 and a UTI. She was discharged home with hospice care. She was admitted 02/02-02/05/21 due to fall and hypoglycemia. She reports she is no longer in hospice. Today her weight is up over 30 lbs. Her family states she is gaining weight since moving with the family in [place]. She was trying to move to low income apartment. She reports she has increased SOB, BP and BLE edema. She is able to lay flat to sleep. Her family states she snores at night. She denies palpitations, dizziness, or syncope. She has stopped drinking soda except one diet, caffeine soda daily. She had been on a beta blocker as well as lisinopril. She is no longer on either; they were likely stopped during hospitalization.       No Known Allergies  Current Outpatient Medications   Medication Sig Dispense Refill    fluticasone (FLONASE) 50 MCG/ACT nasal spray 1 spray by Each Nostril route daily      furosemide (LASIX) 40 MG tablet Take 40 mg by mouth daily      DULoxetine (CYMBALTA) 30 MG extended release capsule Take 1 capsule by mouth daily 30 capsule 0    insulin glargine (LANTUS SOLOSTAR) 100 UNIT/ML injection pen Inject 8 Units into the skin nightly 5 pen 0    apixaban (ELIQUIS) 5 MG TABS tablet Take 1 tablet by mouth 2 times daily 60 tablet 0    QUEtiapine (SEROQUEL XR) 50 MG extended release tablet Take 50 mg by mouth nightly      amiodarone (CORDARONE) 200 MG tablet Take 1 tablet by mouth daily 90 tablet 3    pantoprazole (PROTONIX) 40 MG tablet Take 1 tablet by mouth daily 90 tablet 0    digoxin (LANOXIN) 125 MCG tablet Take 1 tablet by mouth every other day 30 tablet 3    aspirin 81 MG EC tablet Take 1 tablet by mouth daily 30 tablet 2    atorvastatin (LIPITOR) 40 MG tablet Take 1 tablet by mouth nightly 30 tablet 2    miconazole (MICOTIN) 2 % powder Apply topically 2 times daily. 45 g 1    ondansetron (ZOFRAN) 4 MG tablet Take 1 tablet by mouth every 8 hours as needed for Nausea or Vomiting (Patient not taking: Reported on 3/23/2021) 24 tablet 0    nitroGLYCERIN (NITROSTAT) 0.4 MG SL tablet up to max of 3 total doses. If no relief after 1 dose, call 911. (Patient not taking: Reported on 3/23/2021) 25 tablet 3    [employer] Lancets Ultra Thin MISC 1 each by Does not apply route 4 times daily 200 each 0    blood glucose monitor strips Test three times a day & as needed for symptoms of irregular blood glucose. 100 strip 2     No current facility-administered medications for this visit. Past Medical History:   Diagnosis Date    Arthritis     CAD (coronary artery disease)     [facility]erebral [facility] occlusion [facility] infarction [facility]     x 3    [facility]     COVID-19 12/09/2020    Diabetes mellitus ([facility]     ESBL (extended spectrum beta-lactamase) producing bacteria infection 12/09/2020    E. COLI-URINE    Hyperlipidemia     Hypertension     Mental retardation     MI [facility] infarction) [facility]     Systems:   · Constitutional: there has been no unanticipated weight loss. There's been no change in energy level, sleep pattern, or activity level. · Eyes: No visual changes or diplopia. No scleral icterus. · ENT: No Headaches, hearing loss or vertigo. No mouth sores or sore throat. · Cardiovascular: Reviewed in HPI  · Respiratory: No cough or wheezing, no sputum production. No hematemesis. · Gastrointestinal: No abdominal pain, appetite loss, blood in stools. No change in bowel or bladder habits. · Genitourinary: No dysuria, trouble voiding, or hematuria. · Musculoskeletal:  No gait disturbance, weakness or joint complaints. · Integumentary: No rash or pruritis. · Neurological: No headache, diplopia, change in muscle strength, numbness or tingling. No change in gait, balance, coordination, mood, affect, memory, mentation, behavior. · Psychiatric: No anxiety, no depression. · Endocrine: No malaise, fatigue or temperature intolerance. No excessive thirst, fluid intake, or urination. No tremor. · Hematologic/Lymphatic: No abnormal bruising or bleeding, blood clots or swollen lymph nodes. · Allergic/Immunologic: No nasal congestion or hives. Physical Examination:    Vitals:    03/23/21 1352   BP: 120/80   Pulse: 93   SpO2: 95%   Weight: 247 lb (112 kg)   Height: 5' 1\" (1.549 m)     Body mass index is 46.67 kg/m².      Wt Readings from Last 3 Encounters:   03/23/21 247 lb (112 kg)   02/05/21 214 lb (97.1 kg)   01/14/21 205 lb (93 kg)     BP Readings from Last 3 Encounters:   03/23/21 120/80   02/05/21 123/83   01/14/21 118/70     Constitutional and General Appearance:   WD/WN in NAD, obese  HEENT:  NC/AT  JLUIS  No problems with hearing  Skin:  Warm, dry  Respiratory:  · Normal excursion and expansion without use of accessory muscles  · Resp Auscultation: Normal breath sounds without dullness  Cardiovascular:  · The apical impulses not displaced  · Heart tones are crisp and normal  · Cervical veins are not engorged  · The carotid upstroke is normal in amplitude and contour without delay or bruit  · JVP less than 8 cm H2O  RRR with nl S1 and S2 without m,r,g  · Peripheral pulses are symmetrical and full  · There is no clubbing, cyanosis of the extremities. · No edema  · Femoral Arteries: 2+ and equal  · Pedal Pulses: 2+ and equal   Neck:  · No thyromegaly  Abdomen:  · No masses or tenderness  · Liver/Spleen: No Abnormalities Noted  Neurological/Psychiatric:  · Alert and oriented in all spheres  · Moves all extremities well  · Exhibits normal gait balance and coordination  · No abnormalities of mood, affect, memory, mentation, or behavior are noted    Limited echo: 10/06/20   Summary   Limited only f/u for LVEF. The left ventricular systolic function is severely reduced with an ejection   fraction of 15-20 %. There is severe global hypokinesis with regional variations. Changes noted from previous echo on 6- in left ventricular function. Labs were reviewed including labs from other hospital systems through [facility]. Cardiac testing was reviewed including echos, nuclear scans, cardiac catheterization, including from other hospital systems through [facility]. Assessment:    1. Systolic CHF, chronic ([address]    2. Dual ICD (implantable cardioverter-defibrillator) in place    3. Non-ischemic cardiomyopathy ([address]    4. NSVT (nonsustained ventricular tachycardia) ([address]    5. Coronary artery disease involving native coronary artery of native heart without angina pectoris    6. Shortness of breath         Impedance monitoring via [facility] [employer] device was downloaded from patient's ICD and reviewed. The impedance shows increased fluid level since around August.     Plan:  1. Fluid restriction of 64 ounces daily  2. Increase lasix to 40 mg twice daily for 2 weeks then return lasix 40 mg to once daily  3. Restart lisinopril 5 mg daily  4. TSH, CBC, CMP, and BNP before next visit  5.  Consider restarting metoprolol next visit  6. Follow up with Vangie Watkins or Mindy in 4-5 weeks. She lives in [place] now so follow up in the [place] office is ideal.        Scribe's attestation: This note was scribed in the presence of Argelia Fermin M.D. By Jocelyn Theodore RN    The scribe's documentation has been prepared under my direction and personally reviewed by me in its entirety. I confirm that the note above accurately reflects all work, treatment, procedures, and medical decision making performed by me. Time Based Itemization  A total of 60 minutes was spent on today's patient encounter. If applicable, non-patient-facing activities:  ( x)Preparing to see the patient and reviewing records  ( ) Individual interpretation of results  ( ) Discussion or coordination of care with other health care professionals  ( x) Ordering of unique tests, medications, or procedures  ( x) Documentation within the EHR       I appreciate the opportunity of cooperating in the care of this patient.     Argelia Fermin M.D., [facility]

## 2021-03-24 PROBLEM — R60.1 ANASARCA: Status: ACTIVE | Noted: 2021-03-24

## 2021-03-25 ENCOUNTER — HOSPITAL ENCOUNTER (INPATIENT)
Age: 61
LOS: 11 days | Discharge: NURSING, REHAB OR LONG-TERM CARE | DRG: 292 | End: 2021-04-05
Attending: INTERNAL MEDICINE | Admitting: INTERNAL MEDICINE
Payer: MEDICARE

## 2021-03-25 LAB
ANION GAP SERPL CALCULATED.3IONS-SCNC: 12 MMOL/L (ref 3–16)
ANION GAP SERPL CALCULATED.3IONS-SCNC: 12 MMOL/L (ref 3–16)
BUN BLDV-MCNC: 16 MG/DL (ref 7–20)
BUN BLDV-MCNC: 17 MG/DL (ref 7–20)
CALCIUM SERPL-MCNC: 8.8 MG/DL (ref 8.3–10.6)
CALCIUM SERPL-MCNC: 8.9 MG/DL (ref 8.3–10.6)
CHLORIDE BLD-SCNC: 100 MMOL/L (ref 99–110)
CHLORIDE BLD-SCNC: 93 MMOL/L (ref 99–110)
CO2: 27 MMOL/L (ref 21–32)
CO2: 29 MMOL/L (ref 21–32)
CREAT SERPL-MCNC: 0.6 MG/DL (ref 0.6–1.1)
CREAT SERPL-MCNC: 1 MG/DL (ref 0.6–1.1)
GFR AFRICAN AMERICAN: >60
GFR AFRICAN AMERICAN: >60
GFR NON-AFRICAN AMERICAN: 57
GFR NON-AFRICAN AMERICAN: >60
GLUCOSE BLD-MCNC: 103 MG/DL (ref 70–99)
GLUCOSE BLD-MCNC: 181 MG/DL (ref 70–99)
GLUCOSE BLD-MCNC: 195 MG/DL (ref 70–99)
GLUCOSE BLD-MCNC: 85 MG/DL (ref 70–99)
GLUCOSE BLD-MCNC: 93 MG/DL (ref 70–99)
GLUCOSE BLD-MCNC: 96 MG/DL (ref 70–99)
GLUCOSE BLD-MCNC: 99 MG/DL (ref 70–99)
MAGNESIUM: 1.6 MG/DL (ref 1.8–2.4)
PERFORMED ON: ABNORMAL
PERFORMED ON: NORMAL
POTASSIUM REFLEX MAGNESIUM: 3.8 MMOL/L (ref 3.5–5.1)
POTASSIUM SERPL-SCNC: 4.3 MMOL/L (ref 3.5–5.1)
PRO-BNP: 1363 PG/ML (ref 0–124)
SODIUM BLD-SCNC: 132 MMOL/L (ref 136–145)
SODIUM BLD-SCNC: 141 MMOL/L (ref 136–145)
TROPONIN: <0.01 NG/ML

## 2021-03-25 PROCEDURE — 80048 BASIC METABOLIC PNL TOTAL CA: CPT

## 2021-03-25 PROCEDURE — 83735 ASSAY OF MAGNESIUM: CPT

## 2021-03-25 PROCEDURE — 93005 ELECTROCARDIOGRAM TRACING: CPT | Performed by: INTERNAL MEDICINE

## 2021-03-25 PROCEDURE — 93308 TTE F-UP OR LMTD: CPT

## 2021-03-25 PROCEDURE — 36415 COLL VENOUS BLD VENIPUNCTURE: CPT

## 2021-03-25 PROCEDURE — 1200000000 HC SEMI PRIVATE

## 2021-03-25 PROCEDURE — 99222 1ST HOSP IP/OBS MODERATE 55: CPT | Performed by: INTERNAL MEDICINE

## 2021-03-25 PROCEDURE — 6360000002 HC RX W HCPCS: Performed by: INTERNAL MEDICINE

## 2021-03-25 PROCEDURE — 83880 ASSAY OF NATRIURETIC PEPTIDE: CPT

## 2021-03-25 PROCEDURE — 6370000000 HC RX 637 (ALT 250 FOR IP): Performed by: INTERNAL MEDICINE

## 2021-03-25 PROCEDURE — 2580000003 HC RX 258: Performed by: INTERNAL MEDICINE

## 2021-03-25 PROCEDURE — 2500000003 HC RX 250 WO HCPCS: Performed by: INTERNAL MEDICINE

## 2021-03-25 PROCEDURE — 51702 INSERT TEMP BLADDER CATH: CPT

## 2021-03-25 PROCEDURE — 84484 ASSAY OF TROPONIN QUANT: CPT

## 2021-03-25 RX ORDER — PANTOPRAZOLE SODIUM 40 MG/1
40 TABLET, DELAYED RELEASE ORAL DAILY
Status: DISCONTINUED | OUTPATIENT
Start: 2021-03-25 | End: 2021-04-05 | Stop reason: HOSPADM

## 2021-03-25 RX ORDER — ONDANSETRON 2 MG/ML
4 INJECTION INTRAMUSCULAR; INTRAVENOUS EVERY 6 HOURS PRN
Status: DISCONTINUED | OUTPATIENT
Start: 2021-03-25 | End: 2021-03-25

## 2021-03-25 RX ORDER — DEXTROSE MONOHYDRATE 25 G/50ML
12.5 INJECTION, SOLUTION INTRAVENOUS PRN
Status: DISCONTINUED | OUTPATIENT
Start: 2021-03-25 | End: 2021-04-05 | Stop reason: HOSPADM

## 2021-03-25 RX ORDER — ACETAMINOPHEN 325 MG/1
650 TABLET ORAL EVERY 6 HOURS PRN
Status: DISCONTINUED | OUTPATIENT
Start: 2021-03-25 | End: 2021-04-05 | Stop reason: HOSPADM

## 2021-03-25 RX ORDER — SODIUM CHLORIDE 0.9 % (FLUSH) 0.9 %
10 SYRINGE (ML) INJECTION PRN
Status: DISCONTINUED | OUTPATIENT
Start: 2021-03-25 | End: 2021-04-05 | Stop reason: HOSPADM

## 2021-03-25 RX ORDER — MAGNESIUM SULFATE 1 G/100ML
1000 INJECTION INTRAVENOUS PRN
Status: DISCONTINUED | OUTPATIENT
Start: 2021-03-25 | End: 2021-04-05 | Stop reason: HOSPADM

## 2021-03-25 RX ORDER — POLYETHYLENE GLYCOL 3350 17 G/17G
17 POWDER, FOR SOLUTION ORAL DAILY PRN
Status: DISCONTINUED | OUTPATIENT
Start: 2021-03-25 | End: 2021-04-05 | Stop reason: HOSPADM

## 2021-03-25 RX ORDER — ASPIRIN 81 MG/1
81 TABLET ORAL DAILY
Status: DISCONTINUED | OUTPATIENT
Start: 2021-03-25 | End: 2021-04-05 | Stop reason: HOSPADM

## 2021-03-25 RX ORDER — SPIRONOLACTONE 25 MG/1
12.5 TABLET ORAL DAILY
Status: DISCONTINUED | OUTPATIENT
Start: 2021-03-26 | End: 2021-04-05 | Stop reason: HOSPADM

## 2021-03-25 RX ORDER — AMIODARONE HYDROCHLORIDE 200 MG/1
200 TABLET ORAL DAILY
Status: DISCONTINUED | OUTPATIENT
Start: 2021-03-25 | End: 2021-04-05 | Stop reason: HOSPADM

## 2021-03-25 RX ORDER — FUROSEMIDE 10 MG/ML
60 INJECTION INTRAMUSCULAR; INTRAVENOUS ONCE
Status: COMPLETED | OUTPATIENT
Start: 2021-03-25 | End: 2021-03-25

## 2021-03-25 RX ORDER — NICOTINE POLACRILEX 4 MG
15 LOZENGE BUCCAL PRN
Status: DISCONTINUED | OUTPATIENT
Start: 2021-03-25 | End: 2021-04-05 | Stop reason: HOSPADM

## 2021-03-25 RX ORDER — ATORVASTATIN CALCIUM 40 MG/1
40 TABLET, FILM COATED ORAL NIGHTLY
Status: DISCONTINUED | OUTPATIENT
Start: 2021-03-25 | End: 2021-04-05 | Stop reason: HOSPADM

## 2021-03-25 RX ORDER — FLUTICASONE PROPIONATE 50 MCG
1 SPRAY, SUSPENSION (ML) NASAL DAILY
Status: DISCONTINUED | OUTPATIENT
Start: 2021-03-25 | End: 2021-04-05 | Stop reason: HOSPADM

## 2021-03-25 RX ORDER — SODIUM CHLORIDE 0.9 % (FLUSH) 0.9 %
10 SYRINGE (ML) INJECTION EVERY 12 HOURS SCHEDULED
Status: DISCONTINUED | OUTPATIENT
Start: 2021-03-25 | End: 2021-04-05 | Stop reason: HOSPADM

## 2021-03-25 RX ORDER — DULOXETIN HYDROCHLORIDE 30 MG/1
30 CAPSULE, DELAYED RELEASE ORAL DAILY
Status: DISCONTINUED | OUTPATIENT
Start: 2021-03-25 | End: 2021-04-05 | Stop reason: HOSPADM

## 2021-03-25 RX ORDER — PROMETHAZINE HYDROCHLORIDE 25 MG/1
12.5 TABLET ORAL EVERY 6 HOURS PRN
Status: DISCONTINUED | OUTPATIENT
Start: 2021-03-25 | End: 2021-03-25

## 2021-03-25 RX ORDER — ACETAMINOPHEN 650 MG/1
650 SUPPOSITORY RECTAL EVERY 6 HOURS PRN
Status: DISCONTINUED | OUTPATIENT
Start: 2021-03-25 | End: 2021-04-05 | Stop reason: HOSPADM

## 2021-03-25 RX ORDER — FUROSEMIDE 10 MG/ML
40 INJECTION INTRAMUSCULAR; INTRAVENOUS 2 TIMES DAILY
Status: DISCONTINUED | OUTPATIENT
Start: 2021-03-25 | End: 2021-03-25

## 2021-03-25 RX ORDER — PROCHLORPERAZINE EDISYLATE 5 MG/ML
10 INJECTION INTRAMUSCULAR; INTRAVENOUS EVERY 6 HOURS PRN
Status: DISCONTINUED | OUTPATIENT
Start: 2021-03-25 | End: 2021-04-05 | Stop reason: HOSPADM

## 2021-03-25 RX ORDER — DEXTROSE MONOHYDRATE 50 MG/ML
100 INJECTION, SOLUTION INTRAVENOUS PRN
Status: DISCONTINUED | OUTPATIENT
Start: 2021-03-25 | End: 2021-04-05 | Stop reason: HOSPADM

## 2021-03-25 RX ORDER — LISINOPRIL 5 MG/1
5 TABLET ORAL DAILY
Status: DISCONTINUED | OUTPATIENT
Start: 2021-03-25 | End: 2021-03-30

## 2021-03-25 RX ORDER — QUETIAPINE FUMARATE 50 MG/1
50 TABLET, EXTENDED RELEASE ORAL NIGHTLY
Status: DISCONTINUED | OUTPATIENT
Start: 2021-03-25 | End: 2021-04-05 | Stop reason: HOSPADM

## 2021-03-25 RX ORDER — INSULIN GLARGINE 100 [IU]/ML
8 INJECTION, SOLUTION SUBCUTANEOUS NIGHTLY
Status: DISCONTINUED | OUTPATIENT
Start: 2021-03-25 | End: 2021-03-27

## 2021-03-25 RX ORDER — DIGOXIN 125 MCG
125 TABLET ORAL EVERY OTHER DAY
Status: DISCONTINUED | OUTPATIENT
Start: 2021-03-25 | End: 2021-04-05 | Stop reason: HOSPADM

## 2021-03-25 RX ADMIN — ATORVASTATIN CALCIUM 40 MG: 40 TABLET, FILM COATED ORAL at 21:55

## 2021-03-25 RX ADMIN — PANTOPRAZOLE SODIUM 40 MG: 40 TABLET, DELAYED RELEASE ORAL at 11:16

## 2021-03-25 RX ADMIN — FUROSEMIDE 5 MG/HR: 10 INJECTION, SOLUTION INTRAMUSCULAR; INTRAVENOUS at 06:07

## 2021-03-25 RX ADMIN — FLUTICASONE PROPIONATE 1 SPRAY: 50 SPRAY, METERED NASAL at 11:16

## 2021-03-25 RX ADMIN — MICONAZOLE NITRATE: 2 POWDER TOPICAL at 11:16

## 2021-03-25 RX ADMIN — FUROSEMIDE 5 MG/HR: 10 INJECTION, SOLUTION INTRAMUSCULAR; INTRAVENOUS at 18:50

## 2021-03-25 RX ADMIN — AMIODARONE HYDROCHLORIDE 200 MG: 200 TABLET ORAL at 11:17

## 2021-03-25 RX ADMIN — LISINOPRIL 5 MG: 5 TABLET ORAL at 11:17

## 2021-03-25 RX ADMIN — APIXABAN 5 MG: 5 TABLET, FILM COATED ORAL at 21:55

## 2021-03-25 RX ADMIN — DIGOXIN 125 MCG: 125 TABLET ORAL at 11:17

## 2021-03-25 RX ADMIN — INSULIN GLARGINE 8 UNITS: 100 INJECTION, SOLUTION SUBCUTANEOUS at 21:58

## 2021-03-25 RX ADMIN — INSULIN LISPRO 1 UNITS: 100 INJECTION, SOLUTION INTRAVENOUS; SUBCUTANEOUS at 21:58

## 2021-03-25 RX ADMIN — DULOXETINE HYDROCHLORIDE 30 MG: 30 CAPSULE, DELAYED RELEASE ORAL at 11:16

## 2021-03-25 RX ADMIN — FUROSEMIDE 60 MG: 10 INJECTION, SOLUTION INTRAMUSCULAR; INTRAVENOUS at 18:35

## 2021-03-25 RX ADMIN — ASPIRIN 81 MG: 81 TABLET, COATED ORAL at 11:17

## 2021-03-25 ASSESSMENT — PAIN SCALES - GENERAL: PAINLEVEL_OUTOF10: 0

## 2021-03-25 ASSESSMENT — ENCOUNTER SYMPTOMS
SHORTNESS OF BREATH: 1
SORE THROAT: 0
COUGH: 0
VOMITING: 0
BLOOD IN STOOL: 0
CONSTIPATION: 0
EYE PAIN: 0
PHOTOPHOBIA: 0
NAUSEA: 0
RHINORRHEA: 0
ABDOMINAL PAIN: 0
CHEST TIGHTNESS: 1
DIARRHEA: 0

## 2021-03-25 NOTE — PROGRESS NOTES
Writer attempted to contact, Suzanne, with no answer again. Will address with night shift RN. Pt's vitals remain stable. No needs voiced at this time.

## 2021-03-25 NOTE — PROGRESS NOTES
Writer attempted to contact pt's emergency contact, Jannet Alston, for updates with no answer. Will attempt at a later time.

## 2021-03-25 NOTE — CARE COORDINATION
3/25/21 Pt recently revoked hospice was active [place]. Pt lives with her Mona. No active HHC. Has a walker and cane. Could use PT/OT to determine additional needs. Pt denies any other needs currently. Pt states Cristina Bosworth is her POA not her sister. Last paperwork on file is for her sister. Will need Suzanne to bring in [address] [facility] paperwork. Follow for needs.

## 2021-03-25 NOTE — CONSULTS
[facility]  [phone]      Attending Physician: Fred Platt MD  Reason for Consultation/Chief Complaint: Acute heart failure, pericardial effusion    Subjective   History of Present Illness:  Danielle Michel is a 62 y.o. female with a history of chronic LV systolic heart failure (LVEF 15-20%) s/p ICD, non-ischemic cardiomyopathy, LV thrombus, chronic pericardial effusion, NSVT, PSVT, PE, CVA, type II DM, essential hypertension, and hyperlipidemia who presented with worsening shortness of breath and edema. The patient reports that over the course of the last month, she has gained approximately 50 lbs and has had worsening swelling in her legs, abdomen, and face. She has also had progressively worsening shortness of breath to the point that she is now short of breath sitting in her chair at home. She further endorses orthopnea and says that she has felt congested in her chest.  She states that she has been compliant with her medication regimen and has been avoiding salt in her diet and limiting her fluids. She follows with Dr. Johnathon Mcallister in the [address] [place] [address] and was last seen on 3/23/21. At that time, it was recommended that she increase her lasix to 40 mg BID and start taking lisinopril which she has apparently been take off of during a previous admission. Her device was interrogated at that time and Optivol showed increased fluid levels since August.  It is not entirely clear what her dry weight is but per chart review she was down to 205 lbs on 1/14/21. She has a chronic posterior pericardial effusion that has been followed with serial echoes. Letty Ulloa, she presented to [facility] with the above complaints and was noted to be in acute heart failure and was subsequently transferred to [facility] for further evaluation and management.   As part of her work-up at the outside hospital, she had a chest CT that reportedly showed a large pericardial effusion. She has not been tachycardic and BP has been stable throughout her admission. She is currently maintaining O2 sats >90% on room air. Past Medical History:   has a past medical history of Arthritis, CAD (coronary artery disease), Cerebral artery occlusion with cerebral infarction ([facility] [address] CHF (congestive heart failure) ([facility] [address] COVID-19, Diabetes mellitus (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 [address] and Pacemaker. Surgical History:   has a past surgical history that includes Pacemaker insertion; tumor removal; Tubal ligation; back surgery; Upper gastrointestinal endoscopy (N/A, 11/5/2020); IR MIDLINE CATH (11/23/2020); and IR MIDLINE CATH (12/11/2020). Social History:   reports that she has never smoked. She has never used smokeless tobacco. She reports that she does not drink alcohol or use drugs. Family History:  family history includes Cancer in her mother; Heart Disease in her father; Other in her mother. Home Medications:  Were reviewed and are listed in nursing record and/or below  Prior to Admission medications    Medication Sig Start Date End Date Taking?  Authorizing Provider   fluticasone (FLONASE) 50 MCG/ACT nasal spray 1 spray by Each Nostril route daily    Historical Provider, MD   furosemide (LASIX) 40 MG tablet Take 40 mg by mouth daily    Historical Provider, MD   lisinopril (PRINIVIL;ZESTRIL) 5 MG tablet Take 1 tablet by mouth daily 3/23/21   Roshan Lake MD   DULoxetine (CYMBALTA) 30 MG extended release capsule Take 1 capsule by mouth daily 2/5/21   Ruby Gabriel MD   insulin glargine (LANTUS SOLOSTAR) 100 UNIT/ML injection pen Inject 8 Units into the skin nightly 2/5/21   Ruby Gabriel MD   apixaban (ELIQUIS) 5 MG TABS tablet Take 1 tablet by mouth 2 times daily 12/15/20   Ruby Gabriel MD   ondansetron (ZOFRAN) 4 MG tablet Take 1 tablet by mouth every 8 hours as needed for Nausea or Vomiting  Patient not taking: Reported on 3/23/2021 12/2/20   Bouchra Cheatham MD   QUEtiapine (SEROQUEL XR) 50 MG extended release tablet Take 50 mg by mouth nightly    Historical Provider, MD   amiodarone (CORDARONE) 200 MG tablet Take 1 tablet by mouth daily 11/10/20   YO Noble MD   pantoprazole (PROTONIX) 40 MG tablet Take 1 tablet by mouth daily 11/6/20   Tim Alvarez MD   digoxin (LANOXIN) 125 MCG tablet Take 1 tablet by mouth every other day 9/25/20   FRITZ Menchaca - CNP   nitroGLYCERIN (NITROSTAT) 0.4 MG SL tablet up to max of 3 total doses. If no relief after 1 dose, call 911. Patient not taking: Reported on 3/23/2021 8/24/20   Juan Luis Barnett MD   aspirin 81 MG EC tablet Take 1 tablet by mouth daily 7/18/20   Curt Diez MD   atorvastatin (LIPITOR) 40 MG tablet Take 1 tablet by mouth nightly 7/18/20   Curt Diez MD   miconazole (MICOTIN) 2 % powder Apply topically 2 times daily. 7/18/20   Curt Diez MD   [employer] Lancets Ultra Thin MISC 1 each by Does not apply route 4 times daily 7/18/20   Curt Diez MD   blood glucose monitor strips Test three times a day & as needed for symptoms of irregular blood glucose.  5/12/19   Tim Alvarez MD        CURRENT Medications:  amiodarone (CORDARONE) tablet 200 mg, Daily  aspirin EC tablet 81 mg, Daily  apixaban (ELIQUIS) tablet 5 mg, BID  atorvastatin (LIPITOR) tablet 40 mg, Nightly  digoxin (LANOXIN) tablet 125 mcg, Every Other Day  DULoxetine (CYMBALTA) extended release capsule 30 mg, Daily  fluticasone (FLONASE) 50 MCG/ACT nasal spray 1 spray, Daily  insulin glargine (LANTUS) injection vial 8 Units, Nightly  lisinopril (PRINIVIL;ZESTRIL) tablet 5 mg, Daily  pantoprazole (PROTONIX) tablet 40 mg, Daily  QUEtiapine (SEROQUEL XR) extended release tablet 50 mg, Nightly  glucose (GLUTOSE) 40 % oral gel 15 g, PRN  dextrose 50 % IV solution, PRN  glucagon (rDNA) injection 1 mg, PRN  dextrose 5 % solution, PRN  insulin lispro (HUMALOG) injection vial 0-6 Units, TID WC  insulin lispro (HUMALOG) injection vial 0-3 Units, Nightly  sodium chloride flush 0.9 % injection 10 mL, 2 times per day  sodium chloride flush 0.9 % injection 10 mL, PRN  polyethylene glycol (GLYCOLAX) packet 17 g, Daily PRN  acetaminophen (TYLENOL) tablet 650 mg, Q6H PRN    Or  acetaminophen (TYLENOL) suppository 650 mg, Q6H PRN  perflutren lipid microspheres (DEFINITY) injection 1.65 mg, ONCE PRN  miconazole (MICOTIN) 2 % powder, BID  furosemide (LASIX) 100 mg in dextrose 5 % 100 mL infusion, Continuous  prochlorperazine (COMPAZINE) injection 10 mg, Q6H PRN        Allergies:  Patient has no known allergies. Review of Systems:   A 14 point review of symptoms completed. Pertinent positives identified in the HPI, all other review of symptoms negative as below. Review of Systems   Constitutional: Negative for chills and fever. HENT: Negative for rhinorrhea and sore throat. Eyes: Negative for photophobia, pain and visual disturbance. Respiratory: Positive for chest tightness and shortness of breath. Negative for cough. Cardiovascular: Positive for leg swelling. Negative for palpitations. Positive for chest congestion and orthopnea. Gastrointestinal: Negative for abdominal pain, blood in stool, constipation, diarrhea, nausea and vomiting. Endocrine: Negative for cold intolerance and heat intolerance. Genitourinary: Negative for difficulty urinating, dysuria and hematuria. Musculoskeletal: Positive for arthralgias and myalgias. Skin: Negative for rash and wound. Allergic/Immunologic: Negative for environmental allergies and food allergies. Neurological: Negative for dizziness, syncope and light-headedness. Hematological: Negative for adenopathy. Does not bruise/bleed easily. Psychiatric/Behavioral: Negative for dysphoric mood.  The patient is not nervous/anxious. Objective   PHYSICAL EXAM:    Vitals:    03/25/21 1609   BP: 125/80   Pulse: 82   Resp: 16   Temp: 98.1 °F (36.7 °C)   SpO2: 91%    Weight: 243 lb 9.7 oz (110.5 kg)       General: Adult female lying in bed in no acute distress. Pleasant and interactive on exam.  HEENT: Normocephalic, atraumatic, non-icteric, hearing intact, nares normal, mucous membranes moist.  Neck: Supple, trachea midline. No adenopathy. No thyromegaly. No carotid bruits. JVP elevated. Heart: Regular rate and rhythm. Normal S1 and S2. Grade II/VI holosystolic murmur. No rubs. Lungs: Normal respiratory effort. Breath sounds diminished bilaterally. Crackles present. Abdomen: Obese, soft, non-tender. Normoactive bowel sounds. No masses or organomegaly. Skin: No rashes, wounds, or lesions. Pulses: 2+ and symmetric. Extremities: 2+ bilateral lower extremity edema. No clubbing or cyanosis. Musculoskeletal: Spontaneously moves all four extremities. Psych: Normal mood and affect. Neuro: Alert and oriented to person, place, and time. No focal deficits noted.       Labs   CBC:   Lab Results   Component Value Date    WBC 7.5 02/03/2021    RBC 4.07 02/03/2021    HGB 11.5 02/03/2021    HCT 36.2 02/03/2021    MCV 89.0 02/03/2021    RDW 21.6 02/03/2021     02/03/2021     CMP:  Lab Results   Component Value Date     03/25/2021    K 3.8 03/25/2021     03/25/2021    CO2 29 03/25/2021    BUN 17 03/25/2021    CREATININE 0.6 03/25/2021    GFRAA >60 03/25/2021    AGRATIO 1.2 02/02/2021    LABGLOM >60 03/25/2021    GLUCOSE 103 03/25/2021    PROT 6.8 02/02/2021    CALCIUM 8.9 03/25/2021    BILITOT 0.5 02/02/2021    ALKPHOS 65 02/02/2021    AST 44 02/02/2021    ALT 16 02/02/2021     PT/INR:  No results found for: PTINR  HgBA1c:  Lab Results   Component Value Date    LABA1C 9.7 02/02/2021     Lab Results   Component Value Date    CKTOTAL 54 06/12/2020    TROPONINI <0.01 03/25/2021         Cardiac Data Echo:  TTE 6/13/20:  Conclusions   Summary   LV systolic function is moderately reduced with an estimated EF of 35%. Moderate global hypokinesis. There is mild concentric left ventricular hypertrophy. Left ventricular cavity size is mildly dilated. Estimated LV diastolic filling pressure is normal.   Mild mitral and tricuspid regurgitation. Systolic pulmonary artery pressure (SPAP) is estimated at 35mmHg (Right   atrial pressure of 8 mmHg). No significant change from exam done 10/18/2019. Limited TTE 11/17/20:  Conclusions   Summary   Limited exam for pericardial effusion. EF visually estimated at < 20%. Small to Moderate circumferential pericardial effusion without tamponade   physiology. The effusion does not appear to be significantly changed since   the previous exam on 11/17/2020. There is a moderate to large left pleural effusion. Mild to moderate tricuspid regurgitation. Systolic pulmonary artery pressure (SPAP) is elevated and estimated at 41   mmHg (right atrial pressure 15 mmHg) consistent with mild pulmonary   hypertension. Limited TTE 11/22/20:  Conclusions   Summary   Left ventricular systolic function is reduced with ejection fraction   estimated at 10-15 %. There is severe global hypokinesis. Left ventricular size is moderately increased. There is mild concentric left ventricular hypertrophy. There is a left ventricular apical thrombus present. There is a small circumferential pericardial effusion noted with tamponade   physiology only by doppler flow but not by other criteria. Limited TTE 3/25/21:  Conclusions   Summary   Limited for pericardial effusion. There is a moderate posterior pericardial effusion noted ( 1.5cm). No   significant anterior effusion. No tamponade. Severly impaired systolic   function of left ventricle. No significant changes in size of effusion since   11.25.20.     Stress Test:  Pharmacologic Nuclear SPECT Stress 1/31/20:  Conclusions        Summary    Mildly reduced LVEF 45%    Inferolateral hypokinesis    Mainly fixed inferior defect suggestive of scar    No evidence of myocardium at risk for significant reversible ischemia. Pharmacologic Nuclear SPECT Stress 10/5/20:  Conclusions        Summary    Dilated LV with severe global hypokinesis. Large, severe, fixed perfusion    defect of the inferior/inferolateral wall consistent with scar. Diminished    uptake of the anterior wall consistent with breast artifact. Post stress    LVEF is abnormal at 19%. Abnormal study. Overall findings represent a high    risk scan. Cath:  [facility] 12/12/18:  LM <20%  LAD 20-30%  Cx 20-20%  RCA 20-30%              RPDA 50%. FFR 0.89  LVEF 45%    Coronary CTA and calcium scoring study 12/10/18:  FINDINGS:   Calcium score: 145       Left Main: 0       LAD: 130       Left circumflex:  10       RCA:  5       Cardiac findings: Left atrial and left ventricular dilation are identified. Orthotopic origin of the coronary artery vessels.  Left chest wall cardiac   pacemaker noted.       Left Main: No significant atherosclerosis or stenosis.       LAD: Up to 50% narrowing involving the proximal segment left anterior   descending coronary artery (6).  Some blooming artifact around the calcified   focus is identified.  No myocardial bridging is appreciated.       Left circumflex:  Less than 50% narrowing in the LCX.       RCA:  Suboptimally evaluated.       Non-cardiac findings: Calcified and noncalcified mediastinal lymph nodes are   identified. .  Small airway thickening in the visualized lungs with possible   centrilobular micro nodularity.        Assessment:      Sindhu Beavers is a 62 y.o. female with a history of chronic LV systolic heart failure (LVEF 15-20%) s/p ICD, non-ischemic cardiomyopathy, LV thrombus, chronic pericardial effusion, NSVT, PSVT, PE, CVA, type II DM, essential hypertension, and hyperlipidemia admitted with acute on for allowing us to participate in the care of Ada Duran. Please call me with any questions [phone]. DO [facility]ET [facility]   [phone] [facility]  [phone] [address] [place] [address]  3/25/2021 5:43 PM      I will address the patient's cardiac risk factors and adjusted pharmacologic treatment as needed. In addition, I have reinforced the need for patient directed risk factor modification. All questions and concerns were addressed to the patient/family. Alternatives to my treatment were discussed. The note was completed using EMR. Every effort was made to ensure accuracy; however, inadvertent computerized transcription errors may be present.

## 2021-03-25 NOTE — PROGRESS NOTES
Nutrition Education    · Verbally reviewed information with Patient  · Educated on CHF Diet  · Written educational materials provided. · Contact name and number provided. · Refer to Patient Education activity for more details. Pt seen for CHF diet education. Provided pt with written and verbal instruction on HF nutrition therapy. Discussed low sodium diet, daily weights, and fluid restriction. Pt voiced understanding.    · Time spent: 5 minutes    Electronically signed by Jam Record Intern on 3/25/21 at 10:30 AM EDT    Contact: [phone]

## 2021-03-25 NOTE — DISCHARGE INSTR - COC
Continuity of Care Form    Patient Name: Elzbieta Ivory   :  1960  MRN:  [id number]    [facility] date:  3/25/2021  Discharge date:  2021    Code Status Order: Limited   Advance Directives:   Advance Care Flowsheet Documentation       Date/Time Healthcare Directive Type of Healthcare Directive Copy in [address] Agent's Name Healthcare Agent's Phone Number    [phone]  No, patient does not have an advance directive for healthcare treatment -- -- -- -- --            Admitting Physician:  Karuna Chambers MD  PCP: Sha Ness APRN - NP    Discharging Nurse: Ha [facility] Unit/Room#: 0203/0203-01  Discharging Unit Phone Number: [phone]    Emergency Contact:   Extended Emergency Contact Information  Primary Emergency Contact: Christa Campos  Address: [address] [place] [address] [address]           [facility], [address] [facility],[address] Phone: [phone]  Mobile Phone: [phone]  Relation: Brother/Sister  Secondary Emergency Contact: johnCatalina Vigiligham  Mobile Phone: [phone]  Relation: Other    Past Surgical History:  Past Surgical History:   Procedure Laterality Date    BACK SURGERY      x3    IR MIDLINE CATH  2020    IR MIDLINE CATH 2020 [address] SPECIAL PROCEDURES    IR MIDLINE CATH  2020    IR MIDLINE CATH 2020 [address] SPECIAL PROCEDURES    PACEMAKER INSERTION      Boyd pacemaker    TUBAL LIGATION      TUMOR REMOVAL      UPPER GASTROINTESTINAL ENDOSCOPY N/A 2020    EGD BIOPSY performed by Tamra Armstrong DO at [address] [facility]       Immunization History:   Immunization History   Administered Date(s) Administered    Influenza 2012    Influenza A (B3T9-64) Vaccine IM 2009    Influenza Virus Vaccine 10/17/2014, 10/27/2017    Influenza, Quadv, 6 mo and older, IM, PF (Flulaval, Fluarix) 12/10/2018    Influenza, Quadv, IM, PF (6 mo and older Fluzone, Flulaval, Fluarix, and 3 yrs and older Afluria) 10/12/2019, 12/15/2020    Pneumococcal Polysaccharide ([id number]) 10/17/2014, 07/13/2017    Tdap (Boostrix, Adacel) 12/25/2020, 02/02/2021       Active Problems:  Patient Active Problem List   Diagnosis Code    DM (diabetes mellitus) ([facility] E11.9    HTN (hypertension), benign I10    Dyslipidemia E78.5    CAD (coronary artery disease) I25.10    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) I63.50    Dual ICD (implantable cardioverter-defibrillator) in place Z80.65    Brain tumor (benign) ([facility] D33.2    Chronic systolic CHF (congestive heart failure) ([facility]) I50.22    TIA involving right internal carotid artery G45.1    CAD in native artery I25.10    DM (diabetes mellitus), secondary, uncontrolled, w/neurologic complic ([facility]) T87.45, K87.52    CHF (congestive heart failure) ([facility]) I50.9    Non-ischemic cardiomyopathy ([facility]) I42.8    Essential hypertension I10    TIA (transient ischemic attack) G45.9    Hyperglycemia R73.9    Diabetic ketoacidosis without coma associated with type 2 diabetes mellitus ([facility] E11.10    Non-intractable vomiting R11.10    Chest pain R07.9    Arterial ischemic stroke, ICA, right, acute ([facility]) I63.231    Diabetic hyperosmolar non-ketotic state ([facility] E11.00    Diabetic acidosis without coma ([facility] E11.10    Hyponatremia K97.6    Metabolic acidosis S74.6    Disorder of electrolytes E87.8    Diabetic ketoacidosis with coma associated with type 2 diabetes mellitus ([facility]) E11.11    Hypernatremia E87.0    Leukocytosis D72.829    Atrial tachycardia ([facility]) I47.1    DKA, type 2, not at goal ([facility] E11.10    Cognitive developmental delay F81.9    Depressive disorder F32.9    Urinary tract infection without hematuria N39.0    Hypokalemia E87.6    Persistent fever R50.9    Syncope and collapse R55    S/P ICD (internal cardiac defibrillator) procedure Z95.810    History of CVA (cerebrovascular accident) Z80.78    Noncompliance with medications Z91.14    Obesity E66.9    SVT (supraventricular tachycardia) ([facility]) I47.1    Type 2 diabetes mellitus with hyperglycemia, with long-term current use of insulin ([facility]) E11.65, Z79.4    Acute pulmonary edema ([facility]) J81.0    Hyperlipidemia E78.5    Hypomagnesemia E83.42    Lactic acidosis E87.2    NSVT (nonsustained ventricular tachycardia) ([facility]) I47.2    Dyspnea and respiratory abnormalities R06.00, R06.89    Intractable nausea and vomiting R11.2    Tachycardia R00.0    Uncontrolled type 2 diabetes mellitus with hyperglycemia ([facility]) E11.65    Acute on chronic systolic CHF (congestive heart failure) ([facility]) I50.23    Pulmonary emboli ([facility]) I26.99    Heart failure ([facility]) I50.9    Pericardial effusion I31.3    Sepsis ([facility]) A41.9    COVID-19 U07.1    Multifocal pneumonia J18.9    Pneumonia due to COVID-19 virus U07.1, J12.82    Acute hypoxemic respiratory failure ([facility]) J96.01    High risk medication use Z79.899    Hypoglycemia E16.2    New onset seizure ([facility]) R56.9    Contusion of face S00.83XA    Episode of recurrent major depressive disorder ([facility]) F33.9    DJD (degenerative joint disease) M19.90    Anxiety F41.9    Fall on same level from slipping, tripping or stumbling W01. 0XXA    Financial difficulties Z59.8    History of diabetes mellitus Z86.39    Hx of migraines Z86.69    Incontinence R32    Neck pain M54.2    Overdose T50.901A    Raynaud phenomenon I73.00    Schizophrenia ([facility] F20.9    Suicide attempt ([facility] T14.91XA    Anasarca R60.1       Isolation/Infection:   Isolation            No Isolation          Patient Infection Status       Infection Onset Added Last Indicated Last Indicated By Review Planned Expiration Resolved Resolved By    None active    Resolved    COVID-19 Rule Out 21 COVID-19 (Ordered)   21 Rule-Out Test Resulted    COVID-19 20 COVID-19   20     COVID-19 Rule Out 20 COVID-19 (Ordered) 12/09/20 Rule-Out Test Resulted    ESBL (Extended Spectrum Beta Lactamase) 12/07/20 12/09/20 12/09/20 Culture, Urine   02/05/21 Carolin Benton RN    COVID-19 Rule Out 11/23/20 11/23/20 11/23/20 COVID-19 (Ordered)   11/23/20 Rule-Out Test Resulted    COVID-19 Rule Out 11/16/20 11/16/20 11/16/20 COVID-19 (Ordered)   11/18/20 Rule-Out Test Resulted    COVID-19 Rule Out 11/05/20 11/05/20 11/05/20 COVID-19 (Ordered)   11/05/20 Rule-Out Test Resulted    COVID-19 Rule Out 11/03/20 11/03/20 11/03/20 COVID-19 (Ordered)   11/05/20 Mark Booth RN    Ordered COVID swab and discontinued previous ED visit. COVID-19 Rule Out 10/26/20 10/26/20 10/26/20 COVID-19 (Ordered)   10/26/20 Rule-Out Test Resulted    C-diff Rule Out 09/10/20 09/10/20 09/10/20 Clostridium difficile toxin/antigen (Ordered)   09/22/20 Juma Stauffer RN    COVID-19 Rule Out 04/16/20 04/16/20 04/16/20 COVID-19 (Ordered)   04/16/20 Rule-Out Test Resulted            Nurse Assessment:  Last Vital Signs: /80   Pulse 79   Temp 98 °F (36.7 °C) (Oral)   Resp 16   Ht 5' 1\" (1.549 m)   Wt 243 lb 9.7 oz (110.5 kg)   SpO2 94%   BMI 46.03 kg/m²     Last documented pain score (0-10 scale): Pain Level: 0  Last Weight:   Wt Readings from Last 1 Encounters:   03/25/21 243 lb 9.7 oz (110.5 kg)     Mental Status:  oriented and alert    IV Access:  - None    Nursing Mobility/ADLs:  Walking   Assisted  Transfer  Assisted  Bathing  Assisted  Dressing  Independent  Toileting  Assisted  Feeding  Independent  Med Admin  Assisted  Med Delivery   whole    Wound Care Documentation and Therapy:        Elimination:  Continence:   · Bowel: Yes  · Bladder: Yes  Urinary Catheter: Removal Date 04/05/2021   Colostomy/Ileostomy/Ileal Conduit: No       Date of Last BM: 04/03/2021    Intake/Output Summary (Last 24 hours) at 3/25/2021 1444  Last data filed at 3/25/2021 1120  Gross per 24 hour   Intake 160 ml   Output 1200 ml   Net -1040 ml     I/O last 3 completed shifts:   In: 160 [P.O.:160]  Out: 600 [Urine:600]    Safety Concerns:     History of Falls (last 30 days) and At Risk for Falls    Impairments/Disabilities:      None    Nutrition Therapy:  Current Nutrition Therapy:   - Oral Diet:  carb control 2 gm fluid restriction 1500 ml  - na    Routes of Feeding: Oral  Liquids: No Restrictions  Daily Fluid Restriction: no  Last Modified Barium Swallow with Video (Video Swallowing Test): not done    Treatments at the Time of Hospital Discharge:   Respiratory Treatments: NA  Oxygen Therapy:  is not on home oxygen therapy. Ventilator:    - No ventilator support    Rehab Therapies: Physical Therapy and Occupational Therapy  Weight Bearing Status/Restrictions: No weight bearing restirctions  Other Medical Equipment (for information only, NOT a DME order):  None    Other Treatments:     Patient's personal belongings (please select all that are sent with patient):  Jerod    RN SIGNATURE:  Electronically signed by Stephanie Monet RN on 4/5/21 at 3:21 PM EDT    CASE MANAGEMENT/SOCIAL WORK SECTION    Inpatient Status Date: 3/25/21    Readmission Risk Assessment Score:  Readmission Risk              Risk of Unplanned Readmission:        65           Discharging to Facility/ Agency   · Name: [facility]   · Address:  · Phone: [phone]  · Fax: [phone]    Dialysis Facility (if applicable)   · Name:  · Address:  · Dialysis Schedule:  · Phone:  · Fax:    / signature: Electronically signed by Jl Beavers RN on 4/5/21 at 11:05 AM EDT    PHYSICIAN SECTION    Prognosis: Good    Condition at Discharge: Stable    Rehab Potential (if transferring to Rehab): Good    Recommended Labs or Other Treatments After Discharge:  Follow up with your outpatient cardiologist at [facility] in 1-2 weeks     Physician Certification: I certify the above information and transfer of Donaldo Booker  is necessary for the continuing treatment of the diagnosis listed and that she requires [place] for less 30 days.      Update Admission H&P: No change in H&P    PHYSICIAN SIGNATURE:  Electronically signed by Sindi Cosby MD on 4/5/21 at 11:06 AM EDT

## 2021-03-25 NOTE — PROGRESS NOTES
4 Eyes Skin Assessment     The patient is being assess for   Admission    I agree that 2 RN's have performed a thorough Head to Toe Skin Assessment on the patient. ALL assessment sites listed below have been assessed. Areas assessed by both nurses:   [x]   Head, Face, and Ears   [x]   Shoulders, Back, and Chest, Abdomen  [x]   Arms, Elbows, and Hands   [x]   Coccyx, Sacrum, and Ischium  [x]   Legs, Feet, and Heels            **SHARE this note so that the co-signing nurse is able to place an eSignature**    Co-signer eSignature: Maritza Noguera RN    Does the Patient have Skin Breakdown?   No          Ignacio Prevention initiated:  Yes   Wound Care Orders initiated:  No      WOC nurse consulted for Pressure Injury (Stage 3,4, Unstageable, DTI, NWPT, Complex wounds)and New or Established Ostomies:  No      Primary Nurse eSignature: Gregor Lima RN       eye

## 2021-03-25 NOTE — H&P
Hospital Medicine History & Physical      PCP: FRITZ Daniels - NP    Date of Admission: 3/25/2021    Date of Service: Pt seen/examined on 3/25/2021 and Admitted to Inpatient with expected LOS greater than two midnights due to medical therapy. Chief Complaint: Transfer from [facility] ED for pericardial effusion, CHF      History Of Present Illness:   62 y.o. female who presented to [facility] with above complaints  Patient with PMH of DM 2, HTN, chronic CHF, EF <20%, moderate pulmonary hypertension, Hx of PE, depression presented to [facility] ED last night with complaints of worsening shortness of breath. Patient reports she has gained about 50 pounds in the last month all fluid. She reports chest tightness with ambulation. Denies any lightheadedness, abdominal pain nausea vomiting. Patient reports she is no longer in hospice and has recently moved in with her family in [place]. She was evaluated by Dr. Rufino Valdivia yesterday in the CHF clinic, was noted to be fluid overloaded and was asked to increase her Lasix dose to twice daily. She does report 3 pillow orthopnea, no PND. Past Medical History:          Diagnosis Date    Arthritis     CAD (coronary artery disease)     Cerebral artery occlusion with cerebral infarction ([address]     x 3    CHF (congestive heart failure) ([address]     COVID-19 12/09/2020    Diabetes mellitus ([address]     ESBL (extended spectrum beta-lactamase) producing bacteria infection 12/09/2020    E. COLI-URINE    Hyperlipidemia     Hypertension     Mental retardation     MI (myocardial infarction) ([address]     Pacemaker        Past Surgical History:          Procedure Laterality Date    BACK SURGERY      x3    IR MIDLINE CATH  11/23/2020    [facility]    IR MIDLINE CATH  12/11/2020    IR MIDLINE CATH 12/11/2020 [facility] SPECIAL PROCEDURES    PACEMAKER INSERTION      Boyd pacemaker    TUBAL LIGATION  TUMOR REMOVAL      UPPER GASTROINTESTINAL ENDOSCOPY N/A 11/5/2020    EGD BIOPSY performed by Fannie Kim DO at [facility]       Medications Prior to Admission:      Prior to Admission medications    Medication Sig Start Date End Date Taking? Authorizing Provider   fluticasone (FLONASE) 50 MCG/ACT nasal spray 1 spray by Each Nostril route daily    Historical Provider, MD   furosemide (LASIX) 40 MG tablet Take 40 mg by mouth daily    Historical Provider, MD   lisinopril (PRINIVIL;ZESTRIL) 5 MG tablet Take 1 tablet by mouth daily 3/23/21   Desi Goncalves MD   DULoxetine (CYMBALTA) 30 MG extended release capsule Take 1 capsule by mouth daily 2/5/21   Robin Hutchinson MD   insulin glargine (LANTUS SOLOSTAR) 100 UNIT/ML injection pen Inject 8 Units into the skin nightly 2/5/21   Robin Hutchinson MD   apixaban (ELIQUIS) 5 MG TABS tablet Take 1 tablet by mouth 2 times daily 12/15/20   Robin Hutchinson MD   ondansetron (ZOFRAN) 4 MG tablet Take 1 tablet by mouth every 8 hours as needed for Nausea or Vomiting  Patient not taking: Reported on 3/23/2021 12/2/20   Taye Castillo MD   QUEtiapine (SEROQUEL XR) 50 MG extended release tablet Take 50 mg by mouth nightly    Historical Provider, MD   amiodarone (CORDARONE) 200 MG tablet Take 1 tablet by mouth daily 11/10/20   YO Long MD   pantoprazole (PROTONIX) 40 MG tablet Take 1 tablet by mouth daily 11/6/20   Robin Hutchinson MD   digoxin (LANOXIN) 125 MCG tablet Take 1 tablet by mouth every other day 9/25/20   Alex Rudd, APRN - CNP   nitroGLYCERIN (NITROSTAT) 0.4 MG SL tablet up to max of 3 total doses. If no relief after 1 dose, call 911.   Patient not taking: Reported on 3/23/2021 8/24/20   Simon Das MD   aspirin 81 MG EC tablet Take 1 tablet by mouth daily 7/18/20   Lexii Jc MD   atorvastatin (LIPITOR) 40 MG tablet Take 1 tablet by mouth nightly 7/18/20   Lexii Jc MD   miconazole accurate I's/O, daily weights  -Cardiology and CHF nurse consult  -Monitor renal parameters closely  -Limited 2D echo to assess large pericardial effusion  -Telemetry monitoring  -1500 cc fluid restricted diet  -Continue lisinopril, consider adding beta-blocker    Pericardial effusion  Stated as large on CT chest from OSH, along the right heart border increased to 3.2 cm from 1.9 cm back from December 2020  -Limited 2D echo to assess pericardial effusion  -Cardiology consulted, diuresis initiated    DM2 A1c 9.7  Resume home insulin regimen, added sliding scale insulin with Accu-Cheks    History of PE-resume Eliquis    Dual ICD (implantable cardioverter-defibrillator) in place   -Followed by EP    Hx of atrial tachycardia, PSVT-resume amiodarone    Hx of LV thrombus-on Eliquis    Hx of PE diagnosed in November 2020, resume Eliquis    Depression-mood stable, resume Cymbalta and Seroquel    History of stroke-on aspirin statin and Eliquis for secondary prevention    DVT Prophylaxis: On Eliquis  Diet: DIET CARDIAC;  Code Status: Full Code    PT/OT Eval Status: Held    Dispo -IP stay       Carl Muhammad MD    Thank you FRITZ Alonso - NP for the opportunity to be involved in this patient's care. If you have any questions or concerns please feel free to contact me at [phone].

## 2021-03-26 LAB
ANION GAP SERPL CALCULATED.3IONS-SCNC: 13 MMOL/L (ref 3–16)
BUN BLDV-MCNC: 16 MG/DL (ref 7–20)
CALCIUM SERPL-MCNC: 8.4 MG/DL (ref 8.3–10.6)
CHLORIDE BLD-SCNC: 96 MMOL/L (ref 99–110)
CO2: 26 MMOL/L (ref 21–32)
CREAT SERPL-MCNC: 0.6 MG/DL (ref 0.6–1.1)
EKG ATRIAL RATE: 85 BPM
EKG DIAGNOSIS: NORMAL
EKG P AXIS: 16 DEGREES
EKG P-R INTERVAL: 186 MS
EKG Q-T INTERVAL: 404 MS
EKG QRS DURATION: 96 MS
EKG QTC CALCULATION (BAZETT): 480 MS
EKG R AXIS: 103 DEGREES
EKG T AXIS: 32 DEGREES
EKG VENTRICULAR RATE: 85 BPM
GFR AFRICAN AMERICAN: >60
GFR NON-AFRICAN AMERICAN: >60
GLUCOSE BLD-MCNC: 144 MG/DL (ref 70–99)
GLUCOSE BLD-MCNC: 189 MG/DL (ref 70–99)
GLUCOSE BLD-MCNC: 197 MG/DL (ref 70–99)
GLUCOSE BLD-MCNC: 206 MG/DL (ref 70–99)
GLUCOSE BLD-MCNC: 235 MG/DL (ref 70–99)
HCT VFR BLD CALC: 34.4 % (ref 36–48)
HEMOGLOBIN: 11.1 G/DL (ref 12–16)
MAGNESIUM: 1.8 MG/DL (ref 1.8–2.4)
MCH RBC QN AUTO: 27.7 PG (ref 26–34)
MCHC RBC AUTO-ENTMCNC: 32.2 G/DL (ref 31–36)
MCV RBC AUTO: 86 FL (ref 80–100)
PDW BLD-RTO: 18.2 % (ref 12.4–15.4)
PERFORMED ON: ABNORMAL
PLATELET # BLD: 246 K/UL (ref 135–450)
PMV BLD AUTO: 9 FL (ref 5–10.5)
POTASSIUM SERPL-SCNC: 4 MMOL/L (ref 3.5–5.1)
RBC # BLD: 4 M/UL (ref 4–5.2)
SODIUM BLD-SCNC: 135 MMOL/L (ref 136–145)
TSH REFLEX FT4: 0.68 UIU/ML (ref 0.27–4.2)
WBC # BLD: 6.4 K/UL (ref 4–11)

## 2021-03-26 PROCEDURE — 97166 OT EVAL MOD COMPLEX 45 MIN: CPT

## 2021-03-26 PROCEDURE — 99233 SBSQ HOSP IP/OBS HIGH 50: CPT | Performed by: NURSE PRACTITIONER

## 2021-03-26 PROCEDURE — 6360000002 HC RX W HCPCS: Performed by: INTERNAL MEDICINE

## 2021-03-26 PROCEDURE — 97530 THERAPEUTIC ACTIVITIES: CPT

## 2021-03-26 PROCEDURE — 36415 COLL VENOUS BLD VENIPUNCTURE: CPT

## 2021-03-26 PROCEDURE — 83735 ASSAY OF MAGNESIUM: CPT

## 2021-03-26 PROCEDURE — 1200000000 HC SEMI PRIVATE

## 2021-03-26 PROCEDURE — 85027 COMPLETE CBC AUTOMATED: CPT

## 2021-03-26 PROCEDURE — 2580000003 HC RX 258: Performed by: INTERNAL MEDICINE

## 2021-03-26 PROCEDURE — 80048 BASIC METABOLIC PNL TOTAL CA: CPT

## 2021-03-26 PROCEDURE — 93010 ELECTROCARDIOGRAM REPORT: CPT | Performed by: INTERNAL MEDICINE

## 2021-03-26 PROCEDURE — 6370000000 HC RX 637 (ALT 250 FOR IP): Performed by: INTERNAL MEDICINE

## 2021-03-26 PROCEDURE — 6370000000 HC RX 637 (ALT 250 FOR IP): Performed by: NURSE PRACTITIONER

## 2021-03-26 PROCEDURE — 84443 ASSAY THYROID STIM HORMONE: CPT

## 2021-03-26 PROCEDURE — 97162 PT EVAL MOD COMPLEX 30 MIN: CPT

## 2021-03-26 RX ORDER — METOPROLOL SUCCINATE 25 MG/1
25 TABLET, EXTENDED RELEASE ORAL DAILY
Status: DISCONTINUED | OUTPATIENT
Start: 2021-03-26 | End: 2021-03-27

## 2021-03-26 RX ORDER — MAGNESIUM SULFATE IN WATER 40 MG/ML
4000 INJECTION, SOLUTION INTRAVENOUS ONCE
Status: COMPLETED | OUTPATIENT
Start: 2021-03-26 | End: 2021-03-26

## 2021-03-26 RX ADMIN — APIXABAN 5 MG: 5 TABLET, FILM COATED ORAL at 09:58

## 2021-03-26 RX ADMIN — INSULIN GLARGINE 8 UNITS: 100 INJECTION, SOLUTION SUBCUTANEOUS at 20:19

## 2021-03-26 RX ADMIN — PANTOPRAZOLE SODIUM 40 MG: 40 TABLET, DELAYED RELEASE ORAL at 09:58

## 2021-03-26 RX ADMIN — SPIRONOLACTONE 12.5 MG: 25 TABLET ORAL at 09:58

## 2021-03-26 RX ADMIN — MICONAZOLE NITRATE: 2 POWDER TOPICAL at 20:26

## 2021-03-26 RX ADMIN — MICONAZOLE NITRATE: 2 POWDER TOPICAL at 09:59

## 2021-03-26 RX ADMIN — MAGNESIUM SULFATE 1000 MG: 1 INJECTION INTRAVENOUS at 01:23

## 2021-03-26 RX ADMIN — METOPROLOL SUCCINATE 25 MG: 25 TABLET, EXTENDED RELEASE ORAL at 16:25

## 2021-03-26 RX ADMIN — INSULIN LISPRO 1 UNITS: 100 INJECTION, SOLUTION INTRAVENOUS; SUBCUTANEOUS at 08:37

## 2021-03-26 RX ADMIN — MAGNESIUM SULFATE HEPTAHYDRATE 4000 MG: 40 INJECTION, SOLUTION INTRAVENOUS at 10:02

## 2021-03-26 RX ADMIN — INSULIN LISPRO 1 UNITS: 100 INJECTION, SOLUTION INTRAVENOUS; SUBCUTANEOUS at 12:07

## 2021-03-26 RX ADMIN — LISINOPRIL 5 MG: 5 TABLET ORAL at 09:58

## 2021-03-26 RX ADMIN — AMIODARONE HYDROCHLORIDE 200 MG: 200 TABLET ORAL at 09:58

## 2021-03-26 RX ADMIN — INSULIN LISPRO 1 UNITS: 100 INJECTION, SOLUTION INTRAVENOUS; SUBCUTANEOUS at 17:37

## 2021-03-26 RX ADMIN — FUROSEMIDE 5 MG/HR: 10 INJECTION, SOLUTION INTRAMUSCULAR; INTRAVENOUS at 06:03

## 2021-03-26 RX ADMIN — APIXABAN 5 MG: 5 TABLET, FILM COATED ORAL at 20:18

## 2021-03-26 RX ADMIN — ATORVASTATIN CALCIUM 40 MG: 40 TABLET, FILM COATED ORAL at 20:19

## 2021-03-26 RX ADMIN — MAGNESIUM SULFATE 1000 MG: 1 INJECTION INTRAVENOUS at 02:24

## 2021-03-26 RX ADMIN — FLUTICASONE PROPIONATE 1 SPRAY: 50 SPRAY, METERED NASAL at 09:58

## 2021-03-26 RX ADMIN — INSULIN LISPRO 1 UNITS: 100 INJECTION, SOLUTION INTRAVENOUS; SUBCUTANEOUS at 20:19

## 2021-03-26 RX ADMIN — ASPIRIN 81 MG: 81 TABLET, COATED ORAL at 09:58

## 2021-03-26 RX ADMIN — DULOXETINE HYDROCHLORIDE 30 MG: 30 CAPSULE, DELAYED RELEASE ORAL at 09:58

## 2021-03-26 ASSESSMENT — PAIN SCALES - GENERAL: PAINLEVEL_OUTOF10: 0

## 2021-03-26 NOTE — FLOWSHEET NOTE
Attempted to visit with Pt for Epic consult. Pt asleep. Left Spiritual Care note, informing Pt of our availability for support. Will also attempt to follow up at a later time.     Benjie Gandhi   Associate       03/26/21 5589   Encounter Summary   Services provided to: Patient not available   Continue Visiting   (3/26 Epic consult, Pt asleep, left note)   Complexity of Encounter Low   Length of Encounter 15 minutes

## 2021-03-26 NOTE — PROGRESS NOTES
Physical Therapy    Facility/Department: [facility] A2 CARD TELEMETRY  Initial Assessment    NAME: Jimbo Martino  : 1960  MRN: [id number]    Date of Service: 3/26/2021    Discharge Recommendations:  Subacute/Skilled Nursing Facility   PT Equipment Recommendations  Equipment Needed: No  If pt is unable to be seen after this session, please let this note serve as discharge summary. Please see case management note for discharge disposition. Thank you. Assessment   Body structures, Functions, Activity limitations: Decreased functional mobility ; Decreased endurance;Decreased balance  Assessment: Pt functioning below baseline due to deconditioning and swelling from B LE. Pt states she is needing more assist with ADLs at home and limited with ambulation. Pt was able to take a couple steps to bedside chair with HHA. Recommend SNF upon DC. Treatment Diagnosis: deconditioning  Prognosis: Good  Decision Making: Medium Complexity  PT Education: Goals; General Safety;Gait Training;Disease Specific Education;PT Role;Plan of Care; Functional Mobility Training;Transfer Training  Patient Education: Pt expressed understanding on benefits of increased OOB activity  Barriers to Learning: none  REQUIRES PT FOLLOW UP: Yes  Activity Tolerance  Activity Tolerance: Patient limited by fatigue;Patient limited by endurance       Patient Diagnosis(es): There were no encounter diagnoses. has a past medical history of Arthritis, CAD (coronary artery disease), Cerebral artery occlusion with cerebral infarction ([address] CHF (congestive heart failure) ([address] COVID-19, Diabetes mellitus ([address] ESBL (extended spectrum beta-lactamase) producing bacteria infection, Hyperlipidemia, Hypertension, Mental retardation, MI (myocardial infarction) ([address] and Pacemaker. has a past surgical history that includes Pacemaker insertion; tumor removal; Tubal ligation; back surgery; Upper gastrointestinal endoscopy (N/A, 2020);  IR MIDLINE CATH (degrees)  LLE PROM: WFL  AROM LLE (degrees)  LLE AROM : WFL  Strength RLE  Strength RLE: WFL  Strength LLE  Strength LLE: WFL  Tone RLE  RLE Tone: Normotonic  Tone LLE  LLE Tone: Normotonic  Motor Control  Gross Motor?: WFL  Sensation  Overall Sensation Status: WNL  Bed mobility  Supine to Sit: Stand by assistance  Sit to Supine: Unable to assess  Transfers  Sit to Stand: Contact guard assistance  Stand to sit: Contact guard assistance  Ambulation  Ambulation?: Yes  More Ambulation?: No  Ambulation 1  Surface: level tile  Device: Hand-Held Assist  Assistance: Contact guard assistance  Gait Deviations: Slow Ana  Distance: 3'  Stairs/Curb  Stairs?: No     Balance  Posture: Good  Sitting - Static: Good  Sitting - Dynamic: Good  Standing - Static: Good  Standing - Dynamic: Fair  Exercises  Quad Sets: x10 B  Heelslides: x5 B  Ankle Pumps: x 10 B     Plan   Plan  Times per week: 3-5x/week  Current Treatment Recommendations: Strengthening, Balance Training, Endurance Training, Functional Mobility Training, Transfer Training, Gait Training, Home Exercise Program, Patient/Caregiver Education & Training, Equipment Evaluation, Education, & procurement  Safety Devices  Type of devices:  All fall risk precautions in place, Left in chair, Call light within reach, Chair alarm in place, Gait belt, Patient at risk for falls, Nurse notified  Restraints  Initially in place: No      AM-PAC Score  AM-PAC Inpatient Mobility Raw Score : 15 (03/26/21 1506)  AM-PAC Inpatient T-Scale Score : 39.45 (03/26/21 1506)  Mobility Inpatient CMS 0-100% Score: 57.7 (03/26/21 1506)  Mobility Inpatient CMS G-Code Modifier : CK (03/26/21 1506)          Goals  Short term goals  Time Frame for Short term goals: 4/3  Short term goal 1: Pt will complete all transfers with supervision  Short term goal 2: Pt will ambulate 48' with LRAD with supervision  Short term goal 3: Pt will complete B LE exercises to improve functional mobility by 3/29  Patient Goals Patient goals : to get stronger       Therapy Time   Individual Concurrent Group Co-treatment   Time In 1330         Time Out 1350         Minutes 20         Timed Code Treatment Minutes: Ata Viveros 115, PT

## 2021-03-26 NOTE — PROGRESS NOTES
restrictions: Up as tolerated, IV, tinoco catheter    Subjective   General  Chart Reviewed: Yes  Patient assessed for rehabilitation services?: Yes  Family / Caregiver Present: No  Referring Practitioner: Dr. Julisa Perkins  Diagnosis: anasarca  General Comment  Comments: RN cleared pt for OT eval; pt resting in bed, agreeable to get up with therapy  Patient Currently in Pain: Denies    Social/Functional History  Social/Functional History  Lives With: Friend(s)  Type of Home: House  Home Layout: One level  Home Access: Level entry  Bathroom Shower/Tub: Tub/Shower unit  Bathroom Toilet: Standard  Bathroom Equipment: Grab bars in shower  Home Equipment: Rolling walker, Cane  Receives Help From: Friend(s)  ADL Assistance: Independent  Homemaking Assistance: Independent  Ambulation Assistance: Independent(without AD)       Objective        Orientation  Overall Orientation Status: Within Functional Limits     Balance  Sitting Balance: Supervision  Standing Balance: Contact guard assistance(with RW for support)  Standing Balance  Activity: bed-->chair  Functional Mobility  Functional - Mobility Device: Rolling Walker  ADL  Grooming: Setup(seated in chair to comb hair & wash face & hands)  Toileting: Dependent/Total(tinoco catheter)    RUE Tone: Normotonic  LUE Tone: Normotonic  Coordination  Movements Are Fluid And Coordinated: Yes     Bed mobility  Supine to Sit: Stand by assistance(HOB elevated & use of bed rail on Left)  Sit to Supine: Unable to assess(Left up in chair upon exiting, pt agreeable)  Transfers  Stand Pivot Transfers: Contact guard assistance  Sit to stand: Contact guard assistance  Stand to sit: Contact guard assistance     Vision - Basic Assessment  Prior Vision: Wears glasses all the time     Cognition  Overall Cognitive Status: Exceptions(pleasant, cooperative)  Arousal/Alertness: Appropriate responses to stimuli  Following Commands:  Follows one step commands consistently  Attention Span: Appears intact  Memory: Decreased recall of precautions  Safety Judgement: Decreased awareness of need for assistance;Decreased awareness of need for safety  Insights: Decreased awareness of deficits  Initiation: Requires cues for some  Sequencing: Does not require cues             Plan   Plan  Times per week: 3-5x/ week  Current Treatment Recommendations: ROM, Functional Mobility Training, Safety Education & Training, Self-Care / ADL, Endurance Training      AM-PAC Score        AM-PAC Inpatient Daily Activity Raw Score: 14 (03/26/21 1403)  AM-PAC Inpatient ADL T-Scale Score : 33.39 (03/26/21 1403)  ADL Inpatient CMS 0-100% Score: 59.67 (03/26/21 1403)  ADL Inpatient CMS G-Code Modifier : CK (03/26/21 1403)    Goals  Short term goals  Time Frame for Short term goals: 1 week(4-02-21)  Short term goal 1: SBA with bathroom mobility by 3-31-21  Short term goal 2: independent with toileting hygiene by 4-02-21  Short term goal 3: set up for LE self care by 4-02-21  Short term goal 4: tolerate 10-15 reps BUE AROM exercises  Short term goal 5: SBA standing ADL's for 3 minutes at sink  Patient Goals   Patient goals : get stronger to care of myself       Therapy Time   Individual Concurrent Group Co-treatment   Time In 1330         Time Out 1350         Minutes [address], [place]

## 2021-03-26 NOTE — PROGRESS NOTES
Perfect serve to Dr. Srinivas Pena  I see the 4g magnesium once time order, although there are PRN replacement orders as well. Mag was 1.8 this morning. Do you want me to hold for now or give it? Thanks.     Response: give it

## 2021-03-26 NOTE — PROGRESS NOTES
extended release capsule 30 mg  30 mg Oral Daily Karuna Chambers MD   30 mg at 03/26/21 0958    fluticasone (FLONASE) 50 MCG/ACT nasal spray 1 spray  1 spray Each Nostril Daily Karuna Chambers MD   1 spray at 03/26/21 0958    insulin glargine (LANTUS) injection vial 8 Units  8 Units Subcutaneous Nightly Karuna Chambers MD   8 Units at 03/25/21 2158    lisinopril (PRINIVIL;ZESTRIL) tablet 5 mg  5 mg Oral Daily Karuna Chambers MD   5 mg at 03/26/21 0958    pantoprazole (PROTONIX) tablet 40 mg  40 mg Oral Daily Karuna Chambers MD   40 mg at 03/26/21 0958    QUEtiapine (SEROQUEL XR) extended release tablet 50 mg  50 mg Oral Nightly Karuna Chambers MD        glucose (GLUTOSE) 40 % oral gel 15 g  15 g Oral PRN Karuna Chambers MD        dextrose 50 % IV solution  12.5 g Intravenous PRN Karuna Chambers MD        glucagon (rDNA) injection 1 mg  1 mg Intramuscular PRN Karuna Chambers MD        dextrose 5 % solution  100 mL/hr Intravenous PRN Karuna Chambers MD        insulin lispro (HUMALOG) injection vial 0-6 Units  0-6 Units Subcutaneous TID WC Karuna Chambers MD   1 Units at 03/26/21 1207    insulin lispro (HUMALOG) injection vial 0-3 Units  0-3 Units Subcutaneous Nightly Karuna Chambers MD   1 Units at 03/25/21 2158    sodium chloride flush 0.9 % injection 10 mL  10 mL Intravenous 2 times per day Karuna Chambers MD        sodium chloride flush 0.9 % injection 10 mL  10 mL Intravenous PRN Karuna Chambers MD        polyethylene glycol (GLYCOLAX) packet 17 g  17 g Oral Daily PRN Karuna Chambers MD        acetaminophen (TYLENOL) tablet 650 mg  650 mg Oral Q6H PRN Karuna Chambers MD        Or    acetaminophen (TYLENOL) suppository 650 mg  650 mg Rectal Q6H PRN Karuna Chambers MD        perflutren lipid microspheres (DEFINITY) injection 1.65 mg  1.5 mL Intravenous ONCE PRN Karuna Chambers MD        miconazole (MICOTIN) 2 % powder   Topical BID Salome Randhawa MD   Given at 03/26/21 1139    furosemide (LASIX) 100 mg in dextrose 5 % 100 mL infusion  5 mg/hr Intravenous Continuous Salmoe Randhawa MD 5 mL/hr at 03/26/21 0603 5 mg/hr at 03/26/21 0603    prochlorperazine (COMPAZINE) injection 10 mg  10 mg Intravenous Q6H PRN Apoorva Hartmann MD        spironolactone (ALDACTONE) tablet 12.5 mg  12.5 mg Oral Daily Jerrod Haddox, DO   12.5 mg at 03/26/21 5483    magnesium sulfate 1000 mg in dextrose 5% 100 mL IVPB  1,000 mg Intravenous PRN Kenji Aguilar MD   Stopped at 03/26/21 0225       Objective:     Telemetry monitor: SR with PVC's     Physical Exam:  Constitutional and general appearance: alert, cooperative, no distress and appears stated age  HEENT: PERRL, no cervical lymphadenopathy. No masses palpable. Normal oral mucosa  Respiratory:  · Normal excursion and expansion without use of accessory muscles  · Resp auscultation: Normal breath sounds without wheezing, rhonchi, and rales  Cardiovascular:  · The apical impulse is not displaced  · Heart tones are crisp and normal. regular S1 and S2.  · Jugular venous pulsation abnormal ~ 10 CM H2O  · The carotid upstroke is normal in amplitude and contour without delay or bruit  · Peripheral pulses are symmetrical and full   Abdomen:  · No masses or tenderness  · Bowel sounds present  Extremities:  ·  No cyanosis or clubbing  ·  1+-2+ lower extremity edema  ·  Skin: warm and dry  Neurological:  · Alert and oriented  · Moves all extremities well  · Tearful during examination     Data  EKG 3/25/2021:  SR 85 bpm    Echo (limited for pericardial effusion) 3/25/2021:   Limited for pericardial effusion. There is a moderate posterior pericardial effusion noted ( 1.5cm). No   significant anterior effusion. No tamponade. Severly impaired systolic   function of left ventricle. No significant changes in size of effusion since   11.25.20.      Echo 11/25/2020:  Left ventricular systolic function is reduced with ejection fraction   estimated at 10-15 %. There is severe global hypokinesis. Left ventricular size is moderately increased. There is mild concentric left ventricular hypertrophy. There is a left ventricular apical thrombus present. There is a small circumferential pericardial effusion noted with tamponade   physiology only by doppler flow but not by other criteria. Echo 10/6/2021:   The left ventricular systolic function is severely reduced with an ejection   fraction of 15-20 %. Changes noted from previous echo on 6- in left ventricular function. Stress 10/5/2020:      Summary    Dilated LV with severe global hypokinesis. Large, severe, fixed perfusion    defect of the inferior/inferolateral wall consistent with scar. Diminished    uptake of the anterior wall consistent with breast artifact. Post stress    LVEF is abnormal at 19%. Abnormal study. Overall findings represent a high    risk scan.         [facility]/C 10/7/2020:  Left heart catheterization and Right heart catheterization findings 10/7/20:     Findings:     1. Left main coronary artery was normal. It gave off the left anterior descending artery and left circumflex.     2. Left anterior descending artery has mild atherosclerotic disease.  It was moderate in size. It gave off septal perforators and a moderate sized diagonal branch. The LAD covered the entire apex of the left ventricle.      3. Left circumflex has mild atherosclerotic disease.  It was moderate in size. There was a moderate sized obtuse marginal branch.     4. Right coronary artery has mild atherosclerotic disease.  It was moderate in size and was the dominant artery.     5. Left ventriculogram was not performed.  Left ventricular end diastolic l pressure was 26.  There is no gradient across pullback of the aortic valve.     Right atrial pressure of 20  RV 45/7  PA 53/8  Pulmonary wedge pressure mean of 20  RA saturation 42%  PA saturation

## 2021-03-26 NOTE — PROGRESS NOTES
Perfect serve page sent to Dr. Alberto Carvalho regarding pt's mag of 1.6. PRN mag replacement ordered. Pt only with one IV and has lasix gtt running, 2 floor RNs attempted to gain other IV access with no success. Charge RN placed call to clinical and ICU to request someone attempt to gain additional IV access.

## 2021-03-27 LAB
ANION GAP SERPL CALCULATED.3IONS-SCNC: 13 MMOL/L (ref 3–16)
BUN BLDV-MCNC: 14 MG/DL (ref 7–20)
CALCIUM SERPL-MCNC: 8.6 MG/DL (ref 8.3–10.6)
CHLORIDE BLD-SCNC: 97 MMOL/L (ref 99–110)
CO2: 26 MMOL/L (ref 21–32)
CREAT SERPL-MCNC: 0.6 MG/DL (ref 0.6–1.1)
GFR AFRICAN AMERICAN: >60
GFR NON-AFRICAN AMERICAN: >60
GLUCOSE BLD-MCNC: 146 MG/DL (ref 70–99)
GLUCOSE BLD-MCNC: 181 MG/DL (ref 70–99)
GLUCOSE BLD-MCNC: 187 MG/DL (ref 70–99)
GLUCOSE BLD-MCNC: 194 MG/DL (ref 70–99)
GLUCOSE BLD-MCNC: 258 MG/DL (ref 70–99)
MAGNESIUM: 2 MG/DL (ref 1.8–2.4)
PERFORMED ON: ABNORMAL
POTASSIUM SERPL-SCNC: 3.8 MMOL/L (ref 3.5–5.1)
SODIUM BLD-SCNC: 136 MMOL/L (ref 136–145)

## 2021-03-27 PROCEDURE — 2500000003 HC RX 250 WO HCPCS: Performed by: INTERNAL MEDICINE

## 2021-03-27 PROCEDURE — 6370000000 HC RX 637 (ALT 250 FOR IP): Performed by: INTERNAL MEDICINE

## 2021-03-27 PROCEDURE — 80048 BASIC METABOLIC PNL TOTAL CA: CPT

## 2021-03-27 PROCEDURE — 6360000002 HC RX W HCPCS: Performed by: INTERNAL MEDICINE

## 2021-03-27 PROCEDURE — 6370000000 HC RX 637 (ALT 250 FOR IP): Performed by: NURSE PRACTITIONER

## 2021-03-27 PROCEDURE — 2580000003 HC RX 258: Performed by: INTERNAL MEDICINE

## 2021-03-27 PROCEDURE — 36415 COLL VENOUS BLD VENIPUNCTURE: CPT

## 2021-03-27 PROCEDURE — 1200000000 HC SEMI PRIVATE

## 2021-03-27 PROCEDURE — 83735 ASSAY OF MAGNESIUM: CPT

## 2021-03-27 PROCEDURE — 99232 SBSQ HOSP IP/OBS MODERATE 35: CPT | Performed by: NURSE PRACTITIONER

## 2021-03-27 RX ORDER — FUROSEMIDE 10 MG/ML
40 INJECTION INTRAMUSCULAR; INTRAVENOUS 2 TIMES DAILY
Status: CANCELLED | OUTPATIENT
Start: 2021-03-27

## 2021-03-27 RX ORDER — INSULIN GLARGINE 100 [IU]/ML
20 INJECTION, SOLUTION SUBCUTANEOUS NIGHTLY
Status: DISCONTINUED | OUTPATIENT
Start: 2021-03-27 | End: 2021-03-29

## 2021-03-27 RX ORDER — METOPROLOL SUCCINATE 25 MG/1
25 TABLET, EXTENDED RELEASE ORAL DAILY
Status: DISCONTINUED | OUTPATIENT
Start: 2021-03-28 | End: 2021-04-05 | Stop reason: HOSPADM

## 2021-03-27 RX ORDER — SPIRONOLACTONE 25 MG/1
25 TABLET ORAL DAILY
Status: CANCELLED | OUTPATIENT
Start: 2021-03-28

## 2021-03-27 RX ADMIN — DULOXETINE HYDROCHLORIDE 30 MG: 30 CAPSULE, DELAYED RELEASE ORAL at 08:50

## 2021-03-27 RX ADMIN — APIXABAN 5 MG: 5 TABLET, FILM COATED ORAL at 08:50

## 2021-03-27 RX ADMIN — INSULIN LISPRO 1 UNITS: 100 INJECTION, SOLUTION INTRAVENOUS; SUBCUTANEOUS at 08:53

## 2021-03-27 RX ADMIN — SPIRONOLACTONE 12.5 MG: 25 TABLET ORAL at 08:50

## 2021-03-27 RX ADMIN — INSULIN LISPRO 1 UNITS: 100 INJECTION, SOLUTION INTRAVENOUS; SUBCUTANEOUS at 17:08

## 2021-03-27 RX ADMIN — Medication 10 ML: at 22:09

## 2021-03-27 RX ADMIN — APIXABAN 5 MG: 5 TABLET, FILM COATED ORAL at 22:08

## 2021-03-27 RX ADMIN — ATORVASTATIN CALCIUM 40 MG: 40 TABLET, FILM COATED ORAL at 22:09

## 2021-03-27 RX ADMIN — Medication 10 ML: at 08:51

## 2021-03-27 RX ADMIN — METOPROLOL SUCCINATE 25 MG: 25 TABLET, EXTENDED RELEASE ORAL at 08:50

## 2021-03-27 RX ADMIN — PANTOPRAZOLE SODIUM 40 MG: 40 TABLET, DELAYED RELEASE ORAL at 08:50

## 2021-03-27 RX ADMIN — FUROSEMIDE 5 MG/HR: 10 INJECTION, SOLUTION INTRAMUSCULAR; INTRAVENOUS at 22:09

## 2021-03-27 RX ADMIN — MICONAZOLE NITRATE: 2 POWDER TOPICAL at 08:50

## 2021-03-27 RX ADMIN — FLUTICASONE PROPIONATE 1 SPRAY: 50 SPRAY, METERED NASAL at 08:50

## 2021-03-27 RX ADMIN — AMIODARONE HYDROCHLORIDE 200 MG: 200 TABLET ORAL at 08:50

## 2021-03-27 RX ADMIN — ASPIRIN 81 MG: 81 TABLET, COATED ORAL at 08:50

## 2021-03-27 RX ADMIN — ACETAMINOPHEN 650 MG: 325 TABLET ORAL at 22:27

## 2021-03-27 RX ADMIN — DIGOXIN 125 MCG: 125 TABLET ORAL at 08:50

## 2021-03-27 RX ADMIN — FUROSEMIDE 5 MG/HR: 10 INJECTION, SOLUTION INTRAMUSCULAR; INTRAVENOUS at 00:34

## 2021-03-27 RX ADMIN — INSULIN LISPRO 3 UNITS: 100 INJECTION, SOLUTION INTRAVENOUS; SUBCUTANEOUS at 11:57

## 2021-03-27 RX ADMIN — INSULIN LISPRO 1 UNITS: 100 INJECTION, SOLUTION INTRAVENOUS; SUBCUTANEOUS at 22:17

## 2021-03-27 RX ADMIN — LISINOPRIL 5 MG: 5 TABLET ORAL at 08:50

## 2021-03-27 RX ADMIN — QUETIAPINE FUMARATE 50 MG: 50 TABLET, EXTENDED RELEASE ORAL at 22:21

## 2021-03-27 RX ADMIN — MICONAZOLE NITRATE: 2 POWDER TOPICAL at 22:22

## 2021-03-27 RX ADMIN — INSULIN GLARGINE 20 UNITS: 100 INJECTION, SOLUTION SUBCUTANEOUS at 22:18

## 2021-03-27 ASSESSMENT — PAIN SCALES - GENERAL: PAINLEVEL_OUTOF10: 0

## 2021-03-27 NOTE — PROGRESS NOTES
Hospitalist Progress Note      PCP: Mary Fernández, APRN - NP    Date of Admission: 3/25/2021    Chief Complaint: dyspnea, edema       Subjective:  She feels about the same. Denies dyspnea. Flat affect. Medications:  Reviewed    Infusion Medications    dextrose      furosemide (LASIX) 1mg/ml infusion 5 mg/hr (03/27/21 0034)     Scheduled Medications    amiodarone  200 mg Oral Daily    aspirin  81 mg Oral Daily    apixaban  5 mg Oral BID    atorvastatin  40 mg Oral Nightly    digoxin  125 mcg Oral Every Other Day    DULoxetine  30 mg Oral Daily    fluticasone  1 spray Each Nostril Daily    insulin glargine  8 Units Subcutaneous Nightly    lisinopril  5 mg Oral Daily    pantoprazole  40 mg Oral Daily    QUEtiapine  50 mg Oral Nightly    insulin lispro  0-6 Units Subcutaneous TID WC    insulin lispro  0-3 Units Subcutaneous Nightly    sodium chloride flush  10 mL Intravenous 2 times per day    miconazole   Topical BID    spironolactone  12.5 mg Oral Daily     PRN Meds: glucose, dextrose, glucagon (rDNA), dextrose, sodium chloride flush, polyethylene glycol, acetaminophen **OR** acetaminophen, perflutren lipid microspheres, prochlorperazine, magnesium sulfate      Intake/Output Summary (Last 24 hours) at 3/27/2021 1553  Last data filed at 3/27/2021 1202  Gross per 24 hour   Intake 1040 ml   Output 3350 ml   Net -2310 ml       Physical Exam Performed:    BP (!) 145/87   Pulse 83   Temp 98.1 °F (36.7 °C) (Oral)   Resp 18   Ht 5' 1\" (1.549 m)   Wt 229 lb 12.8 oz (104.2 kg)   SpO2 95%   BMI 43.42 kg/m²       General appearance: No apparent distress, appears stated age and cooperative. HEENT: Pupils equal, round, and reactive to light. Conjunctivae/corneas clear. Neck: Supple, with full range of motion. No jugular venous distention. Trachea midline. Respiratory:  Normal respiratory effort. Clear to auscultation, bilaterally without Rales/Wheezes/Rhonchi.   Distant lungs. Cardiovascular: Regular rate and rhythm with normal S1/S2 without murmurs, rubs or gallops. Abdomen: Soft, non-tender, non-distended with normal bowel sounds. Musculoskeletal: No clubbing, cyanosis. Anasarca. .  Full range of motion without deformity. Skin: Skin color, texture, turgor normal.  No rashes or lesions. Neurologic:  Neurovascularly intact without any focal sensory/motor deficits. Cranial nerves: II-XII intact, grossly non-focal.  Psychiatric: Alert and oriented, thought content appropriate, normal insight. Flat affect. Capillary Refill: Brisk,< 3 seconds   Peripheral Pulses: +2 palpable, equal bilaterally       Labs:   Recent Labs     03/26/21  0759   WBC 6.4   HGB 11.1*   HCT 34.4*        Recent Labs     03/25/21 2010 03/26/21  0759 03/27/21  0707   * 135* 136   K 4.3 4.0 3.8   CL 93* 96* 97*   CO2 27 26 26   BUN 16 16 14   CREATININE 1.0 0.6 0.6   CALCIUM 8.8 8.4 8.6     No results for input(s): AST, ALT, BILIDIR, BILITOT, ALKPHOS in the last 72 hours. No results for input(s): INR in the last 72 hours.   Recent Labs     03/25/21  0817   TROPONINI <0.01       Urinalysis:      Lab Results   Component Value Date    NITRU Negative 02/02/2021    WBCUA 10-20 02/02/2021    BACTERIA Rare 02/02/2021    RBCUA 0-2 02/02/2021    BLOODU Negative 02/02/2021    SPECGRAV 1.010 02/02/2021    GLUCOSEU 250 02/02/2021       Radiology:  No orders to display           Assessment/Plan:    Active Hospital Problems    Diagnosis    Anasarca [R60.1]    Pericardial effusion [I31.3]    Acute on chronic systolic CHF (congestive heart failure) (HCC) [I50.23]    Dual ICD (implantable cardioverter-defibrillator) in place [Z95.810]    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) [I63.50]    DM (diabetes mellitus) ([facility] [address] [E11.9]    HTN (hypertension), benign [I10]    Dyslipidemia [E78.5]       \"Patient with PMH of DM 2, HTN, chronic CHF, EF <20%, moderate pulmonary hypertension, Hx of PE, depression presented to [facility] ED last night with complaints of worsening shortness of breath. Patient reports she has gained about 50 pounds in the last month all fluid. She reports chest tightness with ambulation. Patient reports she is no longer in hospice and has recently moved in with her family in [place]. She was evaluated by Dr. Smith Sahu yesterday in the CHF clinic, was noted to be fluid overloaded and was asked to increase her Lasix dose to twice daily. She does report 3 pillow orthopnea, no PND. \"      Acute on chronic systolic and diastolic CHF  - furosemide gtt, spironolactone  - lisinopril. Defer metoprolol to cardiology, ok to start from my perspective. - has AICD     Chronic moderate pericardial effusion. Conservative management. DM2. Insulin regimen.       History of PE-resumed Eliquis     Hx of atrial tachycardia, PSVT-resume amiodarone     Hx of LV thrombus, also PE diagnosed in November 2020 - on Eliquis     Depression-mood stable, resume Cymbalta and Seroquel     History of stroke-on aspirin statin and Eliquis for secondary prevention      DVT Prophylaxis: anticoagulation as above  Diet: DIET CARB CONTROL; Low Sodium (2 GM); Daily Fluid Restriction: 1500 ml  Code Status: DNR-CCA    PT/OT Eval Status: rec'd SNF    Dispo -  When she is adequately diuresed, perhaps 4/1. She came from home.       Shanna Johnson MD

## 2021-03-27 NOTE — PROGRESS NOTES
without Rales/Wheezes/Rhonchi. Distant lungs. Cardiovascular: Regular rate and rhythm with normal S1/S2 without murmurs, rubs or gallops. Abdomen: Soft, non-tender, non-distended with normal bowel sounds. Musculoskeletal: No clubbing, cyanosis. Anasarca. .  Full range of motion without deformity. Skin: Skin color, texture, turgor normal.  No rashes or lesions. Neurologic:  Neurovascularly intact without any focal sensory/motor deficits. Cranial nerves: II-XII intact, grossly non-focal.  Psychiatric: Alert and oriented, thought content appropriate, normal insight. Flat affect. Capillary Refill: Brisk,< 3 seconds   Peripheral Pulses: +2 palpable, equal bilaterally       Labs:   Recent Labs     03/26/21  0759   WBC 6.4   HGB 11.1*   HCT 34.4*        Recent Labs     03/25/21  0817 03/25/21 2010 03/26/21  0759    132* 135*   K 3.8 4.3 4.0    93* 96*   CO2 29 27 26   BUN 17 16 16   CREATININE 0.6 1.0 0.6   CALCIUM 8.9 8.8 8.4     No results for input(s): AST, ALT, BILIDIR, BILITOT, ALKPHOS in the last 72 hours. No results for input(s): INR in the last 72 hours.   Recent Labs     03/25/21 0817   TROPONINI <0.01       Urinalysis:      Lab Results   Component Value Date    NITRU Negative 02/02/2021    WBCUA 10-20 02/02/2021    BACTERIA Rare 02/02/2021    RBCUA 0-2 02/02/2021    BLOODU Negative 02/02/2021    SPECGRAV 1.010 02/02/2021    GLUCOSEU 250 02/02/2021       Radiology:  No orders to display           Assessment/Plan:    Active Hospital Problems    Diagnosis    Anasarca [R60.1]    Pericardial effusion [I31.3]    Acute on chronic systolic CHF (congestive heart failure) (HCC) [I50.23]    Dual ICD (implantable cardioverter-defibrillator) in place [Z95.810]    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) [I63.50]    [facility] [E11.9]    HTN (hypertension), benign [I10]    Dyslipidemia [E78.5]       \"Patient with PMH of DM 2, HTN, chronic CHF, EF <20%,

## 2021-03-27 NOTE — PROGRESS NOTES
End of shift report given to Yuval. Call light within reach, bed in lowest position, no needs at this time.

## 2021-03-27 NOTE — PROGRESS NOTES
[address]     Cardiology                                     Progress Note    Admission date:  3/25/2021    Reason for follow up visit: CHF, pericardial effusion     HPI/CC: Yuniel Murillo presented to [facility] with complaints of shortness of breath and weight gain. She was transferred to [facility] for acute on chronic CHF and pericardial effusion. Echo on 3/25/2021 showed no significant change in pericardial effusion. EKG showed NSR 85 bpm. Rhythm has been sinus with PVC's. Subjective: She complains of SOB with activity. Denies chest pain, dizziness or palpitations. Reports improvement in swelling. She is upset with her sister. Vitals:  Blood pressure (!) 145/87, pulse 83, temperature 98.1 °F (36.7 °C), temperature source Oral, resp. rate 18, height 5' 1\" (1.549 m), weight 229 lb 12.8 oz (104.2 kg), SpO2 95 %, not currently breastfeeding.   Temp  Av.9 °F (36.6 °C)  Min: 97.4 °F (36.3 °C)  Max: 98.8 °F (37.1 °C)  Pulse  Av  Min: 76  Max: 89  BP  Min: 122/79  Max: 161/83  SpO2  Av.7 %  Min: 92 %  Max: 96 %    24 hour I/O    Intake/Output Summary (Last 24 hours) at 3/27/2021 1438  Last data filed at 3/27/2021 1202  Gross per 24 hour   Intake 1040 ml   Output 3350 ml   Net -2310 ml     Current Facility-Administered Medications   Medication Dose Route Frequency Provider Last Rate Last Admin    metoprolol succinate (TOPROL XL) extended release tablet 25 mg  25 mg Oral Daily FRITZ Yeh CNP   25 mg at 21 0850    amiodarone (CORDARONE) tablet 200 mg  200 mg Oral Daily Hayden Boyer MD   200 mg at 21 0850    aspirin EC tablet 81 mg  81 mg Oral Daily Hayden Boyer MD   81 mg at 21 0850    apixaban (ELIQUIS) tablet 5 mg  5 mg Oral BID Hayden Boyer MD   5 mg at 21 0850    atorvastatin (LIPITOR) tablet 40 mg  40 mg Oral Nightly Hayden Boyer MD   40 mg at 21    digoxin (LANOXIN) tablet 125 mcg  125 mcg Oral Every Other Day Edgard Howe MD   125 mcg at 03/27/21 0850    DULoxetine (CYMBALTA) extended release capsule 30 mg  30 mg Oral Daily Edgard Howe MD   30 mg at 03/27/21 0850    fluticasone (FLONASE) 50 MCG/ACT nasal spray 1 spray  1 spray Each Nostril Daily Edgard Howe MD   1 spray at 03/27/21 0850    insulin glargine (LANTUS) injection vial 8 Units  8 Units Subcutaneous Nightly Edgard Howe MD   8 Units at 03/26/21 2019    lisinopril (PRINIVIL;ZESTRIL) tablet 5 mg  5 mg Oral Daily Edgard Howe MD   5 mg at 03/27/21 0850    pantoprazole (PROTONIX) tablet 40 mg  40 mg Oral Daily Edgard Howe MD   40 mg at 03/27/21 0850    QUEtiapine (SEROQUEL XR) extended release tablet 50 mg  50 mg Oral Nightly Edgard Howe MD        glucose (GLUTOSE) 40 % oral gel 15 g  15 g Oral PRN Edgard Howe MD        dextrose 50 % IV solution  12.5 g Intravenous PRN Edgard Howe MD        glucagon (rDNA) injection 1 mg  1 mg Intramuscular PRN Edgard Howe MD        dextrose 5 % solution  100 mL/hr Intravenous PRN Edgard Howe MD        insulin lispro (HUMALOG) injection vial 0-6 Units  0-6 Units Subcutaneous TID WC Edgard Howe MD   3 Units at 03/27/21 1157    insulin lispro (HUMALOG) injection vial 0-3 Units  0-3 Units Subcutaneous Nightly Edgard Howe MD   1 Units at 03/26/21 2019    sodium chloride flush 0.9 % injection 10 mL  10 mL Intravenous 2 times per day Edgard Howe MD   10 mL at 03/27/21 0851    sodium chloride flush 0.9 % injection 10 mL  10 mL Intravenous PRN Edgard Howe MD        polyethylene glycol (GLYCOLAX) packet 17 g  17 g Oral Daily PRN Edgard Howe MD        acetaminophen (TYLENOL) tablet 650 mg  650 mg Oral Q6H PRN Edgard Howe MD        Or    acetaminophen (TYLENOL) suppository 650 mg  650 mg Rectal Q6H PRN Edgard Howe MD        perflutren lipid microspheres (DEFINITY) injection 1.65 mg  1.5 mL Intravenous ONCE PRN Edgard Howe MD        miconazole (MICOTIN) 2 % powder   Topical BID Edgard Howe MD   Given at 03/27/21 0850    furosemide (LASIX) 100 mg in dextrose 5 % 100 mL infusion  5 mg/hr Intravenous Continuous Edgard Howe MD 5 mL/hr at 03/27/21 0034 5 mg/hr at 03/27/21 0034    prochlorperazine (COMPAZINE) injection 10 mg  10 mg Intravenous Q6H PRN Evan Amador MD        spironolactone (ALDACTONE) tablet 12.5 mg  12.5 mg Oral Daily Jerrod Haddox, DO   12.5 mg at 03/27/21 0850    magnesium sulfate 1000 mg in dextrose 5% 100 mL IVPB  1,000 mg Intravenous PRN Janelle Dickinson MD   Stopped at 03/26/21 0225       Objective:     Telemetry monitor: SR with PVC's     Physical Exam:  Constitutional and general appearance: alert, cooperative, no distress and appears stated age  HEENT: PERRL, no cervical lymphadenopathy. No masses palpable. Normal oral mucosa, periorbital edema noted   Respiratory:  · Normal excursion and expansion without use of accessory muscles  · Resp auscultation: Normal breath sounds without wheezing, rhonchi, and rales  Cardiovascular:  · The apical impulse is not displaced  · Heart tones are crisp and normal. regular S1 and S2.  · Jugular venous pulsation abnormal ~ 10 CM H2O  · The carotid upstroke is normal in amplitude and contour without delay or bruit  · Peripheral pulses are symmetrical and full   Abdomen:  · No masses or tenderness  · Bowel sounds present  · + swelling   Extremities:  ·  No cyanosis or clubbing  · 1+ BUE swelling  ·  1+ lower extremity edema  ·  Skin: warm and dry  Neurological:  · Alert and oriented  · Moves all extremities well  · Tearful during examination     Data  EKG 3/25/2021:  SR 85 bpm    Echo (limited for pericardial effusion) 3/25/2021:   Limited for pericardial effusion. There is a moderate posterior pericardial effusion noted ( 1.5cm).  No   significant anterior 32.  There is no gradient across pullback of the aortic valve.     Right atrial pressure of 20  RV 45/7  PA 53/8  Pulmonary wedge pressure mean of 20  RA saturation 42%  PA saturation 45%  Aortic saturation 99%  Cardiac output 2.4  Cardiac index 1.24  SVR 2433  POP 814     CONCLUSIONS:     1. Mild non-obstructive coronary artery disease with known severe left ventricular dysfunction  2.  Moderate pulmonary hypertension with decompensated hemodynamics       All labs and testing reviewed.   Lab Review     Renal Profile:   Lab Results   Component Value Date    CREATININE 0.6 03/27/2021    BUN 14 03/27/2021     03/27/2021    K 3.8 03/27/2021    K 3.8 03/25/2021    CL 97 03/27/2021    CO2 26 03/27/2021     CBC:    Lab Results   Component Value Date    WBC 6.4 03/26/2021    RBC 4.00 03/26/2021    HGB 11.1 03/26/2021    HCT 34.4 03/26/2021    MCV 86.0 03/26/2021    RDW 18.2 03/26/2021     03/26/2021     BNP:  No results found for: BNP  Fasting Lipid Panel:    Lab Results   Component Value Date    CHOL 166 10/12/2019    HDL 51 10/12/2019    TRIG 142 10/12/2019     Cardiac Enzymes:  CK/MbTroponin  Lab Results   Component Value Date    CKTOTAL 54 06/12/2020    TROPONINI <0.01 03/25/2021     PT/ INR   Lab Results   Component Value Date    INR 1.29 11/23/2020    INR 1.02 08/31/2020    INR 1.01 08/12/2020    PROTIME 15.0 11/23/2020    PROTIME 11.8 08/31/2020    PROTIME 11.7 08/12/2020     PTT No results found for: PTT   Lab Results   Component Value Date    MG 2.00 03/27/2021      Lab Results   Component Value Date    TSH 0.28 09/19/2020       Assessment:  Acute on chronic systolic CHF: improving   -weight on admission 243 lb, weight today 229 lb    -appears to be down ~6 liters since admission   NICM: ongoing   -s/p dual chamber ICD 2015 ([employer])    -EF 10-15% 11/2020  CAD   -nonobstructive on cath 10/2020  Pericardial effusion: chronic, stable on echo 3/25/2021  Pulmonary HTN: moderate on RHC 10/2020  History of LV thrombus  NSVT  PSVT  History of PE  History of CVA  Hypomagnesemia   HTN: controlled   HLD  DM      Plan:   1. Continue Toprol, amiodarone, Eliquis, ASA, Lipitor, Digoxin, lisinopril and spironolactone   2. Continue Lasix drip  3. TSH pending   4. Daily weights, strict I/O and fluid restrict  5.  Maintain normal electrolytes       Jessica Guzman, APRN-CELESTINO  [address]  [phone]

## 2021-03-27 NOTE — PROGRESS NOTES
Patient AxO. VSS. Assessment completed as charted. Patient verbalizes pain as 4 on 0-10 scale; generalized. +3 BLE edema noted. Lasix gtt being administered per orders. Patient denies any other needs or requests at this time. Bed is in lowest locked position. Call light and bedside table is within reach. Will continue to monitor patient.

## 2021-03-27 NOTE — PLAN OF CARE
Problem: OXYGENATION/RESPIRATORY FUNCTION  Goal: Patient will maintain patent airway  Outcome: Ongoing     Problem: HEMODYNAMIC STATUS  Goal: Patient has stable vital signs and fluid balance  Outcome: Ongoing     Problem: FLUID AND ELECTROLYTE IMBALANCE  Goal: Fluid and electrolyte balance are achieved/maintained  Outcome: Ongoing       Problem: ACTIVITY INTOLERANCE/IMPAIRED MOBILITY  Goal: Mobility/activity is maintained at optimum level for patient  Outcome: Ongoing       Patient's EF (Ejection Fraction) is less than 40%    Heart Failure Medications:  Diuretics[de-identified] Furosemide and Spironolactone    (One of the following REQUIRED for EF <40%/SYSTOLIC FAILURE but MAY be used in EF% >40%/DIASTOLIC FAILURE)        ACE[de-identified] Lisinopril        ARB[de-identified] None         ARNI[de-identified] None    (Beta Blockers)  NON- Evidenced Based Beta Blocker (for EF% >40%/DIASTOLIC FAILURE): Metoprolol TARTrate- Lopressor    Evidenced Based Beta Blocker::(REQUIRED for EF% <40%/SYSTOLIC FAILURE) None  . .................................................................................................................................................. Patient's weights and intake/output reviewed: Yes    Patient's Last Weight: 229 lbs obtained by standing scale. Difference of 6 lbs less than last documented weight. Intake/Output Summary (Last 24 hours) at 3/27/2021 0933  Last data filed at 3/27/2021 4112  Gross per 24 hour   Intake 1280 ml   Output 4100 ml   Net -2820 ml       Comorbidities Reviewed Yes    Patient has a past medical history of Arthritis, CAD (coronary artery disease), Cerebral artery occlusion with cerebral infarction ([facility] [address] CHF (congestive heart failure) ([facility] [address] COVID-19, Diabetes mellitus ([facility] [address] ESBL (extended spectrum beta-lactamase) producing bacteria infection, Hyperlipidemia, Hypertension, Mental retardation, MI (myocardial infarction) ([facility] [address] and Pacemaker.      >>For CHF and Comorbidity documentation on Education Time and Topics, please see Education Tab    Progressive Mobility Assessment:  What is this patient's Current Level of Mobility?: Ambulatory- with Assistance  How was this patient Mobilized today?: Edge of Bed                 With Whom? Nurse and PCA                 Level of Difficulty/Assistance: 2x Assist     Pt resting in bed at this time on room air. Pt denies shortness of breath. Pt with pitting lower extremity edema.      Patient and/or Family's stated Goal of Care this Admission: reduce shortness of breath, increase activity tolerance, better understand heart failure and disease management, be more comfortable, and reduce lower extremity edema prior to discharge        :

## 2021-03-28 LAB
ANION GAP SERPL CALCULATED.3IONS-SCNC: 12 MMOL/L (ref 3–16)
BUN BLDV-MCNC: 16 MG/DL (ref 7–20)
CALCIUM SERPL-MCNC: 8.5 MG/DL (ref 8.3–10.6)
CHLORIDE BLD-SCNC: 98 MMOL/L (ref 99–110)
CO2: 26 MMOL/L (ref 21–32)
CREAT SERPL-MCNC: 0.6 MG/DL (ref 0.6–1.1)
GFR AFRICAN AMERICAN: >60
GFR NON-AFRICAN AMERICAN: >60
GLUCOSE BLD-MCNC: 109 MG/DL (ref 70–99)
GLUCOSE BLD-MCNC: 116 MG/DL (ref 70–99)
GLUCOSE BLD-MCNC: 176 MG/DL (ref 70–99)
GLUCOSE BLD-MCNC: 202 MG/DL (ref 70–99)
GLUCOSE BLD-MCNC: 261 MG/DL (ref 70–99)
MAGNESIUM: 1.6 MG/DL (ref 1.8–2.4)
PERFORMED ON: ABNORMAL
POTASSIUM SERPL-SCNC: 3.5 MMOL/L (ref 3.5–5.1)
SODIUM BLD-SCNC: 136 MMOL/L (ref 136–145)

## 2021-03-28 PROCEDURE — 6370000000 HC RX 637 (ALT 250 FOR IP): Performed by: INTERNAL MEDICINE

## 2021-03-28 PROCEDURE — 80048 BASIC METABOLIC PNL TOTAL CA: CPT

## 2021-03-28 PROCEDURE — 6370000000 HC RX 637 (ALT 250 FOR IP): Performed by: NURSE PRACTITIONER

## 2021-03-28 PROCEDURE — 99232 SBSQ HOSP IP/OBS MODERATE 35: CPT | Performed by: NURSE PRACTITIONER

## 2021-03-28 PROCEDURE — 6360000002 HC RX W HCPCS: Performed by: NURSE PRACTITIONER

## 2021-03-28 PROCEDURE — 36415 COLL VENOUS BLD VENIPUNCTURE: CPT

## 2021-03-28 PROCEDURE — 2580000003 HC RX 258: Performed by: INTERNAL MEDICINE

## 2021-03-28 PROCEDURE — 6360000002 HC RX W HCPCS: Performed by: INTERNAL MEDICINE

## 2021-03-28 PROCEDURE — 83735 ASSAY OF MAGNESIUM: CPT

## 2021-03-28 PROCEDURE — 1200000000 HC SEMI PRIVATE

## 2021-03-28 PROCEDURE — 2500000003 HC RX 250 WO HCPCS: Performed by: INTERNAL MEDICINE

## 2021-03-28 RX ORDER — POTASSIUM CHLORIDE 20 MEQ/1
40 TABLET, EXTENDED RELEASE ORAL ONCE
Status: COMPLETED | OUTPATIENT
Start: 2021-03-28 | End: 2021-03-28

## 2021-03-28 RX ORDER — MAGNESIUM SULFATE IN WATER 40 MG/ML
2000 INJECTION, SOLUTION INTRAVENOUS ONCE
Status: COMPLETED | OUTPATIENT
Start: 2021-03-28 | End: 2021-03-28

## 2021-03-28 RX ADMIN — APIXABAN 5 MG: 5 TABLET, FILM COATED ORAL at 20:44

## 2021-03-28 RX ADMIN — LISINOPRIL 5 MG: 5 TABLET ORAL at 08:10

## 2021-03-28 RX ADMIN — INSULIN LISPRO 2 UNITS: 100 INJECTION, SOLUTION INTRAVENOUS; SUBCUTANEOUS at 20:44

## 2021-03-28 RX ADMIN — SPIRONOLACTONE 12.5 MG: 25 TABLET ORAL at 08:10

## 2021-03-28 RX ADMIN — INSULIN GLARGINE 20 UNITS: 100 INJECTION, SOLUTION SUBCUTANEOUS at 20:44

## 2021-03-28 RX ADMIN — INSULIN LISPRO 1 UNITS: 100 INJECTION, SOLUTION INTRAVENOUS; SUBCUTANEOUS at 12:18

## 2021-03-28 RX ADMIN — Medication 10 ML: at 20:44

## 2021-03-28 RX ADMIN — FUROSEMIDE 5 MG/HR: 10 INJECTION, SOLUTION INTRAMUSCULAR; INTRAVENOUS at 11:07

## 2021-03-28 RX ADMIN — MICONAZOLE NITRATE: 2 POWDER TOPICAL at 08:11

## 2021-03-28 RX ADMIN — APIXABAN 5 MG: 5 TABLET, FILM COATED ORAL at 08:10

## 2021-03-28 RX ADMIN — DULOXETINE HYDROCHLORIDE 30 MG: 30 CAPSULE, DELAYED RELEASE ORAL at 08:10

## 2021-03-28 RX ADMIN — FLUTICASONE PROPIONATE 1 SPRAY: 50 SPRAY, METERED NASAL at 08:11

## 2021-03-28 RX ADMIN — PANTOPRAZOLE SODIUM 40 MG: 40 TABLET, DELAYED RELEASE ORAL at 08:10

## 2021-03-28 RX ADMIN — METOPROLOL SUCCINATE 25 MG: 25 TABLET, EXTENDED RELEASE ORAL at 08:10

## 2021-03-28 RX ADMIN — MAGNESIUM SULFATE HEPTAHYDRATE 2000 MG: 40 INJECTION, SOLUTION INTRAVENOUS at 11:05

## 2021-03-28 RX ADMIN — ASPIRIN 81 MG: 81 TABLET, COATED ORAL at 08:10

## 2021-03-28 RX ADMIN — AMIODARONE HYDROCHLORIDE 200 MG: 200 TABLET ORAL at 08:10

## 2021-03-28 RX ADMIN — Medication 10 ML: at 08:10

## 2021-03-28 RX ADMIN — QUETIAPINE FUMARATE 50 MG: 50 TABLET, EXTENDED RELEASE ORAL at 20:44

## 2021-03-28 RX ADMIN — MICONAZOLE NITRATE: 2 POWDER TOPICAL at 20:45

## 2021-03-28 RX ADMIN — POTASSIUM CHLORIDE 40 MEQ: 1500 TABLET, EXTENDED RELEASE ORAL at 11:05

## 2021-03-28 RX ADMIN — ATORVASTATIN CALCIUM 40 MG: 40 TABLET, FILM COATED ORAL at 20:44

## 2021-03-28 RX ADMIN — INSULIN LISPRO 2 UNITS: 100 INJECTION, SOLUTION INTRAVENOUS; SUBCUTANEOUS at 17:36

## 2021-03-28 ASSESSMENT — PAIN DESCRIPTION - PAIN TYPE
TYPE: ACUTE PAIN
TYPE: CHRONIC PAIN

## 2021-03-28 ASSESSMENT — PAIN SCALES - GENERAL
PAINLEVEL_OUTOF10: 0
PAINLEVEL_OUTOF10: 0

## 2021-03-28 ASSESSMENT — PAIN DESCRIPTION - LOCATION: LOCATION: BACK

## 2021-03-28 NOTE — PROGRESS NOTES
[facility]     Cardiology                                     Progress Note    Admission date:  3/25/2021    Reason for follow up visit: CHF, pericardial effusion     HPI/CC: Lex Verde presented to [facility] with complaints of shortness of breath and weight gain. She was transferred to [facility] for acute on chronic CHF and pericardial effusion. Echo on 3/25/2021 showed no significant change in pericardial effusion. EKG showed NSR 85 bpm. Rhythm has been sinus with PVC's. Subjective: She complains of SOB with activity. Denies chest pain, dizziness or palpitations. Reports improvement in swelling. She is upset with her sister. Vitals:  Blood pressure 108/64, pulse 66, temperature 97.3 °F (36.3 °C), temperature source Axillary, resp. rate 16, height 5' 1\" (1.549 m), weight 229 lb 12.8 oz (104.2 kg), SpO2 92 %, not currently breastfeeding.   Temp  Av.8 °F (36.6 °C)  Min: 97.3 °F (36.3 °C)  Max: 98.1 °F (36.7 °C)  Pulse  Av.3  Min: 66  Max: 83  BP  Min: 108/64  Max: 145/87  SpO2  Av.5 %  Min: 90 %  Max: 96 %    24 hour I/O    Intake/Output Summary (Last 24 hours) at 3/28/2021 0834  Last data filed at 3/28/2021 0752  Gross per 24 hour   Intake 1150 ml   Output 2150 ml   Net -1000 ml     Current Facility-Administered Medications   Medication Dose Route Frequency Provider Last Rate Last Admin    insulin glargine (LANTUS) injection vial 20 Units  20 Units Subcutaneous Nightly Arlette Sotelo MD   20 Units at 21 2218    metoprolol succinate (TOPROL XL) extended release tablet 25 mg  25 mg Oral Daily FRITZ Yeh CNP   25 mg at 21 0810    amiodarone (CORDARONE) tablet 200 mg  200 mg Oral Daily Hines Bosworth, MD   200 mg at 21 0810    aspirin EC tablet 81 mg  81 mg Oral Daily Hines Bosworth, MD   81 mg at 21 0810    apixaban (ELIQUIS) tablet 5 mg  5 mg Oral BID Hines Bosworth, MD   5 mg at 21 0878    atorvastatin (LIPITOR) tablet 40 mg  40 mg Oral Nightly José Manuel Benavides MD   40 mg at 03/27/21 2209    digoxin (LANOXIN) tablet 125 mcg  125 mcg Oral Every Other Day José Manuel Benavides MD   125 mcg at 03/27/21 0850    DULoxetine (CYMBALTA) extended release capsule 30 mg  30 mg Oral Daily José Manuel Benavides MD   30 mg at 03/28/21 0810    fluticasone (FLONASE) 50 MCG/ACT nasal spray 1 spray  1 spray Each Nostril Daily José Manuel Benavides MD   1 spray at 03/28/21 0811    lisinopril (PRINIVIL;ZESTRIL) tablet 5 mg  5 mg Oral Daily José Manuel Benavides MD   5 mg at 03/28/21 0810    pantoprazole (PROTONIX) tablet 40 mg  40 mg Oral Daily José Manuel Benavides MD   40 mg at 03/28/21 0810    QUEtiapine (SEROQUEL XR) extended release tablet 50 mg  50 mg Oral Nightly José Manuel Benavides MD   50 mg at 03/27/21 2221    glucose (GLUTOSE) 40 % oral gel 15 g  15 g Oral PRN José Manuel Benavides MD        dextrose 50 % IV solution  12.5 g Intravenous PRN José Manuel Benavides MD        glucagon (rDNA) injection 1 mg  1 mg Intramuscular PRN José Manuel Benavides MD        dextrose 5 % solution  100 mL/hr Intravenous PRN José Manuel Benavides MD        insulin lispro (HUMALOG) injection vial 0-6 Units  0-6 Units Subcutaneous TID WC José Manuel Benavides MD   1 Units at 03/27/21 1708    insulin lispro (HUMALOG) injection vial 0-3 Units  0-3 Units Subcutaneous Nightly José Manuel Benavides MD   1 Units at 03/27/21 2217    sodium chloride flush 0.9 % injection 10 mL  10 mL Intravenous 2 times per day José Manuel Benavides MD   10 mL at 03/28/21 0810    sodium chloride flush 0.9 % injection 10 mL  10 mL Intravenous PRN José Manuel Benavides MD        polyethylene glycol (GLYCOLAX) packet 17 g  17 g Oral Daily PRN José Manuel Benavides MD        acetaminophen (TYLENOL) tablet 650 mg  650 mg Oral Q6H PRN José Manuel Benavides MD   650 mg at 03/27/21 2227    Or    acetaminophen (TYLENOL) suppository 650 mg  650 mg Rectal Q6H PRN Anette Payne MD        perflutren lipid microspheres (DEFINITY) injection 1.65 mg  1.5 mL Intravenous ONCE PRN Anette Payne MD        miconazole (MICOTIN) 2 % powder   Topical BID Anette Payne MD   Given at 03/28/21 9620    furosemide (LASIX) 100 mg in dextrose 5 % 100 mL infusion  5 mg/hr Intravenous Continuous Anette Payne MD 5 mL/hr at 03/27/21 2209 5 mg/hr at 03/27/21 2209    prochlorperazine (COMPAZINE) injection 10 mg  10 mg Intravenous Q6H PRN India Muniz MD        spironolactone (ALDACTONE) tablet 12.5 mg  12.5 mg Oral Daily Jerrod Haddox, DO   12.5 mg at 03/28/21 0810    magnesium sulfate 1000 mg in dextrose 5% 100 mL IVPB  1,000 mg Intravenous PRN Kaykay Urbina MD   Stopped at 03/26/21 0225       Objective:     Telemetry monitor: SR with PVC's     Physical Exam:  Constitutional and general appearance: alert, cooperative, no distress and appears stated age  HEENT: PERRL, no cervical lymphadenopathy. No masses palpable. Normal oral mucosa, periorbital edema noted   Respiratory:  · Normal excursion and expansion without use of accessory muscles  · Resp auscultation: Normal breath sounds without wheezing, rhonchi, and rales  Cardiovascular:  · The apical impulse is not displaced  · Heart tones are crisp and normal. regular S1 and S2.  · Jugular venous pulsation abnormal ~ 10 CM H2O  · The carotid upstroke is normal in amplitude and contour without delay or bruit  · Peripheral pulses are symmetrical and full   Abdomen:  · No masses or tenderness  · Bowel sounds present  · + swelling   Extremities:  ·  No cyanosis or clubbing  · 1+ BUE swelling  ·  1+ lower extremity edema  ·  Skin: warm and dry  Neurological:  · Alert and oriented  · Moves all extremities well  · Tearful during examination     Data  EKG 3/25/2021:  SR 85 bpm    Echo (limited for pericardial effusion) 3/25/2021:   Limited for pericardial effusion.    There is a moderate posterior pericardial effusion noted ( 1.5cm). No   significant anterior effusion. No tamponade. Severly impaired systolic   function of left ventricle. No significant changes in size of effusion since   11.25.20. Echo 11/25/2020:  Left ventricular systolic function is reduced with ejection fraction   estimated at 10-15 %. There is severe global hypokinesis. Left ventricular size is moderately increased. There is mild concentric left ventricular hypertrophy. There is a left ventricular apical thrombus present. There is a small circumferential pericardial effusion noted with tamponade   physiology only by doppler flow but not by other criteria. Echo 10/6/2021:   The left ventricular systolic function is severely reduced with an ejection   fraction of 15-20 %. Changes noted from previous echo on 6- in left ventricular function. Stress 10/5/2020:      Summary    Dilated LV with severe global hypokinesis. Large, severe, fixed perfusion    defect of the inferior/inferolateral wall consistent with scar. Diminished    uptake of the anterior wall consistent with breast artifact. Post stress    LVEF is abnormal at 19%. Abnormal study. Overall findings represent a high    risk scan.         [facility] 10/7/2020:  Left heart catheterization and Right heart catheterization findings 10/7/20:     Findings:     1. Left main coronary artery was normal. It gave off the left anterior descending artery and left circumflex.     2. Left anterior descending artery has mild atherosclerotic disease.  It was moderate in size. It gave off septal perforators and a moderate sized diagonal branch. The LAD covered the entire apex of the left ventricle.      3. Left circumflex has mild atherosclerotic disease.  It was moderate in size. There was a moderate sized obtuse marginal branch.     4. Right coronary artery has mild atherosclerotic disease.  It was moderate in size and was the dominant artery.     5. Left ventriculogram was not performed.  Left ventricular end diastolic l pressure was 26.  There is no gradient across pullback of the aortic valve.     Right atrial pressure of 20  RV 45/7  PA 53/8  Pulmonary wedge pressure mean of 20  RA saturation 42%  PA saturation 45%  Aortic saturation 99%  Cardiac output 2.4  Cardiac index 1.24  SVR 2433  GKL 771     CONCLUSIONS:     1. Mild non-obstructive coronary artery disease with known severe left ventricular dysfunction  2.  Moderate pulmonary hypertension with decompensated hemodynamics       All labs and testing reviewed.   Lab Review     Renal Profile:   Lab Results   Component Value Date    CREATININE 0.6 03/27/2021    BUN 14 03/27/2021     03/27/2021    K 3.8 03/27/2021    K 3.8 03/25/2021    CL 97 03/27/2021    CO2 26 03/27/2021     CBC:    Lab Results   Component Value Date    WBC 6.4 03/26/2021    RBC 4.00 03/26/2021    HGB 11.1 03/26/2021    HCT 34.4 03/26/2021    MCV 86.0 03/26/2021    RDW 18.2 03/26/2021     03/26/2021     BNP:  No results found for: BNP  Fasting Lipid Panel:    Lab Results   Component Value Date    CHOL 166 10/12/2019    HDL 51 10/12/2019    TRIG 142 10/12/2019     Cardiac Enzymes:  CK/MbTroponin  Lab Results   Component Value Date    CKTOTAL 54 06/12/2020    TROPONINI <0.01 03/25/2021     PT/ INR   Lab Results   Component Value Date    INR 1.29 11/23/2020    INR 1.02 08/31/2020    INR 1.01 08/12/2020    PROTIME 15.0 11/23/2020    PROTIME 11.8 08/31/2020    PROTIME 11.7 08/12/2020     PTT No results found for: PTT   Lab Results   Component Value Date    MG 2.00 03/27/2021      Lab Results   Component Value Date    TSH 0.28 09/19/2020       Assessment:  Acute on chronic systolic CHF: improving   -weight on admission 243 lb, weight today 229 lb    -appears to be down ~6 liters since admission   NICM: ongoing   -s/p dual chamber ICD 2015 ([employer])    -EF 10-15% 11/2020  CAD   -nonobstructive on cath 10/2020  Pericardial effusion: chronic, stable on echo 3/25/2021  Pulmonary HTN: moderate on RHC 10/2020  History of LV thrombus  NSVT  PSVT  History of PE  History of CVA  Hypomagnesemia   HTN: controlled   HLD  DM      Plan:   1. Continue Toprol, amiodarone, Eliquis, ASA, Lipitor, Digoxin, lisinopril and spironolactone   2. Continue Lasix drip  3. Replace magnesium  4. Replace potassium    5.  Daily weights, strict I/O and fluid restrict      Jessica Guzman, APRN-CNP  [facility]  [phone]

## 2021-03-28 NOTE — PROGRESS NOTES
Hospitalist Progress Note      PCP: FRITZ Vega - NP    Date of Admission: 3/25/2021    Chief Complaint: dyspnea, edema       Subjective:  She feels about the same. Denies dyspnea. Flat affect. She denies suicidal ideation. Says she takes her medications at home even when she is depressed. Medications:  Reviewed    Infusion Medications    dextrose      furosemide (LASIX) 1mg/ml infusion 5 mg/hr (03/28/21 1107)     Scheduled Medications    insulin glargine  20 Units Subcutaneous Nightly    metoprolol succinate  25 mg Oral Daily    amiodarone  200 mg Oral Daily    aspirin  81 mg Oral Daily    apixaban  5 mg Oral BID    atorvastatin  40 mg Oral Nightly    digoxin  125 mcg Oral Every Other Day    DULoxetine  30 mg Oral Daily    fluticasone  1 spray Each Nostril Daily    lisinopril  5 mg Oral Daily    pantoprazole  40 mg Oral Daily    QUEtiapine  50 mg Oral Nightly    insulin lispro  0-6 Units Subcutaneous TID WC    insulin lispro  0-3 Units Subcutaneous Nightly    sodium chloride flush  10 mL Intravenous 2 times per day    miconazole   Topical BID    spironolactone  12.5 mg Oral Daily     PRN Meds: glucose, dextrose, glucagon (rDNA), dextrose, sodium chloride flush, polyethylene glycol, acetaminophen **OR** acetaminophen, perflutren lipid microspheres, prochlorperazine, magnesium sulfate      Intake/Output Summary (Last 24 hours) at 3/28/2021 1320  Last data filed at 3/28/2021 1146  Gross per 24 hour   Intake 1290 ml   Output 2250 ml   Net -960 ml       Physical Exam Performed:    /77   Pulse 66   Temp 97.5 °F (36.4 °C) (Axillary)   Resp 14   Ht 5' 1\" (1.549 m)   Wt 229 lb 12.8 oz (104.2 kg)   SpO2 91%   BMI 43.42 kg/m²       General appearance: No apparent distress, appears stated age and cooperative. HEENT: Pupils equal, round, and reactive to light. Conjunctivae/corneas clear. Neck: Supple, with full range of motion. No jugular venous distention.  Trachea midline. Respiratory:  Normal respiratory effort. Clear to auscultation, bilaterally without Rales/Wheezes/Rhonchi. Distant lungs. Cardiovascular: Regular rate and rhythm with normal S1/S2 without murmurs, rubs or gallops. Abdomen: Soft, non-tender, non-distended with normal bowel sounds. Musculoskeletal: No clubbing, cyanosis. Anasarca. .  Full range of motion without deformity. Skin: Skin color, texture, turgor normal.  No rashes or lesions. Neurologic:  Neurovascularly intact without any focal sensory/motor deficits. Cranial nerves: II-XII intact, grossly non-focal.  Psychiatric: Alert and oriented, thought content appropriate, normal insight. Flat affect. Capillary Refill: Brisk,< 3 seconds   Peripheral Pulses: +2 palpable, equal bilaterally       Labs:   Recent Labs     03/26/21  0759   WBC 6.4   HGB 11.1*   HCT 34.4*        Recent Labs     03/26/21  0759 03/27/21  0707 03/28/21  0848   * 136 136   K 4.0 3.8 3.5   CL 96* 97* 98*   CO2 26 26 26   BUN 16 14 16   CREATININE 0.6 0.6 0.6   CALCIUM 8.4 8.6 8.5     No results for input(s): AST, ALT, BILIDIR, BILITOT, ALKPHOS in the last 72 hours. No results for input(s): INR in the last 72 hours. No results for input(s): Gordan Blacksmith in the last 72 hours.     Urinalysis:      Lab Results   Component Value Date    NITRU Negative 02/02/2021    WBCUA 10-20 02/02/2021    BACTERIA Rare 02/02/2021    RBCUA 0-2 02/02/2021    BLOODU Negative 02/02/2021    SPECGRAV 1.010 02/02/2021    GLUCOSEU 250 02/02/2021       Radiology:  No orders to display           Assessment/Plan:    Active Hospital Problems    Diagnosis    Anasarca [R60.1]    Pericardial effusion [I31.3]    Acute on chronic systolic CHF (congestive heart failure) (HCC) [I50.23]    Dual ICD (implantable cardioverter-defibrillator) in place [Z95.810]    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) [I63.50]    DM (diabetes mellitus) ([facility] [E11.9]    HTN (hypertension),

## 2021-03-28 NOTE — PLAN OF CARE
Problem: Falls - Risk of:  Goal: Will remain free from falls  Description: Will remain free from falls  Outcome: Ongoing  Note: Pt at risk for falls. Educated on fall precautions. Pt alert and oriented, calls out appropriately. Bed alarm active and audible. Non-skid socks on. Pt remains free from falls, will continue to monitor. Problem: Skin Integrity:  Goal: Absence of new skin breakdown  Description: Absence of new skin breakdown  Outcome: Ongoing  Note: Pt at risk for impaired skin integrity due to bed rest and pt's impaired mobility. Skin will be assessed every shift. Pt will be turned every 2 hours as needed to prevent potential pressure injuries. Pt has no evidence of pressure injuries and no further needs at this kurt. Problem: HEMODYNAMIC STATUS  Goal: Patient has stable vital signs and fluid balance  Outcome: Ongoing  Note:   Patient's EF (Ejection Fraction) is less than 40%    Heart Failure Medications:  Diuretics[de-identified] Furosemide and Spironolactone    (One of the following REQUIRED for EF <40%/SYSTOLIC FAILURE but MAY be used in EF% >40%/DIASTOLIC FAILURE)        ACE[de-identified] Lisinopril        ARB[de-identified] None         ARNI[de-identified] None    (Beta Blockers)  NON- Evidenced Based Beta Blocker (for EF% >40%/DIASTOLIC FAILURE): None    Evidenced Based Beta Blocker::(REQUIRED for EF% <40%/SYSTOLIC FAILURE) Metoprolol SUCCinate- Toprol XL  . .................................................................................................................................................. Patient's weights and intake/output reviewed: Yes    Patient's Last Weight: 229 lbs obtained by standing scale. Difference of 6 lbs less than last documented weight.       Intake/Output Summary (Last 24 hours) at 3/28/2021 0228  Last data filed at 3/28/2021 0016  Gross per 24 hour   Intake 920 ml   Output 1750 ml   Net -830 ml       Comorbidities Reviewed Yes    Patient has a past medical history of Arthritis, CAD (coronary artery disease), Cerebral artery occlusion with cerebral infarction ([facility] [address] CHF (congestive heart failure) ([facility] [address] COVID-19, Diabetes mellitus ([facility] [address] ESBL (extended spectrum beta-lactamase) producing bacteria infection, Hyperlipidemia, Hypertension, Mental retardation, MI (myocardial infarction) ([facility] [address] and Pacemaker. >>For CHF and Comorbidity documentation on Education Time and Topics, please see Education Tab    Progressive Mobility Assessment:  What is this patient's Current Level of Mobility?: Ambulatory- with Assistance  How was this patient Mobilized today?: Edge of Bed, Up to Chair, and Bedside Commode                 With Whom? Nurse and PCA                 Level of Difficulty/Assistance: 2x Assist     Pt resting in bed at this time on room air. Pt denies shortness of breath. Pt with pitting lower extremity edema. Patient and/or Family's stated Goal of Care this Admission: reduce shortness of breath, increase activity tolerance, better understand heart failure and disease management, be more comfortable, and reduce lower extremity edema prior to discharge        :       Problem: Serum Glucose Level - Abnormal:  Goal: Ability to maintain appropriate glucose levels will improve  Description: Ability to maintain appropriate glucose levels will improve  Outcome: Ongoing  Note: Pt at risk for elevated blood glucose levels r/t DM. Pt will have glucose levels monitored prior to breakfast, lunch, dinner, and bedtime. Elevated levels will be treated via SSI. Pt understands the need to monitor levels and has no further complaints at this time.

## 2021-03-28 NOTE — PROGRESS NOTES
Patient AxO. VSS. Assessment completed as charted. Patient verbalizes pain as 3 on 0-10 scale as chronic back pain. Patient has very flat affect and is tearful. Patient refusing daily weights. Patient denies any other needs or requests at this time. Bed is in lowest locked position. Call light and bedside table is within reach. Will continue to monitor patient.

## 2021-03-28 NOTE — PROGRESS NOTES
PS sent to Dr. Patricia Wallace:      RE: Eula Witt 1960 RM: 0 FYI patient's Mag resulted as 1.6 I will administer replacement per PRN orders unless otherwise suggested. Also, patient has been tearful with very flat affect related to home living situations/caregiver conflict and stating \"I am just a burden to everyone. I don't want to do this anymore. \" Would it be appropriate to consult social work? Thank you.

## 2021-03-28 NOTE — PLAN OF CARE
Problem: OXYGENATION/RESPIRATORY FUNCTION  Goal: Patient will maintain patent airway  Outcome: Ongoing     Problem: HEMODYNAMIC STATUS  Goal: Patient has stable vital signs and fluid balance  3/28/2021 1004 by Lori Calhoun RN  Outcome: Ongoing    Problem: FLUID AND ELECTROLYTE IMBALANCE  Goal: Fluid and electrolyte balance are achieved/maintained  Outcome: Ongoing     Problem: ACTIVITY INTOLERANCE/IMPAIRED MOBILITY  Goal: Mobility/activity is maintained at optimum level for patient  Outcome: Ongoing       Patient's EF (Ejection Fraction) is less than 40%    Heart Failure Medications:  Diuretics[de-identified] Furosemide and Spironolactone    (One of the following REQUIRED for EF <40%/SYSTOLIC FAILURE but MAY be used in EF% >40%/DIASTOLIC FAILURE)        ACE[de-identified] Lisinopril        ARB[de-identified] None         ARNI[de-identified] None    (Beta Blockers)  NON- Evidenced Based Beta Blocker (for EF% >40%/DIASTOLIC FAILURE): Metoprolol TARTrate- Lopressor    Evidenced Based Beta Blocker::(REQUIRED for EF% <40%/SYSTOLIC FAILURE) None  . .................................................................................................................................................. Patient's weights and intake/output reviewed: Yes    Patient's Last Weight: Patient refused daily weight  n/A  obtained by standing scale. Difference of unknown lbs  unknown  than last documented weight. Intake/Output Summary (Last 24 hours) at 3/28/2021 1004  Last data filed at 3/28/2021 1000  Gross per 24 hour   Intake 1050 ml   Output 2600 ml   Net -1550 ml       Comorbidities Reviewed Yes    Patient has a past medical history of Arthritis, CAD (coronary artery disease), Cerebral artery occlusion with cerebral infarction ([facility] [address] CHF (congestive heart failure) ([facility] [address] COVID-19, Diabetes mellitus ([facility] ESBL (extended spectrum beta-lactamase) producing bacteria infection, Hyperlipidemia, Hypertension, Mental retardation, MI (myocardial infarction) ([facility] and Pacemaker. >>For CHF and Comorbidity documentation on Education Time and Topics, please see Education Tab    Progressive Mobility Assessment:  What is this patient's Current Level of Mobility?: Ambulatory- with Assistance  How was this patient Mobilized today?: Edge of Bed                 With Whom? Nurse and PCA                 Level of Difficulty/Assistance: 2x Assist     Pt resting in bed at this time on room air. Pt denies shortness of breath. Pt with pitting lower extremity edema.      Patient and/or Family's stated Goal of Care this Admission: reduce shortness of breath, increase activity tolerance, better understand heart failure and disease management, be more comfortable, and reduce lower extremity edema prior to discharge        :

## 2021-03-29 LAB
ANION GAP SERPL CALCULATED.3IONS-SCNC: 10 MMOL/L (ref 3–16)
BUN BLDV-MCNC: 16 MG/DL (ref 7–20)
CALCIUM SERPL-MCNC: 8.6 MG/DL (ref 8.3–10.6)
CHLORIDE BLD-SCNC: 99 MMOL/L (ref 99–110)
CO2: 28 MMOL/L (ref 21–32)
CREAT SERPL-MCNC: 0.7 MG/DL (ref 0.6–1.1)
GFR AFRICAN AMERICAN: >60
GFR NON-AFRICAN AMERICAN: >60
GLUCOSE BLD-MCNC: 161 MG/DL (ref 70–99)
GLUCOSE BLD-MCNC: 197 MG/DL (ref 70–99)
GLUCOSE BLD-MCNC: 210 MG/DL (ref 70–99)
GLUCOSE BLD-MCNC: 212 MG/DL (ref 70–99)
GLUCOSE BLD-MCNC: 275 MG/DL (ref 70–99)
MAGNESIUM: 1.7 MG/DL (ref 1.8–2.4)
PERFORMED ON: ABNORMAL
POTASSIUM SERPL-SCNC: 3.6 MMOL/L (ref 3.5–5.1)
SODIUM BLD-SCNC: 137 MMOL/L (ref 136–145)

## 2021-03-29 PROCEDURE — 99232 SBSQ HOSP IP/OBS MODERATE 35: CPT | Performed by: NURSE PRACTITIONER

## 2021-03-29 PROCEDURE — 6370000000 HC RX 637 (ALT 250 FOR IP): Performed by: INTERNAL MEDICINE

## 2021-03-29 PROCEDURE — 80048 BASIC METABOLIC PNL TOTAL CA: CPT

## 2021-03-29 PROCEDURE — 1200000000 HC SEMI PRIVATE

## 2021-03-29 PROCEDURE — 6360000002 HC RX W HCPCS: Performed by: INTERNAL MEDICINE

## 2021-03-29 PROCEDURE — 6370000000 HC RX 637 (ALT 250 FOR IP): Performed by: NURSE PRACTITIONER

## 2021-03-29 PROCEDURE — 36415 COLL VENOUS BLD VENIPUNCTURE: CPT

## 2021-03-29 PROCEDURE — 83735 ASSAY OF MAGNESIUM: CPT

## 2021-03-29 PROCEDURE — 97110 THERAPEUTIC EXERCISES: CPT

## 2021-03-29 PROCEDURE — 2580000003 HC RX 258: Performed by: INTERNAL MEDICINE

## 2021-03-29 RX ORDER — INSULIN GLARGINE 100 [IU]/ML
30 INJECTION, SOLUTION SUBCUTANEOUS NIGHTLY
Status: DISCONTINUED | OUTPATIENT
Start: 2021-03-29 | End: 2021-04-04

## 2021-03-29 RX ORDER — POTASSIUM CHLORIDE 750 MG/1
10 TABLET, EXTENDED RELEASE ORAL 2 TIMES DAILY
Status: DISCONTINUED | OUTPATIENT
Start: 2021-03-29 | End: 2021-04-05 | Stop reason: HOSPADM

## 2021-03-29 RX ADMIN — FUROSEMIDE 5 MG/HR: 10 INJECTION, SOLUTION INTRAMUSCULAR; INTRAVENOUS at 06:48

## 2021-03-29 RX ADMIN — INSULIN LISPRO 2 UNITS: 100 INJECTION, SOLUTION INTRAVENOUS; SUBCUTANEOUS at 21:42

## 2021-03-29 RX ADMIN — FLUTICASONE PROPIONATE 1 SPRAY: 50 SPRAY, METERED NASAL at 10:14

## 2021-03-29 RX ADMIN — INSULIN LISPRO 2 UNITS: 100 INJECTION, SOLUTION INTRAVENOUS; SUBCUTANEOUS at 18:51

## 2021-03-29 RX ADMIN — ATORVASTATIN CALCIUM 40 MG: 40 TABLET, FILM COATED ORAL at 21:41

## 2021-03-29 RX ADMIN — Medication 10 ML: at 21:42

## 2021-03-29 RX ADMIN — QUETIAPINE FUMARATE 50 MG: 50 TABLET, EXTENDED RELEASE ORAL at 21:42

## 2021-03-29 RX ADMIN — SPIRONOLACTONE 12.5 MG: 25 TABLET ORAL at 10:14

## 2021-03-29 RX ADMIN — LISINOPRIL 5 MG: 5 TABLET ORAL at 10:14

## 2021-03-29 RX ADMIN — MICONAZOLE NITRATE: 2 POWDER TOPICAL at 10:14

## 2021-03-29 RX ADMIN — APIXABAN 5 MG: 5 TABLET, FILM COATED ORAL at 21:42

## 2021-03-29 RX ADMIN — INSULIN GLARGINE 30 UNITS: 100 INJECTION, SOLUTION SUBCUTANEOUS at 21:42

## 2021-03-29 RX ADMIN — APIXABAN 5 MG: 5 TABLET, FILM COATED ORAL at 10:14

## 2021-03-29 RX ADMIN — POTASSIUM CHLORIDE 10 MEQ: 750 TABLET, EXTENDED RELEASE ORAL at 14:09

## 2021-03-29 RX ADMIN — POTASSIUM CHLORIDE 10 MEQ: 750 TABLET, EXTENDED RELEASE ORAL at 21:42

## 2021-03-29 RX ADMIN — DIGOXIN 125 MCG: 125 TABLET ORAL at 10:14

## 2021-03-29 RX ADMIN — METOPROLOL SUCCINATE 25 MG: 25 TABLET, EXTENDED RELEASE ORAL at 10:14

## 2021-03-29 RX ADMIN — AMIODARONE HYDROCHLORIDE 200 MG: 200 TABLET ORAL at 10:14

## 2021-03-29 RX ADMIN — PANTOPRAZOLE SODIUM 40 MG: 40 TABLET, DELAYED RELEASE ORAL at 10:14

## 2021-03-29 RX ADMIN — MICONAZOLE NITRATE: 2 POWDER TOPICAL at 21:42

## 2021-03-29 RX ADMIN — FUROSEMIDE 5 MG/HR: 10 INJECTION, SOLUTION INTRAMUSCULAR; INTRAVENOUS at 19:31

## 2021-03-29 RX ADMIN — DULOXETINE HYDROCHLORIDE 30 MG: 30 CAPSULE, DELAYED RELEASE ORAL at 10:14

## 2021-03-29 RX ADMIN — MAGNESIUM GLUCONATE 500 MG ORAL TABLET 400 MG: 500 TABLET ORAL at 14:09

## 2021-03-29 RX ADMIN — ASPIRIN 81 MG: 81 TABLET, COATED ORAL at 10:14

## 2021-03-29 ASSESSMENT — PAIN SCALES - GENERAL
PAINLEVEL_OUTOF10: 0

## 2021-03-29 NOTE — PROGRESS NOTES
midline. Respiratory:  Normal respiratory effort. Clear to auscultation, bilaterally without Rales/Wheezes/Rhonchi. Distant lungs. Cardiovascular: Regular rate and rhythm with normal S1/S2 without murmurs, rubs or gallops. Abdomen: Soft, non-tender, non-distended with normal bowel sounds. Musculoskeletal: No clubbing, cyanosis. 2+ BLE pitting edema. Full range of motion without deformity. Skin: Skin color, texture, turgor normal.  No rashes or lesions. Neurologic:  Neurovascularly intact without any focal sensory/motor deficits. Cranial nerves: II-XII intact, grossly non-focal.  Psychiatric: Alert and oriented, thought content appropriate, normal insight. Flat affect. Capillary Refill: Brisk,< 3 seconds   Peripheral Pulses: +2 palpable, equal bilaterally       Labs:   No results for input(s): WBC, HGB, HCT, PLT in the last 72 hours. Recent Labs     03/27/21  0707 03/28/21  0848 03/29/21  0921    136 137   K 3.8 3.5 3.6   CL 97* 98* 99   CO2 26 26 28   BUN 14 16 16   CREATININE 0.6 0.6 0.7   CALCIUM 8.6 8.5 8.6     No results for input(s): AST, ALT, BILIDIR, BILITOT, ALKPHOS in the last 72 hours. No results for input(s): INR in the last 72 hours. No results for input(s): Paralee Fontan in the last 72 hours.     Urinalysis:      Lab Results   Component Value Date    NITRU Negative 02/02/2021    WBCUA 10-20 02/02/2021    BACTERIA Rare 02/02/2021    RBCUA 0-2 02/02/2021    BLOODU Negative 02/02/2021    SPECGRAV 1.010 02/02/2021    GLUCOSEU 250 02/02/2021       Radiology:  No orders to display           Assessment/Plan:    Active Hospital Problems    Diagnosis    Anasarca [R60.1]    Pericardial effusion [I31.3]    Acute on chronic systolic CHF (congestive heart failure) (HCC) [I50.23]    Dual ICD (implantable cardioverter-defibrillator) in place [Z95.810]    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) [I63.50]    DM (diabetes mellitus) ([facility] [E11.9]    HTN (hypertension), benign [I10]    Dyslipidemia [E78.5]       \"Patient with PMH of DM 2, HTN, chronic CHF, EF <20%, moderate pulmonary hypertension, Hx of PE, depression presented to [facility] ED last night with complaints of worsening shortness of breath. Patient reports she has gained about 50 pounds in the last month all fluid. She reports chest tightness with ambulation. Patient reports she is no longer in hospice and has recently moved in with her family in [place]. She was evaluated by Dr. René Soares yesterday in the CHF clinic, was noted to be fluid overloaded and was asked to increase her Lasix dose to twice daily. She does report 3 pillow orthopnea, no PND. \"      Acute on chronic systolic and diastolic CHF  - furosemide gtt, spironolactone  - lisinopril. Started metoprolol.  - has AICD     Chronic moderate pericardial effusion. Conservative management. DM2. Insulin regimen.       History of PE-resumed Eliquis     Hx of atrial tachycardia, PSVT-resume amiodarone     Hx of LV thrombus, also PE diagnosed in November 2020 - on Eliquis     Depression-mood stable, resume Cymbalta and Seroquel     History of stroke-on aspirin statin and Eliquis for secondary prevention      DVT Prophylaxis: anticoagulation as above  Diet: DIET CARB CONTROL; Low Sodium (2 GM); Daily Fluid Restriction: 1500 ml  Code Status: DNR-CCA    PT/OT Eval Status: rec'd SNF    Dispo -  When she is adequately diuresed, perhaps 4/1. She came from home.       Goran Jimenez MD

## 2021-03-29 NOTE — PLAN OF CARE
Problem: FLUID AND ELECTROLYTE IMBALANCE  Goal: Fluid and electrolyte balance are achieved/maintained  Outcome: Ongoing  Note:   Patient's EF (Ejection Fraction) is less than 40%    Heart Failure Medications:  Diuretics[de-identified] Furosemide and Spironolactone    (One of the following REQUIRED for EF <40%/SYSTOLIC FAILURE but MAY be used in EF% >40%/DIASTOLIC FAILURE)        ACE[de-identified] Lisinopril        ARB[de-identified] None         ARNI[de-identified] None    (Beta Blockers)  NON- Evidenced Based Beta Blocker (for EF% >40%/DIASTOLIC FAILURE): Metoprolol TARTrate- Lopressor    Evidenced Based Beta Blocker::(REQUIRED for EF% <40%/SYSTOLIC FAILURE) Metoprolol SUCCinate- Toprol XL  . .................................................................................................................................................. Patient's weights and intake/output reviewed: Yes    Patient's Last Weight: 229 lbs obtained by standing scale. Difference of 14 lbs less than last documented weight. Pt continues to refuse daily weights by standing scale. Intake/Output Summary (Last 24 hours) at 3/29/2021 1220  Last data filed at 3/29/2021 0830  Gross per 24 hour   Intake 680 ml   Output 2050 ml   Net -1370 ml       Comorbidities Reviewed Yes    Patient has a past medical history of Arthritis, CAD (coronary artery disease), Cerebral artery occlusion with cerebral infarction ([address] CHF (congestive heart failure) ([address] COVID-19, Diabetes mellitus ([address] ESBL (extended spectrum beta-lactamase) producing bacteria infection, Hyperlipidemia, Hypertension, Mental retardation, MI (myocardial infarction) ([address] and Pacemaker. >>For CHF and Comorbidity documentation on Education Time and Topics, please see Education Tab    Progressive Mobility Assessment:  What is this patient's Current Level of Mobility?: Ambulatory- with Assistance  How was this patient Mobilized today?: Patient Refuses to Mobilize                 With Whom?  Self                 Level of Difficulty/Assistance: Independent     Pt resting in bed at this time on room air. Pt denies shortness of breath. Pt with pitting lower extremity edema. Patient and/or Family's stated Goal of Care this Admission: increase activity tolerance, be more comfortable, and reduce lower extremity edema prior to discharge        :      Problem: Serum Glucose Level - Abnormal:  Goal: Ability to maintain appropriate glucose levels will improve  Description: Ability to maintain appropriate glucose levels will improve  Outcome: Ongoing  Note: Blood glucose levels remain abnormal. Sliding scale orders followed. Pt to call out to staff before each meal for check.

## 2021-03-29 NOTE — PROGRESS NOTES
Restriction  Other position/activity restrictions: Up as tolerated, IV, tinoco catheter  Subjective   Pt supine in bed upon arrival. Agreeable to PT session. RN cleared pt for therapy                  Objective      Transfers  Sit to Stand: Unable to assess(Pt declines OOB activity due to fatigue this date)           Exercises  Hamstring Sets: Bx10  Quad Sets: Bx10  Heelslides: Bx10 (Decreased ROM on L)  Gluteal Sets: Bx10  Hip Abduction: Bx10 (AAROM on L)  Knee Short Arc Quad: Bx10  Ankle Pumps: Bx10            AM-PAC Score  AM-PAC Inpatient Mobility Raw Score : 15 (03/29/21 1342)  AM-PAC Inpatient T-Scale Score : 39.45 (03/29/21 1342)  Mobility Inpatient CMS 0-100% Score: 57.7 (03/29/21 1342)  Mobility Inpatient CMS G-Code Modifier : CK (03/29/21 1342)          Goals  Short term goals  Time Frame for Short term goals: 4/3  Short term goal 1: Pt will complete all transfers with supervision- 3/29 DNT  Short term goal 2: Pt will ambulate 48' with LRAD with supervision- 3/29 DNT  Short term goal 3: Pt will complete B LE exercises to improve functional mobility by 3/29--Goal Met 3/29  Patient Goals   Patient goals : to get stronger    Plan    Plan  Times per week: 3-5x/week  Current Treatment Recommendations: Strengthening, Balance Training, Endurance Training, Functional Mobility Training, Transfer Training, Gait Training, Home Exercise Program, Patient/Caregiver Education & Training, Equipment Evaluation, Education, & procurement  Safety Devices  Type of devices: All fall risk precautions in place, Call light within reach, Gait belt, Nurse notified, Bed alarm in place, Left in bed, Patient at risk for falls  Restraints  Initially in place: No     Therapy Time   Individual Concurrent Group Co-treatment   Time In 1337         Time Out 1401         Minutes 24         Timed Code Treatment Minutes: 24 Minutes     If pt is unable to be seen after this session, please let this note serve as discharge summary.   Please see case management note for discharge disposition. Thank you.     Paul Sosa, PT

## 2021-03-29 NOTE — PROGRESS NOTES
Jovanny   Daily Progress Note    Admit Date:  3/25/2021  HPI: Nicolasa Monahan presented to [facility] with shortness of breath and weight gain. She was transferred to [facility] for acute on chronic CHF and pericardial effusion. ECHO on 3/25/21 showed no significant change in pericardial effusion. She has been diuresed over the last 4 days. No chief complaint on file. Subjective:  Patient states she is not feeling much better. Reports chest pain and decrease in appetite. States her breathing feels slightly better than when she arrived to the hospital.     Objective:   Patient Vitals for the past 24 hrs:   BP Temp Temp src Pulse Resp SpO2   03/29/21 0839 115/71 98.2 °F (36.8 °C) -- 79 12 94 %   03/29/21 0429 98/61 98.1 °F (36.7 °C) Axillary 74 16 91 %   03/28/21 2338 127/69 97.4 °F (36.3 °C) Axillary 81 16 90 %   03/28/21 1933 113/74 97.5 °F (36.4 °C) Axillary 71 16 94 %   03/28/21 1634 118/68 97.7 °F (36.5 °C) Oral 71 14 96 %   03/28/21 1154 112/77 97.5 °F (36.4 °C) Axillary 66 14 91 %       Intake/Output Summary (Last 24 hours) at 3/29/2021 1054  Last data filed at 3/29/2021 0432  Gross per 24 hour   Intake 920 ml   Output 2050 ml   Net -1130 ml     Wt Readings from Last 3 Encounters:   03/23/21 247 lb (112 kg)   02/05/21 214 lb (97.1 kg)   01/14/21 205 lb (93 kg)     ASSESSMENT:   1. Acute on Chronic systolic CHF  2. NICM: EF 10-15% per ECHO in Nov 2020 s/p AICD  3. CAD  4. Pericardial effusion: chronic, stable on 3/25/21  5. Hx of LV thrombus  6. NSVT  7. Hx of PE  8. Hx of CVA   9. HTN  10. HLD  11. DM     PLAN:  1. Continue diuresing with Lasix gtt at 5mg/hr  2. Continue BB, amiodarone, eliquis, ASA, lipitor, digoxin and spironolactone  3. Stop lisinopril in order to transition her to [facility]  4. Daily weights and strict I/O and fluid restriction  5. Add daily magnesium and potassium replacement.    6. Daily BMP and Gianna Madrid, APRN - CNP, 3/29/2021, 10:54 AM  [facility] (limited for pericardial effusion) 3/25/2021:   Limited for pericardial effusion.  Jamel Motto is a moderate posterior pericardial effusion noted ( 1.5cm). No   significant anterior effusion. No tamponade. Severly impaired systolic   function of left ventricle. No significant changes in size of effusion since   11.25.20.      Echo 11/25/2020:  Left ventricular systolic function is reduced with ejection fraction   estimated at 10-15 %.   There is severe global hypokinesis.   Left ventricular size is moderately increased.   There is mild concentric left ventricular hypertrophy.  Jamel Motto is a left ventricular apical thrombus present.  Jamel Motto is a small circumferential pericardial effusion noted with tamponade   physiology only by doppler flow but not by other criteria.     [facility]/C 10/7/2020:  Left heart catheterization and Right heart catheterization findings 10/7/20:  Findings:  1. Left main coronary artery was normal. It gave off the left anterior descending artery and left circumflex. 2. Left anterior descending artery has mild atherosclerotic disease.  It was moderate in size. It gave off septal perforators and a moderate sized diagonal branch. The LAD covered the entire apex of the left ventricle. 3. Left circumflex has mild atherosclerotic disease.  It was moderate in size. There was a moderate sized obtuse marginal branch. 4. Right coronary artery has mild atherosclerotic disease. It was moderate in size and was the dominant artery.   5. Left ventriculogram was not performed.  Left ventricular end diastolic l pressure was 26.  There is no gradient across pullback of the aortic valve.     Right atrial pressure of 20  RV 45/7  PA 53/8  Pulmonary wedge pressure mean of 20  RA saturation 42%  PA saturation 45%  Aortic saturation 99%  Cardiac output 2.4  Cardiac index 1.24  SVR 2433  PVA 570     CONCLUSIONS:  1. Mild non-obstructive coronary artery disease with known severe left ventricular dysfunction  2.  Moderate pulmonary hypertension with decompensated hemodynamics

## 2021-03-29 NOTE — ACP (ADVANCE CARE PLANNING)
Patient states she has a health care power of  executed that names her friend, Sánchez Lopez, to be her agent. She states it was brought to the hospital.  The only health care power of  in the EMR lists her sister. She does not want her in this role. I recommended she have Suzanne bring the copy of the document back and make sure we have a copy. Patient states she met Suzanne at [address] and trusts her as a proxy if she becomes unable to make her decisions about medical care. Please follow-up with Suzanne to assure we have a copy of the most recent, correct document. There is a financial power of  in her chart as well. She did confirm she wanted to be a \"DNR\". Chaplains can help patient complete a new document if she can not find the most recent one she is referencing.   O

## 2021-03-29 NOTE — CARE COORDINATION
CM spoke with SMITA Palacios via phone to discuss SNF recommendations. Sony Gallegos states that she is in agreement that pt does need skilled therapy and then plans on transitioning to Assisted Living. Sony El state that she would like pt to be placed in SNF at [address] [place] [address] but has to check what facility name is. CM will call her and pt in AM to obtain name of SNF and place referral. Will follow.      Rupal Dickinson RN

## 2021-03-30 LAB
ANION GAP SERPL CALCULATED.3IONS-SCNC: 8 MMOL/L (ref 3–16)
BUN BLDV-MCNC: 15 MG/DL (ref 7–20)
CALCIUM SERPL-MCNC: 8.7 MG/DL (ref 8.3–10.6)
CHLORIDE BLD-SCNC: 98 MMOL/L (ref 99–110)
CO2: 30 MMOL/L (ref 21–32)
CREAT SERPL-MCNC: 0.7 MG/DL (ref 0.6–1.1)
GFR AFRICAN AMERICAN: >60
GFR NON-AFRICAN AMERICAN: >60
GLUCOSE BLD-MCNC: 169 MG/DL (ref 70–99)
GLUCOSE BLD-MCNC: 179 MG/DL (ref 70–99)
GLUCOSE BLD-MCNC: 227 MG/DL (ref 70–99)
GLUCOSE BLD-MCNC: 82 MG/DL (ref 70–99)
GLUCOSE BLD-MCNC: 95 MG/DL (ref 70–99)
MAGNESIUM: 1.7 MG/DL (ref 1.8–2.4)
PERFORMED ON: ABNORMAL
PERFORMED ON: NORMAL
POTASSIUM SERPL-SCNC: 3.5 MMOL/L (ref 3.5–5.1)
SODIUM BLD-SCNC: 136 MMOL/L (ref 136–145)

## 2021-03-30 PROCEDURE — 6370000000 HC RX 637 (ALT 250 FOR IP): Performed by: INTERNAL MEDICINE

## 2021-03-30 PROCEDURE — 97110 THERAPEUTIC EXERCISES: CPT

## 2021-03-30 PROCEDURE — 83735 ASSAY OF MAGNESIUM: CPT

## 2021-03-30 PROCEDURE — 1200000000 HC SEMI PRIVATE

## 2021-03-30 PROCEDURE — 97116 GAIT TRAINING THERAPY: CPT

## 2021-03-30 PROCEDURE — 6370000000 HC RX 637 (ALT 250 FOR IP): Performed by: NURSE PRACTITIONER

## 2021-03-30 PROCEDURE — 6360000002 HC RX W HCPCS: Performed by: INTERNAL MEDICINE

## 2021-03-30 PROCEDURE — 80048 BASIC METABOLIC PNL TOTAL CA: CPT

## 2021-03-30 PROCEDURE — 2580000003 HC RX 258: Performed by: INTERNAL MEDICINE

## 2021-03-30 PROCEDURE — 99232 SBSQ HOSP IP/OBS MODERATE 35: CPT | Performed by: NURSE PRACTITIONER

## 2021-03-30 PROCEDURE — 36415 COLL VENOUS BLD VENIPUNCTURE: CPT

## 2021-03-30 PROCEDURE — 97530 THERAPEUTIC ACTIVITIES: CPT

## 2021-03-30 RX ADMIN — METOPROLOL SUCCINATE 25 MG: 25 TABLET, EXTENDED RELEASE ORAL at 09:57

## 2021-03-30 RX ADMIN — INSULIN GLARGINE 30 UNITS: 100 INJECTION, SOLUTION SUBCUTANEOUS at 22:44

## 2021-03-30 RX ADMIN — MICONAZOLE NITRATE: 2 POWDER TOPICAL at 09:57

## 2021-03-30 RX ADMIN — POTASSIUM CHLORIDE 10 MEQ: 750 TABLET, EXTENDED RELEASE ORAL at 09:54

## 2021-03-30 RX ADMIN — FUROSEMIDE 10 MG/HR: 10 INJECTION, SOLUTION INTRAMUSCULAR; INTRAVENOUS at 22:14

## 2021-03-30 RX ADMIN — MAGNESIUM GLUCONATE 500 MG ORAL TABLET 400 MG: 500 TABLET ORAL at 09:55

## 2021-03-30 RX ADMIN — APIXABAN 5 MG: 5 TABLET, FILM COATED ORAL at 09:57

## 2021-03-30 RX ADMIN — AMIODARONE HYDROCHLORIDE 200 MG: 200 TABLET ORAL at 09:55

## 2021-03-30 RX ADMIN — INSULIN LISPRO 1 UNITS: 100 INJECTION, SOLUTION INTRAVENOUS; SUBCUTANEOUS at 17:30

## 2021-03-30 RX ADMIN — ATORVASTATIN CALCIUM 40 MG: 40 TABLET, FILM COATED ORAL at 22:44

## 2021-03-30 RX ADMIN — FUROSEMIDE 5 MG/HR: 10 INJECTION, SOLUTION INTRAMUSCULAR; INTRAVENOUS at 06:11

## 2021-03-30 RX ADMIN — FLUTICASONE PROPIONATE 1 SPRAY: 50 SPRAY, METERED NASAL at 09:56

## 2021-03-30 RX ADMIN — Medication 10 ML: at 09:56

## 2021-03-30 RX ADMIN — LISINOPRIL 5 MG: 5 TABLET ORAL at 09:54

## 2021-03-30 RX ADMIN — INSULIN LISPRO 0 UNITS: 100 INJECTION, SOLUTION INTRAVENOUS; SUBCUTANEOUS at 22:44

## 2021-03-30 RX ADMIN — APIXABAN 5 MG: 5 TABLET, FILM COATED ORAL at 22:45

## 2021-03-30 RX ADMIN — DULOXETINE HYDROCHLORIDE 30 MG: 30 CAPSULE, DELAYED RELEASE ORAL at 09:54

## 2021-03-30 RX ADMIN — SPIRONOLACTONE 12.5 MG: 25 TABLET ORAL at 09:54

## 2021-03-30 RX ADMIN — INSULIN LISPRO 1 UNITS: 100 INJECTION, SOLUTION INTRAVENOUS; SUBCUTANEOUS at 12:12

## 2021-03-30 RX ADMIN — MICONAZOLE NITRATE: 2 POWDER TOPICAL at 22:45

## 2021-03-30 RX ADMIN — POTASSIUM CHLORIDE 10 MEQ: 750 TABLET, EXTENDED RELEASE ORAL at 22:45

## 2021-03-30 RX ADMIN — ASPIRIN 81 MG: 81 TABLET, COATED ORAL at 09:55

## 2021-03-30 RX ADMIN — QUETIAPINE FUMARATE 50 MG: 50 TABLET, EXTENDED RELEASE ORAL at 22:45

## 2021-03-30 RX ADMIN — PANTOPRAZOLE SODIUM 40 MG: 40 TABLET, DELAYED RELEASE ORAL at 09:55

## 2021-03-30 ASSESSMENT — PAIN SCALES - GENERAL: PAINLEVEL_OUTOF10: 0

## 2021-03-30 NOTE — PROGRESS NOTES
agreeable to therapy  Vital Signs  Patient Currently in Pain: Denies   Orientation  Orientation  Overall Orientation Status: Within Functional Limits  Objective    ADL  LE Dressing: Maximum assistance  Toileting: Dependent/Total(tinoco catheter)        Balance  Sitting Balance: Supervision  Standing Balance: Contact guard assistance(with RW)  Standing Balance  Time: 2 minutes x 1  Activity: walking in hallway  Comment: decreased safety at end of walk due to increased fatigue, vitals stable on RA  Functional Mobility  Functional - Mobility Device: Rolling Walker  Activity: Other(walking in hallway)  Assist Level: Contact guard assistance  Bed mobility  Supine to Sit: Unable to assess(already up in chair)  Sit to Supine: Unable to assess(Left up in chair, pt's preference)  Transfers  Sit to stand: Contact guard assistance  Stand to sit: Contact guard assistance  Transfer Comments: cues for safe hand placement                       Cognition  Overall Cognitive Status: Exceptions(pleasant, cooperative)  Arousal/Alertness: Appropriate responses to stimuli  Following Commands:  Follows one step commands consistently  Attention Span: Attends with cues to redirect  Memory: Decreased recall of precautions  Safety Judgement: Decreased awareness of need for safety  Insights: Decreased awareness of deficits  Initiation: Does not require cues  Sequencing: Does not require cues          Type of ROM/Therapeutic Exercise: AROM  Comment: BUE seated in chair  Hand flex/ext: x  15  Reps  Wrist flex/ext:  X  15 Reps  Elbow flex/ext:  x  15  Reps  Forearm sup/pron:  x 15   Reps  Shld flex/ext:  x  10  Reps  Shld abd/add:  x  10  Reps      LUE AROM : WFL  RUE AROM : WFL                 Plan   Plan  Times per week: 3-5x/ week  Current Treatment Recommendations: ROM, Functional Mobility Training, Safety Education & Training, Self-Care / ADL, Endurance Training    AM-PAC Score        AM-PAC Inpatient Daily Activity Raw Score: 14 (03/30/21 4409)  AM-PAC Inpatient ADL T-Scale Score : 33.39 (03/30/21 1459)  ADL Inpatient CMS 0-100% Score: 59.67 (03/30/21 1459)  ADL Inpatient CMS G-Code Modifier : CK (03/30/21 1459)    Goals  Short term goals  Time Frame for Short term goals: 1 week(4-02-21)  Short term goal 1: SBA with bathroom mobility by 3-31-21; 3/30 CGA with functional mobility  Short term goal 2: independent with toileting hygiene by 4-02-21  Short term goal 3: set up for LE self care by 4-02-21  Short term goal 4: tolerate 10-15 reps BUE AROM exercises; 3/30 STG met, continue for light strengthening as tolerated  Short term goal 5: SBA standing ADL's for 3 minutes at sink; 3/30 CGA for functional mobility for >2 minutes  Patient Goals   Patient goals : get stronger to care of myself       Therapy Time   Individual Concurrent Group Co-treatment   Time In 1400         Time Out 1433         Minutes [address] [place], [place]

## 2021-03-30 NOTE — PROGRESS NOTES
[facility]   Daily Progress Note    Admit Date:  3/25/2021  HPI: Kaity Wiggins presented to [facility] with shortness of breath and weight gain. She was transferred to [facility] for acute on chronic CHF and pericardial effusion. ECHO on 3/25/21 showed no significant change in pericardial effusion. Subjective:  Patient states she is feeling much better. Denies any chest pain or shortness of breath today. Up in chair,  Colten, caregiver, visiting at bedside. Objective:   Patient Vitals for the past 24 hrs:   BP Temp Temp src Pulse Resp SpO2 Weight   03/30/21 1109 97/63 97.4 °F (36.3 °C) Oral 72 14 99 % --   03/30/21 0842 106/73 97.5 °F (36.4 °C) Oral 69 16 91 % --   03/30/21 0416 118/73 97.5 °F (36.4 °C) Axillary 72 16 90 % --   03/30/21 0415 -- -- -- -- -- -- 221 lb 3.2 oz (100.3 kg)   03/29/21 2325 119/71 98.1 °F (36.7 °C) Oral 66 16 95 % --   03/29/21 2000 132/70 98.9 °F (37.2 °C) Axillary 79 18 94 % --   03/29/21 1635 114/70 98.6 °F (37 °C) Oral 72 -- 93 % --       Intake/Output Summary (Last 24 hours) at 3/30/2021 1528  Last data filed at 3/30/2021 1215  Gross per 24 hour   Intake 1489 ml   Output 2400 ml   Net -911 ml     Wt Readings from Last 3 Encounters:   03/30/21 221 lb 3.2 oz (100.3 kg)   03/23/21 247 lb (112 kg)   02/05/21 214 lb (97.1 kg)     ASSESSMENT:   1. Acute on Chronic systolic CHF - diuresed ~ 10 liters, weight the same today, overall down 14 lbs  2. NICM: EF 10-15% per ECHO in Nov 2020 s/p AICD, on ACEi, BB, Aldactone  3. CAD: non obstructive, on ASA, statin  4. Pericardial effusion: chronic, stable on 3/25/21  5. Hx of LV thrombus: on anticoagulation with Eliquis  6. NSVT: s/p ICD  7. Hx of PE  8. Hx of CVA   9. HTN  10. HLD  11. DM     PLAN:  1. Continue Lasix infusion - increased to 10 mg/hr  2. Continue Aldactone 25 mg daily and KCL 10 mEq bid  3. Stop lisinopril - will start Entresto after 36 hour washout (4/1/21)  4.  Continue current doses of digoxin QOD, Toprol 25 mg qd, ASA and statin  5. Daily weights, labs and I/O  6. Patient does not plan to follow with us in the office    Lam Gonzalez, FRITZ - CNP, 3/30/2021, 3:28 PM  [address]   [phone]     Telemetry: NSR 60's-70's with frequent PVCs  NYHA: IV     Physical Exam:  General:  Awake, alert, NAD, more facial expression/ response this afternoon   Skin:  Warm and dry  Neck:  JVP 9-10 mmHg, +HJR   Chest: fine BBR   Cardiovascular:  RRR, normal S1S2, no murmur appreciated  Abdomen:  Soft, nontender, +bowel sounds  Extremities: +1 BLE pitting edema, BUE +1 nonpitting edema    Medications:    insulin glargine  30 Units Subcutaneous Nightly    magnesium oxide  400 mg Oral Daily    potassium chloride  10 mEq Oral BID    metoprolol succinate  25 mg Oral Daily    amiodarone  200 mg Oral Daily    aspirin  81 mg Oral Daily    apixaban  5 mg Oral BID    atorvastatin  40 mg Oral Nightly    digoxin  125 mcg Oral Every Other Day    DULoxetine  30 mg Oral Daily    fluticasone  1 spray Each Nostril Daily    lisinopril  5 mg Oral Daily    pantoprazole  40 mg Oral Daily    QUEtiapine  50 mg Oral Nightly    insulin lispro  0-6 Units Subcutaneous TID WC    insulin lispro  0-3 Units Subcutaneous Nightly    sodium chloride flush  10 mL Intravenous 2 times per day    miconazole   Topical BID    spironolactone  12.5 mg Oral Daily      dextrose      furosemide (LASIX) 1mg/ml infusion 10 mg/hr (03/30/21 1522)       Lab Data: Lab results independently reviewed and analyzed by myself 3/29/21   CBC: No results for input(s): WBC, HGB, PLT in the last 72 hours. BMP:    Recent Labs     03/28/21  0848 03/29/21  0921 03/30/21  0913    137 136   K 3.5 3.6 3.5   CO2 26 28 30   BUN 16 16 15   CREATININE 0.6 0.7 0.7     INR:  No results for input(s): INR in the last 72 hours. BNP:  No results for input(s): PROBNP in the last 72 hours.   Cardiac Enzymes: No results for input(s): TROPONINI in the last 72 hours. Lipids:   Lab Results   Component Value Date    TRIG 142 10/12/2019    TRIG 251 10/08/2019    HDL 51 10/12/2019    HDL 43 10/08/2019    LDLCALC 87 10/12/2019    LDLCALC 108 10/08/2019       Cardiac Imaging:     Echo (limited for pericardial effusion) 3/25/2021:   Limited for pericardial effusion.  Dwyer Maizes is a moderate posterior pericardial effusion noted ( 1.5cm). No   significant anterior effusion. No tamponade. Severly impaired systolic   function of left ventricle. No significant changes in size of effusion since   11.25.20.      Echo 11/25/2020:  Left ventricular systolic function is reduced with ejection fraction   estimated at 10-15 %.   There is severe global hypokinesis.   Left ventricular size is moderately increased.   There is mild concentric left ventricular hypertrophy.  Dwyer Maizes is a left ventricular apical thrombus present.  Dwyer Maizes is a small circumferential pericardial effusion noted with tamponade   physiology only by doppler flow but not by other criteria.     C/RHC 10/7/2020:  Left heart catheterization and Right heart catheterization findings 10/7/20:  Findings:  1. Left main coronary artery was normal. It gave off the left anterior descending artery and left circumflex. 2. Left anterior descending artery has mild atherosclerotic disease.  It was moderate in size. It gave off septal perforators and a moderate sized diagonal branch. The LAD covered the entire apex of the left ventricle. 3. Left circumflex has mild atherosclerotic disease.  It was moderate in size. There was a moderate sized obtuse marginal branch. 4. Right coronary artery has mild atherosclerotic disease. It was moderate in size and was the dominant artery.   5. Left ventriculogram was not performed.  Left ventricular end diastolic l pressure was 26.  There is no gradient across pullback of the aortic valve.     Right atrial pressure of 20  RV 45/7  PA 53/8  Pulmonary wedge pressure mean of 20  RA saturation 42%  PA saturation 45%  Aortic saturation 99%  Cardiac output 2.4  Cardiac index 1.24  SVR 2433  XRO 354     CONCLUSIONS:  1. Mild non-obstructive coronary artery disease with known severe left ventricular dysfunction  2.  Moderate pulmonary hypertension with decompensated hemodynamics

## 2021-03-30 NOTE — PLAN OF CARE
breath. Pt with pitting lower extremity edema. Patient and/or Family's stated Goal of Care this Admission: increase activity tolerance, be more comfortable, and reduce lower extremity edema prior to discharge        :      Problem: Falls - Risk of:  Goal: Will remain free from falls  Description: Will remain free from falls  Outcome: Ongoing  Note: Pt will remain free from falls throughout hospital stay. Fall precautions in place, bed alarm on, bed in lowest position with wheels locked and side rails 2/4 up. Room door open and hourly rounding completed. Will continue to monitor throughout shift. Problem: Skin Integrity:  Goal: Will show no infection signs and symptoms  Description: Will show no infection signs and symptoms  Outcome: Ongoing  Note: Pt is at risk for skin breakdown. Pt will have skin assessments every shift, encourage/remind to turn Q2, heels elevated off of the bed, and friction and shear prevented when possible. Micotin powder applied as ordered. Will continue to monitor for signs of skin breakdown and enforce prevention measures. Problem: Pain:  Goal: Pain level will decrease  Description: Pain level will decrease  Outcome: Ongoing  Note: Pt will be satisfied with pain control. Pt uses numeric pain rating scale with reassessments after pain med administration. Will continue to monitor progression throughout shift. Problem: Serum Glucose Level - Abnormal:  Goal: Ability to maintain appropriate glucose levels will improve  Description: Ability to maintain appropriate glucose levels will improve  3/29/2021 2342 by Stevo Garcia RN  Outcome: Ongoing  Note: Pt will have accuchecks before meals and at bedtime with sliding scale insulin in place for coverage. Will continue to monitor for signs and symptoms of hypoglycemia and hyperglycemia throughout shift.

## 2021-03-30 NOTE — PROGRESS NOTES
Physical Therapy  Facility/Department: [facility] A2 CARD TELEMETRY  Daily Treatment Note  NAME: Leidy Eagle  : 1960  MRN: [id number]    Date of Service: 3/30/2021    Discharge Recommendations:  Subacute/Skilled Nursing Facility   PT Equipment Recommendations  Equipment Needed: No  Other: defer to next level of care    Assessment   Body structures, Functions, Activity limitations: Decreased functional mobility ; Decreased endurance;Decreased balance  Assessment: Pt able to ambulate down the driscoll this date with and without the AD. Pt stadier with AD and seems less fatigued afer walking with AD. pt able to complete all sitting TE this date without assist. POA in room during session and very encouraginig to pt to participate in PT. Will continue to progress towards goals. Treatment Diagnosis: deconditioning  Prognosis: Good  PT Education: Goals; General Safety;Gait Training;Disease Specific Education;PT Role;Plan of Care; Functional Mobility Training;Transfer Training  Patient Education: use of AD with mobility at this time. Pt verb understanding. Barriers to Learning: none  REQUIRES PT FOLLOW UP: Yes  Activity Tolerance  Activity Tolerance: Patient limited by fatigue;Patient limited by endurance     Patient Diagnosis(es): There were no encounter diagnoses. has a past medical history of Arthritis, CAD (coronary artery disease), Cerebral artery occlusion with cerebral infarction ([address] CHF (congestive heart failure) ([address] COVID-19, Diabetes mellitus ([address] ESBL (extended spectrum beta-lactamase) producing bacteria infection, Hyperlipidemia, Hypertension, Mental retardation, MI (myocardial infarction) ([address] and Pacemaker. has a past surgical history that includes Pacemaker insertion; tumor removal; Tubal ligation; back surgery; Upper gastrointestinal endoscopy (N/A, 2020); IR MIDLINE CATH (2020); and IR MIDLINE CATH (2020).     Restrictions  Restrictions/Precautions  Restrictions/Precautions: General Precautions, Fall Risk  Position Activity Restriction  Other position/activity restrictions: Up as tolerated, IV, tinoco catheter  Subjective   General  Chart Reviewed: Yes  Response To Previous Treatment: Patient with no complaints from previous session. Family / Caregiver Present: Yes  Referring Practitioner: Julissa Corcoran MD  Subjective  Subjective: pt in bed with POA in room, pt agreeable to PT session          Orientation     Cognition      Objective   Bed mobility  Supine to Sit: Stand by assistance(HOB elevated)  Sit to Supine: (pt left up in chair with needs in reach)  Transfers  Sit to Stand: Stand by assistance(to RW)  Stand to sit: Stand by assistance  Ambulation  Ambulation?: Yes  More Ambulation?: Yes  Ambulation 1  Surface: level tile  Device: Rolling Walker  Assistance: Stand by assistance  Gait Deviations: Slow Ana  Distance: 125  Ambulation 2  Surface - 2: level tile  Device 2: No device  Assistance 2: Contact guard assistance  Quality of Gait 2: increased lateral sway, decreased stance le, decreased heel strike, wide DILIA  Gait Deviations: Slow Ana; Increased DILIA;Shuffles  Distance: 125  Comments: discussed with pt the use of AD to help with balance and activity tolerance  Stairs/Curb  Stairs?: No        Exercises  Hip Flexion: seated in chair X 10 B LE  Knee Long Arc Quad: X 10 B LE in chair  Ankle Pumps: X 10 B LE     AM-PAC Score  AM-PAC Inpatient Mobility Raw Score : 18 (03/30/21 1327)  AM-PAC Inpatient T-Scale Score : 43.63 (03/30/21 1327)  Mobility Inpatient CMS 0-100% Score: 46.58 (03/30/21 1327)  Mobility Inpatient CMS G-Code Modifier : CK (03/30/21 1327)          Goals  Short term goals  Time Frame for Short term goals: 4/3  Short term goal 1: Pt will complete all transfers with supervision- 3/30 not met SBA  Short term goal 2: Pt will ambulate 48' with LRAD with supervision- 3/30 not met 125 ft  SBA/CGA  Short term goal 3: Pt will complete B LE exercises to improve functional mobility by 3/30--Goal Met 3/29  Patient Goals   Patient goals : to get stronger    Plan    Plan  Times per week: 3-5x/week  Current Treatment Recommendations: Strengthening, Balance Training, Endurance Training, Functional Mobility Training, Transfer Training, Gait Training, Home Exercise Program, Patient/Caregiver Education & Training, Equipment Evaluation, Education, & procurement  Safety Devices  Type of devices: All fall risk precautions in place, Call light within reach, Gait belt, Nurse notified, Bed alarm in place, Left in bed, Patient at risk for falls  Restraints  Initially in place: No     Therapy Time   Individual Concurrent Group Co-treatment   Time In 1238         Time Out 1302         Minutes 24         Timed Code Treatment Minutes: 24 Minutes     If pt is unable to be seen after this session, please let this note serve as discharge summary. Please see case management note for discharge disposition. Thank you.     Jarad Flor, PT, DPT

## 2021-03-30 NOTE — CARE COORDINATION
ALEXYS spoke with [facility] via phone for SNF choice as she has discussed with pt. She states that Tanya Fatimaalfredbrendon would like a referral to [facility], [facility] pt. Rehab. Referral called to Jessica in admission [phone]; LVM with call back number. ALEXYS explained that pt has SNF recs and pt may not be appropriate for IPR and insurance may not cover it. Suzanne verbalized understanding but states that they would at least like to try there first.     Awaiting return call from facility.      Gennaro Tuttle RN

## 2021-03-31 LAB
ANION GAP SERPL CALCULATED.3IONS-SCNC: 11 MMOL/L (ref 3–16)
ANION GAP SERPL CALCULATED.3IONS-SCNC: 11 MMOL/L (ref 3–16)
BUN BLDV-MCNC: 15 MG/DL (ref 7–20)
BUN BLDV-MCNC: 18 MG/DL (ref 7–20)
CALCIUM SERPL-MCNC: 8.9 MG/DL (ref 8.3–10.6)
CALCIUM SERPL-MCNC: 9 MG/DL (ref 8.3–10.6)
CHLORIDE BLD-SCNC: 96 MMOL/L (ref 99–110)
CHLORIDE BLD-SCNC: 99 MMOL/L (ref 99–110)
CO2: 28 MMOL/L (ref 21–32)
CO2: 30 MMOL/L (ref 21–32)
CREAT SERPL-MCNC: 0.6 MG/DL (ref 0.6–1.1)
CREAT SERPL-MCNC: 0.6 MG/DL (ref 0.6–1.1)
GFR AFRICAN AMERICAN: >60
GFR AFRICAN AMERICAN: >60
GFR NON-AFRICAN AMERICAN: >60
GFR NON-AFRICAN AMERICAN: >60
GLUCOSE BLD-MCNC: 131 MG/DL (ref 70–99)
GLUCOSE BLD-MCNC: 162 MG/DL (ref 70–99)
GLUCOSE BLD-MCNC: 208 MG/DL (ref 70–99)
GLUCOSE BLD-MCNC: 292 MG/DL (ref 70–99)
GLUCOSE BLD-MCNC: 295 MG/DL (ref 70–99)
GLUCOSE BLD-MCNC: 313 MG/DL (ref 70–99)
MAGNESIUM: 1.7 MG/DL (ref 1.8–2.4)
MAGNESIUM: 2.4 MG/DL (ref 1.8–2.4)
PERFORMED ON: ABNORMAL
POTASSIUM SERPL-SCNC: 3.4 MMOL/L (ref 3.5–5.1)
POTASSIUM SERPL-SCNC: 4.4 MMOL/L (ref 3.5–5.1)
SODIUM BLD-SCNC: 135 MMOL/L (ref 136–145)
SODIUM BLD-SCNC: 140 MMOL/L (ref 136–145)

## 2021-03-31 PROCEDURE — 6370000000 HC RX 637 (ALT 250 FOR IP): Performed by: NURSE PRACTITIONER

## 2021-03-31 PROCEDURE — 99233 SBSQ HOSP IP/OBS HIGH 50: CPT | Performed by: INTERNAL MEDICINE

## 2021-03-31 PROCEDURE — 97110 THERAPEUTIC EXERCISES: CPT

## 2021-03-31 PROCEDURE — 1200000000 HC SEMI PRIVATE

## 2021-03-31 PROCEDURE — 6370000000 HC RX 637 (ALT 250 FOR IP): Performed by: INTERNAL MEDICINE

## 2021-03-31 PROCEDURE — 2580000003 HC RX 258: Performed by: INTERNAL MEDICINE

## 2021-03-31 PROCEDURE — 97530 THERAPEUTIC ACTIVITIES: CPT

## 2021-03-31 PROCEDURE — 83735 ASSAY OF MAGNESIUM: CPT

## 2021-03-31 PROCEDURE — 97116 GAIT TRAINING THERAPY: CPT

## 2021-03-31 PROCEDURE — 36415 COLL VENOUS BLD VENIPUNCTURE: CPT

## 2021-03-31 PROCEDURE — 6360000002 HC RX W HCPCS: Performed by: INTERNAL MEDICINE

## 2021-03-31 PROCEDURE — 80048 BASIC METABOLIC PNL TOTAL CA: CPT

## 2021-03-31 RX ORDER — MAGNESIUM SULFATE IN WATER 40 MG/ML
4000 INJECTION, SOLUTION INTRAVENOUS ONCE
Status: COMPLETED | OUTPATIENT
Start: 2021-03-31 | End: 2021-03-31

## 2021-03-31 RX ORDER — POTASSIUM CHLORIDE 20 MEQ/1
40 TABLET, EXTENDED RELEASE ORAL ONCE
Status: COMPLETED | OUTPATIENT
Start: 2021-03-31 | End: 2021-03-31

## 2021-03-31 RX ADMIN — INSULIN LISPRO 2 UNITS: 100 INJECTION, SOLUTION INTRAVENOUS; SUBCUTANEOUS at 22:22

## 2021-03-31 RX ADMIN — METOPROLOL SUCCINATE 25 MG: 25 TABLET, EXTENDED RELEASE ORAL at 09:27

## 2021-03-31 RX ADMIN — FLUTICASONE PROPIONATE 1 SPRAY: 50 SPRAY, METERED NASAL at 09:22

## 2021-03-31 RX ADMIN — ATORVASTATIN CALCIUM 40 MG: 40 TABLET, FILM COATED ORAL at 22:22

## 2021-03-31 RX ADMIN — INSULIN LISPRO 2 UNITS: 100 INJECTION, SOLUTION INTRAVENOUS; SUBCUTANEOUS at 12:05

## 2021-03-31 RX ADMIN — POTASSIUM CHLORIDE 40 MEQ: 1500 TABLET, EXTENDED RELEASE ORAL at 10:58

## 2021-03-31 RX ADMIN — MAGNESIUM SULFATE 4000 MG: 4 INJECTION INTRAVENOUS at 10:58

## 2021-03-31 RX ADMIN — MICONAZOLE NITRATE: 2 POWDER TOPICAL at 22:21

## 2021-03-31 RX ADMIN — POTASSIUM CHLORIDE 10 MEQ: 750 TABLET, EXTENDED RELEASE ORAL at 09:21

## 2021-03-31 RX ADMIN — POTASSIUM CHLORIDE 10 MEQ: 750 TABLET, EXTENDED RELEASE ORAL at 22:22

## 2021-03-31 RX ADMIN — POLYETHYLENE GLYCOL 3350 17 G: 17 POWDER, FOR SOLUTION ORAL at 11:03

## 2021-03-31 RX ADMIN — DIGOXIN 125 MCG: 125 TABLET ORAL at 09:26

## 2021-03-31 RX ADMIN — FUROSEMIDE 10 MG/HR: 10 INJECTION, SOLUTION INTRAMUSCULAR; INTRAVENOUS at 08:48

## 2021-03-31 RX ADMIN — APIXABAN 5 MG: 5 TABLET, FILM COATED ORAL at 09:22

## 2021-03-31 RX ADMIN — INSULIN LISPRO 1 UNITS: 100 INJECTION, SOLUTION INTRAVENOUS; SUBCUTANEOUS at 09:27

## 2021-03-31 RX ADMIN — AMIODARONE HYDROCHLORIDE 200 MG: 200 TABLET ORAL at 09:22

## 2021-03-31 RX ADMIN — QUETIAPINE FUMARATE 50 MG: 50 TABLET, EXTENDED RELEASE ORAL at 22:22

## 2021-03-31 RX ADMIN — ASPIRIN 81 MG: 81 TABLET, COATED ORAL at 09:22

## 2021-03-31 RX ADMIN — FUROSEMIDE 10 MG/HR: 10 INJECTION, SOLUTION INTRAMUSCULAR; INTRAVENOUS at 17:28

## 2021-03-31 RX ADMIN — INSULIN GLARGINE 30 UNITS: 100 INJECTION, SOLUTION SUBCUTANEOUS at 22:22

## 2021-03-31 RX ADMIN — APIXABAN 5 MG: 5 TABLET, FILM COATED ORAL at 22:22

## 2021-03-31 RX ADMIN — SPIRONOLACTONE 12.5 MG: 25 TABLET ORAL at 09:25

## 2021-03-31 RX ADMIN — FUROSEMIDE 10 MG/HR: 10 INJECTION, SOLUTION INTRAMUSCULAR; INTRAVENOUS at 22:23

## 2021-03-31 RX ADMIN — MAGNESIUM GLUCONATE 500 MG ORAL TABLET 400 MG: 500 TABLET ORAL at 09:21

## 2021-03-31 RX ADMIN — DULOXETINE HYDROCHLORIDE 30 MG: 30 CAPSULE, DELAYED RELEASE ORAL at 09:22

## 2021-03-31 RX ADMIN — PANTOPRAZOLE SODIUM 40 MG: 40 TABLET, DELAYED RELEASE ORAL at 09:21

## 2021-03-31 RX ADMIN — MICONAZOLE NITRATE: 2 POWDER TOPICAL at 09:22

## 2021-03-31 RX ADMIN — INSULIN LISPRO 3 UNITS: 100 INJECTION, SOLUTION INTRAVENOUS; SUBCUTANEOUS at 17:25

## 2021-03-31 RX ADMIN — Medication 10 ML: at 09:22

## 2021-03-31 ASSESSMENT — PAIN SCALES - GENERAL
PAINLEVEL_OUTOF10: 0
PAINLEVEL_OUTOF10: 0

## 2021-03-31 NOTE — PROGRESS NOTES
Physical Therapy  Facility/Department: [facility] A2 CARD TELEMETRY  Daily Treatment Note  NAME: Mar Molina  : 1960  MRN: [id number]    Date of Service: 3/31/2021    Discharge Recommendations:  Subacute/Skilled Nursing Facility   PT Equipment Recommendations  Equipment Needed: No  Other: defer to next level of care    Assessment   Body structures, Functions, Activity limitations: Decreased functional mobility ; Decreased endurance;Decreased balance  Assessment: Pt able to ambulate down the driscoll this date with and without the AD. Pt SBA for both, pt prefers no AD. pt able to complete all sitting TE this date without assist.  Will continue to progress towards goals. Recommended for SNF at D/C. Treatment Diagnosis: deconditioning  Prognosis: Good  Decision Making: Medium Complexity  PT Education: Goals; General Safety;Gait Training;Disease Specific Education;PT Role;Plan of Care; Functional Mobility Training;Transfer Training  Patient Education: Importance of continued PT to progress mobility, strength and endurance  Barriers to Learning: none  REQUIRES PT FOLLOW UP: Yes  Activity Tolerance  Activity Tolerance: Patient Tolerated treatment well;Patient limited by endurance     Patient Diagnosis(es): There were no encounter diagnoses. has a past medical history of Arthritis, CAD (coronary artery disease), Cerebral artery occlusion with cerebral infarction ([address] CHF (congestive heart failure) ([address] COVID-19, Diabetes mellitus ([address] ESBL (extended spectrum beta-lactamase) producing bacteria infection, Hyperlipidemia, Hypertension, Mental retardation, MI (myocardial infarction) ([address] and Pacemaker. has a past surgical history that includes Pacemaker insertion; tumor removal; Tubal ligation; back surgery; Upper gastrointestinal endoscopy (N/A, 2020); IR MIDLINE CATH (2020); and IR MIDLINE CATH (2020).     Restrictions  Restrictions/Precautions  Restrictions/Precautions: General Precautions, Fall Risk  Position Activity Restriction  Other position/activity restrictions: Up as tolerated, IV, tinoco catheter     Subjective   General  Chart Reviewed: Yes  Response To Previous Treatment: Patient with no complaints from previous session. Family / Caregiver Present: Yes  Referring Practitioner: Nori Smith MD  Subjective  Subjective: agreeable to PT  General Comment  Comments: cleared by nursing  Pain Screening  Patient Currently in Pain: Denies  Vital Signs  Patient Currently in Pain: Denies           Objective   Bed mobility  Supine to Sit: Unable to assess(already up)  Sit to Supine: Unable to assess(remained up)  Transfers  Sit to Stand: Supervision  Stand to sit: Supervision  Ambulation  Ambulation?: Yes  Ambulation 1  Surface: level tile  Device: Rolling Walker  Assistance: Stand by assistance  Gait Deviations: Slow Ana  Distance: 125  Ambulation 2  Surface - 2: level tile  Device 2: No device  Assistance 2: Stand by assistance  Quality of Gait 2: increased lateral sway, decreased stance le, decreased heel strike, wide DILIA  Gait Deviations: Slow Ana; Increased DILIA  Distance: 76'        Exercises  Gluteal Sets: Bx15  Hip Flexion: seated in chair X 15 B LE  Knee Long Arc Quad: X 15 B LE in chair  Ankle Pumps: X 15 B LE        AM-PAC Score  AM-PAC Inpatient Mobility Raw Score : 18 (03/31/21 1214)  AM-PAC Inpatient T-Scale Score : 43.63 (03/31/21 1214)  Mobility Inpatient CMS 0-100% Score: 46.58 (03/31/21 1214)  Mobility Inpatient CMS G-Code Modifier : CK (03/31/21 1214)          Goals  Short term goals  Time Frame for Short term goals: 4/3  Short term goal 1: Pt will complete all transfers with supervision- 3/31 met  Short term goal 2: Pt will ambulate 48' with LRAD with supervision- 3/31 125' with SBA and RW  Short term goal 3: Pt will complete B LE exercises to improve functional mobility by 3/30--Goal Met 3/29  Patient Goals   Patient goals : to get stronger    Plan    Plan  Times per week:

## 2021-03-31 NOTE — PLAN OF CARE
Problem: OXYGENATION/RESPIRATORY FUNCTION  Goal: Patient will maintain patent airway  Outcome: Ongoing  Note:   Patient's EF (Ejection Fraction) is less than 40%    Heart Failure Medications:  Diuretics[de-identified] Furosemide and Spironolactone    (One of the following REQUIRED for EF <40%/SYSTOLIC FAILURE but MAY be used in EF% >40%/DIASTOLIC FAILURE)        ACE[de-identified] None        ARB[de-identified] None         ARNI[de-identified] None    (Beta Blockers)  NON- Evidenced Based Beta Blocker (for EF% >40%/DIASTOLIC FAILURE): None    Evidenced Based Beta Blocker::(REQUIRED for EF% <40%/SYSTOLIC FAILURE) Metoprolol SUCCinate- Toprol XL  . .................................................................................................................................................. Patient's weights and intake/output reviewed: Yes    Patient's Last Weight: 221 lbs obtained by standing scale. Difference of 0 lbs more than last documented weight. Intake/Output Summary (Last 24 hours) at 3/30/2021 2333  Last data filed at 3/30/2021 2314  Gross per 24 hour   Intake 1489 ml   Output 3100 ml   Net -1611 ml       Comorbidities Reviewed Yes    Patient has a past medical history of Arthritis, CAD (coronary artery disease), Cerebral artery occlusion with cerebral infarction ([address] CHF (congestive heart failure) ([address] COVID-19, Diabetes mellitus ([address] ESBL (extended spectrum beta-lactamase) producing bacteria infection, Hyperlipidemia, Hypertension, Mental retardation, MI (myocardial infarction) ([address] and Pacemaker. >>For CHF and Comorbidity documentation on Education Time and Topics, please see Education Tab    Progressive Mobility Assessment:  What is this patient's Current Level of Mobility?: Ambulatory- with Assistance  How was this patient Mobilized today?: Edge of Bed, Up to Chair, and Up in Room                 With Whom? Nurse, PCA, and Self                 Level of Difficulty/Assistance: 1x Assist     Pt resting in bed at this time on room air. Pt denies shortness of breath. Pt with pitting lower extremity edema. Patient and/or Family's stated Goal of Care this Admission: increase activity tolerance, be more comfortable, and reduce lower extremity edema prior to discharge        :      Problem: Pain:  Goal: Pain level will decrease  Description: Pain level will decrease  Outcome: Ongoing  Note: Pt will be satisfied with pain control. Pt uses numeric pain rating scale with reassessments after pain med administration. Will continue to monitor progression throughout shift. Problem: Serum Glucose Level - Abnormal:  Goal: Ability to maintain appropriate glucose levels will improve  Description: Ability to maintain appropriate glucose levels will improve  Outcome: Ongoing  Note: Pt will have accuchecks before meals and at bedtime with sliding scale insulin in place for coverage. Will continue to monitor for signs and symptoms of hypoglycemia and hyperglycemia throughout shift. Problem: Falls - Risk of:  Goal: Will remain free from falls  Description: Will remain free from falls  Outcome: Ongoing  Note: Pt will remain free from falls throughout hospital stay. Fall precautions in place, bed alarm on, bed in lowest position with wheels locked and side rails 2/4 up. Room door open and hourly rounding completed. Will continue to monitor throughout shift. Problem: Skin Integrity:  Goal: Will show no infection signs and symptoms  Description: Will show no infection signs and symptoms  Outcome: Ongoing  Note: Pt is at risk for skin breakdown. Pt will have skin assessments every shift, encourage/remind to turn Q2, heels elevated off of the bed, and friction and shear prevented when possible. Micotin powder applied as ordered. Will continue to monitor for signs of skin breakdown and enforce prevention measures.

## 2021-03-31 NOTE — PROGRESS NOTES
Occupational Therapy  Facility/Department: [facility] A2 CARD TELEMETRY  Daily Treatment Note  NAME: Rosalia Luna  : 1960  MRN: [id number]    Date of Service: 3/31/2021    Discharge Recommendations:  Subacute/Skilled Nursing Facility     Assessment   Performance deficits / Impairments: Decreased functional mobility ; Decreased ADL status; Decreased safe awareness;Decreased endurance;Decreased high-level IADLs  Assessment: Pt progressing towards OT goals. Pt supervision for sit to stand transfers. Pt SBA for standing balance by chair, performed grooming tasks while standing for 3 minutes. Pt tolerated 15 reps of BUE exercises while seated in chair. Pt would benefit from SNF to continue to address the above noted occupational performance deficits. Prognosis: Good  OT Education: OT Role;Plan of Care;Home Exercise Program;ADL Adaptive Strategies  Patient Education: disease specific: role of OT, therex and ADL strategies  REQUIRES OT FOLLOW UP: Yes    Activity Tolerance  Activity Tolerance: Patient Tolerated treatment well  Activity Tolerance: BC=646/58, HR=65, O2=97%    Safety Devices  Safety Devices in place: Yes  Type of devices: Call light within reach; Chair alarm in place; Left in chair;Nurse notified;Gait belt       Patient Diagnosis(es): There were no encounter diagnoses. has a past medical history of Arthritis, CAD (coronary artery disease), Cerebral artery occlusion with cerebral infarction ([address] CHF (congestive heart failure) ([address] COVID-19, Diabetes mellitus ([facility] ESBL (extended spectrum beta-lactamase) producing bacteria infection, Hyperlipidemia, Hypertension, Mental retardation, MI (myocardial infarction) ([address] and Pacemaker. has a past surgical history that includes Pacemaker insertion; tumor removal; Tubal ligation; back surgery; Upper gastrointestinal endoscopy (N/A, 2020);  IR MIDLINE CATH (2020); and IR MIDLINE CATH (12/11/2020). Restrictions  Restrictions/Precautions  Restrictions/Precautions: General Precautions, Fall Risk  Position Activity Restriction  Other position/activity restrictions: Up as tolerated, IV, tinoco catheter    Subjective   General  Chart Reviewed: Yes  Patient assessed for rehabilitation services?: Yes  Response to previous treatment: Patient with no complaints from previous session  Family / Caregiver Present: No  Referring Practitioner: Dr. Idania Ferro  Diagnosis: anasarca    Subjective  Subjective: Pt sitting up in chair, agreeable to OT treatment. Orientation  Orientation  Overall Orientation Status: Within Functional Limits    Objective    ADL  Grooming: Stand by assistance(standing at chair to brush hair and wash face)  Toileting: Dependent/Total(tinoco catheter)    Balance  Sitting Balance: Supervision  Standing Balance: Stand by assistance  Standing Balance  Activity: standing by chair for 3 minutes     Transfers  Sit to stand: Supervision  Stand to sit: Supervision    Cognition  Overall Cognitive Status: Exceptions  Arousal/Alertness: Appropriate responses to stimuli  Following Commands:  Follows one step commands consistently  Attention Span: Attends with cues to redirect  Problem Solving: Assistance required to identify errors made  Insights: Decreased awareness of deficits  Initiation: Does not require cues  Sequencing: Does not require cues    Type of ROM/Therapeutic Exercise  Type of ROM/Therapeutic Exercise: AROM  Comment: BUE seated in chair  Exercises  Shoulder Elevation: 15x  Shoulder Flexion: 15x  Horizontal ABduction: 15x  Horizontal ADduction: 15x  Supination: 15x  Pronation: 15x  Wrist Flexion: 10x  Wrist Extension: 10x  Finger Flexion: 10x  Finger Extension: 10x     Plan   Plan  Times per week: 3-5x/ week  Current Treatment Recommendations: ROM, Functional Mobility Training, Safety Education & Training, Self-Care / ADL, Endurance Training    AM-PAC Score  AM-PAC Inpatient Daily Activity Raw Score: 15 (03/31/21 1430)  AM-PAC Inpatient ADL T-Scale Score : 34.69 (03/31/21 1430)  ADL Inpatient CMS 0-100% Score: 56.46 (03/31/21 1430)  ADL Inpatient CMS G-Code Modifier : CK (03/31/21 1430)    Goals  Short term goals  Time Frame for Short term goals: 1 week(4-02-21)  Short term goal 1: SBA with bathroom mobility by 3-31-21; --ongoing 3/31/20  Short term goal 2: independent with toileting hygiene by 4-02-21--ongoing 3/31/21  Short term goal 3: set up for LE self care by 4-02-21--ongoing 3/31/21  Short term goal 4: tolerate 10-15 reps BUE AROM exercises; 3/30 STG met, continue for light strengthening as tolerated  Short term goal 5: SBA standing ADL's for 3 minutes at sink; GOAL MET, pt SBA for ADL's standing by chair for 3 minutes 3/31/21  Patient Goals   Patient goals : get stronger to care of myself     Therapy Time   Individual Concurrent Group Co-treatment   Time In 6985         Time Out 1332         Minutes [address], [address]/[place]ERT[place]    Agree with above. All information reviewed by Madhu Frias COTA/TIM

## 2021-03-31 NOTE — CARE COORDINATION
Mitul called [facility]'s POA who was informed that the inpt rehab will not accept pt. She would like a referral to [place] [address] and rehab. MITUL called and spoke with Hasmukh Coats in admissions at [phone] and faxed the referral to [phone]. Hasmukh Coats will review an get back with Mitul within the hour. MITUL will keep Suzanne up to date. Hasmukh Coats notes that they can accept at this facility. She will start precert on this day. Facility will need to talk to RN to do a respiratory assessment. COVID test prior to discharge- RAPID is ok within 24/48 hours. Mitul informed Suzanne.

## 2021-03-31 NOTE — PROCEDURES
[facility]   PROGRESS NOTE  [phone]      Attending Physician: Alisa Hernandez MD  Reason for Consultation/Chief Complaint: Acute decompensated heart failure    Subjective     IV lasix infusion increased to 10 mg/hr yesterday and patient was documented net negative 4.8L over the last 24 hours, although minimal intake was recorded. Weight is down to 215 lbs today from 221 lbs yesterday. This morning, patient is sitting up in chair and says that she feels comfortable. Denies chest pain and shortness of breath at rest.  Still lower extremity edema.          CURRENT Medications:  magnesium sulfate 4000 mg in 100 mL IVPB premix, Once  insulin glargine (LANTUS) injection vial 30 Units, Nightly  magnesium oxide (MAG-OX) tablet 400 mg, Daily  potassium chloride (KLOR-CON M) extended release tablet 10 mEq, BID  metoprolol succinate (TOPROL XL) extended release tablet 25 mg, Daily  amiodarone (CORDARONE) tablet 200 mg, Daily  aspirin EC tablet 81 mg, Daily  apixaban (ELIQUIS) tablet 5 mg, BID  atorvastatin (LIPITOR) tablet 40 mg, Nightly  digoxin (LANOXIN) tablet 125 mcg, Every Other Day  DULoxetine (CYMBALTA) extended release capsule 30 mg, Daily  fluticasone (FLONASE) 50 MCG/ACT nasal spray 1 spray, Daily  pantoprazole (PROTONIX) tablet 40 mg, Daily  QUEtiapine (SEROQUEL XR) extended release tablet 50 mg, Nightly  glucose (GLUTOSE) 40 % oral gel 15 g, PRN  dextrose 50 % IV solution, PRN  glucagon (rDNA) injection 1 mg, PRN  dextrose 5 % solution, PRN  insulin lispro (HUMALOG) injection vial 0-6 Units, TID WC  insulin lispro (HUMALOG) injection vial 0-3 Units, Nightly  sodium chloride flush 0.9 % injection 10 mL, 2 times per day  sodium chloride flush 0.9 % injection 10 mL, PRN  polyethylene glycol (GLYCOLAX) packet 17 g, Daily PRN  acetaminophen (TYLENOL) tablet 650 mg, Q6H PRN    Or  acetaminophen (TYLENOL) suppository 650 mg, Q6H PRN  perflutren lipid microspheres (DEFINITY) injection 1.65 mg, 03/31/2021    CREATININE 0.6 03/31/2021    GFRAA >60 03/31/2021    AGRATIO 1.2 02/02/2021    LABGLOM >60 03/31/2021    GLUCOSE 131 03/31/2021    PROT 6.8 02/02/2021    CALCIUM 8.9 03/31/2021    BILITOT 0.5 02/02/2021    ALKPHOS 65 02/02/2021    AST 44 02/02/2021    ALT 16 02/02/2021     PT/INR:  No results found for: PTINR  HgBA1c:  Lab Results   Component Value Date    LABA1C 9.7 02/02/2021     Lab Results   Component Value Date    CKTOTAL 54 06/12/2020    TROPONINI <0.01 03/25/2021         Cardiac Data        Echo:  TTE 6/13/20:  Conclusions   Summary   LV systolic function is moderately reduced with an estimated EF of 35%.   Moderate global hypokinesis.   There is mild concentric left ventricular hypertrophy.   Left ventricular cavity size is mildly dilated.   Estimated LV diastolic filling pressure is normal.   Mild mitral and tricuspid regurgitation.   Systolic pulmonary artery pressure (SPAP) is estimated at 35mmHg (Right   atrial pressure of 8 mmHg).   No significant change from exam done 10/18/2019.     Limited TTE 11/17/20:  Conclusions   Summary   Limited exam for pericardial effusion.   EF visually estimated at < 20%.   Small to Moderate circumferential pericardial effusion without tamponade   physiology.  The effusion does not appear to be significantly changed since   the previous exam on 11/17/2020.  Stephanie Guise is a moderate to large left pleural effusion.   Mild to moderate tricuspid regurgitation.   Systolic pulmonary artery pressure (SPAP) is elevated and estimated at 41   mmHg (right atrial pressure 15 mmHg) consistent with mild pulmonary   hypertension.     Limited TTE 11/22/20:  Conclusions   Summary   Left ventricular systolic function is reduced with ejection fraction   estimated at 10-15 %.   There is severe global hypokinesis.   Left ventricular size is moderately increased.   There is mild concentric left ventricular hypertrophy.  Stephanie Guise is a left ventricular apical thrombus present.  Stephanie Guise is a small circumferential pericardial effusion noted with tamponade   physiology only by doppler flow but not by other criteria.     Limited TTE 3/25/21:  Conclusions   Summary   Limited for pericardial effusion.  Arneta Nisha is a moderate posterior pericardial effusion noted ( 1.5cm). No   significant anterior effusion. No tamponade. Severly impaired systolic   function of left ventricle. No significant changes in size of effusion since   11.25.20.     Stress Test:  Pharmacologic Nuclear SPECT Stress 1/31/20:  Conclusions        Summary    Mildly reduced LVEF 45%    Inferolateral hypokinesis    Mainly fixed inferior defect suggestive of scar    No evidence of myocardium at risk for significant reversible ischemia.         Pharmacologic Nuclear SPECT Stress 10/5/20:  Conclusions        Summary    Dilated LV with severe global hypokinesis. Large, severe, fixed perfusion    defect of the inferior/inferolateral wall consistent with scar. Diminished    uptake of the anterior wall consistent with breast artifact. Post stress    LVEF is abnormal at 19%. Abnormal study. Overall findings represent a high    risk scan.         Cath:  [facility] 12/12/18:  LM <20%  LAD 20-30%  Cx 20-20%  RCA 20-30%              RPDA 50%. FFR 0.89  LVEF 45%     Coronary CTA and calcium scoring study 12/10/18:  FINDINGS:   Calcium score: 145       Left Main: 0       LAD: 130       Left circumflex:  10       RCA:  5       Cardiac findings: Left atrial and left ventricular dilation are identified. Orthotopic origin of the coronary artery vessels.  Left chest wall cardiac   pacemaker noted.       Left Main: No significant atherosclerosis or stenosis.       LAD: Up to 50% narrowing involving the proximal segment left anterior   descending coronary artery (6).   Some blooming artifact around the calcified   focus is identified.  No myocardial bridging is appreciated.       Left circumflex:  Less than 50% narrowing in the LCX.       RCA:  Suboptimally evaluated.     Non-cardiac findings: Calcified and noncalcified mediastinal lymph nodes are   identified. .  Small airway thickening in the visualized lungs with possible   centrilobular micro nodularity.          Assessment:      1. Acute on chronic LV systolic heart failure - Remains hypervolemic, but diuresed well yesterday following increase in IV lasix infusion to 10 mg/hr. Overall, weight is down 28 lbs since admission and patient is documented as being net fluid negative ~12L. Dry weight is not entirely clear. 2. Non-ischemic cardiomyopathy (LVEF 15-20%)  3. Stable moderate-sized pericardial effusion with no evidence of tamponade physiology  4. Non-obstructive coronary artery disease  5. S/p ICD  6. H/o LV thrombus  7. H/o PE  8. Pulmonary hypertension  9. Non-sustained VT  10. Paroxysmal SVT  11. Essential hypertension  12. Hyperlipidemia  13. Type II DM  14. CVA  13. Morbid obesity      Plan:     1. Continue IV lasix infusion at 10 mg/hr. Will re-bolus as needed to achieve goal net negative 2-3L over next 24 hours. 2. Trend renal panels and magnesium levels BID while aggressively diuresing and replete electrolytes as needed to maintain goal K>4 and Mg>2.  3. Discontinued lisinopril following dose yesterday morning and will plan to start Entresto on 4/2/21 after ACEI has had time to wash out. 4. Continue Eliquis 5 mg BID, aspirin 81 mg daily, atorvastatin 40 mg daily, metoprolol succinate 25 mg daily, spironolactone 12.5 mg daily, amiodarone 200 mg daily, and digoxin 125 mcg daily. 5. Monitor strict I/Os, daily standing weights. 6. 1.5L per day fluid restriction, low sodium diet. 7. Cardiology will continue to follow closely. Thank you for allowing us to participate in the care of Donaldo Booker. Please call me with any questions [phone].       Daljit Styles, [address] [facility]  [phone] [facility]  [phone] [address] [place] [address]  3/31/2021 11:32 AM      I will address the patient's cardiac risk factors and adjusted pharmacologic treatment as needed. In addition, I have reinforced the need for patient directed risk factor modification. All questions and concerns were addressed to the patient/family. Alternatives to my treatment were discussed. The note was completed using EMR. Every effort was made to ensure accuracy; however, inadvertent computerized transcription errors may be present.

## 2021-03-31 NOTE — PROGRESS NOTES
 Dyslipidemia [E78.5]       \"Patient with PMH of DM 2, HTN, chronic CHF, EF <20%, moderate pulmonary hypertension, Hx of PE, depression presented to [facility] ED last night with complaints of worsening shortness of breath. Patient reports she has gained about 50 pounds in the last month all fluid. She reports chest tightness with ambulation. Patient reports she is no longer in hospice and has recently moved in with her family in [place]. She was evaluated by  [facility] yesterday in the CHF clinic, was noted to be fluid overloaded and was asked to increase her Lasix dose to twice daily. She does report 3 pillow orthopnea, no PND. \"      Acute on chronic systolic and diastolic CHF  - furosemide gtt, spironolactone. She had a standing weight of 194 lbs at the time of discharge in 11/2020.  - lisinopril. Cardiology plans to transition to entresto on 4/1  - started metoprolol.  - has AICD     Chronic moderate pericardial effusion. Conservative management. DM2. Insulin regimen.       History of PE-resumed Eliquis     Hx of atrial tachycardia, PSVT-resume amiodarone     Hx of LV thrombus, also PE diagnosed in November 2020 - on Eliquis     Depression-mood stable, resume Cymbalta and Seroquel     History of stroke-on aspirin statin and Eliquis for secondary prevention      DVT Prophylaxis: anticoagulation as above  Diet: DIET CARB CONTROL; Low Sodium (2 GM); Daily Fluid Restriction: 1500 ml  Code Status: DNR-CCA    PT/OT Eval Status: rec'd SNF    Dispo -  When she is adequately diuresed, perhaps 4/2-3. She came from home but plan is for rehab.   I think SNF would be best.      Yesica Hallman MD

## 2021-03-31 NOTE — PLAN OF CARE
Output 6125 ml   Net -5463 ml       Comorbidities Reviewed Yes    Patient has a past medical history of Arthritis, CAD (coronary artery disease), Cerebral artery occlusion with cerebral infarction ([address] CHF (congestive heart failure) ([address] COVID-19, Diabetes mellitus ([address] ESBL (extended spectrum beta-lactamase) producing bacteria infection, Hyperlipidemia, Hypertension, Mental retardation, MI (myocardial infarction) ([address] and Pacemaker. >>For CHF and Comorbidity documentation on Education Time and Topics, please see Education Tab    Progressive Mobility Assessment:  What is this patient's Current Level of Mobility?: Ambulatory- with Assistance  How was this patient Mobilized today?: Edge of Bed                 With Whom? Nurse                 Level of Difficulty/Assistance: 1x Assist     Pt resting in bed at this time on room air. Pt denies shortness of breath. Pt with pitting lower extremity edema. Patient and/or Family's stated Goal of Care this Admission: increase activity tolerance, better understand heart failure and disease management, be more comfortable, and reduce lower extremity edema prior to discharge       Problem: Serum Glucose Level - Abnormal:  Goal: Ability to maintain appropriate glucose levels will improve  Description: Ability to maintain appropriate glucose levels will improve  3/31/2021 0817 by Ashli Whitlock RN  Outcome: Ongoing  Note: Pt will have accuchecks before meals and at bedtime with sliding scale insulin in place for coverage. Will continue to monitor for signs and symptoms of hypoglycemia and hyperglycemia throughout shift.

## 2021-03-31 NOTE — CARE COORDINATION
Call received from [facility]; they are unable to accept pt at this time 2/2 PT/OT notes and pt inability to participate in therapy for 3 hrs a day. Will call [facility] for SNF choices.     Nancy Ferro RN

## 2021-04-01 LAB
ANION GAP SERPL CALCULATED.3IONS-SCNC: 11 MMOL/L (ref 3–16)
BUN BLDV-MCNC: 17 MG/DL (ref 7–20)
CALCIUM SERPL-MCNC: 8.9 MG/DL (ref 8.3–10.6)
CHLORIDE BLD-SCNC: 97 MMOL/L (ref 99–110)
CO2: 27 MMOL/L (ref 21–32)
CREAT SERPL-MCNC: <0.5 MG/DL (ref 0.6–1.1)
GFR AFRICAN AMERICAN: >60
GFR NON-AFRICAN AMERICAN: >60
GLUCOSE BLD-MCNC: 254 MG/DL (ref 70–99)
GLUCOSE BLD-MCNC: 291 MG/DL (ref 70–99)
GLUCOSE BLD-MCNC: 325 MG/DL (ref 70–99)
GLUCOSE BLD-MCNC: 364 MG/DL (ref 70–99)
GLUCOSE BLD-MCNC: 366 MG/DL (ref 70–99)
GLUCOSE BLD-MCNC: 369 MG/DL (ref 70–99)
MAGNESIUM: 1.8 MG/DL (ref 1.8–2.4)
PERFORMED ON: ABNORMAL
POTASSIUM SERPL-SCNC: 3.6 MMOL/L (ref 3.5–5.1)
SODIUM BLD-SCNC: 135 MMOL/L (ref 136–145)

## 2021-04-01 PROCEDURE — 6370000000 HC RX 637 (ALT 250 FOR IP): Performed by: NURSE PRACTITIONER

## 2021-04-01 PROCEDURE — 6360000002 HC RX W HCPCS: Performed by: INTERNAL MEDICINE

## 2021-04-01 PROCEDURE — 6370000000 HC RX 637 (ALT 250 FOR IP): Performed by: INTERNAL MEDICINE

## 2021-04-01 PROCEDURE — 1200000000 HC SEMI PRIVATE

## 2021-04-01 PROCEDURE — 36415 COLL VENOUS BLD VENIPUNCTURE: CPT

## 2021-04-01 PROCEDURE — 2500000003 HC RX 250 WO HCPCS: Performed by: INTERNAL MEDICINE

## 2021-04-01 PROCEDURE — 2580000003 HC RX 258: Performed by: INTERNAL MEDICINE

## 2021-04-01 PROCEDURE — 99233 SBSQ HOSP IP/OBS HIGH 50: CPT | Performed by: INTERNAL MEDICINE

## 2021-04-01 PROCEDURE — 83735 ASSAY OF MAGNESIUM: CPT

## 2021-04-01 PROCEDURE — 80048 BASIC METABOLIC PNL TOTAL CA: CPT

## 2021-04-01 RX ORDER — POTASSIUM CHLORIDE 20 MEQ/1
40 TABLET, EXTENDED RELEASE ORAL ONCE
Status: COMPLETED | OUTPATIENT
Start: 2021-04-01 | End: 2021-04-01

## 2021-04-01 RX ORDER — MAGNESIUM SULFATE IN WATER 40 MG/ML
2000 INJECTION, SOLUTION INTRAVENOUS ONCE
Status: COMPLETED | OUTPATIENT
Start: 2021-04-01 | End: 2021-04-01

## 2021-04-01 RX ADMIN — MAGNESIUM GLUCONATE 500 MG ORAL TABLET 400 MG: 500 TABLET ORAL at 08:12

## 2021-04-01 RX ADMIN — AMIODARONE HYDROCHLORIDE 200 MG: 200 TABLET ORAL at 08:12

## 2021-04-01 RX ADMIN — ASPIRIN 81 MG: 81 TABLET, COATED ORAL at 08:12

## 2021-04-01 RX ADMIN — INSULIN LISPRO 3 UNITS: 100 INJECTION, SOLUTION INTRAVENOUS; SUBCUTANEOUS at 08:34

## 2021-04-01 RX ADMIN — APIXABAN 5 MG: 5 TABLET, FILM COATED ORAL at 08:12

## 2021-04-01 RX ADMIN — POTASSIUM CHLORIDE 10 MEQ: 750 TABLET, EXTENDED RELEASE ORAL at 08:12

## 2021-04-01 RX ADMIN — FLUTICASONE PROPIONATE 1 SPRAY: 50 SPRAY, METERED NASAL at 08:12

## 2021-04-01 RX ADMIN — POTASSIUM CHLORIDE 40 MEQ: 1500 TABLET, EXTENDED RELEASE ORAL at 09:02

## 2021-04-01 RX ADMIN — INSULIN LISPRO 5 UNITS: 100 INJECTION, SOLUTION INTRAVENOUS; SUBCUTANEOUS at 17:17

## 2021-04-01 RX ADMIN — MAGNESIUM SULFATE HEPTAHYDRATE 2000 MG: 40 INJECTION, SOLUTION INTRAVENOUS at 09:03

## 2021-04-01 RX ADMIN — POTASSIUM CHLORIDE 10 MEQ: 750 TABLET, EXTENDED RELEASE ORAL at 20:48

## 2021-04-01 RX ADMIN — FUROSEMIDE 10 MG/HR: 10 INJECTION, SOLUTION INTRAMUSCULAR; INTRAVENOUS at 16:30

## 2021-04-01 RX ADMIN — INSULIN LISPRO 3 UNITS: 100 INJECTION, SOLUTION INTRAVENOUS; SUBCUTANEOUS at 20:48

## 2021-04-01 RX ADMIN — INSULIN LISPRO 5 UNITS: 100 INJECTION, SOLUTION INTRAVENOUS; SUBCUTANEOUS at 11:42

## 2021-04-01 RX ADMIN — SPIRONOLACTONE 12.5 MG: 25 TABLET ORAL at 08:12

## 2021-04-01 RX ADMIN — METOPROLOL SUCCINATE 25 MG: 25 TABLET, EXTENDED RELEASE ORAL at 08:12

## 2021-04-01 RX ADMIN — INSULIN GLARGINE 30 UNITS: 100 INJECTION, SOLUTION SUBCUTANEOUS at 20:48

## 2021-04-01 RX ADMIN — MICONAZOLE NITRATE: 2 POWDER TOPICAL at 20:49

## 2021-04-01 RX ADMIN — ATORVASTATIN CALCIUM 40 MG: 40 TABLET, FILM COATED ORAL at 20:48

## 2021-04-01 RX ADMIN — MICONAZOLE NITRATE: 2 POWDER TOPICAL at 08:12

## 2021-04-01 RX ADMIN — FUROSEMIDE 10 MG/HR: 10 INJECTION, SOLUTION INTRAMUSCULAR; INTRAVENOUS at 09:02

## 2021-04-01 RX ADMIN — APIXABAN 5 MG: 5 TABLET, FILM COATED ORAL at 20:48

## 2021-04-01 RX ADMIN — QUETIAPINE FUMARATE 50 MG: 50 TABLET, EXTENDED RELEASE ORAL at 21:02

## 2021-04-01 RX ADMIN — DULOXETINE HYDROCHLORIDE 30 MG: 30 CAPSULE, DELAYED RELEASE ORAL at 08:12

## 2021-04-01 RX ADMIN — PANTOPRAZOLE SODIUM 40 MG: 40 TABLET, DELAYED RELEASE ORAL at 08:12

## 2021-04-01 RX ADMIN — Medication 10 ML: at 20:49

## 2021-04-01 RX ADMIN — Medication 10 ML: at 08:13

## 2021-04-01 ASSESSMENT — PAIN SCALES - GENERAL
PAINLEVEL_OUTOF10: 0

## 2021-04-01 NOTE — PROGRESS NOTES
04/01/2021    K 3.6 04/01/2021    K 3.8 03/25/2021    CL 97 04/01/2021    CO2 27 04/01/2021    BUN 17 04/01/2021    CREATININE <0.5 04/01/2021    GFRAA >60 04/01/2021    AGRATIO 1.2 02/02/2021    LABGLOM >60 04/01/2021    GLUCOSE 291 04/01/2021    PROT 6.8 02/02/2021    CALCIUM 8.9 04/01/2021    BILITOT 0.5 02/02/2021    ALKPHOS 65 02/02/2021    AST 44 02/02/2021    ALT 16 02/02/2021     PT/INR:  No results found for: PTINR  HgBA1c:  Lab Results   Component Value Date    LABA1C 9.7 02/02/2021     Lab Results   Component Value Date    CKTOTAL 54 06/12/2020    TROPONINI <0.01 03/25/2021         Cardiac Data        Echo:  TTE 6/13/20:  Conclusions   Summary   LV systolic function is moderately reduced with an estimated EF of 35%.   Moderate global hypokinesis.   There is mild concentric left ventricular hypertrophy.   Left ventricular cavity size is mildly dilated.   Estimated LV diastolic filling pressure is normal.   Mild mitral and tricuspid regurgitation.   Systolic pulmonary artery pressure (SPAP) is estimated at 35mmHg (Right   atrial pressure of 8 mmHg).   No significant change from exam done 10/18/2019.     Limited TTE 11/17/20:  Conclusions   Summary   Limited exam for pericardial effusion.   EF visually estimated at < 20%.   Small to Moderate circumferential pericardial effusion without tamponade   physiology.  The effusion does not appear to be significantly changed since   the previous exam on 11/17/2020.  Castilloilynn Juan Pablo is a moderate to large left pleural effusion.   Mild to moderate tricuspid regurgitation.   Systolic pulmonary artery pressure (SPAP) is elevated and estimated at 41   mmHg (right atrial pressure 15 mmHg) consistent with mild pulmonary   hypertension.     Limited TTE 11/22/20:  Conclusions   Summary   Left ventricular systolic function is reduced with ejection fraction   estimated at 10-15 %.   There is severe global hypokinesis.   Left ventricular size is moderately increased.   There is mild concentric left ventricular hypertrophy.  Loraine Pilon is a left ventricular apical thrombus present.  Loraine Pilon is a small circumferential pericardial effusion noted with tamponade   physiology only by doppler flow but not by other criteria.     Limited TTE 3/25/21:  Conclusions   Summary   Limited for pericardial effusion.  Loraine Pilon is a moderate posterior pericardial effusion noted ( 1.5cm). No   significant anterior effusion. No tamponade. Severly impaired systolic   function of left ventricle. No significant changes in size of effusion since   11.25.20.     Stress Test:  Pharmacologic Nuclear SPECT Stress 1/31/20:  Conclusions        Summary    Mildly reduced LVEF 45%    Inferolateral hypokinesis    Mainly fixed inferior defect suggestive of scar    No evidence of myocardium at risk for significant reversible ischemia.         Pharmacologic Nuclear SPECT Stress 10/5/20:  Conclusions        Summary    Dilated LV with severe global hypokinesis. Large, severe, fixed perfusion    defect of the inferior/inferolateral wall consistent with scar. Diminished    uptake of the anterior wall consistent with breast artifact. Post stress    LVEF is abnormal at 19%. Abnormal study. Overall findings represent a high    risk scan.         Cath:  [facility] 12/12/18:  LM <20%  LAD 20-30%  Cx 20-20%  RCA 20-30%              RPDA 50%. FFR 0.89  LVEF 45%     Coronary CTA and calcium scoring study 12/10/18:  FINDINGS:   Calcium score: 145       Left Main: 0       LAD: 130       Left circumflex:  10       RCA:  5       Cardiac findings: Left atrial and left ventricular dilation are identified. Orthotopic origin of the coronary artery vessels.  Left chest wall cardiac   pacemaker noted.       Left Main: No significant atherosclerosis or stenosis.       LAD: Up to 50% narrowing involving the proximal segment left anterior   descending coronary artery (6).   Some blooming artifact around the calcified   focus is identified.  No myocardial bridging is appreciated.       Left circumflex:  Less than 50% narrowing in the LCX.       RCA:  Suboptimally evaluated.       Non-cardiac findings: Calcified and noncalcified mediastinal lymph nodes are   identified. .  Small airway thickening in the visualized lungs with possible   centrilobular micro nodularity.          Assessment:      1. Acute on chronic LV systolic heart failure - Remains hypervolemic, but diuresed well yesterday following increase in IV lasix infusion to 10 mg/hr. Overall, weight is down 39 lbs since admission and patient is documented as being net fluid negative ~16.8L. Dry weight is not entirely clear. 2. Non-ischemic cardiomyopathy (LVEF 15-20%)  3. Stable moderate-sized pericardial effusion with no evidence of tamponade physiology  4. Non-obstructive coronary artery disease  5. S/p ICD  6. H/o LV thrombus  7. H/o PE  8. Pulmonary hypertension  9. Non-sustained VT  10. Paroxysmal SVT  11. Essential hypertension  12. Hyperlipidemia  13. Type II DM  14. CVA  13. Morbid obesity      Plan:     1. Continues to have good response to diuresis with IV lasix infusion, but remains hypervolemic. 2. Will continue IV lasix infusion at 10 mg/hr again today. 3. Discontinued lisinopril following dose on 3/30 and will plan to start Entresto on 4/2/21 after ACEI has had 36 hours to wash out. 4. Continue Eliquis 5 mg BID, aspirin 81 mg daily, atorvastatin 40 mg daily, metoprolol succinate 25 mg daily, spironolactone 12.5 mg daily, amiodarone 200 mg daily, and digoxin 125 mcg every other day. 5. Continue to trend renal panels and replete electrolytes as needed to maintain goal K>4 and Mg>2.  5. Monitor strict I/Os, daily standing weights. 6. 1.5L per day fluid restriction, low sodium diet. 7. Cardiology will continue to follow closely. Thank you for allowing us to participate in the care of Richy Hurt. Please call me with any questions [phone].       [address] [facility] 119

## 2021-04-01 NOTE — PLAN OF CARE
Problem: OXYGENATION/RESPIRATORY FUNCTION  Goal: Patient will maintain patent airway  Outcome: Ongoing     Problem: HEMODYNAMIC STATUS  Goal: Patient has stable vital signs and fluid balance  Outcome: Ongoing     Problem: FLUID AND ELECTROLYTE IMBALANCE  Goal: Fluid and electrolyte balance are achieved/maintained  Outcome: Ongoing     Patient's EF (Ejection Fraction) is less than 40%    Heart Failure Medications:  Diuretics[de-identified] Furosemide and Spironolactone    (One of the following REQUIRED for EF <40%/SYSTOLIC FAILURE but MAY be used in EF% >40%/DIASTOLIC FAILURE)        ACE[de-identified] None        ARB[de-identified] None         ARNI[de-identified] None    (Beta Blockers)  NON- Evidenced Based Beta Blocker (for EF% >40%/DIASTOLIC FAILURE): None    Evidenced Based Beta Blocker::(REQUIRED for EF% <40%/SYSTOLIC FAILURE) Metoprolol SUCCinate- Toprol XL  . .................................................................................................................................................. Patient's weights and intake/output reviewed: Yes    Patient's Last Weight: 204 lbs obtained by standing scale. Difference of 11 lbs less than last documented weight. Intake/Output Summary (Last 24 hours) at 4/1/2021 0957  Last data filed at 4/1/2021 0752  Gross per 24 hour   Intake 1828 ml   Output 6125 ml   Net -4297 ml       Comorbidities Reviewed Yes    Patient has a past medical history of Arthritis, CAD (coronary artery disease), Cerebral artery occlusion with cerebral infarction ([facility] [address] CHF (congestive heart failure) ([facility] [address] COVID-19, Diabetes mellitus ([facility] [address] ESBL (extended spectrum beta-lactamase) producing bacteria infection, Hyperlipidemia, Hypertension, Mental retardation, MI (myocardial infarction) ([facility] [address] and Pacemaker.      >>For CHF and Comorbidity documentation on Education Time and Topics, please see Education Tab    Progressive Mobility Assessment:  What is this patient's Current Level of Mobility?: Ambulatory- with Assistance  How was this patient Mobilized today?: Edge of Bed                 With Whom? Nurse, PCA, PT, and OT                 Level of Difficulty/Assistance: 1x Assist     Pt resting in bed at this time on room air. Pt denies shortness of breath. Pt without lower extremity edema.      Patient and/or Family's stated Goal of Care this Admission: reduce shortness of breath, increase activity tolerance, better understand heart failure and disease management, be more comfortable, and reduce lower extremity edema prior to discharge        :

## 2021-04-01 NOTE — PROGRESS NOTES
Hospitalist Progress Note      PCP: FRITZ Lynn - NP    Date of Admission: 3/25/2021    Chief Complaint: dyspnea, edema     Hospital Course:   Patient with PMH of DM 2, HTN, chronic CHF, EF <20%, moderate pulmonary hypertension, Hx of PE, depression presented to [facility] ED last night with complaints of worsening shortness of breath. Patient reports she has gained about 50 pounds in the last month all fluid. She reports chest tightness with ambulation. Patient reports she is no longer in hospice and has recently moved in with her family in [place]. She was evaluated by Dr. Smith Sahu yesterday in the CHF clinic, was noted to be fluid overloaded and was asked to increase her Lasix dose to twice daily. She does report 3 pillow orthopnea, no PND    Subjective:  Continues to have good diuresis. No new complaints.       Medications:  Reviewed    Infusion Medications    dextrose      furosemide (LASIX) 1mg/ml infusion 10 mg/hr (04/01/21 0902)     Scheduled Medications    insulin glargine  30 Units Subcutaneous Nightly    magnesium oxide  400 mg Oral Daily    potassium chloride  10 mEq Oral BID    metoprolol succinate  25 mg Oral Daily    amiodarone  200 mg Oral Daily    aspirin  81 mg Oral Daily    apixaban  5 mg Oral BID    atorvastatin  40 mg Oral Nightly    digoxin  125 mcg Oral Every Other Day    DULoxetine  30 mg Oral Daily    fluticasone  1 spray Each Nostril Daily    pantoprazole  40 mg Oral Daily    QUEtiapine  50 mg Oral Nightly    insulin lispro  0-6 Units Subcutaneous TID WC    insulin lispro  0-3 Units Subcutaneous Nightly    sodium chloride flush  10 mL Intravenous 2 times per day    miconazole   Topical BID    spironolactone  12.5 mg Oral Daily     PRN Meds: glucose, dextrose, glucagon (rDNA), dextrose, sodium chloride flush, polyethylene glycol, acetaminophen **OR** acetaminophen, perflutren lipid microspheres, prochlorperazine, magnesium sulfate      Intake/Output Summary (Last 24 hours) at 4/1/2021 1300  Last data filed at 4/1/2021 1135  Gross per 24 hour   Intake 2028 ml   Output 5675 ml   Net -3647 ml       Physical Exam Performed:    /68   Pulse 70   Temp 98.1 °F (36.7 °C) (Tympanic)   Resp 18   Ht 5' 1\" (1.549 m)   Wt 204 lb 1.6 oz (92.6 kg)   SpO2 98%   BMI 38.56 kg/m²       General appearance: No apparent distress, appears stated age and cooperative. HEENT: Pupils equal, round, and reactive to light. Conjunctivae/corneas clear. Neck: Supple, with full range of motion. No jugular venous distention. Trachea midline. Respiratory:  Normal respiratory effort. Clear to auscultation, bilaterally without Rales/Wheezes/Rhonchi. Distant lungs. Cardiovascular: Regular rate and rhythm with normal S1/S2 without murmurs, rubs or gallops. Abdomen: Soft, non-tender, non-distended with normal bowel sounds. Musculoskeletal: No clubbing, cyanosis. Now only 1+ BLE pitting edema. Full range of motion without deformity. Skin: Skin color, texture, turgor normal.  No rashes or lesions. Neurologic:  Neurovascularly intact without any focal sensory/motor deficits. Cranial nerves: II-XII intact, grossly non-focal.  Psychiatric: Alert and oriented, thought content appropriate, normal insight. Capillary Refill: Brisk,< 3 seconds   Peripheral Pulses: +2 palpable, equal bilaterally       Labs:   No results for input(s): WBC, HGB, HCT, PLT in the last 72 hours. Recent Labs     03/31/21  0732 03/31/21  1753 04/01/21  0733    135* 135*   K 3.4* 4.4 3.6   CL 99 96* 97*   CO2 30 28 27   BUN 15 18 17   CREATININE 0.6 0.6 <0.5*   CALCIUM 8.9 9.0 8.9     No results for input(s): AST, ALT, BILIDIR, BILITOT, ALKPHOS in the last 72 hours. No results for input(s): INR in the last 72 hours. No results for input(s): Meldon Morris in the last 72 hours.     Urinalysis:      Lab Results   Component Value Date    NITRU Negative 02/02/2021    WBCUA 10-20 02/02/2021    BACTERIA Rare 02/02/2021    RBCUA 0-2 02/02/2021    BLOODU Negative 02/02/2021    SPECGRAV 1.010 02/02/2021    GLUCOSEU 250 02/02/2021       Radiology:  No orders to display           Assessment/Plan:    Active Hospital Problems    Diagnosis    Anasarca [R60.1]    Pericardial effusion [I31.3]    Acute on chronic systolic CHF (congestive heart failure) ([facility]) [I50.23]    Dual ICD (implantable cardioverter-defibrillator) in place [Z95.810]    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) [I63.50]    DM (diabetes mellitus) ([facility] [address] [E11.9]    HTN (hypertension), benign [I10]    Dyslipidemia [E78.5]     Acute on chronic systolic heart failure  - furosemide gtt, spironolactone. She had a standing weight of 194 lbs at the time of discharge in 11/2020.  - starting entresto tomorrow  - started metoprolol.  - has AICD     Chronic moderate pericardial effusion  - Conservative management. DMII  - well controlled  - continue lantus with SSI    HLD  - continue home statin    History of PE  - resumed Eliquis    Hx of atrial tachycardia  - resume amiodarone    Depression  - mood stable  - continue home Cymbalta and Seroquel    H/O CVA   - continue ASA, statin    DVT Prophylaxis: anticoagulation as above  Diet: DIET CARB CONTROL; Low Sodium (2 GM); Daily Fluid Restriction: 1500 ml  Code Status: DNR-CCA    PT/OT Eval Status: rec'd SNF    Dispo -  When she is adequately diuresed, perhaps 4/2-3.  Plan for SNF on discharge      Tae Saez MD

## 2021-04-01 NOTE — CARE COORDINATION
CM called Teresa Cotton in admissions with [facility] [phone] to check precert status. LVM with call back number. Awaiting return call. Ml Beckford RN    Addendum [phone]    Call received from Teresa So in admissions. She states that precert has not yet been initiated with [employer] but that they will accept pt when medically ready under her Medicaid. Pt will need respiratory assessment completed and faxed back to facility prior to admission. She will also require negative rapid covid test which CM ordered. Primary RN aware of need and will collect when appropriate.  Will complete level of care per facility request.     Ml Beckford RN

## 2021-04-01 NOTE — PROGRESS NOTES
Comprehensive Nutrition Assessment    Type and Reason for Visit:  RD Nutrition Re-Screen/LOS    Nutrition Recommendations/Plan:   1. Continue Current Diet (Carb Control, Low Sodium, 1500ml Fluid Restriction)  2. Monitor nutrition adequacy, pertinent labs, bowel habits, wt changes, and clinical progress    Nutrition Assessment:  Pt admitted d/t CHF and fluid build up. PMH CAD, CHF, DM, HLD, HTN, MI. Pt POA in room at visit along with pt. Pt endorses good PO intake and appetite. No c/o N/V. States she is feeling much better with the fluid loss. Finaly had BM today after 1 wk w/o any BM. Pt declined ONS, feels she is consuming enough through diet. No nutritional comments or questions at this time. Continue to monitor. Malnutrition Assessment:  Malnutrition Status:  No malnutrition    Context:  Acute Illness       Estimated Daily Nutrient Needs:  Energy (kcal):  1431-1670kcal; Weight Used for Energy Requirements:  Ideal(47.7kg)     Protein (g):  57-67g; Weight Used for Protein Requirements:  Ideal(1.2-1.4g/kg)        Fluid (ml/day):  1500ml; Method Used for Fluid Requirements:  Other (Comment)(Fluid restriction for CHF)      Nutrition Related Findings:  - 18L fluid per I&Os since adm. Labs: , POCG 254. +2BUE edema, +1 Pitting BLE edema. Wounds:  (Bruising, abrasion, blisters)       Current Nutrition Therapies:    DIET CARB CONTROL; Low Sodium (2 GM);  Daily Fluid Restriction: 1500 ml    Anthropometric Measures:  · Height: 5' 1\" (154.9 cm)  · Current Body Weight: 204 lb (92.5 kg)   · Admission Body Weight: 243 lb (110.2 kg)    · Ideal Body Weight: 105 lbs; % Ideal Body Weight 194.3 %   · BMI: 38.6  · BMI Categories: Obese Class 2 (BMI 35.0 -39.9)       Nutrition Diagnosis:   · Limited adherence to nutrition-related recommendations related to cognitive or neurological impairment as evidenced by lab values, BMI, localized or generalized fluid accumulation      Nutrition Interventions:   Food and/or Nutrient Delivery:  Continue Current Diet  Nutrition Education/Counseling:  No recommendation at this time   Coordination of Nutrition Care:  Continue to monitor while inpatient    Goals:  Pt will continue to have PO intake of at least 50% this admission.        Nutrition Monitoring and Evaluation:   Behavioral-Environmental Outcomes:  None Identified   Food/Nutrient Intake Outcomes:  Food and Nutrient Intake  Physical Signs/Symptoms Outcomes:  Biochemical Data, Nutrition Focused Physical Findings, GI Status, Weight, Skin     Discharge Planning:    Continue current diet     Electronically signed by Khang Lieberman Dietetic Intern on 4/1/21 at 2:16 PM EDT    Contact: [phone]

## 2021-04-01 NOTE — PROGRESS NOTES
[address]   PROGRESS NOTE  [phone]      Attending Physician: Saulo Lala MD  Reason for Consultation/Chief Complaint: Acute decompensated heart failure    Subjective     IV lasix infusion increased to 10 mg/hr yesterday and patient was documented net negative 4.8L over the last 24 hours, although minimal intake was recorded. Weight is down to 215 lbs today from 221 lbs yesterday. This morning, patient is sitting up in chair and says that she feels comfortable. Denies chest pain and shortness of breath at rest.  Still lower extremity edema.          CURRENT Medications:  potassium chloride (KLOR-CON M) extended release tablet 40 mEq, Once  magnesium sulfate 2000 mg in 50 mL IVPB premix, Once  insulin glargine (LANTUS) injection vial 30 Units, Nightly  magnesium oxide (MAG-OX) tablet 400 mg, Daily  potassium chloride (KLOR-CON M) extended release tablet 10 mEq, BID  metoprolol succinate (TOPROL XL) extended release tablet 25 mg, Daily  amiodarone (CORDARONE) tablet 200 mg, Daily  aspirin EC tablet 81 mg, Daily  apixaban (ELIQUIS) tablet 5 mg, BID  atorvastatin (LIPITOR) tablet 40 mg, Nightly  digoxin (LANOXIN) tablet 125 mcg, Every Other Day  DULoxetine (CYMBALTA) extended release capsule 30 mg, Daily  fluticasone (FLONASE) 50 MCG/ACT nasal spray 1 spray, Daily  pantoprazole (PROTONIX) tablet 40 mg, Daily  QUEtiapine (SEROQUEL XR) extended release tablet 50 mg, Nightly  glucose (GLUTOSE) 40 % oral gel 15 g, PRN  dextrose 50 % IV solution, PRN  glucagon (rDNA) injection 1 mg, PRN  dextrose 5 % solution, PRN  insulin lispro (HUMALOG) injection vial 0-6 Units, TID WC  insulin lispro (HUMALOG) injection vial 0-3 Units, Nightly  sodium chloride flush 0.9 % injection 10 mL, 2 times per day  sodium chloride flush 0.9 % injection 10 mL, PRN  polyethylene glycol (GLYCOLAX) packet 17 g, Daily PRN  acetaminophen (TYLENOL) tablet 650 mg, Q6H PRN    Or  acetaminophen (TYLENOL) suppository 650 mg, Q6H 04/01/2021    K 3.8 03/25/2021    CL 97 04/01/2021    CO2 27 04/01/2021    BUN 17 04/01/2021    CREATININE <0.5 04/01/2021    GFRAA >60 04/01/2021    AGRATIO 1.2 02/02/2021    LABGLOM >60 04/01/2021    GLUCOSE 291 04/01/2021    PROT 6.8 02/02/2021    CALCIUM 8.9 04/01/2021    BILITOT 0.5 02/02/2021    ALKPHOS 65 02/02/2021    AST 44 02/02/2021    ALT 16 02/02/2021     PT/INR:  No results found for: PTINR  HgBA1c:  Lab Results   Component Value Date    LABA1C 9.7 02/02/2021     Lab Results   Component Value Date    CKTOTAL 54 06/12/2020    TROPONINI <0.01 03/25/2021         Cardiac Data        Echo:  TTE 6/13/20:  Conclusions   Summary   LV systolic function is moderately reduced with an estimated EF of 35%.   Moderate global hypokinesis.   There is mild concentric left ventricular hypertrophy.   Left ventricular cavity size is mildly dilated.   Estimated LV diastolic filling pressure is normal.   Mild mitral and tricuspid regurgitation.   Systolic pulmonary artery pressure (SPAP) is estimated at 35mmHg (Right   atrial pressure of 8 mmHg).   No significant change from exam done 10/18/2019.     Limited TTE 11/17/20:  Conclusions   Summary   Limited exam for pericardial effusion.   EF visually estimated at < 20%.   Small to Moderate circumferential pericardial effusion without tamponade   physiology.  The effusion does not appear to be significantly changed since   the previous exam on 11/17/2020.  Donneta Roller is a moderate to large left pleural effusion.   Mild to moderate tricuspid regurgitation.   Systolic pulmonary artery pressure (SPAP) is elevated and estimated at 41   mmHg (right atrial pressure 15 mmHg) consistent with mild pulmonary   hypertension.     Limited TTE 11/22/20:  Conclusions   Summary   Left ventricular systolic function is reduced with ejection fraction   estimated at 10-15 %.   There is severe global hypokinesis.   Left ventricular size is moderately increased.   There is mild concentric left ventricular hypertrophy.  Sheeba Landis is a left ventricular apical thrombus present.  Sheeba Landis is a small circumferential pericardial effusion noted with tamponade   physiology only by doppler flow but not by other criteria.     Limited TTE 3/25/21:  Conclusions   Summary   Limited for pericardial effusion.  Sheeba Landis is a moderate posterior pericardial effusion noted ( 1.5cm). No   significant anterior effusion. No tamponade. Severly impaired systolic   function of left ventricle. No significant changes in size of effusion since   11.25.20.     Stress Test:  Pharmacologic Nuclear SPECT Stress 1/31/20:  Conclusions        Summary    Mildly reduced LVEF 45%    Inferolateral hypokinesis    Mainly fixed inferior defect suggestive of scar    No evidence of myocardium at risk for significant reversible ischemia.         Pharmacologic Nuclear SPECT Stress 10/5/20:  Conclusions        Summary    Dilated LV with severe global hypokinesis. Large, severe, fixed perfusion    defect of the inferior/inferolateral wall consistent with scar. Diminished    uptake of the anterior wall consistent with breast artifact. Post stress    LVEF is abnormal at 19%. Abnormal study. Overall findings represent a high    risk scan.         Cath:  [facility] 12/12/18:  LM <20%  LAD 20-30%  Cx 20-20%  RCA 20-30%              RPDA 50%. FFR 0.89  LVEF 45%     Coronary CTA and calcium scoring study 12/10/18:  FINDINGS:   Calcium score: 145       Left Main: 0       LAD: 130       Left circumflex:  10       RCA:  5       Cardiac findings: Left atrial and left ventricular dilation are identified. Orthotopic origin of the coronary artery vessels.  Left chest wall cardiac   pacemaker noted.       Left Main: No significant atherosclerosis or stenosis.       LAD: Up to 50% narrowing involving the proximal segment left anterior   descending coronary artery (6).   Some blooming artifact around the calcified   focus is identified.  No myocardial bridging is appreciated.     Left circumflex:  Less than 50% narrowing in the LCX.       RCA:  Suboptimally evaluated.       Non-cardiac findings: Calcified and noncalcified mediastinal lymph nodes are   identified. .  Small airway thickening in the visualized lungs with possible   centrilobular micro nodularity.          Assessment:      1. Acute on chronic LV systolic heart failure - Remains hypervolemic, but diuresed well yesterday following increase in IV lasix infusion to 10 mg/hr. Overall, weight is down 28 lbs since admission and patient is documented as being net fluid negative ~12L. Dry weight is not entirely clear. 2. Non-ischemic cardiomyopathy (LVEF 15-20%)  3. Stable moderate-sized pericardial effusion with no evidence of tamponade physiology  4. Non-obstructive coronary artery disease  5. S/p ICD  6. H/o LV thrombus  7. H/o PE  8. Pulmonary hypertension  9. Non-sustained VT  10. Paroxysmal SVT  11. Essential hypertension  12. Hyperlipidemia  13. Type II DM  14. CVA  13. Morbid obesity      Plan:     1. Continue IV lasix infusion at 10 mg/hr. Will re-bolus as needed to achieve goal net negative 2-3L over next 24 hours. 2. Trend renal panels and magnesium levels BID while aggressively diuresing and replete electrolytes as needed to maintain goal K>4 and Mg>2.  3. Discontinued lisinopril following dose yesterday morning and will plan to start Entresto on 4/2/21 after ACEI has had time to wash out. 4. Continue Eliquis 5 mg BID, aspirin 81 mg daily, atorvastatin 40 mg daily, metoprolol succinate 25 mg daily, spironolactone 12.5 mg daily, amiodarone 200 mg daily, and digoxin 125 mcg every other day. 5. Monitor strict I/Os, daily standing weights. 6. 1.5L per day fluid restriction, low sodium diet. 7. Cardiology will continue to follow closely. Thank you for allowing us to participate in the care of Pam Prescott. Please call me with any questions [phone].       Jerrod Acosta, [address] [facility]  ([address]) [address]  [phone] [address] [place] [address]  4/1/2021 8:48 AM      I will address the patient's cardiac risk factors and adjusted pharmacologic treatment as needed. In addition, I have reinforced the need for patient directed risk factor modification. All questions and concerns were addressed to the patient/family. Alternatives to my treatment were discussed. The note was completed using EMR. Every effort was made to ensure accuracy; however, inadvertent computerized transcription errors may be present.

## 2021-04-02 LAB
ANION GAP SERPL CALCULATED.3IONS-SCNC: 9 MMOL/L (ref 3–16)
BUN BLDV-MCNC: 14 MG/DL (ref 7–20)
CALCIUM SERPL-MCNC: 9.1 MG/DL (ref 8.3–10.6)
CHLORIDE BLD-SCNC: 98 MMOL/L (ref 99–110)
CO2: 33 MMOL/L (ref 21–32)
CREAT SERPL-MCNC: 0.6 MG/DL (ref 0.6–1.1)
GFR AFRICAN AMERICAN: >60
GFR NON-AFRICAN AMERICAN: >60
GLUCOSE BLD-MCNC: 107 MG/DL (ref 70–99)
GLUCOSE BLD-MCNC: 111 MG/DL (ref 70–99)
GLUCOSE BLD-MCNC: 228 MG/DL (ref 70–99)
GLUCOSE BLD-MCNC: 279 MG/DL (ref 70–99)
GLUCOSE BLD-MCNC: 280 MG/DL (ref 70–99)
MAGNESIUM: 1.8 MG/DL (ref 1.8–2.4)
PERFORMED ON: ABNORMAL
POTASSIUM SERPL-SCNC: 3.3 MMOL/L (ref 3.5–5.1)
SODIUM BLD-SCNC: 140 MMOL/L (ref 136–145)

## 2021-04-02 PROCEDURE — 80048 BASIC METABOLIC PNL TOTAL CA: CPT

## 2021-04-02 PROCEDURE — 2580000003 HC RX 258: Performed by: INTERNAL MEDICINE

## 2021-04-02 PROCEDURE — 6360000002 HC RX W HCPCS: Performed by: INTERNAL MEDICINE

## 2021-04-02 PROCEDURE — 97110 THERAPEUTIC EXERCISES: CPT

## 2021-04-02 PROCEDURE — 97530 THERAPEUTIC ACTIVITIES: CPT

## 2021-04-02 PROCEDURE — 6370000000 HC RX 637 (ALT 250 FOR IP): Performed by: NURSE PRACTITIONER

## 2021-04-02 PROCEDURE — 36415 COLL VENOUS BLD VENIPUNCTURE: CPT

## 2021-04-02 PROCEDURE — 6370000000 HC RX 637 (ALT 250 FOR IP): Performed by: INTERNAL MEDICINE

## 2021-04-02 PROCEDURE — 83735 ASSAY OF MAGNESIUM: CPT

## 2021-04-02 PROCEDURE — 97535 SELF CARE MNGMENT TRAINING: CPT

## 2021-04-02 PROCEDURE — 1200000000 HC SEMI PRIVATE

## 2021-04-02 PROCEDURE — 99233 SBSQ HOSP IP/OBS HIGH 50: CPT | Performed by: INTERNAL MEDICINE

## 2021-04-02 RX ORDER — POTASSIUM CHLORIDE 20 MEQ/1
40 TABLET, EXTENDED RELEASE ORAL ONCE
Status: COMPLETED | OUTPATIENT
Start: 2021-04-02 | End: 2021-04-02

## 2021-04-02 RX ADMIN — METOPROLOL SUCCINATE 25 MG: 25 TABLET, EXTENDED RELEASE ORAL at 08:36

## 2021-04-02 RX ADMIN — INSULIN LISPRO 2 UNITS: 100 INJECTION, SOLUTION INTRAVENOUS; SUBCUTANEOUS at 12:50

## 2021-04-02 RX ADMIN — INSULIN LISPRO 2 UNITS: 100 INJECTION, SOLUTION INTRAVENOUS; SUBCUTANEOUS at 21:43

## 2021-04-02 RX ADMIN — ASPIRIN 81 MG: 81 TABLET, COATED ORAL at 08:36

## 2021-04-02 RX ADMIN — Medication 10 ML: at 21:52

## 2021-04-02 RX ADMIN — FLUTICASONE PROPIONATE 1 SPRAY: 50 SPRAY, METERED NASAL at 08:36

## 2021-04-02 RX ADMIN — MICONAZOLE NITRATE: 2 POWDER TOPICAL at 08:35

## 2021-04-02 RX ADMIN — DIGOXIN 125 MCG: 125 TABLET ORAL at 08:36

## 2021-04-02 RX ADMIN — SACUBITRIL AND VALSARTAN 1 TABLET: 24; 26 TABLET, FILM COATED ORAL at 21:52

## 2021-04-02 RX ADMIN — SPIRONOLACTONE 12.5 MG: 25 TABLET ORAL at 08:36

## 2021-04-02 RX ADMIN — AMIODARONE HYDROCHLORIDE 200 MG: 200 TABLET ORAL at 08:36

## 2021-04-02 RX ADMIN — DULOXETINE HYDROCHLORIDE 30 MG: 30 CAPSULE, DELAYED RELEASE ORAL at 08:36

## 2021-04-02 RX ADMIN — POTASSIUM CHLORIDE 40 MEQ: 1500 TABLET, EXTENDED RELEASE ORAL at 14:15

## 2021-04-02 RX ADMIN — INSULIN LISPRO 6 UNITS: 100 INJECTION, SOLUTION INTRAVENOUS; SUBCUTANEOUS at 12:50

## 2021-04-02 RX ADMIN — PANTOPRAZOLE SODIUM 40 MG: 40 TABLET, DELAYED RELEASE ORAL at 08:36

## 2021-04-02 RX ADMIN — APIXABAN 5 MG: 5 TABLET, FILM COATED ORAL at 08:36

## 2021-04-02 RX ADMIN — FUROSEMIDE 10 MG/HR: 10 INJECTION, SOLUTION INTRAMUSCULAR; INTRAVENOUS at 02:35

## 2021-04-02 RX ADMIN — POTASSIUM CHLORIDE 10 MEQ: 750 TABLET, EXTENDED RELEASE ORAL at 08:36

## 2021-04-02 RX ADMIN — MAGNESIUM GLUCONATE 500 MG ORAL TABLET 400 MG: 500 TABLET ORAL at 08:36

## 2021-04-02 RX ADMIN — APIXABAN 5 MG: 5 TABLET, FILM COATED ORAL at 21:52

## 2021-04-02 RX ADMIN — INSULIN LISPRO 6 UNITS: 100 INJECTION, SOLUTION INTRAVENOUS; SUBCUTANEOUS at 17:47

## 2021-04-02 RX ADMIN — INSULIN GLARGINE 30 UNITS: 100 INJECTION, SOLUTION SUBCUTANEOUS at 21:43

## 2021-04-02 RX ADMIN — MICONAZOLE NITRATE: 2 POWDER TOPICAL at 21:52

## 2021-04-02 RX ADMIN — FUROSEMIDE 5 MG/HR: 10 INJECTION, SOLUTION INTRAMUSCULAR; INTRAVENOUS at 14:15

## 2021-04-02 RX ADMIN — QUETIAPINE FUMARATE 50 MG: 50 TABLET, EXTENDED RELEASE ORAL at 21:52

## 2021-04-02 RX ADMIN — INSULIN LISPRO 3 UNITS: 100 INJECTION, SOLUTION INTRAVENOUS; SUBCUTANEOUS at 17:47

## 2021-04-02 RX ADMIN — ATORVASTATIN CALCIUM 40 MG: 40 TABLET, FILM COATED ORAL at 21:52

## 2021-04-02 RX ADMIN — POTASSIUM CHLORIDE 10 MEQ: 750 TABLET, EXTENDED RELEASE ORAL at 21:52

## 2021-04-02 ASSESSMENT — PAIN SCALES - GENERAL: PAINLEVEL_OUTOF10: 0

## 2021-04-02 NOTE — PROGRESS NOTES
(12/11/2020). Restrictions  Restrictions/Precautions  Restrictions/Precautions: General Precautions, Fall Risk  Position Activity Restriction  Other position/activity restrictions: Up as tolerated, IV, tinoco catheter  Subjective   General  Chart Reviewed: Yes  Patient assessed for rehabilitation services?: Yes  Response to previous treatment: Patient with no complaints from previous session  Family / Caregiver Present: Yes(friend)  Referring Practitioner: Dr. Gurdeep Rashid  Diagnosis: anasarca  Subjective  Subjective: Pt sitting up in chair, agreeable to OT treatment. General Comment  Comments: RN cleared pt for OT; pt up in chair agreeable to therapy  Vital Signs  Patient Currently in Pain: Denies   Orientation  Orientation  Overall Orientation Status: Within Functional Limits  Objective    ADL  Grooming: Stand by assistance  LE Dressing: Minimal assistance(assist to thread catheter)  Toileting: Dependent/Total(tinoco catheter)        Balance  Sitting Balance: Supervision  Standing Balance: Stand by assistance  Standing Balance  Time: ~7 min  Activity: functional mobility, grooming  Functional Mobility  Functional - Mobility Device: No device  Assist Level: Contact guard assistance     Transfers  Sit to stand: Supervision  Stand to sit: Supervision  Transfer Comments: cues for safe hand placement        Coordination  Gross Motor: WFL for ADL tasks  Fine Motor: WFL for ADL tasks              Cognition  Overall Cognitive Status: Exceptions  Arousal/Alertness: Appropriate responses to stimuli  Following Commands:  Follows one step commands consistently  Attention Span: Attends with cues to redirect  Memory: Decreased recall of precautions  Safety Judgement: Decreased awareness of need for safety  Problem Solving: Decreased awareness of errors  Insights: Decreased awareness of deficits  Initiation: Does not require cues  Sequencing: Does not require cues  Cognition Comment: impulsive with mobility and line mgmt Type of ROM/Therapeutic Exercise  Type of ROM/Therapeutic Exercise: AROM  Comment: BUE seated in chair  Exercises  Shoulder Flexion: x10  Elbow Flexion: x10  Elbow Extension: x10  Supination: x10  Pronation: x10  Wrist Flexion: x10  Wrist Extension: x10  Finger Flexion: x10  Finger Extension: x10                    Plan   Plan  Times per week: 3-5x/ week  Current Treatment Recommendations: ROM, Functional Mobility Training, Safety Education & Training, Self-Care / ADL, Endurance Training    AM-PAC Score        AM-[facility] Inpatient Daily Activity Raw Score: 17 (04/02/21 1450)  AM-PAC Inpatient ADL T-Scale Score : 37.26 (04/02/21 1450)  ADL Inpatient CMS 0-100% Score: 50.11 (04/02/21 1450)  ADL Inpatient CMS G-Code Modifier : CK (04/02/21 1450)    Goals  Short term goals  Time Frame for Short term goals: 1 week(4-02-21)  Short term goal 1: SBA with bathroom mobility by 3-31-21; --ongoing 4/02  Short term goal 2: independent with toileting hygiene by 4-02-21--ongoing 4/02  Short term goal 3: set up for LE self care by 4-02-21--ongoing 4/02 min A d/t catheter  Short term goal 4: tolerate 10-15 reps BUE AROM exercises; 3/30 STG met, continue for light strengthening as tolerated, cont goal for consistency  Short term goal 5: SBA standing ADL's for 3 minutes at sink; GOAL MET, pt SBA for ADL's standing by chair for 3 minutes 3/31/21, 7 min 4/02  Patient Goals   Patient goals : get stronger to care of myself       Therapy Time   Individual Concurrent Group Co-treatment   Time In 1401         Time Out 1439         Minutes 38         Timed Code Treatment Minutes: DAVID Flannery

## 2021-04-02 NOTE — PROGRESS NOTES
Hospitalist Progress Note      PCP: FRITZ Springer - AL[facility]    Date of Admission: 3/25/2021    Chief Complaint: dyspnea, edema     Hospital Course:   Patient with PMH of DM 2, HTN, chronic CHF, EF <20%, moderate pulmonary hypertension, Hx of PE, depression presented to [facility] ED last night with complaints of worsening shortness of breath. Patient reports she has gained about 50 pounds in the last month all fluid. She reports chest tightness with ambulation. Patient reports she is no longer in hospice and has recently moved in with her family in [place]. She was evaluated by Dr. Buzz Sanchez yesterday in the CHF clinic, was noted to be fluid overloaded and was asked to increase her Lasix dose to twice daily. She does report 3 pillow orthopnea, no PND    Subjective:  Continues to have good diuresis. No new complaints.       Medications:  Reviewed    Infusion Medications    dextrose      furosemide (LASIX) 1mg/ml infusion 10 mg/hr (04/02/21 0640)     Scheduled Medications    insulin lispro  6 Units Subcutaneous TID WC    insulin glargine  30 Units Subcutaneous Nightly    magnesium oxide  400 mg Oral Daily    potassium chloride  10 mEq Oral BID    metoprolol succinate  25 mg Oral Daily    amiodarone  200 mg Oral Daily    aspirin  81 mg Oral Daily    apixaban  5 mg Oral BID    atorvastatin  40 mg Oral Nightly    digoxin  125 mcg Oral Every Other Day    DULoxetine  30 mg Oral Daily    fluticasone  1 spray Each Nostril Daily    pantoprazole  40 mg Oral Daily    QUEtiapine  50 mg Oral Nightly    insulin lispro  0-6 Units Subcutaneous TID WC    insulin lispro  0-3 Units Subcutaneous Nightly    sodium chloride flush  10 mL Intravenous 2 times per day    miconazole   Topical BID    spironolactone  12.5 mg Oral Daily     PRN Meds: glucose, dextrose, glucagon (rDNA), dextrose, sodium chloride flush, polyethylene glycol, acetaminophen **OR** acetaminophen, perflutren lipid microspheres, prochlorperazine, magnesium sulfate      Intake/Output Summary (Last 24 hours) at 4/2/2021 1238  Last data filed at 4/2/2021 0841  Gross per 24 hour   Intake 610 ml   Output 4250 ml   Net -3640 ml       Physical Exam Performed:    /64   Pulse 70   Temp 98.6 °F (37 °C) (Oral)   Resp 20   Ht 5' 1\" (1.549 m)   Wt 199 lb 11.2 oz (90.6 kg)   SpO2 98%   BMI 37.73 kg/m²       General appearance: No apparent distress, appears stated age and cooperative. HEENT: Pupils equal, round, and reactive to light. Conjunctivae/corneas clear. Neck: Supple, with full range of motion. No jugular venous distention. Trachea midline. Respiratory:  Normal respiratory effort. Clear to auscultation, bilaterally without Rales/Wheezes/Rhonchi. Distant lungs. Cardiovascular: Regular rate and rhythm with normal S1/S2 without murmurs, rubs or gallops. Abdomen: Soft, non-tender, non-distended with normal bowel sounds. Musculoskeletal: No clubbing, cyanosis. Now only 1+ BLE pitting edema. Full range of motion without deformity. Skin: Skin color, texture, turgor normal.  No rashes or lesions. Neurologic:  Neurovascularly intact without any focal sensory/motor deficits. Cranial nerves: II-XII intact, grossly non-focal.  Psychiatric: Alert and oriented, thought content appropriate, normal insight. Capillary Refill: Brisk,< 3 seconds   Peripheral Pulses: +2 palpable, equal bilaterally       Labs:   No results for input(s): WBC, HGB, HCT, PLT in the last 72 hours. Recent Labs     03/31/21  1753 04/01/21  0733 04/02/21  0759   * 135* 140   K 4.4 3.6 3.3*   CL 96* 97* 98*   CO2 28 27 33*   BUN 18 17 14   CREATININE 0.6 <0.5* 0.6   CALCIUM 9.0 8.9 9.1     No results for input(s): AST, ALT, BILIDIR, BILITOT, ALKPHOS in the last 72 hours. No results for input(s): INR in the last 72 hours. No results for input(s): Lydia [place] in the last 72 hours.     Urinalysis:      Lab Results   Component Value Date NITRU Negative 02/02/2021    WBCUA 10-20 02/02/2021    BACTERIA Rare 02/02/2021    RBCUA 0-2 02/02/2021    BLOODU Negative 02/02/2021    SPECGRAV 1.010 02/02/2021    GLUCOSEU 250 02/02/2021       Radiology:  No orders to display           Assessment/Plan:    Active Hospital Problems    Diagnosis    Anasarca [R60.1]    Pericardial effusion [I31.3]    Acute on chronic systolic CHF (congestive heart failure) ([facility]) [I50.23]    Dual ICD (implantable cardioverter-defibrillator) in place [Z95.810]    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) [I63.50]    DM (diabetes mellitus) ([facility] [address] [E11.9]    HTN (hypertension), benign [I10]    Dyslipidemia [E78.5]     Acute on chronic systolic heart failure  - furosemide gtt, spironolactone. She had a standing weight of 194 lbs at the time of discharge in 11/2020.  - starting entresto today  - started metoprolol.  - has AICD     Chronic moderate pericardial effusion  - Conservative management. DMII  - well controlled  - continue lantus with SSI    HLD  - continue home statin    History of PE  - resumed Eliquis    Hx of atrial tachycardia  - resume amiodarone    Depression  - mood stable  - continue home Cymbalta and Seroquel    H/O CVA   - continue ASA, statin    DVT Prophylaxis: anticoagulation as above  Diet: DIET CARB CONTROL; Low Sodium (2 GM); Daily Fluid Restriction: 1500 ml  Code Status: DNR-CCA    PT/OT Eval Status: rec'd SNF    Dispo -  When she is adequately diuresed. Hopefully ready for DC tomorrow.  Plan for SNF on discharge      Gume Daly MD

## 2021-04-02 NOTE — PROGRESS NOTES
[address]   PROGRESS NOTE  [phone]      Attending Physician: Laureano Bailey MD  Reason for Consultation/Chief Complaint: Acute decompensated heart failure    Subjective     Remained on IV lasix infusion at 10 mg/hr yesterday and was net negative ~2.6L over the last 24 hours. Weight is 199 lbs today from 204 lbs yesterday. Creatinine remains stable. Continues to deny chest pain and shortness of breath at rest.  Lower extremity edema continues to improve.     CURRENT Medications:  insulin lispro (HUMALOG) injection vial 6 Units, TID WC  potassium chloride (KLOR-CON M) extended release tablet 40 mEq, Once  sacubitril-valsartan (ENTRESTO) 24-26 MG per tablet 1 tablet, BID  insulin glargine (LANTUS) injection vial 30 Units, Nightly  magnesium oxide (MAG-OX) tablet 400 mg, Daily  potassium chloride (KLOR-CON M) extended release tablet 10 mEq, BID  metoprolol succinate (TOPROL XL) extended release tablet 25 mg, Daily  amiodarone (CORDARONE) tablet 200 mg, Daily  aspirin EC tablet 81 mg, Daily  apixaban (ELIQUIS) tablet 5 mg, BID  atorvastatin (LIPITOR) tablet 40 mg, Nightly  digoxin (LANOXIN) tablet 125 mcg, Every Other Day  DULoxetine (CYMBALTA) extended release capsule 30 mg, Daily  fluticasone (FLONASE) 50 MCG/ACT nasal spray 1 spray, Daily  pantoprazole (PROTONIX) tablet 40 mg, Daily  QUEtiapine (SEROQUEL XR) extended release tablet 50 mg, Nightly  glucose (GLUTOSE) 40 % oral gel 15 g, PRN  dextrose 50 % IV solution, PRN  glucagon (rDNA) injection 1 mg, PRN  dextrose 5 % solution, PRN  insulin lispro (HUMALOG) injection vial 0-6 Units, TID WC  insulin lispro (HUMALOG) injection vial 0-3 Units, Nightly  sodium chloride flush 0.9 % injection 10 mL, 2 times per day  sodium chloride flush 0.9 % injection 10 mL, PRN  polyethylene glycol (GLYCOLAX) packet 17 g, Daily PRN  acetaminophen (TYLENOL) tablet 650 mg, Q6H PRN    Or  acetaminophen (TYLENOL) suppository 650 mg, Q6H PRN  perflutren lipid microspheres (DEFINITY) injection 1.65 mg, ONCE PRN  miconazole (MICOTIN) 2 % powder, BID  furosemide (LASIX) 100 mg in dextrose 5 % 100 mL infusion, Continuous  prochlorperazine (COMPAZINE) injection 10 mg, Q6H PRN  spironolactone (ALDACTONE) tablet 12.5 mg, Daily  magnesium sulfate 1000 mg in dextrose 5% 100 mL IVPB, PRN        Allergies:  Patient has no known allergies. Review of Systems:   General: No chills or sweats. Cardiac: Positive for lower extremity edema. No chest pain or palpitations. Respiratory: No cough or shortness of breath. GI: No nausea, vomiting, or diarrhea. Neuro: No lightheadedness or dizziness. Objective   PHYSICAL EXAM:    Vitals:    04/02/21 1159   BP: 112/64   Pulse: 70   Resp: 20   Temp: 98.6 °F (37 °C)   SpO2: 98%    Weight: 199 lb 11.2 oz (90.6 kg)      General: Adult female lying in bed in no acute distress. Pleasant and interactive on exam.  HEENT: Normocephalic, atraumatic, non-icteric, hearing intact, nares normal, mucous membranes moist.  Neck: JVP elevated. Heart: Regular rate and rhythm. Normal S1 and S2. Grade II/VI holosystolic murmur. No rubs. Lungs: Normal respiratory effort. Breath sounds diminished bilaterally. Crackles less noticeable. Abdomen: Obese, soft, non-tender. Normoactive bowel sounds. No masses or organomegaly. Skin: No rashes, wounds, or lesions. Pulses: 2+ and symmetric. Extremities: 1+ bilateral lower extremity edema, improving. No clubbing or cyanosis. Musculoskeletal: Spontaneously moves all four extremities. Psych: Normal mood and affect. Neuro: Alert and oriented to person, place, and time. No focal deficits noted.       Labs   CBC:   Lab Results   Component Value Date    WBC 6.4 03/26/2021    RBC 4.00 03/26/2021    HGB 11.1 03/26/2021    HCT 34.4 03/26/2021    MCV 86.0 03/26/2021    RDW 18.2 03/26/2021     03/26/2021     CMP:  Lab Results   Component Value Date     04/02/2021    K 3.3 04/02/2021    K 3.8 03/25/2021 left ventricular apical thrombus present.  [place] is a small circumferential pericardial effusion noted with tamponade   physiology only by doppler flow but not by other criteria.     Limited TTE 3/25/21:  Conclusions   Summary   Limited for pericardial effusion.  [place] is a moderate posterior pericardial effusion noted ( 1.5cm). No   significant anterior effusion. No tamponade. Severly impaired systolic   function of left ventricle. No significant changes in size of effusion since   11.25.20.     Stress Test:  Pharmacologic Nuclear SPECT Stress 1/31/20:  Conclusions        Summary    Mildly reduced LVEF 45%    Inferolateral hypokinesis    Mainly fixed inferior defect suggestive of scar    No evidence of myocardium at risk for significant reversible ischemia.         Pharmacologic Nuclear SPECT Stress 10/5/20:  Conclusions        Summary    Dilated LV with severe global hypokinesis. Large, severe, fixed perfusion    defect of the inferior/inferolateral wall consistent with scar. Diminished    uptake of the anterior wall consistent with breast artifact. Post stress    LVEF is abnormal at 19%. Abnormal study. Overall findings represent a high    risk scan.         Cath:  [facility] 12/12/18:  LM <20%  LAD 20-30%  Cx 20-20%  RCA 20-30%              RPDA 50%. FFR 0.89  LVEF 45%     Coronary CTA and calcium scoring study 12/10/18:  FINDINGS:   Calcium score: 145       Left Main: 0       LAD: 130       Left circumflex:  10       RCA:  5       Cardiac findings: Left atrial and left ventricular dilation are identified. Orthotopic origin of the coronary artery vessels.  Left chest wall cardiac   pacemaker noted.       Left Main: No significant atherosclerosis or stenosis.       LAD: Up to 50% narrowing involving the proximal segment left anterior   descending coronary artery (6).   Some blooming artifact around the calcified   focus is identified.  No myocardial bridging is appreciated.       Left circumflex:  Less than 50% narrowing in the LCX.       RCA:  Suboptimally evaluated.       Non-cardiac findings: Calcified and noncalcified mediastinal lymph nodes are   identified. .  Small airway thickening in the visualized lungs with possible   centrilobular micro nodularity.          Assessment:      1. Acute on chronic LV systolic heart failure - Remains hypervolemic, but continues to diurese well on IV lasix infusion. Overall, weight is down 44 lbs since admission. 2. Non-ischemic cardiomyopathy (LVEF 15-20%)  3. Stable moderate-sized pericardial effusion with no evidence of tamponade physiology  4. Non-obstructive coronary artery disease  5. S/p ICD  6. H/o LV thrombus  7. H/o PE  8. Pulmonary hypertension  9. Non-sustained VT  10. Paroxysmal SVT  11. Essential hypertension  12. Hyperlipidemia  13. Type II DM  14. CVA  13. Morbid obesity      Plan:     1. Will decrease IV lasix infusion to 5 mg/hr. 2. Start Entresto 24-26 mg BID. 3. Continue Eliquis 5 mg BID, aspirin 81 mg daily, atorvastatin 40 mg daily, metoprolol succinate 25 mg daily, spironolactone 12.5 mg daily, amiodarone 200 mg daily, and digoxin 125 mcg every other day. 4. Continue to trend renal panels and replete electrolytes as needed to maintain goal K>4 and Mg>2.  5. Monitor strict I/Os, daily standing weights. 6. 1.5L per day fluid restriction, low sodium diet. 7. Cardiology will continue to follow closely. Thank you for allowing us to participate in the care of Rosalia Luna. Please call me with any questions [phone]. Radha Wu, DO  [address]  [phone] [facility]  [phone] [address] [place] [address]  4/2/2021 1:28 PM      I will address the patient's cardiac risk factors and adjusted pharmacologic treatment as needed. In addition, I have reinforced the need for patient directed risk factor modification. All questions and concerns were addressed to the patient/family. Alternatives to my treatment were discussed.  The note was completed using EMR. Every effort was made to ensure accuracy; however, inadvertent computerized transcription errors may be present.

## 2021-04-02 NOTE — FLOWSHEET NOTE
04/01/21 2041   Assessment   Charting Type Shift assessment   Neurological   Neuro (WDL) WDL   Level of Consciousness Alert (0)   Orientation Level Oriented X4   Cognition Follows commands   Language Clear   Bc Coma Scale   Eye Opening 4   Best Verbal Response 5   Best Motor Response 6   [place] Coma Scale Score 15   HEENT   HEENT (WDL) X   Right Eye Impaired vision   Left Eye Impaired vision   Respiratory   Respiratory (WDL) X   Respiratory Pattern Regular   Respiratory Depth Normal   Respiratory Quality/Effort Unlabored   Chest Assessment Chest expansion symmetrical;Trachea midline   L Breath Sounds Diminished   R Breath Sounds Diminished   Breath Sounds   Right Upper Lobe Diminished   Right Middle Lobe Diminished   Right Lower Lobe Diminished   Left Upper Lobe Diminished   Left Lower Lobe Diminished   Cardiac   Cardiac (WDL) WDL  (CHF)   Cardiac Regularity Regular   Cardiac Rhythm NSR   Cardiac Monitor   Telemetry Monitor On Yes   Telemetry Audible Yes   Telemetry Alarms Set Yes   Gastrointestinal   Abdominal (WDL) X   Abdomen Inspection Rounded   Tenderness Soft; No guarding;Nontender; No rebound   RUQ Bowel Sounds Active   LUQ Bowel Sounds Active   RLQ Bowel Sounds Active   LLQ Bowel Sounds Active   Peripheral Vascular   Peripheral Vascular (WDL) X   Edema Right lower extremity; Left lower extremity   Edema Generalized +2   RUE Edema +1;Pitting   LUE Edema +1;Pitting   RLE Edema Pitting;+2   LLE Edema Pitting;+2   Skin Color/Condition   Skin Color/Condition (WDL) X   Skin Color Appropriate for ethnicity   Skin Condition/Temp Warm;Dry;Swollen   Skin Integrity   Skin Integrity (WDL) X   Skin Integrity Bruising; Abrasion   Location scattered   Skin Fold Management Yes   Multiple Skin Integrity Sites Yes   Dressing Site Groin   Treatment Pharmaceutical   Date applied? 04/01/21   Assessed this shift Red   Skin Integrity Site 2   Skin Integrity Location 2 Redness   Location 2 scattered   Skin Integrity Site 3 Skin Integrity Location 3 Redness    Location 3 folds   Musculoskeletal   Musculoskeletal (WDL) X   RUE Full movement;Swelling   LUE Full movement;Swelling   RL Extremity Weakness; Swelling   LL Extremity Weakness; Swelling   Genitourinary   Genitourinary (WDL) X  (tinoco)   Flank Tenderness No   Suprapubic Tenderness No   Dysuria MABEL   Anus/Rectum   Anus/Rectum (WDL) WDL   Urethral Catheter 16 fr   Placement Date/Time: 03/25/21 0520   Inserted by: Alber Terryene  Tube Size (fr): 16 fr  Collection Container: Standard  Securement Method: Securing device (Describe)  Urine Returned: Yes   Catheter Indications Need for fluid management in critically ill patients in a critical care setting not able to be managed by other means such as BSC with hat, bedpan, urinal, condom catheter, or short term intermittent urethral catherization   Site Assessment No urethral drainage;Pink   Urine Color [place]   Urine Appearance Hazy   Urine Odor Malodorous   Output (mL) 650 mL   Psychosocial   Psychosocial (WDL) X  (delayed)   Patient Behaviors Flat affect; Cooperative

## 2021-04-03 LAB
ANION GAP SERPL CALCULATED.3IONS-SCNC: 9 MMOL/L (ref 3–16)
BUN BLDV-MCNC: 19 MG/DL (ref 7–20)
CALCIUM SERPL-MCNC: 9.1 MG/DL (ref 8.3–10.6)
CHLORIDE BLD-SCNC: 98 MMOL/L (ref 99–110)
CO2: 31 MMOL/L (ref 21–32)
CREAT SERPL-MCNC: 0.6 MG/DL (ref 0.6–1.1)
GFR AFRICAN AMERICAN: >60
GFR NON-AFRICAN AMERICAN: >60
GLUCOSE BLD-MCNC: 111 MG/DL (ref 70–99)
GLUCOSE BLD-MCNC: 75 MG/DL (ref 70–99)
GLUCOSE BLD-MCNC: 79 MG/DL (ref 70–99)
GLUCOSE BLD-MCNC: 90 MG/DL (ref 70–99)
GLUCOSE BLD-MCNC: 90 MG/DL (ref 70–99)
GLUCOSE BLD-MCNC: 92 MG/DL (ref 70–99)
GLUCOSE BLD-MCNC: 94 MG/DL (ref 70–99)
PERFORMED ON: ABNORMAL
PERFORMED ON: NORMAL
POTASSIUM SERPL-SCNC: 3.8 MMOL/L (ref 3.5–5.1)
SODIUM BLD-SCNC: 138 MMOL/L (ref 136–145)

## 2021-04-03 PROCEDURE — 6370000000 HC RX 637 (ALT 250 FOR IP): Performed by: INTERNAL MEDICINE

## 2021-04-03 PROCEDURE — 99233 SBSQ HOSP IP/OBS HIGH 50: CPT | Performed by: NURSE PRACTITIONER

## 2021-04-03 PROCEDURE — 2580000003 HC RX 258: Performed by: INTERNAL MEDICINE

## 2021-04-03 PROCEDURE — 6370000000 HC RX 637 (ALT 250 FOR IP): Performed by: NURSE PRACTITIONER

## 2021-04-03 PROCEDURE — 6360000002 HC RX W HCPCS: Performed by: INTERNAL MEDICINE

## 2021-04-03 PROCEDURE — 80048 BASIC METABOLIC PNL TOTAL CA: CPT

## 2021-04-03 PROCEDURE — 1200000000 HC SEMI PRIVATE

## 2021-04-03 PROCEDURE — 36415 COLL VENOUS BLD VENIPUNCTURE: CPT

## 2021-04-03 RX ADMIN — APIXABAN 5 MG: 5 TABLET, FILM COATED ORAL at 21:52

## 2021-04-03 RX ADMIN — AMIODARONE HYDROCHLORIDE 200 MG: 200 TABLET ORAL at 11:55

## 2021-04-03 RX ADMIN — POTASSIUM CHLORIDE 10 MEQ: 750 TABLET, EXTENDED RELEASE ORAL at 21:53

## 2021-04-03 RX ADMIN — SPIRONOLACTONE 12.5 MG: 25 TABLET ORAL at 11:55

## 2021-04-03 RX ADMIN — FUROSEMIDE 5 MG/HR: 10 INJECTION, SOLUTION INTRAMUSCULAR; INTRAVENOUS at 04:06

## 2021-04-03 RX ADMIN — DULOXETINE HYDROCHLORIDE 30 MG: 30 CAPSULE, DELAYED RELEASE ORAL at 11:55

## 2021-04-03 RX ADMIN — ATORVASTATIN CALCIUM 40 MG: 40 TABLET, FILM COATED ORAL at 21:53

## 2021-04-03 RX ADMIN — FLUTICASONE PROPIONATE 1 SPRAY: 50 SPRAY, METERED NASAL at 11:57

## 2021-04-03 RX ADMIN — MICONAZOLE NITRATE: 2 POWDER TOPICAL at 11:57

## 2021-04-03 RX ADMIN — SACUBITRIL AND VALSARTAN 1 TABLET: 24; 26 TABLET, FILM COATED ORAL at 21:53

## 2021-04-03 RX ADMIN — MAGNESIUM GLUCONATE 500 MG ORAL TABLET 400 MG: 500 TABLET ORAL at 11:55

## 2021-04-03 RX ADMIN — METOPROLOL SUCCINATE 25 MG: 25 TABLET, EXTENDED RELEASE ORAL at 11:54

## 2021-04-03 RX ADMIN — SACUBITRIL AND VALSARTAN 1 TABLET: 24; 26 TABLET, FILM COATED ORAL at 11:54

## 2021-04-03 RX ADMIN — APIXABAN 5 MG: 5 TABLET, FILM COATED ORAL at 11:54

## 2021-04-03 RX ADMIN — ASPIRIN 81 MG: 81 TABLET, COATED ORAL at 11:54

## 2021-04-03 RX ADMIN — MICONAZOLE NITRATE: 2 POWDER TOPICAL at 21:52

## 2021-04-03 RX ADMIN — QUETIAPINE FUMARATE 50 MG: 50 TABLET, EXTENDED RELEASE ORAL at 21:52

## 2021-04-03 RX ADMIN — INSULIN GLARGINE 30 UNITS: 100 INJECTION, SOLUTION SUBCUTANEOUS at 21:53

## 2021-04-03 RX ADMIN — PANTOPRAZOLE SODIUM 40 MG: 40 TABLET, DELAYED RELEASE ORAL at 11:55

## 2021-04-03 RX ADMIN — POTASSIUM CHLORIDE 10 MEQ: 750 TABLET, EXTENDED RELEASE ORAL at 11:55

## 2021-04-03 NOTE — PROGRESS NOTES
Kacey Fiore MD   200 mg at 04/02/21 0836    aspirin EC tablet 81 mg  81 mg Oral Daily Preston Leonard MD   81 mg at 04/02/21 0836    apixaban (ELIQUIS) tablet 5 mg  5 mg Oral BID Preston Leonard MD   5 mg at 04/02/21 2152    atorvastatin (LIPITOR) tablet 40 mg  40 mg Oral Nightly Preston Leonard MD   40 mg at 04/02/21 2152    digoxin (LANOXIN) tablet 125 mcg  125 mcg Oral Every Other Day Preston Leonard MD   125 mcg at 04/02/21 0836    DULoxetine (CYMBALTA) extended release capsule 30 mg  30 mg Oral Daily Preston Leonard MD   30 mg at 04/02/21 0836    fluticasone (FLONASE) 50 MCG/ACT nasal spray 1 spray  1 spray Each Nostril Daily Preston Leonard MD   1 spray at 04/02/21 0836    pantoprazole (PROTONIX) tablet 40 mg  40 mg Oral Daily Preston Leonard MD   40 mg at 04/02/21 0836    QUEtiapine (SEROQUEL XR) extended release tablet 50 mg  50 mg Oral Nightly Preston Leonard MD   50 mg at 04/02/21 2152    glucose (GLUTOSE) 40 % oral gel 15 g  15 g Oral PRN Preston Leonard MD        dextrose 50 % IV solution  12.5 g Intravenous PRN Preston Leonard MD        glucagon (rDNA) injection 1 mg  1 mg Intramuscular PRN Preston Leonard MD        dextrose 5 % solution  100 mL/hr Intravenous PRN Preston Leonard MD        insulin lispro (HUMALOG) injection vial 0-6 Units  0-6 Units Subcutaneous TID WC Preston Leonard MD   3 Units at 04/02/21 1747    insulin lispro (HUMALOG) injection vial 0-3 Units  0-3 Units Subcutaneous Nightly Preston Leonard MD   2 Units at 04/02/21 2143    sodium chloride flush 0.9 % injection 10 mL  10 mL Intravenous 2 times per day Preston Leonard MD   10 mL at 04/02/21 2152    sodium chloride flush 0.9 % injection 10 mL  10 mL Intravenous PRN Preston Leonard MD        polyethylene glycol (GLYCOLAX) packet 17 g  17 g Oral Daily PRN Preston Leonard MD   17 g at 03/31/21 1103    acetaminophen (TYLENOL) tablet 650 mg 650 mg Oral Q6H PRN Salome Randhawa MD   650 mg at 03/27/21 2227    Or    acetaminophen (TYLENOL) suppository 650 mg  650 mg Rectal Q6H PRN Salome Randhawa MD        perflutren lipid microspheres (DEFINITY) injection 1.65 mg  1.5 mL Intravenous ONCE PRN Salome Randhawa MD        miconazole (MICOTIN) 2 % powder   Topical BID Salome Randhawa MD   Given at 04/02/21 2152    furosemide (LASIX) 100 mg in dextrose 5 % 100 mL infusion  5 mg/hr Intravenous Continuous Jerrod Haddox, DO 5 mL/hr at 04/03/21 0406 5 mg/hr at 04/03/21 0406    prochlorperazine (COMPAZINE) injection 10 mg  10 mg Intravenous Q6H PRN Apoorva Hartmann MD        spironolactone (ALDACTONE) tablet 12.5 mg  12.5 mg Oral Daily Jerrod Haddox, DO   12.5 mg at 04/02/21 0836    magnesium sulfate 1000 mg in dextrose 5% 100 mL IVPB  1,000 mg Intravenous PRN Apoorva Hartmann MD   Stopped at 03/26/21 0225       Objective:     Telemetry monitor: sinus    Physical Exam:  Constitutional and general appearance: fatigued, no distress and appears older than stated age  HEENT: PERRL, no cervical lymphadenopathy. No masses palpable. Normal oral mucosa  Respiratory:  · Normal excursion and expansion without use of accessory muscles  · Resp auscultation: Clear but diminished breath sounds without wheezing, rhonchi, and rales  Cardiovascular:  · The apical impulse is not displaced  · Heart tones are crisp and normal. regular S1 and S2.  · Jugular venous pulsation Normal  · The carotid upstroke is normal in amplitude and contour without delay or bruit  · Peripheral pulses are symmetrical and full   Abdomen:  · No masses or tenderness  · Bowel sounds present  Extremities:  ·  No cyanosis or clubbing  ·  1+ lower extremity edema  ·  Skin: warm and dry  Neurological:  · + fatigued   · Moves all extremities well  · + flat affect     Data  Echo 3/25/2021:   Limited for pericardial effusion.    There is a moderate posterior pericardial effusion noted ( 1. 5cm). No   significant anterior effusion. No tamponade. Severly impaired systolic   function of left ventricle. No significant changes in size of effusion since   11.25.20. Echo 11/25/2020:   Summary   Left ventricular systolic function is reduced with ejection fraction estimated at 10-15 %. There is severe global hypokinesis. Left ventricular size is moderately increased. There is mild concentric left ventricular hypertrophy. There is a left ventricular apical thrombus present. There is a small circumferential pericardial effusion noted with tamponade physiology only by doppler flow but not by other criteria. Limited echo 10/6/2020:  Limited only f/u for LVEF. The left ventricular systolic function is severely reduced with an ejection fraction of 15-20 %. There is severe global hypokinesis with regional variations. Changes noted from previous echo on 6- in left ventricular function.     Stress test 10/5/2020:  Dilated LV with severe global hypokinesis. Large, severe, fixed perfusion     defect of the inferior/inferolateral wall consistent with scar. Diminished     uptake of the anterior wall consistent with breast artifact. Post stress     LVEF is abnormal at 19%. Abnormal study. Overall findings represent a high     risk scan.       RH and C 10/6/2020 [facility]):  1. Mild non-obstructive coronary artery disease. 2.  Moderate pulmonary hypertension  3.  Decompensated cardiac hemodynamics     [facility] 12/12/2018 (Joyce):  LM <20%  LAD 20-30%  Cx 20-20%  RCA 20-30%              RPDA 50%. FFR 0.89  LVEF 45%     ASA, statin, BBlk, ACE  Stop plavix  OK discharge      All labs and testing reviewed.   Lab Review     Renal Profile:   Lab Results   Component Value Date    CREATININE 0.6 04/02/2021    BUN 14 04/02/2021     04/02/2021    K 3.3 04/02/2021    K 3.8 03/25/2021    CL 98 04/02/2021    CO2 33 04/02/2021     CBC:    Lab Results   Component Value Date    WBC 6.4 03/26/2021    RBC 4.00

## 2021-04-03 NOTE — PROGRESS NOTES
Hospitalist Progress Note      PCP: FRITZ Mendez NP    Date of Admission: 3/25/2021    Chief Complaint: dyspnea, edema     Hospital Course:   Patient with PMH of DM 2, HTN, chronic CHF, EF <20%, moderate pulmonary hypertension, Hx of PE, depression presented to [facility] ED last night with complaints of worsening shortness of breath. Patient reports she has gained about 50 pounds in the last month all fluid. She reports chest tightness with ambulation. Patient reports she is no longer in hospice and has recently moved in with her family in [place]. She was evaluated by Dr. Torrie Mann yesterday in the CHF clinic, was noted to be fluid overloaded and was asked to increase her Lasix dose to twice daily. She does report 3 pillow orthopnea, no PND    Subjective:  Continues to have good diuresis. No new complaints.       Medications:  Reviewed    Infusion Medications    dextrose      furosemide (LASIX) 1mg/ml infusion 5 mg/hr (04/03/21 0406)     Scheduled Medications    insulin lispro  6 Units Subcutaneous TID WC    sacubitril-valsartan  1 tablet Oral BID    insulin glargine  30 Units Subcutaneous Nightly    magnesium oxide  400 mg Oral Daily    potassium chloride  10 mEq Oral BID    metoprolol succinate  25 mg Oral Daily    amiodarone  200 mg Oral Daily    aspirin  81 mg Oral Daily    apixaban  5 mg Oral BID    atorvastatin  40 mg Oral Nightly    digoxin  125 mcg Oral Every Other Day    DULoxetine  30 mg Oral Daily    fluticasone  1 spray Each Nostril Daily    pantoprazole  40 mg Oral Daily    QUEtiapine  50 mg Oral Nightly    insulin lispro  0-6 Units Subcutaneous TID WC    insulin lispro  0-3 Units Subcutaneous Nightly    sodium chloride flush  10 mL Intravenous 2 times per day    miconazole   Topical BID    spironolactone  12.5 mg Oral Daily     PRN Meds: glucose, dextrose, glucagon (rDNA), dextrose, sodium chloride flush, polyethylene glycol, acetaminophen **OR** acetaminophen, perflutren lipid microspheres, prochlorperazine, magnesium sulfate      Intake/Output Summary (Last 24 hours) at 4/3/2021 1111  Last data filed at 4/3/2021 1025  Gross per 24 hour   Intake 1089 ml   Output 4050 ml   Net -2961 ml       Physical Exam Performed:    /70   Pulse 60   Temp 97.3 °F (36.3 °C) (Axillary)   Resp 18   Ht 5' 1\" (1.549 m)   Wt 198 lb 6 oz (90 kg)   SpO2 98%   BMI 37.48 kg/m²       General appearance: No apparent distress, appears stated age and cooperative. HEENT: Pupils equal, round, and reactive to light. Conjunctivae/corneas clear. Neck: Supple, with full range of motion. No jugular venous distention. Trachea midline. Respiratory:  Normal respiratory effort. Clear to auscultation, bilaterally without Rales/Wheezes/Rhonchi. Distant lungs. Cardiovascular: Regular rate and rhythm with normal S1/S2 without murmurs, rubs or gallops. Abdomen: Soft, non-tender, non-distended with normal bowel sounds. Musculoskeletal: No clubbing, cyanosis. Now only 1+ BLE pitting edema. Full range of motion without deformity. Skin: Skin color, texture, turgor normal.  No rashes or lesions. Neurologic:  Neurovascularly intact without any focal sensory/motor deficits. Cranial nerves: II-XII intact, grossly non-focal.  Psychiatric: Alert and oriented, thought content appropriate, normal insight. Capillary Refill: Brisk,< 3 seconds   Peripheral Pulses: +2 palpable, equal bilaterally       Labs:   No results for input(s): WBC, HGB, HCT, PLT in the last 72 hours. Recent Labs     04/01/21  0733 04/02/21  0759 04/03/21  0810   * 140 138   K 3.6 3.3* 3.8   CL 97* 98* 98*   CO2 27 33* 31   BUN 17 14 19   CREATININE <0.5* 0.6 0.6   CALCIUM 8.9 9.1 9.1     No results for input(s): AST, ALT, BILIDIR, BILITOT, ALKPHOS in the last 72 hours. No results for input(s): INR in the last 72 hours. No results for input(s): Janeece Gambles in the last 72 hours.     Urinalysis:

## 2021-04-04 LAB
ANION GAP SERPL CALCULATED.3IONS-SCNC: 11 MMOL/L (ref 3–16)
BUN BLDV-MCNC: 14 MG/DL (ref 7–20)
CALCIUM SERPL-MCNC: 9.2 MG/DL (ref 8.3–10.6)
CHLORIDE BLD-SCNC: 99 MMOL/L (ref 99–110)
CO2: 28 MMOL/L (ref 21–32)
CREAT SERPL-MCNC: <0.5 MG/DL (ref 0.6–1.1)
GFR AFRICAN AMERICAN: >60
GFR NON-AFRICAN AMERICAN: >60
GLUCOSE BLD-MCNC: 127 MG/DL (ref 70–99)
GLUCOSE BLD-MCNC: 166 MG/DL (ref 70–99)
GLUCOSE BLD-MCNC: 53 MG/DL (ref 70–99)
GLUCOSE BLD-MCNC: 59 MG/DL (ref 70–99)
GLUCOSE BLD-MCNC: 62 MG/DL (ref 70–99)
GLUCOSE BLD-MCNC: 79 MG/DL (ref 70–99)
GLUCOSE BLD-MCNC: 81 MG/DL (ref 70–99)
PERFORMED ON: ABNORMAL
PERFORMED ON: NORMAL
PERFORMED ON: NORMAL
POTASSIUM SERPL-SCNC: 4.4 MMOL/L (ref 3.5–5.1)
SODIUM BLD-SCNC: 138 MMOL/L (ref 136–145)

## 2021-04-04 PROCEDURE — 99233 SBSQ HOSP IP/OBS HIGH 50: CPT | Performed by: NURSE PRACTITIONER

## 2021-04-04 PROCEDURE — 6370000000 HC RX 637 (ALT 250 FOR IP): Performed by: INTERNAL MEDICINE

## 2021-04-04 PROCEDURE — 6370000000 HC RX 637 (ALT 250 FOR IP): Performed by: NURSE PRACTITIONER

## 2021-04-04 PROCEDURE — 80048 BASIC METABOLIC PNL TOTAL CA: CPT

## 2021-04-04 PROCEDURE — 1200000000 HC SEMI PRIVATE

## 2021-04-04 PROCEDURE — 2580000003 HC RX 258: Performed by: INTERNAL MEDICINE

## 2021-04-04 PROCEDURE — 36415 COLL VENOUS BLD VENIPUNCTURE: CPT

## 2021-04-04 RX ORDER — FUROSEMIDE 40 MG/1
40 TABLET ORAL 2 TIMES DAILY
Status: DISCONTINUED | OUTPATIENT
Start: 2021-04-04 | End: 2021-04-05 | Stop reason: HOSPADM

## 2021-04-04 RX ADMIN — INSULIN LISPRO 1 UNITS: 100 INJECTION, SOLUTION INTRAVENOUS; SUBCUTANEOUS at 20:47

## 2021-04-04 RX ADMIN — AMIODARONE HYDROCHLORIDE 200 MG: 200 TABLET ORAL at 09:39

## 2021-04-04 RX ADMIN — DULOXETINE HYDROCHLORIDE 30 MG: 30 CAPSULE, DELAYED RELEASE ORAL at 09:38

## 2021-04-04 RX ADMIN — POTASSIUM CHLORIDE 10 MEQ: 750 TABLET, EXTENDED RELEASE ORAL at 09:38

## 2021-04-04 RX ADMIN — QUETIAPINE FUMARATE 50 MG: 50 TABLET, EXTENDED RELEASE ORAL at 20:46

## 2021-04-04 RX ADMIN — MAGNESIUM GLUCONATE 500 MG ORAL TABLET 400 MG: 500 TABLET ORAL at 09:38

## 2021-04-04 RX ADMIN — Medication 10 ML: at 20:46

## 2021-04-04 RX ADMIN — FLUTICASONE PROPIONATE 1 SPRAY: 50 SPRAY, METERED NASAL at 09:40

## 2021-04-04 RX ADMIN — ASPIRIN 81 MG: 81 TABLET, COATED ORAL at 09:38

## 2021-04-04 RX ADMIN — ATORVASTATIN CALCIUM 40 MG: 40 TABLET, FILM COATED ORAL at 20:46

## 2021-04-04 RX ADMIN — POTASSIUM CHLORIDE 10 MEQ: 750 TABLET, EXTENDED RELEASE ORAL at 20:46

## 2021-04-04 RX ADMIN — DIGOXIN 125 MCG: 125 TABLET ORAL at 09:39

## 2021-04-04 RX ADMIN — APIXABAN 5 MG: 5 TABLET, FILM COATED ORAL at 20:46

## 2021-04-04 RX ADMIN — MICONAZOLE NITRATE: 2 POWDER TOPICAL at 09:41

## 2021-04-04 RX ADMIN — PANTOPRAZOLE SODIUM 40 MG: 40 TABLET, DELAYED RELEASE ORAL at 09:38

## 2021-04-04 RX ADMIN — SACUBITRIL AND VALSARTAN 1 TABLET: 24; 26 TABLET, FILM COATED ORAL at 09:39

## 2021-04-04 RX ADMIN — SACUBITRIL AND VALSARTAN 1 TABLET: 24; 26 TABLET, FILM COATED ORAL at 20:46

## 2021-04-04 RX ADMIN — FUROSEMIDE 40 MG: 40 TABLET ORAL at 17:34

## 2021-04-04 RX ADMIN — APIXABAN 5 MG: 5 TABLET, FILM COATED ORAL at 09:41

## 2021-04-04 RX ADMIN — SPIRONOLACTONE 12.5 MG: 25 TABLET ORAL at 09:40

## 2021-04-04 RX ADMIN — MICONAZOLE NITRATE: 2 POWDER TOPICAL at 20:47

## 2021-04-04 ASSESSMENT — PAIN SCALES - GENERAL
PAINLEVEL_OUTOF10: 0

## 2021-04-04 NOTE — PROGRESS NOTES
Hospitalist Progress Note      PCP: FRITZ Bejarano - NP    Date of Admission: 3/25/2021    Chief Complaint: dyspnea, edema     Hospital Course:   Patient with PMH of DM 2, HTN, chronic CHF, EF <20%, moderate pulmonary hypertension, Hx of PE, depression presented [email or web address] ED last night with complaints of worsening shortness of breath. Patient reports she has gained about 50 pounds in the last month all fluid. She reports chest tightness with ambulation. Patient reports she is no longer in hospice and has recently moved in with her family in [place]. She was evaluated by Dr. Xavier Ruano yesterday in the CHF clinic, was noted to be fluid overloaded and was asked to increase her Lasix dose to twice daily. She does report 3 pillow orthopnea, no PND    Subjective:  Continues to have good diuresis. No new complaints.       Medications:  Reviewed    Infusion Medications    dextrose      furosemide (LASIX) 1mg/ml infusion 5 mg/hr (04/03/21 0406)     Scheduled Medications    sacubitril-valsartan  1 tablet Oral BID    magnesium oxide  400 mg Oral Daily    potassium chloride  10 mEq Oral BID    metoprolol succinate  25 mg Oral Daily    amiodarone  200 mg Oral Daily    aspirin  81 mg Oral Daily    apixaban  5 mg Oral BID    atorvastatin  40 mg Oral Nightly    digoxin  125 mcg Oral Every Other Day    DULoxetine  30 mg Oral Daily    fluticasone  1 spray Each Nostril Daily    pantoprazole  40 mg Oral Daily    QUEtiapine  50 mg Oral Nightly    insulin lispro  0-6 Units Subcutaneous TID WC    insulin lispro  0-3 Units Subcutaneous Nightly    sodium chloride flush  10 mL Intravenous 2 times per day    miconazole   Topical BID    spironolactone  12.5 mg Oral Daily     PRN Meds: glucose, dextrose, glucagon (rDNA), dextrose, sodium chloride flush, polyethylene glycol, acetaminophen **OR** acetaminophen, perflutren lipid microspheres, prochlorperazine, magnesium sulfate      Intake/Output Summary (Last 24 hours) at 4/4/2021 1024  Last data filed at 4/4/2021 1021  Gross per 24 hour   Intake 1203.42 ml   Output 7050 ml   Net -5846.58 ml       Physical Exam Performed:    BP (!) 96/57   Pulse 71   Temp 97.7 °F (36.5 °C)   Resp 16   Ht 5' 1\" (1.549 m)   Wt 189 lb 1.6 oz (85.8 kg)   SpO2 95%   BMI 35.73 kg/m²       General appearance: No apparent distress, appears stated age and cooperative. HEENT: Pupils equal, round, and reactive to light. Conjunctivae/corneas clear. Neck: Supple, with full range of motion. No jugular venous distention. Trachea midline. Respiratory:  Normal respiratory effort. Clear to auscultation, bilaterally without Rales/Wheezes/Rhonchi. Distant lungs. Cardiovascular: Regular rate and rhythm with normal S1/S2 without murmurs, rubs or gallops. Abdomen: Soft, non-tender, non-distended with normal bowel sounds. Musculoskeletal: No clubbing, cyanosis. Now only 1+ BLE pitting edema. Full range of motion without deformity. Skin: Skin color, texture, turgor normal.  No rashes or lesions. Neurologic:  Neurovascularly intact without any focal sensory/motor deficits. Cranial nerves: II-XII intact, grossly non-focal.  Psychiatric: Alert and oriented, thought content appropriate, normal insight. Capillary Refill: Brisk,< 3 seconds   Peripheral Pulses: +2 palpable, equal bilaterally       Labs:   No results for input(s): WBC, HGB, HCT, PLT in the last 72 hours. Recent Labs     04/02/21  0759 04/03/21  0810 04/04/21  0751    138 138   K 3.3* 3.8 4.4   CL 98* 98* 99   CO2 33* 31 28   BUN 14 19 14   CREATININE 0.6 0.6 <0.5*   CALCIUM 9.1 9.1 9.2     No results for input(s): AST, ALT, BILIDIR, BILITOT, ALKPHOS in the last 72 hours. No results for input(s): INR in the last 72 hours. No results for input(s): Leveda Rhymes in the last 72 hours.     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 Will likely change to PO today  - She had a standing weight of 194 lbs at the time of discharge in 11/2020.  - started metoprolol, entresto, aldactone  - has AICD     Chronic moderate pericardial effusion  - Conservative management. DMII with hypoglycemia  - well controlled  - continue SSI. Discontinued lantus  - hypoglycemia protocol    HLD  - continue home statin    History of PE  - continue Eliquis    Hx of atrial tachycardia  - continue amiodarone    Depression  - mood stable  - continue home Cymbalta and Seroquel    H/O CVA   - continue ASA, statin    DVT Prophylaxis: anticoagulation as above  Diet: DIET CARB CONTROL; Low Sodium (2 GM); Daily Fluid Restriction: 1500 ml  Code Status: DNR-CCA    PT/OT Eval Status: rec'd SNF    Dispo -  Possibly back to SNF tomorrow.       Maggi Sosa MD

## 2021-04-04 NOTE — PROGRESS NOTES
tablet 40 mg  40 mg Oral Nightly Yung Miller MD   40 mg at 04/03/21 2153    digoxin (LANOXIN) tablet 125 mcg  125 mcg Oral Every Other Day Yung Miller MD   125 mcg at 04/04/21 0939    DULoxetine (CYMBALTA) extended release capsule 30 mg  30 mg Oral Daily Yung Miller MD   30 mg at 04/04/21 0938    fluticasone (FLONASE) 50 MCG/ACT nasal spray 1 spray  1 spray Each Nostril Daily Yung Miller MD   1 spray at 04/04/21 0940    pantoprazole (PROTONIX) tablet 40 mg  40 mg Oral Daily Yung Miller MD   40 mg at 04/04/21 0938    QUEtiapine (SEROQUEL XR) extended release tablet 50 mg  50 mg Oral Nightly Yung Miller MD   50 mg at 04/03/21 2152    glucose (GLUTOSE) 40 % oral gel 15 g  15 g Oral PRN Yung Miller MD        dextrose 50 % IV solution  12.5 g Intravenous PRN Yung Miller MD        glucagon (rDNA) injection 1 mg  1 mg Intramuscular PRN Yung Miller MD        dextrose 5 % solution  100 mL/hr Intravenous PRN Yung Miller MD        insulin lispro (HUMALOG) injection vial 0-6 Units  0-6 Units Subcutaneous TID WC Yung Miller MD   3 Units at 04/02/21 1747    insulin lispro (HUMALOG) injection vial 0-3 Units  0-3 Units Subcutaneous Nightly Yung Miller MD   2 Units at 04/02/21 2143    sodium chloride flush 0.9 % injection 10 mL  10 mL Intravenous 2 times per day Yung Miller MD   10 mL at 04/02/21 2152    sodium chloride flush 0.9 % injection 10 mL  10 mL Intravenous PRN Yung Miller MD        polyethylene glycol (GLYCOLAX) packet 17 g  17 g Oral Daily PRN Yung Miller MD   17 g at 03/31/21 1103    acetaminophen (TYLENOL) tablet 650 mg  650 mg Oral Q6H PRN Yung Miller MD   650 mg at 03/27/21 2227    Or    acetaminophen (TYLENOL) suppository 650 mg  650 mg Rectal Q6H PRN Yung Miller MD        perflutren lipid microspheres (DEFINITY) injection 1.65 mg  1.5 mL Intravenous ONCE PRN Jocelyn Dasilva MD        miconazole (MICOTIN) 2 % powder   Topical BID Jocelyn Dasilva MD   Given at 04/04/21 0941    furosemide (LASIX) 100 mg in dextrose 5 % 100 mL infusion  5 mg/hr Intravenous Continuous Jerrod Haddox, DO 5 mL/hr at 04/03/21 0406 5 mg/hr at 04/03/21 0406    prochlorperazine (COMPAZINE) injection 10 mg  10 mg Intravenous Q6H PRN Fred Platt MD        spironolactone (ALDACTONE) tablet 12.5 mg  12.5 mg Oral Daily Jerrod Haddox, DO   12.5 mg at 04/04/21 0940    magnesium sulfate 1000 mg in dextrose 5% 100 mL IVPB  1,000 mg Intravenous PRN Fred Platt MD   Stopped at 03/26/21 0225       Objective:     Telemetry monitor: sinus    Physical Exam:  Constitutional and general appearance: fatigued, no distress and appears older than stated age  HEENT: PERRL, no cervical lymphadenopathy. No masses palpable. Normal oral mucosa  Respiratory:  · Normal excursion and expansion without use of accessory muscles  · Resp auscultation: Clear but diminished breath sounds without wheezing, rhonchi, and rales  Cardiovascular:  · The apical impulse is not displaced  · Heart tones are crisp and normal. regular S1 and S2.  · Jugular venous pulsation Normal  · The carotid upstroke is normal in amplitude and contour without delay or bruit  · Peripheral pulses are symmetrical and full   Abdomen:  · No masses or tenderness  · Bowel sounds present  Extremities:  ·  No cyanosis or clubbing  ·  1+ lower extremity edema  ·  Skin: warm and dry  Neurological:  · + fatigued   · Moves all extremities well  · + flat affect     Data  Echo 3/25/2021:   Limited for pericardial effusion. There is a moderate posterior pericardial effusion noted ( 1.5cm). No   significant anterior effusion. No tamponade. Severly impaired systolic   function of left ventricle. No significant changes in size of effusion since   11.25.20.     Echo 11/25/2020:   Summary   Left ventricular systolic function is reduced with ejection fraction estimated at 10-15 %. There is severe global hypokinesis. Left ventricular size is moderately increased. There is mild concentric left ventricular hypertrophy. There is a left ventricular apical thrombus present. There is a small circumferential pericardial effusion noted with tamponade physiology only by doppler flow but not by other criteria. Limited echo 10/6/2020:  Limited only f/u for LVEF. The left ventricular systolic function is severely reduced with an ejection fraction of 15-20 %. There is severe global hypokinesis with regional variations. Changes noted from previous echo on 6- in left ventricular function.     Stress test 10/5/2020:  Dilated LV with severe global hypokinesis. Large, severe, fixed perfusion     defect of the inferior/inferolateral wall consistent with scar. Diminished     uptake of the anterior wall consistent with breast artifact. Post stress     LVEF is abnormal at 19%. Abnormal study. Overall findings represent a high     risk scan.       [facility] 10/6/2020 Shweta Reyes):  1. Mild non-obstructive coronary artery disease. 2.  Moderate pulmonary hypertension  3.  Decompensated cardiac hemodynamics     [facility] 12/12/2018 (Joyce):  LM <20%  LAD 20-30%  Cx 20-20%  RCA 20-30%              RPDA 50%. FFR 0.89  LVEF 45%     ASA, statin, BBlk, ACE  Stop plavix  OK discharge      All labs and testing reviewed.   Lab Review     Renal Profile:   Lab Results   Component Value Date    CREATININE <0.5 04/04/2021    BUN 14 04/04/2021     04/04/2021    K 4.4 04/04/2021    K 3.8 03/25/2021    CL 99 04/04/2021    CO2 28 04/04/2021     CBC:    Lab Results   Component Value Date    WBC 6.4 03/26/2021    RBC 4.00 03/26/2021    HGB 11.1 03/26/2021    HCT 34.4 03/26/2021    MCV 86.0 03/26/2021    RDW 18.2 03/26/2021     03/26/2021     BNP:  No results found for: BNP  Fasting Lipid Panel:    Lab Results   Component Value Date    CHOL 166 10/12/2019    HDL 51 10/12/2019    TRIG 142 10/12/2019     Cardiac Enzymes:  CK/MbTroponin  Lab Results   Component Value Date    CKTOTAL 54 06/12/2020    TROPONINI <0.01 03/25/2021     PT/ INR   Lab Results   Component Value Date    INR 1.29 11/23/2020    INR 1.02 08/31/2020    INR 1.01 08/12/2020    PROTIME 15.0 11/23/2020    PROTIME 11.8 08/31/2020    PROTIME 11.7 08/12/2020     PTT No results found for: PTT   Lab Results   Component Value Date    MG 1.80 04/02/2021      Lab Results   Component Value Date    TSH 0.28 09/19/2020       Assessment:  Acute on chronic systolic CHF: improving               -adm weight 243 lbs (189 today, appears inaccurate)              -net -30L this admission   Pericardial effusion: stable   HTN: controlled    SVT: currently stable                -noted on device check  NSVT: stable  Ischemic cardiomyopathy              -s/p dual chamber ICD 2015 ([employer])  CAD   -s/p PCI to RCA 12/2018 (patent on [facility] 10/2020)  Pulmonary HTN: moderate per [facility] 10/2020  History of LV thrombus   Chronic chest pain  DM  History of PE  History of CVA  Cognitive impairment     Plan:   1. Continue amiodarone, Eliquis (PE), ASA, statin, digoxin, Toprol, spironolactone, and Entresto   2. Stop Lasix gtt and start Lasix 40mg po BID. Will likely transition to po 4/4/2021.  Dry weight is essentially unclear but she was 205 lbs in office 1/2021.  3. Strict I&O, daily weight, fluid and sodium restriction     Dispo: possibly 4/5 from cardiac standpoint     Salazar Leal, [address]  [phone]

## 2021-04-04 NOTE — PLAN OF CARE
Problem: HEMODYNAMIC STATUS  Goal: Patient has stable vital signs and fluid balance  Outcome: Ongoing     Patient's EF (Ejection Fraction) is less than 40%    Heart Failure Medications:  Diuretics[de-identified] Furosemide and Spironolactone    (One of the following REQUIRED for EF <40%/SYSTOLIC FAILURE but MAY be used in EF% >40%/DIASTOLIC FAILURE)        ACE[de-identified] None        ARB[de-identified] None         ARNI[de-identified] None    (Beta Blockers)  NON- Evidenced Based Beta Blocker (for EF% >40%/DIASTOLIC FAILURE): None    Evidenced Based Beta Blocker::(REQUIRED for EF% <40%/SYSTOLIC FAILURE) Metoprolol SUCCinate- Toprol XL  . .................................................................................................................................................. Patient's weights and intake/output reviewed: Yes    Patient's Last Weight: 189 lbs obtained by standing scale. Difference of 9 lbs less than last documented weight. 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 >>For CHF and Comorbidity documentation on Education Time and Topics, please see Education Tab    Progressive Mobility Assessment:  What is this patient's Current Level of Mobility?: Ambulatory- with Assistance  How was this patient Mobilized today?: Patient Refuses to Mobilize                 With Whom? Nurse and PCA                 Level of Difficulty/Assistance: 1x Assist     Pt resting in bed at this time on room air.  Pt denies shortness of

## 2021-04-04 NOTE — PROGRESS NOTES
End of shift report given to Heidi Donato RN at bedside. Patient alert and oriented. Bed in lowest position with wheels locked. Call light within reach. No further needs at this time.

## 2021-04-05 VITALS
OXYGEN SATURATION: 97 % | BODY MASS INDEX: 35.25 KG/M2 | HEART RATE: 70 BPM | SYSTOLIC BLOOD PRESSURE: 106 MMHG | RESPIRATION RATE: 12 BRPM | TEMPERATURE: 98 F | DIASTOLIC BLOOD PRESSURE: 69 MMHG | HEIGHT: 61 IN | WEIGHT: 186.7 LBS

## 2021-04-05 LAB
ANION GAP SERPL CALCULATED.3IONS-SCNC: 7 MMOL/L (ref 3–16)
BUN BLDV-MCNC: 14 MG/DL (ref 7–20)
CALCIUM SERPL-MCNC: 9 MG/DL (ref 8.3–10.6)
CHLORIDE BLD-SCNC: 99 MMOL/L (ref 99–110)
CO2: 27 MMOL/L (ref 21–32)
CREAT SERPL-MCNC: 0.6 MG/DL (ref 0.6–1.1)
GFR AFRICAN AMERICAN: >60
GFR NON-AFRICAN AMERICAN: >60
GLUCOSE BLD-MCNC: 117 MG/DL (ref 70–99)
GLUCOSE BLD-MCNC: 140 MG/DL (ref 70–99)
GLUCOSE BLD-MCNC: 206 MG/DL (ref 70–99)
PERFORMED ON: ABNORMAL
PERFORMED ON: ABNORMAL
POTASSIUM SERPL-SCNC: 3.7 MMOL/L (ref 3.5–5.1)
SARS-COV-2, NAAT: NOT DETECTED
SODIUM BLD-SCNC: 133 MMOL/L (ref 136–145)

## 2021-04-05 PROCEDURE — 87635 SARS-COV-2 COVID-19 AMP PRB: CPT

## 2021-04-05 PROCEDURE — 6370000000 HC RX 637 (ALT 250 FOR IP): Performed by: INTERNAL MEDICINE

## 2021-04-05 PROCEDURE — 36415 COLL VENOUS BLD VENIPUNCTURE: CPT

## 2021-04-05 PROCEDURE — 97116 GAIT TRAINING THERAPY: CPT

## 2021-04-05 PROCEDURE — 6370000000 HC RX 637 (ALT 250 FOR IP): Performed by: NURSE PRACTITIONER

## 2021-04-05 PROCEDURE — 2580000003 HC RX 258: Performed by: INTERNAL MEDICINE

## 2021-04-05 PROCEDURE — 99232 SBSQ HOSP IP/OBS MODERATE 35: CPT | Performed by: NURSE PRACTITIONER

## 2021-04-05 PROCEDURE — 97110 THERAPEUTIC EXERCISES: CPT

## 2021-04-05 PROCEDURE — 80048 BASIC METABOLIC PNL TOTAL CA: CPT

## 2021-04-05 RX ORDER — METOPROLOL SUCCINATE 25 MG/1
25 TABLET, EXTENDED RELEASE ORAL DAILY
Qty: 30 TABLET | Refills: 3
Start: 2021-04-06

## 2021-04-05 RX ORDER — FUROSEMIDE 40 MG/1
40 TABLET ORAL 2 TIMES DAILY
Qty: 60 TABLET | Refills: 3
Start: 2021-04-05

## 2021-04-05 RX ORDER — SPIRONOLACTONE 25 MG/1
12.5 TABLET ORAL DAILY
Qty: 30 TABLET | Refills: 3
Start: 2021-04-06

## 2021-04-05 RX ADMIN — FLUTICASONE PROPIONATE 1 SPRAY: 50 SPRAY, METERED NASAL at 08:29

## 2021-04-05 RX ADMIN — POTASSIUM CHLORIDE 10 MEQ: 750 TABLET, EXTENDED RELEASE ORAL at 08:28

## 2021-04-05 RX ADMIN — Medication 10 ML: at 08:29

## 2021-04-05 RX ADMIN — APIXABAN 5 MG: 5 TABLET, FILM COATED ORAL at 08:28

## 2021-04-05 RX ADMIN — AMIODARONE HYDROCHLORIDE 200 MG: 200 TABLET ORAL at 08:29

## 2021-04-05 RX ADMIN — INSULIN LISPRO 2 UNITS: 100 INJECTION, SOLUTION INTRAVENOUS; SUBCUTANEOUS at 12:51

## 2021-04-05 RX ADMIN — MAGNESIUM GLUCONATE 500 MG ORAL TABLET 400 MG: 500 TABLET ORAL at 08:28

## 2021-04-05 RX ADMIN — ASPIRIN 81 MG: 81 TABLET, COATED ORAL at 08:28

## 2021-04-05 RX ADMIN — PANTOPRAZOLE SODIUM 40 MG: 40 TABLET, DELAYED RELEASE ORAL at 08:28

## 2021-04-05 RX ADMIN — MICONAZOLE NITRATE: 2 POWDER TOPICAL at 08:29

## 2021-04-05 RX ADMIN — SACUBITRIL AND VALSARTAN 1 TABLET: 24; 26 TABLET, FILM COATED ORAL at 08:28

## 2021-04-05 RX ADMIN — FUROSEMIDE 40 MG: 40 TABLET ORAL at 08:28

## 2021-04-05 RX ADMIN — DULOXETINE HYDROCHLORIDE 30 MG: 30 CAPSULE, DELAYED RELEASE ORAL at 08:28

## 2021-04-05 RX ADMIN — SPIRONOLACTONE 12.5 MG: 25 TABLET ORAL at 08:28

## 2021-04-05 ASSESSMENT — ENCOUNTER SYMPTOMS
RESPIRATORY NEGATIVE: 1
GASTROINTESTINAL NEGATIVE: 1

## 2021-04-05 ASSESSMENT — PAIN SCALES - GENERAL
PAINLEVEL_OUTOF10: 0
PAINLEVEL_OUTOF10: 0

## 2021-04-05 NOTE — PROGRESS NOTES
past surgical history that includes Pacemaker insertion; tumor removal; Tubal ligation; back surgery; Upper gastrointestinal endoscopy (N/A, 11/5/2020); IR MIDLINE CATH (11/23/2020); and IR MIDLINE CATH (12/11/2020). Restrictions  Restrictions/Precautions  Restrictions/Precautions: General Precautions, Fall Risk  Position Activity Restriction  Other position/activity restrictions: Up as tolerated, tinoco catheter  Subjective   General  Chart Reviewed: Yes  Response To Previous Treatment: Patient with no complaints from previous session. Family / Caregiver Present: No  Referring Practitioner: Gato Rausch MD  Subjective  Subjective: Pt lying in bed upon arrival, finishing with morning meds, agreeable to PT tx session. General Comment  Comments: cleared by nursing  Pain Screening  Patient Currently in Pain: Denies  Vital Signs  Pulse: 79  BP: (!) 104/51  BP Location: Left Arm  Patient Position: Sitting  Patient Currently in Pain: Denies          Objective   Bed mobility  Supine to Sit: Supervision(HOB slightly elevated)  Sit to Supine: Unable to assess  Transfers  Sit to Stand: Supervision  Stand to sit: Supervision  Ambulation  Ambulation?: Yes  Ambulation 1  Surface: level tile  Device: No Device  Assistance: Contact guard assistance;Stand by assistance  Quality of Gait: Pt ambulates with decreased step length and clearance bilaterally, L lateral lean with intermittent reaching out for items on that side for balance, minimal arm swing, mild unsteady requiring CGA progressing to SBA.   Gait Deviations: Slow Ana  Distance: 250ft, 200ft     Balance  Posture: Good  Sitting - Static: Good  Sitting - Dynamic: Good  Standing - Static: Good  Standing - Dynamic: Fair  Exercises  Hip Flexion: Warmup x 15 B  Knee Long Arc Quad: Warmup x 15 B  Ankle Pumps: Warmup x 15 B  Other exercises  Other exercises?: Yes  Other exercises 1: Standing target taps x 10 each LE working on increasing step length and balance  Other exercises 2: Standing target taps up to 4in surface x10 each LE working on increasing stance phase control and balance - required HHA     AM-PAC Score  AM-PAC Inpatient Mobility Raw Score : 18 (04/05/21 1417)  AM-PAC Inpatient T-Scale Score : 43.63 (04/05/21 1417)  Mobility Inpatient CMS 0-100% Score: 46.58 (04/05/21 1417)  Mobility Inpatient CMS G-Code Modifier : CK (04/05/21 1417)          Goals  Short term goals  Time Frame for Short term goals: 4/3. Updated to 4/12 given prolonged hospital stay. Short term goal 1: Pt will complete all transfers with supervision- 3/31 met  Short term goal 2: Pt will ambulate 48' with LRAD with supervision- 4/05: 250ft with no AD and CGA-SBA  Short term goal 3: Pt will complete B LE exercises to improve functional mobility by 3/30--Goal Met 3/29  Patient Goals   Patient goals : to get stronger    Plan    Plan  Times per week: 3-5x/week  Current Treatment Recommendations: Strengthening, Balance Training, Endurance Training, Functional Mobility Training, Transfer Training, Gait Training, Home Exercise Program, Patient/Caregiver Education & Training, Equipment Evaluation, Education, & procurement  Safety Devices  Type of devices: All fall risk precautions in place, Call light within reach, Gait belt, Chair alarm in place, Left in chair, Patient at risk for falls, Nurse notified  Restraints  Initially in place: No     Therapy Time   Individual Concurrent Group Co-treatment   Time In 0833         Time Out 0905         Minutes 32         Timed Code Treatment Minutes: [address], PT       If pt is unable to be seen after this session, please let this note serve as discharge summary. Please see case management note for discharge disposition. Thank you.

## 2021-04-05 NOTE — PROGRESS NOTES
Dooley removed per MD.  Christo Infield removed from balloon.   Dooley removed without compllication

## 2021-04-05 NOTE — PROGRESS NOTES
Hospitalist Progress Note      PCP: FRITZ Bejarano - NP    Date of Admission: 3/25/2021    Chief Complaint: dyspnea, edema     Hospital Course:   Patient with PMH of DM 2, HTN, chronic CHF, EF <20%, moderate pulmonary hypertension, Hx of PE, depression presented to [facility] ED with complaints of worsening shortness of breath. Patient reported she has gained about 50 pounds in the last month all fluid. She reported chest tightness with ambulation. Patient reports she is no longer in hospice and has recently moved in with her family in [place]. She was evaluated by Dr. Xavier Ruano at the [address] [place] [address] clinic, was noted to be fluid overloaded and was asked to increase her Lasix dose to twice daily. She reported 3 pillow orthopnea, no PND. She was admitted for acute CHF. Cardiology was consulted. Subjective:  Continues to have good diuresis. No new complaints.       Medications:  Reviewed    Infusion Medications    dextrose       Scheduled Medications    furosemide  40 mg Oral BID    sacubitril-valsartan  1 tablet Oral BID    magnesium oxide  400 mg Oral Daily    potassium chloride  10 mEq Oral BID    metoprolol succinate  25 mg Oral Daily    amiodarone  200 mg Oral Daily    aspirin  81 mg Oral Daily    apixaban  5 mg Oral BID    atorvastatin  40 mg Oral Nightly    digoxin  125 mcg Oral Every Other Day    DULoxetine  30 mg Oral Daily    fluticasone  1 spray Each Nostril Daily    pantoprazole  40 mg Oral Daily    QUEtiapine  50 mg Oral Nightly    insulin lispro  0-6 Units Subcutaneous TID WC    insulin lispro  0-3 Units Subcutaneous Nightly    sodium chloride flush  10 mL Intravenous 2 times per day    miconazole   Topical BID    spironolactone  12.5 mg Oral Daily     PRN Meds: glucose, dextrose, glucagon (rDNA), dextrose, sodium chloride flush, polyethylene glycol, acetaminophen **OR** acetaminophen, perflutren lipid microspheres, prochlorperazine, magnesium 02/02/2021    BACTERIA Rare 02/02/2021    RBCUA 0-2 02/02/2021    BLOODU Negative 02/02/2021    SPECGRAV 1.010 02/02/2021    GLUCOSEU 250 02/02/2021       Radiology:  No orders to display           Assessment/Plan:    Active Hospital Problems    Diagnosis    Anasarca [R60.1]    Pericardial effusion [I31.3]    Acute on chronic systolic [facility]congestive heart failure) [facility] [I50.23]    Dual ICD (implantable cardioverter-defibrillator) in place [Z95.810]    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 Cardio assisting. Started metoprolol, entresto, aldactone. Was furosemide gtt later switched to PO.   - has AICD     Chronic moderate pericardial effusion  - Conservative management. DMII with hypoglycemia  - well controlled  - continue SSI. Discontinued lantus  - hypoglycemia protocol    HLD  - continue home statin    History of PE  - continue Eliquis    Hx of atrial tachycardia  - continue amiodarone    Depression  - mood stable  - continue home Cymbalta and Seroquel    H/O CVA   - continue ASA, statin    DVT Prophylaxis: anticoagulation as above  Diet: DIET CARB CONTROL; Low Sodium (2 GM); Daily Fluid Restriction: 1500 ml  Code Status: DNR-CCA    PT/OT Eval Status: rec'd SNF    Dispo -  Possibly back to SNF tomorrow.       Flash Mario MD

## 2021-04-05 NOTE — PLAN OF CARE
Problem: HEMODYNAMIC STATUS  Goal: Patient has stable vital signs and fluid balance  Outcome: Ongoing     Patient's EF (Ejection Fraction) is less than 40%    Heart Failure Medications:  Diuretics[de-identified] Furosemide and Spironolactone    (One of the following REQUIRED for EF <40%/SYSTOLIC FAILURE but MAY be used in EF% >40%/DIASTOLIC FAILURE)        ACE[de-identified] None        ARB[de-identified] None         ARNI[de-identified] None    (Beta Blockers)  NON- Evidenced Based Beta Blocker (for EF% >40%/DIASTOLIC FAILURE): None    Evidenced Based Beta Blocker::(REQUIRED for EF% <40%/SYSTOLIC FAILURE) Metoprolol SUCCinate- Toprol XL  . .................................................................................................................................................. Patient's weights and intake/output reviewed: No    Patient's Last Weight: 186 lbs obtained by standing scale. Difference of 3 lbs less than last documented weight. 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 >>For CHF and Comorbidity documentation on Education Time and Topics, please see Education Tab    Progressive Mobility Assessment:  What is this patient's Current Level of Mobility?: Ambulatory- with Assistance  How was this patient Mobilized today?: Edge of Bed, Up to Chair, and Up in Hallway                 With Whom? Nurse, PCA, PT, and OT                 Level of Difficulty/Assistance: 1x Assist     Pt resting in bed at this time on room air.  Pt denies shortness of breath. Pt with nonpitting lower extremity edema.      Patient and/or Family's stated Goal of Care this Admission: increase activity tolerance, better understand heart failure and disease management, be more comfortable, and reduce lower extremity edema prior to discharge        :

## 2021-04-05 NOTE — PROGRESS NOTES
Occupational Therapy  Facility/Department: [facility] A2 CARD TELEMETRY  Daily Treatment Note  NAME: Mindy Pham  : 1960  MRN: [id number]    Date of Service: 2021    Discharge Recommendations:  Subacute/Skilled Nursing Facility       Assessment      Activity Tolerance  Activity Tolerance: Patient limited by fatigue  Safety Devices  Safety Devices in place: Yes  Type of devices: Call light within reach; Chair alarm in place; Left in chair         Patient Diagnosis(es): There were no encounter diagnoses. has a past medical history of Arthritis, CAD (coronary artery disease), Cerebral artery occlusion with cerebral infarction ([facility] [address] CHF (congestive heart failure) ([facility] [address] COVID-19, Diabetes mellitus ([facility] [address] ESBL (extended spectrum beta-lactamase) producing bacteria infection, Hyperlipidemia, Hypertension, Mental retardation, MI (myocardial infarction) ([facility] [address] and Pacemaker. has a past surgical history that includes Pacemaker insertion; tumor removal; Tubal ligation; back surgery; Upper gastrointestinal endoscopy (N/A, 2020); IR MIDLINE CATH (2020); and IR MIDLINE CATH (2020). Restrictions  Restrictions/Precautions  Restrictions/Precautions: General Precautions, Fall Risk  Position Activity Restriction  Other position/activity restrictions: Up as tolerated, tinoco catheter  Subjective   General  Chart Reviewed: Yes  Patient assessed for rehabilitation services?: Yes  Response to previous treatment: Patient with no complaints from previous session  Family / Caregiver Present: No  Referring Practitioner: Dr. Rubi Park  Diagnosis: anasarca  Subjective  Subjective: Pt sitting up in chair, agreeable to OT treatment.   General Comment  Comments: RN cleared pt for OT; pt up in chair agreeable to therapy  Vital Signs  Patient Currently in Pain: Denies   Orientation     Objective    ADL  Grooming: Setup(in chair to comb hair & wash hands & face;declined to walk to bathroom for ADL's)           Bed CASIMIRO [facility], [place]

## 2021-04-05 NOTE — CARE COORDINATION
CM faxed LOC approval to [facility]. Per [facility]ney at [address] it is approved for short term skilled stay.      Ml Beckford RN

## 2021-04-05 NOTE — PROGRESS NOTES
[facility]  Cardiology  Progress Note    Admission date:  3/25/2021    Reason for follow up visit: CHF    HPI/CC: Russella Dancer is a 62 y.o. female who was admitted 3/25/2021 from [facility] for CHF and pericardial effusion. Echo showed moderate posterior pericardial effusion. Treated for CHF. Rhythm has been sinus. Subjective: Feels ok today. Chest pain and shortness of breath improved. Shana Moat to go home. Vitals:  Blood pressure 106/69, pulse 70, temperature 98 °F (36.7 °C), temperature source Oral, resp. rate 12, height 5' 1\" (1.549 m), weight 186 lb 11.2 oz (84.7 kg), SpO2 97 %, not currently breastfeeding.   Temp  Av °F (36.7 °C)  Min: 97.5 °F (36.4 °C)  Max: 98.4 °F (36.9 °C)  Pulse  Av.7  Min: 66  Max: 79  BP  Min: 94/56  Max: 120/56  SpO2  Av.5 %  Min: 96 %  Max: 97 %    24 hour I/O    Intake/Output Summary (Last 24 hours) at 2021 1244  Last data filed at 2021 0838  Gross per 24 hour   Intake 1400 ml   Output 3875 ml   Net -2475 ml     Current Facility-Administered Medications   Medication Dose Route Frequency Provider Last Rate Last Admin    furosemide (LASIX) tablet 40 mg  40 mg Oral BID FRITZ Lugo - CNP   40 mg at 21 0828    sacubitril-valsartan (ENTRESTO) 24-26 MG per tablet 1 tablet  1 tablet Oral BID Jerrod Acosta, DO   1 tablet at 21 0828    magnesium oxide (MAG-OX) tablet 400 mg  400 mg Oral Daily FRITZ Gillespie CNP   400 mg at 21 7873    potassium chloride (KLOR-CON M) extended release tablet 10 mEq  10 mEq Oral BID FRITZ Gillespie - CNP   10 mEq at 21 2688    metoprolol succinate (TOPROL XL) extended release tablet 25 mg  25 mg Oral Daily FRITZ Yeh CNP   25 mg at 21 1154    amiodarone (CORDARONE) tablet 200 mg  200 mg Oral Daily Rogelio Tierney MD   200 mg at 21 0829    aspirin EC tablet 81 mg  81 mg Oral Daily Rogelio Tierney MD   81 mg at 21 7205    apixaban (ELIQUIS) tablet 5 mg  5 mg Oral BID José Manuel Benavides MD   5 mg at 04/05/21 0828    atorvastatin (LIPITOR) tablet 40 mg  40 mg Oral Nightly José Manuel Benavides MD   40 mg at 04/04/21 2046    digoxin (LANOXIN) tablet 125 mcg  125 mcg Oral Every Other Day José Manuel Benavides MD   125 mcg at 04/04/21 0939    DULoxetine (CYMBALTA) extended release capsule 30 mg  30 mg Oral Daily José Manuel Benavides MD   30 mg at 04/05/21 0828    fluticasone (FLONASE) 50 MCG/ACT nasal spray 1 spray  1 spray Each Nostril Daily José Manuel Benavides MD   1 spray at 04/05/21 0829    pantoprazole (PROTONIX) tablet 40 mg  40 mg Oral Daily José Manuel Benavides MD   40 mg at 04/05/21 0828    QUEtiapine (SEROQUEL XR) extended release tablet 50 mg  50 mg Oral Nightly José Manuel Benavides MD   50 mg at 04/04/21 2046    glucose (GLUTOSE) 40 % oral gel 15 g  15 g Oral PRN José Manuel Benavides MD        dextrose 50 % IV solution  12.5 g Intravenous PRN José Manuel Benavides MD        glucagon (rDNA) injection 1 mg  1 mg Intramuscular PRN José Manuel Benavides MD        dextrose 5 % solution  100 mL/hr Intravenous PRN José Manuel Benavides MD        insulin lispro (HUMALOG) injection vial 0-6 Units  0-6 Units Subcutaneous TID WC José Manuel Benavides MD   3 Units at 04/02/21 1747    insulin lispro (HUMALOG) injection vial 0-3 Units  0-3 Units Subcutaneous Nightly José Manuel Benavides MD   1 Units at 04/04/21 2047    sodium chloride flush 0.9 % injection 10 mL  10 mL Intravenous 2 times per day José Manuel Benavides MD   10 mL at 04/05/21 0829    sodium chloride flush 0.9 % injection 10 mL  10 mL Intravenous PRN José Manuel Benavides MD        polyethylene glycol (GLYCOLAX) packet 17 g  17 g Oral Daily PRN José Manuel Benavides MD   17 g at 03/31/21 1103    acetaminophen (TYLENOL) tablet 650 mg  650 mg Oral Q6H PRN José Manuel Benavides MD   650 mg at 03/27/21 2227    Or    acetaminophen (TYLENOL) suppository 650 mg 650 mg Rectal Q6H PRN Miller Andujar MD        perflutren lipid microspheres (DEFINITY) injection 1.65 mg  1.5 mL Intravenous ONCE PRN Miller Andujar MD        miconazole (MICOTIN) 2 % powder   Topical BID Miller Andujar MD   Given at 04/05/21 0829    prochlorperazine (COMPAZINE) injection 10 mg  10 mg Intravenous Q6H PRN Leopold Pump, MD        spironolactone (ALDACTONE) tablet 12.5 mg  12.5 mg Oral Daily Jerrod Haddox, DO   12.5 mg at 04/05/21 7401    magnesium sulfate 1000 mg in dextrose 5% 100 mL IVPB  1,000 mg Intravenous PRN Leopold Pump, MD   Stopped at 03/26/21 0225     Review of Systems   Constitutional: Negative. Respiratory: Negative. Cardiovascular: Negative. Gastrointestinal: Negative. Neurological: Negative. Objective:     Telemetry monitor: SR    Physical Exam:  Constitutional:  Comfortable and alert, NAD, appears older than stated age  Eyes: PERRL, sclera nonicteric  Neck:  Supple, no masses, no thyroidmegaly, JVP 6  Skin:  Warm and dry; no rash or lesions  Heart: Regular, normal apex, S1 and S2 normal, no M/G/R  Lungs:  Normal respiratory effort; clear; no wheezing/rhonchi/rales  Abdomen: soft, non tender, + bowel sounds  Extremities:  No edema or cyanosis; no clubbing  Neuro: alert and oriented, moves legs and arms equally, normal mood and affect    Data Reviewed:    Echo 3/25/2021:  Limited for pericardial effusion. There is a moderate posterior pericardial effusion noted ( 1.5cm). No   significant anterior effusion. No tamponade. Severly impaired systolic   function of left ventricle. No significant changes in size of effusion since   11.25.20. Echo 11/17/2020:  STAT limited echo for pericardial effusion. Left ventricular systolic function is severely reduced with a visually   estimated ejection fraction of <20 %. The left ventricle is mildly dilated in size with normal wall thickness.    There is severe global hypokinesis with regional variation. Grade III diastolic dysfunction with elevated LV pressure. The right ventricle is mildly dilated with mildly reduced systolic function. Biatrial enlargement. Moderate sized primarily posterior pericardial effusion. There is no echocardiographic evidence of tamponade physiology. Moderate tricuspid regurgitation. Systolic pulmonary artery pressure (SPAP) is elevated and estimated at 57   mmHg (right atrial pressure 15 mmHg) consistent with moderate pulmonary   hypertension. Coronary angiogram 10/6/2020:  1. Left heart catheterization  2. Selective left and right coronary angiogram  3. Right heart catheterization  Procedure Findings:  1. Mild non-obstructive coronary artery disease. 2.  Moderate pulmonary hypertension  3. Decompensated cardiac hemodynamics  Findings:  1. Left main coronary artery was normal. It gave off the left anterior descending artery and left circumflex. 2. Left anterior descending artery has mild atherosclerotic disease. It was moderate in size. It gave off septal perforators and a moderate sized diagonal branch. The LAD covered the entire apex of the left ventricle. 3. Left circumflex has mild atherosclerotic disease. It was moderate in size. There was a moderate sized obtuse marginal branch. 4. Right coronary artery has mild atherosclerotic disease. It was moderate in size and was the dominant artery. 5. Left ventriculogram was not performed. Left ventricular end diastolic l pressure was 26. There is no gradient across pullback of the aortic valve. Right heart catheterization findings:  Right atrial pressure of 20  RV 45/7  PA 53/8  Pulmonary wedge pressure mean of 20  RA saturation 42%  PA saturation 45%  Aortic saturation 99%  Cardiac output 2.4  Cardiac index 1.24  SVR 2433    CONCLUSIONS:  1. Mild non-obstructive coronary artery disease with known severe left ventricular dysfunction  2.   Moderate pulmonary hypertension with decompensated hemodynamics  ASSESSMENT/RECOMMENDATIONS:  1. Risk Factor control  2. Advanced heart failure management. Stress test 10/5/2020:  Dilated LV with severe global hypokinesis. Large, severe, fixed perfusion     defect of the inferior/inferolateral wall consistent with scar. Diminished     uptake of the anterior wall consistent with breast artifact. Post stress     LVEF is abnormal at 19%. Abnormal study. Overall findings represent a high     risk scan.       Lab Reviewed:     Renal Profile:  Lab Results   Component Value Date    CREATININE 0.6 04/05/2021    BUN 14 04/05/2021     04/05/2021    K 3.7 04/05/2021    K 3.8 03/25/2021    CL 99 04/05/2021    CO2 27 04/05/2021     CBC:    Lab Results   Component Value Date    WBC 6.4 03/26/2021    RBC 4.00 03/26/2021    HGB 11.1 03/26/2021    HCT 34.4 03/26/2021    MCV 86.0 03/26/2021    RDW 18.2 03/26/2021     03/26/2021     BNP:    Lab Results   Component Value Date    PROBNP 1,363 03/25/2021    PROBNP 2,609 02/02/2021    PROBNP 4,997 12/09/2020    PROBNP 2,699 12/06/2020    PROBNP 2,428 12/02/2020     Fasting Lipid Panel:    Lab Results   Component Value Date    CHOL 166 10/12/2019    HDL 51 10/12/2019    TRIG 142 10/12/2019     Cardiac Enzymes:  CK/MbTroponin  Lab Results   Component Value Date    CKTOTAL 54 06/12/2020    TROPONINI <0.01 03/25/2021     PT/ INR   Lab Results   Component Value Date    INR 1.29 11/23/2020    INR 1.02 08/31/2020    INR 1.01 08/12/2020    PROTIME 15.0 11/23/2020    PROTIME 11.8 08/31/2020    PROTIME 11.7 08/12/2020     PTT No results found for: PTT   Lab Results   Component Value Date    MG 1.80 04/02/2021      Lab Results   Component Value Date    TSH 0.28 09/19/2020     All labs and imaging reviewed today    Assessment:  Chest pain: chronic  Shortness of breath: stable  Acute on chronic systolic CHF: improved, -88C and -49 lbs (186 lbs today)  Nonischemic cardiomyopathy: known history, EF <20% on echo 11/2020, EF 35% 6/2020 - s/p dual chamber ICD 2015  Pericardial effusion: moderate on echo 4/2021 and 11/2020  Pulmonary HTN  CAD: mild on angiogram 10/6/2020  NSVT  HLD  DM  History of CVA  History of PE  Cognitive impairment    Plan:   1. Continue po lasix, down to 186 lbs today  2. Continue amiodarone, eliquis, aspirin, statin, digoxin, toprol, entresto, spironolactone   3. Ok to discharge from cardiology perspective, please call with any questions. She plans to follow up with [facility] cardiology in [place] at discharge.      Alvah Kayser, APRN-CNP  [facility]  [phone]

## 2021-04-05 NOTE — PROGRESS NOTES
Patient okay for discharge per MD. Discharge instructions and script given. IV removed and site assessment clean, dry, and intact. Telebox removed and [facility] notified. Patient discharged home in stable conditions with all belongings including cell phone. Lock box has been emptied. No questions or concerns at this time. Patient escorted by transport to [address] [place] [address].

## 2021-04-05 NOTE — CARE COORDINATION
CASE MANAGEMENT DISCHARGE SUMMARY      Discharge to: [address] completed: LOC completed - Going under [facility] Exemption Notification (HENS) completed: Yes    [address] ordered/agency:     Transportation: [address]    Family/car:   Medical Transport explained to State. Pt/family voice no agency preference. Agency used:   time: 1700   Ambulance form completed: Yes    Confirmed discharge plan with:     Patient: yes - via room phone      Family, name and contact number: Katie [facility] [phone] [facility] with call back number      Facility/Agency, name:  [facility] faxed   Phone number for report to facility: [phone]     RN, name: Magi Kemp RN     Note: Discharging nurse to complete [facility], reconcile [facility], and place final copy with patient's discharge packet. RN to ensure that written prescriptions for  Level II medications are sent with patient to the facility as per protocol.     Kiet Loza RN

## 2021-04-05 NOTE — ADT AUTH CERT
Heart Failure - Care Day 9 (4/1/2021) by Jameson Vilchis, RN       Review Status Review Entered   Completed 4/2/2021 15:11      Criteria Review      Care Day: 9 Care Date: 4/1/2021 Level of Care: Telemetry    Guideline Day 3    Clinical Status    (X) * Hemodynamic stability    4/2/2021 3:11 PM EDT by Denisse Lubin      98. 3  18  71  125/84  93%ra    (X) * Tachypnea absent    (X) * Oxygenation at baseline or acceptable for next level of care    ( ) * Dyspnea at baseline or acceptable for next level of care    4/2/2021 3:11 PM EDT by Denisse Lubin      lungs diminished    (X) * Cardiac rate and rhythm acceptable    ( ) * Pulmonary edema absent or acceptable for next level of care    ( ) * Peripheral or sacral edema absent or acceptable for next level of care    4/2/2021 3:11 PM EDT by Denisse Lubin      +2 pitting ble edema  +1 pitting bue edema    (X) * Mental status at baseline    ( ) * Volume status acceptable on oral medication    (X) * Renal function at baseline or acceptable for next level of care    ( ) * Electrolyte abnormalities absent or acceptable for next level of care    4/2/2021 3:11 PM EDT by Denisse Lubin      k  3.6  Na 135    gluc 291, lantus and ssi continue    ( ) * Precipitating factors absent or controlled    ( ) * Discharge plans and education understood    Activity    ( ) * Ambulatory or acceptable for next level of care    Routes    (X) * Oral hydration, medications, and diet    4/2/2021 3:11 PM EDT by Denisse Lubin      cordarone 200mg qd  eliquis 5mg bid  asa 81mg qd  lanoxin 125mcg qd  magnesium 2000mg iv given, mag oxide 400mg qd  klor 10 meq tid  klor 40 meq given    Interventions    (X) * Oxygen absent    (X) Weigh    4/2/2021 3:11 PM EDT by Denisse Lubin      204 lb 1.6 oz    Medications    (X) Diuretics    4/2/2021 3:11 PM EDT by Denisse Lubin      aldactone 12.5mg qd  lasix 100mg iv gtt continues    (X) Neprilysin inhibitor with ARB, or ACE inhibitor or ARB    (X) Beta-blocker    * Milestone Additional Notes   4/1   Dc plan is SNF, no presert needed      Per Hospitalist;   Acute on chronic systolic heart failure   - furosemide gtt, spironolactone.  She had a standing weight of 194 lbs at the time of discharge in 11/2020.   - starting entresto tomorrow      Toprol xl 25 mg qd continues

## 2021-04-05 NOTE — PROGRESS NOTES
Sent secure message via perfect serve to Dr. Lori Araujo seen by cardio and okay to DC from their standpoint.   Can you place DC orders patient has  scheduled at 5 pm  thank you

## 2021-04-05 NOTE — CARE COORDINATION
[facility] has completed HENS and will submit LOC per facility request this AM. Tentative transport set up for 1700 with [facility] to [facility] today. No DC order currently.      Ambrosio Feliz RN

## 2021-04-07 NOTE — DISCHARGE SUMMARY
Hospital Medicine Discharge Summary    Patient ID: Jimbo Martino      Patient's PCP: FRITZ Robin NP    Admit Date: 3/25/2021     Discharge Date: 4/5/2021      Admitting Physician: Risa Shelton MD     Discharge Physician: Marah Matos MD     Discharge Diagnoses: Active Hospital Problems    Diagnosis    Acute on chronic systolic CHF (congestive heart failure) ([facility]) [I50.23]     Priority: High    Anasarca [R60.1]    Pericardial effusion [I31.3]    Dual ICD (implantable cardioverter-defibrillator) in place [Z95.810]    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) [I63.50]    DM (diabetes mellitus) ([facility] [address] [E11.9]    HTN (hypertension), benign [I10]    Dyslipidemia [E78.5]       The patient was seen and examined on day of discharge and this discharge summary is in conjunction with any daily progress note from day of discharge. HPI on 3/25/2021:  Patient with PMH of DM 2, HTN, chronic CHF, EF <20%, moderate pulmonary hypertension, Hx of PE, depression presented [facility] ED last night with complaints of worsening shortness of breath. Patient reports she has gained about 50 pounds in the last month all fluid. She reports chest tightness with ambulation. Denies any lightheadedness, abdominal pain nausea vomiting. Patient reports she is no longer in hospice and has recently moved in with her family in [place]. She was evaluated by Dr. Juan A Marshall yesterday in the CHF clinic, was noted to be fluid overloaded and was asked to increase her Lasix dose to twice daily. She does report 3 pillow orthopnea, no PND. He presented to [facility] ED for  pericardial effusion, CHF. Transfer was requested to [facility] for further care. Hospital Course:     Acute on chronic systolic heart failure  - Was furosemide gtt later switched to PO.   - Started metoprolol, entresto, aldactone. Euvolemic at discharge. CHF education provided.    - has AICD    Chronic moderate pericardial effusion  - Conservative management.     DMII with hypoglycemia: Hypoglycemia resolved with treatment. - Insulin doses were adjusted. - Continue Lantus and SSI     HLD  - continue home statin     History of PE  - continue Eliquis     Hx of atrial tachycardia  - continue amiodarone     Depression  - mood stable  - continue home Cymbalta and Seroquel     H/O CVA   - continue ASA, statin      Physical Exam Performed:     /69   Pulse 70   Temp 98 °F (36.7 °C) (Oral)   Resp 12   Ht 5' 1\" (1.549 m)   Wt 186 lb 11.2 oz (84.7 kg)   SpO2 97%   BMI 35.28 kg/m²     General appearance: No apparent distress, appears stated age and cooperative. HEENT: Pupils equal, round, and reactive to light. Conjunctivae/corneas clear. Neck: Supple, with full range of motion. No jugular venous distention. Trachea midline. Respiratory:  Normal respiratory effort. Clear to auscultation, bilaterally without Rales/Wheezes/Rhonchi. Distant lungs. Cardiovascular: Regular rate and rhythm with normal S1/S2 without murmurs, rubs or gallops. Abdomen: Soft, non-tender, non-distended with normal bowel sounds. Musculoskeletal: No clubbing, cyanosis. Improved BLE pitting edema. Skin:  Warm and dry  Neurologic:   Alert, speech clear with no overt facial droop  Psychiatric: Alert and oriented, thought content appropriate, normal insight. Labs:  For convenience and continuity at follow-up the following most recent labs are provided:      CBC:    Lab Results   Component Value Date    WBC 6.4 03/26/2021    HGB 11.1 03/26/2021    HCT 34.4 03/26/2021     03/26/2021       Renal:    Lab Results   Component Value Date     04/05/2021    K 3.7 04/05/2021    K 3.8 03/25/2021    CL 99 04/05/2021    CO2 27 04/05/2021    BUN 14 04/05/2021    CREATININE 0.6 04/05/2021    CALCIUM 9.0 04/05/2021    PHOS 3.3 11/20/2020         Significant Diagnostic Studies    Radiology:     Cardiac echo There is a moderate posterior pericardial effusion noted ( 1.5cm). No   significant anterior effusion. No tamponade. Severly impaired systolic   function of left ventricle. No significant changes in size of effusion since   11.25.20.        Consults:     IP CONSULT TO CARDIOLOGY  IP CONSULT TO DIETITIAN  IP CONSULT TO HEART FAILURE NURSE/COORDINATOR  IP CONSULT TO SPIRITUAL SERVICES  IP CONSULT TO CASE MANAGEMENT    Disposition: SNF  Condition at Discharge: Stable    Discharge Instructions/Follow-up:    -Follow-up with PCP after discharge from SNF    Code Status:  Prior  Activity: activity as tolerated    Diet: cardiac diet      Discharge Medications:     Discharge Medication List as of 4/5/2021  4:00 PM           Details   sacubitril-valsartan (ENTRESTO) 24-26 MG per tablet Take 1 tablet by mouth 2 times daily, Disp-60 tablet, R-1NO PRINT      metoprolol succinate (TOPROL XL) 25 MG extended release tablet Take 1 tablet by mouth daily, Disp-30 tablet, R-3NO PRINT      spironolactone (ALDACTONE) 25 MG tablet Take 0.5 tablets by mouth daily, Disp-30 tablet, R-3NO PRINT      magnesium oxide (MAG-OX) 400 (241.3 Mg) MG TABS tablet Take 1 tablet by mouth daily, Disp-30 tablet, R-1NO PRINT      insulin lispro (HUMALOG) 100 UNIT/ML injection vial Inject 0-6 Units into the skin 3 times daily (with meals) **Corrective Low Dose Algorithm**  Glucose: Dose:               No Insulin  140-199 1 Unit  200-249 2 Units  250-299 3 Units  300-349 4 Units  350-399 5 Units  Over 399 6 Units, Disp-1 vial,  R-3NO PRINT              Details   furosemide (LASIX) 40 MG tablet Take 1 tablet by mouth 2 times daily, Disp-60 tablet, R-3NO PRINT              Details   fluticasone (FLONASE) 50 MCG/ACT nasal spray 1 spray by Each Nostril route dailyHistorical Med      DULoxetine (CYMBALTA) 30 MG extended release capsule Take 1 capsule by mouth daily, Disp-30 capsule, R-0Print      insulin glargine (LANTUS SOLOSTAR) 100 UNIT/ML injection pen Inject 8 Units into the skin nightly, Disp-5 pen, R-0NO PRINT      apixaban (ELIQUIS) 5 MG TABS tablet Take 1 tablet by mouth 2 times daily, Disp-60 tablet, R-0Print      ondansetron (ZOFRAN) 4 MG tablet Take 1 tablet by mouth every 8 hours as needed for Nausea or Vomiting, Disp-24 tablet,R-0Print      QUEtiapine (SEROQUEL XR) 50 MG extended release tablet Take 50 mg by mouth nightlyHistorical Med      amiodarone (CORDARONE) 200 MG tablet Take 1 tablet by mouth daily, Disp-90 tablet,R-3Normal      pantoprazole (PROTONIX) 40 MG tablet Take 1 tablet by mouth daily, Disp-90 tablet,R-0Normal      digoxin (LANOXIN) 125 MCG tablet Take 1 tablet by mouth every other day, Disp-30 tablet,R-3Normal      nitroGLYCERIN (NITROSTAT) 0.4 MG SL tablet up to max of 3 total doses. If no relief after 1 dose, call 911., Disp-25 tablet,R-3Print      aspirin 81 MG EC tablet Take 1 tablet by mouth daily, Disp-30 tablet,R-2Normal      atorvastatin (LIPITOR) 40 MG tablet Take 1 tablet by mouth nightly, Disp-30 tablet,R-2Normal      miconazole (MICOTIN) 2 % powder Apply topically 2 times daily. , Disp-45 g,R-1, Normal      [employer] Lancets Ultra Thin MISC 4 TIMES DAILY Starting Sat 7/18/2020, Disp-200 each,R-0, Normal      blood glucose monitor strips Test three times a day & as needed for symptoms of irregular blood glucose., Disp-100 strip, R-2, Print             Time Spent on discharge is more than 30 minutes in the examination, evaluation, counseling and review of medications and discharge plan. Signed:    Jeremias Carmichael MD   4/7/2021      Thank you FRITZ Arcos - ALINE for the opportunity to be involved in this patient's care. If you have any questions or concerns please feel free to contact me at [phone].

## 2021-04-20 ENCOUNTER — TELEPHONE (OUTPATIENT)
Dept: CARDIOLOGY CLINIC | Age: 61
End: 2021-04-20

## 2021-04-20 NOTE — TELEPHONE ENCOUNTER
Thank you! We'll cancel remote monitoring appointments but keep her in [facility] until new clinic requests transfer.

## 2021-04-20 NOTE — TELEPHONE ENCOUNTER
NIKOLAI I called pt's primary number in regards to an appt that needed to be rescheduled. Pt's SMITA Suzanne answered phone stating the pt is currently at a rehab facility and will no longer be seen with [facility]. She stated when pt gets out of quarantine at rehab facility they will be finding a new cardiologist. Clarice Jackson stated to cancel all office appts. NIKOLAI I did not cancel the automated remote transmission scheduled for 6-1-21.

## 2021-05-05 NOTE — PROGRESS NOTES
PM assessment completed. Scheduled medications given per MAR. VSS room air, A/O x4, high functioning MR as baseline,  denies any needs at this time. Call light in reach, will monitor, bed alarm on. Unknown

## 2021-06-10 NOTE — ED NOTES
Controlled Substance Refill Request  Medication Name:   Requested Prescriptions     Pending Prescriptions Disp Refills     diazePAM (VALIUM) 5 MG tablet [Pharmacy Med Name: DIAZEPAM 5MG TABLETS] 60 tablet 0     Sig: TAKE 1 TABLET(5 MG) BY MOUTH DAILY AS NEEDED FOR ANXIETY     Date Last Fill: 2/27/20  [facility]ed [facility]  Submit electronically to pharmacy  Controlled Substance Agreement on file:   Encounter-Level CSA Scan Date:    There are no encounter-level csa scan date.        Last office visit:  5/18/20         Pt report given to Lexii, PCU RN.       Pavithra Erickson, DILLAN  11/05/20 [id number]

## 2021-07-16 ENCOUNTER — TELEPHONE (OUTPATIENT)
Dept: CARDIOLOGY CLINIC | Age: 61
End: 2021-07-16

## 2021-07-16 ENCOUNTER — NURSE ONLY (OUTPATIENT)
Dept: CARDIOLOGY CLINIC | Age: 61
End: 2021-07-16

## 2021-07-16 DIAGNOSIS — Z95.810 DUAL ICD (IMPLANTABLE CARDIOVERTER-DEFIBRILLATOR) IN PLACE: ICD-10-CM

## 2021-07-16 NOTE — TELEPHONE ENCOUNTER
Site with [employer] capability: [id number] [facility] Cardiology_CDO  [place], [place], [address]  [facility]:[phone]  [place]:[phone]    Remote transmission received for patients dual chamber ICD. Transmission shows normal sensing and pacing function. EP physician will review. See interrogation under the cardiology tab in the [address] [place] [address] [address] field for more details. Will continue to monitor remotely. Hx AT/SVT (oac, toprol xl). SVT recorded. Your patient(s) have been released. You will still receive transmission data for these patient(s) until they are enrolled in another clinic. Merlin released. Niki please call [address] [facility] Cardiology Phone: [phone] and ask them to enroll her in [address] [place] [address].     Thanks

## 2021-07-16 NOTE — PROGRESS NOTES
Site with [employer] capability: [id number] [facility] Cardiology_CDO  [address]ez [address]  [facility]:[phone]  [place]:[phone]    Remote transmission received for patients dual chamber ICD. Transmission shows normal sensing and pacing function. EP physician will review. See interrogation under the cardiology tab in the [address] [place] [address] [address] field for more details. Will continue to monitor remotely. Hx AT/SVT (oac, toprol xl). SVT recorded. Your patient(s) have been released. You will still receive transmission data for these patient(s) until they are enrolled in another clinic. Merlin released. Niki please call [address] [facility] Cardiology Phone: [phone] and ask them to enroll her in [address] [place] [address].     Thanks

## 2021-07-22 NOTE — TELEPHONE ENCOUNTER
[id number] [facility] cardiology has not enrolled pt in [address] [place] [address] as of 7/22/21.

## 2021-08-10 NOTE — TELEPHONE ENCOUNTER
Pt was mailed a letter notifying of Dr. Phan Almeida impending departure and recommendation to establish care with another [address] [place] [address] pulmonologist.

## 2021-11-16 NOTE — CARE COORDINATION
MRN:  [id number] Patient contacted regarding COVID-19 risk and screening. Care Transition Nurse/ Ambulatory Care Manager contacted the patient by telephone to perform follow-up assessment. Patient has following risk factors of: nausea, vomiting and elevated BS. Jaymie Dinero Symptoms reviewed with patient who verbalized the following symptoms: nausea, vomiting and elevated BS. Due to new onset of symptoms encounter was routed to provider for escalation. Education provided regarding infection prevention, and signs and symptoms of COVID-19 and when to seek medical attention with patient who verbalized understanding. Discussed exposure protocols and quarantine from [address] you at higher risk for severe illness 2019 and given an opportunity for questions and concerns. The patient agrees to contact the COVID-19 hotline [phone] or PCP office for questions related to their healthcare. CTN/ACM provided contact information for future reference. From CDC: Are you at higher risk for severe illness?  Wash your hands often.  Avoid close contact (6 feet, which is about two arm lengths) with people who are sick.  Put distance between yourself and other people if COVID-19 is spreading in your community.  Clean and disinfect frequently touched surfaces.  Avoid all cruise travel and non-essential air travel.  Call your healthcare professional if you have concerns about COVID-19 and your underlying condition or if you are sick. For more information on steps you can take to protect yourself, see CDC's How to [facility] who reports she has not heard from her PCP since yesterday when she reported elevated BS. Her fasting BS today was 334. She took Lantus 35 units HS last night and she took Novolog 20 units this morning with breakfast. She reports continued n/v \"all the time\". The prn ondansetron does not work for her at this time.  She was unable to work with therapy because of

## 2022-02-12 NOTE — CARE COORDINATION
Problem: Falls - Risk of  Goal: *Absence of Falls  Description: Document Clearence Skates Fall Risk and appropriate interventions in the flowsheet.   Outcome: Progressing Towards Goal  Note: Fall Risk Interventions:            Medication Interventions: Patient to call before getting OOB         History of Falls Interventions: Vital signs minimum Q4HRs X 24 hrs (comment for end date) Pt admitted today, 11/05/2020 for abdominal pain, nausea, vomiting and uncontrolled DM with Lactic Acidosis.

## 2022-05-10 ENCOUNTER — NURSE ONLY (OUTPATIENT)
Dept: CARDIOLOGY CLINIC | Age: 62
End: 2022-05-10
Payer: MEDICARE

## 2022-05-10 DIAGNOSIS — I42.8 NON-ISCHEMIC CARDIOMYOPATHY (HCC): ICD-10-CM

## 2022-05-10 DIAGNOSIS — I47.1 SVT (SUPRAVENTRICULAR TACHYCARDIA) (HCC): ICD-10-CM

## 2022-05-10 DIAGNOSIS — I50.22 CHRONIC SYSTOLIC CHF (CONGESTIVE HEART FAILURE) (HCC): ICD-10-CM

## 2022-05-10 DIAGNOSIS — Z95.810 DUAL ICD (IMPLANTABLE CARDIOVERTER-DEFIBRILLATOR) IN PLACE: ICD-10-CM

## 2022-05-10 DIAGNOSIS — I50.23 ACUTE ON CHRONIC SYSTOLIC CHF (CONGESTIVE HEART FAILURE) (HCC): ICD-10-CM

## 2022-05-10 PROCEDURE — 93289 INTERROG DEVICE EVAL HEART: CPT | Performed by: INTERNAL MEDICINE

## 2022-05-10 NOTE — PROGRESS NOTES
The following is a report from a pacemaker/ICD that was interrogated on 05- 02:32 AM  EDT at [facility]. [facility]:    PT IS NON COMPLIANT/DEVICE MANAGEMENT @ [facility]. Remote transmission received for patients dual chamber ICD. Transmission shows normal sensing and pacing function. EP physician will review. See interrogation under the cardiology tab in the [address] [place] [address] [address] field for more details. Will continue to monitor remotely. Hx AT/SVT (oac, toprol xl). SVT recorded. AT/AF burden <1% since 7/2021. [place] is within normal limits. Your patient(s) have been released. You will still receive transmission data for these patient(s) until they are enrolled in another clinic. Merlin released 7/2021. [facility] Cardiology_CDO  [facility], [address]  [facility]:[phone]  [facility]:[phone]    Pt has also received care by MD RUBI Boo 1/2020, Ellen Cardenas 6/2020.  And   10/14/2019  [address] (original implant)

## 2022-05-19 NOTE — PROGRESS NOTES
Pt appears to be sleeping at this time as manifested by eyes being closed. Respirations easy. No distress noted. Will continue to monitor. · Patient had a fall at the night of 05/12/2022, with nondisplaced fracture  · Orthopedic consult - recommended conservative management  · PT and OT recommended rehab  · Due to other clinical comorbidities, the daughter is interested in home hospice  · Home hospice as above

## 2023-06-02 NOTE — CONSULTS
VS: BP (!) 158/77 (BP Location: Right arm)   Pulse 84   Temp (!) 96.5  F (35.8  C) (Oral)   Resp 16   SpO2 96%    O2: On room air.   Output: voided x1.Patient went to the bathroom with one assist,walker and gait belt.   Last BM: ???   Activity: sleeping   Skin: Intact except fracture  Dislocation of the Lf elbow   Pain: Scheduled Tylenol   CMS: No numbness and tingling   Dressing: No IV access,ace wrapped around the Lf elbow   Diet: Regular diet   LDA: none   Equipment: Call light,walker,gait belt,personal belongings.   Plan: For possible discharged to today to [facility] care for the continuity of care   Additional Info: Patient still confused,bed alarm on for safety        Nutrition Education    Consulted for CHF nutrition education. Writer saw pt 1 month ago for CHF education. Pt declined education on this date. Left CHF and carb control nutrition education on bedside table. Will continue to monitor and follow up as able. · Verbally reviewed information with Patient  · Educated on CHF and DM   · Written educational materials provided. · Contact name and number provided. · Refer to Patient Education activity for more details.     Electronically signed by Leonardo Dick MS, RD, LD on 9/21/20 at 2:08 PM EDT    Contact: Office: [phone]

## 2023-11-16 ENCOUNTER — OUTSIDE SERVICES (OUTPATIENT)
Dept: SURGERY | Age: 63
End: 2023-11-16
Payer: MEDICARE

## 2023-11-16 DIAGNOSIS — K80.10 CHRONIC CALCULOUS CHOLECYSTITIS: Primary | ICD-10-CM

## 2023-11-16 PROCEDURE — 99205 OFFICE O/P NEW HI 60 MIN: CPT | Performed by: SURGERY

## 2023-11-29 ENCOUNTER — TELEPHONE (OUTPATIENT)
Dept: SURGERY | Age: 63
End: 2023-11-29

## 2023-11-29 NOTE — TELEPHONE ENCOUNTER
----- Message from Leon Newell MD sent at 11/27/2023  2:49 PM EST -----  Ask her to return to clinic at [facility] to discuss possible surgery.   ----- Message -----  From: Divya Alatorre MD  Sent: 11/24/2023   1:26 PM EST  To: Leon Newell MD; [place], [place]    She called today with GB flare and wants to talk about getting it out

## 2023-12-14 ENCOUNTER — OUTSIDE SERVICES (OUTPATIENT)
Dept: SURGERY | Age: 63
End: 2023-12-14
Payer: MEDICARE

## 2023-12-14 DIAGNOSIS — K85.90 ACUTE PANCREATITIS WITHOUT INFECTION OR NECROSIS, UNSPECIFIED PANCREATITIS TYPE: Primary | ICD-10-CM

## 2023-12-14 PROCEDURE — 99214 OFFICE O/P EST MOD 30 MIN: CPT | Performed by: SURGERY

## 2023-12-26 DIAGNOSIS — K85.10 GALLSTONE PANCREATITIS: Primary | ICD-10-CM

## 2023-12-28 NOTE — PROGRESS NOTES
Outside service performed at [facility] with date of service 11/16/2023. Clinic note located in the media section.

## 2024-01-01 NOTE — H&P
Hospital Medicine History & Physical      PCP: FRITZ Omalley NP    Date of Admission: 11/4/2020    Date of Service: Pt seen/examined on 11/5/2020     Chief Complaint:    Chief Complaint   Patient presents with    Hyperglycemia       History Of Present Illness: The patient is a 62 y.o. female with hypertension, hyperlipidemia, DM type 2, CHF, CAD, h/o CVA who presents to [facility] with c/o hyperglycemia, nausea, and vomiting. Ongoing for 6 months. Reports taking her medications. She has been vomiting the last 2 days. She was seen in the ED for the same 2 days ago. Symptoms are not improved. She was last admitted to [facility] 10/26-10/28 for the same. She was tachycardic. Labs with hyponatremia, lactic acidosis. Glucose 308. Rapid COVID negative. CXR with mild pulmonary vascular congestion, unchanged cardiomegaly. Patient admitted for further management/care. GI consulted. Past Medical History:        Diagnosis Date    Arthritis     CAD (coronary artery disease)     Cerebral artery occlusion with cerebral infarction ([facility]     x 3    CHF (congestive heart failure) (HCC)     Diabetes mellitus ([address]     Hyperlipidemia     Hypertension     Mental retardation     MI (myocardial infarction) ([facility]     Pacemaker        Past Surgical History:        Procedure Laterality Date    BACK SURGERY      x3    PACEMAKER INSERTION      TUBAL LIGATION      TUMOR REMOVAL         Medications Prior to Admission:    Prior to Admission medications    Medication Sig Start Date End Date Taking?  Authorizing Provider   ondansetron (ZOFRAN) 4 MG tablet Take 1 tablet by mouth every 8 hours as needed for Nausea 11/3/20   Donald Masters PA-C   furosemide (LASIX) 40 MG tablet Take 1 tablet by mouth daily 10/28/20 11/27/20  Peyton Rendon PA-C   insulin aspart (NOVOLOG FLEXPEN) 100 UNIT/ML injection pen Inject 20 Units into the skin 3 times daily (before meals)    Historical Provider, MD   insulin glargine (LANTUS SOLOSTAR) 100 UNIT/ML injection Inject 40 Units into the skin 2 times daily    Historical Provider, MD   metFORMIN (GLUCOPHAGE) 1000 MG tablet Take 1,000 mg by mouth 2 times daily (with meals)    Historical Provider, MD   vitamin D (ERGOCALCIFEROL) 1.25 MG (41493 UT) CAPS capsule Take 50,000 Units by mouth once a week Takes weekly on Sundays    Historical Provider, MD   QUEtiapine (SEROQUEL) 25 MG tablet Take 25 mg by mouth nightly    Historical Provider, MD   amiodarone (PACERONE) 100 MG tablet Take 1 tablet by mouth daily 10/14/20   FRITZ Allen CNP   metoprolol succinate (TOPROL XL) 25 MG extended release tablet Take 0.5 tablets by mouth daily 10/14/20   FRITZ Allen - CNP   sacubitril-valsartan (ENTRESTO) 24-26 MG per tablet Take 0.5 tablets by mouth 2 times daily 10/13/20   FRITZ Allen - CNP   spironolactone (ALDACTONE) 25 MG tablet Take 1 tablet by mouth daily 10/13/20   FRITZ Allen - CNP   digoxin (LANOXIN) 125 MCG tablet Take 1 tablet by mouth every other day 9/25/20   FRITZ Allen - CNP   nitroGLYCERIN (NITROSTAT) 0.4 MG SL tablet up to max of 3 total doses.  If no relief after 1 dose, call 911. 8/24/20   Jolie Lombard, MD   ondansetron [facility]) 4 MG tablet Take 1 tablet by mouth every 8 hours as needed for Nausea 8/12/20   Fleurette Beverage, DO   aspirin 81 MG EC tablet Take 1 tablet by mouth daily 7/18/20   Dalton Pan MD   ranolazine (RANEXA) 500 MG extended release tablet Take 1 tablet by mouth 2 times daily 7/18/20   Dalton Pan MD   DULoxetine (CYMBALTA) 60 MG extended release capsule Take 1 capsule by mouth daily 7/18/20   Dalton Pan MD   atorvastatin (LIPITOR) 40 MG tablet Take 1 tablet by mouth nightly 7/18/20   Dalton Pan MD   fenofibrate micronized (LOFIBRA) 200 MG capsule Take 1 capsule by mouth nightly 7/18/20   Dalton Pan MD   miconazole (MICOTIN) 2 % powder Apply topically 2 times daily. 20   Gilberto Gomez MD   [employer] Lancets Ultra Thin MISC 1 each by Does not apply route 4 times daily 20   Gilberto Gomez MD   blood glucose monitor strips Test three times a day & as needed for symptoms of irregular blood glucose. 19   Sabrina Christine MD       Allergies:  Patient has no known allergies. Social History:  The patient currently lives at home    TOBACCO:   reports that she has never smoked. She has never used smokeless tobacco.  ETOH:   reports no history of alcohol use. Family History:   Positive as follows:        Problem Relation Age of Onset    Heart Disease Father     Cancer Mother     Other Mother        REVIEW OF SYSTEMS:       Constitutional: Negative for fever   HENT: Negative for sore throat   Eyes: Negative for redness   Respiratory: Negative  for dyspnea, cough   Cardiovascular: Negative for chest pain   Gastrointestinal: ++ vomiting, diarrhea   Genitourinary: Negative for hematuria   Musculoskeletal: Negative for arthralgias   Skin: Negative for rash   Neurological: Negative for syncope   Hematological: Negative for adenopathy   Psychiatric/Behavorial: Negative for anxiety    PHYSICAL EXAM:    /89   Pulse 68   Temp 98 °F (36.7 °C) (Oral)   Resp 16   Ht 5' 1\" (1.549 m)   Wt 209 lb 12.8 oz (95.2 kg)   SpO2 98%   BMI 39.64 kg/m²     Gen:  Middle aged female sitting up in bed, No distress. Alert. Eyes: PERRL. No sclera icterus. No conjunctival injection. ENT: No discharge. Pharynx clear. Neck: No JVD. No Carotid Bruit. Trachea midline. Resp: No accessory muscle use. No crackles. No wheezes. No rhonchi. CV: Regular rate. Regular rhythm. No murmur. No rub. No edema. GI: Non-tender. Non-distended. No masses. No organomegaly. Normal bowel sounds. No hernia. Skin: Warm and dry. No nodule on exposed extremities. No rash on exposed extremities. M/S: No cyanosis. No joint deformity. No clubbing. Neuro: Awake. Grossly nonfocal    Psych: Oriented x 3. No anxiety or agitation. CBC:   Recent Labs     11/03/20  1411 11/04/20 1950   WBC 5.5 7.2   HGB 13.4 13.3   HCT 41.0 40.7   MCV 91.5 90.4    366     BMP:   Recent Labs     11/03/20  1411 11/04/20 1950 11/04/20 2205   * 127* 130*   K 4.4 4.6 4.6   CL 99 94* 97*   CO2 22 20* 19*   PHOS 3.9  --   --    BUN 16 19 17   CREATININE 0.6 0.6 0.6     LIVER PROFILE:   Recent Labs     11/03/20  1411 11/04/20 1950   AST 18 22   ALT 15 17   BILITOT 0.8 0.7   ALKPHOS 141* 144*     UA:  Recent Labs     11/04/20 2201   COLORU Straw   PHUR 5.0   CLARITYU Clear   SPECGRAV 1.010   LEUKOCYTESUR Negative   UROBILINOGEN 0.2   BILIRUBINUR Negative   BLOODU Negative   GLUCOSEU >=1000*         CULTURES  Rapid COVID: negative    EKG:  I have reviewed the EKG with the following interpretation:   N/A    RADIOLOGY  XR CHEST (2 VW)   Final Result   Mild pulmonary vascular congestion. Unchanged cardiomegaly. Pertinent previous results reviewed      Echo 10/6/2020  Limited only f/u for LVEF.   The left ventricular systolic function is severely reduced with an ejection   fraction of 15-20 %.   There is severe global hypokinesis with regional variations.   Changes noted from previous echo on 6- in left ventricular function.     LHC/RHC 10/6/2020  CONCLUSIONS:  1. Mild non-obstructive coronary artery disease with known severe left ventricular dysfunction  2.  Moderate pulmonary hypertension with decompensated hemodynamics  ASSESSMENT/RECOMMENDATIONS:  1. Risk Factor control  2.  Advanced heart failure management. Active Problems:    Elevated blood sugar    Lactic acidosis    Intractable nausea and vomiting    Tachycardia    Uncontrolled type 2 diabetes mellitus with hyperglycemia ([address]  Resolved Problems:    * No resolved hospital problems.  *        ASSESSMENT/PLAN:    #Nausea, vomiting  - Benign abdominal exam  - likely diabetic gastroparesis - added PPI, Reglan, Carafate.  - GI Consult. - Antiemetics as needed.  #DM2   - uncontrolled. 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  Trend Lactic     #History of TIA  - cont ASA and statin      DVT Prophylaxis: Lovenox     Diet: DIET CLEAR LIQUID;  Diet NPO Time Specified  Code Status: Full Code     Jorge Frausto MD (2) more than 100 beats/min

## 2024-01-03 ENCOUNTER — OFFICE VISIT (OUTPATIENT)
Dept: ORTHOPEDIC SURGERY | Age: 64
End: 2024-01-03
Payer: MEDICARE

## 2024-01-03 VITALS — WEIGHT: 199.08 LBS | BODY MASS INDEX: 37.59 KG/M2 | HEIGHT: 61 IN

## 2024-01-03 DIAGNOSIS — M47.816 LUMBAR SPONDYLOSIS: ICD-10-CM

## 2024-01-03 DIAGNOSIS — M54.50 LUMBAR PAIN: Primary | ICD-10-CM

## 2024-01-03 PROCEDURE — 99203 OFFICE O/P NEW LOW 30 MIN: CPT | Performed by: ORTHOPAEDIC SURGERY

## 2024-01-03 RX ORDER — MAGNESIUM HYDROXIDE/ALUMINUM HYDROXICE/SIMETHICONE 120; 1200; 1200 MG/30ML; MG/30ML; MG/30ML
30 SUSPENSION ORAL 4 TIMES DAILY PRN
COMMUNITY

## 2024-01-03 RX ORDER — NYSTATIN 100000 [USP'U]/G
POWDER TOPICAL
COMMUNITY
Start: 2023-11-07

## 2024-01-03 RX ORDER — POLYETHYLENE GLYCOL 3350 17 G/17G
17 POWDER, FOR SOLUTION ORAL DAILY
COMMUNITY
Start: 2023-02-13

## 2024-01-03 NOTE — PROGRESS NOTES
New Patient: LUMBAR SPINE    Referring Provider:  Nallely Horton MD    CHIEF COMPLAINT:    Chief Complaint   Patient presents with    Back Pain     NP Lumbar       HISTORY OF PRESENT ILLNESS:    Ms. Estela Eisenberg  is a pleasant 63 y.o. female who is status post posterior fusion L4-L5 in the distant past presents today with a 4-month history of severe atraumatic low back pain.  She rates her back, buttock and neck pain 10/10..  She denies radicular symptoms, saddle anesthesia and bowel or bladder dysfunction.      Current/Past Treatment:   Physical Therapy: no  Chiropractic:  no   Injection:  yes   Medications:  none     Past Medical History:   Past Medical History:   Diagnosis Date    Arthritis     CAD (coronary artery disease)     Cerebral artery occlusion with cerebral infarction (HCC)     x 3    CHF (congestive heart failure) ([facility])     COVID-19 12/09/2020    Diabetes mellitus ([facility])     ESBL (extended spectrum beta-lactamase) producing bacteria infection 12/09/2020    E.COLI-URINE    Hyperlipidemia     Hypertension     Mental retardation     MI (myocardial infarction) ([facility])     Pacemaker       Past Surgical History:     Past Surgical History:   Procedure Laterality Date    BACK SURGERY      x3    IR MIDLINE CATH  11/23/2020    IR MIDLINE [place] [facility]    IR MIDLINE CATH  12/11/2020    IR MIDLINE CATH 12[place]11/2020 [facility]    PACEMAKER INSERTION      Boyd pacemaker    TUBAL LIGATION      TUMOR REMOVAL      UPPER GASTROINTESTINAL ENDOSCOPY N/A 11/5/2020    EGD BIOPSY performed by Freeman Asif DO [place] [facility]     Current Medications:     Current Outpatient Medications:     sacubitril-valsartan (ENTRESTO) 24-26 MG per tablet, Take 1 tablet by mouth 2 times daily, Disp: 60 tablet, Rfl: 1    metoprolol succinate (TOPROL XL) 25 MG extended release tablet, Take 1 tablet by mouth daily, Disp: 30 tablet, Rfl: 3    furosemide (LASIX) 40 MG tablet, Take 1 tablet by

## 2024-01-10 ENCOUNTER — TELEPHONE (OUTPATIENT)
Dept: SURGERY | Age: 64
End: 2024-01-10

## 2024-01-10 ENCOUNTER — ANESTHESIA EVENT (OUTPATIENT)
Dept: OPERATING ROOM | Age: 64
End: 2024-01-10
Payer: MEDICARE

## 2024-01-10 NOTE — TELEPHONE ENCOUNTER
Patient called to reschedule her surgery that was scheduled for today, but she states she missed it because the \"roads were bad\".  She rescheduled surgery for Monday 1/15/2024. I tried multiple times to explain all the pre surgery instructions, ie: NPO, Hold Plavix and Eliquis, but she kept interrupting me and cutting me off before I could complete my sentences. She kept saying \" I already know, I already know\".

## 2024-01-10 NOTE — PROGRESS NOTES

## 2024-01-13 NOTE — CARE COORDINATION
CASE MANAGEMENT DISCHARGE SUMMARY      Discharge to: home with [facility] and add new LSW    Transportation: cab voucher       Confirmed discharge plan with: Pt, nursing and Odell Brown   Patient: yes     Facility/Agency, name:  [facility] to be pulled from [facility] via [address] with [facility] Limb alert placed on left arm     Allie Watters  01/13/24 7090

## 2024-01-15 ENCOUNTER — HOSPITAL ENCOUNTER (OUTPATIENT)
Age: 64
Setting detail: OUTPATIENT SURGERY
Discharge: HOME | End: 2024-01-15
Attending: SURGERY | Admitting: SURGERY
Payer: MEDICARE

## 2024-01-15 ENCOUNTER — ANESTHESIA (OUTPATIENT)
Dept: OPERATING ROOM | Age: 64
End: 2024-01-15
Payer: MEDICARE

## 2024-01-15 ENCOUNTER — APPOINTMENT (OUTPATIENT)
Dept: GENERAL RADIOLOGY | Age: 64
End: 2024-01-15
Attending: SURGERY
Payer: MEDICARE

## 2024-01-15 VITALS
HEIGHT: 61 IN | RESPIRATION RATE: 17 BRPM | BODY MASS INDEX: 37.19 KG/M2 | TEMPERATURE: 97.7 F | DIASTOLIC BLOOD PRESSURE: 56 MMHG | HEART RATE: 80 BPM | WEIGHT: 197 LBS | OXYGEN SATURATION: 97 % | SYSTOLIC BLOOD PRESSURE: 116 MMHG

## 2024-01-15 DIAGNOSIS — K85.10 GALLSTONE PANCREATITIS: ICD-10-CM

## 2024-01-15 LAB
ALBUMIN SERPL-MCNC: 3.9 G/DL (ref 3.4–5)
ALBUMIN/GLOB SERPL: 1.3 {RATIO} (ref 1.1–2.2)
ALP SERPL-CCNC: 146 U/L (ref 40–129)
ALT SERPL-CCNC: 153 U/L (ref 10–40)
ANION GAP SERPL CALCULATED.3IONS-SCNC: 12 MMOL/L (ref 3–16)
AST SERPL-CCNC: 106 U/L (ref 15–37)
BILIRUB DIRECT SERPL-MCNC: <0.2 MG/DL (ref 0–0.3)
BILIRUB INDIRECT SERPL-MCNC: NORMAL MG/DL (ref 0–1)
BILIRUB SERPL-MCNC: 0.4 MG/DL (ref 0–1)
BUN SERPL-MCNC: 15 MG/DL (ref 7–20)
CALCIUM SERPL-MCNC: 9.6 MG/DL (ref 8.3–10.6)
CHLORIDE SERPL-SCNC: 99 MMOL/L (ref 99–110)
CO2 SERPL-SCNC: 26 MMOL/L (ref 21–32)
CREAT SERPL-MCNC: 0.6 MG/DL (ref 0.6–1.2)
EKG ATRIAL RATE: 90 BPM
EKG DIAGNOSIS: NORMAL
EKG P AXIS: 41 DEGREES
EKG P-R INTERVAL: 158 MS
EKG Q-T INTERVAL: 460 MS
EKG QRS DURATION: 96 MS
EKG QTC CALCULATION (BAZETT): 562 MS
EKG R AXIS: -64 DEGREES
EKG T AXIS: 77 DEGREES
EKG VENTRICULAR RATE: 90 BPM
GFR SERPLBLD CREATININE-BSD FMLA CKD-EPI: >60 ML/MIN/{1.73_M2}
GLUCOSE BLD-MCNC: 141 MG/DL (ref 70–99)
GLUCOSE BLD-MCNC: 154 MG/DL (ref 70–99)
GLUCOSE SERPL-MCNC: 144 MG/DL (ref 70–99)
PERFORMED ON: ABNORMAL
PERFORMED ON: ABNORMAL
POTASSIUM SERPL-SCNC: 4.3 MMOL/L (ref 3.5–5.1)
PROT SERPL-MCNC: 6.8 G/DL (ref 6.4–8.2)
SODIUM SERPL-SCNC: 137 MMOL/L (ref 136–145)

## 2024-01-15 PROCEDURE — 88307 TISSUE EXAM BY PATHOLOGIST: CPT

## 2024-01-15 PROCEDURE — 3600000004 HC SURGERY LEVEL 4 BASE: Performed by: SURGERY

## 2024-01-15 PROCEDURE — 6360000002 HC RX W HCPCS: Performed by: NURSE ANESTHETIST, CERTIFIED REGISTERED

## 2024-01-15 PROCEDURE — 7100000001 HC PACU RECOVERY - ADDTL 15 MIN: Performed by: SURGERY

## 2024-01-15 PROCEDURE — 2500000003 HC RX 250 WO HCPCS: Performed by: NURSE ANESTHETIST, CERTIFIED REGISTERED

## 2024-01-15 PROCEDURE — 3700000000 HC ANESTHESIA ATTENDED CARE: Performed by: SURGERY

## 2024-01-15 PROCEDURE — 3700000001 HC ADD 15 MINUTES (ANESTHESIA): Performed by: SURGERY

## 2024-01-15 PROCEDURE — 6360000002 HC RX W HCPCS: Performed by: ANESTHESIOLOGY

## 2024-01-15 PROCEDURE — 6360000002 HC RX W HCPCS: Performed by: SURGERY

## 2024-01-15 PROCEDURE — 93010 ELECTROCARDIOGRAM REPORT: CPT | Performed by: INTERNAL MEDICINE

## 2024-01-15 PROCEDURE — 2580000003 HC RX 258: Performed by: SURGERY

## 2024-01-15 PROCEDURE — 74300 X-RAY BILE DUCTS/PANCREAS: CPT

## 2024-01-15 PROCEDURE — 88304 TISSUE EXAM BY PATHOLOGIST: CPT

## 2024-01-15 PROCEDURE — 7100000010 HC PHASE II RECOVERY - FIRST 15 MIN: Performed by: SURGERY

## 2024-01-15 PROCEDURE — 3600000014 HC SURGERY LEVEL 4 ADDTL 15MIN: Performed by: SURGERY

## 2024-01-15 PROCEDURE — A4216 STERILE WATER/SALINE, 10 ML: HCPCS | Performed by: ANESTHESIOLOGY

## 2024-01-15 PROCEDURE — 80053 COMPREHEN METABOLIC PANEL: CPT

## 2024-01-15 PROCEDURE — 36415 COLL VENOUS BLD VENIPUNCTURE: CPT

## 2024-01-15 PROCEDURE — 6360000002 HC RX W HCPCS

## 2024-01-15 PROCEDURE — 93005 ELECTROCARDIOGRAM TRACING: CPT | Performed by: ANESTHESIOLOGY

## 2024-01-15 PROCEDURE — 2709999900 HC NON-CHARGEABLE SUPPLY: Performed by: SURGERY

## 2024-01-15 PROCEDURE — 2500000003 HC RX 250 WO HCPCS: Performed by: ANESTHESIOLOGY

## 2024-01-15 PROCEDURE — 7100000000 HC PACU RECOVERY - FIRST 15 MIN: Performed by: SURGERY

## 2024-01-15 PROCEDURE — 7100000011 HC PHASE II RECOVERY - ADDTL 15 MIN: Performed by: SURGERY

## 2024-01-15 PROCEDURE — 2500000003 HC RX 250 WO HCPCS: Performed by: SURGERY

## 2024-01-15 PROCEDURE — 88313 SPECIAL STAINS GROUP 2: CPT

## 2024-01-15 PROCEDURE — 2580000003 HC RX 258: Performed by: ANESTHESIOLOGY

## 2024-01-15 RX ORDER — SODIUM CHLORIDE, SODIUM LACTATE, POTASSIUM CHLORIDE, CALCIUM CHLORIDE 600; 310; 30; 20 MG/100ML; MG/100ML; MG/100ML; MG/100ML
INJECTION, SOLUTION INTRAVENOUS CONTINUOUS
Status: DISCONTINUED | OUTPATIENT
Start: 2024-01-15 | End: 2024-01-15 | Stop reason: HOSPADM

## 2024-01-15 RX ORDER — OXYCODONE HYDROCHLORIDE 5 MG/1
10 TABLET ORAL PRN
Status: DISCONTINUED | OUTPATIENT
Start: 2024-01-15 | End: 2024-01-15 | Stop reason: HOSPADM

## 2024-01-15 RX ORDER — OXYCODONE HYDROCHLORIDE 5 MG/1
5 TABLET ORAL PRN
Status: DISCONTINUED | OUTPATIENT
Start: 2024-01-15 | End: 2024-01-15 | Stop reason: HOSPADM

## 2024-01-15 RX ORDER — SODIUM CHLORIDE 9 MG/ML
INJECTION, SOLUTION INTRAVENOUS PRN
Status: DISCONTINUED | OUTPATIENT
Start: 2024-01-15 | End: 2024-01-15 | Stop reason: HOSPADM

## 2024-01-15 RX ORDER — SODIUM CHLORIDE, SODIUM LACTATE, POTASSIUM CHLORIDE, AND CALCIUM CHLORIDE .6; .31; .03; .02 G/100ML; G/100ML; G/100ML; G/100ML
IRRIGANT IRRIGATION PRN
Status: DISCONTINUED | OUTPATIENT
Start: 2024-01-15 | End: 2024-01-15 | Stop reason: ALTCHOICE

## 2024-01-15 RX ORDER — SODIUM CHLORIDE 0.9 % (FLUSH) 0.9 %
5-40 SYRINGE (ML) INJECTION PRN
Status: DISCONTINUED | OUTPATIENT
Start: 2024-01-15 | End: 2024-01-15 | Stop reason: HOSPADM

## 2024-01-15 RX ORDER — ONDANSETRON 2 MG/ML
INJECTION INTRAMUSCULAR; INTRAVENOUS PRN
Status: DISCONTINUED | OUTPATIENT
Start: 2024-01-15 | End: 2024-01-15 | Stop reason: SDUPTHER

## 2024-01-15 RX ORDER — DEXAMETHASONE SODIUM PHOSPHATE 4 MG/ML
INJECTION, SOLUTION INTRA-ARTICULAR; INTRALESIONAL; INTRAMUSCULAR; INTRAVENOUS; SOFT TISSUE PRN
Status: DISCONTINUED | OUTPATIENT
Start: 2024-01-15 | End: 2024-01-15 | Stop reason: SDUPTHER

## 2024-01-15 RX ORDER — SODIUM CHLORIDE 0.9 % (FLUSH) 0.9 %
5-40 SYRINGE (ML) INJECTION EVERY 12 HOURS SCHEDULED
Status: DISCONTINUED | OUTPATIENT
Start: 2024-01-15 | End: 2024-01-15 | Stop reason: HOSPADM

## 2024-01-15 RX ORDER — PROPOFOL 10 MG/ML
INJECTION, EMULSION INTRAVENOUS PRN
Status: DISCONTINUED | OUTPATIENT
Start: 2024-01-15 | End: 2024-01-15 | Stop reason: SDUPTHER

## 2024-01-15 RX ORDER — ROCURONIUM BROMIDE 10 MG/ML
INJECTION, SOLUTION INTRAVENOUS PRN
Status: DISCONTINUED | OUTPATIENT
Start: 2024-01-15 | End: 2024-01-15 | Stop reason: SDUPTHER

## 2024-01-15 RX ORDER — KETOROLAC TROMETHAMINE 30 MG/ML
INJECTION, SOLUTION INTRAMUSCULAR; INTRAVENOUS PRN
Status: DISCONTINUED | OUTPATIENT
Start: 2024-01-15 | End: 2024-01-15 | Stop reason: SDUPTHER

## 2024-01-15 RX ORDER — MIDAZOLAM HYDROCHLORIDE 1 MG/ML
INJECTION INTRAMUSCULAR; INTRAVENOUS PRN
Status: DISCONTINUED | OUTPATIENT
Start: 2024-01-15 | End: 2024-01-15 | Stop reason: SDUPTHER

## 2024-01-15 RX ORDER — MEPERIDINE HYDROCHLORIDE 25 MG/ML
12.5 INJECTION INTRAMUSCULAR; INTRAVENOUS; SUBCUTANEOUS EVERY 5 MIN PRN
Status: DISCONTINUED | OUTPATIENT
Start: 2024-01-15 | End: 2024-01-15 | Stop reason: HOSPADM

## 2024-01-15 RX ORDER — HEPARIN SODIUM 5000 [USP'U]/ML
5000 INJECTION, SOLUTION INTRAVENOUS; SUBCUTANEOUS ONCE
Status: COMPLETED | OUTPATIENT
Start: 2024-01-15 | End: 2024-01-15

## 2024-01-15 RX ORDER — ONDANSETRON 2 MG/ML
4 INJECTION INTRAMUSCULAR; INTRAVENOUS
Status: DISCONTINUED | OUTPATIENT
Start: 2024-01-15 | End: 2024-01-15 | Stop reason: HOSPADM

## 2024-01-15 RX ORDER — BUPIVACAINE HYDROCHLORIDE 5 MG/ML
INJECTION, SOLUTION EPIDURAL; INTRACAUDAL PRN
Status: DISCONTINUED | OUTPATIENT
Start: 2024-01-15 | End: 2024-01-15 | Stop reason: ALTCHOICE

## 2024-01-15 RX ORDER — FENTANYL CITRATE 50 UG/ML
INJECTION, SOLUTION INTRAMUSCULAR; INTRAVENOUS PRN
Status: DISCONTINUED | OUTPATIENT
Start: 2024-01-15 | End: 2024-01-15 | Stop reason: SDUPTHER

## 2024-01-15 RX ORDER — LIDOCAINE HYDROCHLORIDE 20 MG/ML
INJECTION, SOLUTION INFILTRATION; PERINEURAL PRN
Status: DISCONTINUED | OUTPATIENT
Start: 2024-01-15 | End: 2024-01-15 | Stop reason: SDUPTHER

## 2024-01-15 RX ORDER — APREPITANT 40 MG/1
CAPSULE ORAL
Status: COMPLETED
Start: 2024-01-15 | End: 2024-01-15

## 2024-01-15 RX ORDER — OXYCODONE HYDROCHLORIDE 5 MG/1
5 TABLET ORAL EVERY 6 HOURS PRN
Qty: 24 TABLET | Refills: 0 | Status: SHIPPED | OUTPATIENT
Start: 2024-01-15 | End: 2024-01-22

## 2024-01-15 RX ORDER — APREPITANT 40 MG/1
40 CAPSULE ORAL ONCE
Status: COMPLETED | OUTPATIENT
Start: 2024-01-15 | End: 2024-01-15

## 2024-01-15 RX ADMIN — FENTANYL CITRATE 50 MCG: 50 INJECTION INTRAMUSCULAR; INTRAVENOUS at 09:16

## 2024-01-15 RX ADMIN — LIDOCAINE HYDROCHLORIDE 100 MG: 20 INJECTION, SOLUTION INFILTRATION; PERINEURAL at 09:11

## 2024-01-15 RX ADMIN — SODIUM CHLORIDE, POTASSIUM CHLORIDE, SODIUM LACTATE AND CALCIUM CHLORIDE: 600; 310; 30; 20 INJECTION, SOLUTION INTRAVENOUS at 08:32

## 2024-01-15 RX ADMIN — KETOROLAC TROMETHAMINE 30 MG: 30 INJECTION, SOLUTION INTRAMUSCULAR; INTRAVENOUS at 09:49

## 2024-01-15 RX ADMIN — SUGAMMADEX 200 MG: 100 INJECTION, SOLUTION INTRAVENOUS at 09:49

## 2024-01-15 RX ADMIN — FAMOTIDINE 20 MG: 10 INJECTION, SOLUTION INTRAVENOUS at 08:35

## 2024-01-15 RX ADMIN — ROCURONIUM BROMIDE 50 MG: 10 INJECTION, SOLUTION INTRAVENOUS at 09:11

## 2024-01-15 RX ADMIN — FENTANYL CITRATE 50 MCG: 50 INJECTION INTRAMUSCULAR; INTRAVENOUS at 09:11

## 2024-01-15 RX ADMIN — APREPITANT 40 MG: 40 CAPSULE ORAL at 08:35

## 2024-01-15 RX ADMIN — MIDAZOLAM 2 MG: 1 INJECTION INTRAMUSCULAR; INTRAVENOUS at 09:09

## 2024-01-15 RX ADMIN — HYDROMORPHONE HYDROCHLORIDE 0.5 MG: 1 INJECTION, SOLUTION INTRAMUSCULAR; INTRAVENOUS; SUBCUTANEOUS at 10:42

## 2024-01-15 RX ADMIN — PROPOFOL 100 MG: 10 INJECTION, EMULSION INTRAVENOUS at 09:11

## 2024-01-15 RX ADMIN — HEPARIN SODIUM 5000 UNITS: 5000 INJECTION INTRAVENOUS; SUBCUTANEOUS at 08:35

## 2024-01-15 RX ADMIN — DEXAMETHASONE SODIUM PHOSPHATE 8 MG: 4 INJECTION INTRA-ARTICULAR; INTRALESIONAL; INTRAMUSCULAR; INTRAVENOUS; SOFT TISSUE at 09:20

## 2024-01-15 RX ADMIN — ONDANSETRON 8 MG: 2 INJECTION INTRAMUSCULAR; INTRAVENOUS at 09:20

## 2024-01-15 RX ADMIN — SODIUM CHLORIDE 2000 MG: 900 INJECTION INTRAVENOUS at 09:11

## 2024-01-15 ASSESSMENT — PAIN - FUNCTIONAL ASSESSMENT: PAIN_FUNCTIONAL_ASSESSMENT: 0-10

## 2024-01-15 ASSESSMENT — PAIN SCALES - GENERAL
PAINLEVEL_OUTOF10: 6
PAINLEVEL_OUTOF10: 0
PAINLEVEL_OUTOF10: 8

## 2024-01-15 ASSESSMENT — PAIN DESCRIPTION - LOCATION
LOCATION: ABDOMEN

## 2024-01-15 ASSESSMENT — PAIN DESCRIPTION - ORIENTATION
ORIENTATION: RIGHT;LEFT
ORIENTATION: RIGHT;LEFT

## 2024-01-15 ASSESSMENT — PAIN DESCRIPTION - DESCRIPTORS
DESCRIPTORS: ACHING;PRESSURE;SHARP
DESCRIPTORS: PRESSURE

## 2024-01-15 ASSESSMENT — ENCOUNTER SYMPTOMS: SHORTNESS OF BREATH: 1

## 2024-01-15 ASSESSMENT — LIFESTYLE VARIABLES: SMOKING_STATUS: 0

## 2024-01-15 NOTE — ANESTHESIA POSTPROCEDURE EVALUATION
Department of Anesthesiology  Postprocedure Note    Patient: Estela Eisenberg  MRN: [id number]  YOB: 1960  Date of evaluation: 1/15/2024    Procedure Summary       Date: 01/15/24 Room / Location: [address]    Anesthesia Start: 0909 Anesthesia Stop: 1004    Procedure: CHOLECYSTECTOMY LAPAROSCOPIC WITH CHOLANGIOGRAM (Abdomen) Diagnosis:       Gallstone pancreatitis      (Gallstone pancreatitis [K85.10])    Surgeons: Lamont Villatoro MD Responsible Provider: Etienne Raymond MD    Anesthesia Type: general ASA Status: 3            Anesthesia Type: No value filed.    Page Phase I: Page Score: 8    Page Phase II:      Anesthesia Post Evaluation    Patient location during evaluation: bedside  Patient participation: complete - patient participated  Level of consciousness: awake and alert  Airway patency: patent  Nausea & Vomiting: no nausea  Cardiovascular status: hemodynamically stable  Respiratory status: acceptable  Hydration status: euvolemic  Pain management: adequate      Vitals:    01/15/24 1040 01/15/24 1045 01/15/24 1050 01/15/24 1055   BP: (!) 120/57 (!) 116/56     Pulse: 78 76 82 80   Resp: 16 14 14 17   Temp: 97.7 °F (36.5 °C)      TempSrc: Infrared      SpO2: 96% 98% 96% 97%   Weight:       Height:            No notable events documented.

## 2024-01-15 NOTE — DISCHARGE INSTRUCTIONS
[facility]    Thiago Blandon M.D.   [facility]      [facility]        [facility]               [address]                [address]  Ran Barker M.D.              [address]           [address]          [place] [address]         [place] [address]  Yogesh oLwe M.D                         [phone]        [facility]                   Lamont Villatoro M.D.          [facility]       POST-OPERATIVE INSTRUCTIONS FOR GALLBLADDER SURGERY    Call the office to schedule your post-operative appointment with your surgeon for two (2) weeks.   Resume Eliquis tomorrow, Tuesday, January 16, 2024.     You will have either white steri-strips and a water occlusive dressing or surgical glue closing your incisions.    If you have clear bandages over your incisions, you may remove them in 2 days.  Leave the steri-stips in place. These will peel away in 7-10 days.     You may shower in 2 days after removing your dressing.  Wash incisions gently, and pat them dry. Do not rub your incisions.    General guidelines for activity:   Avoid strenuous activity or lifting anything heavier than 15 pounds.    It is OK to be up  walking around, and walking up and down stairs.     Do what is comfortable: stop and rest when you feel tired.   Drink plenty of fluids and stay on a bland diet for 2-3 days after surgery.     Do NOT drive while taking your narcotic pain medicine.  You may resume driving when you feel capable of responding to an urgent situation if needed and not taking prescription pain medication.     Watch for signs of infection:  Excessive warmth or bright redness around your incisions  Leakage of bloody or cloudy fluid from you incisions  Fever over 100.5  During the laparoscopic procedure that you had, gas is pumped into the abdominal cavity.  You may feel abdominal, shoulder,

## 2024-01-15 NOTE — ANESTHESIA PRE PROCEDURE
within 24 Hrs         Neuro/Psych:   (+) CVA (L FACIAL DROOP): residual symptoms, neuromuscular disease (\"COG. D.D.\"):, headaches: migraine headaches, psychiatric history (SCHIZO):depression/anxiety  (DEP.)   (-) seizures and TIA           GI/Hepatic/Renal:   (+) liver disease (ELEV LFTS):     (-) GERD, no renal disease, bowel prep and no morbid obesity       Endo/Other:    (+) DiabetesType II DM, using insulin, blood dyscrasia (OFF PLAVIX 5D): arthritis:., no malignancy/cancer.    (-) no malignancy/cancer               Abdominal:       Abdomen: soft.      Vascular:   + PVD, aortic or cerebral, PE.       Other Findings: L FACIAL DROOP  L CHEST PPM/AICD        Anesthesia Plan      general     ASA 3       Induction: intravenous.    MIPS: Postoperative opioids intended and Prophylactic antiemetics administered.  Anesthetic plan and risks discussed with patient.      Plan discussed with CRNA.              This pre-anesthesia assessment may be used as a history and physical.    DOS STAFF ADDENDUM:    Pt seen and examined, chart reviewed (including anesthesia, drug and allergy history).  No interval changes to history and physical examination.  Anesthetic plan, risks, benefits, alternatives, and personnel involved discussed with patient.  Patient verbalized an understanding and agrees to proceed.      Etienne Raymond MD  January 15, 2024  8:36 AM    Etienne Raymond MD   1/15/2024

## 2024-01-15 NOTE — PROGRESS NOTES
CLINICAL PHARMACY NOTE: MEDS TO BEDS    Total # of Prescriptions Filled: 1   The following medications were delivered to the patient:  Oxycodone 5mg #24 1q6h ppa    Additional Documentation:

## 2024-01-15 NOTE — BRIEF OP NOTE
Brief Postoperative Note      Patient: Estela Eisenberg  YOB: 1960  MRN: [id number]    Date of Procedure: 1/15/2024    Pre-Op Diagnosis Codes:     * Gallstone pancreatitis [K85.10]    Post-Op Diagnosis: Same       Procedure(s):  CHOLECYSTECTOMY LAPAROSCOPIC WITH CHOLANGIOGRAM and lap liver biopsy      Surgeon(s):  Mayda Villatoro MD    Assistant:  Surgical Assistant: Leslie Velasquez    Anesthesia: General    Estimated Blood Loss (mL): Minimal    Complications: None    Specimens:   ID Type Source Tests Collected by Time Destination   A : gallbladder and contents   Tissue Gallbladder SURGICAL PATHOLOGY Mayda Villatoro MD 1/15/2024 0939    B : Liver biopsy Tissue Tissue SURGICAL PATHOLOGY Mayda Villatoro MD 1/15/2024 0940        Implants:  * No implants in log *      Drains: * No LDAs found *    Findings: As above      Electronically signed by MAYDA VILLATORO MD on 1/15/2024 at 9:49 AM

## 2024-01-19 NOTE — OP NOTE
across the right  subcostal abdominal wall.  A nick was made in the cystic duct.   Cholangiogram was obtained.  This confirmed our position in the cystic  duct.  I visualized the common bile duct, common hepatic duct and both  biliary radicals.  There was no ductal dilation.  There was prompt flow  of contrast into the duodenum.  There were no obvious filling defects.   I removed the cholangiogram catheter.  I placed three clips below the  nick in the cystic duct and the cystic duct was divided.  Cystic artery  had two clips placed proximal and one clip distal.  Cystic artery was  divided.  Posterior branch of the artery was clipped as well.  The  gallbladder was dissected from the gallbladder fossa of the liver bed.   Gallbladder was brought out through the epigastric incision.  I  reinspected the right upper quadrant.  I copiously irrigated the area.   I suctioned out the irrigant.  There was no evident bleeding, bile leak,  or complication.  I then used a prostate biopsy gun _____ of liver  tissue as liver biopsy given that her liver enzymes were elevated.   Hemostasis was confirmed.  I then deflated the abdomen and removed the  trocars.  The fascia at the epigastric port site was reapproximated with  0 Vicryl suture.  Local anesthetic was infiltrated.  4-0 Vicryl was used  to reapproximate the skin at all the incisions.  Benzoin and Steri-Strip  dressing were placed.    DISPOSITION:  The patient tolerated the procedures without any acute  complication.        MAYDA VINCENT MD    D: 01/18/2024 21:33:22       T: 01/18/2024 21:38:08     [email or web address]  Job#: [id number]     Doc#: [id number]    CC:

## 2024-03-28 NOTE — PROGRESS NOTES
Outside service performed at [facility] with date of service 12/14/2023.   Clinic note located in the media section.

## 2025-03-10 NOTE — PROGRESS NOTES
Hospitalist Progress Note      PCP: Jackelyn Medina APRN - NP    Date of Admission: 3/25/2021    Chief Complaint: dyspnea, edema       Subjective:  She has a neighbor visiting today and her mood is remarkably better. Still denies any dyspnea. She had a standing weight of 194 at the time of discharge in 11/2020. Medications:  Reviewed    Infusion Medications    dextrose      furosemide (LASIX) 1mg/ml infusion 5 mg/hr (03/30/21 8375)     Scheduled Medications    insulin glargine  30 Units Subcutaneous Nightly    magnesium oxide  400 mg Oral Daily    potassium chloride  10 mEq Oral BID    metoprolol succinate  25 mg Oral Daily    amiodarone  200 mg Oral Daily    aspirin  81 mg Oral Daily    apixaban  5 mg Oral BID    atorvastatin  40 mg Oral Nightly    digoxin  125 mcg Oral Every Other Day    DULoxetine  30 mg Oral Daily    fluticasone  1 spray Each Nostril Daily    lisinopril  5 mg Oral Daily    pantoprazole  40 mg Oral Daily    QUEtiapine  50 mg Oral Nightly    insulin lispro  0-6 Units Subcutaneous TID WC    insulin lispro  0-3 Units Subcutaneous Nightly    sodium chloride flush  10 mL Intravenous 2 times per day    miconazole   Topical BID    spironolactone  12.5 mg Oral Daily     PRN Meds: glucose, dextrose, glucagon (rDNA), dextrose, sodium chloride flush, polyethylene glycol, acetaminophen **OR** acetaminophen, perflutren lipid microspheres, prochlorperazine, magnesium sulfate      Intake/Output Summary (Last 24 hours) at 3/30/2021 1514  Last data filed at 3/30/2021 1215  Gross per 24 hour   Intake 1489 ml   Output 2400 ml   Net -911 ml       Physical Exam Performed:    BP 97/63   Pulse 72   Temp 97.4 °F (36.3 °C) (Oral)   Resp 14   Ht 5' 1\" (1.549 m)   Wt 221 lb 3.2 oz (100.3 kg)   SpO2 99%   BMI 41.80 kg/m²       General appearance: No apparent distress, appears stated age and cooperative. HEENT: Pupils equal, round, and reactive to light.  Conjunctivae/corneas VO given to Dina per Dr. Cotto.    clear.  Neck: Supple, with full range of motion. No jugular venous distention. Trachea midline. Respiratory:  Normal respiratory effort. Clear to auscultation, bilaterally without Rales/Wheezes/Rhonchi. Distant lungs. Cardiovascular: Regular rate and rhythm with normal S1/S2 without murmurs, rubs or gallops. Abdomen: Soft, non-tender, non-distended with normal bowel sounds. Musculoskeletal: No clubbing, cyanosis. 2+ BLE pitting edema. Full range of motion without deformity. Skin: Skin color, texture, turgor normal.  No rashes or lesions. Neurologic:  Neurovascularly intact without any focal sensory/motor deficits. Cranial nerves: II-XII intact, grossly non-focal.  Psychiatric: Alert and oriented, thought content appropriate, normal insight. Capillary Refill: Brisk,< 3 seconds   Peripheral Pulses: +2 palpable, equal bilaterally       Labs:   No results for input(s): WBC, HGB, HCT, PLT in the last 72 hours. Recent Labs     03/28/21  0848 03/29/21  0921 03/30/21  0913    137 136   K 3.5 3.6 3.5   CL 98* 99 98*   CO2 26 28 30   BUN 16 16 15   CREATININE 0.6 0.7 0.7   CALCIUM 8.5 8.6 8.7     No results for input(s): AST, ALT, BILIDIR, BILITOT, ALKPHOS in the last 72 hours. No results for input(s): INR in the last 72 hours. No results for input(s): [place] in the last 72 hours.     Urinalysis:      Lab Results   Component Value Date    NITRU Negative 02/02/2021    WBCUA 10-20 02/02/2021    BACTERIA Rare 02/02/2021    RBCUA 0-2 02/02/2021    BLOODU Negative 02/02/2021    SPECGRAV 1.010 02/02/2021    GLUCOSEU 250 02/02/2021       Radiology:  No orders to display           Assessment/Plan:    Active Hospital Problems    Diagnosis    Anasarca [R60.1]    Pericardial effusion [I31.3]    Acute on chronic systolic CHF (congestive heart failure) (HCC) [I50.23]    Dual ICD (implantable cardioverter-defibrillator) in place [Z95.810]    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) [I63.50]  DM (diabetes mellitus) ([facility] [address] [E11.9]    HTN (hypertension), benign [I10]    Dyslipidemia [E78.5]       \"Patient with PMH of DM 2, HTN, chronic CHF, EF <20%, moderate pulmonary hypertension, Hx of PE, depression presented to [facility] ED last night with complaints of worsening shortness of breath. Patient reports she has gained about 50 pounds in the last month all fluid. She reports chest tightness with ambulation. Patient reports she is no longer in hospice and has recently moved in with her family in [place]. She was evaluated by Dr. Jackson Chamberlain yesterday in the CHF clinic, was noted to be fluid overloaded and was asked to increase her Lasix dose to twice daily. She does report 3 pillow orthopnea, no PND. \"      Acute on chronic systolic and diastolic CHF  - furosemide gtt (increased 3/30), spironolactone. She had a standing weight of 194 lbs at the time of discharge in 11/2020.  - lisinopril. Cardiology plans to transition to entresto. - started metoprolol.  - has AICD     Chronic moderate pericardial effusion. Conservative management. DM2. Insulin regimen.       History of PE-resumed Eliquis     Hx of atrial tachycardia, PSVT-resume amiodarone     Hx of LV thrombus, also PE diagnosed in November 2020 - on Eliquis     Depression-mood stable, resume Cymbalta and Seroquel     History of stroke-on aspirin statin and Eliquis for secondary prevention      DVT Prophylaxis: anticoagulation as above  Diet: DIET CARB CONTROL; Low Sodium (2 GM); Daily Fluid Restriction: 1500 ml  Code Status: DNR-CCA    PT/OT Eval Status: rec'd SNF    Dispo -  When she is adequately diuresed, perhaps 4/1-3. She came from home but plan is for rehab.   I think SNF would be best.      Barber Benitez MD

## 2025-07-12 NOTE — CONSULTS
[address] [place] [address]  [phone]        Reason for Consultation/Chief Complaint: \"I have been high sugars. \"    Consulted for diuretic management    History of Present Illness:  Della Ortiz is a 62 y.o. patient who presented to [facility] 10/26/20 with c/o high sugars and SOB. I saw as consult on 9/11/2020 at [facility] and no testing needed. She has PMH: severe NICM, chronic systolic CHF, [place] s/p dual ICD as well as HTN, HLD, uncontrolled DM, and mental disability. Most recent Lexiscan myoview stress test 10/05/20 Dilated LV with severe global HK; Large, severe, fixed perfusion defect of the inferior inferolateral wall c/w scar. Diminished  uptake of the anterior wall c/w breast artifact. Post stress  LVEF is abnormal at 19% (no sig change from 1/20 study). Most recent [facility] 10/06/20 showed mild NOCAD; RAP=20, PAP=53/8; wedge=20; Moderate pulm HTN. Most recent limited ECHO 10/06/20 showed EF=15-20% (EF=35% on 6/13/20 and 10/18/19 studies);.severe global HK. Most recent device check 10/03/20 Dual ICD with normal function. Last interrogation 9/21/20. SVT/AT and NSVTt recorded. Note recent admit [facility] 10/02/20-10/14/20 with SOB and chest pain. Treated for acute on chronic systolic CHF and cardiac testing performed (see above). Diuresed with IV lasix gtt and decreased weight about 20# down to around 200#. She had issues with hypotension and entresto held initially but then added back. On d/c she was d/c'd from lasix 40mg qd and switched to aldactone 25mg daily. Now presents with c/o high sugars and SOB at home. Reported N/V/D also. Note she is vague and unreliable historian. Admit EKG shows possible ectopic atrial rhythm; nonspecific ST change anterolateral infarct (10/10/20 EKG atrial paced; PVC's). Note CXR showed developing CHF/volume overload. Pro=BNP 5183 (662 on 10/13/20), troponins neg x 2, Mg+ 1.70. Nursing staff noted 7 beat run of [facility] earlier today while patient was sleeping. Weight today 214#. Patient with no complaints of chest pain, palpitations, dizziness, edema, or orthopnea/PND. I have been asked to provide consultation regarding further management and testing. Past Medical History:   has a past medical history of Arthritis, CAD (coronary artery disease), Cerebral artery occlusion with cerebral infarction ([facility] [address] CHF (congestive heart failure) ([facility] [address] Diabetes mellitus [facility] Hyperlipidemia, Hypertension, Mental retardation, MI ([facility]ocardial [facility]) [facility] and Pacemaker. Surgical History:   has a past surgical history that includes back surgery; Pacemaker insertion; tumor removal; and Tubal ligation. Social History:   reports that she has never smoked. She has never used smokeless tobacco. She reports that she does not drink alcohol or use drugs. Family History:  family history includes Cancer in her mother; Heart Disease in her father; Other in her mother. Home Medications:  Were reviewed and are listed in nursing record. and/or listed below  Prior to Admission medications    Medication Sig Start Date End Date Taking?  Authorizing Provider   insulin aspart (NOVOLOG FLEXPEN) 100 UNIT/ML injection pen Inject 20 Units into the skin 3 times daily (before meals)   Yes Historical Provider, MD   insulin glargine (LANTUS SOLOSTAR) 100 UNIT/ML injection Inject 40 Units into the skin 2 times daily   Yes Historical Provider, MD   metFORMIN (GLUCOPHAGE) 1000 MG tablet Take 1,000 mg by mouth 2 times daily (with meals)   Yes Historical Provider, MD   vitamin D (ERGOCALCIFEROL) 1.25 MG (66950 UT) CAPS capsule Take 50,000 Units by mouth once a week Takes weekly on Sundays   Yes Historical Provider, MD   QUEtiapine (SEROQUEL) 25 MG tablet Take 25 mg by mouth nightly   Yes Historical Provider, MD   amiodarone (PACERONE) 100 MG tablet Take 1 tablet by mouth daily 10/14/20  Yes FRITZ Neumann CNP   metoprolol succinate (TOPROL XL) 25 MG extended release tablet Take 0.5 tablets by mouth daily 10/14/20  Yes FRITZ Rojas - CNP   sacubitril-valsartan (ENTRESTO) 24-26 MG per tablet Take 0.5 tablets by mouth 2 times daily 10/13/20  Yes FRITZ Rojas - CNP   spironolactone (ALDACTONE) 25 MG tablet Take 1 tablet by mouth daily 10/13/20  Yes FRITZ Rojas - CNP   digoxin (LANOXIN) 125 MCG tablet Take 1 tablet by mouth every other day 9/25/20  Yes Noe Carrasco APRN - CNP   nitroGLYCERIN (NITROSTAT) 0.4 MG SL tablet up to max of 3 total doses. If no relief after 1 dose, call 911. 8/24/20  Yes Isabel Zavala MD   ondansetron [facility]) 4 MG tablet Take 1 tablet by mouth every 8 hours as needed for Nausea 8/12/20  Yes Marcelle Marquis DO   aspirin 81 MG EC tablet Take 1 tablet by mouth daily 7/18/20  Yes Va Mays MD   ranolazine (RANEXA) 500 MG extended release tablet Take 1 tablet by mouth 2 times daily 7/18/20  Yes Va Mays MD   DULoxetine (CYMBALTA) 60 MG extended release capsule Take 1 capsule by mouth daily 7/18/20  Yes Va Mays MD   atorvastatin (LIPITOR) 40 MG tablet Take 1 tablet by mouth nightly 7/18/20  Yes Va Mays MD   fenofibrate micronized (LOFIBRA) 200 MG capsule Take 1 capsule by mouth nightly 7/18/20  Yes Va Mays MD   miconazole (MICOTIN) 2 % powder Apply topically 2 times daily. 7/18/20  Yes Va Mays MD   [employer] Lancets Ultra Thin MISC 1 each by Does not apply route 4 times daily 7/18/20  Yes Va Mays MD   blood glucose monitor strips Test three times a day & as needed for symptoms of irregular blood glucose. 5/12/19  Yes Bibi Lund MD        Allergies:  Patient has no known allergies.      Review of Systems:   12 point ROS negative in all areas as listed below except as in [place]  Constitutional, EENT, Cardiovascular, pulmonary, GI, , Musculoskeletal, skin, neurological, hematological, endocrine, Psychiatric    Physical Examination:    Vitals: 10/26/20 1155   BP: 117/78   Pulse: 93   Resp:    Temp:    SpO2:     Weight: 214 lb 9.6 oz (97.3 kg)         General Appearance:  Alert, cooperative, no distress, appears stated age   Head:  Normocephalic, without obvious abnormality, atraumatic   Eyes:  PERRL, conjunctiva/corneas clear       Nose: Nares normal, no drainage or sinus tenderness   Throat: Lips, mucosa, and tongue normal   Neck: Supple, symmetrical, trachea midline, no adenopathy, thyroid: not enlarged, symmetric, no tenderness/mass/nodules, no carotid bruit or JVD       Lungs:   Clear to auscultation bilaterally, respirations unlabored   Chest Wall:  No tenderness or deformity   Heart:  Regular rate and rhythm, S1, S2 normal, no murmur, rub or gallop   Abdomen:   Soft, non-tender, bowel sounds active all four quadrants,  no masses, no organomegaly           Extremities: Extremities normal, atraumatic, no cyanosis; 1+ BLE edema   Pulses: 2+ and symmetric   Skin: Skin color, texture, turgor normal, no rashes or lesions   Pysch: Normal mood and affect   Neurologic: Normal gross motor and sensory exam.         Labs  CBC:   Lab Results   Component Value Date    WBC 7.5 10/26/2020    RBC 4.49 10/26/2020    HGB 13.4 10/26/2020    HCT 40.9 10/26/2020    MCV 91.1 10/26/2020    RDW 15.4 10/26/2020     10/26/2020     CMP:    Lab Results   Component Value Date     10/26/2020    K 4.2 10/26/2020    CL 99 10/26/2020    CO2 24 10/26/2020    BUN 12 10/26/2020    CREATININE 0.6 10/26/2020    GFRAA >60 10/26/2020    AGRATIO 1.5 10/26/2020    LABGLOM >60 10/26/2020    GLUCOSE 348 10/26/2020    PROT 6.2 10/26/2020    CALCIUM 8.8 10/26/2020    BILITOT 0.9 10/26/2020    ALKPHOS 101 10/26/2020    AST 25 10/26/2020    ALT 24 10/26/2020     PT/INR:  No results found for: PTINR  Lab Results   Component Value Date    CKTOTAL 54 06/12/2020    TROPONINI <0.01 10/26/2020       EKG: 10/26/20 I have reviewed EKG with the following interpretation:  Impression:  See HPI Unusual P axis, possible ectopic atrial rhythmAnterolateral infarct , age undeterminedNonspecific ST abnormalityAbnormal ECGConfirmed by ALEX Brumfield MD ([id number]) on 10/26/2020 10:34:50 AM     CXR 10/26/20  Developing CHF/volume overload. FINDINGS: Left-sided AICD is in stable position. Cardiomegaly again noted. Mild pulmonary vascular congestion seen. Small bilateral pleural effusions and bibasilar patchy opacities are noted, likely atelectasis. Trachea midline. No pneumothorax. Bones are unchanged. Echo June 2020:    Summary   LV systolic function is moderately reduced with an estimated EF of 35%.   Moderate global hypokinesis.   There is mild concentric left ventricular hypertrophy.   Left ventricular cavity size is mildly dilated.   Estimated LV diastolic filling pressure is normal.   Mild mitral and tricuspid regurgitation.   Systolic pulmonary artery pressure (SPAP) is estimated at 35mmHg (Right atrial pressure of 8 mmHg).   No significant change from exam done 10/18/2019.     Stress Test January 2020:   Summary     Mildly reduced LVEF 45%     Inferolateral hypokinesis     Mainly fixed inferior defect suggestive of scar     No evidence of myocardium at risk for significant reversible ischemia.          Stress test 10/5/2020:  Dilated LV with severe global hypokinesis. Large, severe, fixed perfusion     defect of the inferior/inferolateral wall consistent with scar. Diminished     uptake of the anterior wall consistent with breast artifact. Post stress     LVEF is abnormal at 19%. Abnormal study. Overall findings represent a high     risk scan. Limited echo 10/06/20  Summary   Limited only f/u for LVEF. The left ventricular systolic function is severely reduced with an ejection   fraction of 15-20 %. There is severe global hypokinesis with regional variations. Changes noted from previous echo on 6- in left ventricular function. Cardiac cath 10/6/20  Findings:  1.  Left main coronary artery was normal. It gave off the left anterior descending artery and left circumflex. 2. Left anterior descending artery has mild atherosclerotic disease.  It was moderate in size. It gave off septal perforators and a moderate sized diagonal branch. The LAD covered the entire apex of the left ventricle. 3. Left circumflex has mild atherosclerotic disease.  It was moderate in size. There was a moderate sized obtuse marginal branch.   4. Right coronary artery has mild atherosclerotic disease. It was moderate in size and was the dominant artery.   5. Left ventriculogram was not performed.  Left ventricular end diastolic pressure was 26.  There is no gradient across pullback of the aortic valve.     Right heart catheterization findings:   Right atrial pressure of 20  RV 45/7  PA 53/8  Pulmonary wedge pressure mean of 20  RA saturation 42%  PA saturation 45%  Aortic saturation 99%  Cardiac output 2.4  Cardiac index 1.24  SVR 2433  PSZ 440    Assessment:  Jay Jc is a 62 y.o. patient who presented to [facility] 10/26/20 with c/o high sugars and SOB. I saw as consult on 9/11/2020 at [facility] and no testing needed. She has PMH: severe NICM, chronic systolic CHF, [place] s/p dual ICD as well as HTN, HLD, uncontrolled DM, and mental disability. Most recent Lexiscan myoview stress test 10/05/20 Dilated LV with severe global HK; Large, severe, fixed perfusion defect of the inferior inferolateral wall c/w scar. Diminished  uptake of the anterior wall c/w breast artifact. Post stress  LVEF is abnormal at 19% (no sig change from 1/20 study). Most recent [facility] 10/06/20 showed mild NOCAD; RAP=20, PAP=53/8; wedge=20; Moderate pulm HTN. Most recent limited ECHO 10/06/20 showed EF=15-20% (EF=35% on 6/13/20 and 10/18/19 studies);.severe global HK. Most recent device check 10/03/20 Dual ICD with normal function. Last interrogation 9/21/20. SVT/AT and NSVTt recorded. Note recent admit [facility] 10/02/20-10/14/20 with SOB and chest pain. Chronic combined systolic and diastolic congestive heart failure (HCC)    TIA involving right internal carotid artery    CAD in native artery    DM (diabetes mellitus), secondary, uncontrolled, w/neurologic complic ([address]    CHF (congestive heart failure) ([address]    Nonischemic cardiomyopathy ([address]    Essential hypertension    TIA (transient ischemic attack)    Hyperglycemia    Diabetic ketoacidosis without coma associated with type 2 diabetes mellitus (HCC)    Non-intractable vomiting with nausea    Chest pain    Arterial ischemic stroke, ICA, right, acute ([address]    Diabetic hyperosmolar non-ketotic state ([address]    Diabetic acidosis without coma ([address]    Hyponatremia    Metabolic acidosis    Disorder of electrolytes    Diabetic ketoacidosis with coma associated with type 2 diabetes mellitus (HCC)    Hypernatremia    Leukocytosis    Atrial tachycardia (HCC)    DKA, type 2, not at goal [facility])    Cognitive developmental delay    Mood disorder (HCC)    Urinary tract infection with hematuria    Nausea vomiting and diarrhea    Hypokalemia    Persistent fever    Syncope and collapse    S/P ICD (internal cardiac defibrillator) procedure    History of CVA (cerebrovascular accident)    Noncompliance with medications    Obesity    SVT (supraventricular tachycardia) (HCC)    Type 2 diabetes mellitus with hyperglycemia, with long-term current use of insulin (HCC)    Acute pulmonary edema (HCC)    Mixed hyperlipidemia    Intractable nausea and vomiting    Hypomagnesemia    Lactic acidosis    NSVT (nonsustained ventricular tachycardia) ([address]      Thank you for allowing to us to participate in the care or Kaykay Cuff. Further evaluation will be based upon the patient's clinical course and testing results. No

## (undated) DEVICE — MAX-CORE® DISPOSABLE CORE BIOPSY INSTRUMENT, 18G X 20CM: Brand: MAX-CORE

## (undated) DEVICE — GLOVE ORANGE PI 8 1/2   MSG9085

## (undated) DEVICE — GAUZE SPONGES,8 PLY: Brand: CURITY

## (undated) DEVICE — INSUFFLATION NEEDLE TO ESTABLISH PNEUMOPERITONEUM.: Brand: INSUFFLATION NEEDLE

## (undated) DEVICE — FORCEP BX STD CAP 240CM RAD JAW 4

## (undated) DEVICE — LARGE BORE STOPCOCK WITH ROTATING MALE LUER LOCK

## (undated) DEVICE — PAD,NON-ADHERENT,3X8,STERILE,LF,1/PK: Brand: MEDLINE

## (undated) DEVICE — TROCAR: Brand: KII FIOS FIRST ENTRY

## (undated) DEVICE — TISSUE RETRIEVAL SYSTEM: Brand: INZII RETRIEVAL SYSTEM

## (undated) DEVICE — DISPOSABLE LAPAROSCOPIC CLIP APPLIER WITH 20 CLIPS.: Brand: EPIX® UNIVERSAL CLIP APPLIER

## (undated) DEVICE — CATHETER IV 14GA L1.75IN OD2.146MM ID1.740MM ORNG VIALON

## (undated) DEVICE — CATHETER CHOLGM 4.5FR L18IN W/ MTL SUPP TB

## (undated) DEVICE — PUMP SUC IRR TBNG L10FT W/ HNDPC ASSEMB STRYKEFLOW 2

## (undated) DEVICE — ENDO CARRY-ON PROCEDURE KIT INCLUDES SUCTION TUBING, LUBRICANT, GAUZE, BIOHAZARD STICKER, TRANSPORT PAD AND INTERCEPT BEDSIDE KIT.: Brand: ENDO CARRY-ON PROCEDURE KIT

## (undated) DEVICE — YANKAUER,BULB TIP,W/O VENT,RIGID,STERILE: Brand: MEDLINE

## (undated) DEVICE — SET EXTN IV L30IN TBNG DIA0.1IN PRIMING 4ML MACBOR FEM ADPT

## (undated) DEVICE — TROCAR: Brand: KII® SLEEVE

## (undated) DEVICE — MHCZ GENERAL LAPAROSCOPY: Brand: MEDLINE INDUSTRIES, INC.

## (undated) DEVICE — GOWN,AURORA,NONREINF,RAGLAN,XXL,STERILE: Brand: MEDLINE

## (undated) DEVICE — SUTURE VCRL SZ 4-0 L18IN ABSRB UD L19MM PS-2 3/8 CIR PRIM J496H

## (undated) DEVICE — SYRINGE 20CC LUER LOCK: Brand: CARDINAL HEALTH

## (undated) DEVICE — SUTURE SZ 0 27IN 5/8 CIR UR-6  TAPER PT VIOLET ABSRB VICRYL J603H

## (undated) DEVICE — TUBING, SUCTION, 3/16" X 10', STRAIGHT: Brand: MEDLINE

## (undated) DEVICE — ELECTRODE ECG MONITR FOAM TEAR DROP ADLT RED

## (undated) DEVICE — CANNULA,OXY,ADULT,SUPERSOFT,W/7'TUB,SC: Brand: MEDLINE INDUSTRIES, INC.

## (undated) DEVICE — CONMED SCOPE SAVER BITE BLOCK, 20X27 MM: Brand: SCOPE SAVER

## (undated) DEVICE — INTENDED FOR TISSUE SEPARATION, AND OTHER PROCEDURES THAT REQUIRE A SHARP SURGICAL BLADE TO PUNCTURE OR CUT.: Brand: BARD-PARKER ® STAINLESS STEEL BLADES

## (undated) DEVICE — SOLUTION IV IRRIG 500ML 0.9% SODIUM CHL 2F7123